# Patient Record
Sex: FEMALE | Race: WHITE | Employment: OTHER | URBAN - METROPOLITAN AREA
[De-identification: names, ages, dates, MRNs, and addresses within clinical notes are randomized per-mention and may not be internally consistent; named-entity substitution may affect disease eponyms.]

---

## 2017-10-02 ENCOUNTER — APPOINTMENT (OUTPATIENT)
Dept: MRI IMAGING | Age: 72
End: 2017-10-02
Attending: HOSPITALIST
Payer: MEDICARE

## 2017-10-02 ENCOUNTER — HOSPITAL ENCOUNTER (OUTPATIENT)
Age: 72
Setting detail: OBSERVATION
Discharge: HOME | End: 2017-10-03
Attending: EMERGENCY MEDICINE | Admitting: HOSPITALIST
Payer: MEDICARE

## 2017-10-02 ENCOUNTER — APPOINTMENT (OUTPATIENT)
Dept: CT IMAGING | Age: 72
End: 2017-10-02
Attending: EMERGENCY MEDICINE
Payer: MEDICARE

## 2017-10-02 ENCOUNTER — APPOINTMENT (OUTPATIENT)
Dept: GENERAL RADIOLOGY | Age: 72
End: 2017-10-02
Attending: EMERGENCY MEDICINE
Payer: MEDICARE

## 2017-10-02 DIAGNOSIS — G45.9 TRANSIENT CEREBRAL ISCHEMIA, UNSPECIFIED TYPE: Primary | ICD-10-CM

## 2017-10-02 PROBLEM — I63.9 CVA (CEREBRAL VASCULAR ACCIDENT) (HCC): Status: ACTIVE | Noted: 2017-10-02

## 2017-10-02 LAB
ALBUMIN SERPL-MCNC: 4.1 G/DL (ref 3.4–5)
ALBUMIN/GLOB SERPL: 1.1 {RATIO} (ref 0.8–1.7)
ALP SERPL-CCNC: 76 U/L (ref 45–117)
ALT SERPL-CCNC: 25 U/L (ref 13–56)
ANION GAP SERPL CALC-SCNC: 8 MMOL/L (ref 3–18)
AST SERPL-CCNC: 20 U/L (ref 15–37)
ATRIAL RATE: 68 BPM
BASOPHILS # BLD: 0 K/UL (ref 0–0.06)
BASOPHILS NFR BLD: 0 % (ref 0–2)
BILIRUB SERPL-MCNC: 0.4 MG/DL (ref 0.2–1)
BUN SERPL-MCNC: 15 MG/DL (ref 7–18)
BUN/CREAT SERPL: 21 (ref 12–20)
CALCIUM SERPL-MCNC: 8.8 MG/DL (ref 8.5–10.1)
CALCULATED P AXIS, ECG09: 69 DEGREES
CALCULATED R AXIS, ECG10: 37 DEGREES
CALCULATED T AXIS, ECG11: 53 DEGREES
CHLORIDE SERPL-SCNC: 108 MMOL/L (ref 100–108)
CO2 SERPL-SCNC: 25 MMOL/L (ref 21–32)
CREAT SERPL-MCNC: 0.71 MG/DL (ref 0.6–1.3)
DIAGNOSIS, 93000: NORMAL
DIFFERENTIAL METHOD BLD: ABNORMAL
EOSINOPHIL # BLD: 0.1 K/UL (ref 0–0.4)
EOSINOPHIL NFR BLD: 1 % (ref 0–5)
ERYTHROCYTE [DISTWIDTH] IN BLOOD BY AUTOMATED COUNT: 13 % (ref 11.6–14.5)
GLOBULIN SER CALC-MCNC: 3.8 G/DL (ref 2–4)
GLUCOSE BLD STRIP.AUTO-MCNC: 101 MG/DL (ref 70–110)
GLUCOSE BLD STRIP.AUTO-MCNC: 164 MG/DL (ref 70–110)
GLUCOSE SERPL-MCNC: 122 MG/DL (ref 74–99)
HCT VFR BLD AUTO: 40.9 % (ref 35–45)
HGB BLD-MCNC: 13.9 G/DL (ref 12–16)
INR PPP: 1 (ref 0.8–1.2)
LYMPHOCYTES # BLD: 2.7 K/UL (ref 0.9–3.6)
LYMPHOCYTES NFR BLD: 22 % (ref 21–52)
MCH RBC QN AUTO: 31 PG (ref 24–34)
MCHC RBC AUTO-ENTMCNC: 34 G/DL (ref 31–37)
MCV RBC AUTO: 91.3 FL (ref 74–97)
MONOCYTES # BLD: 0.7 K/UL (ref 0.05–1.2)
MONOCYTES NFR BLD: 6 % (ref 3–10)
NEUTS SEG # BLD: 8.5 K/UL (ref 1.8–8)
NEUTS SEG NFR BLD: 71 % (ref 40–73)
P-R INTERVAL, ECG05: 138 MS
PLATELET # BLD AUTO: 237 K/UL (ref 135–420)
PMV BLD AUTO: 9.8 FL (ref 9.2–11.8)
POTASSIUM SERPL-SCNC: 4 MMOL/L (ref 3.5–5.5)
PROT SERPL-MCNC: 7.9 G/DL (ref 6.4–8.2)
PROTHROMBIN TIME: 12.6 SEC (ref 11.5–15.2)
Q-T INTERVAL, ECG07: 430 MS
QRS DURATION, ECG06: 82 MS
QTC CALCULATION (BEZET), ECG08: 457 MS
RBC # BLD AUTO: 4.48 M/UL (ref 4.2–5.3)
SODIUM SERPL-SCNC: 141 MMOL/L (ref 136–145)
VENTRICULAR RATE, ECG03: 68 BPM
WBC # BLD AUTO: 12 K/UL (ref 4.6–13.2)

## 2017-10-02 PROCEDURE — 96372 THER/PROPH/DIAG INJ SC/IM: CPT

## 2017-10-02 PROCEDURE — 99285 EMERGENCY DEPT VISIT HI MDM: CPT

## 2017-10-02 PROCEDURE — 85610 PROTHROMBIN TIME: CPT | Performed by: EMERGENCY MEDICINE

## 2017-10-02 PROCEDURE — 80053 COMPREHEN METABOLIC PANEL: CPT | Performed by: EMERGENCY MEDICINE

## 2017-10-02 PROCEDURE — 96375 TX/PRO/DX INJ NEW DRUG ADDON: CPT

## 2017-10-02 PROCEDURE — 74011250637 HC RX REV CODE- 250/637: Performed by: EMERGENCY MEDICINE

## 2017-10-02 PROCEDURE — 70551 MRI BRAIN STEM W/O DYE: CPT

## 2017-10-02 PROCEDURE — 70544 MR ANGIOGRAPHY HEAD W/O DYE: CPT

## 2017-10-02 PROCEDURE — 99218 HC RM OBSERVATION: CPT

## 2017-10-02 PROCEDURE — 74011250637 HC RX REV CODE- 250/637: Performed by: HOSPITALIST

## 2017-10-02 PROCEDURE — 74011250636 HC RX REV CODE- 250/636: Performed by: HOSPITALIST

## 2017-10-02 PROCEDURE — 85025 COMPLETE CBC W/AUTO DIFF WBC: CPT | Performed by: EMERGENCY MEDICINE

## 2017-10-02 PROCEDURE — 96374 THER/PROPH/DIAG INJ IV PUSH: CPT

## 2017-10-02 PROCEDURE — 93005 ELECTROCARDIOGRAM TRACING: CPT

## 2017-10-02 PROCEDURE — 82962 GLUCOSE BLOOD TEST: CPT

## 2017-10-02 PROCEDURE — 74011250636 HC RX REV CODE- 250/636

## 2017-10-02 PROCEDURE — 65390000012 HC CONDITION CODE 44 OBSERVATION

## 2017-10-02 PROCEDURE — 70547 MR ANGIOGRAPHY NECK W/O DYE: CPT

## 2017-10-02 PROCEDURE — 70450 CT HEAD/BRAIN W/O DYE: CPT

## 2017-10-02 PROCEDURE — 71010 XR CHEST PORT: CPT

## 2017-10-02 RX ORDER — ATORVASTATIN CALCIUM 40 MG/1
40 TABLET, FILM COATED ORAL
Status: DISCONTINUED | OUTPATIENT
Start: 2017-10-02 | End: 2017-10-03 | Stop reason: HOSPADM

## 2017-10-02 RX ORDER — ONDANSETRON 2 MG/ML
INJECTION INTRAMUSCULAR; INTRAVENOUS
Status: COMPLETED
Start: 2017-10-02 | End: 2017-10-02

## 2017-10-02 RX ORDER — ONDANSETRON 2 MG/ML
4 INJECTION INTRAMUSCULAR; INTRAVENOUS
Status: COMPLETED | OUTPATIENT
Start: 2017-10-02 | End: 2017-10-02

## 2017-10-02 RX ORDER — GUAIFENESIN 100 MG/5ML
324 LIQUID (ML) ORAL
Status: COMPLETED | OUTPATIENT
Start: 2017-10-02 | End: 2017-10-02

## 2017-10-02 RX ORDER — ENOXAPARIN SODIUM 100 MG/ML
40 INJECTION SUBCUTANEOUS EVERY 24 HOURS
Status: DISCONTINUED | OUTPATIENT
Start: 2017-10-02 | End: 2017-10-03 | Stop reason: HOSPADM

## 2017-10-02 RX ORDER — LORAZEPAM 2 MG/ML
0.5 INJECTION INTRAMUSCULAR ONCE
Status: COMPLETED | OUTPATIENT
Start: 2017-10-02 | End: 2017-10-02

## 2017-10-02 RX ORDER — SODIUM CHLORIDE 0.9 % (FLUSH) 0.9 %
5-10 SYRINGE (ML) INJECTION EVERY 8 HOURS
Status: DISCONTINUED | OUTPATIENT
Start: 2017-10-02 | End: 2017-10-03 | Stop reason: HOSPADM

## 2017-10-02 RX ORDER — LABETALOL HYDROCHLORIDE 5 MG/ML
5 INJECTION, SOLUTION INTRAVENOUS
Status: DISCONTINUED | OUTPATIENT
Start: 2017-10-02 | End: 2017-10-03 | Stop reason: HOSPADM

## 2017-10-02 RX ORDER — SODIUM CHLORIDE 0.9 % (FLUSH) 0.9 %
5-10 SYRINGE (ML) INJECTION AS NEEDED
Status: DISCONTINUED | OUTPATIENT
Start: 2017-10-02 | End: 2017-10-03 | Stop reason: HOSPADM

## 2017-10-02 RX ORDER — GUAIFENESIN 100 MG/5ML
81 LIQUID (ML) ORAL DAILY
Status: DISCONTINUED | OUTPATIENT
Start: 2017-10-03 | End: 2017-10-03 | Stop reason: HOSPADM

## 2017-10-02 RX ADMIN — ASPIRIN 81 MG 324 MG: 81 TABLET ORAL at 08:58

## 2017-10-02 RX ADMIN — ENOXAPARIN SODIUM 40 MG: 40 INJECTION SUBCUTANEOUS at 14:00

## 2017-10-02 RX ADMIN — ATORVASTATIN CALCIUM 40 MG: 40 TABLET, FILM COATED ORAL at 22:36

## 2017-10-02 RX ADMIN — LORAZEPAM 0.5 MG: 2 INJECTION INTRAMUSCULAR; INTRAVENOUS at 14:59

## 2017-10-02 RX ADMIN — ONDANSETRON 4 MG: 2 INJECTION INTRAMUSCULAR; INTRAVENOUS at 09:01

## 2017-10-02 RX ADMIN — Medication 10 ML: at 22:38

## 2017-10-02 NOTE — IP AVS SNAPSHOT
Genaro Lawson 
 
 
 [address] [facility] [address] Patient: Thomas Dean MRN: [id number] :1945 Current Discharge Medication List  
  
START taking these medications Dose & Instructions Dispensing Information Comments Morning Noon Evening Bedtime  
 aspirin 81 mg chewable tablet Your last dose was: Your next dose is:    
   
   
 Dose:  81 mg Take 1 Tab by mouth daily. Quantity:  30 Tab Refills:  0  
     
   
   
   
  
 atorvastatin 20 mg tablet Commonly known as:  LIPITOR Your last dose was: Your next dose is:    
   
   
 Dose:  20 mg Take 1 Tab by mouth nightly. Quantity:  30 Tab Refills:  0 Where to Get Your Medications Information on where to get these meds will be given to you by the nurse or doctor. ! Ask your nurse or doctor about these medications  
  aspirin 81 mg chewable tablet  
 atorvastatin 20 mg tablet

## 2017-10-02 NOTE — IP AVS SNAPSHOT
Merlin Laroche 
 
 
 [address] [facility] [address] Patient: Valentin Mcgee MRN: [id number] :1945 You are allergic to the following No active allergies Recent Documentation Height Weight BMI OB Status Smoking Status 1.524 m 51.7 kg 22.26 kg/m2 Hysterectomy Never Smoker Emergency Contacts Name Discharge Info Relation Home Work Mobile Neftaly Friedman DISCHARGE CAREGIVER [3] Child [2] [phone]  [phone] About your hospitalization You were admitted on:  2017 You last received care in the:  [facility] [address] You were discharged on:  October 3, 2017 Unit phone number:  [phone] Why you were hospitalized Your primary diagnosis was:  Not on File Your diagnoses also included:  Cva (Cerebral Vascular Accident) (Hcc), Tia (Transient Ischemic Attack) Providers Seen During Your Hospitalizations Provider Role Specialty Primary office phone Angela Puri MD Attending Provider Emergency Medicine [phone] Kelly Rodriguez MD Attending Provider Internal Medicine [phone] Your Primary Care Physician (PCP) Primary Care Physician Office Phone Office Fax Umang Trevino [phone] Follow-up Information Follow up With Details Comments Contact Info PCP   pt says her daughter in law will set up appoitment in 1 week [place], NP On 11/3/2017 @ 11:15am (In for Dr. Khoi Dinh- Neurology) [address] [facility] [address] [facility] [address] 
[phone] JULIA Pittman On 10/10/2017 @ 1:30 pm (In for Dr. Geovanna Bernstein) & Please bring $12.00 co-pay w/ins. card [address] [facility] [address] [facility] [address] [phone] Your Appointments Tuesday October 10, 2017  2:00 PM EDT HOSPITAL FOLLOW-UP with JULIA Pittman 81 ([facility]) [address] [facility] [address]  
[phone] Friday November 03, 2017 11:30 AM EDT Follow Up with Julee Weinstein NP [address] ([address]) [address] [facility] [address]  
[phone] Current Discharge Medication List  
  
START taking these medications Dose & Instructions Dispensing Information Comments Morning Noon Evening Bedtime  
 aspirin 81 mg chewable tablet Your last dose was: Your next dose is:    
   
   
 Dose:  81 mg Take 1 Tab by mouth daily. Quantity:  30 Tab Refills:  0  
     
   
   
   
  
 atorvastatin 20 mg tablet Commonly known as:  LIPITOR Your last dose was: Your next dose is:    
   
   
 Dose:  20 mg Take 1 Tab by mouth nightly. Quantity:  30 Tab Refills:  0 Where to Get Your Medications Information on where to get these meds will be given to you by the nurse or doctor. ! Ask your nurse or doctor about these medications  
  aspirin 81 mg chewable tablet  
 atorvastatin 20 mg tablet Discharge Instructions Discharge Instructions Patient: Vinny Suh MRN: [id number]  CSN: [id number] YOB: 1945  Age: 81 y.o. Sex: female DOA: 10/2/2017 LOS:  LOS: 1 day   Discharge Date: DIET:  Cardiac Diet ACTIVITY: Activity as tolerated ADDITIONAL INFORMATION: If you experience any of the following symptoms but not limited to Fever, chills, nausea, vomiting, diarrhea, change in mentation, falling, bleeding, shortness of breath, chest pain, please call your primary care physician or return to the emergency room if you cannot get hold of your doctor:  
 
FOLLOW UP CARE: 
PCP, pt says her daughter in law will set up appoitment in 1 week Dr. Bobby Dobbs in 1 month. Betty Arteaga MD 
10/3/2017 11:36 AM 
 
 
 
 
 
Discharge Orders None  
  
[employer] Announcement We are excited to announce that we are making your provider's discharge notes available to you in [employer]. You will see these notes when they are completed and signed by the physician that discharged you from your recent hospital stay. If you have any questions or concerns about any information you see in [employer], please call the Health Information Department where you were seen or reach out to your Primary Care Provider for more information about your plan of care. Introducing [facility]! [employer] introduces [employer] patient portal. Now you can access parts of your medical record, email your doctor's office, and request medication refills online. 1. In your internet browser, go to [email or web address] 2. Click on the First Time User? Click Here link in the Sign In box. You will see the New Member Sign Up page. 3. Enter your [employer] Access Code exactly as it appears below. You will not need to use this code after youve completed the sign-up process. If you do not sign up before the expiration date, you must request a new code. · [employer] Access Code: [email or web address] Expires: 1/1/2018  9:40 AM 
 
4. Enter the last four digits of your Social Security Number (xxxx) and Date of Birth (mm/dd/yyyy) as indicated and click Submit. You will be taken to the next sign-up page. 5. Create a K[employer]xue ID. This will be your K[employer] login ID and cannot be changed, so think of one that is secure and easy to remember. 6. Create a [employer] password. You can change your password at any time. 7. Enter your Password Reset Question and Answer. This can be used at a later time if you forget your password. 8. Enter your e-mail address. You will receive e-mail notification when new information is available in [address] 9. Click Sign Up. You can now view and download portions of your medical record. 10. Click the Download Summary menu link to download a portable copy of your medical information. If you have questions, please visit the Frequently Asked Questions section of the [employer] website. Remember, MyChart is NOT to be used for urgent needs. For medical emergencies, dial 911. Now available from your iPhone and Android! General Information Please provide this summary of care documentation to your next provider. Patient Signature:  ____________________________________________________________ Date:  ____________________________________________________________  
  
Dustin Madiamond Provider Signature:  ____________________________________________________________ Date:  ____________________________________________________________

## 2017-10-02 NOTE — ROUTINE PROCESS
Bedside and Verbal shift change report given to ZULLY Campos RN by [place]. XANDER Rush. Report included the following information SBAR, Kardex, MAR and Recent Results.

## 2017-10-02 NOTE — ED NOTES
Patient resting on stretcher. Patient denies any needs at this time. Call bell within reach. Patient family at bedside.

## 2017-10-02 NOTE — Clinical Note
Status[de-identified] Inpatient [101] Type of Bed: Neuro/Stroke [9] Inpatient Hospitalization Certified Necessary for the Following Reasons: 3. Patient receiving treatment that can only be provided in an inpatient setting (further clarification in H&P documentation) Admitting Diagnosis: CVA (cerebral vascular accident) [facility]) [[id number]] Admitting Physician: Luis Carlos Crum [[id number]] Attending Physician: Luis Carlos Crum [[id number]] Estimated Length of Stay: 2 Midnights Discharge Plan[de-identified] Home with Office Follow-up

## 2017-10-02 NOTE — CONSULTS
[address], [address] [place] [address]    10/2/2017    Moshe Chris is a 81 y.o., right handed,   female, who presents with difficulty walking tilting to left as well as later noted left facial droop. She reports symptoms lasted for approximately 5 hours before essentially resolving. She is unaware of any previous health issues since she does not visit doctors. She denied any difficulties last evening and or previous strokelike symptoms. She denies any chest pain. She has been on Motrin 200 mg twice a day she was not on aspirin previously . Current Facility-Administered Medications   Medication Dose Route Frequency Provider Last Rate Last Dose    sodium chloride (NS) flush 5-10 mL  5-10 mL IntraVENous Q8H Demi Yeung MD        sodium chloride (NS) flush 5-10 mL  5-10 mL IntraVENous PRN Demi Yeung MD        [START ON 10/3/2017] aspirin chewable tablet 81 mg  81 mg Oral DAILY Demi Yeung MD        atorvastatin (LIPITOR) tablet 40 mg  40 mg Oral QHS Demi Yeung MD        labetalol (NORMODYNE;TRANDATE) injection 5 mg  5 mg IntraVENous Q10MIN PRN Demi Yeung MD        enoxaparin (LOVENOX) injection 40 mg  40 mg SubCUTAneous Q24H Demi Yeung MD   40 mg at 10/02/17 1400       Past Medical History:   Diagnosis Date    Abnormal Pap smear     Dr. Zapata Ice Neck pain 5/17/2010       Past Surgical History:   Procedure Laterality Date    HX GYN      Total Hyst     No family history on file.   No Known Allergies    Review of Systems:   Review of Systems - History obtained from the patient  General ROS: negative  Psychological ROS: negative  ENT ROS: negative  Hematological and Lymphatic ROS: negative  Endocrine ROS: negative  Respiratory ROS: no cough, shortness of breath, or wheezing  Cardiovascular ROS: no chest pain or dyspnea on exertion  Gastrointestinal ROS: no abdominal pain, change in bowel habits, or black or bloody stools  Genito-Urinary ROS: no dysuria, trouble voiding, or hematuria  Musculoskeletal ROS: positive for - joint pain  Neurological ROS: positive for - gait disturbance and speech problems  Dermatological ROS: negative    PHYSICAL EXAMINATION:    Visit Vitals    /65    Pulse 78    Temp 98.6 °F (37 °C)    Resp 20    SpO2 96%     General:  Well defined, nourished, and groomed individual in no acute distress. Neck: Supple, nontender, thyroid within normal limits, no JVD, no bruits, no pain with resistance to active range of motion. Heart: Regular rate and rhythm, no murmurs, rub, or gallop. Normal S1S2. Lungs:  Clear to auscultation bilaterally with equal chest expansion, no cough, no wheeze  Musculoskeletal:  Extremities revealed no edema and had full range of motion of joints. Psych:  Good mood and normal affect    NEUROLOGICAL EXAMINATION:     Mental Status:   Alert and oriented to person, place, and time with recent and remote memory intact. Attention span and concentration are normal. Speech is fluent with a full fund of knowledge. Cranial Nerves:    II, III, IV, VI:  Visual acuity grossly intact. Visual fields are normal.    Pupils are equal, round, and reactive to light and accommodation. Extra-ocular movements are full and fluid. Fundoscopic exam was not visualized, no ptosis or nystagmus. V-XII: Hearing is grossly intact. Facial features are symmetric, with normal sensation and strength. The palate rises symmetrically and the tongue protrudes midline. Sternocleidomastoids 5/5. Motor Examination: Normal tone, bulk, and strength, 5/5 muscle strength throughout. No cogwheel rigidity or clonus present. Sensory exam:  Normal throughout to pinprick, temperature, and vibration sense. Coordination:  Heel-to-shin was smooth and symmetrical bilaterally.   Finger to nose and rapid arm movement testing was normal.   No resting or intention tremor    Gait and Station:  no pronator drift. No muscle wasting or fasiculations noted. Gait testing deferred    Reflexes:  DTRs depressed throughout. Toes downgoing. Ct head imaging personally reviewed as showing minimal small vessel disease  Assessment and Plan: Thomas Dean is a 81 y.o. right handed female whose history and physical are consistent with tia versus lacunar infarct. Thomas Dean who has risk factors including ? hypertension     Recommendations :As discussed with patient and attending will need mri/mra exams with further recommendations pending . She has not taken asa in past  Chart reviewed    I spent 50 minutes with the patient in face-to-face consultation, of which greater than 50% was spent in counseling and coordination of care as described above.

## 2017-10-02 NOTE — ED TRIAGE NOTES
Patient arrived via EMS c/o possible stroke. Patient has left sided droop with slurred speech. Patient has left sided weakness with drift in left arm and left leg. Patient denies headache, blurred vision, numbness of tingling.

## 2017-10-02 NOTE — PROGRESS NOTES
conducted an initial consultation and Spiritual Assessment for Tang Smith, who is a 81 y.o.,female. Patients Primary Language is: [place]. According to the patients EMR Tenriism Affiliation is: Mormon. The reason the Patient came to the hospital is:   Patient Active Problem List    Diagnosis Date Noted    CVA (cerebral vascular accident) ([facility] [address] 10/02/2017    Neck pain 05/17/2010        The  provided the following Interventions:  Initiated a relationship of care and support. Listened empathically. Provided information about Spiritual Care Services. Offered  assurance of continued prayers on patient's behalf. Chart reviewed. The following outcomes where achieved:  Family shared limited information about both their medical narrative and spiritual journey/beliefs. Patient processed feeling about current hospitalization. Patient expressed gratitude for 's visit. Assessment:  Patient does not have any Roman Catholic/cultural needs that will affect patients preferences in health care. There are no spiritual or Roman Catholic issues which require intervention at this time. Plan:  Chaplains will continue to follow and will provide pastoral care on an as needed/requested basis.  recommends bedside caregivers page  on duty if patient shows signs of acute spiritual or emotional distress.       Rodolfo Chandra, [address] [place] [address]  Spiritual Care  [phone]

## 2017-10-03 VITALS
HEART RATE: 72 BPM | BODY MASS INDEX: 22.38 KG/M2 | WEIGHT: 114 LBS | SYSTOLIC BLOOD PRESSURE: 121 MMHG | HEIGHT: 60 IN | DIASTOLIC BLOOD PRESSURE: 68 MMHG | OXYGEN SATURATION: 98 % | TEMPERATURE: 98.2 F | RESPIRATION RATE: 20 BRPM

## 2017-10-03 LAB
BASOPHILS # BLD: 0 K/UL (ref 0–0.1)
BASOPHILS NFR BLD: 0 % (ref 0–2)
CHOLEST SERPL-MCNC: 163 MG/DL
DIFFERENTIAL METHOD BLD: NORMAL
EOSINOPHIL # BLD: 0.1 K/UL (ref 0–0.4)
EOSINOPHIL NFR BLD: 1 % (ref 0–5)
ERYTHROCYTE [DISTWIDTH] IN BLOOD BY AUTOMATED COUNT: 13.3 % (ref 11.6–14.5)
EST. AVERAGE GLUCOSE BLD GHB EST-MCNC: 111 MG/DL
GLUCOSE BLD STRIP.AUTO-MCNC: 104 MG/DL (ref 70–110)
HBA1C MFR BLD: 5.5 % (ref 4.2–5.6)
HCT VFR BLD AUTO: 39.6 % (ref 35–45)
HDLC SERPL-MCNC: 71 MG/DL (ref 40–60)
HDLC SERPL: 2.3 {RATIO} (ref 0–5)
HGB BLD-MCNC: 13.3 G/DL (ref 12–16)
LDLC SERPL CALC-MCNC: 70.2 MG/DL (ref 0–100)
LIPID PROFILE,FLP: ABNORMAL
LYMPHOCYTES # BLD: 3.2 K/UL (ref 0.9–3.6)
LYMPHOCYTES NFR BLD: 40 % (ref 21–52)
MCH RBC QN AUTO: 31.1 PG (ref 24–34)
MCHC RBC AUTO-ENTMCNC: 33.6 G/DL (ref 31–37)
MCV RBC AUTO: 92.7 FL (ref 74–97)
MONOCYTES # BLD: 0.8 K/UL (ref 0.05–1.2)
MONOCYTES NFR BLD: 10 % (ref 3–10)
NEUTS SEG # BLD: 3.8 K/UL (ref 1.8–8)
NEUTS SEG NFR BLD: 49 % (ref 40–73)
PLATELET # BLD AUTO: 249 K/UL (ref 135–420)
PMV BLD AUTO: 10 FL (ref 9.2–11.8)
RBC # BLD AUTO: 4.27 M/UL (ref 4.2–5.3)
TRIGL SERPL-MCNC: 109 MG/DL (ref ?–150)
VLDLC SERPL CALC-MCNC: 21.8 MG/DL
WBC # BLD AUTO: 7.9 K/UL (ref 4.6–13.2)

## 2017-10-03 PROCEDURE — G8987 SELF CARE CURRENT STATUS: HCPCS

## 2017-10-03 PROCEDURE — 74011250637 HC RX REV CODE- 250/637: Performed by: HOSPITALIST

## 2017-10-03 PROCEDURE — G8980 MOBILITY D/C STATUS: HCPCS

## 2017-10-03 PROCEDURE — G8978 MOBILITY CURRENT STATUS: HCPCS

## 2017-10-03 PROCEDURE — G8996 SWALLOW CURRENT STATUS: HCPCS

## 2017-10-03 PROCEDURE — 80061 LIPID PANEL: CPT | Performed by: HOSPITALIST

## 2017-10-03 PROCEDURE — G8989 SELF CARE D/C STATUS: HCPCS

## 2017-10-03 PROCEDURE — G8998 SWALLOW D/C STATUS: HCPCS

## 2017-10-03 PROCEDURE — 97165 OT EVAL LOW COMPLEX 30 MIN: CPT

## 2017-10-03 PROCEDURE — 92610 EVALUATE SWALLOWING FUNCTION: CPT

## 2017-10-03 PROCEDURE — G8979 MOBILITY GOAL STATUS: HCPCS

## 2017-10-03 PROCEDURE — 99218 HC RM OBSERVATION: CPT

## 2017-10-03 PROCEDURE — 97161 PT EVAL LOW COMPLEX 20 MIN: CPT

## 2017-10-03 PROCEDURE — G8988 SELF CARE GOAL STATUS: HCPCS

## 2017-10-03 PROCEDURE — 36415 COLL VENOUS BLD VENIPUNCTURE: CPT | Performed by: HOSPITALIST

## 2017-10-03 PROCEDURE — 83036 HEMOGLOBIN GLYCOSYLATED A1C: CPT | Performed by: HOSPITALIST

## 2017-10-03 PROCEDURE — G8997 SWALLOW GOAL STATUS: HCPCS

## 2017-10-03 PROCEDURE — 82962 GLUCOSE BLOOD TEST: CPT

## 2017-10-03 PROCEDURE — 85025 COMPLETE CBC W/AUTO DIFF WBC: CPT | Performed by: HOSPITALIST

## 2017-10-03 RX ORDER — GUAIFENESIN 100 MG/5ML
81 LIQUID (ML) ORAL DAILY
Qty: 30 TAB | Refills: 0 | Status: SHIPPED | OUTPATIENT
Start: 2017-10-03 | End: 2017-10-10 | Stop reason: SDUPTHER

## 2017-10-03 RX ORDER — ATORVASTATIN CALCIUM 20 MG/1
20 TABLET, FILM COATED ORAL
Qty: 30 TAB | Refills: 0 | Status: SHIPPED | OUTPATIENT
Start: 2017-10-03 | End: 2017-10-10 | Stop reason: SDUPTHER

## 2017-10-03 RX ADMIN — ASPIRIN 81 MG 81 MG: 81 TABLET ORAL at 08:52

## 2017-10-03 RX ADMIN — Medication 10 ML: at 06:42

## 2017-10-03 NOTE — PROGRESS NOTES
Progress Note    Patient: Estella Soulier MRN: [id number]  SSN: [id number]    YOB: 1945  Age: 81 y.o.   Sex: female      Admit Date: 10/2/2017    LOS: 1 day     Subjective:     Patient presents with transient left facial droop and gait disturbance         Current Facility-Administered Medications   Medication Dose Route Frequency    sodium chloride (NS) flush 5-10 mL  5-10 mL IntraVENous Q8H    sodium chloride (NS) flush 5-10 mL  5-10 mL IntraVENous PRN    aspirin chewable tablet 81 mg  81 mg Oral DAILY    atorvastatin (LIPITOR) tablet 40 mg  40 mg Oral QHS    labetalol (NORMODYNE;TRANDATE) injection 5 mg  5 mg IntraVENous Q10MIN PRN    enoxaparin (LOVENOX) injection 40 mg  40 mg SubCUTAneous Q24H          Objective:     Vitals:    10/03/17 0400 10/03/17 0541 10/03/17 0800 10/03/17 1200   BP: 106/67  137/82 121/68   Pulse: 77  70 72   Resp: 18  19 20   Temp: 98.7 °F (37.1 °C)  98.1 °F (36.7 °C) 98.2 °F (36.8 °C)   SpO2: 96%  97% 98%   Weight:  51.7 kg (114 lb)     Height:            Intake and Output:  Current Shift:    Last three shifts:      Physical Exam:   GENERAL: alert, cooperative, no distress, appears stated age  NEUROLOGIC: negative findings: alert, oriented x3  speech normal in context and clarity  memory intact grossly  cranial nerves II-XII intact  motor strength: full proximally and distally  no involuntary movements - tremors    Lab/Data Review:  Recent Results (from the past 24 hour(s))   GLUCOSE, POC    Collection Time: 10/02/17  6:23 PM   Result Value Ref Range    Glucose (POC) 164 (H) 70 - 110 mg/dL   GLUCOSE, POC    Collection Time: 10/02/17 10:05 PM   Result Value Ref Range    Glucose (POC) 101 70 - 110 mg/dL   LIPID PANEL    Collection Time: 10/03/17  2:21 AM   Result Value Ref Range    LIPID PROFILE          Cholesterol, total 163 <200 MG/DL    Triglyceride 109 <150 MG/DL    HDL Cholesterol 71 (H) 40 - 60 MG/DL    LDL, calculated 70.2 0 - 100 MG/DL    VLDL, calculated 21.8 MG/DL    CHOL/HDL Ratio 2.3 0 - 5.0     HEMOGLOBIN A1C WITH EAG    Collection Time: 10/03/17  2:21 AM   Result Value Ref Range    Hemoglobin A1c 5.5 4.2 - 5.6 %    Est. average glucose 111 mg/dL   CBC WITH AUTOMATED DIFF    Collection Time: 10/03/17  2:21 AM   Result Value Ref Range    WBC 7.9 4.6 - 13.2 K/uL    RBC 4.27 4.20 - 5.30 M/uL    HGB 13.3 12.0 - 16.0 g/dL    HCT 39.6 35.0 - 45.0 %    MCV 92.7 74.0 - 97.0 FL    MCH 31.1 24.0 - 34.0 PG    MCHC 33.6 31.0 - 37.0 g/dL    RDW 13.3 11.6 - 14.5 %    PLATELET 524 756 - 099 K/uL    MPV 10.0 9.2 - 11.8 FL    NEUTROPHILS 49 40 - 73 %    LYMPHOCYTES 40 21 - 52 %    MONOCYTES 10 3 - 10 %    EOSINOPHILS 1 0 - 5 %    BASOPHILS 0 0 - 2 %    ABS. NEUTROPHILS 3.8 1.8 - 8.0 K/UL    ABS. LYMPHOCYTES 3.2 0.9 - 3.6 K/UL    ABS. MONOCYTES 0.8 0.05 - 1.2 K/UL    ABS. EOSINOPHILS 0.1 0.0 - 0.4 K/UL    ABS.  BASOPHILS 0.0 0.0 - 0.1 K/UL    DF AUTOMATED     GLUCOSE, POC    Collection Time: 10/03/17  7:09 AM   Result Value Ref Range    Glucose (POC) 104 70 - 110 mg/dL       Mri brain imaging personally reviewed as no acute changes ,small vessel cerebravascular disease ?c2 abnormality    Assessment:   Small vessel cerebrovascular disease   tia  Active Problems:    CVA (cerebral vascular accident) ([facility] [address] (10/2/2017)      TIA (transient ischemic attack) (10/2/2017)        Plan:     Recommend asa therapy , statin careful blood pressure control and follow up C2 ?lesion as outpatient  Discussed with patient and attending  Signed By: Ashley Apodaca MD     October 3, 2017

## 2017-10-03 NOTE — DISCHARGE SUMMARY
Discharge Summary    Patient: Nayely Drake MRN: [id number]  CSN: [id number]    YOB: 1945  Age: 81 y.o. Sex: female    DOA: 10/2/2017 LOS:  LOS: 1 day   Discharge Date:      Admission Diagnoses: CVA (cerebral vascular accident) ([facility] [address]  TIA (transient ischemic attack)    Discharge Diagnoses:  PLEASE SEE DICTATION. Discharge Condition: Stable    PHYSICAL EXAM  Visit Vitals    /82 (BP 1 Location: Left arm, BP Patient Position: Supine)    Pulse 70    Temp 98.1 °F (36.7 °C)    Resp 19    Ht 5' (1.524 m)    Wt 51.7 kg (114 lb)    SpO2 97%    BMI 22.26 kg/m2       General: Alert, cooperative, no acute distress    Lungs:  CTA Bilaterally. Heart:  Regular rate and Rhythm. Abdomen: Soft, Non distended, Non tender. + Bowel sounds. Extremities: No edema/ cyanosis/ clubbing  Psych:   Good insight. Not anxious or agitated. Neurologic:  AA oriented X 3. Moves all extremities. Hospital Course: Please see dictation. code # [id number]. Discharge Medications:     Current Discharge Medication List      START taking these medications    Details   aspirin 81 mg chewable tablet Take 1 Tab by mouth daily. Qty: 30 Tab, Refills: 0      atorvastatin (LIPITOR) 20 mg tablet Take 1 Tab by mouth nightly. Qty: 30 Tab, Refills: 0           · It is important that you take the medication exactly as they are prescribed. · Keep your medication in the bottles provided by the pharmacist and keep a list of the medication names, dosages, and times to be taken in your wallet. · Do not take other medications without consulting your doctor.      DIET:  Cardiac Diet    ACTIVITY: Activity as tolerated    ADDITIONAL INFORMATION: If you experience any of the following symptoms but not limited to Fever, chills, nausea, vomiting, diarrhea, change in mentation, falling, bleeding, shortness of breath, chest pain, please call your primary care physician or return to the emergency room if you cannot get hold of your doctor:     FOLLOW UP CARE:  PCP, pt says her daughter in law will set up appoitment in 1 week  Dr. Nikita Jcaobs in 1 month.     Minutes spent on discharge: 40 minutes spent coordinating this discharge (review instructions/follow-up, prescriptions, preparing report for sign off)    Sanchez Walters MD  10/3/2017 11:37 AM

## 2017-10-03 NOTE — PROGRESS NOTES
Problem: Patient Education: Go to Patient Education Activity  Goal: Patient/Family Education  Outcome: Progressing Towards Goal  Pt provided 1:1 on stroke education, stroke book provided.

## 2017-10-03 NOTE — PROGRESS NOTES
Dysphagia eval completed with recs of reg diet and thin liquids, meds as tolerated. Full report to follow.      Thank you for this referral.    Leslie Hernandez M.S. CCC-SLP/L  Speech-Language Pathologist

## 2017-10-03 NOTE — DISCHARGE SUMMARY
L[address]  [address] [place] SUMMARY    Name:  Olga Hilliard  MR#:  [id number]  :  1945  Account #:  [de-identified]  Date of Adm:  10/02/2017  Date of Discharge:  10/03/2017      DISPOSITION: Discharged to home. DISCHARGE CONDITION: Stable. DISCHARGE DIAGNOSES:  1. Transient ischemic attack, cerebrovascular accident ruled out with  negative MRI. 2. 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 DISCHARGE MEDICATIONS:  1. Aspirin 81 mg p.o. daily. 2. Lipitor 20 mg at bedtime. MAJOR INVESTIGATIONS DURING THE HOSPITAL STAY: The  patient had MRI brain which was negative for stroke. The patient also  had MRA head and neck, which was patent intracranial arteries and  extracranial arteries. CONSULTATIONS IN THE HOSPITAL: Consultation with Dr. Irene Neves, Neurology. HOSPITAL COURSE: This is a 42-year-old  female who  presents to the emergency room with left-sided weakness which  resolved within 3-4 hours, the patient back to baseline. The patient had  a CT head on admission and was admitted to the hospital to rule out  stroke and MRI/MRA head and neck was ordered. The patient was  also started on aspirin and statin. The patient initially said that she was  taking aspirin, but she was actually taking Motrin at home. So this was  not an aspirin failure. The patient had MRI brain and also MRA head  and neck, which were negative. No signs of stroke. I discussed with  neurologist, Dr. Irene Neves, who recommended no further workup and  no need for any echo, okay for discharge. The patient does have a C2  fracture versus artifact, but other than some mild neck pain she did not  have any symptoms, no radiculopathies, so Neurology recommends  that an MRI can be done as an outpatient, no need for inpatient  workup. The patient is currently asymptomatic. She has been  ambulating without any problem. She desperately wants to go home.   She is stable for discharge, and the patient will be discharged home. DISCHARGE INSTRUCTIONS, FOLLOWUP APPOINTMENTS AND  PHYSICAL EXAM: Please refer to the electronic medical records. I discussed with the patient about discharge plan and followup  appointments. She completely understood and agreed with the plan. I  also answered all her questions and concerns appropriately.         Demetrius Nageotte, MD BT / RYAN  D:  10/03/2017   17:01  T:  10/03/2017   17:33  Job #:  [id number]

## 2017-10-03 NOTE — PROGRESS NOTES
Patients Name: Barry Escobedo   Account Number: [de-identified]  [address]  Date: 10/02/2017    Date: 10/3/2017    Medicare requires your physician and hospital to determine the correct billing  status for your hospital stay. This billing status is based upon your  diagnosis, the severity of your condition, the intensity of the services  required to treat your condition, the expected length of your hospital stay, and  guidelines set by Medicare. Based on the above criteria, we have determined that the correct billing status  for your current hospital stay should be Outpatient instead of Inpatient. While  your condition may not warrant an Inpatient admission, it does require  observation by health care professionals. Therefore, you are not admitted to  the hospital as an Inpatient, but are instead an Outpatient under observation. Your physician has been made aware and is in agreement. This determination will  mean: Your hospital stay has an Outpatient billing status, even though you are in a  regular hospital bed and may receive some of the same services as a patient  whose hospital stay has an Inpatient billing status. The Outpatient status of your hospital stay may affect Medicare coverage of  post-acute care, e.g., care provided at a skilled nursing facility or your home,  or other community-based care upon your discharge from the hospital.  For  instance, your Outpatient stay does not count toward the three-day Inpatient  stay requirement for admission to a skilled nursing facility. The Outpatient status of your hospital stay may affect Medicare coverage of your  current hospital services, including medications and pharmaceutical supplies. Medicare Part A does not cover Outpatient observation services. You may be  liable for some charges on your bill if they are not covered under Medicare Part  B or if you do not have Medicare Part B.     You will be responsible for your yearly Medicare Part B deductible and any  required cost-sharing. You may also be billed for self-administered  medications. If you have other or additional insurance coverage, such as Medicaid or private  health insurance, the Outpatient billing status of your hospital stay may  similarly affect such coverage. If you have questions concerning Medicare guidelines for determining whether a  hospital stay must be assigned Inpatient or Outpatient status, or about your  cost-sharing responsibilities under the Medicare program, please contact  Medicare at 1-800-MEDICARE (903-596-3268) TTY users should call 2-450.965.8192,  or Jett Matos at [phone]. If you have financial or billing questions please  contact a [place] [address] services representative at [phone] or  your insurance carrier.

## 2017-10-03 NOTE — PROGRESS NOTES
Problem: Mobility Impaired (Adult and Pediatric)  Goal: *Acute Goals and Plan of Care (Insert Text)  PHYSICAL THERAPY EVALUATION & DISCHARGE     Patient: Marco A Kong (81 y.o. female)  Date: 10/3/2017  Primary Diagnosis: CVA (cerebral vascular accident) [facility])  TIA (transient ischemic attack)        Precautions: Fall         ASSESSMENT AND RECOMMENDATIONS:  Pt is 71yo F admitted to hospital for CVA and presents today alert and agreeable to therapy. Pt reports resolution of symptoms. Pt transferred to sitting EOB for objective assessment and then stood and ambulated 200ft independently and navigated 10 steps with HR. Pt returned to supine in bed and was left resting with call bell by her side. Pt is functioning independently and skilled physical therapy is not indicated at this time. Discharge Recommendations: Outpatient  Further Equipment Recommendations for Discharge: N/A        G-:CODES      Mobility  Current  CH= 0%   Goal  CH= 0%  D/C  CH= 0%. The severity rating is based on the Level of Assistance required for Functional Mobility and ADLs. Evaluation Complexity      Eval Complexity: History: MEDIUM  Complexity : 1-2 comorbidities / personal factors will impact the outcome/ POC Exam:LOW Complexity : 1-2 Standardized tests and measures addressing body structure, function, activity limitation and / or participation in recreation  Presentation: LOW Complexity : Stable, uncomplicated  Clinical Decision Making:Low Complexity   Overall Complexity:LOW       SUBJECTIVE:   Patient stated I feel pretty good. A little tired.       OBJECTIVE DATA SUMMARY:       Past Medical History:   Diagnosis Date    Abnormal Pap smear       Dr. Leann Diaz Neck pain 5/17/2010     Past Surgical History:   Procedure Laterality Date    HX GYN         Total Hyst     Barriers to Learning/Limitations: None  Compensate with: visual, verbal, tactile, kinesthetic cues/model  Prior Level of Function/Home Situation: Pt lives in 2 story home with 0STE and was independent with mobility and I/ADL's PTA. Home Situation  Home Environment: Private residence  One/Two Story Residence: Two story  # of Interior Steps: 14  Height of Each Step (in): 6 inches  Interior Rails: Both  Lift Chair Available: No  Living Alone: No  Support Systems: Friends \ neighbors, Family member(s)  Patient Expects to be Discharged to[de-identified] Private residence  Current DME Used/Available at Home: None  Critical Behavior:  Neurologic State: Alert  Orientation Level: Oriented X4  Cognition: Follows commands   Strength:    Strength: Within functional limits (BLE)   Tone & Sensation:   Tone: Normal (BLE)   Sensation: Intact (BLE to LT)   Range Of Motion:  AROM: Within functional limits (BLE)   Functional Mobility:  Bed Mobility:  Rolling: Independent  Supine to Sit: Independent  Sit to Supine: Independent  Scooting: Independent  Transfers:  Sit to Stand: Independent  Stand to Sit: Independent      Balance:   Sitting: Intact  Standing: Intact  Ambulation/Gait Training:  Distance (ft): 200 Feet (ft)  Assistive Device:  (NONE)  Ambulation - Level of Assistance: Independent     Pain:  Pt reports 0/10 pain or discomfort prior to treatment. Pt reports 0/10 pain or discomfort post treatment. Activity Tolerance:   Pt tolerated activity well and denied dizziness, chest pain, or SOB. Please refer to the flowsheet for vital signs taken during this treatment. After treatment:   [ ]         Patient left in no apparent distress sitting up in chair  [X]         Patient left in no apparent distress in bed  [X]         Call bell left within reach  [ ]         Nursing notified  [ ]         Caregiver present  [ ]         Bed alarm activated      COMMUNICATION/EDUCATION:   [X]         Fall prevention education was provided and the patient/caregiver indicated understanding. [X]         Patient/family have participated as able in goal setting and plan of care.   [X] Patient/family agree to work toward stated goals and plan of care. [ ]         Patient understands intent and goals of therapy, but is neutral about his/her participation. [ ]         Patient is unable to participate in goal setting and plan of care.      Thank you for this referral.  Cheryle Phillips, PT   Time Calculation: 10 mins

## 2017-10-03 NOTE — PROGRESS NOTES
Problem: Dysphagia (Adult)  Goal: *Acute Goals and Plan of Care (Insert Text)  Rec:   Reg diet with thin liquids  Aspiration precautions  Oral care TID  Meds as tolerated   Outcome: Resolved/Met Date Met:  10/03/17  SPEECH LANGUAGE PATHOLOGY BEDSIDE SWALLOW EVALUATION AND DISCHARGE     Patient: Angle Borges (81 y.o. female)  Date: 10/3/2017  Primary Diagnosis: CVA (cerebral vascular accident) ([address]  TIA (transient ischemic attack)        Precautions: aspiration         ASSESSMENT :  Clinical beside swallow eval completed per MD orders. Pt A&Ox4. Speech/voice within functional limits. Oral mech examination revealed structures functional for speech and deglutition. Pt observed with thin liquids +/- straw via single sips and successive swallows with timely swallow initiation, adequate laryngeal elevation to palpation and no overt s/sx aspiration. Pt demo positive rotary chew and thorough oral clearance with regular solids with no overt s/sx aspiration. Pt safe for regular diet with thin liquids, meds as tolerated with general safe swallow precautions. Pt educated with regard to s/sx aspiration, aspiration risk, diet recs and role of SLP. Pt able to verbalize understanding. Will sign off. Please re-consult as indicated. D/w RN. PLAN :  Recommendations and Planned Interventions:  No formal ST needs ID'd for dysphagia. Eval only. Discharge Recommendations: Outpatient and To Be Determined       SUBJECTIVE:   Patient stated I feel like everything is back to normal today.       OBJECTIVE:       Past Medical History:   Diagnosis Date    Abnormal Pap smear       Dr. Magdaleno Stratton Neck pain 5/17/2010     Past Surgical History:   Procedure Laterality Date    HX GYN         Total Hyst     Prior Level of Function/Home Situation: private residence  [address]: Private residence  [address] [place] [address] St: Two story  # of Interior Steps: 14  Height of Each Step (in): 6 inches  [employer]: Both  Lift Chair Available: No  Living Alone: No  Support Systems: Friends \ neighbors, Family member(s)  Patient Expects to be Discharged to[de-identified] Private residence  Current DME Used/Available at Home: None  Diet prior to admission: reg with thin  Current Diet:  Reg with thin   Cognitive and Communication Status:  Neurologic State: Alert  Orientation Level: Oriented X4  Cognition: Follows commands   Oral Assessment:  Oral Assessment  Labial: No impairment  Dentition: Natural;Intact  Oral Hygiene: Adequate  Lingual: No impairment  Velum: No impairment  Mandible: No impairment  P.O. Trials:  Patient Position: [facility] at [facility]  Vocal quality prior to P.O.: No impairment  Consistency Presented:  All consistencies  How Presented: Self-fed/presented;Cup/sip;Spoon;Straw   Bolus Acceptance: No impairment  Bolus Formation/Control: No impairment  Propulsion: No impairment  Oral Residue: None  Initiation of Swallow: No impairment  Laryngeal Elevation: Functional  Aspiration Signs/Symptoms: None  Pharyngeal Phase Characteristics: No impairment, issues, or problems   Effective Modifications: None  Cues for Modifications: None     Oral Phase Severity: No impairment  Pharyngeal Phase Severity : No impairment     GCODESwallowing:  Swallow Current Status CH= 0%   Swallow Goal Status CH= 0%   Swallow D/C Status CH= 0%     The severity rating is based on the following outcomes:             Clinical judgment     Pain:  Pt reports 0/10 pain or discomfort prior to eval.   Pt reports 0/10 pain or discomfort post eval.      After treatment:   [ ]            Patient left in no apparent distress sitting up in chair  [X]            Patient left in no apparent distress in bed  [X]            Call bell left within reach  [X]            Nursing notified  [ ]            Caregiver present  [ ]            Bed alarm activated      COMMUNICATION/EDUCATION:   [X]            SLP educated pt with regard to compensatory swallow strategies and aspiration/reflux precautions including: small bites/sips,                  alternate liquids/solids, decrease feeding rate, HOB > 45 with all po, and                  upright in bed at 30 degrees after po for at least 45 minutes. [X]            Patient/family have participated as able in goal setting and plan of care.      Thank you for this referral.     Traci Ceron M.S. CCC-SLP/L  Speech-Language Pathologist

## 2017-10-03 NOTE — PROGRESS NOTES
Care Management Interventions  PCP Verified by CM: Yes  Mode of Transport at Discharge:  (family)  Transition of Care Consult (CM Consult): Discharge Planning  Physical Therapy Consult: Yes  Occupational Therapy Consult: Yes  Speech Therapy Consult: Yes  Current Support Network: Other (lives with friend)  Confirm Follow Up Transport: Family  Plan discussed with Pt/Family/Caregiver: Yes  Discharge Location  Discharge Placement: Home      1145 - pt is a 81year old admitted for CVA/TIA. Pt is alert and oriented and alone in room. Pt reports that she lives with her boyfriend of 9 years - Shimon Baxter. Pt reports that prior to admission she was independent in her ADLs and that she has no DME at home. Pt reports that she plans to return home on discharge and that she has transportation home with family. MOON and OBS forms given to pt. Pt requests some time to review forms prior to signing. Pt reports that she does not have a PCP right now but that her daughter works in a doctors office and that her daughter will be helping her get one at a later date. Pt agreeable to seeing Dr Pushpa Arias for now and voices that she will talk to her daughter later and may change her PCP at a later date but at least she will have one for now. 1230 - FRANZ and OBS forms signed by pt. Originals placed in chart and copies given to pt.

## 2017-10-03 NOTE — ROUTINE PROCESS
Bedside and Verbal shift change report given to [facility] RN (oncoming nurse) by Krysta Winters RN (offgoing nurse). Report given with SBAR, Kardex, Intake/Output, MAR, Accordion and Recent Results. Stroke Education provided to patient and the following topics were discussed    1. Patients personal risk factors for stroke are Other     2. Warning signs of Stroke:        * Sudden numbness or weakness of the face, arm or leg, especially on one side of          The body            * Sudden confusion, trouble speaking or understanding        * Sudden trouble seeing in one or both eyes        * Sudden trouble walking, dizziness, loss of balance or coordination        * Sudden severe headache with no known cause      3. Importance of activation Emergency Medical Services ( 9-1-1 ) immediately if experience any warning signs of stroke. 4. Be sure and schedule a follow-up appointment with your primary care doctor or any specialists as instructed. 5. You must take medicine every day to treat your risk factors for stroke. Be sure to take your medicines exactly as your doctor tells you: no more, no less. Know what your medicines are for , what they do. Anti-thrombotics /anticoagulants can help prevent strokes. You are taking the following medicine(s)  NONE     6. Smoking and second-hand smoke greatly increase your risk of stroke, cardiovascular disease and death. Smoking history never    7. Information provided was [facility] Stroke Education Binder    8. Documentation of teaching completed in Patient Education Activity and on Care Plan with teaching response noted?   yes

## 2017-10-03 NOTE — PROGRESS NOTES
Problem: Self Care Deficits Care Plan (Adult)  Goal: *Acute Goals and Plan of Care (Insert Text)  Outcome: Resolved/Met Date Met:  10/03/17  OCCUPATIONAL THERAPY EVALUATION/DISCHARGE     Patient: Levar Perales (81 y.o. female)  Date: 10/3/2017  Primary Diagnosis: CVA (cerebral vascular accident) [facility])  TIA (transient ischemic attack)        Precautions:   Fall      ASSESSMENT AND RECOMMENDATIONS:  Based on the objective data described below, the patient is independent with functional mobility and self care tasks without an AD. Patient has WFL AROM and strength of BUEs. Patient is able to vj/doff socks/shoes on EOB independently. Patient was independent with simulated toilet transfer. Skilled acute care occupational therapy is not indicated at this time. Discharge Recommendations: None  Further Equipment Recommendations for Discharge: N/A       Barriers to Learning/Limitations: None      COMPLEXITY      Eval Complexity: History: LOW Complexity : Brief history review ; Examination: LOW Complexity : 1-3 performance deficits relating to physical, cognitive , or psychosocial skils that result in activity limitations and / or participation restrictions ; Decision Making:LOW Complexity : No comorbidities that affect functional and no verbal or physical assistance needed to complete eval tasks  Assessment: Low Complexity          G-CODES:      Self Care  Current  CH= 0%   Goal  CH= 0%   D/C  CH= 0%. The severity rating is based on the Level of Assistance required for Functional Mobility and ADLs. SUBJECTIVE:   Patient stated I can do everything.       OBJECTIVE DATA SUMMARY:       Past Medical History:   Diagnosis Date    Abnormal Pap smear       Dr. Redd Heady Neck pain 5/17/2010     Past Surgical History:   Procedure Laterality Date    HX GYN         Total Hyst     Prior Level of Function/Home Situation: Patient reported she was independent in basic self care tasks and functional mobility PTA.  Home Situation  Home Environment: Private residence  # Steps to Enter: 1  One/Two Story Residence: Two story  # of Interior Steps: 14  Height of Each Step (in): 6 inches  Interior Rails: Both  Lift Chair Available: No  Living Alone: No  Support Systems: Friends \ neighbors, Family member(s)  Patient Expects to be Discharged to[de-identified] Private residence  Current DME Used/Available at Home: None  [X]     Right hand dominant       [ ]     Left hand dominant  Cognitive/Behavioral Status:  Neurologic State: Alert  Orientation Level: Oriented X4  Cognition: Follows commands     Skin: No skin changes noted     Edema: No edema noted     Vision/Perceptual:       Acuity: Within Defined Limits       Coordination:  Coordination: Within functional limits (BUEs)       Balance:  Sitting: Intact  Standing: Intact; Without support     Strength:  Strength: Within functional limits (BUEs 5/5)     Tone & Sensation:  Tone: Normal (BUEs)  Sensation: Intact (BUEs)     Range of Motion:  AROM: Within functional limits (BUEs)     Functional Mobility and Transfers for ADLs:  Bed Mobility:  Rolling: Independent  Supine to Sit: Independent  Sit to Supine: Independent  Scooting: Independent  Transfers:  Sit to Stand: Independent              Toilet Transfer : Independent (simulated with no AD)                 ADL Assessment:(clinical judgement)  Feeding: Independent     Oral Facial Hygiene/Grooming: Independent     Bathing: Independent     Upper Body Dressing: Independent     Lower Body Dressing: Independent     Toileting: Independent     Pain:  Pt reports 0/10 pain or discomfort prior to treatment. Pt reports 0/10 pain or discomfort post treatment. Activity Tolerance:   Good     Please refer to the flowsheet for vital signs taken during this treatment.   After treatment:   [ ]  Patient left in no apparent distress sitting up in chair  [X]  Patient left in no apparent distress in bed  [X]  Call bell left within reach  [ ]  Nursing notified  [ ]  Caregiver present  [ ]  Bed alarm activated      COMMUNICATION/EDUCATION:   Communication/Collaboration:  [ ]      Home safety education was provided and the patient/caregiver indicated understanding. [X]      Patient/family have participated as able and agree with findings and recommendations. [ ]      Patient is unable to participate in plan of care at this time.      Prince Joseph, OTR/L  Time Calculation: 12 mins

## 2017-10-10 ENCOUNTER — OFFICE VISIT (OUTPATIENT)
Dept: FAMILY MEDICINE CLINIC | Age: 72
End: 2017-10-10

## 2017-10-10 VITALS
RESPIRATION RATE: 16 BRPM | HEART RATE: 78 BPM | HEIGHT: 60 IN | OXYGEN SATURATION: 97 % | TEMPERATURE: 97.5 F | SYSTOLIC BLOOD PRESSURE: 121 MMHG | BODY MASS INDEX: 22.19 KG/M2 | WEIGHT: 113 LBS | DIASTOLIC BLOOD PRESSURE: 69 MMHG

## 2017-10-10 DIAGNOSIS — G45.9 TRANSIENT CEREBRAL ISCHEMIA, UNSPECIFIED TYPE: Primary | ICD-10-CM

## 2017-10-10 DIAGNOSIS — M54.2 NECK PAIN: ICD-10-CM

## 2017-10-10 DIAGNOSIS — R93.89 ABNORMAL MRI, NECK: ICD-10-CM

## 2017-10-10 RX ORDER — ATORVASTATIN CALCIUM 20 MG/1
20 TABLET, FILM COATED ORAL
Qty: 30 TAB | Refills: 3 | Status: SHIPPED | OUTPATIENT
Start: 2017-10-10 | End: 2018-02-09 | Stop reason: SDUPTHER

## 2017-10-10 RX ORDER — GUAIFENESIN 100 MG/5ML
81 LIQUID (ML) ORAL DAILY
Qty: 30 TAB | Refills: 3 | Status: SHIPPED | OUTPATIENT
Start: 2017-10-10

## 2017-10-10 NOTE — MR AVS SNAPSHOT
Visit Information Date & Time Provider Department Dept. Phone Encounter #  
 10/10/2017  2:00 PM Zahida Allison [facility] [phone] [id number] Your Appointments 11/3/2017 11:30 AM  
Follow Up with Adria Limon NP [facility] ([facility]) Appt Note: [facility] f/u; CVA; Dr. Elliot Cao consult; notes in cc; np packet mailed bd  
 [address] [place] [address] [phone]  
  
   
 [facility] [id number]  
  
    
 1/10/2018 11:00 AM  
Office Visit with JULIA Dunne [facility] ([facility]) Appt Note: [place] Barn[place] [phone]  
[facility] [id number] Upcoming Health Maintenance Date Due Hepatitis C Screening 1945 DTaP/Tdap/Td series (1 - Tdap) 6/24/1968 BREAST CANCER SCRN MAMMOGRAM 6/24/1997 FOBT Q 1 YEAR AGE 50-75 6/24/1997 ZOSTER VACCINE AGE 60> 4/24/2007 GLAUCOMA SCREENING Q2Y 6/24/2012 OSTEOPOROSIS SCREENING (DEXA) 6/24/2012 MEDICARE YEARLY EXAM 6/24/2012 Pneumococcal 65+ Low/Medium Risk (2 of 2 - PPSV23) 10/10/2018 Allergies as of 10/10/2017  Review Complete On: 10/10/2017 By: JULIA Dunne No Known Allergies Current Immunizations  Never Reviewed No immunizations on file. Not reviewed this visit You Were Diagnosed With   
  
 Codes Comments Transient cerebral ischemia, unspecified type    -  Primary ICD-10-CM: G45.9 ICD-9-CM: 435.9 Neck pain     ICD-10-CM: M54.2 ICD-9-CM: 723.1 Abnormal MRI, neck     ICD-10-CM: R93.8 ICD-9-CM: 793.99 Vitals BP Pulse Temp Resp Height(growth percentile) Weight(growth percentile) 121/69 78 97.5 °F (36.4 °C) (Oral) 16 5' (1.524 m) 113 lb (51.3 kg) SpO2 BMI OB Status Smoking Status 97% 22.07 kg/m2 Hysterectomy Never Smoker BMI and BSA Data Body Mass Index Body Surface Area 22.07 kg/m 2 1.47 m 2 Preferred Pharmacy Pharmacy Name Phone [id number]. [place]. [phone] Your Updated Medication List  
  
   
This list is accurate as of: 10/10/17  2:24 PM.  Always use your most recent med list.  
  
  
  
  
 aspirin 81 mg chewable tablet Take 1 Tab by mouth daily. atorvastatin 20 mg tablet Commonly known as:  LIPITOR Take 1 Tab by mouth nightly. Prescriptions Sent to Pharmacy Refills  
 aspirin 81 mg chewable tablet 3 Sig: Take 1 Tab by mouth daily. Class: Normal  
 Pharmacy: [id number]. [place]. Ph #: [phone] Route: Oral  
 atorvastatin (LIPITOR) 20 mg tablet 3 Sig: Take 1 Tab by mouth nightly. Class: Normal  
 Pharmacy: [facility]. [place]ra . Ph #: [phone] Route: Oral  
  
To-Do List   
 10/10/2017 Imaging:  CT SPINE CERV WO CONT Introducing [facility] [place] [facility]! [facility] introduces [employer] patient portal. Now you can access parts of your medical record, email your doctor's office, and request medication refills online. 1. In your internet browser, go to [email or web address] 2. Click on the First Time User? Click Here link in the Sign In box. You will see the New Member Sign Up page. 3. Enter your [employer] Access Code exactly as it appears below. You will not need to use this code after youve completed the sign-up process. If you do not sign up before the expiration date, you must request a new code. · [employer] Access Code: [email or web address] Expires: 1/1/2018  9:40 AM 
 
4. Enter the last four digits of your Social Security Number (xxxx) and Date of Birth (mm/dd/yyyy) as indicated and click Submit. You will be taken to the next sign-up page. 5. Create a Sample6 ID. This will be your Sample[employer] login ID and cannot be changed, so think of one that is secure and easy to remember. 6. Create a Sample6 password. You can change your password at any time. 7. Enter your Password Reset Question and Answer. This can be used at a later time if you forget your password. 8. Enter your e-mail address. You will receive e-mail notification when new information is available in [address] 9. Click Sign Up. You can now view and download portions of your medical record. 10. Click the Download Summary menu link to download a portable copy of your medical information. If you have questions, please visit the Frequently Asked Questions section of the Sample[employer] website. Remember, Sample6 is NOT to be used for urgent needs. For medical emergencies, dial 911. Now available from your iPhone and Android! Please provide this summary of care documentation to your next provider. Your primary care clinician is listed as [facility]. If you have any questions after today's visit, please call [phone].

## 2017-10-10 NOTE — PROGRESS NOTES
Patient: Marco A Kong MRN: [id number]  SSN: [id number]    YOB: 1945  Age: 81 y.o. Sex: female      Date of Service: 10/10/2017   Provider: JULIA Saeed   Office Location:   [address] [facility], [address] [place] [address], [address]  Office Phone: [phone]  Office Fax: [phone]        REASON FOR VISIT:   Chief Complaint   Patient presents with    Follow-up     pt presents for er follow up for left side weakness     Medication Refill     Asprin and Atoravastatin         VITALS:   Visit Vitals    /69    Pulse 78    Temp 97.5 °F (36.4 °C) (Oral)    Resp 16    Ht 5' (1.524 m)    Wt 113 lb (51.3 kg)    SpO2 97%    BMI 22.07 kg/m2       MEDICATIONS:   Current Outpatient Prescriptions   Medication Sig Dispense Refill    aspirin 81 mg chewable tablet Take 1 Tab by mouth daily. 30 Tab 0    atorvastatin (LIPITOR) 20 mg tablet Take 1 Tab by mouth nightly. 30 Tab 0        ALLERGIES:   No Known Allergies     ACTIVE MEDICAL PROBLEMS:  Patient Active Problem List   Diagnosis Code    Neck pain M54.2    CVA (cerebral vascular accident) ([address] I63.9    TIA (transient ischemic attack) G45.9        MEDICAL/SURGICAL HISTORY:  Past Medical History:   Diagnosis Date    Abnormal Pap smear     Dr. Leann Diaz Neck pain 5/17/2010      Past Surgical History:   Procedure Laterality Date    HX GYN      Total Hyst        FAMILY HISTORY:  History reviewed. No pertinent family history. SOCIAL HISTORY:  Social History   Substance Use Topics    Smoking status: Never Smoker    Smokeless tobacco: Never Used    Alcohol use No          HISTORY OF PRESENT ILLNESS:   Marco A Kong is a 81 y.o. female who presents to the office to establish care as a new patient, and for a hospital follow up visit. She has not had routine primary care in many years.    Patient was admitted to [facility] from 10/2/17-10/3/17 for transient left sided weakness, which resolved within 3-4 hours. MRI/MRA negative for acute process. Patient was seen by neurology and ultimately diagnosed with a TIA. She was treated with aspirin and atorvastatin. Incidentally noted on imaging was a possible C2 abnormality. Radiologist comments: \"potentially infiltrative lesion versus artifact versus chronic fracture deformity with pannus. CT or MRI of the cervical spine could be  useful for further evaluation if warranted. \"     Patient reports she has been doing well since discharge. Admits she is feeling a bit tired, but otherwise back to her baseline. She does admit to chronic neck pain x many years, and would like to proceed with follow up imaging of cervical abnormality. She has her neurolgoy follow up scheduled for 11/3. REVIEW OF SYSTEMS:  Review of Systems   Constitutional: Negative for chills and fever. Respiratory: Negative for cough, shortness of breath and wheezing. Cardiovascular: Negative for chest pain and palpitations. Gastrointestinal: Negative for abdominal pain, nausea and vomiting. Neurological: Negative for dizziness, tingling, sensory change, focal weakness and headaches. PHYSICAL EXAMINATION:  Physical Exam   Constitutional: She is oriented to person, place, and time and well-developed, well-nourished, and in no distress. Cardiovascular: Normal rate, regular rhythm and normal heart sounds. Exam reveals no gallop and no friction rub. No murmur heard. Pulmonary/Chest: Effort normal and breath sounds normal. She has no wheezes. She has no rales. Neurological: She is alert and oriented to person, place, and time. No cranial nerve deficit. Gait normal. Coordination normal.   Skin: Skin is warm and dry. No rash noted. Psychiatric: Mood, memory and affect normal.        RESULTS:  No results found for this visit on 10/10/17. ASSESSMENT/PLAN:  Diagnoses and all orders for this visit:    1.  Transient cerebral ischemia, unspecified type  - Doing well since discharge  - Continue meds as below  - Continue with healthy diet and exercise   - Follow up with neurology as scheduled   -     aspirin 81 mg chewable tablet; Take 1 Tab by mouth daily. -     atorvastatin (LIPITOR) 20 mg tablet; Take 1 Tab by mouth nightly. 2. Neck pain  3. Abnormal MRI, neck  - Will order cervical CT to further evaluate the abnormality noted on MRI   -     CT SPINE CERV WO CONT; Future      Follow up in 3 months for Medicare Wellness Visit or sooner as needed    Patient expresses understanding and is agreeable with the above plan.       PATIENT CARE TEAM:   Patient Care Team:  Christina Blake MD as PCP - General (Family Practice)       Vic Whittaker, [address]   October 10, 2017    4:03 PM

## 2017-10-17 ENCOUNTER — HOSPITAL ENCOUNTER (OUTPATIENT)
Dept: CT IMAGING | Age: 72
Discharge: HOME | End: 2017-10-17
Attending: PHYSICIAN ASSISTANT
Payer: MEDICARE

## 2017-10-17 DIAGNOSIS — R93.89 ABNORMAL CT SCAN, NECK: ICD-10-CM

## 2017-10-17 DIAGNOSIS — G89.29 CHRONIC NECK PAIN: Primary | ICD-10-CM

## 2017-10-17 DIAGNOSIS — M54.2 CHRONIC NECK PAIN: Primary | ICD-10-CM

## 2017-10-17 DIAGNOSIS — R93.89 ABNORMAL MRI, NECK: ICD-10-CM

## 2017-10-17 PROCEDURE — 72125 CT NECK SPINE W/O DYE: CPT

## 2017-10-23 ENCOUNTER — TELEPHONE (OUTPATIENT)
Dept: FAMILY MEDICINE CLINIC | Age: 72
End: 2017-10-23

## 2017-10-23 NOTE — TELEPHONE ENCOUNTER
Patient called asking if she take tyneylol will that infer with any medications she is currently taking.

## 2017-10-24 NOTE — TELEPHONE ENCOUNTER
Patient was advised that there is no drug interaction between tylenol and her current medications (Lipitor, aspirin). Patient verbalized understanding of instructions.

## 2017-11-02 ENCOUNTER — OFFICE VISIT (OUTPATIENT)
Dept: NEUROLOGY | Age: 72
End: 2017-11-02

## 2017-11-02 VITALS
WEIGHT: 112.4 LBS | OXYGEN SATURATION: 98 % | HEIGHT: 60 IN | DIASTOLIC BLOOD PRESSURE: 60 MMHG | TEMPERATURE: 97.3 F | SYSTOLIC BLOOD PRESSURE: 120 MMHG | RESPIRATION RATE: 16 BRPM | HEART RATE: 64 BPM | BODY MASS INDEX: 22.07 KG/M2

## 2017-11-02 DIAGNOSIS — Z79.899 ON STATIN THERAPY: ICD-10-CM

## 2017-11-02 DIAGNOSIS — Z79.02 ANTIPLATELET OR ANTITHROMBOTIC LONG-TERM USE: ICD-10-CM

## 2017-11-02 DIAGNOSIS — R53.1 LEFT-SIDED WEAKNESS: ICD-10-CM

## 2017-11-02 DIAGNOSIS — G45.9 TRANSIENT CEREBRAL ISCHEMIA, UNSPECIFIED TYPE: Primary | ICD-10-CM

## 2017-11-02 RX ORDER — DEXTROMETHORPHAN HYDROBROMIDE, GUAIFENESIN 5; 100 MG/5ML; MG/5ML
650 LIQUID ORAL
Status: ON HOLD | COMMUNITY
End: 2018-01-31

## 2017-11-02 NOTE — MR AVS SNAPSHOT
Visit Information Date & Time Provider Department Dept. Phone Encounter #  
 11/2/2017  9:45 AM Nakia Harley NP [facility] [phone] [id number] Follow-up Instructions Return if symptoms worsen or fail to improve. Your Appointments 11/13/2017 11:00 AM  
New Patient with Jewel Andrews MD  
[address] [facility], [address] and [facility]) Appt Note: CHRONIC NECK PAIN/ REF BY JULIA REDMAN/ CT IN CC/ *ADVISED PT TO COME EARLY W. PHOTO ID & INS. CARD, CURRENT MEDICATION LIST & DOSAGE TO THE [facility] LOCATION / CT  
 [address] [facility] [address]  
[address] [facility] [address] [place] [address],[facility] [address] [facility] [facility]  
  
    
 1/10/2018 11:00 AM  
Office Visit with JULIA [facility] ([facility]) Appt Note: [facility]on [facility] [id number]  
[facility] [address] Upcoming Health Maintenance Date Due Hepatitis C Screening 1945 DTaP/Tdap/Td series (1 - Tdap) 6/24/1968 BREAST CANCER SCRN MAMMOGRAM 6/24/1997 FOBT Q 1 YEAR AGE 50-75 6/24/1997 ZOSTER VACCINE AGE 60> 4/24/2007 GLAUCOMA SCREENING Q2Y 6/24/2012 OSTEOPOROSIS SCREENING (DEXA) 6/24/2012 MEDICARE YEARLY EXAM 6/24/2012 Pneumococcal 65+ Low/Medium Risk (2 of 2 - PPSV23) 10/10/2018 Allergies as of 11/2/2017  Review Complete On: 11/2/2017 By: Patricia Márquez LPN No Known Allergies Current Immunizations  Never Reviewed No immunizations on file. Not reviewed this visit You Were Diagnosed With   
  
 Codes Comments Transient cerebral ischemia, unspecified type    -  Primary ICD-10-CM: G45.9 ICD-9-CM: 435.9 On statin therapy     ICD-10-CM: Z79.899 ICD-9-CM: V58.69  Antiplatelet or antithrombotic long-term use     ICD-10-CM: Z79.02 
ICD-9-CM: V58.63   
 Left-sided weakness     ICD-10-CM: R53.1 ICD-9-CM: 728.87 Vitals BP Pulse Temp Resp Height(growth percentile) Weight(growth percentile) 120/60 64 97.3 °F (36.3 °C) (Temporal) 16 5' (1.524 m) 112 lb 6.4 oz (51 kg) SpO2 BMI OB Status Smoking Status 98% 21.95 kg/m2 Hysterectomy Never Smoker BMI and BSA Data Body Mass Index Body Surface Area  
 21.95 kg/m 2 1.47 m 2 Preferred Pharmacy Pharmacy Name Phone [facility] [place], [address] [phone] Your Updated Medication List  
  
   
This list is accurate as of: 11/2/17 10:15 AM.  Always use your most recent med list.  
  
  
  
  
 aspirin 81 mg chewable tablet Take 1 Tab by mouth daily. atorvastatin 20 mg tablet Commonly known as:  LIPITOR Take 1 Tab by mouth nightly. TYLENOL ARTHRITIS PAIN 650 mg CR tablet Generic drug:  acetaminophen Take 650 mg by mouth every six (6) hours as needed for Pain. One tab by mouth twice daily Follow-up Instructions Return if symptoms worsen or fail to improve. Patient Instructions We discussed modifying stroke risk factors including healthy diet and exercise. Here is a link from there American Heart Association: https://healthyforgood. heart.org/ 
Continue ASA 81 mg. Continue Lipitor. Follow up with your PCP as indicated. Maintain appointment for ortho/spine for neck pain. Please call our office with any concerns. No need for routine follow up at this point. Introducing [facility]! Romayne Duster introduces [employer] patient portal. Now you can access parts of your medical record, email your doctor's office, and request medication refills online. 1. In your internet browser, go to [email or web address] 2. Click on the First Time User? Click Here link in the Sign In box. You will see the New Member Sign Up page. 3. Enter your [employer] Access Code exactly as it appears below. You will not need to use this code after youve completed the sign-up process. If you do not sign up before the expiration date, you must request a new code. · [employer] Access Code: [email or web address] Expires: 1/1/2018  9:40 AM 
 
4. Enter the last four digits of your Social Security Number (xxxx) and Date of Birth (mm/dd/yyyy) as indicated and click Submit. You will be taken to the next sign-up page. 5. Create a Autocosta ID. This will be your [employer] login ID and cannot be changed, so think of one that is secure and easy to remember. 6. Create a [employer] password. You can change your password at any time. 7. Enter your Password Reset Question and Answer. This can be used at a later time if you forget your password. 8. Enter your e-mail address. You will receive e-mail notification when new information is available in [address] 9. Click Sign Up. You can now view and download portions of your medical record. 10. Click the Download Summary menu link to download a portable copy of your medical information. If you have questions, please visit the Frequently Asked Questions section of the [employer] website. Remember, [employer] is NOT to be used for urgent needs. For medical emergencies, dial 911. Now available from your iPhone and Android! Please provide this summary of care documentation to your next provider. Your primary care clinician is listed as Faviola Correa. If you have any questions after today's visit, please call [phone].

## 2017-11-02 NOTE — PROGRESS NOTES
[address], [address], [place], [address]  [address] Mike Simpson, [address]  Office:  [phone]  Fax: [phone]  Chief Complaint   Patient presents with   [address] [employer] f/u TIA     This is a 81year old, right handed female, who presents for hospital follow up. She woke up on October 2, and noted difficulty walking. She had left sided weakness including her left arm and left leg. Also mentions left sided facial droop. Her children called 911 and she was taken by EMS to [facility]. She said the weakness would not persist, and when she was at the hospital it seemed to get better. She then recalls laying in the hospital bed and falling asleep, and when she woke the weakness would be present again. She had an MRI of the brain negative for acute stroke. An incidental finding of the MRI she notes some abnormality to her cervical spine. Upon follow-up with primary care she had a CT scan of her cervical spine, and she will follow-up with orthopedics. He recalls a history of neck pain. This is intermittent. Said she fell up the stairs years ago and wonders if this is related. Today she is in office denying focal deficits. Denying new onset weakness. Denies numbness and tingling. She said before this incident she did receive routine health care. She was not on any medications, and now is taking aspirin and Lipitor. She endorses changing her diet, and eating healthy. Denies tobacco use. Past Medical History:   Diagnosis Date    Abnormal Pap smear     Dr. Roberth Campbell Neck pain 5/17/2010       Past Surgical History:   Procedure Laterality Date    HX GYN      Total Hyst       Current Outpatient Prescriptions   Medication Sig Dispense Refill    acetaminophen (TYLENOL ARTHRITIS PAIN) 650 mg CR tablet Take 650 mg by mouth every six (6) hours as needed for Pain.  One tab by mouth twice daily      aspirin 81 mg chewable tablet Take 1 Tab by mouth daily. 30 Tab 3    atorvastatin (LIPITOR) 20 mg tablet Take 1 Tab by mouth nightly. 30 Tab 3        No Known Allergies    Social History   Substance Use Topics    Smoking status: Never Smoker    Smokeless tobacco: Never Used    Alcohol use No       No family history on file. Review of Systems  Pertinent positives and negatives as noted otherwise comprehensive review is negative. Examination  Visit Vitals    /60    Pulse 64    Temp 97.3 °F (36.3 °C) (Temporal)    Resp 16    Ht 5' (1.524 m)    Wt 51 kg (112 lb 6.4 oz)    SpO2 98%    BMI 21.95 kg/m2     Pleasant 82-year-old female, well appearing. No icterus. Oropharynx clear. Supple neck without bruit. Heart regular. No murmur. No edema. Neurologically, she is awake, alert, and oriented with normal speech and language. Her cognition is normal.  She is able to discuss her medical history. She is able to discuss her medications. She is able to discuss current events. Intact cranial nerves 2-12. No nystagmus. Visual fields full to confrontation. Disk margins are flat bilaterally. She has normal bulk and tone. She has no abnormal movement. She has no pronation or drift. She generates full strength in the upper and lower extremities. Reflexes are symmetrical in the upper and lower extremities bilaterally. Her toes are down bilaterally. No Garza. Finger nose finger and rapid alternating movements are normal.  Steady gait. No Romberg. Impression/Plan  Moshe Chris is a 81 y.o. female whose history and physical are consistent with TIA. Patient with resolving symptoms of left sided weakness. Denies new onset symptoms. Her examination today is non focal.  I have personally reviewed imaging including MRI of the brain without evidence of acute intracranial process. Incidental finding of abnormality at cervical level.  This has been addressed by her PCP on CT and found  \"hypertrophic ossification and calcification basion/C1 with partial fusion calcified \"pseudomass\" posterior greater than anterior dens with consideration for CPPD/gout or degenerative changes. \"  MRA head and neck without significant stenosis. We discussed stroke risk factors including healthy lifestyle. Per patient's request provided her with healthy diet, and recipe information found on the American Heart Association web site. Patient on ASA 81 and Lipitor to maintain this. Goal LDL < 70 and achieved. Patient denies tobacco use. Patient has follow up with ortho spine upcoming, and she will maintain this appointment. Maintain follow up with PCP. Defer additional studies. PRN follow up. Diagnoses and all orders for this visit:    1. Transient cerebral ischemia, unspecified type    2. On statin therapy    3. Antiplatelet or antithrombotic long-term use    4. Left-sided weakness    Total time: 25 min   Counseling / coordination time: 18 min   > 50% counseling / coordination?: Yes re: symptoms, risk factors, prevention, medication, and plan of care. Signed By: Taryn Bergman NP    This note will not be viewable in [address]

## 2017-11-02 NOTE — PATIENT INSTRUCTIONS
We discussed modifying stroke risk factors including healthy diet and exercise. Here is a link from there American Heart Association: https://healthyforgood. heart.org/  Continue ASA 81 mg. Continue Lipitor. Follow up with your PCP as indicated. Maintain appointment for ortho/spine for neck pain. Please call our office with any concerns. No need for routine follow up at this point.

## 2017-11-13 ENCOUNTER — OFFICE VISIT (OUTPATIENT)
Dept: ORTHOPEDIC SURGERY | Age: 72
End: 2017-11-13

## 2017-11-13 VITALS
HEART RATE: 76 BPM | DIASTOLIC BLOOD PRESSURE: 72 MMHG | WEIGHT: 112 LBS | RESPIRATION RATE: 14 BRPM | SYSTOLIC BLOOD PRESSURE: 110 MMHG | HEIGHT: 60 IN | BODY MASS INDEX: 21.99 KG/M2

## 2017-11-13 DIAGNOSIS — M47.812 FACET ARTHROPATHY, CERVICAL: Primary | ICD-10-CM

## 2017-11-13 RX ORDER — MELOXICAM 15 MG/1
15 TABLET ORAL DAILY
Qty: 30 TAB | Refills: 2 | Status: SHIPPED | OUTPATIENT
Start: 2017-11-13 | End: 2018-01-19

## 2017-11-13 NOTE — MR AVS SNAPSHOT
Visit Information Date & Time Provider Department Dept. Phone Encounter #  
 11/13/2017 11:00 AM Sherlyn Cutler MD [address] [facility] [phone] [id number] Follow-up Instructions Return in about 2 months (around 1/13/2018), or if symptoms worsen or fail to improve. Your Appointments 1/10/2018 11:00 AM  
[facility]) Appt Note: Juan Barnard [phone]  
[facility] [id number] Upcoming Health Maintenance Date Due Hepatitis C Screening 1945 DTaP/Tdap/Td series (1 - Tdap) 6/24/1968 BREAST CANCER SCRN MAMMOGRAM 6/24/1997 FOBT Q 1 YEAR AGE 50-75 6/24/1997 ZOSTER VACCINE AGE 60> 4/24/2007 GLAUCOMA SCREENING Q2Y 6/24/2012 OSTEOPOROSIS SCREENING (DEXA) 6/24/2012 MEDICARE YEARLY EXAM 6/24/2012 Pneumococcal 65+ Low/Medium Risk (2 of 2 - PPSV23) 10/10/2018 Allergies as of 11/13/2017  Review Complete On: 11/13/2017 By: Areli Valverde LPN No Known Allergies Current Immunizations  Never Reviewed No immunizations on file. Not reviewed this visit You Were Diagnosed With   
  
 Codes Comments Facet arthropathy, cervical    -  Primary ICD-10-CM: M12.88 ICD-9-CM: 721.0 Vitals BP Pulse Resp Height(growth percentile) Weight(growth percentile) BMI  
 110/72 76 14 5' (1.524 m) 112 lb (50.8 kg) 21.87 kg/m2 OB Status Smoking Status Hysterectomy Never Smoker BMI and BSA Data Body Mass Index Body Surface Area  
 21.87 kg/m 2 1.47 m 2 Preferred Pharmacy Pharmacy Name Phone [id number] [address]. Eleazar Lopez, [address] [facility] [phone] Your Updated Medication List  
  
   
This list is accurate as of: 11/13/17 12:12 PM.  Always use your most recent med list.  
  
  
  
  
 aspirin 81 mg chewable tablet Take 1 Tab by mouth daily. atorvastatin 20 mg tablet Commonly known as:  LIPITOR Take 1 Tab by mouth nightly. meloxicam 15 mg tablet Commonly known as:  MOBIC Take 1 Tab by mouth daily. TYLENOL ARTHRITIS PAIN 650 mg Jeannadon Mitchell Generic drug:  acetaminophen Take 650 mg by mouth every six (6) hours as needed for Pain. One tab by mouth twice daily Prescriptions Sent to Pharmacy Refills  
 meloxicam (MOBIC) 15 mg tablet 2 Sig: Take 1 Tab by mouth daily. Class: Normal  
 Pharmacy: [id number] [address]. Eliceo Ibarra, [address]  #: [phone] Route: Oral  
  
We Performed the Following REFERRAL TO PAIN MANAGEMENT [TCE499 Custom] Comments:  
 Cervical medial branch block / RFA Follow-up Instructions Return in about 2 months (around 1/13/2018), or if symptoms worsen or fail to improve. Referral Information Referral ID Referred By Referred To  
  
 [id number] June Martinez MD   
   [address] [place] [address] [facility] [address] Phone: [phone] Fax: [phone] Visits Status Start Date End Date 1 New Request 11/13/17 11/13/18 If your referral has a status of pending review or denied, additional information will be sent to support the outcome of this decision. Patient Instructions Learning About a Cervical Epidural Injection What is a cervical epidural injection? A cervical epidural injection is a shot of medicine in your neck. The injection goes into the area around the spinal cord in your neck. A doctor may give it to you to help with pain, tingling, or numbness in your neck, shoulder, or arm. This injection may have both a steroid, which reduces swelling and pain, and a local anesthetic, which numbs the nerves. Or it may only have a steroid. Some people get a series of these injections over weeks or months. How is a cervical epidural done? The doctor will use a tiny needle to numb the skin where you are getting the injection. After the skin is numb, your doctor will use a larger needle for the epidural injection. He or she may use X-ray or ultrasound to help guide the needle. You may feel some pressure. But you should not feel pain. It takes about 10 to 15 minutes to get this injection. You will probably go home about 20 to 30 minutes after you get it. You will need someone to drive you home. What can you expect after a cervical epidural? 
If your injection included local anesthetic medicine, your neck, shoulder, arm, or hand may feel heavy or numb right after the shot. With a local anesthetic, your pain may be gone right away. But it may return after a few hours. This is because the steroid hasn't started working yet. Before the steroid starts to work, your neck, shoulder, or arm may be sore for a few days. These injections don't always work. When they do, it takes 1 to 5 days. The pain relief can last for several days to a few months or longer. Some people are dizzy or feel sick to their stomach after getting this shot. These symptoms usually don't last very long. You may want to do less than normal for a few days. But you may also be able to return to your daily routine. If your pain is better, you may be able to keep doing your normal activities or physical therapy. But try not to overdo it, even if your pain has improved a lot. If your pain is only a little better or if it comes back, your doctor may want you to get another injection in a few weeks. If your pain has not changed, talk to your doctor about other treatment choices. Follow-up care is a key part of your treatment and safety.  Be sure to make and go to all appointments, and call your doctor if you are having problems. It's also a good idea to know your test results and keep a list of the medicines you take. Where can you learn more? Go to http://karin-michaela.info/. Enter L710 in the search box to learn more about \"Learning About a Cervical Epidural Injection. \" Current as of: March 21, 2017 Content Version: 11.4 © 1783-8877 [employer]. Care instructions adapted under license by [employer] (which disclaims liability or warranty for this information). If you have questions about a medical condition or this instruction, always ask your healthcare professional. [place] [address] any warranty or liability for your use of this information. Neck: Exercises Your Care Instructions Here are some examples of typical rehabilitation exercises for your condition. Start each exercise slowly. Ease off the exercise if you start to have pain. Your doctor or physical therapist will tell you when you can start these exercises and which ones will work best for you. How to do the exercises Neck stretch 1. This stretch works best if you keep your shoulder down as you lean away from it. To help you remember to do this, start by relaxing your shoulders and lightly holding on to your thighs or your chair. 2. Tilt your head toward your shoulder and hold for 15 to 30 seconds. Let the weight of your head stretch your muscles. 3. If you would like a little added stretch, use your hand to gently and steadily pull your head toward your shoulder. For example, keeping your right shoulder down, lean your head to the left. 4. Repeat 2 to 4 times toward each shoulder. Diagonal neck stretch 1. Turn your head slightly toward the direction you will be stretching, and tilt your head diagonally toward your chest and hold for 15 to 30 seconds.  
2. If you would like a little added stretch, use your hand to gently and steadily pull your head forward on the diagonal. 
 3. Repeat 2 to 4 times toward each side. Dorsal glide stretch The dorsal glide stretches the back of the neck. If you feel pain, do not glide so far back. Some people find this exercise easier to do while lying on their backs with an ice pack on the neck. 1. Sit or stand tall and look straight ahead. 2. Slowly tuck your chin as you glide your head backward over your body 3. Hold for a count of 6, and then relax for up to 10 seconds. 4. Repeat 8 to 12 times. Chest and shoulder stretch 1. Sit or stand tall and glide your head backward as in the dorsal glide stretch. 2. Raise both arms so that your hands are next to your ears. 3. Take a deep breath, and as you breathe out, lower your elbows down and behind your back. You will feel your shoulder blades slide down and together, and at the same time you will feel a stretch across your chest and the front of your shoulders. 4. Hold for about 6 seconds, and then relax for up to 10 seconds. 5. Repeat 8 to 12 times. Strengthening: Hands on head 1. Move your head backward, forward, and side to side against gentle pressure from your hands, holding each position for about 6 seconds. 2. Repeat 8 to 12 times. Follow-up care is a key part of your treatment and safety. Be sure to make and go to all appointments, and call your doctor if you are having problems. It's also a good idea to know your test results and keep a list of the medicines you take. Where can you learn more? Go to http://karin-michaela.info/. Enter P975 in the search box to learn more about \"Neck: Exercises. \" Current as of: March 21, 2017 Content Version: 11.4 © 8040-1045 Healthwise, [employer]. Care instructions adapted under license by [employer] (which disclaims liability or warranty for this information).  If you have questions about a medical condition or this instruction, always ask your healthcare professional. Sisi Pichardo[employer] disclaims any warranty or liability for your use of this information. Learning About Medial Branch Block and Neurotomy What are medial branch block and neurotomy? Facet joints connect your vertebrae to each other. Problems in these joints can cause chronic (long-term) pain in the neck or back. They can sometimes affect the shoulders, arms, buttocks, or legs. Medial branch nerves are the nerves that carry many of the pain messages from your facet joints. Radiofrequency medial branch neurotomy is a type of medial branch neurotomy that is used to relieve arthritis pain. It uses radio waves to damage nerves in your neck or back so that they can no longer send pain messages to your brain. Before your doctor knows if a neurotomy will help you, he or she will do a medial branch block to find out if certain nerves are the ones that are a source of your pain. You will need two separate visits to the outpatient center or hospital to have both procedures. How is a medial branch block done? The doctor will use a tiny needle to numb the skin where you will get the block. Then he or she puts the block needle into the numbed area. You may feel some pressure, but you should not feel pain. Using fluoroscopy (live X-ray) to guide the needle, the doctor injects medicine onto one or more nerves to make them numb. If you get relief from your pain in the next 4 to 6 hours, it's a sign that those nerves may be contributing to your pain. The relief will last only a short time. You may then have a medial branch neurotomy at a later visit to try to get longer relief. It takes 20 to 30 minutes to get the block. You can go home after the doctor watches you for about an hour. You will get instructions on how to report how much pain you have when you are at home. You will need someone to drive you home. How is medial branch neurotomy done?  
The doctor will use a tiny needle to numb the skin where you will get the neurotomy. Then he or she puts the neurotomy needle into the numbed area. You may feel some pressure. Using fluoroscopy (live X-ray) to guide the needle, the doctor sends radio waves through the needle to the nerve for 60 to 90 seconds. The radio waves heat the nerve, which damages it. The doctor may do this several times. And he or she may treat more than one nerve. It takes 45 to 90 minutes to get a neurotomy, depending on how many nerves are heated. You will probably go home 30 to 60 minutes later. You will need someone to drive you home. What can you expect after a neurotomy? You may feel a little sore or tender at the injection site at first. But after a successful neurotomy, most people have pain relief right away. It often lasts for 9 to 12 months or longer. Sometimes the pain relief is permanent. If your pain does come back, it may mean that the damaged nerve has healed and can send pain messages again. Or it can mean that a different nerve is causing pain. Your doctor will discuss your options with you. Follow-up care is a key part of your treatment and safety. Be sure to make and go to all appointments, and call your doctor if you are having problems. It's also a good idea to know your test results and keep a list of the medicines you take. Where can you learn more? Go to http://karin-michaela.info/. Enter T476 in the search box to learn more about \"Learning About Medial Branch Block and Neurotomy. \" Current as of: October 14, 2016 Content Version: 11.4 © 2465-6704 Healthwise, Incorporated. Care instructions adapted under license by [employer] (which disclaims liability or warranty for this information). If you have questions about a medical condition or this instruction, always ask your healthcare professional. [place] [address] any warranty or liability for your use of this information. Introducing [facility]! Jorge Hough introduces My Best Interest patient portal. Now you can access parts of your medical record, email your doctor's office, and request medication refills online. 1. In your internet browser, go to [email or web address] [email or web address] 2. Click on the First Time User? Click Here link in the Sign In box. You will see the New Member Sign Up page. 3. Enter your My Best Interest Access Code exactly as it appears below. You will not need to use this code after youve completed the sign-up process. If you do not sign up before the expiration date, you must request a new code. · My Best Interest Access Code: [email or web address] Expires: 1/1/2018  8:40 AM 
 
4. Enter the last four digits of your Social Security Number (xxxx) and Date of Birth (mm/dd/yyyy) as indicated and click Submit. You will be taken to the next sign-up page. 5. Create a My Best Interest ID. This will be your My Best Interest login ID and cannot be changed, so think of one that is secure and easy to remember. 6. Create a My Best Interest password. You can change your password at any time. 7. Enter your Password Reset Question and Answer. This can be used at a later time if you forget your password. 8. Enter your e-mail address. You will receive e-mail notification when new information is available in [address] 9. Click Sign Up. You can now view and download portions of your medical record. 10. Click the Download Summary menu link to download a portable copy of your medical information. If you have questions, please visit the Frequently Asked Questions section of the My Best Interest website. Remember, My Best Interest is NOT to be used for urgent needs. For medical emergencies, dial 911. Now available from your iPhone and Android! Please provide this summary of care documentation to your next provider. Your primary care clinician is listed as Jennie Carlton. If you have any questions after today's visit, please call [phone].

## 2017-11-13 NOTE — PROGRESS NOTES
[address], [address]  [place], [address] [place] [address] Street  Phone: [phone]  Fax: [phone]        Samanta Bonner  : 1945  PCP: Noe Qiu MD  2017    NEW PATIENT      ASSESSMENT AND PLAN     Cora Bach comes in to the office today c/o chronic neck pain which was exacerbated by a recent mild TIA. Her symptoms are likely related to cervical facet arthropathy. On the examination, she had limited cervical ROM with pain reproduced at end ranges of rotation and extension. She did not show neurological deficits nor has radicular pain. The cervical CT scan showed multifocal degenerative disc disease most significant at C5-7 with associated canal stenosis. It also showed significant facet hypertrophy with multifocal regions of bilateral severe neural foramina stenosis. I referred her to Dr. Ally Bonilla for a cervical medial branch block / RFA with sedation. I prescribed her Mobic 15 mg daily. Pt will f/u in 2 months or sooner if needed. Diagnoses and all orders for this visit:    1. Facet arthropathy, cervical  -     REFERRAL TO PAIN MANAGEMENT  -     meloxicam (MOBIC) 15 mg tablet; Take 1 Tab by mouth daily. Follow-up Disposition:  Return in about 2 months (around 2018), or if symptoms worsen or fail to improve. Melvin Levin is seen today in consultation at the request of Noe Qiu MD for complaints of chronic neck pain. HISTORY OF PRESENT ILLNESS  Cora Bach is a 81 y.o. female c/o chronic neck pain which was exacerbated by a mild TIA on 10/02/2017. She note her pain is constant and there is not specific movement that will aggravate her symptoms. She denies a specific position that will help alleviate her pain. She denies radicular symptoms into the extremities at this time.  She present with a cervical CT scan which showed mild regions of anterior subluxation C2-C4 with associated canal stenosis C2-3 up to 7 mm. It also showed multifocal degenerative disc disease most significant at C5-7 with associated canal stenosis. Pt denies any fevers, chills, nausea, vomiting. Pt denies any chest pain and shortness of breath. Pt denies any ear, nose, and throat problems. Pt denies any fecal or urinary incontinence. PAST MEDICAL HISTORY   Past Medical History:   Diagnosis Date    Abnormal Pap smear     Dr. Royal May Neck pain 5/17/2010       Past Surgical History:   Procedure Laterality Date    HX GYN      Total Hyst       MEDICATIONS      Current Outpatient Prescriptions   Medication Sig Dispense Refill    acetaminophen (TYLENOL ARTHRITIS PAIN) 650 mg CR tablet Take 650 mg by mouth every six (6) hours as needed for Pain. One tab by mouth twice daily      aspirin 81 mg chewable tablet Take 1 Tab by mouth daily. 30 Tab 3    atorvastatin (LIPITOR) 20 mg tablet Take 1 Tab by mouth nightly. 30 Tab 3       ALLERGIES  No Known Allergies       SOCIAL HISTORY    Social History     Social History    Marital status:      Spouse name: N/A    Number of children: N/A    Years of education: N/A     Social History Main Topics    Smoking status: Never Smoker    Smokeless tobacco: Never Used    Alcohol use No    Drug use: No    Sexual activity: No     Other Topics Concern    None     Social History Narrative       FAMILY HISTORY  No family history on file. REVIEW OF SYSTEMS  Review of Systems   Constitutional: Negative for chills, diaphoresis, fever, malaise/fatigue and weight loss. Respiratory: Negative for shortness of breath. Cardiovascular: Negative for chest pain and leg swelling. Gastrointestinal: Negative for constipation, nausea and vomiting. Neurological: Negative for dizziness, tingling, seizures, loss of consciousness, weakness and headaches. Psychiatric/Behavioral: The patient does not have insomnia.         PHYSICAL EXAMINATION  Visit Vitals    /72    Pulse 76  Resp 14    Ht 5' (1.524 m)    Wt 112 lb (50.8 kg)    BMI 21.87 kg/m2       Pain Assessment  11/13/2017   Location of Pain Neck   Severity of Pain 9   Quality of Pain Throbbing   Frequency of Pain Constant       Constitutional:  Well developed, well nourished, in no acute distress. Psychiatric: Affect and mood are appropriate. HEENT: Normocephalic, atraumatic. Extraocular movements intact. Integumentary: No rashes or abrasions noted on exposed areas. Cardiovascular: Regular rate and rhythm. Pulmonary: Clear to auscultation bilaterally. SPINE/MUSCULOSKELETAL EXAM    Cervical spine:  Neck is midline. Normal muscle tone. No focal atrophy is noted. Cervical ROM limited to about 30 degrees  Shoulder ROM intact. Tenderness to palpation. Negative Spurling's sign. Negative Tinel's sign. Negative Garza's sign. Sensation in the bilateral arms grossly intact to light touch. Lumbar spine:  No rash, ecchymosis, or gross obliquity. No fasciculations. No focal atrophy is noted. No pain with hip ROM. Full range of motion. No tenderness to palpation. No tenderness to palpation at the sciatic notch. SI joints non-tender. Trochanters non tender. Sensation in the bilateral legs grossly intact to light touch. MOTOR:      Biceps  Triceps Deltoids Wrist Ext Wrist Flex Hand Intrin   Right 5/5 5/5 5/5 5/5 5/5 5/5   Left 5/5 5/5 5/5 5/5 5/5 5/5             Hip Flex  Quads Hamstrings Ankle DF EHL Ankle PF   Right 5/5 5/5 5/5 5/5 5/5 5/5   Left 5/5 5/5 5/5 5/5 5/5 5/5     DTRs are 2+ biceps, triceps, brachioradialis, patella, and Achilles. Negative Straight Leg raise. Squat not tested. No difficulty with tandem gait. Ambulation without assistive device. FWB.     RADIOGRAPHS  Cervical CT images taken on 10/17/2017 personally reviewed with patient:  FINDINGS:     There is narrowing at the atlantooccipital joints bilaterally with hypertrophic  bone formation/calcification at the joint space between the basion and C1  anterior arch. Calcific soft tissue \"mass\" posterior to the dens. There is  calcification of the transverse ligaments dorsal C2. There is significant  artifact at the foramen magnum limiting assessment for narrowing but  calcification appears to efface the thecal sac without significant mass effect  as assessed on axial view. There is sclerosis at the cranial dens with mild  hypertrophic bone anteriorly and posteriorly. Calcified soft tissue mass also  anterior to the dens. There is loss of normal cervical lordosis. There is  narrowing at the C1-C2 anterior arch. No separation at C1-2. 2 mm anterior  subluxation C2 on C3, 1 mm anterior subluxation C3 on C4 and 1 mm anterior  subluxation C4 on C5. There is stenosis at C2-3 measuring up to 7 mm secondary  to disc bulge and anterior subluxation with mass effect on the posterior cord. There may be mild loss of vertebral body height at C5 and C6 with sclerosis at  C6-7 joint space and severe disc space narrowing. Additional severe disc space  narrowing C5-6. There is mild anterior calcifications C4-C6. Disc bulge C4-5  with mild canal narrowing up to 11 mm. Disc bulge C6-7 with narrowing up to 7  mm. Significant facet hypertrophy. No acute fracture. Severe stenosis right C3-4  and C4-5 neural foramina. Severe stenosis left C3-4, C4-5 and C6-7 neural  foramina. Moderate stenosis right C2-3, C5-6, C6-7 and left to 3 C5-6. There is  soft tissue thickening with mild calcifications at the T1 supraspinous ligament.     There is prominence of the lingual tonsils. Significant biapical pleural  scarring.     IMPRESSION  IMPRESSION:       1. Hypertrophic ossification and calcification basion/C1 with partial fusion,  calcified \"pseudomass\" posterior greater than anterior dens with consideration  for CPPD/gout or degenerative changes. No significant narrowing at the foramen  magnum.   2.  Mild regions of anterior subluxation C2-C4 with associated canal stenosis  C2-3 up to 7 mm. 3.  Multifocal degenerative disc disease most significant at C5-7 with  associated canal stenosis. 4.  Significant facet hypertrophy with multifocal regions of bilateral severe  neural foramina stenosis. 5.  Prominent lingual tonsils. 6.  Significant biapical pulmonary pleural scarring.        reviewed    Ms. Gloria Mcmullen has a reminder for a \"due or due soon\" health maintenance. I have asked that she contact her primary care provider for follow-up on this health maintenance. This plan was reviewed with the patient and patient agrees. All questions were answered. More than half of this visit today was spent on counseling. Written by Sisi Martinez, as dictated by Dr. Emilia Trevino. I, Dr. Emilia Trevino, confirm that all documentation is accurate.

## 2017-11-13 NOTE — PATIENT INSTRUCTIONS
Learning About a Cervical Epidural Injection  What is a cervical epidural injection? A cervical epidural injection is a shot of medicine in your neck. The injection goes into the area around the spinal cord in your neck. A doctor may give it to you to help with pain, tingling, or numbness in your neck, shoulder, or arm. This injection may have both a steroid, which reduces swelling and pain, and a local anesthetic, which numbs the nerves. Or it may only have a steroid. Some people get a series of these injections over weeks or months. How is a cervical epidural done? The doctor will use a tiny needle to numb the skin where you are getting the injection. After the skin is numb, your doctor will use a larger needle for the epidural injection. He or she may use X-ray or ultrasound to help guide the needle. You may feel some pressure. But you should not feel pain. It takes about 10 to 15 minutes to get this injection. You will probably go home about 20 to 30 minutes after you get it. You will need someone to drive you home. What can you expect after a cervical epidural?  If your injection included local anesthetic medicine, your neck, shoulder, arm, or hand may feel heavy or numb right after the shot. With a local anesthetic, your pain may be gone right away. But it may return after a few hours. This is because the steroid hasn't started working yet. Before the steroid starts to work, your neck, shoulder, or arm may be sore for a few days. These injections don't always work. When they do, it takes 1 to 5 days. The pain relief can last for several days to a few months or longer. Some people are dizzy or feel sick to their stomach after getting this shot. These symptoms usually don't last very long. You may want to do less than normal for a few days. But you may also be able to return to your daily routine.   If your pain is better, you may be able to keep doing your normal activities or physical therapy. But try not to overdo it, even if your pain has improved a lot. If your pain is only a little better or if it comes back, your doctor may want you to get another injection in a few weeks. If your pain has not changed, talk to your doctor about other treatment choices. Follow-up care is a key part of your treatment and safety. Be sure to make and go to all appointments, and call your doctor if you are having problems. It's also a good idea to know your test results and keep a list of the medicines you take. Where can you learn more? Go to http://karin-michaela.info/. Enter L710 in the search box to learn more about \"Learning About a Cervical Epidural Injection. \"  Current as of: March 21, 2017  Content Version: 11.4  © 9359-5055 [employer]. Care instructions adapted under license by [employer] (which disclaims liability or warranty for this information). If you have questions about a medical condition or this instruction, always ask your healthcare professional. [address] any warranty or liability for your use of this information. Neck: Exercises  Your Care Instructions  Here are some examples of typical rehabilitation exercises for your condition. Start each exercise slowly. Ease off the exercise if you start to have pain. Your doctor or physical therapist will tell you when you can start these exercises and which ones will work best for you. How to do the exercises  Neck stretch    1. This stretch works best if you keep your shoulder down as you lean away from it. To help you remember to do this, start by relaxing your shoulders and lightly holding on to your thighs or your chair. 2. Tilt your head toward your shoulder and hold for 15 to 30 seconds. Let the weight of your head stretch your muscles. 3. If you would like a little added stretch, use your hand to gently and steadily pull your head toward your shoulder.  For example, keeping your right shoulder down, lean your head to the left. 4. Repeat 2 to 4 times toward each shoulder. Diagonal neck stretch    1. Turn your head slightly toward the direction you will be stretching, and tilt your head diagonally toward your chest and hold for 15 to 30 seconds. 2. If you would like a little added stretch, use your hand to gently and steadily pull your head forward on the diagonal.  3. Repeat 2 to 4 times toward each side. Dorsal glide stretch    The dorsal glide stretches the back of the neck. If you feel pain, do not glide so far back. Some people find this exercise easier to do while lying on their backs with an ice pack on the neck. 1. Sit or stand tall and look straight ahead. 2. Slowly tuck your chin as you glide your head backward over your body  3. Hold for a count of 6, and then relax for up to 10 seconds. 4. Repeat 8 to 12 times. Chest and shoulder stretch    1. Sit or stand tall and glide your head backward as in the dorsal glide stretch. 2. Raise both arms so that your hands are next to your ears. 3. Take a deep breath, and as you breathe out, lower your elbows down and behind your back. You will feel your shoulder blades slide down and together, and at the same time you will feel a stretch across your chest and the front of your shoulders. 4. Hold for about 6 seconds, and then relax for up to 10 seconds. 5. Repeat 8 to 12 times. Strengthening: Hands on head    1. Move your head backward, forward, and side to side against gentle pressure from your hands, holding each position for about 6 seconds. 2. Repeat 8 to 12 times. Follow-up care is a key part of your treatment and safety. Be sure to make and go to all appointments, and call your doctor if you are having problems. It's also a good idea to know your test results and keep a list of the medicines you take. Where can you learn more? Go to http://karin-michaela.info/.   Enter P975 in the search box to learn more about \"Neck: Exercises. \"  Current as of: March 21, 2017  Content Version: 11.4  © 2911-9121 [employer]. Care instructions adapted under license by [employer] (which disclaims liability or warranty for this information). If you have questions about a medical condition or this instruction, always ask your healthcare professional. [address] any warranty or liability for your use of this information. Learning About Medial Branch Block and Neurotomy  What are medial branch block and neurotomy? Facet joints connect your vertebrae to each other. Problems in these joints can cause chronic (long-term) pain in the neck or back. They can sometimes affect the shoulders, arms, buttocks, or legs. Medial branch nerves are the nerves that carry many of the pain messages from your facet joints. Radiofrequency medial branch neurotomy is a type of medial branch neurotomy that is used to relieve arthritis pain. It uses radio waves to damage nerves in your neck or back so that they can no longer send pain messages to your brain. Before your doctor knows if a neurotomy will help you, he or she will do a medial branch block to find out if certain nerves are the ones that are a source of your pain. You will need two separate visits to the outpatient center or hospital to have both procedures. How is a medial branch block done? The doctor will use a tiny needle to numb the skin where you will get the block. Then he or she puts the block needle into the numbed area. You may feel some pressure, but you should not feel pain. Using fluoroscopy (live X-ray) to guide the needle, the doctor injects medicine onto one or more nerves to make them numb. If you get relief from your pain in the next 4 to 6 hours, it's a sign that those nerves may be contributing to your pain. The relief will last only a short time.  You may then have a medial branch neurotomy at a later visit to try to get longer relief. It takes 20 to 30 minutes to get the block. You can go home after the doctor watches you for about an hour. You will get instructions on how to report how much pain you have when you are at home. You will need someone to drive you home. How is medial branch neurotomy done? The doctor will use a tiny needle to numb the skin where you will get the neurotomy. Then he or she puts the neurotomy needle into the numbed area. You may feel some pressure. Using fluoroscopy (live X-ray) to guide the needle, the doctor sends radio waves through the needle to the nerve for 60 to 90 seconds. The radio waves heat the nerve, which damages it. The doctor may do this several times. And he or she may treat more than one nerve. It takes 45 to 90 minutes to get a neurotomy, depending on how many nerves are heated. You will probably go home 30 to 60 minutes later. You will need someone to drive you home. What can you expect after a neurotomy? You may feel a little sore or tender at the injection site at first. But after a successful neurotomy, most people have pain relief right away. It often lasts for 9 to 12 months or longer. Sometimes the pain relief is permanent. If your pain does come back, it may mean that the damaged nerve has healed and can send pain messages again. Or it can mean that a different nerve is causing pain. Your doctor will discuss your options with you. Follow-up care is a key part of your treatment and safety. Be sure to make and go to all appointments, and call your doctor if you are having problems. It's also a good idea to know your test results and keep a list of the medicines you take. Where can you learn more? Go to http://karin-michaela.info/. Enter T836 in the search box to learn more about \"Learning About Medial Branch Block and Neurotomy. \"  Current as of: October 14, 2016  Content Version: 11.4  © 6881-5833 [employer]. Care instructions adapted under license by [employer] (which disclaims liability or warranty for this information). If you have questions about a medical condition or this instruction, always ask your healthcare professional. [address] any warranty or liability for your use of this information.

## 2017-11-28 ENCOUNTER — OFFICE VISIT (OUTPATIENT)
Dept: PAIN MANAGEMENT | Age: 72
End: 2017-11-28

## 2017-11-28 VITALS
RESPIRATION RATE: 12 BRPM | HEIGHT: 60 IN | WEIGHT: 112 LBS | TEMPERATURE: 96.4 F | DIASTOLIC BLOOD PRESSURE: 90 MMHG | BODY MASS INDEX: 21.99 KG/M2 | SYSTOLIC BLOOD PRESSURE: 144 MMHG

## 2017-11-28 DIAGNOSIS — G89.4 CHRONIC PAIN SYNDROME: ICD-10-CM

## 2017-11-28 DIAGNOSIS — M50.30 DEGENERATIVE DISC DISEASE, CERVICAL: ICD-10-CM

## 2017-11-28 DIAGNOSIS — M48.02 CERVICAL SPINAL STENOSIS: ICD-10-CM

## 2017-11-28 DIAGNOSIS — M47.812 CERVICAL FACET SYNDROME: ICD-10-CM

## 2017-11-28 DIAGNOSIS — M47.812 SPONDYLOSIS OF CERVICAL REGION WITHOUT MYELOPATHY OR RADICULOPATHY: Primary | ICD-10-CM

## 2017-11-28 NOTE — PROGRESS NOTES
[facility]  Interventional Pain Management Consultation History & Physical    PATIENT NAME:  Devika Colon DBNGUL     YOB: 1945    DATE OF SERVICE:   11/28/2017      CHIEF COMPLAINT:  Neck Pain      REASON FOR VISIT:   Giuliana Taylor presents to the pain clinic today for initial evaluation and to consider interventional pain management options as indicated for the type and location of the pain the patient is presenting with. HISTORY OF PRESENT ILLNESS:   This is an initial evaluation and consideration for interventional procedures regarding this patient. She is referred to us by Dr Salvador Diaz for consideration for cervical radiofrequency neurotomy procedures as indicated, versus other procedures also as indicated. Patient endorses chronic neck pain of years duration. Patient endorses severe neck pain which limits her ability to turn her neck in either direction. Constant aching pain. Pain radiates throughout her neck on both sides as well as up to the level of her ears. She denies upper extremity symptoms. Patient takes meloxicam for her arthritis, this seems to be helping. Is not tried physical therapy. She has not had previous other injection procedures. Is not currently take blood thinners. By review of available medical records, progress note dated November 13, 2017 by Dr Salvador Diaz is reviewed. Patient has chronic severe neck pain, diagnosed with cervical facet arthropathy. Recent mild TIA. She has been placed on Lipitor for this. Limited cervical range of motion with pain reproduced at end ranges of rotational motion as well as extension. No neurologic deficits are noted. No radicular pain is noted. Cervical CT shows multifocal degenerative disc disease most significant at C5-C7. Cervical facet hypertrophy cervical facet arthropathy is noted. Bilateral severe neuroforaminal narrowing is noted.   Anterior subluxation C2-C4 with associated canal stenosis C2-3 of 7 mm. Multilevel degenerative changes noted throughout cervical spine on cervical CT, this was taken October 17, 2017. Hypertrophic bone formation/calcification at the joint space between basion and C1 anterior arch. Calcification in the transverse ligament C2. Subluxation C2-3 C3-4 1 mm each. Canal stenosis C2-3 7 mm. This is secondary to disc bulge and anterior subluxation with mass-effect on posterior cord. Severe space narrowing C6-7. Severe disc narrowing C5-6. Disc bulges other cervical levels is noted. Severe stenosis right C3-4 and C4-5 neuroforamina foramina. Severe stenosis left C3-4, C4-5, C6-7 neuroforamina. Patient takes baby aspirin, she has been on this since her TIA. ASSESSMENT/OPTIONS: as follows. We discussed options. I am in agreement with Dr Greg Carreon and that I believe the patient has cervical facet syndrome related pain, causing a good deal of her pain symptoms. She denies radicular symptoms. She denies upper extremity symptoms. I have discussed with her risk and benefits, contraindications indications of cervical radiofrequency neurotomy procedures at bilateral C4-5, C5-6, C6-7 levels with IV conscious sedation. We will plan on this procedure. I have discussed the risks and benefits, indications, contraindications, and side effects of intended procedure with the patient. I have used skeleton spine model to describe and discuss the procedure with the patient. I have answered all questions relating to the procedure. Patient understands the nature of the procedure and wishes to proceed. Patient has no further questions. MRI Results (most recent):    Results from [place] encounter on 10/02/17   MRA BRAIN WO CONT   Narrative INTRACRANIAL MRA    CPT CODE: 49228    HISTORY: Stroke like symptoms, left-sided weakness and facial droop upon  awakening. COMPARISON: None.     FINDINGS: The distal cervical, petrous, cavernous and supraclinoid segments of the  internal carotid arteries are patent. Bilateral anterior and middle cerebral  arteries are patent. In the posterior circulation, the vertebral arteries are patent to the  vertebrobasilar junction. Basilar trunk the basilar terminus are patent. Bilateral posterior cerebral and superior cerebellar arteries are patent. No evidence of intracranial aneurysm or hemodynamically significant stenosis. Impression IMPRESSION:    Patent intracranial vasculature. PAST MEDICAL HISTORY:   The patient  has a past medical history of Abnormal Pap smear and Neck pain (5/17/2010). PAST SURGICAL HISTORY:   The patient  has a past surgical history that includes gyn. CURRENT MEDICATIONS:   The patient has a current medication list which includes the following prescription(s): meloxicam, acetaminophen, aspirin, and atorvastatin. ALLERGIES:   No Known Allergies    FAMILY HISTORY:   The patient family history is not on file. SOCIAL HISTORY:   The patient  reports that she has never smoked. She has never used smokeless tobacco. The patient  reports that she does not drink alcohol. She also  reports that she does not use illicit drugs. REVIEW OF SYSTEMS:    The patient denies fever, chills, weight loss (Constitutional), rash, itching (Skin), tinnitus, congestion (HENT), blurred vision, photophobia (Eyes), palpitations, orthopnea (Cardiovascular), hemoptysis, wheezing (Respiratory), nausea, vomiting, diarrhea (Gastrointestinal), dysuria, hematuria, urgency (Genitourinary), bowel or bladder incontinence, loss of consciousness (Neurologic), suicidal or homicidal ideation or hallucinations (Psychiatric). Denies swelling, axillary or groin masses (Lymphatic).            PHYSICAL EXAM:  VS:   Visit Vitals    /90 (BP 1 Location: Right arm, BP Patient Position: Sitting)    Temp 96.4 °F (35.8 °C)    Resp 12    Ht 5' (1.524 m)    Wt 50.8 kg (112 lb)    BMI 21.87 kg/m2     General: Elderly female , body habitus consistent with recorded height and weight and the calculated BMI. Apparent distress due to chronic neck pain. Head: Normocephalic, atraumatic. Skin: Inspection of the skin reveals no rashes, lesions or infection. CV: Regular rate. No murmurs or rubs noted. No peripheral edema noted. Pulm: Respirations are even and unlabored. Extr: No clubbing, cyanosis, or edema noted. Musculoskeletal:  1. Cervical spine -decreased range of motion all axes . Paraspinous tenderness throughout the cervical spine . There is no scoliosis, asymmetry, or musculoskeletal defect. 2. Thoracic spine -decreased ROM. No paraspinous tenderness at any level. There is no scoliosis, asymmetry, or musculoskeletal defect. 3. Lumbar spine -decreased ROM. Paraspinous tenderness. SI joints are nontender bilaterally. There is no scoliosis, asymmetry, or musculoskeletal defect. 4. Right upper extremity - Full ROM. 5/5 muscle strength in all muscle groups. No pain or tenderness in shoulder, elbow, wrist, or hand. 5. Left upper extremity - Full ROM. 5/5 muscle strength in all muscle groups. No pain or tenderness in shoulder, elbow, wrist, or hand. 6. Right lower extremity - Full ROM. 5/5 muscle strength in all muscle groups. No pain, tenderness, or swelling in the hip, knee, ankle or foot. 7. Left lower extremity - Full ROM. 5/5 muscle strength in all muscle groups. No pain, tenderness, or swelling in the hip, knee, ankle or foot. Neurological:  1. Mental Status - Alert, awake and oriented. Speech is clear and appropriate. 2. Cranial Nerves - Extraocular muscles intact bilaterally. Cranial nerves II-XII grossly intact bilaterally. 3. Gait - antalgic   4. Reflexes - 2+ and symmetric throughout. 5. Sensation - Intact to light touch and pin prick. 6. Provocative Tests - Spurlings negative bilaterally. Psychological:  1. Mood and affect - Appropriate. 2. Speech - Appropriate. 3. Though content - Appropriate. 4. Judgment - Appropriate. ASSESSMENT:      ICD-10-CM ICD-9-CM    1. Spondylosis of cervical region without myelopathy or radiculopathy M47.812 721.0    2. Cervical facet syndrome M12.88 723.8    3. Cervical spinal stenosis M48.02 723.0    4. Degenerative disc disease, cervical M50.30 722.4    5. Chronic pain syndrome G89.4 338.4            PLAN:    1.    I have thoroughly discussed the risks and benefits, indications, contraindications, and side effects of any and procedures that were mentioned at today's patient visit. I have used a skeleton model to explain all procedures, as well as to provide added emphasis regarding procedures and as well for patient education purposes. I have answered all questions in great detail, and I have obtained verbal confirmation for all procedures planned with the patient. 3.    I have reviewed in great detail today the patient's MRI and other imaging studies with the patient. I have explained to the patient their condition using both actual recent and relevant images insofar as I am able to obtain actual images. I have used a skeleton model for added emphasis as well as patient education. 4.    I have advised patient to have a primary care provider continue to care for their health maintenance and general medical conditions. 5,    I have placed appropriate referrals to specialty care providers as I have deemed necessary through today's clinical consultation with the patient. 5.    I have explained to the patient that if any significant side effects, issues, problems, concerns, or perceived complications may have arisen at around the time of the patient's procedures, they should either call the pain management clinic or go to the emergency room immediately for medical provider evaluation.    6.   I have encouraged all patients to call the pain management clinic with any questions or concerns that they may have pertaining to their procedures. DISPOSITION:   The patients condition and plan were discussed at length and all questions were answered. The patient agrees with the plan. A total of 40 minutes was spent with the patient of which over half of the time was spent counseling the patient. Keke Jessica MD 11/29/2017 2:15 PM    Note: Although these clinic notes were documented by the provider at the time of the exam, they have not been proofed and are subject to transcription variance.

## 2017-12-20 RX ORDER — SODIUM CHLORIDE 0.9 % (FLUSH) 0.9 %
5-10 SYRINGE (ML) INJECTION AS NEEDED
Status: CANCELLED | OUTPATIENT
Start: 2017-12-20

## 2017-12-20 RX ORDER — DIAZEPAM 5 MG/1
10 TABLET ORAL ONCE
Status: CANCELLED | OUTPATIENT
Start: 2018-01-03 | End: 2018-01-03

## 2018-01-03 ENCOUNTER — APPOINTMENT (OUTPATIENT)
Dept: CT IMAGING | Age: 73
End: 2018-01-03
Attending: EMERGENCY MEDICINE
Payer: MEDICARE

## 2018-01-03 ENCOUNTER — HOSPITAL ENCOUNTER (EMERGENCY)
Age: 73
Discharge: HOME | End: 2018-01-03
Attending: EMERGENCY MEDICINE | Admitting: PHYSICAL MEDICINE & REHABILITATION
Payer: MEDICARE

## 2018-01-03 ENCOUNTER — APPOINTMENT (OUTPATIENT)
Dept: GENERAL RADIOLOGY | Age: 73
End: 2018-01-03
Attending: EMERGENCY MEDICINE
Payer: MEDICARE

## 2018-01-03 VITALS
OXYGEN SATURATION: 97 % | HEIGHT: 60 IN | DIASTOLIC BLOOD PRESSURE: 60 MMHG | HEART RATE: 77 BPM | WEIGHT: 110 LBS | TEMPERATURE: 97.7 F | SYSTOLIC BLOOD PRESSURE: 102 MMHG | BODY MASS INDEX: 21.6 KG/M2 | RESPIRATION RATE: 14 BRPM

## 2018-01-03 DIAGNOSIS — R20.2 NUMBNESS AND TINGLING OF BOTH LEGS: Primary | ICD-10-CM

## 2018-01-03 DIAGNOSIS — R20.0 NUMBNESS AND TINGLING OF BOTH LEGS: Primary | ICD-10-CM

## 2018-01-03 LAB
ANION GAP SERPL CALC-SCNC: 5 MMOL/L (ref 3–18)
APTT PPP: 26.9 SEC (ref 23–36.4)
ATRIAL RATE: 76 BPM
BASOPHILS # BLD: 0 K/UL (ref 0–0.1)
BASOPHILS NFR BLD: 0 % (ref 0–2)
BUN SERPL-MCNC: 22 MG/DL (ref 7–18)
BUN/CREAT SERPL: 31 (ref 12–20)
CALCIUM SERPL-MCNC: 9.4 MG/DL (ref 8.5–10.1)
CALCULATED P AXIS, ECG09: 79 DEGREES
CALCULATED R AXIS, ECG10: 34 DEGREES
CALCULATED T AXIS, ECG11: 66 DEGREES
CHLORIDE SERPL-SCNC: 109 MMOL/L (ref 100–108)
CO2 SERPL-SCNC: 27 MMOL/L (ref 21–32)
CREAT SERPL-MCNC: 0.71 MG/DL (ref 0.6–1.3)
DIAGNOSIS, 93000: NORMAL
DIFFERENTIAL METHOD BLD: NORMAL
EOSINOPHIL # BLD: 0.1 K/UL (ref 0–0.4)
EOSINOPHIL NFR BLD: 1 % (ref 0–5)
ERYTHROCYTE [DISTWIDTH] IN BLOOD BY AUTOMATED COUNT: 13 % (ref 11.6–14.5)
GLUCOSE SERPL-MCNC: 99 MG/DL (ref 74–99)
HCT VFR BLD AUTO: 40.5 % (ref 35–45)
HGB BLD-MCNC: 13.5 G/DL (ref 12–16)
INR PPP: 1.1 (ref 0.8–1.2)
LYMPHOCYTES # BLD: 2.3 K/UL (ref 0.9–3.6)
LYMPHOCYTES NFR BLD: 23 % (ref 21–52)
MCH RBC QN AUTO: 31.3 PG (ref 24–34)
MCHC RBC AUTO-ENTMCNC: 33.3 G/DL (ref 31–37)
MCV RBC AUTO: 94 FL (ref 74–97)
MONOCYTES # BLD: 1 K/UL (ref 0.05–1.2)
MONOCYTES NFR BLD: 10 % (ref 3–10)
NEUTS SEG # BLD: 6.7 K/UL (ref 1.8–8)
NEUTS SEG NFR BLD: 66 % (ref 40–73)
P-R INTERVAL, ECG05: 148 MS
PLATELET # BLD AUTO: 231 K/UL (ref 135–420)
PMV BLD AUTO: 9.7 FL (ref 9.2–11.8)
POTASSIUM SERPL-SCNC: 3.9 MMOL/L (ref 3.5–5.5)
PROTHROMBIN TIME: 13.2 SEC (ref 11.5–15.2)
Q-T INTERVAL, ECG07: 400 MS
QRS DURATION, ECG06: 76 MS
QTC CALCULATION (BEZET), ECG08: 450 MS
RBC # BLD AUTO: 4.31 M/UL (ref 4.2–5.3)
SODIUM SERPL-SCNC: 141 MMOL/L (ref 136–145)
VENTRICULAR RATE, ECG03: 76 BPM
WBC # BLD AUTO: 10.1 K/UL (ref 4.6–13.2)

## 2018-01-03 PROCEDURE — 99284 EMERGENCY DEPT VISIT MOD MDM: CPT

## 2018-01-03 PROCEDURE — 80048 BASIC METABOLIC PNL TOTAL CA: CPT | Performed by: EMERGENCY MEDICINE

## 2018-01-03 PROCEDURE — 70450 CT HEAD/BRAIN W/O DYE: CPT

## 2018-01-03 PROCEDURE — 85730 THROMBOPLASTIN TIME PARTIAL: CPT | Performed by: EMERGENCY MEDICINE

## 2018-01-03 PROCEDURE — 71045 X-RAY EXAM CHEST 1 VIEW: CPT

## 2018-01-03 PROCEDURE — 85025 COMPLETE CBC W/AUTO DIFF WBC: CPT | Performed by: EMERGENCY MEDICINE

## 2018-01-03 PROCEDURE — 93005 ELECTROCARDIOGRAM TRACING: CPT

## 2018-01-03 PROCEDURE — 85610 PROTHROMBIN TIME: CPT | Performed by: EMERGENCY MEDICINE

## 2018-01-03 RX ORDER — LORAZEPAM 2 MG/ML
0.5 INJECTION INTRAMUSCULAR ONCE
Status: DISCONTINUED | OUTPATIENT
Start: 2018-01-03 | End: 2018-01-03 | Stop reason: HOSPADM

## 2018-01-03 NOTE — DISCHARGE INSTRUCTIONS
Numbness and Tingling: Care Instructions  Your Care Instructions    Many things can cause numbness or tingling. Swelling may put pressure on a nerve. This could cause you to lose feeling or have a pins-and-needles sensation on part of your body. Nerves may be damaged from trauma, toxins, or diseases, such as diabetes or multiple sclerosis (MS). Sometimes, though, the cause is not clear. If there is no clear reason for your symptoms, and you are not having any other symptoms, your doctor may suggest watching and waiting for a while to see if the numbness or tingling goes away on its own. Your doctor may want you to have blood or nerve tests to find the cause of your symptoms. Follow-up care is a key part of your treatment and safety. Be sure to make and go to all appointments, and call your doctor if you are having problems. It's also a good idea to know your test results and keep a list of the medicines you take. How can you care for yourself at home? · If your doctor prescribes medicine, take it exactly as directed. Call your doctor if you think you are having a problem with your medicine. · If you have any swelling, put ice or a cold pack on the area for 10 to 20 minutes at a time. Put a thin cloth between the ice and your skin. When should you call for help? Call 911 anytime you think you may need emergency care. For example, call if:  ? · You have weakness, numbness, or tingling in both legs. ? · You lose bowel or bladder control. ? · You have symptoms of a stroke. These may include:  ¨ Sudden numbness, tingling, weakness, or loss of movement in your face, arm, or leg, especially on only one side of your body. ¨ Sudden vision changes. ¨ Sudden trouble speaking. ¨ Sudden confusion or trouble understanding simple statements. ¨ Sudden problems with walking or balance. ¨ A sudden, severe headache that is different from past headaches. ? Watch closely for changes in your health, and be sure to contact your doctor if you have any problems, or if:  ? · You do not get better as expected. Where can you learn more? Go to http://karin-michaela.info/. Enter W219 in the search box to learn more about \"Numbness and Tingling: Care Instructions. \"  Current as of: October 14, 2016  Content Version: 11.4  © 0700-4234 [employer]. Care instructions adapted under license by [employer] (which disclaims liability or warranty for this information). If you have questions about a medical condition or this instruction, always ask your healthcare professional. [place] [address] any warranty or liability for your use of this information.

## 2018-01-03 NOTE — ED NOTES
Pt arrived to the ED with co bilateral leg tingling from knees down that is more prominent on the left leg that began yesterday at 1400 and it went away and it started again this morning at 0200 AM. Pt ambulatory no leg or arm drift noted. Pt states she feels as if her left arm is cramping. Hx CVA in Oct 2 nd wo deficits but the CVA initially affected the left side.

## 2018-01-03 NOTE — ED NOTES
Patient for discharge home will go directly to doctors office as previously arranged. She is able to ambulate.

## 2018-01-03 NOTE — ED PROVIDER NOTES
EMERGENCY DEPARTMENT HISTORY AND PHYSICAL EXAM    4:29 AM      Date: 1/3/2018  Patient Name: Hilario Hernandez    History of Presenting Illness     Chief Complaint   Patient presents with    Numbness         History Provided By: Patient    Chief Complaint: Numbness  Duration:  Hours  Timing:  Acute  Location: Bilateral lower extremities and left upper extremity  Quality: N/A  Severity: N/A  Modifying Factors: none reported   Associated Symptoms: SOB      Additional History (Context): Hilario Hernandez is a 81 y.o. female with neck pain and CVA who presents to the ED with c/o acute bilateral lower extremities and left upper extremity numbness which started 14 hours ago. Notes she is able to ambulate but legs are numb. Pt reports a PMHx of CVA in October 2017 which initially affected her left side. Since CVA she is easily fatigue. Notes current Sx is not similar to past CVA Sx. Pt notes SOB. Denies PMHx of asthma, COPD and DVT. Denies fever, cough, and numbness around groin area. No modifying factors reported. No further complaints or Sx. PCP: Jacoby Morales MD    Current Facility-Administered Medications   Medication Dose Route Frequency Provider Last Rate Last Dose    LORazepam (ATIVAN) injection 0.5 mg  0.5 mg IntraVENous ONCE Dinesh Zuniga MD         Current Outpatient Prescriptions   Medication Sig Dispense Refill    meloxicam (MOBIC) 15 mg tablet Take 1 Tab by mouth daily. 30 Tab 2    acetaminophen (TYLENOL ARTHRITIS PAIN) 650 mg CR tablet Take 650 mg by mouth every six (6) hours as needed for Pain. One tab by mouth twice daily      aspirin 81 mg chewable tablet Take 1 Tab by mouth daily. 30 Tab 3    atorvastatin (LIPITOR) 20 mg tablet Take 1 Tab by mouth nightly.  30 Tab 3       Past History     Past Medical History:  Past Medical History:   Diagnosis Date    Abnormal Pap smear     Dr. Alonzo Do Neck pain 5/17/2010    Stroke ([facility] [address] 10/02/2017       Past Surgical History:  Past Surgical History: Procedure Laterality Date    HX GYN      Total Hyst       Family History:  History reviewed. No pertinent family history. Social History:  Social History   Substance Use Topics    Smoking status: Never Smoker    Smokeless tobacco: Never Used    Alcohol use No       Allergies:  No Known Allergies      Review of Systems     Review of Systems   Constitutional: Negative for fever. Respiratory: Positive for shortness of breath. Negative for cough. Genitourinary:        Negative for numbness around groin area. Neurological: Positive for numbness (bilateral lower extremities and left upper extremity). All other systems reviewed and are negative. Physical Exam     Visit Vitals    /60    Pulse 77    Temp 97.7 °F (36.5 °C)    Resp 14    Ht 5' (1.524 m)    Wt 49.9 kg (110 lb)    SpO2 97%    BMI 21.48 kg/m2         Physical Exam   Constitutional:   General:  Well-developed, well-nourished, tremulous and anxious appearing. Head:  Normocephalic atraumatic. Eyes:  Pupils midrange extraocular movements intact. No pallor or conjunctival injection. Nose:  No rhinorrhea, inspection grossly normal.    Ears:  Grossly normal to inspection, no discharge. Mouth:  Mucous membranes moist, no appreciable intraoral lesion. Neck/Back:  Trachea midline, no asymmetry. Chest:  Grossly normal inspection, symmetric chest rise. Pulmonary:  Clear to auscultation bilaterally no wheezes rhonchi or rales. Cardiovascular:  S1-S2 no murmurs rubs or gallops. Abdomen: Soft, nontender, nondistended no guarding rebound or peritoneal signs.     Extremities:  Grossly normal to inspection, peripheral pulses intact    Neurologic:  Alert and oriented  Able to answer name and date correctly  Performs eye closing and hand squeeze tasks  No dysarthria or aphasia  Symmetric smile, uvula midline, no blunting of nasolabial fold  Facial sensation grossly intact to light touch  UPPER extremity:  Bilateral arm raised off bed and held against gravity for 5 seconds   strong and equal  No past pointing  Sensation grossly intact to light touch  LOWER extremity:  Bilateral leg raises off bed and held against gravity for 5 seconds  Sensation grossly intact to light touch. Skin:  Warm and dry  Psychiatric:  Grossly normal mood and affect. Nursing note reviewed, vital signs reviewed. Diagnostic Study Results     Labs -  Recent Results (from the past 12 hour(s))   CBC WITH AUTOMATED DIFF    Collection Time: 01/03/18  5:08 AM   Result Value Ref Range    WBC 10.1 4.6 - 13.2 K/uL    RBC 4.31 4.20 - 5.30 M/uL    HGB 13.5 12.0 - 16.0 g/dL    HCT 40.5 35.0 - 45.0 %    MCV 94.0 74.0 - 97.0 FL    MCH 31.3 24.0 - 34.0 PG    MCHC 33.3 31.0 - 37.0 g/dL    RDW 13.0 11.6 - 14.5 %    PLATELET 841 154 - 638 K/uL    MPV 9.7 9.2 - 11.8 FL    NEUTROPHILS 66 40 - 73 %    LYMPHOCYTES 23 21 - 52 %    MONOCYTES 10 3 - 10 %    EOSINOPHILS 1 0 - 5 %    BASOPHILS 0 0 - 2 %    ABS. NEUTROPHILS 6.7 1.8 - 8.0 K/UL    ABS. LYMPHOCYTES 2.3 0.9 - 3.6 K/UL    ABS. MONOCYTES 1.0 0.05 - 1.2 K/UL    ABS. EOSINOPHILS 0.1 0.0 - 0.4 K/UL    ABS.  BASOPHILS 0.0 0.0 - 0.1 K/UL    DF AUTOMATED     METABOLIC PANEL, BASIC    Collection Time: 01/03/18  5:08 AM   Result Value Ref Range    Sodium 141 136 - 145 mmol/L    Potassium 3.9 3.5 - 5.5 mmol/L    Chloride 109 (H) 100 - 108 mmol/L    CO2 27 21 - 32 mmol/L    Anion gap 5 3.0 - 18 mmol/L    Glucose 99 74 - 99 mg/dL    BUN 22 (H) 7.0 - 18 MG/DL    Creatinine 0.71 0.6 - 1.3 MG/DL    BUN/Creatinine ratio 31 (H) 12 - 20      GFR est AA >60 >60 ml/min/1.73m2    GFR est non-AA >60 >60 ml/min/1.73m2    Calcium 9.4 8.5 - 10.1 MG/DL   PTT    Collection Time: 01/03/18  5:08 AM   Result Value Ref Range    aPTT 26.9 23.0 - 36.4 SEC   PROTHROMBIN TIME + INR    Collection Time: 01/03/18  5:08 AM   Result Value Ref Range    Prothrombin time 13.2 11.5 - 15.2 sec    INR 1.1 0.8 - 1.2         Radiologic Studies - CT HEAD WO CONT   Final Result      XR CHEST PORT    (Results Pending)         Medical Decision Making   I am the first provider for this patient. I reviewed the vital signs, available nursing notes, past medical history, past surgical history, family history and social history. Vital Signs-Reviewed the patient's vital signs. Pulse Oximetry Analysis -  99% on room air, non-hypoxic  ED course:  Patient presents with history of stroke, bilateral leg numbness LEFT arm numbness since yesterday at 2 PM.  Not a TPA candidate due to the onset of her symptoms being more than 4.5 hours, I doubt that she has a stroke more likely a peripheral neuropathy versus anxiety. Consult:  Discussed care with Dr. Carol Moreno. Standard discussion; including history of patients chief complaint, available diagnostic results, and treatment course. Patient was evaluated by tele-neurology, no concern for stroke, check labs and likely discharge for outpatient follow-up if workup unremarkable    Labs unremarkable    Patient had slept, reevaluated at 0730 hrs.: Symptoms have completely resolved     will follow with PCP or return here with any concerns    Patient's presentation, history, physical exam and laboratory evaluations were reviewed. At this time patient was felt to be stable for outpatient management and follow with primary care/specialist.  Patient was instructed to return to the emergency department with any concerns. Disposition:    Discharged home      Portions of this chart were created with Dragon medical speech to text program.   Unrecognized errors may be present.                 Follow-up Information     Follow up With Details Comments Wayne Zhong MD Call in 2 days      [facility] EMERGENCY DEPT  As needed, If symptoms worsen [address]           Patient's Medications   Start Taking    No medications on file   Continue Taking    ACETAMINOPHEN (TYLENOL ARTHRITIS PAIN) 650 MG CR TABLET    Take 650 mg by mouth every six (6) hours as needed for Pain. One tab by mouth twice daily    ASPIRIN 81 MG CHEWABLE TABLET    Take 1 Tab by mouth daily. ATORVASTATIN (LIPITOR) 20 MG TABLET    Take 1 Tab by mouth nightly. MELOXICAM (MOBIC) 15 MG TABLET    Take 1 Tab by mouth daily. These Medications have changed    No medications on file   Stop Taking    No medications on file     _______________________________    Attestations:  [address] [place] [address] acting as a scribe for and in the presence of Lita Germain MD      January 03, 2018 at 4:29 AM       Provider Attestation:      I personally performed the services described in the documentation, reviewed the documentation, as recorded by the scribe in my presence, and it accurately and completely records my words and actions.  January 03, 2018 at 4:29 AM - Lita Germain MD    _______________________________

## 2018-01-04 ENCOUNTER — HOSPITAL ENCOUNTER (EMERGENCY)
Age: 73
Discharge: HOME | End: 2018-01-04
Attending: EMERGENCY MEDICINE
Payer: MEDICARE

## 2018-01-04 ENCOUNTER — APPOINTMENT (OUTPATIENT)
Dept: GENERAL RADIOLOGY | Age: 73
End: 2018-01-04
Attending: EMERGENCY MEDICINE
Payer: MEDICARE

## 2018-01-04 VITALS
WEIGHT: 130 LBS | TEMPERATURE: 97.6 F | HEIGHT: 60 IN | DIASTOLIC BLOOD PRESSURE: 67 MMHG | HEART RATE: 78 BPM | SYSTOLIC BLOOD PRESSURE: 127 MMHG | RESPIRATION RATE: 20 BRPM | OXYGEN SATURATION: 98 % | BODY MASS INDEX: 25.52 KG/M2

## 2018-01-04 DIAGNOSIS — R06.02 SOB (SHORTNESS OF BREATH): Primary | ICD-10-CM

## 2018-01-04 PROCEDURE — 93005 ELECTROCARDIOGRAM TRACING: CPT

## 2018-01-04 PROCEDURE — 71045 X-RAY EXAM CHEST 1 VIEW: CPT

## 2018-01-04 PROCEDURE — 99285 EMERGENCY DEPT VISIT HI MDM: CPT

## 2018-01-04 NOTE — ED PROVIDER NOTES
EMERGENCY DEPARTMENT HISTORY AND PHYSICAL EXAM    12:10 PM      Date: 1/4/2018  Patient Name: Renato Karimi    History of Presenting Illness     Chief Complaint   Patient presents with    Anxiety         History Provided By: Patient    Additional History (Context): Renato Karimi is a 81 y.o. female with stroke who presents to the ED with a chief complaint of constant lower extremity weakness for the past 2 days with associated symptoms of anxiety and SOB. Patient states \"I was here for the same thing yesterday. \" Patient also states \"I had a stroke in Oct-2017 and have SOB since then. \" Patient reports that a concern for another stroke makes her anxious and precipitates her SOB. Patient does not report any alleviating factors. Patient denies any other symptoms or complaints. PCP: Santos Velasco MD    Chief Complaint: Lower extremity weakness   Duration: 2 Days  Timing:  Constant  Location: Lower extremity   Quality: Aching  Severity: Moderate  Modifying Factors: SOB precipitated with thoughts of having another stroke. Associated Symptoms: anxiety and SOB      Current Outpatient Prescriptions   Medication Sig Dispense Refill    meloxicam (MOBIC) 15 mg tablet Take 1 Tab by mouth daily. 30 Tab 2    acetaminophen (TYLENOL ARTHRITIS PAIN) 650 mg CR tablet Take 650 mg by mouth every six (6) hours as needed for Pain. One tab by mouth twice daily      aspirin 81 mg chewable tablet Take 1 Tab by mouth daily. 30 Tab 3    atorvastatin (LIPITOR) 20 mg tablet Take 1 Tab by mouth nightly. 30 Tab 3       Past History     Past Medical History:  Past Medical History:   Diagnosis Date    Abnormal Pap smear     Dr. Tiffany Quinones Neck pain 5/17/2010    Stroke ([facility] [address] 10/02/2017       Past Surgical History:  Past Surgical History:   Procedure Laterality Date    HX GYN      Total Hyst       Family History:  History reviewed. No pertinent family history.     Social History:  Social History   Substance Use Topics    Smoking status: Never Smoker    Smokeless tobacco: Never Used    Alcohol use No       Allergies:  No Known Allergies      Review of Systems     Review of Systems   Respiratory: Positive for shortness of breath. Neurological: Positive for weakness (lower extremities). Psychiatric/Behavioral: Positive for behavioral problems (anxiety). All other systems reviewed and are negative. Physical Exam     Visit Vitals    /76 (BP 1 Location: Left arm, BP Patient Position: At rest)    Pulse 78    Temp 97.6 °F (36.4 °C)    Resp 20    Ht 5' (1.524 m)    Wt 59 kg (130 lb)    SpO2 100%    BMI 25.39 kg/m2       Physical Exam   Constitutional: She is oriented to person, place, and time. She appears well-developed. anxous and tearful   HENT:   Head: Normocephalic and atraumatic. Eyes: EOM are normal. Pupils are equal, round, and reactive to light. Neck: Normal range of motion. Neck supple. Cardiovascular: Normal rate, regular rhythm and normal heart sounds. Exam reveals no friction rub. No murmur heard. Pulmonary/Chest: Effort normal and breath sounds normal. No respiratory distress. She has no wheezes. Abdominal: Soft. She exhibits no distension. There is no tenderness. There is no rebound and no guarding. Musculoskeletal: Normal range of motion. Neurological: She is alert and oriented to person, place, and time. Skin: Skin is warm and dry. Psychiatric: She has a normal mood and affect. Her behavior is normal. Thought content normal.         Diagnostic Study Results     ekg nsr at 64. Nl axis. Nl int. No virgil/d no hypertrophy   cxr napd. Medical Decision Making     1. Sob; associated with thinking about her stroke- made worse by thinking about her cva. Pt notes similar yesterday; no cp, no n/v. No radiation. Check cxr and EKG. Labs from yesterday reviewed. Orthostatic neg; will d/c     Diagnosis     No diagnosis found.     _______________________________    Attestations:  Scribe Attestation     Richard Painter acting as a scribe for and in the presence of John Blanco MD      January 04, 2018 at 12:10 PM       Provider Attestation:      I personally performed the services described in the documentation, reviewed the documentation, as recorded by the scribe in my presence, and it accurately and completely records my words and actions.  January 04, 2018 at 12:10 PM - John Blanco MD    _______________________________

## 2018-01-04 NOTE — DISCHARGE INSTRUCTIONS
Shortness of Breath: Care Instructions  Your Care Instructions  Shortness of breath has many causes. Sometimes conditions such as anxiety can lead to shortness of breath. Some people get mild shortness of breath when they exercise. Trouble breathing also can be a symptom of a serious problem, such as asthma, lung disease, emphysema, heart problems, and pneumonia. If your shortness of breath continues, you may need tests and treatment. Watch for any changes in your breathing and other symptoms. Follow-up care is a key part of your treatment and safety. Be sure to make and go to all appointments, and call your doctor if you are having problems. It's also a good idea to know your test results and keep a list of the medicines you take. How can you care for yourself at home? · Do not smoke or allow others to smoke around you. If you need help quitting, talk to your doctor about stop-smoking programs and medicines. These can increase your chances of quitting for good. · Get plenty of rest and sleep. · Take your medicines exactly as prescribed. Call your doctor if you think you are having a problem with your medicine. · Find healthy ways to deal with stress. ¨ Exercise daily. ¨ Get plenty of sleep. ¨ Eat regularly and well. When should you call for help? Call 911 anytime you think you may need emergency care. For example, call if:  ? · You have severe shortness of breath. ? · You have symptoms of a heart attack. These may include:  ¨ Chest pain or pressure, or a strange feeling in the chest.  ¨ Sweating. ¨ Shortness of breath. ¨ Nausea or vomiting. ¨ Pain, pressure, or a strange feeling in the back, neck, jaw, or upper belly or in one or both shoulders or arms. ¨ Lightheadedness or sudden weakness. ¨ A fast or irregular heartbeat. After you call 911, the  may tell you to chew 1 adult-strength or 2 to 4 low-dose aspirin. Wait for an ambulance. Do not try to drive yourself.    ?Call your doctor now or seek immediate medical care if:  ? · Your shortness of breath gets worse or you start to wheeze. Wheezing is a high-pitched sound when you breathe. ? · You wake up at night out of breath or have to prop your head up on several pillows to breathe. ? · You are short of breath after only light activity or while at rest.   ? Watch closely for changes in your health, and be sure to contact your doctor if:  ? · You do not get better over the next 1 to 2 days. Where can you learn more? Go to http://karin-michaela.info/. Enter S780 in the search box to learn more about \"Shortness of Breath: Care Instructions. \"  Current as of: May 12, 2017  Content Version: 11.4  © 5171-5641 [employer]. Care instructions adapted under license by [employer] (which disclaims liability or warranty for this information). If you have questions about a medical condition or this instruction, always ask your healthcare professional. [address] any warranty or liability for your use of this information.

## 2018-01-04 NOTE — ED TRIAGE NOTES
Pt c/o weakness in lower extremity due to rapid breathing, pt appears anxious, pt aao*4, no s/s of acute cardiac or respiratory distress, pt ambulatory strong steady gait, pt neuro WNL

## 2018-01-05 LAB
ATRIAL RATE: 64 BPM
CALCULATED P AXIS, ECG09: 60 DEGREES
CALCULATED R AXIS, ECG10: 51 DEGREES
CALCULATED T AXIS, ECG11: 71 DEGREES
DIAGNOSIS, 93000: NORMAL
P-R INTERVAL, ECG05: 136 MS
Q-T INTERVAL, ECG07: 422 MS
QRS DURATION, ECG06: 74 MS
QTC CALCULATION (BEZET), ECG08: 435 MS
VENTRICULAR RATE, ECG03: 64 BPM

## 2018-01-09 ENCOUNTER — HOSPITAL ENCOUNTER (OUTPATIENT)
Dept: LAB | Age: 73
Discharge: HOME | End: 2018-01-09
Payer: MEDICARE

## 2018-01-09 ENCOUNTER — OFFICE VISIT (OUTPATIENT)
Dept: INTERNAL MEDICINE CLINIC | Age: 73
End: 2018-01-09

## 2018-01-09 VITALS
OXYGEN SATURATION: 99 % | TEMPERATURE: 97.9 F | BODY MASS INDEX: 20.62 KG/M2 | RESPIRATION RATE: 18 BRPM | SYSTOLIC BLOOD PRESSURE: 120 MMHG | HEIGHT: 60 IN | WEIGHT: 105 LBS | HEART RATE: 55 BPM | DIASTOLIC BLOOD PRESSURE: 69 MMHG

## 2018-01-09 DIAGNOSIS — R06.09 DYSPNEA ON EXERTION: ICD-10-CM

## 2018-01-09 DIAGNOSIS — R25.2 LEG CRAMPING: ICD-10-CM

## 2018-01-09 DIAGNOSIS — F41.9 ANXIETY: ICD-10-CM

## 2018-01-09 DIAGNOSIS — G45.9 TRANSIENT CEREBRAL ISCHEMIA, UNSPECIFIED TYPE: ICD-10-CM

## 2018-01-09 DIAGNOSIS — R06.09 DYSPNEA ON EXERTION: Primary | ICD-10-CM

## 2018-01-09 LAB
ALBUMIN SERPL-MCNC: 4.7 G/DL (ref 3.4–5)
ALBUMIN/GLOB SERPL: 1.5 {RATIO} (ref 0.8–1.7)
ALP SERPL-CCNC: 91 U/L (ref 45–117)
ALT SERPL-CCNC: 64 U/L (ref 13–56)
ANION GAP SERPL CALC-SCNC: 9 MMOL/L (ref 3–18)
AST SERPL-CCNC: 44 U/L (ref 15–37)
BASOPHILS # BLD: 0 K/UL (ref 0–0.06)
BASOPHILS NFR BLD: 0 % (ref 0–2)
BILIRUB SERPL-MCNC: 0.4 MG/DL (ref 0.2–1)
BUN SERPL-MCNC: 21 MG/DL (ref 7–18)
BUN/CREAT SERPL: 29 (ref 12–20)
CALCIUM SERPL-MCNC: 10.2 MG/DL (ref 8.5–10.1)
CHLORIDE SERPL-SCNC: 107 MMOL/L (ref 100–108)
CO2 SERPL-SCNC: 26 MMOL/L (ref 21–32)
CREAT SERPL-MCNC: 0.73 MG/DL (ref 0.6–1.3)
DIFFERENTIAL METHOD BLD: NORMAL
EOSINOPHIL # BLD: 0 K/UL (ref 0–0.4)
EOSINOPHIL NFR BLD: 0 % (ref 0–5)
ERYTHROCYTE [DISTWIDTH] IN BLOOD BY AUTOMATED COUNT: 13.2 % (ref 11.6–14.5)
GLOBULIN SER CALC-MCNC: 3.2 G/DL (ref 2–4)
GLUCOSE SERPL-MCNC: 95 MG/DL (ref 74–99)
HCT VFR BLD AUTO: 43.3 % (ref 35–45)
HGB BLD-MCNC: 14.2 G/DL (ref 12–16)
LYMPHOCYTES # BLD: 2.9 K/UL (ref 0.9–3.6)
LYMPHOCYTES NFR BLD: 28 % (ref 21–52)
MCH RBC QN AUTO: 31.8 PG (ref 24–34)
MCHC RBC AUTO-ENTMCNC: 32.8 G/DL (ref 31–37)
MCV RBC AUTO: 96.9 FL (ref 74–97)
MONOCYTES # BLD: 1 K/UL (ref 0.05–1.2)
MONOCYTES NFR BLD: 10 % (ref 3–10)
NEUTS SEG # BLD: 6.5 K/UL (ref 1.8–8)
NEUTS SEG NFR BLD: 62 % (ref 40–73)
PLATELET # BLD AUTO: 257 K/UL (ref 135–420)
PMV BLD AUTO: 10.6 FL (ref 9.2–11.8)
POTASSIUM SERPL-SCNC: 4.2 MMOL/L (ref 3.5–5.5)
PROT SERPL-MCNC: 7.9 G/DL (ref 6.4–8.2)
RBC # BLD AUTO: 4.47 M/UL (ref 4.2–5.3)
SODIUM SERPL-SCNC: 142 MMOL/L (ref 136–145)
WBC # BLD AUTO: 10.5 K/UL (ref 4.6–13.2)

## 2018-01-09 PROCEDURE — 85025 COMPLETE CBC W/AUTO DIFF WBC: CPT | Performed by: NURSE PRACTITIONER

## 2018-01-09 PROCEDURE — 80053 COMPREHEN METABOLIC PANEL: CPT | Performed by: NURSE PRACTITIONER

## 2018-01-09 RX ORDER — BUSPIRONE HYDROCHLORIDE 7.5 MG/1
7.5 TABLET ORAL 2 TIMES DAILY
Qty: 60 TAB | Refills: 1 | Status: SHIPPED | OUTPATIENT
Start: 2018-01-09 | End: 2018-02-12 | Stop reason: SDUPTHER

## 2018-01-09 NOTE — PATIENT INSTRUCTIONS
Shortness of Breath: Care Instructions  Your Care Instructions  Shortness of breath has many causes. Sometimes conditions such as anxiety can lead to shortness of breath. Some people get mild shortness of breath when they exercise. Trouble breathing also can be a symptom of a serious problem, such as asthma, lung disease, emphysema, heart problems, and pneumonia. If your shortness of breath continues, you may need tests and treatment. Watch for any changes in your breathing and other symptoms. Follow-up care is a key part of your treatment and safety. Be sure to make and go to all appointments, and call your doctor if you are having problems. It's also a good idea to know your test results and keep a list of the medicines you take. How can you care for yourself at home? · Do not smoke or allow others to smoke around you. If you need help quitting, talk to your doctor about stop-smoking programs and medicines. These can increase your chances of quitting for good. · Get plenty of rest and sleep. · Take your medicines exactly as prescribed. Call your doctor if you think you are having a problem with your medicine. · Find healthy ways to deal with stress. ¨ Exercise daily. ¨ Get plenty of sleep. ¨ Eat regularly and well. When should you call for help? Call 911 anytime you think you may need emergency care. For example, call if:  ? · You have severe shortness of breath. ? · You have symptoms of a heart attack. These may include:  ¨ Chest pain or pressure, or a strange feeling in the chest.  ¨ Sweating. ¨ Shortness of breath. ¨ Nausea or vomiting. ¨ Pain, pressure, or a strange feeling in the back, neck, jaw, or upper belly or in one or both shoulders or arms. ¨ Lightheadedness or sudden weakness. ¨ A fast or irregular heartbeat. After you call 911, the  may tell you to chew 1 adult-strength or 2 to 4 low-dose aspirin. Wait for an ambulance. Do not try to drive yourself.    ?Call your doctor now or seek immediate medical care if:  ? · Your shortness of breath gets worse or you start to wheeze. Wheezing is a high-pitched sound when you breathe. ? · You wake up at night out of breath or have to prop your head up on several pillows to breathe. ? · You are short of breath after only light activity or while at rest.   ? Watch closely for changes in your health, and be sure to contact your doctor if:  ? · You do not get better over the next 1 to 2 days. Where can you learn more? Go to http://karin-michaela.info/. Enter S780 in the search box to learn more about \"Shortness of Breath: Care Instructions. \"  Current as of: May 12, 2017  Content Version: 11.4  © 0339-0498 [employer]. Care instructions adapted under license by [employer] (which disclaims liability or warranty for this information). If you have questions about a medical condition or this instruction, always ask your healthcare professional. [address] any warranty or liability for your use of this information.

## 2018-01-09 NOTE — PROGRESS NOTES
Hilario Hernandez is a 81 y.o. female presenting today for Breathing Problem and Leg Pain (bilateral)  . HPI:  Hilario Hernandez presents to the office today for bilateral leg cramping and dyspnea. Patient notes she has difficulty ambulating due to her leg pain and cramping. She denied any edema or inflammation. She is also complaining of dyspnea. She notes she had a TIA in October and has not felt right since. Review of Systems   Respiratory: Positive for shortness of breath. Negative for cough. Cardiovascular: Negative for chest pain and palpitations. Musculoskeletal: Positive for myalgias (BLE cramping). No Known Allergies    Current Outpatient Prescriptions   Medication Sig Dispense Refill    busPIRone (BUSPAR) 7.5 mg tablet Take 1 Tab by mouth two (2) times a day. 60 Tab 1    acetaminophen (TYLENOL ARTHRITIS PAIN) 650 mg CR tablet Take 650 mg by mouth every six (6) hours as needed for Pain. One tab by mouth twice daily      aspirin 81 mg chewable tablet Take 1 Tab by mouth daily. 30 Tab 3    atorvastatin (LIPITOR) 20 mg tablet Take 1 Tab by mouth nightly. 30 Tab 3    meloxicam (MOBIC) 15 mg tablet Take 1 Tab by mouth daily. 27 Tab 2       Past Medical History:   Diagnosis Date    Abnormal Pap smear     Dr. Alonzo Do Hypercholesterolemia     Neck pain 5/17/2010    Stroke ([facility] [address] 10/02/2017       Past Surgical History:   Procedure Laterality Date    HX GYN      Total Hyst       Social History     Social History    Marital status:      Spouse name: N/A    Number of children: N/A    Years of education: N/A     Occupational History    Not on file.      Social History Main Topics    Smoking status: Never Smoker    Smokeless tobacco: Never Used    Alcohol use No    Drug use: No    Sexual activity: No     Other Topics Concern    Not on file     Social History Narrative       Patient does not have an advanced directive on file    Vitals:    01/09/18 1028   BP: 120/69   Pulse: (!) 55   Resp: 18   Temp: 97.9 °F (36.6 °C)   TempSrc: Tympanic   SpO2: 99%   Weight: 105 lb (47.6 kg)   Height: 5' (1.524 m)   PainSc:  10 - Worst pain ever   PainLoc: Neck       Physical Exam   Constitutional: She appears distressed. Cardiovascular: Normal rate, regular rhythm and normal heart sounds. Pulmonary/Chest: Effort normal and breath sounds normal.   Abdominal: Soft. Musculoskeletal: She exhibits edema (trace) and tenderness (mild). Lymphadenopathy:     She has no cervical adenopathy. Nursing note and vitals reviewed.       Admission on 01/04/2018, Discharged on 01/04/2018   Component Date Value Ref Range Status    Ventricular Rate 01/04/2018 64  BPM Final    Atrial Rate 01/04/2018 64  BPM Final    P-R Interval 01/04/2018 136  ms Final    QRS Duration 01/04/2018 74  ms Final    Q-T Interval 01/04/2018 422  ms Final    QTC Calculation (Bezet) 01/04/2018 435  ms Final    Calculated P Axis 01/04/2018 60  degrees Final    Calculated R Axis 01/04/2018 51  degrees Final    Calculated T Axis 01/04/2018 71  degrees Final    Diagnosis 01/04/2018    Final                    Value:Normal sinus rhythm  Normal ECG  When compared with ECG of 03-JAN-2018 06:51,  No significant change was found  Confirmed by Andrea Mcgraw MD, ----- ([id number]) on 1/5/2018 5:13:40 PM     Admission on 01/03/2018, Discharged on 01/03/2018   Component Date Value Ref Range Status    WBC 01/03/2018 10.1  4.6 - 13.2 K/uL Final    RBC 01/03/2018 4.31  4.20 - 5.30 M/uL Final    HGB 01/03/2018 13.5  12.0 - 16.0 g/dL Final    HCT 01/03/2018 40.5  35.0 - 45.0 % Final    MCV 01/03/2018 94.0  74.0 - 97.0 FL Final    MCH 01/03/2018 31.3  24.0 - 34.0 PG Final    MCHC 01/03/2018 33.3  31.0 - 37.0 g/dL Final    RDW 01/03/2018 13.0  11.6 - 14.5 % Final    PLATELET 33/10/2115 238  135 - 420 K/uL Final    MPV 01/03/2018 9.7  9.2 - 11.8 FL Final    NEUTROPHILS 01/03/2018 66  40 - 73 % Final    LYMPHOCYTES 01/03/2018 23  21 - 52 % Final    MONOCYTES 01/03/2018 10  3 - 10 % Final    EOSINOPHILS 01/03/2018 1  0 - 5 % Final    BASOPHILS 01/03/2018 0  0 - 2 % Final    ABS. NEUTROPHILS 01/03/2018 6.7  1.8 - 8.0 K/UL Final    ABS. LYMPHOCYTES 01/03/2018 2.3  0.9 - 3.6 K/UL Final    ABS. MONOCYTES 01/03/2018 1.0  0.05 - 1.2 K/UL Final    ABS. EOSINOPHILS 01/03/2018 0.1  0.0 - 0.4 K/UL Final    ABS. BASOPHILS 01/03/2018 0.0  0.0 - 0.1 K/UL Final    DF 01/03/2018 AUTOMATED    Final    Sodium 01/03/2018 141  136 - 145 mmol/L Final    Potassium 01/03/2018 3.9  3.5 - 5.5 mmol/L Final    Chloride 01/03/2018 109* 100 - 108 mmol/L Final    CO2 01/03/2018 27  21 - 32 mmol/L Final    Anion gap 01/03/2018 5  3.0 - 18 mmol/L Final    Glucose 01/03/2018 99  74 - 99 mg/dL Final    BUN 01/03/2018 22* 7.0 - 18 MG/DL Final    Creatinine 01/03/2018 0.71  0.6 - 1.3 MG/DL Final    BUN/Creatinine ratio 01/03/2018 31* 12 - 20   Final    GFR est AA 01/03/2018 >60  >60 ml/min/1.73m2 Final    GFR est non-AA 01/03/2018 >60  >60 ml/min/1.73m2 Final    Comment: (NOTE)  Estimated GFR is calculated using the Modification of Diet in Renal   Disease (MDRD) Study equation, reported for both  Americans   (GFRAA) and non- Americans (GFRNA), and normalized to 1.73m2   body surface area. The physician must decide which value applies to   the patient. The MDRD study equation should only be used in   individuals age 25 or older. It has not been validated for the   following: pregnant women, patients with serious comorbid conditions,   or on certain medications, or persons with extremes of body size,   muscle mass, or nutritional status.       Calcium 01/03/2018 9.4  8.5 - 10.1 MG/DL Final    aPTT 01/03/2018 26.9  23.0 - 36.4 SEC Final    Prothrombin time 01/03/2018 13.2  11.5 - 15.2 sec Final    INR 01/03/2018 1.1  0.8 - 1.2   Final    Comment:            INR Therapeutic Ranges         (on stable oral anticoagulant):     INDICATION INR  DVT/PE/Atrial Fib          2.0-3.0  MI/Mechanical Heart Valve  2.5-3.5      Ventricular Rate 01/03/2018 76  BPM Final    Atrial Rate 01/03/2018 76  BPM Final    P-R Interval 01/03/2018 148  ms Final    QRS Duration 01/03/2018 76  ms Final    Q-T Interval 01/03/2018 400  ms Final    QTC Calculation (Bezet) 01/03/2018 450  ms Final    Calculated P Axis 01/03/2018 79  degrees Final    Calculated R Axis 01/03/2018 34  degrees Final    Calculated T Axis 01/03/2018 66  degrees Final    Diagnosis 01/03/2018    Final                    Value:Normal sinus rhythm  Normal ECG  When compared with ECG of 02-OCT-2017 08:57,  No significant change was found  Confirmed by Desmond Funes MD, ----- [id number]) on 1/3/2018 5:44:57 PM         .No results found for any visits on 01/09/18. Assessment / Plan:      ICD-10-CM ICD-9-CM    1. Dyspnea on exertion J62.17 801.12 METABOLIC PANEL, COMPREHENSIVE      CBC WITH AUTOMATED DIFF      NT-PRO BNP      2D ECHO COMPLETE ADULT (TTE) W OR WO CONTR      busPIRone (BUSPAR) 7.5 mg tablet   2. Leg cramping P42.4 715.93 METABOLIC PANEL, COMPREHENSIVE   3. Anxiety F41.9 300.00 busPIRone (BUSPAR) 7.5 mg tablet   4. Transient cerebral ischemia, unspecified type G45.9 435.9      BLE cramping- CMP ordered  Echo ordered  CBC/BNP ordered  ? Anxiety related- Buspar rx given  B/p- controlled      Follow-up Disposition:  Return in about 1 week (around 1/16/2018). I asked the patient if she  had any questions and answered her  questions.   The patient stated that she understands the treatment plan and agrees with the treatment plan

## 2018-01-09 NOTE — MR AVS SNAPSHOT
Visit Information Date & Time Provider Department Dept. Phone Encounter #  
 1/9/2018 10:15 AM Neftaly Kwan NP [facility] [phone] [id number] Follow-up Instructions Return in about 1 week (around 1/16/2018). Follow-up and Disposition History Your Appointments 1/15/2018  1:30 PM  
Follow Up with Eliceo Rutherford MD  
[address] [facility] [facility]) Appt Note: 2mo fu; 2mo fu  
 [address] [facility] [address] [phone]  
  
   
 [address] [facility] [address] 1/17/2018  9:20 AM  
PROCEDURE with Antoinette Boyd MD  
[facility] (DEEJAY SCHEDULING) Appt Note: B/L Cervical MBB @ C4-6 per Sammy (2 of 2)  
 [address] [facility] [address] [place] [address]  
[phone]  
  
   
 [facility] [address]  
  
    
 1/31/2018 11:00 AM  
Office Visit with CRIS Smith ([facility]) Appt Note: ;   
 [address] [facility] [address]  
[facility] [address] Upcoming Health Maintenance Date Due Hepatitis C Screening 1945 DTaP/Tdap/Td series (1 - Tdap) 6/24/1968 BREAST CANCER SCRN MAMMOGRAM 6/24/1997 FOBT Q 1 YEAR AGE 50-75 6/24/1997 ZOSTER VACCINE AGE 60> 4/24/2007 GLAUCOMA SCREENING Q2Y 6/24/2012 OSTEOPOROSIS SCREENING (DEXA) 6/24/2012 MEDICARE YEARLY EXAM 6/24/2012 Pneumococcal 65+ Low/Medium Risk (2 of 2 - PPSV23) 10/10/2018 Allergies as of 1/9/2018  Review Complete On: 1/9/2018 By: Neftaly Kwan NP No Known Allergies Current Immunizations  Never Reviewed No immunizations on file. Not reviewed this visit You Were Diagnosed With   
  
 Codes Comments  Dyspnea on exertion    -  Primary ICD-10-CM: R06.09 
ICD-9-CM: 786.09   
 Leg cramping     ICD-10-CM: R25.2 ICD-9-CM: 729.82 Anxiety     ICD-10-CM: F41.9 ICD-9-CM: 300.00 Transient cerebral ischemia, unspecified type     ICD-10-CM: G45.9 ICD-9-CM: 435.9 Vitals BP Pulse Temp Resp Height(growth percentile) Weight(growth percentile) 120/69 (!) 55 97.9 °F (36.6 °C) (Tympanic) 18 5' (1.524 m) 105 lb (47.6 kg) SpO2 BMI OB Status Smoking Status 99% 20.51 kg/m2 Hysterectomy Never Smoker Vitals History BMI and BSA Data Body Mass Index Body Surface Area 20.51 kg/m 2 1.42 m 2 Preferred Pharmacy Pharmacy Name Phone [id number], [address] [facility] [phone] Your Updated Medication List  
  
   
This list is accurate as of: 1/9/18 11:24 AM.  Always use your most recent med list.  
  
  
  
  
 aspirin 81 mg chewable tablet Take 1 Tab by mouth daily. atorvastatin 20 mg tablet Commonly known as:  LIPITOR Take 1 Tab by mouth nightly. busPIRone 7.5 mg tablet Commonly known as:  BUSPAR Take 1 Tab by mouth two (2) times a day. meloxicam 15 mg tablet Commonly known as:  MOBIC Take 1 Tab by mouth daily. TYLENOL ARTHRITIS PAIN 650 mg Duana Repine Generic drug:  acetaminophen Take 650 mg by mouth every six (6) hours as needed for Pain. One tab by mouth twice daily Prescriptions Sent to Pharmacy Refills  
 busPIRone (BUSPAR) 7.5 mg tablet 1 Sig: Take 1 Tab by mouth two (2) times a day. Class: Normal  
 Pharmacy: [id number], [address] [facility]. Ph #: [phone] Route: Oral  
  
Follow-up Instructions Return in about 1 week (around 1/16/2018). To-Do List   
 01/09/2018 ECHO:  2D ECHO COMPLETE ADULT (TTE) W OR WO CONTR   
  
 01/09/2018 Lab:  CBC WITH AUTOMATED DIFF   
  
 01/09/2018 Lab:  METABOLIC PANEL, COMPREHENSIVE   
  
 01/09/2018   Lab:  NT-PRO BNP   
  
  
 Patient Instructions Shortness of Breath: Care Instructions Your Care Instructions Shortness of breath has many causes. Sometimes conditions such as anxiety can lead to shortness of breath. Some people get mild shortness of breath when they exercise. Trouble breathing also can be a symptom of a serious problem, such as asthma, lung disease, emphysema, heart problems, and pneumonia. If your shortness of breath continues, you may need tests and treatment. Watch for any changes in your breathing and other symptoms. Follow-up care is a key part of your treatment and safety. Be sure to make and go to all appointments, and call your doctor if you are having problems. It's also a good idea to know your test results and keep a list of the medicines you take. How can you care for yourself at home? · Do not smoke or allow others to smoke around you. If you need help quitting, talk to your doctor about stop-smoking programs and medicines. These can increase your chances of quitting for good. · Get plenty of rest and sleep. · Take your medicines exactly as prescribed. Call your doctor if you think you are having a problem with your medicine. · Find healthy ways to deal with stress. ¨ Exercise daily. ¨ Get plenty of sleep. ¨ Eat regularly and well. When should you call for help? Call 911 anytime you think you may need emergency care. For example, call if: 
? · You have severe shortness of breath. ? · You have symptoms of a heart attack. These may include: ¨ Chest pain or pressure, or a strange feeling in the chest. 
¨ Sweating. ¨ Shortness of breath. ¨ Nausea or vomiting. ¨ Pain, pressure, or a strange feeling in the back, neck, jaw, or upper belly or in one or both shoulders or arms. ¨ Lightheadedness or sudden weakness. ¨ A fast or irregular heartbeat. After you call 911, the  may tell you to chew 1 adult-strength or 2 to 4 low-dose aspirin. Wait for an ambulance.  Do not try to drive yourself. ?Call your doctor now or seek immediate medical care if: 
? · Your shortness of breath gets worse or you start to wheeze. Wheezing is a high-pitched sound when you breathe. ? · You wake up at night out of breath or have to prop your head up on several pillows to breathe. ? · You are short of breath after only light activity or while at rest. ? Watch closely for changes in your health, and be sure to contact your doctor if: 
? · You do not get better over the next 1 to 2 days. Where can you learn more? Go to http://karin-michaela.info/. Enter S780 in the search box to learn more about \"Shortness of Breath: Care Instructions. \" Current as of: May 12, 2017 Content Version: 11.4 © 8858-5046 [employer]. Care instructions adapted under license by [employer] (which disclaims liability or warranty for this information). If you have questions about a medical condition or this instruction, always ask your healthcare professional. [address] any warranty or liability for your use of this information. Introducing [facility]! Obed Dorsey introduces Heyday patient portal. Now you can access parts of your medical record, email your doctor's office, and request medication refills online. 1. In your internet browser, go to [email or web address] 2. Click on the First Time User? Click Here link in the Sign In box. You will see the New Member Sign Up page. 3. Enter your Heyday Access Code exactly as it appears below. You will not need to use this code after youve completed the sign-up process. If you do not sign up before the expiration date, you must request a new code. · Heyday Access Code: [address] Expires: 4/2/2018 11:21 AM 
 
4. Enter the last four digits of your Social Security Number (xxxx) and Date of Birth (mm/dd/yyyy) as indicated and click Submit.  You will be taken to the next sign-up page. 5. Create a Ancanco ID. This will be your Ancanco login ID and cannot be changed, so think of one that is secure and easy to remember. 6. Create a Ancanco password. You can change your password at any time. 7. Enter your Password Reset Question and Answer. This can be used at a later time if you forget your password. 8. Enter your e-mail address. You will receive e-mail notification when new information is available in [address] 9. Click Sign Up. You can now view and download portions of your medical record. 10. Click the Download Summary menu link to download a portable copy of your medical information. If you have questions, please visit the Frequently Asked Questions section of the [employer] website. Remember, [employer] is NOT to be used for urgent needs. For medical emergencies, dial 911. Now available from your iPhone and Android! Please provide this summary of care documentation to your next provider. Your primary care clinician is listed as Arelis Glass. If you have any questions after today's visit, please call [phone].

## 2018-01-10 ENCOUNTER — HOSPITAL ENCOUNTER (OUTPATIENT)
Dept: LAB | Age: 73
Discharge: HOME | End: 2018-01-10
Payer: MEDICARE

## 2018-01-10 DIAGNOSIS — R06.09 DYSPNEA ON EXERTION: ICD-10-CM

## 2018-01-10 PROCEDURE — 83880 ASSAY OF NATRIURETIC PEPTIDE: CPT

## 2018-01-15 ENCOUNTER — OFFICE VISIT (OUTPATIENT)
Dept: ORTHOPEDIC SURGERY | Age: 73
End: 2018-01-15

## 2018-01-15 ENCOUNTER — OFFICE VISIT (OUTPATIENT)
Dept: INTERNAL MEDICINE CLINIC | Age: 73
End: 2018-01-15

## 2018-01-15 VITALS
WEIGHT: 105 LBS | HEIGHT: 60 IN | OXYGEN SATURATION: 97 % | HEART RATE: 73 BPM | TEMPERATURE: 97.9 F | BODY MASS INDEX: 20.62 KG/M2 | RESPIRATION RATE: 18 BRPM

## 2018-01-15 VITALS
SYSTOLIC BLOOD PRESSURE: 122 MMHG | DIASTOLIC BLOOD PRESSURE: 66 MMHG | TEMPERATURE: 97.8 F | HEART RATE: 80 BPM | WEIGHT: 105 LBS | OXYGEN SATURATION: 97 % | HEIGHT: 60 IN | RESPIRATION RATE: 18 BRPM | BODY MASS INDEX: 20.62 KG/M2

## 2018-01-15 DIAGNOSIS — M47.812 FACET ARTHROPATHY, CERVICAL: Primary | ICD-10-CM

## 2018-01-15 DIAGNOSIS — R13.10 DYSPHAGIA, UNSPECIFIED TYPE: ICD-10-CM

## 2018-01-15 DIAGNOSIS — R06.09 DYSPNEA ON EXERTION: Primary | ICD-10-CM

## 2018-01-15 DIAGNOSIS — Z00.00 MEDICARE ANNUAL WELLNESS VISIT, INITIAL: ICD-10-CM

## 2018-01-15 DIAGNOSIS — R29.898 BILATERAL LEG WEAKNESS: ICD-10-CM

## 2018-01-15 DIAGNOSIS — F41.9 ANXIETY: ICD-10-CM

## 2018-01-15 DIAGNOSIS — Z78.0 MENOPAUSE: ICD-10-CM

## 2018-01-15 DIAGNOSIS — Z12.39 BREAST CANCER SCREENING: ICD-10-CM

## 2018-01-15 RX ORDER — LORAZEPAM 0.5 MG/1
0.5 TABLET ORAL
Qty: 30 TAB | Refills: 0 | Status: SHIPPED | OUTPATIENT
Start: 2018-01-15 | End: 2018-09-22

## 2018-01-15 RX ORDER — PREDNISONE 10 MG/1
10 TABLET ORAL SEE ADMIN INSTRUCTIONS
Qty: 21 TAB | Refills: 0 | Status: SHIPPED | OUTPATIENT
Start: 2018-01-15 | End: 2018-03-29 | Stop reason: ALTCHOICE

## 2018-01-15 NOTE — PROGRESS NOTES
Please let the patient know she needs to repeat her metabolic panel in 6 weeks- elevated liver enzymes.

## 2018-01-15 NOTE — PROGRESS NOTES
Radha Ruffin is a 81 y.o. female presenting today for Shortness of Breath and Dysphagia  . HPI:  Radha Ruffin presents to the office today for dyspnea and dysphagia. Patient was seen in the office x 1 week ago for dyspnea and echo ordered and patient given prescription for anxiety. Patient notes the anxiety has improved a little but she remains anxious. She is negative for chest, back or extremity pain or numbness. Review of Systems   Constitutional: Negative for fever. HENT: Positive for congestion (mild). Respiratory: Positive for shortness of breath. Negative for cough and wheezing. Cardiovascular: Negative for chest pain, palpitations and leg swelling. Gastrointestinal: Negative for abdominal pain. Dysphagia     Psychiatric/Behavioral: The patient is nervous/anxious. No Known Allergies    Current Outpatient Prescriptions   Medication Sig Dispense Refill    LORazepam (ATIVAN) 0.5 mg tablet Take 1 Tab by mouth every four (4) hours as needed for Anxiety. Max Daily Amount: 3 mg. 30 Tab 0    busPIRone (BUSPAR) 7.5 mg tablet Take 1 Tab by mouth two (2) times a day. 60 Tab 1    meloxicam (MOBIC) 15 mg tablet Take 1 Tab by mouth daily. 30 Tab 2    acetaminophen (TYLENOL ARTHRITIS PAIN) 650 mg CR tablet Take 650 mg by mouth every six (6) hours as needed for Pain. One tab by mouth twice daily      aspirin 81 mg chewable tablet Take 1 Tab by mouth daily. 30 Tab 3    atorvastatin (LIPITOR) 20 mg tablet Take 1 Tab by mouth nightly. 30 Tab 3    predniSONE (STERAPRED DS) 10 mg dose pack Take 1 Tab by mouth See Admin Instructions.  See administration instruction per 10mg dose pack 21 Tab 0       Past Medical History:   Diagnosis Date    Abnormal Pap smear     Dr. Marla Hernandez Hypercholesterolemia     Neck pain 5/17/2010    Stroke ([facility] [address] 10/02/2017       Past Surgical History:   Procedure Laterality Date    HX GYN      Total Hyst       Social History     Social History    Marital status:      Spouse name: N/A    Number of children: N/A    Years of education: N/A     Occupational History    Not on file. Social History Main Topics    Smoking status: Never Smoker    Smokeless tobacco: Never Used    Alcohol use No    Drug use: No    Sexual activity: No     Other Topics Concern    Not on file     Social History Narrative       Patient does not have an advanced directive on file    Vitals:    01/15/18 0857   Pulse: 73   Resp: 18   Temp: 97.9 °F (36.6 °C)   TempSrc: Tympanic   SpO2: 97%   Weight: 105 lb (47.6 kg)   Height: 5' (1.524 m)   PainSc:  10 - Worst pain ever   PainLoc: Neck       Physical Exam   Constitutional: No distress. Cardiovascular: Normal rate and regular rhythm. Pulmonary/Chest: Effort normal and breath sounds normal. No respiratory distress. She has no wheezes. She has no rales. Musculoskeletal: She exhibits no edema or tenderness. Lymphadenopathy:     She has no cervical adenopathy. Neurological: She is alert. Ambulates without assistance. Legs somewhat shaky   Skin: Skin is warm. Nursing note and vitals reviewed.       Hospital Outpatient Visit on 01/09/2018   Component Date Value Ref Range Status    Sodium 01/09/2018 142  136 - 145 mmol/L Final    Potassium 01/09/2018 4.2  3.5 - 5.5 mmol/L Final    Chloride 01/09/2018 107  100 - 108 mmol/L Final    CO2 01/09/2018 26  21 - 32 mmol/L Final    Anion gap 01/09/2018 9  3.0 - 18 mmol/L Final    Glucose 01/09/2018 95  74 - 99 mg/dL Final    BUN 01/09/2018 21* 7.0 - 18 MG/DL Final    Creatinine 01/09/2018 0.73  0.6 - 1.3 MG/DL Final    BUN/Creatinine ratio 01/09/2018 29* 12 - 20   Final    GFR est AA 01/09/2018 >60  >60 ml/min/1.73m2 Final    GFR est non-AA 01/09/2018 >60  >60 ml/min/1.73m2 Final    Comment: (NOTE)  Estimated GFR is calculated using the Modification of Diet in Renal   Disease (MDRD) Study equation, reported for both  Americans   (GFRAA) and non- Americans [facility]), and normalized to 1.73m2   body surface area. The physician must decide which value applies to   the patient. The MDRD study equation should only be used in   individuals age 25 or older. It has not been validated for the   following: pregnant women, patients with serious comorbid conditions,   or on certain medications, or persons with extremes of body size,   muscle mass, or nutritional status.  Calcium 01/09/2018 10.2* 8.5 - 10.1 MG/DL Final    Bilirubin, total 01/09/2018 0.4  0.2 - 1.0 MG/DL Final    ALT (SGPT) 01/09/2018 64* 13 - 56 U/L Final    AST (SGOT) 01/09/2018 44* 15 - 37 U/L Final    Alk. phosphatase 01/09/2018 91  45 - 117 U/L Final    Protein, total 01/09/2018 7.9  6.4 - 8.2 g/dL Final    Albumin 01/09/2018 4.7  3.4 - 5.0 g/dL Final    Globulin 01/09/2018 3.2  2.0 - 4.0 g/dL Final    A-G Ratio 01/09/2018 1.5  0.8 - 1.7   Final    WBC 01/09/2018 10.5  4.6 - 13.2 K/uL Final    RBC 01/09/2018 4.47  4.20 - 5.30 M/uL Final    HGB 01/09/2018 14.2  12.0 - 16.0 g/dL Final    HCT 01/09/2018 43.3  35.0 - 45.0 % Final    MCV 01/09/2018 96.9  74.0 - 97.0 FL Final    MCH 01/09/2018 31.8  24.0 - 34.0 PG Final    MCHC 01/09/2018 32.8  31.0 - 37.0 g/dL Final    RDW 01/09/2018 13.2  11.6 - 14.5 % Final    PLATELET 31/09/4165 246  135 - 420 K/uL Final    MPV 01/09/2018 10.6  9.2 - 11.8 FL Final    NEUTROPHILS 01/09/2018 62  40 - 73 % Final    LYMPHOCYTES 01/09/2018 28  21 - 52 % Final    MONOCYTES 01/09/2018 10  3 - 10 % Final    EOSINOPHILS 01/09/2018 0  0 - 5 % Final    BASOPHILS 01/09/2018 0  0 - 2 % Final    ABS. NEUTROPHILS 01/09/2018 6.5  1.8 - 8.0 K/UL Final    ABS. LYMPHOCYTES 01/09/2018 2.9  0.9 - 3.6 K/UL Final    ABS. MONOCYTES 01/09/2018 1.0  0.05 - 1.2 K/UL Final    ABS. EOSINOPHILS 01/09/2018 0.0  0.0 - 0.4 K/UL Final    ABS.  BASOPHILS 01/09/2018 0.0  0.0 - 0.06 K/UL Final    DF 01/09/2018 AUTOMATED    Final   Admission on 01/04/2018, Discharged on 01/04/2018 Component Date Value Ref Range Status    Ventricular Rate 01/04/2018 64  BPM Final    Atrial Rate 01/04/2018 64  BPM Final    P-R Interval 01/04/2018 136  ms Final    QRS Duration 01/04/2018 74  ms Final    Q-T Interval 01/04/2018 422  ms Final    QTC Calculation (Bezet) 01/04/2018 435  ms Final    Calculated P Axis 01/04/2018 60  degrees Final    Calculated R Axis 01/04/2018 51  degrees Final    Calculated T Axis 01/04/2018 71  degrees Final    Diagnosis 01/04/2018    Final                    Value:Normal sinus rhythm  Normal ECG  When compared with ECG of 03-JAN-2018 06:51,  No significant change was found  Confirmed by Kevin Larkin MD, ----- ([id number]) on 1/5/2018 5:13:40 PM     Admission on 01/03/2018, Discharged on 01/03/2018   Component Date Value Ref Range Status    WBC 01/03/2018 10.1  4.6 - 13.2 K/uL Final    RBC 01/03/2018 4.31  4.20 - 5.30 M/uL Final    HGB 01/03/2018 13.5  12.0 - 16.0 g/dL Final    HCT 01/03/2018 40.5  35.0 - 45.0 % Final    MCV 01/03/2018 94.0  74.0 - 97.0 FL Final    MCH 01/03/2018 31.3  24.0 - 34.0 PG Final    MCHC 01/03/2018 33.3  31.0 - 37.0 g/dL Final    RDW 01/03/2018 13.0  11.6 - 14.5 % Final    PLATELET 46/61/8216 403  135 - 420 K/uL Final    MPV 01/03/2018 9.7  9.2 - 11.8 FL Final    NEUTROPHILS 01/03/2018 66  40 - 73 % Final    LYMPHOCYTES 01/03/2018 23  21 - 52 % Final    MONOCYTES 01/03/2018 10  3 - 10 % Final    EOSINOPHILS 01/03/2018 1  0 - 5 % Final    BASOPHILS 01/03/2018 0  0 - 2 % Final    ABS. NEUTROPHILS 01/03/2018 6.7  1.8 - 8.0 K/UL Final    ABS. LYMPHOCYTES 01/03/2018 2.3  0.9 - 3.6 K/UL Final    ABS. MONOCYTES 01/03/2018 1.0  0.05 - 1.2 K/UL Final    ABS. EOSINOPHILS 01/03/2018 0.1  0.0 - 0.4 K/UL Final    ABS.  BASOPHILS 01/03/2018 0.0  0.0 - 0.1 K/UL Final    DF 01/03/2018 AUTOMATED    Final    Sodium 01/03/2018 141  136 - 145 mmol/L Final    Potassium 01/03/2018 3.9  3.5 - 5.5 mmol/L Final    Chloride 01/03/2018 109* 100 - 108 mmol/L Final    CO2 01/03/2018 27  21 - 32 mmol/L Final    Anion gap 01/03/2018 5  3.0 - 18 mmol/L Final    Glucose 01/03/2018 99  74 - 99 mg/dL Final    BUN 01/03/2018 22* 7.0 - 18 MG/DL Final    Creatinine 01/03/2018 0.71  0.6 - 1.3 MG/DL Final    BUN/Creatinine ratio 01/03/2018 31* 12 - 20   Final    GFR est AA 01/03/2018 >60  >60 ml/min/1.73m2 Final    GFR est non-AA 01/03/2018 >60  >60 ml/min/1.73m2 Final    Comment: (NOTE)  Estimated GFR is calculated using the Modification of Diet in Renal   Disease (MDRD) Study equation, reported for both  Americans   (GFRAA) and non- Americans (GFRNA), and normalized to 1.73m2   body surface area. The physician must decide which value applies to   the patient. The MDRD study equation should only be used in   individuals age 25 or older. It has not been validated for the   following: pregnant women, patients with serious comorbid conditions,   or on certain medications, or persons with extremes of body size,   muscle mass, or nutritional status.       Calcium 01/03/2018 9.4  8.5 - 10.1 MG/DL Final    aPTT 01/03/2018 26.9  23.0 - 36.4 SEC Final    Prothrombin time 01/03/2018 13.2  11.5 - 15.2 sec Final    INR 01/03/2018 1.1  0.8 - 1.2   Final    Comment:            INR Therapeutic Ranges         (on stable oral anticoagulant):     INDICATION                INR  DVT/PE/Atrial Fib          2.0-3.0  MI/Mechanical Heart Valve  2.5-3.5      Ventricular Rate 01/03/2018 76  BPM Final    Atrial Rate 01/03/2018 76  BPM Final    P-R Interval 01/03/2018 148  ms Final    QRS Duration 01/03/2018 76  ms Final    Q-T Interval 01/03/2018 400  ms Final    QTC Calculation (Bezet) 01/03/2018 450  ms Final    Calculated P Axis 01/03/2018 79  degrees Final    Calculated R Axis 01/03/2018 34  degrees Final    Calculated T Axis 01/03/2018 66  degrees Final    Diagnosis 01/03/2018    Final                    Value:Normal sinus rhythm  Normal ECG  When compared with ECG of 02-OCT-2017 08:57,  No significant change was found  Confirmed by Radha Lance MD, ----- ([id number]) on 1/3/2018 5:44:57 PM         .No results found for any visits on 01/15/18. Assessment / Plan:      ICD-10-CM ICD-9-CM    1. Dyspnea on exertion R06.09 786.09 CTA CHEST W OR W WO CONT   2. Dysphagia, unspecified type R13.10 787.20 REFERRAL TO GASTROENTEROLOGY   3. Bilateral leg weakness R29.898 729.89 CK      EMG TWO EXTREMITIES LOWER   4. Menopause Z78.0 627.2 DEXA BONE DENSITY STUDY AXIAL   5. Anxiety F41.9 300.00 LORazepam (ATIVAN) 0.5 mg tablet   6. Breast cancer screening Z12.31 V76.10 CIERRA MAMMO BI SCREENING INCL CAD     Labs- CK today  EMG's Lower extremities  Referral to GI- dysphagia  HM- Dexa scan, Mammo  Ativan until Buspar effective- remains very anxious  F/u in 2 weeks    Follow-up Disposition:  Return if symptoms worsen or fail to improve. I asked the patient if she  had any questions and answered her  questions. The patient stated that she understands the treatment plan and agrees with the treatment plan      This is an Initial Medicare Annual Wellness Exam (AWV) (Performed 12 months after IPPE or effective date of Medicare Part B enrollment, Once in a lifetime)    I have reviewed the patient's medical history in detail and updated the computerized patient record. History     Past Medical History:   Diagnosis Date    Abnormal Pap smear     Dr. Shima Hackett Hypercholesterolemia     Neck pain 5/17/2010    Stroke ([facility] [address] 10/02/2017      Past Surgical History:   Procedure Laterality Date    HX GYN      Total Hyst     Current Outpatient Prescriptions   Medication Sig Dispense Refill    LORazepam (ATIVAN) 0.5 mg tablet Take 1 Tab by mouth every four (4) hours as needed for Anxiety. Max Daily Amount: 3 mg. 30 Tab 0    busPIRone (BUSPAR) 7.5 mg tablet Take 1 Tab by mouth two (2) times a day. 60 Tab 1    meloxicam (MOBIC) 15 mg tablet Take 1 Tab by mouth daily.  30 Tab 2    acetaminophen (TYLENOL ARTHRITIS PAIN) 650 mg CR tablet Take 650 mg by mouth every six (6) hours as needed for Pain. One tab by mouth twice daily      aspirin 81 mg chewable tablet Take 1 Tab by mouth daily. 30 Tab 3    atorvastatin (LIPITOR) 20 mg tablet Take 1 Tab by mouth nightly. 30 Tab 3    predniSONE (STERAPRED DS) 10 mg dose pack Take 1 Tab by mouth See Admin Instructions. See administration instruction per 10mg dose pack 21 Tab 0     No Known Allergies  Family History   Problem Relation Age of Onset    Cancer Mother      breast    Heart Disease Father      Social History   Substance Use Topics    Smoking status: Never Smoker    Smokeless tobacco: Never Used    Alcohol use No     Patient Active Problem List   Diagnosis Code    Neck pain M54.2    CVA (cerebral vascular accident) ([facility] [address] I63.9    TIA (transient ischemic attack) G45.9    Cervical facet syndrome M12.88    Spondylosis of cervical region without myelopathy or radiculopathy M47.812    Cervical spinal stenosis M48.02    Degenerative disc disease, cervical M50.30    Chronic pain syndrome G89.4       Depression Risk Factor Screening:     PHQ over the last two weeks 11/28/2017   Little interest or pleasure in doing things Not at all   Feeling down, depressed or hopeless Not at all   Total Score PHQ 2 0     Alcohol Risk Factor Screening: You do not drink alcohol or very rarely. Functional Ability and Level of Safety:     Hearing Loss  The patient needs further evaluation. Activities of Daily Living  The home contains: no safety equipment. Patient does total self care    Fall Risk  Fall Risk Assessment, last 12 mths 11/28/2017   Able to walk? Yes   Fall in past 12 months?  No       Abuse Screen  Patient is not abused    Cognitive Screening   Evaluation of Cognitive Function:  Has your family/caregiver stated any concerns about your memory: no  Normal    Patient Care Team   Patient Care Team:  Rachel Carpio NP as PCP - General (Nurse Practitioner)    Assessment/Plan   Education and counseling provided:  Are appropriate based on today's review and evaluation    Diagnoses and all orders for this visit:    1. Dyspnea on exertion  -     CTA CHEST W OR W WO CONT; Future    2. Dysphagia, unspecified type  -     REFERRAL TO GASTROENTEROLOGY    3. Bilateral leg weakness  -     CK; Future  -     EMG TWO EXTREMITIES LOWER; Future    4. Menopause  -     DEXA BONE DENSITY STUDY AXIAL; Future    5. Anxiety  -     LORazepam (ATIVAN) 0.5 mg tablet; Take 1 Tab by mouth every four (4) hours as needed for Anxiety. Max Daily Amount: 3 mg.    6. Breast cancer screening  -     CIERRA MAMMO BI SCREENING INCL CAD;  Future         Health Maintenance Due   Topic Date Due    Hepatitis C Screening  1945    DTaP/Tdap/Td series (1 - Tdap) 06/24/1968    BREAST CANCER SCRN MAMMOGRAM  06/24/1997    FOBT Q 1 YEAR AGE 50-75  06/24/1997    ZOSTER VACCINE AGE 60>  04/24/2007    GLAUCOMA SCREENING Q2Y  06/24/2012    OSTEOPOROSIS SCREENING (DEXA)  06/24/2012    MEDICARE YEARLY EXAM  06/24/2012

## 2018-01-15 NOTE — MR AVS SNAPSHOT
Visit Information Date & Time Provider Department Dept. Phone Encounter #  
 1/15/2018  8:45 AM Abhinav Karimi NP [facility] [place] [phone] [id number] Your Appointments 1/15/2018  1:30 PM  
Follow Up with Keeley Pelayo MD  
[address] [facility], [address] and [facility]) Appt Note: 2mo fu; 2mo fu  
 [address] [facility] [address]  
[phone]  
  
   
 [address] [facility],[address]  
  
    
 1/31/2018 11:00 AM  
Office Visit with CRIS Chairez ([facility]) Appt Note: ;   
 [address] [facility] [address]  
[facility] [address] Upcoming Health Maintenance Date Due Hepatitis C Screening 1945 DTaP/Tdap/Td series (1 - Tdap) 6/24/1968 BREAST CANCER SCRN MAMMOGRAM 6/24/1997 FOBT Q 1 YEAR AGE 50-75 6/24/1997 ZOSTER VACCINE AGE 60> 4/24/2007 GLAUCOMA SCREENING Q2Y 6/24/2012 OSTEOPOROSIS SCREENING (DEXA) 6/24/2012 MEDICARE YEARLY EXAM 6/24/2012 Pneumococcal 65+ Low/Medium Risk (2 of 2 - PPSV23) 10/10/2018 Allergies as of 1/15/2018  Review Complete On: 1/15/2018 By: Marilu Mitchell LPN No Known Allergies Current Immunizations  Reviewed on 1/12/2018 No immunizations on file. Not reviewed this visit You Were Diagnosed With   
  
 Codes Comments Dyspnea on exertion    -  Primary ICD-10-CM: R06.09 
ICD-9-CM: 786.09 Dysphagia, unspecified type     ICD-10-CM: R13.10 ICD-9-CM: 787.20 Bilateral leg weakness     ICD-10-CM: R29.898 ICD-9-CM: 729.89 Menopause     ICD-10-CM: Z78.0 ICD-9-CM: 627.2 Anxiety     ICD-10-CM: F41.9 ICD-9-CM: 300.00 Breast cancer screening     ICD-10-CM: Z12.31 
ICD-9-CM: V76.10 Vitals Pulse Temp Resp Height(growth percentile) Weight(growth percentile) SpO2 73 97.9 °F (36.6 °C) (Tympanic) 18 5' (1.524 m) 105 lb (47.6 kg) 97% BMI OB Status Smoking Status 20.51 kg/m2 Hysterectomy Never Smoker BMI and BSA Data Body Mass Index Body Surface Area 20.51 kg/m 2 1.42 m 2 Preferred Pharmacy Pharmacy Name Phone [facility] [address] [address], [address] [phone] Your Updated Medication List  
  
   
This list is accurate as of: 1/15/18 10:21 AM.  Always use your most recent med list.  
  
  
  
  
 aspirin 81 mg chewable tablet Take 1 Tab by mouth daily. atorvastatin 20 mg tablet Commonly known as:  LIPITOR Take 1 Tab by mouth nightly. busPIRone 7.5 mg tablet Commonly known as:  BUSPAR Take 1 Tab by mouth two (2) times a day. LORazepam 0.5 mg tablet Commonly known as:  ATIVAN Take 1 Tab by mouth every four (4) hours as needed for Anxiety. Max Daily Amount: 3 mg.  
  
 meloxicam 15 mg tablet Commonly known as:  MOBIC Take 1 Tab by mouth daily. TYLENOL ARTHRITIS PAIN 650 mg Lilyan Senate Generic drug:  acetaminophen Take 650 mg by mouth every six (6) hours as needed for Pain. One tab by mouth twice daily Prescriptions Printed Refills LORazepam (ATIVAN) 0.5 mg tablet 0 Sig: Take 1 Tab by mouth every four (4) hours as needed for Anxiety. Max Daily Amount: 3 mg. Class: Print Route: Oral  
  
We Performed the Following REFERRAL TO GASTROENTEROLOGY [AVL88 Custom] Comments: Dysphagia To-Do List   
 01/15/2018 Lab:  CK   
  
 01/15/2018 Imaging:  CTA CHEST W OR W WO CONT   
  
 01/15/2018 Imaging:  DEXA BONE DENSITY STUDY AXIAL   
  
 01/15/2018 Neurology:  EMG TWO EXTREMITIES LOWER   
  
 01/15/2018 Imaging:  CIERRA MAMMO BI SCREENING INCL CAD   
  
 01/16/2018 11:30 AM  
  Appointment with [facility] CT RM 2 at [facility] RAD [address] ([phone]) DIET RESTRICTIONS  Nothing to eat or drink 4 hours prior to study May have water to take meds  GENERAL INSTRUCTIONS  If you were given medications to take for a contrast allergy prior to having this study, please arrange to have someone drive you to your appointment. If you have had a creatinine level drawn within the past 30 days, please bring most recent results to your appt. This study does not require you to drink contrast prior to your study. MEDICATIONS  Bring a complete list of all medications you are currently taking to include prescriptions, over-the-counter meds, herbals, vitamins & any dietary supplements. OUTSIDE FILMS  Bring outside films, CDs, and reports related to the study with you on the day of your exam.  QUESTIONS  Notify the CT Department if you have any questions concerning your study. Yanira Parada - [phone] Renat[phone]  
  
 01/17/2018 2:00 PM  
  Appointment with [facility] RM 1 at [facility] NON-INVASIVE CARD ([phone]) Age Limit for ALL Heart procedures @ [facility]: 18 yrs and older only. Under the age of 25, refer to [address] [place] [address] ([phone]). This study requires patient to bring a written physician's order or MD office may fax the order to Central Scheduling at [phone]. Patient needs to bring a current list of all medications. No preparation is required for this study. 01/24/2018 1:00 PM  
  Appointment with [facility] RM 2 at [address] [place] [address] ([phone]) OUTSIDE FILMS  - Any outside films related to the study being scheduled should be brought with you on the day of the exam.  If this cannot be done there may be a delay in the reading of the study.   MEDICATIONS  - Patient must bring a complete list of all medications currently taking to include prescriptions, over-the-counter meds, herbals, vitamins & any dietary supplements  GENERAL INSTRUCTIONS  - On the day of your exam do not use any bath powder, deodorant or lotions on the armpit area. -Tenderness of breasts may cause an increase of discomfort during procedure. If you are experiencing breast tenderness on the day of your appointment and would like to reschedule, please call [phone].  
  
 01/24/2018 2:30 PM  
  Appointment with [facility] BONE DEXA RM 1 at [facility] RAD BONE DENSITY ([phone]) OUTSIDE FILMS  - Any outside films related to the study being scheduled should be brought with you on the day of the exam.  If this cannot be done there may be a delay in the reading of the study. MEDICATIONS  - Patient must bring a complete list of all medications currently taking to include prescriptions, over-the-counter meds, herbals, vitamins & any dietary supplements - Patient must discontinue use of calcium, vitamins, or calcium supplements including antacids (calcium based) 24 hours before scan. GENERAL INSTRUCTIONS  - [facility] cannot accommodate patients on stretchers, patient must be able to walk into the room and be able to sit up for a portion of the exam.  
  
  
Referral Information Referral ID Referred By Referred To  
  
 [id number] Roberta Poole MD   
   [address] [place] [address] [address] [place], [address] Phone: [phone] Fax: [phone] Visits Status Start Date End Date 1 New Request 1/15/18 1/15/19 If your referral has a status of pending review or denied, additional information will be sent to support the outcome of this decision. Introducing \[facility] [place] [facility]\"" & HEALTH SERVICES! [employer] introduces [employer] patient portal. Now you can access parts of your medical record, email your doctor's office, and request medication refills online. 1. In your internet browser, go to [email or web address] It - Smartphone RFIDt 2. Click on the First Time User? Click Here link in the Sign In box.  You will see the New Member Sign Up page. 3. Enter your [employer] Access Code exactly as it appears below. You will not need to use this code after youve completed the sign-up process. If you do not sign up before the expiration date, you must request a new code. · [employer] Access Code: [email or web address] Expires: 4/2/2018 11:21 AM 
 
4. Enter the last four digits of your Social Security Number (xxxx) and Date of Birth (mm/dd/yyyy) as indicated and click Submit. You will be taken to the next sign-up page. 5. Create a [employer] ID. This will be your [employer] login ID and cannot be changed, so think of one that is secure and easy to remember. 6. Create a [employer] password. You can change your password at any time. 7. Enter your Password Reset Question and Answer. This can be used at a later time if you forget your password. 8. Enter your e-mail address. You will receive e-mail notification when new information is available in [address] 9. Click Sign Up. You can now view and download portions of your medical record. 10. Click the Download Summary menu link to download a portable copy of your medical information. If you have questions, please visit the Frequently Asked Questions section of the [employer] website. Remember, [employer] is NOT to be used for urgent needs. For medical emergencies, dial 911. Now available from your iPhone and Android! Please provide this summary of care documentation to your next provider. Your primary care clinician is listed as JAY JENKINS. If you have any questions after today's visit, please call [phone].

## 2018-01-15 NOTE — PROGRESS NOTES
[address]  [address], [address]  [place], [facility]  Phone: [phone]  Fax: [phone]        Armando Ring  : 1945  PCP: Bhaskar Mariee NP  1/15/2018    PROGRESS NOTE      ASSESSMENT AND PLAN    Hilario Hernandez was unable to undergo the cervical medial branch block due to a severe panic attack x2. She has started buspar which should help her complete this process eventually. I prescribed her a Prednisone 10 mg dose pack to provide relief until she is able to start the procedure. Pt will f/u after medial branch block with Dr. Pickering Done      Diagnoses and all orders for this visit:    1. Facet arthropathy, cervical  -     predniSONE (STERAPRED DS) 10 mg dose pack; Take 1 Tab by mouth See Admin Instructions. See administration instruction per 10mg dose pack       Follow-up Disposition:  Return after medial branch block with Dr. Ladan Ovalle. HISTORY OF PRESENT ILLNESS  Hilario Hernandez is a 81 y.o. female. A&P / HPI from 2017: Hilario Hernandez comes in to the office today c/o chronic neck pain which was exacerbated by a recent mild TIA. Her symptoms are likely related to cervical facet arthropathy.      On the examination, she had limited cervical ROM with pain reproduced at end ranges of rotation and extension. She did not show neurological deficits nor has radicular pain. The cervical CT scan showed multifocal degenerative disc disease most significant at C5-7 with associated canal stenosis. It also showed significant facet hypertrophy with multifocal regions of bilateral severe neural foramina stenosis.      I referred her to Dr. Ladan Ovalle for a cervical medial branch block / RFA with sedation. I prescribed her Mobic 15 mg daily. HISTORY OF PRESENT ILLNESS  Hilario Hernandez is a 81 y.o. female c/o chronic neck pain which was exacerbated by a mild TIA on 10/02/2017.  She note her pain is constant and there is not specific movement that will aggravate her symptoms. She denies a specific position that will help alleviate her pain. She denies radicular symptoms into the extremities at this time. She present with a cervical CT scan which showed mild regions of anterior subluxation C2-C4 with associated canal stenosis C2-3 up to 7 mm. It also showed multifocal degenerative disc disease most significant at C5-7 with associated canal stenosis.      Pt denies any fevers, chills, nausea, vomiting. Pt denies any chest pain and shortness of breath. Pt denies any ear, nose, and throat problems. Pt denies any fecal or urinary incontinence.      Updates from 01/15/18:  She was unable to undergo the cervical medial branch block due a severe panic attack. She has been prescribed an anti-anxiety medication to further void these symptoms. Her symptoms have not significantly changed since the last office visit. She was also unable to tolerate the Mobic 15 mg daily due to stomach issues. PAST MEDICAL HISTORY   Past Medical History:   Diagnosis Date    Abnormal Pap smear     Dr. Arya Bhakta Hypercholesterolemia     Neck pain 5/17/2010    [facility]       Past Surgical History:   Procedure Laterality Date    HX GYN      Total Hyst   .      MEDICATIONS      Current Outpatient Prescriptions   Medication Sig Dispense Refill    LORazepam (ATIVAN) 0.5 mg tablet Take 1 Tab by mouth every four (4) hours as needed for Anxiety. Max Daily Amount: 3 mg. 30 Tab 0    busPIRone (BUSPAR) 7.5 mg tablet Take 1 Tab by mouth two (2) times a day. 60 Tab 1    aspirin 81 mg chewable tablet Take 1 Tab by mouth daily. 30 Tab 3    atorvastatin (LIPITOR) 20 mg tablet Take 1 Tab by mouth nightly. 30 Tab 3    meloxicam (MOBIC) 15 mg tablet Take 1 Tab by mouth daily. 30 Tab 2    acetaminophen (TYLENOL ARTHRITIS PAIN) 650 mg CR tablet Take 650 mg by mouth every six (6) hours as needed for Pain.  One tab by mouth twice daily          ALLERGIES  No Known Allergies SOCIAL HISTORY    Social History     Social History    Marital status:      Spouse name: N/A    Number of children: N/A    Years of education: N/A     Social History Main Topics    Smoking status: Never Smoker    Smokeless tobacco: Never Used    Alcohol use No    Drug use: No    Sexual activity: No     Other Topics Concern    Not on file     Social History Narrative       FAMILY HISTORY    Family History   Problem Relation Age of Onset    Cancer Mother      breast    Heart Disease Father          REVIEW OF SYSTEMS  Review of Systems   Constitutional: Negative for chills, diaphoresis, fever, malaise/fatigue and weight loss. Respiratory: Negative for shortness of breath. Cardiovascular: Negative for chest pain and leg swelling. Gastrointestinal: Negative for constipation, nausea and vomiting. Neurological: Negative for dizziness, tingling, seizures, loss of consciousness, weakness and headaches. Psychiatric/Behavioral: The patient does not have insomnia. PHYSICAL EXAMINATION  Visit Vitals    /66    Pulse 80    Temp 97.8 °F (36.6 °C)    Resp 18    Ht 5' (1.524 m)    Wt 105 lb (47.6 kg)    SpO2 97%    BMI 20.51 kg/m2       Pain Assessment  11/13/2017   Location of Pain Neck   Severity of Pain 9   Quality of Pain Throbbing   Frequency of Pain Constant           Constitutional:  Well developed, well nourished, in no acute distress. Psychiatric: Affect and mood are appropriate. Integumentary: No rashes or abrasions noted on exposed areas. SPINE/MUSCULOSKELETAL EXAM    Cervical spine:  Neck is midline. Normal muscle tone. No focal atrophy is noted. Cervical ROM limited to about 30 degrees  Shoulder ROM intact. Tenderness to palpation. Negative Spurling's sign. Negative Tinel's sign. Negative Garza's sign.       Sensation in the bilateral arms grossly intact to light touch.      Lumbar spine:  No rash, ecchymosis, or gross obliquity.    No fasciculations. No focal atrophy is noted. No pain with hip ROM. Full range of motion. No tenderness to palpation. No tenderness to palpation at the sciatic notch. SI joints non-tender. Trochanters non tender.      Sensation in the bilateral legs grossly intact to light touch. MOTOR:      Biceps  Triceps Deltoids Wrist Ext Wrist Flex Hand Intrin   Right 5/5 5/5 5/5 5/5 5/5 5/5   Left 5/5 5/5 5/5 5/5 5/5 5/5             Hip Flex  Quads Hamstrings Ankle DF EHL Ankle PF   Right 5/5 5/5 5/5 5/5 5/5 5/5   Left 5/5 5/5 5/5 5/5 5/5 5/5     DTRs are 2+ biceps, triceps, brachioradialis, patella, and Achilles.     Negative Straight Leg raise. Squat not tested. No difficulty with tandem gait.      Ambulation without assistive device. FWB. RADIOGRAPHS  Cervical CT images taken on 10/17/2017 personally reviewed with patient:  FINDINGS:      There is narrowing at the atlantooccipital joints bilaterally with hypertrophic  bone formation/calcification at the joint space between the basion and C1  anterior arch. Calcific soft tissue \"mass\" posterior to the dens. There is  calcification of the transverse ligaments dorsal C2. There is significant  artifact at the foramen magnum limiting assessment for narrowing but  calcification appears to efface the thecal sac without significant mass effect  as assessed on axial view. There is sclerosis at the cranial dens with mild  hypertrophic bone anteriorly and posteriorly. Calcified soft tissue mass also  anterior to the dens. There is loss of normal cervical lordosis. There is  narrowing at the C1-C2 anterior arch. No separation at C1-2. 2 mm anterior  subluxation C2 on C3, 1 mm anterior subluxation C3 on C4 and 1 mm anterior  subluxation C4 on C5. There is stenosis at C2-3 measuring up to 7 mm secondary  to disc bulge and anterior subluxation with mass effect on the posterior cord.   There may be mild loss of vertebral body height at C5 and C6 with sclerosis at  C6-7 joint space and severe disc space narrowing. Additional severe disc space  narrowing C5-6. There is mild anterior calcifications C4-C6. Disc bulge C4-5  with mild canal narrowing up to 11 mm. Disc bulge C6-7 with narrowing up to 7  mm. Significant facet hypertrophy. No acute fracture. Severe stenosis right C3-4  and C4-5 neural foramina. Severe stenosis left C3-4, C4-5 and C6-7 neural  foramina. Moderate stenosis right C2-3, C5-6, C6-7 and left to 3 C5-6. There is  soft tissue thickening with mild calcifications at the T1 supraspinous ligament.      There is prominence of the lingual tonsils. Significant biapical pleural  scarring.      IMPRESSION  IMPRESSION:        1.  Hypertrophic ossification and calcification basion/C1 with partial fusion,  calcified \"pseudomass\" posterior greater than anterior dens with consideration  for CPPD/gout or degenerative changes. No significant narrowing at the foramen  magnum. 2.  Mild regions of anterior subluxation C2-C4 with associated canal stenosis  C2-3 up to 7 mm. 3.  Multifocal degenerative disc disease most significant at C5-7 with  associated canal stenosis. 4.  Significant facet hypertrophy with multifocal regions of bilateral severe  neural foramina stenosis. 5.  Prominent lingual tonsils. 6.  Significant biapical pulmonary pleural scarring.         reviewed     Ms. Amrita Matamoros has a reminder for a \"due or due soon\" health maintenance. I have asked that she contact her primary care provider for follow-up on this health maintenance.      This plan was reviewed with the patient and patient agrees. All questions were answered. More than half of this visit today was spent on counseling.     Written by Lilian Denton, as dictated by Dr. Rocío Robbins, Dr. Amanda Harry, confirm that all documentation is accurate.

## 2018-01-16 ENCOUNTER — HOSPITAL ENCOUNTER (OUTPATIENT)
Dept: CT IMAGING | Age: 73
Discharge: HOME | End: 2018-01-16
Attending: NURSE PRACTITIONER
Payer: MEDICARE

## 2018-01-16 DIAGNOSIS — R06.09 DYSPNEA ON EXERTION: ICD-10-CM

## 2018-01-16 LAB — CREAT UR-MCNC: 0.7 MG/DL (ref 0.6–1.3)

## 2018-01-16 PROCEDURE — 82565 ASSAY OF CREATININE: CPT

## 2018-01-16 PROCEDURE — 74011636320 HC RX REV CODE- 636/320: Performed by: NURSE PRACTITIONER

## 2018-01-16 PROCEDURE — 71275 CT ANGIOGRAPHY CHEST: CPT

## 2018-01-16 RX ADMIN — IOPAMIDOL 96 ML: 755 INJECTION, SOLUTION INTRAVENOUS at 11:46

## 2018-01-17 ENCOUNTER — HOSPITAL ENCOUNTER (OUTPATIENT)
Dept: NON INVASIVE DIAGNOSTICS | Age: 73
Discharge: HOME | End: 2018-01-17
Attending: NURSE PRACTITIONER
Payer: MEDICARE

## 2018-01-17 DIAGNOSIS — R06.09 DYSPNEA ON EXERTION: ICD-10-CM

## 2018-01-17 PROCEDURE — 93306 TTE W/DOPPLER COMPLETE: CPT

## 2018-01-18 LAB
Lab: NORMAL
NT-PROBNP SERPL-MCNC: 96 PG/ML (ref 0–125)
NT-PROBNP SERPL-MCNC: 96 PG/ML (ref 0–125)
REFERENCE LAB,REFLB: NORMAL
TEST DESCRIPTION:,ATST: NORMAL

## 2018-01-22 ENCOUNTER — ANESTHESIA EVENT (OUTPATIENT)
Dept: ENDOSCOPY | Age: 73
End: 2018-01-22
Payer: MEDICARE

## 2018-01-23 ENCOUNTER — ANESTHESIA (OUTPATIENT)
Dept: ENDOSCOPY | Age: 73
End: 2018-01-23
Payer: MEDICARE

## 2018-01-23 ENCOUNTER — HOSPITAL ENCOUNTER (OUTPATIENT)
Age: 73
Setting detail: OUTPATIENT SURGERY
Discharge: HOME | End: 2018-01-23
Attending: INTERNAL MEDICINE | Admitting: INTERNAL MEDICINE
Payer: MEDICARE

## 2018-01-23 VITALS
WEIGHT: 100.4 LBS | RESPIRATION RATE: 14 BRPM | BODY MASS INDEX: 19.71 KG/M2 | DIASTOLIC BLOOD PRESSURE: 69 MMHG | TEMPERATURE: 96.8 F | HEIGHT: 60 IN | SYSTOLIC BLOOD PRESSURE: 112 MMHG | HEART RATE: 75 BPM | OXYGEN SATURATION: 99 %

## 2018-01-23 PROCEDURE — 74011000250 HC RX REV CODE- 250: Performed by: NURSE ANESTHETIST, CERTIFIED REGISTERED

## 2018-01-23 PROCEDURE — 76060000031 HC ANESTHESIA FIRST 0.5 HR: Performed by: INTERNAL MEDICINE

## 2018-01-23 PROCEDURE — 74011250636 HC RX REV CODE- 250/636

## 2018-01-23 PROCEDURE — 77030008565 HC TBNG SUC IRR ERBE -B: Performed by: INTERNAL MEDICINE

## 2018-01-23 PROCEDURE — 88305 TISSUE EXAM BY PATHOLOGIST: CPT | Performed by: INTERNAL MEDICINE

## 2018-01-23 PROCEDURE — 88342 IMHCHEM/IMCYTCHM 1ST ANTB: CPT | Performed by: INTERNAL MEDICINE

## 2018-01-23 PROCEDURE — 77030018846 HC SOL IRR STRL H20 ICUM -A: Performed by: INTERNAL MEDICINE

## 2018-01-23 PROCEDURE — 77030019988 HC FCPS ENDOSC DISP BSC -B: Performed by: INTERNAL MEDICINE

## 2018-01-23 PROCEDURE — 74011250636 HC RX REV CODE- 250/636: Performed by: NURSE ANESTHETIST, CERTIFIED REGISTERED

## 2018-01-23 PROCEDURE — 76040000019: Performed by: INTERNAL MEDICINE

## 2018-01-23 RX ORDER — SODIUM CHLORIDE 0.9 % (FLUSH) 0.9 %
5-10 SYRINGE (ML) INJECTION EVERY 8 HOURS
Status: DISCONTINUED | OUTPATIENT
Start: 2018-01-23 | End: 2018-01-23 | Stop reason: HOSPADM

## 2018-01-23 RX ORDER — SODIUM CHLORIDE, SODIUM LACTATE, POTASSIUM CHLORIDE, CALCIUM CHLORIDE 600; 310; 30; 20 MG/100ML; MG/100ML; MG/100ML; MG/100ML
INJECTION, SOLUTION INTRAVENOUS
Status: DISCONTINUED | OUTPATIENT
Start: 2018-01-23 | End: 2018-01-23 | Stop reason: HOSPADM

## 2018-01-23 RX ORDER — SODIUM CHLORIDE, SODIUM LACTATE, POTASSIUM CHLORIDE, CALCIUM CHLORIDE 600; 310; 30; 20 MG/100ML; MG/100ML; MG/100ML; MG/100ML
100 INJECTION, SOLUTION INTRAVENOUS CONTINUOUS
Status: DISCONTINUED | OUTPATIENT
Start: 2018-01-23 | End: 2018-01-23 | Stop reason: HOSPADM

## 2018-01-23 RX ORDER — DEXTROSE 50 % IN WATER (D50W) INTRAVENOUS SYRINGE
25-50 AS NEEDED
Status: DISCONTINUED | OUTPATIENT
Start: 2018-01-23 | End: 2018-01-23 | Stop reason: HOSPADM

## 2018-01-23 RX ORDER — SODIUM CHLORIDE 0.9 % (FLUSH) 0.9 %
5-10 SYRINGE (ML) INJECTION AS NEEDED
Status: DISCONTINUED | OUTPATIENT
Start: 2018-01-23 | End: 2018-01-23 | Stop reason: HOSPADM

## 2018-01-23 RX ORDER — FENTANYL CITRATE 50 UG/ML
INJECTION, SOLUTION INTRAMUSCULAR; INTRAVENOUS AS NEEDED
Status: DISCONTINUED | OUTPATIENT
Start: 2018-01-23 | End: 2018-01-23 | Stop reason: HOSPADM

## 2018-01-23 RX ORDER — NALBUPHINE HYDROCHLORIDE 10 MG/ML
5 INJECTION, SOLUTION INTRAMUSCULAR; INTRAVENOUS; SUBCUTANEOUS
Status: DISCONTINUED | OUTPATIENT
Start: 2018-01-23 | End: 2018-01-23 | Stop reason: HOSPADM

## 2018-01-23 RX ORDER — PROPOFOL 10 MG/ML
INJECTION, EMULSION INTRAVENOUS AS NEEDED
Status: DISCONTINUED | OUTPATIENT
Start: 2018-01-23 | End: 2018-01-23 | Stop reason: HOSPADM

## 2018-01-23 RX ORDER — SODIUM CHLORIDE, SODIUM LACTATE, POTASSIUM CHLORIDE, CALCIUM CHLORIDE 600; 310; 30; 20 MG/100ML; MG/100ML; MG/100ML; MG/100ML
75 INJECTION, SOLUTION INTRAVENOUS CONTINUOUS
Status: DISCONTINUED | OUTPATIENT
Start: 2018-01-23 | End: 2018-01-23 | Stop reason: HOSPADM

## 2018-01-23 RX ORDER — FENTANYL CITRATE 50 UG/ML
25 INJECTION, SOLUTION INTRAMUSCULAR; INTRAVENOUS AS NEEDED
Status: DISCONTINUED | OUTPATIENT
Start: 2018-01-23 | End: 2018-01-23 | Stop reason: HOSPADM

## 2018-01-23 RX ORDER — MAGNESIUM SULFATE 100 %
4 CRYSTALS MISCELLANEOUS AS NEEDED
Status: DISCONTINUED | OUTPATIENT
Start: 2018-01-23 | End: 2018-01-23 | Stop reason: HOSPADM

## 2018-01-23 RX ADMIN — PROPOFOL 50 MG: 10 INJECTION, EMULSION INTRAVENOUS at 08:51

## 2018-01-23 RX ADMIN — FENTANYL CITRATE 50 MCG: 50 INJECTION, SOLUTION INTRAMUSCULAR; INTRAVENOUS at 08:45

## 2018-01-23 RX ADMIN — SODIUM CHLORIDE, SODIUM LACTATE, POTASSIUM CHLORIDE, CALCIUM CHLORIDE: 600; 310; 30; 20 INJECTION, SOLUTION INTRAVENOUS at 08:05

## 2018-01-23 RX ADMIN — SODIUM CHLORIDE, SODIUM LACTATE, POTASSIUM CHLORIDE, AND CALCIUM CHLORIDE 75 ML/HR: 600; 310; 30; 20 INJECTION, SOLUTION INTRAVENOUS at 08:36

## 2018-01-23 RX ADMIN — FAMOTIDINE 20 MG: 10 INJECTION, SOLUTION INTRAVENOUS at 08:35

## 2018-01-23 NOTE — H&P
History and Physical reviewed; I have examined the patient and there are no pertinent changes. Benigno Delarosa MD, MD   8:45 AM 1/23/2018  [facility], [address] [place] [address]  [email or web address]

## 2018-01-23 NOTE — ANESTHESIA PREPROCEDURE EVALUATION
Anesthetic History   No history of anesthetic complications            Review of Systems / Medical History  Patient summary reviewed and pertinent labs reviewed    Pulmonary  Within defined limits                 Neuro/Psych       CVA (3 months ago)       Cardiovascular  Within defined limits                Exercise tolerance: >4 METS     GI/Hepatic/Renal  Within defined limits              Endo/Other        Arthritis     Other Findings   Comments: Documentation of current medication  Current medications obtained, documented and obtained? YES      Risk Factors for Postoperative nausea/vomiting:       History of postoperative nausea/vomiting? NO       Female? YES       Motion sickness? NO       Intended opioid administration for postoperative analgesia? NO      Smoking Abstinence:  Current Smoker? NO  Elective Surgery? YES  Seen preoperatively by anesthesiologist or proxy prior to day of surgery? YES  Pt abstained from smoking 24 hours prior to anesthesia?  N/A    Preventive care/screening for High Blood Pressure:  Aged 18 years and older: YES  Screened for high blood pressure: YES  Patients with high blood pressure referred to primary care provider   for BP management: YES               Physical Exam    Airway  Mallampati: II  TM Distance: 4 - 6 cm  Neck ROM: normal range of motion   Mouth opening: Normal     Cardiovascular  Regular rate and rhythm,  S1 and S2 normal,  no murmur, click, rub, or gallop  Rhythm: regular  Rate: normal         Dental  No notable dental hx       Pulmonary  Breath sounds clear to auscultation               Abdominal  GI exam deferred       Other Findings            Anesthetic Plan    ASA: 4  Anesthesia type: MAC          Induction: Intravenous  Anesthetic plan and risks discussed with: Patient

## 2018-01-23 NOTE — ANESTHESIA POSTPROCEDURE EVALUATION
Post-Anesthesia Evaluation and Assessment    Patient: Radha Ruffin MRN: [id number]  SSN: [id number]    YOB: 1945  Age: 81 y.o. Sex: female      Data from PACU flowsheet    Cardiovascular Function/Vital Signs  Visit Vitals    /53    Pulse 76    Temp 36.8 °C (98.2 °F)    Resp 14    Ht 5' (1.524 m)    Wt 45.5 kg (100 lb 6.4 oz)    SpO2 99%    Breastfeeding No    BMI 19.61 kg/m2       Patient is status post MAC anesthesia for Procedure(s):  ENDOSCOPY with Bx's & Dilation 52Fr. Nausea/Vomiting: controlled    Postoperative hydration reviewed and adequate. Pain:  Pain Scale 1: Visual (01/23/18 0902)  Pain Intensity 1: 0 (01/23/18 0902)   Managed      Mental Status and Level of Consciousness: Alert and oriented     Pulmonary Status:   O2 Device: CO2 nasal cannula (01/23/18 0906)   Adequate oxygenation and airway patent    Complications related to anesthesia: None    Post-anesthesia assessment completed.  No concerns    Signed By: Ana Kemp MD     January 23, 2018

## 2018-01-23 NOTE — PROCEDURES
[facility], [address]      Brief Procedure Note    Laurey Apgar  1945  [id number]    Date of Procedure: 2018    Preoperative diagnosis: Dysphagia, unspecified type [R13.10]    Postoperative diagnosis:  Gastritis    Type of Anesthesia: MAC (monitered anesthesia care)    Description of Findings: same as post op dx    Procedure: Procedure(s):  ENDOSCOPY with Bx's & Dilation 52Fr    :  Dr. Maryann Galvan MD    Assistant(s): [unfilled]    Type of Anesthesia:MAC     EBL:None    Specimens:   ID Type Source Tests Collected by Time Destination   1 : Gastric Bx's Preservative Stomach  Maryann Galvan MD 2018 0085 Pathology       Findings: See printed and scanned procedure note    Complications: None    Dr. Maryann Galvan MD  2018  9:06 AM

## 2018-01-23 NOTE — DISCHARGE INSTRUCTIONS
Esophageal Dilation: What to Expect at Home:Your Recovery  After you have esophageal dilation, you will stay at the hospital or surgery center for 1 to 2 hours. This will allow the medicine to wear off. You will be able to go home after your doctor or nurse checks to make sure you are not having any problems. This care sheet gives you a general idea about how long it will take for you to recover. But each person recovers at a different pace. Follow the steps below to get better as quickly as possible. How can you care for yourself at home? Activity  ? · Rest as much as you need to after you go home. ? · You should be able to go back to your usual activities the day after the procedure. Diet  ? · Follow your doctor's directions for eating after the procedure. ? · Drink plenty of fluids (unless your doctor has told you not to). Medicines  ? · Your doctor will tell you if and when you can restart your medicines. He or she will also give you instructions about taking any new medicines. ? · If you take blood thinners, such as warfarin (Coumadin), clopidogrel (Plavix), or aspirin, be sure to talk to your doctor. He or she will tell you if and when to start taking those medicines again. Make sure that you understand exactly what your doctor wants you to do. ? · If you have a sore throat the day after the procedure, use an over-the-counter spray to numb your throat. Sucking on throat lozenges and gargling with warm salt water may also help relieve your symptoms. Follow-up care is a key part of your treatment and safety. Be sure to make and go to all appointments, and call your doctor if you are having problems. It's also a good idea to know your test results and keep a list of the medicines you take. When should you call for help? Call 911 anytime you think you may need emergency care. For example, call if:  ? · You passed out (lost consciousness). ? · You have trouble breathing.    ? · Your stools are maroon or very bloody   ? Call your doctor now or seek immediate medical care if:  ? · You have new or worse belly pain. ? · You have a fever. ? · You have new or more blood in your stools. ? · You are sick to your stomach and cannot drink fluids. ? · You cannot pass stools or gas. ? · You have pain that does not get better after you take pain medicine. ? Watch closely for changes in your health, and be sure to contact your doctor if:  ? · Your throat still hurts after a day or two. ? · You do not get better as expected. Where can you learn more? Go to http://karin-michaela.info/. Enter J812 in the search box to learn more about \"Esophageal Dilation: What to Expect at Home. \"  Current as of: May 12, 2017  Content Version: 11.4  © 4824-1538 [employer]. Care instructions adapted under license by [employer] (which disclaims liability or warranty for this information). If you have questions about a medical condition or this instruction, always ask your healthcare professional. [address] any warranty or liability for your use of this information. Gastritis: Care Instructions  Your Care Instructions    Gastritis is a sore and upset stomach. It happens when something irritates the stomach lining. Many things can cause it. These include an infection such as the flu or something you ate or drank. Medicines or a sore on the lining of the stomach (ulcer) also can cause it. Your belly may bloat and ache. You may belch, vomit, and feel sick to your stomach. You should be able to relieve the problem by taking medicine. And it may help to change your diet. If gastritis lasts, your doctor may prescribe medicine. Follow-up care is a key part of your treatment and safety. Be sure to make and go to all appointments, and call your doctor if you are having problems.  It's also a good idea to know your test results and keep a list of the medicines you take.  How can you care for yourself at home? · If your doctor prescribed antibiotics, take them as directed. Do not stop taking them just because you feel better. You need to take the full course of antibiotics. · Be safe with medicines. If your doctor prescribed medicine to decrease stomach acid, take it as directed. Call your doctor if you think you are having a problem with your medicine. · Do not take any other medicine, including over-the-counter pain relievers, without talking to your doctor first.  · If your doctor recommends over-the-counter medicine to reduce stomach acid, such as Pepcid AC, Prilosec, Tagamet HB, or Zantac 75, follow the directions on the label. · Drink plenty of fluids (enough so that your urine is light yellow or clear like water) to prevent dehydration. Choose water and other caffeine-free clear liquids. If you have kidney, heart, or liver disease and have to limit fluids, talk with your doctor before you increase the amount of fluids you drink. · Limit how much alcohol you drink. · Avoid coffee, tea, cola drinks, chocolate, and other foods with caffeine. They increase stomach acid. When should you call for help? Call 911 anytime you think you may need emergency care. For example, call if:  ? · You vomit blood or what looks like coffee grounds. ? · You pass maroon or very bloody stools. ?Call your doctor now or seek immediate medical care if:  ? · You start breathing fast and have not produced urine in the last 8 hours. ? · You cannot keep fluids down. ? Watch closely for changes in your health, and be sure to contact your doctor if:  ? · You do not get better as expected. Where can you learn more? Go to http://karin-michaela.info/. Enter [id number] in the search box to learn more about \"Gastritis: Care Instructions. \"  Current as of: May 12, 2017  Content Version: 11.4  © 2121-3067 [employer].  Care instructions adapted under license by [facility] (which disclaims liability or warranty for this information). If you have questions about a medical condition or this instruction, always ask your healthcare professional. [place] [address] any warranty or liability for your use of this information. Hiatal Hernia: Care Instructions  Your Care Instructions  A hiatal hernia occurs when part of the stomach bulges into the chest cavity. A hiatal hernia may allow stomach acid and juices to back up into the esophagus (acid reflux). This can cause a feeling of burning, warmth, heat, or pain behind the breastbone. This feeling may often occur after you eat, soon after you lie down, or when you bend forward, and it may come and go. You also may have a sour taste in your mouth. These symptoms are commonly known as heartburn or reflux. But not all hiatal hernias cause symptoms. Follow-up care is a key part of your treatment and safety. Be sure to make and go to all appointments, and call your doctor if you are having problems. It's also a good idea to know your test results and keep a list of the medicines you take. How can you care for yourself at home? · Take your medicines exactly as prescribed. Call your doctor if you think you are having a problem with your medicine. · Do not take aspirin or other nonsteroidal anti-inflammatory drugs (NSAIDs), such as ibuprofen (Advil, Motrin) or naproxen (Aleve), unless your doctor says it is okay. Ask your doctor what you can take for pain. · Your doctor may recommend over-the-counter medicine. For mild or occasional indigestion, antacids such as Tums, Gaviscon, Maalox, or Mylanta may help. Your doctor also may recommend over-the-counter acid reducers, such as famotidine (Pepcid AC), cimetidine (Tagamet HB), ranitidine (Zantac 75 and Zantac 150), or omeprazole (Prilosec). Read and follow all instructions on the label. If you use these medicines often, talk with your doctor.   · Change your eating habits. ¨ It's best to eat several small meals instead of two or three large meals. ¨ After you eat, wait 2 to 3 hours before you lie down. Late-night snacks aren't a good idea. ¨ Chocolate, mint, and alcohol can make heartburn worse. They relax the valve between the esophagus and the stomach. ¨ Spicy foods, foods that have a lot of acid (like tomatoes and oranges), and coffee can make heartburn symptoms worse in some people. If your symptoms are worse after you eat a certain food, you may want to stop eating that food to see if your symptoms get better. · Do not smoke or chew tobacco.  · If you get heartburn at night, raise the head of your bed 6 to 8 inches by putting the frame on blocks or placing a foam wedge under the head of your mattress. (Adding extra pillows does not work.)  · Do not wear tight clothing around your middle. · Lose weight if you need to. Losing just 5 to 10 pounds can help. When should you call for help? Call your doctor now or seek immediate medical care if:  ? · You have new or worse belly pain. ? · You are vomiting. ? Watch closely for changes in your health, and be sure to contact your doctor if:  ? · You have new or worse symptoms of indigestion. ? · You have trouble or pain swallowing. ? · You are losing weight. ? · You do not get better as expected. Where can you learn more? Go to http://karin-michaela.info/. Enter C871 in the search box to learn more about \"Hiatal Hernia: Care Instructions. \"  Current as of: May 12, 2017  Content Version: 11.4  © 2907-1729 [employer]. Care instructions adapted under license by [employer] (which disclaims liability or warranty for this information). If you have questions about a medical condition or this instruction, always ask your healthcare professional. [place] [address] any warranty or liability for your use of this information.     DISCHARGE SUMMARY from Nurse    PATIENT INSTRUCTIONS:    After general anesthesia or intravenous sedation, for 24 hours or while taking prescription Narcotics:  · Limit your activities  · Do not drive and operate hazardous machinery  · Do not make important personal or business decisions  · Do  not drink alcoholic beverages  · If you have not urinated within 8 hours after discharge, please contact your surgeon on call. Report the following to your surgeon:  · Excessive pain, swelling, redness or odor of or around the surgical area  · Temperature over 100.5  · Nausea and vomiting lasting longer than 4 hours or if unable to take medications  · Any signs of decreased circulation or nerve impairment to extremity: change in color, persistent  numbness, tingling, coldness or increase pain  · Any questions    *  Please give a list of your current medications to your Primary Care Provider. *  Please update this list whenever your medications are discontinued, doses are      changed, or new medications (including over-the-counter products) are added. *  Please carry medication information at all times in case of emergency situations. These are general instructions for a healthy lifestyle:    No smoking/ No tobacco products/ Avoid exposure to second hand smoke  Surgeon General's Warning:  Quitting smoking now greatly reduces serious risk to your health. Obesity, smoking, and sedentary lifestyle greatly increases your risk for illness    A healthy diet, regular physical exercise & weight monitoring are important for maintaining a healthy lifestyle    You may be retaining fluid if you have a history of heart failure or if you experience any of the following symptoms:  Weight gain of 3 pounds or more overnight or 5 pounds in a week, increased swelling in our hands or feet or shortness of breath while lying flat in bed.   Please call your doctor as soon as you notice any of these symptoms; do not wait until your next office visit. Recognize signs and symptoms of STROKE:    F-face looks uneven    A-arms unable to move or move unevenly    S-speech slurred or non-existent    T-time-call 911 as soon as signs and symptoms begin-DO NOT go       Back to bed or wait to see if you get better-TIME IS BRAIN. Warning Signs of HEART ATTACK     Call 911 if you have these symptoms:   Chest discomfort. Most heart attacks involve discomfort in the center of the chest that lasts more than a few minutes, or that goes away and comes back. It can feel like uncomfortable pressure, squeezing, fullness, or pain.  Discomfort in other areas of the upper body. Symptoms can include pain or discomfort in one or both arms, the back, neck, jaw, or stomach.  Shortness of breath with or without chest discomfort.  Other signs may include breaking out in a cold sweat, nausea, or lightheadedness. Don't wait more than five minutes to call 911 - MINUTES MATTER! Fast action can save your life. Calling 911 is almost always the fastest way to get lifesaving treatment. Emergency Medical Services staff can begin treatment when they arrive -- up to an hour sooner than if someone gets to the hospital by car. The discharge information has been reviewed with the patient. The patient verbalized understanding. Discharge medications reviewed with the patient and appropriate educational materials and side effects teaching were provided.   ___________________________________________________________________________________________________________________________________

## 2018-01-23 NOTE — IP AVS SNAPSHOT
[address] [id number] [facility] 61 [facility] Patient: Haley Truong MRN: [id number] :1945 About your hospitalization You were admitted on:  2018 You last received care in the:  [facility] PHASE 2 RECOVERY You were discharged on:  2018 Why you were hospitalized Your primary diagnosis was:  Not on File Follow-up Information Follow up With Details Comments Contact Info Kiley Gustafson NP   [address] [place] [address] [address] [place] [id number] 
[phone] [facility] [address] [facility] [address] 
[phone] Your Scheduled Appointments 2018 ENDOSCOPY with Jewell Ryder MD  
[facility] ENDOSCOPY [address] [place] [address]) 920 [facility] [address] 2018  1:00 PM EST  
CIERRA MAMMO SCREENING with HBV CIERRA  [address] [facility] [address] [phone] OUTSIDE FILMS  - Any outside films related to the study being scheduled should be brought with you on the day of the exam.  If this cannot be done there may be a delay in the reading of the study. MEDICATIONS  - Patient must bring a complete list of all medications currently taking to include prescriptions, over-the-counter meds, herbals, vitamins & any dietary supplements  GENERAL INSTRUCTIONS  - On the day of your exam do not use any bath powder, deodorant or lotions on the armpit area. -Tenderness of breasts may cause an increase of discomfort during procedure. If you are experiencing breast tenderness on the day of your appointment and would like to reschedule, please call [phone].   
  
    
CHECK IN INSTRUCTIONS  Check in to Registration desk 15 minutes prior to your appointment. [address] (located on the second floor) [address] Wednesday January 24, 2018  2:30 PM EST  
DEXA BONE DENSITY STDY AXIAL with HBV BONE DEXA RM 1 HBV RAD BONE DENSITY (Osito Duarte) [address] [facility] [address] [address] [address] [address] [phone] OUTSIDE FILMS  - Any outside films related to the study being scheduled should be brought with you on the day of the exam.  If this cannot be done there may be a delay in the reading of the study. MEDICATIONS  - Patient must bring a complete list of all medications currently taking to include prescriptions, over-the-counter meds, herbals, vitamins & any dietary supplements - Patient must discontinue use of calcium, vitamins, or calcium supplements including antacids (calcium based) 24 hours before scan. GENERAL INSTRUCTIONS  - [facility] cannot accommodate patients on stretchers, patient must be able to walk into the room and be able to sit up for a portion of the exam.  
  
    
CHECK IN INSTRUCTIONS  Check in to Registration desk 30 minutes prior to your appointment. [address] (located on the second floor) [address] Wednesday January 31, 2018 11:00 AM EST Office Visit with Carmenza Velasco PA-C [facility] ([address]) [facility] [address] [facility] [address]  
[phone] Monday February 12, 2018  8:45 AM EST Follow Up with Ferdie Cogan, NP  
[facility] [place] ([address]) [address] [place] [address] [address] [facility] [address] [phone] Discharge Orders None A check aleena indicates which time of day the medication should be taken. My Medications CONTINUE taking these medications  Instructions Each Dose to Equal  
 Morning Noon Evening Bedtime  
 aspirin 81 mg chewable tablet Your last dose was: Your next dose is: Take 1 Tab by mouth daily. 81 mg  
    
   
   
   
  
 atorvastatin 20 mg tablet Commonly known as:  LIPITOR Your last dose was: Your next dose is: Take 1 Tab by mouth nightly. 20 mg  
    
   
   
   
  
 busPIRone 7.5 mg tablet Commonly known as:  BUSPAR Your last dose was: Your next dose is: Take 1 Tab by mouth two (2) times a day. 7.5 mg LORazepam 0.5 mg tablet Commonly known as:  ATIVAN Your last dose was: Your next dose is: Take 1 Tab by mouth every four (4) hours as needed for Anxiety. Max Daily Amount: 3 mg. 0.5 mg  
    
   
   
   
  
 predniSONE 10 mg dose pack Commonly known as:  STERAPRED DS Your last dose was: Your next dose is: Take 1 Tab by mouth See Admin Instructions. See administration instruction per 10mg dose pack 10 mg  
    
   
   
   
  
 TYLENOL ARTHRITIS PAIN 650 mg Tber Generic drug:  acetaminophen Your last dose was: Your next dose is: Take 650 mg by mouth every six (6) hours as needed for Pain. One tab by mouth twice daily 650 mg Discharge Instructions Esophageal Dilation: What to Expect at Home:Your Recovery After you have esophageal dilation, you will stay at the hospital or surgery center for 1 to 2 hours. This will allow the medicine to wear off. You will be able to go home after your doctor or nurse checks to make sure you are not having any problems. This care sheet gives you a general idea about how long it will take for you to recover. But each person recovers at a different pace. Follow the steps below to get better as quickly as possible. How can you care for yourself at home? Activity ? · Rest as much as you need to after you go home. ? · You should be able to go back to your usual activities the day after the procedure. Diet ? · Follow your doctor's directions for eating after the procedure. ? · Drink plenty of fluids (unless your doctor has told you not to). Medicines ? · Your doctor will tell you if and when you can restart your medicines. He or she will also give you instructions about taking any new medicines. ? · If you take blood thinners, such as warfarin (Coumadin), clopidogrel (Plavix), or aspirin, be sure to talk to your doctor. He or she will tell you if and when to start taking those medicines again. Make sure that you understand exactly what your doctor wants you to do. ? · If you have a sore throat the day after the procedure, use an over-the-counter spray to numb your throat. Sucking on throat lozenges and gargling with warm salt water may also help relieve your symptoms. Follow-up care is a key part of your treatment and safety. Be sure to make and go to all appointments, and call your doctor if you are having problems. It's also a good idea to know your test results and keep a list of the medicines you take. When should you call for help? Call 911 anytime you think you may need emergency care. For example, call if: 
? · You passed out (lost consciousness). ? · You have trouble breathing. ? · Your stools are maroon or very bloody ? Call your doctor now or seek immediate medical care if: 
? · You have new or worse belly pain. ? · You have a fever. ? · You have new or more blood in your stools. ? · You are sick to your stomach and cannot drink fluids. ? · You cannot pass stools or gas. ? · You have pain that does not get better after you take pain medicine. ? Watch closely for changes in your health, and be sure to contact your doctor if: 
? · Your throat still hurts after a day or two. ? · You do not get better as expected. Where can you learn more? Go to http://karin-michaela.info/. Enter X132 in the search box to learn more about \"Esophageal Dilation: What to Expect at Home. \" Current as of: May 12, 2017 Content Version: 11.4 © 1951-3743 [employer]. Care instructions adapted under license by [employer] (which disclaims liability or warranty for this information). If you have questions about a medical condition or this instruction, always ask your healthcare professional. [address] any warranty or liability for your use of this information. Gastritis: Care Instructions Your Care Instructions Gastritis is a sore and upset stomach. It happens when something irritates the stomach lining. Many things can cause it. These include an infection such as the flu or something you ate or drank. Medicines or a sore on the lining of the stomach (ulcer) also can cause it. Your belly may bloat and ache. You may belch, vomit, and feel sick to your stomach. You should be able to relieve the problem by taking medicine. And it may help to change your diet. If gastritis lasts, your doctor may prescribe medicine. Follow-up care is a key part of your treatment and safety. Be sure to make and go to all appointments, and call your doctor if you are having problems. It's also a good idea to know your test results and keep a list of the medicines you take. How can you care for yourself at home? · If your doctor prescribed antibiotics, take them as directed. Do not stop taking them just because you feel better. You need to take the full course of antibiotics. · Be safe with medicines. If your doctor prescribed medicine to decrease stomach acid, take it as directed. Call your doctor if you think you are having a problem with your medicine.  
· Do not take any other medicine, including over-the-counter pain relievers, without talking to your doctor first. 
 · If your doctor recommends over-the-counter medicine to reduce stomach acid, such as Pepcid AC, Prilosec, Tagamet HB, or Zantac 75, follow the directions on the label. · Drink plenty of fluids (enough so that your urine is light yellow or clear like water) to prevent dehydration. Choose water and other caffeine-free clear liquids. If you have kidney, heart, or liver disease and have to limit fluids, talk with your doctor before you increase the amount of fluids you drink. · Limit how much alcohol you drink. · Avoid coffee, tea, cola drinks, chocolate, and other foods with caffeine. They increase stomach acid. When should you call for help? Call 911 anytime you think you may need emergency care. For example, call if: 
? · You vomit blood or what looks like coffee grounds. ? · You pass maroon or very bloody stools. ?Call your doctor now or seek immediate medical care if: 
? · You start breathing fast and have not produced urine in the last 8 hours. ? · You cannot keep fluids down. ? Watch closely for changes in your health, and be sure to contact your doctor if: 
? · You do not get better as expected. Where can you learn more? Go to http://karin-michaela.info/. Enter [id number] in the search box to learn more about \"Gastritis: Care Instructions. \" Current as of: May 12, 2017 Content Version: 11.4 © 4819-8197 JamOrigin. Care instructions adapted under license by [employer] (which disclaims liability or warranty for this information). If you have questions about a medical condition or this instruction, always ask your healthcare professional. [place] [address] any warranty or liability for your use of this information. Hiatal Hernia: Care Instructions Your Care Instructions A hiatal hernia occurs when part of the stomach bulges into the chest cavity.  
A hiatal hernia may allow stomach acid and juices to back up into the esophagus (acid reflux). This can cause a feeling of burning, warmth, heat, or pain behind the breastbone. This feeling may often occur after you eat, soon after you lie down, or when you bend forward, and it may come and go. You also may have a sour taste in your mouth. These symptoms are commonly known as heartburn or reflux. But not all hiatal hernias cause symptoms. Follow-up care is a key part of your treatment and safety. Be sure to make and go to all appointments, and call your doctor if you are having problems. It's also a good idea to know your test results and keep a list of the medicines you take. How can you care for yourself at home? · Take your medicines exactly as prescribed. Call your doctor if you think you are having a problem with your medicine. · Do not take aspirin or other nonsteroidal anti-inflammatory drugs (NSAIDs), such as ibuprofen (Advil, Motrin) or naproxen (Aleve), unless your doctor says it is okay. Ask your doctor what you can take for pain. · Your doctor may recommend over-the-counter medicine. For mild or occasional indigestion, antacids such as Tums, Gaviscon, Maalox, or Mylanta may help. Your doctor also may recommend over-the-counter acid reducers, such as famotidine (Pepcid AC), cimetidine (Tagamet HB), ranitidine (Zantac 75 and Zantac 150), or omeprazole (Prilosec). Read and follow all instructions on the label. If you use these medicines often, talk with your doctor. · Change your eating habits. ¨ It's best to eat several small meals instead of two or three large meals. ¨ After you eat, wait 2 to 3 hours before you lie down. Late-night snacks aren't a good idea. ¨ Chocolate, mint, and alcohol can make heartburn worse. They relax the valve between the esophagus and the stomach. ¨ Spicy foods, foods that have a lot of acid (like tomatoes and oranges), and coffee can make heartburn symptoms worse in some people.  If your symptoms are worse after you eat a certain food, you may want to stop eating that food to see if your symptoms get better. · Do not smoke or chew tobacco. 
· If you get heartburn at night, raise the head of your bed 6 to 8 inches by putting the frame on blocks or placing a foam wedge under the head of your mattress. (Adding extra pillows does not work.) · Do not wear tight clothing around your middle. · Lose weight if you need to. Losing just 5 to 10 pounds can help. When should you call for help? Call your doctor now or seek immediate medical care if: 
? · You have new or worse belly pain. ? · You are vomiting. ? Watch closely for changes in your health, and be sure to contact your doctor if: 
? · You have new or worse symptoms of indigestion. ? · You have trouble or pain swallowing. ? · You are losing weight. ? · You do not get better as expected. Where can you learn more? Go to http://karin-michaela.info/. Enter U145 in the search box to learn more about \"Hiatal Hernia: Care Instructions. \" Current as of: May 12, 2017 Content Version: 11.4 © 7845-1071 [employer]. Care instructions adapted under license by [employer] (which disclaims liability or warranty for this information). If you have questions about a medical condition or this instruction, always ask your healthcare professional. [place] [address] any warranty or liability for your use of this information. DISCHARGE SUMMARY from Nurse PATIENT INSTRUCTIONS: 
 
 
F-face looks uneven A-arms unable to move or move unevenly S-speech slurred or non-existent T-time-call 911 as soon as signs and symptoms begin-DO NOT go Back to bed or wait to see if you get better-TIME IS BRAIN. Warning Signs of HEART ATTACK Call 911 if you have these symptoms: 
? Chest discomfort.  Most heart attacks involve discomfort in the center of the chest that lasts more than a few minutes, or that goes away and comes back. It can feel like uncomfortable pressure, squeezing, fullness, or pain. ? Discomfort in other areas of the upper body. Symptoms can include pain or discomfort in one or both arms, the back, neck, jaw, or stomach. ? Shortness of breath with or without chest discomfort. ? Other signs may include breaking out in a cold sweat, nausea, or lightheadedness. Don't wait more than five minutes to call [address]! Fast action can save your life. Calling 911 is almost always the fastest way to get lifesaving treatment. Emergency Medical Services staff can begin treatment when they arrive  up to an hour sooner than if someone gets to the hospital by car. The discharge information has been reviewed with the patient. The patient verbalized understanding. Discharge medications reviewed with the patient and appropriate educational materials and side effects teaching were provided. ___________________________________________________________________________________________________________________________________ Introducing [facility]! Quinn Muse introduces [employer] patient portal. Now you can access parts of your medical record, email your doctor's office, and request medication refills online. 1. In your internet browser, go to [email or web address] [email or web address] Appt 2. Click on the First Time User? Click Here link in the Sign In box. You will see the New Member Sign Up page. 3. Enter your [employer] Access Code exactly as it appears below. You will not need to use this code after youve completed the sign-up process. If you do not sign up before the expiration date, you must request a new code. · [employer] Access Code: [address] Expires: 4/2/2018 11:21 AM 
 
4. Enter the last four digits of your Social Security Number (xxxx) and Date of Birth (mm/dd/yyyy) as indicated and click Submit.  You will be taken to the next sign-up page. 5. Create a Rormix ID. This will be your [employer] login ID and cannot be changed, so think of one that is secure and easy to remember. 6. Create a R[employer]ix password. You can change your password at any time. 7. Enter your Password Reset Question and Answer. This can be used at a later time if you forget your password. 8. Enter your e-mail address. You will receive e-mail notification when new information is available in [address]. 9. Click Sign Up. You can now view and download portions of your medical record. 10. Click the Download Summary menu link to download a portable copy of your medical information. If you have questions, please visit the Frequently Asked Questions section of the [employer] website. Remember, [employer] is NOT to be used for urgent needs. For medical emergencies, dial 911. Now available from your iPhone and Android! Providers Seen During Your Hospitalization Provider Specialty Primary office phone Kyler So MD Gastroenterology [phone] Your Primary Care Physician (PCP) Primary Care Physician Office Phone Office Fax [address] [place], [address] [facility] [address] [phone] You are allergic to the following No active allergies Recent Documentation Height Weight Breastfeeding? BMI OB Status Smoking Status 1.524 m 45.5 kg No 19.61 kg/m2 Hysterectomy Never Smoker Emergency Contacts Name Discharge Info Relation Home Work Mobile Neftaly Friedman DISCHARGE CAREGIVER [3] Child [2] [phone] [address] CAREGIVER [3] Friend [5] [phone] Patient Belongings The following personal items are in your possession at time of discharge: 
  Dental Appliances: None  Visual Aid: None Please provide this summary of care documentation to your next provider.  
  
  
 
  
Signatures-by signing, you are acknowledging that this After Visit Summary has been reviewed with you and you have received a copy. Patient Signature:  ____________________________________________________________ Date:  ____________________________________________________________  
  
Natacha Milton Provider Signature:  ____________________________________________________________ Date:  ____________________________________________________________

## 2018-01-24 ENCOUNTER — HOSPITAL ENCOUNTER (OUTPATIENT)
Dept: MAMMOGRAPHY | Age: 73
Discharge: HOME | End: 2018-01-24
Attending: NURSE PRACTITIONER
Payer: MEDICARE

## 2018-01-24 ENCOUNTER — HOSPITAL ENCOUNTER (OUTPATIENT)
Dept: BONE DENSITY | Age: 73
Discharge: HOME | End: 2018-01-24
Attending: NURSE PRACTITIONER
Payer: MEDICARE

## 2018-01-24 DIAGNOSIS — R59.0 LYMPHADENOPATHY, HILAR: Primary | ICD-10-CM

## 2018-01-24 DIAGNOSIS — Z12.39 BREAST CANCER SCREENING: ICD-10-CM

## 2018-01-24 DIAGNOSIS — Z78.0 MENOPAUSE: ICD-10-CM

## 2018-01-24 PROCEDURE — 77063 BREAST TOMOSYNTHESIS BI: CPT

## 2018-01-24 PROCEDURE — 77080 DXA BONE DENSITY AXIAL: CPT

## 2018-01-24 NOTE — PROGRESS NOTES
Please have patient follow-up with  Pulmonary physician.  She has an enlarged lymph node that should be followed by pulmonary

## 2018-01-25 ENCOUNTER — TELEPHONE (OUTPATIENT)
Dept: INTERNAL MEDICINE CLINIC | Age: 73
End: 2018-01-25

## 2018-01-25 ENCOUNTER — OFFICE VISIT (OUTPATIENT)
Dept: PAIN MANAGEMENT | Age: 73
End: 2018-01-25

## 2018-01-25 VITALS
HEIGHT: 60 IN | RESPIRATION RATE: 14 BRPM | HEART RATE: 82 BPM | BODY MASS INDEX: 19.63 KG/M2 | SYSTOLIC BLOOD PRESSURE: 117 MMHG | TEMPERATURE: 97.6 F | WEIGHT: 100 LBS | DIASTOLIC BLOOD PRESSURE: 71 MMHG

## 2018-01-25 DIAGNOSIS — G89.4 CHRONIC PAIN SYNDROME: ICD-10-CM

## 2018-01-25 DIAGNOSIS — M47.812 CERVICAL FACET SYNDROME: ICD-10-CM

## 2018-01-25 DIAGNOSIS — M47.812 SPONDYLOSIS OF CERVICAL REGION WITHOUT MYELOPATHY OR RADICULOPATHY: ICD-10-CM

## 2018-01-25 DIAGNOSIS — M48.02 CERVICAL SPINAL STENOSIS: ICD-10-CM

## 2018-01-25 DIAGNOSIS — M50.30 DEGENERATIVE DISC DISEASE, CERVICAL: ICD-10-CM

## 2018-01-25 DIAGNOSIS — M54.12 CERVICAL RADICULITIS: Primary | ICD-10-CM

## 2018-01-25 NOTE — PROGRESS NOTES
[address] for Pain Management  Interventional Pain Management Consultation History & Physical    PATIENT NAME:  Young Cash ZNTGUT     YOB: 1945    DATE OF SERVICE:   1/25/2018      CHIEF COMPLAINT:  Neck Pain      REASON FOR VISIT:   Tere Fitzgerald presents to the pain clinic today for follow on evaluation and to consider interventional pain management options as indicated for the type and location of the pain the patient is presenting with. HISTORY OF PRESENT ILLNESS:     Patient presents for follow on evaluation and to discuss interventional pain procedures as may be indicated. I had originally seen her on November 28, 2017. At that time I found her to have signs and symptoms, as well as exam and imaging evidence primarily suggestive of bilateral neck pain due to cervical facet arthropathy and cervical facet hypertrophy causing cervical facet syndrome. I discussed with her risk and benefits of cervical radiofrequency neurotomy procedure at bilateral C4-5, C5-6, C6-7 levels with IV conscious sedation. Unfortunately, before the patient's initial procedural visit, she suffered a panic attack and severe emotional distress. She feels it was actually related to her upcoming procedure, she did not feel she could mentally tolerate cervical radiofrequency neurotomy procedure. Since that time, the nature of the patient's pain has actually changed. She not only has bilateral neck pain, but she now also has bilateral upper extremity pins and needles, tingling, radiating upper extremity pain. This pain is poorly controlled with her current pain regimen. She has not had physical therapy for this pain. We again reviewed her cervical imaging. Cervical CT was again reviewed from October 17, 2017. Multifocal degenerative disc disease most significant C5-6 C6-7. Cervical facet hypertrophy, cervical facet arthropathy is noted.   Bilateral and severe neuroforaminal narrowing is noted at several levels. Anterior subluxation C2-C4 with canal stenosis C2-3 of 7 mm. Multiple areas of cervical degeneration noted on cervical spine hypertrophic bone formation/calcification of joint space C1 anterior arch. Subluxation C2-3, C3-4 1 mm each. Severe space narrowing C6-7 severe disc narrowing C5-6 severe canal stenosis on the left C3-4 C4-5 C6-7 neuroforamina. ASSESSMENT/OPTIONS: as follows. We discussed options. Given the nature of her current symptoms involving bilateral upper extremity pins and needles, shooting pain, as well as imaging evidence suggestive of nerve root compression at many cervical levels, I believe she has a strong element of cervical radiculitis secondary to cervical spinal stenosis and other degenerative changes. Furthermore, given the fact that she had a panic attack prior to cervical radiofrequency neurotomy procedures, I am at the present somewhat disinclined to continue along cervical radiofrequency route as I do not think she is psychologically prepared to undergo this. I will instead planned cervical epidural steroid injections with IV conscious sedation for her neck and bilateral upper extremity symptoms. I have discussed the risks and benefits, indications, contraindications, and side effects of intended procedure with the patient. I have used skeleton spine model to describe and discuss the procedure with the patient. I have answered all questions relating to the procedure. Patient understands the nature of the procedure and wishes to proceed. Patient has no further questions. She should tolerate cervical epidural steroid injections much better than radiofrequency procedure. I have thoroughly discussed this with the patient. She understands and agrees.                MRI Results (most recent):    Results from [place] encounter on 10/02/17   MRA BRAIN WO CONT   Narrative INTRACRANIAL MRA    CPT CODE: 08096    HISTORY: Stroke like symptoms, left-sided weakness and facial droop upon  awakening. COMPARISON: None. FINDINGS:     The distal cervical, petrous, cavernous and supraclinoid segments of the  internal carotid arteries are patent. Bilateral anterior and middle cerebral  arteries are patent. In the posterior circulation, the vertebral arteries are patent to the  vertebrobasilar junction. Basilar trunk the basilar terminus are patent. Bilateral posterior cerebral and superior cerebellar arteries are patent. No evidence of intracranial aneurysm or hemodynamically significant stenosis. Impression IMPRESSION:    Patent intracranial vasculature. PAST MEDICAL HISTORY:   The patient  has a past medical history of Abnormal Pap smear; Hypercholesterolemia; Neck pain (5/17/2010); and Stroke ([facility] [address] (10/02/2017). PAST SURGICAL HISTORY:   The patient  has a past surgical history that includes hx gyn and hx lap cholecystectomy. CURRENT MEDICATIONS:   The patient has a current medication list which includes the following prescription(s): lorazepam, prednisone, buspirone, aspirin, atorvastatin, and acetaminophen. ALLERGIES:   No Known Allergies    FAMILY HISTORY:   The patient family history includes Cancer in her mother; Heart Disease in her father. SOCIAL HISTORY:   The patient  reports that she has never smoked. She has never used smokeless tobacco. The patient  reports that she does not drink alcohol. She      REVIEW OF SYSTEMS:    The patient denies fever, chills, weight loss (Constitutional), rash, itching (Skin), tinnitus, congestion (HENT), blurred vision, photophobia (Eyes), palpitations, orthopnea (Cardiovascular), hemoptysis, wheezing (Respiratory), nausea, vomiting, diarrhea (Gastrointestinal), dysuria, hematuria, urgency (Genitourinary), bowel or bladder incontinence, loss of consciousness (Neurologic), suicidal or homicidal ideation or hallucinations (Psychiatric).  Denies swelling, axillary or groin masses (Lymphatic). PHYSICAL EXAM:  VS:   Visit Vitals    /71 (BP 1 Location: Left arm, BP Patient Position: Sitting)    Pulse 82    Temp 97.6 °F (36.4 °C) (Oral)    Resp 14    Ht 5' (1.524 m)    Wt 45.4 kg (100 lb)    BMI 19.53 kg/m2     General: Well-developed and well-nourished. Body habitus consistent with recorded height and weight and the calculated BMI. Apparent distress due to neck and upper extremity symptoms. Head: Normocephalic, atraumatic. Skin: Inspection of the skin reveals no rashes, lesions or infection. CV: Regular rate. No murmurs or rubs noted. No peripheral edema noted. Pulm: Respirations are even and unlabored. Extr: No clubbing, cyanosis, or edema noted. Musculoskeletal:  1. Cervical spine -decreased range of motion all [place] . Paraspinous tenderness throughout the cervical spine . There is no scoliosis, asymmetry, or musculoskeletal defect. 2. Thoracic spine - Full ROM. No paraspinous tenderness at any level. There is no scoliosis, asymmetry, or musculoskeletal defect. 3. Lumbar spine - Full ROM. No paraspinous tenderness at any level. SI joints are nontender bilaterally. There is no scoliosis, asymmetry, or musculoskeletal defect. 4. Right upper extremity - Full ROM. 5/5 muscle strength in all muscle groups. No pain or tenderness in shoulder, elbow, wrist, or hand. 5. Left upper extremity - Full ROM. 5/5 muscle strength in all muscle groups. No pain or tenderness in shoulder, elbow, wrist, or hand. 6. Right lower extremity - Full ROM. 5/5 muscle strength in all muscle groups. No pain, tenderness, or swelling in the hip, knee, ankle or foot. 7. Left lower extremity - Full ROM. 5/5 muscle strength in all muscle groups. No pain, tenderness, or swelling in the hip, knee, ankle or foot. Neurological:  1. Mental Status - Alert, awake and oriented. Speech is clear and appropriate.   2. Cranial Nerves - Extraocular muscles intact bilaterally. Cranial nerves II-XII grossly intact bilaterally. 3. Gait - Non-antalgic   4. Reflexes - 2+ and symmetric throughout. 5. Sensation - Intact to light touch and pin prick. 6. Provocative Tests - Spurlings negative bilaterally. Psychological:  1. Mood and affect - Appropriate. 2. Speech - Appropriate. 3. Though content - Appropriate. 4. Judgment - Appropriate. ASSESSMENT:      ICD-10-CM ICD-9-CM    1. Cervical radiculitis M54.12 723.4    2. Degenerative disc disease, cervical M50.30 722.4    3. Spondylosis of cervical region without myelopathy or radiculopathy M47.812 721.0    4. Cervical spinal stenosis M48.02 723.0    5. Cervical facet syndrome M12.88 723.8    6. Chronic pain syndrome G89.4 338.4            PLAN:    1.    I have thoroughly discussed the risks and benefits, indications, contraindications, and side effects of any and procedures that were mentioned at today's patient visit. I have used a skeleton model to explain all procedures, as well as to provide added emphasis regarding procedures and as well for patient education purposes. I have answered all questions in great detail, and I have obtained verbal confirmation for all procedures planned with the patient. 3.    I have reviewed in great detail today, when indicated, the patient's MRI and other imaging studies with the patient. I have explained to the patient, when indicated, their condition using both actual recent and relevant images insofar as I am able to obtain actual images. I have used a skeleton model for added emphasis as well as patient education. 4.    I have advised patient to have a primary care provider continue to care for their health maintenance and general medical conditions. 5,    I have placed appropriate referrals to specialty care providers as I have deemed necessary through today's clinical consultation with the patient.   5.    I have explained to the patient that if any significant side effects, issues, problems, concerns, or perceived complications may arise at around the time of the patient's procedures, they should either call the pain management clinic or go to the emergency room immediately for medical provider evaluation. 6.   I have encouraged all patients to call the pain management clinic with any questions or concerns that they may have pertaining to their procedures. DISPOSITION:   The patients condition and plan were discussed at length and all questions were answered. The patient agrees with the plan. A total of 25 minutes was spent with the patient of which over half of the time was spent counseling the patient. Diane Keene MD 1/25/2018 5:46 PM    Note: Although these clinic notes were documented by the provider at the time of the exam, they have not been proofed and are subject to transcription variance.

## 2018-01-25 NOTE — TELEPHONE ENCOUNTER
Patient wants to discuss something about her Pulmonary Test she had done She called their office and they referred her back to us.  Please call her at [phone]

## 2018-01-25 NOTE — TELEPHONE ENCOUNTER
----- Message from Kecia Hawley NP sent at 1/15/2018  2:59 PM EST -----  Please let the patient know she needs to repeat her metabolic panel in 6 weeks- elevated liver enzymes.

## 2018-01-25 NOTE — TELEPHONE ENCOUNTER
Contacted Mary Amye Kehr and informed Her of lab and 2d echo results and recomendations per Rey Mittal NP's request. Yu Desai expressed understanding.

## 2018-01-26 ENCOUNTER — TELEPHONE (OUTPATIENT)
Dept: INTERNAL MEDICINE CLINIC | Age: 73
End: 2018-01-26

## 2018-01-26 DIAGNOSIS — M81.0 OSTEOPOROSIS, UNSPECIFIED OSTEOPOROSIS TYPE, UNSPECIFIED PATHOLOGICAL FRACTURE PRESENCE: Primary | ICD-10-CM

## 2018-01-26 RX ORDER — ALENDRONATE SODIUM 10 MG/1
10 TABLET ORAL
Qty: 30 TAB | Refills: 2 | Status: SHIPPED | OUTPATIENT
Start: 2018-01-26 | End: 2018-04-16 | Stop reason: SDUPTHER

## 2018-01-26 NOTE — TELEPHONE ENCOUNTER
Ms Mireya Smoker contacted and results of CTA were reviewed and she expressed understanding and will continue with visit with pulmonary

## 2018-01-26 NOTE — TELEPHONE ENCOUNTER
Contacted Jamaica Friedman and informed Her of Mammogram and dexa results and recommendation to start fosamax per Franco Victor NP's request. Andres Lou expressed understanding.

## 2018-01-26 NOTE — PROGRESS NOTES
Please let patient know the xray results showed osteoporosis. Will need to start patient on Fosamax.  Prescription sent to pharmacy

## 2018-01-26 NOTE — TELEPHONE ENCOUNTER
----- Message from Segundo Rankin NP sent at 1/26/2018 11:59 AM EST -----  Please let patient know the xray results showed osteoporosis. Will need to start patient on Fosamax.  Prescription sent to pharmacy

## 2018-01-29 ENCOUNTER — TELEPHONE (OUTPATIENT)
Dept: PAIN MANAGEMENT | Age: 73
End: 2018-01-29

## 2018-01-29 RX ORDER — SODIUM CHLORIDE 0.9 % (FLUSH) 0.9 %
5-10 SYRINGE (ML) INJECTION AS NEEDED
Status: CANCELLED | OUTPATIENT
Start: 2018-01-31

## 2018-01-29 RX ORDER — MIDAZOLAM HYDROCHLORIDE 1 MG/ML
.5-6 INJECTION, SOLUTION INTRAMUSCULAR; INTRAVENOUS
Status: CANCELLED | OUTPATIENT
Start: 2018-01-31

## 2018-01-29 NOTE — TELEPHONE ENCOUNTER
Patient called to report she has been of ASA. Need to be off 4 days prior to procedure on 01/31/2018.

## 2018-01-31 ENCOUNTER — APPOINTMENT (OUTPATIENT)
Dept: GENERAL RADIOLOGY | Age: 73
End: 2018-01-31
Attending: PHYSICAL MEDICINE & REHABILITATION
Payer: MEDICARE

## 2018-01-31 ENCOUNTER — HOSPITAL ENCOUNTER (OUTPATIENT)
Age: 73
Setting detail: OUTPATIENT SURGERY
Discharge: HOME | End: 2018-01-31
Attending: PHYSICAL MEDICINE & REHABILITATION | Admitting: PHYSICAL MEDICINE & REHABILITATION
Payer: MEDICARE

## 2018-01-31 VITALS
BODY MASS INDEX: 19.63 KG/M2 | HEART RATE: 65 BPM | WEIGHT: 100 LBS | OXYGEN SATURATION: 98 % | RESPIRATION RATE: 18 BRPM | DIASTOLIC BLOOD PRESSURE: 67 MMHG | TEMPERATURE: 98 F | HEIGHT: 60 IN | SYSTOLIC BLOOD PRESSURE: 113 MMHG

## 2018-01-31 PROCEDURE — 74011250636 HC RX REV CODE- 250/636

## 2018-01-31 PROCEDURE — 74011000250 HC RX REV CODE- 250

## 2018-01-31 PROCEDURE — 74011636320 HC RX REV CODE- 636/320: Performed by: PHYSICAL MEDICINE & REHABILITATION

## 2018-01-31 PROCEDURE — 76010000009 HC PAIN MGT 0 TO 30 MIN PROC: Performed by: PHYSICAL MEDICINE & REHABILITATION

## 2018-01-31 PROCEDURE — 74011250636 HC RX REV CODE- 250/636: Performed by: PHYSICAL MEDICINE & REHABILITATION

## 2018-01-31 PROCEDURE — 74011636320 HC RX REV CODE- 636/320

## 2018-01-31 RX ORDER — OMEPRAZOLE 20 MG/1
20 CAPSULE, DELAYED RELEASE ORAL DAILY
COMMUNITY
End: 2020-02-19

## 2018-01-31 RX ORDER — ONDANSETRON 2 MG/ML
INJECTION INTRAMUSCULAR; INTRAVENOUS
Status: COMPLETED
Start: 2018-01-31 | End: 2018-01-31

## 2018-01-31 RX ORDER — LIDOCAINE HYDROCHLORIDE 10 MG/ML
INJECTION, SOLUTION EPIDURAL; INFILTRATION; INTRACAUDAL; PERINEURAL AS NEEDED
Status: DISCONTINUED | OUTPATIENT
Start: 2018-01-31 | End: 2018-01-31 | Stop reason: HOSPADM

## 2018-01-31 RX ORDER — DEXAMETHASONE SODIUM PHOSPHATE 100 MG/10ML
INJECTION INTRAMUSCULAR; INTRAVENOUS AS NEEDED
Status: DISCONTINUED | OUTPATIENT
Start: 2018-01-31 | End: 2018-01-31 | Stop reason: HOSPADM

## 2018-01-31 RX ORDER — MIDAZOLAM HYDROCHLORIDE 1 MG/ML
.5-6 INJECTION, SOLUTION INTRAMUSCULAR; INTRAVENOUS
Status: DISCONTINUED | OUTPATIENT
Start: 2018-01-31 | End: 2018-01-31 | Stop reason: HOSPADM

## 2018-01-31 RX ORDER — ONDANSETRON 2 MG/ML
4 INJECTION INTRAMUSCULAR; INTRAVENOUS ONCE
Status: COMPLETED | OUTPATIENT
Start: 2018-01-31 | End: 2018-01-31

## 2018-01-31 RX ORDER — FENTANYL CITRATE 50 UG/ML
INJECTION, SOLUTION INTRAMUSCULAR; INTRAVENOUS AS NEEDED
Status: DISCONTINUED | OUTPATIENT
Start: 2018-01-31 | End: 2018-01-31 | Stop reason: HOSPADM

## 2018-01-31 RX ORDER — SODIUM CHLORIDE 0.9 % (FLUSH) 0.9 %
5-10 SYRINGE (ML) INJECTION AS NEEDED
Status: DISCONTINUED | OUTPATIENT
Start: 2018-01-31 | End: 2018-01-31 | Stop reason: HOSPADM

## 2018-01-31 RX ADMIN — ONDANSETRON 4 MG: 2 SOLUTION INTRAMUSCULAR; INTRAVENOUS at 09:29

## 2018-01-31 RX ADMIN — ONDANSETRON 4 MG: 2 INJECTION INTRAMUSCULAR; INTRAVENOUS at 09:29

## 2018-01-31 NOTE — PROCEDURES
[facility] [address]    INTERLAMINAR CERVICAL EPIDURAL STEROID INJECTION  PROCEDURE REPORT      PATIENT:  Anthony Viveros OF BIRTH:  1945  DATE OF SERVICE:  1/31/2018  [facility]    PRE-PROCEDURE DIAGNOSIS:  See Above    POST-PROCEDURE DIAGNOSIS:  See Above                PROCEDURE:    1. Interlaminar cervical epidural steroid injection, C7-T1  (19406)  2. Fluoroscopic needle guidance (spinal) (95498)  3. Supervision of moderate sedation (32335)    ANESTHESIA:  Local with moderate IV sedation. See Medication Administration Record for specific medications and dosage. COMPLICATIONS: None. PHYSICIAN:  Davy Mclaughlin MD    PRE-PROCEDURE NOTE:  Pre-procedural assessment of the patient was performed including a limited history and physical examination. The details of the procedure were discussed with the patient, including the risks, benefits and alternative options and an informed consent was obtained. The patients NPO status, if necessary for the specific procedure and/or administration of moderate intravenous sedation, if utilized, and availability of a responsible adult to escort the patient following the procedure were confirmed. A peripheral intravenous cannula was placed without difficulty and lactated Ringers solution administered. See nursing notes for details. PROCEDURE NOTE:  The patient was brought to the procedure suite and positioned on the fluoroscopy table in the prone position. Physiologic monitors were applied and supplemental oxygen was administered via nasal cannula. The skin was prepped in the standard surgical fashion and sterile drapes were applied over the procedure site.  Please refer to the Flowsheet for documentation of the patients vital signs and the Medication Administration Record for any oral and/or intravenous sedation administered prior to or during the procedure. The above-listed interlaminar space was identified and the skin and subcutaneous tissues were infiltrated with 1% Lidocaine. Under anterior-posterior fluoroscopic guidance an 18g, 3.5-inch Tuohy epidural needle was advanced along the previously identified interlaminar space. The fluoroscope was then turned lateral view and a loss of resistance syringe was attached to the needle containing preservative free normal saline. Under lateral flouroscopic guidance, the needle was then advanced through the ligamentum flavum, entering the epidural space with a clear and crisp loss of resistance. The needle was not advanced beyond the interlaminar line at any time during this process. No CSF was noted. Aspiration was negative for blood or CSF. Additional confirmation was made with the injection of 0.5 mL of a nonionic water-soluble radiographic contrast medium (Isovue-M 200) demonstrating appropriate epidural spread and the absence of vascular uptake. Following this, a 3 mL solution comprised of 2 mL of dexamethasone (10mg/ml) and 1 mL of lidocaine 1% preservative free was injected slowly after negative aspiration. The needle was cleared of steroid solution and removed. The area was thoroughly cleaned and sterile bandages applied as necessary. The patient tolerated the procedure well and vital signs remained stable throughout the procedure. POST-PROCEDURE COURSE:  The patient was escorted from the procedure suite in satisfactory condition and recovered per facility protocol based on the type of procedure performed and/or the sedation utilized. The patient did not experience any adverse events and remained hemodynamically stable during the post-procedure period. DISCHARGE NOTE:  Upon discharge, the patient was able to tolerate fluids and was in no acute distress. The patient was oriented to person, place and time and vital signs were stable.  Appropriate post-procedure instructions were provided and explained to the patient in detail and all questions were answered.     Gerry Gonzalez MD 1/31/2018 10:55 AM

## 2018-01-31 NOTE — INTERVAL H&P NOTE
H&P Update: Andres Lou was seen and examined. History and physical has been reviewed. The patient has been examined.  There have been no significant clinical changes since the completion of the originally dated History and Physical.    Signed By: Mercy Ingram MD     January 31, 2018 8:15 AM

## 2018-01-31 NOTE — DISCHARGE INSTRUCTIONS
[address] [place] [address] for Pain Management      Post Procedures Instructions    *Resume Diet and Activity as tolerated. Rest for the remainder of the day. *You may fell worse before you feel better as the numbing medications wear off before the steroids take effect if used for your procedures. *Do not use affected extremity until numbness or loss of sensation has completely resolved without assistance. *DO NOT DRIVE, operate machinery/heavey equipment for 24 hours. *DO NOT DRINK ALCOHOL for 24 hours as it may interact with the sedation if you received it and also thins your blood and may cause you to bleed. *WAIT 24 hours before starting back ANY Blood thinning medications:   (Heparin, Coumadin, Warfarin, Lovenox, Plavix, Aggrenox)    *Resume Pre-Procedure Medications as prescribed except Blood Thinners unless directed by your Physician or Cardiologist.     *Avoid Hot tubs and Heating pad for 24 hours to prevent dissipation of medications, you may shower to remove bandages and remaining prep residue on the skin. * If you develop a Headache, drink plenty of fluids including beverages with caffeine (Coffee, Mt. Dew etc.) and rest.  If the headache persists longer than 24 hoursor intensifies - Please call Center for Pain Management ([facility]) [phone]    * If you are DIABETIC, check your blood sugar three times a day for the next three days, the steroids will increase your blood sugar. If your blood sugar is greater than 400 have someone drive you to the nearest [address]. * If you experience any of the following problems, call the Center for Pain Management [phone] between 8:00 am - 4:30pm or After Hours [phone].     Shortness of breath    Fever of 101 F or higher    Nausea / Vomiting (not normal to you)    Increasing stiffness in the neck    Weakness or numbness in the arms or legs that is not resolving    Prolonged and increasing pain > than 4 days    ANYTHING OUT of the ORDINARY TO YOU    If YOU are experiencing a severe reaction / complication that you have never had before post procedure, call 911 or go to the nearest emergency room! All patients must have a  for transportation [place] regardless if you do or do not receive sedation. DISCHARGE SUMMARY from Nurse      PATIENT INSTRUCTIONS:    After Oral  or intravenous sedation, for 24 hours or while taking prescription Narcotics:  · Limit your activities  · Do not drive and operate hazardous machinery  · Do not make important personal or business decisions  · Do  not drink alcoholic beverages  · If you have not urinated within 8 hours after discharge, please contact your surgeon on call. Report the following to your surgeon:  · Excessive pain, swelling, redness or odor of or around the surgical area  · Temperature over 101  · Nausea and vomiting lasting longer than 4 hours or if unable to take medications  · Any signs of decreased circulation or nerve impairment to extremity: change in color, persistent  numbness, tingling, coldness or increase pain  · Any questions        What to do at Home:  Recommended activity: Activity as tolerated, NO DRIVING FOR 24 Hours post injection          *  Please give a list of your current medications to your Primary Care Provider. *  Please update this list whenever your medications are discontinued, doses are      changed, or new medications (including over-the-counter products) are added. *  Please carry medication information at all times in case of emergency situations. These are general instructions for a healthy lifestyle:    No smoking/ No tobacco products/ Avoid exposure to second hand smoke    Surgeon General's Warning:  Quitting smoking now greatly reduces serious risk to your health.     Obesity, smoking, and sedentary lifestyle greatly increases your risk for illness    A healthy diet, regular physical exercise & weight monitoring are important for maintaining a healthy lifestyle    You may be retaining fluid if you have a history of heart failure or if you experience any of the following symptoms:  Weight gain of 3 pounds or more overnight or 5 pounds in a week, increased swelling in our hands or feet or shortness of breath while lying flat in bed. Please call your doctor as soon as you notice any of these symptoms; do not wait until your next office visit. Recognize signs and symptoms of STROKE:    F-face looks uneven    A-arms unable to move or move unevenly    S-speech slurred or non-existent    T-time-call 911 as soon as signs and symptoms begin-DO NOT go       Back to bed or wait to see if you get better-TIME IS BRAIN.

## 2018-02-07 ENCOUNTER — TELEPHONE (OUTPATIENT)
Dept: PAIN MANAGEMENT | Age: 73
End: 2018-02-07

## 2018-02-07 NOTE — TELEPHONE ENCOUNTER
Ms. Amrita Matamoros was contacted for follow-up status post Interlaminar cervical epidural steroid injection, C7-T1  on January 31, 2018.  She reports:    Pre-procedure numerical pain score: 10/10  Post-procedure numerical pain score immediately after: 3/10  Duration of relief post-procedure (if applicable): 12 hrs  Improvement in functional activities (if applicable): Yes  Percentage of overall improvement: 100%    COMMENTS:

## 2018-02-09 ENCOUNTER — OFFICE VISIT (OUTPATIENT)
Dept: FAMILY MEDICINE CLINIC | Age: 73
End: 2018-02-09

## 2018-02-09 VITALS — OXYGEN SATURATION: 98 % | RESPIRATION RATE: 18 BRPM

## 2018-02-09 DIAGNOSIS — G45.9 TRANSIENT CEREBRAL ISCHEMIA, UNSPECIFIED TYPE: ICD-10-CM

## 2018-02-09 RX ORDER — ATORVASTATIN CALCIUM 20 MG/1
20 TABLET, FILM COATED ORAL
Qty: 30 TAB | Refills: 3 | Status: SHIPPED | OUTPATIENT
Start: 2018-02-09 | End: 2018-02-09

## 2018-02-09 NOTE — PATIENT INSTRUCTIONS

## 2018-02-09 NOTE — PROGRESS NOTES
Patient came in for [address] today. This was already done by [facility] on 1/15/2018.

## 2018-02-09 NOTE — PROGRESS NOTES
A user error has taken place: encounter opened in error, closed for administrative reasons Pt already had medicare wellness visit

## 2018-02-12 ENCOUNTER — OFFICE VISIT (OUTPATIENT)
Dept: PULMONOLOGY | Age: 73
End: 2018-02-12

## 2018-02-12 ENCOUNTER — OFFICE VISIT (OUTPATIENT)
Dept: INTERNAL MEDICINE CLINIC | Age: 73
End: 2018-02-12

## 2018-02-12 VITALS
WEIGHT: 103.25 LBS | DIASTOLIC BLOOD PRESSURE: 60 MMHG | OXYGEN SATURATION: 98 % | HEART RATE: 87 BPM | RESPIRATION RATE: 18 BRPM | SYSTOLIC BLOOD PRESSURE: 110 MMHG | BODY MASS INDEX: 20.27 KG/M2 | HEIGHT: 60 IN | TEMPERATURE: 98.3 F

## 2018-02-12 VITALS
HEART RATE: 80 BPM | OXYGEN SATURATION: 98 % | RESPIRATION RATE: 16 BRPM | HEIGHT: 60 IN | WEIGHT: 105 LBS | SYSTOLIC BLOOD PRESSURE: 110 MMHG | TEMPERATURE: 98.1 F | BODY MASS INDEX: 20.62 KG/M2 | DIASTOLIC BLOOD PRESSURE: 56 MMHG

## 2018-02-12 DIAGNOSIS — R07.9 CHEST PAIN, UNSPECIFIED TYPE: ICD-10-CM

## 2018-02-12 DIAGNOSIS — R59.0 HILAR ADENOPATHY: Primary | ICD-10-CM

## 2018-02-12 DIAGNOSIS — F41.9 ANXIETY: ICD-10-CM

## 2018-02-12 DIAGNOSIS — R91.8 PULMONARY INFILTRATES: ICD-10-CM

## 2018-02-12 DIAGNOSIS — R29.898 WEAKNESS OF BOTH LOWER EXTREMITIES: Primary | ICD-10-CM

## 2018-02-12 RX ORDER — ATORVASTATIN CALCIUM 20 MG/1
20 TABLET, FILM COATED ORAL DAILY
COMMUNITY
End: 2018-06-14 | Stop reason: SDUPTHER

## 2018-02-12 RX ORDER — ATORVASTATIN CALCIUM 20 MG/1
20 TABLET, FILM COATED ORAL DAILY
Qty: 30 TAB | Refills: 1 | Status: CANCELLED | OUTPATIENT
Start: 2018-02-12

## 2018-02-12 RX ORDER — LORATADINE 10 MG/1
10 TABLET ORAL
COMMUNITY
End: 2019-02-15 | Stop reason: ALTCHOICE

## 2018-02-12 RX ORDER — BUSPIRONE HYDROCHLORIDE 7.5 MG/1
7.5 TABLET ORAL 2 TIMES DAILY
Qty: 60 TAB | Refills: 1 | Status: SHIPPED | OUTPATIENT
Start: 2018-02-12 | End: 2018-04-16 | Stop reason: SDUPTHER

## 2018-02-12 NOTE — MR AVS SNAPSHOT
[address] [facility] [address] 
 
 
 [address], [address] [place] [address] 
[phone] Patient: Andres Lou MRN: [id number] :1945 Visit Information Date & Time Provider Department Dept. Phone Encounter #  
 2018  8:45 AM Maryellen Peralta NP [facility] INTERNAL MEDICINE OF [place]  Your Appointments 2018  2:30 PM  
New Patient with Saint Peers, MD  
[address] ([facility]) Appt Note: TERESITA Friedman Ek  MV; cxr 18 MV ER  
 [address] [place] [address], [address] [address]  
[phone]  
  
   
 [address] [place] [address], Formerly [facility] [address] [address]  
  
    
 2018  8:45 AM  
Follow Up with Maryellen Peralta NP  
[facility] INTERNAL MEDICINE OF [place] ([facility]) Appt Note: 2 month [address], [address] [facility] [address]  
  
   
 [address], [address] [facility] [address] Upcoming Health Maintenance Date Due Hepatitis C Screening 1945 DTaP/Tdap/Td series (1 - Tdap) 1968 FOBT Q 1 YEAR AGE 50-75 1997 ZOSTER VACCINE AGE 60> 2007 GLAUCOMA SCREENING Q2Y 2012 Pneumococcal 65+ Low/Medium Risk (2 of 2 - PPSV23) 10/10/2018 MEDICARE YEARLY EXAM 2019 BREAST CANCER SCRN MAMMOGRAM 2020 Allergies as of 2018  Review Complete On: 2018 By: Usha Razo LPN No Known Allergies Current Immunizations  Reviewed on 2018 No immunizations on file. Not reviewed this visit You Were Diagnosed With   
  
 Codes Comments Dyspnea on exertion     ICD-10-CM: R06.09 
ICD-9-CM: 786.09 Anxiety     ICD-10-CM: F41.9 ICD-9-CM: 300.00 Vitals BP Pulse Temp Resp Height(growth percentile) 110/56 (BP 1 Location: Left arm, BP Patient Position: Sitting) 80 98.1 °F (36.7 °C) (Tympanic) 16 5' (1.524 m) Weight(growth percentile) SpO2 BMI OB Status Smoking Status 105 lb (47.6 kg) 98% 20.51 kg/m2 Hysterectomy Never Smoker BMI and BSA Data Body Mass Index Body Surface Area 20.51 kg/m 2 1.42 m 2 Preferred Pharmacy Pharmacy Name Phone [facility] [address], [address] [phone] Your Updated Medication List  
  
   
This list is accurate as of: 2/12/18  9:30 AM.  Always use your most recent med list.  
  
  
  
  
 alendronate 10 mg tablet Commonly known as:  FOSAMAX Take 1 Tab by mouth Daily (before breakfast). aspirin 81 mg chewable tablet Take 1 Tab by mouth daily. atorvastatin 20 mg tablet Commonly known as:  LIPITOR Take 20 mg by mouth daily. busPIRone 7.5 mg tablet Commonly known as:  BUSPAR Take 1 Tab by mouth two (2) times a day. LORazepam 0.5 mg tablet Commonly known as:  ATIVAN Take 1 Tab by mouth every four (4) hours as needed for Anxiety. Max Daily Amount: 3 mg.  
  
 omeprazole 20 mg capsule Commonly known as:  PRILOSEC Take 20 mg by mouth daily. predniSONE 10 mg dose pack Commonly known as:  STERAPRED DS Take 1 Tab by mouth See Admin Instructions. See administration instruction per 10mg dose pack Patient Instructions Muscle Conditioning: Exercises Your Care Instructions Here are some examples of exercises for muscle conditioning. Start each exercise slowly. Ease off the exercise if you start to have pain. Your doctor or physical therapist will tell you when you can start these exercises and which ones will work best for you. How to do the exercises Wall push-ups When you can do this exercise against a wall comfortably (without your muscles feeling tired), you can try it against a counter. Start with 5 repetitions again and work up to 8 to 12. You can then slowly progress to the end of a couch or a sturdy chair, and finally to the floor. 1. Stand facing a wall, about 12 to 18 inches away. 2. Place your hands on the wall at shoulder height. 3. Slowly bend your elbows and bring your face toward the wall, moving your hips and shoulders forward together. 4. Push slowly back to the starting position. 5. Start with 5 repetitions and work up to 8 to 12. 6. Rest for a minute, and repeat the exercise. Knee extension If this exercise becomes easy, you can add a light weight around your ankle or tie an elastic resistance band to a chair leg and one ankle. 1. While sitting in a chair, straighten one leg and hold while you slowly count to 5. Be sure you do not lock your knee. 2. Repeat 8 to 12 times. 3. Rest for a minute, and repeat the exercise. 4. Do the same exercise with the other leg. Side-lying leg lift If this exercise becomes easy, you can add a light weight around your ankle or tie an elastic resistance band to both ankles. 1. Lie on your side, with your legs extended. Keep your hips straight up and down during this exercise. Do not let your top hip rock toward the back. Support your head with your hand, and place the other hand on the floor near your waist. 
2. Slowly raise your upper leg until it is about in line with your shoulder. Keep your toes pointed forward. 3. Slowly lower your leg to the starting position. 4. Repeat 8 to 12 times. 5. Rest for a minute, and repeat the exercise. 6. Turn to your other side and do the same exercise with your other leg. Shallow standing knee bends 1. Stand with your hands lightly resting on a counter or chair in front of you with your feet shoulder-width apart. 2. Slowly bend your knees so that you squat down just like you were going to sit in a chair. Make sure your knees do not go in front of your toes. 3. Lower yourself about 6 inches. Your heels should remain on the floor at all times. 4. Rise slowly to a standing position. 5. Repeat 8 to 12 times. 6. Rest for a minute, and repeat the exercise. Follow-up care is a key part of your treatment and safety. Be sure to make and go to all appointments, and call your doctor if you are having problems. It's also a good idea to know your test results and keep a list of the medicines you take. Where can you learn more? Go to http://karin-michaela.info/. Enter R472 in the search box to learn more about \"Muscle Conditioning: Exercises. \" Current as of: March 13, 2017 Content Version: 11.4 © 9654-5066 Smart Checkout. Care instructions adapted under license by [employer] (which disclaims liability or warranty for this information). If you have questions about a medical condition or this instruction, always ask your healthcare professional. [address] any warranty or liability for your use of this information. Introducing [address]! Randy Garay introduces Availendar patient portal. Now you can access parts of your medical record, email your doctor's office, and request medication refills online. 1. In your internet browser, go to [email or web address] 2. Click on the First Time User? Click Here link in the Sign In box. You will see the New Member Sign Up page. 3. Enter your Availendar Access Code exactly as it appears below. You will not need to use this code after youve completed the sign-up process. If you do not sign up before the expiration date, you must request a new code. · Availendar Access Code: [address] Expires: 4/2/2018 11:21 AM 
 
4. Enter the last four digits of your Social Security Number (xxxx) and Date of Birth (mm/dd/yyyy) as indicated and click Submit. You will be taken to the next sign-up page. 5. Create a Availendar ID. This will be your Availendar login ID and cannot be changed, so think of one that is secure and easy to remember. 6. Create a Mobi Rider password. You can change your password at any time. 7. Enter your Password Reset Question and Answer. This can be used at a later time if you forget your password. 8. Enter your e-mail address. You will receive e-mail notification when new information is available in [address] 9. Click Sign Up. You can now view and download portions of your medical record. 10. Click the Download Summary menu link to download a portable copy of your medical information. If you have questions, please visit the Frequently Asked Questions section of the Mobi Rider website. Remember, Mobi Rider is NOT to be used for urgent needs. For medical emergencies, dial 911. Now available from your iPhone and Android! Please provide this summary of care documentation to your next provider. Your primary care clinician is listed as JAY JENKINS. If you have any questions after today's visit, please call [phone].

## 2018-02-12 NOTE — PROGRESS NOTES
Pt here for abnormal CT. She never smoked. The pt had a TIA in October 2017. She questions a disparity in her memory secondary to same.

## 2018-02-12 NOTE — PATIENT INSTRUCTIONS
Muscle Conditioning: Exercises  Your Care Instructions  Here are some examples of exercises for muscle conditioning. Start each exercise slowly. Ease off the exercise if you start to have pain. Your doctor or physical therapist will tell you when you can start these exercises and which ones will work best for you. How to do the exercises  Wall push-ups    When you can do this exercise against a wall comfortably (without your muscles feeling tired), you can try it against a counter. Start with 5 repetitions again and work up to 8 to 12. You can then slowly progress to the end of a couch or a sturdy chair, and finally to the floor. 1. Stand facing a wall, about 12 to 18 inches away. 2. Place your hands on the wall at shoulder height. 3. Slowly bend your elbows and bring your face toward the wall, moving your hips and shoulders forward together. 4. Push slowly back to the starting position. 5. Start with 5 repetitions and work up to 8 to 12. 6. Rest for a minute, and repeat the exercise. Knee extension    If this exercise becomes easy, you can add a light weight around your ankle or tie an elastic resistance band to a chair leg and one ankle. 1. While sitting in a chair, straighten one leg and hold while you slowly count to 5. Be sure you do not lock your knee. 2. Repeat 8 to 12 times. 3. Rest for a minute, and repeat the exercise. 4. Do the same exercise with the other leg. Side-lying leg lift    If this exercise becomes easy, you can add a light weight around your ankle or tie an elastic resistance band to both ankles. 1. Lie on your side, with your legs extended. Keep your hips straight up and down during this exercise. Do not let your top hip rock toward the back. Support your head with your hand, and place the other hand on the floor near your waist.  2. Slowly raise your upper leg until it is about in line with your shoulder. Keep your toes pointed forward.   3. Slowly lower your leg to the starting position. 4. Repeat 8 to 12 times. 5. Rest for a minute, and repeat the exercise. 6. Turn to your other side and do the same exercise with your other leg. Shallow standing knee bends    1. Stand with your hands lightly resting on a counter or chair in front of you with your feet shoulder-width apart. 2. Slowly bend your knees so that you squat down just like you were going to sit in a chair. Make sure your knees do not go in front of your toes. 3. Lower yourself about 6 inches. Your heels should remain on the floor at all times. 4. Rise slowly to a standing position. 5. Repeat 8 to 12 times. 6. Rest for a minute, and repeat the exercise. Follow-up care is a key part of your treatment and safety. Be sure to make and go to all appointments, and call your doctor if you are having problems. It's also a good idea to know your test results and keep a list of the medicines you take. Where can you learn more? Go to http://karin-michaela.info/. Enter R834 in the search box to learn more about \"Muscle Conditioning: Exercises. \"  Current as of: March 13, 2017  Content Version: 11.4  © 0273-8608 Healthwise, Incorporated. Care instructions adapted under license by [employer] (which disclaims liability or warranty for this information). If you have questions about a medical condition or this instruction, always ask your healthcare professional. [place] [address] any warranty or liability for your use of this information.

## 2018-02-12 NOTE — PROGRESS NOTES
Maurisio Huerta is a 81 y.o. female presenting today for Results  . HPI:  Maurisio Huerta presents to the office today for depression follow-up care and results. Patient noted she is feeling much better today. Her depression has improved and she denies any pain today. She continues to complain of lower extremity weakness. Review of Systems   Respiratory: Negative for cough. Cardiovascular: Negative for chest pain and palpitations. Musculoskeletal:        Lower extremity weakness       No Known Allergies    Current Outpatient Prescriptions   Medication Sig Dispense Refill    busPIRone (BUSPAR) 7.5 mg tablet Take 1 Tab by mouth two (2) times a day. 60 Tab 1    atorvastatin (LIPITOR) 20 mg tablet Take 20 mg by mouth daily.  omeprazole (PRILOSEC) 20 mg capsule Take 20 mg by mouth daily.  alendronate (FOSAMAX) 10 mg tablet Take 1 Tab by mouth Daily (before breakfast). 30 Tab 2    LORazepam (ATIVAN) 0.5 mg tablet Take 1 Tab by mouth every four (4) hours as needed for Anxiety. Max Daily Amount: 3 mg. 30 Tab 0    aspirin 81 mg chewable tablet Take 1 Tab by mouth daily. 30 Tab 3    predniSONE (STERAPRED DS) 10 mg dose pack Take 1 Tab by mouth See Admin Instructions. See administration instruction per 10mg dose pack 21 Tab 0       Past Medical History:   Diagnosis Date    Abnormal Pap smear     Dr. Tracy Cruz Hypercholesterolemia     Neck pain 5/17/2010    Stroke ([facility] [address] 10/02/2017    TIA-        Past Surgical History:   Procedure Laterality Date    HX GYN      Total Hyst    HX LAP CHOLECYSTECTOMY         Social History     Social History    Marital status:      Spouse name: N/A    Number of children: N/A    Years of education: N/A     Occupational History    Not on file.      Social History Main Topics    Smoking status: Never Smoker    Smokeless tobacco: Never Used    Alcohol use No    Drug use: No    Sexual activity: No     Other Topics Concern    Not on file     Social History Narrative       Patient does not have an advanced directive on file    Vitals:    02/12/18 0904   BP: 110/56   Pulse: 80   Resp: 16   Temp: 98.1 °F (36.7 °C)   TempSrc: Tympanic   SpO2: 98%   Weight: 105 lb (47.6 kg)   Height: 5' (1.524 m)   PainSc:   0 - No pain       Physical Exam   Constitutional: No distress. Cardiovascular: Normal rate, regular rhythm and normal heart sounds. Pulmonary/Chest: Effort normal and breath sounds normal.   Musculoskeletal: She exhibits no edema or tenderness. Psychiatric: Mood, affect and judgment normal.   Nursing note and vitals reviewed. Hospital Outpatient Visit on 01/16/2018   Component Date Value Ref Range Status    Creatinine, POC 01/16/2018 0.7  0.6 - 1.3 MG/DL Final    GFRAA, POC 01/16/2018 >60  >60 ml/min/1.73m2 Final    GFRNA, POC 01/16/2018 >60  >60 ml/min/1.73m2 Final    Comment: Estimated GFR is calculated using the IDMS-traceable Modification of Diet in Renal Disease (MDRD) Study equation, reported for both  Americans (GFRAA) and non- Americans (GFRNA), and normalized to 1.73m2 body surface area. The physician must decide which value applies to the patient. The MDRD study equation should only be used in individuals age 25 or older. It has not been validated for the following: pregnant women, patients with serious comorbid conditions, or on certain medications, or persons with extremes of body size, muscle mass, or nutritional status.      Hospital Outpatient Visit on 01/10/2018   Component Date Value Ref Range Status    Test Description: 01/10/2018 PBNP ON FROZEN PLASMA TEST [id number]   Final    Reference Lab: 01/10/2018 [facility]   Final    Results: 01/10/2018 SEE REPORT FROM REFERENCE LAB    Final    Comment: (NOTE)  TEST DESCRIPTION:  PRO BNP ON FROZEN PLASMA PER  TEST NUMBER [id number]    REFERENCE LAB:  [facility]    RESULTS:  96    REFERENCE RANGE: 0  pg/ml    The following cut points have been suggested for the use of proBNP for the diagnostic evaluation of heart failure (HF) in patients with  acute dyspnea:   Modality                     Age (years)       Optimal Cut Point  Diagnosis (rule in HF)          <50               450 pg/ml                               50 to 75            900 pg/ml                                 >75               1800 pg/ml  Exclusion (rule out HF)      Age independent     300 pg/ml        NT-proBNP 01/10/2018 96  pg/mL Final    NT-proBNP 01/10/2018 96  pg/mL Final   Hospital Outpatient Visit on 01/09/2018   Component Date Value Ref Range Status    Sodium 01/09/2018 142  136 - 145 mmol/L Final    Potassium 01/09/2018 4.2  3.5 - 5.5 mmol/L Final    Chloride 01/09/2018 107  100 - 108 mmol/L Final    CO2 01/09/2018 26  21 - 32 mmol/L Final    Anion gap 01/09/2018 9  3.0 - 18 mmol/L Final    Glucose 01/09/2018 95  74 - 99 mg/dL Final    BUN 01/09/2018 21* 7.0 - 18 MG/DL Final    Creatinine 01/09/2018 0.73  0.6 - 1.3 MG/DL Final    BUN/Creatinine ratio 01/09/2018 29* 12 - 20   Final    GFR est AA 01/09/2018 >60  >60 ml/min/1.73m2 Final    GFR est non-AA 01/09/2018 >60  >60 ml/min/1.73m2 Final    Comment: (NOTE)  Estimated GFR is calculated using the Modification of Diet in Renal   Disease (MDRD) Study equation, reported for both  Americans   (GFRAA) and non- Americans (GFRNA), and normalized to 1.73m2   body surface area. The physician must decide which value applies to   the patient. The MDRD study equation should only be used in   individuals age 25 or older. It has not been validated for the   following: pregnant women, patients with serious comorbid conditions,   or on certain medications, or persons with extremes of body size,   muscle mass, or nutritional status.  Calcium 01/09/2018 10.2* 8.5 - 10.1 MG/DL Final    Bilirubin, total 01/09/2018 0.4  0.2 - 1.0 MG/DL Final    ALT (SGPT) 01/09/2018 64* 13 - 56 U/L Final    AST (SGOT) 01/09/2018 44* 15 - 37 U/L Final    Alk. phosphatase 01/09/2018 91  45 - 117 U/L Final    Protein, total 01/09/2018 7.9  6.4 - 8.2 g/dL Final    Albumin 01/09/2018 4.7  3.4 - 5.0 g/dL Final    Globulin 01/09/2018 3.2  2.0 - 4.0 g/dL Final    A-G Ratio 01/09/2018 1.5  0.8 - 1.7   Final    WBC 01/09/2018 10.5  4.6 - 13.2 K/uL Final    RBC 01/09/2018 4.47  4.20 - 5.30 M/uL Final    HGB 01/09/2018 14.2  12.0 - 16.0 g/dL Final    HCT 01/09/2018 43.3  35.0 - 45.0 % Final    MCV 01/09/2018 96.9  74.0 - 97.0 FL Final    MCH 01/09/2018 31.8  24.0 - 34.0 PG Final    MCHC 01/09/2018 32.8  31.0 - 37.0 g/dL Final    RDW 01/09/2018 13.2  11.6 - 14.5 % Final    PLATELET 50/45/9922 961  135 - 420 K/uL Final    MPV 01/09/2018 10.6  9.2 - 11.8 FL Final    NEUTROPHILS 01/09/2018 62  40 - 73 % Final    LYMPHOCYTES 01/09/2018 28  21 - 52 % Final    MONOCYTES 01/09/2018 10  3 - 10 % Final    EOSINOPHILS 01/09/2018 0  0 - 5 % Final    BASOPHILS 01/09/2018 0  0 - 2 % Final    ABS. NEUTROPHILS 01/09/2018 6.5  1.8 - 8.0 K/UL Final    ABS. LYMPHOCYTES 01/09/2018 2.9  0.9 - 3.6 K/UL Final    ABS. MONOCYTES 01/09/2018 1.0  0.05 - 1.2 K/UL Final    ABS. EOSINOPHILS 01/09/2018 0.0  0.0 - 0.4 K/UL Final    ABS.  BASOPHILS 01/09/2018 0.0  0.0 - 0.06 K/UL Final    DF 01/09/2018 AUTOMATED    Final   Admission on 01/04/2018, Discharged on 01/04/2018   Component Date Value Ref Range Status    Ventricular Rate 01/04/2018 64  BPM Final    Atrial Rate 01/04/2018 64  BPM Final    P-R Interval 01/04/2018 136  ms Final    QRS Duration 01/04/2018 74  ms Final    Q-T Interval 01/04/2018 422  ms Final    QTC Calculation (Bezet) 01/04/2018 435  ms Final    Calculated P Axis 01/04/2018 60  degrees Final    Calculated R Axis 01/04/2018 51  degrees Final    Calculated T Axis 01/04/2018 71  degrees Final    Diagnosis 01/04/2018    Final                    Value:Normal sinus rhythm  Normal ECG  When compared with ECG of 03-JAN-2018 06:51,  No significant change was found  Confirmed by Jeffrey Culver MD, ----- ([phone] on 1/5/2018 5:13:40 PM     Admission on 01/03/2018, Discharged on 01/03/2018   Component Date Value Ref Range Status    WBC 01/03/2018 10.1  4.6 - 13.2 K/uL Final    RBC 01/03/2018 4.31  4.20 - 5.30 M/uL Final    HGB 01/03/2018 13.5  12.0 - 16.0 g/dL Final    HCT 01/03/2018 40.5  35.0 - 45.0 % Final    MCV 01/03/2018 94.0  74.0 - 97.0 FL Final    MCH 01/03/2018 31.3  24.0 - 34.0 PG Final    MCHC 01/03/2018 33.3  31.0 - 37.0 g/dL Final    RDW 01/03/2018 13.0  11.6 - 14.5 % Final    PLATELET 95/12/8521 269  135 - 420 K/uL Final    MPV 01/03/2018 9.7  9.2 - 11.8 FL Final    NEUTROPHILS 01/03/2018 66  40 - 73 % Final    LYMPHOCYTES 01/03/2018 23  21 - 52 % Final    MONOCYTES 01/03/2018 10  3 - 10 % Final    EOSINOPHILS 01/03/2018 1  0 - 5 % Final    BASOPHILS 01/03/2018 0  0 - 2 % Final    ABS. NEUTROPHILS 01/03/2018 6.7  1.8 - 8.0 K/UL Final    ABS. LYMPHOCYTES 01/03/2018 2.3  0.9 - 3.6 K/UL Final    ABS. MONOCYTES 01/03/2018 1.0  0.05 - 1.2 K/UL Final    ABS. EOSINOPHILS 01/03/2018 0.1  0.0 - 0.4 K/UL Final    ABS.  BASOPHILS 01/03/2018 0.0  0.0 - 0.1 K/UL Final    DF 01/03/2018 AUTOMATED    Final    Sodium 01/03/2018 141  136 - 145 mmol/L Final    Potassium 01/03/2018 3.9  3.5 - 5.5 mmol/L Final    Chloride 01/03/2018 109* 100 - 108 mmol/L Final    CO2 01/03/2018 27  21 - 32 mmol/L Final    Anion gap 01/03/2018 5  3.0 - 18 mmol/L Final    Glucose 01/03/2018 99  74 - 99 mg/dL Final    BUN 01/03/2018 22* 7.0 - 18 MG/DL Final    Creatinine 01/03/2018 0.71  0.6 - 1.3 MG/DL Final    BUN/Creatinine ratio 01/03/2018 31* 12 - 20   Final    GFR est AA 01/03/2018 >60  >60 ml/min/1.73m2 Final    GFR est non-AA 01/03/2018 >60  >60 ml/min/1.73m2 Final    Comment: (NOTE)  Estimated GFR is calculated using the Modification of Diet in Renal   Disease (MDRD) Study equation, reported for both  Americans   (GFRAA) and non- Americans [facility]), and normalized to 1.73m2   body surface area. The physician must decide which value applies to   the patient. The MDRD study equation should only be used in   individuals age 25 or older. It has not been validated for the   following: pregnant women, patients with serious comorbid conditions,   or on certain medications, or persons with extremes of body size,   muscle mass, or nutritional status.  Calcium 01/03/2018 9.4  8.5 - 10.1 MG/DL Final    aPTT 01/03/2018 26.9  23.0 - 36.4 SEC Final    Prothrombin time 01/03/2018 13.2  11.5 - 15.2 sec Final    INR 01/03/2018 1.1  0.8 - 1.2   Final    Comment:            INR Therapeutic Ranges         (on stable oral anticoagulant):     INDICATION                INR  DVT/PE/Atrial Fib          2.0-3.0  MI/Mechanical Heart Valve  2.5-3.5      Ventricular Rate 01/03/2018 76  BPM Final    Atrial Rate 01/03/2018 76  BPM Final    P-R Interval 01/03/2018 148  ms Final    QRS Duration 01/03/2018 76  ms Final    Q-T Interval 01/03/2018 400  ms Final    QTC Calculation (Bezet) 01/03/2018 450  ms Final    Calculated P Axis 01/03/2018 79  degrees Final    Calculated R Axis 01/03/2018 34  degrees Final    Calculated T Axis 01/03/2018 66  degrees Final    Diagnosis 01/03/2018    Final                    Value:Normal sinus rhythm  Normal ECG  When compared with ECG of 02-OCT-2017 08:57,  No significant change was found  Confirmed by Khang Ambriz MD, ----- ([id number]) on 1/3/2018 5:44:57 PM         .No results found for any visits on 02/12/18. Assessment / Plan:      ICD-10-CM ICD-9-CM    1. Weakness of both lower extremities R29.898 729.89    2. Anxiety F41.9 300.00 busPIRone (BUSPAR) 7.5 mg tablet     Buspar rx refill  Muscle conditioning exercising  Stopped Atorvastatin- last lipid panel good and patient have lower extremity weakness  F/u in 2 months    Follow-up Disposition:  Return if symptoms worsen or fail to improve.     I asked the patient if she  had any questions and answered her  questions.   The patient stated that she understands the treatment plan and agrees with the treatment plan

## 2018-02-12 NOTE — PROGRESS NOTES
[facility]  Pulmonary, Critical Care, and Sleep Medicine    Dear Ms. WayneTaylaBrody,    Chief complaint:  CT of the chest abnormality and chest pain    HPI:  Jeff High is 81years old and comes to the office today at your request because of abnormal CT of the chest findings including adenopathy and fibrotic infiltrates. The patient relates that in the last several months she has noted substernal nonradiating chest pain lasting up to an hour and which can occur several times a day. It is not associated with any activity such as exertion. She also denies shortness of breath or chronic cough leg pain or swelling but relates some postnasal drip. She also says she has reflux which is being treated with Prilosec even though she does not have heartburn apparently it was demonstrated on a x-ray study. No Known Allergies  Current Outpatient Prescriptions   Medication Sig    busPIRone (BUSPAR) 7.5 mg tablet Take 1 Tab by mouth two (2) times a day.  atorvastatin (LIPITOR) 20 mg tablet Take 20 mg by mouth daily.  loratadine (CLARITIN) 10 mg tablet Take 10 mg by mouth daily as needed for Allergies or Rhinitis.  omeprazole (PRILOSEC) 20 mg capsule Take 20 mg by mouth daily.  LORazepam (ATIVAN) 0.5 mg tablet Take 1 Tab by mouth every four (4) hours as needed for Anxiety. Max Daily Amount: 3 mg.  aspirin 81 mg chewable tablet Take 1 Tab by mouth daily.  alendronate (FOSAMAX) 10 mg tablet Take 1 Tab by mouth Daily (before breakfast).  predniSONE (STERAPRED DS) 10 mg dose pack Take 1 Tab by mouth See Admin Instructions. See administration instruction per 10mg dose pack     No current facility-administered medications for this visit.       Past Medical History:   Diagnosis Date    Abnormal Pap smear     Dr. Shima Hackett Allergic rhinitis     Arthritis     Chronic pain     Hypercholesterolemia     Neck pain 5/17/2010    Stroke ([facility] [address] 10/02/2017    TIA-    She denies a history of heart disease hypertension diabetes kidney disease liver disease ulcers tuberculosis cancer asthma or emphysema but does report arthritis particularly in her neck and a TIA in the past  Past Surgical History:   Procedure Laterality Date    HX GYN      Total Hyst    HX LAP CHOLECYSTECTOMY      HX OTHER SURGICAL  2017    Throat widened       Family history: Breast cancer and heart disease       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 She reports arthritis symptoms particularly in her neck and also notes difficulty with swallowing which has improved after esophageal stretching.   She also attributes recent weight loss to inability to swallow which is now improving and has a good appetite and no fever or chills    Physical Exam:  Visit Vitals    /60 (BP 1 Location: Left arm, BP Patient Position: Sitting)    Pulse 87    Temp 98.3 °F (36.8 °C)    Resp 18    Ht 5' (1.524 m)    Wt 46.8 kg (103 lb 4 oz)    SpO2 98%    BMI 20.16 kg/m2     Well-developed and thin  HEENT: pupils equal, reactive, sclera, non-icteric   Oropharynx tongue: normal   Neck: Supple   Lymph Nodes: Supra clavicular and cervical nodes, negative   Chest: Equal symmetrical expansion, no dullness, no wheezes, rales or rubs   Heart: Regular, rhythm without xiao or murmur no carotid bruits  Abdomen: soft, non-tender no masses or organomegaly   Extremities: no cyanosis, clubbing, no edema no calf tenderness  Musculoskeletal: No acute joint inflammation or muscle wasting  Skin: No rash   Neurological: alert, oriented, moves all extremities      LABS:  CT of the chest 1/16/18 personally reviewed bilateral fibrotic-like infiltrates at the apices and a small fibrotic-like infiltrate in the right upper lobe posteriorly  Also an enlarged subcarinal lymph node and hilar lymph node on the right  O2 sat 98% room air at rest  Impression:   The cause of the chest pain is unclear but because of the patient has unexplained lymphadenopathy a biopsy of the precarinal lymph node should be considered if feasible. Otherwise follow-up of the lymphadenopathy is recommended. The infiltrates appear fibrotic. The apical location suggests previous infection. The right lower lobe infiltrate is isolated and probably not related to any general fibrotic process    Plan: Will review CT for the purpose of consideration of a E-BUS    Thanks for allowing me to share in this patient's evaluation will follow her progress with you    Sincerely,    Lady Ethan MD , [facility]    CC: Melodie Harrell NP    [address] [facility]. [address]  [place], [address] [address]     P: [phone]     F: [phone]

## 2018-02-14 ENCOUNTER — TELEPHONE (OUTPATIENT)
Dept: INTERNAL MEDICINE CLINIC | Age: 73
End: 2018-02-14

## 2018-02-14 RX ORDER — MIDAZOLAM HYDROCHLORIDE 1 MG/ML
.5-6 INJECTION, SOLUTION INTRAMUSCULAR; INTRAVENOUS
Status: CANCELLED | OUTPATIENT
Start: 2018-02-19

## 2018-02-14 RX ORDER — SODIUM CHLORIDE 0.9 % (FLUSH) 0.9 %
5-10 SYRINGE (ML) INJECTION AS NEEDED
Status: CANCELLED | OUTPATIENT
Start: 2018-02-19

## 2018-02-14 NOTE — TELEPHONE ENCOUNTER
Patient called to let Bj Bandar know that she was in the ER on Monday and has an UTI. She is on an antibiotic.

## 2018-02-22 ENCOUNTER — TELEPHONE (OUTPATIENT)
Dept: PULMONOLOGY | Age: 73
End: 2018-02-22

## 2018-02-22 DIAGNOSIS — R07.89 OTHER CHEST PAIN: Primary | ICD-10-CM

## 2018-02-22 NOTE — TELEPHONE ENCOUNTER
Called and spoke with Montserrat Geiger. Explained to her that we just got the patient scheduled for her procedure yesterday afternoon and I have not had a chance to call the patient and discuss the detail or go over procedure instructions yet. Patient is first on my list to call this morning.

## 2018-02-22 NOTE — TELEPHONE ENCOUNTER
Called patient. No answer at number provided in patient's chart, patient's voice mail not set up so I was unable to leave a message for patient to return my call. Will try her again later today. Patient returned my call. Reviewed procedure reminder, including time and date of procedure. Also, reviewed procedure instructions with patient.

## 2018-02-22 NOTE — TELEPHONE ENCOUNTER
Collin Conn from [facility] called because they called the pt to go over everything for the E-Bus that she is scheduled for on 2-28-18 and the pt informed her that she was not scheduled, that Dr. Ramone York did not go over it with her and that she had no idea what was going on with the procedure.  Please call [phone]

## 2018-02-26 ENCOUNTER — HOSPITAL ENCOUNTER (OUTPATIENT)
Dept: PREADMISSION TESTING | Age: 73
Discharge: HOME | End: 2018-02-26
Payer: MEDICARE

## 2018-02-26 DIAGNOSIS — R07.89 OTHER CHEST PAIN: ICD-10-CM

## 2018-02-26 LAB
APTT PPP: 26.3 SEC (ref 23–36.4)
BASOPHILS # BLD: 0 K/UL (ref 0–0.1)
BASOPHILS NFR BLD: 0 % (ref 0–2)
DIFFERENTIAL METHOD BLD: NORMAL
EOSINOPHIL # BLD: 0.2 K/UL (ref 0–0.4)
EOSINOPHIL NFR BLD: 2 % (ref 0–5)
ERYTHROCYTE [DISTWIDTH] IN BLOOD BY AUTOMATED COUNT: 12.9 % (ref 11.6–14.5)
HCT VFR BLD AUTO: 43 % (ref 35–45)
HGB BLD-MCNC: 13.8 G/DL (ref 12–16)
INR PPP: 1 (ref 0.8–1.2)
LYMPHOCYTES # BLD: 3 K/UL (ref 0.9–3.6)
LYMPHOCYTES NFR BLD: 31 % (ref 21–52)
MCH RBC QN AUTO: 30.9 PG (ref 24–34)
MCHC RBC AUTO-ENTMCNC: 32.1 G/DL (ref 31–37)
MCV RBC AUTO: 96.2 FL (ref 74–97)
MONOCYTES # BLD: 0.9 K/UL (ref 0.05–1.2)
MONOCYTES NFR BLD: 10 % (ref 3–10)
NEUTS SEG # BLD: 5.4 K/UL (ref 1.8–8)
NEUTS SEG NFR BLD: 57 % (ref 40–73)
PLATELET # BLD AUTO: 325 K/UL (ref 135–420)
PMV BLD AUTO: 9.9 FL (ref 9.2–11.8)
PROTHROMBIN TIME: 12.8 SEC (ref 11.5–15.2)
RBC # BLD AUTO: 4.47 M/UL (ref 4.2–5.3)
WBC # BLD AUTO: 9.5 K/UL (ref 4.6–13.2)

## 2018-02-26 PROCEDURE — 85610 PROTHROMBIN TIME: CPT | Performed by: INTERNAL MEDICINE

## 2018-02-26 PROCEDURE — 85025 COMPLETE CBC W/AUTO DIFF WBC: CPT | Performed by: INTERNAL MEDICINE

## 2018-02-26 PROCEDURE — 36415 COLL VENOUS BLD VENIPUNCTURE: CPT | Performed by: INTERNAL MEDICINE

## 2018-02-26 PROCEDURE — 85730 THROMBOPLASTIN TIME PARTIAL: CPT | Performed by: INTERNAL MEDICINE

## 2018-02-26 RX ORDER — MIDAZOLAM HYDROCHLORIDE 1 MG/ML
.5-6 INJECTION, SOLUTION INTRAMUSCULAR; INTRAVENOUS
Status: CANCELLED | OUTPATIENT
Start: 2018-03-06

## 2018-02-26 RX ORDER — SODIUM CHLORIDE 0.9 % (FLUSH) 0.9 %
5-10 SYRINGE (ML) INJECTION AS NEEDED
Status: CANCELLED | OUTPATIENT
Start: 2018-03-06

## 2018-02-27 ENCOUNTER — ANESTHESIA EVENT (OUTPATIENT)
Dept: ENDOSCOPY | Age: 73
End: 2018-02-27
Payer: MEDICARE

## 2018-02-28 ENCOUNTER — APPOINTMENT (OUTPATIENT)
Dept: GENERAL RADIOLOGY | Age: 73
End: 2018-02-28
Attending: INTERNAL MEDICINE
Payer: MEDICARE

## 2018-02-28 ENCOUNTER — HOSPITAL ENCOUNTER (OUTPATIENT)
Age: 73
Setting detail: OUTPATIENT SURGERY
Discharge: HOME | End: 2018-02-28
Attending: INTERNAL MEDICINE | Admitting: INTERNAL MEDICINE
Payer: MEDICARE

## 2018-02-28 ENCOUNTER — ANESTHESIA (OUTPATIENT)
Dept: ENDOSCOPY | Age: 73
End: 2018-02-28
Payer: MEDICARE

## 2018-02-28 VITALS
WEIGHT: 101 LBS | SYSTOLIC BLOOD PRESSURE: 108 MMHG | DIASTOLIC BLOOD PRESSURE: 70 MMHG | HEIGHT: 60 IN | OXYGEN SATURATION: 99 % | BODY MASS INDEX: 19.83 KG/M2 | RESPIRATION RATE: 14 BRPM | TEMPERATURE: 97 F | HEART RATE: 69 BPM

## 2018-02-28 PROCEDURE — 77030037590 HC NDL ASPIR BIOP OCOA -D: Performed by: INTERNAL MEDICINE

## 2018-02-28 PROCEDURE — 88112 CYTOPATH CELL ENHANCE TECH: CPT | Performed by: INTERNAL MEDICINE

## 2018-02-28 PROCEDURE — 77030012699 HC VLV SUC CNTRL OCOA -A: Performed by: INTERNAL MEDICINE

## 2018-02-28 PROCEDURE — 77030009046 HC CATH BRNCH BLLN OCOA -B: Performed by: INTERNAL MEDICINE

## 2018-02-28 PROCEDURE — 74011000250 HC RX REV CODE- 250

## 2018-02-28 PROCEDURE — 71045 X-RAY EXAM CHEST 1 VIEW: CPT

## 2018-02-28 PROCEDURE — 77030008683 HC TU ET CUF COVD -A: Performed by: ANESTHESIOLOGY

## 2018-02-28 PROCEDURE — 77030003454 HC NDL BIOP BRONCH BSC -B: Performed by: INTERNAL MEDICINE

## 2018-02-28 PROCEDURE — 88172 CYTP DX EVAL FNA 1ST EA SITE: CPT | Performed by: INTERNAL MEDICINE

## 2018-02-28 PROCEDURE — 74011000258 HC RX REV CODE- 258

## 2018-02-28 PROCEDURE — 76060000034 HC ANESTHESIA 1.5 TO 2 HR: Performed by: INTERNAL MEDICINE

## 2018-02-28 PROCEDURE — 76040000009: Performed by: INTERNAL MEDICINE

## 2018-02-28 PROCEDURE — 74011250636 HC RX REV CODE- 250/636: Performed by: NURSE ANESTHETIST, CERTIFIED REGISTERED

## 2018-02-28 PROCEDURE — 74011000250 HC RX REV CODE- 250: Performed by: NURSE ANESTHETIST, CERTIFIED REGISTERED

## 2018-02-28 PROCEDURE — 74011250636 HC RX REV CODE- 250/636

## 2018-02-28 PROCEDURE — 88177 CYTP FNA EVAL EA ADDL: CPT | Performed by: INTERNAL MEDICINE

## 2018-02-28 PROCEDURE — 77030022556 HC FCPS BIOP TIS ENDOSC BSC -B: Performed by: INTERNAL MEDICINE

## 2018-02-28 PROCEDURE — 77030026438 HC STYL ET INTUB CARD -A: Performed by: ANESTHESIOLOGY

## 2018-02-28 PROCEDURE — 88305 TISSUE EXAM BY PATHOLOGIST: CPT | Performed by: INTERNAL MEDICINE

## 2018-02-28 PROCEDURE — 88173 CYTOPATH EVAL FNA REPORT: CPT | Performed by: INTERNAL MEDICINE

## 2018-02-28 RX ORDER — SODIUM CHLORIDE 9 MG/ML
INJECTION, SOLUTION INTRAVENOUS
Status: DISCONTINUED | OUTPATIENT
Start: 2018-02-28 | End: 2018-02-28 | Stop reason: HOSPADM

## 2018-02-28 RX ORDER — PROPOFOL 10 MG/ML
INJECTION, EMULSION INTRAVENOUS
Status: DISCONTINUED | OUTPATIENT
Start: 2018-02-28 | End: 2018-02-28 | Stop reason: HOSPADM

## 2018-02-28 RX ORDER — PROPOFOL 10 MG/ML
INJECTION, EMULSION INTRAVENOUS AS NEEDED
Status: DISCONTINUED | OUTPATIENT
Start: 2018-02-28 | End: 2018-02-28 | Stop reason: HOSPADM

## 2018-02-28 RX ORDER — MIDAZOLAM HYDROCHLORIDE 1 MG/ML
INJECTION, SOLUTION INTRAMUSCULAR; INTRAVENOUS AS NEEDED
Status: DISCONTINUED | OUTPATIENT
Start: 2018-02-28 | End: 2018-02-28 | Stop reason: HOSPADM

## 2018-02-28 RX ORDER — SUCCINYLCHOLINE CHLORIDE 20 MG/ML
INJECTION INTRAMUSCULAR; INTRAVENOUS AS NEEDED
Status: DISCONTINUED | OUTPATIENT
Start: 2018-02-28 | End: 2018-02-28 | Stop reason: HOSPADM

## 2018-02-28 RX ORDER — NEOSTIGMINE METHYLSULFATE 5 MG/5 ML
SYRINGE (ML) INTRAVENOUS AS NEEDED
Status: DISCONTINUED | OUTPATIENT
Start: 2018-02-28 | End: 2018-02-28 | Stop reason: HOSPADM

## 2018-02-28 RX ORDER — SODIUM CHLORIDE, SODIUM LACTATE, POTASSIUM CHLORIDE, CALCIUM CHLORIDE 600; 310; 30; 20 MG/100ML; MG/100ML; MG/100ML; MG/100ML
75 INJECTION, SOLUTION INTRAVENOUS CONTINUOUS
Status: DISCONTINUED | OUTPATIENT
Start: 2018-02-28 | End: 2018-02-28 | Stop reason: HOSPADM

## 2018-02-28 RX ORDER — ONDANSETRON 2 MG/ML
INJECTION INTRAMUSCULAR; INTRAVENOUS AS NEEDED
Status: DISCONTINUED | OUTPATIENT
Start: 2018-02-28 | End: 2018-02-28 | Stop reason: HOSPADM

## 2018-02-28 RX ORDER — SODIUM CHLORIDE 0.9 % (FLUSH) 0.9 %
5-10 SYRINGE (ML) INJECTION EVERY 8 HOURS
Status: DISCONTINUED | OUTPATIENT
Start: 2018-02-28 | End: 2018-02-28 | Stop reason: HOSPADM

## 2018-02-28 RX ORDER — ROCURONIUM BROMIDE 10 MG/ML
INJECTION, SOLUTION INTRAVENOUS AS NEEDED
Status: DISCONTINUED | OUTPATIENT
Start: 2018-02-28 | End: 2018-02-28 | Stop reason: HOSPADM

## 2018-02-28 RX ORDER — SODIUM CHLORIDE 0.9 % (FLUSH) 0.9 %
5-10 SYRINGE (ML) INJECTION AS NEEDED
Status: DISCONTINUED | OUTPATIENT
Start: 2018-02-28 | End: 2018-02-28 | Stop reason: HOSPADM

## 2018-02-28 RX ORDER — DEXAMETHASONE SODIUM PHOSPHATE 4 MG/ML
INJECTION, SOLUTION INTRA-ARTICULAR; INTRALESIONAL; INTRAMUSCULAR; INTRAVENOUS; SOFT TISSUE AS NEEDED
Status: DISCONTINUED | OUTPATIENT
Start: 2018-02-28 | End: 2018-02-28 | Stop reason: HOSPADM

## 2018-02-28 RX ORDER — FENTANYL CITRATE 50 UG/ML
INJECTION, SOLUTION INTRAMUSCULAR; INTRAVENOUS AS NEEDED
Status: DISCONTINUED | OUTPATIENT
Start: 2018-02-28 | End: 2018-02-28 | Stop reason: HOSPADM

## 2018-02-28 RX ORDER — NALOXONE HYDROCHLORIDE 0.4 MG/ML
0.1 INJECTION, SOLUTION INTRAMUSCULAR; INTRAVENOUS; SUBCUTANEOUS ONCE
Status: DISCONTINUED | OUTPATIENT
Start: 2018-02-28 | End: 2018-02-28 | Stop reason: HOSPADM

## 2018-02-28 RX ORDER — GLYCOPYRROLATE 0.2 MG/ML
INJECTION INTRAMUSCULAR; INTRAVENOUS AS NEEDED
Status: DISCONTINUED | OUTPATIENT
Start: 2018-02-28 | End: 2018-02-28 | Stop reason: HOSPADM

## 2018-02-28 RX ORDER — HYDROMORPHONE HYDROCHLORIDE 2 MG/ML
0.5 INJECTION, SOLUTION INTRAMUSCULAR; INTRAVENOUS; SUBCUTANEOUS AS NEEDED
Status: DISCONTINUED | OUTPATIENT
Start: 2018-02-28 | End: 2018-02-28

## 2018-02-28 RX ORDER — LIDOCAINE HYDROCHLORIDE 20 MG/ML
INJECTION, SOLUTION EPIDURAL; INFILTRATION; INTRACAUDAL; PERINEURAL AS NEEDED
Status: DISCONTINUED | OUTPATIENT
Start: 2018-02-28 | End: 2018-02-28 | Stop reason: HOSPADM

## 2018-02-28 RX ORDER — HYDROMORPHONE HYDROCHLORIDE 1 MG/ML
0.5 INJECTION, SOLUTION INTRAMUSCULAR; INTRAVENOUS; SUBCUTANEOUS AS NEEDED
Status: DISCONTINUED | OUTPATIENT
Start: 2018-02-28 | End: 2018-02-28 | Stop reason: HOSPADM

## 2018-02-28 RX ADMIN — ROCURONIUM BROMIDE 15 MG: 10 INJECTION, SOLUTION INTRAVENOUS at 08:27

## 2018-02-28 RX ADMIN — PROPOFOL 110 MG: 10 INJECTION, EMULSION INTRAVENOUS at 07:36

## 2018-02-28 RX ADMIN — FENTANYL CITRATE 50 MCG: 50 INJECTION, SOLUTION INTRAMUSCULAR; INTRAVENOUS at 07:36

## 2018-02-28 RX ADMIN — LIDOCAINE HYDROCHLORIDE 40 MG: 20 INJECTION, SOLUTION EPIDURAL; INFILTRATION; INTRACAUDAL; PERINEURAL at 07:36

## 2018-02-28 RX ADMIN — PROPOFOL 100 MCG/KG/MIN: 10 INJECTION, EMULSION INTRAVENOUS at 07:47

## 2018-02-28 RX ADMIN — FENTANYL CITRATE 25 MCG: 50 INJECTION, SOLUTION INTRAMUSCULAR; INTRAVENOUS at 08:24

## 2018-02-28 RX ADMIN — DEXAMETHASONE SODIUM PHOSPHATE 8 MG: 4 INJECTION, SOLUTION INTRA-ARTICULAR; INTRALESIONAL; INTRAMUSCULAR; INTRAVENOUS; SOFT TISSUE at 08:15

## 2018-02-28 RX ADMIN — MIDAZOLAM HYDROCHLORIDE 1 MG: 1 INJECTION, SOLUTION INTRAMUSCULAR; INTRAVENOUS at 07:30

## 2018-02-28 RX ADMIN — GLYCOPYRROLATE 0.3 MG: 0.2 INJECTION INTRAMUSCULAR; INTRAVENOUS at 09:04

## 2018-02-28 RX ADMIN — ONDANSETRON 4 MG: 2 INJECTION INTRAMUSCULAR; INTRAVENOUS at 09:00

## 2018-02-28 RX ADMIN — SUCCINYLCHOLINE CHLORIDE 120 MG: 20 INJECTION INTRAMUSCULAR; INTRAVENOUS at 07:36

## 2018-02-28 RX ADMIN — FAMOTIDINE 20 MG: 10 INJECTION, SOLUTION INTRAVENOUS at 07:07

## 2018-02-28 RX ADMIN — SODIUM CHLORIDE, SODIUM LACTATE, POTASSIUM CHLORIDE, AND CALCIUM CHLORIDE 75 ML/HR: 600; 310; 30; 20 INJECTION, SOLUTION INTRAVENOUS at 07:06

## 2018-02-28 RX ADMIN — Medication 1.5 MG: at 09:04

## 2018-02-28 RX ADMIN — FENTANYL CITRATE 25 MCG: 50 INJECTION, SOLUTION INTRAMUSCULAR; INTRAVENOUS at 08:43

## 2018-02-28 RX ADMIN — ROCURONIUM BROMIDE 5 MG: 10 INJECTION, SOLUTION INTRAVENOUS at 07:36

## 2018-02-28 RX ADMIN — SODIUM CHLORIDE: 9 INJECTION, SOLUTION INTRAVENOUS at 08:30

## 2018-02-28 RX ADMIN — MIDAZOLAM HYDROCHLORIDE 1 MG: 1 INJECTION, SOLUTION INTRAMUSCULAR; INTRAVENOUS at 07:35

## 2018-02-28 RX ADMIN — Medication 10 ML: at 07:07

## 2018-02-28 NOTE — PERIOP NOTES
Spoke with Dr. Miguel Serrano re: CXR and diet orders when D/C'd. Per MD CXR neg for pneumothroax and can D/C home. MD also wants pt to have water and if no aspiration then can eat/drink when she goes home.

## 2018-02-28 NOTE — PROCEDURES
[address] [place] [address] [address] [facility]  Pulmonary, Critical Care, and Sleep Medicine          Name: Dena Carney MRN: [id number]   : 1945 Hospital: [facility]   Date: 2018        Bronchoscopy with EBUS/TBNA Report    Procedure: Diagnostic bronchoscopy. Indication: Abnormal chest imaging - Mediastinal/hilar lymphadenopathy    Consent/Treatment: Informed consent was obtained from the  patient after risks, benefits and alternatives were explained. Timeout verified the correct patient and correct procedure. Anesthesia:   General anesthesia was performed by anesthesiology    Procedure Details:   -- The bronchoscope was introduced through an endotracheal tube. -- The trachea and carlos were completely inspected and were found to be bronchiectatic throughout, otherwise normal.  -- The right-sided endobronchial anatomy was completely inspected and was found to be normal.  -- The left-sided endobronchial anatomy was completely inspected and was found to be normal.   -- No endobronchial lesions were encountered  -- The flexible bronchoscope was removed and the endobronchial ultrasound scope was inserted into the endotracheal tube  -- The mediastinal and hilar lymph nodes were inspected showing lymphadenopathy at the following sites: stations 7 and 10R  -- Transbronchial needle aspiration was then performed using ultrasound guidance to the following lymph nodes/sites: station 7 (4passes), station 10R (4passes)  -- Following transbronchial needle aspiration, the endobronchial ultrasound scope was removed and the flexible bronchoscope was then reinserted. -- The airways were reinspected and showed no active bleeding. Bloody secretions were aspirated as completely as possible  -- Bronchial washings were collected for cytology  -- BAL was performed at [facility]  -- The flexible bronchoscope was then removed.   -- CXR was ordered post procedure    --Images from the procedure are below and scanned into the patient's chart    Specimens:   Bronchial washings were sent for  cytology  The bronchoscope was wedged in the RML and bronchoalveolar lavage was performed; material was sent for  cytology  Transbronchial needle aspiration from stations 7 and 10R was sent for  cytology    Rapid On-Site Evaluation: Lymphatic tissue, but no pathology    Complications: none    Estimated Blood Loss: Minimal                                  Tara Aragon MD/MPH     Pulmonary, Critical Care Medicine  [facility]

## 2018-02-28 NOTE — PROGRESS NOTES
conducted a pre-op visit with Claudette Cooley, who is a 81 y.o.,female. The  provided the following Interventions:  Initiated a relationship of care and support. Offered prayer and assurance of continued prayers on patient's behalf. Plan:  Chaplains will continue to follow and will provide pastoral care on an as needed/requested basis.       [facility] Certified [address] [place] [address]   [phone]

## 2018-02-28 NOTE — H&P
H&P Update: Elpidio Lopez was seen and examined. History and physical has been reviewed. The patient has been examined. There have been no significant clinical changes since the completion of the originally dated History and Physical.  Pt is appropriate for bronchoscopy with EBUS/TBNA.       Signed By: Rashad Barnhart MD     February 28, 2018 7:23 AM

## 2018-02-28 NOTE — IP AVS SNAPSHOT
Crystal Hurtado 
 
 
 [address] [facility] ER 61 [facility] Patient: Jorden Paredes MRN: [id number] :1945 About your hospitalization You were admitted on:  2018 You last received care in the:  [facility] PHASE 2 RECOVERY You were discharged on:  2018 Why you were hospitalized Your primary diagnosis was:  Not on File Follow-up Information Follow up With Details Comments Contact Info Devin Gaspar NP   [address] [facility] [address] [facility] [address] 
[phone] Kaia Sifuentes MD   [address] [facility] [address] [address] 
[phone] Your Scheduled Appointments 2018 CERVICAL INTERLAMINAR EPIDURAL STERIOD INJECTION with Chong Sweet MD  
[address] [place] [address] Dr) [address] [facility] ER [address] 2018 10:15 AM EDT Follow Up with Taryn Lara MD  
[address] ([address]) [address] [place] [address], [address] [address]  
[phone] 2018  8:45 AM EDT Follow Up with Devin Gaspar NP  
[facility] [place] ([address]) [address] [facility], [facility] [address]  
[phone] Discharge Orders None A check aleena indicates which time of day the medication should be taken. My Medications ASK your doctor about these medications Instructions Each Dose to Equal  
 Morning Noon Evening Bedtime  
 alendronate 10 mg tablet Commonly known as:  FOSAMAX Your last dose was: Your next dose is: Take 1 Tab by mouth Daily (before breakfast). 10 mg  
    
   
   
   
  
 aspirin 81 mg chewable tablet Your last dose was: Your next dose is: Take 1 Tab by mouth daily.   
 81 mg  
    
   
   
   
  
 atorvastatin 20 mg tablet Commonly known as:  LIPITOR Your last dose was: Your next dose is: Take 20 mg by mouth daily. 20 mg  
    
   
   
   
  
 busPIRone 7.5 mg tablet Commonly known as:  BUSPAR Your last dose was: Your next dose is: Take 1 Tab by mouth two (2) times a day. 7.5 mg  
    
   
   
   
  
 CLARITIN 10 mg tablet Generic drug:  loratadine Your last dose was: Your next dose is: Take 10 mg by mouth daily as needed for Allergies or Rhinitis. 10 mg LORazepam 0.5 mg tablet Commonly known as:  ATIVAN Your last dose was: Your next dose is: Take 1 Tab by mouth every four (4) hours as needed for Anxiety. Max Daily Amount: 3 mg. 0.5 mg  
    
   
   
   
  
 omeprazole 20 mg capsule Commonly known as:  PRILOSEC Your last dose was: Your next dose is: Take 20 mg by mouth daily. 20 mg  
    
   
   
   
  
 predniSONE 10 mg dose pack Commonly known as:  STERAPRED DS Your last dose was: Your next dose is: Take 1 Tab by mouth See Admin Instructions. See administration instruction per 10mg dose pack 10 mg Discharge Instructions DISCHARGE SUMMARY from Nurse PATIENT INSTRUCTIONS: 
 
 
F-face looks uneven A-arms unable to move or move unevenly S-speech slurred or non-existent T-time-call 911 as soon as signs and symptoms begin-DO NOT go Back to bed or wait to see if you get better-TIME IS BRAIN. Warning Signs of HEART ATTACK Call 911 if you have these symptoms: 
? Chest discomfort. Most heart attacks involve discomfort in the center of the chest that lasts more than a few minutes, or that goes away and comes back. It can feel like uncomfortable pressure, squeezing, fullness, or pain. ? Discomfort in other areas of the upper body. Symptoms can include pain or discomfort in one or both arms, the back, neck, jaw, or stomach. ? Shortness of breath with or without chest discomfort. ? Other signs may include breaking out in a cold sweat, nausea, or lightheadedness. Don't wait more than five minutes to call [address]! Fast action can save your life. Calling 911 is almost always the fastest way to get lifesaving treatment. Emergency Medical Services staff can begin treatment when they arrive  up to an hour sooner than if someone gets to the hospital by car. The discharge information has been reviewed with the patient and friend. The patient and friend verbalized understanding. Discharge medications reviewed with the patient and friend and appropriate educational materials and side effects teaching were provided. ___________________________________________________________________________________________________________________________________ Bronchoscopy: What to Expect at [facility] Your Recovery Bronchoscopy lets your doctor look at your airway through a tube called a bronchoscope. Afterward, you may feel tired for 1 or 2 days. Your mouth may feel very dry for several hours after the procedure. You may also have a sore throat and a hoarse voice for a few days. Sucking on throat lozenges or gargling with warm salt water may help soothe your sore throat. If a sample of tissue (biopsy) was taken, you may spit up a small amount of blood or have bloody saliva. This is normal. 
This care sheet gives you a general idea about how long it will take for you to recover. But each person recovers at a different pace. Follow the steps below to get better as quickly as possible. How can you care for yourself at home? Activity ? · Do not eat anything for 2 hours after the procedure. ? · Rest when you feel tired. Getting enough sleep will help you recover. ? · Avoid strenuous activities, such as bicycle riding, jogging, weight lifting, or aerobic exercise, until your doctor says it is okay. ? · Ask your doctor when you can drive again. Diet ? · You can eat your normal diet. If your stomach is upset, try bland, low-fat foods like plain rice, broiled chicken, toast, and yogurt. ? · If it is painful to swallow, start out with cold drinks, flavored ice pops, and ice cream. Next, try soft foods like pudding, yogurt, canned or cooked fruit, scrambled eggs, and mashed potatoes. Avoid eating hard or scratchy foods like chips or raw vegetables. Avoid orange or tomato juice and other acidic foods that can sting the throat. ? · Drink plenty of fluids to avoid becoming dehydrated (unless your doctor tells you not to). Medicines ? · Take pain medicines exactly as directed. ¨ If the doctor gave you a prescription medicine for pain, take it as prescribed. ¨ If you are not taking a prescription pain medicine, ask your doctor if you can take an over-the-counter medicine. ? · If you think your pain medicine is making you sick to your stomach: 
¨ Take your medicine after meals (unless your doctor has told you not to). ¨ Ask your doctor for a different pain medicine. ? · If your doctor prescribed antibiotics, take them as directed. Do not stop taking them just because you feel better. You need to take the full course of antibiotics. Follow-up care is a key part of your treatment and safety. Be sure to make and go to all appointments, and call your doctor if you are having problems. It's also a good idea to know your test results and keep a list of the medicines you take. When should you call for help? Call 911 anytime you think you may need emergency care. For example, call if: 
? · You passed out (lost consciousness). ? · You have sudden chest pain and shortness of breath. ? · You cough up large amounts of bright red blood. ? · You have severe pain in your chest.  
? · You have severe trouble breathing. ?Call your doctor now or seek immediate medical care if: ? · You cough up more than a few tablespoons of blood. ? · You have pain that does not get better after you take pain medicine. ? · You have a fever over 100°F.  
? · You still sound hoarse after a few days. ? · You have bubbles under the skin around the collarbone. These may crackle and pop when you press on them. ? Watch closely for changes in your health, and be sure to contact your doctor if you have any problems. Where can you learn more? Go to http://karin-michaela.info/. Enter S688 in the search box to learn more about \"Bronchoscopy: What to Expect at Home. \" Current as of: May 12, 2017 Content Version: 11.4 © 0450-0891 Healthwise, [employer]. Care instructions adapted under license by [employer] (which disclaims liability or warranty for this information). If you have questions about a medical condition or this instruction, always ask your healthcare professional. [address] any warranty or liability for your use of this information. Introducing [facility]! Cole Hopson introduces [employer] patient portal. Now you can access parts of your medical record, email your doctor's office, and request medication refills online. 1. In your internet browser, go to [email or web address] 2. Click on the First Time User? Click Here link in the Sign In box. You will see the New Member Sign Up page. 3. Enter your [employer] Access Code exactly as it appears below. You will not need to use this code after youve completed the sign-up process. If you do not sign up before the expiration date, you must request a new code. · [employer] Access Code: [address] Expires: 4/2/2018 11:21 AM 
 
4. Enter the last four digits of your Social Security Number (xxxx) and Date of Birth (mm/dd/yyyy) as indicated and click Submit. You will be taken to the next sign-up page. 5. Create a Dr[employer] Map ID. This will be your [employer] login ID and cannot be changed, so think of one that is secure and easy to remember. 6. Create a [employer] password. You can change your password at any time. 7. Enter your Password Reset Question and Answer. This can be used at a later time if you forget your password. 8. Enter your e-mail address. You will receive e-mail notification when new information is available in [address] 9. Click Sign Up. You can now view and download portions of your medical record. 10. Click the Download Summary menu link to download a portable copy of your medical information. If you have questions, please visit the Frequently Asked Questions section of the [employer] website. Remember, [employer] is NOT to be used for urgent needs. For medical emergencies, dial 911. Now available from your iPhone and Android! Unresulted Labs-Please follow up with your PCP about these lab tests Order Current Status XR CHEST PORT In process Providers Seen During Your Hospitalization Provider Specialty Primary office phone Domi Damon MD Pulmonary Disease [phone] Your Primary Care Physician (PCP) Primary Care Physician Office Phone Office Fax [address] [place] [address], [address] [facility] [address] [phone] You are allergic to the following No active allergies Recent Documentation Height Weight Breastfeeding? BMI OB Status Smoking Status 1.524 m 45.8 kg No 19.73 kg/m2 Hysterectomy Never Smoker Emergency Contacts Name Discharge Info Relation Home Work Mobile [address] CAREGIVER [3] Friend [5] [phone] Neftaly Friedman DISCHARGE CAREGIVER [3] Son [22] [phone] Patient Belongings The following personal items are in your possession at time of discharge: 
  Dental Appliances: None  Visual Aid: None Please provide this summary of care documentation to your next provider. Signatures-by signing, you are acknowledging that this After Visit Summary has been reviewed with you and you have received a copy. Patient Signature:  ____________________________________________________________ Date:  ____________________________________________________________  
  
[facility] Provider Signature:  ____________________________________________________________ Date:  ____________________________________________________________

## 2018-02-28 NOTE — DISCHARGE INSTRUCTIONS
DISCHARGE SUMMARY from Nurse    PATIENT INSTRUCTIONS:    After general anesthesia or intravenous sedation, for 24 hours or while taking prescription Narcotics:  · Limit your activities  · Do not drive and operate hazardous machinery  · Do not make important personal or business decisions  · Do  not drink alcoholic beverages  · If you have not urinated within 8 hours after discharge, please contact your surgeon on call. Report the following to your surgeon:  · Excessive pain, swelling, redness or odor of or around the surgical area  · Temperature over 100.5  · Nausea and vomiting lasting longer than 4 hours or if unable to take medications  · Any signs of decreased circulation or nerve impairment to extremity: change in color, persistent  numbness, tingling, coldness or increase pain  · Any questions    *  Please update this list whenever your medications are discontinued, doses are      changed, or new medications (including over-the-counter products) are added. *  Please carry medication information at all times in case of emergency situations. These are general instructions for a healthy lifestyle:    No smoking/ No tobacco products/ Avoid exposure to second hand smoke  Surgeon General's Warning:  Quitting smoking now greatly reduces serious risk to your health. Obesity, smoking, and sedentary lifestyle greatly increases your risk for illness    A healthy diet, regular physical exercise & weight monitoring are important for maintaining a healthy lifestyle    You may be retaining fluid if you have a history of heart failure or if you experience any of the following symptoms:  Weight gain of 3 pounds or more overnight or 5 pounds in a week, increased swelling in our hands or feet or shortness of breath while lying flat in bed. Please call your doctor as soon as you notice any of these symptoms; do not wait until your next office visit.     Recognize signs and symptoms of STROKE:    F-face looks uneven    A-arms unable to move or move unevenly    S-speech slurred or non-existent    T-time-call 911 as soon as signs and symptoms begin-DO NOT go       Back to bed or wait to see if you get better-TIME IS BRAIN. Warning Signs of HEART ATTACK     Call 911 if you have these symptoms:   Chest discomfort. Most heart attacks involve discomfort in the center of the chest that lasts more than a few minutes, or that goes away and comes back. It can feel like uncomfortable pressure, squeezing, fullness, or pain.  Discomfort in other areas of the upper body. Symptoms can include pain or discomfort in one or both arms, the back, neck, jaw, or stomach.  Shortness of breath with or without chest discomfort.  Other signs may include breaking out in a cold sweat, nausea, or lightheadedness. Don't wait more than five minutes to call 911 - MINUTES MATTER! Fast action can save your life. Calling 911 is almost always the fastest way to get lifesaving treatment. Emergency Medical Services staff can begin treatment when they arrive -- up to an hour sooner than if someone gets to the hospital by car. The discharge information has been reviewed with the patient and friend. The patient and friend verbalized understanding. Discharge medications reviewed with the patient and friend and appropriate educational materials and side effects teaching were provided. ___________________________________________________________________________________________________________________________________     Bronchoscopy: What to Expect at Home  Your Recovery    Bronchoscopy lets your doctor look at your airway through a tube called a bronchoscope. Afterward, you may feel tired for 1 or 2 days. Your mouth may feel very dry for several hours after the procedure. You may also have a sore throat and a hoarse voice for a few days. Sucking on throat lozenges or gargling with warm salt water may help soothe your sore throat.   If a sample of tissue (biopsy) was taken, you may spit up a small amount of blood or have bloody saliva. This is normal.  This care sheet gives you a general idea about how long it will take for you to recover. But each person recovers at a different pace. Follow the steps below to get better as quickly as possible. How can you care for yourself at home? Activity  ? · Do not eat anything for 2 hours after the procedure. ? · Rest when you feel tired. Getting enough sleep will help you recover. ? · Avoid strenuous activities, such as bicycle riding, jogging, weight lifting, or aerobic exercise, until your doctor says it is okay. ? · Ask your doctor when you can drive again. Diet  ? · You can eat your normal diet. If your stomach is upset, try bland, low-fat foods like plain rice, broiled chicken, toast, and yogurt. ? · If it is painful to swallow, start out with cold drinks, flavored ice pops, and ice cream. Next, try soft foods like pudding, yogurt, canned or cooked fruit, scrambled eggs, and mashed potatoes. Avoid eating hard or scratchy foods like chips or raw vegetables. Avoid orange or tomato juice and other acidic foods that can sting the throat. ? · Drink plenty of fluids to avoid becoming dehydrated (unless your doctor tells you not to). Medicines  ? · Take pain medicines exactly as directed. ¨ If the doctor gave you a prescription medicine for pain, take it as prescribed. ¨ If you are not taking a prescription pain medicine, ask your doctor if you can take an over-the-counter medicine. ? · If you think your pain medicine is making you sick to your stomach:  ¨ Take your medicine after meals (unless your doctor has told you not to). ¨ Ask your doctor for a different pain medicine. ? · If your doctor prescribed antibiotics, take them as directed. Do not stop taking them just because you feel better. You need to take the full course of antibiotics.    Follow-up care is a key part of your treatment and safety. Be sure to make and go to all appointments, and call your doctor if you are having problems. It's also a good idea to know your test results and keep a list of the medicines you take. When should you call for help? Call 911 anytime you think you may need emergency care. For example, call if:  ? · You passed out (lost consciousness). ? · You have sudden chest pain and shortness of breath. ? · You cough up large amounts of bright red blood. ? · You have severe pain in your chest.   ? · You have severe trouble breathing. ?Call your doctor now or seek immediate medical care if:  ? · You cough up more than a few tablespoons of blood. ? · You have pain that does not get better after you take pain medicine. ? · You have a fever over 100°F.   ? · You still sound hoarse after a few days. ? · You have bubbles under the skin around the collarbone. These may crackle and pop when you press on them. ? Watch closely for changes in your health, and be sure to contact your doctor if you have any problems. Where can you learn more? Go to http://karin-michaela.info/. Enter F268 in the search box to learn more about \"Bronchoscopy: What to Expect at Home. \"  Current as of: May 12, 2017  Content Version: 11.4  © 4996-1630 [employer]. Care instructions adapted under license by [employer] (which disclaims liability or warranty for this information). If you have questions about a medical condition or this instruction, always ask your healthcare professional. [place] [address] any warranty or liability for your use of this information.

## 2018-02-28 NOTE — ANESTHESIA PREPROCEDURE EVALUATION
Anesthetic History   No history of anesthetic complications            Review of Systems / Medical History  Patient summary reviewed and pertinent labs reviewed    Pulmonary  Within defined limits                 Neuro/Psych       CVA (3 months ago)  TIA     Cardiovascular  Within defined limits                Exercise tolerance: >4 METS     GI/Hepatic/Renal  Within defined limits              Endo/Other        Arthritis     Other Findings   Comments: Documentation of current medication  Current medications obtained, documented and obtained? YES      Risk Factors for Postoperative nausea/vomiting:       History of postoperative nausea/vomiting? NO       Female? YES       Motion sickness? NO       Intended opioid administration for postoperative analgesia? NO      Smoking Abstinence:  Current Smoker? NO  Elective Surgery? YES  Seen preoperatively by anesthesiologist or proxy prior to day of surgery? YES  Pt abstained from smoking 24 hours prior to anesthesia?  N/A    Preventive care/screening for High Blood Pressure:  Aged 18 years and older: YES  Screened for high blood pressure: YES  Patients with high blood pressure referred to primary care provider   for BP management: YES                 Physical Exam    Airway  Mallampati: II  TM Distance: 4 - 6 cm  Neck ROM: normal range of motion   Mouth opening: Normal     Cardiovascular  Regular rate and rhythm,  S1 and S2 normal,  no murmur, click, rub, or gallop  Rhythm: regular  Rate: normal         Dental  No notable dental hx       Pulmonary  Breath sounds clear to auscultation               Abdominal  GI exam deferred       Other Findings            Anesthetic Plan    ASA: 4  Anesthesia type: MAC          Induction: Intravenous  Anesthetic plan and risks discussed with: Patient

## 2018-02-28 NOTE — ANESTHESIA POSTPROCEDURE EVALUATION
Post-Anesthesia Evaluation and Assessment    Patient: Tyesha Lind MRN: [id number]  SSN: [id number]    YOB: 1945  Age: 81 y.o. Sex: female     VS from flow sheet    Cardiovascular Function/Vital Signs  Visit Vitals    /70 (BP 1 Location: Right arm, BP Patient Position: At rest)    Pulse 69    Temp 36.1 °C (97 °F)    Resp 14    Ht 5' (1.524 m)    Wt 45.8 kg (101 lb)    SpO2 99%    Breastfeeding No    BMI 19.73 kg/m2       Patient is status post MAC anesthesia for Procedure(s):  ENDOSCOPIC BRONCHOSCOPY ULTRASOUND (EBUS)/C-ARM w slides w cytolyte  BRONCHOSCOPY w washings w BAL. Nausea/Vomiting: None    Postoperative hydration reviewed and adequate. Pain:  Pain Scale 1: Numeric (0 - 10) (02/28/18 1006)  Pain Intensity 1: 0 (02/28/18 1006)   Managed    Neurological Status:   Neuro (WDL): Within Defined Limits (02/28/18 0915)   At baseline    Mental Status and Level of Consciousness: Arousable    Pulmonary Status:   O2 Device: Room air (02/28/18 1006)   Adequate oxygenation and airway patent    Complications related to anesthesia: None    Post-anesthesia assessment completed.  No concerns    Signed By: Te Matamoros MD     February 28, 2018

## 2018-02-28 NOTE — PERIOP NOTES
I have reviewed discharge instructions with the patient and friend. The patient and friend verbalized understanding.     Patient armband removed and shredded

## 2018-03-02 ENCOUNTER — TELEPHONE (OUTPATIENT)
Dept: PULMONOLOGY | Age: 73
End: 2018-03-02

## 2018-03-02 NOTE — TELEPHONE ENCOUNTER
Called patient regarding biopsy results.     Karina Conroy MD/MPH     Pulmonary, Critical Care Medicine  [facility]

## 2018-03-06 ENCOUNTER — APPOINTMENT (OUTPATIENT)
Dept: GENERAL RADIOLOGY | Age: 73
End: 2018-03-06
Attending: PHYSICAL MEDICINE & REHABILITATION
Payer: MEDICARE

## 2018-03-06 ENCOUNTER — HOSPITAL ENCOUNTER (OUTPATIENT)
Age: 73
Setting detail: OUTPATIENT SURGERY
Discharge: HOME | End: 2018-03-06
Attending: PHYSICAL MEDICINE & REHABILITATION | Admitting: PHYSICAL MEDICINE & REHABILITATION
Payer: MEDICARE

## 2018-03-06 VITALS
DIASTOLIC BLOOD PRESSURE: 62 MMHG | HEIGHT: 60 IN | BODY MASS INDEX: 19.83 KG/M2 | HEART RATE: 71 BPM | WEIGHT: 101 LBS | RESPIRATION RATE: 16 BRPM | TEMPERATURE: 98.5 F | SYSTOLIC BLOOD PRESSURE: 107 MMHG | OXYGEN SATURATION: 99 %

## 2018-03-06 PROCEDURE — 74011000250 HC RX REV CODE- 250

## 2018-03-06 PROCEDURE — 74011250636 HC RX REV CODE- 250/636: Performed by: PHYSICAL MEDICINE & REHABILITATION

## 2018-03-06 PROCEDURE — 74011636320 HC RX REV CODE- 636/320

## 2018-03-06 PROCEDURE — 74011636320 HC RX REV CODE- 636/320: Performed by: PHYSICAL MEDICINE & REHABILITATION

## 2018-03-06 PROCEDURE — 76010000009 HC PAIN MGT 0 TO 30 MIN PROC: Performed by: PHYSICAL MEDICINE & REHABILITATION

## 2018-03-06 PROCEDURE — 74011250636 HC RX REV CODE- 250/636

## 2018-03-06 RX ORDER — MIDAZOLAM HYDROCHLORIDE 1 MG/ML
INJECTION, SOLUTION INTRAMUSCULAR; INTRAVENOUS AS NEEDED
Status: DISCONTINUED | OUTPATIENT
Start: 2018-03-06 | End: 2018-03-06 | Stop reason: HOSPADM

## 2018-03-06 RX ORDER — MIDAZOLAM HYDROCHLORIDE 1 MG/ML
.5-6 INJECTION, SOLUTION INTRAMUSCULAR; INTRAVENOUS
Status: DISCONTINUED | OUTPATIENT
Start: 2018-03-06 | End: 2018-03-06 | Stop reason: HOSPADM

## 2018-03-06 RX ORDER — SODIUM CHLORIDE 0.9 % (FLUSH) 0.9 %
5-10 SYRINGE (ML) INJECTION AS NEEDED
Status: DISCONTINUED | OUTPATIENT
Start: 2018-03-06 | End: 2018-03-06 | Stop reason: HOSPADM

## 2018-03-06 RX ORDER — FENTANYL CITRATE 50 UG/ML
INJECTION, SOLUTION INTRAMUSCULAR; INTRAVENOUS AS NEEDED
Status: DISCONTINUED | OUTPATIENT
Start: 2018-03-06 | End: 2018-03-06 | Stop reason: HOSPADM

## 2018-03-06 RX ORDER — DEXAMETHASONE SODIUM PHOSPHATE 100 MG/10ML
INJECTION INTRAMUSCULAR; INTRAVENOUS AS NEEDED
Status: DISCONTINUED | OUTPATIENT
Start: 2018-03-06 | End: 2018-03-06 | Stop reason: HOSPADM

## 2018-03-06 RX ORDER — LIDOCAINE HYDROCHLORIDE 10 MG/ML
INJECTION, SOLUTION EPIDURAL; INFILTRATION; INTRACAUDAL; PERINEURAL AS NEEDED
Status: DISCONTINUED | OUTPATIENT
Start: 2018-03-06 | End: 2018-03-06 | Stop reason: HOSPADM

## 2018-03-06 RX ADMIN — SODIUM CHLORIDE 500 ML: 900 INJECTION, SOLUTION INTRAVENOUS at 09:45

## 2018-03-06 NOTE — PROCEDURES
[facility]    INTERLAMINAR CERVICAL EPIDURAL STEROID INJECTION  PROCEDURE REPORT      PATIENT:  Jagjit Quintana OF BIRTH:  1945  DATE OF SERVICE:  3/6/2018  [facility]    PRE-PROCEDURE DIAGNOSIS:  See Above    POST-PROCEDURE DIAGNOSIS:  See Above                PROCEDURE:    1. Interlaminar cervical epidural steroid injection, C7-T1  (29526)  2. Fluoroscopic needle guidance (spinal) (27406)  3. Supervision of moderate sedation (49748)    ANESTHESIA:  Local with moderate IV sedation. See Medication Administration Record for specific medications and dosage. COMPLICATIONS: None. PHYSICIAN:  Julia Kiran MD    PRE-PROCEDURE NOTE:  Pre-procedural assessment of the patient was performed including a limited history and physical examination. The details of the procedure were discussed with the patient, including the risks, benefits and alternative options and an informed consent was obtained. The patients NPO status, if necessary for the specific procedure and/or administration of moderate intravenous sedation, if utilized, and availability of a responsible adult to escort the patient following the procedure were confirmed. A peripheral intravenous cannula was placed without difficulty and lactated Ringers solution administered. See nursing notes for details. PROCEDURE NOTE:  The patient was brought to the procedure suite and positioned on the fluoroscopy table in the prone position. Physiologic monitors were applied and supplemental oxygen was administered via nasal cannula. The skin was prepped in the standard surgical fashion and sterile drapes were applied over the procedure site.  Please refer to the Flowsheet for documentation of the patients vital signs and the Medication Administration Record for any oral and/or intravenous sedation administered prior to or during the procedure. The above-listed interlaminar space was identified and the skin and subcutaneous tissues were infiltrated with 1% Lidocaine. Under anterior-posterior fluoroscopic guidance an 18g, 3.5-inch Tuohy epidural needle was advanced along the previously identified interlaminar space. The fluoroscope was then turned lateral view and a loss of resistance syringe was attached to the needle containing preservative free normal saline. Under lateral flouroscopic guidance, the needle was then advanced through the ligamentum flavum, entering the epidural space with a clear and crisp loss of resistance. The needle was not advanced beyond the interlaminar line at any time during this process. No CSF was noted. Aspiration was negative for blood or CSF. Additional confirmation was made with the injection of 0.5 mL of a nonionic water-soluble radiographic contrast medium (Isovue-M 200) demonstrating appropriate epidural spread and the absence of vascular uptake. Following this, a 3 mL solution comprised of 2 mL of dexamethasone (10mg/ml) and 1 mL of lidocaine 1% preservative free was injected slowly after negative aspiration. The needle was cleared of steroid solution and removed. The area was thoroughly cleaned and sterile bandages applied as necessary. The patient tolerated the procedure well and vital signs remained stable throughout the procedure. POST-PROCEDURE COURSE:  The patient was escorted from the procedure suite in satisfactory condition and recovered per facility protocol based on the type of procedure performed and/or the sedation utilized. The patient did not experience any adverse events and remained hemodynamically stable during the post-procedure period. DISCHARGE NOTE:  Upon discharge, the patient was able to tolerate fluids and was in no acute distress. The patient was oriented to person, place and time and vital signs were stable.  Appropriate post-procedure instructions were provided and explained to the patient in detail and all questions were answered.     Bora Martinez MD 3/6/2018 10:32 AM

## 2018-03-06 NOTE — DISCHARGE INSTRUCTIONS
[address] [place] [address] for Pain Management      Post Procedures Instructions    *Resume Diet and Activity as tolerated. Rest for the remainder of the day. *You may fell worse before you feel better as the numbing medications wear off before the steroids take effect if used for your procedures. *Do not use affected extremity until numbness or loss of sensation has completely resolved without assistance. *DO NOT DRIVE, operate machinery/heavey equipment for 24 hours. *DO NOT DRINK ALCOHOL for 24 hours as it may interact with the sedation if you received it and also thins your blood and may cause you to bleed. *WAIT 24 hours before starting back ANY Blood thinning medications:   (Heparin, Coumadin, Warfarin, Lovenox, Plavix, Aggrenox)    *Resume Pre-Procedure Medications as prescribed except Blood Thinners unless directed by your Physician or Cardiologist.     *Avoid Hot tubs and Heating pad for 24 hours to prevent dissipation of medications, you may shower to remove bandages and remaining prep residue on the skin. * If you develop a Headache, drink plenty of fluids including beverages with caffeine (Coffee, Mt. Dew etc.) and rest.  If the headache persists longer than 24 hoursor intensifies - Please call [facility] ([facility]) [phone]    * If you are DIABETIC, check your blood sugar three times a day for the next three days, the steroids will increase your blood sugar. If your blood sugar is greater than 400 have someone drive you to the nearest [address]. * If you experience any of the following problems, call the [facility] [phone] between 8:00 am - 4:30pm or After Hours [phone].     Shortness of breath    Fever of 101 F or higher    Nausea / Vomiting (not normal to you)    Increasing stiffness in the neck    Weakness or numbness in the arms or legs that is not resolving    Prolonged and increasing pain > than 4 days    ANYTHING OUT of the ORDINARY TO YOU    If YOU are experiencing a severe reaction / complication that you have never had before post procedure, call 911 or go to the nearest emergency room! All patients must have a  for transportation [place] regardless if you do or do not receive sedation. DISCHARGE SUMMARY from Nurse      PATIENT INSTRUCTIONS:    After Oral  or intravenous sedation, for 24 hours or while taking prescription Narcotics:  · Limit your activities  · Do not drive and operate hazardous machinery  · Do not make important personal or business decisions  · Do  not drink alcoholic beverages  · If you have not urinated within 8 hours after discharge, please contact your surgeon on call. Report the following to your surgeon:  · Excessive pain, swelling, redness or odor of or around the surgical area  · Temperature over 101  · Nausea and vomiting lasting longer than 4 hours or if unable to take medications  · Any signs of decreased circulation or nerve impairment to extremity: change in color, persistent  numbness, tingling, coldness or increase pain  · Any questions        What to do at Home:  Recommended activity: Activity as tolerated, NO DRIVING FOR 24 Hours post injection          *  Please give a list of your current medications to your Primary Care Provider. *  Please update this list whenever your medications are discontinued, doses are      changed, or new medications (including over-the-counter products) are added. *  Please carry medication information at all times in case of emergency situations. These are general instructions for a healthy lifestyle:    No smoking/ No tobacco products/ Avoid exposure to second hand smoke    Surgeon General's Warning:  Quitting smoking now greatly reduces serious risk to your health.     Obesity, smoking, and sedentary lifestyle greatly increases your risk for illness    A healthy diet, regular physical exercise & weight monitoring are important for maintaining a healthy lifestyle    You may be retaining fluid if you have a history of heart failure or if you experience any of the following symptoms:  Weight gain of 3 pounds or more overnight or 5 pounds in a week, increased swelling in our hands or feet or shortness of breath while lying flat in bed. Please call your doctor as soon as you notice any of these symptoms; do not wait until your next office visit. Recognize signs and symptoms of STROKE:    F-face looks uneven    A-arms unable to move or move unevenly    S-speech slurred or non-existent    T-time-call 911 as soon as signs and symptoms begin-DO NOT go       Back to bed or wait to see if you get better-TIME IS BRAIN.

## 2018-03-06 NOTE — H&P (VIEW-ONLY)
H&P Update: Dena Carney was seen and examined. History and physical has been reviewed. The patient has been examined. There have been no significant clinical changes since the completion of the originally dated History and Physical.  Pt is appropriate for bronchoscopy with EBUS/TBNA.       Signed By: Maximus Titus MD     February 28, 2018 7:23 AM

## 2018-03-06 NOTE — INTERVAL H&P NOTE
H&P Update: Darcy Simons was seen and examined. History and physical has been reviewed. The patient has been examined.  There have been no significant clinical changes since the completion of the originally dated History and Physical.    Signed By: Fide Soto MD     March 6, 2018 8:57 AM

## 2018-03-06 NOTE — PROGRESS NOTES
Pt with low BP post procedure. 500ml NS bolus given. No signs of distress. Discharged after bolus and increased BP with no distress. Left office via w/c with caregiver in stable condition.

## 2018-03-08 ENCOUNTER — TELEPHONE (OUTPATIENT)
Dept: INTERNAL MEDICINE CLINIC | Age: 73
End: 2018-03-08

## 2018-03-08 NOTE — TELEPHONE ENCOUNTER
Contacted Ms Aguilera Prashanth and reminded her to come to office for repeat Complete metabolic panel for liver enzymes per dav may np, she expressed understanding

## 2018-03-09 ENCOUNTER — HOSPITAL ENCOUNTER (OUTPATIENT)
Dept: GENERAL RADIOLOGY | Age: 73
Discharge: HOME | End: 2018-03-09
Attending: INTERNAL MEDICINE
Payer: MEDICARE

## 2018-03-09 DIAGNOSIS — K44.9 HIATAL HERNIA: ICD-10-CM

## 2018-03-09 PROCEDURE — 74011000250 HC RX REV CODE- 250: Performed by: INTERNAL MEDICINE

## 2018-03-09 PROCEDURE — 74011000255 HC RX REV CODE- 255: Performed by: INTERNAL MEDICINE

## 2018-03-09 PROCEDURE — 74220 X-RAY XM ESOPHAGUS 1CNTRST: CPT

## 2018-03-09 RX ADMIN — BARIUM SULFATE 700 MG: 700 TABLET ORAL at 09:00

## 2018-03-09 RX ADMIN — ANTACID/ANTIFLATULENT 4 G: 380; 550; 10; 10 GRANULE, EFFERVESCENT ORAL at 09:00

## 2018-03-09 RX ADMIN — BARIUM SULFATE 135 ML: 980 POWDER, FOR SUSPENSION ORAL at 09:00

## 2018-03-12 ENCOUNTER — HOSPITAL ENCOUNTER (OUTPATIENT)
Dept: LAB | Age: 73
Discharge: HOME | End: 2018-03-12
Payer: MEDICARE

## 2018-03-12 ENCOUNTER — OFFICE VISIT (OUTPATIENT)
Dept: PULMONOLOGY | Age: 73
End: 2018-03-12

## 2018-03-12 VITALS
SYSTOLIC BLOOD PRESSURE: 110 MMHG | WEIGHT: 103 LBS | HEIGHT: 60 IN | HEART RATE: 83 BPM | TEMPERATURE: 98.5 F | BODY MASS INDEX: 20.22 KG/M2 | RESPIRATION RATE: 20 BRPM | OXYGEN SATURATION: 99 % | DIASTOLIC BLOOD PRESSURE: 60 MMHG

## 2018-03-12 DIAGNOSIS — R91.8 PULMONARY INFILTRATES: ICD-10-CM

## 2018-03-12 DIAGNOSIS — R59.0 HILAR ADENOPATHY: Primary | ICD-10-CM

## 2018-03-12 DIAGNOSIS — R05.9 COUGH: ICD-10-CM

## 2018-03-12 DIAGNOSIS — R74.8 ELEVATED LIVER ENZYMES: ICD-10-CM

## 2018-03-12 LAB
ALBUMIN SERPL-MCNC: 3.9 G/DL (ref 3.4–5)
ALBUMIN/GLOB SERPL: 1.2 {RATIO} (ref 0.8–1.7)
ALP SERPL-CCNC: 99 U/L (ref 45–117)
ALT SERPL-CCNC: 25 U/L (ref 13–56)
ANION GAP SERPL CALC-SCNC: 6 MMOL/L (ref 3–18)
AST SERPL-CCNC: 19 U/L (ref 15–37)
BILIRUB SERPL-MCNC: 0.3 MG/DL (ref 0.2–1)
BUN SERPL-MCNC: 19 MG/DL (ref 7–18)
BUN/CREAT SERPL: 28 (ref 12–20)
CALCIUM SERPL-MCNC: 9.5 MG/DL (ref 8.5–10.1)
CHLORIDE SERPL-SCNC: 105 MMOL/L (ref 100–108)
CO2 SERPL-SCNC: 29 MMOL/L (ref 21–32)
CREAT SERPL-MCNC: 0.69 MG/DL (ref 0.6–1.3)
GLOBULIN SER CALC-MCNC: 3.3 G/DL (ref 2–4)
GLUCOSE SERPL-MCNC: 81 MG/DL (ref 74–99)
POTASSIUM SERPL-SCNC: 4.6 MMOL/L (ref 3.5–5.5)
PROT SERPL-MCNC: 7.2 G/DL (ref 6.4–8.2)
SODIUM SERPL-SCNC: 140 MMOL/L (ref 136–145)

## 2018-03-12 PROCEDURE — 80053 COMPREHEN METABOLIC PANEL: CPT | Performed by: NURSE PRACTITIONER

## 2018-03-12 NOTE — PROGRESS NOTES
[facility]  Pulmonary, Critical Care, and Sleep Medicine      Chief complaint:  Hilar adenopathy pulmonary infiltrate chronic cough    HPI:    Shawnee Shetty    is 81years old and returns the office today after a EBUS biopsy of hilar lymph nodes. Post biopsy the patient notes no shortness of breath no change in her mild chest discomfort and a continuing cough which is usually dry and which is associated with a sensation of postnasal drip but occurs more at night. The patient is also being treated for hiatal hernia and gastroesophageal reflux. No Known Allergies  Current Outpatient Prescriptions   Medication Sig    busPIRone (BUSPAR) 7.5 mg tablet Take 1 Tab by mouth two (2) times a day.  atorvastatin (LIPITOR) 20 mg tablet Take 20 mg by mouth daily.  loratadine (CLARITIN) 10 mg tablet Take 10 mg by mouth daily as needed for Allergies or Rhinitis.  omeprazole (PRILOSEC) 20 mg capsule Take 20 mg by mouth daily.  alendronate (FOSAMAX) 10 mg tablet Take 1 Tab by mouth Daily (before breakfast).  LORazepam (ATIVAN) 0.5 mg tablet Take 1 Tab by mouth every four (4) hours as needed for Anxiety. Max Daily Amount: 3 mg.  predniSONE (STERAPRED DS) 10 mg dose pack Take 1 Tab by mouth See Admin Instructions. See administration instruction per 10mg dose pack    aspirin 81 mg chewable tablet Take 1 Tab by mouth daily. No current facility-administered medications for this visit.       Past Medical History:   Diagnosis Date    Abnormal Pap smear     Dr. Dina Moura Allergic rhinitis     Arthritis     Chronic pain     Hypercholesterolemia     Neck pain 5/17/2010    Stroke ([facility] [address] 10/02/2017    TIA-      Past Surgical History:   Procedure Laterality Date    HX GYN      Total Hyst    HX LAP CHOLECYSTECTOMY      HX OTHER SURGICAL  2017    Throat widened     Social History     Social History    Marital status:      Spouse name: N/A    Number of children: N/A    Years of education: N/A     Occupational History    Not on file. Social History Main Topics    Smoking status: Never Smoker    Smokeless tobacco: Never Used    Alcohol use No    Drug use: No    Sexual activity: No     Other Topics Concern    Not on file     Social History Narrative     Family History   Problem Relation Age of Onset    Cancer Mother      breast    Heart Disease Father        Review of systems:  She denies fever chills poor appetite or weight loss    Physical Exam:  Visit Vitals    /60 (BP 1 Location: Left arm, BP Patient Position: Sitting)    Pulse 83    Temp 98.5 °F (36.9 °C)    Resp 20    Ht 5' (1.524 m)    Wt 46.7 kg (103 lb)    SpO2 99%    BMI 20.12 kg/m2       Well-developed and thin  HEENT: WNL  Lymph node exam: Supraclavicular cervical lymph nodes negative  Chest: Equal symmetrical expansion no dullness no wheezes rales rubs  Heart: Regular rhythm no gallop no murmur no JVD no peripheral edema  Extremities: No cyanosis clubbing or calf tenderness  Neurological: Alert and oriented    Labs:  Lymph node biopsies from 2/20 8/18- for malignancy  O2 sat 99% room air at rest    Impression:     Hilar adenopathy of unknown etiology with needle biopsies negative indicating most likely benign etiology  Cough possibly due to gastroesophageal reflux    Plan:     Empiric treatment for cough with elevation of the head of the bed and no eating for 2 hours before bedtime and daily Prilosec  Follow-up hilar adenopathy in 6 months with CT imaging  Follow-up in 3 months    Maria Teresa Wilson MD , [facility]    CC: Rachel Carpio NP     [address] [facility]  [place], [address] [facility] [address]     P: [phone]     F: [phone]

## 2018-03-12 NOTE — LETTER
4/6/2018 4:31 PM 
 
Ms. Haley Truong [address] [address] Dear Haley Truong: 
 
Please find your most recent results below. Resulted Orders METABOLIC PANEL, COMPREHENSIVE Result Value Ref Range Sodium 140 136 - 145 mmol/L Potassium 4.6 3.5 - 5.5 mmol/L Chloride 105 100 - 108 mmol/L  
 CO2 29 21 - 32 mmol/L Anion gap 6 3.0 - 18 mmol/L Glucose 81 74 - 99 mg/dL BUN 19 (H) 7.0 - 18 MG/DL Creatinine 0.69 0.6 - 1.3 MG/DL  
 BUN/Creatinine ratio 28 (H) 12 - 20 GFR est AA >60 >60 ml/min/1.73m2 GFR est non-AA >60 >60 ml/min/1.73m2 Comment:  
   (NOTE) Estimated GFR is calculated using the Modification of Diet in Renal  
Disease (MDRD) Study equation, reported for both  Americans [facility]) and non- Americans (GFRNA), and normalized to 1.73m2  
body surface area. The physician must decide which value applies to  
the patient. The MDRD study equation should only be used in  
individuals age 25 or older. It has not been validated for the  
following: pregnant women, patients with serious comorbid conditions,  
or on certain medications, or persons with extremes of body size,  
muscle mass, or nutritional status. Calcium 9.5 8.5 - 10.1 MG/DL Bilirubin, total 0.3 0.2 - 1.0 MG/DL  
 ALT (SGPT) 25 13 - 56 U/L  
 AST (SGOT) 19 15 - 37 U/L Alk. phosphatase 99 45 - 117 U/L Protein, total 7.2 6.4 - 8.2 g/dL Albumin 3.9 3.4 - 5.0 g/dL Globulin 3.3 2.0 - 4.0 g/dL A-G Ratio 1.2 0.8 - 1.7 RECOMMENDATIONS: 
None. Keep up the good work! Liver Enzymes are normal. 
 
Please call me if you have any questions: [phone] Sincerely, [facility]

## 2018-03-12 NOTE — PROGRESS NOTES
The pt. Came post Needle Biopsy. She states that \"the MD who did the procedure called her and the results were good. \"  No c/os    She hasn't been to an ED nor Urgent Care recently.

## 2018-03-12 NOTE — PATIENT INSTRUCTIONS
Always call for symptoms such as shortness of breath or worsening cough    Remember to not eat or drink for 2 hours before bedtime    Try to elevate the head of your bed by using 4 inch blocks with the top of the block carved out so that the head post fits into the block preventing the bed from slipping off the block.   There is also a commercial product called bed risers that can be placed underneath the head post    Renée Prilosec

## 2018-03-12 NOTE — MR AVS SNAPSHOT
[address] [facility], [address] [address] 
[phone] Patient: Haley Truong MRN: [id number] :1945 Visit Information Date & Time Provider Department Dept. Phone Encounter #  
 3/12/2018 10:15 AM MD [facility] Gaby Singh [id number] Follow-up Instructions Return in about 3 months (around 2018). Your Appointments 2018  8:45 AM  
Follow Up with Kiley Gustafson NP  
[facility] [place] ([facility]) Appt Note: 2 month [address] [facility], [address] [facility] [address]  
  
   
 [address] [facility], [address] [place] [address] Upcoming Health Maintenance Date Due Hepatitis C Screening 1945 DTaP/Tdap/Td series (1 - Tdap) 1968 FOBT Q 1 YEAR AGE 50-75 1997 ZOSTER VACCINE AGE 60> 2007 GLAUCOMA SCREENING Q2Y 2012 Pneumococcal 65+ Low/Medium Risk (2 of 2 - PPSV23) 10/10/2018 MEDICARE YEARLY EXAM 2019 BREAST CANCER SCRN MAMMOGRAM 2020 Allergies as of 3/12/2018  Review Complete On: 3/12/2018 By: Manjinder Lopez LPN No Known Allergies Current Immunizations  Reviewed on 2018 No immunizations on file. Not reviewed this visit You Were Diagnosed With   
  
 Codes Comments Hilar adenopathy    -  Primary ICD-10-CM: R59.0 ICD-9-CM: 785.6 Pulmonary infiltrates     ICD-10-CM: R91.8 ICD-9-CM: 793.19 Cough     ICD-10-CM: R05 ICD-9-CM: 924. 2 Vitals BP Pulse Temp Resp Height(growth percentile) Weight(growth percentile) 110/60 (BP 1 Location: Left arm, BP Patient Position: Sitting) 83 98.5 °F (36.9 °C) 20 5' (1.524 m) 103 lb (46.7 kg) SpO2 BMI OB Status Smoking Status 99% 20.12 kg/m2 Hysterectomy Never Smoker BMI and BSA Data Body Mass Index Body Surface Area 20.12 kg/m 2 1.41 m 2 Preferred Pharmacy Pharmacy Name Phone [id number] [place], [address] [phone] Your Updated Medication List  
  
   
This list is accurate as of 3/12/18 10:48 AM.  Always use your most recent med list.  
  
  
  
  
 alendronate 10 mg tablet Commonly known as:  FOSAMAX Take 1 Tab by mouth Daily (before breakfast). aspirin 81 mg chewable tablet Take 1 Tab by mouth daily. atorvastatin 20 mg tablet Commonly known as:  LIPITOR Take 20 mg by mouth daily. busPIRone 7.5 mg tablet Commonly known as:  BUSPAR Take 1 Tab by mouth two (2) times a day. CLARITIN 10 mg tablet Generic drug:  loratadine Take 10 mg by mouth daily as needed for Allergies or Rhinitis. LORazepam 0.5 mg tablet Commonly known as:  ATIVAN Take 1 Tab by mouth every four (4) hours as needed for Anxiety. Max Daily Amount: 3 mg.  
  
 omeprazole 20 mg capsule Commonly known as:  PRILOSEC Take 20 mg by mouth daily. predniSONE 10 mg dose pack Commonly known as:  STERAPRED DS Take 1 Tab by mouth See Admin Instructions. See administration instruction per 10mg dose pack Follow-up Instructions Return in about 3 months (around 6/12/2018). Patient Instructions Always call for symptoms such as shortness of breath or worsening cough Remember to not eat or drink for 2 hours before bedtime Try to elevate the head of your bed by using 4 inch blocks with the top of the block carved out so that the head post fits into the block preventing the bed from slipping off the block. There is also a commercial product called bed risers that can be placed underneath the head post 
 
Continue Prilosec Introducing [facility]! [facility] introduces [employer] patient portal. Now you can access parts of your medical record, email your doctor's office, and request medication refills online. 1. In your internet browser, go to [email or web address] 2. Click on the First Time User? Click Here link in the Sign In box. You will see the New Member Sign Up page. 3. Enter your [employer] Access Code exactly as it appears below. You will not need to use this code after youve completed the sign-up process. If you do not sign up before the expiration date, you must request a new code. · [employer] Access Code: [address] Expires: 4/2/2018 12:21 PM 
 
4. Enter the last four digits of your Social Security Number (xxxx) and Date of Birth (mm/dd/yyyy) as indicated and click Submit. You will be taken to the next sign-up page. 5. Create a Quosist ID. This will be your [employer] login ID and cannot be changed, so think of one that is secure and easy to remember. 6. Create a [employer] password. You can change your password at any time. 7. Enter your Password Reset Question and Answer. This can be used at a later time if you forget your password. 8. Enter your e-mail address. You will receive e-mail notification when new information is available in [address] 9. Click Sign Up. You can now view and download portions of your medical record. 10. Click the Download Summary menu link to download a portable copy of your medical information. If you have questions, please visit the Frequently Asked Questions section of the [employer] website. Remember, [employer] is NOT to be used for urgent needs. For medical emergencies, dial 911. Now available from your iPhone and Android! Please provide this summary of care documentation to your next provider. Your primary care clinician is listed as JAY JENKINS. If you have any questions after today's visit, please call [phone].

## 2018-03-13 ENCOUNTER — TELEPHONE (OUTPATIENT)
Dept: PAIN MANAGEMENT | Age: 73
End: 2018-03-13

## 2018-03-13 NOTE — TELEPHONE ENCOUNTER
Ms. Sherri Aviles was contacted for follow-up status post Interlaminar cervical epidural steroid injection, C7-T1  on March 6, 2018.  She reports:    Pre-procedure numerical pain score: 4/10  Post-procedure numerical pain score immediately after: 0/10  Duration of relief post-procedure (if applicable): 1 days  Improvement in functional activities (if applicable): Yes  Percentage of overall improvement: 100%    COMMENTS:

## 2018-03-15 ENCOUNTER — TELEPHONE (OUTPATIENT)
Dept: PAIN MANAGEMENT | Age: 73
End: 2018-03-15

## 2018-03-15 RX ORDER — METHYLPREDNISOLONE 4 MG/1
TABLET ORAL
Qty: 1 DOSE PACK | Refills: 0 | Status: SHIPPED | OUTPATIENT
Start: 2018-03-15 | End: 2018-03-29 | Stop reason: ALTCHOICE

## 2018-03-15 NOTE — TELEPHONE ENCOUNTER
15 Mar 2018, 16:30PM.  Patient just called in complaining of pain, she had cervical epidural steroid injection with IV conscious sedation recently. This is causing her some discomfort. I will call her in a Medrol Dosepak to her pharmacy. If she is still having significant discomfort I have offered to see her as a walk-in in the clinic in the afternoon next Tuesday, March 20, 2018.  [facility]

## 2018-03-15 NOTE — TELEPHONE ENCOUNTER
Patient called with complaint of increased pain post procedure on 3/6/18. Dr Abi Faye informed and Medrol dose pack sent to [facility]  on [address] TO follow up on 3/20/18 if no improvement.

## 2018-03-29 ENCOUNTER — OFFICE VISIT (OUTPATIENT)
Dept: PAIN MANAGEMENT | Age: 73
End: 2018-03-29

## 2018-03-29 VITALS
WEIGHT: 105 LBS | TEMPERATURE: 98.2 F | HEIGHT: 60 IN | SYSTOLIC BLOOD PRESSURE: 121 MMHG | BODY MASS INDEX: 20.62 KG/M2 | HEART RATE: 85 BPM | DIASTOLIC BLOOD PRESSURE: 67 MMHG | RESPIRATION RATE: 14 BRPM

## 2018-03-29 DIAGNOSIS — G89.4 CHRONIC PAIN SYNDROME: ICD-10-CM

## 2018-03-29 DIAGNOSIS — M47.812 CERVICAL FACET SYNDROME: ICD-10-CM

## 2018-03-29 DIAGNOSIS — M79.18 MYOFASCIAL PAIN SYNDROME: ICD-10-CM

## 2018-03-29 DIAGNOSIS — M47.812 SPONDYLOSIS OF CERVICAL REGION WITHOUT MYELOPATHY OR RADICULOPATHY: Primary | ICD-10-CM

## 2018-03-29 DIAGNOSIS — M48.02 CERVICAL SPINAL STENOSIS: ICD-10-CM

## 2018-03-29 DIAGNOSIS — M50.30 DEGENERATIVE DISC DISEASE, CERVICAL: ICD-10-CM

## 2018-03-29 RX ORDER — ACETAMINOPHEN 500 MG/1
650 TABLET, FILM COATED ORAL
COMMUNITY
End: 2019-05-15

## 2018-03-29 NOTE — PROGRESS NOTES
[address] [facility]  Interventional Pain Management Consultation History & Physical    PATIENT NAME:  Smiley WIN     YOB: 1945    DATE OF SERVICE:   3/29/2018      CHIEF COMPLAINT:  Neck Pain      REASON FOR VISIT:   Claudette Cooley presents to the pain clinic today for follow on evaluation and to consider interventional pain management options as indicated for the type and location of the pain the patient is presenting with. HISTORY OF PRESENT ILLNESS: Patient returns for clinical reevaluation and exam within 30 days of anticipated upcoming procedure. By way review I had previously seen this very pleasant lady November 18, 2017. I found her to have signs and symptoms primarily suggestive of bilateral neck pain which I believe is due to cervical facet syndrome secondary to cervical facet hypertrophy and cervical facet arthropathy causing bilateral cervical facet mediated pain. I had scheduled her for bilateral cervical radiofrequency neurotomy procedures at C4-5, C5-6, C6-7 levels with IV conscious sedation. However before the patient's initial visit for the procedure, she had a panic attack and did not complete or even begin the procedure. I therefore reevaluated her on January 25, 2018. I found her to have symptoms not only suggestive of cervical facet syndrome, but also cervical radiculitis. She underwent cervical epidural steroid injections with improvement of her radiating upper extremity symptoms. She returns with bilateral neck pain, again suggestive of cervical facet related pain. Aching throbbing pain bilateral increased with cervical facet turning to the right or left exacerbating cervical facet load and pain. At this juncture, she does not have significant upper extremity radiating or radicular pain. We reviewed her cervical imaging, cervical CT from October 17, 2017 demonstrating degenerative disc disease C5-6 and C6-7. Cervical facet hypertrophy and cervical facet arthropathy also noted. Bilateral and severe neuroforaminal narrowing at several levels. Central canal stenosis noted. Hypertrophic spinal bone formation/calcification in the joint space C1 anterior arch severe space narrowing C6-7, C5-6 severe canal stenosis. Neuroforaminal narrowing. Overall multilevel degenerative changes are noted. ASSESSMENT/OPTIONS: as follows. We discussed options. Patient is now willing to reattempt cervical radiofrequency diagnostic medial branch blocks and as indicated cervical radiofrequency neurotomy procedures. I have asked her to take her anti-anxiety medicine BuSpar, she has also 0.5 mg Ativan that she takes p.o. as needed. She can also take that. We will provide her with p.o. Valium as needed. She should be able to tolerate her procedure if she takes his medications for anxiety. I have discussed the risks and benefits, indications, contraindications, and side effects of intended procedure with the patient. I have used skeleton spine model to describe and discuss the procedure with the patient. I have answered all questions relating to the procedure. Patient understands the nature of the procedure and wishes to proceed. Patient has no further questions. From previous clinical follow-up note January 25, 2018 is followed-  HISTORY OF PRESENT ILLNESS:     Patient presents for follow on evaluation and to discuss interventional pain procedures as may be indicated. I had originally seen her on November 28, 2017. At that time I found her to have signs and symptoms, as well as exam and imaging evidence primarily suggestive of bilateral neck pain due to cervical facet arthropathy and cervical facet hypertrophy causing cervical facet syndrome. I discussed with her risk and benefits of cervical radiofrequency neurotomy procedure at bilateral C4-5, C5-6, C6-7 levels with IV conscious sedation.   Unfortunately, before the patient's initial procedural visit, she suffered a panic attack and severe emotional distress. She feels it was actually related to her upcoming procedure, she did not feel she could mentally tolerate cervical radiofrequency neurotomy procedure. Since that time, the nature of the patient's pain has actually changed. She not only has bilateral neck pain, but she now also has bilateral upper extremity pins and needles, tingling, radiating upper extremity pain. This pain is poorly controlled with her current pain regimen. She has not had physical therapy for this pain. We again reviewed her cervical imaging. Cervical CT was again reviewed from October 17, 2017. Multifocal degenerative disc disease most significant C5-6 C6-7. Cervical facet hypertrophy, cervical facet arthropathy is noted. Bilateral and severe neuroforaminal narrowing is noted at several levels. Anterior subluxation C2-C4 with canal stenosis C2-3 of 7 mm. Multiple areas of cervical degeneration noted on cervical spine hypertrophic bone formation/calcification of joint space C1 anterior arch. Subluxation C2-3, C3-4 1 mm each. Severe space narrowing C6-7 severe disc narrowing C5-6 severe canal stenosis on the left C3-4 C4-5 C6-7 neuroforamina.         ASSESSMENT/OPTIONS: as follows. We discussed options. Given the nature of her current symptoms involving bilateral upper extremity pins and needles, shooting pain, as well as imaging evidence suggestive of nerve root compression at many cervical levels, I believe she has a strong element of cervical radiculitis secondary to cervical spinal stenosis and other degenerative changes. Furthermore, given the fact that she had a panic attack prior to cervical radiofrequency neurotomy procedures, I am at the present somewhat disinclined to continue along cervical radiofrequency route as I do not think she is psychologically prepared to undergo this.   I will instead planned cervical epidural steroid injections with IV conscious sedation for her neck and bilateral upper extremity symptoms. I have discussed the risks and benefits, indications, contraindications, and side effects of intended procedure with the patient. I have used skeleton spine model to describe and discuss the procedure with the patient. I have answered all questions relating to the procedure. Patient understands the nature of the procedure and wishes to proceed. Patient has no further questions. She should tolerate cervical epidural steroid injections much better than radiofrequency procedure. I have thoroughly discussed this with the patient. She understands and agrees. MRI Results (most recent):    Results from [place] encounter on 10/02/17   MRA BRAIN WO CONT   Narrative INTRACRANIAL MRA    CPT CODE: 78473    HISTORY: Stroke like symptoms, left-sided weakness and facial droop upon  awakening. COMPARISON: None. FINDINGS:     The distal cervical, petrous, cavernous and supraclinoid segments of the  internal carotid arteries are patent. Bilateral anterior and middle cerebral  arteries are patent. In the posterior circulation, the vertebral arteries are patent to the  vertebrobasilar junction. Basilar trunk the basilar terminus are patent. Bilateral posterior cerebral and superior cerebellar arteries are patent. No evidence of intracranial aneurysm or hemodynamically significant stenosis. Impression IMPRESSION:    Patent intracranial vasculature. PAST MEDICAL HISTORY:   The patient  has a past medical history of Abnormal Pap smear; Allergic rhinitis; Arthritis; Chronic pain; Hypercholesterolemia; Neck pain (5/17/2010); and Stroke ([address] (10/02/2017). She also has no past medical history of Anemia; Asthma; Cancer ([address] Chronic bronchitis ([address] Chronic kidney disease; Chronic lung disease; Chronic sinusitis; Congestive heart failure ([address]  Coronary artery disease; Diabetes mellitus ([facility] DVT (deep venous thrombosis) ([facility] GERD (gastroesophageal reflux disease); Hypertension; Liver disease; Pneumonia; Positive PPD; Pulmonary embolism ([address] Pulmonary emphysema ([address] Thyroid disease; or Tuberculosis. PAST SURGICAL HISTORY:   The patient  has a past surgical history that includes hx gyn; hx lap cholecystectomy; and hx other surgical (2017). CURRENT MEDICATIONS:   The patient has a current medication list which includes the following prescription(s): acetaminophen, buspirone, atorvastatin, loratadine, omeprazole, alendronate, lorazepam, and aspirin. ALLERGIES:   No Known Allergies    FAMILY HISTORY:   The patient family history includes Cancer in her mother; Heart Disease in her father. SOCIAL HISTORY:   The patient  reports that she has never smoked. She has never used smokeless tobacco. The patient  reports that she does not drink alcohol. She      REVIEW OF SYSTEMS:    The patient denies fever, chills, weight loss (Constitutional), rash, itching (Skin), tinnitus, congestion (HENT), blurred vision, photophobia (Eyes), palpitations, orthopnea (Cardiovascular), hemoptysis, wheezing (Respiratory), nausea, vomiting, diarrhea (Gastrointestinal), dysuria, hematuria, urgency (Genitourinary), bowel or bladder incontinence, loss of consciousness (Neurologic), suicidal or homicidal ideation or hallucinations (Psychiatric). Denies swelling, axillary or groin masses (Lymphatic). PHYSICAL EXAM:  VS:   Visit Vitals    /67 (BP 1 Location: Right arm, BP Patient Position: Sitting)    Pulse 85    Temp 98.2 °F (36.8 °C) (Oral)    Resp 14    Ht 5' (1.524 m)    Wt 47.6 kg (105 lb)    BMI 20.51 kg/m2     General: Well-developed and well-nourished. Body habitus consistent with recorded height and weight and the calculated BMI. Apparent distress due to bilateral neck pain. Head: Normocephalic, atraumatic.   Skin: Inspection of the skin reveals no rashes, lesions or infection. CV: Regular rate. No murmurs or rubs noted. No peripheral edema noted. Pulm: Respirations are even and unlabored. Extr: No clubbing, cyanosis, or edema noted. Musculoskeletal:  1. Cervical spine -decreased range of motion bilateral .  Paraspinous tenderness throughout the posterior cervical spine area. There is no scoliosis, asymmetry, or musculoskeletal defect. 2. Thoracic spine - Full ROM. No paraspinous tenderness at any level. There is no scoliosis, asymmetry, or musculoskeletal defect. 3. Lumbar spine - Full ROM. No paraspinous tenderness at any level. SI joints are nontender bilaterally. There is no scoliosis, asymmetry, or musculoskeletal defect. 4. Right upper extremity - Full ROM. 5/5 muscle strength in all muscle groups. No pain or tenderness in shoulder, elbow, wrist, or hand. 5. Left upper extremity - Full ROM. 5/5 muscle strength in all muscle groups. No pain or tenderness in shoulder, elbow, wrist, or hand. 6. Right lower extremity - Full ROM. 5/5 muscle strength in all muscle groups. No pain, tenderness, or swelling in the hip, knee, ankle or foot. 7. Left lower extremity - Full ROM. 5/5 muscle strength in all muscle groups. No pain, tenderness, or swelling in the hip, knee, ankle or foot. Neurological:  1. Mental Status - Alert, awake and oriented. Speech is clear and appropriate. 2. Cranial Nerves - Extraocular muscles intact bilaterally. Cranial nerves II-XII grossly intact bilaterally. 3. Gait - Non-antalgic   4. Reflexes - 2+ and symmetric throughout. 5. Sensation - Intact to light touch and pin prick. 6. Provocative Tests - Spurlings negative bilaterally. Psychological:  1. Mood and affect - Appropriate. 2. Speech - Appropriate. 3. Though content - Appropriate. 4. Judgment - Appropriate. ASSESSMENT:      ICD-10-CM ICD-9-CM    1. Spondylosis of cervical region without myelopathy or radiculopathy M47.812 721.0    2.  Cervical facet syndrome M12.88 723.8    3. Cervical spinal stenosis M48.02 723.0    4. Degenerative disc disease, cervical M50.30 722.4    5. Chronic pain syndrome G89.4 338.4    6. Myofascial pain syndrome M79.1 729.1          PLAN:    1.    I have thoroughly discussed the risks and benefits, indications, contraindications, and side effects of any and procedures that were mentioned at today's patient visit. I have used a skeleton model to explain all procedures, as well as to provide added emphasis regarding procedures and as well for patient education purposes. I have answered all questions in great detail, and I have obtained verbal confirmation for all procedures planned with the patient. 3.    I have reviewed in great detail today, when indicated, the patient's MRI and other imaging studies with the patient. I have explained to the patient, when indicated, their condition using both actual recent and relevant images insofar as I am able to obtain actual images. I have used a skeleton model for added emphasis as well as patient education. 4.    I have advised patient to have a primary care provider continue to care for their health maintenance and general medical conditions. 5,    I have placed appropriate referrals to specialty care providers as I have deemed necessary through today's clinical consultation with the patient. 5.    I have explained to the patient that if any significant side effects, issues, problems, concerns, or perceived complications may arise at around the time of the patient's procedures, they should either call the pain management clinic or go to the emergency room immediately for medical provider evaluation. 6.   I have encouraged all patients to call the pain management clinic with any questions or concerns that they may have pertaining to their procedures. DISPOSITION:   The patients condition and plan were discussed at length and all questions were answered.   The patient agrees with the plan. A total of 25 minutes was spent with the patient of which over half of the time was spent counseling the patient. Mekhi Valdes MD 3/29/2018 5:11 PM    Note: Although these clinic notes were documented by the provider at the time of the exam, they have not been proofed and are subject to transcription variance.

## 2018-03-29 NOTE — PROGRESS NOTES
The pt is here today to discuss procedure options with Dr. Daja Alvarez. Patient rates pain score as 10/10.

## 2018-04-04 ENCOUNTER — HOSPITAL ENCOUNTER (OUTPATIENT)
Age: 73
Setting detail: OUTPATIENT SURGERY
Discharge: HOME | End: 2018-04-04
Attending: PHYSICAL MEDICINE & REHABILITATION | Admitting: PHYSICAL MEDICINE & REHABILITATION
Payer: MEDICARE

## 2018-04-04 ENCOUNTER — APPOINTMENT (OUTPATIENT)
Dept: GENERAL RADIOLOGY | Age: 73
End: 2018-04-04
Attending: PHYSICAL MEDICINE & REHABILITATION
Payer: MEDICARE

## 2018-04-04 VITALS
OXYGEN SATURATION: 94 % | RESPIRATION RATE: 18 BRPM | TEMPERATURE: 98.5 F | BODY MASS INDEX: 20.62 KG/M2 | HEART RATE: 88 BPM | DIASTOLIC BLOOD PRESSURE: 72 MMHG | SYSTOLIC BLOOD PRESSURE: 116 MMHG | HEIGHT: 60 IN | WEIGHT: 105 LBS

## 2018-04-04 PROCEDURE — 74011250636 HC RX REV CODE- 250/636

## 2018-04-04 PROCEDURE — 74011250636 HC RX REV CODE- 250/636: Performed by: PHYSICAL MEDICINE & REHABILITATION

## 2018-04-04 PROCEDURE — 74011000250 HC RX REV CODE- 250

## 2018-04-04 PROCEDURE — 76010000009 HC PAIN MGT 0 TO 30 MIN PROC: Performed by: PHYSICAL MEDICINE & REHABILITATION

## 2018-04-04 PROCEDURE — 74011250637 HC RX REV CODE- 250/637: Performed by: PHYSICAL MEDICINE & REHABILITATION

## 2018-04-04 PROCEDURE — 77030003672 HC NDL SPN HALY -A: Performed by: PHYSICAL MEDICINE & REHABILITATION

## 2018-04-04 RX ORDER — ROPIVACAINE HYDROCHLORIDE 2 MG/ML
INJECTION, SOLUTION EPIDURAL; INFILTRATION; PERINEURAL AS NEEDED
Status: DISCONTINUED | OUTPATIENT
Start: 2018-04-04 | End: 2018-04-04 | Stop reason: HOSPADM

## 2018-04-04 RX ORDER — LIDOCAINE HYDROCHLORIDE 10 MG/ML
INJECTION, SOLUTION EPIDURAL; INFILTRATION; INTRACAUDAL; PERINEURAL AS NEEDED
Status: DISCONTINUED | OUTPATIENT
Start: 2018-04-04 | End: 2018-04-04 | Stop reason: HOSPADM

## 2018-04-04 RX ORDER — DIAZEPAM 5 MG/1
5-20 TABLET ORAL ONCE
Status: COMPLETED | OUTPATIENT
Start: 2018-04-04 | End: 2018-04-04

## 2018-04-04 RX ADMIN — DIAZEPAM 10 MG: 5 TABLET ORAL at 08:07

## 2018-04-04 NOTE — INTERVAL H&P NOTE
H&P Update: Ary Cleveland was seen and examined. History and physical has been reviewed. The patient has been examined. There have been no significant clinical changes since the completion of the originally dated History and Physical.    [address] for Pain Management  Brief Pre-Procedure History & Physical    PATIENT NAME:  Yvrose Leal OF BIRTH:  1945  DATE OF SERVICE:  4/4/2018      CHIEF COMPLAINT:  Pain    HISTORY OF PRESENT ILLNESS:  Ary Cleveland presents today for a previously diagnosed problem contributing to some or all of this patients pain. The location and pattern of the pain has not changed substantially since the last visit in our office. No other significant medical changes have occurred in the last 30 days. PAST MEDICAL HISTORY:  The patient  has a past medical history of Abnormal Pap smear; Allergic rhinitis; Arthritis; Chronic pain; Hypercholesterolemia; Neck pain (5/17/2010); and Stroke ([address] (10/02/2017). She also has no past medical history of Anemia; Asthma; Cancer ([address] Chronic bronchitis ([address] Chronic kidney disease; Chronic lung disease; Chronic sinusitis; Congestive heart failure ([address] Coronary artery disease; Diabetes mellitus ([address] DVT (deep venous thrombosis) ([address] GERD (gastroesophageal reflux disease); Hypertension; Liver disease; Pneumonia; Positive PPD; Pulmonary embolism ([address] Pulmonary emphysema ([address] Thyroid disease; or Tuberculosis. PAST SURGICAL HISTORY:  The patient  has a past surgical history that includes hx gyn; hx lap cholecystectomy; and hx other surgical (2017). CURRENT MEDICATIONS:  See Medication Administration Record [facility]) in the patient's electronic record. ALLERGIES:  No Known Allergies    FAMILY HISTORY:  The patient family history includes Cancer in her mother; Heart Disease in her father. SOCIAL HISTORY:  The patient  reports that she has never smoked.  She has never used smokeless tobacco. The patient reports that she does not drink alcohol. She  reports that she does not use illicit drugs. REVIEW OF SYSTEMS:  Khadra Bailon denies any fever, chills, unexplained weight loss, use of antibiotics for recent infection or bleeding abnormalities. PHYSICAL EXAM:  VS:   Visit Vitals    /69 (BP 1 Location: Left arm, BP Patient Position: Sitting)    Pulse 90    Temp 98.5 °F (36.9 °C)    Resp 16    Ht 5' (1.524 m)    Wt 47.6 kg (105 lb)    SpO2 96%    BMI 20.51 kg/m2     Gen: Well-developed. Body habitus consistent with recorded height and weight and the calculated BMI. No apparent distress. Head: Normocephalic, atraumatic. Skin: No obvious rashes, lesions or infection. Pulm: Respirations are even and unlabored. Psych:    Mood, affect and speech - Appropriate. ASSESSMENT:   1. Stable for bilateral C4-6 MBB's, interventional pain procedure as discussed. PLAN:  Proceed with scheduled procedure.      Sol Heller MD 4/4/2018 8:19 AM        Signed By: Sol Heller MD     April 4, 2018 8:18 AM

## 2018-04-04 NOTE — H&P (VIEW-ONLY)
The pt is here today to discuss procedure options with Dr. Shantanu Klein. Patient rates pain score as 10/10.

## 2018-04-04 NOTE — PROCEDURES
[facility]    DIAGNOSTIC CERVICAL FACET INJECTION  PROCEDURE REPORT      PATIENT:  Zaki Willis OF BIRTH:  1945  DATE OF SERVICE:  4/4/2018  [facility]    PRE-PROCEDURE DIAGNOSIS:  See Above    POST-PROCEDURE DIAGNOSIS:  See Above                PROCEDURE:  1. Bilateral diagnostic cervical medial branch blocks at  C4/C5, C5/C6, C6/C7,  nerves  (92415, 50;  22167, 50;  46979, 50  )  2. Fluoroscopic needle guidance (27711)      LEVELS TREATED:  Bilateral  C4/C5, C5/C6, C6/C7,  nerves    ANESTHESIA:   Local with oral sedation. See Medication Administration Record for specific medications and dosage. COMPLICATIONS: None. PHYSICIAN:  Radha Mims MD    PRE-PROCEDURE NOTE:  Pre-procedural assessment of the patient was performed including a limited history and physical examination. The details of the procedure were discussed with the patient, including the risks, benefits and alternative options and an informed consent was obtained. The patients NPO status, if necessary for the specific procedure and/or administration of moderate intravenous sedation, if utilized, and availability of a responsible adult to escort the patient following the procedure were confirmed. PROCEDURE NOTE:  The patient was brought to the procedure suite and positioned on the fluoroscopy table in the prone position. Physiologic monitors were applied and supplemental oxygen was administered via nasal cannula. The skin was prepped in the standard surgical fashion and sterile drapes were applied over the procedure site. Please refer to the Flowsheet for documentation of the patients vital signs and the Medication Administration Report for any oral and/or intravenous sedation administered prior to or during the procedure. 1% Lidocaine was utilized for local anesthesia.  Under AP fluoroscopic guidance a 25-gauge, 2-1/2 inch short bevel spinal needle was advanced from the posterior approach to the apex of the waist of the articular pillar at each of the above-listed levels. Each needle tip was rotated laterally and advanced slightly anteriorly 1-2 mm to lie alongside the corresponding medial branch nerve. After all needles were placed, 0.5 mL of ropivacaine 0.20% was injected at each location after the negative aspiration of blood, air or CSF. The needles were removed and the stilets were replaced. The procedure was performed on the contralateral side in the same fashion and at the same levels using the same volume of local anesthetic following negative aspiration of blood, air or CSF. The needles were removed intact. The area was thoroughly cleaned and sterile bandages applied as necessary. The patient tolerated the procedure well and vital signs remained stable throughout the procedure. The patient was assessed immediately following the procedure and was noted to have greater than 50% reduction in pain (a reduction from 7/10 to 2/10 in severity of cervical neck pain). Based on these results, the patient was considered an appropriate candidate for radiofrequency ablation of the above-mentioned medial branch nerves and will be scheduled for that procedure. The total levels successfully blocked were 3 levels,  both sides      POST-PROCEDURE COURSE:   The patient was escorted from the procedure suite in satisfactory condition and recovered per facility protocol based on the type of procedure performed and/or the sedation utilized. The patient did not experience any adverse events and remained hemodynamically stable during the post-procedure period. DISCHARGE NOTE:  Upon discharge, the patient was able to tolerate fluids and was in no acute distress. The patient was oriented to person, place and time and vital signs were stable.  Appropriate post-procedure instructions were provided and explained to the patient in detail and all questions were answered.     Genna Harry MD 4/4/2018 9:30 AM

## 2018-04-04 NOTE — DISCHARGE INSTRUCTIONS
[facility]      Post Procedures Instructions    *Resume Diet and Activity as tolerated. Rest for the remainder of the day. *You may fell worse before you feel better as the numbing medications wear off before the steroids take effect if used for your procedures. *Do not use affected extremity until numbness or loss of sensation has completely resolved without assistance. *DO NOT DRIVE, operate machinery/heavey equipment for 24 hours. *DO NOT DRINK ALCOHOL for 24 hours as it may interact with the sedation if you received it and also thins your blood and may cause you to bleed. *WAIT 24 hours before starting back ANY Blood thinning medications:   (Heparin, Coumadin, Warfarin, Lovenox, Plavix, Aggrenox)    *Resume Pre-Procedure Medications as prescribed except Blood Thinners unless directed by your Physician or Cardiologist.     *Avoid Hot tubs and Heating pad for 24 hours to prevent dissipation of medications, you may shower to remove bandages and remaining prep residue on the skin. * If you develop a Headache, drink plenty of fluids including beverages with caffeine (Coffee, Mt. Dew etc.) and rest.  If the headache persists longer than 24 hoursor intensifies - Please call [facility] ([facility]) [phone]    * If you are DIABETIC, check your blood sugar three times a day for the next three days, the steroids will increase your blood sugar. If your blood sugar is greater than 400 have someone drive you to the nearest [address]. * If you experience any of the following problems, call the [facility] [phone] between 8:00 am - 4:30pm or After Hours [phone].     Shortness of breath    Fever of 101 F or higher    Nausea / Vomiting (not normal to you)    Increasing stiffness in the neck    Weakness or numbness in the arms or legs that is not resolving    Prolonged and increasing pain > than 4 days    ANYTHING OUT of the ORDINARY TO YOU    If YOU are experiencing a severe reaction / complication that you have never had before post procedure, call 911 or go to the nearest emergency room! All patients must have a  for transportation [place] regardless if you do or do not receive sedation. DISCHARGE SUMMARY from Nurse      PATIENT INSTRUCTIONS:    After Oral  or intravenous sedation, for 24 hours or while taking prescription Narcotics:  · Limit your activities  · Do not drive and operate hazardous machinery  · Do not make important personal or business decisions  · Do  not drink alcoholic beverages  · If you have not urinated within 8 hours after discharge, please contact your surgeon on call. Report the following to your surgeon:  · Excessive pain, swelling, redness or odor of or around the surgical area  · Temperature over 101  · Nausea and vomiting lasting longer than 4 hours or if unable to take medications  · Any signs of decreased circulation or nerve impairment to extremity: change in color, persistent  numbness, tingling, coldness or increase pain  · Any questions        What to do at Home:  Recommended activity: Activity as tolerated, NO DRIVING FOR 24 Hours post injection          *  Please give a list of your current medications to your Primary Care Provider. *  Please update this list whenever your medications are discontinued, doses are      changed, or new medications (including over-the-counter products) are added. *  Please carry medication information at all times in case of emergency situations. These are general instructions for a healthy lifestyle:    No smoking/ No tobacco products/ Avoid exposure to second hand smoke    Surgeon General's Warning:  Quitting smoking now greatly reduces serious risk to your health.     Obesity, smoking, and sedentary lifestyle greatly increases your risk for illness    A healthy diet, regular physical exercise & weight monitoring are important for maintaining a healthy lifestyle    You may be retaining fluid if you have a history of heart failure or if you experience any of the following symptoms:  Weight gain of 3 pounds or more overnight or 5 pounds in a week, increased swelling in our hands or feet or shortness of breath while lying flat in bed. Please call your doctor as soon as you notice any of these symptoms; do not wait until your next office visit. Recognize signs and symptoms of STROKE:    F-face looks uneven    A-arms unable to move or move unevenly    S-speech slurred or non-existent    T-time-call 911 as soon as signs and symptoms begin-DO NOT go       Back to bed or wait to see if you get better-TIME IS BRAIN.

## 2018-04-05 ENCOUNTER — TELEPHONE (OUTPATIENT)
Dept: PAIN MANAGEMENT | Age: 73
End: 2018-04-05

## 2018-04-05 ENCOUNTER — OFFICE VISIT (OUTPATIENT)
Dept: INTERNAL MEDICINE CLINIC | Age: 73
End: 2018-04-05

## 2018-04-05 VITALS
SYSTOLIC BLOOD PRESSURE: 120 MMHG | BODY MASS INDEX: 21.09 KG/M2 | WEIGHT: 107.4 LBS | HEART RATE: 96 BPM | DIASTOLIC BLOOD PRESSURE: 78 MMHG | RESPIRATION RATE: 16 BRPM | OXYGEN SATURATION: 97 % | HEIGHT: 60 IN | TEMPERATURE: 100.1 F

## 2018-04-05 DIAGNOSIS — R05.9 COUGH: ICD-10-CM

## 2018-04-05 DIAGNOSIS — J06.9 UPPER RESPIRATORY TRACT INFECTION, UNSPECIFIED TYPE: Primary | ICD-10-CM

## 2018-04-05 DIAGNOSIS — R68.89 FLU-LIKE SYMPTOMS: ICD-10-CM

## 2018-04-05 DIAGNOSIS — J02.9 SORE THROAT: ICD-10-CM

## 2018-04-05 LAB
QUICKVUE INFLUENZA TEST: NEGATIVE
S PYO AG THROAT QL: NEGATIVE
VALID INTERNAL CONTROL?: YES
VALID INTERNAL CONTROL?: YES

## 2018-04-05 RX ORDER — FLUTICASONE PROPIONATE 50 MCG
SPRAY, SUSPENSION (ML) NASAL
Qty: 1 BOTTLE | Refills: 1 | Status: SHIPPED | OUTPATIENT
Start: 2018-04-05 | End: 2018-07-02 | Stop reason: SDUPTHER

## 2018-04-05 RX ORDER — BENZONATATE 100 MG/1
100 CAPSULE ORAL
Qty: 21 CAP | Refills: 0 | Status: SHIPPED | OUTPATIENT
Start: 2018-04-05 | End: 2018-04-12

## 2018-04-05 RX ORDER — MONTELUKAST SODIUM 10 MG/1
10 TABLET ORAL DAILY
Qty: 30 TAB | Refills: 2 | Status: SHIPPED | OUTPATIENT
Start: 2018-04-05 | End: 2018-07-02 | Stop reason: SDUPTHER

## 2018-04-05 RX ORDER — AZITHROMYCIN 250 MG/1
TABLET, FILM COATED ORAL
Qty: 6 TAB | Refills: 0 | Status: SHIPPED | OUTPATIENT
Start: 2018-04-05 | End: 2018-04-16 | Stop reason: ALTCHOICE

## 2018-04-05 NOTE — TELEPHONE ENCOUNTER
Ms. Bora Rushing was contacted for follow-up status post Bilateral diagnostic cervical medial branch blocks at  C4/C5, C5/C6, C6/C7,  nerves  on April 4, 2018.  She reports:    Pre-procedure numerical pain score: 10/10  Post-procedure numerical pain score immediately after: 5/10  Duration of relief post-procedure (if applicable): 12 hrs  Improvement in functional activities (if applicable): Yes  Percentage of overall improvement: 50%    COMMENTS:

## 2018-04-05 NOTE — PROGRESS NOTES
Ary Cleveland is a 81 y.o. female presenting today for Cough; Nasal Congestion (reported that the symptoms been going on for 6 months,stated the Claritin is not helping); and Sore Throat  . HPI:  Ary Cleveland presents to the office today for sore throat and headache x 2 days. She is also complaining nasal congestion and productive cough    Review of Systems   Constitutional: Negative for fever. HENT: Positive for congestion and sore throat. Respiratory: Positive for cough and sputum production. Cardiovascular: Negative for chest pain and palpitations. Neurological: Negative for headaches. No Known Allergies    Current Outpatient Prescriptions   Medication Sig Dispense Refill    fluticasone (FLONASE) 50 mcg/actuation nasal spray 2 spray each nostril daily 1 Bottle 1    montelukast (SINGULAIR) 10 mg tablet Take 1 Tab by mouth daily. 30 Tab 2    azithromycin (ZITHROMAX) 250 mg tablet Take 2 tablets today, then take 1 tablet daily 6 Tab 0    benzonatate (TESSALON) 100 mg capsule Take 1 Cap by mouth three (3) times daily as needed for Cough for up to 7 days. 21 Cap 0    acetaminophen (TYLENOL EXTRA STRENGTH) 500 mg tablet Take  by mouth every six (6) hours as needed for Pain.  busPIRone (BUSPAR) 7.5 mg tablet Take 1 Tab by mouth two (2) times a day. 60 Tab 1    atorvastatin (LIPITOR) 20 mg tablet Take 20 mg by mouth daily.  loratadine (CLARITIN) 10 mg tablet Take 10 mg by mouth daily as needed for Allergies or Rhinitis.  omeprazole (PRILOSEC) 20 mg capsule Take 20 mg by mouth daily.  alendronate (FOSAMAX) 10 mg tablet Take 1 Tab by mouth Daily (before breakfast). 30 Tab 2    LORazepam (ATIVAN) 0.5 mg tablet Take 1 Tab by mouth every four (4) hours as needed for Anxiety. Max Daily Amount: 3 mg. 30 Tab 0    aspirin 81 mg chewable tablet Take 1 Tab by mouth daily.  30 Tab 3       Past Medical History:   Diagnosis Date    Abnormal Pap smear     Dr. [place] Allergic rhinitis     Arthritis     Chronic pain     Hypercholesterolemia     Neck pain 5/17/2010    Stroke ([address] 10/02/2017    TIA-        Past Surgical History:   Procedure Laterality Date    HX GYN      Total Hyst    HX LAP CHOLECYSTECTOMY      HX OTHER SURGICAL  2017    Throat widened       Social History     Social History    Marital status:      Spouse name: N/A    Number of children: N/A    Years of education: N/A     Occupational History    Not on file. Social History Main Topics    Smoking status: Never Smoker    Smokeless tobacco: Never Used    Alcohol use No    Drug use: No    Sexual activity: No     Other Topics Concern    Not on file     Social History Narrative       Patient does not have an advanced directive on file    Vitals:    04/05/18 1520   BP: 120/78   Pulse: 96   Resp: 16   Temp: 100.1 °F (37.8 °C)   TempSrc: Oral   SpO2: 97%   Weight: 107 lb 6.4 oz (48.7 kg)   Height: 5' (1.524 m)   PainSc:   5   PainLoc: Head       Physical Exam   HENT:   Mouth/Throat: No oropharyngeal exudate. Cerumen debris AU   Cardiovascular: Normal rate, regular rhythm and normal heart sounds. Pulmonary/Chest: Effort normal and breath sounds normal.   Nursing note and vitals reviewed.       Office Visit on 04/05/2018   Component Date Value Ref Range Status    VALID INTERNAL CONTROL POC 04/05/2018 Yes   Final    Group A Strep Ag 04/05/2018 Negative  Negative Final    VALID INTERNAL CONTROL POC 04/05/2018 Yes   Final    QuickVue Influenza test 04/05/2018 Negative  Negative Final   Hospital Outpatient Visit on 03/12/2018   Component Date Value Ref Range Status    Sodium 03/12/2018 140  136 - 145 mmol/L Final    Potassium 03/12/2018 4.6  3.5 - 5.5 mmol/L Final    Chloride 03/12/2018 105  100 - 108 mmol/L Final    CO2 03/12/2018 29  21 - 32 mmol/L Final    Anion gap 03/12/2018 6  3.0 - 18 mmol/L Final    Glucose 03/12/2018 81  74 - 99 mg/dL Final    BUN 03/12/2018 19* 7.0 - 18 MG/DL Final  Creatinine 03/12/2018 0.69  0.6 - 1.3 MG/DL Final    BUN/Creatinine ratio 03/12/2018 28* 12 - 20   Final    GFR est AA 03/12/2018 >60  >60 ml/min/1.73m2 Final    GFR est non-AA 03/12/2018 >60  >60 ml/min/1.73m2 Final    Comment: (NOTE)  Estimated GFR is calculated using the Modification of Diet in Renal   Disease (MDRD) Study equation, reported for both  Americans   (GFRAA) and non- Americans (GFRNA), and normalized to 1.73m2   body surface area. The physician must decide which value applies to   the patient. The MDRD study equation should only be used in   individuals age 25 or older. It has not been validated for the   following: pregnant women, patients with serious comorbid conditions,   or on certain medications, or persons with extremes of body size,   muscle mass, or nutritional status.  Calcium 03/12/2018 9.5  8.5 - 10.1 MG/DL Final    Bilirubin, total 03/12/2018 0.3  0.2 - 1.0 MG/DL Final    ALT (SGPT) 03/12/2018 25  13 - 56 U/L Final    AST (SGOT) 03/12/2018 19  15 - 37 U/L Final    Alk.  phosphatase 03/12/2018 99  45 - 117 U/L Final    Protein, total 03/12/2018 7.2  6.4 - 8.2 g/dL Final    Albumin 03/12/2018 3.9  3.4 - 5.0 g/dL Final    Globulin 03/12/2018 3.3  2.0 - 4.0 g/dL Final    A-G Ratio 03/12/2018 1.2  0.8 - 1.7   Final   Hospital Outpatient Visit on 02/26/2018   Component Date Value Ref Range Status    WBC 02/26/2018 9.5  4.6 - 13.2 K/uL Final    RBC 02/26/2018 4.47  4.20 - 5.30 M/uL Final    HGB 02/26/2018 13.8  12.0 - 16.0 g/dL Final    HCT 02/26/2018 43.0  35.0 - 45.0 % Final    MCV 02/26/2018 96.2  74.0 - 97.0 FL Final    MCH 02/26/2018 30.9  24.0 - 34.0 PG Final    MCHC 02/26/2018 32.1  31.0 - 37.0 g/dL Final    RDW 02/26/2018 12.9  11.6 - 14.5 % Final    PLATELET 03/59/1629 333  135 - 420 K/uL Final    MPV 02/26/2018 9.9  9.2 - 11.8 FL Final    NEUTROPHILS 02/26/2018 57  40 - 73 % Final    LYMPHOCYTES 02/26/2018 31  21 - 52 % Final    MONOCYTES 02/26/2018 10  3 - 10 % Final    EOSINOPHILS 02/26/2018 2  0 - 5 % Final    BASOPHILS 02/26/2018 0  0 - 2 % Final    ABS. NEUTROPHILS 02/26/2018 5.4  1.8 - 8.0 K/UL Final    ABS. LYMPHOCYTES 02/26/2018 3.0  0.9 - 3.6 K/UL Final    ABS. MONOCYTES 02/26/2018 0.9  0.05 - 1.2 K/UL Final    ABS. EOSINOPHILS 02/26/2018 0.2  0.0 - 0.4 K/UL Final    ABS. BASOPHILS 02/26/2018 0.0  0.0 - 0.1 K/UL Final    DF 02/26/2018 AUTOMATED    Final    Prothrombin time 02/26/2018 12.8  11.5 - 15.2 sec Final    INR 02/26/2018 1.0  0.8 - 1.2   Final    Comment:            INR Therapeutic Ranges         (on stable oral anticoagulant):     INDICATION                INR  DVT/PE/Atrial Fib          2.0-3.0  MI/Mechanical Heart Valve  2.5-3.5      aPTT 02/26/2018 26.3  23.0 - 36.4 [address] [place] [address] Final   Hospital Outpatient Visit on 01/16/2018   Component Date Value Ref Range Status    Creatinine, POC 01/16/2018 0.7  0.6 - 1.3 MG/DL Final    GFRAA, POC 01/16/2018 >60  >60 ml/min/1.73m2 Final    GFRNA, POC 01/16/2018 >60  >60 ml/min/1.73m2 Final    Comment: Estimated GFR is calculated using the IDMS-traceable Modification of Diet in Renal Disease (MDRD) Study equation, reported for both  Americans (GFRAA) and non- Americans (GFRNA), and normalized to 1.73m2 body surface area. The physician must decide which value applies to the patient. The MDRD study equation should only be used in individuals age 25 or older. It has not been validated for the following: pregnant women, patients with serious comorbid conditions, or on certain medications, or persons with extremes of body size, muscle mass, or nutritional status.      Hospital Outpatient Visit on 01/10/2018   Component Date Value Ref Range Status    Test Description: 01/10/2018 PBNP ON FROZEN PLASMA TEST [id number]   Final    Reference Lab: 01/10/2018 [facility]   Final    Results: 01/10/2018 SEE REPORT FROM REFERENCE LAB    Final    Comment: (NOTE)  TEST DESCRIPTION:  PRO BNP ON FROZEN PLASMA PER  TEST NUMBER [id number]    REFERENCE LAB:  [facility]    RESULTS:  96    REFERENCE RANGE: 0  pg/ml    The following cut points have been suggested for the use of proBNP   for the diagnostic evaluation of heart failure (HF) in patients with  acute dyspnea:   Modality                     Age (years)       Optimal Cut Point  Diagnosis (rule in HF)          <50               450 pg/ml                               50 to 75            900 pg/ml                                 >75               1800 pg/ml  Exclusion (rule out HF)      Age independent     300 pg/ml        NT-proBNP 01/10/2018 96  pg/mL Final    NT-proBNP 01/10/2018 96  pg/mL Final   Hospital Outpatient Visit on 01/09/2018   Component Date Value Ref Range Status    Sodium 01/09/2018 142  136 - 145 mmol/L Final    Potassium 01/09/2018 4.2  3.5 - 5.5 mmol/L Final    Chloride 01/09/2018 107  100 - 108 mmol/L Final    CO2 01/09/2018 26  21 - 32 mmol/L Final    Anion gap 01/09/2018 9  3.0 - 18 mmol/L Final    Glucose 01/09/2018 95  74 - 99 mg/dL Final    BUN 01/09/2018 21* 7.0 - 18 MG/DL Final    Creatinine 01/09/2018 0.73  0.6 - 1.3 MG/DL Final    BUN/Creatinine ratio 01/09/2018 29* 12 - 20   Final    GFR est AA 01/09/2018 >60  >60 ml/min/1.73m2 Final    GFR est non-AA 01/09/2018 >60  >60 ml/min/1.73m2 Final    Comment: (NOTE)  Estimated GFR is calculated using the Modification of Diet in Renal   Disease (MDRD) Study equation, reported for both  Americans   (GFRAA) and non- Americans (GFRNA), and normalized to 1.73m2   body surface area. The physician must decide which value applies to   the patient. The MDRD study equation should only be used in   individuals age 25 or older. It has not been validated for the   following: pregnant women, patients with serious comorbid conditions,   or on certain medications, or persons with extremes of body size,   muscle mass, or nutritional status.       Calcium 01/09/2018 10.2* 8.5 - 10.1 MG/DL Final    Bilirubin, total 01/09/2018 0.4  0.2 - 1.0 MG/DL Final    ALT (SGPT) 01/09/2018 64* 13 - 56 U/L Final    AST (SGOT) 01/09/2018 44* 15 - 37 U/L Final    Alk. phosphatase 01/09/2018 91  45 - 117 U/L Final    Protein, total 01/09/2018 7.9  6.4 - 8.2 g/dL Final    Albumin 01/09/2018 4.7  3.4 - 5.0 g/dL Final    Globulin 01/09/2018 3.2  2.0 - 4.0 g/dL Final    A-G Ratio 01/09/2018 1.5  0.8 - 1.7   Final    WBC 01/09/2018 10.5  4.6 - 13.2 K/uL Final    RBC 01/09/2018 4.47  4.20 - 5.30 M/uL Final    HGB 01/09/2018 14.2  12.0 - 16.0 g/dL Final    HCT 01/09/2018 43.3  35.0 - 45.0 % Final    MCV 01/09/2018 96.9  74.0 - 97.0 FL Final    MCH 01/09/2018 31.8  24.0 - 34.0 PG Final    MCHC 01/09/2018 32.8  31.0 - 37.0 g/dL Final    RDW 01/09/2018 13.2  11.6 - 14.5 % Final    PLATELET 99/97/7730 408  135 - 420 K/uL Final    MPV 01/09/2018 10.6  9.2 - 11.8 FL Final    NEUTROPHILS 01/09/2018 62  40 - 73 % Final    LYMPHOCYTES 01/09/2018 28  21 - 52 % Final    MONOCYTES 01/09/2018 10  3 - 10 % Final    EOSINOPHILS 01/09/2018 0  0 - 5 % Final    BASOPHILS 01/09/2018 0  0 - 2 % Final    ABS. NEUTROPHILS 01/09/2018 6.5  1.8 - 8.0 K/UL Final    ABS. LYMPHOCYTES 01/09/2018 2.9  0.9 - 3.6 K/UL Final    ABS. MONOCYTES 01/09/2018 1.0  0.05 - 1.2 K/UL Final    ABS. EOSINOPHILS 01/09/2018 0.0  0.0 - 0.4 K/UL Final    ABS. BASOPHILS 01/09/2018 0.0  0.0 - 0.06 K/UL Final    DF 01/09/2018 AUTOMATED    Final       .  Results for orders placed or performed in visit on 04/05/18   AMB POC RAPID STREP A   Result Value Ref Range    VALID INTERNAL CONTROL POC Yes     Group A Strep Ag Negative Negative   AMB POC RAPID INFLUENZA TEST   Result Value Ref Range    VALID INTERNAL CONTROL POC Yes     QuickVue Influenza test Negative Negative       Assessment / Plan:      ICD-10-CM ICD-9-CM    1. Upper respiratory tract infection, unspecified type J06.9 465.9 benzonatate (TESSALON) 100 mg capsule   2.  Cough R05 786.2 AMB POC RAPID INFLUENZA TEST      fluticasone (FLONASE) 50 mcg/actuation nasal spray      montelukast (SINGULAIR) 10 mg tablet      azithromycin (ZITHROMAX) 250 mg tablet      benzonatate (TESSALON) 100 mg capsule   3. Flu-like symptoms R68.89 780.99 AMB POC RAPID INFLUENZA TEST   4. Sore throat J02.9 462 AMB POC RAPID STREP A      montelukast (SINGULAIR) 10 mg tablet     Zpak rx  Singulair  Tessalon rx  Flonase rx  Influenza negative  Cerumen debris- ear wax removal x 3 days and return for irrigation  F/u prn    Follow-up Disposition:  Return if symptoms worsen or fail to improve. I asked the patient if she  had any questions and answered her  questions.   The patient stated that she understands the treatment plan and agrees with the treatment plan

## 2018-04-05 NOTE — PROGRESS NOTES
Identified pt with two pt identifiers(name and ). Reviewed record in preparation for visit and have obtained necessary documentation. Chief Complaint   Patient presents with    Cough    Nasal Congestion     reported that the symptoms been going on for 6 months,stated the Claritin is not helping    Sore Throat        Health Maintenance Due   Topic    Hepatitis C Screening     DTaP/Tdap/Td series (1 - Tdap)    FOBT Q 1 YEAR AGE 50-75     ZOSTER VACCINE AGE 60>     GLAUCOMA SCREENING Q2Y    HM reviewed w/patient. Depression Screening:  PHQ over the last two weeks 3/29/2018 3/12/2018 2018 2018 2018 2017   PHQ Not Done Active Diagnosis of Depression or Bipolar Disorder - - - Active Diagnosis of Depression or Bipolar Disorder -   Little interest or pleasure in doing things - Not at all Not at all Not at all - Not at all   Feeling down, depressed or hopeless - Not at all Not at all Not at all - Not at all   Total Score PHQ 2 - 0 0 0 - 0       Learning Assessment:  Learning Assessment 2017   PRIMARY LEARNER Patient Patient   HIGHEST LEVEL OF EDUCATION - PRIMARY LEARNER  GRADUATED [address] CAREGIVER No No   PRIMARY LANGUAGE ENGLISH ENGLISH   LEARNER PREFERENCE PRIMARY READING DEMONSTRATION   ANSWERED BY patient patient   RELATIONSHIP SELF SELF       Abuse Screening:  Abuse Screening Questionnaire 2017   Do you ever feel afraid of your partner? N N N   Are you in a relationship with someone who physically or mentally threatens you? N N N   Is it safe for you to go home? Colletta Springer       Fall Risk  Fall Risk Assessment, last 12 mths 3/29/2018 3/12/2018 2018 2018 2018 2017   Able to walk? Yes Yes Yes Yes Yes Yes   Fall in past 12 months?  No No No No No No       Coordination of Care Questionnaire:  :   1) Have you been to an emergency room, urgent care clinic since your last visit? no Hospitalized since your last visit? no             2. Have seen or consulted any other health care provider since your last visit? NO  If yes, where when, and reason for visit? 3) Do you have an Advanced Directive/ Living Will in place? YES  If yes, do we have a copy on file YES      Patient is accompanied by self.

## 2018-04-11 ENCOUNTER — CLINICAL SUPPORT (OUTPATIENT)
Dept: INTERNAL MEDICINE CLINIC | Age: 73
End: 2018-04-11

## 2018-04-11 DIAGNOSIS — H61.23 EXCESSIVE EAR WAX, BILATERAL: Primary | ICD-10-CM

## 2018-04-16 ENCOUNTER — OFFICE VISIT (OUTPATIENT)
Dept: INTERNAL MEDICINE CLINIC | Age: 73
End: 2018-04-16

## 2018-04-16 VITALS
OXYGEN SATURATION: 98 % | DIASTOLIC BLOOD PRESSURE: 64 MMHG | BODY MASS INDEX: 20.81 KG/M2 | HEART RATE: 92 BPM | TEMPERATURE: 97.6 F | WEIGHT: 106 LBS | SYSTOLIC BLOOD PRESSURE: 120 MMHG | RESPIRATION RATE: 16 BRPM | HEIGHT: 60 IN

## 2018-04-16 DIAGNOSIS — M47.812 CERVICAL FACET SYNDROME: ICD-10-CM

## 2018-04-16 DIAGNOSIS — M81.0 OSTEOPOROSIS, UNSPECIFIED OSTEOPOROSIS TYPE, UNSPECIFIED PATHOLOGICAL FRACTURE PRESENCE: ICD-10-CM

## 2018-04-16 DIAGNOSIS — F41.9 ANXIETY: Primary | ICD-10-CM

## 2018-04-16 RX ORDER — MIDAZOLAM HYDROCHLORIDE 1 MG/ML
.5-6 INJECTION, SOLUTION INTRAMUSCULAR; INTRAVENOUS
Status: CANCELLED | OUTPATIENT
Start: 2018-04-23

## 2018-04-16 RX ORDER — ALENDRONATE SODIUM 10 MG/1
10 TABLET ORAL
Qty: 30 TAB | Refills: 2 | Status: SHIPPED | OUTPATIENT
Start: 2018-04-16 | End: 2018-08-09 | Stop reason: SDUPTHER

## 2018-04-16 RX ORDER — SODIUM CHLORIDE 0.9 % (FLUSH) 0.9 %
5-10 SYRINGE (ML) INJECTION AS NEEDED
Status: CANCELLED | OUTPATIENT
Start: 2018-04-23

## 2018-04-16 RX ORDER — BUSPIRONE HYDROCHLORIDE 7.5 MG/1
7.5 TABLET ORAL 2 TIMES DAILY
Qty: 60 TAB | Refills: 1 | Status: SHIPPED | OUTPATIENT
Start: 2018-04-16 | End: 2018-06-08 | Stop reason: SDUPTHER

## 2018-04-16 NOTE — PROGRESS NOTES
Brandon Teresa is a 81 y.o. female presenting today for Well Woman (2 month follow up)  . HPI:  Brandon Teresa presents to the office today for hyperlipidemia and anxiety follow-up care. Patient reports she is feeling okay today. She was in the office last week for a URI and reports her symptoms have improved. She states the Buspar is controlling her anxiety symptoms. She is negative for chest pain or palpitations. Review of Systems   Constitutional: Negative for fever. HENT: Positive for congestion. Negative for sore throat. Respiratory: Negative for cough. Cardiovascular: Negative for chest pain and palpitations. Musculoskeletal: Positive for neck pain. Neurological: Negative for dizziness and headaches. No Known Allergies    Current Outpatient Prescriptions   Medication Sig Dispense Refill    alendronate (FOSAMAX) 10 mg tablet Take 1 Tab by mouth Daily (before breakfast). 30 Tab 2    busPIRone (BUSPAR) 7.5 mg tablet Take 1 Tab by mouth two (2) times a day. 60 Tab 1    fluticasone (FLONASE) 50 mcg/actuation nasal spray 2 spray each nostril daily 1 Bottle 1    montelukast (SINGULAIR) 10 mg tablet Take 1 Tab by mouth daily. 30 Tab 2    acetaminophen (TYLENOL EXTRA STRENGTH) 500 mg tablet Take  by mouth every six (6) hours as needed for Pain.  atorvastatin (LIPITOR) 20 mg tablet Take 20 mg by mouth daily.  loratadine (CLARITIN) 10 mg tablet Take 10 mg by mouth daily as needed for Allergies or Rhinitis.  omeprazole (PRILOSEC) 20 mg capsule Take 20 mg by mouth daily.  LORazepam (ATIVAN) 0.5 mg tablet Take 1 Tab by mouth every four (4) hours as needed for Anxiety. Max Daily Amount: 3 mg. 30 Tab 0    aspirin 81 mg chewable tablet Take 1 Tab by mouth daily.  30 Tab 3       Past Medical History:   Diagnosis Date    Abnormal Pap smear     Dr. Carmela Hidalgo Allergic rhinitis     Arthritis     Chronic pain     Hypercholesterolemia     Neck pain 5/17/2010    Stroke ([facility] 10/02/2017    TIA-        Past Surgical History:   Procedure Laterality Date    HX GYN      Total Hyst    HX LAP CHOLECYSTECTOMY      HX OTHER SURGICAL  2017    Throat widened       Social History     Social History    Marital status:      Spouse name: N/A    Number of children: N/A    Years of education: N/A     Occupational History    Not on file. Social History Main Topics    Smoking status: Never Smoker    Smokeless tobacco: Never Used    Alcohol use No    Drug use: No    Sexual activity: No     Other Topics Concern    Not on file     Social History Narrative       Patient does not have an advanced directive on file    Vitals:    04/16/18 0838   BP: 120/64   Pulse: 92   Resp: 16   Temp: 97.6 °F (36.4 °C)   TempSrc: Tympanic   SpO2: 98%   Weight: 106 lb (48.1 kg)   Height: 5' (1.524 m)   PainSc:   3   PainLoc: Neck       Physical Exam   Constitutional: No distress. HENT:   Mouth/Throat: No oropharyngeal exudate. Cardiovascular: Normal rate, regular rhythm and normal heart sounds. Pulmonary/Chest: Effort normal and breath sounds normal.   Abdominal: Soft. Musculoskeletal: She exhibits no edema. Neurological: She is alert. Nursing note and vitals reviewed.       Office Visit on 04/05/2018   Component Date Value Ref Range Status    VALID INTERNAL CONTROL POC 04/05/2018 Yes   Final    Group A Strep Ag 04/05/2018 Negative  Negative Final    VALID INTERNAL CONTROL POC 04/05/2018 Yes   Final    QuickVue Influenza test 04/05/2018 Negative  Negative Final   Hospital Outpatient Visit on 03/12/2018   Component Date Value Ref Range Status    Sodium 03/12/2018 140  136 - 145 mmol/L Final    Potassium 03/12/2018 4.6  3.5 - 5.5 mmol/L Final    Chloride 03/12/2018 105  100 - 108 mmol/L Final    CO2 03/12/2018 29  21 - 32 mmol/L Final    Anion gap 03/12/2018 6  3.0 - 18 mmol/L Final    Glucose 03/12/2018 81  74 - 99 mg/dL Final    BUN 03/12/2018 19* 7.0 - 18 MG/DL Final    Creatinine 03/12/2018 0.69  0.6 - 1.3 MG/DL Final    BUN/Creatinine ratio 03/12/2018 28* 12 - 20   Final    GFR est AA 03/12/2018 >60  >60 ml/min/1.73m2 Final    GFR est non-AA 03/12/2018 >60  >60 ml/min/1.73m2 Final    Comment: (NOTE)  Estimated GFR is calculated using the Modification of Diet in Renal   Disease (MDRD) Study equation, reported for both  Americans   (GFRAA) and non- Americans (GFRNA), and normalized to 1.73m2   body surface area. The physician must decide which value applies to   the patient. The MDRD study equation should only be used in   individuals age 25 or older. It has not been validated for the   following: pregnant women, patients with serious comorbid conditions,   or on certain medications, or persons with extremes of body size,   muscle mass, or nutritional status.  Calcium 03/12/2018 9.5  8.5 - 10.1 MG/DL Final    Bilirubin, total 03/12/2018 0.3  0.2 - 1.0 MG/DL Final    ALT (SGPT) 03/12/2018 25  13 - 56 U/L Final    AST (SGOT) 03/12/2018 19  15 - 37 U/L Final    Alk.  phosphatase 03/12/2018 99  45 - 117 U/L Final    Protein, total 03/12/2018 7.2  6.4 - 8.2 g/dL Final    Albumin 03/12/2018 3.9  3.4 - 5.0 g/dL Final    Globulin 03/12/2018 3.3  2.0 - 4.0 g/dL Final    A-G Ratio 03/12/2018 1.2  0.8 - 1.7   Final   Hospital Outpatient Visit on 02/26/2018   Component Date Value Ref Range Status    WBC 02/26/2018 9.5  4.6 - 13.2 K/uL Final    RBC 02/26/2018 4.47  4.20 - 5.30 M/uL Final    HGB 02/26/2018 13.8  12.0 - 16.0 g/dL Final    HCT 02/26/2018 43.0  35.0 - 45.0 % Final    MCV 02/26/2018 96.2  74.0 - 97.0 FL Final    MCH 02/26/2018 30.9  24.0 - 34.0 PG Final    MCHC 02/26/2018 32.1  31.0 - 37.0 g/dL Final    RDW 02/26/2018 12.9  11.6 - 14.5 % Final    PLATELET 58/60/4019 876  135 - 420 K/uL Final    MPV 02/26/2018 9.9  9.2 - 11.8 FL Final    NEUTROPHILS 02/26/2018 57  40 - 73 % Final    LYMPHOCYTES 02/26/2018 31  21 - 52 % Final    MONOCYTES 02/26/2018 10  3 - 10 % Final    EOSINOPHILS 02/26/2018 2  0 - 5 % Final    BASOPHILS 02/26/2018 0  0 - 2 % Final    ABS. NEUTROPHILS 02/26/2018 5.4  1.8 - 8.0 K/UL Final    ABS. LYMPHOCYTES 02/26/2018 3.0  0.9 - 3.6 K/UL Final    ABS. MONOCYTES 02/26/2018 0.9  0.05 - 1.2 K/UL Final    ABS. EOSINOPHILS 02/26/2018 0.2  0.0 - 0.4 K/UL Final    ABS. BASOPHILS 02/26/2018 0.0  0.0 - 0.1 K/UL Final    DF 02/26/2018 AUTOMATED    Final    Prothrombin time 02/26/2018 12.8  11.5 - 15.2 sec Final    INR 02/26/2018 1.0  0.8 - 1.2   Final    Comment:            INR Therapeutic Ranges         (on stable oral anticoagulant):     INDICATION                INR  DVT/PE/Atrial Fib          2.0-3.0  MI/Mechanical Heart Valve  2.5-3.5      aPTT 02/26/2018 26.3  23.0 - 36.4 SEC Final       .No results found for any visits on 04/16/18. Assessment / Plan:      ICD-10-CM ICD-9-CM    1. Anxiety F41.9 300.00 busPIRone (BUSPAR) 7.5 mg tablet   2. Osteoporosis, unspecified osteoporosis type, unspecified pathological fracture presence M81.0 733.00 alendronate (FOSAMAX) 10 mg tablet   3. Cervical facet syndrome (HCC) M46.92 723.8        Follow-up Disposition:  Return in about 4 months (around 8/16/2018). I asked the patient if she  had any questions and answered her  questions.   The patient stated that she understands the treatment plan and agrees with the treatment plan

## 2018-04-16 NOTE — PROGRESS NOTES
Chief Complaint   Patient presents with    Well Woman     2 month follow up         Is someone accompanying this pt? no    Is the patient using any DME equipment during OV? no    Depression Screening:  PHQ over the last two weeks 3/29/2018 3/12/2018 2/12/2018 2/9/2018 1/25/2018 11/28/2017   PHQ Not Done Active Diagnosis of Depression or Bipolar Disorder - - - Active Diagnosis of Depression or Bipolar Disorder -   Little interest or pleasure in doing things - Not at all Not at all Not at all - Not at all   Feeling down, depressed or hopeless - Not at all Not at all Not at all - Not at all   Total Score PHQ 2 - 0 0 0 - 0       Learning Assessment:  Learning Assessment 1/25/2018 11/28/2017   PRIMARY LEARNER Patient Patient   HIGHEST LEVEL OF EDUCATION - PRIMARY LEARNER  GRADUATED [address] CAREGIVER No No   PRIMARY LANGUAGE ENGLISH ENGLISH   LEARNER PREFERENCE PRIMARY READING DEMONSTRATION   ANSWERED BY patient patient   RELATIONSHIP SELF SELF       Abuse Screening:  Abuse Screening Questionnaire 2/9/2018 1/25/2018 11/28/2017   Do you ever feel afraid of your partner? N N N   Are you in a relationship with someone who physically or mentally threatens you? N N N   Is it safe for you to go home? Juan Bishop       Fall Risk  Fall Risk Assessment, last 12 mths 3/29/2018 3/12/2018 2/12/2018 2/9/2018 1/25/2018 11/28/2017   Able to walk? Yes Yes Yes Yes Yes Yes   Fall in past 12 months? No No No No No No         Health Maintenance reviewed and discussed per provider. Pt currently taking Antiplatelet therapy? no      Coordination of Care:  1. Have you been to the ER, urgent care clinic since your last visit? Hospitalized since your last visit? no    2. Have you seen or consulted any other health care providers outside of the [facility] since your last visit? Include any pap smears or colon screening.  no    Please see Red banners under Allergies, Med rec, Immunizations to remove outside inquires. All correct information has been verified with patient and added to chart.      Medication variance in dosage/sig per patient as follows: none

## 2018-04-19 ENCOUNTER — TELEPHONE (OUTPATIENT)
Dept: PAIN MANAGEMENT | Age: 73
End: 2018-04-19

## 2018-04-23 ENCOUNTER — APPOINTMENT (OUTPATIENT)
Dept: GENERAL RADIOLOGY | Age: 73
End: 2018-04-23
Attending: PHYSICAL MEDICINE & REHABILITATION
Payer: MEDICARE

## 2018-04-23 ENCOUNTER — HOSPITAL ENCOUNTER (OUTPATIENT)
Age: 73
Setting detail: OUTPATIENT SURGERY
Discharge: HOME | End: 2018-04-23
Attending: PHYSICAL MEDICINE & REHABILITATION | Admitting: PHYSICAL MEDICINE & REHABILITATION
Payer: MEDICARE

## 2018-04-23 VITALS
HEART RATE: 93 BPM | OXYGEN SATURATION: 100 % | RESPIRATION RATE: 18 BRPM | HEIGHT: 60 IN | WEIGHT: 106 LBS | BODY MASS INDEX: 20.81 KG/M2 | SYSTOLIC BLOOD PRESSURE: 122 MMHG | DIASTOLIC BLOOD PRESSURE: 75 MMHG | TEMPERATURE: 97.9 F

## 2018-04-23 PROCEDURE — 74011000250 HC RX REV CODE- 250

## 2018-04-23 PROCEDURE — 74011250636 HC RX REV CODE- 250/636

## 2018-04-23 PROCEDURE — 77030003672 HC NDL SPN HALY -A: Performed by: PHYSICAL MEDICINE & REHABILITATION

## 2018-04-23 PROCEDURE — 74011250636 HC RX REV CODE- 250/636: Performed by: PHYSICAL MEDICINE & REHABILITATION

## 2018-04-23 PROCEDURE — 76010000009 HC PAIN MGT 0 TO 30 MIN PROC: Performed by: PHYSICAL MEDICINE & REHABILITATION

## 2018-04-23 PROCEDURE — 74011250637 HC RX REV CODE- 250/637: Performed by: PHYSICAL MEDICINE & REHABILITATION

## 2018-04-23 RX ORDER — ROPIVACAINE HYDROCHLORIDE 2 MG/ML
INJECTION, SOLUTION EPIDURAL; INFILTRATION; PERINEURAL AS NEEDED
Status: DISCONTINUED | OUTPATIENT
Start: 2018-04-23 | End: 2018-04-23 | Stop reason: HOSPADM

## 2018-04-23 RX ORDER — DIAZEPAM 5 MG/1
5-20 TABLET ORAL ONCE
Status: COMPLETED | OUTPATIENT
Start: 2018-04-23 | End: 2018-04-23

## 2018-04-23 RX ORDER — SODIUM CHLORIDE 0.9 % (FLUSH) 0.9 %
5-10 SYRINGE (ML) INJECTION AS NEEDED
Status: DISCONTINUED | OUTPATIENT
Start: 2018-04-23 | End: 2018-04-23 | Stop reason: HOSPADM

## 2018-04-23 RX ADMIN — DIAZEPAM 15 MG: 5 TABLET ORAL at 08:08

## 2018-04-23 NOTE — DISCHARGE INSTRUCTIONS
[facility]      Post Procedures Instructions    *Resume Diet and Activity as tolerated. Rest for the remainder of the day. *You may fell worse before you feel better as the numbing medications wear off before the steroids take effect if used for your procedures. *Do not use affected extremity until numbness or loss of sensation has completely resolved without assistance. *DO NOT DRIVE, operate machinery/heavey equipment for 24 hours. *DO NOT DRINK ALCOHOL for 24 hours as it may interact with the sedation if you received it and also thins your blood and may cause you to bleed. *WAIT 24 hours before starting back ANY Blood thinning medications:   (Heparin, Coumadin, Warfarin, Lovenox, Plavix, Aggrenox)    *Resume Pre-Procedure Medications as prescribed except Blood Thinners unless directed by your Physician or Cardiologist.     *Avoid Hot tubs and Heating pad for 24 hours to prevent dissipation of medications, you may shower to remove bandages and remaining prep residue on the skin. * If you develop a Headache, drink plenty of fluids including beverages with caffeine (Coffee, Mt. Dew etc.) and rest.  If the headache persists longer than 24 hoursor intensifies - Please call [facility] ([facility]) [phone]      * If you are DIABETIC, check your blood sugar three times a day for the next three days, the steroids will increase your blood sugar. If your blood sugar is greater than 400 have someone drive you to the nearest [address]. * If you experience any of the following problems, call the [facility] [phone] between 8:00 am - 4:30pm or After Hours [phone].     Shortness of breath    Fever of 101 F or higher    Nausea / Vomiting (not normal to you)    Increasing stiffness in the neck    Weakness or numbness in the arms or legs that is not resolving    Prolonged and increasing pain > than 4 days    ANYTHING OUT of the ORDINARY TO YOU    If YOU are experiencing a severe reaction / complication that you have never had before post procedure, call 911 or go to the nearest emergency room! All patients must have a  for transportation [place] regardless if you do or do not receive sedation. DISCHARGE SUMMARY from Nurse      PATIENT INSTRUCTIONS:    After Oral  or intravenous sedation, for 24 hours or while taking prescription Narcotics:  · Limit your activities  · Do not drive and operate hazardous machinery  · Do not make important personal or business decisions  · Do  not drink alcoholic beverages  · If you have not urinated within 8 hours after discharge, please contact your surgeon on call. Report the following to your surgeon:  · Excessive pain, swelling, redness or odor of or around the surgical area  · Temperature over 101  · Nausea and vomiting lasting longer than 4 hours or if unable to take medications  · Any signs of decreased circulation or nerve impairment to extremity: change in color, persistent  numbness, tingling, coldness or increase pain  · Any questions        What to do at Home:  Recommended activity: Activity as tolerated, NO DRIVING FOR 24 Hours post injection          *  Please give a list of your current medications to your Primary Care Provider. *  Please update this list whenever your medications are discontinued, doses are      changed, or new medications (including over-the-counter products) are added. *  Please carry medication information at all times in case of emergency situations. These are general instructions for a healthy lifestyle:    No smoking/ No tobacco products/ Avoid exposure to second hand smoke    Surgeon General's Warning:  Quitting smoking now greatly reduces serious risk to your health.     Obesity, smoking, and sedentary lifestyle greatly increases your risk for illness    A healthy diet, regular physical exercise & weight monitoring are important for maintaining a healthy lifestyle    You may be retaining fluid if you have a history of heart failure or if you experience any of the following symptoms:  Weight gain of 3 pounds or more overnight or 5 pounds in a week, increased swelling in our hands or feet or shortness of breath while lying flat in bed. Please call your doctor as soon as you notice any of these symptoms; do not wait until your next office visit. Recognize signs and symptoms of STROKE:    F-face looks uneven    A-arms unable to move or move unevenly    S-speech slurred or non-existent    T-time-call 911 as soon as signs and symptoms begin-DO NOT go       Back to bed or wait to see if you get better-TIME IS BRAIN. [employer] Activation    Thank you for requesting access to [employer]. Please follow the instructions below to securely access and download your online medical record. [employer] allows you to send messages to your doctor, view your test results, renew your prescriptions, schedule appointments, and more. How Do I Sign Up? 1. In your internet browser, go to [email or web address]  2. Click on the First Time User? Click Here link in the Sign In box. You will be redirect to the New Member Sign Up page. 3. Enter your [employer] Access Code exactly as it appears below. You will not need to use this code after youve completed the sign-up process. If you do not sign up before the expiration date, you must request a new code. [employer] Access Code: [email or web address]  Expires: 2018  3:17 PM (This is the date your [employer] access code will )    4. Enter the last four digits of your Social Security Number (xxxx) and Date of Birth (mm/dd/yyyy) as indicated and click Submit. You will be taken to the next sign-up page. 5. Create a [employer] ID. This will be your [employer] login ID and cannot be changed, so think of one that is secure and easy to remember. 6. Create a [employer] password.  You can change your password at any time. 7. Enter your Password Reset Question and Answer. This can be used at a later time if you forget your password. 8. Enter your e-mail address. You will receive e-mail notification when new information is available in [address] 9. Click Sign Up. You can now view and download portions of your medical record. 10. Click the Download Summary menu link to download a portable copy of your medical information. Additional Information    If you have questions, please visit the Frequently Asked Questions section of the [employer] website at [email or web address] Remember, [employer] is NOT to be used for urgent needs. For medical emergencies, dial 911.

## 2018-04-23 NOTE — H&P (VIEW-ONLY)
Chief Complaint   Patient presents with    Well Woman     2 month follow up         Is someone accompanying this pt? no    Is the patient using any DME equipment during OV? no    Depression Screening:  PHQ over the last two weeks 3/29/2018 3/12/2018 2/12/2018 2/9/2018 1/25/2018 11/28/2017   PHQ Not Done Active Diagnosis of Depression or Bipolar Disorder - - - Active Diagnosis of Depression or Bipolar Disorder -   Little interest or pleasure in doing things - Not at all Not at all Not at all - Not at all   Feeling down, depressed or hopeless - Not at all Not at all Not at all - Not at all   Total Score PHQ 2 - 0 0 0 - 0       Learning Assessment:  Learning Assessment 1/25/2018 11/28/2017   PRIMARY LEARNER Patient Patient   HIGHEST LEVEL OF EDUCATION - PRIMARY LEARNER  GRADUATED [address] CAREGIVER No No   PRIMARY LANGUAGE ENGLISH ENGLISH   LEARNER PREFERENCE PRIMARY READING DEMONSTRATION   ANSWERED BY patient patient   RELATIONSHIP SELF SELF       Abuse Screening:  Abuse Screening Questionnaire 2/9/2018 1/25/2018 11/28/2017   Do you ever feel afraid of your partner? N N N   Are you in a relationship with someone who physically or mentally threatens you? N N N   Is it safe for you to go home? Travis Comer       Fall Risk  Fall Risk Assessment, last 12 mths 3/29/2018 3/12/2018 2/12/2018 2/9/2018 1/25/2018 11/28/2017   Able to walk? Yes Yes Yes Yes Yes Yes   Fall in past 12 months? No No No No No No         Health Maintenance reviewed and discussed per provider. Pt currently taking Antiplatelet therapy? no      Coordination of Care:  1. Have you been to the ER, urgent care clinic since your last visit? Hospitalized since your last visit? no    2. Have you seen or consulted any other health care providers outside of the [address] [place] [address] since your last visit? Include any pap smears or colon screening.  no    Please see Red banners under Allergies, Med rec, Immunizations to remove outside inquires. All correct information has been verified with patient and added to chart.      Medication variance in dosage/sig per patient as follows: none

## 2018-04-23 NOTE — PROCEDURES
[facility]    DIAGNOSTIC CERVICAL FACET INJECTION  PROCEDURE REPORT      PATIENT:  Regine Kathleen Holiday OF BIRTH:  1945  DATE OF SERVICE:  4/23/2018  [facility]    PRE-PROCEDURE DIAGNOSIS:  See Above    POST-PROCEDURE DIAGNOSIS:  See Above                PROCEDURE:  1. Bilateral diagnostic cervical medial branch blocks at  C4/C5, C5/C6, C6/C7,  nerves  (69188, 50;  02582, 50;  01833, 50  )  2. Fluoroscopic needle guidance (73175)      LEVELS TREATED:  Bilateral  C4/C5, C5/C6, C6/C7,  nerves    ANESTHESIA:   Local with oral sedation. See Medication Administration Record for specific medications and dosage. COMPLICATIONS: None. PHYSICIAN:  Farhana Welsh MD    PRE-PROCEDURE NOTE:  Pre-procedural assessment of the patient was performed including a limited history and physical examination. The details of the procedure were discussed with the patient, including the risks, benefits and alternative options and an informed consent was obtained. The patients NPO status, if necessary for the specific procedure and/or administration of moderate intravenous sedation, if utilized, and availability of a responsible adult to escort the patient following the procedure were confirmed. PROCEDURE NOTE:  The patient was brought to the procedure suite and positioned on the fluoroscopy table in the prone position. Physiologic monitors were applied and supplemental oxygen was administered via nasal cannula. The skin was prepped in the standard surgical fashion and sterile drapes were applied over the procedure site. Please refer to the Flowsheet for documentation of the patients vital signs and the Medication Administration Report for any oral and/or intravenous sedation administered prior to or during the procedure. 1% Lidocaine was utilized for local anesthesia.  Under AP fluoroscopic guidance a 25-gauge, 2-1/2 inch short bevel spinal needle was advanced from the posterior approach to the apex of the waist of the articular pillar at each of the above-listed levels. Each needle tip was rotated laterally and advanced slightly anteriorly 1-2 mm to lie alongside the corresponding medial branch nerve. After all needles were placed, 0.5 mL of ropivacaine 0.20% was injected at each location after the negative aspiration of blood, air or CSF. The needles were removed and the stilets were replaced. The procedure was performed on the contralateral side in the same fashion and at the same levels using the same volume of local anesthetic following negative aspiration of blood, air or CSF. The needles were removed intact. The area was thoroughly cleaned and sterile bandages applied as necessary. The patient tolerated the procedure well and vital signs remained stable throughout the procedure. The patient was assessed immediately following the procedure and was noted to have greater than 50% reduction in pain (a reduction from 7/10 to 2/10 in severity of cervical neck pain). Based on these results, the patient was considered an appropriate candidate for radiofrequency ablation of the above-mentioned medial branch nerves and will be scheduled for that procedure. The total levels successfully blocked were 3 levels,  both sides      POST-PROCEDURE COURSE:   The patient was escorted from the procedure suite in satisfactory condition and recovered per facility protocol based on the type of procedure performed and/or the sedation utilized. The patient did not experience any adverse events and remained hemodynamically stable during the post-procedure period. DISCHARGE NOTE:  Upon discharge, the patient was able to tolerate fluids and was in no acute distress. The patient was oriented to person, place and time and vital signs were stable.  Appropriate post-procedure instructions were provided and explained to the patient in detail and all questions were answered.     Seema Arango MD 4/23/2018 9:49 AM

## 2018-04-23 NOTE — INTERVAL H&P NOTE
H&P Update: Glory Mays was seen and examined. History and physical has been reviewed. The patient has been examined. There have been no significant clinical changes since the completion of the originally dated History and Physical.    [facility]  Brief Pre-Procedure History & Physical    PATIENT NAME:  Mahin Naqvi OF BIRTH:  1945  DATE OF SERVICE:  4/23/2018      CHIEF COMPLAINT:  Pain    HISTORY OF PRESENT ILLNESS:  Glory Mays presents today for a previously diagnosed problem contributing to some or all of this patients pain. The location and pattern of the pain has not changed substantially since the last visit in our office. No other significant medical changes have occurred in the last 30 days. PAST MEDICAL HISTORY:  The patient  has a past medical history of Abnormal Pap smear; Allergic rhinitis; Arthritis; Chronic pain; Hypercholesterolemia; Neck pain (5/17/2010); and Stroke ([address] (10/02/2017). She also has no past medical history of Anemia; Asthma; Cancer ([address] Chronic bronchitis ([address] Chronic kidney disease; Chronic lung disease; Chronic sinusitis; Congestive heart failure ([address] Coronary artery disease; Diabetes mellitus ([address] DVT (deep venous thrombosis) ([address] GERD (gastroesophageal reflux disease); Hypertension; Liver disease; Pneumonia; Positive PPD; Pulmonary embolism ([address] Pulmonary emphysema ([address] Thyroid disease; or Tuberculosis. PAST SURGICAL HISTORY:  The patient  has a past surgical history that includes hx gyn; hx lap cholecystectomy; and hx other surgical (2017). CURRENT MEDICATIONS:  See Medication Administration Record [facility]) in the patient's electronic record. ALLERGIES:  No Known Allergies    FAMILY HISTORY:  The patient family history includes Cancer in her mother; Heart Disease in her father. SOCIAL HISTORY:  The patient  reports that she has never smoked.  She has never used smokeless tobacco. The patient reports that she does not drink alcohol. She  reports that she does not use illicit drugs. REVIEW OF SYSTEMS:  Jody Taylor denies any fever, chills, unexplained weight loss, use of antibiotics for recent infection or bleeding abnormalities. PHYSICAL EXAM:  VS:   Visit Vitals    /84 (BP 1 Location: Right arm, BP Patient Position: Sitting)    Pulse 89    Temp 97.9 °F (36.6 °C)    Resp 18    Ht 5' (1.524 m)    Wt 48.1 kg (106 lb)    SpO2 99%    BMI 20.7 kg/m2     Gen: Well-developed. Body habitus consistent with recorded height and weight and the calculated BMI. No apparent distress. Head: Normocephalic, atraumatic. Skin: No obvious rashes, lesions or infection. Pulm: Respirations are even and unlabored. Psych:    Mood, affect and speech - Appropriate. ASSESSMENT:   1. Stable for bilateral cervical MBB C4-6 interventional pain procedure as discussed. PLAN:  Proceed with scheduled procedure.      Kyler Pinedo MD 4/23/2018 8:22 AM        Signed By: Kyler Pinedo MD     April 23, 2018 8:22 AM

## 2018-04-24 ENCOUNTER — TELEPHONE (OUTPATIENT)
Dept: PAIN MANAGEMENT | Age: 73
End: 2018-04-24

## 2018-04-24 NOTE — TELEPHONE ENCOUNTER
Ms. Jennifer Yang was contacted for follow-up status post Bilateral diagnostic cervical medial branch blocks at  C4/C5, C5/C6, C6/C7,  nerves  on April 23, 2018.  She reports     Pre-procedure numerical pain score: 7/10  Post-procedure numerical pain score immediately after: 2/10  Duration of relief post-procedure (if applicable): 12 hrs  Improvement in functional activities (if applicable): Yes  Percentage of overall improvement: 80%    COMMENTS:

## 2018-04-26 ENCOUNTER — TELEPHONE (OUTPATIENT)
Dept: PAIN MANAGEMENT | Age: 73
End: 2018-04-26

## 2018-04-26 RX ORDER — MIDAZOLAM HYDROCHLORIDE 1 MG/ML
.5-6 INJECTION, SOLUTION INTRAMUSCULAR; INTRAVENOUS
Status: CANCELLED | OUTPATIENT
Start: 2018-04-30

## 2018-04-26 RX ORDER — SODIUM CHLORIDE 0.9 % (FLUSH) 0.9 %
5-10 SYRINGE (ML) INJECTION AS NEEDED
Status: CANCELLED | OUTPATIENT
Start: 2018-04-30

## 2018-04-30 ENCOUNTER — HOSPITAL ENCOUNTER (OUTPATIENT)
Age: 73
Setting detail: OUTPATIENT SURGERY
Discharge: HOME | End: 2018-04-30
Attending: PHYSICAL MEDICINE & REHABILITATION | Admitting: PHYSICAL MEDICINE & REHABILITATION
Payer: MEDICARE

## 2018-04-30 ENCOUNTER — APPOINTMENT (OUTPATIENT)
Dept: GENERAL RADIOLOGY | Age: 73
End: 2018-04-30
Attending: PHYSICAL MEDICINE & REHABILITATION
Payer: MEDICARE

## 2018-04-30 VITALS
WEIGHT: 106 LBS | HEART RATE: 73 BPM | BODY MASS INDEX: 20.81 KG/M2 | OXYGEN SATURATION: 97 % | DIASTOLIC BLOOD PRESSURE: 72 MMHG | RESPIRATION RATE: 18 BRPM | HEIGHT: 60 IN | TEMPERATURE: 97.8 F | SYSTOLIC BLOOD PRESSURE: 130 MMHG

## 2018-04-30 PROCEDURE — 74011000250 HC RX REV CODE- 250: Performed by: PHYSICAL MEDICINE & REHABILITATION

## 2018-04-30 PROCEDURE — 77030020508 HC PD GRND GENRTR BAYL -A: Performed by: PHYSICAL MEDICINE & REHABILITATION

## 2018-04-30 PROCEDURE — 99152 MOD SED SAME PHYS/QHP 5/>YRS: CPT | Performed by: PHYSICAL MEDICINE & REHABILITATION

## 2018-04-30 PROCEDURE — 74011000250 HC RX REV CODE- 250

## 2018-04-30 PROCEDURE — 74011250636 HC RX REV CODE- 250/636

## 2018-04-30 PROCEDURE — 76010000009 HC PAIN MGT 0 TO 30 MIN PROC: Performed by: PHYSICAL MEDICINE & REHABILITATION

## 2018-04-30 PROCEDURE — 74011250636 HC RX REV CODE- 250/636: Performed by: PHYSICAL MEDICINE & REHABILITATION

## 2018-04-30 RX ORDER — LIDOCAINE HYDROCHLORIDE 20 MG/ML
INJECTION, SOLUTION EPIDURAL; INFILTRATION; INTRACAUDAL; PERINEURAL AS NEEDED
Status: DISCONTINUED | OUTPATIENT
Start: 2018-04-30 | End: 2018-04-30 | Stop reason: HOSPADM

## 2018-04-30 RX ORDER — MIDAZOLAM HYDROCHLORIDE 1 MG/ML
.5-6 INJECTION, SOLUTION INTRAMUSCULAR; INTRAVENOUS
Status: DISCONTINUED | OUTPATIENT
Start: 2018-04-30 | End: 2018-04-30 | Stop reason: HOSPADM

## 2018-04-30 RX ORDER — FENTANYL CITRATE 50 UG/ML
25-400 INJECTION, SOLUTION INTRAMUSCULAR; INTRAVENOUS
Status: DISCONTINUED | OUTPATIENT
Start: 2018-04-30 | End: 2018-04-30 | Stop reason: HOSPADM

## 2018-04-30 RX ORDER — LIDOCAINE HYDROCHLORIDE 10 MG/ML
INJECTION, SOLUTION EPIDURAL; INFILTRATION; INTRACAUDAL; PERINEURAL AS NEEDED
Status: DISCONTINUED | OUTPATIENT
Start: 2018-04-30 | End: 2018-04-30 | Stop reason: HOSPADM

## 2018-04-30 RX ORDER — SODIUM CHLORIDE 0.9 % (FLUSH) 0.9 %
5-10 SYRINGE (ML) INJECTION AS NEEDED
Status: DISCONTINUED | OUTPATIENT
Start: 2018-04-30 | End: 2018-04-30 | Stop reason: HOSPADM

## 2018-04-30 RX ORDER — DEXAMETHASONE SODIUM PHOSPHATE 100 MG/10ML
INJECTION INTRAMUSCULAR; INTRAVENOUS AS NEEDED
Status: DISCONTINUED | OUTPATIENT
Start: 2018-04-30 | End: 2018-04-30 | Stop reason: HOSPADM

## 2018-04-30 NOTE — PROCEDURES
[facility]    RADIOFREQUENCY THERMOCOAGULATION   PROCEDURE REPORT      PATIENT:  Shayan Cobb OF BIRTH:  1945  DATE OF SERVICE:  4/30/2018  [facility]    PRE-PROCEDURE DIAGNOSIS:  See Above    POST-PROCEDURE DIAGNOSIS:  See Above                PROCEDURE:    1. Right radiofrequency thermocoagulation of cervical medial branch nerves,  C4/C5, C5/C6, C6/C7,  (68703, 64634 x2)  2. Fluoroscopic needle guidance (spinal) (15087)  3. Supervision of moderate sedation (00943)  4. Additional 15 minutes of supervised moderate sedation (25654)    ANESTHESIA:   Local with moderate IV sedation. See Medication Administration Record for specific medications and dosage. COMPLICATIONS: None. PHYSICIAN:  Seema Arango MD    PRE-PROCEDURE NOTE:  Pre-procedural assessment of the patient was performed including a limited history and physical examination. The details of the procedure were discussed with the patient, including the risks, benefits and alternative options and an informed consent was obtained. The patients NPO status, if necessary for the specific procedure and/or administration of moderate intravenous sedation, if utilized, and availability of a responsible adult to escort the patient following the procedure were confirmed. A peripheral intravenous cannula was placed without difficulty and lactated Ringers solution administered. See nursing notes for details. PROCEDURE NOTE:  The patient was brought to the procedure suite and positioned on the fluoroscopy table in the prone position. Physiologic monitors were applied and supplemental oxygen was administered via nasal cannula. The skin was prepped in the standard surgical fashion and sterile drapes were applied over the procedure site.  Please refer to the Flowsheet for documentation of the patients vital signs and the Medication Administration Record for any oral and/or intravenous sedation administered prior to or during the procedure. 1% Lidocaine was utilized for local anesthesia. For the  C4-5, C5-6, C6-7, levels, under AP fluoroscopic guidance a 10cm 20-gauge radiofrequency needle with a 10 mm curved active tip was advanced from the posterior approach to the apex of the waist of the articular pillar at each of the above-listed levels. Each needle tip was rotated medially and advanced slightly anteriorly 1-2 mm to lie alongside the corresponding medial branch nerve. Correct needle depth was confirmed in lateral fluoroscopic view. Final correct needle placement was then confirmed by viewing each needle in AP and lateral fluoroscopic views. After each individual needle was placed and depth confirmed, sensory and motor testing were performed, at 50 Hz and 2 Hz, respectively, which elicited ipsilateral local neck discomfort without evidence of motor stimulation in the ipsilateral face or upper extremity. Following this, one half to 1 mL of lidocaine 2% was injected at each level after the negative aspiration of blood, air or CSF. Finally, medial branch nerve radiofrequency thermocoagulation was then performed at each level for 120 seconds at 70° centigrade x 1 cycle. Following this, 1/3ml  to 1/2ml of a mixture of lidocaine 1% admixed with dexamethasone 2mg [10mg/ml] was injected through each radiofrequency needle after negative aspiration and before removing each needle. Then, all needles were removed intact. The area was thoroughly cleaned and sterile bandages applied as necessary. The patient tolerated the procedure well and vital signs remained stable throughout the procedure. POST-PROCEDURE COURSE:   The patient was escorted from the procedure suite in satisfactory condition and recovered per facility protocol based on the type of procedure performed and/or the sedation utilized.  The patient did not experience any adverse events and remained hemodynamically stable during the post-procedure period. DISCHARGE NOTE:  Upon discharge, the patient was able to tolerate fluids and was in no acute distress. The patient was oriented to person, place and time and vital signs were stable. Appropriate post-procedure instructions were provided and explained to the patient in detail and all questions were answered.     Kyler Pinedo MD 4/30/2018 8:07 AM

## 2018-04-30 NOTE — INTERVAL H&P NOTE
H&P Update: Wang Ibarra was seen and examined. History and physical has been reviewed. The patient has been examined.  There have been no significant clinical changes since the completion of the originally dated History and Physical.    Signed By: Farhana Welsh MD     April 30, 2018 7:31 AM

## 2018-04-30 NOTE — H&P (VIEW-ONLY)
Leonel Braswell is a 81 y.o. female presenting today for [facility] (2 month follow up)  . HPI:  Leonel Braswell presents to the office today for hyperlipidemia and anxiety follow-up care. Patient reports she is feeling okay today. She was in the office last week for a URI and reports her symptoms have improved. She states the Buspar is controlling her anxiety symptoms. She is negative for chest pain or palpitations. Review of Systems   Constitutional: Negative for fever. HENT: Positive for congestion. Negative for sore throat. Respiratory: Negative for cough. Cardiovascular: Negative for chest pain and palpitations. Musculoskeletal: Positive for neck pain. Neurological: Negative for dizziness and headaches. No Known Allergies    Current Outpatient Prescriptions   Medication Sig Dispense Refill    alendronate (FOSAMAX) 10 mg tablet Take 1 Tab by mouth Daily (before breakfast). 30 Tab 2    busPIRone (BUSPAR) 7.5 mg tablet Take 1 Tab by mouth two (2) times a day. 60 Tab 1    fluticasone (FLONASE) 50 mcg/actuation nasal spray 2 spray each nostril daily 1 Bottle 1    montelukast (SINGULAIR) 10 mg tablet Take 1 Tab by mouth daily. 30 Tab 2    acetaminophen (TYLENOL EXTRA STRENGTH) 500 mg tablet Take  by mouth every six (6) hours as needed for Pain.  atorvastatin (LIPITOR) 20 mg tablet Take 20 mg by mouth daily.  loratadine (CLARITIN) 10 mg tablet Take 10 mg by mouth daily as needed for Allergies or Rhinitis.  omeprazole (PRILOSEC) 20 mg capsule Take 20 mg by mouth daily.  LORazepam (ATIVAN) 0.5 mg tablet Take 1 Tab by mouth every four (4) hours as needed for Anxiety. Max Daily Amount: 3 mg. 30 Tab 0    aspirin 81 mg chewable tablet Take 1 Tab by mouth daily.  30 Tab 3       Past Medical History:   Diagnosis Date    Abnormal Pap smear     Dr. Chery Scales Allergic rhinitis     Arthritis     Chronic pain     Hypercholesterolemia     Neck pain 5/17/2010    Stroke ([facility] 10/02/2017    TIA-        Past Surgical History:   Procedure Laterality Date    HX GYN      Total Hyst    HX LAP CHOLECYSTECTOMY      HX OTHER SURGICAL  2017    Throat widened       Social History     Social History    Marital status:      Spouse name: N/A    Number of children: N/A    Years of education: N/A     Occupational History    Not on file. Social History Main Topics    Smoking status: Never Smoker    Smokeless tobacco: Never Used    Alcohol use No    Drug use: No    Sexual activity: No     Other Topics Concern    Not on file     Social History Narrative       Patient does not have an advanced directive on file    Vitals:    04/16/18 0838   BP: 120/64   Pulse: 92   Resp: 16   Temp: 97.6 °F (36.4 °C)   TempSrc: Tympanic   SpO2: 98%   Weight: 106 lb (48.1 kg)   Height: 5' (1.524 m)   PainSc:   3   PainLoc: Neck       Physical Exam   Constitutional: No distress. HENT:   Mouth/Throat: No oropharyngeal exudate. Cardiovascular: Normal rate, regular rhythm and normal heart sounds. Pulmonary/Chest: Effort normal and breath sounds normal.   Abdominal: Soft. Musculoskeletal: She exhibits no edema. Neurological: She is alert. Nursing note and vitals reviewed.       Office Visit on 04/05/2018   Component Date Value Ref Range Status    VALID INTERNAL CONTROL POC 04/05/2018 Yes   Final    Group A Strep Ag 04/05/2018 Negative  Negative Final    VALID INTERNAL CONTROL POC 04/05/2018 Yes   Final    QuickVue Influenza test 04/05/2018 Negative  Negative Final   Hospital Outpatient Visit on 03/12/2018   Component Date Value Ref Range Status    Sodium 03/12/2018 140  136 - 145 mmol/L Final    Potassium 03/12/2018 4.6  3.5 - 5.5 mmol/L Final    Chloride 03/12/2018 105  100 - 108 mmol/L Final    CO2 03/12/2018 29  21 - 32 mmol/L Final    Anion gap 03/12/2018 6  3.0 - 18 mmol/L Final    Glucose 03/12/2018 81  74 - 99 mg/dL Final    BUN 03/12/2018 19* 7.0 - 18 MG/DL Final    Creatinine 03/12/2018 0.69  0.6 - 1.3 MG/DL Final    BUN/Creatinine ratio 03/12/2018 28* 12 - 20   Final    GFR est AA 03/12/2018 >60  >60 ml/min/1.73m2 Final    GFR est non-AA 03/12/2018 >60  >60 ml/min/1.73m2 Final    Comment: (NOTE)  Estimated GFR is calculated using the Modification of Diet in Renal   Disease (MDRD) Study equation, reported for both  Americans   (GFRAA) and non- Americans (GFRNA), and normalized to 1.73m2   body surface area. The physician must decide which value applies to   the patient. The MDRD study equation should only be used in   individuals age 25 or older. It has not been validated for the   following: pregnant women, patients with serious comorbid conditions,   or on certain medications, or persons with extremes of body size,   muscle mass, or nutritional status.  Calcium 03/12/2018 9.5  8.5 - 10.1 MG/DL Final    Bilirubin, total 03/12/2018 0.3  0.2 - 1.0 MG/DL Final    ALT (SGPT) 03/12/2018 25  13 - 56 U/L Final    AST (SGOT) 03/12/2018 19  15 - 37 U/L Final    Alk.  phosphatase 03/12/2018 99  45 - 117 U/L Final    Protein, total 03/12/2018 7.2  6.4 - 8.2 g/dL Final    Albumin 03/12/2018 3.9  3.4 - 5.0 g/dL Final    Globulin 03/12/2018 3.3  2.0 - 4.0 g/dL Final    A-G Ratio 03/12/2018 1.2  0.8 - 1.7   Final   Hospital Outpatient Visit on 02/26/2018   Component Date Value Ref Range Status    WBC 02/26/2018 9.5  4.6 - 13.2 K/uL Final    RBC 02/26/2018 4.47  4.20 - 5.30 M/uL Final    HGB 02/26/2018 13.8  12.0 - 16.0 g/dL Final    HCT 02/26/2018 43.0  35.0 - 45.0 % Final    MCV 02/26/2018 96.2  74.0 - 97.0 FL Final    MCH 02/26/2018 30.9  24.0 - 34.0 PG Final    MCHC 02/26/2018 32.1  31.0 - 37.0 g/dL Final    RDW 02/26/2018 12.9  11.6 - 14.5 % Final    PLATELET 79/17/7903 357  135 - 420 K/uL Final    MPV 02/26/2018 9.9  9.2 - 11.8 FL Final    NEUTROPHILS 02/26/2018 57  40 - 73 % Final    LYMPHOCYTES 02/26/2018 31  21 - 52 % Final    MONOCYTES 02/26/2018 10  3 - 10 % Final    EOSINOPHILS 02/26/2018 2  0 - 5 % Final    BASOPHILS 02/26/2018 0  0 - 2 % Final    ABS. NEUTROPHILS 02/26/2018 5.4  1.8 - 8.0 K/UL Final    ABS. LYMPHOCYTES 02/26/2018 3.0  0.9 - 3.6 K/UL Final    ABS. MONOCYTES 02/26/2018 0.9  0.05 - 1.2 K/UL Final    ABS. EOSINOPHILS 02/26/2018 0.2  0.0 - 0.4 K/UL Final    ABS. BASOPHILS 02/26/2018 0.0  0.0 - 0.1 K/UL Final    DF 02/26/2018 AUTOMATED    Final    Prothrombin time 02/26/2018 12.8  11.5 - 15.2 sec Final    INR 02/26/2018 1.0  0.8 - 1.2   Final    Comment:            INR Therapeutic Ranges         (on stable oral anticoagulant):     INDICATION                INR  DVT/PE/Atrial Fib          2.0-3.0  MI/Mechanical Heart Valve  2.5-3.5      aPTT 02/26/2018 26.3  23.0 - 36.4 SEC Final       .No results found for any visits on 04/16/18. Assessment / Plan:      ICD-10-CM ICD-9-CM    1. Anxiety F41.9 300.00 busPIRone (BUSPAR) 7.5 mg tablet   2. Osteoporosis, unspecified osteoporosis type, unspecified pathological fracture presence M81.0 733.00 alendronate (FOSAMAX) 10 mg tablet   3. Cervical facet syndrome (HCC) M46.92 723.8        Follow-up Disposition:  Return in about 4 months (around 8/16/2018). I asked the patient if she  had any questions and answered her  questions.   The patient stated that she understands the treatment plan and agrees with the treatment plan

## 2018-04-30 NOTE — INTERVAL H&P NOTE
H&P Update: Queenie Alford was seen and examined. [address] for Pain Management  Brief Pre-Procedure History & Physical    PATIENT NAME:  Phylicia Friedman   YOB: 1945  DATE OF SERVICE:  4/30/2018      CHIEF COMPLAINT:  Pain    HISTORY OF PRESENT ILLNESS:  Queenie Alford presents today for a previously diagnosed problem contributing to some or all of this patients pain. The location and pattern of the pain has not changed substantially since the last visit in our office. No other significant medical changes have occurred in the last 30 days. PAST MEDICAL HISTORY:  The patient  has a past medical history of Abnormal Pap smear; Allergic rhinitis; Arthritis; Chronic pain; Hypercholesterolemia; Neck pain (5/17/2010); and Stroke ([address] (10/02/2017). She also has no past medical history of Anemia; Asthma; Cancer ([address] Chronic bronchitis ([address] Chronic kidney disease; Chronic lung disease; Chronic sinusitis; Congestive heart failure ([address] Coronary artery disease; Diabetes mellitus ([address] DVT (deep venous thrombosis) ([address] GERD (gastroesophageal reflux disease); Hypertension; Liver disease; Pneumonia; Positive PPD; Pulmonary embolism ([address] Pulmonary emphysema ([address] Thyroid disease; or Tuberculosis. PAST SURGICAL HISTORY:  The patient  has a past surgical history that includes hx gyn; hx lap cholecystectomy; and hx other surgical (2017). CURRENT MEDICATIONS:  See Medication Administration Record [facility]) in the patient's electronic record. ALLERGIES:  No Known Allergies    FAMILY HISTORY:  The patient family history includes Cancer in her mother; Heart Disease in her father. SOCIAL HISTORY:  The patient  reports that she has never smoked. She has never used smokeless tobacco. The patient  reports that she does not drink alcohol. She  reports that she does not use illicit drugs.      REVIEW OF SYSTEMS:  Queenie Alford denies any fever, chills, unexplained weight loss, use of antibiotics for recent infection or bleeding abnormalities. PHYSICAL EXAM:  VS:   Visit Vitals    /73 (BP 1 Location: Left arm, BP Patient Position: Sitting)    Pulse 74    Temp 97.8 °F (36.6 °C)    Resp 15    Ht 5' (1.524 m)    Wt 48.1 kg (106 lb)    SpO2 99%    BMI 20.7 kg/m2     Gen: Well-developed. Body habitus consistent with recorded height and weight and the calculated BMI. No apparent distress. Head: Normocephalic, atraumatic. Skin: No obvious rashes, lesions or infection. Pulm: Respirations are even and unlabored. Psych:    Mood, affect and speech - Appropriate. ASSESSMENT:   1. Stable for right sided RFA C4-6 interventional pain procedure as discussed. PLAN:  Proceed with scheduled procedure.      Vianey Phelan MD 4/30/2018 7:31 AM        Signed By: Vianey Phelan MD     April 30, 2018 7:30 AM

## 2018-04-30 NOTE — DISCHARGE INSTRUCTIONS
[address] [place] [address] for Pain Management      Post Procedures Instructions    *Resume Diet and Activity as tolerated. Rest for the remainder of the day. *You may fell worse before you feel better as the numbing medications wear off before the steroids take effect if used for your procedures. *Do not use affected extremity until numbness or loss of sensation has completely resolved without assistance. *DO NOT DRIVE, operate machinery/heavey equipment for 24 hours. *DO NOT DRINK ALCOHOL for 24 hours as it may interact with the sedation if you received it and also thins your blood and may cause you to bleed. *WAIT 24 hours before starting back ANY Blood thinning medications:   (Heparin, Coumadin, Warfarin, Lovenox, Plavix, Aggrenox)    *Resume Pre-Procedure Medications as prescribed except Blood Thinners unless directed by your Physician or Cardiologist.     *Avoid Hot tubs and Heating pad for 24 hours to prevent dissipation of medications, you may shower to remove bandages and remaining prep residue on the skin. * If you develop a Headache, drink plenty of fluids including beverages with caffeine (Coffee, Mt. Dew etc.) and rest.  If the headache persists longer than 24 hoursor intensifies - Please call [facility] ([facility]) [phone]    * If you are DIABETIC, check your blood sugar three times a day for the next three days, the steroids will increase your blood sugar. If your blood sugar is greater than 400 have someone drive you to the nearest [address]. * If you experience any of the following problems, call the [facility] [phone] between 8:00 am - 4:30pm or After Hours [phone].     Shortness of breath    Fever of 101 F or higher    Nausea / Vomiting (not normal to you)    Increasing stiffness in the neck    Weakness or numbness in the arms or legs that is not resolving    Prolonged and increasing pain > than 4 days    ANYTHING OUT of the ORDINARY TO YOU    If YOU are experiencing a severe reaction / complication that you have never had before post procedure, call 911 or go to the nearest emergency room! All patients must have a  for transportation [place] regardless if you do or do not receive sedation. DISCHARGE SUMMARY from Nurse      PATIENT INSTRUCTIONS:    After Oral  or intravenous sedation, for 24 hours or while taking prescription Narcotics:  · Limit your activities  · Do not drive and operate hazardous machinery  · Do not make important personal or business decisions  · Do  not drink alcoholic beverages  · If you have not urinated within 8 hours after discharge, please contact your surgeon on call. Report the following to your surgeon:  · Excessive pain, swelling, redness or odor of or around the surgical area  · Temperature over 101  · Nausea and vomiting lasting longer than 4 hours or if unable to take medications  · Any signs of decreased circulation or nerve impairment to extremity: change in color, persistent  numbness, tingling, coldness or increase pain  · Any questions        What to do at Home:  Recommended activity: Activity as tolerated, NO DRIVING FOR 24 Hours post injection          *  Please give a list of your current medications to your Primary Care Provider. *  Please update this list whenever your medications are discontinued, doses are      changed, or new medications (including over-the-counter products) are added. *  Please carry medication information at all times in case of emergency situations. These are general instructions for a healthy lifestyle:    No smoking/ No tobacco products/ Avoid exposure to second hand smoke    Surgeon General's Warning:  Quitting smoking now greatly reduces serious risk to your health.     Obesity, smoking, and sedentary lifestyle greatly increases your risk for illness    A healthy diet, regular physical exercise & weight monitoring are important for maintaining a healthy lifestyle    You may be retaining fluid if you have a history of heart failure or if you experience any of the following symptoms:  Weight gain of 3 pounds or more overnight or 5 pounds in a week, increased swelling in our hands or feet or shortness of breath while lying flat in bed. Please call your doctor as soon as you notice any of these symptoms; do not wait until your next office visit. Recognize signs and symptoms of STROKE:    F-face looks uneven    A-arms unable to move or move unevenly    S-speech slurred or non-existent    T-time-call 911 as soon as signs and symptoms begin-DO NOT go       Back to bed or wait to see if you get better-TIME IS BRAIN.

## 2018-04-30 NOTE — H&P (VIEW-ONLY)
Chief Complaint   Patient presents with    Well Woman     2 month follow up         Is someone accompanying this pt? no    Is the patient using any DME equipment during OV? no    Depression Screening:  PHQ over the last two weeks 3/29/2018 3/12/2018 2/12/2018 2/9/2018 1/25/2018 11/28/2017   PHQ Not Done Active Diagnosis of Depression or Bipolar Disorder - - - Active Diagnosis of Depression or Bipolar Disorder -   Little interest or pleasure in doing things - Not at all Not at all Not at all - Not at all   Feeling down, depressed or hopeless - Not at all Not at all Not at all - Not at all   Total Score PHQ 2 - 0 0 0 - 0       Learning Assessment:  Learning Assessment 1/25/2018 11/28/2017   PRIMARY LEARNER Patient Patient   HIGHEST LEVEL OF EDUCATION - PRIMARY LEARNER  GRADUATED [address] CAREGIVER No No   PRIMARY LANGUAGE ENGLISH ENGLISH   LEARNER PREFERENCE PRIMARY READING DEMONSTRATION   ANSWERED BY patient patient   RELATIONSHIP SELF SELF       Abuse Screening:  Abuse Screening Questionnaire 2/9/2018 1/25/2018 11/28/2017   Do you ever feel afraid of your partner? N N N   Are you in a relationship with someone who physically or mentally threatens you? N N N   Is it safe for you to go home? Inderjit Joel       Fall Risk  Fall Risk Assessment, last 12 mths 3/29/2018 3/12/2018 2/12/2018 2/9/2018 1/25/2018 11/28/2017   Able to walk? Yes Yes Yes Yes Yes Yes   Fall in past 12 months? No No No No No No         Health Maintenance reviewed and discussed per provider. Pt currently taking Antiplatelet therapy? no      Coordination of Care:  1. Have you been to the ER, urgent care clinic since your last visit? Hospitalized since your last visit? no    2. Have you seen or consulted any other health care providers outside of the [facility] since your last visit? Include any pap smears or colon screening.  no    Please see Red banners under Allergies, Med rec, Immunizations to remove outside inquires. All correct information has been verified with patient and added to chart.      Medication variance in dosage/sig per patient as follows: none

## 2018-05-09 RX ORDER — SODIUM CHLORIDE 0.9 % (FLUSH) 0.9 %
5-10 SYRINGE (ML) INJECTION AS NEEDED
Status: CANCELLED | OUTPATIENT
Start: 2018-05-14

## 2018-05-09 RX ORDER — MIDAZOLAM HYDROCHLORIDE 1 MG/ML
.5-6 INJECTION, SOLUTION INTRAMUSCULAR; INTRAVENOUS
Status: CANCELLED | OUTPATIENT
Start: 2018-05-14

## 2018-05-10 ENCOUNTER — TELEPHONE (OUTPATIENT)
Dept: PAIN MANAGEMENT | Age: 73
End: 2018-05-10

## 2018-05-14 ENCOUNTER — APPOINTMENT (OUTPATIENT)
Dept: GENERAL RADIOLOGY | Age: 73
End: 2018-05-14
Attending: PHYSICAL MEDICINE & REHABILITATION
Payer: MEDICARE

## 2018-05-14 ENCOUNTER — HOSPITAL ENCOUNTER (OUTPATIENT)
Age: 73
Setting detail: OUTPATIENT SURGERY
Discharge: HOME | End: 2018-05-14
Attending: PHYSICAL MEDICINE & REHABILITATION | Admitting: PHYSICAL MEDICINE & REHABILITATION
Payer: MEDICARE

## 2018-05-14 VITALS
OXYGEN SATURATION: 96 % | RESPIRATION RATE: 16 BRPM | WEIGHT: 106 LBS | SYSTOLIC BLOOD PRESSURE: 119 MMHG | TEMPERATURE: 98 F | HEART RATE: 79 BPM | HEIGHT: 60 IN | BODY MASS INDEX: 20.81 KG/M2 | DIASTOLIC BLOOD PRESSURE: 76 MMHG

## 2018-05-14 PROCEDURE — 99152 MOD SED SAME PHYS/QHP 5/>YRS: CPT | Performed by: PHYSICAL MEDICINE & REHABILITATION

## 2018-05-14 PROCEDURE — 74011000250 HC RX REV CODE- 250

## 2018-05-14 PROCEDURE — 77030020508 HC PD GRND GENRTR BAYL -A: Performed by: PHYSICAL MEDICINE & REHABILITATION

## 2018-05-14 PROCEDURE — 74011000250 HC RX REV CODE- 250: Performed by: PHYSICAL MEDICINE & REHABILITATION

## 2018-05-14 PROCEDURE — 74011250636 HC RX REV CODE- 250/636

## 2018-05-14 PROCEDURE — 76010000009 HC PAIN MGT 0 TO 30 MIN PROC: Performed by: PHYSICAL MEDICINE & REHABILITATION

## 2018-05-14 PROCEDURE — 74011250636 HC RX REV CODE- 250/636: Performed by: PHYSICAL MEDICINE & REHABILITATION

## 2018-05-14 PROCEDURE — 77030029505: Performed by: PHYSICAL MEDICINE & REHABILITATION

## 2018-05-14 RX ORDER — LIDOCAINE HYDROCHLORIDE 10 MG/ML
INJECTION, SOLUTION EPIDURAL; INFILTRATION; INTRACAUDAL; PERINEURAL AS NEEDED
Status: DISCONTINUED | OUTPATIENT
Start: 2018-05-14 | End: 2018-05-14 | Stop reason: HOSPADM

## 2018-05-14 RX ORDER — SODIUM CHLORIDE 0.9 % (FLUSH) 0.9 %
5-10 SYRINGE (ML) INJECTION AS NEEDED
Status: DISCONTINUED | OUTPATIENT
Start: 2018-05-14 | End: 2018-05-14 | Stop reason: HOSPADM

## 2018-05-14 RX ORDER — MIDAZOLAM HYDROCHLORIDE 1 MG/ML
.5-6 INJECTION, SOLUTION INTRAMUSCULAR; INTRAVENOUS
Status: DISCONTINUED | OUTPATIENT
Start: 2018-05-14 | End: 2018-05-14 | Stop reason: HOSPADM

## 2018-05-14 RX ORDER — LIDOCAINE HYDROCHLORIDE 20 MG/ML
INJECTION, SOLUTION EPIDURAL; INFILTRATION; INTRACAUDAL; PERINEURAL AS NEEDED
Status: DISCONTINUED | OUTPATIENT
Start: 2018-05-14 | End: 2018-05-14 | Stop reason: HOSPADM

## 2018-05-14 RX ORDER — FENTANYL CITRATE 50 UG/ML
INJECTION, SOLUTION INTRAMUSCULAR; INTRAVENOUS AS NEEDED
Status: DISCONTINUED | OUTPATIENT
Start: 2018-05-14 | End: 2018-05-14 | Stop reason: HOSPADM

## 2018-05-14 RX ORDER — DEXAMETHASONE SODIUM PHOSPHATE 100 MG/10ML
INJECTION INTRAMUSCULAR; INTRAVENOUS AS NEEDED
Status: DISCONTINUED | OUTPATIENT
Start: 2018-05-14 | End: 2018-05-14 | Stop reason: HOSPADM

## 2018-05-14 NOTE — DISCHARGE INSTRUCTIONS
[address] [place] [address] for Pain Management      Post Procedures Instructions    *Resume Diet and Activity as tolerated. Rest for the remainder of the day. *You may fell worse before you feel better as the numbing medications wear off before the steroids take effect if used for your procedures. *Do not use affected extremity until numbness or loss of sensation has completely resolved without assistance. *DO NOT DRIVE, operate machinery/heavey equipment for 24 hours. *DO NOT DRINK ALCOHOL for 24 hours as it may interact with the sedation if you received it and also thins your blood and may cause you to bleed. *WAIT 24 hours before starting back ANY Blood thinning medications:   (Heparin, Coumadin, Warfarin, Lovenox, Plavix, Aggrenox)    *Resume Pre-Procedure Medications as prescribed except Blood Thinners unless directed by your Physician or Cardiologist.     *Avoid Hot tubs and Heating pad for 24 hours to prevent dissipation of medications, you may shower to remove bandages and remaining prep residue on the skin. * If you develop a Headache, drink plenty of fluids including beverages with caffeine (Coffee, Mt. Dew etc.) and rest.  If the headache persists longer than 24 hoursor intensifies - Please call [facility]) [phone]    * If you are DIABETIC, check your blood sugar three times a day for the next three days, the steroids will increase your blood sugar. If your blood sugar is greater than 400 have someone drive you to the nearest [address] * If you experience any of the following problems, call the [facility] [phone] between 8:00 am - 4:30pm or After Hours [phone].     Shortness of breath    Fever of 101 F or higher    Nausea / Vomiting (not normal to you)    Increasing stiffness in the neck    Weakness or numbness in the arms or legs that is not resolving    Prolonged and increasing pain > than 4 days    ANYTHING OUT of the ORDINARY TO YOU    If YOU are experiencing a severe reaction / complication that you have never had before post procedure, call 911 or go to the nearest emergency room! All patients must have a  for transportation [place] regardless if you do or do not receive sedation. DISCHARGE SUMMARY from Nurse      PATIENT INSTRUCTIONS:    After Oral  or intravenous sedation, for 24 hours or while taking prescription Narcotics:  · Limit your activities  · Do not drive and operate hazardous machinery  · Do not make important personal or business decisions  · Do  not drink alcoholic beverages  · If you have not urinated within 8 hours after discharge, please contact your surgeon on call. Report the following to your surgeon:  · Excessive pain, swelling, redness or odor of or around the surgical area  · Temperature over 101  · Nausea and vomiting lasting longer than 4 hours or if unable to take medications  · Any signs of decreased circulation or nerve impairment to extremity: change in color, persistent  numbness, tingling, coldness or increase pain  · Any questions        What to do at Home:  Recommended activity: Activity as tolerated, NO DRIVING FOR 24 Hours post injection          *  Please give a list of your current medications to your Primary Care Provider. *  Please update this list whenever your medications are discontinued, doses are      changed, or new medications (including over-the-counter products) are added. *  Please carry medication information at all times in case of emergency situations. These are general instructions for a healthy lifestyle:    No smoking/ No tobacco products/ Avoid exposure to second hand smoke    Surgeon General's Warning:  Quitting smoking now greatly reduces serious risk to your health.     Obesity, smoking, and sedentary lifestyle greatly increases your risk for illness    A healthy diet, regular physical exercise & weight monitoring are important for maintaining a healthy lifestyle    You may be retaining fluid if you have a history of heart failure or if you experience any of the following symptoms:  Weight gain of 3 pounds or more overnight or 5 pounds in a week, increased swelling in our hands or feet or shortness of breath while lying flat in bed. Please call your doctor as soon as you notice any of these symptoms; do not wait until your next office visit. Recognize signs and symptoms of STROKE:    F-face looks uneven    A-arms unable to move or move unevenly    S-speech slurred or non-existent    T-time-call 911 as soon as signs and symptoms begin-DO NOT go       Back to bed or wait to see if you get better-TIME IS BRAIN.

## 2018-05-14 NOTE — INTERVAL H&P NOTE
H&P Update: Brandon Teresa was seen and examined. History and physical has been reviewed. The patient has been examined. There have been no significant clinical changes since the completion of the originally dated History and Physical.    [facility]  Brief Pre-Procedure History & Physical    PATIENT NAME:  Zaira Moeller OF BIRTH:  1945  DATE OF SERVICE:  5/14/2018      CHIEF COMPLAINT:  Pain    HISTORY OF PRESENT ILLNESS:  Brandon Teresa presents today for a previously diagnosed problem contributing to some or all of this patients pain. The location and pattern of the pain has not changed substantially since the last visit in our office. No other significant medical changes have occurred in the last 30 days. PAST MEDICAL HISTORY:  The patient  has a past medical history of Abnormal Pap smear; Allergic rhinitis; Arthritis; Chronic pain; Hypercholesterolemia; Neck pain (5/17/2010); and Stroke ([address] (10/02/2017). She also has no past medical history of Anemia; Asthma; Cancer ([address] Chronic bronchitis ([address] Chronic kidney disease; Chronic lung disease; Chronic sinusitis; Congestive heart failure ([address] Coronary artery disease; Diabetes mellitus ([address] DVT (deep venous thrombosis) ([address] GERD (gastroesophageal reflux disease); Hypertension; Liver disease; Pneumonia; Positive PPD; Pulmonary embolism ([address] Pulmonary emphysema ([address] Thyroid disease; or Tuberculosis. PAST SURGICAL HISTORY:  The patient  has a past surgical history that includes hx gyn; hx lap cholecystectomy; and hx other surgical (2017). CURRENT MEDICATIONS:  See Medication Administration Record [facility]) in the patient's electronic record. ALLERGIES:  No Known Allergies    FAMILY HISTORY:  The patient family history includes Cancer in her mother; Heart Disease in her father. SOCIAL HISTORY:  The patient  reports that she has never smoked.  She has never used smokeless tobacco. The patient reports that she does not drink alcohol. She  reports that she does not use illicit drugs. REVIEW OF SYSTEMS:  Lucero Lema denies any fever, chills, unexplained weight loss, use of antibiotics for recent infection or bleeding abnormalities. PHYSICAL EXAM:  VS:   Visit Vitals    /73 (BP 1 Location: Left arm, BP Patient Position: Sitting)    Pulse 71    Temp 98 °F (36.7 °C)    Resp 15    Ht 5' (1.524 m)    Wt 48.1 kg (106 lb)    SpO2 99%    BMI 20.7 kg/m2     Gen: Well-developed. Body habitus consistent with recorded height and weight and the calculated BMI. No apparent distress. Head: Normocephalic, atraumatic. Skin: No obvious rashes, lesions or infection. Pulm: Respirations are even and unlabored. Psych:    Mood, affect and speech - Appropriate. ASSESSMENT:   1. Stable for left C4-6 RFA interventional pain procedure as discussed. PLAN:  Proceed with scheduled procedure.      Claribel Spivey MD 5/14/2018 7:32 AM        Signed By: Claribel Spivey MD     May 14, 2018 7:32 AM

## 2018-05-14 NOTE — PROCEDURES
[facility]NAG[facility]    RADIOFREQUENCY THERMOCOAGULATION   PROCEDURE REPORT      PATIENT:  Nikita Mckenna OF BIRTH:  1945  DATE OF SERVICE:  5/14/2018  [facility]    PRE-PROCEDURE DIAGNOSIS:  See Above    POST-PROCEDURE DIAGNOSIS:  See Above                PROCEDURE:    1. Left radiofrequency thermocoagulation of cervical medial branch nerves, C4/C5, C5/C6, C6/C7,  (80486, 64634 x2)  2. Fluoroscopic needle guidance (spinal) (20269)  3. Supervision of moderate sedation (56571)  4. Additional 15 minutes of supervised moderate sedation (70652)    ANESTHESIA:   Local with moderate IV sedation. See Medication Administration Record for specific medications and dosage. COMPLICATIONS: None. PHYSICIAN:  Gustavo Tavarez MD    PRE-PROCEDURE NOTE:  Pre-procedural assessment of the patient was performed including a limited history and physical examination. The details of the procedure were discussed with the patient, including the risks, benefits and alternative options and an informed consent was obtained. The patients NPO status, if necessary for the specific procedure and/or administration of moderate intravenous sedation, if utilized, and availability of a responsible adult to escort the patient following the procedure were confirmed. A peripheral intravenous cannula was placed without difficulty and lactated Ringers solution administered. See nursing notes for details. PROCEDURE NOTE:  The patient was brought to the procedure suite and positioned on the fluoroscopy table in the prone position. Physiologic monitors were applied and supplemental oxygen was administered via nasal cannula. The skin was prepped in the standard surgical fashion and sterile drapes were applied over the procedure site.  Please refer to the Flowsheet for documentation of the patients vital signs and the Medication Administration Record for any oral and/or intravenous sedation administered prior to or during the procedure. 1% Lidocaine was utilized for local anesthesia. For the C4-5, C5-6, C6-7,  levels, under AP fluoroscopic guidance a 10cm 20-gauge radiofrequency needle with a 10 mm curved active tip was advanced from the posterior approach to the apex of the waist of the articular pillar at each of the above-listed levels. Each needle tip was rotated medially and advanced slightly anteriorly 1-2 mm to lie alongside the corresponding medial branch nerve. Correct needle depth was confirmed in lateral fluoroscopic view. Final correct needle placement was then confirmed by viewing each needle in AP and lateral fluoroscopic views. After each individual needle was placed and depth confirmed, sensory and motor testing were performed, at 50 Hz and 2 Hz, respectively, which elicited ipsilateral local neck discomfort without evidence of motor stimulation in the ipsilateral face or upper extremity. Following this, one half to 1 mL of lidocaine 2% was injected at each level after the negative aspiration of blood, air or CSF. Finally, medial branch nerve radiofrequency thermocoagulation was then performed at each level for 120 seconds at 70° centigrade x 1 cycle. Following this, 1/3ml  to 1/2ml of a mixture of lidocaine 1% admixed with dexamethasone 2mg [10mg/ml] was injected through each radiofrequency needle after negative aspiration and before removing each needle. Then, all needles were removed intact. The area was thoroughly cleaned and sterile bandages applied as necessary. The patient tolerated the procedure well and vital signs remained stable throughout the procedure. POST-PROCEDURE COURSE:   The patient was escorted from the procedure suite in satisfactory condition and recovered per facility protocol based on the type of procedure performed and/or the sedation utilized.  The patient did not experience any adverse events and remained hemodynamically stable during the post-procedure period. DISCHARGE NOTE:  Upon discharge, the patient was able to tolerate fluids and was in no acute distress. The patient was oriented to person, place and time and vital signs were stable. Appropriate post-procedure instructions were provided and explained to the patient in detail and all questions were answered.     Lilly Arevalo MD 5/14/2018 9:23 AM

## 2018-05-14 NOTE — H&P (VIEW-ONLY)
Chief Complaint   Patient presents with    Well Woman     2 month follow up         Is someone accompanying this pt? no    Is the patient using any DME equipment during OV? no    Depression Screening:  PHQ over the last two weeks 3/29/2018 3/12/2018 2/12/2018 2/9/2018 1/25/2018 11/28/2017   PHQ Not Done Active Diagnosis of Depression or Bipolar Disorder - - - Active Diagnosis of Depression or Bipolar Disorder -   Little interest or pleasure in doing things - Not at all Not at all Not at all - Not at all   Feeling down, depressed or hopeless - Not at all Not at all Not at all - Not at all   Total Score PHQ 2 - 0 0 0 - 0       Learning Assessment:  Learning Assessment 1/25/2018 11/28/2017   PRIMARY LEARNER Patient Patient   HIGHEST LEVEL OF EDUCATION - PRIMARY LEARNER  GRADUATED [address] CAREGIVER No No   PRIMARY LANGUAGE ENGLISH ENGLISH   LEARNER PREFERENCE PRIMARY READING DEMONSTRATION   ANSWERED BY patient patient   RELATIONSHIP SELF SELF       Abuse Screening:  Abuse Screening Questionnaire 2/9/2018 1/25/2018 11/28/2017   Do you ever feel afraid of your partner? N N N   Are you in a relationship with someone who physically or mentally threatens you? N N N   Is it safe for you to go home? Destiny Almanza       Fall Risk  Fall Risk Assessment, last 12 mths 3/29/2018 3/12/2018 2/12/2018 2/9/2018 1/25/2018 11/28/2017   Able to walk? Yes Yes Yes Yes Yes Yes   Fall in past 12 months? No No No No No No         Health Maintenance reviewed and discussed per provider. Pt currently taking Antiplatelet therapy? no      Coordination of Care:  1. Have you been to the ER, urgent care clinic since your last visit? Hospitalized since your last visit? no    2. Have you seen or consulted any other health care providers outside of the [facility] since your last visit? Include any pap smears or colon screening.  no    Please see Red banners under Allergies, Med rec, Immunizations to remove outside inquires. All correct information has been verified with patient and added to chart.      Medication variance in dosage/sig per patient as follows: none

## 2018-05-29 ENCOUNTER — TELEPHONE (OUTPATIENT)
Dept: INTERNAL MEDICINE CLINIC | Age: 73
End: 2018-05-29

## 2018-05-29 NOTE — TELEPHONE ENCOUNTER
Patient called and stated that she is taking the Buspar twice daily and it is making her dizzy in the morning. She is wanting to know if she can take it only once a day. Please call her at [phone] to advise.

## 2018-06-02 ENCOUNTER — ANESTHESIA EVENT (OUTPATIENT)
Dept: ENDOSCOPY | Age: 73
End: 2018-06-02
Payer: MEDICARE

## 2018-06-04 ENCOUNTER — ANESTHESIA (OUTPATIENT)
Dept: ENDOSCOPY | Age: 73
End: 2018-06-04
Payer: MEDICARE

## 2018-06-04 ENCOUNTER — HOSPITAL ENCOUNTER (OUTPATIENT)
Age: 73
Setting detail: OUTPATIENT SURGERY
Discharge: HOME | End: 2018-06-04
Attending: INTERNAL MEDICINE | Admitting: INTERNAL MEDICINE
Payer: MEDICARE

## 2018-06-04 VITALS
TEMPERATURE: 97.5 F | SYSTOLIC BLOOD PRESSURE: 122 MMHG | BODY MASS INDEX: 20.81 KG/M2 | OXYGEN SATURATION: 100 % | HEIGHT: 60 IN | DIASTOLIC BLOOD PRESSURE: 65 MMHG | WEIGHT: 106 LBS | RESPIRATION RATE: 21 BRPM | HEART RATE: 70 BPM

## 2018-06-04 PROCEDURE — 74011250636 HC RX REV CODE- 250/636

## 2018-06-04 PROCEDURE — 88305 TISSUE EXAM BY PATHOLOGIST: CPT | Performed by: INTERNAL MEDICINE

## 2018-06-04 PROCEDURE — 77030013992 HC SNR POLYP ENDOSC BSC -B: Performed by: INTERNAL MEDICINE

## 2018-06-04 PROCEDURE — 76040000007: Performed by: INTERNAL MEDICINE

## 2018-06-04 PROCEDURE — 77030009426 HC FCPS BIOP ENDOSC BSC -B: Performed by: INTERNAL MEDICINE

## 2018-06-04 PROCEDURE — 76060000032 HC ANESTHESIA 0.5 TO 1 HR: Performed by: INTERNAL MEDICINE

## 2018-06-04 PROCEDURE — 74011250636 HC RX REV CODE- 250/636: Performed by: NURSE ANESTHETIST, CERTIFIED REGISTERED

## 2018-06-04 RX ORDER — SODIUM CHLORIDE, SODIUM LACTATE, POTASSIUM CHLORIDE, CALCIUM CHLORIDE 600; 310; 30; 20 MG/100ML; MG/100ML; MG/100ML; MG/100ML
75 INJECTION, SOLUTION INTRAVENOUS CONTINUOUS
Status: CANCELLED | OUTPATIENT
Start: 2018-06-04

## 2018-06-04 RX ORDER — SODIUM CHLORIDE 0.9 % (FLUSH) 0.9 %
5-10 SYRINGE (ML) INJECTION EVERY 8 HOURS
Status: CANCELLED | OUTPATIENT
Start: 2018-06-04 | End: 2018-06-04

## 2018-06-04 RX ORDER — NALOXONE HYDROCHLORIDE 0.4 MG/ML
0.4 INJECTION, SOLUTION INTRAMUSCULAR; INTRAVENOUS; SUBCUTANEOUS
Status: CANCELLED | OUTPATIENT
Start: 2018-06-04 | End: 2018-06-04

## 2018-06-04 RX ORDER — ATROPINE SULFATE 0.1 MG/ML
0.5 INJECTION INTRAVENOUS
Status: CANCELLED | OUTPATIENT
Start: 2018-06-04 | End: 2018-06-04

## 2018-06-04 RX ORDER — SODIUM CHLORIDE, SODIUM LACTATE, POTASSIUM CHLORIDE, CALCIUM CHLORIDE 600; 310; 30; 20 MG/100ML; MG/100ML; MG/100ML; MG/100ML
INJECTION, SOLUTION INTRAVENOUS
Status: DISCONTINUED | OUTPATIENT
Start: 2018-06-04 | End: 2018-06-04 | Stop reason: HOSPADM

## 2018-06-04 RX ORDER — SODIUM CHLORIDE 0.9 % (FLUSH) 0.9 %
5-10 SYRINGE (ML) INJECTION AS NEEDED
Status: CANCELLED | OUTPATIENT
Start: 2018-06-04 | End: 2018-06-04

## 2018-06-04 RX ORDER — PROPOFOL 10 MG/ML
INJECTION, EMULSION INTRAVENOUS AS NEEDED
Status: DISCONTINUED | OUTPATIENT
Start: 2018-06-04 | End: 2018-06-04 | Stop reason: HOSPADM

## 2018-06-04 RX ORDER — FLUMAZENIL 0.1 MG/ML
0.2 INJECTION INTRAVENOUS
Status: CANCELLED | OUTPATIENT
Start: 2018-06-04 | End: 2018-06-04

## 2018-06-04 RX ORDER — SODIUM CHLORIDE 0.9 % (FLUSH) 0.9 %
5-10 SYRINGE (ML) INJECTION EVERY 8 HOURS
Status: DISCONTINUED | OUTPATIENT
Start: 2018-06-04 | End: 2018-06-04 | Stop reason: HOSPADM

## 2018-06-04 RX ORDER — SODIUM CHLORIDE 0.9 % (FLUSH) 0.9 %
5-10 SYRINGE (ML) INJECTION AS NEEDED
Status: DISCONTINUED | OUTPATIENT
Start: 2018-06-04 | End: 2018-06-04 | Stop reason: HOSPADM

## 2018-06-04 RX ORDER — SODIUM CHLORIDE, SODIUM LACTATE, POTASSIUM CHLORIDE, CALCIUM CHLORIDE 600; 310; 30; 20 MG/100ML; MG/100ML; MG/100ML; MG/100ML
75 INJECTION, SOLUTION INTRAVENOUS CONTINUOUS
Status: DISCONTINUED | OUTPATIENT
Start: 2018-06-04 | End: 2018-06-04 | Stop reason: HOSPADM

## 2018-06-04 RX ORDER — DEXTROMETHORPHAN/PSEUDOEPHED 2.5-7.5/.8
1.2 DROPS ORAL
Status: CANCELLED | OUTPATIENT
Start: 2018-06-04

## 2018-06-04 RX ORDER — EPINEPHRINE 0.1 MG/ML
1 INJECTION INTRACARDIAC; INTRAVENOUS
Status: CANCELLED | OUTPATIENT
Start: 2018-06-04 | End: 2018-06-04

## 2018-06-04 RX ADMIN — PROPOFOL 20 MG: 10 INJECTION, EMULSION INTRAVENOUS at 13:12

## 2018-06-04 RX ADMIN — PROPOFOL 20 MG: 10 INJECTION, EMULSION INTRAVENOUS at 13:00

## 2018-06-04 RX ADMIN — PROPOFOL 20 MG: 10 INJECTION, EMULSION INTRAVENOUS at 13:16

## 2018-06-04 RX ADMIN — PROPOFOL 20 MG: 10 INJECTION, EMULSION INTRAVENOUS at 13:28

## 2018-06-04 RX ADMIN — SODIUM CHLORIDE, SODIUM LACTATE, POTASSIUM CHLORIDE, CALCIUM CHLORIDE: 600; 310; 30; 20 INJECTION, SOLUTION INTRAVENOUS at 12:53

## 2018-06-04 RX ADMIN — PROPOFOL 20 MG: 10 INJECTION, EMULSION INTRAVENOUS at 13:08

## 2018-06-04 RX ADMIN — PROPOFOL 60 MG: 10 INJECTION, EMULSION INTRAVENOUS at 12:56

## 2018-06-04 RX ADMIN — PROPOFOL 20 MG: 10 INJECTION, EMULSION INTRAVENOUS at 13:22

## 2018-06-04 RX ADMIN — PROPOFOL 20 MG: 10 INJECTION, EMULSION INTRAVENOUS at 12:58

## 2018-06-04 RX ADMIN — PROPOFOL 20 MG: 10 INJECTION, EMULSION INTRAVENOUS at 13:04

## 2018-06-04 RX ADMIN — SODIUM CHLORIDE, SODIUM LACTATE, POTASSIUM CHLORIDE, AND CALCIUM CHLORIDE 75 ML/HR: 600; 310; 30; 20 INJECTION, SOLUTION INTRAVENOUS at 12:26

## 2018-06-04 RX ADMIN — PROPOFOL 20 MG: 10 INJECTION, EMULSION INTRAVENOUS at 13:30

## 2018-06-04 NOTE — IP AVS SNAPSHOT
303 [facility], [place] [address] [address] [phone] Patient: Wang Ibarra MRN: [id number] :1945 About your hospitalization You were admitted on:  2018 You last received care in the:  [facility] You were discharged on:  2018 Why you were hospitalized Your primary diagnosis was:  Not on File Follow-up Information Follow up With Details Comments Contact Info [address] 200 [facility] 
[phone] Your Scheduled Appointments 2018  9:30 AM EDT Follow Up with Denisha Sanz MD  
4600  46[facility] ([address]) 235 [facility], [address] [facility] [address]  
[phone] 2018  8:30 AM EDT Follow Up with Marissa Corral NP  
[facility] [place] ([address]) 340 [facility], [address] [facility] [id number] [phone] Discharge Orders None A check aleena indicates which time of day the medication should be taken. My Medications ASK your doctor about these medications Instructions Each Dose to Equal  
 Morning Noon Evening Bedtime  
 alendronate 10 mg tablet Commonly known as:  FOSAMAX Your last dose was: Your next dose is: Take 1 Tab by mouth Daily (before breakfast). 10 mg  
    
   
   
   
  
 aspirin 81 mg chewable tablet Your last dose was: Your next dose is: Take 1 Tab by mouth daily. 81 mg  
    
   
   
   
  
 atorvastatin 20 mg tablet Commonly known as:  LIPITOR Your last dose was: Your next dose is: Take 20 mg by mouth daily. 20 mg  
    
   
   
   
  
 busPIRone 7.5 mg tablet Commonly known as:  BUSPAR Your last dose was: Your next dose is: Take 1 Tab by mouth two (2) times a day. 7.5 mg  
    
   
   
   
  
 CLARITIN 10 mg tablet Generic drug:  loratadine Your last dose was: Your next dose is: Take 10 mg by mouth daily as needed for Allergies or Rhinitis. 10 mg  
    
   
   
   
  
 fluticasone 50 mcg/actuation nasal spray Commonly known as:  Curtistine Paci Your last dose was: Your next dose is:    
   
   
 2 spray each nostril daily LORazepam 0.5 mg tablet Commonly known as:  ATIVAN Your last dose was: Your next dose is: Take 1 Tab by mouth every four (4) hours as needed for Anxiety. Max Daily Amount: 3 mg. 0.5 mg  
    
   
   
   
  
 montelukast 10 mg tablet Commonly known as:  SINGULAIR Your last dose was: Your next dose is: Take 1 Tab by mouth daily. 10 mg  
    
   
   
   
  
 omeprazole 20 mg capsule Commonly known as:  PRILOSEC Your last dose was: Your next dose is: Take 20 mg by mouth daily. 20 mg  
    
   
   
   
  
 TYLENOL EXTRA STRENGTH 500 mg tablet Generic drug:  acetaminophen Your last dose was: Your next dose is: Take  by mouth every six (6) hours as needed for Pain. Discharge Instructions Colonoscopy: What to Expect at [facility] Your Recovery After you have a colonoscopy, you will stay at the clinic for 1 to 2 hours until the medicines wear off. Then you can go home. But you will need to arrange for a ride. Your doctor will tell you when you can eat and do your other usual activities. Your doctor will talk to you about when you will need your next colonoscopy. Your doctor can help you decide how often you need to be checked. This will depend on the results of your test and your risk for colorectal cancer. After the test, you may be bloated or have gas pains.  You may need to pass gas. If a biopsy was done or a polyp was removed, you may have streaks of blood in your stool (feces) for a few days. This care sheet gives you a general idea about how long it will take for you to recover. But each person recovers at a different pace. Follow the steps below to get better as quickly as possible. How can you care for yourself at home? Activity · Rest when you feel tired. · You can do your normal activities when it feels okay to do so. Diet · Follow your doctor's directions for eating. · Unless your doctor has told you not to, drink plenty of fluids. This helps to replace the fluids that were lost during the colon prep. · Do not drink alcohol. Medicines · If polyps were removed or a biopsy was done during the test, your doctor may tell you not to take aspirin or other anti-inflammatory medicines for a few days. These include ibuprofen (Advil, Motrin) and naproxen (Aleve). Other instructions · For your safety, do not drive or operate machinery until the medicine wears off and you can think clearly. Your doctor may tell you not to drive or operate machinery until the day after your test. 
· Do not sign legal documents or make major decisions until the medicine wears off and you can think clearly. The anesthesia can make it hard for you to fully understand what you are agreeing to. Follow-up care is a key part of your treatment and safety. Be sure to make and go to all appointments, and call your doctor if you are having problems. It's also a good idea to know your test results and keep a list of the medicines you take. When should you call for help? Call 911 anytime you think you may need emergency care. For example, call if: 
· You passed out (lost consciousness). · You pass maroon or bloody stools. · You have severe belly pain. Call your doctor now or seek immediate medical care if: 
· Your stools are black and tarlike. · Your stools have streaks of blood, but you did not have a biopsy or any polyps removed. · You have belly pain, or your belly is swollen and firm. · You vomit. · You have a fever. · You are very dizzy. Watch closely for changes in your health, and be sure to contact your doctor if you have any problems. Where can you learn more? Go to Re[employer] Compass.be Enter E264 in the search box to learn more about \"Colonoscopy: What to Expect at Home. \"  
© 7438-2368 [employer]. Care instructions adapted under license by Nichelle Zhong (which disclaims liability or warranty for this information). This care instruction is for use with your licensed healthcare professional. If you have questions about a medical condition or this instruction, always ask your healthcare professional. [address] any warranty or liability for your use of this information. Content Version: 79.9.879177; Current as of: November 14, 2014 Colon Polyps: Care Instructions Your Care Instructions Colon polyps are growths in the colon or the rectum. The cause of most colon polyps is not known, and most people who get them do not have any problems. But a certain kind can turn into cancer. For this reason, regular testing for colon polyps is important for people age 48 and older and anyone who has an increased risk for colon cancer. Polyps are usually found through routine colon cancer screening tests. Although most colon polyps are not cancerous, they are usually removed and then tested for cancer. Screening for colon cancer saves lives because the cancer can usually be cured if it is caught early. If you have a polyp that is the type that can turn into cancer, you may need more tests to examine your entire colon. The doctor will remove any other polyps that he or she finds, and you will be tested more often. Follow-up care is a key part of your treatment and safety.  Be sure to make and go to all appointments, and call your doctor if you are having problems. It's also a good idea to know your test results and keep a list of the medicines you take. How can you care for yourself at home? Regular exams to look for colon polyps are the best way to prevent polyps from turning into colon cancer. These can include stool tests, sigmoidoscopy, colonoscopy, and CT colonography. Talk with your doctor about a testing schedule that is right for you. To prevent polyps There is no home treatment that can prevent colon polyps. But these steps may help lower your risk for cancer. · Stay active. Being active can help you get to and stay at a healthy weight. Try to exercise on most days of the week. Walking is a good choice. · Eat well. Choose a variety of vegetables, fruits, legumes (such as peas and beans), fish, poultry, and whole grains. · Do not smoke. If you need help quitting, talk to your doctor about stop-smoking programs and medicines. These can increase your chances of quitting for good. · If you drink alcohol, limit how much you drink. Limit alcohol to 2 drinks a day for men and 1 drink a day for women. When should you call for help? Call your doctor now or seek immediate medical care if: 
? · You have severe belly pain. ? · Your stools are maroon or very bloody. ? Watch closely for changes in your health, and be sure to contact your doctor if: 
? · You have a fever. ? · You have nausea or vomiting. ? · You have a change in bowel habits (new constipation or diarrhea). ? · Your symptoms get worse or are not improving as expected. Where can you learn more? Go to http://karin-michaela.info/. Enter 95 [id number] in the search box to learn more about \"Colon Polyps: Care Instructions. \" Current as of: May 12, 2017 Content Version: 11.4 © 5878-2783 [employer].  Care instructions adapted under license by [address] (which disclaims liability or warranty for this information). If you have questions about a medical condition or this instruction, always ask your healthcare professional. [address] any warranty or liability for your use of this information. Hemorrhoids: Care Instructions Your Care Instructions Hemorrhoids are enlarged veins that develop in the anal canal. Bleeding during bowel movements, itching, swelling, and rectal pain are the most common symptoms. They can be uncomfortable at times, but hemorrhoids rarely are a serious problem. You can treat most hemorrhoids with simple changes to your diet and bowel habits. These changes include eating more fiber and not straining to pass stools. Most hemorrhoids do not need surgery or other treatment unless they are very large and painful or bleed a lot. Follow-up care is a key part of your treatment and safety. Be sure to make and go to all appointments, and call your doctor if you are having problems. It's also a good idea to know your test results and keep a list of the medicines you take. How can you care for yourself at home? · Sit in a few inches of warm water (sitz bath) 3 times a day and after bowel movements. The warm water helps with pain and itching. · Put ice on your anal area several times a day for 10 minutes at a time. Put a thin cloth between the ice and your skin. Follow this by placing a warm, wet towel on the area for another 10 to 20 minutes. · Take pain medicines exactly as directed. ¨ If the doctor gave you a prescription medicine for pain, take it as prescribed. ¨ If you are not taking a prescription pain medicine, ask your doctor if you can take an over-the-counter medicine. · Keep the anal area clean, but be gentle. Use water and a fragrance-free soap, such as [place], or use baby wipes or medicated pads, such as Tucks. · Wear cotton underwear and loose clothing to decrease moisture in the anal area. · Eat more fiber. Include foods such as whole-grain breads and cereals, raw vegetables, raw and dried fruits, and beans. · Drink plenty of fluids, enough so that your urine is light yellow or clear like water. If you have kidney, heart, or liver disease and have to limit fluids, talk with your doctor before you increase the amount of fluids you drink. · Use a stool softener that contains bran or psyllium. You can save money by buying bran or psyllium (available in bulk at most health food stores) and sprinkling it on foods or stirring it into fruit juice. Or you can use a product such as Metamucil or Hydrocil. · Practice healthy bowel habits. ¨ Go to the bathroom as soon as you have the urge. ¨ Avoid straining to pass stools. Relax and give yourself time to let things happen naturally. ¨ Do not hold your breath while passing stools. ¨ Do not read while sitting on the toilet. Get off the toilet as soon as you have finished. · Take your medicines exactly as prescribed. Call your doctor if you think you are having a problem with your medicine. When should you call for help? Call 911 anytime you think you may need emergency care. For example, call if: 
? · You pass maroon or very bloody stools. ?Call your doctor now or seek immediate medical care if: 
? · You have increased pain. ? · You have increased bleeding. ? Watch closely for changes in your health, and be sure to contact your doctor if: 
? · Your symptoms have not improved after 3 or 4 days. Where can you learn more? Go to http://karin-michaela.info/. Enter F228 in the search box to learn more about \"Hemorrhoids: Care Instructions. \" Current as of: May 12, 2017 Content Version: 11.4 © 3420-8610 [employer].  Care instructions adapted under license by [employer] (which disclaims liability or warranty for this information). If you have questions about a medical condition or this instruction, always ask your healthcare professional. [address] any warranty or liability for your use of this information. DISCHARGE SUMMARY from Nurse POST-PROCEDURE INSTRUCTIONS: 
 
Call your Physician if you: 
? Observe any excess bleeding. ? Develop a temperature over 100.5o F. 
? Experience abdominal, shoulder or chest pain. ? Notice any signs of decreased circulation or nerve impairment to an extremity such as a change in color, persistent numbness, tingling, coldness or increase in pain. ? Vomit blood or you have nausea and vomiting lasting longer than 4 hours. ? Are unable to take medications. ? Are unable to urinate within 8 hours after discharge following general anesthesia or intravenous sedation. For the next 24 hours after receiving general anesthesia or intravenous sedation, or while taking prescription Narcotics, limit your activities: 
? Do NOT drive a motor vehicle, operate hazard machinery or power tools, or perform tasks that require coordination. The medication you received during your procedure may have some effect on your mental awareness. ? Do NOT make important personal or business decisions. The medication you received during your procedure may have some effect on your mental awareness. ? Do NOT drink alcoholic beverages. These drinks do not mix well with the medications that have been given to you. ? Upon discharge from the hospital, you must be accompanied by a responsible adult. ? Resume your diet as directed by your physician. ? Resume medications as your physician has prescribed. ? Please give a list of your current medications to your Primary Care Provider. ? Please update this list whenever your medications are discontinued, doses are changed, or new medications (including over-the-counter products) are added. ? Please carry medication information at all times in case of emergency situations. These are general instructions for a healthy lifestyle: No smoking/ No tobacco products/ Avoid exposure to second hand smoke. ? Surgeon General's Warning:  Quitting smoking now greatly reduces serious risk to your health. Obesity, smoking, and a sedentary lifestyle greatly increase your risk for illness. ? A healthy diet, regular physical exercise & weight monitoring are important for maintaining a healthy lifestyle ? You may be retaining fluid if you have a history of heart failure or if you experience any of the following symptoms:  Weight gain of 3 pounds or more overnight or 5 pounds in a week, increased swelling in our hands or feet or shortness of breath while lying flat in bed. Please call your doctor as soon as you notice any of these symptoms; do not wait until your next office visit. Recognize signs and symptoms of STROKE: 
F  -  Face looks uneven A  -  Arms unable to move or move unevenly S  -  Speech slurred or non-existent T  -  Time to call 911 - as soon as signs and symptoms begin - DO NOT go back to bed or wait to see If you get better - TIME IS BRAIN. Colorectal Screening ? Colorectal cancer almost always develops from precancerous polyps (abnormal growths) in the colon or rectum. Screening tests can find precancerous polyps, so that they can be removed before they turn into cancer. Screening tests can also find colorectal cancer early, when treatment works best. 
? Speak with your physician about when you should begin screening and how often you should be tested. Additional Information If you have questions, please call [phone]. Remember, [employer] is NOT to be used for urgent needs. For medical emergencies, dial 911. Educational references and/or instructions provided during this visit included: 
 
Colon Polyps, Hemorrhoids APPOINTMENTS: 
 
 Please make a follow-up appointment with your physician. Discharge information has been reviewed with the patient. The patient verbalized understanding. ACO Transitions of Care Introducing [facility] offers a voluntary care coordination program to provide high quality service and care to [facility] fee-for-service beneficiaries. Anya Sutton was designed to help you enhance your health and well-being through the following services: ? Transitions of Care  support for individuals who are transitioning from one care setting to another (example: Hospital to home). ? Chronic and Complex Care Coordination  support for individuals and caregivers of those with serious or chronic illnesses or with more than one chronic (ongoing) condition and those who take a number of different medications. If you meet specific medical criteria, a [address] [place] [address] Rd may call you directly to coordinate your care with your primary care physician and your other care providers. For questions about the [facility] programs, please, contact your physicians office. For general questions or additional information about Accountable Care Organizations: 
Please visit www.medicare.gov/acos. html or call 1-800-MEDICARE (3-181.487.4207) TTY users should call 4-969.609.3040. Introducing [facility]! [facility] introduces [employer] patient portal. Now you can access parts of your medical record, email your doctor's office, and request medication refills online. 1. In your internet browser, go to [email or web address] 2. Click on the First Time User? Click Here link in the Sign In box. You will see the New Member Sign Up page. 3. Enter your [employer] Access Code exactly as it appears below. You will not need to use this code after youve completed the sign-up process.  If you do not sign up before the expiration date, you must request a new code. · [employer] Access Code: [email or web address] Expires: 7/2/2018  3:17 PM 
 
4. Enter the last four digits of your Social Security Number (xxxx) and Date of Birth (mm/dd/yyyy) as indicated and click Submit. You will be taken to the next sign-up page. 5. Create a [employer] ID. This will be your [employer] login ID and cannot be changed, so think of one that is secure and easy to remember. 6. Create a [employer] password. You can change your password at any time. 7. Enter your Password Reset Question and Answer. This can be used at a later time if you forget your password. 8. Enter your e-mail address. You will receive e-mail notification when new information is available in [address] 9. Click Sign Up. You can now view and download portions of your medical record. 10. Click the Download Summary menu link to download a portable copy of your medical information. If you have questions, please visit the Frequently Asked Questions section of the [employer] website. Remember, [employer] is NOT to be used for urgent needs. For medical emergencies, dial 911. Now available from your iPhone and Android! Introducing Jagdeep Garcia As a [facility] patient, I wanted to make you aware of our electronic visit tool called Jagdeep Garcia. [facility] 24/7 allows you to connect within minutes with a medical provider 24 hours a day, seven days a week via a mobile device or tablet or logging into a secure website from your computer. You can access Jagdeep Garcia from anywhere in the [place].  
 
A virtual visit might be right for you when you have a simple condition and feel like you just dont want to get out of bed, or cant get away from work for an appointment, when your regular [facility] provider is not available (evenings, weekends or holidays), or when youre out of town and need minor care. Electronic visits cost only $49 and if the [facility] 24/7 provider determines a prescription is needed to treat your condition, one can be electronically transmitted to a nearby pharmacy*. Please take a moment to enroll today if you have not already done so. The enrollment process is free and takes just a few minutes. To enroll, please download the Rosariofritz VenturaAshleigh 24/7 inna to your tablet or phone, or visit [email or web address] to enroll on your computer. And, as an [address] [place] [address] patient with a wikifolio account, the results of your visits will be scanned into your electronic medical record and your primary care provider will be able to view the scanned results. We urge you to continue to see your regular [facility]isa Sotelo provider for your ongoing medical care. And while your primary care provider may not be the one available when you seek a Jagdeep Garcia virtual visit, the peace of mind you get from getting a real diagnosis real time can be priceless. For more information on Jagdeep Garcia, view our Frequently Asked Questions (FAQs) at [email or web address]. Sincerely, 
 
Piter Osullivan MD 
Chief Medical Officer [employer] *:  certain medications cannot be prescribed via Jagdeep [employer] Providers Seen During Your Hospitalization Provider Specialty Primary office phone Fatmata Moss MD Gastroenterology [phone] Your Primary Care Physician (PCP) Primary Care Physician Office Phone Office Fax [address] [place] [address] [address] [phone] You are allergic to the following No active allergies Recent Documentation Height Weight Breastfeeding? BMI OB Status Smoking Status 1.524 m 48.1 kg No 20.7 kg/m2 Hysterectomy Never Smoker Emergency Contacts Name Discharge Info Relation Home Work Mobile [address] CAREGIVER [3] Friend [5] [phone] Neftaly Friedman DISCHARGE CAREGIVER [3] Son [22] [phone] Patient Belongings The following personal items are in your possession at time of discharge: 
  Dental Appliances: None  Visual Aid: None Please provide this summary of care documentation to your next provider. Signatures-by signing, you are acknowledging that this After Visit Summary has been reviewed with you and you have received a copy. Patient Signature:  ____________________________________________________________ Date:  ____________________________________________________________  
  
[facility] Provider Signature:  ____________________________________________________________ Date:  ____________________________________________________________

## 2018-06-04 NOTE — DISCHARGE INSTRUCTIONS
Colonoscopy: What to Expect at [address] [place] [address]  After you have a colonoscopy, you will stay at the clinic for 1 to 2 hours until the medicines wear off. Then you can go home. But you will need to arrange for a ride. Your doctor will tell you when you can eat and do your other usual activities. Your doctor will talk to you about when you will need your next colonoscopy. Your doctor can help you decide how often you need to be checked. This will depend on the results of your test and your risk for colorectal cancer. After the test, you may be bloated or have gas pains. You may need to pass gas. If a biopsy was done or a polyp was removed, you may have streaks of blood in your stool (feces) for a few days. This care sheet gives you a general idea about how long it will take for you to recover. But each person recovers at a different pace. Follow the steps below to get better as quickly as possible. How can you care for yourself at home? Activity  · Rest when you feel tired. · You can do your normal activities when it feels okay to do so. Diet  · Follow your doctor's directions for eating. · Unless your doctor has told you not to, drink plenty of fluids. This helps to replace the fluids that were lost during the colon prep. · Do not drink alcohol. Medicines  · If polyps were removed or a biopsy was done during the test, your doctor may tell you not to take aspirin or other anti-inflammatory medicines for a few days. These include ibuprofen (Advil, Motrin) and naproxen (Aleve). Other instructions  · For your safety, do not drive or operate machinery until the medicine wears off and you can think clearly. Your doctor may tell you not to drive or operate machinery until the day after your test.  · Do not sign legal documents or make major decisions until the medicine wears off and you can think clearly. The anesthesia can make it hard for you to fully understand what you are agreeing to.   Follow-up care is a key part of your treatment and safety. Be sure to make and go to all appointments, and call your doctor if you are having problems. It's also a good idea to know your test results and keep a list of the medicines you take. When should you call for help? Call 911 anytime you think you may need emergency care. For example, call if:  · You passed out (lost consciousness). · You pass maroon or bloody stools. · You have severe belly pain. Call your doctor now or seek immediate medical care if:  · Your stools are black and tarlike. · Your stools have streaks of blood, but you did not have a biopsy or any polyps removed. · You have belly pain, or your belly is swollen and firm. · You vomit. · You have a fever. · You are very dizzy. Watch closely for changes in your health, and be sure to contact your doctor if you have any problems. Where can you learn more? Go to Oncodesign.be  Enter E264 in the search box to learn more about \"Colonoscopy: What to Expect at Home. \"   © 8222-0142 Healthwise, Incorporated. Care instructions adapted under license by [facility] (which disclaims liability or warranty for this information). This care instruction is for use with your licensed healthcare professional. If you have questions about a medical condition or this instruction, always ask your healthcare professional. [address] any warranty or liability for your use of this information. Content Version: 67.3.546016; Current as of: November 14, 2014     Colon Polyps: Care Instructions  Your Care Instructions    Colon polyps are growths in the colon or the rectum. The cause of most colon polyps is not known, and most people who get them do not have any problems. But a certain kind can turn into cancer. For this reason, regular testing for colon polyps is important for people age 48 and older and anyone who has an increased risk for colon cancer.   Polyps are usually found through routine colon cancer screening tests. Although most colon polyps are not cancerous, they are usually removed and then tested for cancer. Screening for colon cancer saves lives because the cancer can usually be cured if it is caught early. If you have a polyp that is the type that can turn into cancer, you may need more tests to examine your entire colon. The doctor will remove any other polyps that he or she finds, and you will be tested more often. Follow-up care is a key part of your treatment and safety. Be sure to make and go to all appointments, and call your doctor if you are having problems. It's also a good idea to know your test results and keep a list of the medicines you take. How can you care for yourself at home? Regular exams to look for colon polyps are the best way to prevent polyps from turning into colon cancer. These can include stool tests, sigmoidoscopy, colonoscopy, and CT colonography. Talk with your doctor about a testing schedule that is right for you. To prevent polyps  There is no home treatment that can prevent colon polyps. But these steps may help lower your risk for cancer. · Stay active. Being active can help you get to and stay at a healthy weight. Try to exercise on most days of the week. Walking is a good choice. · Eat well. Choose a variety of vegetables, fruits, legumes (such as peas and beans), fish, poultry, and whole grains. · Do not smoke. If you need help quitting, talk to your doctor about stop-smoking programs and medicines. These can increase your chances of quitting for good. · If you drink alcohol, limit how much you drink. Limit alcohol to 2 drinks a day for men and 1 drink a day for women. When should you call for help? Call your doctor now or seek immediate medical care if:  ? · You have severe belly pain. ? · Your stools are maroon or very bloody. ? Watch closely for changes in your health, and be sure to contact your doctor if:  ? · You have a fever.   ? · You have nausea or vomiting. ? · You have a change in bowel habits (new constipation or diarrhea). ? · Your symptoms get worse or are not improving as expected. Where can you learn more? Go to http://karin-michaela.info/. Enter 95 [id number] in the search box to learn more about \"Colon Polyps: Care Instructions. \"  Current as of: May 12, 2017  Content Version: 11.4  © 2006-2017 [employer]. Care instructions adapted under license by [employer] (which disclaims liability or warranty for this information). If you have questions about a medical condition or this instruction, always ask your healthcare professional. [place] [address] any warranty or liability for your use of this information. Hemorrhoids: Care Instructions  Your Care Instructions    Hemorrhoids are enlarged veins that develop in the anal canal. Bleeding during bowel movements, itching, swelling, and rectal pain are the most common symptoms. They can be uncomfortable at times, but hemorrhoids rarely are a serious problem. You can treat most hemorrhoids with simple changes to your diet and bowel habits. These changes include eating more fiber and not straining to pass stools. Most hemorrhoids do not need surgery or other treatment unless they are very large and painful or bleed a lot. Follow-up care is a key part of your treatment and safety. Be sure to make and go to all appointments, and call your doctor if you are having problems. It's also a good idea to know your test results and keep a list of the medicines you take. How can you care for yourself at home? · Sit in a few inches of warm water (sitz bath) 3 times a day and after bowel movements. The warm water helps with pain and itching. · Put ice on your anal area several times a day for 10 minutes at a time. Put a thin cloth between the ice and your skin.  Follow this by placing a warm, wet towel on the area for another 10 to 20 minutes. · Take pain medicines exactly as directed. ¨ If the doctor gave you a prescription medicine for pain, take it as prescribed. ¨ If you are not taking a prescription pain medicine, ask your doctor if you can take an over-the-counter medicine. · Keep the anal area clean, but be gentle. Use water and a fragrance-free soap, such as [place], or use baby wipes or medicated pads, such as Tucks. · Wear cotton underwear and loose clothing to decrease moisture in the anal area. · Eat more fiber. Include foods such as whole-grain breads and cereals, raw vegetables, raw and dried fruits, and beans. · Drink plenty of fluids, enough so that your urine is light yellow or clear like water. If you have kidney, heart, or liver disease and have to limit fluids, talk with your doctor before you increase the amount of fluids you drink. · Use a stool softener that contains bran or psyllium. You can save money by buying bran or psyllium (available in bulk at most health food stores) and sprinkling it on foods or stirring it into fruit juice. Or you can use a product such as Metamucil or Hydrocil. · Practice healthy bowel habits. ¨ Go to the bathroom as soon as you have the urge. ¨ Avoid straining to pass stools. Relax and give yourself time to let things happen naturally. ¨ Do not hold your breath while passing stools. ¨ Do not read while sitting on the toilet. Get off the toilet as soon as you have finished. · Take your medicines exactly as prescribed. Call your doctor if you think you are having a problem with your medicine. When should you call for help? Call 911 anytime you think you may need emergency care. For example, call if:  ? · You pass maroon or very bloody stools. ?Call your doctor now or seek immediate medical care if:  ? · You have increased pain. ? · You have increased bleeding. ? Watch closely for changes in your health, and be sure to contact your doctor if:  ? · Your symptoms have not improved after 3 or 4 days. Where can you learn more? Go to http://karin-michaela.info/. Enter F228 in the search box to learn more about \"Hemorrhoids: Care Instructions. \"  Current as of: May 12, 2017  Content Version: 11.4  © 5898-4646 [employer]. Care instructions adapted under license by [employer] (which disclaims liability or warranty for this information). If you have questions about a medical condition or this instruction, always ask your healthcare professional. [place] [address] any warranty or liability for your use of this information. DISCHARGE SUMMARY from Nurse     POST-PROCEDURE INSTRUCTIONS:    Call your Physician if you:  ? Observe any excess bleeding. ? Develop a temperature over 100.5o F.  ? Experience abdominal, shoulder or chest pain. ? Notice any signs of decreased circulation or nerve impairment to an extremity such as a change in color, persistent numbness, tingling, coldness or increase in pain. ? Vomit blood or you have nausea and vomiting lasting longer than 4 hours. ? Are unable to take medications. ? Are unable to urinate within 8 hours after discharge following general anesthesia or intravenous sedation. For the next 24 hours after receiving general anesthesia or intravenous sedation, or while taking prescription Narcotics, limit your activities:  ? Do NOT drive a motor vehicle, operate hazard machinery or power tools, or perform tasks that require coordination. The medication you received during your procedure may have some effect on your mental awareness. ? Do NOT make important personal or business decisions. The medication you received during your procedure may have some effect on your mental awareness. ? Do NOT drink alcoholic beverages. These drinks do not mix well with the medications that have been given to you. ?  Upon discharge from the hospital, you must be accompanied by a responsible adult.    ? Resume your diet as directed by your physician. ? Resume medications as your physician has prescribed. ? Please give a list of your current medications to your Primary Care Provider. ? Please update this list whenever your medications are discontinued, doses are changed, or new medications (including over-the-counter products) are added. ? Please carry medication information at all times in case of emergency situations. These are general instructions for a healthy lifestyle:    No smoking/ No tobacco products/ Avoid exposure to second hand smoke.  Surgeon General's Warning:  Quitting smoking now greatly reduces serious risk to your health. Obesity, smoking, and a sedentary lifestyle greatly increase your risk for illness.  A healthy diet, regular physical exercise & weight monitoring are important for maintaining a healthy lifestyle   You may be retaining fluid if you have a history of heart failure or if you experience any of the following symptoms:  Weight gain of 3 pounds or more overnight or 5 pounds in a week, increased swelling in our hands or feet or shortness of breath while lying flat in bed. Please call your doctor as soon as you notice any of these symptoms; do not wait until your next office visit. Recognize signs and symptoms of STROKE:  F  -  Face looks uneven  A  -  Arms unable to move or move unevenly  S  -  Speech slurred or non-existent  T  -  Time to call 911 - as soon as signs and symptoms begin - DO NOT go back to bed or wait to see If you get better - TIME IS BRAIN. Colorectal Screening   Colorectal cancer almost always develops from precancerous polyps (abnormal growths) in the colon or rectum. Screening tests can find precancerous polyps, so that they can be removed before they turn into cancer.  Screening tests can also find colorectal cancer early, when treatment works best.  [employer] Speak with your physician about when you should begin screening and how often you should be tested. Additional Information    If you have questions, please call [phone]. Remember, MyChart is NOT to be used for urgent needs. For medical emergencies, dial 911. Educational references and/or instructions provided during this visit included:    Colon Polyps, Hemorrhoids      APPOINTMENTS:    Please make a follow-up appointment with your physician. Discharge information has been reviewed with the patient. The patient verbalized understanding.

## 2018-06-04 NOTE — ANESTHESIA POSTPROCEDURE EVALUATION
Post-Anesthesia Evaluation and Assessment    Patient: Cyril Galeas MRN: [id number]  SSN: [id number]    YOB: 1945  Age: 81 y.o. Sex: female       Cardiovascular Function/Vital Signs  Visit Vitals    /48    Pulse 76    Temp 36.4 °C (97.6 °F)    Resp 21    Ht 5' (1.524 m)    Wt 48.1 kg (106 lb)    SpO2 100%    Breastfeeding No    BMI 20.7 kg/m2       Patient is status post MAC anesthesia for Procedure(s):  COLONOSCOPY / polypectomy. Nausea/Vomiting: None    Postoperative hydration reviewed and adequate. Pain:  Pain Scale 1: Numeric (0 - 10) (06/04/18 1215)  Pain Intensity 1: 0 (06/04/18 1215)   Managed    Neurological Status: At baseline    Mental Status and Level of Consciousness: Arousable    Pulmonary Status:   O2 Device: Nasal cannula (06/04/18 1340)   Adequate oxygenation and airway patent    Complications related to anesthesia: None    Post-anesthesia assessment completed.  No concerns    Signed By: Carla Horowitz MD     June 4, 2018

## 2018-06-04 NOTE — H&P
History and Physical reviewed; I have examined the patient and there are no pertinent changes. Gelacio Wall MD, MD   12:48 PM 6/4/2018  [facility], [address] [place] [address]  [email or web address]

## 2018-06-04 NOTE — ANESTHESIA PREPROCEDURE EVALUATION
Anesthetic History   No history of anesthetic complications            Review of Systems / Medical History  Patient summary reviewed and pertinent labs reviewed    Pulmonary  Within defined limits                 Neuro/Psych       CVA       Cardiovascular                  Exercise tolerance: <4 METS     GI/Hepatic/Renal  Within defined limits              Endo/Other        Arthritis     Other Findings   Comments: Documentation of current medication  Current medications obtained, documented and obtained? YES      Risk Factors for Postoperative nausea/vomiting:       History of postoperative nausea/vomiting? NO       Female? YES       Motion sickness? NO       Intended opioid administration for postoperative analgesia? NO      Smoking Abstinence:  Current Smoker? NO  Elective Surgery? YES  Seen preoperatively by anesthesiologist or proxy prior to day of surgery? YES  Pt abstained from smoking 24 hours prior to anesthesia?  N/A    Preventive care/screening for High Blood Pressure:  Aged 18 years and older: YES  Screened for high blood pressure: YES  Patients with high blood pressure referred to primary care provider   for BP management: YES                 Physical Exam    Airway  Mallampati: III  TM Distance: 4 - 6 cm  Neck ROM: decreased range of motion   Mouth opening: Normal     Cardiovascular    Rhythm: regular  Rate: normal         Dental    Dentition: Poor dentition     Pulmonary  Breath sounds clear to auscultation               Abdominal  GI exam deferred       Other Findings            Anesthetic Plan    ASA: 3  Anesthesia type: MAC            Anesthetic plan and risks discussed with: Patient

## 2018-06-04 NOTE — PROCEDURES
[email or web address]  [place], [address]      Brief Procedure Note    Bladimir Michel  [birth year]  [id number]    Date of Procedure: 6/4/2018    Preoperative diagnosis:                V76.51 - Z12.11,  Colon cancer Screening    Postoperative diagnosis: Colon Polyp.  Hemorrhoids    Type of Anesthesia: MAC (Monitored anesthesia care)    Description of findings: same as post op dx    Procedure: Procedure(s):  COLONOSCOPY / polypectomy    :  Dr. Ezequiel Malloy MD    Assistant(s): Endoscopy Technician-1: Dee Mai  Endoscopy RN-1: Radha Balbuena RN; Araceli Lynch RN    EBL:None    Specimens:   ID Type Source Tests Collected by Time Destination   1 : Polyps Ascending Preservative Colon, Ascending  Ezequiel Malloy MD 6/4/2018 1308 Pathology   2 : Polyp Cecum Preservative Cecum  Ezequiel Malloy MD 6/4/2018 1318 Pathology       Findings: See printed and scanned procedure note    Complications: None    Dr. Ezequiel Malloy MD  6/4/2018  1:48 PM

## 2018-06-06 ENCOUNTER — OFFICE VISIT (OUTPATIENT)
Dept: PULMONOLOGY | Age: 73
End: 2018-06-06

## 2018-06-06 VITALS
HEART RATE: 74 BPM | HEIGHT: 60 IN | WEIGHT: 108.75 LBS | BODY MASS INDEX: 21.35 KG/M2 | TEMPERATURE: 98.1 F | RESPIRATION RATE: 18 BRPM | SYSTOLIC BLOOD PRESSURE: 118 MMHG | DIASTOLIC BLOOD PRESSURE: 50 MMHG | OXYGEN SATURATION: 98 %

## 2018-06-06 DIAGNOSIS — R59.0 HILAR ADENOPATHY: Primary | ICD-10-CM

## 2018-06-06 NOTE — PROGRESS NOTES
The pt. C/o a bit of an increase in SOB recently. Her PCP is treating her for asthma and allergy symptoms; added Singulair and Flonase to her med regime with good effect. She c/o an achiness across the mid upper chest from time to time. It lasts a couple of minutes only not requiring medication. She denies ED nor Urgent Care visits.

## 2018-06-06 NOTE — PROGRESS NOTES
[facility]  Pulmonary, Critical Care, and Sleep Medicine      Chief complaint:  Hilar adenopathy    HPI:    Timur Montilla    is 81years old and returns the office today for follow-up concerning hilar adenopathy and relates that she does feel a slight discomfort in her substernal area which seems to come and go and is not related to activity and is nonradiating and which she describes as very mild. However the patient denies any other chest pain and says her cough is significantly improved and attributes this possibly to the ICS nasal spray   She says her rhinorrhea has stopped almost completely. She also notes no shortness of breath except with significant exertion      No Known Allergies  Current Outpatient Prescriptions   Medication Sig    alendronate (FOSAMAX) 10 mg tablet Take 1 Tab by mouth Daily (before breakfast).  busPIRone (BUSPAR) 7.5 mg tablet Take 1 Tab by mouth two (2) times a day.  fluticasone (FLONASE) 50 mcg/actuation nasal spray 2 spray each nostril daily    montelukast (SINGULAIR) 10 mg tablet Take 1 Tab by mouth daily.  acetaminophen (TYLENOL EXTRA STRENGTH) 500 mg tablet Take  by mouth every six (6) hours as needed for Pain.  atorvastatin (LIPITOR) 20 mg tablet Take 20 mg by mouth daily.  loratadine (CLARITIN) 10 mg tablet Take 10 mg by mouth daily as needed for Allergies or Rhinitis.  omeprazole (PRILOSEC) 20 mg capsule Take 20 mg by mouth daily.  LORazepam (ATIVAN) 0.5 mg tablet Take 1 Tab by mouth every four (4) hours as needed for Anxiety. Max Daily Amount: 3 mg.  aspirin 81 mg chewable tablet Take 1 Tab by mouth daily. No current facility-administered medications for this visit.       Past Medical History:   Diagnosis Date    Abnormal Pap smear     Dr. Jesse Spence Allergic rhinitis     Arthritis     Chronic pain     Hypercholesterolemia     Neck pain 5/17/2010    Stroke [facility] [address] 10/02/2017    TIA- legs weak memory issues     Past Surgical History:   Procedure Laterality Date    COLONOSCOPY N/A 6/4/2018    COLONOSCOPY / polypectomy performed by [facility]hawn Lee [facility] ENDOSCOPY    HX GYN      Total Hyst    HX LAP CHOLECYSTECTOMY      HX OTHER SURGICAL  2017    Throat widened     Social History     Social History    Marital status:      Spouse name: N/A    Number of children: N/A    Years of education: N/A     Occupational History    Not on file. Social History Main Topics    Smoking status: Never Smoker    Smokeless tobacco: Never Used    Alcohol use No    Drug use: No    Sexual activity: No     Other Topics Concern    Not on file     Social History Narrative     Family History   Problem Relation Age of Onset    Cancer Mother      breast    Heart Disease Father        Review of systems:  She denies fever chills says her appetite is about usual and that she is actually gaining some weight and that her night sweats have stopped    Physical Exam:  Visit Vitals    /50 (BP 1 Location: Left arm, BP Patient Position: Sitting)    Pulse 74    Temp 98.1 °F (36.7 °C)    Resp 18    Ht 5' (1.524 m)    Wt 49.3 kg (108 lb 12 oz)    SpO2 98%    BMI 21.24 kg/m2       Well-developed well-nourished and thin  HEENT: WNL  Lymph node exam: Supraclavicular cervical lymph nodes negative  Chest: Equal symmetrical expansion no dullness no wheezes rales rubs  Heart: Regular rhythm no gallop no murmur no JVD no peripheral edema  Extremities: No cyanosis clubbing or calf tenderness  Neurological: Alert and    Labs:    O2 sat room air at rest 98%    Impression:     By history and physical exam no suggestion of systemic disease related to hilar adenopathy    Plan:     Follow-up contrast CT in 3 months or sooner if needed    Chase Mcmahan MD , [facility]    CC: Alberta Mckeon NP     [address] [facility]  [place], [address] [address]     P: [phone]     F: [phone]

## 2018-06-08 DIAGNOSIS — F41.9 ANXIETY: ICD-10-CM

## 2018-06-08 RX ORDER — BUSPIRONE HYDROCHLORIDE 7.5 MG/1
TABLET ORAL
Qty: 60 TAB | Refills: 1 | Status: SHIPPED | OUTPATIENT
Start: 2018-06-08 | End: 2018-11-26 | Stop reason: SDUPTHER

## 2018-06-13 ENCOUNTER — PATIENT MESSAGE (OUTPATIENT)
Dept: INTERNAL MEDICINE CLINIC | Age: 73
End: 2018-06-13

## 2018-06-14 RX ORDER — ATORVASTATIN CALCIUM 20 MG/1
20 TABLET, FILM COATED ORAL DAILY
Qty: 90 TAB | Refills: 3 | Status: SHIPPED | OUTPATIENT
Start: 2018-06-14 | End: 2019-01-30 | Stop reason: SDUPTHER

## 2018-06-14 NOTE — TELEPHONE ENCOUNTER
From: Luis Holm  Sent: 6/13/2018 8:23 PM EDT  Subject: Prescription Question    Hi there. This is Stevie Zhu. Birthday [birth year]. I do not have enough Lipitor to last until my next appointment on July 2nd 2018. Can you have it refilled for me? My phone number is [phone]. Thank You.

## 2018-06-26 ENCOUNTER — OFFICE VISIT (OUTPATIENT)
Dept: PAIN MANAGEMENT | Age: 73
End: 2018-06-26

## 2018-06-26 VITALS
SYSTOLIC BLOOD PRESSURE: 138 MMHG | HEART RATE: 67 BPM | TEMPERATURE: 97.4 F | DIASTOLIC BLOOD PRESSURE: 66 MMHG | BODY MASS INDEX: 21.35 KG/M2 | HEIGHT: 60 IN | RESPIRATION RATE: 16 BRPM | WEIGHT: 108.75 LBS

## 2018-06-26 DIAGNOSIS — M79.18 MYOFASCIAL PAIN SYNDROME: ICD-10-CM

## 2018-06-26 DIAGNOSIS — M48.02 CERVICAL SPINAL STENOSIS: ICD-10-CM

## 2018-06-26 DIAGNOSIS — M47.812 SPONDYLOSIS OF CERVICAL REGION WITHOUT MYELOPATHY OR RADICULOPATHY: Primary | ICD-10-CM

## 2018-06-26 DIAGNOSIS — M47.812 CERVICAL FACET SYNDROME: ICD-10-CM

## 2018-06-26 DIAGNOSIS — M50.30 DEGENERATIVE DISC DISEASE, CERVICAL: ICD-10-CM

## 2018-06-26 DIAGNOSIS — G89.4 CHRONIC PAIN SYNDROME: ICD-10-CM

## 2018-06-26 NOTE — PROGRESS NOTES
Nursing Notes    Patient presents to the office today in follow-up. Patient rates her pain at 3/10 on the numerical pain scale. Comments:   Patient is here for a f/u appointment with Pavelrenzo Katz. POC UDS was not performed in office today    Any new labs or imaging since last appointment? NO    Have you been to an emergency room (ER) or urgent care clinic since your last visit? NO            Have you been hospitalized since your last visit? NO     If yes, where, when, and reason for visit? Have you seen or consulted any other health care providers outside of the [address] [place] [address]  since your last visit? YES, Pulmonologist.     If yes, where, when, and reason for visit? Ms. Denilson Vega has a reminder for a \"due or due soon\" health maintenance. I have asked that she contact her primary care provider for follow-up on this health maintenance.       PHQ over the last two weeks 6/26/2018   PHQ Not Done -   Little interest or pleasure in doing things Not at all   Feeling down, depressed or hopeless Not at all   Total Score PHQ 2 0

## 2018-06-28 NOTE — PROGRESS NOTES
[address] [facility]  Interventional Pain Management Consultation History & Physical    PATIENT NAME:  Itzel Arnold FGYMEG     YOB: 1945    DATE OF SERVICE:   6/26/2018      CHIEF COMPLAINT:  Neck Pain      REASON FOR VISIT:   Tyrus Lennox presents to the pain clinic today for follow on evaluation and to consider interventional pain management options as indicated for the type and location of the pain the patient is presenting with. HISTORY OF PRESENT ILLNESS:     This is a reevaluation after cervical radiofrequency neurotomy procedures at bilateral C4, C5, C6 levels which were done recently in May 2018. Patient states she has much improved neck pain, better range of motion. Better flexibility in her neck. She also physically appears to be in less pain of her neck area, and she appears to have better range of motion of her neck. She does continue to have some discomfort of her neck, although it is much improved. We again reviewed her cervical imaging, this is significant for advanced degenerative disc disease C5-6 and C6-7, cervical facet hypertrophy, cervical facet arthropathy. Bilateral severe neuroforaminal narrowing at several levels. Central canal narrowing also. This is from cervical CT October 17, 2017. Hypertrophic bone and joint space C1. Severe space narrowing C6-7 C5-6 severe canal stenosis at    ASSESSMENT/OPTIONS: as follows. We discussed options. Patient can be considered to repeat her cervical radiofrequency neurotomy procedures perhaps sometime in November 2018. In the interim she may benefit from some medication such as Celebrex 200 mg tablet daily if there are no other contraindications. We can also set her up for repeat cervical epidural steroid injection perhaps sometime in September 2018 timeframe. Patient agrees and understands, she will follow-up as needed.                MRI Results (most recent):    Results from Hospital Encounter encounter on 10/02/17   MRA BRAIN WO CONT   Narrative INTRACRANIAL MRA    CPT CODE: 94281    HISTORY: Stroke like symptoms, left-sided weakness and facial droop upon  awakening. COMPARISON: None. FINDINGS:     The distal cervical, petrous, cavernous and supraclinoid segments of the  internal carotid arteries are patent. Bilateral anterior and middle cerebral  arteries are patent. In the posterior circulation, the vertebral arteries are patent to the  vertebrobasilar junction. Basilar trunk the basilar terminus are patent. Bilateral posterior cerebral and superior cerebellar arteries are patent. No evidence of intracranial aneurysm or hemodynamically significant stenosis. Impression IMPRESSION:    Patent intracranial vasculature. PAST MEDICAL HISTORY:   The patient  has a past medical history of Abnormal Pap smear; Allergic rhinitis; Arthritis; Chronic pain; Hypercholesterolemia; Neck pain (5/17/2010); and Stroke ([address] (10/02/2017). She also has no past medical history of Anemia; Asthma; Cancer ([address] Chronic bronchitis ([address] Chronic kidney disease; Chronic lung disease; Chronic sinusitis; Congestive heart failure ([address] Coronary artery disease; Diabetes mellitus ([address] DVT (deep venous thrombosis) ([address] GERD (gastroesophageal reflux disease); Hypertension; Liver disease; Pneumonia; Positive PPD; Pulmonary embolism ([address] Pulmonary emphysema ([address] Thyroid disease; or Tuberculosis. PAST SURGICAL HISTORY:   The patient  has a past surgical history that includes hx gyn; hx lap cholecystectomy; hx other surgical (2017); and colonoscopy (N/A, 6/4/2018). CURRENT MEDICATIONS:   The patient has a current medication list which includes the following prescription(s): atorvastatin, buspirone, alendronate, fluticasone, montelukast, acetaminophen, omeprazole, lorazepam, aspirin, and loratadine.     ALLERGIES:   No Known Allergies    FAMILY HISTORY:   The patient family history includes Cancer in her mother; Heart Disease in her father. SOCIAL HISTORY:   The patient  reports that she has never smoked. She has never used smokeless tobacco. The patient  reports that she does not drink alcohol. She      REVIEW OF SYSTEMS:    The patient denies fever, chills, weight loss (Constitutional), rash, itching (Skin), tinnitus, congestion (HENT), blurred vision, photophobia (Eyes), palpitations, orthopnea (Cardiovascular), hemoptysis, wheezing (Respiratory), nausea, vomiting, diarrhea (Gastrointestinal), dysuria, hematuria, urgency (Genitourinary), bowel or bladder incontinence, loss of consciousness (Neurologic), suicidal or homicidal ideation or hallucinations (Psychiatric). Denies swelling, axillary or groin masses (Lymphatic). PHYSICAL EXAM:  VS:   Visit Vitals    /66 (BP 1 Location: Right arm, BP Patient Position: Sitting)  Comment: pt asymptomatic.  Pulse 67    Temp 97.4 °F (36.3 °C) (Oral)    Resp 16    Ht 5' (1.524 m)    Wt 49.3 kg (108 lb 12 oz)    BMI 21.24 kg/m2     General: Well-developed and well-nourished. Body habitus consistent with recorded height and weight and the calculated BMI. Apparent distress due to neck pain. Head: Normocephalic, atraumatic. Skin: Inspection of the skin reveals no rashes, lesions or infection. CV: Regular rate. No murmurs or rubs noted. No peripheral edema noted. Pulm: Respirations are even and unlabored. Extr: No clubbing, cyanosis, or edema noted. Musculoskeletal:  1. Cervical spine -Improved ROM. No paraspinous tenderness at any level. There is no scoliosis, asymmetry, or musculoskeletal defect. 2. Thoracic spine -limited ROM. No paraspinous tenderness at any level. There is no scoliosis, asymmetry, or musculoskeletal defect. 3. Lumbar spine -limited ROM. No paraspinous tenderness at any level. SI joints are nontender bilaterally. There is no scoliosis, asymmetry, or musculoskeletal defect.   4. Right upper extremity - Full ROM. 5/5 muscle strength in all muscle groups. No pain or tenderness in shoulder, elbow, wrist, or hand. 5. Left upper extremity - Full ROM. 5/5 muscle strength in all muscle groups. No pain or tenderness in shoulder, elbow, wrist, or hand. 6. Right lower extremity - Full ROM. 5/5 muscle strength in all muscle groups. No pain, tenderness, or swelling in the hip, knee, ankle or foot. 7. Left lower extremity - Full ROM. 5/5 muscle strength in all muscle groups. No pain, tenderness, or swelling in the hip, knee, ankle or foot. Neurological:  1. Mental Status - Alert, awake and oriented. Speech is clear and appropriate. 2. Cranial Nerves - Extraocular muscles intact bilaterally. Cranial nerves II-XII grossly intact bilaterally. 3. Gait - Non-antalgic   4. Reflexes - 2+ and symmetric throughout. 5. Sensation - Intact to light touch and pin prick. 6. Provocative Tests - Spurlings negative bilaterally. Straight leg raise negative bilaterally. Psychological:  1. Mood and affect - Appropriate. 2. Speech - Appropriate. 3. Though content - Appropriate. 4. Judgment - Appropriate. ASSESSMENT:      ICD-10-CM ICD-9-CM    1. Spondylosis of cervical region without myelopathy or radiculopathy M47.812 721.0    2. Cervical facet syndrome (HCC) M46.92 723.8    3. Degenerative disc disease, cervical M50.30 722.4    4. Cervical spinal stenosis M48.02 723.0    5. Chronic pain syndrome G89.4 338.4    6. Myofascial pain syndrome M79.1 729.1            PLAN:    1.    I have thoroughly discussed the risks and benefits, indications, contraindications, and side effects of any/all procedures that were mentioned at today's patient visit. I have used a skeleton spine model when indicated to explain all procedures, as well as to provide added emphasis regarding procedures and as well for patient education purposes.        I have answered all questions in great detail, and I have obtained verbal and written confirmation for all procedures planned with the patient. 3.    I have reviewed in great detail today, when indicated, the patient's MRI and other imaging studies with the patient. I have explained to the patient, when indicated, their condition using both actual recent and relevant images insofar as I am able to obtain these images. I have used a skeleton spine model for added emphasis as well as for patient education. 4.    I have advised patient to have a primary care provider continue to care for their health maintenance and general medical conditions. 5,    I have placed appropriate referrals to specialty care providers as I have deemed necessary through today's clinical consultation with the patient. 5.    I have explained to the patient that if any significant side effects, issues, problems, concerns, or perceived complications as may arise at around the time of the patient's procedures, they should either call the pain management clinic or go to the emergency room immediately for medical provider evaluation. 6.   I have encouraged all patients to call the pain management clinic with any questions or concerns that they may have pertaining to their procedures. DISPOSITION:   The patients condition and plan were discussed at length and all questions were answered. The patient agrees with the plan. A total of 15 minutes was spent with the patient of which over half of the time was spent counseling the patient. Lukas Parham MD 6/27/2018 10:02 PM    Note: Although these clinic notes were documented by the provider at the time of the exam, they have not been proofed and are subject to transcription variance.

## 2018-07-02 ENCOUNTER — HOSPITAL ENCOUNTER (OUTPATIENT)
Dept: LAB | Age: 73
Discharge: HOME | End: 2018-07-02
Payer: MEDICARE

## 2018-07-02 ENCOUNTER — TELEPHONE (OUTPATIENT)
Dept: INTERNAL MEDICINE CLINIC | Age: 73
End: 2018-07-02

## 2018-07-02 ENCOUNTER — OFFICE VISIT (OUTPATIENT)
Dept: INTERNAL MEDICINE CLINIC | Age: 73
End: 2018-07-02

## 2018-07-02 VITALS
HEART RATE: 89 BPM | OXYGEN SATURATION: 98 % | SYSTOLIC BLOOD PRESSURE: 120 MMHG | BODY MASS INDEX: 21.6 KG/M2 | WEIGHT: 110 LBS | HEIGHT: 60 IN | RESPIRATION RATE: 16 BRPM | TEMPERATURE: 98.2 F | DIASTOLIC BLOOD PRESSURE: 60 MMHG

## 2018-07-02 DIAGNOSIS — E78.5 HYPERLIPIDEMIA, UNSPECIFIED HYPERLIPIDEMIA TYPE: ICD-10-CM

## 2018-07-02 DIAGNOSIS — E78.5 HYPERLIPIDEMIA, UNSPECIFIED HYPERLIPIDEMIA TYPE: Primary | ICD-10-CM

## 2018-07-02 DIAGNOSIS — F41.9 ANXIETY: ICD-10-CM

## 2018-07-02 DIAGNOSIS — M54.2 NECK PAIN: ICD-10-CM

## 2018-07-02 DIAGNOSIS — Z13.5 GLAUCOMA SCREENING: ICD-10-CM

## 2018-07-02 DIAGNOSIS — J02.9 SORE THROAT: ICD-10-CM

## 2018-07-02 DIAGNOSIS — Z11.59 ENCOUNTER FOR HEPATITIS C SCREENING TEST FOR LOW RISK PATIENT: ICD-10-CM

## 2018-07-02 DIAGNOSIS — Z23 ENCOUNTER FOR IMMUNIZATION: ICD-10-CM

## 2018-07-02 DIAGNOSIS — R05.9 COUGH: ICD-10-CM

## 2018-07-02 LAB
CHOLEST SERPL-MCNC: 127 MG/DL
HDLC SERPL-MCNC: 57 MG/DL (ref 40–60)
HDLC SERPL: 2.2 {RATIO} (ref 0–5)
LDLC SERPL CALC-MCNC: 49 MG/DL (ref 0–100)
LIPID PROFILE,FLP: NORMAL
TRIGL SERPL-MCNC: 105 MG/DL (ref ?–150)
VLDLC SERPL CALC-MCNC: 21 MG/DL

## 2018-07-02 PROCEDURE — 36415 COLL VENOUS BLD VENIPUNCTURE: CPT | Performed by: NURSE PRACTITIONER

## 2018-07-02 PROCEDURE — 80061 LIPID PANEL: CPT | Performed by: NURSE PRACTITIONER

## 2018-07-02 PROCEDURE — 86803 HEPATITIS C AB TEST: CPT | Performed by: NURSE PRACTITIONER

## 2018-07-02 RX ORDER — MONTELUKAST SODIUM 10 MG/1
10 TABLET ORAL DAILY
Qty: 30 TAB | Refills: 2 | Status: SHIPPED | OUTPATIENT
Start: 2018-07-02 | End: 2018-08-29 | Stop reason: SDUPTHER

## 2018-07-02 RX ORDER — FLUTICASONE PROPIONATE 50 MCG
SPRAY, SUSPENSION (ML) NASAL
Qty: 1 BOTTLE | Refills: 1 | Status: SHIPPED | OUTPATIENT
Start: 2018-07-02 | End: 2018-07-31 | Stop reason: SDUPTHER

## 2018-07-02 NOTE — TELEPHONE ENCOUNTER
Patient was notified of her upcoming appointment with [facility]. During the call, she inquired about the Celebrex is is requesting since Pain Management does not prescribe. Please advise.

## 2018-07-02 NOTE — PROGRESS NOTES
Chief Complaint   Patient presents with    Well Woman     3 month follow up         Is someone accompanying this pt? no    Is the patient using any DME equipment during OV? no    Depression Screening:  PHQ over the last two weeks 6/26/2018 6/6/2018 3/29/2018 3/12/2018 2/12/2018 2/9/2018 1/25/2018   PHQ Not Done - - Active Diagnosis of Depression or Bipolar Disorder - - - Active Diagnosis of Depression or Bipolar Disorder   Little interest or pleasure in doing things Not at all Not at all - Not at all Not at all Not at all -   Feeling down, depressed or hopeless Not at all Not at all - Not at all Not at all Not at all -   Total Score PHQ 2 0 0 - 0 0 0 -       Learning Assessment:  Learning Assessment 1/25/2018 11/28/2017   PRIMARY LEARNER Patient Patient   HIGHEST LEVEL OF EDUCATION - PRIMARY LEARNER  GRADUATED [address] CAREGIVER No No   PRIMARY LANGUAGE ENGLISH ENGLISH   LEARNER PREFERENCE PRIMARY READING DEMONSTRATION   ANSWERED BY patient patient   RELATIONSHIP SELF SELF       Abuse Screening:  Abuse Screening Questionnaire 2/9/2018 1/25/2018 11/28/2017   Do you ever feel afraid of your partner? N N N   Are you in a relationship with someone who physically or mentally threatens you? N N N   Is it safe for you to go home? Matty Velasquez       Fall Risk  Fall Risk Assessment, last 12 mths 6/26/2018 6/6/2018 3/29/2018 3/12/2018 2/12/2018 2/9/2018 1/25/2018   Able to walk? Yes Yes Yes Yes Yes Yes Yes   Fall in past 12 months? No No No No No No No         Health Maintenance reviewed and discussed per provider. Pt is due for   Health Maintenance Due   Topic Date Due    Hepatitis C Screening  1945    ZOSTER VACCINE AGE 60>  04/24/2007    GLAUCOMA SCREENING Q2Y  06/24/2012      Please order/place referral if appropriate. Pt currently taking Antiplatelet therapy? Aspirin 81 mg      Coordination of Care:  1.  Have you been to the ER, urgent care clinic since your last visit? Hospitalized since your last visit? no    2. Have you seen or consulted any other health care providers outside of the [facility] since your last visit? Include any pap smears or colon screening. no    Please see Red banners under Allergies, Med rec, Immunizations to remove outside inquires. All correct information has been verified with patient and added to chart.

## 2018-07-02 NOTE — MR AVS SNAPSHOT
[address] [facility] 
 
 
 [address] [facility], [address] [facility] [address] 
[phone] Patient: Manuela Blackman MRN: [id number] :1945 Visit Information Date & Time Provider Department Dept. Phone Encounter #  
 2018  8:30 AM Pippa Mcgraw NP [facility] INTERNAL MEDICINE OF [address] [id number] Follow-up Instructions Return in about 6 months (around 2019), or if symptoms worsen or fail to improve. Follow-up and Disposition History Your Appointments 9/10/2018 10:15 AM  
Follow Up with Terrell Bocanegra MD  
[address] ([facility]) Appt Note: FROM 18  
 [address] [place] [address], [address] [place] [address]  
[phone]  
  
   
 [address] [place] [address], [address] [facility] [place] [address]  
  
    
 1/15/2019  8:30 AM  
Follow Up with Pippa Mcgraw NP  
[facility] INTERNAL MEDICINE OF [place] ([facility]) Appt Note: 6mo  
 [address] [facility], [address] [facility] [address]  
  
   
 [address] [facility], [address] [facility] [address] Upcoming Health Maintenance Date Due Hepatitis C Screening 1945 ZOSTER VACCINE AGE 60> 2007 GLAUCOMA SCREENING Q2Y 2012 DTaP/Tdap/Td series (1 - Tdap) 2018* Influenza Age 5 to Adult 2018 Pneumococcal 65+ Low/Medium Risk (2 of 2 - PPSV23) 10/10/2018 MEDICARE YEARLY EXAM 2019 BREAST CANCER SCRN MAMMOGRAM 2020 COLONOSCOPY 2021 *Topic was postponed. The date shown is not the original due date. Allergies as of 2018  Review Complete On: 2018 By: Pippa Mcgraw NP No Known Allergies Current Immunizations  Reviewed on 2018 No immunizations on file. Not reviewed this visit You Were Diagnosed With   
  
 Codes Comments Hyperlipidemia, unspecified hyperlipidemia type    -  Primary ICD-10-CM: E78.5 ICD-9-CM: 272.4 Neck pain     ICD-10-CM: M54.2 ICD-9-CM: 723.1 Anxiety     ICD-10-CM: F41.9 ICD-9-CM: 300.00 Glaucoma screening     ICD-10-CM: Z13.5 ICD-9-CM: V80.1 Encounter for immunization     ICD-10-CM: Q49 ICD-9-CM: V03.89 Encounter for hepatitis C screening test for low risk patient     ICD-10-CM: Z11.59 
ICD-9-CM: V73.89 Cough     ICD-10-CM: R05 ICD-9-CM: 786.2 Sore throat     ICD-10-CM: J02.9 ICD-9-CM: 061 Vitals BP Pulse Temp Resp Height(growth percentile) 120/60 (BP 1 Location: Left arm, BP Patient Position: Sitting) 89 98.2 °F (36.8 °C) (Tympanic) 16 5' (1.524 m) Weight(growth percentile) SpO2 BMI OB Status Smoking Status 110 lb (49.9 kg) 98% 21.48 kg/m2 Hysterectomy Never Smoker BMI and BSA Data Body Mass Index Body Surface Area  
 21.48 kg/m 2 1.45 m 2 Preferred Pharmacy Pharmacy Name Phone [id number] [address] [phone] Your Updated Medication List  
  
   
This list is accurate as of 7/2/18  9:16 AM.  Always use your most recent med list.  
  
  
  
  
 alendronate 10 mg tablet Commonly known as:  FOSAMAX Take 1 Tab by mouth Daily (before breakfast). aspirin 81 mg chewable tablet Take 1 Tab by mouth daily. atorvastatin 20 mg tablet Commonly known as:  LIPITOR Take 1 Tab by mouth daily. busPIRone 7.5 mg tablet Commonly known as:  BUSPAR  
take 1 tablet by mouth twice a day CLARITIN 10 mg tablet Generic drug:  loratadine Take 10 mg by mouth daily as needed for Allergies or Rhinitis. fluticasone 50 mcg/actuation nasal spray Commonly known as:  FLONASE  
2 spray each nostril daily LORazepam 0.5 mg tablet Commonly known as:  ATIVAN Take 1 Tab by mouth every four (4) hours as needed for Anxiety. Max Daily Amount: 3 mg.  
  
 montelukast 10 mg tablet Commonly known as:  SINGULAIR Take 1 Tab by mouth daily. omeprazole 20 mg capsule Commonly known as:  PRILOSEC Take 20 mg by mouth daily. TYLENOL EXTRA STRENGTH 500 mg tablet Generic drug:  acetaminophen Take 650 mg by mouth every six (6) hours as needed for Pain. Pt states they take medication QID. varicella-zoster recombinant (PF) 50 mcg/0.5 mL Susr injection Commonly known as:  SHINGRIX (PF)  
0.5 mL by IntraMUSCular route once for 1 dose. Please repeat dose in 2-6 Prescriptions Printed Refills  
 varicella-zoster recombinant, PF, (SHINGRIX, PF,) 50 mcg/0.5 mL susr injection 1 Si.5 mL by IntraMUSCular route once for 1 dose. Please repeat dose in 2-6 Class: Print Route: IntraMUSCular Prescriptions Sent to Pharmacy Refills  
 fluticasone (FLONASE) 50 mcg/actuation nasal spray 1 Si spray each nostril daily Class: Normal  
 Pharmacy: [address] [facility] . Ph #: [phone]  
 montelukast (SINGULAIR) 10 mg tablet 2 Sig: Take 1 Tab by mouth daily. Class: Normal  
 Pharmacy: [address]. [place] [id number]  [facility] . Ph #: [phone] Route: Oral  
  
We Performed the Following REFERRAL TO OPHTHALMOLOGY [REF57 Custom] Follow-up Instructions Return in about 6 months (around 2019), or if symptoms worsen or fail to improve. Referral Information Referral ID Referred By Referred To  
  
 [id number] Raj Adams MD   
   [address] [place] [address], [address] Phone: [phone] Fax: [phone] Visits Status Start Date End Date 1 New Request 18 If your referral has a status of pending review or denied, additional information will be sent to support the outcome of this decision. Introducing [facility]! Dear Hilary Michelle: 
Thank you for requesting a [employer] account.   Our records indicate that you already have an active [employer] account. You can access your account anytime at [email or web address] Did you know that you can access your hospital and ER discharge instructions at any time in [employer]? You can also review all of your test results from your hospital stay or ER visit. Additional Information If you have questions, please visit the Frequently Asked Questions section of the [employer] website at [email or web address] Remember, [employer] is NOT to be used for urgent needs. For medical emergencies, dial 911. Now available from your iPhone and Android! Please provide this summary of care documentation to your next provider. Your primary care clinician is listed as JAY JENKINS. If you have any questions after today's visit, please call [phone].

## 2018-07-02 NOTE — TELEPHONE ENCOUNTER
Patient was seen by Loraine Davis today and was advised to call pain management provider to see if they would prescribe medication for her neck. They do not prescribe. She would like for Loraine Davis to give her something. Please advise at [phone].

## 2018-07-02 NOTE — LETTER
7/5/2018 7:40 AM 
 
Ms. Olman Beaulieu [address] [place] [address] Dear Olman Beaulieu: 
 
Please find your most recent results below. Resulted Orders HEPATITIS C AB Result Value Ref Range Hepatitis C virus Ab 0.08 <0.80 Index Hep C  virus Ab Interp. NEGATIVE  NEG Hep C  virus Ab comment Comment:  
   Index <0.80. ......................... Chavo Curtis Negative Index > or = to 0.80 and <1.00. .... Keishae Purvis Chavo Curtis Equivocal 
Index >1.00. ......................... Chavo Curtis Positive For Equivocal or Positive results, confirmation with Hepatitis C RNA by PCR or bDNA is suggested. LIPID PANEL Result Value Ref Range LIPID PROFILE Cholesterol, total 127 <200 MG/DL Triglyceride 105 <150 MG/DL Comment:  
   The drugs N-acetylcysteine (NAC) and 
Metamiszole have been found to cause falsely 
low results in this chemical assay. Please 
be sure to submit blood samples obtained BEFORE administration of either of these 
drugs to assure correct results. HDL Cholesterol 57 40 - 60 MG/DL  
 LDL, calculated 49 0 - 100 MG/DL VLDL, calculated 21 MG/DL  
 CHOL/HDL Ratio 2.2 0 - 5.0    
 
 
RECOMMENDATIONS: 
None. Keep up the good work! Please call me if you have any questions: [phone] Sincerely, [facility] INSURANCE

## 2018-07-02 NOTE — PROGRESS NOTES
Stephanie Ernst is a 72 y.o. female presenting today for Well Woman (3 month follow up)  . Well Woman   Pertinent negatives include no chest pain and no headaches.   :  Stephanie Ernst presents to the office today for hyperlipidemia and anxiety follow-up care. Patient reports she is feeling okay today. She states the Buspar is controlling her anxiety symptoms. Patient is also complaining of post-nasal drip and continues to take Claritin, Flonase and Singulair. She is negative for chest pain or palpitations. Review of Systems   Constitutional: Negative for fever. HENT: Positive for congestion (post nasal drip). Negative for sore throat. Respiratory: Negative for cough. Cardiovascular: Negative for chest pain and palpitations. Musculoskeletal: Positive for neck pain. Neurological: Negative for dizziness and headaches. No Known Allergies    Current Outpatient Prescriptions   Medication Sig Dispense Refill    varicella-zoster recombinant, PF, (SHINGRIX, PF,) 50 mcg/0.5 mL susr injection 0.5 mL by IntraMUSCular route once for 1 dose. Please repeat dose in 2-6 0.5 mL 1    fluticasone (FLONASE) 50 mcg/actuation nasal spray 2 spray each nostril daily 1 Bottle 1    montelukast (SINGULAIR) 10 mg tablet Take 1 Tab by mouth daily. 30 Tab 2    atorvastatin (LIPITOR) 20 mg tablet Take 1 Tab by mouth daily. 90 Tab 3    busPIRone (BUSPAR) 7.5 mg tablet take 1 tablet by mouth twice a day 60 Tab 1    alendronate (FOSAMAX) 10 mg tablet Take 1 Tab by mouth Daily (before breakfast). 30 Tab 2    acetaminophen (TYLENOL EXTRA STRENGTH) 500 mg tablet Take 650 mg by mouth every six (6) hours as needed for Pain. Pt states they take medication QID.  loratadine (CLARITIN) 10 mg tablet Take 10 mg by mouth daily as needed for Allergies or Rhinitis.  omeprazole (PRILOSEC) 20 mg capsule Take 20 mg by mouth daily.       LORazepam (ATIVAN) 0.5 mg tablet Take 1 Tab by mouth every four (4) hours as needed for Anxiety. Max Daily Amount: 3 mg. 30 Tab 0    aspirin 81 mg chewable tablet Take 1 Tab by mouth daily. 30 Tab 3       Past Medical History:   Diagnosis Date    Abnormal Pap smear     Dr. Bell Carney Allergic rhinitis     Arthritis     Chronic pain     Hypercholesterolemia     Neck pain 5/17/2010    Stroke ([facility] [address] 10/02/2017    TIA- legs weak memory issues       Past Surgical History:   Procedure Laterality Date    COLONOSCOPY N/A 6/4/2018    COLONOSCOPY / polypectomy performed by Marielos Mccoy MD at [facility] ENDOSCOPY    HX GYN      Total Hyst    HX LAP CHOLECYSTECTOMY      HX OTHER SURGICAL  2017    Throat widened       Social History     Social History    Marital status:      Spouse name: N/A    Number of children: N/A    Years of education: N/A     Occupational History    Not on file. Social History Main Topics    Smoking status: Never Smoker    Smokeless tobacco: Never Used    Alcohol use No    Drug use: No    Sexual activity: No     Other Topics Concern    Not on file     Social History Narrative       Patient does not have an advanced directive on file    Vitals:    07/02/18 0834   BP: 120/60   Pulse: 89   Resp: 16   Temp: 98.2 °F (36.8 °C)   TempSrc: Tympanic   SpO2: 98%   Weight: 110 lb (49.9 kg)   Height: 5' (1.524 m)   PainSc:   4   PainLoc: Neck       Physical Exam   Constitutional: No distress. HENT:   Right Ear: External ear normal.   Left Ear: External ear normal.   Mouth/Throat: Oropharynx is clear and moist. No oropharyngeal exudate. Cardiovascular: Normal rate, regular rhythm and normal heart sounds. Pulmonary/Chest: Effort normal and breath sounds normal.   Abdominal: Soft. Musculoskeletal: She exhibits no edema. Cervical back: She exhibits pain. Lymphadenopathy:     She has no cervical adenopathy. Neurological: She is alert. Nursing note and vitals reviewed.       Office Visit on 04/05/2018   Component Date Value Ref Range Status    VALID INTERNAL CONTROL POC 04/05/2018 Yes   Final    Group A Strep Ag 04/05/2018 Negative  Negative Final    VALID INTERNAL CONTROL POC 04/05/2018 Yes   Final    QuickVue Influenza test 04/05/2018 Negative  Negative Final       .No results found for any visits on 07/02/18. Assessment / Plan:      ICD-10-CM ICD-9-CM    1. Hyperlipidemia, unspecified hyperlipidemia type E78.5 272.4 LIPID PANEL   2. Neck pain M54.2 723.1    3. Anxiety F41.9 300.00    4. Glaucoma screening Z13.5 V80.1 REFERRAL TO OPHTHALMOLOGY   5. Encounter for immunization Z23 V03.89 varicella-zoster recombinant, PF, (SHINGRIX, PF,) 50 mcg/0.5 mL susr injection   6. Encounter for hepatitis C screening test for low risk patient Z11.59 V73.89 HEPATITIS C AB   7. Cough R05 786.2 fluticasone (FLONASE) 50 mcg/actuation nasal spray      montelukast (SINGULAIR) 10 mg tablet   8. Sore throat J02.9 462 montelukast (SINGULAIR) 10 mg tablet     Lipid panel ordered  Patient scheduled for Cortisone injection in \"September, 2018\"  Anxiety- controlled. Patient reports she is taking 1 Buspar daily instead of 2  Shingle vaccine ordered  Referral to Ophthalmology for Glaucoma screening  Hep C screening  F/u in 6 months or prn    Follow-up Disposition:  Return in about 6 months (around 1/2/2019), or if symptoms worsen or fail to improve. I asked the patient if she  had any questions and answered her  questions.   The patient stated that she understands the treatment plan and agrees with the treatment plan

## 2018-07-03 DIAGNOSIS — M47.812 OSTEOARTHRITIS OF CERVICAL SPINE, UNSPECIFIED SPINAL OSTEOARTHRITIS COMPLICATION STATUS: Primary | ICD-10-CM

## 2018-07-03 LAB
HCV AB SER IA-ACNC: 0.08 INDEX
HCV AB SERPL QL IA: NEGATIVE
HCV COMMENT,HCGAC: NORMAL

## 2018-07-03 RX ORDER — CELECOXIB 200 MG/1
200 CAPSULE ORAL DAILY
Qty: 30 CAP | Refills: 1 | Status: SHIPPED | OUTPATIENT
Start: 2018-07-03 | End: 2018-09-06

## 2018-07-25 ENCOUNTER — PATIENT MESSAGE (OUTPATIENT)
Dept: INTERNAL MEDICINE CLINIC | Age: 73
End: 2018-07-25

## 2018-07-26 NOTE — TELEPHONE ENCOUNTER
Ms Yue Jeong contacted and per Cydney Pena NP she could use celexa every other day and if that was still bothering her she could stop and contact office for alternatives. Ms Yue Jeong expressed understanding.

## 2018-07-31 DIAGNOSIS — R05.9 COUGH: ICD-10-CM

## 2018-07-31 RX ORDER — FLUTICASONE PROPIONATE 50 MCG
SPRAY, SUSPENSION (ML) NASAL
Qty: 16 G | Refills: 1 | Status: SHIPPED | OUTPATIENT
Start: 2018-07-31 | End: 2018-08-22 | Stop reason: SDUPTHER

## 2018-08-09 DIAGNOSIS — M81.0 OSTEOPOROSIS, UNSPECIFIED OSTEOPOROSIS TYPE, UNSPECIFIED PATHOLOGICAL FRACTURE PRESENCE: ICD-10-CM

## 2018-08-09 NOTE — TELEPHONE ENCOUNTER
Requested Prescriptions     Pending Prescriptions Disp Refills    alendronate (FOSAMAX) 10 mg tablet 30 Tab 2     Sig: Take 1 Tab by mouth Daily (before breakfast).

## 2018-08-10 RX ORDER — ALENDRONATE SODIUM 10 MG/1
10 TABLET ORAL
Qty: 30 TAB | Refills: 2 | Status: SHIPPED | OUTPATIENT
Start: 2018-08-10 | End: 2018-11-08 | Stop reason: SDUPTHER

## 2018-08-22 ENCOUNTER — OFFICE VISIT (OUTPATIENT)
Dept: INTERNAL MEDICINE CLINIC | Age: 73
End: 2018-08-22

## 2018-08-22 VITALS
TEMPERATURE: 97.5 F | DIASTOLIC BLOOD PRESSURE: 62 MMHG | HEART RATE: 79 BPM | RESPIRATION RATE: 16 BRPM | OXYGEN SATURATION: 97 % | WEIGHT: 109 LBS | BODY MASS INDEX: 21.4 KG/M2 | HEIGHT: 60 IN | SYSTOLIC BLOOD PRESSURE: 128 MMHG

## 2018-08-22 DIAGNOSIS — R09.81 NASAL CONGESTION: ICD-10-CM

## 2018-08-22 DIAGNOSIS — R42 DIZZINESS: Primary | ICD-10-CM

## 2018-08-22 DIAGNOSIS — R51.9 NONINTRACTABLE HEADACHE, UNSPECIFIED CHRONICITY PATTERN, UNSPECIFIED HEADACHE TYPE: ICD-10-CM

## 2018-08-22 RX ORDER — FLUTICASONE PROPIONATE 50 MCG
SPRAY, SUSPENSION (ML) NASAL
Qty: 16 G | Refills: 1 | Status: SHIPPED | OUTPATIENT
Start: 2018-08-22 | End: 2019-02-13 | Stop reason: SDUPTHER

## 2018-08-22 RX ORDER — MECLIZINE HYDROCHLORIDE 25 MG/1
25 TABLET ORAL
Qty: 30 TAB | Refills: 0 | Status: SHIPPED | OUTPATIENT
Start: 2018-08-22 | End: 2018-09-01

## 2018-08-22 NOTE — MR AVS SNAPSHOT
[address] 
 
 
 [address], [address] [facility] [address] 
[phone] Patient: Tang Smith MRN: [id number] :1945 Visit Information Date & Time Provider Department Dept. Phone Encounter #  
 2018  8:45 AM Dave Paredes NP [facility] INTERNAL MEDICINE [facility] [phone] [id number] Follow-up Instructions Return in about 10 years (around 2028). Your Appointments 2018 10:45 AM  
Follow Up with Haley Aparicio MD  
[address] ([facility]) Appt Note: FROM 18; [facility] FR 9/10/18 - PT OKD  
 [address] [facility], [address] [address]  
[phone]  
  
   
 [address] [place] [address], [address] [place] [address],[address] [place] [address]  
  
    
 1/15/2019  8:30 AM  
Follow Up with Dave Paredes NP  
[facility] INTERNAL MEDICINE [facility] [place] ([facility]) Appt Note: 6mo  
 [address], [address] [facility] [address]  
  
   
 [address], [address] [facility] [address] Upcoming Health Maintenance Date Due DTaP/Tdap/Td series (1 - Tdap) 1966 ZOSTER VACCINE AGE 60> 2007 Influenza Age 5 to Adult 2018 Pneumococcal 65+ Low/Medium Risk (2 of 2 - PPSV23) 10/10/2018 MEDICARE YEARLY EXAM 2019 BREAST CANCER SCRN MAMMOGRAM 2020 GLAUCOMA SCREENING Q2Y 2020 COLONOSCOPY 2021 Allergies as of 2018  Review Complete On: 2018 By: Dave Paredes NP No Known Allergies Current Immunizations  Reviewed on 2018 Name Date Zoster Recombinant 7/3/2018 12:00 AM  
  
 Not reviewed this visit You Were Diagnosed With   
  
 Codes Comments Dizziness    -  Primary ICD-10-CM: C33 ICD-9-CM: 780.4 Nasal congestion     ICD-10-CM: R09.81 ICD-9-CM: 478.19 Vitals BP Pulse Temp Resp Height(growth percentile) 128/62 (BP 1 Location: Left arm, BP Patient Position: Sitting) 79 97.5 °F (36.4 °C) (Tympanic) 16 5' (1.524 m) Weight(growth percentile) SpO2 BMI OB Status Smoking Status 109 lb (49.4 kg) 97% 21.29 kg/m2 Hysterectomy Never Smoker BMI and BSA Data Body Mass Index Body Surface Area  
 21.29 kg/m 2 1.45 m 2 Preferred Pharmacy Pharmacy Name Phone [id number] John Schaeffer, [address] [facility]. [phone] Your Updated Medication List  
  
   
This list is accurate as of 8/22/18 10:23 AM.  Always use your most recent med list.  
  
  
  
  
 alendronate 10 mg tablet Commonly known as:  FOSAMAX Take 1 Tab by mouth Daily (before breakfast). aspirin 81 mg chewable tablet Take 1 Tab by mouth daily. atorvastatin 20 mg tablet Commonly known as:  LIPITOR Take 1 Tab by mouth daily. busPIRone 7.5 mg tablet Commonly known as:  BUSPAR  
take 1 tablet by mouth twice a day  
  
 celecoxib 200 mg capsule Commonly known as:  CELEBREX Take 1 Cap by mouth daily for 90 days. CLARITIN 10 mg tablet Generic drug:  loratadine Take 10 mg by mouth daily as needed for Allergies or Rhinitis. fluticasone 50 mcg/actuation nasal spray Commonly known as:  [place] INSTILL 2 SPRAYS IN EACH NOSTRIL DAILY LORazepam 0.5 mg tablet Commonly known as:  ATIVAN Take 1 Tab by mouth every four (4) hours as needed for Anxiety. Max Daily Amount: 3 mg.  
  
 meclizine 25 mg tablet Commonly known as:  ANTIVERT Take 1 Tab by mouth three (3) times daily as needed for up to 10 days. montelukast 10 mg tablet Commonly known as:  SINGULAIR Take 1 Tab by mouth daily. omeprazole 20 mg capsule Commonly known as:  PRILOSEC Take 20 mg by mouth daily. TYLENOL EXTRA STRENGTH 500 mg tablet Generic drug:  acetaminophen Take 650 mg by mouth every six (6) hours as needed for Pain. Pt states they take medication QID. Prescriptions Sent to Pharmacy Refills  
 fluticasone (FLONASE) 50 mcg/actuation nasal spray 1 Sig: INSTILL 2 SPRAYS IN EACH NOSTRIL DAILY Class: Normal  
 Pharmacy: [id number] [facility] [address] , [address] Ph #: [phone]  
 meclizine (ANTIVERT) 25 mg tablet 0 Sig: Take 1 Tab by mouth three (3) times daily as needed for up to 10 days. Class: Normal  
 Pharmacy: [id number] [facility] [address] , [address] [facility] Ph #: [phone] Route: Oral  
  
Follow-up Instructions Return in about 10 years (around 8/22/2028). To-Do List   
 08/22/2018 Imaging:  MRI BRAIN W WO CONT   
  
 09/04/2018 8:00 AM  
  Appointment with [facility] CT RM 2 at [facility] RAD [address] ([phone]) GENERAL INSTRUCTIONS  If you were given medications to take for a contrast allergy prior to having this study, please arrange to have someone drive you to your appointment. If you have had a creatinine level drawn within the past 30 days, please bring most recent results to your appt. This study does not require you to drink contrast prior to your study. MEDICATIONS  Bring a complete list of all medications you are currently taking to include prescriptions, over-the-counter meds, herbals, vitamins & any dietary supplements. STUDY DETAILS Patient must be NPO (nothing to eat/drink, including meds and water) for 4 hours prior to study. OUTSIDE FILMS  Bring outside films, CDs, and reports related to the study with you on the day of your exam.  QUESTIONS  Notify the CT Department if you have any questions concerning your study. Anjum Poster - [phone] Darlene Berry - [phone] Introducing [facility]! Dear Felipe Ureña: 
Thank you for requesting a [employer] account. Our records indicate that you already have an active [employer] account. You can access your account anytime at [email or web address] Did you know that you can access your hospital and ER discharge instructions at any time in [employer]? You can also review all of your test results from your hospital stay or ER visit. Additional Information If you have questions, please visit the Frequently Asked Questions section of the [employer] website at [email or web address] Remember, [employer] is NOT to be used for urgent needs. For medical emergencies, dial 911. Now available from your iPhone and Android! Please provide this summary of care documentation to your next provider. Your primary care clinician is listed as JAY JENKINS. If you have any questions after today's visit, please call [phone].

## 2018-08-22 NOTE — PROGRESS NOTES
Olman Beaulieu is a 72 y.o. female presenting today for Dizziness  . HPI:  Olman Beaulieu presents to the office today for dizziness. Patient reports she has intermittent dizzy spells while just lying in the bed at home and not the dizziness is not associated with rapid head movement or postural changes. While in the store yesterday she had a sudden onset of dizziness that last about 3 hours. Associated symptoms include a frontal headache. Review of Systems   Respiratory: Negative for cough and sputum production. Cardiovascular: Negative for chest pain and palpitations. Neurological: Positive for dizziness and headaches. No Known Allergies    Current Outpatient Prescriptions   Medication Sig Dispense Refill    fluticasone (FLONASE) 50 mcg/actuation nasal spray INSTILL 2 SPRAYS IN EACH NOSTRIL DAILY 16 g 1    meclizine (ANTIVERT) 25 mg tablet Take 1 Tab by mouth three (3) times daily as needed for up to 10 days. 30 Tab 0    alendronate (FOSAMAX) 10 mg tablet Take 1 Tab by mouth Daily (before breakfast). 30 Tab 2    montelukast (SINGULAIR) 10 mg tablet Take 1 Tab by mouth daily. 30 Tab 2    atorvastatin (LIPITOR) 20 mg tablet Take 1 Tab by mouth daily. 90 Tab 3    busPIRone (BUSPAR) 7.5 mg tablet take 1 tablet by mouth twice a day 60 Tab 1    acetaminophen (TYLENOL EXTRA STRENGTH) 500 mg tablet Take 650 mg by mouth every six (6) hours as needed for Pain. Pt states they take medication QID.  loratadine (CLARITIN) 10 mg tablet Take 10 mg by mouth daily as needed for Allergies or Rhinitis.  omeprazole (PRILOSEC) 20 mg capsule Take 20 mg by mouth daily.  LORazepam (ATIVAN) 0.5 mg tablet Take 1 Tab by mouth every four (4) hours as needed for Anxiety. Max Daily Amount: 3 mg. 30 Tab 0    aspirin 81 mg chewable tablet Take 1 Tab by mouth daily. 30 Tab 3    celecoxib (CELEBREX) 200 mg capsule Take 1 Cap by mouth daily for 90 days.  30 Cap 1       Past Medical History:   Diagnosis Date  Abnormal Pap smear     Dr. Sophie Bay Allergic rhinitis     Arthritis     Chronic pain     Hypercholesterolemia     Neck pain 5/17/2010    Stroke ([facility] [address] 10/02/2017    TIA- legs weak memory issues       Past Surgical History:   Procedure Laterality Date    COLONOSCOPY N/A 6/4/2018    COLONOSCOPY / polypectomy performed by Arnulfo Valdivia MD at [facility] ENDOSCOPY    HX GYN      Total Hyst    HX LAP CHOLECYSTECTOMY      HX OTHER SURGICAL  2017    Throat widened       Social History     Social History    Marital status:      Spouse name: N/A    Number of children: N/A    Years of education: N/A     Occupational History    Not on file. Social History Main Topics    Smoking status: Never Smoker    Smokeless tobacco: Never Used    Alcohol use No    Drug use: No    Sexual activity: No     Other Topics Concern    Not on file     Social History Narrative       Patient does have an advanced directive on file    Vitals:    08/22/18 0928   BP: 128/62   Pulse: 79   Resp: 16   Temp: 97.5 °F (36.4 °C)   TempSrc: Tympanic   SpO2: 97%   Weight: 109 lb (49.4 kg)   Height: 5' (1.524 m)   PainSc:   7   PainLoc: Head       Physical Exam   Constitutional: She is oriented to person, place, and time. HENT:   Head: Normocephalic. Right Ear: External ear normal.   Left Ear: External ear normal.   Cardiovascular: Normal rate, regular rhythm and normal heart sounds. Pulmonary/Chest: Effort normal and breath sounds normal.   Neurological: She is alert and oriented to person, place, and time. She has normal strength. A cranial nerve deficit is present. She has an abnormal Romberg Test.   Nursing note and vitals reviewed.       Hospital Outpatient Visit on 07/02/2018   Component Date Value Ref Range Status    Hepatitis C virus Ab 07/02/2018 0.08  <0.80 Index Final    Hep C  virus Ab Interp. 07/02/2018 NEGATIVE   NEG   Final    Hep C  virus Ab comment 07/02/2018        Final    Comment: Index <0. 80.......................... Yaritza Ku Negative  Index > or = to 0.80 and <1.00. .... Yaritza Holman Equivocal  Index >1.00. ......................... Yaritza Ku Positive          For Equivocal or Positive results, confirmation with Hepatitis C RNA by PCR or bDNA is suggested.  LIPID PROFILE 07/02/2018        Final    Cholesterol, total 07/02/2018 127  <200 MG/DL Final    Triglyceride 07/02/2018 105  <150 MG/DL Final    Comment: The drugs N-acetylcysteine (NAC) and  Metamiszole have been found to cause falsely  low results in this chemical assay. Please  be sure to submit blood samples obtained  BEFORE administration of either of these  drugs to assure correct results.  HDL Cholesterol 07/02/2018 57  40 - 60 MG/DL Final    LDL, calculated 07/02/2018 49  0 - 100 MG/DL Final    VLDL, calculated 07/02/2018 21  MG/DL Final    CHOL/HDL Ratio 07/02/2018 2.2  0 - 5.0   Final       .No results found for any visits on 08/22/18. Assessment / Plan:      ICD-10-CM ICD-9-CM    1. Dizziness R42 780.4 MRI BRAIN W WO CONT      meclizine (ANTIVERT) 25 mg tablet   2. Nonintractable headache, unspecified chronicity pattern, unspecified headache type R51 784.0    3. Nasal congestion R09.81 478.19 fluticasone (FLONASE) 50 mcg/actuation nasal spray     MRI Brain scheduled- ongoing dizziness, + Romberg  Refilled flonase  Take NSAID for headache  F/u in 10 days      Follow-up Disposition:  Return in about 10 years (around 8/22/2028). I asked the patient if she  had any questions and answered her  questions.   The patient stated that she understands the treatment plan and agrees with the treatment plan

## 2018-08-28 ENCOUNTER — HOSPITAL ENCOUNTER (OUTPATIENT)
Dept: MRI IMAGING | Age: 73
Discharge: HOME | End: 2018-08-28
Attending: NURSE PRACTITIONER

## 2018-08-28 DIAGNOSIS — R42 DIZZINESS: ICD-10-CM

## 2018-08-29 ENCOUNTER — OFFICE VISIT (OUTPATIENT)
Dept: INTERNAL MEDICINE CLINIC | Age: 73
End: 2018-08-29

## 2018-08-29 VITALS
TEMPERATURE: 98.2 F | OXYGEN SATURATION: 98 % | SYSTOLIC BLOOD PRESSURE: 108 MMHG | DIASTOLIC BLOOD PRESSURE: 68 MMHG | HEART RATE: 86 BPM | HEIGHT: 60 IN | RESPIRATION RATE: 16 BRPM | BODY MASS INDEX: 21.79 KG/M2 | WEIGHT: 111 LBS

## 2018-08-29 DIAGNOSIS — G43.909 MIGRAINE WITHOUT STATUS MIGRAINOSUS, NOT INTRACTABLE, UNSPECIFIED MIGRAINE TYPE: ICD-10-CM

## 2018-08-29 DIAGNOSIS — J02.9 SORE THROAT: ICD-10-CM

## 2018-08-29 DIAGNOSIS — F41.8 SITUATIONAL ANXIETY: Primary | ICD-10-CM

## 2018-08-29 DIAGNOSIS — R05.9 COUGH: ICD-10-CM

## 2018-08-29 RX ORDER — DIAZEPAM 5 MG/1
TABLET ORAL
Qty: 2 TAB | Refills: 0 | Status: SHIPPED | OUTPATIENT
Start: 2018-08-29 | End: 2018-09-22

## 2018-08-29 NOTE — PROGRESS NOTES
Chief Complaint   Patient presents with    Migraine     follow up          Is someone accompanying this pt? no    Is the patient using any DME equipment during OV? no    Depression Screening:  PHQ over the last two weeks 6/26/2018 6/6/2018 3/29/2018 3/12/2018 2/12/2018 2/9/2018 1/25/2018   PHQ Not Done - - Active Diagnosis of Depression or Bipolar Disorder - - - Active Diagnosis of Depression or Bipolar Disorder   Little interest or pleasure in doing things Not at all Not at all - Not at all Not at all Not at all -   Feeling down, depressed, irritable, or hopeless Not at all Not at all - Not at all Not at all Not at all -   Total Score PHQ 2 0 0 - 0 0 0 -       Learning Assessment:  Learning Assessment 1/25/2018 11/28/2017   PRIMARY LEARNER Patient Patient   HIGHEST LEVEL OF EDUCATION - PRIMARY LEARNER  GRADUATED HIGH SCHOOL OR GED GRADUATED [address] [facility] CAREGIVER No No   PRIMARY LANGUAGE ENGLISH ENGLISH   LEARNER PREFERENCE PRIMARY READING DEMONSTRATION   ANSWERED BY patient patient   RELATIONSHIP SELF SELF       Abuse Screening:  Abuse Screening Questionnaire 2/9/2018 1/25/2018 11/28/2017   Do you ever feel afraid of your partner? N N N   Are you in a relationship with someone who physically or mentally threatens you? N N N   Is it safe for you to go home? Brooke Diana       Fall Risk  Fall Risk Assessment, last 12 mths 6/26/2018 6/6/2018 3/29/2018 3/12/2018 2/12/2018 2/9/2018 1/25/2018   Able to walk? Yes Yes Yes Yes Yes Yes Yes   Fall in past 12 months? No No No No No No No         Health Maintenance reviewed and discussed per provider. Pt is due for   Health Maintenance Due   Topic    ZOSTER VACCINE AGE 60>     Influenza Age 5 to Adult     Please order/place referral if appropriate. Pt currently taking Antiplatelet therapy? no      Coordination of Care:  1. Have you been to the ER, urgent care clinic since your last visit? Hospitalized since your last visit? no    2.  Have you seen or consulted any other health care providers outside of the [employer] [place] [address] since your last visit? Include any pap smears or colon screening. no    Please see Red banners under Allergies, Med rec, Immunizations to remove outside inquires. All correct information has been verified with patient and added to chart.

## 2018-08-31 ENCOUNTER — HOSPITAL ENCOUNTER (OUTPATIENT)
Dept: MRI IMAGING | Age: 73
Discharge: HOME | End: 2018-08-31
Attending: NURSE PRACTITIONER
Payer: MEDICARE

## 2018-08-31 PROCEDURE — 70553 MRI BRAIN STEM W/O & W/DYE: CPT

## 2018-08-31 PROCEDURE — 74011636320 HC RX REV CODE- 636/320: Performed by: NURSE PRACTITIONER

## 2018-08-31 PROCEDURE — A9575 INJ GADOTERATE MEGLUMI 0.1ML: HCPCS | Performed by: NURSE PRACTITIONER

## 2018-08-31 PROCEDURE — 82565 ASSAY OF CREATININE: CPT

## 2018-08-31 RX ADMIN — GADOTERATE MEGLUMINE 10 ML: 376.9 INJECTION INTRAVENOUS at 11:46

## 2018-09-01 LAB — CREAT UR-MCNC: 0.7 MG/DL (ref 0.6–1.3)

## 2018-09-04 ENCOUNTER — HOSPITAL ENCOUNTER (OUTPATIENT)
Dept: CT IMAGING | Age: 73
Discharge: HOME | End: 2018-09-04
Attending: INTERNAL MEDICINE
Payer: MEDICARE

## 2018-09-04 ENCOUNTER — OFFICE VISIT (OUTPATIENT)
Dept: INTERNAL MEDICINE CLINIC | Age: 73
End: 2018-09-04

## 2018-09-04 VITALS
TEMPERATURE: 99.1 F | DIASTOLIC BLOOD PRESSURE: 64 MMHG | OXYGEN SATURATION: 97 % | WEIGHT: 112 LBS | RESPIRATION RATE: 16 BRPM | BODY MASS INDEX: 21.99 KG/M2 | HEART RATE: 90 BPM | HEIGHT: 60 IN | SYSTOLIC BLOOD PRESSURE: 112 MMHG

## 2018-09-04 DIAGNOSIS — Z23 ENCOUNTER FOR IMMUNIZATION: ICD-10-CM

## 2018-09-04 DIAGNOSIS — R59.0 HILAR ADENOPATHY: ICD-10-CM

## 2018-09-04 DIAGNOSIS — R51.9 FRONTAL HEADACHE: ICD-10-CM

## 2018-09-04 DIAGNOSIS — R42 DIZZINESS: Primary | ICD-10-CM

## 2018-09-04 PROCEDURE — 74011636320 HC RX REV CODE- 636/320: Performed by: INTERNAL MEDICINE

## 2018-09-04 PROCEDURE — 71260 CT THORAX DX C+: CPT

## 2018-09-04 RX ORDER — MONTELUKAST SODIUM 10 MG/1
TABLET ORAL
Qty: 30 TAB | Refills: 2 | Status: SHIPPED | OUTPATIENT
Start: 2018-09-04 | End: 2018-09-21 | Stop reason: SDUPTHER

## 2018-09-04 RX ADMIN — IOPAMIDOL 75 ML: 612 INJECTION, SOLUTION INTRAVENOUS at 08:00

## 2018-09-04 NOTE — PROGRESS NOTES
Moshe Chris is a 72 y.o. female presenting today for Migraine (follow up )  . HPI:  Moshe Chris presents to the office today migraine follow-up care. Patient was scheduled an MRI but became anxious and not complete the exam.  She was sent back to the office for follow-up care and for medication for situational anxiety. She continues to complain of a headache at 8/10. Review of Systems   Respiratory: Negative for cough. Cardiovascular: Negative for chest pain and palpitations. Neurological: Positive for headaches. Psychiatric/Behavioral: The patient is nervous/anxious. Allergies   Allergen Reactions    Celebrex [Celecoxib] Other (comments)     Tiredness        Current Outpatient Prescriptions   Medication Sig Dispense Refill    fluticasone (FLONASE) 50 mcg/actuation nasal spray INSTILL 2 SPRAYS IN EACH NOSTRIL DAILY 16 g 1    alendronate (FOSAMAX) 10 mg tablet Take 1 Tab by mouth Daily (before breakfast). 30 Tab 2    atorvastatin (LIPITOR) 20 mg tablet Take 1 Tab by mouth daily. 90 Tab 3    busPIRone (BUSPAR) 7.5 mg tablet take 1 tablet by mouth twice a day 60 Tab 1    acetaminophen (TYLENOL EXTRA STRENGTH) 500 mg tablet Take 650 mg by mouth every six (6) hours as needed for Pain. Pt states they take medication QID.  loratadine (CLARITIN) 10 mg tablet Take 10 mg by mouth daily as needed for Allergies or Rhinitis.  omeprazole (PRILOSEC) 20 mg capsule Take 20 mg by mouth daily.  aspirin 81 mg chewable tablet Take 1 Tab by mouth daily. 30 Tab 3    diazePAM (VALIUM) 2 mg tablet Take 1 Tab by mouth every eight (8) hours as needed for Anxiety.  Max Daily Amount: 6 mg. 10 Tab 0    montelukast (SINGULAIR) 10 mg tablet take 1 tablet by mouth once daily 90 Tab 2       Past Medical History:   Diagnosis Date    Abnormal Pap smear     Dr. Mauricio Lay    Allergic rhinitis     Arthritis     Chronic pain     Hypercholesterolemia     Neck pain 5/17/2010    Stroke ([facility] 10/02/2017 TIA- legs weak memory issues       Past Surgical History:   Procedure Laterality Date    COLONOSCOPY N/A 6/4/2018    COLONOSCOPY / polypectomy performed by Jerry Lance MD at [facility] END[facility]COPY    HX GYN      Total Hyst    HX LAP CHOLECYSTECTOMY      HX OTHER SURGICAL  2017    Throat widened       Social History     Social History    Marital status:      Spouse name: N/A    Number of children: N/A    Years of education: N/A     Occupational History    Not on file. Social History Main Topics    Smoking status: Never Smoker    Smokeless tobacco: Never Used    Alcohol use No    Drug use: No    Sexual activity: No     Other Topics Concern    Not on file     Social History Narrative       Patient does not have an advanced directive on file    Vitals:    08/29/18 0905   BP: 108/68   Pulse: 86   Resp: 16   Temp: 98.2 °F (36.8 °C)   TempSrc: Tympanic   SpO2: 98%   Weight: 111 lb (50.3 kg)   Height: 5' (1.524 m)   PainSc:   8   PainLoc: Head       Physical Exam   Cardiovascular: Normal rate and regular rhythm. Pulmonary/Chest: Effort normal and breath sounds normal.   Psychiatric: Her mood appears anxious (situational). Hospital Outpatient Visit on 07/02/2018   Component Date Value Ref Range Status    Hepatitis C virus Ab 07/02/2018 0.08  <0.80 Index Final    Hep C  virus Ab Interp. 07/02/2018 NEGATIVE   NEG   Final    Hep C  virus Ab comment 07/02/2018        Final    Comment: Index <0.80. ......................... [place] Negative  Index > or = to 0.80 and <1.00. .... [place] Equivocal  Index >1.00. ......................... [place] Positive          For Equivocal or Positive results, confirmation with Hepatitis C RNA by PCR or bDNA is suggested.       LIPID PROFILE 07/02/2018        Final    Cholesterol, total 07/02/2018 127  <200 MG/DL Final    Triglyceride 07/02/2018 105  <150 MG/DL Final    Comment: The drugs N-acetylcysteine (NAC) and  Metamiszole have been found to cause falsely  low results in this chemical assay. Please  be sure to submit blood samples obtained  BEFORE administration of either of these  drugs to assure correct results.  HDL Cholesterol 07/02/2018 57  40 - 60 MG/DL Final    LDL, calculated 07/02/2018 49  0 - 100 MG/DL Final    VLDL, calculated 07/02/2018 21  MG/DL Final    CHOL/HDL Ratio 07/02/2018 2.2  0 - 5.0   Final       .No results found for any visits on 08/29/18. Assessment / Plan:      ICD-10-CM ICD-9-CM    1. Situational anxiety F41.8 300.09 DISCONTINUED: diazePAM (VALIUM) 5 mg tablet   2. Migraine without status migrainosus, not intractable, unspecified migraine type G43.909 346.90        Follow-up Disposition:  Return if symptoms worsen or fail to improve. I asked the patient if she  had any questions and answered her  questions.   The patient stated that she understands the treatment plan and agrees with the treatment plan

## 2018-09-04 NOTE — PROGRESS NOTES
Kane Bauman is a 72 y.o. female presenting today for Results  . HPI:  Kane Bauman presents to the office today for dizziness, headache and cervical neck follow-up care. Patient was sent for a MRI of the head which was reviewed with the patient and confirmed with Dr. Mk Rudolph. Per the MRI patient has some stenosis in the cervical spine. Patient is currently followed by Ortho Spine and will schedule a follow-up appointment. She is also going to pain management and is scheduled for an injection in her neck this month. She will call for an appointment. She notes she continues to feel like she walks with a slanted gait but the dizziness has slightly improved. Review of Systems   Respiratory: Negative for cough. Cardiovascular: Negative for chest pain and palpitations. Musculoskeletal: Positive for neck pain. Neurological: Positive for dizziness and headaches. No Known Allergies    Current Outpatient Prescriptions   Medication Sig Dispense Refill    montelukast (SINGULAIR) 10 mg tablet take 1 tablet by mouth once daily 30 Tab 2    fluticasone (FLONASE) 50 mcg/actuation nasal spray INSTILL 2 SPRAYS IN EACH NOSTRIL DAILY 16 g 1    alendronate (FOSAMAX) 10 mg tablet Take 1 Tab by mouth Daily (before breakfast). 30 Tab 2    atorvastatin (LIPITOR) 20 mg tablet Take 1 Tab by mouth daily. 90 Tab 3    busPIRone (BUSPAR) 7.5 mg tablet take 1 tablet by mouth twice a day 60 Tab 1    acetaminophen (TYLENOL EXTRA STRENGTH) 500 mg tablet Take 650 mg by mouth every six (6) hours as needed for Pain. Pt states they take medication QID.  loratadine (CLARITIN) 10 mg tablet Take 10 mg by mouth daily as needed for Allergies or Rhinitis.  omeprazole (PRILOSEC) 20 mg capsule Take 20 mg by mouth daily.  LORazepam (ATIVAN) 0.5 mg tablet Take 1 Tab by mouth every four (4) hours as needed for Anxiety. Max Daily Amount: 3 mg. 30 Tab 0    aspirin 81 mg chewable tablet Take 1 Tab by mouth daily.  30 Tab 3    diazePAM (VALIUM) 5 mg tablet Take 1 tablet by mouth 60 minutes prior to the test.  If anxious 30 minutes after  taking the first tablet take an addition tablet by mouth 2 Tab 0    celecoxib (CELEBREX) 200 mg capsule Take 1 Cap by mouth daily for 90 days. 27 Cap 1       Past Medical History:   Diagnosis Date    Abnormal Pap smear     Dr. Linda Hernández Allergic rhinitis     Arthritis     Chronic pain     Hypercholesterolemia     Neck pain 5/17/2010    Stroke ([address] 10/02/2017    TIA- legs weak memory issues       Past Surgical History:   Procedure Laterality Date    COLONOSCOPY N/A 6/4/2018    COLONOSCOPY / [facility]pect[facility] Kyaw Penaloza [facility]    HX GYN      Total Hyst    HX LAP CHOLECYSTECTOMY      HX OTHER SURGICAL  2017    Throat widened       Social History     Social History    Marital status:      Spouse name: N/A    Number of children: N/A    Years of education: N/A     Occupational History    Not on file. Social History Main Topics    Smoking status: Never Smoker    Smokeless tobacco: Never Used    Alcohol use No    Drug use: No    Sexual activity: No     Other Topics Concern    Not on file     Social History Narrative       Patient does not have an advanced directive on file    Vitals:    09/04/18 1239   BP: 112/64   Pulse: 90   Resp: 16   Temp: 99.1 °F (37.3 °C)   TempSrc: Tympanic   SpO2: 97%   Weight: 112 lb (50.8 kg)   Height: 5' (1.524 m)   PainSc:   8   PainLoc: Head       Physical Exam   Constitutional: No distress. HENT:   Head: Normocephalic. Cardiovascular: Normal rate, regular rhythm and normal heart sounds. Pulmonary/Chest: Effort normal and breath sounds normal.   Musculoskeletal: She exhibits tenderness (cervical neck). Nursing note and vitals reviewed.       Hospital Outpatient Visit on 08/31/2018   Component Date Value Ref Range Status    Creatinine, POC 08/31/2018 0.7  0.6 - 1.3 MG/DL Final    GFRAA, POC 08/31/2018 >60  >60 ml/min/1.73m2 Final    GFRNA, POC 08/31/2018 >60  >60 ml/min/1.73m2 Final    Comment: Estimated GFR is calculated using the IDMS-traceable Modification of Diet in Renal Disease (MDRD) Study equation, reported for both  Americans (GFRAA) and non- Americans (GFRNA), and normalized to 1.73m2 body surface area. The physician must decide which value applies to the patient. The MDRD study equation should only be used in individuals age 25 or older. It has not been validated for the following: pregnant women, patients with serious comorbid conditions, or on certain medications, or persons with extremes of body size, muscle mass, or nutritional status. Hospital Outpatient Visit on 07/02/2018   Component Date Value Ref Range Status    Hepatitis C virus Ab 07/02/2018 0.08  <0.80 Index Final    Hep C  virus Ab Interp. 07/02/2018 NEGATIVE   NEG   Final    Hep C  virus Ab comment 07/02/2018        Final    Comment: Index <0.80. ......................... [place] Negative  Index > or = to 0.80 and <1.00. .... [place]ie Gallego [place] Galleg[place] Equivocal  Index >1.00. ......................... [place] Positive          For Equivocal or Positive results, confirmation with Hepatitis C RNA by PCR or bDNA is suggested.  LIPID PROFILE 07/02/2018        Final    Cholesterol, total 07/02/2018 127  <200 MG/DL Final    Triglyceride 07/02/2018 105  <150 MG/DL Final    Comment: The drugs N-acetylcysteine (NAC) and  Metamiszole have been found to cause falsely  low results in this chemical assay. Please  be sure to submit blood samples obtained  BEFORE administration of either of these  drugs to assure correct results.  HDL Cholesterol 07/02/2018 57  40 - 60 MG/DL Final    LDL, calculated 07/02/2018 49  0 - 100 MG/DL Final    VLDL, calculated 07/02/2018 21  MG/DL Final    CHOL/HDL Ratio 07/02/2018 2.2  0 - 5.0   Final       .No results found for any visits on 09/04/18.     Assessment / Plan:      ICD-10-CM ICD-9-CM    1. Dizziness R42 780.4 REFERRAL TO PHYSICAL THERAPY   2. Frontal headache R51 784.0    3. Encounter for immunization Z23 V03.89 INFLUENZA VACCINE INACTIVATED (IIV), SUBUNIT, ADJUVANTED, IM      ADMIN INFLUENZA VIRUS VAC     Patient with follow-up with pan management for neck pain  Patient will schedule an appointment with Spine Ortho  Tylenol or Ibuprofen for headache  Influenza vaccine today  F/u prn    Follow-up Disposition:  Return if symptoms worsen or fail to improve. I asked the patient if she  had any questions and answered her  questions.   The patient stated that she understands the treatment plan and agrees with the treatment plan

## 2018-09-04 NOTE — PATIENT INSTRUCTIONS
Vaccine Information Statement    Influenza (Flu) Vaccine (Inactivated or Recombinant): What you need to know    Many Vaccine Information Statements are available in Arabic and other languages. See www.immunize.org/vis  Hojas de Información Sobre Vacunas están disponibles en Español y en muchos otros idiomas. Visite www.immunize.org/vis    1. Why get vaccinated? Influenza (flu) is a contagious disease that spreads around the [place] every year, usually between October and May. Flu is caused by influenza viruses, and is spread mainly by coughing, sneezing, and close contact. Anyone can get flu. Flu strikes suddenly and can last several days. Symptoms vary by age, but can include:   fever/chills   sore throat   muscle aches   fatigue   cough   headache    runny or stuffy nose    Flu can also lead to pneumonia and blood infections, and cause diarrhea and seizures in children. If you have a medical condition, such as heart or lung disease, flu can make it worse. Flu is more dangerous for some people. Infants and young children, people 72years of age and older, pregnant women, and people with certain health conditions or a weakened immune system are at greatest risk. Each year thousands of people in the [facility] die from flu, and many more are hospitalized. Flu vaccine can:   keep you from getting flu,   make flu less severe if you do get it, and   keep you from spreading flu to your family and other people. 2. Inactivated and recombinant flu vaccines    A dose of flu vaccine is recommended every flu season. Children 6 months through 6years of age may need two doses during the same flu season. Everyone else needs only one dose each flu season.        Some inactivated flu vaccines contain a very small amount of a mercury-based preservative called thimerosal. Studies have not shown thimerosal in vaccines to be harmful, but flu vaccines that do not contain thimerosal are available. There is no live flu virus in flu shots. They cannot cause the flu. There are many flu viruses, and they are always changing. Each year a new flu vaccine is made to protect against three or four viruses that are likely to cause disease in the upcoming flu season. But even when the vaccine doesnt exactly match these viruses, it may still provide some protection    Flu vaccine cannot prevent:   flu that is caused by a virus not covered by the vaccine, or   illnesses that look like flu but are not. It takes about 2 weeks for protection to develop after vaccination, and protection lasts through the flu season. 3. Some people should not get this vaccine    Tell the person who is giving you the vaccine:     If you have any severe, life-threatening allergies. If you ever had a life-threatening allergic reaction after a dose of flu vaccine, or have a severe allergy to any part of this vaccine, you may be advised not to get vaccinated. Most, but not all, types of flu vaccine contain a small amount of egg protein.  If you ever had Guillain-Barré Syndrome (also called GBS). Some people with a history of GBS should not get this vaccine. This should be discussed with your doctor.  If you are not feeling well. It is usually okay to get flu vaccine when you have a mild illness, but you might be asked to come back when you feel better. 4. Risks of a vaccine reaction    With any medicine, including vaccines, there is a chance of reactions. These are usually mild and go away on their own, but serious reactions are also possible. Most people who get a flu shot do not have any problems with it.      Minor problems following a flu shot include:    soreness, redness, or swelling where the shot was given     hoarseness   sore, red or itchy eyes   cough   fever   aches   headache   itching   fatigue  If these problems occur, they usually begin soon after the shot and last 1 or 2 days. More serious problems following a flu shot can include the following:     There may be a small increased risk of Guillain-Barré Syndrome (GBS) after inactivated flu vaccine. This risk has been estimated at 1 or 2 additional cases per million people vaccinated. This is much lower than the risk of severe complications from flu, which can be prevented by flu vaccine.  Young children who get the flu shot along with pneumococcal vaccine (PCV13) and/or DTaP vaccine at the same time might be slightly more likely to have a seizure caused by fever. Ask your doctor for more information. Tell your doctor if a child who is getting flu vaccine has ever had a seizure. Problems that could happen after any injected vaccine:      People sometimes faint after a medical procedure, including vaccination. Sitting or lying down for about 15 minutes can help prevent fainting, and injuries caused by a fall. Tell your doctor if you feel dizzy, or have vision changes or ringing in the ears.  Some people get severe pain in the shoulder and have difficulty moving the arm where a shot was given. This happens very rarely.  Any medication can cause a severe allergic reaction. Such reactions from a vaccine are very rare, estimated at about 1 in a million doses, and would happen within a few minutes to a few hours after the vaccination. As with any medicine, there is a very remote chance of a vaccine causing a serious injury or death. The safety of vaccines is always being monitored. For more information, visit: www.cdc.gov/vaccinesafety/    5. What if there is a serious reaction? What should I look for?  Look for anything that concerns you, such as signs of a severe allergic reaction, very high fever, or unusual behavior.     Signs of a severe allergic reaction can include hives, swelling of the face and throat, difficulty breathing, a fast heartbeat, dizziness, and weakness - usually within a few minutes to a few hours after the vaccination. What should I do?  If you think it is a severe allergic reaction or other emergency that cant wait, call 9-1-1 and get the person to the nearest hospital. Otherwise, call your doctor.  Reactions should be reported to the Vaccine Adverse Event Reporting System (VAERS). Your doctor should file this report, or you can do it yourself through  the VAERS web site at www.vaers. [facility].gov, or by calling 4-274.442.9139. VAERS does not give medical advice. 6. The National Vaccine Injury Compensation Program    The [facility] Vaccine Injury Compensation Program (VICP) is a federal program that was created to compensate people who may have been injured by certain vaccines. Persons who believe they may have been injured by a vaccine can learn about the program and about filing a claim by calling 9-547.610.2212 or visiting the [employer] website at www.[facility].gov/vaccinecompensation. There is a time limit to file a claim for compensation. 7. How can I learn more?  Ask your healthcare provider. He or she can give you the vaccine package insert or suggest other sources of information.  Call your local or state health department.  Contact the Centers for Disease Control and Prevention (CDC):  - Call 2-129.788.4504 (1-800-CDC-INFO) or  - Visit CDCs website at www.cdc.gov/flu    Vaccine Information Statement   Inactivated Influenza Vaccine   8/7/2015  42 JULIANNA Darling [id number]    Department of Health and Human Services  Centers for Disease Control and Prevention    Office Use Only

## 2018-09-04 NOTE — PROGRESS NOTES
Vinny Suh is a 72 y.o. female who presents for routine immunizations. She denies any symptoms , reactions or allergies that would exclude them from being immunized today. Risks and adverse reactions were discussed and the VIS was given to them. All questions were addressed. She was observed for 15 min post injection. There were no reactions observed.     Estephanie Felix LPN

## 2018-09-06 ENCOUNTER — OFFICE VISIT (OUTPATIENT)
Dept: ORTHOPEDIC SURGERY | Age: 73
End: 2018-09-06

## 2018-09-06 VITALS
TEMPERATURE: 97.8 F | RESPIRATION RATE: 18 BRPM | HEART RATE: 72 BPM | DIASTOLIC BLOOD PRESSURE: 69 MMHG | BODY MASS INDEX: 22.23 KG/M2 | OXYGEN SATURATION: 96 % | SYSTOLIC BLOOD PRESSURE: 135 MMHG | HEIGHT: 60 IN | WEIGHT: 113.2 LBS

## 2018-09-06 DIAGNOSIS — M79.18 MYOFASCIAL PAIN: Primary | ICD-10-CM

## 2018-09-06 DIAGNOSIS — M54.2 NECK PAIN: ICD-10-CM

## 2018-09-06 NOTE — MR AVS SNAPSHOT
[address] [facility] [address] [facility] [address] 
[phone] Patient: Nel Navas MRN: [id number] :1945 Visit Information Date & Time Provider Department Dept. Phone Encounter #  
 2018 11:15 AM Sonali Hoffman MD [facility] [phone] [id number] Follow-up Instructions Return in about 6 weeks (around 10/18/2018). Your Appointments 2018 10:45 AM  
Follow Up with Jacoby Lopez MD  
[address] ([address]) Appt Note: FROM 18; OFFC RS FR 9/10/18 - PT OKD  
 [address] [facility], [address] [address]  
[phone]  
  
   
 [address] [place] [address], [address] [facility] [place] [address]  
  
    
 1/15/2019  8:30 AM  
[facility] [place] ([facility]) Appt Note: 6mo  
 [address], [address] [facility] [address]  
  
   
 [address], [address] [facility] [address] Upcoming Health Maintenance Date Due ZOSTER VACCINE AGE 60> 2007 DTaP/Tdap/Td series (1 - Tdap) 2023* Pneumococcal 65+ Low/Medium Risk (2 of 2 - PPSV23) 10/10/2018 MEDICARE YEARLY EXAM 2019 BREAST CANCER SCRN MAMMOGRAM 2020 GLAUCOMA SCREENING Q2Y 2020 COLONOSCOPY 2021 *Topic was postponed. The date shown is not the original due date. Allergies as of 2018  Review Complete On: 2018 By: Genevieve Patel LPN No Known Allergies Current Immunizations  Reviewed on 2018 Name Date Influenza Vaccine (Tri) Adjuvanted 2018 Zoster Recombinant 7/3/2018 12:00 AM  
  
 Not reviewed this visit You Were Diagnosed With   
  
 Codes Comments Myofascial pain    -  Primary ICD-10-CM: M79.1 ICD-9-CM: 729.1 Neck pain     ICD-10-CM: M54.2 ICD-9-CM: 723.1 Vitals BP Pulse Temp Resp Height(growth percentile) Weight(growth percentile) 135/69 72 97.8 °F (36.6 °C) (Oral) 18 5' (1.524 m) 113 lb 3.2 oz (51.3 kg) SpO2 BMI OB Status Smoking Status 96% 22.11 kg/m2 Hysterectomy Never Smoker BMI and BSA Data Body Mass Index Body Surface Area  
 22.11 kg/m 2 1.47 m 2 Preferred Pharmacy Pharmacy Name Phone [facility] [place], [address] [facility] [phone] Your Updated Medication List  
  
   
This list is accurate as of 9/6/18 11:44 AM.  Always use your most recent med list.  
  
  
  
  
 alendronate 10 mg tablet Commonly known as:  FOSAMAX Take 1 Tab by mouth Daily (before breakfast). aspirin 81 mg chewable tablet Take 1 Tab by mouth daily. atorvastatin 20 mg tablet Commonly known as:  LIPITOR Take 1 Tab by mouth daily. busPIRone 7.5 mg tablet Commonly known as:  BUSPAR  
take 1 tablet by mouth twice a day CLARITIN 10 mg tablet Generic drug:  loratadine Take 10 mg by mouth daily as needed for Allergies or Rhinitis. diazePAM 5 mg tablet Commonly known as:  VALIUM Take 1 tablet by mouth 60 minutes prior to the test.  If anxious 30 minutes after  taking the first tablet take an addition tablet by mouth  
  
 fluticasone 50 mcg/actuation nasal spray Commonly known as:  Zayda Jumper INSTILL 2 SPRAYS IN EACH NOSTRIL DAILY LORazepam 0.5 mg tablet Commonly known as:  ATIVAN Take 1 Tab by mouth every four (4) hours as needed for Anxiety. Max Daily Amount: 3 mg.  
  
 montelukast 10 mg tablet Commonly known as:  SINGULAIR  
take 1 tablet by mouth once daily  
  
 omeprazole 20 mg capsule Commonly known as:  PRILOSEC Take 20 mg by mouth daily. TYLENOL EXTRA STRENGTH 500 mg tablet Generic drug:  acetaminophen Take 650 mg by mouth every six (6) hours as needed for Pain. Pt states they take medication QID. We Performed the Following REFERRAL TO PHYSICAL THERAPY [EVQ72 Custom] Comments:  
 eval and treat Cervical myofascial pain Please include postural restoration Follow-up Instructions Return in about 6 weeks (around 10/18/2018). To-Do List   
 09/10/2018 9:00 AM  
  Appointment with Lilian Villegas PT at [facility] PT [address]  ([phone]) Referral Information Referral ID Referred By Referred To  
  
 [id number] RAISA AMBRIZ [facility] PT [address] [facility]   
   [address] [facility] [address] Phone: [phone] Fax: [phone] Visits Status Start Date End Date 1 New Request 9/6/18 9/6/19 If your referral has a status of pending review or denied, additional information will be sent to support the outcome of this decision. Introducing [facility]! Dear Mishel Jeffery: 
Thank you for requesting a [employer] account. Our records indicate that you already have an active [employer] account. You can access your account anytime at [email or web address] Did you know that you can access your hospital and ER discharge instructions at any time in [employer]? You can also review all of your test results from your hospital stay or ER visit. Additional Information If you have questions, please visit the Frequently Asked Questions section of the [employer] website at [email or web address] Remember, [employer] is NOT to be used for urgent needs. For medical emergencies, dial 911. Now available from your iPhone and Android! Please provide this summary of care documentation to your next provider. Your primary care clinician is listed as JAY JENKINS. If you have any questions after today's visit, please call [phone].

## 2018-09-06 NOTE — PROGRESS NOTES
[address], [address]  [place], [address] [place] [address] Street  Phone: [phone]  Fax: [phone]        Samanta Bonner  : 1945  PCP: Ericka Truong NP  2018    PROGRESS NOTE      ASSESSMENT AND PLAN    Cora Bach comes in to the office today for cervical RFA f/u. She has seen some improvement of her upper back pain. However, she continues to have severe upper neck pain and headaches. Pt denies any numbness, weakness, or radicular symptoms in her UE. She rates her pain as a 9/10 today. On examination, she presents with a posture with flexed forward posture. She is neurologically intact. She had pain reproduced with cervical extension. She had tenderness to palpation of her neck. Her current complaints are likely myofascial in nature. I referred to PT. Pt will f/u in 6 weeks or sooner as needed. Diagnoses and all orders for this visit:    1. Myofascial pain  -     REFERRAL TO PHYSICAL THERAPY    2. Neck pain  -     REFERRAL TO PHYSICAL THERAPY       Follow-up Disposition: Not on File      HISTORY OF PRESENT ILLNESS  Cora Bach is a 72 y.o. female. A&P / HPI from 2017: Jacy Corcoran in to the office today c/o chronic neck pain which was exacerbated by a recent mild TIA. Her symptoms are likely related to cervical facet arthropathy.       On the examination, she had limited cervical ROM with pain reproduced at end ranges of rotation and extension. She did not show neurological deficits nor has radicular pain. The cervical CT scan showed multifocal degenerative disc disease most significant at C5-7 with associated canal stenosis. It also showed significant facet hypertrophy with multifocal regions of bilateral severe neural foramina stenosis.     I referred her to Dr. Ally Bonilla for a cervical medial branch block / RFA with sedation.  I prescribed her Mobic 15 mg daily.      HISTORY OF PRESENT ILLNESS  Jamaica Friedman is a 72 y.o. female c/o chronic neck pain which was exacerbated by a mild TIA on 10/02/2017. She note her pain is constant and there is not specific movement that will aggravate her symptoms. She denies a specific position that will help alleviate her pain. She denies radicular symptoms into the extremities at this time. She present with a cervical CT scan which showed mild regions of anterior subluxation C2-C4 with associated canal stenosis C2-3 up to 7 mm. It also showed multifocal degenerative disc disease most significant at C5-7 with associated canal stenosis.     Pt denies any fevers, chills, nausea, vomiting. Pt denies any chest pain and shortness of breath. Pt denies any ear, nose, and throat problems. Pt denies any fecal or urinary incontinence.       Updates from 01/15/18:  She was unable to undergo the cervical medial branch block due a severe panic attack. She has been prescribed an anti-anxiety medication to further void these symptoms. Her symptoms have not significantly changed since the last office visit. She was also unable to tolerate the Mobic 15 mg daily due to stomach issues. Updates from 09/06/18:  Pt presents for cervical RFA f/u. She has seen some improvement of her upper back pain. However, she continues to have severe neck pain and headaches. Pt denies any numbness, weakness, or radicular symptoms in her UE. She recently had a brain MRI. She begins PT Monday because she \"is walking funny. \"      PAST MEDICAL HISTORY   Past Medical History:   Diagnosis Date    Abnormal Pap smear     Dr. Kevin Lord Allergic rhinitis     Arthritis     Chronic pain     Hypercholesterolemia     Neck pain 5/17/2010    Stroke ([facility] [address] 10/02/2017    TIA- legs weak memory issues       Past Surgical History:   Procedure Laterality Date    COLONOSCOPY N/A 6/4/2018    COLONOSCOPY / polypectomy performed by Wendy Navarro MD at [facility] ENDOSCOPY    HX GYN      Total Hyst    HX LAP CHOLECYSTECTOMY      HX OTHER SURGICAL  2017    Throat widened   . MEDICATIONS    Current Outpatient Prescriptions   Medication Sig Dispense Refill    montelukast (SINGULAIR) 10 mg tablet take 1 tablet by mouth once daily 30 Tab 2    diazePAM (VALIUM) 5 mg tablet Take 1 tablet by mouth 60 minutes prior to the test.  If anxious 30 minutes after  taking the first tablet take an addition tablet by mouth 2 Tab 0    fluticasone (FLONASE) 50 mcg/actuation nasal spray INSTILL 2 SPRAYS IN EACH NOSTRIL DAILY 16 g 1    alendronate (FOSAMAX) 10 mg tablet Take 1 Tab by mouth Daily (before breakfast). 30 Tab 2    celecoxib (CELEBREX) 200 mg capsule Take 1 Cap by mouth daily for 90 days. 30 Cap 1    atorvastatin (LIPITOR) 20 mg tablet Take 1 Tab by mouth daily. 90 Tab 3    busPIRone (BUSPAR) 7.5 mg tablet take 1 tablet by mouth twice a day 60 Tab 1    acetaminophen (TYLENOL EXTRA STRENGTH) 500 mg tablet Take 650 mg by mouth every six (6) hours as needed for Pain. Pt states they take medication QID.  loratadine (CLARITIN) 10 mg tablet Take 10 mg by mouth daily as needed for Allergies or Rhinitis.  omeprazole (PRILOSEC) 20 mg capsule Take 20 mg by mouth daily.  LORazepam (ATIVAN) 0.5 mg tablet Take 1 Tab by mouth every four (4) hours as needed for Anxiety. Max Daily Amount: 3 mg. 30 Tab 0    aspirin 81 mg chewable tablet Take 1 Tab by mouth daily.  30 Tab 3        ALLERGIES  No Known Allergies       SOCIAL HISTORY    Social History     Social History    Marital status:      Spouse name: N/A    Number of children: N/A    Years of education: N/A     Social History Main Topics    Smoking status: Never Smoker    Smokeless tobacco: Never Used    Alcohol use No    Drug use: No    Sexual activity: No     Other Topics Concern    Not on file     Social History Narrative       FAMILY HISTORY  Family History   Problem Relation Age of Onset    Cancer Mother      breast    Heart Disease Father          REVIEW OF SYSTEMS  Review of Systems   Musculoskeletal: Positive for neck pain. Neurological: Positive for headaches. PHYSICAL EXAMINATION  There were no vitals taken for this visit. Pain Assessment  11/13/2017   Location of Pain Neck   Severity of Pain 9   Quality of Pain Throbbing   Frequency of Pain Constant           Constitutional:  Well developed, well nourished, in no acute distress. Psychiatric: Affect and mood are appropriate. Integumentary: No rashes or abrasions noted on exposed areas. SPINE/MUSCULOSKELETAL EXAM    Cervical spine:  Neck is midline. Normal muscle tone. No focal atrophy is noted. Cervical ROM limited to about 30 degrees  Shoulder ROM intact. Tenderness to palpation. Negative Spurling's sign. Negative Tinel's sign. Negative Garza's sign.       Sensation in the bilateral arms grossly intact to light touch.       Lumbar spine:  No rash, ecchymosis, or gross obliquity. No fasciculations. No focal atrophy is noted. No pain with hip ROM. Full range of motion. No tenderness to palpation. No tenderness to palpation at the sciatic notch. SI joints non-tender. Trochanters non tender.      Sensation in the bilateral legs grossly intact to light touch. MOTOR:      Biceps  Triceps Deltoids Wrist Ext Wrist Flex Hand Intrin   Right 5/5 5/5 5/5 5/5 5/5 5/5   Left 5/5 5/5 5/5 5/5 5/5 5/5             Hip Flex  Quads Hamstrings Ankle DF EHL Ankle PF   Right 5/5 5/5 5/5 5/5 5/5 5/5   Left 5/5 5/5 5/5 5/5 5/5 5/5       DTRs are 2+ biceps, triceps, brachioradialis, patella, and Achilles.      Negative Straight Leg raise. Squat not tested. No difficulty with tandem gait.       Ambulation without assistive device. FWB.     RADIOGRAPHS  Cervical CT images taken on 10/17/2017 personally reviewed with patient:  FINDINGS:      There is narrowing at the atlantooccipital joints bilaterally with hypertrophic  bone formation/calcification at the joint space between the basion and C1  anterior arch. Calcific soft tissue \"mass\" posterior to the dens. There is  calcification of the transverse ligaments dorsal C2. There is significant  artifact at the foramen magnum limiting assessment for narrowing but  calcification appears to efface the thecal sac without significant mass effect  as assessed on axial view. There is sclerosis at the cranial dens with mild  hypertrophic bone anteriorly and posteriorly. Calcified soft tissue mass also  anterior to the dens. There is loss of normal cervical lordosis. There is  narrowing at the C1-C2 anterior arch. No separation at C1-2. 2 mm anterior  subluxation C2 on C3, 1 mm anterior subluxation C3 on C4 and 1 mm anterior  subluxation C4 on C5. There is stenosis at C2-3 measuring up to 7 mm secondary  to disc bulge and anterior subluxation with mass effect on the posterior cord. There may be mild loss of vertebral body height at C5 and C6 with sclerosis at  C6-7 joint space and severe disc space narrowing. Additional severe disc space  narrowing C5-6. There is mild anterior calcifications C4-C6. Disc bulge C4-5  with mild canal narrowing up to 11 mm. Disc bulge C6-7 with narrowing up to 7  mm. Significant facet hypertrophy. No acute fracture. Severe stenosis right C3-4  and C4-5 neural foramina. Severe stenosis left C3-4, C4-5 and C6-7 neural  foramina. Moderate stenosis right C2-3, C5-6, C6-7 and left to 3 C5-6. There is  soft tissue thickening with mild calcifications at the T1 supraspinous ligament.      There is prominence of the lingual tonsils. Significant biapical pleural  scarring.      IMPRESSION  IMPRESSION:        1.  Hypertrophic ossification and calcification basion/C1 with partial fusion,  calcified \"pseudomass\" posterior greater than anterior dens with consideration  for CPPD/gout or degenerative changes. No significant narrowing at the foramen  magnum.   2.  Mild regions of anterior subluxation C2-C4 with associated canal stenosis  C2-3 up to 7 mm. 3.  Multifocal degenerative disc disease most significant at C5-7 with  associated canal stenosis. 4.  Significant facet hypertrophy with multifocal regions of bilateral severe  neural foramina stenosis. 5.  Prominent lingual tonsils. 6.  Significant biapical pulmonary pleural scarring. 11 minutes of face-to-face contact were spent with the patient during today's visit extensively discussing symptoms and treatment plan. All questions were answered. More than half of this visit today was spent on counseling.      Written by Sharon Ling as dictated by Vipul Garcia MD

## 2018-09-10 ENCOUNTER — HOSPITAL ENCOUNTER (OUTPATIENT)
Dept: PHYSICAL THERAPY | Age: 73
Discharge: HOME | End: 2018-09-10
Payer: MEDICARE

## 2018-09-10 PROCEDURE — G8979 MOBILITY GOAL STATUS: HCPCS

## 2018-09-10 PROCEDURE — 97110 THERAPEUTIC EXERCISES: CPT

## 2018-09-10 PROCEDURE — G8978 MOBILITY CURRENT STATUS: HCPCS

## 2018-09-10 PROCEDURE — 97162 PT EVAL MOD COMPLEX 30 MIN: CPT

## 2018-09-10 NOTE — PROGRESS NOTES
PT DAILY TREATMENT NOTE - [facility]     Patient Name: Cristian Rodriguez  Date:9/10/2018  : 1945  [x]  Patient  Verified  Payor: Oscar Martinez / Plan: [place] MEDICARE PART A & B / Product Type: Medicare /    In time:900  Out time:1000  Total Treatment Time (min): 60  Total Timed Codes (min): 30  1:1 Treatment Time ([address] only): 60   Visit #: 1 of 12    Treatment Area: Dizziness [R42]    SUBJECTIVE  Pain Level (0-10 scale): 8/10  Any medication changes, allergies to medications, adverse drug reactions, diagnosis change, or new procedure performed?: [x] No    [] Yes (see summary sheet for update)  Subjective functional status/changes:   [] No changes reported  CVA 10/17. Was dizzy in a store 2 weeks ago that lasted hours. Spinning dizziness 3-4 times before CVA. Vision is blurry since CVA. Feel unsteady, bu no falls. Live in 2 story home with a friend. Tinnitus is constant. CS pain and stiffness with severe arthritis. Lanice Tenisha is not taken any more due to drowsiness. OBJECTIVE    Modality rationale: decrease pain to improve the patients ability to ease with ADL's.     Min Type Additional Details    - Estim:  -Unatt       -IFC  -Premod                        -Other:  -w/ice   -w/heat  Position:  Location:    - Estim: -Att    -TENS instruct  -NMES                    -Other:  -w/US   -w/ice   -w/heat  Position:  Location:    -  Traction: - Cervical       -Lumbar                       - Prone          -Supine                       -Intermittent   -Continuous Lbs:  - before manual  - after manual    -  Ultrasound: -Continuous   - Pulsed                           -1MHz   -3MHz W/cm2:  Location:    -  Iontophoresis with dexamethasone         Location: - Take home patch   - In clinic   10 -  Ice     -  heat  -  Ice massage  -  Laser   -  Anodyne Position:seated  Location:CS    []  Laser with stim  []  Other:  Position:  Location:    []  Vasopneumatic Device Pressure:       [] lo [] med [] hi   Temperature: [] lo [] med [] hi   [] Skin assessment post-treatment:  []intact []redness- no adverse reaction    []redness - adverse reaction:     25 min []Eval                  []Re-Eval       25 min Therapeutic Exercise:  [] See flow sheet :   Rationale: increase ROM, increase strength and improve coordination to improve the patients ability to ease with independence. Pt education included and 5 min gentle massage          With   [] TE   [] TA   [] neuro   [] other: Patient Education: [x] Review HEP    [] Progressed/Changed HEP based on:   [] positioning   [] body mechanics   [] transfers   [] heat/ice application    [] other:      Other Objective/Functional Measures: Positive for dizziness, neck pain and HA's. CS  Canal Stenosis per MRI. CS rotation= 50 deg right, 45 deg left. occulomotor test WNL.     HISTORY OF PRESENT ILLNESS  Devika Friedman is a 72 y.o. female c/o chronic neck pain which was exacerbated by a mild TIA on 10/02/2017. She note her pain is constant and there is not specific movement that will aggravate her symptoms. She denies a specific position that will help alleviate her pain. She denies radicular symptoms into the extremities at this time. She present with a cervical CT scan which showed mild regions of anterior subluxation C2-C4 with associated canal stenosis C2-3 up to 7 mm. It also showed multifocal degenerative disc disease most significant at C5-7 with associated canal stenosis. LE strength 4/5. Did not ck Hallpike secondary degenerative changes  transverse ligament per MRI. [place] is unstable with EC/EO with LOB and SBA. Unstable ambulation and transfers. Fatigues easily. MRI:8/31/18  Stable appearance of the craniocervical junction with poor visualization of the  odontoid process. Evaluated with cervical spine CT 10/17/2017 with intact  odontoid, presumed degenerative changes in the transverse ligament.  Associated  central spinal canal narrowing at C1-C2 level appears stable. Pain Level (0-10 scale) post treatment: 7/10    ASSESSMENT/Changes in Function: see poc    Patient will continue to benefit from skilled PT services to modify and progress therapeutic interventions, address functional mobility deficits, address ROM deficits, address strength deficits, analyze and address soft tissue restrictions, analyze and cue movement patterns, analyze and modify body mechanics/ergonomics, assess and modify postural abnormalities and address imbalance/dizziness to attain remaining goals.      [x]  See Plan of Care  []  See progress note/recertification  []  See Discharge Summary         Progress towards goals / Updated goals:  See poc    PLAN  [x]  Upgrade activities as tolerated     [x]  Continue plan of care  []  Update interventions per flow sheet       []  Discharge due to:_  []  Other:_      Ruby Ford PT 9/10/2018  9:12 AM    Future Appointments  Date Time Provider Gaby Delarosa   9/11/2018 10:45 AM Wilbert Martinez MD [address]   10/30/2018 11:30 AM Ayla Alonzo  [address]   1/15/2019 8:30 AM Kenn Love NP [facility]

## 2018-09-10 NOTE — PROGRESS NOTES
[facility] [employer] [facility]  [address] [place] [address]  [phone] fax    Plan of Care/ Statement of Necessity for Physical Therapy Services    Patient name: Manuela Blackman Start of Care: 9/10/2018   Referral source: Betzy El, * : 1945    Medical Diagnosis: Dizziness [R42]   Onset Date:2 weeks ago    Treatment Diagnosis: Dizziness/Balance   Prior Hospitalization: see medical history Provider#: [id number]   Medications: Verified on Patient summary List    Comorbidities: TIA 10/02/17. CS Stenosis, Arthritis, Panic Attacks. Prior Level of Function: Independent. Lives in a 2 story home. 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 The Plan of Care and following information is based on the information from the initial evaluation. Assessment/ key information: Pt is a 72 yr old female who reports intermittent dizziness, imbalance and HA's. She reports a sudden onset of dizziness 2 weeks ago while shopping that lasted hours. Dizziness is sometimes spinning in nature. She also reports moderate CS pain and tenderness with limitations at end ROM. She reports Tinnitus in her ears that has been ongoing since her stroke. Pt presents with decreased CS ROM, Upper shoulder and CS tenderness. Pain today 8/10. Occulomotor tests were negative, Hallpike tyler test was NOT performed due to CS instability and Transverse ligament degeneration. Pt denies Radicular sx. Pt ambulates with an unsteady gait and with complaint of ongoing mild dizziness. [place]g EO/EC is demonstrated with increased sway with LOB. SOT will be performed at follow up visit to determine Vestibular Sensory Input.     Patient will benefit from skilled PT services to modify and progress therapeutic interventions, address functional mobility deficits, address ROM deficits, address strength deficits, analyze and address soft tissue restrictions, analyze and cue movement patterns, analyze and modify body mechanics/ergonomics, assess and modify postural abnormalities and address imbalance/dizziness to attain functional goals.      Evaluation Complexity History MEDIUM  Complexity : 1-2 comorbidities / personal factors will impact the outcome/ POC ; Examination MEDIUM Complexity : 3 Standardized tests and measures addressing body structure, function, activity limitation and / or participation in recreation  ;Presentation MEDIUM Complexity : Evolving with changing characteristics  ; Clinical Decision Making MEDIUM Complexity : FOTO score of 26-74  Overall Complexity Rating: MEDIUM  Problem List: pain affecting function, decrease ROM, decrease strength, edema affecting function, impaired gait/ balance, decrease ADL/ functional abilitiies, decrease activity tolerance, decrease flexibility/ joint mobility and decrease transfer abilities   Treatment Plan may include any combination of the following: Therapeutic exercise, Therapeutic activities, Neuromuscular re-education, Physical agent/modality, Gait/balance training, Manual therapy, Patient education, Self Care training, Functional mobility training, Home safety training and Stair training  Patient / Family readiness to learn indicated by: asking questions, trying to perform skills and interest  Persons(s) to be included in education: patient (P)  Barriers to Learning/Limitations: None  Patient Goal (s): To help with balance.   Patient Self Reported Health Status: fair  Rehabilitation Potential: good    Short Term Goals: To be accomplished in 1 weeks:   1. Pt will be compliant with a HEP to improve with balance and dizziness. 2. Pt will perform SOT to determine Sensory Input to improve Vestibular function. Long Term Goals: To be accomplished in 6 weeks:   1. Pt will increase FOTO score by 3  pts to improve with balance and mobility. 2. Pt will decrease DHI by 10 pts to improve balance and ADL function.     3. Pt will report sx improvement by >70% to be able to return to PLOF and improve QOL. Frequency / Duration: Patient to be seen 2 times per week for 6 weeks. Patient/ Caregiver education and instruction: Diagnosis, prognosis, self care and exercises   [x]  Plan of care has been reviewed with PTA    G-Codes (GP)  Mobility   Current  CK= 40-59%   Goal  CK= 40-59%    The severity rating is based on clinical judgment and the FOTO score. Certification Period: 9/10/18-11/09/18  Burt Stern, PT 9/10/2018 1:55 PM    ________________________________________________________________________    I certify that the above Therapy Services are being furnished while the patient is under my care. I agree with the treatment plan and certify that this therapy is necessary.     Physician's Signature:____________Date:_________TIME:________    ** Signature, Date and Time must be completed for valid certification **    Please sign and return to [facility] - [address]  [phone] fax

## 2018-09-11 ENCOUNTER — OFFICE VISIT (OUTPATIENT)
Dept: PULMONOLOGY | Age: 73
End: 2018-09-11

## 2018-09-11 VITALS
WEIGHT: 112.5 LBS | DIASTOLIC BLOOD PRESSURE: 50 MMHG | HEART RATE: 78 BPM | OXYGEN SATURATION: 98 % | TEMPERATURE: 98.2 F | RESPIRATION RATE: 18 BRPM | HEIGHT: 60 IN | BODY MASS INDEX: 22.09 KG/M2 | SYSTOLIC BLOOD PRESSURE: 106 MMHG

## 2018-09-11 DIAGNOSIS — R91.8 PULMONARY INFILTRATES: ICD-10-CM

## 2018-09-11 DIAGNOSIS — R07.9 CHEST PAIN, UNSPECIFIED TYPE: Primary | ICD-10-CM

## 2018-09-11 NOTE — PROGRESS NOTES
[facility]  Pulmonary, Critical Care, and Sleep Medicine      Chief complaint:  Pulmonary infiltrates and chest discomfort    HPI:    Moshe Chris    is 72years old and returns the office today for follow-up concerning lymphadenopathy on CT imaging and chest discomfort. The patient relates that she has shortness of breath with exertion but it is stable but she still gets occasional substernal chest pain but it is not like an elephant on her chest anymore but it is still noticeable and present for about a half hour. She says she has no significant cough she does not cough up blood she denies leg pain or swelling      Allergies   Allergen Reactions    Celebrex [Celecoxib] Other (comments)     Tiredness      Current Outpatient Prescriptions   Medication Sig    montelukast (SINGULAIR) 10 mg tablet take 1 tablet by mouth once daily    fluticasone (FLONASE) 50 mcg/actuation nasal spray INSTILL 2 SPRAYS IN EACH NOSTRIL DAILY    alendronate (FOSAMAX) 10 mg tablet Take 1 Tab by mouth Daily (before breakfast).  atorvastatin (LIPITOR) 20 mg tablet Take 1 Tab by mouth daily.  busPIRone (BUSPAR) 7.5 mg tablet take 1 tablet by mouth twice a day    loratadine (CLARITIN) 10 mg tablet Take 10 mg by mouth daily as needed for Allergies or Rhinitis.  omeprazole (PRILOSEC) 20 mg capsule Take 20 mg by mouth daily.  aspirin 81 mg chewable tablet Take 1 Tab by mouth daily.  diazePAM (VALIUM) 5 mg tablet Take 1 tablet by mouth 60 minutes prior to the test.  If anxious 30 minutes after  taking the first tablet take an addition tablet by mouth    acetaminophen (TYLENOL EXTRA STRENGTH) 500 mg tablet Take 650 mg by mouth every six (6) hours as needed for Pain. Pt states they take medication QID.  LORazepam (ATIVAN) 0.5 mg tablet Take 1 Tab by mouth every four (4) hours as needed for Anxiety. Max Daily Amount: 3 mg. No current facility-administered medications for this visit.       Past Medical History:   Diagnosis Date    Abnormal Pap smear     Dr. Laguna Stands Allergic rhinitis     Arthritis     Chronic pain     Hypercholesterolemia     Neck pain 5/17/2010    Stroke ([facility] [address] 10/02/2017    TIA- legs weak memory issues     Past Surgical History:   Procedure Laterality Date    COLONOSCOPY N/A 6/4/2018    COLONOSCOPY / polypectomy performed by Oralia Kang [facility] at [facility]    HX GYN      Total Hyst    HX LAP CHOLECYSTECTOMY      HX OTHER SURGICAL  2017    Throat widened     Social History     Social History    Marital status:      Spouse name: N/A    Number of children: N/A    Years of education: N/A     Occupational History    Not on file.      Social History Main Topics    Smoking status: Never Smoker    Smokeless tobacco: Never Used    Alcohol use No    Drug use: No    Sexual activity: No     Other Topics Concern    Not on file     Social History Narrative     Family History   Problem Relation Age of Onset    Cancer Mother      breast    Heart Disease Father        Review of systems:  She denies fever chills poor appetite weight loss    Physical Exam:  Visit Vitals    /50 (BP 1 Location: Left arm, BP Patient Position: Sitting)    Pulse 78    Temp 98.2 °F (36.8 °C)    Resp 18    Ht 5' (1.524 m)    Wt 51 kg (112 lb 8 oz)    SpO2 98%    BMI 21.97 kg/m2       Well-developed well-nourished  HEENT: WNL  Lymph node exam: Supraclavicular and cervical lymph nodes negative  Chest: Equal symmetrical expansion no dullness and absence of wheezes rales rubs  Heart: Regular rhythm no gallop no murmur no JVD no peripheral edema  Extremities: No cyanosis clubbing or calf tenderness  Neurological: Alert and oriented    Labs:    O2 sat 98% room air at rest  CT imaging 9/4/18: Personally reviewed, no lymphadenopathy however now the patient has some mild groundglass infiltrates in the lower lung zones    Impression:     Chest pain rule out cardiac ischemia  Resolution of hilar adenopathy  Groundglass infiltrates without significant symptoms to suggest a cause    Plan:     Nuclear medicine stress test rule out cardiac ischemia  Follow-up CT prone and supine positions in 3-4 months or sooner if needed with follow-up at that time    Any Paredes MD , [facility]    CC: Chirag Hinds NP     [address] [facility] [address] [facility]  [place], [address] [address]     P: [phone]     F: [phone]

## 2018-09-11 NOTE — MR AVS SNAPSHOT
[address] [facility], [address] [place] [address] 
[phone] Patient: Moshe Chris MRN: [id number] :1945 Visit Information Date & Time Provider Department Dept. Phone Encounter #  
 2018 10:45 AM Franky Emanuel MD [facility] [id number] Follow-up Instructions Return in about 4 months (around 2019). Follow-up and Disposition History Your Appointments 10/30/2018 11:30 AM  
Follow Up with Sherlyn Cutler MD  
[facility] Appt Note: 6 WK PT F/U  
 [address] [facility] [address]  
[phone]  
  
   
 [address] [facility] [address] 1/15/2019  8:30 AM  
Follow Up with Kayla Hennessy NP  
[facility] [place] [facility] Appt Note: 6mo  
 [address] [facility] [address]  
  
   
 [address] [facility] [address] Upcoming Health Maintenance Date Due ZOSTER VACCINE AGE 60> 2007 DTaP/Tdap/Td series (1 - Tdap) 2023* Pneumococcal 65+ Low/Medium Risk (2 of 2 - PPSV23) 10/10/2018 MEDICARE YEARLY EXAM 2019 BREAST CANCER SCRN MAMMOGRAM 2020 GLAUCOMA SCREENING Q2Y 2020 COLONOSCOPY 2021 *Topic was postponed. The date shown is not the original due date. Allergies as of 2018  Review Complete On: 9/10/2018 By: Janae Valles, PT Severity Noted Reaction Type Reactions Celebrex [Celecoxib] Low 2018   Systemic Other (comments) Tiredness Current Immunizations  Reviewed on 2018 Name Date Influenza Vaccine (Tri) Adjuvanted 2018 Zoster Recombinant 7/3/2018 12:00 AM  
  
 Not reviewed this visit You Were Diagnosed With   
  
 Codes Comments Chest pain, unspecified type    -  Primary ICD-10-CM: R07.9 ICD-9-CM: 786.50 Pulmonary infiltrates     ICD-10-CM: R91.8 ICD-9-CM: 793.19 Vitals BP Pulse Temp Resp Height(growth percentile) Weight(growth percentile) 106/50 (BP 1 Location: Left arm, BP Patient Position: Sitting) 78 98.2 °F (36.8 °C) 18 5' (1.524 m) 112 lb 8 oz (51 kg) SpO2 BMI OB Status Smoking Status 98% 21.97 kg/m2 Hysterectomy Never Smoker Vitals History BMI and BSA Data Body Mass Index Body Surface Area  
 21.97 kg/m 2 1.47 m 2 Preferred Pharmacy Pharmacy Name Phone [id number] [address] [facility]. [facility] [address] [facility] [phone] Your Updated Medication List  
  
   
This list is accurate as of 9/11/18 11:54 AM.  Always use your most recent med list.  
  
  
  
  
 alendronate 10 mg tablet Commonly known as:  FOSAMAX Take 1 Tab by mouth Daily (before breakfast). aspirin 81 mg chewable tablet Take 1 Tab by mouth daily. atorvastatin 20 mg tablet Commonly known as:  LIPITOR Take 1 Tab by mouth daily. busPIRone 7.5 mg tablet Commonly known as:  BUSPAR  
take 1 tablet by mouth twice a day CLARITIN 10 mg tablet Generic drug:  loratadine Take 10 mg by mouth daily as needed for Allergies or Rhinitis. diazePAM 5 mg tablet Commonly known as:  VALIUM Take 1 tablet by mouth 60 minutes prior to the test.  If anxious 30 minutes after  taking the first tablet take an addition tablet by mouth  
  
 fluticasone 50 mcg/actuation nasal spray Commonly known as:  Kristine Serene INSTILL 2 SPRAYS IN EACH NOSTRIL DAILY LORazepam 0.5 mg tablet Commonly known as:  ATIVAN Take 1 Tab by mouth every four (4) hours as needed for Anxiety. Max Daily Amount: 3 mg.  
  
 montelukast 10 mg tablet Commonly known as:  SINGULAIR  
take 1 tablet by mouth once daily  
  
 omeprazole 20 mg capsule Commonly known as:  PRILOSEC  
 Take 20 mg by mouth daily. TYLENOL EXTRA STRENGTH 500 mg tablet Generic drug:  acetaminophen Take 650 mg by mouth every six (6) hours as needed for Pain. Pt states they take medication QID. Follow-up Instructions Return in about 4 months (around 1/11/2019). To-Do List   
 Around 09/11/2018 Imaging:  NM CARDIAC MUGA REST AND STRESS   
  
 09/17/2018 1:30 PM  
  Appointment with Sue Edward PT at [facility] PT [address] ([phone])  
  
 09/25/2018 9:00 AM  
  Appointment with Roselia Dixon PT at [facility] PT [address] ([phone])  
  
 09/27/2018 9:30 AM  
  Appointment with Agnes Neal PT at [facility] PT [address] [place] ([phone]) 10/01/2018 10:00 AM  
  Appointment with Roselia Dixon PT at [facility] PT [address] [place] ([phone]) 10/03/2018 10:00 AM  
  Appointment with Roselia Dixon PT at [facility] PT [address] [place] ([phone]) 10/08/2018 10:30 AM  
  Appointment with Roselia Dixon PT at [facility] PT [address] [place] ([phone]) 10/10/2018 10:00 AM  
  Appointment with Roselia Dixon PT at [facility] PT [address] [place] ([phone]) 10/15/2018 10:00 AM  
  Appointment with Roselia Dixon PT at [facility] PT [address] [place] ([phone]) 10/17/2018 10:30 AM  
  Appointment with Roselia Dixon PT at [facility] PT [address] [place] ([phone]) 10/22/2018 10:00 AM  
  Appointment with Roselia Dixon PT at [facility] PT [address] [facility] ([phone]) 10/24/2018 10:00 AM  
  Appointment with Roselia Dixon PT at [facility] PT [address] [facility] ([phone]) 10/29/2018 10:30 AM  
  Appointment with Roselia Dixon PT at [facility] PT [address] [facility] ([phone]) 10/31/2018 10:00 AM  
  Appointment with Roselia Dixon PT at [facility] PT [address] [facility] ([phone]) Around 12/31/2018 Imaging:  CT CHEST WO CONT Patient Instructions Call for worsening shortness of breath or worsening unexplained chest discomfort [facility]! Dear Keke Fee: Thank you for requesting a [employer] account. Our records indicate that you already have an active [employer] account. You can access your account anytime at [email or web address]. [employer] Did you know that you can access your hospital and ER discharge instructions at any time in [employer]? You can also review all of your test results from your hospital stay or ER visit. Additional Information If you have questions, please visit the Frequently Asked Questions section of the [employer] website at [email or web address] [email or web address]. [employer]/. Remember, [employer] is NOT to be used for urgent needs. For medical emergencies, dial 911. Now available from your iPhone and Android! Please provide this summary of care documentation to your next provider. Your primary care clinician is listed as JAY JENKINS. If you have any questions after today's visit, please call [phone].

## 2018-09-11 NOTE — PROGRESS NOTES
The pt. C/o head/sinus pain, stuffiness and post nasal drip constantly. She also suffers much arthritic type pain. No recent ED visits.

## 2018-09-18 ENCOUNTER — HOSPITAL ENCOUNTER (OUTPATIENT)
Dept: NON INVASIVE DIAGNOSTICS | Age: 73
Discharge: HOME | End: 2018-09-18
Attending: INTERNAL MEDICINE
Payer: MEDICARE

## 2018-09-18 ENCOUNTER — HOSPITAL ENCOUNTER (OUTPATIENT)
Dept: CT IMAGING | Age: 73
Discharge: HOME | End: 2018-09-18
Attending: INTERNAL MEDICINE
Payer: MEDICARE

## 2018-09-18 VITALS
DIASTOLIC BLOOD PRESSURE: 76 MMHG | BODY MASS INDEX: 21.14 KG/M2 | WEIGHT: 112 LBS | SYSTOLIC BLOOD PRESSURE: 142 MMHG | HEIGHT: 61 IN

## 2018-09-18 DIAGNOSIS — R91.8 PULMONARY INFILTRATES: ICD-10-CM

## 2018-09-18 DIAGNOSIS — R07.9 CHEST PAIN, UNSPECIFIED TYPE: ICD-10-CM

## 2018-09-18 LAB
NUC REST EJECTION FRACTION: 91 %
STRESS BASELINE DIAS BP: 76 MMHG
STRESS BASELINE HR: 76 BPM
STRESS BASELINE SYS BP: 142 MMHG
STRESS ESTIMATED WORKLOAD: 1 METS
STRESS EXERCISE DUR MIN: NORMAL
STRESS PEAK DIAS BP: 72 MMHG
STRESS PEAK SYS BP: 147 MMHG
STRESS PERCENT HR ACHIEVED: 88 %
STRESS POST PEAK HR: 112 BPM
STRESS RATE PRESSURE PRODUCT: NORMAL BPM*MMHG
STRESS TARGET HR: 127 BPM
TID: 0.7

## 2018-09-18 PROCEDURE — 71250 CT THORAX DX C-: CPT

## 2018-09-18 PROCEDURE — 93017 CV STRESS TEST TRACING ONLY: CPT

## 2018-09-18 PROCEDURE — 74011250636 HC RX REV CODE- 250/636: Performed by: INTERNAL MEDICINE

## 2018-09-18 RX ORDER — SODIUM CHLORIDE 9 MG/ML
250 INJECTION, SOLUTION INTRAVENOUS ONCE
Status: COMPLETED | OUTPATIENT
Start: 2018-09-18 | End: 2018-09-18

## 2018-09-18 RX ADMIN — REGADENOSON 0.4 MG: 0.08 INJECTION, SOLUTION INTRAVENOUS at 10:40

## 2018-09-18 RX ADMIN — SODIUM CHLORIDE 250 ML: 900 INJECTION, SOLUTION INTRAVENOUS at 10:40

## 2018-09-18 NOTE — PROGRESS NOTES
Patient was given 10.91 mCi of Sestamibi for the Resting pictures. Patient received 0.4 mg of Lexiscan for the exercise portion of the Stress test. Patient was then given 33 mCi of Sestamibi for the Stress pictures. Armband was removed and disposed of before the patient left.

## 2018-09-21 ENCOUNTER — PATIENT MESSAGE (OUTPATIENT)
Dept: INTERNAL MEDICINE CLINIC | Age: 73
End: 2018-09-21

## 2018-09-21 DIAGNOSIS — R05.9 COUGH: ICD-10-CM

## 2018-09-21 DIAGNOSIS — J02.9 SORE THROAT: ICD-10-CM

## 2018-09-21 RX ORDER — MONTELUKAST SODIUM 10 MG/1
TABLET ORAL
Qty: 90 TAB | Refills: 2 | Status: SHIPPED | OUTPATIENT
Start: 2018-09-21 | End: 2019-06-21 | Stop reason: SDUPTHER

## 2018-09-21 NOTE — TELEPHONE ENCOUNTER
From: Andrés Moura  Sent: 9/21/2018 9:31 AM EDT  Subject: Prescription Question    Good Morning. This is Stevie Zhu. Birthday is [birth year]. I am going to be out of Montelukast Sod 10 mg with no refills left soon. Can I have another refill? Thank You.

## 2018-09-22 ENCOUNTER — HOSPITAL ENCOUNTER (EMERGENCY)
Age: 73
Discharge: HOME | End: 2018-09-22
Attending: EMERGENCY MEDICINE
Payer: MEDICARE

## 2018-09-22 ENCOUNTER — APPOINTMENT (OUTPATIENT)
Dept: GENERAL RADIOLOGY | Age: 73
End: 2018-09-22
Attending: PHYSICIAN ASSISTANT
Payer: MEDICARE

## 2018-09-22 ENCOUNTER — APPOINTMENT (OUTPATIENT)
Dept: CT IMAGING | Age: 73
End: 2018-09-22
Attending: PHYSICIAN ASSISTANT
Payer: MEDICARE

## 2018-09-22 VITALS
HEART RATE: 67 BPM | OXYGEN SATURATION: 100 % | TEMPERATURE: 98 F | SYSTOLIC BLOOD PRESSURE: 122 MMHG | RESPIRATION RATE: 22 BRPM | DIASTOLIC BLOOD PRESSURE: 58 MMHG

## 2018-09-22 DIAGNOSIS — R42 VERTIGO: ICD-10-CM

## 2018-09-22 DIAGNOSIS — F41.1 ANXIETY STATE: ICD-10-CM

## 2018-09-22 DIAGNOSIS — F41.8 SITUATIONAL ANXIETY: ICD-10-CM

## 2018-09-22 DIAGNOSIS — R42 DIZZINESS: Primary | ICD-10-CM

## 2018-09-22 DIAGNOSIS — R51.9 ACUTE NONINTRACTABLE HEADACHE, UNSPECIFIED HEADACHE TYPE: ICD-10-CM

## 2018-09-22 LAB
ALBUMIN SERPL-MCNC: 4.4 G/DL (ref 3.4–5)
ALBUMIN/GLOB SERPL: 1.3 {RATIO} (ref 0.8–1.7)
ALP SERPL-CCNC: 66 U/L (ref 45–117)
ALT SERPL-CCNC: 23 U/L (ref 13–56)
ANION GAP SERPL CALC-SCNC: 12 MMOL/L (ref 3–18)
APPEARANCE UR: CLEAR
AST SERPL-CCNC: 20 U/L (ref 15–37)
ATRIAL RATE: 91 BPM
BASOPHILS # BLD: 0.1 K/UL (ref 0–0.1)
BASOPHILS NFR BLD: 1 % (ref 0–2)
BILIRUB DIRECT SERPL-MCNC: 0.1 MG/DL (ref 0–0.2)
BILIRUB SERPL-MCNC: 0.5 MG/DL (ref 0.2–1)
BILIRUB UR QL: NEGATIVE
BUN SERPL-MCNC: 16 MG/DL (ref 7–18)
BUN/CREAT SERPL: 21 (ref 12–20)
CALCIUM SERPL-MCNC: 9.6 MG/DL (ref 8.5–10.1)
CALCULATED P AXIS, ECG09: 71 DEGREES
CALCULATED R AXIS, ECG10: 47 DEGREES
CALCULATED T AXIS, ECG11: 66 DEGREES
CHLORIDE SERPL-SCNC: 105 MMOL/L (ref 100–108)
CK MB CFR SERPL CALC: 1.6 % (ref 0–4)
CK MB CFR SERPL CALC: NORMAL % (ref 0–4)
CK MB SERPL-MCNC: 1.3 NG/ML (ref 5–25)
CK MB SERPL-MCNC: <1 NG/ML (ref 5–25)
CK SERPL-CCNC: 80 U/L (ref 26–192)
CK SERPL-CCNC: 91 U/L (ref 26–192)
CO2 SERPL-SCNC: 24 MMOL/L (ref 21–32)
COLOR UR: YELLOW
CREAT SERPL-MCNC: 0.75 MG/DL (ref 0.6–1.3)
DIAGNOSIS, 93000: NORMAL
DIFFERENTIAL METHOD BLD: ABNORMAL
EOSINOPHIL # BLD: 0.1 K/UL (ref 0–0.4)
EOSINOPHIL NFR BLD: 1 % (ref 0–5)
ERYTHROCYTE [DISTWIDTH] IN BLOOD BY AUTOMATED COUNT: 12.3 % (ref 11.6–14.5)
GLOBULIN SER CALC-MCNC: 3.3 G/DL (ref 2–4)
GLUCOSE SERPL-MCNC: 113 MG/DL (ref 74–99)
GLUCOSE UR STRIP.AUTO-MCNC: NEGATIVE MG/DL
HCT VFR BLD AUTO: 39 % (ref 35–45)
HGB BLD-MCNC: 13.3 G/DL (ref 12–16)
HGB UR QL STRIP: NEGATIVE
KETONES UR QL STRIP.AUTO: 15 MG/DL
LEUKOCYTE ESTERASE UR QL STRIP.AUTO: NEGATIVE
LIPASE SERPL-CCNC: 246 U/L (ref 73–393)
LYMPHOCYTES # BLD: 4.8 K/UL (ref 0.9–3.6)
LYMPHOCYTES NFR BLD: 42 % (ref 21–52)
MAGNESIUM SERPL-MCNC: 1.9 MG/DL (ref 1.6–2.6)
MCH RBC QN AUTO: 31.7 PG (ref 24–34)
MCHC RBC AUTO-ENTMCNC: 34.1 G/DL (ref 31–37)
MCV RBC AUTO: 92.9 FL (ref 74–97)
MONOCYTES # BLD: 0.9 K/UL (ref 0.05–1.2)
MONOCYTES NFR BLD: 8 % (ref 3–10)
NEUTS SEG # BLD: 5.4 K/UL (ref 1.8–8)
NEUTS SEG NFR BLD: 48 % (ref 40–73)
NITRITE UR QL STRIP.AUTO: NEGATIVE
P-R INTERVAL, ECG05: 150 MS
PH UR STRIP: 8.5 [PH] (ref 5–8)
PLATELET # BLD AUTO: 270 K/UL (ref 135–420)
PMV BLD AUTO: 9.2 FL (ref 9.2–11.8)
POTASSIUM SERPL-SCNC: 3.6 MMOL/L (ref 3.5–5.5)
PROT SERPL-MCNC: 7.7 G/DL (ref 6.4–8.2)
PROT UR STRIP-MCNC: NEGATIVE MG/DL
Q-T INTERVAL, ECG07: 394 MS
QRS DURATION, ECG06: 72 MS
QTC CALCULATION (BEZET), ECG08: 484 MS
RBC # BLD AUTO: 4.2 M/UL (ref 4.2–5.3)
SODIUM SERPL-SCNC: 141 MMOL/L (ref 136–145)
SP GR UR REFRACTOMETRY: 1.01 (ref 1–1.03)
TROPONIN I SERPL-MCNC: <0.02 NG/ML (ref 0–0.04)
TROPONIN I SERPL-MCNC: <0.02 NG/ML (ref 0–0.04)
UROBILINOGEN UR QL STRIP.AUTO: 0.2 EU/DL (ref 0.2–1)
VENTRICULAR RATE, ECG03: 91 BPM
WBC # BLD AUTO: 11.2 K/UL (ref 4.6–13.2)

## 2018-09-22 PROCEDURE — 81003 URINALYSIS AUTO W/O SCOPE: CPT | Performed by: PHYSICIAN ASSISTANT

## 2018-09-22 PROCEDURE — 80048 BASIC METABOLIC PNL TOTAL CA: CPT | Performed by: EMERGENCY MEDICINE

## 2018-09-22 PROCEDURE — 83690 ASSAY OF LIPASE: CPT | Performed by: PHYSICIAN ASSISTANT

## 2018-09-22 PROCEDURE — 96374 THER/PROPH/DIAG INJ IV PUSH: CPT

## 2018-09-22 PROCEDURE — 83735 ASSAY OF MAGNESIUM: CPT | Performed by: PHYSICIAN ASSISTANT

## 2018-09-22 PROCEDURE — 93005 ELECTROCARDIOGRAM TRACING: CPT

## 2018-09-22 PROCEDURE — 96375 TX/PRO/DX INJ NEW DRUG ADDON: CPT

## 2018-09-22 PROCEDURE — 85025 COMPLETE CBC W/AUTO DIFF WBC: CPT | Performed by: EMERGENCY MEDICINE

## 2018-09-22 PROCEDURE — 96376 TX/PRO/DX INJ SAME DRUG ADON: CPT

## 2018-09-22 PROCEDURE — 74011250636 HC RX REV CODE- 250/636: Performed by: PHYSICIAN ASSISTANT

## 2018-09-22 PROCEDURE — 70450 CT HEAD/BRAIN W/O DYE: CPT

## 2018-09-22 PROCEDURE — 82550 ASSAY OF CK (CPK): CPT | Performed by: PHYSICIAN ASSISTANT

## 2018-09-22 PROCEDURE — 71045 X-RAY EXAM CHEST 1 VIEW: CPT

## 2018-09-22 PROCEDURE — 74011250636 HC RX REV CODE- 250/636

## 2018-09-22 PROCEDURE — 99285 EMERGENCY DEPT VISIT HI MDM: CPT

## 2018-09-22 PROCEDURE — 80076 HEPATIC FUNCTION PANEL: CPT | Performed by: PHYSICIAN ASSISTANT

## 2018-09-22 RX ORDER — ONDANSETRON 2 MG/ML
4 INJECTION INTRAMUSCULAR; INTRAVENOUS
Status: COMPLETED | OUTPATIENT
Start: 2018-09-22 | End: 2018-09-22

## 2018-09-22 RX ORDER — LORAZEPAM 2 MG/ML
1 INJECTION INTRAMUSCULAR
Status: DISCONTINUED | OUTPATIENT
Start: 2018-09-22 | End: 2018-09-22

## 2018-09-22 RX ORDER — DIAZEPAM 10 MG/2ML
5 INJECTION INTRAMUSCULAR
Status: COMPLETED | OUTPATIENT
Start: 2018-09-22 | End: 2018-09-22

## 2018-09-22 RX ORDER — ONDANSETRON 2 MG/ML
INJECTION INTRAMUSCULAR; INTRAVENOUS
Status: COMPLETED
Start: 2018-09-22 | End: 2018-09-22

## 2018-09-22 RX ORDER — DIAZEPAM 2 MG/1
2 TABLET ORAL
Qty: 10 TAB | Refills: 0 | Status: SHIPPED | OUTPATIENT
Start: 2018-09-22 | End: 2019-03-08 | Stop reason: ALTCHOICE

## 2018-09-22 RX ORDER — MECLIZINE HCL 12.5 MG 12.5 MG/1
25 TABLET ORAL
Status: COMPLETED | OUTPATIENT
Start: 2018-09-22 | End: 2018-09-22

## 2018-09-22 RX ADMIN — ONDANSETRON 4 MG: 2 INJECTION INTRAMUSCULAR; INTRAVENOUS at 13:11

## 2018-09-22 RX ADMIN — Medication 5 MG: at 15:42

## 2018-09-22 RX ADMIN — ONDANSETRON 4 MG: 2 INJECTION INTRAMUSCULAR; INTRAVENOUS at 15:40

## 2018-09-22 RX ADMIN — MECLIZINE 25 MG: 12.5 TABLET ORAL at 15:43

## 2018-09-22 NOTE — ED PROVIDER NOTES
EMERGENCY DEPARTMENT HISTORY AND PHYSICAL EXAM    Date: 9/22/2018  Patient Name: Valentin Mcgee    History of Presenting Illness     Chief Complaint   Patient presents with    Dizziness         History Provided By: patient     Chief Complaint: dizziness  Duration: 3-4 hrs   Timing:acute  Location: n/a  Quality:sensation of the room spinning   Severity: severe  Modifying Factors: worse with sudden movements  Associated Symptoms: N/V, anxiety, tremors, weakness, headache       Additional History (Context): Valentin Mcgee is a 72 y.o. female with PMH stroke, chronic pain, and hypercholesterolemia who presents with complaints of dizziness described as \"feeling like the room is spinning\" N/V, headache, weakness, fatigue, increased anxiety, and tremors that began 3-4 hrs PTA while out shopping. Pt states she has been told she has vertigo by her PCP, and subsequently went home to take her meclizine. She denies any relief after taking this medication. Pt has had a stroke over a yr ago with mild residual R sided weakness. She denies abd pain, urinary sx, fever, and chills. No other complaints at this time.      PCP: Cheryl Isabel NP    Current Facility-Administered Medications   Medication Dose Route Frequency Provider Last Rate Last Dose    ondansetron [facility]) injection 4 mg  4 mg IntraVENous NOW April CAROLINE Siu PA-C        LORazepam (ATIVAN) injection 1 mg  1 mg IntraVENous NOW April CAROLINE Siu PA-C         Current Outpatient Prescriptions   Medication Sig Dispense Refill    montelukast (SINGULAIR) 10 mg tablet take 1 tablet by mouth once daily 90 Tab 2    diazePAM (VALIUM) 5 mg tablet Take 1 tablet by mouth 60 minutes prior to the test.  If anxious 30 minutes after  taking the first tablet take an addition tablet by mouth 2 Tab 0    fluticasone (FLONASE) 50 mcg/actuation nasal spray INSTILL 2 SPRAYS IN EACH NOSTRIL DAILY 16 g 1    alendronate (FOSAMAX) 10 mg tablet Take 1 Tab by mouth Daily (before breakfast). 30 Tab 2    atorvastatin (LIPITOR) 20 mg tablet Take 1 Tab by mouth daily. 90 Tab 3    busPIRone (BUSPAR) 7.5 mg tablet take 1 tablet by mouth twice a day 60 Tab 1    acetaminophen (TYLENOL EXTRA STRENGTH) 500 mg tablet Take 650 mg by mouth every six (6) hours as needed for Pain. Pt states they take medication QID.  loratadine (CLARITIN) 10 mg tablet Take 10 mg by mouth daily as needed for Allergies or Rhinitis.  omeprazole (PRILOSEC) 20 mg capsule Take 20 mg by mouth daily.  LORazepam (ATIVAN) 0.5 mg tablet Take 1 Tab by mouth every four (4) hours as needed for Anxiety. Max Daily Amount: 3 mg. 30 Tab 0    aspirin 81 mg chewable tablet Take 1 Tab by mouth daily. 30 Tab 3       Past History     Past Medical History:  Past Medical History:   Diagnosis Date    Abnormal Pap smear     Dr. Tramaine Keys Allergic rhinitis     Arthritis     Chronic pain     Hypercholesterolemia     Neck pain 5/17/2010    Stroke ([address] 10/02/2017    TIA- legs weak memory issues       Past Surgical History:  Past Surgical History:   Procedure Laterality Date    COLONOSCOPY N/A 6/4/2018    COLONOSCOPY / polypectomy performed by Patience Guzman MD at [facility] ENDOSCOPY    HX GYN      Total Hyst    HX LAP CHOLECYSTECTOMY      HX OTHER SURGICAL  2017    Throat widened       Family History:  Family History   Problem Relation Age of Onset    Cancer Mother      breast    Heart Disease Father        Social History:  Social History   Substance Use Topics    Smoking status: Never Smoker    Smokeless tobacco: Never Used    Alcohol use No       Allergies: Allergies   Allergen Reactions    Celebrex [Celecoxib] Other (comments)     Tiredness          Review of Systems   Review of Systems   Constitutional: Positive for fatigue. Negative for chills and fever. HENT: Negative. Negative for congestion, ear pain and rhinorrhea. Eyes: Negative. Negative for pain and redness. Respiratory: Negative.   Negative for cough, shortness of breath, wheezing and stridor. Cardiovascular: Negative. Negative for chest pain and leg swelling. Gastrointestinal: Positive for nausea and vomiting. Negative for abdominal pain, constipation and diarrhea. Genitourinary: Negative. Negative for dysuria and frequency. Musculoskeletal: Negative. Negative for back pain and neck pain. Skin: Negative. Negative for rash and wound. Neurological: Positive for dizziness, tremors, weakness and headaches. Negative for seizures, syncope and numbness. Psychiatric/Behavioral: The patient is nervous/anxious. All other systems reviewed and are negative. All Other Systems Negative  Physical Exam     Vitals:    09/22/18 1315 09/22/18 1316 09/22/18 1330 09/22/18 1445   BP: 135/65  123/58 136/60   Pulse: 77  74 85   Resp: 17  12 (!) 41   Temp:       SpO2: 100% 100% 95% 100%     Physical Exam   Constitutional: She is oriented to person, place, and time. She appears well-developed and well-nourished. She appears distressed. HENT:   Head: Normocephalic and atraumatic. Eyes: Conjunctivae and EOM are normal. Pupils are equal, round, and reactive to light. Right eye exhibits no discharge. Left eye exhibits no discharge. No scleral icterus. Neck: Normal range of motion. Neck supple. ROM intact, no midline TTP noted along the spine, no evidence of radiculopathy    Cardiovascular: Normal rate, regular rhythm and normal heart sounds. Exam reveals no gallop and no friction rub. No murmur heard. Pulmonary/Chest: Effort normal and breath sounds normal. No stridor. No respiratory distress. She has no wheezes. She has no rales. Abdominal: Soft. Bowel sounds are normal. She exhibits no distension. There is no tenderness. There is no guarding. Musculoskeletal: Normal range of motion. She exhibits no edema, tenderness or deformity. Neurological: She is alert and oriented to person, place, and time. Coordination normal.   Gait is steady.  Able to ambulate without difficulty.  strength 4/5 bilaterally, flexor strength in the RUE 3/5; LUE 4/5. Flexor strength in the RLE 3/5, LLE 4/5. No facial droop noted. Intact and full sensation noted bilaterally. Pt exhibits a slight resting tremor. Skin: Skin is warm and dry. No rash noted. She is not diaphoretic. No erythema. Psychiatric: She has a normal mood and affect. Her behavior is normal. Thought content normal.   Nursing note and vitals reviewed. Diagnostic Study Results     Labs -     Recent Results (from the past 12 hour(s))   EKG, 12 LEAD, INITIAL    Collection Time: 09/22/18  1:04 PM   Result Value Ref Range    Ventricular Rate 91 BPM    Atrial Rate 91 BPM    P-R Interval 150 ms    QRS Duration 72 ms    Q-T Interval 394 ms    QTC Calculation (Bezet) 484 ms    Calculated P Axis 71 degrees    Calculated R Axis 47 degrees    Calculated T Axis 66 degrees    Diagnosis       Sinus rhythm  Possible Left atrial enlargement  Borderline ECG  When compared with ECG of 04-JAN-2018 12:44,  No significant change was found    Confirmed by Lazara Mcgraw ([id number]) on 9/22/2018 8:29:45 PM     METABOLIC PANEL, BASIC    Collection Time: 09/22/18  1:05 PM   Result Value Ref Range    Sodium 141 136 - 145 mmol/L    Potassium 3.6 3.5 - 5.5 mmol/L    Chloride 105 100 - 108 mmol/L    CO2 24 21 - 32 mmol/L    Anion gap 12 3.0 - 18 mmol/L    Glucose 113 (H) 74 - 99 mg/dL    BUN 16 7.0 - 18 MG/DL    Creatinine 0.75 0.6 - 1.3 MG/DL    BUN/Creatinine ratio 21 (H) 12 - 20      GFR est AA >60 >60 ml/min/1.73m2    GFR est non-AA >60 >60 ml/min/1.73m2    Calcium 9.6 8.5 - 10.1 MG/DL   CBC WITH AUTOMATED DIFF    Collection Time: 09/22/18  1:05 PM   Result Value Ref Range    WBC 11.2 4.6 - 13.2 K/uL    RBC 4.20 4. 20 - 5.30 M/uL    HGB 13.3 12.0 - 16.0 g/dL    HCT 39.0 35.0 - 45.0 %    MCV 92.9 74.0 - 97.0 FL    MCH 31.7 24.0 - 34.0 PG    MCHC 34.1 31.0 - 37.0 g/dL    RDW 12.3 11.6 - 14.5 %    PLATELET 882 701 - 295 K/uL MPV 9.2 9.2 - 11.8 FL    NEUTROPHILS 48 40 - 73 %    LYMPHOCYTES 42 21 - 52 %    MONOCYTES 8 3 - 10 %    EOSINOPHILS 1 0 - 5 %    BASOPHILS 1 0 - 2 %    ABS. NEUTROPHILS 5.4 1.8 - 8.0 K/UL    ABS. LYMPHOCYTES 4.8 (H) 0.9 - 3.6 K/UL    ABS. MONOCYTES 0.9 0.05 - 1.2 K/UL    ABS. EOSINOPHILS 0.1 0.0 - 0.4 K/UL    ABS. BASOPHILS 0.1 0.0 - 0.1 K/UL    DF AUTOMATED     HEPATIC FUNCTION PANEL    Collection Time: 09/22/18  1:05 PM   Result Value Ref Range    Protein, total 7.7 6.4 - 8.2 g/dL    Albumin 4.4 3.4 - 5.0 g/dL    Globulin 3.3 2.0 - 4.0 g/dL    A-G Ratio 1.3 0.8 - 1.7      Bilirubin, total 0.5 0.2 - 1.0 MG/DL    Bilirubin, direct 0.1 0.0 - 0.2 MG/DL    Alk. phosphatase 66 45 - 117 U/L    AST (SGOT) 20 15 - 37 U/L    ALT (SGPT) 23 13 - 56 U/L   MAGNESIUM    Collection Time: 09/22/18  1:05 PM   Result Value Ref Range    Magnesium 1.9 1.6 - 2.6 mg/dL   CARDIAC PANEL,(CK, CKMB & TROPONIN)    Collection Time: 09/22/18  1:05 PM   Result Value Ref Range    CK 80 26 - 192 U/L    CK - MB 1.3 <3.6 ng/ml    CK-MB Index 1.6 0.0 - 4.0 %    Troponin-I, Qt. <0.02 0.0 - 0.045 NG/ML   LIPASE    Collection Time: 09/22/18  1:05 PM   Result Value Ref Range    Lipase 246 73 - 393 U/L       Radiologic Studies -   CT HEAD WO CONT   Final Result      XR CHEST PORT    (Results Pending)     CT Results  (Last 48 hours)               09/22/18 1411  CT HEAD WO CONT Final result    Impression:  IMPRESSION:        No acute intracranial abnormality. No significant interval change. Narrative:  INDICATION: Dizziness       COMPARISON: CT head 1/18       TECHNIQUE: Unenhanced CT of the head was performed using 5 mm images. Brain and   bone windows were generated.  All CT scans at this facility are performed using   doze optimization technique as appropriate to a performed exam, to include   automated exposure control, adjustment of the mA and/or KV according to patient   size (including appropriate matching for site specific examinations), or use of   iterative reconstruction technique. FINDINGS:       Similar minimal global volume loss. Chronic small vessel ischemic changes. Old   lacunar infarcts in the basal ganglia. There is no intracranial hemorrhage,   extra-axial collection, mass, mass effect or midline shift. The basilar   cisterns are open. No acute infarct is identified. The bone windows demonstrate   no acute abnormalities. The visualized portions of the paranasal sinuses and   mastoid air cells are clear. Arthrosclerosis. CXR Results  (Last 48 hours)    None            Medical Decision Making   I am the first provider for this patient. I reviewed the vital signs, available nursing notes, past medical history, past surgical history, family history and social history. Vital Signs-Reviewed the patient's vital signs. Records Reviewed: Shanae Siu PA-C     Procedures:  Procedures    Provider Notes (Medical Decision Making): Impression:  Dizziness, headache, anxiety, vertigo    3:00 PM code S called as pt has developed new R sided weakness, she cannot lift her R arm. Dr. Samantha Macias made aware    3:04 PM Spoke with Dr. Francisco Harris and discussed the case, he agrees to evaluate the pt. Patronus is at bedside. Jett Mondragon PA-C     3:10 PM Dr. Dell Osborne has seen and examined the pt. He does not feel that her sx are representative of an acute stroke. CT scan is normal. Recommendation is to give the pt another dose of meclizine along with valium. This medication has been ordered and I have discussed this with the pt. Will re-evaluate the pt and also have Dr. Samantha Macias, ED attending evaluate the pt while in the ED. Shanae Siu PA-C     4:41 PM Pt re-examined. Sx significantly improved with valium and meclizine. PT denies dizziness and vertigo sx at present. Will repeat second set of cardiac enzymes at this time. Shanae Siu PA-C     5:43 PM repeat cardiac panel negative. Pt sx have greatly improved.  She has been examined by Dr. Gigi Gonzalez who agrees she is stable for d/c with ENT and neurology follow-up. Shanae Siu PA-C     MED RECONCILIATION:  Current Facility-Administered Medications   Medication Dose Route Frequency    ondansetron (ZOFRAN) injection 4 mg  4 mg IntraVENous NOW    LORazepam (ATIVAN) injection 1 mg  1 mg IntraVENous NOW     Current Outpatient Prescriptions   Medication Sig    montelukast (SINGULAIR) 10 mg tablet take 1 tablet by mouth once daily    diazePAM (VALIUM) 5 mg tablet Take 1 tablet by mouth 60 minutes prior to the test.  If anxious 30 minutes after  taking the first tablet take an addition tablet by mouth    fluticasone (FLONASE) 50 mcg/actuation nasal spray INSTILL 2 SPRAYS IN EACH NOSTRIL DAILY    alendronate (FOSAMAX) 10 mg tablet Take 1 Tab by mouth Daily (before breakfast).  atorvastatin (LIPITOR) 20 mg tablet Take 1 Tab by mouth daily.  busPIRone (BUSPAR) 7.5 mg tablet take 1 tablet by mouth twice a day    acetaminophen (TYLENOL EXTRA STRENGTH) 500 mg tablet Take 650 mg by mouth every six (6) hours as needed for Pain. Pt states they take medication QID.  loratadine (CLARITIN) 10 mg tablet Take 10 mg by mouth daily as needed for Allergies or Rhinitis.  omeprazole (PRILOSEC) 20 mg capsule Take 20 mg by mouth daily.  LORazepam (ATIVAN) 0.5 mg tablet Take 1 Tab by mouth every four (4) hours as needed for Anxiety. Max Daily Amount: 3 mg.  aspirin 81 mg chewable tablet Take 1 Tab by mouth daily. Disposition:  D/c    DISCHARGE NOTE:     Pt has been reexamined. Patient has no new complaints, changes, or physical findings. Care plan outlined and precautions discussed. Results of the labs and imaging were reviewed with the patient. All medications were reviewed with the patient; will d/c home with short course of valium. Pt has meclizine at home. All of pt's questions and concerns were addressed.  Patient was instructed and agrees to follow up with neurology and ENT, as well as to return to the ED upon further deterioration. Patient is ready to go home. Shanae Siu PA-C 5:43 PM       Diagnosis     Clinical Impression: dizziness, headache, anxiety, vertigo

## 2018-09-22 NOTE — DISCHARGE INSTRUCTIONS
Anxiety Disorder: Care Instructions  Your Care Instructions    Anxiety is a normal reaction to stress. Difficult situations can cause you to have symptoms such as sweaty palms and a nervous feeling. In an anxiety disorder, the symptoms are far more severe. Constant worry, muscle tension, trouble sleeping, nausea and diarrhea, and other symptoms can make normal daily activities difficult or impossible. These symptoms may occur for no reason, and they can affect your work, school, or social life. Medicines, counseling, and self-care can all help. Follow-up care is a key part of your treatment and safety. Be sure to make and go to all appointments, and call your doctor if you are having problems. It's also a good idea to know your test results and keep a list of the medicines you take. How can you care for yourself at home? · Take medicines exactly as directed. Call your doctor if you think you are having a problem with your medicine. · Go to your counseling sessions and follow-up appointments. · Recognize and accept your anxiety. Then, when you are in a situation that makes you anxious, say to yourself, \"This is not an emergency. I feel uncomfortable, but I am not in danger. I can keep going even if I feel anxious. \"  · Be kind to your body:  ¨ Relieve tension with exercise or a massage. ¨ Get enough rest.  ¨ Avoid alcohol, caffeine, nicotine, and illegal drugs. They can increase your anxiety level and cause sleep problems. ¨ Learn and do relaxation techniques. See below for more about these techniques. · Engage your mind. Get out and do something you enjoy. Go to a funny movie, or take a walk or hike. Plan your day. Having too much or too little to do can make you anxious. · Keep a record of your symptoms. Discuss your fears with a good friend or family member, or join a support group for people with similar problems. Talking to others sometimes relieves stress.   · Get involved in social groups, or volunteer to help others. Being alone sometimes makes things seem worse than they are. · Get at least 30 minutes of exercise on most days of the week to relieve stress. Walking is a good choice. You also may want to do other activities, such as running, swimming, cycling, or playing tennis or team sports. Relaxation techniques  Do relaxation exercises 10 to 20 minutes a day. You can play soothing, relaxing music while you do them, if you wish. · Tell others in your house that you are going to do your relaxation exercises. Ask them not to disturb you. · Find a comfortable place, away from all distractions and noise. · Lie down on your back, or sit with your back straight. · Focus on your breathing. Make it slow and steady. · Breathe in through your nose. Breathe out through either your nose or mouth. · Breathe deeply, filling up the area between your navel and your rib cage. Breathe so that your belly goes up and down. · Do not hold your breath. · Breathe like this for 5 to 10 minutes. Notice the feeling of calmness throughout your whole body. As you continue to breathe slowly and deeply, relax by doing the following for another 5 to 10 minutes:  · Tighten and relax each muscle group in your body. You can begin at your toes and work your way up to your head. · Imagine your muscle groups relaxing and becoming heavy. · Empty your mind of all thoughts. · Let yourself relax more and more deeply. · Become aware of the state of calmness that surrounds you. · When your relaxation time is over, you can bring yourself back to alertness by moving your fingers and toes and then your hands and feet and then stretching and moving your entire body. Sometimes people fall asleep during relaxation, but they usually wake up shortly afterward. · Always give yourself time to return to full alertness before you drive a car or do anything that might cause an accident if you are not fully alert.  Never play a relaxation tape while you drive a car. When should you call for help? Call 911 anytime you think you may need emergency care. For example, call if:    · You feel you cannot stop from hurting yourself or someone else.   [place] the numbers for these national suicide hotlines: 6-299-886-TALK (0-684.747.7357) and 1-699-HHIIHGC (3-277.413.4857). If you or someone you know talks about suicide or feeling hopeless, get help right away.   Watch closely for changes in your health, and be sure to contact your doctor if:    · You have anxiety or fear that affects your life.     · You have symptoms of anxiety that are new or different from those you had before. Where can you learn more? Go to http://karin-michaela.info/. Enter P754 in the search box to learn more about \"Anxiety Disorder: Care Instructions. \"  Current as of: December 7, 2017  Content Version: 11.7  © 6557-0082 [employer]. Care instructions adapted under license by [employer] (which disclaims liability or warranty for this information). If you have questions about a medical condition or this instruction, always ask your healthcare professional. [address] any warranty or liability for your use of this information. Dizziness: Care Instructions  Your Care Instructions  Dizziness is the feeling of unsteadiness or fuzziness in your head. It is different than having vertigo, which is a feeling that the room is spinning or that you are moving or falling. It is also different from lightheadedness, which is the feeling that you are about to faint. It can be hard to know what causes dizziness. Some people feel dizzy when they have migraine headaches. Sometimes bouts of flu can make you feel dizzy. Some medical conditions, such as heart problems or high blood pressure, can make you feel dizzy. Many medicines can cause dizziness, including medicines for high blood pressure, pain, or anxiety.   If a medicine causes your symptoms, your doctor may recommend that you stop or change the medicine. If it is a problem with your heart, you may need medicine to help your heart work better. If there is no clear reason for your symptoms, your doctor may suggest watching and waiting for a while to see if the dizziness goes away on its own. Follow-up care is a key part of your treatment and safety. Be sure to make and go to all appointments, and call your doctor if you are having problems. It's also a good idea to know your test results and keep a list of the medicines you take. How can you care for yourself at home? · If your doctor recommends or prescribes medicine, take it exactly as directed. Call your doctor if you think you are having a problem with your medicine. · Do not drive while you feel dizzy. · Try to prevent falls. Steps you can take include:  ¨ Using nonskid mats, adding grab bars near the tub, and using night-lights. ¨ Clearing your home so that walkways are free of anything you might trip on. ¨ Letting family and friends know that you have been feeling dizzy. This will help them know how to help you. When should you call for help? Call 911 anytime you think you may need emergency care. For example, call if:    · You passed out (lost consciousness).     · You have dizziness along with symptoms of a heart attack. These may include:  ¨ Chest pain or pressure, or a strange feeling in the chest.  ¨ Sweating. ¨ Shortness of breath. ¨ Nausea or vomiting. ¨ Pain, pressure, or a strange feeling in the back, neck, jaw, or upper belly or in one or both shoulders or arms. ¨ Lightheadedness or sudden weakness. ¨ A fast or irregular heartbeat.     · You have symptoms of a stroke. These may include:  ¨ Sudden numbness, tingling, weakness, or loss of movement in your face, arm, or leg, especially on only one side of your body. ¨ Sudden vision changes. ¨ Sudden trouble speaking.   ¨ Sudden confusion or trouble understanding simple statements. ¨ Sudden problems with walking or balance. ¨ A sudden, severe headache that is different from past headaches.    Call your doctor now or seek immediate medical care if:    · You feel dizzy and have a fever, headache, or ringing in your ears.     · You have new or increased nausea and vomiting.     · Your dizziness does not go away or comes back.    Watch closely for changes in your health, and be sure to contact your doctor if:    · You do not get better as expected. Where can you learn more? Go to http://karin-michaela.info/. Enter K171 in the search box to learn more about \"Dizziness: Care Instructions. \"  Current as of: November 20, 2017  Content Version: 11.7  © 6751-0180 [employer]. Care instructions adapted under license by [employer] (which disclaims liability or warranty for this information). If you have questions about a medical condition or this instruction, always ask your healthcare professional. [place] [address] any warranty or liability for your use of this information. Kite Activation    Thank you for requesting access to Kite. Please follow the instructions below to securely access and download your online medical record. Kite allows you to send messages to your doctor, view your test results, renew your prescriptions, schedule appointments, and more. How Do I Sign Up? 1. In your internet browser, go to [email or web address]  2. Click on the First Time User? Click Here link in the Sign In box. You will be redirect to the New Member Sign Up page. 3. Enter your Kite Access Code exactly as it appears below. You will not need to use this code after youve completed the sign-up process. If you do not sign up before the expiration date, you must request a new code. Kite Access Code:  Activation code not generated  Current Kite Status: Active (This is the date your Kite access code will )    4. Enter the last four digits of your Social Security Number (xxxx) and Date of Birth (mm/dd/yyyy) as indicated and click Submit. You will be taken to the next sign-up page. 5. Create a OpinewsTV ID. This will be your Opinews[employer] login ID and cannot be changed, so think of one that is secure and easy to remember. 6. Create a Op[employer] password. You can change your password at any time. 7. Enter your Password Reset Question and Answer. This can be used at a later time if you forget your password. 8. Enter your e-mail address. You will receive e-mail notification when new information is available in [address] 9. Click Sign Up. You can now view and download portions of your medical record. 10. Click the Download Summary menu link to download a portable copy of your medical information. Additional Information    If you have questions, please visit the Frequently Asked Questions section of the [employer]ws[employer] website at [email or web address] Remember, OpinewsTV is NOT to be used for urgent needs. For medical emergencies, dial 911.

## 2018-09-22 NOTE — Clinical Note
Complete all medications as prescribed. Follow-up with neurology and ENT in 1 week. Return to the ED immediately for any new or worsening symptoms.

## 2018-09-22 NOTE — ED TRIAGE NOTES
The patient presents for evaluation of dizziness, nausea, and vomiting that began prior to arrival.  The patient has a history of vertigo and admits to taking Meclizine one half hour prior to arrival to the ED.

## 2018-09-25 ENCOUNTER — APPOINTMENT (OUTPATIENT)
Dept: PHYSICAL THERAPY | Age: 73
End: 2018-09-25
Payer: MEDICARE

## 2018-09-27 ENCOUNTER — APPOINTMENT (OUTPATIENT)
Dept: PHYSICAL THERAPY | Age: 73
End: 2018-09-27
Payer: MEDICARE

## 2018-09-28 ENCOUNTER — HOSPITAL ENCOUNTER (OUTPATIENT)
Dept: PHYSICAL THERAPY | Age: 73
Discharge: HOME | End: 2018-09-28
Payer: MEDICARE

## 2018-09-28 PROCEDURE — 97140 MANUAL THERAPY 1/> REGIONS: CPT

## 2018-09-28 PROCEDURE — 97110 THERAPEUTIC EXERCISES: CPT

## 2018-09-28 NOTE — PROGRESS NOTES
PT DAILY TREATMENT NOTE - [facility]     Patient Name: Evan Wakefield  Date:2018  : 1945  [x]  Patient  Verified  Payor: [place] MED[place]ARE / Plan: [place] MEDICARE PART A & B / Product Type: Medicare /    In time:1024  Out time:1108  Total Treatment Time (min): 44  Total Timed Codes (min): 44  1:1 Treatment Time (MC only): 40   Visit #: 3 of 12    Treatment Area: Dizziness [R42]    SUBJECTIVE  Pain Level (0-10 scale): 2/10  Any medication changes, allergies to medications, adverse drug reactions, diagnosis change, or new procedure performed?: [x] No    [] Yes (see summary sheet for update)  Subjective functional status/changes:   [] No changes reported  Reported she had an episode of dizziness leaving [employer]. She drove home and her son called 48 and did not get an answer. He drove her to the ER and was given meclazine and threw up more after taking meclazine. She will  follow up with Neurologist and an ENT. OBJECTIVE      30 min Therapeutic Exercise:  [] See flow sheet :   Rationale: increase ROM and increase strength to improve the patients ability to ease with ADL's and educate on vestibular ex's. 14 min Manual Therapy:  []  See flow sheet : STM and TPR to multiple trigger points to UT bilaterally. Rationale: dec dizziness  to improve the patients ability to ease with ADL's           With   [] TE   [] TA   [] neuro   [] other: Patient Education: [x] Review HEP    [] Progressed/Changed HEP based on:   [] positioning   [] body mechanics   [] transfers   [] heat/ice application    [] other:      Other Objective/Functional Measures: Trial of Jaynee Piggs with demonstration. Pt reported no dizziness to the left or to the right. Reports continuous neck pain . Moderate tightness and trigger points to UT bilaterally. Pain Level (0-10 scale) post treatment: 0/10    ASSESSMENT/Changes in Function: Pt to perform Jaynee Piggs 1x day for 5 reps.  Pt reports she likes to shop and it's after shopping she gets sx most likely  Sx are consistent with Visual dependence and cervicogenic dizziness with intermittent Episodes of BPPV. Patient will continue to benefit from skilled PT services to modify and progress therapeutic interventions, address functional mobility deficits, address ROM deficits, address strength deficits, analyze and address soft tissue restrictions, analyze and cue movement patterns, analyze and modify body mechanics/ergonomics, assess and modify postural abnormalities and address imbalance/dizziness to attain remaining goals. []  See Plan of Care  [x]  See progress note/recertification  []  See Discharge Summary         Progress towards goals / Updated goals:  Short Term Goals: To be accomplished in 1 weeks:                        1. Pt will be compliant with a HEP to improve with balance and dizziness. met                        6. Pt will perform SOT to determine Sensory Input to improve Vestibular function. Met: 43 points (9/17/18)     Long Term Goals: To be accomplished in 6 weeks:                        1. Pt will increase FOTO score by 3  pts to improve with balance and mobility.                         7. Pt will decrease DHI by 10 pts to improve balance and ADL function.                         3. Pt will report sx improvement by >70% to be able to return to PLOF and improve QOL.        PLAN  []  Upgrade activities as tolerated     [x]  Continue plan of care  []  Update interventions per flow sheet       []  Discharge due to:_  []  Other:_      Rey Manning PT 9/28/2018  11:00 AM    Future Appointments  Date Time Provider Gaby Delarosa   10/1/2018 10:00 AM Rey Manning PT [id number] [facility]   10/1/2018 2:15 PM Madelaine Villarreal,  [address] [place]    10/3/2018 10:00 AM Rey Manning PT [id number] [facility]   10/8/2018 10:30 AM Rey Manning, PT [id number] [facility]   10/10/2018 10:00 AM Rey Manning, PT [id number] [facility]   10/15/2018 10:00 AM Olga Sheets, DINO [id number] [facility]   10/17/2018 10:30 AM Rey Manning, PT [id number] [facility]   10/22/2018 10:00 AM Olga Pelaez, PT [id number] [facility]   10/24/2018 10:00 AM Ratna Wiseman, PT [id number] [facility]   10/29/2018 10:30 AM Olga Pelaez, PT TABIJCT [facility]   10/30/2018 11:30 AM Amilcar Kelly  [address]   10/31/2018 10:00 AM Gurdeep Esposito, PT [id number] [facility]   1/15/2019 8:30 AM Moe Peck  [address] [place]

## 2018-10-01 ENCOUNTER — HOSPITAL ENCOUNTER (OUTPATIENT)
Dept: PHYSICAL THERAPY | Age: 73
Discharge: HOME | End: 2018-10-01
Payer: MEDICARE

## 2018-10-01 ENCOUNTER — OFFICE VISIT (OUTPATIENT)
Dept: NEUROLOGY | Age: 73
End: 2018-10-01

## 2018-10-01 VITALS
OXYGEN SATURATION: 99 % | DIASTOLIC BLOOD PRESSURE: 60 MMHG | HEIGHT: 61 IN | WEIGHT: 112 LBS | BODY MASS INDEX: 21.14 KG/M2 | SYSTOLIC BLOOD PRESSURE: 124 MMHG | HEART RATE: 75 BPM | TEMPERATURE: 98.6 F | RESPIRATION RATE: 18 BRPM

## 2018-10-01 DIAGNOSIS — R42 DIZZY: Primary | ICD-10-CM

## 2018-10-01 PROCEDURE — 97110 THERAPEUTIC EXERCISES: CPT

## 2018-10-01 RX ORDER — LORAZEPAM 0.5 MG/1
0.5 TABLET ORAL
COMMUNITY
End: 2019-03-27

## 2018-10-01 NOTE — PROGRESS NOTES
Patient is here to see MCKENNA Ramírez for dizziness.     PHQ over the last two weeks 10/1/2018   PHQ Not Done -   Little interest or pleasure in doing things Not at all   Feeling down, depressed, irritable, or hopeless Not at all   Total Score PHQ 2 0

## 2018-10-01 NOTE — PROGRESS NOTES
[facility]  [address], [address], [place], [address] [address] [facility]. Mike Simpson, [address]  Office:  [phone]  Fax: [phone]  Chief Complaint   Patient presents with    Dizziness       This is a 74-year-old female presents for new patient evaluation with chief complaint of dizziness. Patient presented to [facility] emergency room with complaints of dizziness described as \"feeling like the room is spinning. \"  This was intermittent. Associated with nausea and vomiting. She also mentions headache. She was concerned she was having a stroke. She said she drove home from shopping. Previous history of similar dizziness and was given meclizine by her primary care provider. She said she does not like to take this medication. She endorses ringing in the ears, but denies hearing loss. She endorses blurred vision. She thinks she felt her heart racing. Denies chest pain. Today denies overt dizziness or headache. In the ED she had a negative head CT. She was diagnosed with vertigo. She was discharged home with valium. She says she has not had to take this yet. She had started vestibular therapy. She has an upcoming appointment with ENT. Denies positional related dizziness. Patient was seen in office one year ago for TIA. She is maintained on Lipitor and Aspirin. She denies history of seizures. Denies waking up on the floor and sure how she got there. Denies losing her water or biting her tongue in the middle the night. She mentions history of neck pain in which she was seen by Dr. Moya Doing at pain management for cervical injections. She says this has helped her with her headaches in the past.  She wonders if this dizziness is attributed to her not having the injections. She says that she has tried to contact pain management and was unsuccessful in scheduling injections of recent.   She mentions needing to follow-up with pulmonology for some shortness of breath. She said she sees Dr. Wing Simental. No other complaints at this time. Past Medical History:   Diagnosis Date    Abnormal Pap smear     Dr. Ifeoma Mccormick Allergic rhinitis     Arthritis     Chronic pain     Hypercholesterolemia     Neck pain 5/17/2010    Stroke ([address] 10/02/2017    TIA- legs weak memory issues       Past Surgical History:   Procedure Laterality Date    COLONOSCOPY N/A 6/4/2018    COLONOSCOPY / polypectomy [facility] by Donny [facility]za [facility] ENDOSCOPY    HX GYN      Total Hyst    HX LAP CHOLECYSTECTOMY      HX OTHER SURGICAL  2017    Throat widened       Current Outpatient Prescriptions   Medication Sig Dispense Refill    LORazepam (ATIVAN) 0.5 mg tablet Take 0.5 mg by mouth as needed for Anxiety.  diazePAM (VALIUM) 2 mg tablet Take 1 Tab by mouth every eight (8) hours as needed for Anxiety. Max Daily Amount: 6 mg. 10 Tab 0    montelukast (SINGULAIR) 10 mg tablet take 1 tablet by mouth once daily 90 Tab 2    fluticasone (FLONASE) 50 mcg/actuation nasal spray INSTILL 2 SPRAYS IN EACH NOSTRIL DAILY 16 g 1    alendronate (FOSAMAX) 10 mg tablet Take 1 Tab by mouth Daily (before breakfast). 30 Tab 2    atorvastatin (LIPITOR) 20 mg tablet Take 1 Tab by mouth daily. 90 Tab 3    busPIRone (BUSPAR) 7.5 mg tablet take 1 tablet by mouth twice a day 60 Tab 1    omeprazole (PRILOSEC) 20 mg capsule Take 20 mg by mouth daily.  aspirin 81 mg chewable tablet Take 1 Tab by mouth daily. 30 Tab 3    acetaminophen (TYLENOL EXTRA STRENGTH) 500 mg tablet Take 650 mg by mouth every six (6) hours as needed for Pain. Pt states they take medication QID.  loratadine (CLARITIN) 10 mg tablet Take 10 mg by mouth daily as needed for Allergies or Rhinitis.           Allergies   Allergen Reactions    Celebrex [Celecoxib] Other (comments)     Tiredness        Social History   Substance Use Topics    Smoking status: Never Smoker    Smokeless tobacco: Never Used    Alcohol use No       Family History   Problem Relation Age of Onset    Cancer Mother      breast    Heart Disease Father        Review of Systems  GENERAL: Denies fever or fatigue  CARDIAC: No CP or SOB  PULMONARY: No cough of SOB  MUSCULOSKELETAL: No new joint pain  NEURO: SEE HPI    Examination  Visit Vitals    /60 (BP 1 Location: Right arm, BP Patient Position: Sitting)    Pulse 75    Temp 98.6 °F (37 °C) (Oral)    Resp 18    Ht 5' 1\" (1.549 m)    Wt 50.8 kg (112 lb)    SpO2 99%    BMI 21.16 kg/m2       This is a very pleasant 74-year-old female she is alert, and in NAD. Heart is regular. Oriented x3, EOM's are full, PERRL, no facial asymmetries. Resists following the upper extremities with decreased strength at bilateral psoas. DTR's symmetric, and gait symmetric with no signs of ataxia or incoordination. Impression/Plan  Elsie Benton is a 72 y.o. female whose history and physical are consistent with dizziness. Patient presents for hospital follow-up dizziness. Acute onset of dizziness not related to position. Endorses symptoms are intermittent. She presented to the emergency room and had a negative head CT. She was discharged home with a diagnosis of vertigo. Denies any subsequent dizziness. I have reviewed connect care to include head CT unremarkable for any acute intracranial processes. In the past she was seen in office for hospital follow-up TIA. At that time she had MRA brain and MRA neck patent. She recently had MRI of the brain in August with no acute intracranial abnormality. Evidence of tiny chronic lacunar infarct. I would like to move forward with EEG to rule out any underlying abnormality in brain waves  such as epileptiform. Defer additional imaging at this time. Future consideration for evaluation by cardiac electrophysiologist if she continues to have feelings of her heart racing or palpitations. No medication adjustments recommended.   We discussed safety and avoidance of falls. Patient will follow-up after testing is complete and she will have evaluation by ENT faxed to the office for review. All questions addressed and patient is agreeable with plan of care. Diagnoses and all orders for this visit:    1. Dizzy  -     EEG; Future    Total time: 30 min   Counseling / coordination time: 25 min   > 50% counseling / coordination?: Yes re: symptoms, management, chart review, recommendations, safety, answering questions, and plan of care. Signed By: Jerad Isidro NP    This note will not be viewable in [address] PLEASE NOTE:   Portions of this document may have been produced using voice recognition software. Unrecognized errors in transcription may be present.

## 2018-10-01 NOTE — MR AVS SNAPSHOT
[facility] 
 
 
 [address] [facility] 
[phone] Patient: Davy Westbrook MRN: [id number] :1945 Visit Information Date & Time Provider Department Dept. Phone Encounter #  
 10/1/2018  2:15 PM Sanna Salas NP [address] at [address] [place] [address] [id number] Follow-up Instructions Return for after test.  
  
Your Appointments 10/30/2018 11:30 AM  
Follow Up with Katie Waters MD  
[facility] [address]) Appt Note: 6 WK PT F/U  
 [address] [facility] [address]  
[phone]  
  
   
 [address] [facility] [address] 1/15/2019  8:30 AM  
Follow Up with Evan Sidhu NP  
[facility] [place] ([address]) Appt Note: 6mo  
 [address] [facility], [address] [facility] [address]  
  
   
 [address] [facility], [address] [facility] [address] Upcoming Health Maintenance Date Due Shingrix Vaccine Age 50> (2 of 2) 2018 DTaP/Tdap/Td series (1 - Tdap) 2023* Pneumococcal 65+ Low/Medium Risk (2 of 2 - PPSV23) 10/10/2018 MEDICARE YEARLY EXAM 2019 BREAST CANCER SCRN MAMMOGRAM 2020 GLAUCOMA SCREENING Q2Y 2020 COLONOSCOPY 2021 *Topic was postponed. The date shown is not the original due date. Allergies as of 10/1/2018  Review Complete On: 10/1/2018 By: Kassandra Harding RN Severity Noted Reaction Type Reactions Celebrex [Celecoxib] Low 2018   Systemic Other (comments) Tiredness Current Immunizations  Reviewed on 2018 Name Date Influenza Vaccine (Tri) Adjuvanted 2018 Zoster Recombinant 7/3/2018 12:00 AM  
  
 Not reviewed this visit You Were Diagnosed With   
  
 Codes Comments Dizzy    -  Primary ICD-10-CM: F71 ICD-9-CM: 780.4 Vitals BP Pulse Temp Resp Height(growth percentile) Weight(growth percentile) 124/60 (BP 1 Location: Right arm, BP Patient Position: Sitting) 75 98.6 °F (37 °C) (Oral) 18 5' 1\" (1.549 m) 112 lb (50.8 kg) SpO2 BMI OB Status Smoking Status 99% 21.16 kg/m2 Hysterectomy Never Smoker Vitals History BMI and BSA Data Body Mass Index Body Surface Area  
 21.16 kg/m 2 1.48 m 2 Preferred Pharmacy Pharmacy Name Phone [id number] [address], [address] [facility]. [phone] Your Updated Medication List  
  
   
This list is accurate as of 10/1/18  2:36 PM.  Always use your most recent med list.  
  
  
  
  
 alendronate 10 mg tablet Commonly known as:  FOSAMAX Take 1 Tab by mouth Daily (before breakfast). aspirin 81 mg chewable tablet Take 1 Tab by mouth daily. ATIVAN 0.5 mg tablet Generic drug:  LORazepam  
Take 0.5 mg by mouth as needed for Anxiety. atorvastatin 20 mg tablet Commonly known as:  LIPITOR Take 1 Tab by mouth daily. busPIRone 7.5 mg tablet Commonly known as:  BUSPAR  
take 1 tablet by mouth twice a day CLARITIN 10 mg tablet Generic drug:  loratadine Take 10 mg by mouth daily as needed for Allergies or Rhinitis. diazePAM 2 mg tablet Commonly known as:  VALIUM Take 1 Tab by mouth every eight (8) hours as needed for Anxiety. Max Daily Amount: 6 mg.  
  
 fluticasone 50 mcg/actuation nasal spray Commonly known as:  Salvador Kelly INSTILL 2 SPRAYS IN EACH NOSTRIL DAILY  
  
 montelukast 10 mg tablet Commonly known as:  SINGULAIR  
take 1 tablet by mouth once daily  
  
 omeprazole 20 mg capsule Commonly known as:  PRILOSEC Take 20 mg by mouth daily. TYLENOL EXTRA STRENGTH 500 mg tablet Generic drug:  acetaminophen Take 650 mg by mouth every six (6) hours as needed for Pain. Pt states they take medication QID. Follow-up Instructions  Return for after test.  
  
 To-Do List   
 10/01/2018 Neurology:  EEG   
  
 10/03/2018 10:00 AM  
  Appointment with Luis A Yang PT at [facility] PT [address] [facility] ([phone]) 10/08/2018 10:30 AM  
  Appointment with Luis A Yang PT at [facility] PT [address] [facility] ([phone]) 10/10/2018 10:00 AM  
  Appointment with Luis A Yang PT at [facility] PT [address] [facility] ([phone]) 10/15/2018 10:00 AM  
  Appointment with Luis A Yang PT at [facility] PT [address] [facility] ([phone]) 10/17/2018 10:30 AM  
  Appointment with Luis A Yang PT at [facility] PT [address] [facility] ([phone]) 10/22/2018 10:00 AM  
  Appointment with Luis A Yang PT at [facility] PT [address] [facility] ([phone]) 10/24/2018 10:00 AM  
  Appointment with Rupert Lerma PT at [facility] PT [address] [facility] ([phone]) 10/29/2018 10:30 AM  
  Appointment with Luis A Yang PT at [facility] PT [address] [facility] ([phone]) 10/31/2018 10:00 AM  
  Appointment with Luis A Yang PT at [facility] PT [address] [facility] ([phone]) Patient Instructions Please follow up after testing and after being seen by ENT. Please have ENT fax over office note to our office. Call and schedule follow up with pain management. Introducing [facility]! Dear Reford Nails: 
Thank you for requesting a [employer] account. Our records indicate that you already have an active [employer]ic[employer] account. You can access your account anytime at [email or web address] Did you know that you can access your hospital and ER discharge instructions at any time in [employer]? You can also review all of your test results from your hospital stay or ER visit. Additional Information If you have questions, please visit the Frequently Asked Questions section of the [employer]ic[employer] website at [email or web address] Remember, [employer] is NOT to be used for urgent needs. For medical emergencies, dial 911. Now available from your iPhone and Android! Please provide this summary of care documentation to your next provider. Your primary care clinician is listed as JAY JENKINS. If you have any questions after today's visit, please call [phone].

## 2018-10-01 NOTE — PROGRESS NOTES
PT DAILY TREATMENT NOTE - [facility]     Patient Name: Davy Westbrook  Date:10/1/2018  : 1945  [x]  Patient  Verified  Payor: [place] MEDICARE / Plan: VA MEDICARE PART A & B / Product Type: Medicare /    In time:1005  Out time:1032  Total Treatment Time (min): 27  Total Timed Codes (min): 27  1:1 Treatment Time ( only): 27   Visit #: 4 of 12    Treatment Area: Dizziness [R42]    SUBJECTIVE  Pain Level (0-10 scale): 4/10  Any medication changes, allergies to medications, adverse drug reactions, diagnosis change, or new procedure performed?: [x] No    [] Yes (see summary sheet for update)  Subjective functional status/changes:   [] No changes reported  No dizzy episodes reported. Reports she will see a Neurologist today at 2pm. She has not started Emily Seton. OBJECTIVE    27 min Therapeutic Exercise:  [] See flow sheet :   Rationale: increase ROM, increase strength, improve coordination and improve balance to improve the patients ability to ease with ADL's      With   [] TE   [] TA   [] neuro   [] other: Patient Education: [x] Review HEP    [] Progressed/Changed HEP based on:   [] positioning   [] body mechanics   [] transfers   [] heat/ice application    [] other:      Other Objective/Functional Measures: Pt declined VOR ex's as well as massage due to ongoing severe CS pain. Pain Level (0-10 scale) post treatment: 10    ASSESSMENT/Changes in Function: Slow progress. Pt seems unmotivated and reported she does not like to look at herself in the mirror. Patient will continue to benefit from skilled PT services to modify and progress therapeutic interventions, address functional mobility deficits, address ROM deficits, address strength deficits, analyze and address soft tissue restrictions, analyze and cue movement patterns, analyze and modify body mechanics/ergonomics, assess and modify postural abnormalities and address imbalance/dizziness to attain remaining goals.      []  See Plan of Care  [] See progress note/recertification  []  See Discharge Summary         Progress towards goals / Updated goals:  Short Term Goals: To be accomplished in 1 weeks:                        1. Pt will be compliant with a HEP to improve with balance and dizziness. met                        8. Pt will perform SOT to determine Sensory Input to improve Vestibular function. Met: 43 points (9/17/18)      Long Term Goals: To be accomplished in 6 weeks:                        4. Pt will increase FOTO score by 3  pts to improve with balance and mobility.                         4. Pt will decrease DHI by 10 pts to improve balance and ADL function.                         3. Pt will report sx improvement by >70% to be able to return to OF and improve QOL.        PLAN  []  Upgrade activities as tolerated     [x]  Continue plan of care  []  Update interventions per flow sheet       []  Discharge due to:_  []  Other:_      Jah Funk PT 10/1/2018  10:39 AM    Future Appointments  Date Time Provider Gaby Delarosa   10/1/2018 2:15 PM Yfn Trejo NP [address]   10/3/2018 10:00 AM Olga Erickson, PT [id number] [facility]   10/8/2018 10:30 AM Olga Erickson, PT [id number] [facility]   10/10/2018 10:00 AM Olga Erickson, PT [id number] [facility]   10/15/2018 10:00 AM Olga Erickson, PT REFFLAN [facility]   10/17/2018 10:30 AM Olga Erickson, PT [id number] [facility]   10/22/2018 10:00 AM Olga Erickson, PT [id number] [facility]   10/24/2018 10:00 AM Warden Lee, PT MIYSONK [facility]   10/29/2018 10:30 AM Olga Erickson, PT [id number] [facility]   10/30/2018 11:30 AM Pilo Marqeuz  [address]   10/31/2018 10:00 AM Jah Funk, PT [id number] [facility]   1/15/2019 8:30 AM Jennifer Lara  Mohinder Hernandes, [address] [place]

## 2018-10-01 NOTE — PATIENT INSTRUCTIONS
Please follow up after testing and after being seen by ENT. Please have ENT fax over office note to our office. Call and schedule follow up with pain management.

## 2018-10-03 ENCOUNTER — HOSPITAL ENCOUNTER (OUTPATIENT)
Dept: PHYSICAL THERAPY | Age: 73
Discharge: HOME | End: 2018-10-03
Payer: MEDICARE

## 2018-10-03 PROCEDURE — 97110 THERAPEUTIC EXERCISES: CPT

## 2018-10-03 PROCEDURE — 97530 THERAPEUTIC ACTIVITIES: CPT

## 2018-10-03 NOTE — PROGRESS NOTES
PT DAILY TREATMENT NOTE - [facility]     Patient Name: Elsie Benton  CVFT:[id number]  : 1945  [x]  Patient  Verified  Payor: [place] MEDICARE / Plan: VA MEDICARE PART A & B / Product Type: Medicare /    In time: 1000 Out time:1041  Total Treatment Time (min): 41  Total Timed Codes (min): 41  1:1 Treatment Time ( only): 41   Visit #: 5 of 12    Treatment Area: Dizziness [R42]    SUBJECTIVE  Pain Level (0-10 scale): 6/10  Any medication changes, allergies to medications, adverse drug reactions, diagnosis change, or new procedure performed?: [x] No    [] Yes (see summary sheet for update)  Subjective functional status/changes:   [] No changes reported  See pain mgmt tomorrow. She reported she believes that the sx are from her neck. OBJECTIVE      20 min Therapeutic Exercise:  [] See flow sheet :   Rationale: increase ROM and increase strength to improve the patients ability to ease with ADL's    21 min Therapeutic Activity:  []  See flow sheet :   R    To improve balance. With   [] TE   [] TA   [] neuro   [] other: Patient Education: [x] Review HEP    [] Progressed/Changed HEP based on:   [] positioning   [] body mechanics   [] transfers   [] heat/ice application    [] other:      Other Objective/Functional Measures: challenged with EC MSR x 30 sec  Saccades and smooth pursuit =Negative. Pt does a lot of blinking due to \"burning or dry eyes\"     Pain Level (0-10 scale) post treatment: 5/10    ASSESSMENT/Changes in Function: Slow progress. Pt educated on benefits of wearing sunglasses to deter glare and possibly to help with HA's.      Patient will continue to benefit from skilled PT services to modify and progress therapeutic interventions, address functional mobility deficits, address ROM deficits, address strength deficits, analyze and address soft tissue restrictions, analyze and cue movement patterns, analyze and modify body mechanics/ergonomics, assess and modify postural abnormalities and address imbalance/dizziness to attain remaining goals. [x]  See Plan of Care  []  See progress note/recertification  []  See Discharge Summary         Progress towards goals / Updated goals:  Short Term Goals: To be accomplished in 1 weeks:                        1. Pt will be compliant with a HEP to improve with balance and dizziness. met                        9. Pt will perform SOT to determine Sensory Input to improve Vestibular function. Met: 43 points (9/17/18)      Long Term Goals: To be accomplished in 6 weeks:                        9. Pt will increase FOTO score by 3  pts to improve with balance and mobility.                       2. Pt will decrease DHI by 10 pts to improve balance and ADL function.                         3. Pt will report sx improvement by >70% to be able to return to [facility] and improve QOL.            PLAN  [x]  Upgrade activities as tolerated     [x]  Continue plan of care  []  Update interventions per flow sheet       []  Discharge due to:_  []  Other:_      James Gordillo, PT 10/3/2018  9:25 AM    Future Appointments  Date Time Provider Gaby Delarosa   10/3/2018 10:00 AM James Gordillo, PT [id number] [facility]   10/4/2018 10:00 AM [facility]   10/8/2018 10:30 AM James Gordillo, PT [id number] [facility]   10/10/2018 10:00 AM Olga Paredes, PT [id number] [facility]   10/15/2018 10:00 AM Olga Leiva Holstein, PT [id number] [facility]   10/17/2018 10:30 AM Coreen Roma Holstein, PT [id number] [facility]   10/22/2018 10:00 AM Olga Roma Holstein, PT [id number] [facility]   10/24/2018 10:00 AM Juan Daniel Zhu, PT [id number] [facility]   10/29/2018 10:30 AM Olga Roma Holstein, PT [id number] [facility]   10/30/2018 11:30 AM Casandra Puri  [address]   10/31/2018 10:00 AM James Gordillo, PT [id number] [facility]   1/15/2019 8:30 AM Tanya Arnold  [address]

## 2018-10-04 ENCOUNTER — OFFICE VISIT (OUTPATIENT)
Dept: PAIN MANAGEMENT | Age: 73
End: 2018-10-04

## 2018-10-04 VITALS
HEART RATE: 68 BPM | HEIGHT: 61 IN | TEMPERATURE: 97 F | RESPIRATION RATE: 14 BRPM | WEIGHT: 112 LBS | SYSTOLIC BLOOD PRESSURE: 131 MMHG | BODY MASS INDEX: 21.14 KG/M2 | DIASTOLIC BLOOD PRESSURE: 76 MMHG

## 2018-10-04 DIAGNOSIS — M47.812 SPONDYLOSIS OF CERVICAL REGION WITHOUT MYELOPATHY OR RADICULOPATHY: Primary | ICD-10-CM

## 2018-10-04 DIAGNOSIS — M47.812 CERVICAL FACET SYNDROME: ICD-10-CM

## 2018-10-04 NOTE — PROGRESS NOTES
Social History     Social History    Marital status:      Spouse name: N/A    Number of children: N/A    Years of education: N/A     Occupational History    Not on file. Social History Main Topics    Smoking status: Never Smoker    Smokeless tobacco: Never Used    Alcohol use No    Drug use: No    Sexual activity: No     Other Topics Concern    Not on file     Social History Narrative     Family History   Problem Relation Age of Onset    Cancer Mother      breast    Heart Disease Father      Allergies   Allergen Reactions    Celebrex [Celecoxib] Other (comments)     Tiredness      Past Medical History:   Diagnosis Date    Abnormal Pap smear     Dr. Wallace Ely Allergic rhinitis     Arthritis     Chronic pain     Hypercholesterolemia     Neck pain 5/17/2010    Stroke ([facility] [address] 10/02/2017    TIA- legs weak memory issues     Past Surgical History:   Procedure Laterality Date    COLONOSCOPY N/A 6/4/2018    COLONOSCOPY / polypectomy performed by Nikita Crowley MD at [facility] ENDOSCOPY    HX GYN      Total Hyst    HX LAP CHOLECYSTECTOMY      HX OTHER SURGICAL  2017    Throat widened     Current Outpatient Prescriptions on File Prior to Visit   Medication Sig    LORazepam (ATIVAN) 0.5 mg tablet Take 0.5 mg by mouth daily as needed for Anxiety.  montelukast (SINGULAIR) 10 mg tablet take 1 tablet by mouth once daily    fluticasone (FLONASE) 50 mcg/actuation nasal spray INSTILL 2 SPRAYS IN EACH NOSTRIL DAILY    alendronate (FOSAMAX) 10 mg tablet Take 1 Tab by mouth Daily (before breakfast).  atorvastatin (LIPITOR) 20 mg tablet Take 1 Tab by mouth daily.  busPIRone (BUSPAR) 7.5 mg tablet take 1 tablet by mouth twice a day    acetaminophen (TYLENOL EXTRA STRENGTH) 500 mg tablet Take 650 mg by mouth every six (6) hours as needed for Pain. Pt states they take medication QID.  loratadine (CLARITIN) 10 mg tablet Take 10 mg by mouth daily as needed for Allergies or Rhinitis.     omeprazole (PRILOSEC) 20 mg capsule Take 20 mg by mouth daily.  aspirin 81 mg chewable tablet Take 1 Tab by mouth daily.  diazePAM (VALIUM) 2 mg tablet Take 1 Tab by mouth every eight (8) hours as needed for Anxiety. Max Daily Amount: 6 mg. No current facility-administered medications on file prior to visit. HPI:  Bret Boss is a 72 y.o. female here for f/u visit for ongoing evaluation of chronic cervical pain. Pt was last seen here in June 2018. Do Enrique Pt denies interval changes in the character or distribution of pain. Pain is located bilat cervical paraspinals into the bilat upper trap region. Pain is aching to stabbing in nature and ranges from 5-9/10. The left side is worse than the right. The pain is worsened with cervical rotation in either direction. Symptoms are relieved approximately 60% with cervical RFA bilateral C4 through C6 done approximately 2 weeks apart. Hx of stroke in Oct 2017 with residual L LE weakness. She had bilateral C4, C5, C6 radiofrequency ablation done in May 2018. ROS: Review of systems is negative for fever, chills, nausea, vomiting, constipation, chest pain, abdominal pain, weakness, trouble swallowing, acute changes in vision, acute changes in hearing, falls, bladder incontinence, bowel incontinence, depression, suicidal ideation, homicidal ideation, alcohol use. Review of systems positive for intermittent diarrhea, intermittent shortness of breath, dizziness, anxiety. Vitals:    10/04/18 1017   BP: 131/76   Pulse: 68   Resp: 14   Temp: 97 °F (36.1 °C)   TempSrc: Oral   Weight: 50.8 kg (112 lb)   Height: 5' 1\" (1.549 m)   PainSc:   9   PainLoc: Neck        Imaging: Report from CT of the cervical spine from October 17, 2017 showed, \"\"\"\"\"\"\"\"\"\"\"\"\"\"FINDINGS:     There is narrowing at the atlantooccipital joints bilaterally with hypertrophic  bone formation/calcification at the joint space between the basion and C1  anterior arch.  Calcific soft tissue \"mass\" posterior to the dens. There is  calcification of the transverse ligaments dorsal C2. There is significant  artifact at the foramen magnum limiting assessment for narrowing but  calcification appears to efface the thecal sac without significant mass effect  as assessed on axial view. There is sclerosis at the cranial dens with mild  hypertrophic bone anteriorly and posteriorly. Calcified soft tissue mass also  anterior to the dens. There is loss of normal cervical lordosis. There is  narrowing at the C1-C2 anterior arch. No separation at C1-2. 2 mm anterior  subluxation C2 on C3, 1 mm anterior subluxation C3 on C4 and 1 mm anterior  subluxation C4 on C5. There is stenosis at C2-3 measuring up to 7 mm secondary  to disc bulge and anterior subluxation with mass effect on the posterior cord. There may be mild loss of vertebral body height at C5 and C6 with sclerosis at  C6-7 joint space and severe disc space narrowing. Additional severe disc space  narrowing C5-6. There is mild anterior calcifications C4-C6. Disc bulge C4-5  with mild canal narrowing up to 11 mm. Disc bulge C6-7 with narrowing up to 7  mm. Significant facet hypertrophy. No acute fracture. Severe stenosis right C3-4  and C4-5 neural foramina. Severe stenosis left C3-4, C4-5 and C6-7 neural  foramina. Moderate stenosis right C2-3, C5-6, C6-7 and left to 3 C5-6. There is  soft tissue thickening with mild calcifications at the T1 supraspinous ligament.     There is prominence of the lingual tonsils. Significant biapical pleural  scarring.     IMPRESSION  IMPRESSION:       1. Hypertrophic ossification and calcification basion/C1 with partial fusion,  calcified \"pseudomass\" posterior greater than anterior dens with consideration  for CPPD/gout or degenerative changes. No significant narrowing at the foramen  magnum. 2.  Mild regions of anterior subluxation C2-C4 with associated canal stenosis  C2-3 up to 7 mm.   3.  Multifocal degenerative disc disease most significant at C5-7 with  associated canal stenosis. 4.  Significant facet hypertrophy with multifocal regions of bilateral severe  neural foramina stenosis. 5.  Prominent lingual tonsils. 6.  Significant biapical pulmonary pleural scarring.   \"\"\"\"\"\"\"\"\"\"\"\"\"\"\"\"\"\"\"         PE:  AFVSS, no acute distress, normal body habitus. A&OXs 3.  normocephalic, atraumatic. Conjugate gaze, clear sclerae. Strength for right upper extremity is 4/5 for shoulder abduction, elbow flexion, wrist extension, elbow extension, finger abduction, flexor digitorum profundus to digits 2 through 5. Strength for left upper extremity is 4/5 for shoulder abduction, elbow flexion, wrist extension, elbow extension, finger abduction, flexor digitorum profundus to digits 2 through 5. Muscle stretch reflexes for right upper extremity are 3+ for C5, C6, C7. Muscle stretch reflexes for left upper extremity are 3+ for C5, C6, C7. Muscle Stretch reflexes for right lower extremity are 2+ for L4,  S1.   Muscle Stretch reflexes for left  lower extremity are 2+ for L4, S1.      Negative Hoffmans on the right and the left. Gait is within functional limits. Balance is within functional limits. Sensation to light touch is intact for left C2-T1 and right C2-T1 and right L2-S2 and left L2-S2. Full active range of motion for cervical flexion without significant change in primary pain complaint until returning to neutral which reproduces primary pain bilaterally. Active range of motion for cervical extension is reduced approximately 25% without significant change in primary pain complaint. Active range of motion for cervical rotation to the right is reduced by 75% with reproduction of the primary right-sided cervical pain from the base of the skull to the base of the neck.   Active range of motion for cervical rotation to the left is reduced by 75% with reproduction of the primary left-sided cervical pain from the base of the skull to the base of the neck. There is mild tenderness to palpation along bilateral cervical paraspinal musculature. There is active trigger point in the right upper trapezius. Forward head posture. Primary Care Physician  [address]  [address]  [phone]      GIC-5 and 2    CHARLETTE -34%    COMM-5    PHQ -- . PHQ over the last two weeks 10/4/2018   PHQ Not Done -   Little interest or pleasure in doing things Not at all   Feeling down, depressed, irritable, or hopeless Not at all   Total Score PHQ 2 0       Assessment/Plan:     ICD-10-CM ICD-9-CM    1. Spondylosis of cervical region without myelopathy or radiculopathy M47.812 721.0    2. Cervical facet syndrome M47.812 723.8         --Patient with history and physical and imaging suggestive of ongoing cervical facet arthropathy which has responded positively in the past to bilateral C4 through C6 radiofrequency ablation. Return for left C4,C5,C6 rFA and then repeat on the right side at the same levels. --The decrease in active range of motion of bilateral cervical rotation is significant enough to suggest involvement of the atlantoaxial joint. There was certainly evidence of this mention in his CT of the cervical spine from October 2017. We will consider neurosurgical consultation regarding the upper cervical region. GOALS:  To establish complementary and integrative plan of care to address chronic pain issues while minimizing pharmaceuticals to maximize patient's function improve quality of life. F/u:. Follow-up Disposition:  Return if symptoms worsen or fail to improve.

## 2018-10-04 NOTE — PROGRESS NOTES
Nursing Notes    Patient presents to the office today in follow-up. Patient rates her pain at 9/10 on the numerical pain scale. Reviewed medications with counts as follows:    Rx Date filled Qty Dispensed Pill Count Last Dose Short   No opioids for pt at this time. Last opioid agreement N/A  Last urine drug screen N/A    Comments:     POC UDS was not performed in office today    Any new labs or imaging since last appointment? NO    Have you been to an emergency room (ER) or urgent care clinic since your last visit? YES. Pt went to the ER one time for extreme dizziness and nausea/vomiting             Have you been hospitalized since your last visit? NO     If yes, where, when, and reason for visit? Have you seen or consulted any other health care providers outside of the [address] [place] [address]  since your last visit? YES     If yes, where, when, and reason for visit? pcp  MsLenora Martinez has a reminder for a \"due or due soon\" health maintenance. I have asked that she contact her primary care provider for follow-up on this health maintenance.     PHQ over the last two weeks 10/4/2018   PHQ Not Done -   Little interest or pleasure in doing things Not at all   Feeling down, depressed, irritable, or hopeless Not at all   Total Score PHQ 2 0

## 2018-10-08 ENCOUNTER — HOSPITAL ENCOUNTER (OUTPATIENT)
Dept: PHYSICAL THERAPY | Age: 73
Discharge: HOME | End: 2018-10-08
Payer: MEDICARE

## 2018-10-08 PROCEDURE — 97110 THERAPEUTIC EXERCISES: CPT

## 2018-10-08 PROCEDURE — G8978 MOBILITY CURRENT STATUS: HCPCS

## 2018-10-08 PROCEDURE — G8979 MOBILITY GOAL STATUS: HCPCS

## 2018-10-08 NOTE — PROGRESS NOTES
PT DAILY TREATMENT NOTE - [facility]     Patient Name: Isaura Wilson  [id number]  : 1945  [x]  Patient  Verified  Payor: [place] MEDICARE / Plan: [place] MEDICARE PART A & B / Product Type: Medicare /    In time:1030  Out time:1103  Total Treatment Time (min): 33  Total Timed Codes (min): 33  1:1 Treatment Time ( only): 33   Visit #: 6 of 12    Treatment Area: Dizziness [R42]    SUBJECTIVE  Pain Level (0-10 scale): 5/10  Any medication changes, allergies to medications, adverse drug reactions, diagnosis change, or new procedure performed?: [x] No    [] Yes (see summary sheet for update)  Subjective functional status/changes:   [] No changes reported  Reports she is having an EEG tomorrow. OBJECTIVE        33 min Therapeutic Exercise:  [] See flow sheet :   Rationale: increase ROM, increase strength, improve coordination and improve balance to improve the patients ability to ease with Ambulation. With   [] TE   [] TA   [] neuro   [] other: Patient Education: [x] Review HEP    [] Progressed/Changed HEP based on:   [] positioning   [] body mechanics   [] transfers   [] heat/ice application    [] other:      Other Objective/Functional Measures: Requires correction for step up and over small hurdles to center gravity and lean forward before stepping up. No reports of dizziness. Pain Level (0-10 scale) post treatment: 4/10 dizzy    ASSESSMENT/Changes in Function: Pt reports she still think the dizziness is coming from her neck but cannot tolerate any massage or ex's due to pain. Patient will continue to benefit from skilled PT services to modify and progress therapeutic interventions, address functional mobility deficits, address ROM deficits, address strength deficits, analyze and address soft tissue restrictions, analyze and cue movement patterns, analyze and modify body mechanics/ergonomics, assess and modify postural abnormalities and address imbalance/dizziness to attain remaining goals. []  See Plan of Care  [x]  See progress note/recertification  []  See Discharge Summary         Progress towards goals / Updated goals:  Short Term Goals: To be accomplished in 1 weeks:                        1. Pt will be compliant with a HEP to improve with balance and dizziness. met                        1. Pt will perform SOT to determine Sensory Input to improve Vestibular function. Met: 43 points (9/17/18)      Long Term Goals: To be accomplished in 6 weeks:                        7. Pt will increase FOTO score by 3  pts to improve with balance and mobility.                       5. Pt will decrease DHI by 10 pts to improve balance and ADL function.                         3. Pt will report sx improvement by >70% to be able to return to [facility] [address] [facility] and improve QOL.      PLAN  [x]  Upgrade activities as tolerated     [x]  Continue plan of care  []  Update interventions per flow sheet       []  Discharge due to:_  []  Other:_      Nick Uriostegui, PT 10/8/2018  11:25 AM    Future Appointments  Date Time Provider Gaby Delarosa   10/9/2018 9:30 AM [facility] EEG RM 1 [id number] [facility]   10/10/2018 10:00 AM Olga Holliday, PT [id number] [facility]   10/15/2018 10:00 AM Olga Holliday, PT ACQIFNS [facility]   10/17/2018 10:30 AM Olga Holliday, PT [id number] [facility]   10/22/2018 10:00 AM Olga Holliday, PT [email or web address] [facility]   10/24/2018 10:00 AM Davis Gutierrez, PT [id number] [facility]   10/29/2018 10:30 AM Olga Holliday, PT [id number] [facility]   10/30/2018 11:30 AM Miranda Sutton MD [facility]   10/31/2018 10:00 AM Nick Uriostegui, PT [id number] [facility]   1/15/2019 8:30 AM Donzell Babinski,  Mohinder Hernandes, [address] [place]

## 2018-10-09 ENCOUNTER — HOSPITAL ENCOUNTER (OUTPATIENT)
Dept: NEUROLOGY | Age: 73
Discharge: HOME | End: 2018-10-09
Attending: NURSE PRACTITIONER
Payer: MEDICARE

## 2018-10-09 DIAGNOSIS — R42 DIZZY: ICD-10-CM

## 2018-10-09 PROCEDURE — 95816 EEG AWAKE AND DROWSY: CPT

## 2018-10-09 NOTE — PROCEDURES
[facility]  MR#: [id number]  : 1945  ACCOUNT #: [de-identified]   DATE OF SERVICE: 10/09/2018    EEG NUMBER . HISTORY:  A 68-year-old female with dizziness and question of alterations of consciousness. MEDICATIONS:  Include lorazepam as needed, Valium as needed, Singulair, Flonase, Fosamax, Lipitor, BuSpar, loratadine, omeprazole. EEG REPORT:  This is a 16-channel awake EEG done using international 10-20 electrode placement system. The predominant  background consists of 9 Hz posterior predominant and symmetric rhythmic activity, which attenuates with eye opening. There were no abnormal photoparoxysmal responses. Hyperventilation did not alter the background. There were no epileptiform abnormalities observed. Sleep was not observed. IMPRESSION:  This is a normal awake EEG.       MD KELLY Hannah / ANTHONY  D: 10/09/2018 15:43     T: 10/09/2018 16:13  JOB #: [id number]  CC: Gisele Guillory NP

## 2018-10-10 ENCOUNTER — HOSPITAL ENCOUNTER (OUTPATIENT)
Dept: PHYSICAL THERAPY | Age: 73
Discharge: HOME | End: 2018-10-10
Payer: MEDICARE

## 2018-10-10 PROCEDURE — 97110 THERAPEUTIC EXERCISES: CPT

## 2018-10-10 PROCEDURE — 97530 THERAPEUTIC ACTIVITIES: CPT

## 2018-10-10 NOTE — PROGRESS NOTES
[facility] [employer]  YA  [address] [facility]  [phone] fax    Continued Plan of Care/ Re-certification for Physical Therapy Services  Patient name: Elsie Benton Start of Care: 9/10/2018   Referral source: Francisco Lance, * : 1945                         Medical Diagnosis: Dizziness [R42] Onset Date:2 weeks ago                         Treatment Diagnosis: Dizziness/Balance   Prior Hospitalization: see medical history Provider#: [id number]   Medications: Verified on Patient summary List    Comorbidities: TIA 10/02/17. CS Stenosis, Arthritis, Panic Attacks. Prior Level of Function: Independent. Lives in a 2 story home. 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     Visits from Start of Care: 6    Missed Visits: 0    The Plan of Care and following information is based on the patient's current status:  Goal: 1. Pt will be compliant with a HEP to improve with balance and dizziness. Status at last note/certification:  Current Status: met    Goal:. Pt will perform SOT to determine Sensory Input to improve Vestibular function. Status at last note/certification:  Current Status: met    Goal:Pt will increase FOTO score by 3  pts to improve with balance and mobility. Status at last note/certification:  Current Status: met    Goal: Pt will decrease DHI by 10 pts to improve balance and ADL function. Status at last note/certification:  Current Status: met     Pt will report sx improvement by >70% to be able to return to PLOF and improve QOL. Not met. Key functional changes: Improving balance strategies. Dizziness and neck pain remains the same. Problems/ barriers to goal attainment: Limited tolerance to CS treatment involving cervico genic dizziness.       Problem List: pain affecting function, decrease ROM, decrease strength, impaired gait/ balance, decrease ADL/ functional abilitiies, decrease activity tolerance and decrease flexibility/ joint mobility    Treatment Plan: Therapeutic exercise, Therapeutic activities, Neuromuscular re-education, Physical agent/modality, Gait/balance training, Manual therapy, Patient education, Self Care training, Home safety training and Stair training     Patient Goal (s) has been updated and includes: \"to work on my legs\"     Goals for this certification period to be accomplished in 4 weeks:    1. Pt will report 70% improvement in balance to continue to be a safe community ambulator. 2. Pt will negotiate an obstacle course w/o LOB to ease with safe return to community ambulation. Frequency / Duration: Patient to be seen 2 times per week for 4 weeks:    Assessment / Recommendations:   Patient will continue to benefit from skilled PT services to modify and progress therapeutic interventions, address functional mobility deficits, address ROM deficits, address strength deficits, analyze and address soft tissue restrictions, analyze and cue movement patterns, analyze and modify body mechanics/ergonomics, assess and modify postural abnormalities and address imbalance/dizziness to attain remaining goals. G-Codes (GP)  Mobility  [id number] Current  CJ= 20-39%  [id number] Goal  CK= 40-59%    The severity rating is based on clinical judgment and the FOTO score. Certification Period: 10/10/18-11/9/18    Sheryl Cabral, PT 10/9/2018 11:32 PM    ________________________________________________________________________  I certify that the above Therapy Services are being furnished while the patient is under my care. I agree with the treatment plan and certify that this therapy is necessary. [] I have read the above and request that my patient continue as recommended.   [] I have read the above report and request that my patient continue therapy with the following changes/special instructions: _______________________________________  [] I have read the above report and request that my patient be discharged from therapy    Physician's Signature:____________Date:_________TIME:________    [employer], Date and Time must be completed for valid certification **    Please sign and return to [facility] [employer] [employer]  [address] [place] [address]  [phone] fax

## 2018-10-10 NOTE — PROGRESS NOTES
PT DAILY TREATMENT NOTE - [facility]     Patient Name: Elsie Benton  VHYO:  : 1945  [x]  Patient  Verified  Payor: [employer] / Plan: [place] MEDICARE PART A & B / Product Type: Medicare /    In time:1000  Out time:1032  Total Treatment Time (min): 32  Total Timed Codes (min): 32  1:1 Treatment Time ( only): 32   Visit #: 7 of 12    Treatment Area: Dizziness [R42]    SUBJECTIVE  Pain Level (0-10 scale): 4/10 dizzy  Any medication changes, allergies to medications, adverse drug reactions, diagnosis change, or new procedure performed?: [x] No    [] Yes (see summary sheet for update)  Subjective functional status/changes:   [] No changes reported  Reports she had an EEG yesterday and will get results at the end of the month/. OBJECTIVE      20 min Therapeutic Exercise:  [] See flow sheet :   Rationale: increase ROM, increase strength, improve coordination and improve balance to improve the patients ability to ease with ADL's    13 min Therapeutic Activity:  []  See flow sheet :   Rationale: increase ROM, improve coordination, improve balance and increase proprioception  to improve the patients ability to ease with ADL's          With   [] TE   [] TA   [] neuro   [] other: Patient Education: [x] Review HEP    [] Progressed/Changed HEP based on:   [] positioning   [] body mechanics   [] transfers   [] heat/ice application    [] other:      Other Objective/Functional Measures: FOTO=70  Pt was observed picking up her purse off the floor w/o dizziness. DHI=16  Pain Level (0-10 scale) post treatment: 4/10    ASSESSMENT/Changes in Function: Progressing well with improved static and dynamic balance. She can walk further distance w/o LOB.     Patient will continue to benefit from skilled PT services to modify and progress therapeutic interventions, address functional mobility deficits, address ROM deficits, address strength deficits, analyze and address soft tissue restrictions, analyze and cue movement patterns, analyze and modify body mechanics/ergonomics, assess and modify postural abnormalities and address imbalance/dizziness to attain remaining goals. []  See Plan of Care  []  See progress note/recertification  []  See Discharge Summary         Progress towards goals / Updated goals:  Short Term Goals: To be accomplished in 1 weeks:                        1. Pt will be compliant with a HEP to improve with balance and dizziness. met                        4. Pt will perform SOT to determine Sensory Input to improve Vestibular function. Met: 43 points (9/17/18)      Long Term Goals: To be accomplished in 6 weeks:                        4. Pt will increase FOTO score by 3  pts to improve with balance and mobility. MET. 101/10/18                        1. Pt will decrease DHI by 10 pts to improve balance and ADL function. Met. 10/10/18                        3. Pt will report sx improvement by >70% to be able to return to PLOF and improve QOL.      PLAN  [x]  Upgrade activities as tolerated     [x]  Continue plan of care  []  Update interventions per flow sheet       []  Discharge due to:_  []  Other:_      Ngozi Baron, PT 10/10/2018  9:40 AM    Future Appointments  Date Time Provider Gaby Delarosa   10/10/2018 10:00 AM Ngozi Baron, PT SAASKSA [facility]   10/15/2018 10:00 AM Olga Diaz, PT [id number] [facility]   10/17/2018 10:30 AM Olga Diaz, PT [id number] [facility]   10/22/2018 10:00 AM Olga Diaz, PT [id number] [facility]   10/24/2018 10:00 AM Maurizio Pineda, PT [id number] [facility]   10/29/2018 10:30 AM Olga Diaz, PT [id number] [facility]   10/30/2018 11:30 AM Dave Powell  [address]   10/31/2018 10:00 AM Ngozi Baron, PT [id number] [facility]   1/15/2019 8:30 AM Shruthi Arteaga  [facility]

## 2018-10-15 ENCOUNTER — HOSPITAL ENCOUNTER (OUTPATIENT)
Dept: PHYSICAL THERAPY | Age: 73
Discharge: HOME | End: 2018-10-15
Payer: MEDICARE

## 2018-10-15 PROCEDURE — 97112 NEUROMUSCULAR REEDUCATION: CPT

## 2018-10-15 PROCEDURE — 97110 THERAPEUTIC EXERCISES: CPT

## 2018-10-15 NOTE — PROGRESS NOTES
PT DAILY TREATMENT NOTE 10-18    Patient Name: Taylor Guzman  Date:10/15/2018  : 1945  [x]  Patient  Verified  Payor: [place] MEDICARE / Plan: [place] MEDICARE PART A & B / Product Type: Medicare /    In time:959  Out time:1028  Total Treatment Time (min): 28  Visit #: 8 of 12    Medicare/BCBS Only   Total Timed Codes (min):  28 1:1 Treatment Time:  28       Treatment Area: Dizziness [R42]    SUBJECTIVE  Pain Level (0-10 scale): 10/10  Any medication changes, allergies to medications, adverse drug reactions, diagnosis change, or new procedure performed?: [x] No    [] Yes (see summary sheet for update)  Subjective functional status/changes:   [] No changes reported  Pt stated that she always has neck pain and nothing can help it    OBJECTIVE    21 min Therapeutic Exercise:  [] See flow sheet :   Rationale: increase ROM and increase strength to improve the patients ability to increase ease with ADLs    8 min Neuromuscular Re-education:  []  See flow sheet :   Rationale: improve coordination and improve balance  to improve the patients ability to decrease fall risk    With   [x] TE   [] TA   [] neuro   [] other: Patient Education: [x] Review HEP    [] Progressed/Changed HEP based on:   [] positioning   [] body mechanics   [] transfers   [] heat/ice application    [] other:      Other Objective/Functional Measures:   Had no difficulty with static balance activities  Had no difficulty with increased weights     Pain Level (0-10 scale) post treatment: 6/10    ASSESSMENT/Changes in Function:   Pt is slowly progressing toward goals. Pt cont to have decreased activity tolerance. Needed a few standing rest breaks during standing exercises. Patient will continue to benefit from skilled PT services to modify and progress therapeutic interventions, address functional mobility deficits, address ROM deficits, address strength deficits and address imbalance/dizziness to attain remaining goals.      []  See Plan of Care  [x] See progress note/recertification  []  See Discharge Summary         Progress towards goals / Updated goals:  Short Term Goals: To be accomplished in 1 weeks:                        1. Pt will be compliant with a HEP to improve with balance and dizziness. met                        8. Pt will perform SOT to determine Sensory Input to improve Vestibular function. Met: 43 points (9/17/18)      Long Term Goals: To be accomplished in 6 weeks:                        7. Pt will increase FOTO score by 3  pts to improve with balance and mobility. MET. 101/10/18                        8. Pt will decrease DHI by 10 pts to improve balance and ADL function.    Met. 10/10/18                        3. Pt will report sx improvement by >70% to be able to return to [facility] and improve QOL.        PLAN  []  Upgrade activities as tolerated     [x]  Continue plan of care  []  Update interventions per flow sheet       []  Discharge due to:_  []  Other:_      Malia Graft, PTA 10/15/2018  10:00 AM    Future Appointments  Date Time Provider Gaby Castrejonisti   10/17/2018 10:30 AM Mike Acharya, PT [id number] [facility]   10/22/2018 10:00 AM Olga Braga, PT SMDIGWN [facility]   10/24/2018 10:00 AM Monique Alvarez, PT MMCPTPB [facility]   10/29/2018 10:30 AM Olga Braga, PT [id number] [facility]   10/30/2018 11:30 AM Taylor Garcia  [address]   10/31/2018 10:00 AM Mike Acharya, PT MMCPTPB [facility]   1/15/2019 8:30 AM Gayle Márquez NP [facility]

## 2018-10-17 ENCOUNTER — HOSPITAL ENCOUNTER (OUTPATIENT)
Dept: PHYSICAL THERAPY | Age: 73
Discharge: HOME | End: 2018-10-17
Payer: MEDICARE

## 2018-10-17 PROCEDURE — 97110 THERAPEUTIC EXERCISES: CPT

## 2018-10-17 PROCEDURE — 97530 THERAPEUTIC ACTIVITIES: CPT

## 2018-10-17 NOTE — PROGRESS NOTES
PT DAILY TREATMENT NOTE - [facility]     Patient Name: Elsie Benton  Date:10/17/2018  : 1945  [x]  Patient  Verified  Payor: [place] MEDICARE / Plan: [place] MEDICARE PART A & B / Product Type: Medicare /    In time:1030  Out time:1101  Total Treatment Time (min): 31  Total Timed Codes (min): 31  1:1 Treatment Time (MC only): 31   Visit #: 9 of 12    Treatment Area: Dizziness [R42]    SUBJECTIVE  Pain Level (0-10 scale): 9/10 Neck pain  Any medication changes, allergies to medications, adverse drug reactions, diagnosis change, or new procedure performed?: [x] No    [] Yes (see summary sheet for update)  Subjective functional status/changes:   [] No changes reported  Reports she will follow up with ENT on the 24 th OCt. And pain mgmt after for her neck. OBJECTIVE    15 min Therapeutic Exercise:  [] See flow sheet :   Rationale: increase ROM and increase strength to improve the patients ability to ease with ADL's    16 min Therapeutic Activity:  []  See flow sheet :   Rationale: increase ROM, increase strength, improve coordination and improve balance  to improve the patients ability to ease with ADL's             With   [] TE   [] TA   [] neuro   [] other: Patient Education: [x] Review HEP    [] Progressed/Changed HEP based on:   [] positioning   [] body mechanics   [] transfers   [] heat/ice application    [] other:      Other Objective/Functional Measures: Challenged with dynamic stability over cones. Challenged with maintaining COG over CONSTANTIN. Requires HHA during cone stepovers. No reports of dizziness during dynamic balance  Pain Level (0-10 scale) post treatment: 9/10 CS    ASSESSMENT/Changes in Function: Progressing WNL for dynamic balance. Pt will benefit from continuing to work on dynamic balance activities due to ongoing challenges. She declines to perform any Vestibular ex's. She prefers to work on balance.      Patient will continue to benefit from skilled PT services to modify and progress therapeutic interventions, address functional mobility deficits, address ROM deficits, address strength deficits, analyze and address soft tissue restrictions, analyze and cue movement patterns, analyze and modify body mechanics/ergonomics and instruct in home and community integration to attain remaining goals. [x]  See progress note/recertification           Progress towards goals / Updated goals:     1. Pt will report 70% improvement in balance to continue to be a safe community ambulator. 2. Pt will negotiate an obstacle course w/o LOB to ease with safe return to community ambulation. Progressing. See above.  10/17/18       PLAN  [x]  Upgrade activities as tolerated     [x]  Continue plan of care  []  Update interventions per flow sheet       []  Discharge due to:_  []  Other:_      Mike Acharya PT 10/17/2018  11:03 AM    Future Appointments   Date Time Provider Gaby Delarosa   10/22/2018 10:00 AM Otis Villafuerte PT MMCPTPB [facility]   10/24/2018 10:00 AM Otis Villafuerte PT [facility]CPTPB [facility]   10/29/2018 10:30 AM Sagrario Gan PT [facility]CPTPB [facility]   10/30/2018 11:30 AM Mike Enrique  [address]   10/31/2018 10:00 AM Sagrario Gan PT [facility]CPTPB [facility]   1/15/2019  8:30 AM Jimmie Gay  [address] [place]

## 2018-10-22 ENCOUNTER — HOSPITAL ENCOUNTER (OUTPATIENT)
Dept: PHYSICAL THERAPY | Age: 73
Discharge: HOME | End: 2018-10-22
Payer: MEDICARE

## 2018-10-22 PROCEDURE — 97112 NEUROMUSCULAR REEDUCATION: CPT

## 2018-10-22 PROCEDURE — 97110 THERAPEUTIC EXERCISES: CPT

## 2018-10-22 NOTE — PROGRESS NOTES
PT DAILY TREATMENT NOTE - [facility]     Patient Name: Vanessa Barron  Date:10/22/2018  : 1945  [x]  Patient  Verified  Payor: [place] MEDICARE / Plan: VA MEDICARE PART A & B / Product Type: Medicare /    In time: 10:00  Out time: 10:30  Total Treatment Time (min):  30  Total Timed Codes (min):  30  1:1 Treatment Time (MC only): 30   Visit #: 10 of 12    Treatment Area: Dizziness [R42]    SUBJECTIVE  Pain Level (0-10 scale): 8/10  Any medication changes, allergies to medications, adverse drug reactions, diagnosis change, or new procedure performed?: [x] No    [] Yes (see summary sheet for update)  Subjective functional status/changes:   [] No changes reported  Pt. Reports she is doing pretty good. She reports her balance is about the same since [facility]. OBJECTIVE    15 min Therapeutic Exercise:  [x] See flow sheet :   Rationale: increase ROM and increase strength to improve the patients ability to increase ease of ADLs     15 min Neuromuscular Re-education:  []  See flow sheet : standing balance activities, side stepping, backward walking   Rationale: increase strength, improve coordination and improve balance  to improve the patients ability to increase ease of ADLs          With   [x] TE   [] TA   [] neuro   [] other: Patient Education: [x] Review HEP    [] Progressed/Changed HEP based on:   [] positioning   [] body mechanics   [] transfers   [] heat/ice application    [] other:      Other Objective/Functional Measures:   Pt. Continues to have difficulty with Rhomberg eyes closed and on compliant surfaces  She had LOB with backward walking, but had good step response  She was challenged with LAQ with 5#     Pain Level (0-10 scale) post treatment: 8/10    ASSESSMENT/Changes in Function:  Pt. Is progressing slowly towards goals. She continues to have decreased balance and is limited by back and neck pain. She continues to be challenged with backward ambulation.      Patient will continue to benefit from skilled PT services to modify and progress therapeutic interventions, address functional mobility deficits, address ROM deficits, address strength deficits, analyze and address soft tissue restrictions, analyze and cue movement patterns and analyze and modify body mechanics/ergonomics to attain remaining goals. Progress towards goals / Updated goals:  1. Pt will report 70% improvement in balance to continue to be a safe community ambulator. Reports no significant change in her balance (10/22/18)  2. Pt will negotiate an obstacle course w/o LOB to ease with safe return to community ambulation.   Progressing. See above.  10/17/18           PLAN  []  Upgrade activities as tolerated     [x]  Continue plan of care  []  Update interventions per flow sheet       []  Discharge due to:_  []  Other:_      Praveena Amanda PT 10/22/2018  10:10 AM    Future Appointments   Date Time Provider Gaby Delarosa   10/24/2018 10:00 AM Lorie Mota PT [facility]CPTP[facility]   10/29/2018 10:30 AM Julia Davison PT [facility]CPTPB [facility]   10/30/2018 11:30 AM Edwar Hills  [address]   10/31/2018 10:00 AM Julia Davison PT [facility]CPTPB [facility]   1/15/2019  8:30 AM Wiley Canales  [address] [facility]

## 2018-10-24 ENCOUNTER — HOSPITAL ENCOUNTER (OUTPATIENT)
Dept: PHYSICAL THERAPY | Age: 73
Discharge: HOME | End: 2018-10-24
Payer: MEDICARE

## 2018-10-24 PROCEDURE — 97110 THERAPEUTIC EXERCISES: CPT

## 2018-10-24 PROCEDURE — 97112 NEUROMUSCULAR REEDUCATION: CPT

## 2018-10-24 NOTE — PROGRESS NOTES
PT DAILY TREATMENT NOTE - [facility]     Patient Name: Taylor Guzman  Date:10/24/2018  : 1945  [x]  Patient  Verified  Payor: [place] MEDICARE / Plan: VA MEDICARE PART A & B / Product Type: Medicare /    In time:10:00  Out time: 10:30  Total Treatment Time (min): 30  Total Timed Codes (min): 30  1:1 Treatment Time (MC only): 30   Visit #: 2 of 8    Treatment Area: Dizziness [R42]    SUBJECTIVE  Pain Level (0-10 scale): 10  Any medication changes, allergies to medications, adverse drug reactions, diagnosis change, or new procedure performed?: [x] No    [] Yes (see summary sheet for update)  Subjective functional status/changes:   [] No changes reported  Pt. Reports she is doing ok today. She reports having a lot of neck pain. OBJECTIVE    15 min Therapeutic Exercise:  [x] See flow sheet :   Rationale: increase ROM and increase strength to improve the patients ability to increase ease of ADLs     15 min Neuromuscular Re-education:  [x]  See flow sheet : standing balance activities, side stepping, backward ambulation, ambulation with eye turns, slow walking   Rationale: increase strength, improve coordination and improve balance  to improve the patients ability to increase ease of ADLs. With   [x] TE   [] TA   [] neuro   [] other: Patient Education: [x] Review HEP    [] Progressed/Changed HEP based on:   [] positioning   [] body mechanics   [] transfers   [] heat/ice application    [] other:      Other Objective/Functional Measures:   Pt. Tolerated PT well with no reports of increased pain  She had instability with backward walking, slow walking, and ambulation with eye turns  She had LOB with Rhomberg on foam with eyes closed     Pain Level (0-10 scale) post treatment:  0/10    ASSESSMENT/Changes in Function:  Pt. Is progressing slowly towards goals. She reports no significant change in her balance since [facility] but does demonstrate improving static standing balance.     Patient will continue to benefit from skilled PT services to modify and progress therapeutic interventions, address functional mobility deficits, address ROM deficits, address strength deficits, analyze and address soft tissue restrictions, analyze and cue movement patterns, analyze and modify body mechanics/ergonomics and assess and modify postural abnormalities to attain remaining goals. Progress towards goals / Updated goals:  1. Pt will report 70% improvement in balance to continue to be a safe community ambulator. Reports no significant change in her balance (10/22/18)  2. Pt will negotiate an obstacle course w/o LOB to ease with safe return to community ambulation.   Progressing. See above.  10/17/18           PLAN  []  Upgrade activities as tolerated     [x]  Continue plan of care  []  Update interventions per flow sheet       []  Discharge due to:_  []  Other:_      Solange Rankin PT 10/24/2018  9:59 AM    Future Appointments   Date Time Provider Gaby Delarosa   10/24/2018 10:00 AM Kevin Koroma PT [facility]CPTP[facility]   10/29/2018 10:30 AM Betzy Loo PT [facility]CPTP[facility]   10/30/2018 11:30 AM Mariah Parekh  [address]   10/31/2018 10:00 AM Betzy Loo PT [facility]CPTPB [facility]   1/15/2019  8:30 AM Jana Napier  [address] [place]

## 2018-10-29 ENCOUNTER — HOSPITAL ENCOUNTER (OUTPATIENT)
Dept: PHYSICAL THERAPY | Age: 73
Discharge: HOME | End: 2018-10-29
Payer: MEDICARE

## 2018-10-29 PROCEDURE — 97530 THERAPEUTIC ACTIVITIES: CPT

## 2018-10-29 PROCEDURE — 97110 THERAPEUTIC EXERCISES: CPT

## 2018-10-29 NOTE — PROGRESS NOTES
PT DAILY TREATMENT NOTE - [facility]     Patient Name: Bret Boss  Date:10/29/2018  : 1945  [x]  Patient  Verified  Payor: [place] MEDICARE / Plan: VA MEDICARE PART A & B / Product Type: Medicare /    In time:1030  Out time:1102  Total Treatment Time (min): 32  Total Timed Codes (min): 32  1:1 Treatment Time (MC only): 32   Visit #: 3 of 4    Treatment Area: Dizziness [R42]    SUBJECTIVE  Pain Level (0-10 scale): 9/10 constant C/S   Any medication changes, allergies to medications, adverse drug reactions, diagnosis change, or new procedure performed?: [x] No    [] Yes (see summary sheet for update)  Subjective functional status/changes:   [] No changes reported  Reports she is disappointed that she did not get her inner ear checked but only hearing. OBJECTIVE      20 min Therapeutic Exercise:  [] See flow sheet :   Rationale: increase ROM, increase strength, improve coordination and improve balance to improve the patients ability to ease with ADL's    12 min Therapeutic Activity:  []  See flow sheet :   Rationale: increase ROM, improve coordination, improve balance and increase proprioception  to improve the patients ability to ease with ADL's and ambulation. With   [] TE   [] TA   [] neuro   [] other: Patient Education: [x] Review HEP    [] Progressed/Changed HEP based on:   [] positioning   [] body mechanics   [] transfers   [] heat/ice application    [] other:      Other Objective/Functional Measures: Introduced FOam EC x 30 sec.  8 in step/taps with 1 finger/1 UE light touch     Pain Level (0-10 scale) post treatment: 0/10    ASSESSMENT/Changes in Function: Progressing well. Pt to DC at next visit. She has been consistent with he r HEP 1-2 times daily.      Patient will continue to benefit from skilled PT services to modify and progress therapeutic interventions, address functional mobility deficits, address ROM deficits, address strength deficits, analyze and address soft tissue restrictions, analyze and cue movement patterns, analyze and modify body mechanics/ergonomics, assess and modify postural abnormalities and address imbalance/dizziness to attain remaining goals. []  See Plan of Care  [x]  See progress note/recertification  []  See Discharge Summary         Progress towards goals / Updated goals:  1. Pt will report 70% improvement in balance to continue to be a safe community ambulator.   Reports no significant change in her balance (10/22/18)  2. Pt will negotiate an obstacle course w/o LOB to ease with safe return to community ambulation.   Met.  10/29/18        PLAN  []  Upgrade activities as tolerated     [x]  Continue plan of care  []  Update interventions per flow sheet       []  Discharge due to:_  []  Other:_      Rohini President, PT 10/29/2018  11:11 AM    Future Appointments   Date Time Provider Gaby Delarosa   10/30/2018 11:30 AM Ginette Quiroga  [address]   11/2/2018  2:00 PM Jessica Delgado PT [facility]CPTPB [facility]   11/8/2018  8:00 AM Marie Conner NP [address]   1/15/2019  8:30 AM Preethi Dooley  [address] [place]

## 2018-10-30 ENCOUNTER — OFFICE VISIT (OUTPATIENT)
Dept: ORTHOPEDIC SURGERY | Age: 73
End: 2018-10-30

## 2018-10-30 VITALS
HEIGHT: 61 IN | RESPIRATION RATE: 20 BRPM | SYSTOLIC BLOOD PRESSURE: 134 MMHG | BODY MASS INDEX: 21.16 KG/M2 | DIASTOLIC BLOOD PRESSURE: 68 MMHG | OXYGEN SATURATION: 100 % | HEART RATE: 70 BPM | TEMPERATURE: 97.6 F

## 2018-10-30 DIAGNOSIS — M25.512 ACUTE PAIN OF LEFT SHOULDER: ICD-10-CM

## 2018-10-30 DIAGNOSIS — M79.18 MYOFASCIAL PAIN: ICD-10-CM

## 2018-10-30 DIAGNOSIS — M47.812 ARTHROPATHY OF CERVICAL FACET JOINT: Primary | ICD-10-CM

## 2018-10-30 DIAGNOSIS — M54.2 NECK PAIN: ICD-10-CM

## 2018-10-30 RX ORDER — SODIUM CHLORIDE 0.9 % (FLUSH) 0.9 %
5-10 SYRINGE (ML) INJECTION AS NEEDED
Status: CANCELLED | OUTPATIENT
Start: 2018-10-30

## 2018-10-30 NOTE — PROGRESS NOTES
[address]  [address], [address]  [place], [address] [place] [address] Street  Phone: [phone]  Fax: [phone]        Katya Huntley  : 1945  PCP: Dave Loya NP  10/30/2018    PROGRESS NOTE      ASSESSMENT AND PLAN    Ulices De La Cruz comes in to the office today for 6 week f/u. She has been seeing Dr. Celina Crockett in pain management, and is scheduled to have a repeat RFA. She has been experiencing dizziness lately, and believes it can be attributed to her medications. She also c/o left deltoid and upper arm pain that she suspects may be due to recently having her flu and shingles vaccine in that arm. She has seen benefit from PT for her balance. She continues to have axial neck pain that she rates as a 7/10 today. On examination, she had a positive Resendez sign on the L. She is neurologically intact. Her pain remains due to myofascial pain and facet arthropathy. We will consider more treatment options, possibly trigger points or PT, after she has the cervical RFA. I advised she f/u with a different ENT for her vertigo and dizziness. Pt will f/u in 8 weeks or sooner as needed. Diagnoses and all orders for this visit:    1. Arthropathy of cervical facet joint    2. Myofascial pain    3. Neck pain    4. Acute pain of left shoulder         Follow-up Disposition: Not on File      HISTORY OF PRESENT ILLNESS  Ulices De La Cruz is a 72 y.o. female. A&P / HPI from 2017: Pat Meeks in to the office today c/o chronic neck pain which was exacerbated by a recent mild TIA. Her symptoms are likely related to cervical facet arthropathy.       On the examination, she had limited cervical ROM with pain reproduced at end ranges of rotation and extension. She did not show neurological deficits nor has radicular pain. The cervical CT scan showed multifocal degenerative disc disease most significant at C5-7 with associated canal stenosis.  It also showed significant facet hypertrophy with multifocal regions of bilateral severe neural foramina stenosis.     I referred her to Dr. Eliazar Yañez for a cervical medial branch block / RFA with sedation. I prescribed her Mobic 15 mg daily.      HISTORY OF PRESENT ILLNESS  Lincoln Friedman is a 72 y.o. female c/o chronic neck pain which was exacerbated by a mild TIA on 10/02/2017. She note her pain is constant and there is not specific movement that will aggravate her symptoms. She denies a specific position that will help alleviate her pain. She denies radicular symptoms into the extremities at this time. She present with a cervical CT scan which showed mild regions of anterior subluxation C2-C4 with associated canal stenosis C2-3 up to 7 mm. It also showed multifocal degenerative disc disease most significant at C5-7 with associated canal stenosis.     Pt denies any fevers, chills, nausea, vomiting. Pt denies any chest pain and shortness of breath. Pt denies any ear, nose, and throat problems. Pt denies any fecal or urinary incontinence.       Updates from 01/15/18:  She was unable to undergo the cervical medial branch block due a severe panic attack. She has been prescribed an anti-anxiety medication to further void these symptoms. Her symptoms have not significantly changed since the last office visit. She was also unable to tolerate the Mobic 15 mg daily due to stomach issues.      Updates from 09/06/18:  Pt presents for cervical RFA f/u. She has seen some improvement of her upper back pain. However, she continues to have severe neck pain and headaches. Pt denies any numbness, weakness, or radicular symptoms in her UE.     She recently had a brain MRI.     She begins PT Monday because she \"is walking funny. \"    Updates from 10/30/18:  Pt presents for 6 week f/u. She has been seeing Dr. Yahir Harry in pain management, and is scheduled to have a repeat RFA with the right side being done tomorrow.      She has been experiencing dizziness lately, and believes it can be attributed to her medications. She also c/o left deltoid and upper arm pain that she suspects may be due to recently having her flu and shingles vaccine in that arm. She has seen significant improvement of her balance with PT. She continues to have headaches and ear ringing. She states she saw an ENT, but he only advised that she needs hearing aids. She states that she experiences dizziness when she turns her neck too fast. She reports that she was given a medication for dizziness, and when she took the second pill, she was vomiting so much that she went to the ER. PAST MEDICAL HISTORY   Past Medical History:   Diagnosis Date    Abnormal Pap smear     Dr. Lizy Amor Allergic rhinitis     Arthritis     Chronic pain     Hypercholesterolemia     Neck pain 5/17/2010    Stroke ([facility] [address] 10/02/2017    TIA- legs weak memory issues       Past Surgical History:   Procedure Laterality Date    HX GYN      Total Hyst    HX LAP CHOLECYSTECTOMY      HX OTHER SURGICAL  2017    Throat widened   . MEDICATIONS    Current Outpatient Medications   Medication Sig Dispense Refill    LORazepam (ATIVAN) 0.5 mg tablet Take 0.5 mg by mouth daily as needed for Anxiety.  diazePAM (VALIUM) 2 mg tablet Take 1 Tab by mouth every eight (8) hours as needed for Anxiety. Max Daily Amount: 6 mg. 10 Tab 0    montelukast (SINGULAIR) 10 mg tablet take 1 tablet by mouth once daily 90 Tab 2    fluticasone (FLONASE) 50 mcg/actuation nasal spray INSTILL 2 SPRAYS IN EACH NOSTRIL DAILY 16 g 1    alendronate (FOSAMAX) 10 mg tablet Take 1 Tab by mouth Daily (before breakfast). 30 Tab 2    atorvastatin (LIPITOR) 20 mg tablet Take 1 Tab by mouth daily. 90 Tab 3    busPIRone (BUSPAR) 7.5 mg tablet take 1 tablet by mouth twice a day 60 Tab 1    acetaminophen (TYLENOL EXTRA STRENGTH) 500 mg tablet Take 650 mg by mouth every six (6) hours as needed for Pain.  Pt states they take medication QID.      loratadine (CLARITIN) 10 mg tablet Take 10 mg by mouth daily as needed for Allergies or Rhinitis.  omeprazole (PRILOSEC) 20 mg capsule Take 20 mg by mouth daily.  aspirin 81 mg chewable tablet Take 1 Tab by mouth daily. 30 Tab 3        ALLERGIES  Allergies   Allergen Reactions    Celebrex [Celecoxib] Other (comments)     Tiredness           SOCIAL HISTORY    Social History     Socioeconomic History    Marital status:      Spouse name: Not on file    Number of children: Not on file    Years of education: Not on file    Highest education level: Not on file   Social Needs    Financial resource strain: Not on file    Food insecurity - worry: Not on file    Food insecurity - inability: Not on file   [employer] needs - medical: Not on file   [employer] needs - non-medical: Not on file   Occupational History    Not on file   Tobacco Use    Smoking status: Never Smoker    Smokeless tobacco: Never Used   Substance and Sexual Activity    Alcohol use: No    Drug use: No    Sexual activity: No   Other Topics Concern    Not on file   Social History Narrative    Not on file       FAMILY HISTORY  Family History   Problem Relation Age of Onset    Cancer Mother         breast    Heart Disease Father          REVIEW OF SYSTEMS  Review of Systems   Musculoskeletal: Positive for neck pain. Neurological: Positive for dizziness and headaches. PHYSICAL EXAMINATION  There were no vitals taken for this visit. Pain Assessment  9/6/2018   Location of Pain Neck   Severity of Pain 9   Quality of Pain Aching   Frequency of Pain Constant           Constitutional:  Well developed, well nourished, in no acute distress. Psychiatric: Affect and mood are appropriate. Integumentary: No rashes or abrasions noted on exposed areas. SPINE/MUSCULOSKELETAL EXAM    Cervical spine:  Neck is midline. Normal muscle tone. No focal atrophy is noted.    Cervical ROM limited to about 30 degrees  Shoulder ROM intact. Tenderness to palpation. Negative Spurling's sign. Negative Tinel's sign. Negative Garza's sign.       Sensation in the bilateral arms grossly intact to light touch.       Lumbar spine:  No rash, ecchymosis, or gross obliquity. No fasciculations. No focal atrophy is noted. No pain with hip ROM. Full range of motion. No tenderness to palpation. No tenderness to palpation at the sciatic notch. SI joints non-tender. Trochanters non tender.      Sensation in the bilateral legs grossly intact to light touch. MOTOR:      Biceps  Triceps Deltoids Wrist Ext Wrist Flex Hand Intrin   Right 5/5 5/5 5/5 5/5 5/5 5/5   Left 5/5 5/5 5/5 5/5 5/5 5/5             Hip Flex  Quads Hamstrings Ankle DF EHL Ankle PF   Right 5/5 5/5 5/5 5/5 5/5 5/5   Left 5/5 5/5 5/5 5/5 5/5 5/5     DTRs are 2+ biceps, triceps, brachioradialis, patella, and Achilles.      Negative Straight Leg raise. Squat not tested. No difficulty with tandem gait.       Ambulation without assistive device. FWB. RADIOGRAPHS  Cervical CT images taken on 10/17/2017 personally reviewed with patient:  FINDINGS:      There is narrowing at the atlantooccipital joints bilaterally with hypertrophic  bone formation/calcification at the joint space between the basion and C1  anterior arch. Calcific soft tissue \"mass\" posterior to the dens. There is  calcification of the transverse ligaments dorsal C2. There is significant  artifact at the foramen magnum limiting assessment for narrowing but  calcification appears to efface the thecal sac without significant mass effect  as assessed on axial view. There is sclerosis at the cranial dens with mild  hypertrophic bone anteriorly and posteriorly. Calcified soft tissue mass also  anterior to the dens. There is loss of normal cervical lordosis. There is  narrowing at the C1-C2 anterior arch.  No separation at C1-2. 2 mm anterior  subluxation C2 on C3, 1 mm anterior subluxation C3 on C4 and 1 mm anterior  subluxation C4 on C5. There is stenosis at C2-3 measuring up to 7 mm secondary  to disc bulge and anterior subluxation with mass effect on the posterior cord. There may be mild loss of vertebral body height at C5 and C6 with sclerosis at  C6-7 joint space and severe disc space narrowing. Additional severe disc space  narrowing C5-6. There is mild anterior calcifications C4-C6. Disc bulge C4-5  with mild canal narrowing up to 11 mm. Disc bulge C6-7 with narrowing up to 7  mm. Significant facet hypertrophy. No acute fracture. Severe stenosis right C3-4  and C4-5 neural foramina. Severe stenosis left C3-4, C4-5 and C6-7 neural  foramina. Moderate stenosis right C2-3, C5-6, C6-7 and left to 3 C5-6. There is  soft tissue thickening with mild calcifications at the T1 supraspinous ligament.      There is prominence of the lingual tonsils. Significant biapical pleural  scarring.      IMPRESSION  IMPRESSION:        1.  Hypertrophic ossification and calcification basion/C1 with partial fusion,  calcified \"pseudomass\" posterior greater than anterior dens with consideration  for CPPD/gout or degenerative changes. No significant narrowing at the foramen  magnum. 2.  Mild regions of anterior subluxation C2-C4 with associated canal stenosis  C2-3 up to 7 mm. 3.  Multifocal degenerative disc disease most significant at C5-7 with  associated canal stenosis. 4.  Significant facet hypertrophy with multifocal regions of bilateral severe  neural foramina stenosis. 5.  Prominent lingual tonsils. 6.  Significant biapical pulmonary pleural scarring. 20 minutes of face-to-face contact were spent with the patient during today's visit extensively discussing symptoms and treatment plan. All questions were answered. More than half of this visit today was spent on counseling.      Written by Siomara Lara as dictated by Sylvia Rowe MD

## 2018-10-30 NOTE — PATIENT INSTRUCTIONS
Rotator Cuff: Exercises  Your Care Instructions  Here are some examples of typical rehabilitation exercises for your condition. Start each exercise slowly. Ease off the exercise if you start to have pain. Your doctor or physical therapist will tell you when you can start these exercises and which ones will work best for you. How to do the exercises  Pendulum swing    1. Hold on to a table or the back of a chair with your good arm. Then bend forward a little and let your sore arm hang straight down. This exercise does not use the arm muscles. Rather, use your legs and your hips to create movement that makes your arm swing freely. 2. Use the movement from your hips and legs to guide the slightly swinging arm back and forth like a pendulum (or elephant trunk). Then guide it in circles that start small (about the size of a dinner plate). Make the circles a bit larger each day, as your pain allows. 3. Do this exercise for 5 minutes, 5 to 7 times each day. 4. As you have less pain, try bending over a little farther to do this exercise. This will increase the amount of movement at your shoulder. Posterior stretching exercise    1. Hold the elbow of your injured arm with your other hand. 2. Use your hand to pull your injured arm gently up and across your body. You will feel a gentle stretch across the back of your injured shoulder. 3. Hold for at least 15 to 30 seconds. Then slowly lower your arm. 4. Repeat 2 to 4 times. Up-the-back stretch    1. Put your hand in your back pocket. Let it rest there to stretch your shoulder. 2. With your other hand, hold your injured arm (palm outward) behind your back by the wrist. Pull your arm up gently to stretch your shoulder. 3. Next, put a towel over your other shoulder. Put the hand of your injured arm behind your back. Now hold the back end of the towel. With the other hand, hold the front end of the towel in front of your body.  Pull gently on the front end of the towel. This will bring your hand farther up your back to stretch your shoulder. Overhead stretch    1. Standing about an arm's length away, grasp onto a solid surface. You could use a countertop, a doorknob, or the back of a sturdy chair. 2. With your knees slightly bent, bend forward with your arms straight. Lower your upper body, and let your shoulders stretch. 3. As your shoulders are able to stretch farther, you may need to take a step or two backward. 4. Hold for at least 15 to 30 seconds. Then stand up and relax. If you had stepped back during your stretch, step forward so you can keep your hands on the solid surface. 5. Repeat 2 to 4 times. Shoulder flexion (lying down)    1. Lie on your back, holding a wand with both hands. Your palms should face down as you hold the wand. 2. Keeping your elbows straight, slowly raise your arms over your head. Raise them until you feel a stretch in your shoulders, upper back, and chest.  3. Hold for 15 to 30 seconds. 4. Repeat 2 to 4 times. Shoulder rotation (lying down)    1. Lie on your back. Hold a wand with both hands with your elbows bent and palms up. 2. Keep your elbows close to your body, and move the wand across your body toward the sore arm. 3. Hold for 8 to 12 seconds. 4. Repeat 2 to 4 times. Wall climbing (to the side)    1. Stand with your side to a wall so that your fingers can just touch it at an angle about 30 degrees toward the front of your body. 2. Walk the fingers of your injured arm up the wall as high as pain permits. Try not to shrug your shoulder up toward your ear as you move your arm up. 3. Hold that position for a count of at least 15 to 20.  4. Walk your fingers back down to the starting position. 5. Repeat at least 2 to 4 times. Try to reach higher each time. Wall climbing (to the front)    1. Face a wall, and stand so your fingers can just touch it.   2. Keeping your shoulder down, walk the fingers of your injured arm up the wall as high as pain permits. (Don't shrug your shoulder up toward your ear.)  3. Hold your arm in that position for at least 15 to 30 seconds. 4. Slowly walk your fingers back down to where you started. 5. Repeat at least 2 to 4 times. Try to reach higher each time. Shoulder blade squeeze    1. Stand with your arms at your sides, and squeeze your shoulder blades together. Do not raise your shoulders up as you squeeze. 2. Hold 6 seconds. 3. Repeat 8 to 12 times. Scapular exercise: Arm reach    1. Lie flat on your back. This exercise is a very slight motion that starts with your arms raised (elbows straight, arms straight). 2. From this position, reach higher toward the panda or ceiling. Keep your elbows straight. All motion should be from your shoulder blade only. 3. Relax your arms back to where you started. 4. Repeat 8 to 12 times. Arm raise to the side    1. Slowly raise your injured arm to the side, with your thumb facing up. Raise your arm 60 degrees at the most (shoulder level is 90 degrees). 2. Hold the position for 3 to 5 seconds. Then lower your arm back to your side. If you need to, bring your \"good\" arm across your body and place it under the elbow as you lower your injured arm. Use your good arm to keep your injured arm from dropping down too fast.  3. Repeat 8 to 12 times. 4. When you first start out, don't hold any extra weight in your hand. As you get stronger, you may use a 1-pound to 2-pound dumbbell or a small can of food. Shoulder flexor and extensor exercise    1. Push forward (flex): Stand facing a wall or doorjamb, about 6 inches or less back. Hold your injured arm against your body. Make a closed fist with your thumb on top. Then gently push your hand forward into the wall with about 25% to 50% of your strength. Don't let your body move backward as you push. Hold for about 6 seconds. Relax for a few seconds. Repeat 8 to 12 times.   2. Push backward (extend): Stand with your back flat against a wall. Your upper arm should be against the wall, with your elbow bent 90 degrees (your hand straight ahead). Push your elbow gently back against the wall with about 25% to 50% of your strength. Don't let your body move forward as you push. Hold for about 6 seconds. Relax for a few seconds. Repeat 8 to 12 times. Scapular exercise: Wall push-ups    1. Stand facing a wall, about 12 inches to 18 inches away. 2. Place your hands on the wall at shoulder height. 3. Slowly bend your elbows and bring your face to the wall. Keep your back and hips straight. 4. Push back to where you started. 5. Repeat 8 to 12 times. 6. When you can do this exercise against a wall comfortably, you can try it against a counter. You can then slowly progress to the end of a couch, then to a sturdy chair, and finally to the floor. Scapular exercise: Retraction    1. Put the band around a solid object at about waist level. (A bedpost will work well.) Each hand should hold an end of the band. 2. With your elbows at your sides and bent to 90 degrees, pull the band back. Your shoulder blades should move toward each other. Then move your arms back where you started. 3. Repeat 8 to 12 times. 4. If you have good range of motion in your shoulders, try this exercise with your arms lifted out to the sides. Keep your elbows at a 90-degree angle. Raise the elastic band up to about shoulder level. Pull the band back to move your shoulder blades toward each other. Then move your arms back where you started. Internal rotator strengthening exercise    1. Start by tying a piece of elastic exercise material to a doorknob. You can use surgical tubing or Thera-Band. 2. Stand or sit with your shoulder relaxed and your elbow bent 90 degrees. Your upper arm should rest comfortably against your side. Squeeze a rolled towel between your elbow and your body for comfort. This will help keep your arm at your side.   3. Hold one end of the elastic band in the hand of the painful arm. 4. Slowly rotate your forearm toward your body until it touches your belly. Slowly move it back to where you started. 5. Keep your elbow and upper arm firmly tucked against the towel roll or at your side. 6. Repeat 8 to 12 times. External rotator strengthening exercise    1. Start by tying a piece of elastic exercise material to a doorknob. You can use surgical tubing or Thera-Band. (You may also hold one end of the band in each hand.)  2. Stand or sit with your shoulder relaxed and your elbow bent 90 degrees. Your upper arm should rest comfortably against your side. Squeeze a rolled towel between your elbow and your body for comfort. This will help keep your arm at your side. 3. Hold one end of the elastic band with the hand of the painful arm. 4. Start with your forearm across your belly. Slowly rotate the forearm out away from your body. Keep your elbow and upper arm tucked against the towel roll or the side of your body until you begin to feel tightness in your shoulder. Slowly move your arm back to where you started. 5. Repeat 8 to 12 times. Follow-up care is a key part of your treatment and safety. Be sure to make and go to all appointments, and call your doctor if you are having problems. It's also a good idea to know your test results and keep a list of the medicines you take. Where can you learn more? Go to http://karin-michaela.info/. Enter Gabby Singer in the search box to learn more about \"Rotator Cuff: Exercises. \"  Current as of: November 29, 2017  Content Version: 11.8  © 0660-0760 [employer]. Care instructions adapted under license by [employer] (which disclaims liability or warranty for this information).  If you have questions about a medical condition or this instruction, always ask your healthcare professional. [address]  any warranty or liability for your use of this information.

## 2018-10-31 ENCOUNTER — APPOINTMENT (OUTPATIENT)
Dept: GENERAL RADIOLOGY | Age: 73
End: 2018-10-31
Attending: PHYSICAL MEDICINE & REHABILITATION
Payer: MEDICARE

## 2018-10-31 ENCOUNTER — APPOINTMENT (OUTPATIENT)
Dept: PHYSICAL THERAPY | Age: 73
End: 2018-10-31
Payer: MEDICARE

## 2018-10-31 ENCOUNTER — HOSPITAL ENCOUNTER (OUTPATIENT)
Age: 73
Setting detail: OUTPATIENT SURGERY
Discharge: HOME | End: 2018-10-31
Attending: PHYSICAL MEDICINE & REHABILITATION | Admitting: PHYSICAL MEDICINE & REHABILITATION
Payer: MEDICARE

## 2018-10-31 VITALS
SYSTOLIC BLOOD PRESSURE: 124 MMHG | HEART RATE: 84 BPM | DIASTOLIC BLOOD PRESSURE: 72 MMHG | TEMPERATURE: 98.2 F | RESPIRATION RATE: 18 BRPM | OXYGEN SATURATION: 100 % | BODY MASS INDEX: 21.14 KG/M2 | WEIGHT: 112 LBS | HEIGHT: 61 IN

## 2018-10-31 PROCEDURE — 74011250636 HC RX REV CODE- 250/636

## 2018-10-31 PROCEDURE — 76010000009 HC PAIN MGT 0 TO 30 MIN PROC: Performed by: PHYSICAL MEDICINE & REHABILITATION

## 2018-10-31 PROCEDURE — 74011250637 HC RX REV CODE- 250/637: Performed by: PHYSICAL MEDICINE & REHABILITATION

## 2018-10-31 PROCEDURE — 77030029505: Performed by: PHYSICAL MEDICINE & REHABILITATION

## 2018-10-31 RX ORDER — LIDOCAINE HYDROCHLORIDE 10 MG/ML
INJECTION, SOLUTION EPIDURAL; INFILTRATION; INTRACAUDAL; PERINEURAL AS NEEDED
Status: DISCONTINUED | OUTPATIENT
Start: 2018-10-31 | End: 2018-11-08 | Stop reason: HOSPADM

## 2018-10-31 RX ORDER — DIAZEPAM 5 MG/1
5-20 TABLET ORAL ONCE
Status: COMPLETED | OUTPATIENT
Start: 2018-10-31 | End: 2018-10-31

## 2018-10-31 RX ADMIN — DIAZEPAM 10 MG: 5 TABLET ORAL at 09:22

## 2018-10-31 NOTE — DISCHARGE INSTRUCTIONS
[address] [place] [address] for Pain Management      Post Procedures Instructions    *Resume Diet and Activity as tolerated. Rest for the remainder of the day. *You may fell worse before you feel better as the numbing medications wear off before the steroids take effect if used for your procedures. *Do not use affected extremity until numbness or loss of sensation has completely resolved without assistance. *DO NOT DRIVE, operate machinery/heavey equipment for 24 hours. *DO NOT DRINK ALCOHOL for 24 hours as it may interact with the sedation if you received it and also thins your blood and may cause you to bleed. *WAIT 24 hours before starting back ANY Blood thinning medications:   (Heparin, Coumadin, Warfarin, Lovenox, Plavix, Aggrenox)    *Resume Pre-Procedure Medications as prescribed except Blood Thinners unless directed by your Physician or Cardiologist.     *Avoid Hot tubs and Heating pad for 24 hours to prevent dissipation of medications, you may shower to remove bandages and remaining prep residue on the skin. * If you develop a Headache, drink plenty of fluids including beverages with caffeine (Coffee, Mt. Dew etc.) and rest.  If the headache persists longer than 24 hoursor intensifies - Please call [facility]) [phone]    * If you are DIABETIC, check your blood sugar three times a day for the next three days, the steroids will increase your blood sugar. If your blood sugar is greater than 400 have someone drive you to the nearest [email or web address]. * If you experience any of the following problems, call the [facility] [phone] between 8:00 am - 4:30pm or After Hours [phone].     Shortness of breath    Fever of 101 F or higher    Nausea / Vomiting (not normal to you)    Increasing stiffness in the neck    Weakness or numbness in the arms or legs that is not resolving    Prolonged and increasing pain > than 4 days    ANYTHING OUT of the ORDINARY TO YOU    If YOU are experiencing a severe reaction / complication that you have never had before post procedure, call 911 or go to the nearest emergency room! All patients must have a  for transportation [place] regardless if you do or do not receive sedation. DISCHARGE SUMMARY from Nurse      PATIENT INSTRUCTIONS:    After Oral  or intravenous sedation, for 24 hours or while taking prescription Narcotics:  · Limit your activities  · Do not drive and operate hazardous machinery  · Do not make important personal or business decisions  · Do  not drink alcoholic beverages  · If you have not urinated within 8 hours after discharge, please contact your surgeon on call. Report the following to your surgeon:  · Excessive pain, swelling, redness or odor of or around the surgical area  · Temperature over 101  · Nausea and vomiting lasting longer than 4 hours or if unable to take medications  · Any signs of decreased circulation or nerve impairment to extremity: change in color, persistent  numbness, tingling, coldness or increase pain  · Any questions        What to do at Home:  Recommended activity: Activity as tolerated, NO DRIVING FOR 24 Hours post injection          *  Please give a list of your current medications to your Primary Care Provider. *  Please update this list whenever your medications are discontinued, doses are      changed, or new medications (including over-the-counter products) are added. *  Please carry medication information at all times in case of emergency situations. These are general instructions for a healthy lifestyle:    No smoking/ No tobacco products/ Avoid exposure to second hand smoke    Surgeon General's Warning:  Quitting smoking now greatly reduces serious risk to your health.     Obesity, smoking, and sedentary lifestyle greatly increases your risk for illness    A healthy diet, regular physical exercise & weight monitoring are important for maintaining a healthy lifestyle    You may be retaining fluid if you have a history of heart failure or if you experience any of the following symptoms:  Weight gain of 3 pounds or more overnight or 5 pounds in a week, increased swelling in our hands or feet or shortness of breath while lying flat in bed. Please call your doctor as soon as you notice any of these symptoms; do not wait until your next office visit. Recognize signs and symptoms of STROKE:    F-face looks uneven    A-arms unable to move or move unevenly    S-speech slurred or non-existent    T-time-call 911 as soon as signs and symptoms begin-DO NOT go       Back to bed or wait to see if you get better-TIME IS BRAIN.

## 2018-10-31 NOTE — PROGRESS NOTES
Patient unable to tolerate procedure. Staff assist off of table and to vehicle via w/c in stable condition. No signs of distress.

## 2018-10-31 NOTE — PROCEDURES
[facility]  Brief Pre-Procedure History & Physical    PATIENT NAME:  Gennaro Kehr Carney   YOB: 1945  DATE OF SERVICE:  10/31/2018      CHIEF COMPLAINT:  Pain    HISTORY OF PRESENT ILLNESS:  Patti Long presents today for a previously diagnosed problem contributing to some or all of this patients pain. The location and pattern of the pain has not changed substantially since the last visit in our office. No other significant medical changes have occurred in the last 30 days. PAST MEDICAL HISTORY:  The patient  has a past medical history of Abnormal Pap smear, Allergic rhinitis, Arthritis, Chronic pain, Hypercholesterolemia, Neck pain, and Stroke ([facility] [address] PAST SURGICAL HISTORY:  The patient  has a past surgical history that includes hx gyn; hx lap cholecystectomy; hx other surgical (2017); COLONOSCOPY / polypectomy (N/A, 6/4/2018); LEFT C4,C5,C6 RADIO FREQUENCY ABLATION/C-ARM (Left, 5/14/2018); RIGHT C4,C5,C6 RADIO FREQUENCY ABLATION/C-ARM (Right, 4/30/2018); BILATERAL CERVICAL MEDIAL BRANCH BLOCK AT C4 C5 C6/C-ARM (Bilateral, 4/23/2018); BILATERAL C4,C5,C6 MEDIAL BRANCH BLOCK/C-ARM (Bilateral, 4/4/2018); C7/T1 INTERLAMINAR EPIDURAL STEROID INJECTION/C-ARM (N/A, 3/6/2018); ENDOSCOPIC BRONCHOSCOPY ULTRASOUND (EBUS)/C-ARM w slides w cytolyte (N/A, 2/28/2018); BRONCHOSCOPY w washings w BAL (N/A, 2/28/2018); C7/T1 INTERLAMINAR EPIDURAL STEROID INJECTION/C-ARM (N/A, 1/31/2018); and ENDOSCOPY with Bx's & Dilation 52Fr (N/A, 1/23/2018). CURRENT MEDICATIONS:  See Medication Administration Record [facility]) in the patient's electronic record. ALLERGIES:    Allergies   Allergen Reactions    Celebrex [Celecoxib] Other (comments)     Tiredness        FAMILY HISTORY:  The patient family history includes Cancer in her mother; Heart Disease in her father. SOCIAL HISTORY:  The patient  reports that  has never smoked.  she has never used smokeless tobacco. The patient reports that she does not drink alcohol. She  reports that she does not use drugs. REVIEW OF SYSTEMS:  Silver Nicole denies any fever, chills, unexplained weight loss, use of antibiotics for recent infection or bleeding abnormalities. PHYSICAL EXAM:  VS:   Visit Vitals  /83 (BP 1 Location: Left arm, BP Patient Position: Sitting)   Pulse 77   Temp 98.2 °F (36.8 °C)   Resp 14   Ht 5' 1\" (1.549 m)   Wt 50.8 kg (112 lb)   SpO2 99%   BMI 21.16 kg/m²     Gen: Well-developed. Body habitus consistent with recorded height and weight and the calculated BMI. No apparent distress. Head: Normocephalic, atraumatic. Skin: No obvious rashes, lesions or infection. Pulm: Respirations are even and unlabored. Psych:    Mood, affect and speech - Appropriate. Mallampati III, decreased AROM for cervical rotation bilat,, ext, and flex. Increased cervical pain with cervical extension. Thyromental distance is 3 fingers. Mouth opening is 2.5 fingers     ASSESSMENT:   1. Stable for cervical interventional pain procedure as discussed. Risks, benefits, alternatives were discussed and all questions were answered. PLAN:  Proceed with scheduled procedure. Pastor Kel DO 10/31/2018 10:03 AM      [facility]    RADIOFREQUENCY THERMOCOAGULATION   PROCEDURE REPORT      PATIENT:  Bello Maguire OF BIRTH:  1945  DATE OF SERVICE:  10/31/2018  [facility]    PRE-PROCEDURE DIAGNOSIS: M47.182    POST-PROCEDURE DIAGNOSIS: M47.182                PROCEDURE:    1. Right radiofrequency thermocoagulation of cervical medial branch nerves,  C4/C5, C5/C6 ([id number], [id number]) the procedure was aborted  2. Fluoroscopic needle guidance (spinal) (29546)    ANESTHESIA:  Local with oral sedation/anxiolytic. See Medication Administration Record for specific medications and dosage. COMPLICATIONS: None.     PHYSICIAN:  Vincent Sheriff Julianne Kaur DO    PRE-PROCEDURE NOTE:  Pre-procedural assessment of the patient was performed including a limited history and physical examination. The details of the procedure were discussed with the patient, including the risks, benefits and alternative options and an informed consent was obtained. The patients NPO status and availability of a responsible adult to escort the patient following the procedure were confirmed. PROCEDURE NOTE:  The patient was brought to the procedure suite and positioned on the fluoroscopy table in the prone position. Physiologic monitors were applied and supplemental oxygen was administered via nasal cannula. The skin was prepped in the standard surgical fashion and sterile drapes were applied over the procedure site. Please refer to the Flowsheet for documentation of the patients vital signs and the Medication Administration Record for any oral and/or intravenous sedation administered prior to or during the procedure. 1% Lidocaine was utilized for local anesthesia. For the C4-C5-C6 levels, under AP fluoroscopic guidance a 10cm 20-gauge radiofrequency needle with a 10 mm curved active tip was advanced from the posterior approach to the apex of the waist of the articular pillar at each of the above-listed levels. The patient had difficulty remaining still during needle placement. She also had advanced osteoarthritis requiring even more precise needle placement. During needle placement during the initial AP projection as the patient continuously contracted the cervical musculature it was determined that we cannot proceed. She was displacing the previously arranged needles with every muscle contraction and there was still a lot of action remaining in the procedure. The procedure was halted at this juncture. Then, all needles were removed intact. The area was thoroughly cleaned and sterile bandages applied as necessary.  The patient remained stable throughout the procedure. POST-PROCEDURE COURSE:   The patient was escorted from the procedure suite in satisfactory condition and recovered per facility protocol based on the type of procedure performed and/or the sedation utilized. The patient did not experience any adverse events and remained hemodynamically stable during the post-procedure period. DISCHARGE NOTE:  Upon discharge, the patient was able to tolerate fluids and was in no acute distress. The patient was oriented to person, place and time and vital signs were stable. Appropriate post-procedure instructions were provided and explained to the patient in detail and all questions were answered. Patient was advised that we will need to consider performing the procedure with an anesthesiologist in the OR secondary to the need for sedation and her limited airway. Another consideration would be to refer the patient to a different interventional list who has capabilities perform the procedures with sedation.       Darius Cardona DO 10/31/2018 10:05 AM

## 2018-11-02 ENCOUNTER — HOSPITAL ENCOUNTER (OUTPATIENT)
Dept: PHYSICAL THERAPY | Age: 73
Discharge: HOME | End: 2018-11-02
Payer: MEDICARE

## 2018-11-02 ENCOUNTER — DOCUMENTATION ONLY (OUTPATIENT)
Dept: NEUROLOGY | Age: 73
End: 2018-11-02

## 2018-11-02 PROCEDURE — G8979 MOBILITY GOAL STATUS: HCPCS

## 2018-11-02 PROCEDURE — 97110 THERAPEUTIC EXERCISES: CPT

## 2018-11-02 PROCEDURE — G8978 MOBILITY CURRENT STATUS: HCPCS

## 2018-11-02 NOTE — PROGRESS NOTES
[facility] - Josie Watts  [address] [facility]  [phone] fax    Physical Therapy Discharge Summary    Patient name: Davy Westbrook Start of Care:  9/10/18   Referral source: Leeann Wan, * : 1945   Medical/Treatment Diagnosis: Dizziness and giddiness [R42]  Payor: [place] MEDICARE / Plan: [place] MEDICARE PART A & B / Product Type: Medicare /  Onset Date: 2 weeks ago     Prior Hospitalization: see medical history Provider#: [id number]   Medications: Verified on Patient Summary List    Comorbidities:  TIA 10/02/17. CS Stenosis, Arthritis, Panic Attacks. Prior Level of Function: Independent. Lives in a 2 story home. Jessica Mars a [employer] [place]. Visits from Start of Care: 13    Missed Visits: 0    Reporting Period : 10/9/18 to 18    Summary of Care:  Goal: Pt will report 70% improvement in balance to continue to be a safe community ambulator. Status at last note/certification: n/a  Status at discharge: not met    Goal: Pt will negotiate an obstacle course w/o LOB to ease with safe return to community ambulation. Status at last note/certification: unable  Status at discharge: met    Pt. Was progressing with physical therapy. She reports no significant change in symptoms since [facility], but per last progress note did have significant change in SOT, FOTO, and DHI scores. She does demonstrate improving static standing balance and has increased ease of negotiating obstacle course. She was educated on continuing with her HEP following D/C. G-Codes (GP)  Mobility   [id number] Goal  CK= 40-59%  D/C  CJ= 20-39%  The severity rating is based on clinical judgment and the FOTO score.       ASSESSMENT/RECOMMENDATIONS:  [x]Discontinue therapy: [x]Patient has reached or is progressing toward set goals      []Patient is non-compliant or has abdicated      []Due to lack of appreciable progress towards set goals    Emily Shah, PT 2018 6:06 PM

## 2018-11-02 NOTE — PROGRESS NOTES
PT DAILY TREATMENT NOTE 10-18    Patient Name: Hillard Landau  Date:2018  : 1945  [x]  Patient  Verified  Payor: VA MEDICARE / Plan: VA MEDICARE PART A & B / Product Type: Medicare /    In time: 2:00  Out time: 2:30  Total Treatment Time (min):  30  Visit #: 4 of 4    Medicare/BCBS Only   Total Timed Codes (min):  30 1:1 Treatment Time:  30       Treatment Area: Dizziness and giddiness [R42]    SUBJECTIVE  Pain Level (0-10 scale):  7/10  Any medication changes, allergies to medications, adverse drug reactions, diagnosis change, or new procedure performed?: [x] No    [] Yes (see summary sheet for update)  Subjective functional status/changes:   [] No changes reported  Pt. Reports she is still having a lot of neck pain. She is ready for D/C today. OBJECTIVE    30 min Therapeutic Exercise:  [x] See flow sheet :   Rationale: increase strength, improve coordination and improve balance to improve the patients ability to increase ease of ADLs          With   [x] TE   [] TA   [] neuro   [] other: Patient Education: [x] Review HEP    [] Progressed/Changed HEP based on:   [] positioning   [] body mechanics   [] transfers   [] heat/ice application    [] other:      Other Objective/Functional Measures:   % improvement since SOC: none  Pt.  Went around obstacle course without LOB including small hurdles and standing on compliant surfaces  She was educated on continuing with her HEP following D/C     Pain Level (0-10 scale) post treatment: 7/10    ASSESSMENT/Changes in Function:    See D/C note         Progress towards goals / Updated goals:  See d/C note    PLAN  []  Upgrade activities as tolerated     []  Continue plan of care  []  Update interventions per flow sheet       [x]  Discharge due to:_ progress towards goals  []  Other:_      Liz Ryder, PT 2018  1:57 PM    Future Appointments   Date Time Provider Gaby Delarosa   2018  2:00 PM Denia Metcalf, PT [facility]   2018 3:00 PM Mario Saravia MD [address]   12/27/2018 11:30 AM Sangeeta High  [address]   1/15/2019  8:30 AM Ryan Suárez  Mohinder Hernandes,  [address] [place]

## 2018-11-07 ENCOUNTER — DOCUMENTATION ONLY (OUTPATIENT)
Dept: INTERNAL MEDICINE CLINIC | Age: 73
End: 2018-11-07

## 2018-11-07 NOTE — PROGRESS NOTES
Pharmacy Note: Immunization Update    Patient: Naomie Aguilera (72 y.o., 1945)     Patient's immunization history was updated to reflect information contained in the [place] and/or outside immunization/pharmacy records were reconciled within [facility]. Health Maintenance schedule updated when appropriate.     Current immunizations now reflect:       Immunization History   Administered Date(s) Administered    Influenza Vaccine (Tri) Adjuvanted 09/04/2018    Zoster Recombinant 07/03/2018, 10/19/2018       Kali Stewart, ClaudiaD, BCACP

## 2018-11-08 ENCOUNTER — OFFICE VISIT (OUTPATIENT)
Dept: INTERNAL MEDICINE CLINIC | Age: 73
End: 2018-11-08

## 2018-11-08 ENCOUNTER — OFFICE VISIT (OUTPATIENT)
Dept: NEUROLOGY | Age: 73
End: 2018-11-08

## 2018-11-08 VITALS
BODY MASS INDEX: 21.67 KG/M2 | SYSTOLIC BLOOD PRESSURE: 132 MMHG | HEART RATE: 66 BPM | OXYGEN SATURATION: 98 % | HEIGHT: 61 IN | DIASTOLIC BLOOD PRESSURE: 80 MMHG | RESPIRATION RATE: 16 BRPM | TEMPERATURE: 97.7 F | WEIGHT: 114.8 LBS

## 2018-11-08 VITALS
DIASTOLIC BLOOD PRESSURE: 68 MMHG | TEMPERATURE: 97.2 F | BODY MASS INDEX: 21.67 KG/M2 | SYSTOLIC BLOOD PRESSURE: 140 MMHG | HEART RATE: 70 BPM | OXYGEN SATURATION: 99 % | HEIGHT: 61 IN | WEIGHT: 114.8 LBS | RESPIRATION RATE: 20 BRPM

## 2018-11-08 DIAGNOSIS — M81.0 OSTEOPOROSIS, UNSPECIFIED OSTEOPOROSIS TYPE, UNSPECIFIED PATHOLOGICAL FRACTURE PRESENCE: ICD-10-CM

## 2018-11-08 DIAGNOSIS — M54.2 CERVICAL PAIN (NECK): Primary | ICD-10-CM

## 2018-11-08 DIAGNOSIS — M48.02 CERVICAL STENOSIS OF SPINAL CANAL: Primary | ICD-10-CM

## 2018-11-08 DIAGNOSIS — R42 VERTIGO: ICD-10-CM

## 2018-11-08 DIAGNOSIS — R27.0 ATAXIA: ICD-10-CM

## 2018-11-08 RX ORDER — ALENDRONATE SODIUM 10 MG/1
10 TABLET ORAL
Qty: 30 TAB | Refills: 2 | Status: SHIPPED | OUTPATIENT
Start: 2018-11-08 | End: 2019-02-13 | Stop reason: SDUPTHER

## 2018-11-08 RX ORDER — TRAMADOL HYDROCHLORIDE 50 MG/1
50 TABLET ORAL
Qty: 12 TAB | Refills: 0 | Status: SHIPPED | OUTPATIENT
Start: 2018-11-08 | End: 2018-11-11

## 2018-11-08 RX ORDER — PREDNISONE 20 MG/1
TABLET ORAL
Qty: 8 TAB | Refills: 0 | Status: SHIPPED | OUTPATIENT
Start: 2018-11-08 | End: 2018-12-18 | Stop reason: ALTCHOICE

## 2018-11-08 NOTE — PROGRESS NOTES
Eve Maicas is a 72 y.o. female presenting today for Neck Pain  . HPI:  Eve Macias presents to the office today for cervical neck pain. The patient states she was scheduled for a pain injection in the cervical neck on October 31, 2018 but could not remain still due to the pain. She notes she was given 10 mg of Valium but the Valium can did not control the pain so that provider stopped the procedure. Patient reports her pain is a level 9 out of 10. She has difficulty with range of motion and is requesting something for pain. She notes she was given a referral for a appointment with Dr. Claudio Calabrese. Review of Systems   Respiratory: Negative for cough. Cardiovascular: Negative for chest pain and palpitations. Gastrointestinal: Negative for nausea and vomiting. Musculoskeletal: Positive for neck pain. Allergies   Allergen Reactions    Celebrex [Celecoxib] Other (comments)     Tiredness        Current Outpatient Medications   Medication Sig Dispense Refill    traMADol (ULTRAM) 50 mg tablet Take 1 Tab by mouth every six (6) hours as needed for Pain for up to 3 days. Max Daily Amount: 200 mg. 12 Tab 0    predniSONE (DELTASONE) 20 mg tablet 2 tabs daily x 2 days, 1 tab daily x 2 days, 1/2 tab daily x 4 days 8 Tab 0    LORazepam (ATIVAN) 0.5 mg tablet Take 0.5 mg by mouth daily as needed for Anxiety.  montelukast (SINGULAIR) 10 mg tablet take 1 tablet by mouth once daily 90 Tab 2    fluticasone (FLONASE) 50 mcg/actuation nasal spray INSTILL 2 SPRAYS IN EACH NOSTRIL DAILY 16 g 1    alendronate (FOSAMAX) 10 mg tablet Take 1 Tab by mouth Daily (before breakfast). 30 Tab 2    atorvastatin (LIPITOR) 20 mg tablet Take 1 Tab by mouth daily. 90 Tab 3    busPIRone (BUSPAR) 7.5 mg tablet take 1 tablet by mouth twice a day 60 Tab 1    acetaminophen (TYLENOL EXTRA STRENGTH) 500 mg tablet Take 650 mg by mouth every six (6) hours as needed for Pain. Pt states they take medication QID.       loratadine (CLARITIN) 10 mg tablet Take 10 mg by mouth daily as needed for Allergies or Rhinitis.  omeprazole (PRILOSEC) 20 mg capsule Take 20 mg by mouth daily.  aspirin 81 mg chewable tablet Take 1 Tab by mouth daily. 30 Tab 3    diazePAM (VALIUM) 2 mg tablet Take 1 Tab by mouth every eight (8) hours as needed for Anxiety.  Max Daily Amount: 6 mg. 10 Tab 0     Facility-Administered Medications Ordered in Other Visits   Medication Dose Route Frequency Provider Last Rate Last Dose    lidocaine (PF) (XYLOCAINE) 10 mg/mL (1 %) injection    PRN Mirza Abreu D, DO   10 mL at 10/31/18 1010       Past Medical History:   Diagnosis Date    Abnormal Pap smear     Dr. Ethan Davis Allergic rhinitis     Arthritis     Chronic pain     Hypercholesterolemia     Neck pain 5/17/2010    [facility]    TIA- legs weak memory issues       Past Surgical History:   Procedure Laterality Date    HX GYN      Total Hyst    HX LAP CHOLECYSTECTOMY      HX OTHER SURGICAL  2017    Throat widened       Social History     Socioeconomic History    Marital status:      Spouse name: Not on file    Number of children: Not on file    Years of education: Not on file    Highest education level: Not on file   Social Needs    Financial resource strain: Not on file    Food insecurity - worry: Not on file    Food insecurity - inability: Not on file   [employer] needs - medical: Not on file   [employer] needs - non-medical: Not on file   Occupational History    Not on file   Tobacco Use    Smoking status: Never Smoker    Smokeless tobacco: Never Used   Substance and Sexual Activity    Alcohol use: No    Drug use: No    Sexual activity: No   Other Topics Concern    Not on file   Social History Narrative    Not on file       Patient does not have an advanced directive on file    Vitals:    11/08/18 1108   BP: 132/80   Pulse: 66   Resp: 16   Temp: 97.7 °F (36.5 °C)   TempSrc: Tympanic   SpO2: 98% Weight: 114 lb 12.8 oz (52.1 kg)   Height: 5' 1\" (1.549 m)   PainSc:   9   PainLoc: Neck       Physical Exam   Cardiovascular: Normal rate, regular rhythm and normal heart sounds. Pulmonary/Chest: Effort normal and breath sounds normal.   Musculoskeletal: She exhibits tenderness. Cervical back: She exhibits decreased range of motion, tenderness and pain. Neurological: She is alert. Nursing note and vitals reviewed.       Admission on 09/22/2018, Discharged on 09/22/2018   Component Date Value Ref Range Status    Ventricular Rate 09/22/2018 91  BPM Final    Atrial Rate 09/22/2018 91  BPM Final    P-R Interval 09/22/2018 150  ms Final    QRS Duration 09/22/2018 72  ms Final    Q-T Interval 09/22/2018 394  ms Final    QTC Calculation (Bezet) 09/22/2018 484  ms Final    Calculated P Axis 09/22/2018 71  degrees Final    Calculated R Axis 09/22/2018 47  degrees Final    Calculated T Axis 09/22/2018 66  degrees Final    Diagnosis 09/22/2018    Final                    Value:Sinus rhythm  Possible Left atrial enlargement  Borderline ECG  When compared with ECG of 04-JAN-2018 12:44,  No significant change was found    Confirmed by Eddi Smith ([id number]) on 9/22/2018 2:51:37 PM      Sodium 09/22/2018 141  136 - 145 mmol/L Final    Potassium 09/22/2018 3.6  3.5 - 5.5 mmol/L Final    Chloride 09/22/2018 105  100 - 108 mmol/L Final    CO2 09/22/2018 24  21 - 32 mmol/L Final    Anion gap 09/22/2018 12  3.0 - 18 mmol/L Final    Glucose 09/22/2018 113* 74 - 99 mg/dL Final    BUN 09/22/2018 16  7.0 - 18 MG/DL Final    Creatinine 09/22/2018 0.75  0.6 - 1.3 MG/DL Final    BUN/Creatinine ratio 09/22/2018 21* 12 - 20   Final    GFR est AA 09/22/2018 >60  >60 ml/min/1.73m2 Final    GFR est non-AA 09/22/2018 >60  >60 ml/min/1.73m2 Final    Comment: (NOTE)  Estimated GFR is calculated using the Modification of Diet in Renal   Disease (MDRD) Study equation, reported for both  Americans   (GFRAA) and non- Americans (GFRNA), and normalized to 1.73m2   body surface area. The physician must decide which value applies to   the patient. The MDRD study equation should only be used in   individuals age 25 or older. It has not been validated for the   following: pregnant women, patients with serious comorbid conditions,   or on certain medications, or persons with extremes of body size,   muscle mass, or nutritional status.  Calcium 09/22/2018 9.6  8.5 - 10.1 MG/DL Final    WBC 09/22/2018 11.2  4.6 - 13.2 K/uL Final    RBC 09/22/2018 4.20  4. 20 - 5.30 M/uL Final    HGB 09/22/2018 13.3  12.0 - 16.0 g/dL Final    HCT 09/22/2018 39.0  35.0 - 45.0 % Final    MCV 09/22/2018 92.9  74.0 - 97.0 FL Final    MCH 09/22/2018 31.7  24.0 - 34.0 PG Final    MCHC 09/22/2018 34.1  31.0 - 37.0 g/dL Final    RDW 09/22/2018 12.3  11.6 - 14.5 % Final    PLATELET 92/04/7852 238  135 - 420 K/uL Final    MPV 09/22/2018 9.2  9.2 - 11.8 FL Final    NEUTROPHILS 09/22/2018 48  40 - 73 % Final    LYMPHOCYTES 09/22/2018 42  21 - 52 % Final    MONOCYTES 09/22/2018 8  3 - 10 % Final    EOSINOPHILS 09/22/2018 1  0 - 5 % Final    BASOPHILS 09/22/2018 1  0 - 2 % Final    ABS. NEUTROPHILS 09/22/2018 5.4  1.8 - 8.0 K/UL Final    ABS. LYMPHOCYTES 09/22/2018 4.8* 0.9 - 3.6 K/UL Final    ABS. MONOCYTES 09/22/2018 0.9  0.05 - 1.2 K/UL Final    ABS. EOSINOPHILS 09/22/2018 0.1  0.0 - 0.4 K/UL Final    ABS. BASOPHILS 09/22/2018 0.1  0.0 - 0.1 K/UL Final    DF 09/22/2018 AUTOMATED    Final    Protein, total 09/22/2018 7.7  6.4 - 8.2 g/dL Final    Albumin 09/22/2018 4.4  3.4 - 5.0 g/dL Final    Globulin 09/22/2018 3.3  2.0 - 4.0 g/dL Final    A-G Ratio 09/22/2018 1.3  0.8 - 1.7   Final    Bilirubin, total 09/22/2018 0.5  0.2 - 1.0 MG/DL Final    Bilirubin, direct 09/22/2018 0.1  0.0 - 0.2 MG/DL Final    Alk.  phosphatase 09/22/2018 66  45 - 117 U/L Final    AST (SGOT) 09/22/2018 20  15 - 37 U/L Final    ALT (SGPT) 09/22/2018 23  13 - 56 U/L Final    Magnesium 09/22/2018 1.9  1.6 - 2.6 mg/dL Final    Color 09/22/2018 YELLOW    Final    Appearance 09/22/2018 CLEAR    Final    Specific gravity 09/22/2018 1.012  1.005 - 1.030   Final    pH (UA) 09/22/2018 8.5* 5.0 - 8.0   Final    Protein 09/22/2018 NEGATIVE   NEG mg/dL Final    Glucose 09/22/2018 NEGATIVE   NEG mg/dL Final    Ketone 09/22/2018 15* NEG mg/dL Final    Bilirubin 09/22/2018 NEGATIVE   NEG   Final    Blood 09/22/2018 NEGATIVE   NEG   Final    Urobilinogen 09/22/2018 0.2  0.2 - 1.0 EU/dL Final    Nitrites 09/22/2018 NEGATIVE   NEG   Final    Leukocyte Esterase 09/22/2018 NEGATIVE   NEG   Final    CK 09/22/2018 80  26 - 192 U/L Final    CK - MB 09/22/2018 1.3  <3.6 ng/ml Final    CK-MB Index 09/22/2018 1.6  0.0 - 4.0 % Final    Troponin-I, Qt. 09/22/2018 <0.02  0.0 - 0.045 NG/ML Final    Comment: The presence of detectable troponin above the reference range indicates myocardial injury which may be due to ischemia, myocarditis, trauma, etc.  Clinical correlation is necessary to establish the significance of this finding. Sequential testing is recommended to determine if the typical rise and fall of cTnI is demonstrated. Note:  Cardiac troponin I has a relatively long half life and may be present well after the CK MB has returned to baseline. The reference range is based on the 99th percentile of the referent population.       Lipase 09/22/2018 246  73 - 393 U/L Final    CK 09/22/2018 91  26 - 192 U/L Final    SLIGHTLY HEMOLYZED SPECIMEN    CK - MB 09/22/2018 <1.0  <3.6 ng/ml Final    CK-MB Index 09/22/2018 CALCULATION NOT PERFORMED WHEN RESULT IS BELOW LINEAR LIMIT  0.0 - 4.0 % Final    Troponin-I, Qt. 09/22/2018 <0.02  0.0 - 0.045 NG/ML Final    Comment: The presence of detectable troponin above the reference range indicates myocardial injury which may be due to ischemia, myocarditis, trauma, etc.  Clinical correlation is necessary to establish the significance of this finding. Sequential testing is recommended to determine if the typical rise and fall of cTnI is demonstrated. Note:  Cardiac troponin I has a relatively long half life and may be present well after the CK MB has returned to baseline. The reference range is based on the 99th percentile of the referent population. Hospital Outpatient Visit on 09/18/2018   Component Date Value Ref Range Status    Target HR 09/18/2018 127  bpm Final    Exercise duration time 09/18/2018 00:04:00   Preliminary    Stress Base Systolic BP 72/24/2997 117  mmHg Preliminary    Stress Base Diastolic BP 00/90/9760 72  mmHg Preliminary    Post peak HR 09/18/2018 112  BPM Preliminary    Baseline HR 09/18/2018 76  BPM Preliminary    Estimated workload 09/18/2018 1.0  METS Preliminary    Percent HR 09/18/2018 88  % Final    Stress Rate Pressure Product 09/18/2018 16,464  BPM*mmHg Final    Baseline BP 09/18/2018 142  mmHg Final    Stress Base Diastolic BP 55/18/2227 76  mmHg Final    TID 09/18/2018 0.70   Final    Nuc Rest EF 09/18/2018 91  % Final   Hospital Outpatient Visit on 08/31/2018   Component Date Value Ref Range Status    Creatinine, POC 08/31/2018 0.7  0.6 - 1.3 MG/DL Final    GFRAA, POC 08/31/2018 >60  >60 ml/min/1.73m2 Final    GFRNA, POC 08/31/2018 >60  >60 ml/min/1.73m2 Final    Comment: Estimated GFR is calculated using the IDMS-traceable Modification of Diet in Renal Disease (MDRD) Study equation, reported for both  Americans (GFRAA) and non- Americans (GFRNA), and normalized to 1.73m2 body surface area. The physician must decide which value applies to the patient. The MDRD study equation should only be used in individuals age 25 or older. It has not been validated for the following: pregnant women, patients with serious comorbid conditions, or on certain medications, or persons with extremes of body size, muscle mass, or nutritional status.          .No results found for any visits on 11/08/18. Assessment / Plan:      ICD-10-CM ICD-9-CM    1. Cervical pain (neck) M54.2 723.1 traMADol (ULTRAM) 50 mg tablet      predniSONE (DELTASONE) 20 mg tablet     Cervical neck pain-prednisone taper dose given, tramadol times 3 days given  Patient has a follow-up appointment with orthopedic doctor  Follow-up PRN    Follow-up Disposition:  Return if symptoms worsen or fail to improve. I asked the patient if she  had any questions and answered her  questions.   The patient stated that she understands the treatment plan and agrees with the treatment plan

## 2018-11-08 NOTE — PROGRESS NOTES
11/8/2018 3:31 PM    SSN: [id number]    Subjective:   53-year-old  female coming for follow-up of dizziness. She had a stroke in October 2017. She woke up with left sided numbness and went to [facility], dx with a stroke at the time. She reports that a couple of times she would be at a store or in bed and she would have a room spinning sensation. She went to [facility] with 3 hrs of symptoms of numbness, she would go in and out of these symptoms, she would also have slurred speech, this went on and off for about 3 hrs. She was evaluated and MRI of the brain did not show acute ischemia. She recovered fully with no deficits. Four months ago she was in a store walking down an aisle and all of a sudden he had a room spinning sensation for 2-3 hrs, it resolved, she went home. The second one happened 1.5 months ago, she became dizzy while driving. Everything was spinning. Went home, took meclizine, started throwing up. Her partner took her to the ER, she received valium, this eventually stopped. She has seen the nail be both, NP for this. She has tried physical therapy, but she has a chronic neck pain syndrome for which she goes to a pain clinic, for which she gets injections, and it is difficult for her to do proper vestibular therapy. She had an MRI of the brain back in October 2017 which again did not show evidence of acute ischemia and a recent one in August with did not show acute ischemia, she does have a significant amount of diffuse microangiopathy  particularly in the yarely. On the sagittal views the upper part of the spine is somewhat visible, and she has a significant amount of kyphosis at the C1 level. A CT of the cervical spine that she had in October 2017 showed changes of \"Hypertrophic ossification and calcification basion/C1 with partial fusion,  calcified \"pseudomass\" posterior greater than anterior dens with consideration  for CPPD/gout or degenerative changes.  No significant narrowing at the foramen  magnum. 2.  Mild regions of anterior subluxation C2-C4 with associated canal stenosis  C2-3 up to 7 mm. 3.  Multifocal degenerative disc disease most significant at C5-7 with  associated canal stenosis. 4.  Significant facet hypertrophy with multifocal regions of bilateral severe  neural foramina stenosis. Diana Mejia An MRA of the brain done when she presented with a presumed TIA in October 2017 was degraded by motion. Social History     Socioeconomic History    Marital status:      Spouse name: Not on file    Number of children: Not on file    Years of education: Not on file    Highest education level: Not on file   Social Needs    Financial resource strain: Not on file    Food insecurity - worry: Not on file    Food insecurity - inability: Not on file    Transportation needs - medical: Not on file   [employer] needs - non-medical: Not on file   Occupational History    Not on file   Tobacco Use    Smoking status: Never Smoker    Smokeless tobacco: Never Used   Substance and Sexual Activity    Alcohol use: No    Drug use: No    Sexual activity: No   Other Topics Concern    Not on file   Social History Narrative    Not on file       Family History   Problem Relation Age of Onset    Cancer Mother         breast    Heart Disease Father        Current Outpatient Medications   Medication Sig Dispense Refill    traMADol (ULTRAM) 50 mg tablet Take 1 Tab by mouth every six (6) hours as needed for Pain for up to 3 days. Max Daily Amount: 200 mg. 12 Tab 0    predniSONE (DELTASONE) 20 mg tablet 2 tabs daily x 2 days, 1 tab daily x 2 days, 1/2 tab daily x 4 days 8 Tab 0    montelukast (SINGULAIR) 10 mg tablet take 1 tablet by mouth once daily 90 Tab 2    fluticasone (FLONASE) 50 mcg/actuation nasal spray INSTILL 2 SPRAYS IN EACH NOSTRIL DAILY 16 g 1    alendronate (FOSAMAX) 10 mg tablet Take 1 Tab by mouth Daily (before breakfast).  30 Tab 2    atorvastatin (LIPITOR) 20 mg tablet Take 1 Tab by mouth daily. 90 Tab 3    busPIRone (BUSPAR) 7.5 mg tablet take 1 tablet by mouth twice a day 60 Tab 1    loratadine (CLARITIN) 10 mg tablet Take 10 mg by mouth daily as needed for Allergies or Rhinitis.  omeprazole (PRILOSEC) 20 mg capsule Take 20 mg by mouth daily.  aspirin 81 mg chewable tablet Take 1 Tab by mouth daily. 30 Tab 3    LORazepam (ATIVAN) 0.5 mg tablet Take 0.5 mg by mouth daily as needed for Anxiety.  diazePAM (VALIUM) 2 mg tablet Take 1 Tab by mouth every eight (8) hours as needed for Anxiety. Max Daily Amount: 6 mg. 10 Tab 0    acetaminophen (TYLENOL EXTRA STRENGTH) 500 mg tablet Take 650 mg by mouth every six (6) hours as needed for Pain. Pt states they take medication QID. Past Medical History:   Diagnosis Date    Abnormal Pap smear     Dr. Severa Flint Allergic rhinitis     Arthritis     Chronic pain     Hypercholesterolemia     Neck pain 5/17/2010    Stroke ([address] 10/02/2017    TIA- legs weak memory issues       Past Surgical History:   Procedure Laterality Date    HX GYN      Total Hyst    HX LAP CHOLECYSTECTOMY      HX OTHER SURGICAL  2017    Throat widened       Allergies   Allergen Reactions    Celebrex [Celecoxib] Other (comments)     Tiredness        Vital signs:    Visit Vitals  /68 (BP 1 Location: Left arm, BP Patient Position: Sitting)   Pulse 70   Temp 97.2 °F (36.2 °C) (Oral)   Resp 20   Ht 5' 1\" (1.549 m)   Wt 52.1 kg (114 lb 12.8 oz)   SpO2 99%   BMI 21.69 kg/m²       Review of Systems:   GENERAL: Denies fever or fatigue  CARDIAC: No CP or SOB  PULMONARY: No cough of SOB  MUSCULOSKELETAL: No new joint pain  NEURO: SEE HPI      EXAM: Alert, in NAD. Heart is regular. Oriented x3, EOM's are full, PERRL, no facial asymmetries. Strength and tone are normal. DTR's +3 with spread and clonus bilat ankles, gait symmetric. Cervical range of motion is markedly diminished to no more than 15-20 degrees of rotation bilaterally. Lateral flexion is substantially diminished as well. It is difficult to do proper vestibular testing given this, but she does not elicit vertigo or nystagmus on bedside vestibular testing. Her walking is a little stiff, but reasonably stable. Assessment/Plan:  most likely her 3 episodes of clear vertigo likely from a peripheral vestibulopathy which is aggravated by significant structural cervical spine disease. She has significant abnormalities on the upper part of the cervical spine that have not been properly visualized on CT of the spine. These issues of her neck also limit her ability to do proper vestibular rehabilitation. Given that some of her vertigo spells have occurred while walking and not clearly positional, given the reports at least once of slurred speech with them, and the fact that she does have cerebrovascular disease on her imaging, it is still possible this could be vertebrobasilar insufficiency. With this in mind, I will get an MRI of the cervical spine and a CT angiogram of the head and neck. I advised her to continue trying to do cervical exercises and vestibular exercises as much as possible. I will see her in 1 month after her tests. 25 out of 40 minutes were spent counseling on vestibular and cervical exercises, on her past workup, on that the difference between peripheral and central causes of vertigo, and potential treatments that could come out of pending workup. PLEASE NOTE:   Portions of this document may have been produced using voice recognition software. Unrecognized errors in transcription may be present. This note will not be viewable in [address]

## 2018-11-08 NOTE — PROGRESS NOTES
Naomie Aguilera is a 72 y.o. female in today to discuss EEG results; Napoleon Agarwal, TERESITA patient. Learning assessment previously completed 1/25/2018; primary language is [place]. 1. Have you been to the ER, urgent care clinic since your last visit? Hospitalized since your last visit? No    2. Have you seen or consulted any other health care providers outside of the [address] [place] [address] since your last visit? Include any pap smears or colon screening.  No

## 2018-11-15 ENCOUNTER — HOSPITAL ENCOUNTER (OUTPATIENT)
Dept: MRI IMAGING | Age: 73
Discharge: HOME | End: 2018-11-15
Attending: PSYCHIATRY & NEUROLOGY
Payer: MEDICARE

## 2018-11-15 ENCOUNTER — HOSPITAL ENCOUNTER (OUTPATIENT)
Dept: CT IMAGING | Age: 73
Discharge: HOME | End: 2018-11-15
Attending: PSYCHIATRY & NEUROLOGY
Payer: MEDICARE

## 2018-11-15 DIAGNOSIS — R27.0 ATAXIA: ICD-10-CM

## 2018-11-15 DIAGNOSIS — M48.02 CERVICAL STENOSIS OF SPINAL CANAL: ICD-10-CM

## 2018-11-15 DIAGNOSIS — R42 VERTIGO: ICD-10-CM

## 2018-11-15 LAB — CREAT UR-MCNC: 0.7 MG/DL (ref 0.6–1.3)

## 2018-11-15 PROCEDURE — 72141 MRI NECK SPINE W/O DYE: CPT

## 2018-11-15 PROCEDURE — 70498 CT ANGIOGRAPHY NECK: CPT

## 2018-11-15 PROCEDURE — 82565 ASSAY OF CREATININE: CPT

## 2018-11-15 PROCEDURE — 74011636320 HC RX REV CODE- 636/320: Performed by: PSYCHIATRY & NEUROLOGY

## 2018-11-15 RX ADMIN — IOPAMIDOL 80 ML: 755 INJECTION, SOLUTION INTRAVENOUS at 08:00

## 2018-11-26 DIAGNOSIS — F41.9 ANXIETY: ICD-10-CM

## 2018-11-26 RX ORDER — BUSPIRONE HYDROCHLORIDE 7.5 MG/1
TABLET ORAL
Qty: 60 TAB | Refills: 1 | Status: SHIPPED | OUTPATIENT
Start: 2018-11-26 | End: 2019-01-16 | Stop reason: SDUPTHER

## 2018-11-26 NOTE — TELEPHONE ENCOUNTER
Requested Prescriptions     Pending Prescriptions Disp Refills    busPIRone (BUSPAR) 7.5 mg tablet 60 Tab 1     Sig: take 1 tablet by mouth twice a day

## 2018-11-27 DIAGNOSIS — R05.9 COUGH: ICD-10-CM

## 2018-11-27 RX ORDER — FLUTICASONE PROPIONATE 50 MCG
SPRAY, SUSPENSION (ML) NASAL
Qty: 16 G | Refills: 1 | Status: SHIPPED | OUTPATIENT
Start: 2018-11-27 | End: 2018-12-18 | Stop reason: SDUPTHER

## 2018-12-06 ENCOUNTER — OFFICE VISIT (OUTPATIENT)
Dept: NEUROLOGY | Age: 73
End: 2018-12-06

## 2018-12-06 VITALS
BODY MASS INDEX: 23.16 KG/M2 | HEART RATE: 70 BPM | TEMPERATURE: 98.5 F | SYSTOLIC BLOOD PRESSURE: 130 MMHG | OXYGEN SATURATION: 99 % | RESPIRATION RATE: 18 BRPM | WEIGHT: 118 LBS | DIASTOLIC BLOOD PRESSURE: 70 MMHG | HEIGHT: 60 IN

## 2018-12-06 DIAGNOSIS — M48.02 CERVICAL STENOSIS OF SPINAL CANAL: Primary | ICD-10-CM

## 2018-12-06 DIAGNOSIS — R42 VERTIGO: ICD-10-CM

## 2018-12-06 NOTE — PROGRESS NOTES
Chief Complaint   Patient presents with    Neurologic Problem     Neck and back pain follow up     PHQ over the last two weeks 12/6/2018   PHQ Not Done -   Little interest or pleasure in doing things Not at all   Feeling down, depressed, irritable, or hopeless Not at all   Total Score PHQ 2 0     Fall Risk Assessment, last 12 mths 12/6/2018   Able to walk? Yes   Fall in past 12 months?  No

## 2018-12-06 NOTE — PROGRESS NOTES
12/6/2018 3:27 PM    SSN: [id number]    Subjective: 74-year-old female coming for follow-up of dizziness. I last saw her November 8, 2018. She came with a history of 3 episodes of clear vertigo which I thought was from a peripheral vestibulopathy, but there were questions about the possibility of vertebrobasilar insufficiency. She does have severe cervical stenosis. She had a normal CT angiogram of the head and neck. She had an MRI of the cervical spine which I have personally reviewed with the patient. 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 She continues to have a lot of pain. She has a pending appointment with the pain clinic. She declines physical therapy as she has tried this before and it had not helped.      Social History     Socioeconomic History    Marital status:      Spouse name: Not on file    Number of children: Not on file    Years of education: Not on file    Highest education level: Not on file   Social Needs    Financial resource strain: Not on file    Food insecurity - worry: Not on file    Food insecurity - inability: Not on file    Transportation needs - medical: Not on file   [employer] needs - non-medical: Not on file   Occupational History    Not on file   Tobacco Use    Smoking status: Never Smoker    Smokeless tobacco: Never Used   Substance and Sexual Activity    Alcohol use: No    Drug use: No    Sexual activity: No   Other Topics Concern    Not on file   Social History Narrative    Not on file       Family History   Problem Relation Age of Onset    Cancer Mother         breast    Heart Disease Father        Current Outpatient Medications   Medication Sig Dispense Refill    fluticasone (FLONASE) 50 mcg/actuation nasal spray instill 2 sprays into each nostril daily 16 g 1    busPIRone (BUSPAR) 7.5 mg tablet take 1 tablet by mouth twice a day 60 Tab 1    alendronate (FOSAMAX) 10 mg tablet Take 1 Tab by mouth Daily (before breakfast). 30 Tab 2    LORazepam (ATIVAN) 0.5 mg tablet Take 0.5 mg by mouth daily as needed for Anxiety.  diazePAM (VALIUM) 2 mg tablet Take 1 Tab by mouth every eight (8) hours as needed for Anxiety. Max Daily Amount: 6 mg. 10 Tab 0    montelukast (SINGULAIR) 10 mg tablet take 1 tablet by mouth once daily 90 Tab 2    fluticasone (FLONASE) 50 mcg/actuation nasal spray INSTILL 2 SPRAYS IN EACH NOSTRIL DAILY 16 g 1    atorvastatin (LIPITOR) 20 mg tablet Take 1 Tab by mouth daily. 90 Tab 3    acetaminophen (TYLENOL EXTRA STRENGTH) 500 mg tablet Take 650 mg by mouth every six (6) hours as needed for Pain. Pt states they take medication QID.  loratadine (CLARITIN) 10 mg tablet Take 10 mg by mouth daily as needed for Allergies or Rhinitis.  omeprazole (PRILOSEC) 20 mg capsule Take 20 mg by mouth daily.  aspirin 81 mg chewable tablet Take 1 Tab by mouth daily.  30 Tab 3    predniSONE (DELTASONE) 20 mg tablet 2 tabs daily x 2 days, 1 tab daily x 2 days, 1/2 tab daily x 4 days 8 Tab 0       Past Medical History:   Diagnosis Date    Abnormal Pap smear     Dr. Rowena Plummer    Allergic rhinitis     Arthritis     Chronic pain     Hypercholesterolemia     Neck pain 5/17/2010    Stroke ([facility] [address] 10/02/2017    TIA- legs weak memory issues       Past Surgical History:   Procedure Laterality Date    COLONOSCOPY N/A 6/4/2018    COLONOSCOPY / polypect[facility] Lupe Kaiser [facility]    HX GYN      Total Hyst    HX LAP CHOLECYSTECTOMY      HX OTHER SURGICAL  2017    Throat widened       Allergies   Allergen Reactions    Celebrex [Celecoxib] Other (comments)     Tiredness        Vital signs:    Visit Vitals  /70 (BP 1 Location: Left arm, BP Patient Position: Sitting)   Pulse 70   Temp 98.5 °F (36.9 °C) (Oral)   Resp 18   Ht 5' (1.524 m)   Wt 53.5 kg (118 lb)   SpO2 99%   BMI 23.05 kg/m²       Review of Systems:   GENERAL: Denies fever or fatigue  CARDIAC: No CP or SOB  PULMONARY: No cough of SOB  MUSCULOSKELETAL: No new joint pain  NEURO: SEE HPI      EXAM: Alert, in NAD. Heart is regular. Cervical range of motion is markedly diminished to no more than 15-20 degrees of rotation bilaterally. Lateral flexion is substantially diminished. Oriented x3, EOM's are full, PERRL, no facial asymmetries. Strength and tone are normal. DTR's +3 with spread and clonus bilaterally, gait symmetric,  no myelopathic          Assessment/Plan:I think her vertigo is from a peripheral vestibulopathy which is secondary to the decreased cervical range of motion she has from very severe cervical spine disease for which there is no easy solution. It is not the kind of problem that is fixable with surgery, which I explained to her. She declines physical therapy. She has a pending appointment with the pain clinic. She asked me for interventions for her pain, but I explained to her that she came to see me for her vestibular symptoms which I have explained and which are currently at the and she has a pain clinic that is where she should have her pain needs meds. She will return for neurological evaluation as needed. PLEASE NOTE:   Portions of this document may have been produced using voice recognition software. Unrecognized errors in transcription may be present. This note will not be viewable in [address]

## 2018-12-18 ENCOUNTER — OFFICE VISIT (OUTPATIENT)
Dept: PULMONOLOGY | Age: 73
End: 2018-12-18

## 2018-12-18 VITALS
HEART RATE: 65 BPM | TEMPERATURE: 97.4 F | HEIGHT: 60 IN | BODY MASS INDEX: 23.56 KG/M2 | RESPIRATION RATE: 16 BRPM | OXYGEN SATURATION: 97 % | WEIGHT: 120 LBS | DIASTOLIC BLOOD PRESSURE: 74 MMHG | SYSTOLIC BLOOD PRESSURE: 128 MMHG

## 2018-12-18 DIAGNOSIS — R91.8 PULMONARY INFILTRATES: ICD-10-CM

## 2018-12-18 DIAGNOSIS — R07.9 CHEST PAIN, UNSPECIFIED TYPE: Primary | ICD-10-CM

## 2018-12-18 NOTE — PROGRESS NOTES
[facility]  Pulmonary, Critical Care, and Sleep Medicine      Chief complaint:  Pulmonary infiltrates and chest discomfort    HPI:    Clifton Rivera    is 72years old and returns the office today for follow-up and relates she is has less shortness of breath but still has dyspnea on exertion at times and still has chest discomfort at times especially with walking upstairs. The pain is substernal nonradiating and usually lasts for several minutes. She had a nuclear medicine stress test which was negative for ischemia. She denies leg pain or swelling and has only a minimal dry cough      Allergies   Allergen Reactions    Celebrex [Celecoxib] Other (comments)     Tiredness      Current Outpatient Medications   Medication Sig    busPIRone (BUSPAR) 7.5 mg tablet take 1 tablet by mouth twice a day    alendronate (FOSAMAX) 10 mg tablet Take 1 Tab by mouth Daily (before breakfast).  LORazepam (ATIVAN) 0.5 mg tablet Take 0.5 mg by mouth daily as needed for Anxiety.  diazePAM (VALIUM) 2 mg tablet Take 1 Tab by mouth every eight (8) hours as needed for Anxiety. Max Daily Amount: 6 mg.  montelukast (SINGULAIR) 10 mg tablet take 1 tablet by mouth once daily    fluticasone (FLONASE) 50 mcg/actuation nasal spray INSTILL 2 SPRAYS IN EACH NOSTRIL DAILY    atorvastatin (LIPITOR) 20 mg tablet Take 1 Tab by mouth daily.  acetaminophen (TYLENOL EXTRA STRENGTH) 500 mg tablet Take 650 mg by mouth every six (6) hours as needed for Pain. Pt states they take medication QID.  loratadine (CLARITIN) 10 mg tablet Take 10 mg by mouth daily as needed for Allergies or Rhinitis.  omeprazole (PRILOSEC) 20 mg capsule Take 20 mg by mouth daily.  aspirin 81 mg chewable tablet Take 1 Tab by mouth daily. No current facility-administered medications for this visit.       Past Medical History:   Diagnosis Date    Abnormal Pap smear     Dr. Aleta Wilburn Allergic rhinitis     Arthritis     Chronic pain  Hypercholesterolemia     Neck pain 5/17/2010    Stroke ([facility] [address] 10/02/2017    TIA- legs weak memory issues     Past Surgical History:   Procedure Laterality Date    COLONOSCOPY N/A 6/4/2018    COLONOSCOPY / polypectomy performed by Kevin Wakefield MD at [facility] ENDOSCOPY    HX GYN      Total Hyst    HX LAP CHOLECYSTECTOMY      HX OTHER SURGICAL  2017    Throat widened     Social History     Socioeconomic History    Marital status:      Spouse name: Not on file    Number of children: Not on file    Years of education: Not on file    Highest education level: Not on file   Social Needs    Financial resource strain: Not on file    Food insecurity - worry: Not on file    Food insecurity - inability: Not on file   [employer] needs - medical: Not on file   [employer] needs - non-medical: Not on file   Occupational History    Not on file   Tobacco Use    Smoking status: Never Smoker    Smokeless tobacco: Never Used   Substance and Sexual Activity    Alcohol use: No    Drug use: No    Sexual activity: No   Other Topics Concern    Not on file   Social History Narrative    Not on file     Family History   Problem Relation Age of Onset    Cancer Mother         breast    Heart Disease Father        Review of systems:  She denies fever chills poor appetite or weight loss    Physical Exam:  Visit Vitals  /74 (BP 1 Location: Left arm, BP Patient Position: Sitting)   Pulse 65   Temp 97.4 °F (36.3 °C) (Oral)   Resp 16   Ht 5' (1.524 m)   Wt 54.4 kg (120 lb)   SpO2 97%   BMI 23.44 kg/m²       Well-developed well-nourished  HEENT: WNL  Lymph node exam: Supraclavicular cervical lymph nodes negative  Chest: Equal symmetrical expansion no dullness no wheezes rales rubs  Heart: Regular rhythm no gallop no murmur no JVD no peripheral edema  Extremities: No cyanosis clubbing or calf tenderness  Neurological: Alert and oriented    Labs:  Nuclear medicine stress test 9/18/18: Negative for ischemia  O2 sat room air at rest 97    Impression:     Chest pain etiology unknown unlikely to be cardiac  Shortness of breath in a patient with pulmonary infiltrates which needs to be further evaluated    Plan:     Follow-up CT prone and supine positions 3 weeks  Follow-up office visit 4 weeks    Nathalie Snell MD , [facility]    CC: Kali Larson NP     [address]  [place], [address] [address]     P: [phone]     F: [phone]

## 2018-12-18 NOTE — PROGRESS NOTES
Chief Complaint   Patient presents with    Chest Pain     follow up from 9/11/2018; ct 9/18/2018, cardiac stress test 9/18/2018    Other     pulmonary infiltrate     1. Have you been to the ER, urgent care clinic since your last visit? Hospitalized since your last visit? Yes When: 9/22/2018 & 10/30/2018 Where: [facility] & [facility] pain mgmt Reason for visit: dizziness/vertigo/Headache & osteoarthritis of cervical spine    2. Have you seen or consulted any other health care providers outside of the [address] [place] [address] since your last visit? Include any pap smears or colon screening.  No

## 2019-01-08 ENCOUNTER — HOSPITAL ENCOUNTER (OUTPATIENT)
Dept: CT IMAGING | Age: 74
Discharge: HOME | End: 2019-01-08
Attending: INTERNAL MEDICINE
Payer: MEDICARE

## 2019-01-08 DIAGNOSIS — R91.8 PULMONARY INFILTRATES: ICD-10-CM

## 2019-01-08 PROCEDURE — 71250 CT THORAX DX C-: CPT

## 2019-01-15 ENCOUNTER — OFFICE VISIT (OUTPATIENT)
Dept: PULMONOLOGY | Age: 74
End: 2019-01-15

## 2019-01-15 VITALS
BODY MASS INDEX: 24.35 KG/M2 | RESPIRATION RATE: 22 BRPM | OXYGEN SATURATION: 96 % | SYSTOLIC BLOOD PRESSURE: 104 MMHG | TEMPERATURE: 97.5 F | HEIGHT: 60 IN | DIASTOLIC BLOOD PRESSURE: 70 MMHG | WEIGHT: 124 LBS | HEART RATE: 70 BPM

## 2019-01-15 DIAGNOSIS — R91.8 PULMONARY INFILTRATES: ICD-10-CM

## 2019-01-15 DIAGNOSIS — R06.02 SHORTNESS OF BREATH: ICD-10-CM

## 2019-01-15 DIAGNOSIS — R07.9 CHEST PAIN, UNSPECIFIED TYPE: Primary | ICD-10-CM

## 2019-01-15 NOTE — PROGRESS NOTES
[facility]  Pulmonary, Critical Care, and Sleep Medicine      Chief complaint:  Chest discomfort pulmonary infiltrates shortness of breath with exertion    HPI:    Parveen Gonzalez    is 72years old returns the office today for follow-up and relates she still has shortness of breath with exertion but it does not interfere with most of her activities of daily living. She also relates that she still gets she is vague substernal chest discomfort that are not particularly associated with activity because sometimes she gets some rest and they resolve spontaneously. She denies a chronic cough she denies leg pain or swelling fever chills. Her main complaint is postnasal drip      Allergies   Allergen Reactions    Celebrex [Celecoxib] Other (comments)     Tiredness      Current Outpatient Medications   Medication Sig    dextromethorphan-guaiFENesin (CORICIDIN HBP)  mg cap Take  by mouth as needed (sinus symptoms).  busPIRone (BUSPAR) 7.5 mg tablet take 1 tablet by mouth twice a day    alendronate (FOSAMAX) 10 mg tablet Take 1 Tab by mouth Daily (before breakfast).  diazePAM (VALIUM) 2 mg tablet Take 1 Tab by mouth every eight (8) hours as needed for Anxiety. Max Daily Amount: 6 mg.  montelukast (SINGULAIR) 10 mg tablet take 1 tablet by mouth once daily    fluticasone (FLONASE) 50 mcg/actuation nasal spray INSTILL 2 SPRAYS IN EACH NOSTRIL DAILY    atorvastatin (LIPITOR) 20 mg tablet Take 1 Tab by mouth daily.  acetaminophen (TYLENOL EXTRA STRENGTH) 500 mg tablet Take 650 mg by mouth every six (6) hours as needed for Pain. Pt states they take medication QID.  omeprazole (PRILOSEC) 20 mg capsule Take 20 mg by mouth daily.  aspirin 81 mg chewable tablet Take 1 Tab by mouth daily.  LORazepam (ATIVAN) 0.5 mg tablet Take 0.5 mg by mouth daily as needed for Anxiety.  loratadine (CLARITIN) 10 mg tablet Take 10 mg by mouth daily as needed for Allergies or Rhinitis.      No current facility-administered medications for this visit.       Past Medical History:   Diagnosis Date    Abnormal Pap smear     Dr. Azam Maxwell Allergic rhinitis     Arthritis     Chronic pain     Hypercholesterolemia     Neck pain 5/17/2010    Stroke ([facility] [address] 10/02/2017    TIA- legs weak memory issues     Past Surgical History:   Procedure Laterality Date    COLONOSCOPY N/A 6/4/2018    COLONOSCOPY / polypectomy performed by Toy Carrera MD at [facility] ENDOSCOPY    HX GYN      Total Hyst    HX LAP CHOLECYSTECTOMY      HX OTHER SURGICAL  2017    Throat widened     Social History     Socioeconomic History    Marital status:      Spouse name: Not on file    Number of children: Not on file    Years of education: Not on file    Highest education level: Not on file   Social Needs    Financial resource strain: Not on file    Food insecurity - worry: Not on file    Food insecurity - inability: Not on file   [employer] needs - medical: Not on file   [employer] needs - non-medical: Not on file   Occupational History    Not on file   Tobacco Use    Smoking status: Never Smoker    Smokeless tobacco: Never Used   Substance and Sexual Activity    Alcohol use: No    Drug use: No    Sexual activity: No   Other Topics Concern    Not on file   Social History Narrative    Not on file     Family History   Problem Relation Age of Onset    Cancer Mother         breast    Heart Disease Father        Review of systems:  She denies fever chills poor appetite or weight loss    Physical Exam:  Visit Vitals  /70 (BP 1 Location: Left arm, BP Patient Position: Sitting)   Pulse 70   Temp 97.5 °F (36.4 °C)   Resp 22   Ht 5' (1.524 m)   Wt 56.2 kg (124 lb)   SpO2 96%   BMI 24.22 kg/m²       Well-developed and elderly  HEENT: WNL  Lymph node exam: Supraclavicular cervical lymph nodes negative  Chest: Equal symmetrical expansion no dullness no wheezes rales or rubs  Heart: Regular rhythm no gallop no murmur no JVD no peripheral edema  Extremities: No cyanosis clubbing or calf tenderness  Neurological: Alert and oriented    Labs:    O2 sat room air at rest 96%  CT of the chest 1/8/19: Personally reviewed, no pulmonary infiltrates noted. There is extensive apical fibrosis which is chronic and mosaic attenuation which is prominent on expiratory views. Impression:     The patient's symptoms appear stable and her chest pain is most likely non-ischemic based on her nuclear medicine stress test.  It is not clear why she is having mosaic attenuation to this extent but she does not have appear to have active asthma or COPD but will confirm this with PFTs in the future    Plan:     Evaluate chest pain if symptoms persist or worsen with cardiology consultation  Spirometry on return  Follow-up in 6 months or sooner if needed    Mateus Lau MD , [facility]    CC: Richard Gutierrez NP     [address] [place] [address]. [address]  [place], [address]     P: [phone]     F: [phone]

## 2019-01-15 NOTE — PROGRESS NOTES
The pt.c/o wheezing, large amount of post nasal drip and occasional prod. Of clear sputum. She was using Claritin without effect therefore d/c'd it. She is current using Coricidin HBP with better effect. No recent ED nor Urgent Care visits. She is up to date with her other MD visits.

## 2019-01-16 ENCOUNTER — OFFICE VISIT (OUTPATIENT)
Dept: INTERNAL MEDICINE CLINIC | Age: 74
End: 2019-01-16

## 2019-01-16 VITALS
HEIGHT: 60 IN | DIASTOLIC BLOOD PRESSURE: 70 MMHG | WEIGHT: 121.2 LBS | OXYGEN SATURATION: 99 % | TEMPERATURE: 97.7 F | BODY MASS INDEX: 23.8 KG/M2 | HEART RATE: 78 BPM | RESPIRATION RATE: 16 BRPM | SYSTOLIC BLOOD PRESSURE: 132 MMHG

## 2019-01-16 DIAGNOSIS — M47.812 OSTEOARTHRITIS OF CERVICAL SPINE, UNSPECIFIED SPINAL OSTEOARTHRITIS COMPLICATION STATUS: ICD-10-CM

## 2019-01-16 DIAGNOSIS — E78.5 HYPERLIPIDEMIA, UNSPECIFIED HYPERLIPIDEMIA TYPE: Primary | ICD-10-CM

## 2019-01-16 DIAGNOSIS — F41.9 ANXIETY: ICD-10-CM

## 2019-01-16 DIAGNOSIS — M54.2 NECK PAIN: ICD-10-CM

## 2019-01-16 DIAGNOSIS — Z00.00 MEDICARE ANNUAL WELLNESS VISIT, SUBSEQUENT: ICD-10-CM

## 2019-01-16 RX ORDER — BUSPIRONE HYDROCHLORIDE 7.5 MG/1
TABLET ORAL
Qty: 60 TAB | Refills: 1 | Status: SHIPPED | OUTPATIENT
Start: 2019-01-16 | End: 2019-05-15 | Stop reason: SDUPTHER

## 2019-01-16 NOTE — PROGRESS NOTES
John Paul Gustafson is a 72 y.o. female presenting today for Annual Wellness Visit () and Well Woman (6 month follow up)  . HPI:  John Paul Gustafson presents to the office today for hyperlipidemia follow-up care. Patient is complaining of bilateral shoulder and cervical neck pain today. Patient is scheduled for a pain block with Dr. Ibrahima Cleveland at the [facility] on February 28th, injection with sedation  Patient reports decrease ROM in right thumb. Patient reports her thumb has been locked since her last office visit. She notes she has an upcoming appointment with Ortho and will discuss her thumb. Review of Systems   Respiratory: Negative for cough. Cardiovascular: Negative for chest pain and palpitations. Gastrointestinal: Negative for abdominal pain, diarrhea, nausea and vomiting. Musculoskeletal: Positive for joint pain, myalgias and neck pain. Psychiatric/Behavioral: The patient is nervous/anxious. Allergies   Allergen Reactions    Celebrex [Celecoxib] Other (comments)     Tiredness        Current Outpatient Medications   Medication Sig Dispense Refill    busPIRone (BUSPAR) 7.5 mg tablet take 1 tablet by mouth twice a day 60 Tab 1    dextromethorphan-guaiFENesin (CORICIDIN HBP)  mg cap Take  by mouth as needed (sinus symptoms).  alendronate (FOSAMAX) 10 mg tablet Take 1 Tab by mouth Daily (before breakfast). 30 Tab 2    montelukast (SINGULAIR) 10 mg tablet take 1 tablet by mouth once daily 90 Tab 2    fluticasone (FLONASE) 50 mcg/actuation nasal spray INSTILL 2 SPRAYS IN EACH NOSTRIL DAILY 16 g 1    atorvastatin (LIPITOR) 20 mg tablet Take 1 Tab by mouth daily. 90 Tab 3    acetaminophen (TYLENOL EXTRA STRENGTH) 500 mg tablet Take 650 mg by mouth every six (6) hours as needed for Pain. Pt states they take medication QID.  omeprazole (PRILOSEC) 20 mg capsule Take 20 mg by mouth daily.  aspirin 81 mg chewable tablet Take 1 Tab by mouth daily.  30 Tab 3    fluticasone (FLONASE) 50 mcg/actuation nasal spray instill 2 sprays into each nostril once daily 16 g 1    LORazepam (ATIVAN) 0.5 mg tablet Take 0.5 mg by mouth daily as needed for Anxiety.  diazePAM (VALIUM) 2 mg tablet Take 1 Tab by mouth every eight (8) hours as needed for Anxiety. Max Daily Amount: 6 mg. 10 Tab 0    loratadine (CLARITIN) 10 mg tablet Take 10 mg by mouth daily as needed for Allergies or Rhinitis.          Past Medical History:   Diagnosis Date    Abnormal Pap smear     Dr. Azam Maxwell Allergic rhinitis     Arthritis     Chronic pain     Hypercholesterolemia     Neck pain 5/17/2010    Stroke ([address] 10/02/2017    TIA- legs weak memory issues       Past Surgical History:   Procedure Laterality Date    COLONOSCOPY N/A 6/4/2018    COLONOSCOPY / polypectomy performed by Toy Carrera MD at [facility]    HX GYN      Total Hyst    HX LAP CHOLECYSTECTOMY      HX OTHER SURGICAL  2017    Throat widened       Social History     Socioeconomic History    Marital status:      Spouse name: Not on file    Number of children: Not on file    Years of education: Not on file    Highest education level: Not on file   Social Needs    Financial resource strain: Not on file    Food insecurity - worry: Not on file    Food insecurity - inability: Not on file   [employer] needs - medical: Not on file   [employer] needs - non-medical: Not on file   Occupational History    Not on file   Tobacco Use    Smoking status: Never Smoker    Smokeless tobacco: Never Used   Substance and Sexual Activity    Alcohol use: No    Drug use: No    Sexual activity: No   Other Topics Concern    Not on file   Social History Narrative    Not on file       Patient does have an advanced directive on file    Vitals:    01/16/19 1115   BP: 132/70   Pulse: 78   Resp: 16   Temp: 97.7 °F (36.5 °C)   TempSrc: Tympanic   SpO2: 99%   Weight: 121 lb 3.2 oz (55 kg)   Height: 5' (1.524 m)   PainSc:   8   PainLoc: Shoulder       Physical Exam   Cardiovascular: Normal rate, regular rhythm and normal heart sounds. Pulmonary/Chest: Effort normal and breath sounds normal.   Musculoskeletal:        Right shoulder: She exhibits pain. Left shoulder: She exhibits pain. Hands:      Hospital Outpatient Visit on 11/15/2018   Component Date Value Ref Range Status    Creatinine, POC 11/15/2018 0.7  0.6 - 1.3 MG/DL Final    GFRAA, POC 11/15/2018 >60  >60 ml/min/1.73m2 Final    GFRNA, POC 11/15/2018 >60  >60 ml/min/1.73m2 Final    Comment: Estimated GFR is calculated using the IDMS-traceable Modification of Diet in Renal Disease (MDRD) Study equation, reported for both  Americans (GFRAA) and non- Americans (GFRNA), and normalized to 1.73m2 body surface area. The physician must decide which value applies to the patient. The MDRD study equation should only be used in individuals age 25 or older. It has not been validated for the following: pregnant women, patients with serious comorbid conditions, or on certain medications, or persons with extremes of body size, muscle mass, or nutritional status. .No results found for any visits on 01/16/19. Assessment / Plan:      ICD-10-CM ICD-9-CM    1. Hyperlipidemia, unspecified hyperlipidemia type E78.5 272.4    2. Anxiety F41.9 300.00 busPIRone (BUSPAR) 7.5 mg tablet   3. Neck pain M54.2 723.1    4. Osteoarthritis of cervical spine, unspecified spinal osteoarthritis complication status F96.255 721.0      Anxiety refill BuSpar 7.5 mg today  Hyperlipidemia fasting labs next visit  Patient is scheduled for injection in her cervical neck in February, 2018. Patient will discuss possible right thumb trigger finger      Follow-up Disposition:  Return in about 4 months (around 5/16/2019), or if symptoms worsen or fail to improve. I asked the patient if she  had any questions and answered her  questions.   The patient stated that she understands the treatment plan and agrees with the treatment plan    This is the Subsequent Medicare Annual Wellness Exam, performed 12 months or more after the Initial AWV or the last Subsequent AWV    I have reviewed the patient's medical history in detail and updated the computerized patient record. History     Past Medical History:   Diagnosis Date    Abnormal Pap smear     Dr. Lacie Mendez Allergic rhinitis     Arthritis     Chronic pain     Hypercholesterolemia     Neck pain 5/17/2010    Stroke ([address] 10/02/2017    TIA- legs weak memory issues      Past Surgical History:   Procedure Laterality Date    COLONOSCOPY N/A 6/4/2018    COLONOSCOPY / polypectomy performed by Leighann Otto MD at [facility]    HX GYN      Total Hyst    HX LAP CHOLECYSTECTOMY      HX OTHER SURGICAL  2017    Throat widened     Current Outpatient Medications   Medication Sig Dispense Refill    busPIRone (BUSPAR) 7.5 mg tablet take 1 tablet by mouth twice a day 60 Tab 1    dextromethorphan-guaiFENesin (CORICIDIN HBP)  mg cap Take  by mouth as needed (sinus symptoms).  alendronate (FOSAMAX) 10 mg tablet Take 1 Tab by mouth Daily (before breakfast). 30 Tab 2    montelukast (SINGULAIR) 10 mg tablet take 1 tablet by mouth once daily 90 Tab 2    fluticasone (FLONASE) 50 mcg/actuation nasal spray INSTILL 2 SPRAYS IN EACH NOSTRIL DAILY 16 g 1    atorvastatin (LIPITOR) 20 mg tablet Take 1 Tab by mouth daily. 90 Tab 3    acetaminophen (TYLENOL EXTRA STRENGTH) 500 mg tablet Take 650 mg by mouth every six (6) hours as needed for Pain. Pt states they take medication QID.  omeprazole (PRILOSEC) 20 mg capsule Take 20 mg by mouth daily.  aspirin 81 mg chewable tablet Take 1 Tab by mouth daily. 30 Tab 3    fluticasone (FLONASE) 50 mcg/actuation nasal spray instill 2 sprays into each nostril once daily 16 g 1    LORazepam (ATIVAN) 0.5 mg tablet Take 0.5 mg by mouth daily as needed for Anxiety.       diazePAM (VALIUM) 2 mg tablet Take 1 Tab by mouth every eight (8) hours as needed for Anxiety. Max Daily Amount: 6 mg. 10 Tab 0    loratadine (CLARITIN) 10 mg tablet Take 10 mg by mouth daily as needed for Allergies or Rhinitis. Allergies   Allergen Reactions    Celebrex [Celecoxib] Other (comments)     Tiredness      Family History   Problem Relation Age of Onset    Cancer Mother         breast    Heart Disease Father      Social History     Tobacco Use    Smoking status: Never Smoker    Smokeless tobacco: Never Used   Substance Use Topics    Alcohol use: No     Patient Active Problem List   Diagnosis Code    Neck pain M54.2    CVA (cerebral vascular accident) ([address] I63.9    TIA (transient ischemic attack) G45.9    Cervical facet syndrome M47.812    Spondylosis of cervical region without myelopathy or radiculopathy M47.812    Cervical spinal stenosis M48.02    Degenerative disc disease, cervical M50.30    Chronic pain syndrome G89.4    Cervical radiculitis M54.12    Myofascial pain syndrome M79.18    Hyperlipidemia E78.5    Anxiety F41.9    Osteoarthritis of cervical spine M47.812       Depression Risk Factor Screening:     PHQ over the last two weeks 1/16/2019   PHQ Not Done -   Little interest or pleasure in doing things Not at all   Feeling down, depressed, irritable, or hopeless Not at all   Total Score PHQ 2 0     Alcohol Risk Factor Screening: You do not drink alcohol or very rarely. Functional Ability and Level of Safety:   Hearing Loss  Hearing is good. Activities of Daily Living  The home contains: no safety equipment. Patient does total self care    Fall Risk  Fall Risk Assessment, last 12 mths 1/16/2019   Able to walk? Yes   Fall in past 12 months?  No       Abuse Screen  Patient is not abused    Cognitive Screening   Evaluation of Cognitive Function:  Has your family/caregiver stated any concerns about your memory: no  Normal    Patient Care Team   Patient Care Team:  Cristino Dong NP as PCP - General (Nurse Practitioner)  Prachi Zhu MD (Pulmonary Disease)    Assessment/Plan   Education and counseling provided:  Are appropriate based on today's review and evaluation    Diagnoses and all orders for this visit:    1. Hyperlipidemia, unspecified hyperlipidemia type    2. Anxiety  -     busPIRone (BUSPAR) 7.5 mg tablet; take 1 tablet by mouth twice a day    3. Neck pain    4.  Osteoarthritis of cervical spine, unspecified spinal osteoarthritis complication status        Health Maintenance Due   Topic Date Due    MEDICARE YEARLY EXAM  01/16/2019

## 2019-01-23 DIAGNOSIS — R05.9 COUGH: ICD-10-CM

## 2019-01-23 RX ORDER — FLUTICASONE PROPIONATE 50 MCG
SPRAY, SUSPENSION (ML) NASAL
Qty: 16 G | Refills: 1 | Status: SHIPPED | OUTPATIENT
Start: 2019-01-23 | End: 2019-02-13 | Stop reason: SDUPTHER

## 2019-01-25 ENCOUNTER — HOSPITAL ENCOUNTER (OUTPATIENT)
Dept: MAMMOGRAPHY | Age: 74
Discharge: HOME | End: 2019-01-25
Attending: NURSE PRACTITIONER
Payer: MEDICARE

## 2019-01-25 DIAGNOSIS — Z12.31 VISIT FOR SCREENING MAMMOGRAM: ICD-10-CM

## 2019-01-25 PROCEDURE — 77063 BREAST TOMOSYNTHESIS BI: CPT

## 2019-01-30 RX ORDER — ATORVASTATIN CALCIUM 20 MG/1
TABLET, FILM COATED ORAL
Qty: 90 TAB | Refills: 3 | Status: SHIPPED | OUTPATIENT
Start: 2019-01-30 | End: 2020-02-19 | Stop reason: SDUPTHER

## 2019-02-13 DIAGNOSIS — M81.0 OSTEOPOROSIS, UNSPECIFIED OSTEOPOROSIS TYPE, UNSPECIFIED PATHOLOGICAL FRACTURE PRESENCE: ICD-10-CM

## 2019-02-13 DIAGNOSIS — R05.9 COUGH: ICD-10-CM

## 2019-02-13 RX ORDER — FLUTICASONE PROPIONATE 50 MCG
SPRAY, SUSPENSION (ML) NASAL
Qty: 3 BOTTLE | Refills: 1 | Status: SHIPPED | OUTPATIENT
Start: 2019-02-13 | End: 2019-10-01 | Stop reason: SDUPTHER

## 2019-02-13 RX ORDER — ALENDRONATE SODIUM 10 MG/1
10 TABLET ORAL
Qty: 30 TAB | Refills: 2 | Status: SHIPPED | OUTPATIENT
Start: 2019-02-13 | End: 2019-03-08 | Stop reason: CLARIF

## 2019-02-13 NOTE — TELEPHONE ENCOUNTER
Requested Prescriptions     Pending Prescriptions Disp Refills    fluticasone (FLONASE) 50 mcg/actuation nasal spray 3 Bottle 1     Sig: instill 2 sprays into each nostril once daily    alendronate (FOSAMAX) 10 mg tablet 30 Tab 2     Sig: Take 1 Tab by mouth Daily (before breakfast).

## 2019-02-15 ENCOUNTER — OFFICE VISIT (OUTPATIENT)
Dept: ORTHOPEDIC SURGERY | Age: 74
End: 2019-02-15

## 2019-02-15 VITALS
HEIGHT: 60 IN | OXYGEN SATURATION: 98 % | WEIGHT: 121.6 LBS | SYSTOLIC BLOOD PRESSURE: 150 MMHG | RESPIRATION RATE: 16 BRPM | DIASTOLIC BLOOD PRESSURE: 74 MMHG | BODY MASS INDEX: 23.87 KG/M2 | HEART RATE: 67 BPM | TEMPERATURE: 97.6 F

## 2019-02-15 DIAGNOSIS — M54.2 NECK PAIN: ICD-10-CM

## 2019-02-15 DIAGNOSIS — G89.4 CHRONIC PAIN SYNDROME: ICD-10-CM

## 2019-02-15 DIAGNOSIS — M79.641 RIGHT HAND PAIN: ICD-10-CM

## 2019-02-15 DIAGNOSIS — M47.812 ARTHROPATHY OF CERVICAL FACET JOINT: Primary | ICD-10-CM

## 2019-02-15 DIAGNOSIS — M79.18 MYOFASCIAL PAIN: ICD-10-CM

## 2019-02-15 NOTE — PATIENT INSTRUCTIONS
Neck Spasm: Exercises  Your Care Instructions  Here are some examples of typical rehabilitation exercises for your condition. Start each exercise slowly. Ease off the exercise if you start to have pain. Your doctor or physical therapist will tell you when you can start these exercises and which ones will work best for you. How to do the exercises  Levator scapula stretch    1. Sit in a firm chair, or stand up straight. 2. Gently tilt your head toward your left shoulder. 3. Turn your head to look down into your armpit, bending your head slightly forward. Let the weight of your head stretch your neck muscles. 4. Hold for 15 to 30 seconds. 5. Return to your starting position. 6. Follow the same instructions above, but tilt your head toward your right shoulder. 7. Repeat 2 to 4 times toward each shoulder. Upper trapezius stretch    1. Sit in a firm chair, or stand up straight. 2. This stretch works best if you keep your shoulder down as you lean away from it. To help you remember to do this, start by relaxing your shoulders and lightly holding on to your thighs or your chair. 3. Tilt your head toward your shoulder and hold for 15 to 30 seconds. Let the weight of your head stretch your muscles. 4. If you would like a little added stretch, place your arm behind your back. Use the arm opposite of the direction you are tilting your head. For example, if you are tilting your head to the left, place your right arm behind your back. 5. Repeat 2 to 4 times toward each shoulder. Neck rotation    1. Sit in a firm chair, or stand up straight. 2. Keeping your chin level, turn your head to the right, and hold for 15 to 30 seconds. 3. Turn your head to the left, and hold for 15 to 30 seconds. 4. Repeat 2 to 4 times to each side. Chin tuck    1. Lie on the floor with a rolled-up towel under your neck. Your head should be touching the floor. 2. Slowly bring your chin toward the front of your neck.   3. Hold for a count of 6, and then relax for up to 10 seconds. 4. Repeat 8 to 12 times. Forward neck flexion    1. Sit in a firm chair, or stand up straight. 2. Bend your head forward. 3. Hold for 15 to 30 seconds, then return to your starting position. 4. Repeat 2 to 4 times. Follow-up care is a key part of your treatment and safety. Be sure to make and go to all appointments, and call your doctor if you are having problems. It's also a good idea to know your test results and keep a list of the medicines you take. Where can you learn more? Go to http://karin-michaela.info/. Enter P962 in the search box to learn more about \"Neck Spasm: Exercises. \"  Current as of: September 20, 2018  Content Version: 11.9  © 7751-6332 [employer], [employer]. Care instructions adapted under license by [employer] (which disclaims liability or warranty for this information). If you have questions about a medical condition or this instruction, always ask your healthcare professional. [place] [address] any warranty or liability for your use of this information. Neck: Exercises  Your Care Instructions  Here are some examples of typical rehabilitation exercises for your condition. Start each exercise slowly. Ease off the exercise if you start to have pain. Your doctor or physical therapist will tell you when you can start these exercises and which ones will work best for you. How to do the exercises  Neck stretch    1. This stretch works best if you keep your shoulder down as you lean away from it. To help you remember to do this, start by relaxing your shoulders and lightly holding on to your thighs or your chair. 2. Tilt your head toward your shoulder and hold for 15 to 30 seconds. Let the weight of your head stretch your muscles. 3. If you would like a little added stretch, use your hand to gently and steadily pull your head toward your shoulder.  For example, keeping your right shoulder down, lean your head to the left. 4. Repeat 2 to 4 times toward each shoulder. Diagonal neck stretch    1. Turn your head slightly toward the direction you will be stretching, and tilt your head diagonally toward your chest and hold for 15 to 30 seconds. 2. If you would like a little added stretch, use your hand to gently and steadily pull your head forward on the diagonal.  3. Repeat 2 to 4 times toward each side. Dorsal glide stretch    1. Sit or stand tall and look straight ahead. 2. Slowly tuck your chin as you glide your head backward over your body  3. Hold for a count of 6, and then relax for up to 10 seconds. 4. Repeat 8 to 12 times. Chest and shoulder stretch    1. Sit or stand tall and glide your head backward as in the dorsal glide stretch. 2. Raise both arms so that your hands are next to your ears. 3. Take a deep breath, and as you breathe out, lower your elbows down and behind your back. You will feel your shoulder blades slide down and together, and at the same time you will feel a stretch across your chest and the front of your shoulders. 4. Hold for about 6 seconds, and then relax for up to 10 seconds. 5. Repeat 8 to 12 times. Strengthening: Hands on head    1. Move your head backward, forward, and side to side against gentle pressure from your hands, holding each position for about 6 seconds. 2. Repeat 8 to 12 times. Follow-up care is a key part of your treatment and safety. Be sure to make and go to all appointments, and call your doctor if you are having problems. It's also a good idea to know your test results and keep a list of the medicines you take. Where can you learn more? Go to http://karin-michaela.info/. Enter P975 in the search box to learn more about \"Neck: Exercises. \"  Current as of: September 20, 2018  Content Version: 11.9  © 9498-1974 [employer].  Care instructions adapted under license by [facility] (which disclaims liability or warranty for this information). If you have questions about a medical condition or this instruction, always ask your healthcare professional. [address] any warranty or liability for your use of this information. Shoulder Blade: Exercises  Your Care Instructions  Here are some examples of typical exercises for your condition. Start each exercise slowly. Ease off the exercise if you start to have pain. Your doctor or physical therapist will tell you when you can start these exercises and which ones will work best for you. How to do the exercises  Shoulder roll    1. Stand tall with your chin slightly tucked. Imagine that a string at the top of your head is pulling you straight up. 2. Keep your arms relaxed. All motion will be in your shoulders. 3. Shrug your shoulders up toward your ears, then up and back. [place] your shoulders down and back, like you're sliding your hands down into your back pants pockets. 4. Repeat the circles at least 2 to 4 times. 5. This exercise is also helpful anytime you want to relax. Lower neck and upper back stretch    1. With your arms about shoulder height, clasp your hands in front of you. 2. Drop your chin toward your chest.  3. Reach straight forward so you are rounding your upper back. Think about pulling your shoulder blades apart. Lynrichie Bolds feel a stretch across your upper back and shoulders. Hold for at least 6 seconds. 4. Repeat 2 to 4 times. Triceps stretch    1. Reach your arm straight up. 2. Keeping your elbow in place, bend your arm and reach your hand down behind your back. 3. With your other hand, apply gentle pressure to the bent elbow. Lynrichie Bolds feel a stretch at the back of your upper arm and shoulder. Hold about 6 seconds. 4. Repeat 2 to 4 times with each arm. Shoulder stretch    1. Relax your shoulders.   2. Raise one arm to shoulder height, and reach it across your chest.  3. Pull the arm slightly toward you with your other arm. This will help you get a gentle stretch. Hold for about 6 seconds. 4. Repeat 2 to 4 times. Shoulder blade squeeze    1. Sit or stand up tall with your arms at your sides. 2. Keep your shoulders relaxed and down, not shrugged. 3. Squeeze your shoulder blades together. Hold for 6 seconds, then relax. 4. Repeat 8 to 12 times. Straight-arm shoulder blade squeeze    1. Sit or stand tall. Relax your shoulders. 2. With palms down, hold your elastic tubing or band straight out in front of you. 3. Start with slight tension in the tubing or band, with your hands about shoulder-width apart. 4. Slowly pull straight out to the sides, squeezing your shoulder blades together. Keep your arms straight and at shoulder height. Slowly release. 5. Repeat 8 to 12 times. Rowing    1. [place] your elastic tubing or band at about waist height. Take one end in each hand. 2. Sit or stand with your feet hip-width apart. 3. Hold your arms straight in front of you. Adjust your distance to create slight tension in the tubing or band. 4. Slightly tuck your chin. Relax your shoulders. 5. Without shrugging your shoulders, pull straight back. Your elbows will pass alongside your waist.    Pull-downs    1. [place] your elastic tubing or band in the top of a closed door. Take one end in each hand. 2. Either sit or stand, depending on what is more comfortable. If you feel unsteady, sit on a chair. 3. Start with your arms up and comfortably apart, elbows straight. There should be a slight tension in the tubing or band. 4. Slightly tuck your chin, and look straight ahead. 5. Keeping your back straight, slowly pull down and back, bending your elbows. 6. Stop where your hands are level with your chin, in a \"goalpost\" position. 7. Repeat 8 to 12 times. Chest T stretch    1. Lie on your back. Raise your knees so they are bent. Plant your feet on the floor, hip-width apart.   2. Nikki Punch your chin, and relax your shoulders. 3. Reach your arms straight out to the sides. If you don't feel a mild stretch in your shoulders and across your chest, use a foam roll or a tightly rolled blanket under your spine, from your tailbone to your head. 4. Relax in this position for at least 15 to 30 seconds while you breathe normally. Repeat 2 to 4 times. 5. As you get used to this stretch, keep adding a little more time until you are able relax in this position for 2 or 3 minutes. When you can relax for at least 2 minutes, you only need to do the exercise 1 time per session. Chest goalpost stretch    1. Lie on your back. Raise your knees so they are bent. Plant your feet on the floor, hip-width apart. 2. Tuck your chin, and relax your shoulders. 3. Reach your arms straight out to the sides. 4. Bend your arms at the elbows, with your hands pointed toward the top of your head. Your arms should make an L on either side of your head. Your palms should be facing up. 5. If you don't feel a mild stretch in your shoulders and across your chest, use a foam roll or tightly rolled blanket under your spine, from your tailbone to your head. 6. Relax in this position for at least 15 to 30 seconds while you breathe normally. Repeat 2 to 4 times. 7. Each day you do this exercise, add a little more time until you can relax in this position for 2 or 3 minutes. When you can relax for at least 2 minutes, you only need to do the exercise 1 time per session. Follow-up care is a key part of your treatment and safety. Be sure to make and go to all appointments, and call your doctor if you are having problems. It's also a good idea to know your test results and keep a list of the medicines you take. Where can you learn more? Go to http://karin-michaela.info/. Enter [phone] in the search box to learn more about \"Shoulder Blade: Exercises. \"  Current as of: September 20, 2018  Content Version: 11.9  © 3188-9417 [employer]. Care instructions adapted under license by [employer] (which disclaims liability or warranty for this information). If you have questions about a medical condition or this instruction, always ask your healthcare professional. [place] [address] any warranty or liability for your use of this information. Healthy Upper Back: Exercises  Your Care Instructions  Here are some examples of exercises for your upper back. Start each exercise slowly. Ease off the exercise if you start to have pain. Your doctor or physical therapist will tell you when you can start these exercises and which ones will work best for you. How to do the exercises  Lower neck and upper back stretch    5. Stretch your arms out in front of your body. Clasp one hand on top of your other hand. 6. Gently reach out so that you feel your shoulder blades stretching away from each other. 7. Gently bend your head forward. 8. Hold for 15 to 30 seconds. 9. Repeat 2 to 4 times. Midback stretch    4. Kneel on the floor, and sit back on your ankles. 5. Lean forward, place your hands on the floor, and stretch your arms out in front of you. Rest your head between your arms. 6. Gently push your chest toward the floor, reaching as far in front of you as possible. 7. Hold for 15 to 30 seconds. 8. Repeat 2 to 4 times. Shoulder rolls    5. Sit comfortably with your feet shoulder-width apart. You can also do this exercise while standing. 6. Roll your shoulders up, then back, and then down in a smooth, circular motion. 7. Repeat 2 to 4 times. Wall push-up    6. Stand against a wall with your feet about 12 to 24 inches back from the wall. If you feel any pain when you do this exercise, stand closer to the wall. 7. Place your hands on the wall slightly wider apart than your shoulders, and lean forward. 8. Gently lean your body toward the wall. Then push back to your starting position.  Keep the motion smooth and controlled. 9. Repeat 8 to 12 times. Resisted shoulder blade squeeze    3. Sit or stand, holding the band in both hands in front of you. Keep your elbows close to your sides, bent at a 90-degree angle. Your palms should face up. 4. Squeeze your shoulder blades together, and move your arms to the outside, stretching the band. Be sure to keep your elbows at your sides while you do this. 5. Relax. 6. Repeat 8 to 12 times. Resisted rows    1. Put the band around a solid object, such as a bedpost, at about waist level. Hold one end of the band in each hand. 2. With your elbows at your sides and bent to 90 degrees, pull the band back to move your shoulder blades toward each other. Return to the starting position. 3. Repeat 8 to 12 times. Follow-up care is a key part of your treatment and safety. Be sure to make and go to all appointments, and call your doctor if you are having problems. It's also a good idea to know your test results and keep a list of the medicines you take. Where can you learn more? Go to http://karin-michaela.info/. Enter D756 in the search box to learn more about \"Healthy Upper Back: Exercises. \"  Current as of: September 20, 2018  Content Version: 11.9  © 4822-3736 [employer]. Care instructions adapted under license by [employer] (which disclaims liability or warranty for this information). If you have questions about a medical condition or this instruction, always ask your healthcare professional. [place] [address] any warranty or liability for your use of this information.

## 2019-02-15 NOTE — PROGRESS NOTES
[address]  [address], [address]  [place]  Phone: [phone]  Fax: [phone]        Beni Fermin  : 1945  PCP: Osmel Durant NP  2/15/2019    PROGRESS NOTE      ASSESSMENT AND PLAN    Brandon Teresa comes in to the office today for f/u. She was unable to complete the cervical RFA with Dr. Dorene Isidro because she notes that he does not do sedation, and she jumped during the procedure, even with taking valium, so they were unable to proceed. She is scheduled to have a cervical RFA with Dr. Zeus Hastings next week with sedation. She continues to have constant axial neck pain. She also c/o limited movement of her right thumb that is very painful to move, but it loosens up when she soaks her hand in hot water. She rates her pain as an 8/10 today. On examination, she had tenderness to palpation of her cervical paraspinals, trapezii, and scalenes. Her pain remains due to cervical facet arthropathy and myofascial pain. I advised on proper posture. I advised she consult her PCP in regards to potential sleep apnea. She will proceed with the cervical RFA with Dr. Zeus Hastings, and I provided exercises to add to her HEP. I referred to pain management. I also referred to Dr. Ekta Harman for evaluation of her right hand pain. Pt will f/u after cervical RFA or sooner as needed. Diagnoses and all orders for this visit:    1. Arthropathy of cervical facet joint  -     REFERRAL TO PAIN MANAGEMENT    2. Myofascial pain  -     REFERRAL TO PAIN MANAGEMENT    3. Neck pain  -     REFERRAL TO PAIN MANAGEMENT    4. Right hand pain  -     REFERRAL TO ORTHOPEDICS    5. Chronic pain syndrome  -     REFERRAL TO PAIN MANAGEMENT         Follow-up Disposition:  Return after cervical RFA. HISTORY OF PRESENT ILLNESS  Brandon Teresa is a 72 y.o. female. A&P / HPI from 2017:   Vernell Dixon in to the office today c/o chronic neck pain which was exacerbated by a recent mild TIA. Her symptoms are likely related to cervical facet arthropathy.       On the examination, she had limited cervical ROM with pain reproduced at end ranges of rotation and extension. She did not show neurological deficits nor has radicular pain. The cervical CT scan showed multifocal degenerative disc disease most significant at C5-7 with associated canal stenosis. It also showed significant facet hypertrophy with multifocal regions of bilateral severe neural foramina stenosis.     I referred her to Dr. Hany Eric for a cervical medial branch block / RFA with sedation. I prescribed her Mobic 15 mg daily.      HISTORY OF PRESENT ILLNESS  Antonieta Friedman is a 72 y.o. female c/o chronic neck pain which was exacerbated by a mild TIA on 10/02/2017. She note her pain is constant and there is not specific movement that will aggravate her symptoms. She denies a specific position that will help alleviate her pain. She denies radicular symptoms into the extremities at this time. She present with a cervical CT scan which showed mild regions of anterior subluxation C2-C4 with associated canal stenosis C2-3 up to 7 mm. It also showed multifocal degenerative disc disease most significant at C5-7 with associated canal stenosis.     Pt denies any fevers, chills, nausea, vomiting. Pt denies any chest pain and shortness of breath. Pt denies any ear, nose, and throat problems. Pt denies any fecal or urinary incontinence.       Updates from 01/15/18:  She was unable to undergo the cervical medial branch block due a severe panic attack. She has been prescribed an anti-anxiety medication to further void these symptoms. Her symptoms have not significantly changed since the last office visit. She was also unable to tolerate the Mobic 15 mg daily due to stomach issues.      Updates from 09/06/18:  Pt presents for cervical RFA f/u. She has seen some improvement of her upper back pain.  However, she continues to have severe neck pain and headaches. Pt denies any numbness, weakness, or radicular symptoms in her UE.     She recently had a brain MRI.     She begins PT Monday because she \"is walking funny. \"     Updates from 10/30/18:  Pt presents for 6 week f/u. She has been seeing Dr. Alexandre Savage in pain management, and is scheduled to have a repeat RFA with the right side being done tomorrow.      She has been experiencing dizziness lately, and believes it can be attributed to her medications.      She also c/o left deltoid and upper arm pain that she suspects may be due to recently having her flu and shingles vaccine in that arm.     She has seen significant improvement of her balance with PT.      She continues to have headaches and ear ringing. She states she saw an ENT, but he only advised that she needs hearing aids. She states that she experiences dizziness when she turns her neck too fast. She reports that she was given a medication for dizziness, and when she took the second pill, she was vomiting so much that she went to the ER. Updates from 02/15/19:  Pt presents for f/u. She was unable to complete the cervical RFA with Dr. Alexandre Savage because she notes that he does not do sedation, and she jumped during the procedure, even with taking valium, so they were unable to proceed. She is scheduled to have a cervical RFA with Dr. Genaro Rubin next week with sedation. She continues to have constant axial neck pain. She has not found relief with Salonpas, Aspercreme, or hemp oil. She notes that since her stroke, her snoring has worsened and she experiences daytime fatigue. She sometimes wakes up gasping for air, so she is curious as to whether she may have sleep apnea.        PAST MEDICAL HISTORY   Past Medical History:   Diagnosis Date    Abnormal Pap smear     Dr. Ger Clark Allergic rhinitis     Arthritis     Chronic pain     Hypercholesterolemia     Neck pain 5/17/2010    Stroke ([facility] [address] 10/02/2017    TIA- legs weak memory issues       Past Surgical History:   Procedure Laterality Date    COLONOSCOPY N/A 6/4/2018    COLONOSCOPY / polypect[facility] by Alaina Ace [facility] at [facility]OSCOPY    HX GYN      Total Hyst    HX LAP CHOLECYSTECTOMY      HX OTHER SURGICAL  2017    Throat widened   . MEDICATIONS    Current Outpatient Medications   Medication Sig Dispense Refill    fluticasone (FLONASE) 50 mcg/actuation nasal spray instill 2 sprays into each nostril once daily 3 Bottle 1    alendronate (FOSAMAX) 10 mg tablet Take 1 Tab by mouth Daily (before breakfast). 30 Tab 2    atorvastatin (LIPITOR) 20 mg tablet take 1 tablet by mouth once daily 90 Tab 3    busPIRone (BUSPAR) 7.5 mg tablet take 1 tablet by mouth twice a day 60 Tab 1    dextromethorphan-guaiFENesin (CORICIDIN HBP)  mg cap Take  by mouth as needed (sinus symptoms).  LORazepam (ATIVAN) 0.5 mg tablet Take 0.5 mg by mouth daily as needed for Anxiety.  diazePAM (VALIUM) 2 mg tablet Take 1 Tab by mouth every eight (8) hours as needed for Anxiety. Max Daily Amount: 6 mg. 10 Tab 0    montelukast (SINGULAIR) 10 mg tablet take 1 tablet by mouth once daily 90 Tab 2    acetaminophen (TYLENOL EXTRA STRENGTH) 500 mg tablet Take 650 mg by mouth every six (6) hours as needed for Pain. Pt states they take medication QID.  loratadine (CLARITIN) 10 mg tablet Take 10 mg by mouth daily as needed for Allergies or Rhinitis.  omeprazole (PRILOSEC) 20 mg capsule Take 20 mg by mouth daily.  aspirin 81 mg chewable tablet Take 1 Tab by mouth daily.  30 Tab 3        ALLERGIES  Allergies   Allergen Reactions    Celebrex [Celecoxib] Other (comments)     Tiredness           SOCIAL HISTORY    Social History     Socioeconomic History    Marital status:      Spouse name: Not on file    Number of children: Not on file    Years of education: Not on file    Highest education level: Not on file   Tobacco Use    Smoking status: Never Smoker    Smokeless tobacco: Never Used   Substance and Sexual Activity    Alcohol use: No    Drug use: No    Sexual activity: No       FAMILY HISTORY  Family History   Problem Relation Age of Onset    Cancer Mother         breast    Heart Disease Father          REVIEW OF SYSTEMS  Review of Systems   Musculoskeletal: Positive for neck pain. PHYSICAL EXAMINATION  Visit Vitals  /74   Pulse 67   Temp 97.6 °F (36.4 °C)   Resp 16   Ht 5' (1.524 m)   Wt 121 lb 9.6 oz (55.2 kg)   SpO2 98%   BMI 23.75 kg/m²       Pain Assessment  9/6/2018   Location of Pain Neck   Severity of Pain 9   Quality of Pain Aching   Frequency of Pain Constant           Constitutional:  Well developed, well nourished, in no acute distress. Psychiatric: Affect and mood are appropriate. Integumentary: No rashes or abrasions noted on exposed areas. SPINE/MUSCULOSKELETAL EXAM    Cervical spine:  Neck is midline. Normal muscle tone. No focal atrophy is noted. Cervical ROM limited to about 30 degrees  Shoulder ROM intact. Tenderness to palpation. Negative Spurling's sign. Negative Tinel's sign. Negative Garza's sign.       Sensation in the bilateral arms grossly intact to light touch.       Lumbar spine:  No rash, ecchymosis, or gross obliquity. No fasciculations. No focal atrophy is noted. No pain with hip ROM. Full range of motion. No tenderness to palpation. No tenderness to palpation at the sciatic notch. SI joints non-tender. Trochanters non tender.      Sensation in the bilateral legs grossly intact to light touch. Updates from 2/15/19:  Tenderness to palpation of cervical paraspinals, scalenes, and trapezii  Negative Garza's sign bilaterally.     MOTOR:      Biceps  Triceps Deltoids Wrist Ext Wrist Flex Hand Intrin   Right 5/5 5/5 5/5 5/5 5/5 5/5   Left 5/5 5/5 5/5 5/5 5/5 5/5             Hip Flex  Quads Hamstrings Ankle DF EHL Ankle PF   Right 5/5 5/5 5/5 5/5 5/5 5/5   Left 5/5 5/5 5/5 5/5 5/5 5/5 DTRs are 2+ biceps, triceps, brachioradialis, patella, and Achilles.      Negative Straight Leg raise. Squat not tested. No difficulty with tandem gait.       Ambulation without assistive device. FWB. RADIOGRAPHS  Cervical CT images taken on 10/17/2017 personally reviewed with patient:  FINDINGS:      There is narrowing at the atlantooccipital joints bilaterally with hypertrophic  bone formation/calcification at the joint space between the basion and C1  anterior arch. Calcific soft tissue \"mass\" posterior to the dens. There is  calcification of the transverse ligaments dorsal C2. There is significant  artifact at the foramen magnum limiting assessment for narrowing but  calcification appears to efface the thecal sac without significant mass effect  as assessed on axial view. There is sclerosis at the cranial dens with mild  hypertrophic bone anteriorly and posteriorly. Calcified soft tissue mass also  anterior to the dens. There is loss of normal cervical lordosis. There is  narrowing at the C1-C2 anterior arch. No separation at C1-2. 2 mm anterior  subluxation C2 on C3, 1 mm anterior subluxation C3 on C4 and 1 mm anterior  subluxation C4 on C5. There is stenosis at C2-3 measuring up to 7 mm secondary  to disc bulge and anterior subluxation with mass effect on the posterior cord. There may be mild loss of vertebral body height at C5 and C6 with sclerosis at  C6-7 joint space and severe disc space narrowing. Additional severe disc space  narrowing C5-6. There is mild anterior calcifications C4-C6. Disc bulge C4-5  with mild canal narrowing up to 11 mm. Disc bulge C6-7 with narrowing up to 7  mm. Significant facet hypertrophy. No acute fracture. Severe stenosis right C3-4  and C4-5 neural foramina. Severe stenosis left C3-4, C4-5 and C6-7 neural  foramina. Moderate stenosis right C2-3, C5-6, C6-7 and left to 3 C5-6.  There is  soft tissue thickening with mild calcifications at the T1 supraspinous ligament.      There is prominence of the lingual tonsils. Significant biapical pleural  scarring.      IMPRESSION  IMPRESSION:        1.  Hypertrophic ossification and calcification basion/C1 with partial fusion,  calcified \"pseudomass\" posterior greater than anterior dens with consideration  for CPPD/gout or degenerative changes. No significant narrowing at the foramen  magnum. 2.  Mild regions of anterior subluxation C2-C4 with associated canal stenosis  C2-3 up to 7 mm. 3.  Multifocal degenerative disc disease most significant at C5-7 with  associated canal stenosis. 4.  Significant facet hypertrophy with multifocal regions of bilateral severe  neural foramina stenosis. 5.  Prominent lingual tonsils. 6.  Significant biapical pulmonary pleural scarring. 30 minutes of face-to-face contact were spent with the patient during today's visit extensively discussing symptoms and treatment plan. All questions were answered. More than half of this visit today was spent on counseling.      Written by Tolu Vines as dictated by Colin Solorzano MD

## 2019-02-26 ENCOUNTER — OFFICE VISIT (OUTPATIENT)
Dept: ORTHOPEDIC SURGERY | Facility: CLINIC | Age: 74
End: 2019-02-26

## 2019-02-26 VITALS
OXYGEN SATURATION: 99 % | SYSTOLIC BLOOD PRESSURE: 129 MMHG | RESPIRATION RATE: 18 BRPM | BODY MASS INDEX: 23.13 KG/M2 | TEMPERATURE: 96.7 F | HEART RATE: 75 BPM | HEIGHT: 60 IN | WEIGHT: 117.8 LBS | DIASTOLIC BLOOD PRESSURE: 62 MMHG

## 2019-02-26 DIAGNOSIS — M65.311 TRIGGER FINGER OF RIGHT THUMB: Primary | ICD-10-CM

## 2019-02-26 RX ORDER — TRAMADOL HYDROCHLORIDE 50 MG/1
50 TABLET ORAL
COMMUNITY
End: 2019-03-14

## 2019-02-26 RX ORDER — TURMERIC 400 MG
1 CAPSULE ORAL DAILY
COMMUNITY

## 2019-02-26 NOTE — PATIENT INSTRUCTIONS
Trigger Finger: Care Instructions  Your Care Instructions  A trigger finger is a finger stuck in a bent position. The bent finger usually straightens out on its own. A trigger finger can be painful, but it normally is not a serious problem. Trigger fingers seem to occur more in some groups of people. These include people who have diabetes or arthritis or who have injured their hands in the past. This problem also occurs in musicians and people who  tools often. Rest, exercises, and other things you can do at home may help your trigger finger relax so that it can bend as it should. You may get a corticosteroid shot. This can reduce swelling and pain. Your doctor may put a splint on your finger. This will give your finger some rest and avoid irritating the joint. You may need surgery if the finger keeps locking in a bent position. Follow-up care is a key part of your treatment and safety. Be sure to make and go to all appointments, and call your doctor if you are having problems. It's also a good idea to know your test results and keep a list of the medicines you take. How can you care for yourself at home? · If your doctor put a splint on your finger, wear the splint as directed. Do not remove it until your doctor says you can. · You may need to change your activities to avoid movements that irritate the finger. · If your finger is swollen, put ice or a cold pack on your finger for 10 to 20 minutes at a time. Try to do this every 1 to 2 hours for the next 3 days (when you are awake) or until the swelling goes down. Put a thin cloth between the ice and your skin. · Prop up your hand on a pillow when you ice it or anytime you sit or lie down during the next 3 days. Try to keep it above the level of your heart. This will help reduce swelling. · Take your medicines exactly as prescribed. Call your doctor if you think you are having a problem with your medicine.   · Ask your doctor if you can take an over-the-counter pain medicine, such as acetaminophen (Tylenol), ibuprofen (Advil, Motrin), or naproxen (Aleve). Be safe with medicines. Read and follow all instructions on the label. · If your doctor recommends exercises, do them as directed. When should you call for help? Call your doctor now or seek immediate medical care if:    · Your finger locks in a bent position and will not straighten.    Watch closely for changes in your health, and be sure to contact your doctor if:    · You do not get better as expected. Where can you learn more? Go to http://karin-michaela.info/. Enter M826 in the search box to learn more about \"Trigger Finger: Care Instructions. \"  Current as of: September 20, 2018  Content Version: 11.9  © 1928-8909 [employer]. Care instructions adapted under license by [employer] (which disclaims liability or warranty for this information). If you have questions about a medical condition or this instruction, always ask your healthcare professional. [address] any warranty or liability for your use of this information.

## 2019-02-26 NOTE — PROGRESS NOTES
Cristian Pelayo is a 72 y.o. female right handed retiree. Worker's Compensation and legal considerations: not known. Vitals:    02/26/19 0819   BP: 129/62   Pulse: 75   Resp: 18   Temp: 96.7 °F (35.9 °C)   TempSrc: Oral   SpO2: 99%   Weight: 117 lb 12.8 oz (53.4 kg)   Height: 5' (1.524 m)   PainSc:  10 - Worst pain ever   PainLoc: Neck           Chief Complaint   Patient presents with    Hand Pain     Right    Neck Pain         HPI: Patient comes in today with complaints of right thumb pain. She was referred from the spine center for which she is being treated for cervical spine issues and pain issues. She reports for a month or so she has had locking and pain in her right thumb. She denies any injury to the area. She was told she has a trigger thumb. Date of onset:  1/2019    Injury: No    Prior Treatment:  No    Numbness/ Tingling: No    ROS: Review of Systems - General ROS: negative  Respiratory ROS: no cough, shortness of breath, or wheezing  Cardiovascular ROS: no chest pain or dyspnea on exertion  Musculoskeletal ROS: positive for - pain in thumb - right  Neurological ROS: negative  Dermatological ROS: negative    Past Medical History:   Diagnosis Date    Abnormal Pap smear     Dr. Kervin Patel    Allergic rhinitis     Arthritis     Chronic pain     Hypercholesterolemia     Neck pain 5/17/2010    Stroke ([facility] [address] 10/02/2017    TIA- legs weak memory issues       Past Surgical History:   Procedure Laterality Date    COLONOSCOPY N/A 6/4/2018    COLONOSCOPY / polypectomy performed by Sanchez Mcmullen MD at [facility] ENDOSCOPY    HX GYN      Total Hyst    HX LAP CHOLECYSTECTOMY      HX OTHER SURGICAL  2017    Throat widened       Current Outpatient Medications   Medication Sig Dispense Refill    turmeric 400 mg cap Take  by mouth.  traMADol (ULTRAM) 50 mg tablet Take 50 mg by mouth every six (6) hours as needed for Pain.       triamcinolone acetonide (KENALOG) 10 mg/mL injection 1 mL by IntraMUSCular route once for 1 dose. 1 Vial 0    fluticasone (FLONASE) 50 mcg/actuation nasal spray instill 2 sprays into each nostril once daily 3 Bottle 1    alendronate (FOSAMAX) 10 mg tablet Take 1 Tab by mouth Daily (before breakfast). 30 Tab 2    atorvastatin (LIPITOR) 20 mg tablet take 1 tablet by mouth once daily 90 Tab 3    busPIRone (BUSPAR) 7.5 mg tablet take 1 tablet by mouth twice a day 60 Tab 1    omeprazole (PRILOSEC) 20 mg capsule Take 20 mg by mouth daily.  aspirin 81 mg chewable tablet Take 1 Tab by mouth daily. 30 Tab 3    dextromethorphan-guaiFENesin (CORICIDIN HBP)  mg cap Take  by mouth as needed (sinus symptoms).  LORazepam (ATIVAN) 0.5 mg tablet Take 0.5 mg by mouth daily as needed for Anxiety.  diazePAM (VALIUM) 2 mg tablet Take 1 Tab by mouth every eight (8) hours as needed for Anxiety. Max Daily Amount: 6 mg. 10 Tab 0    montelukast (SINGULAIR) 10 mg tablet take 1 tablet by mouth once daily 90 Tab 2    acetaminophen (TYLENOL EXTRA STRENGTH) 500 mg tablet Take 650 mg by mouth every six (6) hours as needed for Pain. Pt states they take medication QID. Allergies   Allergen Reactions    Celebrex [Celecoxib] Other (comments)     Tiredness          PE:     Hand:    Examination L Digit(s) R Digit(s)   1st CMC Tenderness -  -    1st CMC Grind -  -    Fausto Nodes -  -    Heberden Nodes -  -    A1 Pulley Tenderness -  + Th   Triggering -  + Th   UCL Instability -  -    RCL Instability -  -    Lateral Stress Pain -  -    Palmar Cords -  -    Tabletop test -  -    Garrod's Pads -  -     Strength       Pinch Strength         ROM: Full      Imaging: None indicated today        ICD-10-CM ICD-9-CM    1. Trigger finger of right thumb M65.311 727.03 TRIAMCINOLONE ACETONIDE INJ      triamcinolone acetonide (KENALOG) 10 mg/mL injection      INJECT TENDON SHEATH/LIGAMENT       Plan:     Right thumb A1 pulley injection    Follow-up PRN.   I instructed the patient to reevaluate how she is doing at 2 months compared to now. [facility]  OFFICE PROCEDURE PROGRESS NOTE        Chart reviewed for the following:   Bc MORELOS DO, have reviewed the History, Physical and updated the Allergic reactions for Zachariah Russell performed immediately prior to start of procedure:   Bc MORELOS DO, have performed the following reviews on Luis Holm prior to the start of the procedure:            * Patient was identified by name and date of birth   * Agreement on procedure being performed was verified  * Risks and Benefits explained to the patient  * Procedure site verified and marked as necessary  * Patient was positioned for comfort  * Consent was signed and verified     Time: 08:35 AM      Date of procedure: 2/26/2019    Procedure performed by: Smiley Rodriguez DO    Provider assisted by: Dorene Ayers LPN    Patient assisted by: self    How tolerated by patient: tolerated the procedure well with no complications    Post Procedural Pain Scale: 0 - No Hurt    Comments: none    Procedure:  After consent was obtained, using sterile technique the A1 pulley was prepped. Local anesthetic used: 1% lidocaine. Kenalog 5 mg and was then injected and the needle withdrawn. The procedure was well tolerated. The patient is asked to continue to rest the area for a few more days before resuming regular activities. It may be more painful for the first 1-2 days. Watch for fever, or increased swelling or persistent pain in the joint. Call or return to clinic prn if such symptoms occur or there is failure to improve as anticipated.     Plan was reviewed with patient, who verbalized agreement and understanding of the plan

## 2019-03-08 ENCOUNTER — OFFICE VISIT (OUTPATIENT)
Dept: INTERNAL MEDICINE CLINIC | Age: 74
End: 2019-03-08

## 2019-03-08 VITALS
DIASTOLIC BLOOD PRESSURE: 78 MMHG | HEIGHT: 60 IN | BODY MASS INDEX: 23.99 KG/M2 | OXYGEN SATURATION: 98 % | WEIGHT: 122.2 LBS | SYSTOLIC BLOOD PRESSURE: 138 MMHG | HEART RATE: 66 BPM | RESPIRATION RATE: 16 BRPM | TEMPERATURE: 97.8 F

## 2019-03-08 DIAGNOSIS — M54.2 NECK PAIN: ICD-10-CM

## 2019-03-08 DIAGNOSIS — M81.0 OSTEOPOROSIS, UNSPECIFIED OSTEOPOROSIS TYPE, UNSPECIFIED PATHOLOGICAL FRACTURE PRESENCE: ICD-10-CM

## 2019-03-08 DIAGNOSIS — G47.30 SLEEP APNEA, UNSPECIFIED TYPE: Primary | ICD-10-CM

## 2019-03-08 NOTE — PROGRESS NOTES
Chief Complaint   Patient presents with    Sleep Problem         Is someone accompanying this pt? yes    Is the patient using any DME equipment during OV? no    Depression Screening:  3 most recent [facility] Screens 2/26/2019 1/16/2019 12/18/2018 12/6/2018 10/4/2018 10/1/2018 9/11/2018   PHQ Not Done Patient Decline - - - - - Medical Reason (indicate in comments)   Little interest or pleasure in doing things - Not at all Not at all Not at all Not at all Not at all -   Feeling down, depressed, irritable, or hopeless - Not at all Not at all Not at all Not at all Not at all -   Total Score PHQ 2 - 0 0 0 0 0 -       Learning Assessment:  Learning Assessment 2/26/2019 1/25/2018 11/28/2017   PRIMARY LEARNER Patient Patient Patient   HIGHEST LEVEL OF EDUCATION - PRIMARY LEARNER  - [address] [place] [address] CAREGIVER - No No   PRIMARY LANGUAGE ENGLISH ENGLISH ENGLISH   LEARNER PREFERENCE PRIMARY DEMONSTRATION READING DEMONSTRATION   ANSWERED BY Patient patient patient   RELATIONSHIP SELF SELF SELF       Abuse Screening:  Abuse Screening Questionnaire 2/9/2018 1/25/2018 11/28/2017   Do you ever feel afraid of your partner? N N N   Are you in a relationship with someone who physically or mentally threatens you? N N N   Is it safe for you to go home? Nathalie Turner       Fall Risk  Fall Risk Assessment, last 12 mths 2/26/2019 2/15/2019 1/16/2019 12/18/2018 12/6/2018 10/1/2018 9/11/2018   Able to walk? Yes Yes Yes Yes Yes Yes Yes   Fall in past 12 months? No No No No No No No         Health Maintenance reviewed and discussed per provider. Pt is due for There are no preventive care reminders to display for this patient. Please order/place referral if appropriate. Pt currently taking Antiplatelet therapy? no      Coordination of Care:  1. Have you been to the ER, urgent care clinic since your last visit? Hospitalized since your last visit? no    2.  Have you seen or consulted any other health care providers outside of the [address] [place] [address] since your last visit? Include any pap smears or colon screening. yes    Please see Red banners under Allergies, Med rec, Immunizations to remove outside inquires. All correct information has been verified with patient and added to chart.

## 2019-03-14 ENCOUNTER — HOSPITAL ENCOUNTER (EMERGENCY)
Age: 74
Discharge: HOME | DRG: 552 | End: 2019-03-14
Attending: EMERGENCY MEDICINE
Payer: MEDICARE

## 2019-03-14 ENCOUNTER — APPOINTMENT (OUTPATIENT)
Dept: GENERAL RADIOLOGY | Age: 74
DRG: 552 | End: 2019-03-14
Attending: PHYSICIAN ASSISTANT
Payer: MEDICARE

## 2019-03-14 VITALS
SYSTOLIC BLOOD PRESSURE: 144 MMHG | TEMPERATURE: 97.9 F | DIASTOLIC BLOOD PRESSURE: 83 MMHG | OXYGEN SATURATION: 99 % | RESPIRATION RATE: 16 BRPM | HEART RATE: 72 BPM

## 2019-03-14 DIAGNOSIS — R42 VERTIGO: ICD-10-CM

## 2019-03-14 DIAGNOSIS — M47.892 OTHER OSTEOARTHRITIS OF SPINE, CERVICAL REGION: Primary | ICD-10-CM

## 2019-03-14 DIAGNOSIS — R53.83 FATIGUE, UNSPECIFIED TYPE: ICD-10-CM

## 2019-03-14 LAB
ANION GAP SERPL CALC-SCNC: 6 MMOL/L (ref 3–18)
ATRIAL RATE: 62 BPM
BASOPHILS # BLD: 0 K/UL (ref 0–0.1)
BASOPHILS NFR BLD: 0 % (ref 0–2)
BUN SERPL-MCNC: 18 MG/DL (ref 7–18)
BUN/CREAT SERPL: 25 (ref 12–20)
CALCIUM SERPL-MCNC: 9.8 MG/DL (ref 8.5–10.1)
CALCULATED P AXIS, ECG09: 79 DEGREES
CALCULATED R AXIS, ECG10: 48 DEGREES
CALCULATED T AXIS, ECG11: 65 DEGREES
CHLORIDE SERPL-SCNC: 105 MMOL/L (ref 100–108)
CK SERPL-CCNC: 72 U/L (ref 26–192)
CO2 SERPL-SCNC: 28 MMOL/L (ref 21–32)
CREAT SERPL-MCNC: 0.71 MG/DL (ref 0.6–1.3)
DIAGNOSIS, 93000: NORMAL
DIFFERENTIAL METHOD BLD: ABNORMAL
EOSINOPHIL # BLD: 0.1 K/UL (ref 0–0.4)
EOSINOPHIL NFR BLD: 1 % (ref 0–5)
ERYTHROCYTE [DISTWIDTH] IN BLOOD BY AUTOMATED COUNT: 12.3 % (ref 11.6–14.5)
FLUAV AG NPH QL IA: NEGATIVE
FLUBV AG NOSE QL IA: NEGATIVE
GLUCOSE SERPL-MCNC: 112 MG/DL (ref 74–99)
HCT VFR BLD AUTO: 42.3 % (ref 35–45)
HGB BLD-MCNC: 14.5 G/DL (ref 12–16)
LYMPHOCYTES # BLD: 2.2 K/UL (ref 0.9–3.6)
LYMPHOCYTES NFR BLD: 25 % (ref 21–52)
MCH RBC QN AUTO: 32.6 PG (ref 24–34)
MCHC RBC AUTO-ENTMCNC: 34.3 G/DL (ref 31–37)
MCV RBC AUTO: 95.1 FL (ref 74–97)
MONOCYTES # BLD: 1.1 K/UL (ref 0.05–1.2)
MONOCYTES NFR BLD: 13 % (ref 3–10)
NEUTS SEG # BLD: 5.3 K/UL (ref 1.8–8)
NEUTS SEG NFR BLD: 61 % (ref 40–73)
P-R INTERVAL, ECG05: 146 MS
PLATELET # BLD AUTO: 244 K/UL (ref 135–420)
PMV BLD AUTO: 9.3 FL (ref 9.2–11.8)
POTASSIUM SERPL-SCNC: 4 MMOL/L (ref 3.5–5.5)
Q-T INTERVAL, ECG07: 406 MS
QRS DURATION, ECG06: 74 MS
QTC CALCULATION (BEZET), ECG08: 412 MS
RBC # BLD AUTO: 4.45 M/UL (ref 4.2–5.3)
SODIUM SERPL-SCNC: 139 MMOL/L (ref 136–145)
VENTRICULAR RATE, ECG03: 62 BPM
WBC # BLD AUTO: 8.8 K/UL (ref 4.6–13.2)

## 2019-03-14 PROCEDURE — 87804 INFLUENZA ASSAY W/OPTIC: CPT

## 2019-03-14 PROCEDURE — 80048 BASIC METABOLIC PNL TOTAL CA: CPT

## 2019-03-14 PROCEDURE — 71046 X-RAY EXAM CHEST 2 VIEWS: CPT

## 2019-03-14 PROCEDURE — 85025 COMPLETE CBC W/AUTO DIFF WBC: CPT

## 2019-03-14 PROCEDURE — 99283 EMERGENCY DEPT VISIT LOW MDM: CPT

## 2019-03-14 PROCEDURE — 93005 ELECTROCARDIOGRAM TRACING: CPT

## 2019-03-14 PROCEDURE — 82550 ASSAY OF CK (CPK): CPT

## 2019-03-14 RX ORDER — TRAMADOL HYDROCHLORIDE 50 MG/1
50 TABLET ORAL
Qty: 15 TAB | Refills: 0 | Status: SHIPPED | OUTPATIENT
Start: 2019-03-14 | End: 2019-03-20

## 2019-03-14 RX ORDER — MECLIZINE HYDROCHLORIDE 25 MG/1
25 TABLET ORAL
Qty: 15 TAB | Refills: 0 | Status: SHIPPED | OUTPATIENT
Start: 2019-03-14 | End: 2019-03-24

## 2019-03-14 NOTE — ED NOTES
Pt was very upset about time of wait and not seeing provider. This RN offered to get Dr. Barbara Cordero during discharge. Pt declined. I have reviewed discharge instructions and prescriptions with the patient. The patient verbalized understanding.

## 2019-03-14 NOTE — ED TRIAGE NOTES
Pt c/o fatigue and dizziness with turning neck to the left. C/o difficulty swallowing since yesterday. Hx of neck arthritis, hands and back. Saw primary care last week and was referred for sleep study.

## 2019-03-14 NOTE — ED PROVIDER NOTES
EMERGENCY DEPARTMENT HISTORY AND PHYSICAL EXAM    5:40 PM  Date: 3/14/2019  Patient Name: Leonel Braswell    History of Presenting Illness     Chief Complaint: Fatigue    History Provided By: Patient    HPI: Leonel Braswell is a 72 y.o. female with a past MHx neck pain, chronic pain, arthritis and stroke who presents to the ED c/o worsening and intermittent fatigue, dizziness, ear pain and neck pain. The pt indicated that she has ongoing trouble with her chronic neck pain, dizziness, and vertigo, however, over the past week she has been increasingly more fatigue and her other symptoms have worsened since last Friday. She indicated that her dizzy spells last 10-15 seconds and when she walks she \"stumbles\". Admitted to having neck spasms. Denies any difficulty speaking, blurred vision, syncope, fever, chills or urinary issues. PCP: Thom Gatica NP    This was created with voice recognition software and transcription errors may be present. Past History     Past Medical History:  Past Medical History:   Diagnosis Date    Abnormal Pap smear     Dr. Chery Scales Allergic rhinitis     Arthritis     Chronic pain     Hypercholesterolemia     Neck pain 5/17/2010    Stroke ([facility] [address] 10/02/2017    TIA- legs weak memory issues       Past Surgical History:  Past Surgical History:   Procedure Laterality Date    COLONOSCOPY N/A 6/4/2018    COLONOSCOPY / polypectomy performed by Gelacio Wall [facility] at [facility]COPY    HX GYN      Total Hyst    HX LAP CHOLECYSTECTOMY      HX OTHER SURGICAL  2017    Throat widened       Family History:  Family History   Problem Relation Age of Onset    Cancer Mother         breast    Heart Disease Father        Social History:  Social History     Tobacco Use    Smoking status: Never Smoker    Smokeless tobacco: Never Used   Substance Use Topics    Alcohol use: No    Drug use: No       Allergies:   Allergies   Allergen Reactions    Celebrex [Celecoxib] Other (comments) Tiredness        Review of Systems     Review of Systems   Constitutional: Positive for fatigue. Negative for chills and fever. HENT: Positive for ear pain. Eyes: Negative for visual disturbance. Genitourinary: Negative for difficulty urinating and dysuria. Musculoskeletal: Positive for gait problem (Stumbling ) and neck pain. Neurological: Positive for dizziness. Negative for syncope and speech difficulty. All other systems reviewed and are negative. Physical Exam       Physical Exam   Constitutional: She is oriented to person, place, and time. She appears well-developed. HENT:   Head: Normocephalic and atraumatic. Eyes: EOM are normal. Pupils are equal, round, and reactive to light. Neck: Normal range of motion. Neck supple. Cardiovascular: Normal rate, regular rhythm and normal heart sounds. Exam reveals no friction rub. No murmur heard. Pulmonary/Chest: Effort normal and breath sounds normal. No respiratory distress. She has no wheezes. Abdominal: Soft. She exhibits no distension. There is no tenderness. There is no rebound and no guarding. Musculoskeletal: Normal range of motion. Mild tenderness to palpation over the cervical spine C5-6   Neurological: She is alert and oriented to person, place, and time. Skin: Skin is warm and dry. Psychiatric: She has a normal mood and affect. Her behavior is normal. Thought content normal.       Diagnostic Study Results     Vital Signs   Visit Vitals  /83 (BP Patient Position: At rest)   Pulse 72   Temp 97.9 °F (36.6 °C)   Resp 16   SpO2 99%         EKG: EKG shows a rate of 62 with sinus rhythm with a normal axis and normal intervals there is no ST elevation or depression and no hypertrophy    Labs: cbc neg  Flu neg  Bmp neg      Imaging:   cxr napd  Medical Decision Making     ED Course: Progress Notes, Reevaluation, and Consults:      Provider Notes (Medical Decision Making):  This is a 62-year-old female who presents with some generalized weakness no dysuria no fevers no chills no cough some neck pain it has been going on for years associated with some dizziness. The dizziness is quite hard to figure out she does describe more of a vertigo than a lightheadedness or presyncope. There is no secondary signs of blurred vision or slurred speech. This is been a recurrent problem lasting for sometimes a few seconds and sometimes longer when this happens she does have some stumbling but she does not have any gait problems. She has been cared for by pain management as well as spine in the past.  She would just like a referral so that she knows she can improve her symptoms. I do not think this is a stroke with the vertigo. She has no secondary signs and it is extremely recurrent and often short-lived. It may be vertigo I will treated now given the duration. She has seen a neurologist in the past will refer her to neurology. Diagnosis     Clinical Impression: No diagnosis found. Disposition:       Medication List      ASK your doctor about these medications    aspirin 81 mg chewable tablet  Take 1 Tab by mouth daily.      ATIVAN 0.5 mg tablet  Generic drug:  LORazepam     atorvastatin 20 mg tablet  Commonly known as:  LIPITOR  take 1 tablet by mouth once daily     busPIRone 7.5 mg tablet  Commonly known as:  BUSPAR  take 1 tablet by mouth twice a day     CORICIDIN HBP  mg Cap  Generic drug:  dextromethorphan-guaiFENesin     fluticasone propionate 50 mcg/actuation nasal spray  Commonly known as:  FLONASE  instill 2 sprays into each nostril once daily     montelukast 10 mg tablet  Commonly known as:  SINGULAIR  take 1 tablet by mouth once daily     omeprazole 20 mg capsule  Commonly known as:  PRILOSEC     traMADol 50 mg tablet  Commonly known as:  ULTRAM     turmeric 400 mg Cap     TYLENOL EXTRA STRENGTH 500 mg tablet  Generic drug:  acetaminophen          _______________________________    Attestations:  Dena Attestation I-Madai Ernst acting as a scribe for and in the presence of Clover Ulloa MD      March 14, 2019 at 5:40 PM       Provider Attestation:      I personally performed the services described in the documentation, reviewed the documentation, as recorded by the scribe in my presence, and it accurately and completely records my words and actions.  March 14, 2019 at 5:40 PM - Clover Ulloa MD    _______________________________

## 2019-03-17 ENCOUNTER — APPOINTMENT (OUTPATIENT)
Dept: GENERAL RADIOLOGY | Age: 74
DRG: 552 | End: 2019-03-17
Attending: EMERGENCY MEDICINE
Payer: MEDICARE

## 2019-03-17 ENCOUNTER — HOSPITAL ENCOUNTER (INPATIENT)
Age: 74
LOS: 3 days | Discharge: HOME | DRG: 552 | End: 2019-03-20
Attending: EMERGENCY MEDICINE | Admitting: INTERNAL MEDICINE
Payer: MEDICARE

## 2019-03-17 ENCOUNTER — APPOINTMENT (OUTPATIENT)
Dept: CT IMAGING | Age: 74
DRG: 552 | End: 2019-03-17
Attending: EMERGENCY MEDICINE
Payer: MEDICARE

## 2019-03-17 DIAGNOSIS — R42 LIGHTHEADEDNESS: ICD-10-CM

## 2019-03-17 DIAGNOSIS — R53.1 GENERALIZED WEAKNESS: ICD-10-CM

## 2019-03-17 DIAGNOSIS — M47.892 OTHER OSTEOARTHRITIS OF SPINE, CERVICAL REGION: ICD-10-CM

## 2019-03-17 DIAGNOSIS — R42 VERTIGO: Primary | ICD-10-CM

## 2019-03-17 LAB
ALBUMIN SERPL-MCNC: 4.2 G/DL (ref 3.4–5)
ALBUMIN/GLOB SERPL: 1.4 {RATIO} (ref 0.8–1.7)
ALP SERPL-CCNC: 72 U/L (ref 45–117)
ALT SERPL-CCNC: 31 U/L (ref 13–56)
ANION GAP SERPL CALC-SCNC: 6 MMOL/L (ref 3–18)
AST SERPL-CCNC: 25 U/L (ref 15–37)
BASOPHILS # BLD: 0 K/UL (ref 0–0.1)
BASOPHILS NFR BLD: 0 % (ref 0–2)
BILIRUB SERPL-MCNC: 0.5 MG/DL (ref 0.2–1)
BNP SERPL-MCNC: 213 PG/ML (ref 0–900)
BUN SERPL-MCNC: 18 MG/DL (ref 7–18)
BUN/CREAT SERPL: 23 (ref 12–20)
CALCIUM SERPL-MCNC: 8.7 MG/DL (ref 8.5–10.1)
CHLORIDE SERPL-SCNC: 108 MMOL/L (ref 100–108)
CK MB CFR SERPL CALC: 1.3 % (ref 0–4)
CK MB SERPL-MCNC: 1.4 NG/ML (ref 5–25)
CK SERPL-CCNC: 107 U/L (ref 26–192)
CO2 SERPL-SCNC: 27 MMOL/L (ref 21–32)
CREAT SERPL-MCNC: 0.77 MG/DL (ref 0.6–1.3)
DIFFERENTIAL METHOD BLD: ABNORMAL
EOSINOPHIL # BLD: 0.1 K/UL (ref 0–0.4)
EOSINOPHIL NFR BLD: 1 % (ref 0–5)
ERYTHROCYTE [DISTWIDTH] IN BLOOD BY AUTOMATED COUNT: 12.2 % (ref 11.6–14.5)
GLOBULIN SER CALC-MCNC: 3.1 G/DL (ref 2–4)
GLUCOSE SERPL-MCNC: 87 MG/DL (ref 74–99)
HCT VFR BLD AUTO: 39.8 % (ref 35–45)
HGB BLD-MCNC: 13.1 G/DL (ref 12–16)
LYMPHOCYTES # BLD: 2.4 K/UL (ref 0.9–3.6)
LYMPHOCYTES NFR BLD: 26 % (ref 21–52)
MCH RBC QN AUTO: 31.2 PG (ref 24–34)
MCHC RBC AUTO-ENTMCNC: 32.9 G/DL (ref 31–37)
MCV RBC AUTO: 94.8 FL (ref 74–97)
MONOCYTES # BLD: 1.1 K/UL (ref 0.05–1.2)
MONOCYTES NFR BLD: 12 % (ref 3–10)
NEUTS SEG # BLD: 5.6 K/UL (ref 1.8–8)
NEUTS SEG NFR BLD: 61 % (ref 40–73)
PLATELET # BLD AUTO: 225 K/UL (ref 135–420)
PMV BLD AUTO: 9.2 FL (ref 9.2–11.8)
POTASSIUM SERPL-SCNC: 3.6 MMOL/L (ref 3.5–5.5)
PROT SERPL-MCNC: 7.3 G/DL (ref 6.4–8.2)
RBC # BLD AUTO: 4.2 M/UL (ref 4.2–5.3)
SODIUM SERPL-SCNC: 141 MMOL/L (ref 136–145)
TROPONIN I SERPL-MCNC: <0.02 NG/ML (ref 0–0.04)
WBC # BLD AUTO: 9.3 K/UL (ref 4.6–13.2)

## 2019-03-17 PROCEDURE — 93005 ELECTROCARDIOGRAM TRACING: CPT

## 2019-03-17 PROCEDURE — 85025 COMPLETE CBC W/AUTO DIFF WBC: CPT

## 2019-03-17 PROCEDURE — 80053 COMPREHEN METABOLIC PANEL: CPT

## 2019-03-17 PROCEDURE — 96360 HYDRATION IV INFUSION INIT: CPT

## 2019-03-17 PROCEDURE — 83880 ASSAY OF NATRIURETIC PEPTIDE: CPT

## 2019-03-17 PROCEDURE — 65660000000 HC RM CCU STEPDOWN

## 2019-03-17 PROCEDURE — 82550 ASSAY OF CK (CPK): CPT

## 2019-03-17 PROCEDURE — 74011250636 HC RX REV CODE- 250/636: Performed by: EMERGENCY MEDICINE

## 2019-03-17 PROCEDURE — 99284 EMERGENCY DEPT VISIT MOD MDM: CPT

## 2019-03-17 PROCEDURE — 71046 X-RAY EXAM CHEST 2 VIEWS: CPT

## 2019-03-17 PROCEDURE — 70450 CT HEAD/BRAIN W/O DYE: CPT

## 2019-03-17 RX ORDER — GUAIFENESIN 100 MG/5ML
81 LIQUID (ML) ORAL DAILY
Status: DISCONTINUED | OUTPATIENT
Start: 2019-03-18 | End: 2019-03-20 | Stop reason: HOSPADM

## 2019-03-17 RX ORDER — SODIUM CHLORIDE 0.9 % (FLUSH) 0.9 %
5-40 SYRINGE (ML) INJECTION AS NEEDED
Status: DISCONTINUED | OUTPATIENT
Start: 2019-03-17 | End: 2019-03-20 | Stop reason: HOSPADM

## 2019-03-17 RX ORDER — HEPARIN SODIUM 5000 [USP'U]/ML
5000 INJECTION, SOLUTION INTRAVENOUS; SUBCUTANEOUS EVERY 8 HOURS
Status: DISCONTINUED | OUTPATIENT
Start: 2019-03-17 | End: 2019-03-20 | Stop reason: HOSPADM

## 2019-03-17 RX ORDER — SODIUM CHLORIDE 0.9 % (FLUSH) 0.9 %
5-40 SYRINGE (ML) INJECTION EVERY 8 HOURS
Status: DISCONTINUED | OUTPATIENT
Start: 2019-03-17 | End: 2019-03-20 | Stop reason: HOSPADM

## 2019-03-17 RX ORDER — MECLIZINE HCL 12.5 MG 12.5 MG/1
25 TABLET ORAL
Status: DISCONTINUED | OUTPATIENT
Start: 2019-03-17 | End: 2019-03-20 | Stop reason: HOSPADM

## 2019-03-17 RX ORDER — SODIUM CHLORIDE 9 MG/ML
75 INJECTION, SOLUTION INTRAVENOUS CONTINUOUS
Status: DISPENSED | OUTPATIENT
Start: 2019-03-17 | End: 2019-03-18

## 2019-03-17 RX ORDER — MECLIZINE HCL 12.5 MG 12.5 MG/1
25 TABLET ORAL ONCE
Status: COMPLETED | OUTPATIENT
Start: 2019-03-17 | End: 2019-03-17

## 2019-03-17 RX ADMIN — SODIUM CHLORIDE 1000 ML: 900 INJECTION, SOLUTION INTRAVENOUS at 15:54

## 2019-03-17 RX ADMIN — MECLIZINE 25 MG: 12.5 TABLET ORAL at 17:46

## 2019-03-17 NOTE — ED NOTES
Patient laying in bed alert and oriented x3, no signs of distress noted. Patient states since she received her Meclizine she is feeling better. She was given a warm blanket.

## 2019-03-17 NOTE — H&P
History & Physical    Patient: Cristian Pelayo MRN: [id number]  CSN: [id number]    YOB: 1945  Age: 72 y.o. Sex: female      DOA: 3/17/2019    Chief Complaint:   Chief Complaint   Patient presents with    Dizziness    Fatigue    Anxiety          HPI:     Cristian Pelayo is a 72 y.o.   female who has chronic neck pain and dizziness on outpatient vestibular therapy presents to ER with worsening dizziness and weakness similar to when she had a stoke  Her partner is having to help her to ambulate and do most ADL and did not feel comfortable taking her home   She recently had Laser and DEBBIE injection by spine specialist with no relief ; few days ago was in ER given Meclizine did not fill due to side effects   Denies cough congestion or fever   CT done in hospital negative  Seen by Neuro  And ask to admit for repeat stroke work     Past Medical History:   Diagnosis Date    Abnormal Pap smear     Dr. Sean Rodas Allergic rhinitis     Arthritis     Chronic pain     Hypercholesterolemia     Neck pain 5/17/2010    Stroke ([facility] [address] 10/02/2017    TIA- legs weak memory issues       Past Surgical History:   Procedure Laterality Date    COLONOSCOPY N/A 6/4/2018    COLONOSCOPY / polypectomy performed by Sanchez Mcmullen [facility] at [facility] ENDOSCOPY    HX GYN      Total Hyst    HX LAP CHOLECYSTECTOMY      HX OTHER SURGICAL  2017    Throat widened       Family History   Problem Relation Age of Onset    Cancer Mother         breast    Heart Disease Father        Social History     Socioeconomic History    Marital status:      Spouse name: Not on file    Number of children: Not on file    Years of education: Not on file    Highest education level: Not on file   Tobacco Use    Smoking status: Never Smoker    Smokeless tobacco: Never Used   Substance and Sexual Activity    Alcohol use: No    Drug use: No    Sexual activity: No       Prior to Admission medications    Medication Sig Start Date End Date Taking? Authorizing Provider   traMADol (ULTRAM) 50 mg tablet Take 1 Tab by mouth every six (6) hours as needed for Pain for up to 3 days. Max Daily Amount: 200 mg. 3/14/19 3/17/19  Abdulaziz Wallace MD   meclizine (ANTIVERT) 25 mg tablet Take 1 Tab by mouth three (3) times daily as needed for Dizziness for up to 10 days. 3/14/19 3/24/19  Abdulaziz Wallace MD   turmeric 400 mg cap Take  by mouth. Provider, Historical   fluticasone (FLONASE) 50 mcg/actuation nasal spray instill 2 sprays into each nostril once daily 2/13/19   Lj Ferreira NP   atorvastatin (LIPITOR) 20 mg tablet take 1 tablet by mouth once daily 1/30/19   Penelope Reese MD   busPIRone (BUSPAR) 7.5 mg tablet take 1 tablet by mouth twice a day 1/16/19   jL Ferreira NP   dextromethorphan-guaiFENesin (CORICIDIN HBP)  mg cap Take  by mouth as needed (sinus symptoms). Provider, Historical   LORazepam (ATIVAN) 0.5 mg tablet Take 0.5 mg by mouth daily as needed for Anxiety. Provider, Historical   montelukast (SINGULAIR) 10 mg tablet take 1 tablet by mouth once daily 9/21/18   Lj Ferreira NP   acetaminophen (TYLENOL EXTRA STRENGTH) 500 mg tablet Take 650 mg by mouth every six (6) hours as needed for Pain. Pt states they take medication QID. Provider, Historical   omeprazole (PRILOSEC) 20 mg capsule Take 20 mg by mouth daily. Provider, Historical   aspirin 81 mg chewable tablet Take 1 Tab by mouth daily. 10/10/17   JULIA Soto       Allergies   Allergen Reactions    Celebrex [Celecoxib] Other (comments)     Tiredness          Review of Systems  GENERAL: Patient alert, awake and oriented times 3, able to communicate full sentences and not in distress. HEENT: No change in vision, no earache, tinnitus, sore throat or sinus congestion. NECK: No pain or stiffness. PULMONARY: No shortness of breath, cough or wheeze.    Cardiovascular: no pnd or orthopnea, no CP  GASTROINTESTINAL: No abdominal pain, nausea, vomiting or diarrhea, melena or bright red blood per rectum. GENITOURINARY: No urinary frequency, urgency, hesitancy or dysuria. MUSCULOSKELETAL: No joint or muscle pain, + back pain, no recent trauma. Chronic neck pain and vestibular dysfunciton   DERMATOLOGIC: No rash, no itching, no lesions. ENDOCRINE: No polyuria, polydipsia, no heat or cold intolerance. No recent change in weight. HEMATOLOGICAL: No anemia or easy bruising or bleeding. NEUROLOGIC: No headache, seizures,+ numbness, tingling+ weakness. Physical Exam:     Physical Exam:  Visit Vitals  /84 (BP 1 Location: Right arm, BP Patient Position: Standing)   Pulse 80   Temp 97.8 °F (36.6 °C)   Resp 17   SpO2 98%      O2 Device: Room air    Temp (24hrs), Av.8 °F (36.6 °C), Min:97.8 °F (36.6 °C), Max:97.8 °F (36.6 °C)    No intake/output data recorded. No intake/output data recorded. General:  Alert, cooperative, no distress, appears stated age. Head: Normocephalic, without obvious abnormality, atraumatic. Eyes:  Conjunctivae/corneas clear. PERRL, EOMs intact. Nose: Nares normal. No drainage or sinus tenderness. Neck: Supple, symmetrical, trachea midline, no adenopathy, thyroid: no enlargement, no carotid bruit and no JVD. Lungs:   Clear to auscultation bilaterally. Heart:  Regular rate and rhythm, S1, S2 normal.     Abdomen: Soft, non-tender. Bowel sounds normal.    Extremities: Extremities normal, atraumatic, no cyanosis or edema. Pulses: 2+ and symmetric all extremities. Skin:  No rashes or lesions   Neurologic: AAOx3, No focal motor or sensory deficit. Labs Reviewed: All lab results for the last 24 hours reviewed.    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     Angelina Kelly MD  3/17/2019 7:23 PM

## 2019-03-17 NOTE — ED NOTES
Patient is alert and oriented x3, no signs of distress noted at this time. Will continue to monitor. Patient states that she has been sick for a little while now since her stroke. She states recently she has felt like she has no strength and she is worried about having another stroke.

## 2019-03-17 NOTE — ED PROVIDER NOTES
[facility]  Emergency Department Treatment Report    Patient: Timur Montilla Age: 72 y.o. Sex: female    YOB: 1945 Admit Date: 3/17/2019 PCP: Tanya Moreno NP   MRN: [id number]  CSN: [id number]     Room: [facility] Time Dictated: 3:08 PM        Chief Complaint   Chief Complaint   Patient presents with    Dizziness    Fatigue    Anxiety       History of Present Illness   72 y.o. female with history including arthritis, chronic pain, stroke presents with lightheadedness, fatigue, persistent cramps. Patient states that she was seen 3 days ago for the same, her symptoms have persisted. She states that she awoke this morning left lower extremity numbness, which she states is typical for her, and that she is usually able to \"get it moving\" after several minutes. She states that it lasted a little bit longer this morning. She states that she is also felt dizzy, which she describes as a lightheaded sensation as if she is about to pass out. She denies relieving chest pain factors. She denies fever, chills, chest pain, shortness of breath, abdominal pain, nausea/vomiting/diarrhea, urinary or vaginal symptoms, or other associated complaints. Review of Systems   Constitutional:  No fever, chills  Eyes: No visual complaints.   ENT: No sore throat, runny nose  Heme/Lymph: No easy bruising or lymph node swelling  Respiratory: No cough, dyspnea  Cardiovascular: No chest pain, pressure  Gastrointestinal: No vomiting, diarrhea  Genitourinary: No dysuria, frequency  Musculoskeletal: Positive for joint pain, no swelling  Integumentary: No rashes or other skin lesions  Neurological: Positive for fatigue, positive for intermittent motor symptoms, no sensory complaints  Denies complaints in all other systems      Past Medical/Surgical History     Past Medical History:   Diagnosis Date    Abnormal Pap smear     Dr. Jesse Spence Allergic rhinitis     Arthritis     Chronic pain     Hypercholesterolemia     Neck pain 5/17/2010    Stroke ([address] 10/02/2017    TIA- legs weak memory issues     Past Surgical History:   Procedure Laterality Date    COLONOSCOPY N/A 6/4/2018    COLONOSCOPY / polypectomy performed by Davy Gant [facility] at [facility] ENDOSCOPY    HX GYN      Total Hyst    HX LAP CHOLECYSTECTOMY      HX OTHER SURGICAL  2017    Throat widened       Social History     Social History     Socioeconomic History    Marital status:      Spouse name: Not on file    Number of children: Not on file    Years of education: Not on file    Highest education level: Not on file   Tobacco Use    Smoking status: Never Smoker    Smokeless tobacco: Never Used   Substance and Sexual Activity    Alcohol use: No    Drug use: No    Sexual activity: No       Family History     Family History   Problem Relation Age of Onset    Cancer Mother         breast    Heart Disease Father        Current Medications     Prior to Admission Medications   Prescriptions Last Dose Informant Patient Reported? Taking? LORazepam (ATIVAN) 0.5 mg tablet   Yes No   Sig: Take 0.5 mg by mouth daily as needed for Anxiety. acetaminophen (TYLENOL EXTRA STRENGTH) 500 mg tablet   Yes No   Sig: Take 650 mg by mouth every six (6) hours as needed for Pain. Pt states they take medication QID. aspirin 81 mg chewable tablet   No No   Sig: Take 1 Tab by mouth daily. atorvastatin (LIPITOR) 20 mg tablet   No No   Sig: take 1 tablet by mouth once daily   busPIRone (BUSPAR) 7.5 mg tablet   No No   Sig: take 1 tablet by mouth twice a day   dextromethorphan-guaiFENesin (CORICIDIN HBP)  mg cap   Yes No   Sig: Take  by mouth as needed (sinus symptoms). fluticasone (FLONASE) 50 mcg/actuation nasal spray   No No   Sig: instill 2 sprays into each nostril once daily   meclizine (ANTIVERT) 25 mg tablet   No No   Sig: Take 1 Tab by mouth three (3) times daily as needed for Dizziness for up to 10 days.    montelukast (SINGULAIR) 10 mg tablet   No No   Sig: take 1 tablet by mouth once daily   omeprazole (PRILOSEC) 20 mg capsule   Yes No   Sig: Take 20 mg by mouth daily. traMADol (ULTRAM) 50 mg tablet   No No   Sig: Take 1 Tab by mouth every six (6) hours as needed for Pain for up to 3 days. Max Daily Amount: 200 mg.   turmeric 400 mg cap   Yes No   Sig: Take  by mouth. Facility-Administered Medications: None     Allergies     Allergies   Allergen Reactions    Celebrex [Celecoxib] Other (comments)     Tiredness      Physical Exam   TRIAGE VITALS:   ED Triage Vitals   Enc Vitals Group      BP 03/17/19 1338 136/76      Pulse (Heart Rate) 03/17/19 1332 75      Resp Rate 03/17/19 1332 17      Temp 03/17/19 1338 97.8 °F (36.6 °C)      Temp src --       O2 Sat (%) 03/17/19 1332 98 %      Weight --       Height --       Head Circumference --       Peak Flow --       Pain Score --       Pain Loc --       Pain Edu? --       Excl. in [address]? --        Constituational: Patient is afebrile, vital signs reviewed, patient is uncomfortable and anxious in mild distress  Head: Atraumatic, normocephalic  Eyes: Normal inspection. No conjunctival injection or discharge. ENT:  No facial bruises, normal external ear and nose inspection. Mucous membranes moist. Uvula midline. No injection or exudate. Neck:  Supple, non tender. normal inspection. Cardiovascular:  regular rate and rhythm, heart sounds normal without murmurs. Radial pulses 2+ and equal bilaterally  Respiratory:  No respiratory distress, breath sounds normal.  No rales or wheezing  Abdomen: Soft, nontender, nondistended, no rebound or guarding, normoactive bowel sounds  Musculoskeletal:  normal ROM, non-tender , no pedal edema. Calves soft, symmetric, and non-tender with no palpable cords. Skin: color normal, no rash, warm, dry . Neuro: awake & alert oriented ×3, no motor/sensory deficit. Upper extremity and lower extremity strength and sensation are grossly normal, intact, and symmetric. CN 3-12 normal, intact, and symmetric. Finger-to-nose and heel-to-shin normal, patient unable to stand for Romberg  Psych: Anxious, mildly agitated    Impression and Management Plan   Labs and imaging studies will be utilized to narrow the differential diagnosis and evaluate the potential causes of the patients chief complaint, and final disposition will be based on the results of their workup as well as their response to symptomatic treatment. Diagnostic Studies   Lab:   Recent Results (from the past 24 hour(s))   CBC WITH AUTOMATED DIFF    Collection Time: 03/17/19  1:40 PM   Result Value Ref Range    WBC 9.3 4.6 - 13.2 K/uL    RBC 4.20 4. 20 - 5.30 M/uL    HGB 13.1 12.0 - 16.0 g/dL    HCT 39.8 35.0 - 45.0 %    MCV 94.8 74.0 - 97.0 FL    MCH 31.2 24.0 - 34.0 PG    MCHC 32.9 31.0 - 37.0 g/dL    RDW 12.2 11.6 - 14.5 %    PLATELET 417 789 - 320 K/uL    MPV 9.2 9.2 - 11.8 FL    NEUTROPHILS 61 40 - 73 %    LYMPHOCYTES 26 21 - 52 %    MONOCYTES 12 (H) 3 - 10 %    EOSINOPHILS 1 0 - 5 %    BASOPHILS 0 0 - 2 %    ABS. NEUTROPHILS 5.6 1.8 - 8.0 K/UL    ABS. LYMPHOCYTES 2.4 0.9 - 3.6 K/UL    ABS. MONOCYTES 1.1 0.05 - 1.2 K/UL    ABS. EOSINOPHILS 0.1 0.0 - 0.4 K/UL    ABS. BASOPHILS 0.0 0.0 - 0.1 K/UL    DF AUTOMATED     METABOLIC PANEL, COMPREHENSIVE    Collection Time: 03/17/19  1:40 PM   Result Value Ref Range    Sodium 141 136 - 145 mmol/L    Potassium 3.6 3.5 - 5.5 mmol/L    Chloride 108 100 - 108 mmol/L    CO2 27 21 - 32 mmol/L    Anion gap 6 3.0 - 18 mmol/L    Glucose 87 74 - 99 mg/dL    BUN 18 7.0 - 18 MG/DL    Creatinine 0.77 0.6 - 1.3 MG/DL    BUN/Creatinine ratio 23 (H) 12 - 20      GFR est AA >60 >60 ml/min/1.73m2    GFR est non-AA >60 >60 ml/min/1.73m2    Calcium 8.7 8.5 - 10.1 MG/DL    Bilirubin, total 0.5 0.2 - 1.0 MG/DL    ALT (SGPT) 31 13 - 56 U/L    AST (SGOT) 25 15 - 37 U/L    Alk.  phosphatase 72 45 - 117 U/L    Protein, total 7.3 6.4 - 8.2 g/dL    Albumin 4.2 3.4 - 5.0 g/dL    Globulin 3.1 2.0 - 4.0 g/dL    A-G Ratio 1.4 0.8 - 1.7     NT-PRO BNP    Collection Time: 03/17/19  1:40 PM   Result Value Ref Range    NT pro- 0 - 900 PG/ML       Imaging:    CT HEAD WO CONT   Final Result   IMPRESSION:       Stable findings without acute intracranial abnormality. Please note that MR is more sensitive for an acute infarct in the first 24-48   hours. XR CHEST PA LAT    (Results Pending)     Ct Head Wo Cont    Result Date: 3/17/2019  INDICATION: Dizziness COMPARISON: 9/18 TECHNIQUE: Unenhanced CT of the head was performed using 5 mm images. Brain and bone windows were generated. All CT scans at this facility are performed using doze optimization technique as appropriate to a performed exam, to include automated exposure control, adjustment of the mA and/or KV according to patient size (including appropriate matching for site specific examinations), or use of iterative reconstruction technique. FINDINGS: Similar mild global volume loss and chronic small vessel ischemic changes. Old lacunar infarcts in the basal ganglia. No large territory acute infarct. There is no significant white matter disease. There is no intracranial hemorrhage, extra-axial collection, mass, mass effect or midline shift. The basilar cisterns are open. The bone windows demonstrate no acute abnormalities. The visualized portions of the paranasal sinuses and mastoid air cells are clear. Arthrosclerosis. IMPRESSION: Stable findings without acute intracranial abnormality. Please note that MR is more sensitive for an acute infarct in the first 24-48 hours. MOST RECENT VITALS   Visit Vitals  /76 (BP Patient Position: At rest)   Pulse 75   Temp 97.8 °F (36.6 °C)   Resp 17   SpO2 98%       Medical Decision Making   Patient seen and examined. Several days of generalized weakness and fatigue. Patient states presented with left-sided weakness. She states that she has recently been having intermittent left-sided weakness.   She is normally able to ambulate without assistance, however, recently she has required assistance from her  in order to walk around the house. No focal deficits noted on exam.  CT head negative. Do not suspect acute intracranial process, bleed. No clear distribution of patient's symptoms to suspect TIA or CVA. The intermittent history does not lend itself to a posterior stroke. Labs nonrevealing unchanged from prior. Patient initially stated that her dizziness was a lightheadedness, however, she later stated that when she went for CT she felt vertiginous. She states that she was given meclizine 2 days ago, however, she did not try this medication at home. Family not comfortable taking patient home due to her inability to ambulate herself. Discussed case with neurology who will see patient in consultation. Discussed case with internal medicine who will admit patient for further management. Plan for admission discussed with patient family understood. All questions answered. Stable for transfer to floor. Final Diagnosis       ICD-10-CM ICD-9-CM   1. Vertigo R42 780.4   2. Lightheadedness R42 780.4   3. Generalized weakness R53.1 780.79     Disposition   Admit    Sumit Bustamante MD  March 17, 2019    My signature above authenticates this document and my orders, the final    diagnosis (es), discharge prescription (s), and instructions in the Epic    record. If you have any questions please contact [phone].     Nursing notes have been reviewed by the physician/ advanced practice    Clinician. Dragon medical dictation software was used for portions of this report. Unintended voice recognition errors may occur.

## 2019-03-18 ENCOUNTER — APPOINTMENT (OUTPATIENT)
Dept: MRI IMAGING | Age: 74
DRG: 552 | End: 2019-03-18
Attending: INTERNAL MEDICINE
Payer: MEDICARE

## 2019-03-18 ENCOUNTER — APPOINTMENT (OUTPATIENT)
Dept: MRI IMAGING | Age: 74
DRG: 552 | End: 2019-03-18
Attending: PSYCHIATRY & NEUROLOGY
Payer: MEDICARE

## 2019-03-18 ENCOUNTER — APPOINTMENT (OUTPATIENT)
Dept: NON INVASIVE DIAGNOSTICS | Age: 74
DRG: 552 | End: 2019-03-18
Attending: INTERNAL MEDICINE
Payer: MEDICARE

## 2019-03-18 LAB
ATRIAL RATE: 69 BPM
CALCULATED P AXIS, ECG09: 76 DEGREES
CALCULATED R AXIS, ECG10: 55 DEGREES
CALCULATED T AXIS, ECG11: 70 DEGREES
CHOLEST SERPL-MCNC: 134 MG/DL
CK MB CFR SERPL CALC: 1.3 % (ref 0–4)
CK MB SERPL-MCNC: 1.4 NG/ML (ref 5–25)
CK SERPL-CCNC: 112 U/L (ref 26–192)
DIAGNOSIS, 93000: NORMAL
ECHO AO ROOT DIAM: 2.97 CM
ECHO IVC SNIFF: 1.5 CM
ECHO LA AREA 4C: 14 CM2
ECHO LA VOL 2C: 38.63 ML (ref 22–52)
ECHO LA VOL 4C: 33.03 ML (ref 22–52)
ECHO LA VOL BP: 41.01 ML (ref 22–52)
ECHO LA VOL/BSA BIPLANE: 27.11 ML/M2 (ref 16–28)
ECHO LA VOLUME INDEX A2C: 25.53 ML/M2 (ref 16–28)
ECHO LA VOLUME INDEX A4C: 21.83 ML/M2 (ref 16–28)
ECHO LV INTERNAL DIMENSION DIASTOLIC: 3.26 CM (ref 3.9–5.3)
ECHO LV INTERNAL DIMENSION SYSTOLIC: 2.29 CM
ECHO LV IVSD: 1.07 CM (ref 0.6–0.9)
ECHO LV MASS 2D: 116.9 G (ref 67–162)
ECHO LV MASS INDEX 2D: 77.3 G/M2 (ref 43–95)
ECHO LV POSTERIOR WALL DIASTOLIC: 1.1 CM (ref 0.6–0.9)
ECHO LVOT DIAM: 1.93 CM
ECHO LVOT PEAK GRADIENT: 3 MMHG
ECHO LVOT PEAK VELOCITY: 86.37 CM/S
ECHO LVOT VTI: 19.73 CM
ECHO MV A VELOCITY: 99.72 CM/S
ECHO MV E DECELERATION TIME (DT): 179.3 MS
ECHO MV E VELOCITY: 83.87 CM/S
ECHO MV E/A RATIO: 0.84
ECHO PULMONARY ARTERY SYSTOLIC PRESSURE (PASP): 35 MMHG
ECHO TV REGURGITANT MAX VELOCITY: 282.29 CM/S
ECHO TV REGURGITANT PEAK GRADIENT: 31.9 MMHG
EST. AVERAGE GLUCOSE BLD GHB EST-MCNC: 120 MG/DL
GLUCOSE BLD STRIP.AUTO-MCNC: 111 MG/DL (ref 70–110)
GLUCOSE BLD STRIP.AUTO-MCNC: 88 MG/DL (ref 70–110)
HBA1C MFR BLD: 5.8 % (ref 4.2–5.6)
HDLC SERPL-MCNC: 73 MG/DL (ref 40–60)
HDLC SERPL: 1.8 {RATIO} (ref 0–5)
LDLC SERPL CALC-MCNC: 48 MG/DL (ref 0–100)
LIPID PROFILE,FLP: ABNORMAL
P-R INTERVAL, ECG05: 148 MS
Q-T INTERVAL, ECG07: 434 MS
QRS DURATION, ECG06: 76 MS
QTC CALCULATION (BEZET), ECG08: 465 MS
TRIGL SERPL-MCNC: 65 MG/DL (ref ?–150)
TROPONIN I SERPL-MCNC: <0.02 NG/ML (ref 0–0.04)
VENTRICULAR RATE, ECG03: 69 BPM
VLDLC SERPL CALC-MCNC: 13 MG/DL

## 2019-03-18 PROCEDURE — 97530 THERAPEUTIC ACTIVITIES: CPT

## 2019-03-18 PROCEDURE — 80061 LIPID PANEL: CPT

## 2019-03-18 PROCEDURE — 82550 ASSAY OF CK (CPK): CPT

## 2019-03-18 PROCEDURE — 82962 GLUCOSE BLOOD TEST: CPT

## 2019-03-18 PROCEDURE — 83036 HEMOGLOBIN GLYCOSYLATED A1C: CPT

## 2019-03-18 PROCEDURE — 36415 COLL VENOUS BLD VENIPUNCTURE: CPT

## 2019-03-18 PROCEDURE — 74011000250 HC RX REV CODE- 250: Performed by: HOSPITALIST

## 2019-03-18 PROCEDURE — 74011250636 HC RX REV CODE- 250/636: Performed by: INTERNAL MEDICINE

## 2019-03-18 PROCEDURE — 70551 MRI BRAIN STEM W/O DYE: CPT

## 2019-03-18 PROCEDURE — 65660000000 HC RM CCU STEPDOWN

## 2019-03-18 PROCEDURE — 74011250637 HC RX REV CODE- 250/637: Performed by: INTERNAL MEDICINE

## 2019-03-18 PROCEDURE — 97165 OT EVAL LOW COMPLEX 30 MIN: CPT

## 2019-03-18 PROCEDURE — 93306 TTE W/DOPPLER COMPLETE: CPT

## 2019-03-18 PROCEDURE — 97161 PT EVAL LOW COMPLEX 20 MIN: CPT

## 2019-03-18 PROCEDURE — 72141 MRI NECK SPINE W/O DYE: CPT

## 2019-03-18 PROCEDURE — 74011250636 HC RX REV CODE- 250/636: Performed by: PSYCHIATRY & NEUROLOGY

## 2019-03-18 RX ORDER — ACETAMINOPHEN 325 MG/1
650 TABLET ORAL
Status: DISCONTINUED | OUTPATIENT
Start: 2019-03-18 | End: 2019-03-19

## 2019-03-18 RX ORDER — SODIUM CHLORIDE 9 MG/ML
10 INJECTION INTRAMUSCULAR; INTRAVENOUS; SUBCUTANEOUS
Status: COMPLETED | OUTPATIENT
Start: 2019-03-18 | End: 2019-03-18

## 2019-03-18 RX ORDER — ATORVASTATIN CALCIUM 20 MG/1
20 TABLET, FILM COATED ORAL DAILY
Status: DISCONTINUED | OUTPATIENT
Start: 2019-03-19 | End: 2019-03-20 | Stop reason: HOSPADM

## 2019-03-18 RX ORDER — LORAZEPAM 2 MG/ML
1 INJECTION INTRAMUSCULAR
Status: DISCONTINUED | OUTPATIENT
Start: 2019-03-18 | End: 2019-03-20 | Stop reason: HOSPADM

## 2019-03-18 RX ORDER — LORAZEPAM 2 MG/ML
2 INJECTION INTRAMUSCULAR ONCE
Status: COMPLETED | OUTPATIENT
Start: 2019-03-18 | End: 2019-03-18

## 2019-03-18 RX ORDER — GUAIFENESIN 100 MG/5ML
81 LIQUID (ML) ORAL DAILY
Status: DISCONTINUED | OUTPATIENT
Start: 2019-03-19 | End: 2019-03-20 | Stop reason: SDUPTHER

## 2019-03-18 RX ADMIN — HEPARIN SODIUM 5000 UNITS: 5000 INJECTION INTRAVENOUS; SUBCUTANEOUS at 20:26

## 2019-03-18 RX ADMIN — ASPIRIN 81 MG 81 MG: 81 TABLET ORAL at 16:56

## 2019-03-18 RX ADMIN — SODIUM CHLORIDE 10 ML: 9 INJECTION INTRAMUSCULAR; INTRAVENOUS; SUBCUTANEOUS at 11:37

## 2019-03-18 RX ADMIN — LORAZEPAM 2 MG: 2 INJECTION INTRAMUSCULAR; INTRAVENOUS at 16:53

## 2019-03-18 RX ADMIN — Medication 10 ML: at 23:29

## 2019-03-18 NOTE — ROUTINE PROCESS
TRANSFER - IN REPORT:    Verbal report received from Miranda Cobian  RN(name) on Parveen Gonzalez  being received from ED(unit) for routine progression of care      Report consisted of patients Situation, Background, Assessment and   Recommendations(SBAR). Information from the following report(s) SBAR, Kardex and Recent Results was reviewed with the receiving nurse. Opportunity for questions and clarification was provided. Assessment completed upon patients arrival to unit and care assumed.

## 2019-03-18 NOTE — PROGRESS NOTES
Problem: Mobility Impaired (Adult and Pediatric)  Goal: *Acute Goals and Plan of Care (Insert Text)  Physical Therapy Goals  Initiated 3/18/2019 and to be accomplished within 7 day(s)  1. Patient will move from supine to sit and sit to supine , scoot up and down and roll side to side in bed with modified independence. 2.  Patient will transfer from bed to chair and chair to bed with modified independence using the least restrictive device. 3.  Patient will perform sit to stand with modified independence. 4.  Patient will ambulate with modified independence for >200 feet with the least restrictive device. 5.  Patient will ascend/descend 10-12 stairs with 1 handrail(s) with supervision/set-up. physical Therapy EVALUATION    Patient: Dayana Porter (72 y.o. female)  Date: 3/18/2019  Primary Diagnosis: Vertigo [R42]  Vertigo [R42]  Dizziness [R42]  Weakness [R53.1]       Precautions: Fall        ASSESSMENT :  Patient is 79yo F admitted to hospital for dizziness and presents today alert and agreeable to therapy and was supine in bed upon arrival. Patient transferred to sitting EOB for objective assessment and denied dizziness. Patient ambulated into hallway at [facility] with antalgic gait; patient reports her baseline walking is faster and notes that [facility] doctor said one leg is shorter than the other. \" Patient transferred to sitting in locked recliner and was educated on role and goals of therapy and on keeping journal regarding factors that alleviate and worsen dizziness. Patient left resting with call bell by her side and denied further need for assist.   Patient will benefit from skilled intervention to address the above impairments.   Patients rehabilitation potential is considered to be Good  Factors which may influence rehabilitation potential include:   []         None noted  []         Mental ability/status  [x]         Medical condition  [x]         Home/family situation and support systems  [x] Safety awareness  []         Pain tolerance/management  []         Other:      PLAN :  Recommendations and Planned Interventions:  [x]           Bed Mobility Training             [x]    Neuromuscular Re-Education  [x]           Transfer Training                   []    Orthotic/Prosthetic Training  [x]           Gait Training                          []    Modalities  [x]           Therapeutic Exercises          []    Edema Management/Control  [x]           Therapeutic Activities            [x]    Patient and Family Training/Education  []           Other (comment):    Frequency/Duration: Patient will be followed by physical therapy 1-2 times per day/4-7 days per week to address goals. Discharge Recommendations: Home Health  Further Equipment Recommendations for Discharge: N/A       G-CODES     Eval Complexity: History: MEDIUM  Complexity : 1-2 comorbidities / personal factors will impact the outcome/ POC Exam:LOW Complexity : 1-2 Standardized tests and measures addressing body structure, function, activity limitation and / or participation in recreation  Presentation: LOW Complexity : Stable, uncomplicated  Clinical Decision Making:Low Complexity   Overall Complexity:LOW     SUBJECTIVE:   Patient stated I've been dealing with this for so long and I'm tired of not feeling good.     OBJECTIVE DATA SUMMARY:     Past Medical History:   Diagnosis Date    Abnormal Pap smear     Dr. Ramy Young Allergic rhinitis     Arthritis     Chronic pain     Hypercholesterolemia     Neck pain 5/17/2010    [address] [address] 10/02/2017    TIA- legs weak memory issues     Past Surgical History:   Procedure Laterality Date    COLONOSCOPY N/A 6/4/2018    COLONOSCOPY / polypectomy performed by Luc Terrell MD at [facility] ENDOSCOPY    HX GYN      Total Hyst    HX LAP CHOLECYSTECTOMY      HX OTHER SURGICAL  2017    Throat widened     Barriers to Learning/Limitations: None  Compensate with: N/A  Prior Level of Function/Home Situation: Patient lives in 2 story home with 4STE with HR and lives with her significant other. Patient reports that she was independent with mobility and I/ADL's PTA and has no AD/DME. Critical Behavior:   Orientation Level: Oriented X4  Cognition: Follows commands   Strength:    Strength: Within functional limits(BLE)   Tone & Sensation:   Tone: Normal(BLE)   Sensation: Intact(BLE)   Range Of Motion:  AROM: Within functional limits(BLE)   Functional Mobility:  Bed Mobility:  Rolling: Supervision  Supine to Sit: Supervision   Scooting: Modified independent  Transfers:  Sit to Stand: Supervision  Stand to Sit: Supervision    Balance:   Sitting: Intact  Standing: Impaired  Standing - Static: Good  Standing - Dynamic : Fair  Ambulation/Gait Training:  Distance (ft): 125 Feet (ft)   Ambulation - Level of Assistance: Supervision   Base of Support: Narrowed  Speed/Betzy: Slow  Interventions: Verbal cues; Visual/Demos              Pain:  Pt reports 0/10 pain or discomfort prior to treatment.    Pt reports 0/10 pain or discomfort post treatment. Activity Tolerance:   Patient tolerated activity well; no chest pain, SOB, and minimal dizziness that did not limit participation. Please refer to the flowsheet for vital signs taken during this treatment. After treatment:   [x]         Patient left in no apparent distress sitting up in chair  []         Patient left in no apparent distress in bed  [x]         Call bell left within reach  []         Nursing notified   []         Caregiver present  []         Bed alarm activated  []         SCDs in place to B LE     COMMUNICATION/EDUCATION:   [x]         Fall prevention education was provided and the patient/caregiver indicated understanding. [x]         Patient/family have participated as able in goal setting and plan of care. [x]         Patient/family agree to work toward stated goals and plan of care.   []         Patient understands intent and goals of therapy, but is neutral about his/her participation. []         Patient is unable to participate in goal setting and plan of care.     Thank you for this referral.  June Mejia, PT   Time Calculation: 24 mins

## 2019-03-18 NOTE — H&P
History & Physical    Patient: Do Stone MRN: [id number]  CSN: [id number]    YOB: 1945  Age: 72 y.o. Sex: female      DOA: 3/17/2019    Chief Complaint:   Chief Complaint   Patient presents with    Dizziness    Fatigue    Anxiety          HPI:     Do Stone is a 72 y.o.   female who has chronic neck pain and dizziness on outpatient vestibular therapy presents to ER with worsening dizziness and weakness similar to when she had a stoke  Her partner is having to help her to ambulate and do most ADL and did not feel comfortable taking her home   She recently had Laser and DEBBIE injection by spine specialist with no relief ; few days ago was in ER given Meclizine did not fill due to side effects   Denies cough congestion or fever   CT done in hospital negative  Seen by Neuro  And ask to admit for repeat stroke work     Past Medical History:   Diagnosis Date    Abnormal Pap smear     Dr. Mariam Fair Allergic rhinitis     Arthritis     Chronic pain     Hypercholesterolemia     Neck pain 5/17/2010    Stroke ([facility] [address] 10/02/2017    TIA- legs weak memory issues       Past Surgical History:   Procedure Laterality Date    COLONOSCOPY N/A 6/4/2018    COLONOSCOPY / polypectomy performed by [facility] Bell [facility] at [facility]OSCOPY    HX GYN      Total Hyst    HX LAP CHOLECYSTECTOMY      HX OTHER SURGICAL  2017    Throat widened       Family History   Problem Relation Age of Onset    Cancer Mother         breast    Heart Disease Father        Social History     Socioeconomic History    Marital status:      Spouse name: Not on file    Number of children: Not on file    Years of education: Not on file    Highest education level: Not on file   Tobacco Use    Smoking status: Never Smoker    Smokeless tobacco: Never Used   Substance and Sexual Activity    Alcohol use: No    Drug use: No    Sexual activity: No       Prior to Admission medications    Medication Sig Start Date End Date Taking? Authorizing Provider   traMADol (ULTRAM) 50 mg tablet Take 1 Tab by mouth every six (6) hours as needed for Pain for up to 3 days. Max Daily Amount: 200 mg. 3/14/19 3/17/19  Siena Abrams MD   meclizine (ANTIVERT) 25 mg tablet Take 1 Tab by mouth three (3) times daily as needed for Dizziness for up to 10 days. 3/14/19 3/24/19  Siena Abrams MD   turmeric 400 mg cap Take  by mouth. Provider, Historical   fluticasone (FLONASE) 50 mcg/actuation nasal spray instill 2 sprays into each nostril once daily 2/13/19   Jose Alberto Comer NP   atorvastatin (LIPITOR) 20 mg tablet take 1 tablet by mouth once daily 1/30/19   Irena Ivey MD   busPIRone (BUSPAR) 7.5 mg tablet take 1 tablet by mouth twice a day 1/16/19   Jose Alberto Comer NP   dextromethorphan-guaiFENesin (CORICIDIN HBP)  mg cap Take  by mouth as needed (sinus symptoms). Provider, Historical   LORazepam (ATIVAN) 0.5 mg tablet Take 0.5 mg by mouth daily as needed for Anxiety. Provider, Historical   montelukast (SINGULAIR) 10 mg tablet take 1 tablet by mouth once daily 9/21/18   Jose Alberto Comer NP   acetaminophen (TYLENOL EXTRA STRENGTH) 500 mg tablet Take 650 mg by mouth every six (6) hours as needed for Pain. Pt states they take medication QID. Provider, Historical   omeprazole (PRILOSEC) 20 mg capsule Take 20 mg by mouth daily. Provider, Historical   aspirin 81 mg chewable tablet Take 1 Tab by mouth daily. 10/10/17   JULIA Aguiar       Allergies   Allergen Reactions    Celebrex [Celecoxib] Other (comments)     Tiredness          Review of Systems  GENERAL: Patient alert, awake and oriented times 3, able to communicate full sentences and not in distress. HEENT: No change in vision, no earache, tinnitus, sore throat or sinus congestion. NECK: No pain or stiffness. PULMONARY: No shortness of breath, cough or wheeze.    Cardiovascular: no pnd or orthopnea, no CP  GASTROINTESTINAL: No abdominal pain, nausea, vomiting or diarrhea, melena or bright red blood per rectum. GENITOURINARY: No urinary frequency, urgency, hesitancy or dysuria. MUSCULOSKELETAL: No joint or muscle pain, + back pain, no recent trauma. Chronic neck pain and vestibular dysfunciton   DERMATOLOGIC: No rash, no itching, no lesions. ENDOCRINE: No polyuria, polydipsia, no heat or cold intolerance. No recent change in weight. HEMATOLOGICAL: No anemia or easy bruising or bleeding. NEUROLOGIC: No headache, seizures,+ numbness, tingling+ weakness. Physical Exam:     Physical Exam:  Visit Vitals  /84 (BP 1 Location: Right arm, BP Patient Position: Standing)   Pulse 80   Temp 97.8 °F (36.6 °C)   Resp 17   SpO2 98%      O2 Device: Room air    Temp (24hrs), Av.8 °F (36.6 °C), Min:97.8 °F (36.6 °C), Max:97.8 °F (36.6 °C)    No intake/output data recorded. No intake/output data recorded. General:  Alert, cooperative, no distress, appears stated age. Head: Normocephalic, without obvious abnormality, atraumatic. Eyes:  Conjunctivae/corneas clear. PERRL, EOMs intact. Nose: Nares normal. No drainage or sinus tenderness. Neck: Supple, symmetrical, trachea midline, no adenopathy, thyroid: no enlargement, no carotid bruit and no JVD. Lungs:   Clear to auscultation bilaterally. Heart:  Regular rate and rhythm, S1, S2 normal.     Abdomen: Soft, non-tender. Bowel sounds normal.    Extremities: Extremities normal, atraumatic, no cyanosis or edema. Pulses: 2+ and symmetric all extremities. Skin:  No rashes or lesions   Neurologic: AAOx3, No focal motor or sensory deficit. Labs Reviewed: All lab results for the last 24 hours reviewed.    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     Tova Schwab MD  3/17/2019 7:23 PM

## 2019-03-18 NOTE — PROGRESS NOTES
[facility] REFERRAL CONTACT NOTE  [address] [place] [address] Physical Rehabilitation    RE: Joanne Michelle    Referral received to review this patient's case for admission to [address] [place] [address] Physical Rehabilitation. Current status reviewed.  When appropriate, will need PT/OT/ST evaluation/treatment on this patient to complete the pre-admission evaluation.  Will continue to follow. Thank you for this referral.  Should you have any questions please do not hesitate to call. Sincerely,  Destiney Pratt.  [address] [place] [address] Physical Rehabilitation  [phone]

## 2019-03-18 NOTE — CONSULTS
HPI:  71 y/o female admitted on 3/17/19 with CC of ataxia. I had seen her twice in Nov and again in Dec 2018 for symptoms of vertigo which have been going on chronically for over 1 yrs. She had a stroke in October 2017. She woke up with left sided numbness and went to [facility], dx with a stroke at the time. She reports that a couple of times she would be at a store or in bed and she would have a room spinning sensation. She went to [facility] with 3 hrs of symptoms of numbness, she would go in and out of these symptoms, she would also have slurred speech, this went on and off for about 3 hrs. She was evaluated and MRI of the brain did not show acute ischemia. She recovered fully with no deficits. Around July 2018 she was in a store walking down an aisle and all of a sudden she had a room spinning sensation for 2-3 hrs, it resolved, she went home. This again happened around September, when she became dizzy while driving. Everything was spinning. Went home, took meclizine, started throwing up. Her partner took her to the ER, she received valium, this eventually stopped. She has tried physical therapy, but she has a chronic neck pain syndrome for which she goes to a pain clinic, so it has been difficult to do proper vestibular therapy. She had an MRI of the brain back in October 2017 which did not show evidence of acute ischemia and another one in August 2018 with did not show acute ischemia. She did show a significant amount of diffuse microangiopathy  particularly in the yarely. On the sagittal views the upper part of the spine is somewhat visible, and she has a significant amount of kyphosis at the C1 level. A CT of the cervical spine that she had in October 2017 showed changes of \"Hypertrophic ossification and calcification basion/C1 with partial fusion, calcified \"pseudomass\" posterior greater than anterior dens with consideration for CPPD/gout or degenerative changes.  No significant narrowing at the foramen magnum. 2.  Mild regions of anterior subluxation C2-C4 with associated canal stenosis  C2-3 up to 7 mm. 3.  Multifocal degenerative disc disease most significant at C5-7 with  associated canal stenosis. 4.  Significant facet hypertrophy with multifocal regions of bilateral severe  neural foramina stenosis. MRI Cervical spine November 2018 showed multilevel dod to advanced DD and facet joint disease, with multilevel compression of the cord without definite evidence of myelopathy within  limitation of motion degraded exam.  November CTA of the head and neck was normal.     She was admitted basically because of chronic problems with ambulation, worsening dizziness,  and the fact her partner did not feel comfortable taking her at home. She goes to a spine specialist, recently had laser and an DEBBIE injection which did not help. Cervical RFA was attempted, she could not complete this without sedation.              Social History     Socioeconomic History    Marital status:      Spouse name: Not on file    Number of children: Not on file    Years of education: Not on file    Highest education level: Not on file   Social Needs    Financial resource strain: Not on file    Food insecurity - worry: Not on file    Food insecurity - inability: Not on file    Transportation needs - medical: Not on file   [employer] needs - non-medical: Not on file   Occupational History    Not on file   Tobacco Use    Smoking status: Never Smoker    Smokeless tobacco: Never Used   Substance and Sexual Activity    Alcohol use: No    Drug use: No    Sexual activity: No   Other Topics Concern    Not on file   Social History Narrative    Not on file       Family History   Problem Relation Age of Onset    Cancer Mother         breast    Heart Disease Father        Current Facility-Administered Medications   Medication Dose Route Frequency Provider Last Rate Last Dose    [START ON 3/19/2019] atorvastatin (LIPITOR) tablet 20 mg  20 mg Oral DAILY Maikol Steiner MD        [START ON 3/19/2019] aspirin chewable tablet 81 mg  81 mg Oral DAILY Maikol Steiner MD        acetaminophen (TYLENOL) tablet 650 mg  650 mg Oral Q6H PRN Maikol Steiner MD        0.9% sodium chloride infusion  75 mL/hr IntraVENous CONTINUOUS Maikol Steiner MD        meclizine (ANTIVERT) tablet 25 mg  25 mg Oral Q6H PRN Maikol Steiner MD        sodium chloride (NS) flush 5-40 mL  5-40 mL IntraVENous Q8H Maikol Steiner MD        sodium chloride (NS) flush 5-40 mL  5-40 mL IntraVENous PRN Maikol Steiner MD        aspirin chewable tablet 81 mg  81 mg Oral DAILY Maikol Steiner MD        heparin (porcine) injection 5,000 Units  5,000 Units SubCUTAneous Q8H Maikol Steiner MD         Facility-Administered Medications Ordered in Other Encounters   Medication Dose Route Frequency Provider Last Rate Last Dose    [START ON 3/19/2019] denosumab (PROLIA) injection 60 mg  60 mg SubCUTAneous ONCE Diana Haddad NP           Past Medical History:   Diagnosis Date    Abnormal Pap smear     Dr. Gordon Rho    Allergic rhinitis     Arthritis     Chronic pain     Hypercholesterolemia     Neck pain 5/17/2010    Stroke ([facility] [address] 10/02/2017    TIA- legs weak memory issues       Past Surgical History:   Procedure Laterality Date    COLONOSCOPY N/A 6/4/2018    COLONOSCOPY / polypectomy performed by Levi Bradford MD at [facility] ENDOSCOPY    HX GYN      Total Hyst    HX LAP CHOLECYSTECTOMY      HX OTHER SURGICAL  2017    Throat widened       Allergies   Allergen Reactions    Celebrex [Celecoxib] Other (comments)     Tiredness        Patient Active Problem List   Diagnosis Code    Neck pain M54.2    CVA (cerebral vascular accident) ([facility] [address] I63.9    TIA (transient ischemic attack) G45.9    Cervical facet syndrome M47.812    Spondylosis of cervical region without myelopathy or radiculopathy M47.812    Cervical spinal stenosis M48.02    Degenerative disc disease, cervical M50.30    Chronic pain syndrome G89.4    Cervical radiculitis M54.12    Myofascial pain syndrome M79.18    Hyperlipidemia E78.5    Anxiety F41.9    Osteoarthritis of cervical spine M47.812    Osteoporosis M81.0    Vertigo R42    Dizziness R42    Weakness R53.1         Review of Systems:   Constitutional: no fever or chills  Skin denies rash or itching  HEENT:  Denies tinnitus, hearing loss, or visual changes  Respiratory: denies shortness of breath  Cardiovascular: denies chest pain, dyspnea on exertion  Gastrointestinal: does not report nausea or vomiting  Genitourinary: does not report dysuria or incontinence  Musculoskeletal: does not report joint pain or swelling  Endocrine: denies weight change  Hematology: denies easy bruising or bleeding   Neurological: as above in HPI      PHYSICAL EXAMINATION:      VITAL SIGNS:    Visit Vitals  /84   Pulse 88   Temp 98.3 °F (36.8 °C)   Resp 18   Ht 5' (1.524 m)   Wt 55.3 kg (122 lb)   SpO2 96%   BMI 23.83 kg/m²       GENERAL: Well developed, well nourished, in no apparent distress. HEART: RR, no murmurs heard, no carotid bruits  EXTREMITIES: No clubbing, cyanosis, or edema is identified. Pulses 2+    and symmetrical.  HEAD:   Normocephalic, atraumatic. Markedly reduced cervical rotation bilaterally and symmetrically, diffusely tender cervical muscles, and a     NEUROLOGIC EXAMINATION    MENTAL STATUS: Awake, alert, and oriented x 4. Attention and STM are grossly normal. There is no aphasia. Fund of knowledge is adequate. Mood and affect are appropriate  CRANIAL NERVES: Visual fields are full to confrontation. Unable to see fundi. Purdin-Halpike trigger vertigo to the right at 45 degrees, not to left, resists eye opening when dizzy but when able to open eyes I see no nystagmus. Pupils are reactive to light and accommodation.    Extraocular movements are intact and there is no nystagmus at rest or with eye movements without head movement. Facial sensation is normal  Face is symmetrical.   Hearing is present. SCM/TPZ 5/5  Palate rises symmetrically. Tongue is in the midline. MOTOR:  5/5     CEREBELLAR: No tremors     SENSORY: Normal PP, vibration, propioception. Romberg not tested    DTR's: +2 throughout, no long tract signs     GAIT:   Not tested        I have personally reviewed imaging studies. Impression: Chronic recurrent dizziness likely secondary to poor cervical mobility from advanced DJD and cord compression affecting cervical positional afferents and resulting in vertigo. The question of VBI and posterior circulation stroke has been visited and revisited on several occasions with negative w/u and her presentation is not consistent with this. Biggest obstacle has been chronic intractable neck pain precluding appropriate vestibular rehab. Plan: 1-Cancelled MRA head and neck, not needed. 2-Ordered C spine MRI with sedation, cont with MRI brain as well, to clarify the motion degraded MRI report from Nov of \"cord compression\"  3-Will follow. PLEASE NOTE:   Portions of this document may have been produced using voice recognition software. Unrecognized errors in transcription may be present. This note will not be viewable in [address]

## 2019-03-18 NOTE — ROUTINE PROCESS
Primary Nurse Lisa Robbins RN and Simeon Bhat RN performed a dual skin assessment on this patient No impairment noted  Rosalio score is 23

## 2019-03-18 NOTE — ROUTINE PROCESS
TRANSFER - OUT REPORT:    Verbal report given to [employer] (name) on Carlette Mcburney  being transferred to [address] [place] [address] (unit) for routine progression of care       Report consisted of patients Situation, Background, Assessment and   Recommendations(SBAR). Information from the following report(s) ED Summary was reviewed with the receiving nurse. Lines:   Peripheral IV 03/17/19 Right Antecubital (Active)   Site Assessment Clean, dry, & intact 3/17/2019  1:49 PM   Phlebitis Assessment 0 3/17/2019  1:49 PM   Infiltration Assessment 0 3/17/2019  1:49 PM   Dressing Status Clean, dry, & intact 3/17/2019  1:49 PM        Opportunity for questions and clarification was provided.       Patient transported with:   Registered Nurse

## 2019-03-19 ENCOUNTER — APPOINTMENT (OUTPATIENT)
Dept: GENERAL RADIOLOGY | Age: 74
DRG: 552 | End: 2019-03-19
Attending: ORTHOPAEDIC SURGERY
Payer: MEDICARE

## 2019-03-19 ENCOUNTER — HOSPITAL ENCOUNTER (OUTPATIENT)
Dept: INFUSION THERAPY | Age: 74
Discharge: HOME | End: 2019-03-19
Payer: MEDICARE

## 2019-03-19 PROBLEM — R42 DIZZINESS: Chronic | Status: ACTIVE | Noted: 2019-03-17

## 2019-03-19 PROBLEM — M48.02 CERVICAL SPINAL STENOSIS: Chronic | Status: ACTIVE | Noted: 2017-11-28

## 2019-03-19 PROBLEM — M47.812 SPONDYLOSIS OF CERVICAL REGION WITHOUT MYELOPATHY OR RADICULOPATHY: Chronic | Status: ACTIVE | Noted: 2017-11-28

## 2019-03-19 PROBLEM — G95.20 SPINAL CORD COMPRESSION (HCC): Status: ACTIVE | Noted: 2019-03-19

## 2019-03-19 PROBLEM — I67.9 CEREBROVASCULAR SMALL VESSEL DISEASE: Chronic | Status: ACTIVE | Noted: 2019-03-18

## 2019-03-19 PROBLEM — R42 VERTIGO: Chronic | Status: ACTIVE | Noted: 2019-03-17

## 2019-03-19 PROBLEM — I51.7 CONCENTRIC LEFT VENTRICULAR HYPERTROPHY: Chronic | Status: ACTIVE | Noted: 2019-03-18

## 2019-03-19 PROBLEM — I67.9 CEREBROVASCULAR SMALL VESSEL DISEASE: Status: ACTIVE | Noted: 2019-03-18

## 2019-03-19 LAB
GLUCOSE BLD STRIP.AUTO-MCNC: 106 MG/DL (ref 70–110)
GLUCOSE BLD STRIP.AUTO-MCNC: 149 MG/DL (ref 70–110)
GLUCOSE BLD STRIP.AUTO-MCNC: 95 MG/DL (ref 70–110)

## 2019-03-19 PROCEDURE — 97110 THERAPEUTIC EXERCISES: CPT

## 2019-03-19 PROCEDURE — 72052 X-RAY EXAM NECK SPINE 6/>VWS: CPT

## 2019-03-19 PROCEDURE — 74011250636 HC RX REV CODE- 250/636: Performed by: INTERNAL MEDICINE

## 2019-03-19 PROCEDURE — 82962 GLUCOSE BLOOD TEST: CPT

## 2019-03-19 PROCEDURE — 74011250637 HC RX REV CODE- 250/637: Performed by: INTERNAL MEDICINE

## 2019-03-19 PROCEDURE — 92610 EVALUATE SWALLOWING FUNCTION: CPT

## 2019-03-19 PROCEDURE — 97116 GAIT TRAINING THERAPY: CPT

## 2019-03-19 PROCEDURE — 92526 ORAL FUNCTION THERAPY: CPT

## 2019-03-19 PROCEDURE — 65660000000 HC RM CCU STEPDOWN

## 2019-03-19 RX ORDER — DEXAMETHASONE SODIUM PHOSPHATE 4 MG/ML
10 INJECTION, SOLUTION INTRA-ARTICULAR; INTRALESIONAL; INTRAMUSCULAR; INTRAVENOUS; SOFT TISSUE ONCE
Status: COMPLETED | OUTPATIENT
Start: 2019-03-19 | End: 2019-03-19

## 2019-03-19 RX ORDER — DEXAMETHASONE SODIUM PHOSPHATE 4 MG/ML
4 INJECTION, SOLUTION INTRA-ARTICULAR; INTRALESIONAL; INTRAMUSCULAR; INTRAVENOUS; SOFT TISSUE EVERY 6 HOURS
Status: DISCONTINUED | OUTPATIENT
Start: 2019-03-20 | End: 2019-03-20 | Stop reason: HOSPADM

## 2019-03-19 RX ORDER — TRAMADOL HYDROCHLORIDE 50 MG/1
50 TABLET ORAL
Status: DISCONTINUED | OUTPATIENT
Start: 2019-03-19 | End: 2019-03-20 | Stop reason: HOSPADM

## 2019-03-19 RX ORDER — ACETAMINOPHEN 325 MG/1
650 TABLET ORAL
Status: DISCONTINUED | OUTPATIENT
Start: 2019-03-19 | End: 2019-03-20 | Stop reason: HOSPADM

## 2019-03-19 RX ADMIN — HEPARIN SODIUM 5000 UNITS: 5000 INJECTION INTRAVENOUS; SUBCUTANEOUS at 13:49

## 2019-03-19 RX ADMIN — HEPARIN SODIUM 5000 UNITS: 5000 INJECTION INTRAVENOUS; SUBCUTANEOUS at 21:00

## 2019-03-19 RX ADMIN — Medication 10 ML: at 18:10

## 2019-03-19 RX ADMIN — HEPARIN SODIUM 5000 UNITS: 5000 INJECTION INTRAVENOUS; SUBCUTANEOUS at 02:48

## 2019-03-19 RX ADMIN — ASPIRIN 81 MG 81 MG: 81 TABLET ORAL at 08:57

## 2019-03-19 RX ADMIN — ATORVASTATIN CALCIUM 20 MG: 20 TABLET, FILM COATED ORAL at 08:57

## 2019-03-19 RX ADMIN — DEXAMETHASONE SODIUM PHOSPHATE 10 MG: 4 INJECTION, SOLUTION INTRAMUSCULAR; INTRAVENOUS at 13:48

## 2019-03-19 RX ADMIN — ACETAMINOPHEN 650 MG: 325 TABLET ORAL at 08:57

## 2019-03-19 RX ADMIN — Medication 10 ML: at 06:29

## 2019-03-19 RX ADMIN — Medication 10 ML: at 21:01

## 2019-03-19 RX ADMIN — TRAMADOL HYDROCHLORIDE 50 MG: 50 TABLET, COATED ORAL at 18:06

## 2019-03-19 NOTE — PROGRESS NOTES
3/19/2019 12:19 PM    SSN: [id number]    Subjective:   45-year-old female admitted on 3/17th with chief complaint of ataxia and dizziness. She has had symptomatic vertigo for over a year, which I have felt is secondary to problems with a cervical mobility, and prior attempts to do vestibular rehabilitation have been limited. I have personally reviewed the MRI of the brain she had done yesterday afternoon, which showed mild to moderate white matter anomalies likely secondary to small vessel disease and a small old left lentiform nucleus infarct. Her cervical spine MRI also was reviewed. Compared to the November 2018 1 she again has moderate to advanced degenerative disc and facet joint disease with multilevel mild to moderate degrees of central canal stenosis with a multilevel ventral compression of the cord and a moderate cord compression with subtle cord signal increase at C1-2. There are reactive bone marrow changes in the C2 lamina.           Social History     Socioeconomic History    Marital status:      Spouse name: Not on file    Number of children: Not on file    Years of education: Not on file    Highest education level: Not on file   Social Needs    Financial resource strain: Not on file    Food insecurity - worry: Not on file    Food insecurity - inability: Not on file    Transportation needs - medical: Not on file   [employer] needs - non-medical: Not on file   Occupational History    Not on file   Tobacco Use    Smoking status: Never Smoker    Smokeless tobacco: Never Used   Substance and Sexual Activity    Alcohol use: No    Drug use: No    Sexual activity: No   Other Topics Concern    Not on file   Social History Narrative    Not on file       Family History   Problem Relation Age of Onset    Cancer Mother         breast    Heart Disease Father        Current Facility-Administered Medications   Medication Dose Route Frequency Provider Last Rate Last Dose    acetaminophen (TYLENOL) tablet 650 mg  650 mg Oral Q6H PRN Nivia Sweeney MD   650 mg at 03/19/19 0857    dexamethasone (DECADRON) 4 mg/mL injection 10 mg  10 mg IntraVENous ONCE Nivia Sweeney MD        atorvastatin (LIPITOR) tablet 20 mg  20 mg Oral DAILY Vicki Steiner MD   20 mg at 03/19/19 0857    aspirin chewable tablet 81 mg  81 mg Oral DAILY Vicki Steiner MD        LORazepam (ATIVAN) injection 1 mg  1 mg IntraVENous Q4H PRN Devika Chirinos DO        meclizine (ANTIVERT) tablet 25 mg  25 mg Oral Q6H PRN Vicki Steiner MD        sodium chloride (NS) flush 5-40 mL  5-40 mL IntraVENous Q8H Vicki Steiner MD   10 mL at 03/19/19 9768    sodium chloride (NS) flush 5-40 mL  5-40 mL IntraVENous PRN Vicki Steiner MD        aspirin chewable tablet 81 mg  81 mg Oral DAILY Vicki Steiner MD   81 mg at 03/19/19 0857    heparin (porcine) injection 5,000 Units  5,000 Units SubCUTAneous Q8H Vicki Steiner MD   5,000 Units at 03/19/19 0248     Facility-Administered Medications Ordered in Other Encounters   Medication Dose Route Frequency Provider Last Rate Last Dose    denosumab (PROLIA) injection 60 mg  60 mg SubCUTAneous ONCE Kristine Garcia NP           Past Medical History:   Diagnosis Date    Abnormal Pap smear     Dr. King Prom    Allergic rhinitis     Arthritis     Cerebrovascular small vessel disease 3/18/2019    CT 3/17/2019    Chronic pain     Concentric left ventricular hypertrophy 3/18/2019    With left ventricular diastolic dysfunction by echocardiogram 3/18/2019    Hypercholesterolemia     Neck pain 5/17/2010    Stroke ([facility] [address] 10/02/2017    TIA- legs weak memory issues       Past Surgical History:   Procedure Laterality Date    COLONOSCOPY N/A 6/4/2018    COLONOSCOPY / polypectomy performed by Ezequiel Malloy MD at [facility] ENDOSCOPY    HX GYN      Total Hyst    HX LAP CHOLECYSTECTOMY      HX OTHER SURGICAL 2017    Throat widened       Allergies   Allergen Reactions    Celebrex [Celecoxib] Other (comments)     Tiredness        Vital signs:    Visit Vitals  /80 (BP 1 Location: Left arm, BP Patient Position: Supine)   Pulse 76   Temp 98 °F (36.7 °C)   Resp 17   Ht 5' (1.524 m)   Wt 54.5 kg (120 lb 3.2 oz)   SpO2 96%   Breastfeeding? Unknown   BMI 23.47 kg/m²       Review of Systems:   GENERAL: Denies fever or fatigue  CARDIAC: No CP or SOB  PULMONARY: No cough of SOB  MUSCULOSKELETAL: No new joint pain  NEURO: SEE HPI      EXAM: Alert, in NAD. Heart is regular. Oriented x3, EOM's are full, PERRL, no facial asymmetries. Strength and tone are normal. DTR's +3, gait is symmetric, and she takes a couple of very difficult steps, it does not appear to be spastic. The bilateral lower extremity strength is diminished. Assessment/Plan: Advanced cervical spondylosis with chronic neck pain and a myelopathy, with a gait impairment I suspect is secondary to cord compression. The features of her vertiginous symptoms are atypical, despite elicitation of vertigo on Rutherford-Hallpike maneuvers there is no visible nystagmus, still my best theory for her vertigo is that given her very marked decrease in cervical range of motion she is having problems with cervical afferents to the vestibular nuclei. She may benefit from a physical therapy for this, but given the cervical spine findings spine surgery needs to be consulted. I will follow patient with you. PLEASE NOTE:   Portions of this document may have been produced using voice recognition software. Unrecognized errors in transcription may be present. This note will not be viewable in [address]

## 2019-03-19 NOTE — ROUTINE PROCESS
Pt was woke up this am and C/O headache, neck ache, and dizziness. Pt helped to bathroom and back to bed. Md called for pain medication. Krissy Long

## 2019-03-19 NOTE — CONSULTS
Consult    Patient: Khadra Bailon MRN: [id number]  SSN: [id number]    YOB: 1945  Age: 72 y.o. Sex: female      Subjective: Khadra Bailon is a 72 y.o. female who is being seen for chronic cervical pain. This is a patient known to our practice. She was last referred for Cerivcal RFA. She was admitted for worsening dizziness. She stats she is having her normal, chronic neck pain. She states the pain is in her posterior neck and radiates up her head behind her ears. She does admit to intermittent headaches and dizziness.      Past Medical History:   Diagnosis Date    Abnormal Pap smear     Dr. Elizabeth Alberto Allergic rhinitis     Arthritis     Cerebrovascular small vessel disease 3/18/2019    CT 3/17/2019    Chronic pain     Concentric left ventricular hypertrophy 3/18/2019    With left ventricular diastolic dysfunction by echocardiogram 3/18/2019    Hypercholesterolemia     Neck pain 5/17/2010    Stroke ([address] 10/02/2017    TIA- legs weak memory issues     Past Surgical History:   Procedure Laterality Date    COLONOSCOPY N/A 6/4/2018    COLONOSCOPY / polypectomy performed by Ajay Lopez [facility]    HX GYN      Total Hyst    HX LAP CHOLECYSTECTOMY      HX OTHER SURGICAL  2017    Throat widened      Family History   Problem Relation Age of Onset    Cancer Mother         breast    Heart Disease Father      Social History     Tobacco Use    Smoking status: Never Smoker    Smokeless tobacco: Never Used   Substance Use Topics    Alcohol use: No      Current Facility-Administered Medications   Medication Dose Route Frequency Provider Last Rate Last Dose    acetaminophen (TYLENOL) tablet 650 mg  650 mg Oral Q6H PRN Ivan Hicks MD   650 mg at 03/19/19 0857    atorvastatin (LIPITOR) tablet 20 mg  20 mg Oral DAILY Daniel Steiner MD   20 mg at 03/19/19 0857    aspirin chewable tablet 81 mg  81 mg Oral DAILY Daniel Steiner MD        LORazepam (ATIVAN) injection 1 mg  1 mg IntraVENous Q4H PRN Nessa DO Vandana        meclizine (ANTIVERT) tablet 25 mg  25 mg Oral Q6H PRN Abhinav Steiner MD        sodium chloride (NS) flush 5-40 mL  5-40 mL IntraVENous Q8H Abhinav Steiner MD   10 mL at 03/19/19 0629    sodium chloride (NS) flush 5-40 mL  5-40 mL IntraVENous PRN Abhinav Steiner MD        aspirin chewable tablet 81 mg  81 mg Oral DAILY Abhinav Steiner MD   81 mg at 03/19/19 0857    heparin (porcine) injection 5,000 Units  5,000 Units SubCUTAneous Q8H Abhinav Steiner MD   5,000 Units at 03/19/19 1349     Facility-Administered Medications Ordered in Other Encounters   Medication Dose Route Frequency Provider Last Rate Last Dose    denosumab (PROLIA) injection 60 mg  60 mg SubCUTAneous ONCE Tone-Fernanda Brody Dose, NP            Allergies   Allergen Reactions    Celebrex [Celecoxib] Other (comments)     Tiredness        Review of Systems:  A comprehensive review of systems was negative except for that written in the History of Present Illness. Objective:     Vitals:    03/19/19 0200 03/19/19 0705 03/19/19 0821 03/19/19 1208   BP: 118/72  150/79 116/80   Pulse: 72  88 76   Resp: 17  17 17   Temp: 98.1 °F (36.7 °C)  97.6 °F (36.4 °C) 98 °F (36.7 °C)   SpO2: 100%  96% 96%   Weight:  120 lb 3.2 oz (54.5 kg)     Height:            Physical Exam:  General:  Alert, cooperative, no distress, appears stated age. Eyes:  Conjunctivae/corneas clear. PERRL, EOMs intact. Fundi benign   Ears:  Normal TMs and external ear canals both ears. Nose: Nares normal. Septum midline. Mucosa normal. No drainage or sinus tenderness. Mouth/Throat: Lips, mucosa, and tongue normal. Teeth and gums normal.   Neck: Supple, symmetrical, trachea midline, no adenopathy, thyroid: no enlargment/tenderness/nodules, no carotid bruit and no JVD. Back:   Symmetric, no curvature. ROM normal. No CVA tenderness. Lungs:   Clear to auscultation bilaterally. Heart:  Regular rate and rhythm, S1, S2 normal, no murmur, click, rub or gallop. Abdomen:   Soft, non-tender. Bowel sounds normal. No masses,  No organomegaly. Extremities: Extremities normal, atraumatic, no cyanosis or edema. Pulses: 2+ and symmetric all extremities. Skin: Skin color, texture, turgor normal. No rashes or lesions   Lymph nodes: Cervical, supraclavicular, and axillary nodes normal.   Neurologic: CNII-XII intact. Normal strength, sensation and reflexes throughout. GENERAL: alert, cooperative, no distress, appears stated age  EXTREMITIES:  extremities normal, atraumatic, no cyanosis or edema  SKIN: Normal.  NEUROLOGIC: AOx3. Gait normal. Reflexes and motor strength normal and symmetric. Cranial nerves 2-12 and sensation grossly intact. Assessment:     Hospital Problems  Date Reviewed: 3/8/2019          Codes Class Noted POA    Concentric left ventricular hypertrophy (Chronic) ICD-10-CM: I51.7  ICD-9-CM: 429.3  3/18/2019 Yes    Overview Signed 3/19/2019  2:30 AM by Madelyne Hashimoto, DO     With left ventricular diastolic dysfunction by echocardiogram 3/18/2019             Cerebrovascular small vessel disease (Chronic) ICD-10-CM: I67.9  ICD-9-CM: 437.9  3/18/2019 Yes    Overview Signed 3/19/2019  2:35 AM by Madelyne Hashimoto, DO     CT 3/17/2019             Vertigo (Chronic) ICD-10-CM: R42  ICD-9-CM: 780.4  3/17/2019 Yes        * (Principal) Dizziness ICD-10-CM: R42  ICD-9-CM: 780.4  3/17/2019 Yes        Weakness ICD-10-CM: R53.1  ICD-9-CM: 780.79  3/17/2019 Yes        Spondylosis of cervical region without myelopathy or radiculopathy (Chronic) ICD-10-CM: Z44.645  ICD-9-CM: 721.0  11/28/2017 Yes        Cervical spinal stenosis (Chronic) ICD-10-CM: M48.02  ICD-9-CM: 723.0  11/28/2017 Yes        Cervical neck pain with evidence of disc disease (Chronic) ICD-10-CM: M50.90  ICD-9-CM: 722.91  5/17/2010 Yes          C MRI   IMPRESSION:     1.  No significant interval change in multilevel moderate to advanced  degenerative disc and facet joint disease, multilevel listhesis and multilevel  mild and moderate degrees of central canal stenosis as discussed. -Multilevel ventral compression of the cord. 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 2. Multilevel high-grade neural foraminal stenosis similar to prior. 3. Mild bone marrow edema in C2 lamina, nonspecific and probably reactive  changes. No visualized fracture    Plan:     Dr. Tiffani Ortega has reviewed the CMRI. This is a patient with Cervical stenosis at C1-2 and C5-6. Surgery would be an occipital cervical fusion with a C2 laminectomy. We will obtain flexion and extension cervical x-rays to assess for instability. Will make further recommendations based on X-rays but will likely recommend she be followed on an outpatient basis. We would recommend she remain in a soft collar at this time.     Signed By: Lily Correa NP     March 19, 2019

## 2019-03-19 NOTE — PROGRESS NOTES
ARU/IPR REFERRAL CONTACT NOTE  [id number] [place] [address] for Physical Rehabilitation    RE: Lady Amaro     Thank you for the opportunity to review this patient's case for admission to [address] [place] [address] for Physical Rehabilitation. Based on our pre-admission screening:     [x ] This patient does not meet criteria for admission to [facility] for Physical        Rehabilitation due to:      [x ] Too functional, per documentation, patient does not require both Physical and Occupational Therapy Services at an acute rehabilitation level of intensity. Again, Thank you for this referral. Should you have any questions please do not hesitate to call. Sincerely,  Iveth Camara. Evette Darling, [address]   Evette Darling, RN  Admissions [facility] for Physical Rehabilitation  [phone]

## 2019-03-19 NOTE — PROGRESS NOTES
Problem: Self Care Deficits Care Plan (Adult)  Goal: *Acute Goals and Plan of Care (Insert Text)  Outcome: Resolved/Met Date Met: 03/18/19  Occupational Therapy EVALUATION/discharge    Patient: Kelsie Molina (72 y.o. female)  Date: 3/18/2019  Primary Diagnosis: Vertigo [R42]  Vertigo [R42]  Dizziness [R42]  Weakness [R53.1]       Precautions:   Fall    ASSESSMENT AND RECOMMENDATIONS:  Based on the objective data described below, the patient is able to perform basic self care tasks without assistance. Supervision given for functional standing and transfers. Will defer to PT for mobility training. Patient has a supportive significant other at home to assist her prn. Discussed use of a seat for the shower to increase safety and patient declined at this time. Skilled occupational therapy is not indicated at this time. Discharge Recommendations: None  Further Equipment Recommendations for Discharge: N/A      Barriers to Learning/Limitations: None  Compensate with: visual, verbal, tactile, kinesthetic cues/model     COMPLEXITY     Eval Complexity: History: LOW Complexity : Brief history review ; Examination: LOW Complexity : 1-3 performance deficits relating to physical, cognitive , or psychosocial skils that result in activity limitations and / or participation restrictions ; Decision Making:LOW Complexity : No comorbidities that affect functional and no verbal or physical assistance needed to complete eval tasks  Assessment: Low Complexity     SUBJECTIVE:   Patient stated My dizziness has gotten much better.     OBJECTIVE DATA SUMMARY:     Past Medical History:   Diagnosis Date    Abnormal Pap smear     Dr. Ger Clark Allergic rhinitis     Arthritis     Chronic pain     Hypercholesterolemia     Neck pain 5/17/2010    Stroke [address] [address] 10/02/2017    TIA- legs weak memory issues     Past Surgical History:   Procedure Laterality Date    COLONOSCOPY N/A 6/4/2018    COLONOSCOPY / polypectomy performed by Shakeel Nayda Barajas MD at [facility] ENDOSCOPY    HX GYN      Total Hyst    HX LAP CHOLECYSTECTOMY      HX OTHER SURGICAL  2017    Throat widened     Prior Level of Function/Home Situation: Pt was modified independent with basic self care tasks and used no AD for functional mobility PTA. Home Situation  Home Environment: Private residence  # Steps to Enter: 4  One/Two Story Residence: Two story  # of Interior Steps: 16  Height of Each Step (in): 12 inches  Interior Rails: Right  Lift Chair Available: No  Living Alone: No  Support Systems: Friends \ neighbors  Patient Expects to be Discharged to[de-identified] Private residence  Current DME Used/Available at Home: None  Tub or Shower Type: Tub/Shower combination  [x]     Right hand dominant   []     Left hand dominant  Cognitive/Behavioral Status:  Neurologic State: Alert  Orientation Level: Oriented X4  Cognition: Follows commands  Safety/Judgement: Awareness of environment; Fall prevention    Skin: Intact on UEs    Edema: None noted in UEs    Vision/Perceptual:    Acuity: Within Defined Limits      Coordination:  Coordination: Within functional limits(BLE)  Fine Motor Skills-Upper: Left Intact; Right Intact    Gross Motor Skills-Upper: Left Intact; Right Intact    Balance:  Sitting: Intact  Standing: Impaired  Standing - Static: Good  Standing - Dynamic : Fair    Strength:  Strength:  Within functional limits(UEs)    Tone & Sensation:  Tone: Normal(UEs)  Sensation: Intact(UEs)    Range of Motion:  AROM: Within functional limits(UEs)    Functional Mobility and Transfers for ADLs:  Bed Mobility:  Rolling: Supervision  Supine to Sit: Supervision  Scooting: Modified independent  Transfers:  Sit to Stand: Supervision   Toilet Transfer : Supervision     ADL Assessment:  Feeding: Independent    Oral Facial Hygiene/Grooming: Independent    Bathing: Supervision    Upper Body Dressing: Independent    Lower Body Dressing: Supervision    Toileting: Modified independent    Pain:  Pt reports 7/10 pain or discomfort prior to treatment, in neck. Pain meds given prior to session but patient reports pain is chronic and uncontrolled PTA.    Pt reports 7/10 pain or discomfort post treatment, in neck. Patient resting in chair at end of session with neck supported on pillow. Activity Tolerance:   Good    Please refer to the flowsheet for vital signs taken during this treatment. After treatment:   [x]  Patient left in no apparent distress sitting up in chair  []  Patient left in no apparent distress in bed  [x]  Call bell left within reach  [x]  Nursing notified  []  Caregiver present  []  Bed alarm activated    COMMUNICATION/EDUCATION:   Communication/Collaboration:  [x]      Home safety education was provided and the patient/caregiver indicated understanding. [x]      Patient/family have participated as able and agree with findings and recommendations. []      Patient is unable to participate in plan of care at this time.     Conchis Vicente MS OTR/L   Time Calculation: 12 mins

## 2019-03-19 NOTE — PROGRESS NOTES
Pt transported to [facility] at this time . Reason for Admission:  Vertigo [R42]  Vertigo [R42]  Dizziness [R42]  Weakness [R53.1]                 RRAT Score:    12            Plan for utilizing home health:    Ordered, nut pt prefers outpt therapy. Likelihood of Readmission:   LOW                         Transition of Care Plan:              Initial assessment completed with patient. Cognitive status of patient: oriented to time, place, person and situation. Face sheet information confirmed:  yes. The patient designates friend, Aleksandar Cohen to participate in her discharge plan and to receive any needed information. This patient lives in a single family home with patient and friend. Patient is able to navigate steps as needed. Prior to hospitalization, patient was considered to be independent with ADLs/IADLS : yes . Patient has a current ACP document on file: yes  The patient and friend will be available to transport patient home upon discharge. The patient already has none reported, and  medical equipment available in the home. Patient is not currently active with home health. Patient has not stayed in a skilled nursing facility or rehab. This patient is on dialysis :no      Freedom of choice signed: no. Currently, the discharge plan is Home (pt is only interested in outpt therapy. Refusing HH)    The patient states that she can obtain her medications from the pharmacy, and take her medications as directed. Patient's current insurance is Medicare/[employer]       Care Management Interventions  PCP Verified by CM: Yes  Mode of Transport at Discharge: Self  Transition of Care Consult (CM Consult):  Other(pt is interested in outpt therapy only)  Discharge Durable Medical Equipment: No  Physical Therapy Consult: Yes  Occupational Therapy Consult: Yes  Current Support Network: Lives with Caregiver  Confirm Follow Up Transport: Family  Plan discussed with Pt/Family/Caregiver: Yes  Discharge Location  Discharge Placement: Home        JACINDA Orozco, [address]

## 2019-03-19 NOTE — ROUTINE PROCESS
Bedside and Verbal shift change report given to XANDER Parekh (oncoming nurse) by [address], RN (offgoing nurse). Report included the following information SBAR, Kardex and Recent Results.

## 2019-03-19 NOTE — PROGRESS NOTES
Problem: Falls - Risk of  Goal: *Absence of Falls  Document Connie Fall Risk and appropriate interventions in the flowsheet.   Outcome: Progressing Towards Goal  Fall Risk Interventions:            Medication Interventions: Patient to call before getting OOB, Teach patient to arise slowly

## 2019-03-19 NOTE — ROUTINE PROCESS
Bedside shift change report given to [facility] (oncoming nurse) by Arturo Armando (offgoing nurse). Report included the following information SBAR, Intake/Output, MAR, Recent Results and Cardiac Rhythm NSR.

## 2019-03-19 NOTE — PROGRESS NOTES
Problem: Dysphagia (Adult)  Goal: *Acute Goals and Plan of Care (Insert Text)  Patient will:  1. Tolerate PO trials with 0 s/s overt distress in 4/5 trials  2. Utilize compensatory swallow strategies/maneuvers (decrease bite/sip, size/rate, alt. liq/sol) with min cues in 4/5 trials    Recommend:   Regular diet with thin liquids  Meds as tolerated   Add sauces/gravies to dry food   Aspiration precautions  HOB >45 degrees during all intake and for at least 30 min after po   Small bites/sips, Alternating bites/sips   Outpatient GI to address suspected esophageal dysphagia     Speech LAnguage Pathology bedside swallow evaluation/dysphagia treatment    Patient: Brandon Teresa (72 y.o. female)  Date: 3/19/2019  Primary Diagnosis: Vertigo [R42]  Vertigo [R42]  Dizziness [R42]  Weakness [R53.1]  Precautions: Aspiration,  Fall    ASSESSMENT :  Based on the objective data described below, the patient presents with suspected min pharyngeal and suspected esophageal dysphagia. Pt alert and oriented x4, following commands and reporting hx of dysphagia requiring dilation in the past (unsure of exact date). Pt reporting globus sensation with hard/dry solids and with pills. Oral mech exam revealed structures functional for speech/deglutition. Pt tolerating consecutive swallows of thin liquids + straw with timely swallow initiation, adequate laryngeal elevation upon observation and no overt s/sx aspiration. Pt tolerating multiple pills whole with thin liquid wash with positive oral clearance and no overt s/sx aspiration. Recommend continue current diet with use of the above mentioned compensatory strategies/aspiration precautions. TREATMENT :  Skilled therapy initiated; Educated pt on aspiration precautions and importance of compensatory swallow techniques to decrease aspiration risk (decrease rate of intake & sip/bite size, upright @HOB for all po intake and ~30 minutes after po); verbalized comprehension.  SLP to follow as indicated. Patient will benefit from skilled intervention to address the above impairments. Patients rehabilitation potential is considered to be Excellent    Factors which may influence rehabilitation potential include:   []            None noted  []            Mental ability/status  [x]            Medical condition  []            Home/family situation and support systems  []            Safety awareness  []            Pain tolerance/management  []            Other:      PLAN :  Recommendations and Planned Interventions:  As above   Frequency/Duration: Patient will be followed by speech-language pathology x1-2 visits to address goals. Discharge Recommendations: Outpatient GI      SUBJECTIVE:   Patient stated Vance Medina had my throat stretched in the past.     OBJECTIVE:     Past Medical History:   Diagnosis Date    Abnormal Pap smear     Dr. Randy Barron Allergic rhinitis     Arthritis     Cerebrovascular small vessel disease 3/18/2019    CT 3/17/2019    Chronic pain     Concentric left ventricular hypertrophy 3/18/2019    With left ventricular diastolic dysfunction by echocardiogram 3/18/2019    Hypercholesterolemia     Neck pain 5/17/2010    Stroke [address] [address] 10/02/2017    TIA- legs weak memory issues     Past Surgical History:   Procedure Laterality Date    COLONOSCOPY N/A 6/4/2018    COLONOSCOPY / polypectomy performed by Alex May MD at [facility] ENDOSCOPY    HX GYN      Total Hyst    HX LAP CHOLECYSTECTOMY      HX OTHER SURGICAL  2017    Throat widened     Prior Level of Function/Home Situation:   Home Situation  Home Environment: Private residence  # Steps to Enter: 4  One/Two Story Residence: Two story  # of Interior Steps: 16  Height of Each Step (in): 12 inches  Interior Rails: Right  Lift Chair Available: No  Living Alone: No  Support Systems: Friends \ neighbors  Patient Expects to be Discharged to[de-identified] Private residence  Current DME Used/Available at Home: None  Tub or Shower Type: Tub/Shower combination  Diet prior to admission: Regular/thin  Current Diet:  Regular/thin   Cognitive and Communication Status:  Neurologic State: Alert  Orientation Level: Oriented X4  Cognition: Follows commands  Perception: Appears intact  Perseveration: No perseveration noted  Safety/Judgement: Awareness of environment, Fall prevention  Oral Assessment:  Oral Assessment  Labial: No impairment  Dentition: Intact  Oral Hygiene: Good  Lingual: No impairment  Velum: No impairment  Mandible: No impairment  P.O. Trials:  Patient Position: 90 in chair  Vocal quality prior to P.O.: No impairment  Consistency Presented: Thin liquid; Solid;Pill/Tablet  How Presented: Self-fed/presented;Cup/sip;Spoon;Straw;Successive swallows  Bolus Acceptance: No impairment  Bolus Formation/Control: No impairment  Propulsion: No impairment  Oral Residue: None  Initiation of Swallow: No impairment  Laryngeal Elevation: Functional  Aspiration Signs/Symptoms: None  Pharyngeal Phase Characteristics: Foreign body sensation  Effective Modifications: Small sips and bites; Alternate liquids/solids  Cues for Modifications: Minimal  Oral Phase Severity: No impairment  Pharyngeal Phase Severity : Minimal    The severity rating is based on the following outcomes:    Clinical Judgment    Pain:  Pt c/o 7/10 pain prior to evaluation. Pt c/o 7/10 pain post evaluation.   RN present/aware     After treatment:   [x]            Patient left in no apparent distress sitting up in chair  []            Patient left in no apparent distress in bed  [x]            Call bell left within reach  [x]            Nursing notified  [x]            Caregiver present  []            Bed alarm activated    COMMUNICATION/EDUCATION:   [x]            SLP educated pt with regard to compensatory swallow strategies and       aspiration/reflux precautions including: small bites/sips,       alternate liquids/solids, decrease feeding rate, HOB > 45 with all po, and upright in bed at 30 degrees after po for at least 45 minutes. [x]            Patient/family have participated as able in goal setting and plan of care. []            Patient/family agree to work toward stated goals and plan of care. []            Patient understands intent and goals of therapy; neutral about participation. []            Patient is unable to participate in goal setting and plan of care.     Thank you for this referral.  Smiley Carrera M.S., [id number] [facility]  Speech-Language Pathologist

## 2019-03-19 NOTE — HOME CARE
Rounded on this ACO pt - discussed HC - left brochure with pt - [facility] will be available if needed - MICKY Khan RN

## 2019-03-19 NOTE — PROGRESS NOTES
Problem: Falls - Risk of  Goal: *Absence of Falls  Document Connie Fall Risk and appropriate interventions in the flowsheet.   Outcome: Progressing Towards Goal  Fall Risk Interventions:            Medication Interventions: Bed/chair exit alarm, Patient to call before getting OOB, Teach patient to arise slowly

## 2019-03-19 NOTE — PROGRESS NOTES
Problem: Mobility Impaired (Adult and Pediatric)  Goal: *Acute Goals and Plan of Care (Insert Text)  Physical Therapy Goals  Initiated 3/18/2019 and to be accomplished within 7 day(s)  1. Patient will move from supine to sit and sit to supine , scoot up and down and roll side to side in bed with modified independence. 2.  Patient will transfer from bed to chair and chair to bed with modified independence using the least restrictive device. 3.  Patient will perform sit to stand with modified independence. 4.  Patient will ambulate with modified independence for >200 feet with the least restrictive device. 5.  Patient will ascend/descend 10-12 stairs with 1 handrail(s) with supervision/set-up. Outcome: Progressing Towards Goal  physical Therapy TREATMENT    Patient: Matilda Johnson (72 y.o. female)  Date: 3/19/2019  Diagnosis: Vertigo [R42]  Vertigo [R42]  Dizziness [R42]  Weakness [R53.1] Dizziness      Precautions: Fall   Chart, physical therapy assessment, plan of care and goals were reviewed. OBJECTIVE/ASSESSMENT:  Pt was cleared by nursing to participate in therapy and was agreeable to PT treatment. Pt was received sitting up in recliner. She was able to stand with supervision and then ambulated x 75 feet with no AD (occasional use of railing) with SBA to [facility]. She negotiated 5 stairs with 1 railing with step-to gait pattern. Pt then ambulated x 75 feet with no AD and gait pattern was slow, narrow CONSTANTIN with occasional scissoring. Pt returned to recliner with supervision. Pt then performed therapeutic exercises in seated position with education on HEP.   Education:   [x]         Bed mobility  [x]         Transfers  [x]         Ambulation  []         Assistive device management  [x]         Stairs  []         Body mechanics  [x]         Position change  [x]         Activity pacing/energy conservation  []         Other:  Progression toward goals:  [x]      Improving appropriately and progressing toward goals  []      Improving slowly and progressing toward goals  []      Not making progress toward goals and plan of care will be adjusted     PLAN:  Patient continues to benefit from skilled intervention to address the above impairments. Continue treatment per established plan of care. Discharge Recommendations:  Outpatient  PT to address balance impairments  Further Equipment Recommendations for Discharge:  N/A     SUBJECTIVE:   Patient stated I don't want to get to the point that I need a walker.     OBJECTIVE DATA SUMMARY:   Critical Behavior:  Neurologic State: Alert  Orientation Level: Oriented X4  Cognition: Follows commands  Safety/Judgement: Awareness of environment, Fall prevention  Functional Mobility Training:  Bed Mobility:  Transfers:  Sit to Stand: Supervision  Stand to Sit: Supervision  Balance:  Sitting: Intact  Standing: Impaired  Standing - Static: Good  Standing - Dynamic : Fair  Ambulation/Gait Training:  Distance (ft): 150 Feet (ft)  Ambulation - Level of Assistance: Supervision(occasional use of furniture in room and railing in hallway)  Gait Abnormalities: Decreased step clearance;Scissoring  Base of Support: Narrowed  Speed/Betzy: Slow  Step Length: Left shortened;Right shortened    Stairs:    5 stairs, 1 railing, CGA    Therapeutic Exercises:   LAQ x 10 with 3 second hold, hip flexion  Pain:  Pre session: 0  Post session: 0  Activity Tolerance:   Good  Please refer to the flowsheet for vital signs taken during this treatment.   After treatment:   [x] Patient left in no apparent distress sitting up in chair  [] Patient left in no apparent distress in bed  [x] Call bell left within reach  [x] Nursing notified  [x] Caregiver present  [] Bed alarm activated      Uriel Reeves PT   Time Calculation: 24 mins

## 2019-03-19 NOTE — PROGRESS NOTES
Hospitalist Progress Note    Patient: Brandon Teresa Age: 72 y.o. : 1945 MR#: [id number] SSN: [id number]  Date/Time:2019  8:50 PM    Subjective:     Ms. 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 She presented to the ER at [facility] on 3/14/2019 with report of neck pain, vertigo and fatigue. She reportedly was given a RX for Meclizine and did not fill due to side effects. The patient's partner called EMS and Ms. Lizette Alexander returned to  [facility] ED on 3/17/2019. She reported that she awoke with leg cramps that she can usually work out, but they lasted longer today; additionally she felt dizzy and lightheaded. Review of Systems -   Denied fever, chills, chest pain, shortness of breath, nausea, vomiting or abdominal pain. Objective:   VS:   Visit Vitals  /72 (BP 1 Location: Left arm)   Pulse 70   Temp 98 °F (36.7 °C)   Resp 17   Ht 5' (1.524 m)   Wt 55.3 kg (122 lb)   SpO2 98%   Breastfeeding? Unknown   BMI 23.83 kg/m²      Tmax/24hrs: Temp (24hrs), Av.3 °F (36.8 °C), Min:98 °F (36.7 °C), Max:98.4 °F (36.9 °C)       Intake/Output Summary (Last 24 hours) at 3/19/2019 0151  Last data filed at 3/18/2019 1821  Gross per 24 hour   Intake 280 ml   Output --   Net 280 ml       General:afebrile, sleepy, NAD   HEENT:atruamatic, normal external ears, facial symmetry, neck veins flat  Cardiovascular:  HR 70 reg  Pulmonary: normal respiratory effort, clear   GI: BS+, non distended, no guarding   Extremities:  Warm and dry, no edema, no rashes    Labs:    ECHOCARDIOGRAM 3/18/2019\"  Summary     · Saline contrast was given to evaluate for intracardiac shunt. No shunt seen. · Estimated left ventricular ejection fraction is 56 - 60%. Visually measured ejection fraction. Left ventricular cavity size is decreased. Left ventricular mild concentric hypertrophy.  No regional wall motion abnormality noted. Age-appropriate left ventricular diastolic function.   · There is no evidence of pulmonary hypertension       Recent Results (from the past 24 hour(s))   ECHO ADULT COMPLETE    Collection Time: 03/18/19 11:37 AM   Result Value Ref Range    LVIDd 3.26 (A) 3.9 - 5.3 cm    LVPWd 1.10 (A) 0.6 - 0.9 cm    LVIDs 2.29 cm    IVSd 1.07 (A) 0.6 - 0.9 cm    LVOT d 1.93 cm    LVOT Peak Velocity 86.37 cm/s    LVOT Peak Gradient 3.0 mmHg    LVOT VTI 19.73 cm    MV A Zi 99.72 cm/s    MV E Zi 83.87 cm/s    MV E/A 0.84     Inferior Vena Cava Diameter Sniffing 1.50 cm    LA Vol 4C 33.03 22 - 52 mL    LA Vol 2C 38.63 22 - 52 mL    LA Area 4C 14.0 cm2    LV Mass .9 67 - 162 g    LV Mass AL Index 77.3 43 - 95 g/m2    Mitral Valve E Wave Deceleration Time 179.3 ms    Triscuspid Valve Regurgitation Peak Gradient 31.9 mmHg    TR Max Velocity 282.29 cm/s    PASP 35.0 mmHg    LA Vol Index 25.53 16 - 28 ml/m2    LA Vol Index 21.83 16 - 28 ml/m2    LA Volume 41.01 22 - 52 mL    Ao Root D 2.97 cm    LA Vol Index 27.11 16 - 28 ml/m2   GLUCOSE, POC    Collection Time: 03/18/19  3:12 PM   Result Value Ref Range    Glucose (POC) 88 70 - 110 mg/dL   HEMOGLOBIN A1C WITH EAG    Collection Time: 03/18/19  4:07 PM   Result Value Ref Range    Hemoglobin A1c 5.8 (H) 4.2 - 5.6 %    Est. average glucose 120 mg/dL   CARDIAC PANEL,(CK, CKMB & TROPONIN)    Collection Time: 03/18/19  4:07 PM   Result Value Ref Range     26 - 192 U/L    CK - MB 1.4 <3.6 ng/ml    CK-MB Index 1.3 0.0 - 4.0 %    Troponin-I, QT <0.02 0.0 - 0.045 NG/ML   LIPID PANEL    Collection Time: 03/18/19  4:07 PM   Result Value Ref Range    LIPID PROFILE          Cholesterol, total 134 <200 MG/DL    Triglyceride 65 <150 MG/DL    HDL Cholesterol 73 (H) 40 - 60 MG/DL    LDL, calculated 48 0 - 100 MG/DL    VLDL, calculated 13 MG/DL    CHOL/HDL Ratio 1.8 0 - 5.0     GLUCOSE, POC    Collection Time: 03/18/19  9:12 PM   Result Value Ref Range Glucose (POC) 111 (H) 70 - 110 mg/dL       Imaging:  CT Results  (Last 48 hours)               03/17/19 1500  CT HEAD WO CONT Final result    Impression:  IMPRESSION:        Stable findings without acute intracranial abnormality. Please note that MR is more sensitive for an acute infarct in the first 24-48   hours. Narrative:  INDICATION: Dizziness       COMPARISON: 9/18       TECHNIQUE: Unenhanced CT of the head was performed using 5 mm images. Brain and   bone windows were generated. All CT scans at this facility are performed using   doze optimization technique as appropriate to a performed exam, to include   automated exposure control, adjustment of the mA and/or KV according to patient   size (including appropriate matching for site specific examinations), or use of   iterative reconstruction technique. FINDINGS:       Similar mild global volume loss and chronic small vessel ischemic changes. Old   lacunar infarcts in the basal ganglia. No large territory acute infarct. There   is no significant white matter disease. There is no intracranial hemorrhage,   extra-axial collection, mass, mass effect or midline shift. The basilar   cisterns are open. The bone windows demonstrate no acute abnormalities. The   visualized portions of the paranasal sinuses and mastoid air cells are clear. Arthrosclerosis. Assessment/Plan:     Principal Problem:    Dizziness (3/17/2019)    Active Problems:    Cervical neck pain with evidence of disc disease (5/17/2010)      Spondylosis of cervical region without myelopathy or radiculopathy (11/28/2017)      Cervical spinal stenosis (11/28/2017)      Vertigo (3/17/2019)      Weakness (3/17/2019)      Concentric left ventricular hypertrophy (3/18/2019)      Overview:  With left ventricular diastolic dysfunction by echocardiogram 3/18/2019      Cerebrovascular small vessel disease (3/18/2019)      Overview: CT 3/17/2019    neurology has consulted and will follow  MRI C spine with ativan 2 mg IV on call   MRI brain  Patient has been intolerant of vestibular rehab secondary to chronic intractable neck pain, per her neurologist  PT/OT  Additional Notes:      Full Code    Disposition:   Home    Case discussed with:  []Patient  []Family  [x]Nursing  []Case Management  DVT Prophylaxis:  []Lovenox  []Hep SQ  []SCDs  []Coumadin   []On Heparin gtt    Signed By: Gerson Hickey DO    March 18, 2019  8:50 PM

## 2019-03-20 VITALS
TEMPERATURE: 98 F | DIASTOLIC BLOOD PRESSURE: 76 MMHG | OXYGEN SATURATION: 98 % | HEART RATE: 82 BPM | BODY MASS INDEX: 22.97 KG/M2 | HEIGHT: 60 IN | WEIGHT: 117 LBS | RESPIRATION RATE: 19 BRPM | SYSTOLIC BLOOD PRESSURE: 143 MMHG

## 2019-03-20 LAB
GLUCOSE BLD STRIP.AUTO-MCNC: 132 MG/DL (ref 70–110)
GLUCOSE BLD STRIP.AUTO-MCNC: 152 MG/DL (ref 70–110)
GLUCOSE BLD STRIP.AUTO-MCNC: 201 MG/DL (ref 70–110)

## 2019-03-20 PROCEDURE — 82962 GLUCOSE BLOOD TEST: CPT

## 2019-03-20 PROCEDURE — 74011250636 HC RX REV CODE- 250/636: Performed by: INTERNAL MEDICINE

## 2019-03-20 PROCEDURE — 74011250637 HC RX REV CODE- 250/637: Performed by: INTERNAL MEDICINE

## 2019-03-20 RX ORDER — DEXAMETHASONE 4 MG/1
TABLET ORAL
Qty: 10 TAB | Refills: 0 | Status: SHIPPED | OUTPATIENT
Start: 2019-03-20 | End: 2019-05-15

## 2019-03-20 RX ADMIN — DEXAMETHASONE SODIUM PHOSPHATE 4 MG: 4 INJECTION, SOLUTION INTRA-ARTICULAR; INTRALESIONAL; INTRAMUSCULAR; INTRAVENOUS; SOFT TISSUE at 06:30

## 2019-03-20 RX ADMIN — DEXAMETHASONE SODIUM PHOSPHATE 4 MG: 4 INJECTION, SOLUTION INTRA-ARTICULAR; INTRALESIONAL; INTRAMUSCULAR; INTRAVENOUS; SOFT TISSUE at 00:45

## 2019-03-20 RX ADMIN — HEPARIN SODIUM 5000 UNITS: 5000 INJECTION INTRAVENOUS; SUBCUTANEOUS at 04:15

## 2019-03-20 RX ADMIN — TRAMADOL HYDROCHLORIDE 50 MG: 50 TABLET, COATED ORAL at 00:49

## 2019-03-20 RX ADMIN — ATORVASTATIN CALCIUM 20 MG: 20 TABLET, FILM COATED ORAL at 09:17

## 2019-03-20 RX ADMIN — ASPIRIN 81 MG 81 MG: 81 TABLET ORAL at 09:17

## 2019-03-20 RX ADMIN — HEPARIN SODIUM 5000 UNITS: 5000 INJECTION INTRAVENOUS; SUBCUTANEOUS at 11:59

## 2019-03-20 RX ADMIN — DEXAMETHASONE SODIUM PHOSPHATE 4 MG: 4 INJECTION, SOLUTION INTRA-ARTICULAR; INTRALESIONAL; INTRAMUSCULAR; INTRAVENOUS; SOFT TISSUE at 11:57

## 2019-03-20 RX ADMIN — Medication 10 ML: at 06:30

## 2019-03-20 NOTE — PROGRESS NOTES
vss afeb  Neuro intact  Patient with decreased neck pain- no pain since soft cervical collar last night. Has chronic cervical arthropathy with suboccipital stenosis c1/2 without instability. This is a probable cause for neck pain and and radiating posterior occipital headache. Less likely cause for vertigo, or changes in visual acuity. Patient without classic myelopathic symptoms, dexterity issues, or balance issues outside of vertigo. Intervention could be temporized with occipital nerve blocks, but true treatment would require a cervical decompression of c1/2/3 and occipital cervical fusion-  a large surgery. Patient has significant osteoporosis- about to be treated with prolia. Recommendation:  Patient in hard collar  Will see in office as outpatient  Have discussed options and will review again. Cervical spondylosis and stenosis likely part of picture but does not explain the whole of presentation'  Patient states that the neck pain is dramatically affected her quality of life. She may very well be a candidate for surgical intervention.

## 2019-03-20 NOTE — PROGRESS NOTES
Important Message from Medicare reviewed and explained with the patient and/or representative at bedside and signature obtained. A copy provided to patient/ representative. Original signed document placed on patients chart.  \

## 2019-03-20 NOTE — ROUTINE PROCESS
Bedside and Verbal shift change report given to [facility] (oncoming nurse) by ZULLY Riddle RN (offgoing nurse). Report given with SBAR, Jerry, MAR and Recent Results.

## 2019-03-20 NOTE — ROUTINE PROCESS
0800 assumed care of pt after bedside verbal report was given by off going nurse, pt resting in bed awake, no acute distress noted, will monitor

## 2019-03-20 NOTE — PROGRESS NOTES
3/20/2019 8:46 AM    SSN: [id number]    Subjective:   82-year-old female admitted on 3/17 with worsening of a chronic ataxia. She has also had chronic vertigo for a year on and off. A cervical spine MRI done yesterday showed multilevel degenerative joint disease with multilevel compression and cord signal anomalies at C1-2. Dr. Scott Castro input is greatly appreciated. She is in a hard collar, surgery is being contemplated (cervical decompression at C1, 2, and 3 with occipital cervical fusion) given the pain she has. She feels her dizziness today is much better.     Social History     Socioeconomic History    Marital status:      Spouse name: Not on file    Number of children: Not on file    Years of education: Not on file    Highest education level: Not on file   Occupational History    Not on file   Social Needs    Financial resource strain: Not on file    Food insecurity:     Worry: Not on file     Inability: Not on file    Transportation needs:     Medical: Not on file     Non-medical: Not on file   Tobacco Use    Smoking status: Never Smoker    Smokeless tobacco: Never Used   Substance and Sexual Activity    Alcohol use: No    Drug use: No    Sexual activity: Never   Lifestyle    Physical activity:     Days per week: Not on file     Minutes per session: Not on file    Stress: Not on file   Relationships    Social connections:     Talks on phone: Not on file     Gets together: Not on file     Attends Taoist service: Not on file     Active member of club or organization: Not on file     Attends meetings of clubs or organizations: Not on file     Relationship status: Not on file    Intimate partner violence:     Fear of current or ex partner: Not on file     Emotionally abused: Not on file     Physically abused: Not on file     Forced sexual activity: Not on file   Other Topics Concern    Not on file   Social History Narrative    Not on file       Family History Problem Relation Age of Onset    Cancer Mother         breast    Heart Disease Father        Current Facility-Administered Medications   Medication Dose Route Frequency Provider Last Rate Last Dose    acetaminophen (TYLENOL) tablet 650 mg  650 mg Oral Q6H PRN Ruthie Negrete MD   650 mg at 03/19/19 0857    traMADol (ULTRAM) tablet 50 mg  50 mg Oral Q6H PRN Ruthie Negrete MD   50 mg at 03/20/19 0049    dexamethasone (DECADRON) 4 mg/mL injection 4 mg  4 mg IntraVENous Q6H Ruthie Negrete MD   4 mg at 03/20/19 0630    atorvastatin (LIPITOR) tablet 20 mg  20 mg Oral DAILY Gene Steiner MD   20 mg at 03/19/19 0857    aspirin chewable tablet 81 mg  81 mg Oral DAILY Gene Steiner MD        LORazepam (ATIVAN) injection 1 mg  1 mg IntraVENous Q4H PRN Kim Peterson DO        meclizine (ANTIVERT) tablet 25 mg  25 mg Oral Q6H PRN Gene Steiner MD        sodium chloride (NS) flush 5-40 mL  5-40 mL IntraVENous Q8H Gene Steiner MD   10 mL at 03/20/19 0630    sodium chloride (NS) flush 5-40 mL  5-40 mL IntraVENous PRN Gene Steiner MD        aspirin chewable tablet 81 mg  81 mg Oral DAILY Gene Steiner MD   81 mg at 03/19/19 0857    heparin (porcine) injection 5,000 Units  5,000 Units SubCUTAneous Q8H Gene Steiner MD   5,000 Units at 03/20/19 0415       Past Medical History:   Diagnosis Date    Abnormal Pap smear     Dr. Angelique Frausto Allergic rhinitis     Arthritis     Cerebrovascular small vessel disease 3/18/2019    CT 3/17/2019    Chronic pain     Concentric left ventricular hypertrophy 3/18/2019    With left ventricular diastolic dysfunction by echocardiogram 3/18/2019    Hypercholesterolemia     Neck pain 5/17/2010    Stroke ([facility] [address] 10/02/2017    TIA- legs weak memory issues       Past Surgical History:   Procedure Laterality Date    COLONOSCOPY N/A 6/4/2018    COLONOSCOPY / polypectomy performed by Keyshawn Lee MD at [facility] ENDOSCOPY    HX GYN      Total Hyst    HX LAP CHOLECYSTECTOMY      HX OTHER SURGICAL  2017    Throat widened       Allergies   Allergen Reactions    Celebrex [Celecoxib] Other (comments)     Tiredness        Vital signs:    Visit Vitals  /80 (BP 1 Location: Left arm, BP Patient Position: Supine)   Pulse 76   Temp 97.9 °F (36.6 °C)   Resp 18   Ht 5' (1.524 m)   Wt 53.1 kg (117 lb)   SpO2 93%   Breastfeeding? Unknown   BMI 22.85 kg/m²       Review of Systems:   GENERAL: Denies fever or fatigue  CARDIAC: No CP or SOB  PULMONARY: No cough of SOB  MUSCULOSKELETAL: No new joint pain  NEURO: SEE HPI      EXAM: Alert, in NAD. Heart is regular. Oriented x3, EOM's are full, PERRL, no facial asymmetries. Strength and tone are normal. DTR's +3, gait  symmetric,slightly more steady today, not spastic, bilateral lower extremity power testing is variable. Assessment/Plan: Advanced cervical spondylosis with chronic neck pain and a myelopathy, with a gait impairment I suspect is secondary to cord compression. She does have hyperreflexia, although I agree does not have an overtly myelopathic picture. I suspect her chronic vertigo is part of the reason she is having problems with walking. Her exam is inconsistent, her workup has not yielded any other cerebral or peripheral nervous system explanation for her weakness (does have an abundance of cerebrovascular disease on imaging which may in part contributed to her ataxia), as she is not intact and she has bilateral lower extremity weakness and ataxia. I also think that in directly as mentioned before her vertigo is likely secondary to her marked decrease in cervical range of motion causing problems with cervical afferents to the vestibular nuclei, and a well known phenomenon. This has made it very difficult to do proper vestibular rehabilitation which requires good cervical mobility.   At this time she is feeling good and her issues can be addressed as an outpatient, so I recommend that she is discharged home today with follow-up with Dr Akshat Valles as well as with me for additional evaluation and interventions. PLEASE NOTE:   Portions of this document may have been produced using voice recognition software. Unrecognized errors in transcription may be present. This note will not be viewable in [address]

## 2019-03-20 NOTE — PROGRESS NOTES
Hospitalist Progress Note    Patient: Ary Cleveland Age: 72 y.o. : 1945 MR#: [id number] SSN: [id number]  Date/Time:2019  8:50 PM    Subjective:     Ms. 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 She presented to the ER at [facility] on 3/14/2019 with report of neck pain, vertigo and fatigue. She reportedly was given a RX for Meclizine and did not fill due to side effects. The patient's partner called EMS and Ms. Matheus Moore returned to  [facility] ED on 3/17/2019. She reported that she awoke with leg cramps that she can usually work out, but they lasted longer today; additionally she felt dizzy and lightheaded. Pt is seen and examined, continues to c/o worsening Neck pain. MRI results reviewed and d/w neurology and Dr Giana Covarrubias   Cervical soft collar placed   MRI Impression  -Multilevel ventral compression of the cord. 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 Objective:   VS:   Visit Vitals  /74 (BP 1 Location: Left arm, BP Patient Position: At rest)   Pulse 86   Temp 98.1 °F (36.7 °C)   Resp 18   Ht 5' (1.524 m)   Wt 54.5 kg (120 lb 3.2 oz)   SpO2 96%   Breastfeeding? Unknown   BMI 23.47 kg/m²      Tmax/24hrs: Temp (24hrs), Av °F (36.7 °C), Min:97.6 °F (36.4 °C), Max:98.3 °F (36.8 °C)     No intake or output data in the 24 hours ending 19    General:afebrile, sleepy, NAD   HEENT:atruamatic, normal external ears, facial symmetry, has neck pain with Restricted ROM   Cardiovascular:  HR 70 reg  Pulmonary: normal respiratory effort, clear   GI: BS+, non distended, no guarding   Extremities:  Warm and dry, no edema, no rashes    Labs:    ECHOCARDIOGRAM 3/18/2019\"  Summary     · Saline contrast was given to evaluate for intracardiac shunt. No shunt seen.   · Estimated left ventricular ejection fraction is 56 - 60%. Visually measured ejection fraction. Left ventricular cavity size is decreased. Left ventricular mild concentric hypertrophy. No regional wall motion abnormality noted. Age-appropriate left ventricular diastolic function. · There is no evidence of pulmonary hypertension       Recent Results (from the past 24 hour(s))   GLUCOSE, POC    Collection Time: 03/18/19  9:12 PM   Result Value Ref Range    Glucose (POC) 111 (H) 70 - 110 mg/dL   GLUCOSE, POC    Collection Time: 03/19/19  6:33 AM   Result Value Ref Range    Glucose (POC) 95 70 - 110 mg/dL   GLUCOSE, POC    Collection Time: 03/19/19 11:36 AM   Result Value Ref Range    Glucose (POC) 106 70 - 110 mg/dL       Imaging:  CT Results  (Last 48 hours)    None          Assessment/Plan:     Principal Problem:    Spinal cord compression (HCC) (3/19/2019)    Active Problems:    Cervical neck pain with evidence of disc disease (5/17/2010)      Spondylosis of cervical region without myelopathy or radiculopathy (11/28/2017)      Cervical spinal stenosis (11/28/2017)      Vertigo (3/17/2019)      Dizziness (3/17/2019)      Weakness (3/17/2019)      Concentric left ventricular hypertrophy (3/18/2019)      Overview:  With left ventricular diastolic dysfunction by echocardiogram 3/18/2019      Cerebrovascular small vessel disease (3/18/2019)      Overview: CT 3/17/2019    Neurology consult is appreciated   Dr Tiffani Ortega is called >> possible intervention   C-collar placed  One dose of Decadron is given >> will continue 4 mg q 6 for now   Awaiting official read on XR SPINE CERV W OBL/FLEX/EXT MIN 6 V COMP     Patient has been intolerant of vestibular rehab secondary to chronic intractable neck pain, per her neurologist  PT/OT  Additional Notes:  Pain control with Tramadol     Full Code    Disposition:   TBD    Case discussed with:  [x]Patient  []Family  [x]Nursing  []Case Management  DVT Prophylaxis:  []Lovenox  [x]Hep SQ  []SCDs  []Coumadin   []On Heparin gtt    Signed By: Felicitas Frankel, DO    March 18, 2019  8:50 PM

## 2019-03-20 NOTE — INTERDISCIPLINARY ROUNDS
Per Dr. Carmelo Calle, the NIHSS q4 hours is discontinued. However, with any acute neurological changes, the nurse should do an NIHSS, contact the doctor, and resume floor protocol for CVA/TIA patients.

## 2019-03-20 NOTE — PROGRESS NOTES
conducted an initial consultation and Spiritual Assessment for Luis Holm, who is a 72 y.o.,female. Patients Primary Language is: [place]. According to the patients EMR Tenriism Affiliation is: Presybeterian. The reason the Patient came to the hospital is:   Patient Active Problem List    Diagnosis Date Noted    Spinal cord compression ([facility] [address] 03/19/2019    Concentric left ventricular hypertrophy 03/18/2019    Cerebrovascular small vessel disease 03/18/2019    Vertigo 03/17/2019    Dizziness 03/17/2019    Weakness 03/17/2019    Osteoporosis 03/08/2019    Osteoarthritis of cervical spine 07/03/2018    Hyperlipidemia 07/02/2018    Anxiety 07/02/2018    Myofascial pain syndrome 03/29/2018    Cervical radiculitis 01/25/2018    Cervical facet syndrome 11/28/2017    Spondylosis of cervical region without myelopathy or radiculopathy 11/28/2017    Cervical spinal stenosis 11/28/2017    Degenerative disc disease, cervical 11/28/2017    Chronic pain syndrome 11/28/2017    CVA (cerebral vascular accident) ([facility] [address] 10/02/2017    TIA (transient ischemic attack) 10/02/2017    Cervical neck pain with evidence of disc disease 05/17/2010        The  provided the following Interventions:  Initiated a relationship of care and support. Explored issues of wolfgang, belief, spirituality and Confucianism/ritual needs while hospitalized. Listened empathically. Provided chaplaincy education. Provided information about Spiritual Care Services. Offered prayer and assurance of continued prayers on patient's behalf. Chart reviewed. The following outcomes where achieved:  Patient shared limited information about both their medical narrative and spiritual journey/beliefs. Patient processed feeling about current hospitalization. Patient expressed gratitude for 's visit.     Assessment:  Patient does not have any Confucianism/cultural needs that will affect patients preferences in health care.  There are no spiritual or Druze issues which require intervention at this time. Plan:  Chaplains will continue to follow and will provide pastoral care on an as needed/requested basis.  recommends bedside caregivers page  on duty if patient shows signs of acute spiritual or emotional distress.     [address] [facility]   [phone]

## 2019-03-20 NOTE — ROUTINE PROCESS
The documentation for this period is being entered following the guidelines as defined in the [facility] policy by Barb Collier RN.

## 2019-03-20 NOTE — DISCHARGE SUMMARY
Discharge Summary    Patient: Tyrus Lennox MRN: [id number]  CSN: [id number]    YOB: 1945  Age: 72 y.o.   Sex: female    DOA: 3/17/2019 LOS:  LOS: 3 days   Discharge Date:      Admission Diagnoses: Vertigo [R42]  Vertigo [R42]  Dizziness [R42]  Weakness [R53.1]    Discharge Diagnoses:    Problem List as of 3/20/2019 Date Reviewed: 3/8/2019          Codes Class Noted - Resolved    * (Principal) Spinal cord compression ([facility] [address] ICD-10-CM: G95.20  ICD-9-CM: 336.9  3/19/2019 - Present        Concentric left ventricular hypertrophy (Chronic) ICD-10-CM: I51.7  ICD-9-CM: 429.3  3/18/2019 - Present    Overview Signed 3/19/2019  2:30 AM by Rama Necessary, DO     With left ventricular diastolic dysfunction by echocardiogram 3/18/2019             Cerebrovascular small vessel disease (Chronic) ICD-10-CM: I67.9  ICD-9-CM: 437.9  3/18/2019 - Present    Overview Signed 3/19/2019  2:35 AM by Rama Necessary, DO     CT 3/17/2019             Vertigo (Chronic) ICD-10-CM: R42  ICD-9-CM: 780.4  3/17/2019 - Present        Dizziness ICD-10-CM: R42  ICD-9-CM: 780.4  3/17/2019 - Present        Weakness ICD-10-CM: R53.1  ICD-9-CM: 780.79  3/17/2019 - Present        Osteoporosis ICD-10-CM: M81.0  ICD-9-CM: 733.00  3/8/2019 - Present        Osteoarthritis of cervical spine ICD-10-CM: Q96.419  ICD-9-CM: 721.0  7/3/2018 - Present        Hyperlipidemia ICD-10-CM: E78.5  ICD-9-CM: 272.4  7/2/2018 - Present        Anxiety ICD-10-CM: F41.9  ICD-9-CM: 300.00  7/2/2018 - Present        Myofascial pain syndrome ICD-10-CM: M79.18  ICD-9-CM: 729.1  3/29/2018 - Present        Cervical radiculitis ICD-10-CM: M54.12  ICD-9-CM: 723.4  1/25/2018 - Present        Cervical facet syndrome ICD-10-CM: O73.406  ICD-9-CM: 723.8  11/28/2017 - Present        Spondylosis of cervical region without myelopathy or radiculopathy (Chronic) ICD-10-CM: M73.715  ICD-9-CM: 721.0  11/28/2017 - Present        Cervical spinal stenosis (Chronic) ICD-10-CM: M48.02  ICD-9-CM: 723.0  11/28/2017 - Present        Degenerative disc disease, cervical ICD-10-CM: M50.30  ICD-9-CM: 722.4  11/28/2017 - Present        Chronic pain syndrome ICD-10-CM: G89.4  ICD-9-CM: 338.4  11/28/2017 - Present        CVA (cerebral vascular accident) [facility]) ICD-10-CM: I63.9  ICD-9-CM: 434.91  10/2/2017 - Present        TIA (transient ischemic attack) ICD-10-CM: G45.9  ICD-9-CM: 435.9  10/2/2017 - Present        Cervical neck pain with evidence of disc disease (Chronic) ICD-10-CM: M50.90  ICD-9-CM: 722.91  5/17/2010 - Present              Discharge Condition: Stable    PHYSICAL EXAM  Visit Vitals  /70 (BP 1 Location: Left arm, BP Patient Position: Sitting)   Pulse 79   Temp 97.9 °F (36.6 °C)   Resp 18   Ht 5' (1.524 m)   Wt 53.1 kg (117 lb)   SpO2 93%   Breastfeeding? Unknown   BMI 22.85 kg/m²       General: Alert, cooperative, no acute distress    HEENT: NC, Atraumatic. PERRLA, EOMI. Anicteric sclerae. Lungs:  CTA Bilaterally. No Wheezing/Rhonchi/Rales. Heart:  Regular  rhythm,  No murmur, No Rubs, No Gallops  Abdomen: Soft, Non distended, Non tender.  +Bowel sounds, no HSM  Extremities: No c/c/e  Psych:   Good insight. Not anxious or agitated. Neurologic:  CN 2-12 grossly intact, oriented X 3. No acute neurological                                 Deficits,     Hospital Course: Ms. Bibiana Hennessy is a 72year old female with a past medical history of CVA 2017, diffuse cerebral microangiopathy and advanced degenerative disc and facet joint disease of the cervical spine. She presented to the ER at [facility] on 3/14/2019 with report of neck pain, vertigo and fatigue. She reportedly was given a RX for Meclizine and did not fill due to side effects    Pt continued to c/o worsening Neck pain. MRI results reviewed and d/w neurology and Dr Talita Butterfield   Cervical soft collar placed   MRI Impression  -Multilevel ventral compression of the cord.  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 Dr Rao Trevino reviewed the case and Pt is currently stable for discharge  Received Decadron 10 mg IV and felt better and will be discharged on Decadron PO to f/u with Dr Naun Palafox for possible surgical intervention as an OP       Consults: Neurology and ortho    Significant Diagnostic Studies: AVS    Discharge Medications:     Current Discharge Medication List      START taking these medications    Details   dexamethasone (DECADRON) 4 mg tablet Take one table BID x 5 days  Indications: cervical cord compression  Qty: 10 Tab, Refills: 0         CONTINUE these medications which have NOT CHANGED    Details   meclizine (ANTIVERT) 25 mg tablet Take 1 Tab by mouth three (3) times daily as needed for Dizziness for up to 10 days. Qty: 15 Tab, Refills: 0      atorvastatin (LIPITOR) 20 mg tablet take 1 tablet by mouth once daily  Qty: 90 Tab, Refills: 3      aspirin 81 mg chewable tablet Take 1 Tab by mouth daily. Qty: 30 Tab, Refills: 3    Associated Diagnoses: Transient cerebral ischemia, unspecified type      turmeric 400 mg cap Take  by mouth. fluticasone (FLONASE) 50 mcg/actuation nasal spray instill 2 sprays into each nostril once daily  Qty: 3 Bottle, Refills: 1    Associated Diagnoses: Cough      busPIRone (BUSPAR) 7.5 mg tablet take 1 tablet by mouth twice a day  Qty: 60 Tab, Refills: 1    Associated Diagnoses: Anxiety      LORazepam (ATIVAN) 0.5 mg tablet Take 0.5 mg by mouth daily as needed for Anxiety. montelukast (SINGULAIR) 10 mg tablet take 1 tablet by mouth once daily  Qty: 90 Tab, Refills: 2    Associated Diagnoses: Cough; Sore throat      acetaminophen (TYLENOL EXTRA STRENGTH) 500 mg tablet Take 650 mg by mouth every six (6) hours as needed for Pain. Pt states they take medication QID. omeprazole (PRILOSEC) 20 mg capsule Take 20 mg by mouth daily.          STOP taking these medications       traMADol (ULTRAM) 50 mg tablet Comments:   Reason for Stopping: Activity: activity as tolerated    Diet: Cardiac Diet    Wound Care: None needed and C-collar     Follow-up: with PCP, Osmel Durant, NP in 7-10days    Minutes spent on discharge: >30 minutes spent coordinating this discharge (review instructions/follow-up, prescriptions, preparing report for sign off)

## 2019-03-21 ENCOUNTER — PATIENT OUTREACH (OUTPATIENT)
Dept: FAMILY MEDICINE CLINIC | Facility: CLINIC | Age: 74
End: 2019-03-21

## 2019-03-21 NOTE — PROGRESS NOTES
Hospital Discharge Follow-Up      Date/Time:  3/21/2019 10:13 AM    Patient was admitted to [facility] on 3/17/19 and discharged on 3/20/19 for cervical spondylosis, vertigo. The physician discharge summary was not available at the time of outreach. Patient was contacted within one business days of discharge. Top Challenges reviewed with the provider   Apt with PCP 19         Method of communication with provider :staff message    Inpatient RRAT score: 15  Was this a readmission? no   Patient stated reason for the readmission: NA    Nurse Navigator (NN) contacted the patient by telephone to perform post hospital discharge assessment. Verified name and  with patient as identifiers. Provided introduction to self, and explanation of the Nurse Navigator role. Reviewed discharge instructions and red flags with patient who verbalized understanding. Patient given an opportunity to ask questions and does not have any further questions or concerns at this time. The patient agrees to contact the PCP office for questions related to their healthcare. NN provided contact information for future reference. Disease Specific:   N/A    Summary of patient's top problems:  1. vertigo  2. Neck pain  3. Back pain    Home Health orders at discharge: [address]: NA  Date of initial visit: NA    Durable Medical Equipment ordered/company: none  Durable Medical Equipment received: NA    Barriers to care? none identified at this time    Advance Care Planning:   Does patient have an Advance Directive:  reviewed and needs to be updated. The documents on file are not signed. Discussed with patient who agrees to provide a signed copy at next [address] [place] [address] Rd.     Medication(s):   New Medications at Discharge: Decadron  Changed Medications at Discharge: none  Discontinued Medications at Discharge: Tramadol    Medication reconciliation was performed with patient, who verbalizes understanding of administration of home medications. There were no barriers to obtaining medications identified at this time. Referral to Pharm D needed: no     Current Outpatient Medications   Medication Sig    dexamethasone (DECADRON) 4 mg tablet Take one table BID x 5 days  Indications: cervical cord compression    turmeric 400 mg cap Take  by mouth.  fluticasone (FLONASE) 50 mcg/actuation nasal spray instill 2 sprays into each nostril once daily    atorvastatin (LIPITOR) 20 mg tablet take 1 tablet by mouth once daily    busPIRone (BUSPAR) 7.5 mg tablet take 1 tablet by mouth twice a day    montelukast (SINGULAIR) 10 mg tablet take 1 tablet by mouth once daily    acetaminophen (TYLENOL EXTRA STRENGTH) 500 mg tablet Take 650 mg by mouth every six (6) hours as needed for Pain. Pt states they take medication QID.  omeprazole (PRILOSEC) 20 mg capsule Take 20 mg by mouth daily.  aspirin 81 mg chewable tablet Take 1 Tab by mouth daily.  meclizine (ANTIVERT) 25 mg tablet Take 1 Tab by mouth three (3) times daily as needed for Dizziness for up to 10 days.  LORazepam (ATIVAN) 0.5 mg tablet Take 0.5 mg by mouth daily as needed for Anxiety. No current facility-administered medications for this visit. There are no discontinued medications. [facility] follow up appointment(s):   Future Appointments   Date Time Provider Gaby Delarosa   3/25/2019  3:00 PM [facility] INFUSION NURSE 2 [facility]   4/3/2019 10:00 AM TERESITA SilvaMO DEEJAY SCHED   4/4/2019 12:30 PM TERESITA Moon SCHED   4/10/2019  3:30 PM Luis Carlos Zendejas MD [address]   5/15/2019 11:00 AM TERESITA Moon SCHED      Non-[facility] follow up appointment(s): none  [facility]:  n/a       Goals      Attends follow-up appointments as directed.       Plan:  Monitor for attendance at appointments and assist as needed to schedule         Knowledge and adherence of prescribed medication (ie. action, side effects, missed dose, etc.).       Plan: Reconcile medications and assess patient understanding of purpose, how/when to take using the teach back technique      3/21/19-done with patient who can name purpose, when to take

## 2019-03-21 NOTE — ROUTINE PROCESS
1558 discharged pt to home, instructions given to pt on follow up appt, medications, opportunity given for questions

## 2019-03-25 ENCOUNTER — HOSPITAL ENCOUNTER (OUTPATIENT)
Dept: INFUSION THERAPY | Age: 74
Discharge: HOME | End: 2019-03-25
Payer: MEDICARE

## 2019-03-27 ENCOUNTER — HOSPITAL ENCOUNTER (OUTPATIENT)
Dept: INFUSION THERAPY | Age: 74
Discharge: HOME | End: 2019-03-27
Payer: MEDICARE

## 2019-03-27 VITALS
SYSTOLIC BLOOD PRESSURE: 125 MMHG | DIASTOLIC BLOOD PRESSURE: 69 MMHG | RESPIRATION RATE: 18 BRPM | OXYGEN SATURATION: 97 % | HEART RATE: 72 BPM | TEMPERATURE: 98.1 F

## 2019-03-27 LAB
MAGNESIUM SERPL-MCNC: 2.3 MG/DL (ref 1.6–2.6)
PHOSPHATE SERPL-MCNC: 3.9 MG/DL (ref 2.5–4.9)

## 2019-03-27 PROCEDURE — 74011250636 HC RX REV CODE- 250/636: Performed by: NURSE PRACTITIONER

## 2019-03-27 PROCEDURE — 36415 COLL VENOUS BLD VENIPUNCTURE: CPT

## 2019-03-27 PROCEDURE — 84100 ASSAY OF PHOSPHORUS: CPT

## 2019-03-27 PROCEDURE — 83735 ASSAY OF MAGNESIUM: CPT

## 2019-03-27 PROCEDURE — 96372 THER/PROPH/DIAG INJ SC/IM: CPT

## 2019-03-27 RX ADMIN — DENOSUMAB 60 MG: 60 INJECTION SUBCUTANEOUS at 15:05

## 2019-03-27 NOTE — PROGRESS NOTES
[facility] Progress Note    Date: 2019    Name: Teresa Price    MRN: [id number]         : 1945     Prolia Injection. Patient arrived to [facility] at 1500 anxious and fearful. She states she has a fear of needles and is not looking forward to receiving her prolia. Patient offered encouragement. Ms. Johnny Lagos was assessed and education was provided. Ms. Haley Thomas vitals were reviewed and patient was observed for 5 minutes prior to treatment. Visit Vitals  /69 (BP 1 Location: Left arm, BP Patient Position: Sitting)   Pulse 72   Temp 98.1 °F (36.7 °C)   Resp 18   SpO2 97%   Breastfeeding? No       Lab results( CMP) were obtained on 3/17/19 and reviewed. Calcuim= 8.7    Prolia 60 mg was administered subcutaneously to the back of patient's left arm. Band aid applied to site. Care notes offered on prolia. Full understanding noted by patient. Patient politely declined observation period and was informed to go to the ER in the event of a reaction. She was noted with full understanding. Ms. Johnny Lagos tolerated well, and had no complaints. Patient armband removed and shredded. Ms. Johnny Lagos was discharged from [place] [address] in stable condition at 1520. She is to return on 19 at 1500 for her next appointment.     Marysol Mccauley  2019  3:19 PM

## 2019-04-03 ENCOUNTER — OFFICE VISIT (OUTPATIENT)
Dept: ORTHOPEDIC SURGERY | Age: 74
End: 2019-04-03

## 2019-04-03 VITALS
DIASTOLIC BLOOD PRESSURE: 77 MMHG | HEART RATE: 88 BPM | HEIGHT: 60 IN | OXYGEN SATURATION: 98 % | WEIGHT: 120.9 LBS | SYSTOLIC BLOOD PRESSURE: 151 MMHG | BODY MASS INDEX: 23.74 KG/M2 | TEMPERATURE: 97.8 F | RESPIRATION RATE: 18 BRPM

## 2019-04-03 DIAGNOSIS — M54.2 CERVICAL PAIN: Primary | ICD-10-CM

## 2019-04-03 NOTE — PATIENT INSTRUCTIONS
Neck Pain: Care Instructions  Your Care Instructions    You can have neck pain anywhere from the bottom of your head to the top of your shoulders. It can spread to the upper back or arms. Injuries, painting a ceiling, sleeping with your neck twisted, staying in one position for too long, and many other activities can cause neck pain. Most neck pain gets better with home care. Your doctor may recommend medicine to relieve pain or relax your muscles. He or she may suggest exercise and physical therapy to increase flexibility and relieve stress. You may need to wear a special (cervical) collar to support your neck for a day or two. Follow-up care is a key part of your treatment and safety. Be sure to make and go to all appointments, and call your doctor if you are having problems. It's also a good idea to know your test results and keep a list of the medicines you take. How can you care for yourself at home? · Try using a heating pad on a low or medium setting for 15 to 20 minutes every 2 or 3 hours. Try a warm shower in place of one session with the heating pad. · You can also try an ice pack for 10 to 15 minutes every 2 to 3 hours. Put a thin cloth between the ice and your skin. · Take pain medicines exactly as directed. ? If the doctor gave you a prescription medicine for pain, take it as prescribed. ? If you are not taking a prescription pain medicine, ask your doctor if you can take an over-the-counter medicine. · If your doctor recommends a cervical collar, wear it exactly as directed. When should you call for help? Call your doctor now or seek immediate medical care if:    · You have new or worsening numbness in your arms, buttocks or legs.     · You have new or worsening weakness in your arms or legs.  (This could make it hard to stand up.)     · You lose control of your bladder or bowels.    Watch closely for changes in your health, and be sure to contact your doctor if:    · Your neck pain is getting worse.     · You are not getting better after 1 week.     · You do not get better as expected. Where can you learn more? Go to http://karin-michaela.info/. Enter [id number] in the search box to learn more about \"Neck Pain: Care Instructions. \"  Current as of: September 20, 2018  Content Version: 11.9  © 3955-0837 [employer]. Care instructions adapted under license by [employer] (which disclaims liability or warranty for this information). If you have questions about a medical condition or this instruction, always ask your healthcare professional. [address] any warranty or liability for your use of this information. Cervical Spinal Fusion: Before Your Surgery  What is cervical spinal fusion? Cervical spinal fusion is surgery that joins two or more of the vertebrae in your neck. When these bones are joined together, it's called fusion. After the joints are fused, they can no longer move. During the surgery, the doctor uses bone to make a \"bridge\" between your vertebrae. This bridge is strengthened with metal plates and screws. In most cases, the doctor uses bone from another part of your body or bone that has been donated to a bone bank. But sometimes artificial bone is used. To do the surgery, the doctor makes a cut in either the front or the back of your neck. The cut is called an incision. It leaves a scar that fades with time. After surgery, you will stay in the hospital for a few days. Your neck will feel stiff or sore. You will get medicine to help with pain. Most people can go back to work after 4 to 6 weeks. But it may take a few months to get back to all of your usual activities. Follow-up care is a key part of your treatment and safety. Be sure to make and go to all appointments, and call your doctor if you are having problems.  It's also a good idea to know your test results and keep a list of the medicines you take.  What happens before surgery?   Surgery can be stressful. This information will help you understand what you can expect. And it will help you safely prepare for surgery.   Preparing for surgery    · Understand exactly what surgery is planned, along with the risks, benefits, and other options. · Tell your doctors ALL the medicines, vitamins, supplements, and herbal remedies you take. Some of these can increase the risk of bleeding or interact with anesthesia.     · If you take blood thinners, such as warfarin (Coumadin), clopidogrel (Plavix), or aspirin, be sure to talk to your doctor. He or she will tell you if you should stop taking these medicines before your surgery. Make sure that you understand exactly what your doctor wants you to do.     · Your doctor will tell you which medicines to take or stop before your surgery. You may need to stop taking certain medicines a week or more before surgery. So talk to your doctor as soon as you can.     · If you have an advance directive, let your doctor know. It may include a living will and a durable power of  for health care. Bring a copy to the hospital. If you don't have one, you may want to prepare one. It lets your doctor and loved ones know your health care wishes. Doctors advise that everyone prepare these papers before any type of surgery or procedure. What happens on the day of surgery? · Follow the instructions exactly about when to stop eating and drinking. If you don't, your surgery may be canceled. If your doctor told you to take your medicines on the day of surgery, take them with only a sip of water.     · Take a bath or shower before you come in for your surgery. Do not apply lotions, perfumes, deodorants, or nail polish.     · Do not shave the surgical site yourself.     · Take off all jewelry and piercings.  And take out contact lenses, if you wear them.    At the hospital or surgery center   · Bring a picture ID.     · The area for surgery is often marked to make sure there are no errors.     · You will be kept comfortable and safe by your anesthesia provider. You will be asleep during the surgery.     · The surgery usually takes about 1 to 1½ hours.     · When you wake up, you will be lying on your back. You will have a soft or hard collar around your neck. This will protect and support your neck. It will also keep you from turning your head.     · You may have a small plastic tube coming out of your incision. This is to drain fluids. It's usually taken out in 1 or 2 days. Going home   · Be sure you have someone to drive you home. Anesthesia and pain medicine make it unsafe for you to drive.     · You will be given more specific instructions about recovering from your surgery. They will cover things like diet, wound care, follow-up care, driving, and getting back to your normal routine. When should you call your doctor? · You have questions or concerns.     · You do not understand how to prepare for your surgery.     · You become ill before surgery (such as fever, flu, or a cold).     · You need to reschedule or have changed your mind about having the surgery. Where can you learn more? Go to http://karin-michaela.info/. Enter N691 in the search box to learn more about \"Cervical Spinal Fusion: Before Your Surgery. \"  Current as of: September 20, 2018  Content Version: 11.9  © 9519-0686 [employer]. Care instructions adapted under license by [employer] (which disclaims liability or warranty for this information). If you have questions about a medical condition or this instruction, always ask your healthcare professional. [place] [address] any warranty or liability for your use of this information.

## 2019-04-03 NOTE — PROGRESS NOTES
[address]  [address], [address]  [place], [address]  Phone: [phone]  Fax: [phone]    Palak Frank  : 1945  PCP: Cyrus Bumpers, NP    PROGRESS NOTE    HISTORY OF PRESENT ILLNESS:  Chief Complaint   Patient presents with   Martaamber Robbins is a 72 y.o.  female with history of cerivcal pain. She was last seen in the office with Dr. Abdelrahman Miranda. She was to undergo RFA with Dr. Blu Deras. She was seen at [facility] by Dr. Brad Whitney. Per this note, Has chronic cervical arthropathy with suboccipital stenosis c1/2 without instability. This is a probable cause for neck pain and and radiating posterior occipital headache. Less likely cause for vertigo, or changes in visual acuity. Patient without classic myelopathic symptoms, dexterity issues, or balance issues outside of vertigo. Intervention could be temporized with occipital nerve blocks, but true treatment would require a cervical decompression of c1/2/3 and occipital cervical fusion-  a large surgery. Patient has significant osteoporosis- about to be treated with prolia. He Recommend she remain in a hard collar. Cervical spondylosis and stenosis likely part of picture but does not explain the whole of presentation. Patient stated that the neck pain is dramatically affected her quality of life. She may very well be a candidate for surgical intervention. Today, she states her pain is about a 1/10. Its a posterior neck pain. She is here today for a hospital follow up. She is stating she would like this issue fixed. She states her pain was a 8/10 before the steroids consistently. She finished the MDP about 1 week ago. She has been wearing the foam hard collar. She is taking Aleve as needed. She is adamant she wants to speak with Dr. Brad Whitney about a surgical solution. In her mind, she is fearful for when the steroids stop working and she wants this issue surgically resolved.  She is a relatively healty woman. She had a stroke about 1.5 weeks ago. She is not really interested in the occipital blocks. Denies bladder/bowel dysfunction, saddle paresthesia, weakness, gait disturbance, or other neurological deficit. Pt at this time desires to  continue with current care/proceed with surgical consult. CMRI 3/2018  IMPRESSION:     1. No significant interval change in multilevel moderate to advanced  degenerative disc and facet joint disease, multilevel listhesis and multilevel  mild and moderate degrees of central canal stenosis as discussed. -Multilevel ventral compression of the cord. 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 2. Multilevel high-grade neural foraminal stenosis similar to prior. 3. Mild bone marrow edema in C2 lamina, nonspecific and probably reactive  changes. No visualized fracture. ASSESSMENT  72 y.o. female with cervical pain. Diagnoses and all orders for this visit:    1. Cervical pain         IMPRESSION/PLAN    1) Pt was given information on cervical care. 2) she would like to to talk with Dr. Brad Whitney about her surgical options. 3) she will continue to wear her collar. Cont Aleve as needed. 4) Ms. Ashleigh Elizondo has a reminder for a \"due or due soon\" health maintenance. I have asked that she contact her primary care provider, Cyrus Bumpers, NP, for follow-up on this health maintenance. 5) We have informed patient to notify us for immediate appointment if he has any worsening neurogical symptoms or if an emergency situation presents, then call 911  6) Pt will follow-up in next available with Dr. Brad Whitney. Risks and benefits of ongoing therapy have been reviewed with the patient.  is appropriate.        PAST MEDICAL HISTORY  Past Medical History:   Diagnosis Date    Abnormal Pap smear     Dr. Mark Rodriguez Allergic rhinitis     Arthritis     Cerebrovascular small vessel disease 3/18/2019    CT 3/17/2019    Chronic pain     Concentric left ventricular hypertrophy 3/18/2019    With left ventricular diastolic dysfunction by echocardiogram 3/18/2019    Hypercholesterolemia     Neck pain 5/17/2010    Stroke ([facility] [address] 10/02/2017    TIA- legs weak memory issues        MEDICATIONS  Current Outpatient Medications   Medication Sig Dispense Refill    dextromethorphan-guaiFENesin (CORICIDIN HBP)  mg cap Take  mg by mouth.  denosumab (PROLIA) 60 mg/mL injection 60 mg by SubCUTAneous route once.  dexamethasone (DECADRON) 4 mg tablet Take one table BID x 5 days  Indications: cervical cord compression 10 Tab 0    turmeric 400 mg cap Take  by mouth.  fluticasone (FLONASE) 50 mcg/actuation nasal spray instill 2 sprays into each nostril once daily 3 Bottle 1    atorvastatin (LIPITOR) 20 mg tablet take 1 tablet by mouth once daily 90 Tab 3    busPIRone (BUSPAR) 7.5 mg tablet take 1 tablet by mouth twice a day 60 Tab 1    montelukast (SINGULAIR) 10 mg tablet take 1 tablet by mouth once daily 90 Tab 2    omeprazole (PRILOSEC) 20 mg capsule Take 20 mg by mouth daily.  aspirin 81 mg chewable tablet Take 1 Tab by mouth daily. 30 Tab 3    acetaminophen (TYLENOL EXTRA STRENGTH) 500 mg tablet Take 650 mg by mouth every six (6) hours as needed for Pain. Pt states they take medication QID.          ALLERGIES  Allergies   Allergen Reactions    Celebrex [Celecoxib] Other (comments)     Tiredness        SOCIAL HISTORY    Social History     Socioeconomic History    Marital status:      Spouse name: Not on file    Number of children: Not on file    Years of education: Not on file    Highest education level: Not on file   Occupational History    Not on file   Social Needs    Financial resource strain: Not on file    Food insecurity:     Worry: Not on file     Inability: Not on file    Transportation needs:     Medical: Not on file     Non-medical: Not on file   Tobacco Use    Smoking status: Never Smoker  Smokeless tobacco: Never Used   Substance and Sexual Activity    Alcohol use: No    Drug use: No    Sexual activity: Never   Lifestyle    Physical activity:     Days per week: Not on file     Minutes per session: Not on file    Stress: Not on file   Relationships    Social connections:     Talks on phone: Not on file     Gets together: Not on file     Attends Scientologist service: Not on file     Active member of club or organization: Not on file     Attends meetings of clubs or organizations: Not on file     Relationship status: Not on file    Intimate partner violence:     Fear of current or ex partner: Not on file     Emotionally abused: Not on file     Physically abused: Not on file     Forced sexual activity: Not on file   Other Topics Concern    Not on file   Social History Narrative    Not on file       SUBJECTIVE      Pain Scale: 1/10    Pain Assessment  4/3/2019   Location of Pain Neck   Location Modifiers -   Severity of Pain 1   Quality of Pain Aching   Duration of Pain -   Frequency of Pain Intermittent   Aggravating Factors -   Limiting Behavior -   Relieving Factors (No Data)   Relieving Factors Comment steroids   Result of Injury -       Accompanied by spouse. REVIEW OF SYSTEMS  ROS    Constitutional: Negative for fever, chills, or weight change. Respiratory: Negative for cough or shortness of breath. Cardiovascular: Negative for chest pain or palpitations. Gastrointestinal: Negative for acid reflux, change in bowel habits, or constipation. Genitourinary: Negative for incontinence, dysuria and flank pain. Musculoskeletal: Positive for cervical pain. Skin: Negative for rash. Neurological: Negative for headaches, dizziness, or numbness. Endo/Heme/Allergies: Negative . Psychiatric/Behavioral: Negative.        PHYSICAL EXAMINATION  Visit Vitals  /77 (BP 1 Location: Left arm, BP Patient Position: Sitting)   Pulse 88   Temp 97.8 °F (36.6 °C) (Oral)   Resp 18   Ht 5' (1.524 m) Wt 120 lb 14.4 oz (54.8 kg)   SpO2 98%   BMI 23.61 kg/m²       Constitutional: Well developed,  well nourished,  awake, alert, and in no acute distress. Neurological:  Sensation to light touch is intact. Psychiatric: Affect and mood are appropriate. Integumentary: No rashes or abrasions noted on exposed areas,  warm, dry and intact. Cardiovascular/Peripheral Vascular:  No peripheral edema is noted. Lymphatic:  No evidence of lymphedema. No cervical lymphadenopathy. SPINE/MUSCULOSKELETAL EXAM    Cervical spine:  Neck is midline. Normal muscle tone. No focal atrophy is noted. Shoulder ROM intact. Tenderness to palpation to posterior cervical spine. Negative Spurling's sign. Negative Tinel's sign. Negative Garza's sign. MOTOR    Biceps  Triceps Deltoids Wrist Ext Wrist Flex Hand Intrin   Right +4/5 +4/5 +4/5 +4/5 +4/5 +4/5   Left +4/5 +4/5 +4/5 +4/5 +4/5 +4/5       normal gait and station    Ambulation without assistive device. full weight bearing, non-antalgic gait.     Adonis Molina NP

## 2019-04-04 ENCOUNTER — OFFICE VISIT (OUTPATIENT)
Dept: FAMILY MEDICINE CLINIC | Facility: CLINIC | Age: 74
End: 2019-04-04

## 2019-04-04 VITALS
HEART RATE: 78 BPM | BODY MASS INDEX: 21.99 KG/M2 | SYSTOLIC BLOOD PRESSURE: 118 MMHG | RESPIRATION RATE: 16 BRPM | OXYGEN SATURATION: 98 % | HEIGHT: 60 IN | WEIGHT: 112 LBS | DIASTOLIC BLOOD PRESSURE: 64 MMHG | TEMPERATURE: 97.6 F

## 2019-04-04 DIAGNOSIS — M54.2 CERVICAL PAIN (NECK): Primary | ICD-10-CM

## 2019-04-04 DIAGNOSIS — G95.20 SPINAL CORD COMPRESSION (HCC): ICD-10-CM

## 2019-04-04 NOTE — PROGRESS NOTES
Chief Complaint   Patient presents with    Transitions Of Care     cervical neck compression         Is someone accompanying this pt? no    Is the patient using any DME equipment during OV? no    Depression Screening:  3 most recent [facility] Screens 2/26/2019 1/16/2019 12/18/2018 12/6/2018 10/4/2018 10/1/2018 9/11/2018   PHQ Not Done Patient Decline - - - - - Medical Reason (indicate in comments)   Little interest or pleasure in doing things - Not at all Not at all Not at all Not at all Not at all -   Feeling down, depressed, irritable, or hopeless - Not at all Not at all Not at all Not at all Not at all -   Total Score PHQ 2 - 0 0 0 0 0 -       Learning Assessment:  Learning Assessment 2/26/2019 1/25/2018 11/28/2017   PRIMARY LEARNER Patient Patient Patient   HIGHEST LEVEL OF EDUCATION - PRIMARY LEARNER  - [address] [place] [address] CAREGIVER - No No   PRIMARY LANGUAGE ENGLISH ENGLISH ENGLISH   LEARNER PREFERENCE PRIMARY DEMONSTRATION READING DEMONSTRATION   ANSWERED BY Patient patient patient   RELATIONSHIP SELF SELF SELF       Abuse Screening:  Abuse Screening Questionnaire 2/9/2018 1/25/2018 11/28/2017   Do you ever feel afraid of your partner? N N N   Are you in a relationship with someone who physically or mentally threatens you? N N N   Is it safe for you to go home? Marylen Bath       Fall Risk  Fall Risk Assessment, last 12 mths 3/21/2019 2/26/2019 2/15/2019 1/16/2019 12/18/2018 12/6/2018 10/1/2018   Able to walk? Yes Yes Yes Yes Yes Yes Yes   Fall in past 12 months? No No No No No No No         Health Maintenance reviewed and discussed per provider. Pt is due for   Health Maintenance Due   Topic    Pneumococcal 65+ years (1 of 2 - PCV13)    Please order/place referral if appropriate. Pt currently taking Antiplatelet therapy? no      Coordination of Care:  1. Have you been to the ER, urgent care clinic since your last visit? Hospitalized since your last visit? yes    2. Have you seen or consulted any other health care providers outside of the [address] [place] [address] since your last visit? Include any pap smears or colon screening. yes    Please see Red banners under Allergies, Med rec, Immunizations to remove outside inquires. All correct information has been verified with patient and added to chart.

## 2019-04-04 NOTE — PROGRESS NOTES
Andrés Moura is a 72 y.o. female presenting today for Transitions Of Care (cervical neck compression)  . HPI:  Andrés Moura presents to the office today for cervical spondylosis and vertigo follow-up care. Patient was admitted to [facility] on 3/17/2019 and discharged on 3/20/2019 and contacted by Ernie Sharp on 3/21/2019. Patient was admitted to [facility] for reported cervical neck pain, vertigo and fatigue. MRI was done in noted to have multilevel ventral compression of the cord. 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 Patient was given pain steroids and was discharged to follow-up with the surgeon as well as neurologist.  Patient presents today with a cervical collar. She denies any pain. She notes that the Decadron has really helped with the cervical neck pain. She notes that she has upcoming appointments next week with both the surgeon and the neurologist.  She is negative for any chest pain, palpitation or dyspnea. Review of Systems   Respiratory: Negative for cough. Cardiovascular: Negative for chest pain and palpitations. Neurological: Negative for dizziness, speech change, weakness and headaches. Allergies   Allergen Reactions    Celebrex [Celecoxib] Other (comments)     Tiredness        Current Outpatient Medications   Medication Sig Dispense Refill    denosumab (PROLIA) 60 mg/mL injection 60 mg by SubCUTAneous route once.  dexamethasone (DECADRON) 4 mg tablet Take one table BID x 5 days  Indications: cervical cord compression 10 Tab 0    turmeric 400 mg cap Take  by mouth.       fluticasone (FLONASE) 50 mcg/actuation nasal spray instill 2 sprays into each nostril once daily 3 Bottle 1    atorvastatin (LIPITOR) 20 mg tablet take 1 tablet by mouth once daily 90 Tab 3    busPIRone (BUSPAR) 7.5 mg tablet take 1 tablet by mouth twice a day 60 Tab 1    montelukast (SINGULAIR) 10 mg tablet take 1 tablet by mouth once daily 90 Tab 2    acetaminophen (TYLENOL EXTRA STRENGTH) 500 mg tablet Take 650 mg by mouth every six (6) hours as needed for Pain. Pt states they take medication QID.  omeprazole (PRILOSEC) 20 mg capsule Take 20 mg by mouth daily.  aspirin 81 mg chewable tablet Take 1 Tab by mouth daily. 30 Tab 3    dextromethorphan-guaiFENesin (CORICIDIN HBP)  mg cap Take  mg by mouth.          Past Medical History:   Diagnosis Date    Abnormal Pap smear     Dr. Rao Lassiter Allergic rhinitis     Arthritis     Cerebrovascular small vessel disease 3/18/2019    CT 3/17/2019    Chronic pain     Concentric left ventricular hypertrophy 3/18/2019    With left ventricular diastolic dysfunction by echocardiogram 3/18/2019    Hypercholesterolemia     Neck pain 5/17/2010    Stroke ([facility] [address] 10/02/2017    TIA- legs weak memory issues       Past Surgical History:   Procedure Laterality Date    COLONOSCOPY N/A 6/4/2018    COLONOSCOPY / polypectomy performed by Liliana Hester MD at [facility] ENDOSCOPY    HX GYN      Total Hyst    HX LAP CHOLECYSTECTOMY      HX OTHER SURGICAL  2017    Throat widened       Social History     Socioeconomic History    Marital status:      Spouse name: Not on file    Number of children: Not on file    Years of education: Not on file    Highest education level: Not on file   Occupational History    Not on file   Social Needs    Financial resource strain: Not on file    Food insecurity:     Worry: Not on file     Inability: Not on file    Transportation needs:     Medical: Not on file     Non-medical: Not on file   Tobacco Use    Smoking status: Never Smoker    Smokeless tobacco: Never Used   Substance and Sexual Activity    Alcohol use: No    Drug use: No    Sexual activity: Never   Lifestyle    Physical activity:     Days per week: Not on file     Minutes per session: Not on file    Stress: Not on file   Relationships    Social connections: Talks on phone: Not on file     Gets together: Not on file     Attends Yarsanism service: Not on file     Active member of club or organization: Not on file     Attends meetings of clubs or organizations: Not on file     Relationship status: Not on file    Intimate partner violence:     Fear of current or ex partner: Not on file     Emotionally abused: Not on file     Physically abused: Not on file     Forced sexual activity: Not on file   Other Topics Concern    Not on file   Social History Narrative    Not on file       Patient does not have an advanced directive on file    Vitals:    04/04/19 1301   BP: 118/64   Pulse: 78   Resp: 16   Temp: 97.6 °F (36.4 °C)   TempSrc: Tympanic   SpO2: 98%   Weight: 112 lb (50.8 kg)   Height: 5' (1.524 m)   PainSc:   0 - No pain       Physical Exam   Constitutional: She is oriented to person, place, and time. Cardiovascular: Normal rate, regular rhythm and normal heart sounds. Pulmonary/Chest: Effort normal and breath sounds normal.   Musculoskeletal: Normal range of motion. Neurological: She is alert and oriented to person, place, and time. No cranial nerve deficit. Gait normal. Coordination normal.   Nursing note and vitals reviewed. Hospital Outpatient Visit on 03/27/2019   Component Date Value Ref Range Status    Magnesium 03/27/2019 2.3  1.6 - 2.6 mg/dL Final    Phosphorus 03/27/2019 3.9  2.5 - 4.9 MG/DL Final   Admission on 03/17/2019, Discharged on 03/20/2019   Component Date Value Ref Range Status    WBC 03/17/2019 9.3  4.6 - 13.2 K/uL Final    RBC 03/17/2019 4.20  4. 20 - 5.30 M/uL Final    HGB 03/17/2019 13.1  12.0 - 16.0 g/dL Final    HCT 03/17/2019 39.8  35.0 - 45.0 % Final    MCV 03/17/2019 94.8  74.0 - 97.0 FL Final    MCH 03/17/2019 31.2  24.0 - 34.0 PG Final    MCHC 03/17/2019 32.9  31.0 - 37.0 g/dL Final    RDW 03/17/2019 12.2  11.6 - 14.5 % Final    PLATELET 34/61/5095 220  135 - 420 K/uL Final    MPV 03/17/2019 9.2  9.2 - 11.8 FL Final  NEUTROPHILS 03/17/2019 61  40 - 73 % Final    LYMPHOCYTES 03/17/2019 26  21 - 52 % Final    MONOCYTES 03/17/2019 12* 3 - 10 % Final    EOSINOPHILS 03/17/2019 1  0 - 5 % Final    BASOPHILS 03/17/2019 0  0 - 2 % Final    ABS. NEUTROPHILS 03/17/2019 5.6  1.8 - 8.0 K/UL Final    ABS. LYMPHOCYTES 03/17/2019 2.4  0.9 - 3.6 K/UL Final    ABS. MONOCYTES 03/17/2019 1.1  0.05 - 1.2 K/UL Final    ABS. EOSINOPHILS 03/17/2019 0.1  0.0 - 0.4 K/UL Final    ABS. BASOPHILS 03/17/2019 0.0  0.0 - 0.1 K/UL Final    DF 03/17/2019 AUTOMATED    Final    Sodium 03/17/2019 141  136 - 145 mmol/L Final    Potassium 03/17/2019 3.6  3.5 - 5.5 mmol/L Final    Chloride 03/17/2019 108  100 - 108 mmol/L Final    CO2 03/17/2019 27  21 - 32 mmol/L Final    Anion gap 03/17/2019 6  3.0 - 18 mmol/L Final    Glucose 03/17/2019 87  74 - 99 mg/dL Final    BUN 03/17/2019 18  7.0 - 18 MG/DL Final    Creatinine 03/17/2019 0.77  0.6 - 1.3 MG/DL Final    BUN/Creatinine ratio 03/17/2019 23* 12 - 20   Final    GFR est AA 03/17/2019 >60  >60 ml/min/1.73m2 Final    GFR est non-AA 03/17/2019 >60  >60 ml/min/1.73m2 Final    Comment: (NOTE)  Estimated GFR is calculated using the Modification of Diet in Renal   Disease (MDRD) Study equation, reported for both  Americans   (GFRAA) and non- Americans (GFRNA), and normalized to 1.73m2   body surface area. The physician must decide which value applies to   the patient. The MDRD study equation should only be used in   individuals age 25 or older. It has not been validated for the   following: pregnant women, patients with serious comorbid conditions,   or on certain medications, or persons with extremes of body size,   muscle mass, or nutritional status.  Calcium 03/17/2019 8.7  8.5 - 10.1 MG/DL Final    Bilirubin, total 03/17/2019 0.5  0.2 - 1.0 MG/DL Final    ALT (SGPT) 03/17/2019 31  13 - 56 U/L Final    AST (SGOT) 03/17/2019 25  15 - 37 U/L Final    Alk.  phosphatase 03/17/2019 72  45 - 117 U/L Final    Protein, total 03/17/2019 7.3  6.4 - 8.2 g/dL Final    Albumin 03/17/2019 4.2  3.4 - 5.0 g/dL Final    Globulin 03/17/2019 3.1  2.0 - 4.0 g/dL Final    A-G Ratio 03/17/2019 1.4  0.8 - 1.7   Final    Ventricular Rate 03/17/2019 69  BPM Final    Atrial Rate 03/17/2019 69  BPM Final    P-R Interval 03/17/2019 148  ms Final    QRS Duration 03/17/2019 76  ms Final    Q-T Interval 03/17/2019 434  ms Final    QTC Calculation (Bezet) 03/17/2019 465  ms Final    Calculated P Axis 03/17/2019 76  degrees Final    Calculated R Axis 03/17/2019 55  degrees Final    Calculated T Axis 03/17/2019 70  degrees Final    Diagnosis 03/17/2019    Final                    Value:Normal sinus rhythm  Possible Left atrial enlargement  Borderline ECG  When compared with ECG of 14-MAR-2019 12:49,  No significant change was found  Confirmed by Gary Sutton MD, Андрей Knowles ([id number]) on 3/18/2019 8:32:37 AM      NT pro-BNP 03/17/2019 213  0 - 900 PG/ML Final    Comment:           For patients with dyspnea, NT proBNP is highly sensitive for detecting acute congestive heart failure. Also, an NT proBNP <300 pg/mL effectively rules out acute congestive heart failure, with 99% negative predictive value. Our reference ranges are for acute dyspnea. Age              Range (pg/ml)                          0-49                0-450        50-75               0-900        >75                 0-1800       For ambulatory office patients, the ranges below apply: For patients with dyspnea, NT proBNP is highly sensitive for detecting acute congestive heart failure. Also, an NT proBNP <300 pg/mL effectively rules out acute congestive heart failure, with 99% negative predictive value. Our reference ranges are for acute dyspnea.       CK 03/17/2019 107  26 - 192 U/L Final    CK - MB 03/17/2019 1.4  <3.6 ng/ml Final    CK-MB Index 03/17/2019 1.3  0.0 - 4.0 % Final    Troponin-I, QT 03/17/2019 <0.02  0.0 - 0.045 NG/ML Final    Comment: The presence of detectable troponin above the reference range indicates myocardial injury which may be due to ischemia, myocarditis, trauma, etc.  Clinical correlation is necessary to establish the significance of this finding. Sequential testing is recommended to determine if the typical rise and fall of cTnI is demonstrated. Note:  Cardiac troponin I has a relatively long half life and may be present well after the CK MB has returned to baseline. The reference range is based on the 99th percentile of the referent population.       LVIDd 03/18/2019 3.26* 3.9 - 5.3 cm Final    LVPWd 03/18/2019 1.10* 0.6 - 0.9 cm Final    LVIDs 03/18/2019 2.29  cm Final    IVSd 03/18/2019 1.07* 0.6 - 0.9 cm Final    LVOT d 03/18/2019 1.93  cm Final    LVOT Peak Velocity 03/18/2019 86.37  cm/s Final    LVOT Peak Gradient 03/18/2019 3.0  mmHg Final    LVOT VTI 03/18/2019 19.73  cm Final    MV A Zi 03/18/2019 99.72  cm/s Final    MV E Zi 03/18/2019 83.87  cm/s Final    MV E/A 03/18/2019 0.84   Final    Inferior Vena Cava Diameter Sniffi* 03/18/2019 1.50  cm Final    LA Vol 4C 03/18/2019 33.03  22 - 52 mL Final    LA Vol 2C 03/18/2019 38.63  22 - 52 mL Final    LA Area 4C 03/18/2019 14.0  cm2 Final    LV Mass AL 03/18/2019 116.9  67 - 162 g Final    LV Mass AL Index 03/18/2019 77.3  43 - 95 g/m2 Final    Mitral Valve E Wave Deceleration T* 03/18/2019 179.3  ms Final    Triscuspid Valve Regurgitation Pea* 03/18/2019 31.9  mmHg Final    TR Max Velocity 03/18/2019 282.29  cm/s Final    PASP 03/18/2019 35.0  mmHg Final    LA Vol Index 03/18/2019 25.53  16 - 28 ml/m2 Final    LA Vol Index 03/18/2019 21.83  16 - 28 ml/m2 Final    LA Volume 03/18/2019 41.01  22 - 52 mL Final    Ao Root D 03/18/2019 2.97  cm Final    LA Vol Index 03/18/2019 27.11  16 - 28 ml/m2 Final    Hemoglobin A1c 03/18/2019 5.8* 4.2 - 5.6 % Final    Comment: (NOTE)  HbA1C Interpretive Ranges  <5.7 Normal  5.7 - 6.4         Consider Prediabetes  >6.5              Consider Diabetes      Est. average glucose 03/18/2019 120  mg/dL Final    Comment: (NOTE)  The eAG should be interpreted with patient characteristics in mind   since ethnicity, interindividual differences, red cell lifespan,   variation in rates of glycation, etc. may affect the validity of the   calculation.  CK 03/18/2019 112  26 - 192 U/L Final    CK - MB 03/18/2019 1.4  <3.6 ng/ml Final    CK-MB Index 03/18/2019 1.3  0.0 - 4.0 % Final    Troponin-I, QT 03/18/2019 <0.02  0.0 - 0.045 NG/ML Final    Comment: The presence of detectable troponin above the reference range indicates myocardial injury which may be due to ischemia, myocarditis, trauma, etc.  Clinical correlation is necessary to establish the significance of this finding. Sequential testing is recommended to determine if the typical rise and fall of cTnI is demonstrated. Note:  Cardiac troponin I has a relatively long half life and may be present well after the CK MB has returned to baseline. The reference range is based on the 99th percentile of the referent population.  LIPID PROFILE 03/18/2019        Final    Cholesterol, total 03/18/2019 134  <200 MG/DL Final    Triglyceride 03/18/2019 65  <150 MG/DL Final    Comment: The drugs N-acetylcysteine (NAC) and  Metamiszole have been found to cause falsely  low results in this chemical assay. Please  be sure to submit blood samples obtained  BEFORE administration of either of these  drugs to assure correct results.       HDL Cholesterol 03/18/2019 73* 40 - 60 MG/DL Final    LDL, calculated 03/18/2019 48  0 - 100 MG/DL Final    VLDL, calculated 03/18/2019 13  MG/DL Final    CHOL/HDL Ratio 03/18/2019 1.8  0 - 5.0   Final    Glucose (POC) 03/18/2019 88  70 - 110 mg/dL Final    Comment: (NOTE)  The FDA has indicated that no capillary point of care blood glucose   monitoring systems are approved for use in \"critically ill\" patients,   however they have not defined this population. The College of   American Pathologists has recommended that these devices should not   be used in cases such as severe hypotension, dehydration, shock, and   hyperglycemic-hyperosmolar state, amongst others. Venous or arterial   collection is the recommended specimen for testing these patients.  Glucose (POC) 03/18/2019 111* 70 - 110 mg/dL Final    Comment: (NOTE)  The FDA has indicated that no capillary point of care blood glucose   monitoring systems are approved for use in \"critically ill\" patients,   however they have not defined this population. The College of   American Pathologists has recommended that these devices should not   be used in cases such as severe hypotension, dehydration, shock, and   hyperglycemic-hyperosmolar state, amongst others. Venous or arterial   collection is the recommended specimen for testing these patients.  Glucose (POC) 03/19/2019 95  70 - 110 mg/dL Final    Comment: (NOTE)  The FDA has indicated that no capillary point of care blood glucose   monitoring systems are approved for use in \"critically ill\" patients,   however they have not defined this population. The College of   American Pathologists has recommended that these devices should not   be used in cases such as severe hypotension, dehydration, shock, and   hyperglycemic-hyperosmolar state, amongst others. Venous or arterial   collection is the recommended specimen for testing these patients.  Glucose (POC) 03/19/2019 106  70 - 110 mg/dL Final    Comment: (NOTE)  The FDA has indicated that no capillary point of care blood glucose   monitoring systems are approved for use in \"critically ill\" patients,   however they have not defined this population.  The College of   American Pathologists has recommended that these devices should not   be used in cases such as severe hypotension, dehydration, shock, and   hyperglycemic-hyperosmolar state, amongst others. Venous or arterial   collection is the recommended specimen for testing these patients.  Glucose (POC) 03/19/2019 149* 70 - 110 mg/dL Final    Comment: (NOTE)  The FDA has indicated that no capillary point of care blood glucose   monitoring systems are approved for use in \"critically ill\" patients,   however they have not defined this population. The College of   American Pathologists has recommended that these devices should not   be used in cases such as severe hypotension, dehydration, shock, and   hyperglycemic-hyperosmolar state, amongst others. Venous or arterial   collection is the recommended specimen for testing these patients.  Glucose (POC) 03/20/2019 132* 70 - 110 mg/dL Final    Comment: (NOTE)  The FDA has indicated that no capillary point of care blood glucose   monitoring systems are approved for use in \"critically ill\" patients,   however they have not defined this population. The College of   American Pathologists has recommended that these devices should not   be used in cases such as severe hypotension, dehydration, shock, and   hyperglycemic-hyperosmolar state, amongst others. Venous or arterial   collection is the recommended specimen for testing these patients.  Glucose (POC) 03/20/2019 201* 70 - 110 mg/dL Final    Comment: (NOTE)  The FDA has indicated that no capillary point of care blood glucose   monitoring systems are approved for use in \"critically ill\" patients,   however they have not defined this population. The College of   American Pathologists has recommended that these devices should not   be used in cases such as severe hypotension, dehydration, shock, and   hyperglycemic-hyperosmolar state, amongst others. Venous or arterial   collection is the recommended specimen for testing these patients.       Glucose (POC) 03/20/2019 152* 70 - 110 mg/dL Final    Comment: (NOTE)  The FDA has indicated that no capillary point of care blood glucose   monitoring systems are approved for use in \"critically ill\" patients,   however they have not defined this population. The College of   American Pathologists has recommended that these devices should not   be used in cases such as severe hypotension, dehydration, shock, and   hyperglycemic-hyperosmolar state, amongst others. Venous or arterial   collection is the recommended specimen for testing these patients. Admission on 03/14/2019, Discharged on 03/14/2019   Component Date Value Ref Range Status    CK 03/14/2019 72  26 - 192 U/L Final    WBC 03/14/2019 8.8  4.6 - 13.2 K/uL Final    RBC 03/14/2019 4.45  4.20 - 5.30 M/uL Final    HGB 03/14/2019 14.5  12.0 - 16.0 g/dL Final    HCT 03/14/2019 42.3  35.0 - 45.0 % Final    MCV 03/14/2019 95.1  74.0 - 97.0 FL Final    MCH 03/14/2019 32.6  24.0 - 34.0 PG Final    MCHC 03/14/2019 34.3  31.0 - 37.0 g/dL Final    RDW 03/14/2019 12.3  11.6 - 14.5 % Final    PLATELET 14/65/5802 463  135 - 420 K/uL Final    MPV 03/14/2019 9.3  9.2 - 11.8 FL Final    NEUTROPHILS 03/14/2019 61  40 - 73 % Final    LYMPHOCYTES 03/14/2019 25  21 - 52 % Final    MONOCYTES 03/14/2019 13* 3 - 10 % Final    EOSINOPHILS 03/14/2019 1  0 - 5 % Final    BASOPHILS 03/14/2019 0  0 - 2 % Final    ABS. NEUTROPHILS 03/14/2019 5.3  1.8 - 8.0 K/UL Final    ABS. LYMPHOCYTES 03/14/2019 2.2  0.9 - 3.6 K/UL Final    ABS. MONOCYTES 03/14/2019 1.1  0.05 - 1.2 K/UL Final    ABS. EOSINOPHILS 03/14/2019 0.1  0.0 - 0.4 K/UL Final    ABS.  BASOPHILS 03/14/2019 0.0  0.0 - 0.1 K/UL Final    DF 03/14/2019 AUTOMATED    Final    Sodium 03/14/2019 139  136 - 145 mmol/L Final    Potassium 03/14/2019 4.0  3.5 - 5.5 mmol/L Final    Chloride 03/14/2019 105  100 - 108 mmol/L Final    CO2 03/14/2019 28  21 - 32 mmol/L Final    Anion gap 03/14/2019 6  3.0 - 18 mmol/L Final    Glucose 03/14/2019 112* 74 - 99 mg/dL Final    BUN 03/14/2019 18  7.0 - 18 MG/DL Final    Creatinine 03/14/2019 0.71  0.6 - 1.3 MG/DL Final  BUN/Creatinine ratio 03/14/2019 25* 12 - 20   Final    GFR est AA 03/14/2019 >60  >60 ml/min/1.73m2 Final    GFR est non-AA 03/14/2019 >60  >60 ml/min/1.73m2 Final    Comment: (NOTE)  Estimated GFR is calculated using the Modification of Diet in Renal   Disease (MDRD) Study equation, reported for both  Americans   (GFRAA) and non- Americans (GFRNA), and normalized to 1.73m2   body surface area. The physician must decide which value applies to   the patient. The MDRD study equation should only be used in   individuals age 25 or older. It has not been validated for the   following: pregnant women, patients with serious comorbid conditions,   or on certain medications, or persons with extremes of body size,   muscle mass, or nutritional status.  Calcium 03/14/2019 9.8  8.5 - 10.1 MG/DL Final    Ventricular Rate 03/14/2019 62  BPM Final    Atrial Rate 03/14/2019 62  BPM Final    P-R Interval 03/14/2019 146  ms Final    QRS Duration 03/14/2019 74  ms Final    Q-T Interval 03/14/2019 406  ms Final    QTC Calculation (Bezet) 03/14/2019 412  ms Final    Calculated P Axis 03/14/2019 79  degrees Final    Calculated R Axis 03/14/2019 48  degrees Final    Calculated T Axis 03/14/2019 65  degrees Final    Diagnosis 03/14/2019    Final                    Value:Normal sinus rhythm  Normal ECG  When compared with ECG of 22-SEP-2018 13:04,  QT has shortened  Confirmed by Sharifa Howell MD, ----- ([id number]) on 3/14/2019 2:04:55 PM      Influenza A Antigen 03/14/2019 NEGATIVE   NEG   Final    A negative result does not exclude influenza virus infection, seasonal or H1N1 due to suboptimal sensitivity. If influenza is circulating in your community, a diagnosis of influenza should be considered based on a patients clinical presentation and empiric antiviral treatment should be considered, if indicated.     Influenza B Antigen 03/14/2019 NEGATIVE   NEG   Final       .No results found for any visits on 04/04/19. Assessment / Plan:      ICD-10-CM ICD-9-CM    1. Cervical pain (neck) M54.2 723.1    2. Spinal cord compression (HCC) G95.20 336.9      Keep cervical collar on until seen by orthopedic specialist  Pain controlled status post Decadron injection  Blood pressure controlled at 118/84  Follow-up in 1 month    Follow-up and Dispositions    · Return in about 1 month (around 5/4/2019), or if symptoms worsen or fail to improve. I asked the patient if she  had any questions and answered her  questions.   The patient stated that she understands the treatment plan and agrees with the treatment plan

## 2019-04-08 ENCOUNTER — PATIENT OUTREACH (OUTPATIENT)
Dept: FAMILY MEDICINE CLINIC | Facility: CLINIC | Age: 74
End: 2019-04-08

## 2019-04-08 PROBLEM — G95.20 SPINAL CORD COMPRESSION (HCC): Status: RESOLVED | Noted: 2019-03-19 | Resolved: 2019-04-08

## 2019-04-08 NOTE — PROGRESS NOTES
Hospital Discharge Follow-Up        Patient was [facility] and discharged on 3/20/19 for cervical spondylosis, vertigo. The patient attended an appointment with TERESITA Howell on 4/3/19. No medication changes noted. The patient attended an appointment with TERESITA Land on 4/4/19. Per visit note patient reported relief from cervical pain with Decadron. No new medications noted. Spoke with patient who stated \"I'm getting along fine. The pain is much better. I'm not taking any prescription pain medications. \"  Patient stated she is looking forward to upcoming appointments this week with Dr. Paola Bragg, neurology, and Dr. Miguel Vaca, ortho because she would like to find a permanent solution to the neck pain. Endorsed taking medications as directed and using Aleve once daily for pain. Goals Addressed                 This Visit's Progress     Attends follow-up appointments as directed. On track     Plan:  Monitor for attendance at appointments and assist as needed to schedule      4/3/19-attended apt with peter Perez  4/4/19-attended apt with PCP         Knowledge and adherence of prescribed medication (ie. action, side effects, missed dose, etc.). On track     Plan: Reconcile medications and assess patient understanding of purpose, how/when to take using the teach back technique      3/21/19-done with patient who can name purpose, when to take    4/8/19-taking medications as directed              .

## 2019-04-10 ENCOUNTER — OFFICE VISIT (OUTPATIENT)
Dept: NEUROLOGY | Age: 74
End: 2019-04-10

## 2019-04-10 VITALS
HEIGHT: 60 IN | WEIGHT: 118 LBS | TEMPERATURE: 98.7 F | SYSTOLIC BLOOD PRESSURE: 110 MMHG | RESPIRATION RATE: 16 BRPM | OXYGEN SATURATION: 97 % | DIASTOLIC BLOOD PRESSURE: 60 MMHG | BODY MASS INDEX: 23.16 KG/M2 | HEART RATE: 87 BPM

## 2019-04-10 DIAGNOSIS — G47.10 HYPERSOMNIA: ICD-10-CM

## 2019-04-10 DIAGNOSIS — G44.89 OTHER HEADACHE SYNDROME: ICD-10-CM

## 2019-04-10 DIAGNOSIS — M48.02 CERVICAL STENOSIS OF SPINAL CANAL: Primary | ICD-10-CM

## 2019-04-10 DIAGNOSIS — G47.8 UPPER AIRWAY RESISTANCE SYNDROME: ICD-10-CM

## 2019-04-10 NOTE — PROGRESS NOTES
4/10/2019 3:04 PM    SSN: [id number]    Subjective:   82-year-old female who I recently saw in the hospital for evaluation of vertigo related to chronic advanced structural spine disease. My thoughts were that the decrease ability to move her neck secondary to her cervical stenosis was an indirect cause of vertigo by reducing feedback from cervico-vestibular afferents and was creating an obstacle for treatment. She saw Dr. Duarte Marquis in the hospital as well. There is ongoing discussion regarding the possibility of a large cervical decompression with an occipital cervical fusion, which is a very intensive surgery, being contemplated because of the severe problems with quality of life she has had as a result of her cervical stenosis. She comes with her significant other. She reports she has felt a lot better since I saw her in the hospital.  She does get a little bit dizzy when she gets up in the morning or when she bends down. Her neck pain is also significantly improved by using a soft cervical collar. She also brings up the fact that she snores very heavily. Her significant other reports that he had to move to different room because of the heavy snoring. He also reports that she does stop breathing in her sleep. She falls asleep anywhere, anytime. She does have sleep fragmentation. She has never had a sleep study done. She does have sinusitis and she takes fluticasone for it with relief. She has history of asthma.     Social History     Socioeconomic History    Marital status:      Spouse name: Not on file    Number of children: Not on file    Years of education: Not on file    Highest education level: Not on file   Occupational History    Not on file   Social Needs    Financial resource strain: Not on file    Food insecurity:     Worry: Not on file     Inability: Not on file    Transportation needs:     Medical: Not on file     Non-medical: Not on file   Tobacco Use    Smoking status: Never Smoker    Smokeless tobacco: Never Used   Substance and Sexual Activity    Alcohol use: No    Drug use: No    Sexual activity: Never   Lifestyle    Physical activity:     Days per week: Not on file     Minutes per session: Not on file    Stress: Not on file   Relationships    Social connections:     Talks on phone: Not on file     Gets together: Not on file     Attends Druze service: Not on file     Active member of club or organization: Not on file     Attends meetings of clubs or organizations: Not on file     Relationship status: Not on file    Intimate partner violence:     Fear of current or ex partner: Not on file     Emotionally abused: Not on file     Physically abused: Not on file     Forced sexual activity: Not on file   Other Topics Concern    Not on file   Social History Narrative    Not on file       Family History   Problem Relation Age of Onset    Cancer Mother         breast    Heart Disease Father        Current Outpatient Medications   Medication Sig Dispense Refill    turmeric 400 mg cap Take  by mouth.  fluticasone (FLONASE) 50 mcg/actuation nasal spray instill 2 sprays into each nostril once daily 3 Bottle 1    atorvastatin (LIPITOR) 20 mg tablet take 1 tablet by mouth once daily 90 Tab 3    busPIRone (BUSPAR) 7.5 mg tablet take 1 tablet by mouth twice a day 60 Tab 1    montelukast (SINGULAIR) 10 mg tablet take 1 tablet by mouth once daily 90 Tab 2    acetaminophen (TYLENOL EXTRA STRENGTH) 500 mg tablet Take 650 mg by mouth every six (6) hours as needed for Pain. Pt states they take medication QID.  omeprazole (PRILOSEC) 20 mg capsule Take 20 mg by mouth daily.  aspirin 81 mg chewable tablet Take 1 Tab by mouth daily. 30 Tab 3    dextromethorphan-guaiFENesin (CORICIDIN HBP)  mg cap Take  mg by mouth.  denosumab (PROLIA) 60 mg/mL injection 60 mg by SubCUTAneous route once.       dexamethasone (DECADRON) 4 mg tablet Take one table BID x 5 days  Indications: cervical cord compression 10 Tab 0       Past Medical History:   Diagnosis Date    Abnormal Pap smear     Dr. Loretta Mark Allergic rhinitis     Arthritis     Cerebrovascular small vessel disease 3/18/2019    CT 3/17/2019    Chronic pain     Concentric left ventricular hypertrophy 3/18/2019    With left ventricular diastolic dysfunction by echocardiogram 3/18/2019    Hypercholesterolemia     Neck pain 5/17/2010    Stroke ([address] 10/02/2017    TIA- legs weak memory issues       Past Surgical History:   Procedure Laterality Date    COLONOSCOPY N/A 6/4/2018    COLONOSCOPY / polypectomy performed by Wandy Qureshi MD at [facility] ENDOSCOPY    HX GYN      Total Hyst    HX LAP CHOLECYSTECTOMY      HX OTHER SURGICAL  2017    Throat widened       Allergies   Allergen Reactions    Celebrex [Celecoxib] Other (comments)     Tiredness        Vital signs:    Visit Vitals  /60 (BP 1 Location: Left arm, BP Patient Position: Sitting)   Pulse 87   Temp 98.7 °F (37.1 °C) (Oral)   Resp 16   Ht 5' (1.524 m)   Wt 53.5 kg (118 lb)   LMP  (LMP Unknown)   SpO2 97%   BMI 23.05 kg/m²       Review of Systems:   GENERAL: Denies fever or fatigue  CARDIAC: No CP or SOB  PULMONARY: No cough of SOB  MUSCULOSKELETAL: No new joint pain  NEURO: SEE HPI      EXAM: Alert, in NAD. Heart is regular. Reduced cervical range of motion with a soft collar. Oriented x3, EOM's are full, PERRL, no facial asymmetries. Strength and tone are normal. DTR's +2, gait slow but symmetric       Assessment/Plan: Chronic vertigo, appears better, related to severe structural spine disease, as mentioned I think there is a problem with cervical vestibular afferents and obstacles for physical therapy because of her structural spine disease. At this point she is doing quite well. She does not want to take medications such as meclizine.   I advised her and demonstrated some range of motion exercises that may help her situation so she can do them judiciously at times when she is not wearing her soft cervical collar. She will follow-up with Dr. Radha Dsouza also to discuss whether to proceed with major neck surgery that includes a cervical occipital fusion. Regarding her excessive daytime sleepiness, witnessed apneas, and heavy disruptive snoring, she has a high likelihood of having obstructive sleep apnea. Counseled pt at length about obstructive sleep apnea evaluation, treatment options, weight loss as appropriate, and consequences of untreated LORNA. I will get a sleep study and see her afterwards. 25 out of 40 minutes spent counseling of the above issues. PLEASE NOTE:   Portions of this document may have been produced using voice recognition software. Unrecognized errors in transcription may be present. This note will not be viewable in [address]

## 2019-04-10 NOTE — PROGRESS NOTES
ROOM # 3    Estella Soulier presents today for   Chief Complaint   Patient presents with    Follow-up    Neck Pain       [facility], [place], [facility] preferred language for health care discussion is english/other. Depression Screening:  3 most recent [facility] [address] Screens 4/10/2019 2/26/2019 1/16/2019 12/18/2018 12/6/2018 10/4/2018 10/1/2018   PHQ Not Done - Patient Decline - - - - -   Little interest or pleasure in doing things Not at all - Not at all Not at all Not at all Not at all Not at all   Feeling down, depressed, irritable, or hopeless Not at all - Not at all Not at all Not at all Not at all Not at all   Total Score PHQ 2 0 - 0 0 0 0 0       Learning Assessment:  Learning Assessment 2/26/2019 1/25/2018 11/28/2017   PRIMARY LEARNER Patient Patient Patient   HIGHEST LEVEL OF EDUCATION - PRIMARY LEARNER  - [address] [place] [address] CAREGIVER - No No   PRIMARY LANGUAGE ENGLISH ENGLISH ENGLISH   LEARNER PREFERENCE PRIMARY DEMONSTRATION READING DEMONSTRATION   ANSWERED BY Patient patient patient   RELATIONSHIP SELF SELF SELF       Abuse Screening:  Abuse Screening Questionnaire 2/9/2018 1/25/2018 11/28/2017   Do you ever feel afraid of your partner? N N N   Are you in a relationship with someone who physically or mentally threatens you? N N N   Is it safe for you to go home? Jarret Ngo       Fall Risk  Fall Risk Assessment, last 12 mths 4/10/2019 3/21/2019 2/26/2019 2/15/2019 1/16/2019 12/18/2018 12/6/2018   Able to walk? Yes Yes Yes Yes Yes Yes Yes   Fall in past 12 months? No No No No No No No       Visit Vitals  /60 (BP 1 Location: Left arm, BP Patient Position: Sitting)   Pulse 87   Temp 98.7 °F (37.1 °C) (Oral)   Resp 16   Ht 5' (1.524 m)   Wt 118 lb (53.5 kg)   SpO2 97%   BMI 23.05 kg/m²       Health Maintenance reviewed and discussed per provider.  Yes    Estella Soulier is due for   Health Maintenance Due   Topic Date Due    Pneumococcal 65+ years (1 of 2 - PCV13) 06/24/2012 Please order/place referral if appropriate. Advance Directive:  1. Do you have an advance directive in place? Patient Reply: No    2. If not, would you like material regarding how to put one in place? Patient Reply: No    Coordination of Care:  1. Have you been to the ER, urgent care clinic since your last visit? Hospitalized since your last visit?  No

## 2019-04-12 ENCOUNTER — OFFICE VISIT (OUTPATIENT)
Dept: ORTHOPEDIC SURGERY | Age: 74
End: 2019-04-12

## 2019-04-12 VITALS
HEIGHT: 60 IN | SYSTOLIC BLOOD PRESSURE: 136 MMHG | TEMPERATURE: 98 F | HEART RATE: 64 BPM | WEIGHT: 118.6 LBS | DIASTOLIC BLOOD PRESSURE: 81 MMHG | RESPIRATION RATE: 16 BRPM | BODY MASS INDEX: 23.29 KG/M2

## 2019-04-12 DIAGNOSIS — G95.20 SPINAL CORD COMPRESSION (HCC): ICD-10-CM

## 2019-04-12 DIAGNOSIS — M48.02 CERVICAL SPINAL STENOSIS: Primary | ICD-10-CM

## 2019-04-12 NOTE — PROGRESS NOTES
[address]  [address], [address]  [place], [address] [place] [address] Street  Phone: [phone]  Fax: [phone]  INITIAL CONSULTATION  Patient: Kelsie Hernandez                MRN: [id number]       SSN: [id number]  YOB: 1945        AGE: 72 y.o. SEX: female  Body mass index is 23.16 kg/m². PCP: Juarez Hidalgo NP  04/12/19    Chief Complaint   Patient presents with    Neck Pain     SC         HISTORY OF PRESENT ILLNESS, RADIOGRAPHS, and PLAN:         HISTORY OF PRESENT ILLNESS:  Ms. Alaina Acevedo returns today. She is doing quite well. She has known diffuse cervical arthritis with cervical stenosis at C1 and C2 that would require occipital cervical fusion and suboccipital arthritis that if we ended up operating on her, she probably would end up requiring an occiput to C6 fusion. However, utilizing a soft, cervical collar, her pain is a 2/10, and she has no objective neurology. She has also noticed improvement in her vertiginousness. She has seen Dr. Nayeli Spencer. He has recommended sleep studies, which are being obtained. ASSESSMENT/PLAN: I discussed the nature of her pathology. Her MRI demonstrates stenosis. There is a hint of signal change to the cord but subtle at worst, and she has no objective neurology. At this point, she does not wish to consider surgery and I think that is reasonable. I think we can observe her. Surgery would be extensive, and I cannot be confident that she would actually have less pain after surgery than she has now given the extensive nature of the surgery she would require. I think sleep studies are a great idea. She may have obstructive sleep apnea. We will see her back in a few months and just monitor her clinically. If she does develop a more clear-cut myelopathic picture, an occipital cervical decompression and fusion, I believe, would be indicated. Cc:Meri Land N.P.         Past Medical History: Diagnosis Date    Abnormal Pap smear     Dr. Lynn Mclaughlin    Allergic rhinitis     Arthritis     Cerebrovascular small vessel disease 3/18/2019    CT 3/17/2019    Chronic pain     Concentric left ventricular hypertrophy 3/18/2019    With left ventricular diastolic dysfunction by echocardiogram 3/18/2019    Hypercholesterolemia     Neck pain 5/17/2010    Stroke ([address] 10/02/2017    TIA- legs weak memory issues       Family History   Problem Relation Age of Onset    Cancer Mother         breast    Heart Disease Father        Current Outpatient Medications   Medication Sig Dispense Refill    dextromethorphan-guaiFENesin (CORICIDIN HBP)  mg cap Take  mg by mouth.  denosumab (PROLIA) 60 mg/mL injection 60 mg by SubCUTAneous route once.  dexamethasone (DECADRON) 4 mg tablet Take one table BID x 5 days  Indications: cervical cord compression 10 Tab 0    turmeric 400 mg cap Take  by mouth.  fluticasone (FLONASE) 50 mcg/actuation nasal spray instill 2 sprays into each nostril once daily 3 Bottle 1    atorvastatin (LIPITOR) 20 mg tablet take 1 tablet by mouth once daily 90 Tab 3    busPIRone (BUSPAR) 7.5 mg tablet take 1 tablet by mouth twice a day 60 Tab 1    montelukast (SINGULAIR) 10 mg tablet take 1 tablet by mouth once daily 90 Tab 2    acetaminophen (TYLENOL EXTRA STRENGTH) 500 mg tablet Take 650 mg by mouth every six (6) hours as needed for Pain. Pt states they take medication QID.  omeprazole (PRILOSEC) 20 mg capsule Take 20 mg by mouth daily.  aspirin 81 mg chewable tablet Take 1 Tab by mouth daily.  30 Tab 3       Allergies   Allergen Reactions    Celebrex [Celecoxib] Other (comments)     Tiredness        Past Surgical History:   Procedure Laterality Date    COLONOSCOPY N/A 6/4/2018    COLONOSCOPY / polypectomy performed by Kai Franco [facility]OSCOPY    HX GYN      Total Hyst    HX LAP CHOLECYSTECTOMY      HX OTHER SURGICAL  2017    Throat widened Past Medical History:   Diagnosis Date    Abnormal Pap smear     Dr. Destiney Monreal    Allergic rhinitis     Arthritis     Cerebrovascular small vessel disease 3/18/2019    CT 3/17/2019    Chronic pain     Concentric left ventricular hypertrophy 3/18/2019    With left ventricular diastolic dysfunction by echocardiogram 3/18/2019    Hypercholesterolemia     Neck pain 5/17/2010    Stroke ([facility] 10/02/2017    TIA- legs weak memory issues       Social History     Socioeconomic History    Marital status:      Spouse name: Not on file    Number of children: Not on file    Years of education: Not on file    Highest education level: Not on file   Occupational History    Not on file   Social Needs    Financial resource strain: Not on file    Food insecurity:     Worry: Not on file     Inability: Not on file    Transportation needs:     Medical: Not on file     Non-medical: Not on file   Tobacco Use    Smoking status: Never Smoker    Smokeless tobacco: Never Used   Substance and Sexual Activity    Alcohol use: No    Drug use: No    Sexual activity: Never   Lifestyle    Physical activity:     Days per week: Not on file     Minutes per session: Not on file    Stress: Not on file   Relationships    Social connections:     Talks on phone: Not on file     Gets together: Not on file     Attends Ep[employer]op[employer] service: Not on file     Active member of club or organization: Not on file     Attends meetings of clubs or organizations: Not on file     Relationship status: Not on file    Intimate partner violence:     Fear of current or ex partner: Not on file     Emotionally abused: Not on file     Physically abused: Not on file     Forced sexual activity: Not on file   Other Topics Concern    Not on file   Social History Narrative    Not on file           REVIEW OF SYSTEMS:   CONSTITUTIONAL SYMPTOMS:  Negative. EYES:  Negative. EARS, NOSE, THROAT AND MOUTH:  Negative.    CARDIOVASCULAR: Negative. RESPIRATORY:  Negative. GENITOURINARY: Per HPI. GASTROINTESTINAL:  Per HPI. INTEGUMENTARY (SKIN AND/OR BREAST):  Negative. MUSCULOSKELETAL: Per HPI.   ENDOCRINE/RHEUMATOLOGIC:  Negative. NEUROLOGICAL:  Per HPI. HEMATOLOGIC/LYMPHATIC:  Negative. ALLERGIC/IMMUNOLOGIC:  Negative. PSYCHIATRIC:  Negative. PHYSICAL EXAMINATION:   Visit Vitals  /81 (BP 1 Location: Left arm, BP Patient Position: Sitting)   Pulse 64   Temp 98 °F (36.7 °C) (Oral)   Resp 16   Ht 5' (1.524 m)   Wt 118 lb 9.6 oz (53.8 kg)   LMP  (LMP Unknown)   BMI 23.16 kg/m²    PAIN SCALE: 2/10    CONSTITUTIONAL: The patient is in no apparent distress and is alert and oriented x 3. HEENT: Normocephalic. Hearing grossly intact. NECK: Supple and symmetric. no tenderness, or masses were felt. RESPIRATORY: No labored breathing. CARDIOVASCULAR: The carotid pulses were normal. Peripheral pulses were 2+. CHEST: Normal AP diameter and normal contour without any kyphoscoliosis. LYMPHATIC: No lymphadenopathy was appreciated in the neck, axillae or groin. SKIN:  Negative for scars, rashes, lesions, or ulcers on the right upper, right lower, left upper, left lower and trunk. NEUROLOGICAL: Alert and oriented x 3. Ambulation without assistive device. FWB. EXTREMITIES:  See musculoskeletal.  MUSCULOSKELETAL:   Head and Neck:  Negative for misalignment, asymmetry, crepitation, defects, tenderness masses or effusions.  Left Upper Extremity: Inspection, percussion and palpation performed. Garzas sign is negative.  Right Upper Extremity: Inspection, percussion and palpation performed. Garzas sign is negative.  Spine, Ribs and Pelvis: Inspection, percussion and palpation performed. Negative for misalignment, asymmetry, crepitation, defects, tenderness masses or effusions.  Left Lower Extremity: Inspection, percussion and palpation performed. Negative straight leg raise.    Right Lower Extremity: Inspection, percussion and palpation performed. Negative straight leg raise. SPINE EXAM:     Cervical spine: Neck is midline. Normal muscle tone. No focal atrophy is noted. Lumbar spine: No rash, ecchymosis, or gross obliquity. No focal atrophy is noted. ASSESSMENT    ICD-10-CM ICD-9-CM    1. Cervical spinal stenosis M48.02 723.0    2. Spinal cord compression ([facility] G95.20 336.9        Written by Phylicia Garrett, as dictated by Maryam García MD.    I, Dr. Maryam García MD, confirm that all documentation is accurate.

## 2019-04-14 ENCOUNTER — HOSPITAL ENCOUNTER (OUTPATIENT)
Dept: SLEEP MEDICINE | Age: 74
Discharge: HOME | End: 2019-04-14
Payer: MEDICARE

## 2019-04-14 DIAGNOSIS — G47.8 UPPER AIRWAY RESISTANCE SYNDROME: ICD-10-CM

## 2019-04-14 DIAGNOSIS — G47.10 HYPERSOMNIA: ICD-10-CM

## 2019-04-14 PROCEDURE — 95810 POLYSOM 6/> YRS 4/> PARAM: CPT

## 2019-04-15 VITALS
HEIGHT: 60 IN | DIASTOLIC BLOOD PRESSURE: 75 MMHG | SYSTOLIC BLOOD PRESSURE: 143 MMHG | BODY MASS INDEX: 23.16 KG/M2 | HEART RATE: 79 BPM | WEIGHT: 118 LBS

## 2019-04-17 ENCOUNTER — PATIENT OUTREACH (OUTPATIENT)
Dept: FAMILY MEDICINE CLINIC | Facility: CLINIC | Age: 74
End: 2019-04-17

## 2019-04-17 NOTE — PROGRESS NOTES
Hospital Discharge Follow-Up        Patient was admitted [facility] on 3/17/19 and discharged on 3/20/19 for cervical spondylosis, vertigo. The patient attended an appointment with Dr. Jose Son, neurology, on 4/10/19. Patient was counseled about obstructive sleep apnea and a sleep study was ordered. The patient attended an appointment with peter Kimball, on 4/12/19. Patient is wearing in a soft cervical collar and reported decreased neck pain and vertigo. Per visit note the plan is to monitor with no surgery planned at this time. No medication changes noted. Goals Addressed                 This Visit's Progress     Attends follow-up appointments as directed.         Plan:  Monitor for attendance at appointments and assist as needed to schedule      4/3/19-attended apt with peter Perez  4/4/19-attended apt with PCP  4/10/19-attended apt with Dr. Jose Son, neurology  4/12/19-attended apt with peter Kimball  4/14/19- attended apt with sleep center

## 2019-04-26 ENCOUNTER — OFFICE VISIT (OUTPATIENT)
Dept: NEUROLOGY | Age: 74
End: 2019-04-26

## 2019-04-26 VITALS
SYSTOLIC BLOOD PRESSURE: 130 MMHG | BODY MASS INDEX: 23.16 KG/M2 | HEIGHT: 60 IN | DIASTOLIC BLOOD PRESSURE: 80 MMHG | RESPIRATION RATE: 16 BRPM | HEART RATE: 66 BPM | TEMPERATURE: 97.2 F | OXYGEN SATURATION: 93 % | WEIGHT: 118 LBS

## 2019-04-26 DIAGNOSIS — M48.02 CERVICAL STENOSIS OF SPINAL CANAL: ICD-10-CM

## 2019-04-26 DIAGNOSIS — R42 VERTIGO: ICD-10-CM

## 2019-04-26 DIAGNOSIS — G47.33 OSA (OBSTRUCTIVE SLEEP APNEA): Primary | ICD-10-CM

## 2019-04-26 NOTE — PROGRESS NOTES
ROOM # 2    Manus Poser presents today for   Chief Complaint   Patient presents with    Follow-up    Sleep Study       Manus Poser preferred language for health care discussion is english/other. Depression Screening:  3 most recent [facility] Screens 4/10/2019 2/26/2019 1/16/2019 12/18/2018 12/6/2018 10/4/2018 10/1/2018   PHQ Not Done - Patient Decline - - - - -   Little interest or pleasure in doing things Not at all - Not at all Not at all Not at all Not at all Not at all   Feeling down, depressed, irritable, or hopeless Not at all - Not at all Not at all Not at all Not at all Not at all   Total Score PHQ 2 0 - 0 0 0 0 0       Learning Assessment:  Learning Assessment 2/26/2019 1/25/2018 11/28/2017   PRIMARY LEARNER Patient Patient Patient   HIGHEST LEVEL OF EDUCATION - PRIMARY LEARNER  - [address] [place] [address] CAREGIVER - No No   PRIMARY LANGUAGE ENGLISH ENGLISH ENGLISH   LEARNER PREFERENCE PRIMARY DEMONSTRATION READING DEMONSTRATION   ANSWERED BY Patient patient patient   RELATIONSHIP SELF SELF SELF       Abuse Screening:  Abuse Screening Questionnaire 2/9/2018 1/25/2018 11/28/2017   Do you ever feel afraid of your partner? N N N   Are you in a relationship with someone who physically or mentally threatens you? N N N   Is it safe for you to go home? [place]       Fall Risk  Fall Risk Assessment, last 12 mths 4/10/2019 3/21/2019 2/26/2019 2/15/2019 1/16/2019 12/18/2018 12/6/2018   Able to walk? Yes Yes Yes Yes Yes Yes Yes   Fall in past 12 months? No No No No No No No       Visit Vitals  /80 (BP 1 Location: Left arm, BP Patient Position: Sitting)   Pulse 66   Temp 97.2 °F (36.2 °C) (Oral)   Resp 16   Ht 5' (1.524 m)   Wt 118 lb (53.5 kg)   SpO2 93%   BMI 23.05 kg/m²       Health Maintenance reviewed and discussed per provider.  Yes    Manus Poser is due for   Health Maintenance Due   Topic Date Due    Pneumococcal 65+ years (1 of 2 - PCV13) 06/24/2012 Please order/place referral if appropriate. Advance Directive:  1. Do you have an advance directive in place? Patient Reply: No    2. If not, would you like material regarding how to put one in place? Patient Reply: No    Coordination of Care:  1. Have you been to the ER, urgent care clinic since your last visit? Hospitalized since your last visit?  No

## 2019-04-26 NOTE — PROGRESS NOTES
4/26/2019 10:07 AM    SSN: [id number]    Subjective: 29-year-old female comes in for follow-up of chronic vertigo, structural cervical spine disease, and symptoms of obstructive sleep apnea. Since last year she recently saw Dr. Dustin Chang, she had reported improvement of her symptoms of vertigo and neck pain with use of a soft cervical collar and given that the nature of her structural spine disease and the very complex surgery which would be required to address this, the conclusion was that this was going to be observed. She has continued to do pretty well. An overnight sleep study on April 14 showed overall AHI of 9.5 which increased during REM to 13 with desaturations down to a minimum of 84%.     Social History     Socioeconomic History    Marital status:      Spouse name: Not on file    Number of children: Not on file    Years of education: Not on file    Highest education level: Not on file   Occupational History    Not on file   Social Needs    Financial resource strain: Not on file    Food insecurity:     Worry: Not on file     Inability: Not on file    Transportation needs:     Medical: Not on file     Non-medical: Not on file   Tobacco Use    Smoking status: Never Smoker    Smokeless tobacco: Never Used   Substance and Sexual Activity    Alcohol use: No    Drug use: No    Sexual activity: Never   Lifestyle    Physical activity:     Days per week: Not on file     Minutes per session: Not on file    Stress: Not on file   Relationships    Social connections:     Talks on phone: Not on file     Gets together: Not on file     Attends Yazidi service: Not on file     Active member of club or organization: Not on file     Attends meetings of clubs or organizations: Not on file     Relationship status: Not on file    Intimate partner violence:     Fear of current or ex partner: Not on file     Emotionally abused: Not on file     Physically abused: Not on file Forced sexual activity: Not on file   Other Topics Concern    Not on file   Social History Narrative    Not on file       Family History   Problem Relation Age of Onset    Cancer Mother         breast    Heart Disease Father        Current Outpatient Medications   Medication Sig Dispense Refill    denosumab (PROLIA) 60 mg/mL injection 60 mg by SubCUTAneous route once.  turmeric 400 mg cap Take  by mouth.  fluticasone (FLONASE) 50 mcg/actuation nasal spray instill 2 sprays into each nostril once daily 3 Bottle 1    atorvastatin (LIPITOR) 20 mg tablet take 1 tablet by mouth once daily 90 Tab 3    busPIRone (BUSPAR) 7.5 mg tablet take 1 tablet by mouth twice a day 60 Tab 1    montelukast (SINGULAIR) 10 mg tablet take 1 tablet by mouth once daily 90 Tab 2    omeprazole (PRILOSEC) 20 mg capsule Take 20 mg by mouth daily.  aspirin 81 mg chewable tablet Take 1 Tab by mouth daily. 30 Tab 3    dextromethorphan-guaiFENesin (CORICIDIN HBP)  mg cap Take  mg by mouth.  dexamethasone (DECADRON) 4 mg tablet Take one table BID x 5 days  Indications: cervical cord compression 10 Tab 0    acetaminophen (TYLENOL EXTRA STRENGTH) 500 mg tablet Take 650 mg by mouth every six (6) hours as needed for Pain. Pt states they take medication QID.          Past Medical History:   Diagnosis Date    Abnormal Pap smear     Dr. Patrick Mckay Allergic rhinitis     Arthritis     Cerebrovascular small vessel disease 3/18/2019    CT 3/17/2019    Chronic pain     Concentric left ventricular hypertrophy 3/18/2019    With left ventricular diastolic dysfunction by echocardiogram 3/18/2019    Hypercholesterolemia     Neck pain 5/17/2010    Stroke ([facility] [address] 10/02/2017    TIA- legs weak memory issues       Past Surgical History:   Procedure Laterality Date    COLONOSCOPY N/A 6/4/2018    COLONOSCOPY / polypectomy performed by Montse Stauffer MD at [facility] ENDOSCOPY    HX GYN      Total Hyst    HX LAP CHOLECYSTECTOMY  HX OTHER SURGICAL  2017    Throat widened       Allergies   Allergen Reactions    Celebrex [Celecoxib] Other (comments)     Tiredness        Vital signs:    Visit Vitals  /80 (BP 1 Location: Left arm, BP Patient Position: Sitting)   Pulse 66   Temp 97.2 °F (36.2 °C) (Oral)   Resp 16   Ht 5' (1.524 m)   Wt 53.5 kg (118 lb)   LMP  (LMP Unknown)   SpO2 93%   BMI 23.05 kg/m²       Review of Systems:   GENERAL: Denies fever, reports fatigue  CARDIAC: No CP or SOB  PULMONARY: No cough of SOB  MUSCULOSKELETAL: No new joint pain  NEURO: SEE HPI      EXAM: Alert, in NAD. Heart is regular. Oriented x3, EOM's are full, PERRL, no facial asymmetries. Strength and tone are normal. DTR's +2, gait symmetric           Assessment/Plan: She has mild obstructive sleep apnea with snoring, excessive daytime sleepiness. Counseled pt at length about obstructive sleep apnea evaluation, treatment options, weight loss as appropriate, and consequences of untreated LORNA. Discussed study results, CPAP pitfalls, LORNA treatment options, weight management. She is motivated to have this treated, so I will bring her back for an attended CPAP trial.  She will return to see me after she has this done. Her vertigo lately has been at [facility] and her neck pain improved. She is following up with Dr. Anna Wheeler for this. PLEASE NOTE:   Portions of this document may have been produced using voice recognition software. Unrecognized errors in transcription may be present. This note will not be viewable in [address]

## 2019-04-30 ENCOUNTER — PATIENT OUTREACH (OUTPATIENT)
Dept: FAMILY MEDICINE CLINIC | Facility: CLINIC | Age: 74
End: 2019-04-30

## 2019-04-30 NOTE — PROGRESS NOTES
Hospital Discharge Follow-Up        Patient was admitted [facility] on 3/17/19 and discharged on 3/20/19 for cervical spondylosis, vertigo.     The patient attended an appointment with Dr. Lenora Brooks, neurology, on 4/26/19 to discuss results of sleep study. Patient will try a CPAP. Spoke with patient who stated \"I'm doing well. \"  Endorsed taking medications as directed and voiced no questions or concerns. Patient has graduated from the Transitions of Care Coordination  program on 4/30/19. Patient's symptoms are stable at this time. Patient/family has the ability to self-manage. Care management goals have been completed at this time. No further nurse navigator follow up scheduled. Pt has nurse navigator's contact information for any further questions, concerns, or needs. Patients upcoming visits:    Future Appointments   Date Time Provider Gaby Delarosa   5/15/2019 11:00 AM Dorene Babinski, NP [facility] SCHED   6/18/2019  8:30 PM [facility] SLEEP RM 4 [facility]   8/13/2019  8:15 AM Darlene Katz  [address]   9/30/2019  3:00 PM [facility] FAST TRACK NURSE [facility]      Goals Addressed                 This Visit's Progress     Attends follow-up appointments as directed. On track     Plan:  Monitor for attendance at appointments and assist as needed to schedule      4/3/19-attended apt with peter Perez  4/4/19-attended apt with PCP  4/10/19-attended apt with Dr. Lenora Brooks, neurology  4/12/19, 4/26/19-attended apt with peter Giles  4/14/19- attended apt with sleep center       Knowledge and adherence of prescribed medication (ie. action, side effects, missed dose, etc.).    On track     Plan: Reconcile medications and assess patient understanding of purpose, how/when to take using the teach back technique      3/21/19-done with patient who can name purpose, when to take    4/8/19, 4/30/19-taking medications as directed

## 2019-05-15 ENCOUNTER — OFFICE VISIT (OUTPATIENT)
Dept: FAMILY MEDICINE CLINIC | Facility: CLINIC | Age: 74
End: 2019-05-15

## 2019-05-15 VITALS
BODY MASS INDEX: 23.4 KG/M2 | HEIGHT: 60 IN | HEART RATE: 62 BPM | TEMPERATURE: 97.5 F | OXYGEN SATURATION: 99 % | WEIGHT: 119.2 LBS | RESPIRATION RATE: 16 BRPM | SYSTOLIC BLOOD PRESSURE: 110 MMHG | DIASTOLIC BLOOD PRESSURE: 64 MMHG

## 2019-05-15 DIAGNOSIS — F41.9 ANXIETY: Primary | ICD-10-CM

## 2019-05-15 DIAGNOSIS — M50.90 CERVICAL NECK PAIN WITH EVIDENCE OF DISC DISEASE: Chronic | ICD-10-CM

## 2019-05-15 DIAGNOSIS — E78.5 HYPERLIPIDEMIA, UNSPECIFIED HYPERLIPIDEMIA TYPE: ICD-10-CM

## 2019-05-15 RX ORDER — BUSPIRONE HYDROCHLORIDE 7.5 MG/1
TABLET ORAL
Qty: 60 TAB | Refills: 1 | Status: SHIPPED | OUTPATIENT
Start: 2019-05-15 | End: 2019-09-16 | Stop reason: SDUPTHER

## 2019-05-15 RX ORDER — CHOLECALCIFEROL (VITAMIN D3) 125 MCG
2 CAPSULE ORAL 2 TIMES DAILY
COMMUNITY
End: 2020-02-19

## 2019-05-15 NOTE — PROGRESS NOTES
Boy Gillespie is a 72 y.o. female presenting today for Anxiety  . HPI:  Boy Gillespie presents to the office today for anxiety, hyperlipidemia and GERD follow-up care. Patient presents today stating she feels well. She does present today wearing her cervical collar. Patient is being seen for cervical spinal stenosis by Dr. Starla Berg. Per Dr. Fausto Marshall note she has known diffuse cervical arthritis with cervical stenosis at C1-C2 that require a cervical fusion. Patient notes she is wearing the cervical collar for support and notes she will continue wear to to prolonged surgery. Per Dr. Celeste [address] office note patient will require occipital to C6 fusion. She presents today without any complaints of pain. She denied any upper extremity numbness or tingling. Patient presents today requesting refill of her postpartum. Patient notes that she is currently taking BuSpar 1 tablet daily. She reports she is expecting a very stressful situation this summer. She notes that she was contacted by son that she gave up for adoption and anticipates meeting him this summer. Patient notes that she has 2 other sons who have no ideas she had a child in her abhi year in high school. Patient also another stressful situation is that she will be going to [place] to visit a sister and notes those situations are often stressful  Patient was evaluated by sleep medicine. She reports that she has mild obstructive sleep apnea scheduled for sleep apnea 39 June 18, 2019. Patient presents today without any chest pain, palpitation or lower extremity edema. She denied any cough or wheezing. She is negative for abdominal pain, nausea, vomiting, diarrhea constipation. She denies any headache. Review of Systems   Constitutional: Positive for malaise/fatigue ( Secondary to LORNA). Respiratory: Negative for cough, sputum production and shortness of breath.     Cardiovascular: Negative for chest pain, palpitations and leg swelling. Musculoskeletal: Positive for neck pain. Neurological: Negative for dizziness and headaches. Allergies   Allergen Reactions    Celebrex [Celecoxib] Other (comments)     Tiredness        Current Outpatient Medications   Medication Sig Dispense Refill    naproxen sodium (ALEVE) 220 mg cap Take 2 Caps by mouth two (2) times a day.  doxylamine succinate (UNISOM) 25 mg tablet Take 25 mg by mouth nightly as needed for Sleep.  busPIRone (BUSPAR) 7.5 mg tablet take 1 tablet by mouth twice a day 60 Tab 1    denosumab (PROLIA) 60 mg/mL injection 60 mg by SubCUTAneous route once.  turmeric 400 mg cap Take  by mouth.  fluticasone (FLONASE) 50 mcg/actuation nasal spray instill 2 sprays into each nostril once daily 3 Bottle 1    atorvastatin (LIPITOR) 20 mg tablet take 1 tablet by mouth once daily 90 Tab 3    montelukast (SINGULAIR) 10 mg tablet take 1 tablet by mouth once daily 90 Tab 2    omeprazole (PRILOSEC) 20 mg capsule Take 20 mg by mouth daily.  aspirin 81 mg chewable tablet Take 1 Tab by mouth daily.  30 Tab 3       Past Medical History:   Diagnosis Date    Abnormal Pap smear     Dr. Gabriel Vega Allergic rhinitis     Arthritis     Cerebrovascular small vessel disease 3/18/2019    CT 3/17/2019    Chronic pain     Concentric left ventricular hypertrophy 3/18/2019    With left ventricular diastolic dysfunction by echocardiogram 3/18/2019    Hypercholesterolemia     Neck pain 5/17/2010    [facility]    TIA- legs weak memory issues       Past Surgical History:   Procedure Laterality Date    COLONOSCOPY N/A 6/4/2018    COLONOSCOPY / polypectomy performed by [facility]legas [facility] at [facility] ENDOSCOPY    HX GYN      Total Hyst    HX LAP CHOLECYSTECTOMY      HX OTHER SURGICAL  2017    Throat widened       Social History     Socioeconomic History    Marital status:      Spouse name: Not on file    Number of children: Not on file    Years of education: Not on file    Highest education level: Not on file   Occupational History    Not on file   Social Needs    Financial resource strain: Not on file    Food insecurity:     Worry: Not on file     Inability: Not on file    Transportation needs:     Medical: Not on file     Non-medical: Not on file   Tobacco Use    Smoking status: Never Smoker    Smokeless tobacco: Never Used   Substance and Sexual Activity    Alcohol use: No    Drug use: No    Sexual activity: Never   Lifestyle    Physical activity:     Days per week: Not on file     Minutes per session: Not on file    Stress: Not on file   Relationships    Social connections:     Talks on phone: Not on file     Gets together: Not on file     Attends Quaker service: Not on file     Active member of club or organization: Not on file     Attends meetings of clubs or organizations: Not on file     Relationship status: Not on file    Intimate partner violence:     Fear of current or ex partner: Not on file     Emotionally abused: Not on file     Physically abused: Not on file     Forced sexual activity: Not on file   Other Topics Concern    Not on file   Social History Narrative    Not on file       Patient does not have an advanced directive on file    Vitals:    05/15/19 1055   BP: 110/64   Pulse: 62   Resp: 16   Temp: 97.5 °F (36.4 °C)   TempSrc: Tympanic   SpO2: 99%   Weight: 119 lb 3.2 oz (54.1 kg)   Height: 5' (1.524 m)   PainSc:   0 - No pain       Physical Exam   Cardiovascular: Normal rate, regular rhythm, normal heart sounds and intact distal pulses. Pulmonary/Chest: Effort normal and breath sounds normal.   Abdominal: Soft. Bowel sounds are normal.   Musculoskeletal: She exhibits no edema or tenderness. Cervical back: She exhibits decreased range of motion and pain. She exhibits no tenderness and no swelling. Cervical collar intact. Negative for pain   Neurological: She is alert. Nursing note and vitals reviewed.       [facility] Outpatient Visit on 03/27/2019   Component Date Value Ref Range Status    Magnesium 03/27/2019 2.3  1.6 - 2.6 mg/dL Final    Phosphorus 03/27/2019 3.9  2.5 - 4.9 MG/DL Final   Admission on 03/17/2019, Discharged on 03/20/2019   Component Date Value Ref Range Status    WBC 03/17/2019 9.3  4.6 - 13.2 K/uL Final    RBC 03/17/2019 4.20  4. 20 - 5.30 M/uL Final    HGB 03/17/2019 13.1  12.0 - 16.0 g/dL Final    HCT 03/17/2019 39.8  35.0 - 45.0 % Final    MCV 03/17/2019 94.8  74.0 - 97.0 FL Final    MCH 03/17/2019 31.2  24.0 - 34.0 PG Final    MCHC 03/17/2019 32.9  31.0 - 37.0 g/dL Final    RDW 03/17/2019 12.2  11.6 - 14.5 % Final    PLATELET 19/75/5618 993  135 - 420 K/uL Final    MPV 03/17/2019 9.2  9.2 - 11.8 FL Final    NEUTROPHILS 03/17/2019 61  40 - 73 % Final    LYMPHOCYTES 03/17/2019 26  21 - 52 % Final    MONOCYTES 03/17/2019 12* 3 - 10 % Final    EOSINOPHILS 03/17/2019 1  0 - 5 % Final    BASOPHILS 03/17/2019 0  0 - 2 % Final    ABS. NEUTROPHILS 03/17/2019 5.6  1.8 - 8.0 K/UL Final    ABS. LYMPHOCYTES 03/17/2019 2.4  0.9 - 3.6 K/UL Final    ABS. MONOCYTES 03/17/2019 1.1  0.05 - 1.2 K/UL Final    ABS. EOSINOPHILS 03/17/2019 0.1  0.0 - 0.4 K/UL Final    ABS.  BASOPHILS 03/17/2019 0.0  0.0 - 0.1 K/UL Final    DF 03/17/2019 AUTOMATED    Final    Sodium 03/17/2019 141  136 - 145 mmol/L Final    Potassium 03/17/2019 3.6  3.5 - 5.5 mmol/L Final    Chloride 03/17/2019 108  100 - 108 mmol/L Final    CO2 03/17/2019 27  21 - 32 mmol/L Final    Anion gap 03/17/2019 6  3.0 - 18 mmol/L Final    Glucose 03/17/2019 87  74 - 99 mg/dL Final    BUN 03/17/2019 18  7.0 - 18 MG/DL Final    Creatinine 03/17/2019 0.77  0.6 - 1.3 MG/DL Final    BUN/Creatinine ratio 03/17/2019 23* 12 - 20   Final    GFR est AA 03/17/2019 >60  >60 ml/min/1.73m2 Final    GFR est non-AA 03/17/2019 >60  >60 ml/min/1.73m2 Final    Comment: (NOTE)  Estimated GFR is calculated using the Modification of Diet in Renal   Disease (MDRD) Study equation, reported for both  Americans   (GFRAA) and non- Americans (GFRNA), and normalized to 1.73m2   body surface area. The physician must decide which value applies to   the patient. The MDRD study equation should only be used in   individuals age 25 or older. It has not been validated for the   following: pregnant women, patients with serious comorbid conditions,   or on certain medications, or persons with extremes of body size,   muscle mass, or nutritional status.  Calcium 03/17/2019 8.7  8.5 - 10.1 MG/DL Final    Bilirubin, total 03/17/2019 0.5  0.2 - 1.0 MG/DL Final    ALT (SGPT) 03/17/2019 31  13 - 56 U/L Final    AST (SGOT) 03/17/2019 25  15 - 37 U/L Final    Alk. phosphatase 03/17/2019 72  45 - 117 U/L Final    Protein, total 03/17/2019 7.3  6.4 - 8.2 g/dL Final    Albumin 03/17/2019 4.2  3.4 - 5.0 g/dL Final    Globulin 03/17/2019 3.1  2.0 - 4.0 g/dL Final    A-G Ratio 03/17/2019 1.4  0.8 - 1.7   Final    Ventricular Rate 03/17/2019 69  BPM Final    Atrial Rate 03/17/2019 69  BPM Final    P-R Interval 03/17/2019 148  ms Final    QRS Duration 03/17/2019 76  ms Final    Q-T Interval 03/17/2019 434  ms Final    QTC Calculation (Bezet) 03/17/2019 465  ms Final    Calculated P Axis 03/17/2019 76  degrees Final    Calculated R Axis 03/17/2019 55  degrees Final    Calculated T Axis 03/17/2019 70  degrees Final    Diagnosis 03/17/2019    Final                    Value:Normal sinus rhythm  Possible Left atrial enlargement  Borderline ECG  When compared with ECG of 14-MAR-2019 12:49,  No significant change was found  Confirmed by Kevin Nolasco MD, Jennifer Villalpando ([id number]) on 3/18/2019 8:32:37 AM      NT pro-BNP 03/17/2019 213  0 - 900 PG/ML Final    Comment:           For patients with dyspnea, NT proBNP is highly sensitive for detecting acute congestive heart failure.  Also, an NT proBNP <300 pg/mL effectively rules out acute congestive heart failure, with 99% negative predictive value. Our reference ranges are for acute dyspnea. Age              Range (pg/ml)                            0-49                0-450        50-75               0-900        >75                 0-1800       For ambulatory office patients, the ranges below apply: For patients with dyspnea, NT proBNP is highly sensitive for detecting acute congestive heart failure. Also, an NT proBNP <300 pg/mL effectively rules out acute congestive heart failure, with 99% negative predictive value. Our reference ranges are for acute dyspnea.  CK 03/17/2019 107  26 - 192 U/L Final    CK - MB 03/17/2019 1.4  <3.6 ng/ml Final    CK-MB Index 03/17/2019 1.3  0.0 - 4.0 % Final    Troponin-I, QT 03/17/2019 <0.02  0.0 - 0.045 NG/ML Final    Comment: The presence of detectable troponin above the reference range indicates myocardial injury which may be due to ischemia, myocarditis, trauma, etc.  Clinical correlation is necessary to establish the significance of this finding. Sequential testing is recommended to determine if the typical rise and fall of cTnI is demonstrated. Note:  Cardiac troponin I has a relatively long half life and may be present well after the CK MB has returned to baseline. The reference range is based on the 99th percentile of the referent population.       LVIDd 03/18/2019 3.26* 3.9 - 5.3 cm Final    LVPWd 03/18/2019 1.10* 0.6 - 0.9 cm Final    LVIDs 03/18/2019 2.29  cm Final    IVSd 03/18/2019 1.07* 0.6 - 0.9 cm Final    LVOT d 03/18/2019 1.93  cm Final    LVOT Peak Velocity 03/18/2019 86.37  cm/s Final    LVOT Peak Gradient 03/18/2019 3.0  mmHg Final    LVOT VTI 03/18/2019 19.73  cm Final    MV A Zi 03/18/2019 99.72  cm/s Final    MV E Zi 03/18/2019 83.87  cm/s Final    MV E/A 03/18/2019 0.84   Final    Inferior Vena Cava Diameter Sniffi* 03/18/2019 1.50  cm Final    LA Vol 4C 03/18/2019 33.03  22 - 52 mL Final    LA Vol 2C 03/18/2019 38.63  22 - 52 mL Final    LA Area 4C 03/18/2019 14.0  cm2 Final    LV Mass AL 03/18/2019 116.9  67 - 162 g Final    LV Mass AL Index 03/18/2019 77.3  43 - 95 g/m2 Final    Mitral Valve E Wave Deceleration T* 03/18/2019 179.3  ms Final    Triscuspid Valve Regurgitation Pea* 03/18/2019 31.9  mmHg Final    TR Max Velocity 03/18/2019 282.29  cm/s Final    PASP 03/18/2019 35.0  mmHg Final    LA Vol Index 03/18/2019 25.53  16 - 28 ml/m2 Final    LA Vol Index 03/18/2019 21.83  16 - 28 ml/m2 Final    LA Volume 03/18/2019 41.01  22 - 52 mL Final    Ao Root D 03/18/2019 2.97  cm Final    LA Vol Index 03/18/2019 27.11  16 - 28 ml/m2 Final    Hemoglobin A1c 03/18/2019 5.8* 4.2 - 5.6 % Final    Comment: (NOTE)  HbA1C Interpretive Ranges  <5.7              Normal  5.7 - 6.4         Consider Prediabetes  >6.5              Consider Diabetes      Est. average glucose 03/18/2019 120  mg/dL Final    Comment: (NOTE)  The eAG should be interpreted with patient characteristics in mind   since ethnicity, interindividual differences, red cell lifespan,   variation in rates of glycation, etc. may affect the validity of the   calculation.  CK 03/18/2019 112  26 - 192 U/L Final    CK - MB 03/18/2019 1.4  <3.6 ng/ml Final    CK-MB Index 03/18/2019 1.3  0.0 - 4.0 % Final    Troponin-I, QT 03/18/2019 <0.02  0.0 - 0.045 NG/ML Final    Comment: The presence of detectable troponin above the reference range indicates myocardial injury which may be due to ischemia, myocarditis, trauma, etc.  Clinical correlation is necessary to establish the significance of this finding. Sequential testing is recommended to determine if the typical rise and fall of cTnI is demonstrated. Note:  Cardiac troponin I has a relatively long half life and may be present well after the CK MB has returned to baseline. The reference range is based on the 99th percentile of the referent population.       LIPID PROFILE 03/18/2019        Final    Cholesterol, total 03/18/2019 134  <200 MG/DL Final    Triglyceride 03/18/2019 65  <150 MG/DL Final    Comment: The drugs N-acetylcysteine (NAC) and  Metamiszole have been found to cause falsely  low results in this chemical assay. Please  be sure to submit blood samples obtained  BEFORE administration of either of these  drugs to assure correct results.  HDL Cholesterol 03/18/2019 73* 40 - 60 MG/DL Final    LDL, calculated 03/18/2019 48  0 - 100 MG/DL Final    VLDL, calculated 03/18/2019 13  MG/DL Final    CHOL/HDL Ratio 03/18/2019 1.8  0 - 5.0   Final    Glucose (POC) 03/18/2019 88  70 - 110 mg/dL Final    Comment: (NOTE)  The FDA has indicated that no capillary point of care blood glucose   monitoring systems are approved for use in \"critically ill\" patients,   however they have not defined this population. The College of   American Pathologists has recommended that these devices should not   be used in cases such as severe hypotension, dehydration, shock, and   hyperglycemic-hyperosmolar state, amongst others. Venous or arterial   collection is the recommended specimen for testing these patients.  Glucose (POC) 03/18/2019 111* 70 - 110 mg/dL Final    Comment: (NOTE)  The FDA has indicated that no capillary point of care blood glucose   monitoring systems are approved for use in \"critically ill\" patients,   however they have not defined this population. The College of   American Pathologists has recommended that these devices should not   be used in cases such as severe hypotension, dehydration, shock, and   hyperglycemic-hyperosmolar state, amongst others. Venous or arterial   collection is the recommended specimen for testing these patients.  Glucose (POC) 03/19/2019 95  70 - 110 mg/dL Final    Comment: (NOTE)  The FDA has indicated that no capillary point of care blood glucose   monitoring systems are approved for use in \"critically ill\" patients,   however they have not defined this population.  The [employer] of   American Pathologists has recommended that these devices should not   be used in cases such as severe hypotension, dehydration, shock, and   hyperglycemic-hyperosmolar state, amongst others. Venous or arterial   collection is the recommended specimen for testing these patients.  Glucose (POC) 03/19/2019 106  70 - 110 mg/dL Final    Comment: (NOTE)  The FDA has indicated that no capillary point of care blood glucose   monitoring systems are approved for use in \"critically ill\" patients,   however they have not defined this population. The College of   American Pathologists has recommended that these devices should not   be used in cases such as severe hypotension, dehydration, shock, and   hyperglycemic-hyperosmolar state, amongst others. Venous or arterial   collection is the recommended specimen for testing these patients.  Glucose (POC) 03/19/2019 149* 70 - 110 mg/dL Final    Comment: (NOTE)  The FDA has indicated that no capillary point of care blood glucose   monitoring systems are approved for use in \"critically ill\" patients,   however they have not defined this population. The College of   American Pathologists has recommended that these devices should not   be used in cases such as severe hypotension, dehydration, shock, and   hyperglycemic-hyperosmolar state, amongst others. Venous or arterial   collection is the recommended specimen for testing these patients.  Glucose (POC) 03/20/2019 132* 70 - 110 mg/dL Final    Comment: (NOTE)  The FDA has indicated that no capillary point of care blood glucose   monitoring systems are approved for use in \"critically ill\" patients,   however they have not defined this population. The College of   American Pathologists has recommended that these devices should not   be used in cases such as severe hypotension, dehydration, shock, and   hyperglycemic-hyperosmolar state, amongst others. Venous or arterial   collection is the recommended specimen for testing these patients.  Glucose (POC) 03/20/2019 201* 70 - 110 mg/dL Final    Comment: (NOTE)  The FDA has indicated that no capillary point of care blood glucose   monitoring systems are approved for use in \"critically ill\" patients,   however they have not defined this population. The College of   American Pathologists has recommended that these devices should not   be used in cases such as severe hypotension, dehydration, shock, and   hyperglycemic-hyperosmolar state, amongst others. Venous or arterial   collection is the recommended specimen for testing these patients.  Glucose (POC) 03/20/2019 152* 70 - 110 mg/dL Final    Comment: (NOTE)  The FDA has indicated that no capillary point of care blood glucose   monitoring systems are approved for use in \"critically ill\" patients,   however they have not defined this population. The College of   American Pathologists has recommended that these devices should not   be used in cases such as severe hypotension, dehydration, shock, and   hyperglycemic-hyperosmolar state, amongst others. Venous or arterial   collection is the recommended specimen for testing these patients.      Admission on 03/14/2019, Discharged on 03/14/2019   Component Date Value Ref Range Status    CK 03/14/2019 72  26 - 192 U/L Final    WBC 03/14/2019 8.8  4.6 - 13.2 K/uL Final    RBC 03/14/2019 4.45  4.20 - 5.30 M/uL Final    HGB 03/14/2019 14.5  12.0 - 16.0 g/dL Final    HCT 03/14/2019 42.3  35.0 - 45.0 % Final    MCV 03/14/2019 95.1  74.0 - 97.0 FL Final    MCH 03/14/2019 32.6  24.0 - 34.0 PG Final    MCHC 03/14/2019 34.3  31.0 - 37.0 g/dL Final    RDW 03/14/2019 12.3  11.6 - 14.5 % Final    PLATELET 81/61/5040 323  135 - 420 K/uL Final    MPV 03/14/2019 9.3  9.2 - 11.8 FL Final    NEUTROPHILS 03/14/2019 61  40 - 73 % Final    LYMPHOCYTES 03/14/2019 25  21 - 52 % Final    MONOCYTES 03/14/2019 13* 3 - 10 % Final    EOSINOPHILS 03/14/2019 1  0 - 5 % Final    BASOPHILS 03/14/2019 0  0 - 2 % Final  ABS. NEUTROPHILS 03/14/2019 5.3  1.8 - 8.0 K/UL Final    ABS. LYMPHOCYTES 03/14/2019 2.2  0.9 - 3.6 K/UL Final    ABS. MONOCYTES 03/14/2019 1.1  0.05 - 1.2 K/UL Final    ABS. EOSINOPHILS 03/14/2019 0.1  0.0 - 0.4 K/UL Final    ABS. BASOPHILS 03/14/2019 0.0  0.0 - 0.1 K/UL Final    DF 03/14/2019 AUTOMATED    Final    Sodium 03/14/2019 139  136 - 145 mmol/L Final    Potassium 03/14/2019 4.0  3.5 - 5.5 mmol/L Final    Chloride 03/14/2019 105  100 - 108 mmol/L Final    CO2 03/14/2019 28  21 - 32 mmol/L Final    Anion gap 03/14/2019 6  3.0 - 18 mmol/L Final    Glucose 03/14/2019 112* 74 - 99 mg/dL Final    BUN 03/14/2019 18  7.0 - 18 MG/DL Final    Creatinine 03/14/2019 0.71  0.6 - 1.3 MG/DL Final    BUN/Creatinine ratio 03/14/2019 25* 12 - 20   Final    GFR est AA 03/14/2019 >60  >60 ml/min/1.73m2 Final    GFR est non-AA 03/14/2019 >60  >60 ml/min/1.73m2 Final    Comment: (NOTE)  Estimated GFR is calculated using the Modification of Diet in Renal   Disease (MDRD) Study equation, reported for both  Americans   (GFRAA) and non- Americans (GFRNA), and normalized to 1.73m2   body surface area. The physician must decide which value applies to   the patient. The MDRD study equation should only be used in   individuals age 25 or older. It has not been validated for the   following: pregnant women, patients with serious comorbid conditions,   or on certain medications, or persons with extremes of body size,   muscle mass, or nutritional status.       Calcium 03/14/2019 9.8  8.5 - 10.1 MG/DL Final    Ventricular Rate 03/14/2019 62  BPM Final    Atrial Rate 03/14/2019 62  BPM Final    P-R Interval 03/14/2019 146  ms Final    QRS Duration 03/14/2019 74  ms Final    Q-T Interval 03/14/2019 406  ms Final    QTC Calculation (Bezet) 03/14/2019 412  ms Final    Calculated P Axis 03/14/2019 79  degrees Final    Calculated R Axis 03/14/2019 48  degrees Final    Calculated T Axis 03/14/2019 65 degrees Final    Diagnosis 03/14/2019    Final                    Value:Normal sinus rhythm  Normal ECG  When compared with ECG of 22-SEP-2018 13:04,  QT has shortened  Confirmed by Aime Malagon MD, ----- ([id number]) on 3/14/2019 2:04:55 PM      Influenza A Antigen 03/14/2019 NEGATIVE   NEG   Final    A negative result does not exclude influenza virus infection, seasonal or H1N1 due to suboptimal sensitivity. If influenza is circulating in your community, a diagnosis of influenza should be considered based on a patients clinical presentation and empiric antiviral treatment should be considered, if indicated.  Influenza B Antigen 03/14/2019 NEGATIVE   NEG   Final       .No results found for any visits on 05/15/19. Assessment / Plan:      ICD-10-CM ICD-9-CM    1. Anxiety F41.9 300.00 busPIRone (BUSPAR) 7.5 mg tablet   2. Cervical neck pain with evidence of disc disease M50.90 722.91    3. Hyperlipidemia, unspecified hyperlipidemia type E78.5 272.4      Anxiety-refill buspirone prescription  Cervical neck pain-managed with Dr. Teresita Ramos. Patient notes she wears a cervical neck collar about half of the day  Hyperlipidemia-last labs okay. Patient needs lipid panel next visit  Follow-up in 4 months    Follow-up and Dispositions    · Return in about 4 months (around 9/15/2019). I asked the patient if she  had any questions and answered her  questions. The patient stated that she understands the treatment plan and agrees with the treatment plan    This document was created with a voice activated dictation system and may contain transcription errors.

## 2019-06-04 ENCOUNTER — TELEPHONE (OUTPATIENT)
Dept: PULMONOLOGY | Age: 74
End: 2019-06-04

## 2019-06-04 NOTE — TELEPHONE ENCOUNTER
Per pt, yesterday pt states she felt sick to stomach and vomited x 1. She states she saw spots of blood in vomit. She states she then laid down. She woke up and blew nose that was clot of blood and also clot from mouth. No cough or congestion. All came from throwing up. Pt has not seen any more signs of bleeding throughout the rest of yesterday or this am.   Advised pt she needs to let her PCP know to see if they want to evaluate.  Pt verbalizes understanding and agrees with plan

## 2019-06-04 NOTE — TELEPHONE ENCOUNTER
Pt stated yesterday she threw up spots of blood. After she tried to lay down and blood + clots came out of her mouth + nose. Please advise [phone].

## 2019-06-05 ENCOUNTER — OFFICE VISIT (OUTPATIENT)
Dept: FAMILY MEDICINE CLINIC | Facility: CLINIC | Age: 74
End: 2019-06-05

## 2019-06-05 VITALS
RESPIRATION RATE: 16 BRPM | BODY MASS INDEX: 22.78 KG/M2 | TEMPERATURE: 97.7 F | WEIGHT: 116 LBS | SYSTOLIC BLOOD PRESSURE: 128 MMHG | DIASTOLIC BLOOD PRESSURE: 62 MMHG | OXYGEN SATURATION: 98 % | HEIGHT: 60 IN | HEART RATE: 75 BPM

## 2019-06-05 DIAGNOSIS — R04.0 NOSEBLEED, SYMPTOM: ICD-10-CM

## 2019-06-05 DIAGNOSIS — K92.0 HEMATEMESIS WITHOUT NAUSEA: Primary | ICD-10-CM

## 2019-06-05 DIAGNOSIS — R09.81 NASAL CONGESTION: ICD-10-CM

## 2019-06-05 RX ORDER — LORATADINE 10 MG/1
10 TABLET ORAL DAILY
Qty: 90 TAB | Refills: 1 | Status: SHIPPED | OUTPATIENT
Start: 2019-06-05 | End: 2021-02-04

## 2019-06-05 NOTE — PROGRESS NOTES
Sheyla Yeager is a 72 y.o. female presenting today for Vomiting Blood  . HPI:  Sheyla Yeager presents to the office today for hematemesis and blood when blowing her nose. Patient awoke yesterday morning and felt a lump in her throat. She notes that she went to the bathroom and vomited and noticed some yellow, mucousy secretions with dark red blood. She notes that later that same morning she felt like she needed to blow her nose in which she saw blood again and notice a blood clot. Patient notes she does have some congestion and rhinorrhea. She currently takes Flonase and Singulair differently. She denies taking any antihistamines. She is negative for abdominal pain or abdominal cramping. She denies any cough, wheezing, hematochezia or diarrhea. He notes the episode was isolated episode and she has not had any further problems. She is negative for chest pain, palpitation or dyspnea. Review of Systems   HENT: Positive for congestion and nosebleeds. Respiratory: Negative for cough and sputum production. Cardiovascular: Negative for chest pain and palpitations. Gastrointestinal: Positive for vomiting (with dark red blood). Negative for constipation and diarrhea. Allergies   Allergen Reactions    Celebrex [Celecoxib] Other (comments)     Tiredness        Current Outpatient Medications   Medication Sig Dispense Refill    loratadine (CLARITIN) 10 mg tablet Take 1 Tab by mouth daily. 90 Tab 1    naproxen sodium (ALEVE) 220 mg cap Take 2 Caps by mouth two (2) times a day.  doxylamine succinate (UNISOM) 25 mg tablet Take 25 mg by mouth nightly as needed for Sleep.  busPIRone (BUSPAR) 7.5 mg tablet take 1 tablet by mouth twice a day (Patient taking differently: take 1 tablet by mouth twice a day  Patient states she take 1 a day) 60 Tab 1    denosumab (PROLIA) 60 mg/mL injection 60 mg by SubCUTAneous route once.  turmeric 400 mg cap Take  by mouth.       fluticasone (FLONASE) 50 mcg/actuation nasal spray instill 2 sprays into each nostril once daily 3 Bottle 1    atorvastatin (LIPITOR) 20 mg tablet take 1 tablet by mouth once daily 90 Tab 3    montelukast (SINGULAIR) 10 mg tablet take 1 tablet by mouth once daily 90 Tab 2    omeprazole (PRILOSEC) 20 mg capsule Take 20 mg by mouth daily.  aspirin 81 mg chewable tablet Take 1 Tab by mouth daily.  27 Tab 3       Past Medical History:   Diagnosis Date    Abnormal Pap smear     Dr. Mauricio Lay    Allergic rhinitis     Arthritis     Cerebrovascular small vessel disease 3/18/2019    CT 3/17/2019    Chronic pain     Concentric left ventricular hypertrophy 3/18/2019    With left ventricular diastolic dysfunction by echocardiogram 3/18/2019    Hypercholesterolemia     Neck pain 5/17/2010    Stroke ([facility] [address] 10/02/2017    TIA- legs weak memory issues       Past Surgical History:   Procedure Laterality Date    COLONOSCOPY N/A 6/4/2018    COLONOSCOPY / polypectomy performed by Harshal Castaneda MD at [facility] ENDOSCOPY    HX GYN      Total Hyst    HX LAP CHOLECYSTECTOMY      HX OTHER SURGICAL  2017    Throat widened       Social History     Socioeconomic History    Marital status:      Spouse name: Not on file    Number of children: Not on file    Years of education: Not on file    Highest education level: Not on file   Occupational History    Not on file   Social Needs    Financial resource strain: Not on file    Food insecurity:     Worry: Not on file     Inability: Not on file    Transportation needs:     Medical: Not on file     Non-medical: Not on file   Tobacco Use    Smoking status: Never Smoker    Smokeless tobacco: Never Used   Substance and Sexual Activity    Alcohol use: No    Drug use: No    Sexual activity: Never   Lifestyle    Physical activity:     Days per week: Not on file     Minutes per session: Not on file    Stress: Not on file   Relationships    Social connections:     Talks on phone: Not on file Gets together: Not on file     Attends [employer]ism service: Not on file     Active member of club or organization: Not on file     Attends meetings of clubs or organizations: Not on file     Relationship status: Not on file    Intimate partner violence:     Fear of current or ex partner: Not on file     Emotionally abused: Not on file     Physically abused: Not on file     Forced sexual activity: Not on file   Other Topics Concern    Not on file   Social History Narrative    Not on file       Patient does not have an advanced directive on file    Vitals:    06/05/19 0712   BP: 128/62   Pulse: 75   Resp: 16   Temp: 97.7 °F (36.5 °C)   TempSrc: Oral   SpO2: 98%   Weight: 116 lb (52.6 kg)   Height: 5' (1.524 m)   PainSc:   2   PainLoc: Neck       Physical Exam   Constitutional: No distress. HENT:   Right Ear: External ear normal.   Left Ear: External ear normal.   Mouth/Throat: Oropharynx is clear and moist. No oropharyngeal exudate. Left nare mild more erythematous   Cardiovascular: Normal rate, regular rhythm and normal heart sounds. Pulmonary/Chest: Effort normal and breath sounds normal.   Abdominal: Soft. Bowel sounds are normal.   Neurological: She is alert. Nursing note and vitals reviewed. Hospital Outpatient Visit on 03/27/2019   Component Date Value Ref Range Status    Magnesium 03/27/2019 2.3  1.6 - 2.6 mg/dL Final    Phosphorus 03/27/2019 3.9  2.5 - 4.9 MG/DL Final   Admission on 03/17/2019, Discharged on 03/20/2019   Component Date Value Ref Range Status    WBC 03/17/2019 9.3  4.6 - 13.2 K/uL Final    RBC 03/17/2019 4.20  4. 20 - 5.30 M/uL Final    HGB 03/17/2019 13.1  12.0 - 16.0 g/dL Final    HCT 03/17/2019 39.8  35.0 - 45.0 % Final    MCV 03/17/2019 94.8  74.0 - 97.0 FL Final    MCH 03/17/2019 31.2  24.0 - 34.0 PG Final    MCHC 03/17/2019 32.9  31.0 - 37.0 g/dL Final    RDW 03/17/2019 12.2  11.6 - 14.5 % Final    PLATELET 88/14/7971 499  135 - 420 K/uL Final    MPV 03/17/2019 9.2  9.2 - 11.8 FL Final    NEUTROPHILS 03/17/2019 61  40 - 73 % Final    LYMPHOCYTES 03/17/2019 26  21 - 52 % Final    MONOCYTES 03/17/2019 12* 3 - 10 % Final    EOSINOPHILS 03/17/2019 1  0 - 5 % Final    BASOPHILS 03/17/2019 0  0 - 2 % Final    ABS. NEUTROPHILS 03/17/2019 5.6  1.8 - 8.0 K/UL Final    ABS. LYMPHOCYTES 03/17/2019 2.4  0.9 - 3.6 K/UL Final    ABS. MONOCYTES 03/17/2019 1.1  0.05 - 1.2 K/UL Final    ABS. EOSINOPHILS 03/17/2019 0.1  0.0 - 0.4 K/UL Final    ABS. BASOPHILS 03/17/2019 0.0  0.0 - 0.1 K/UL Final    DF 03/17/2019 AUTOMATED    Final    Sodium 03/17/2019 141  136 - 145 mmol/L Final    Potassium 03/17/2019 3.6  3.5 - 5.5 mmol/L Final    Chloride 03/17/2019 108  100 - 108 mmol/L Final    CO2 03/17/2019 27  21 - 32 mmol/L Final    Anion gap 03/17/2019 6  3.0 - 18 mmol/L Final    Glucose 03/17/2019 87  74 - 99 mg/dL Final    BUN 03/17/2019 18  7.0 - 18 MG/DL Final    Creatinine 03/17/2019 0.77  0.6 - 1.3 MG/DL Final    BUN/Creatinine ratio 03/17/2019 23* 12 - 20   Final    GFR est AA 03/17/2019 >60  >60 ml/min/1.73m2 Final    GFR est non-AA 03/17/2019 >60  >60 ml/min/1.73m2 Final    Comment: (NOTE)  Estimated GFR is calculated using the Modification of Diet in Renal   Disease (MDRD) Study equation, reported for both  Americans   (GFRAA) and non- Americans (GFRNA), and normalized to 1.73m2   body surface area. The physician must decide which value applies to   the patient. The MDRD study equation should only be used in   individuals age 25 or older. It has not been validated for the   following: pregnant women, patients with serious comorbid conditions,   or on certain medications, or persons with extremes of body size,   muscle mass, or nutritional status.       Calcium 03/17/2019 8.7  8.5 - 10.1 MG/DL Final    Bilirubin, total 03/17/2019 0.5  0.2 - 1.0 MG/DL Final    ALT (SGPT) 03/17/2019 31  13 - 56 U/L Final    AST (SGOT) 03/17/2019 25  15 - 37 U/L Final  Alk. phosphatase 03/17/2019 72  45 - 117 U/L Final    Protein, total 03/17/2019 7.3  6.4 - 8.2 g/dL Final    Albumin 03/17/2019 4.2  3.4 - 5.0 g/dL Final    Globulin 03/17/2019 3.1  2.0 - 4.0 g/dL Final    A-G Ratio 03/17/2019 1.4  0.8 - 1.7   Final    Ventricular Rate 03/17/2019 69  BPM Final    Atrial Rate 03/17/2019 69  BPM Final    P-R Interval 03/17/2019 148  ms Final    QRS Duration 03/17/2019 76  ms Final    Q-T Interval 03/17/2019 434  ms Final    QTC Calculation (Bezet) 03/17/2019 465  ms Final    Calculated P Axis 03/17/2019 76  degrees Final    Calculated R Axis 03/17/2019 55  degrees Final    Calculated T Axis 03/17/2019 70  degrees Final    Diagnosis 03/17/2019    Final                    Value:Normal sinus rhythm  Possible Left atrial enlargement  Borderline ECG  When compared with ECG of 14-MAR-2019 12:49,  No significant change was found  Confirmed by Malvin Bernard MD, Melissa Bundy ([id number]) on 3/18/2019 8:32:37 AM      NT pro-BNP 03/17/2019 213  0 - 900 PG/ML Final    Comment:           For patients with dyspnea, NT proBNP is highly sensitive for detecting acute congestive heart failure. Also, an NT proBNP <300 pg/mL effectively rules out acute congestive heart failure, with 99% negative predictive value. Our reference ranges are for acute dyspnea. Age              Range (pg/ml)                            0-49                0-450        50-75               0-900        >75                 0-1800       For ambulatory office patients, the ranges below apply: For patients with dyspnea, NT proBNP is highly sensitive for detecting acute congestive heart failure. Also, an NT proBNP <300 pg/mL effectively rules out acute congestive heart failure, with 99% negative predictive value. Our reference ranges are for acute dyspnea.       CK 03/17/2019 107  26 - 192 U/L Final    CK - MB 03/17/2019 1.4  <3.6 ng/ml Final    CK-MB Index 03/17/2019 1.3  0.0 - 4.0 % Final    Troponin-I, QT 03/17/2019 <0.02  0.0 - 0.045 NG/ML Final    Comment: The presence of detectable troponin above the reference range indicates myocardial injury which may be due to ischemia, myocarditis, trauma, etc.  Clinical correlation is necessary to establish the significance of this finding. Sequential testing is recommended to determine if the typical rise and fall of cTnI is demonstrated. Note:  Cardiac troponin I has a relatively long half life and may be present well after the CK MB has returned to baseline. The reference range is based on the 99th percentile of the referent population.       LVIDd 03/18/2019 3.26* 3.9 - 5.3 cm Final    LVPWd 03/18/2019 1.10* 0.6 - 0.9 cm Final    LVIDs 03/18/2019 2.29  cm Final    IVSd 03/18/2019 1.07* 0.6 - 0.9 cm Final    LVOT d 03/18/2019 1.93  cm Final    LVOT Peak Velocity 03/18/2019 86.37  cm/s Final    LVOT Peak Gradient 03/18/2019 3.0  mmHg Final    LVOT VTI 03/18/2019 19.73  cm Final    MV A Zi 03/18/2019 99.72  cm/s Final    MV E Zi 03/18/2019 83.87  cm/s Final    MV E/A 03/18/2019 0.84   Final    Inferior Vena Cava Diameter Sniffi* 03/18/2019 1.50  cm Final    LA Vol 4C 03/18/2019 33.03  22 - 52 mL Final    LA Vol 2C 03/18/2019 38.63  22 - 52 mL Final    LA Area 4C 03/18/2019 14.0  cm2 Final    LV Mass AL 03/18/2019 116.9  67 - 162 g Final    LV Mass AL Index 03/18/2019 77.3  43 - 95 g/m2 Final    Mitral Valve E Wave Deceleration T* 03/18/2019 179.3  ms Final    Triscuspid Valve Regurgitation Pea* 03/18/2019 31.9  mmHg Final    TR Max Velocity 03/18/2019 282.29  cm/s Final    PASP 03/18/2019 35.0  mmHg Final    LA Vol Index 03/18/2019 25.53  16 - 28 ml/m2 Final    LA Vol Index 03/18/2019 21.83  16 - 28 ml/m2 Final    LA Volume 03/18/2019 41.01  22 - 52 mL Final    Ao Root D 03/18/2019 2.97  cm Final    LA Vol Index 03/18/2019 27.11  16 - 28 ml/m2 Final    Hemoglobin A1c 03/18/2019 5.8* 4.2 - 5.6 % Final    Comment: (NOTE)  HbA1C Interpretive Ranges  <5.7              Normal  5.7 - 6.4         Consider Prediabetes  >6.5              Consider Diabetes      Est. average glucose 03/18/2019 120  mg/dL Final    Comment: (NOTE)  The eAG should be interpreted with patient characteristics in mind   since ethnicity, interindividual differences, red cell lifespan,   variation in rates of glycation, etc. may affect the validity of the   calculation.  CK 03/18/2019 112  26 - 192 U/L Final    CK - MB 03/18/2019 1.4  <3.6 ng/ml Final    CK-MB Index 03/18/2019 1.3  0.0 - 4.0 % Final    Troponin-I, QT 03/18/2019 <0.02  0.0 - 0.045 NG/ML Final    Comment: The presence of detectable troponin above the reference range indicates myocardial injury which may be due to ischemia, myocarditis, trauma, etc.  Clinical correlation is necessary to establish the significance of this finding. Sequential testing is recommended to determine if the typical rise and fall of cTnI is demonstrated. Note:  Cardiac troponin I has a relatively long half life and may be present well after the CK MB has returned to baseline. The reference range is based on the 99th percentile of the referent population.  LIPID PROFILE 03/18/2019        Final    Cholesterol, total 03/18/2019 134  <200 MG/DL Final    Triglyceride 03/18/2019 65  <150 MG/DL Final    Comment: The drugs N-acetylcysteine (NAC) and  Metamiszole have been found to cause falsely  low results in this chemical assay. Please  be sure to submit blood samples obtained  BEFORE administration of either of these  drugs to assure correct results.       HDL Cholesterol 03/18/2019 73* 40 - 60 MG/DL Final    LDL, calculated 03/18/2019 48  0 - 100 MG/DL Final    VLDL, calculated 03/18/2019 13  MG/DL Final    CHOL/HDL Ratio 03/18/2019 1.8  0 - 5.0   Final    Glucose (POC) 03/18/2019 88  70 - 110 mg/dL Final    Comment: (NOTE)  The FDA has indicated that no capillary point of care blood glucose   monitoring systems are approved for use in \"critically ill\" patients,   however they have not defined this population. The College of   American Pathologists has recommended that these devices should not   be used in cases such as severe hypotension, dehydration, shock, and   hyperglycemic-hyperosmolar state, amongst others. Venous or arterial   collection is the recommended specimen for testing these patients.  Glucose (POC) 03/18/2019 111* 70 - 110 mg/dL Final    Comment: (NOTE)  The FDA has indicated that no capillary point of care blood glucose   monitoring systems are approved for use in \"critically ill\" patients,   however they have not defined this population. The College of   American Pathologists has recommended that these devices should not   be used in cases such as severe hypotension, dehydration, shock, and   hyperglycemic-hyperosmolar state, amongst others. Venous or arterial   collection is the recommended specimen for testing these patients.  Glucose (POC) 03/19/2019 95  70 - 110 mg/dL Final    Comment: (NOTE)  The FDA has indicated that no capillary point of care blood glucose   monitoring systems are approved for use in \"critically ill\" patients,   however they have not defined this population. The College of   American Pathologists has recommended that these devices should not   be used in cases such as severe hypotension, dehydration, shock, and   hyperglycemic-hyperosmolar state, amongst others. Venous or arterial   collection is the recommended specimen for testing these patients.  Glucose (POC) 03/19/2019 106  70 - 110 mg/dL Final    Comment: (NOTE)  The FDA has indicated that no capillary point of care blood glucose   monitoring systems are approved for use in \"critically ill\" patients,   however they have not defined this population.  The College of   American Pathologists has recommended that these devices should not   be used in cases such as severe hypotension, dehydration, shock, and hyperglycemic-hyperosmolar state, amongst others. Venous or arterial   collection is the recommended specimen for testing these patients.  Glucose (POC) 03/19/2019 149* 70 - 110 mg/dL Final    Comment: (NOTE)  The FDA has indicated that no capillary point of care blood glucose   monitoring systems are approved for use in \"critically ill\" patients,   however they have not defined this population. The College of   American Pathologists has recommended that these devices should not   be used in cases such as severe hypotension, dehydration, shock, and   hyperglycemic-hyperosmolar state, amongst others. Venous or arterial   collection is the recommended specimen for testing these patients.  Glucose (POC) 03/20/2019 132* 70 - 110 mg/dL Final    Comment: (NOTE)  The FDA has indicated that no capillary point of care blood glucose   monitoring systems are approved for use in \"critically ill\" patients,   however they have not defined this population. The College of   American Pathologists has recommended that these devices should not   be used in cases such as severe hypotension, dehydration, shock, and   hyperglycemic-hyperosmolar state, amongst others. Venous or arterial   collection is the recommended specimen for testing these patients.  Glucose (POC) 03/20/2019 201* 70 - 110 mg/dL Final    Comment: (NOTE)  The FDA has indicated that no capillary point of care blood glucose   monitoring systems are approved for use in \"critically ill\" patients,   however they have not defined this population. The College of   American Pathologists has recommended that these devices should not   be used in cases such as severe hypotension, dehydration, shock, and   hyperglycemic-hyperosmolar state, amongst others. Venous or arterial   collection is the recommended specimen for testing these patients.       Glucose (POC) 03/20/2019 152* 70 - 110 mg/dL Final    Comment: (NOTE)  The FDA has indicated that no capillary point of care blood glucose   monitoring systems are approved for use in \"critically ill\" patients,   however they have not defined this population. The College of   American Pathologists has recommended that these devices should not   be used in cases such as severe hypotension, dehydration, shock, and   hyperglycemic-hyperosmolar state, amongst others. Venous or arterial   collection is the recommended specimen for testing these patients. Admission on 03/14/2019, Discharged on 03/14/2019   Component Date Value Ref Range Status    CK 03/14/2019 72  26 - 192 U/L Final    WBC 03/14/2019 8.8  4.6 - 13.2 K/uL Final    RBC 03/14/2019 4.45  4.20 - 5.30 M/uL Final    HGB 03/14/2019 14.5  12.0 - 16.0 g/dL Final    HCT 03/14/2019 42.3  35.0 - 45.0 % Final    MCV 03/14/2019 95.1  74.0 - 97.0 FL Final    MCH 03/14/2019 32.6  24.0 - 34.0 PG Final    MCHC 03/14/2019 34.3  31.0 - 37.0 g/dL Final    RDW 03/14/2019 12.3  11.6 - 14.5 % Final    PLATELET 89/28/2852 026  135 - 420 K/uL Final    MPV 03/14/2019 9.3  9.2 - 11.8 FL Final    NEUTROPHILS 03/14/2019 61  40 - 73 % Final    LYMPHOCYTES 03/14/2019 25  21 - 52 % Final    MONOCYTES 03/14/2019 13* 3 - 10 % Final    EOSINOPHILS 03/14/2019 1  0 - 5 % Final    BASOPHILS 03/14/2019 0  0 - 2 % Final    ABS. NEUTROPHILS 03/14/2019 5.3  1.8 - 8.0 K/UL Final    ABS. LYMPHOCYTES 03/14/2019 2.2  0.9 - 3.6 K/UL Final    ABS. MONOCYTES 03/14/2019 1.1  0.05 - 1.2 K/UL Final    ABS. EOSINOPHILS 03/14/2019 0.1  0.0 - 0.4 K/UL Final    ABS.  BASOPHILS 03/14/2019 0.0  0.0 - 0.1 K/UL Final    DF 03/14/2019 AUTOMATED    Final    Sodium 03/14/2019 139  136 - 145 mmol/L Final    Potassium 03/14/2019 4.0  3.5 - 5.5 mmol/L Final    Chloride 03/14/2019 105  100 - 108 mmol/L Final    CO2 03/14/2019 28  21 - 32 mmol/L Final    Anion gap 03/14/2019 6  3.0 - 18 mmol/L Final    Glucose 03/14/2019 112* 74 - 99 mg/dL Final    BUN 03/14/2019 18  7.0 - 18 MG/DL Final    Creatinine 03/14/2019 0.71  0.6 - 1.3 MG/DL Final    BUN/Creatinine ratio 03/14/2019 25* 12 - 20   Final    GFR est AA 03/14/2019 >60  >60 ml/min/1.73m2 Final    GFR est non-AA 03/14/2019 >60  >60 ml/min/1.73m2 Final    Comment: (NOTE)  Estimated GFR is calculated using the Modification of Diet in Renal   Disease (MDRD) Study equation, reported for both  Americans   (GFRAA) and non- Americans (GFRNA), and normalized to 1.73m2   body surface area. The physician must decide which value applies to   the patient. The MDRD study equation should only be used in   individuals age 25 or older. It has not been validated for the   following: pregnant women, patients with serious comorbid conditions,   or on certain medications, or persons with extremes of body size,   muscle mass, or nutritional status.  Calcium 03/14/2019 9.8  8.5 - 10.1 MG/DL Final    Ventricular Rate 03/14/2019 62  BPM Final    Atrial Rate 03/14/2019 62  BPM Final    P-R Interval 03/14/2019 146  ms Final    QRS Duration 03/14/2019 74  ms Final    Q-T Interval 03/14/2019 406  ms Final    QTC Calculation (Bezet) 03/14/2019 412  ms Final    Calculated P Axis 03/14/2019 79  degrees Final    Calculated R Axis 03/14/2019 48  degrees Final    Calculated T Axis 03/14/2019 65  degrees Final    Diagnosis 03/14/2019    Final                    Value:Normal sinus rhythm  Normal ECG  When compared with ECG of 22-SEP-2018 13:04,  QT has shortened  Confirmed by Ana Maria Rowley MD, ----- ([id number]) on 3/14/2019 2:04:55 PM      Influenza A Antigen 03/14/2019 NEGATIVE   NEG   Final    A negative result does not exclude influenza virus infection, seasonal or H1N1 due to suboptimal sensitivity. If influenza is circulating in your community, a diagnosis of influenza should be considered based on a patients clinical presentation and empiric antiviral treatment should be considered, if indicated.     Influenza B Antigen 03/14/2019 NEGATIVE   NEG Final       .No results found for any visits on 06/05/19. Assessment / Plan:      ICD-10-CM ICD-9-CM    1. Hematemesis without nausea K92.0 578.0 loratadine (CLARITIN) 10 mg tablet   2. Nasal congestion R09.81 478.19    3. Nosebleed, symptom R04.0 784.7      Humidifying to bed at room  Loratadine 10 mg tablet  We will continue to monitor for additional bleeding  Last platelet count was 100 k/uL        Follow-up and Dispositions    · Return if symptoms worsen or fail to improve. I asked the patient if she  had any questions and answered her  questions. The patient stated that she understands the treatment plan and agrees with the treatment plan    This document was created with a voice activated dictation system and may contain transcription errors.

## 2019-06-05 NOTE — PROGRESS NOTES
Chino Zhang presents today for   Chief Complaint   Patient presents with    Vomiting Blood       Starla Friedman preferred language for health care discussion is english. Is someone accompanying this pt? no    Is the patient using any DME equipment during [address]? no    Depression Screening:  3 most recent PHQ Screens 6/5/2019   PHQ Not Done -   Little interest or pleasure in doing things Not at all   Feeling down, depressed, irritable, or hopeless Not at all   Total Score PHQ 2 0       Learning Assessment:  Learning Assessment 2/26/2019   PRIMARY LEARNER Patient   HIGHEST LEVEL OF EDUCATION - PRIMARY LEARNER  -   CO-LEARNER CAREGIVER -   PRIMARY LANGUAGE ENGLISH   LEARNER PREFERENCE PRIMARY DEMONSTRATION   ANSWERED BY Patient   RELATIONSHIP SELF       Abuse Screening:  Abuse Screening Questionnaire 2/9/2018   Do you ever feel afraid of your partner? N   Are you in a relationship with someone who physically or mentally threatens you? N   Is it safe for you to go home? Y       Fall Risk  Fall Risk Assessment, last 12 mths 6/5/2019   Able to walk? Yes   Fall in past 12 months? No       Health Maintenance reviewed and discussed and ordered per Provider. Health Maintenance Due   Topic Date Due    Pneumococcal 65+ years (1 of 2 - PCV13) 06/24/2012   . Chino Zhang is updated on all     Pt currently taking Antiplatelet therapy? no    Coordination of Care:  1. Have you been to the ER, urgent care clinic since your last visit? no Hospitalized since your last visit? no    2. Have you seen or consulted any other health care providers outside of the [address] [place] [address] since your last visit? no Include any pap smears or colon screening.  no

## 2019-06-18 ENCOUNTER — HOSPITAL ENCOUNTER (OUTPATIENT)
Dept: SLEEP MEDICINE | Age: 74
Discharge: HOME | End: 2019-06-18
Payer: MEDICARE

## 2019-06-18 DIAGNOSIS — G47.33 OSA (OBSTRUCTIVE SLEEP APNEA): ICD-10-CM

## 2019-06-18 PROCEDURE — 95811 POLYSOM 6/>YRS CPAP 4/> PARM: CPT

## 2019-06-19 ENCOUNTER — PATIENT MESSAGE (OUTPATIENT)
Dept: FAMILY MEDICINE CLINIC | Facility: CLINIC | Age: 74
End: 2019-06-19

## 2019-06-19 VITALS
SYSTOLIC BLOOD PRESSURE: 135 MMHG | WEIGHT: 119.2 LBS | HEART RATE: 72 BPM | HEIGHT: 61 IN | DIASTOLIC BLOOD PRESSURE: 73 MMHG | BODY MASS INDEX: 22.51 KG/M2

## 2019-06-19 DIAGNOSIS — J02.9 SORE THROAT: ICD-10-CM

## 2019-06-19 DIAGNOSIS — R05.9 COUGH: ICD-10-CM

## 2019-06-23 RX ORDER — MONTELUKAST SODIUM 10 MG/1
TABLET ORAL
Qty: 90 TAB | Refills: 2 | Status: SHIPPED | OUTPATIENT
Start: 2019-06-23 | End: 2020-02-19

## 2019-06-28 ENCOUNTER — OFFICE VISIT (OUTPATIENT)
Dept: NEUROLOGY | Age: 74
End: 2019-06-28

## 2019-06-28 VITALS
TEMPERATURE: 97.1 F | HEIGHT: 60 IN | OXYGEN SATURATION: 97 % | DIASTOLIC BLOOD PRESSURE: 74 MMHG | WEIGHT: 119 LBS | BODY MASS INDEX: 23.36 KG/M2 | SYSTOLIC BLOOD PRESSURE: 114 MMHG | RESPIRATION RATE: 16 BRPM | HEART RATE: 71 BPM

## 2019-06-28 DIAGNOSIS — G47.33 OSA (OBSTRUCTIVE SLEEP APNEA): Primary | ICD-10-CM

## 2019-06-28 DIAGNOSIS — M48.02 CERVICAL STENOSIS OF SPINAL CANAL: ICD-10-CM

## 2019-06-28 NOTE — PROGRESS NOTES
ROOM # 2    Marcia Hightower presents today for   Chief Complaint   Patient presents with    Follow-up    Sleep Study         Visit Vitals  /74 (BP 1 Location: Left arm, BP Patient Position: Sitting)   Pulse 71   Temp 97.1 °F (36.2 °C) (Oral)   Resp 16   Ht 5' (1.524 m)   Wt 119 lb (54 kg)   SpO2 97%   BMI 23.24 kg/m²         Advance Directive:  1. Do you have an advance directive in place? Patient Reply: No    2. If not, would you like material regarding how to put one in place? Patient Reply: No    Coordination of Care:  1. Have you been to the ER, urgent care clinic since your last visit? Hospitalized since your last visit?  No

## 2019-06-28 NOTE — PROGRESS NOTES
6/28/2019 10:47 AM    SSN: [id number]    Subjective:   68-year-old female who I last saw in April for history of chronic vertigo, structural cervical spine disease, and symptoms of obstructive sleep apnea. Last time here we reviewed an overnight sleep study on April 14 showed overall AHI of 9.5 which increased during REM to 13 with desaturations down to a minimum of 84%. A CPAP trial in June 18 showed she responded well to CPAP at 6 cm of H2O with a Leona nasal S/M mask. She is stable from her cervical spine disease.         Social History     Socioeconomic History    Marital status:      Spouse name: Not on file    Number of children: Not on file    Years of education: Not on file    Highest education level: Not on file   Occupational History    Not on file   Social Needs    Financial resource strain: Not on file    Food insecurity:     Worry: Not on file     Inability: Not on file    Transportation needs:     Medical: Not on file     Non-medical: Not on file   Tobacco Use    Smoking status: Never Smoker    Smokeless tobacco: Never Used   Substance and Sexual Activity    Alcohol use: No    Drug use: No    Sexual activity: Never   Lifestyle    Physical activity:     Days per week: Not on file     Minutes per session: Not on file    Stress: Not on file   Relationships    Social connections:     Talks on phone: Not on file     Gets together: Not on file     Attends Yarsani service: Not on file     Active member of club or organization: Not on file     Attends meetings of clubs or organizations: Not on file     Relationship status: Not on file    Intimate partner violence:     Fear of current or ex partner: Not on file     Emotionally abused: Not on file     Physically abused: Not on file     Forced sexual activity: Not on file   Other Topics Concern    Not on file   Social History Narrative    Not on file       Family History   Problem Relation Age of Onset    Cancer Mother         breast    Heart Disease Father        Current Outpatient Medications   Medication Sig Dispense Refill    montelukast (SINGULAIR) 10 mg tablet take 1 tablet by mouth once daily 90 Tab 2    loratadine (CLARITIN) 10 mg tablet Take 1 Tab by mouth daily. 90 Tab 1    naproxen sodium (ALEVE) 220 mg cap Take 2 Caps by mouth two (2) times a day.  doxylamine succinate (UNISOM) 25 mg tablet Take 25 mg by mouth nightly as needed for Sleep.  busPIRone (BUSPAR) 7.5 mg tablet take 1 tablet by mouth twice a day (Patient taking differently: take 1 tablet by mouth twice a day  Patient states she take 1 a day) 60 Tab 1    denosumab (PROLIA) 60 mg/mL injection 60 mg by SubCUTAneous route once.  turmeric 400 mg cap Take  by mouth.  fluticasone (FLONASE) 50 mcg/actuation nasal spray instill 2 sprays into each nostril once daily 3 Bottle 1    atorvastatin (LIPITOR) 20 mg tablet take 1 tablet by mouth once daily 90 Tab 3    omeprazole (PRILOSEC) 20 mg capsule Take 20 mg by mouth daily.  aspirin 81 mg chewable tablet Take 1 Tab by mouth daily.  30 Tab 3       Past Medical History:   Diagnosis Date    Abnormal Pap smear     Dr. Colleen Huggins Allergic rhinitis     Arthritis     Cerebrovascular small vessel disease 3/18/2019    CT 3/17/2019    Chronic pain     Concentric left ventricular hypertrophy 3/18/2019    With left ventricular diastolic dysfunction by echocardiogram 3/18/2019    Hypercholesterolemia     Neck pain 5/17/2010    Stroke ([address] 10/02/2017    TIA- legs weak memory issues       Past Surgical History:   Procedure Laterality Date    COLONOSCOPY N/A 6/4/2018    COLONOSCOPY / [facility]pect[facility] ENDOSCOPY    HX GYN      Total Hyst    HX LAP CHOLECYSTECTOMY      HX OTHER SURGICAL  2017    Throat widened       Allergies   Allergen Reactions    Celebrex [Celecoxib] Other (comments)     Tiredness        Vital signs:    Visit Vitals  /74 (BP 1 Location: Left arm, BP Patient Position: Sitting)   Pulse 71   Temp 97.1 °F (36.2 °C) (Oral)   Resp 16   Ht 5' (1.524 m)   Wt 54 kg (119 lb)   LMP  (LMP Unknown)   SpO2 97%   BMI 23.24 kg/m²       Review of Systems:   GENERAL: Denies fever or fatigue  CARDIAC: No CP or SOB  PULMONARY: No cough of SOB  MUSCULOSKELETAL: No new joint pain  NEURO: SEE HPI      EXAM: Alert, in NAD. Heart is regular. Oriented x3, EOM's are full, PERRL, no facial asymmetries. Strength and tone are normal. DTR's +2, gait symmetric           Assessment/Plan: Mild obstructive sleep apnea, responding well to CPAP at 6 cm of H2O using a Leona nasal S/M mask. Discussed study results, CPAP pitfalls, LORNA treatment options, weight management. She is not too motivated to try CPAP and would like options. She will try a tennis ball tshirt to avoid supine sleep. PLEASE NOTE:   Portions of this document may have been produced using voice recognition software. Unrecognized errors in transcription may be present. This note will not be viewable in [address]

## 2019-07-08 ENCOUNTER — OFFICE VISIT (OUTPATIENT)
Dept: PULMONOLOGY | Age: 74
End: 2019-07-08

## 2019-07-08 VITALS
OXYGEN SATURATION: 99 % | HEIGHT: 60 IN | BODY MASS INDEX: 22.97 KG/M2 | HEART RATE: 69 BPM | SYSTOLIC BLOOD PRESSURE: 130 MMHG | RESPIRATION RATE: 20 BRPM | DIASTOLIC BLOOD PRESSURE: 60 MMHG | TEMPERATURE: 97.5 F | WEIGHT: 117 LBS

## 2019-07-08 DIAGNOSIS — J45.909 UNCOMPLICATED ASTHMA, UNSPECIFIED ASTHMA SEVERITY, UNSPECIFIED WHETHER PERSISTENT: Primary | ICD-10-CM

## 2019-07-08 DIAGNOSIS — R06.02 SHORTNESS OF BREATH: ICD-10-CM

## 2019-07-08 NOTE — PROGRESS NOTES
Tara Arguelles presents today for   Chief Complaint   Patient presents with    Asthma    Shortness of Breath       Is someone accompanying this pt? No    Is the patient using any DME equipment during OV? No    -DME Company None    Depression Screening:  3 most recent PHQ Screens 6/5/2019   PHQ Not Done -   Little interest or pleasure in doing things Not at all   Feeling down, depressed, irritable, or hopeless Not at all   Total Score PHQ 2 0       Learning Assessment:  Learning Assessment 2/26/2019   PRIMARY LEARNER Patient   HIGHEST LEVEL OF EDUCATION - PRIMARY LEARNER  -   CO-LEARNER CAREGIVER -   PRIMARY LANGUAGE ENGLISH   LEARNER PREFERENCE PRIMARY DEMONSTRATION   ANSWERED BY Patient   RELATIONSHIP SELF       Abuse Screening:  Abuse Screening Questionnaire 2/9/2018   Do you ever feel afraid of your partner? N   Are you in a relationship with someone who physically or mentally threatens you? N   Is it safe for you to go home? Y       Fall Risk  Fall Risk Assessment, last 12 mths 6/5/2019   Able to walk? Yes   Fall in past 12 months? No         Coordination of Care:  1. Have you been to the ER, urgent care clinic since your last visit? Hospitalized since your last visit? Yes; Name: [facility]  5/2019  Weakness     2. Have you seen or consulted any other health care providers outside of the [address] [place] [address] since your last visit? Include any pap smears or colon screening.  No

## 2019-07-08 NOTE — PROGRESS NOTES
[facility]  Pulmonary, Critical Care, and Sleep Medicine      Chief complaint:  Shortness of breath    HPI:    Alejandro Schwartz    is 69years old and comes to the office today for follow-up concerning shortness of breath which includes difficulty walking upstairs which she does several times a day and sometimes activity like carrying in groceries. She denies any associated chronic cough chest pain leg swelling. She also denies PND      Allergies   Allergen Reactions    Celebrex [Celecoxib] Other (comments)     Tiredness      Current Outpatient Medications   Medication Sig    montelukast (SINGULAIR) 10 mg tablet take 1 tablet by mouth once daily    loratadine (CLARITIN) 10 mg tablet Take 1 Tab by mouth daily.  naproxen sodium (ALEVE) 220 mg cap Take 2 Caps by mouth two (2) times a day.  doxylamine succinate (UNISOM) 25 mg tablet Take 25 mg by mouth nightly as needed for Sleep.  busPIRone (BUSPAR) 7.5 mg tablet take 1 tablet by mouth twice a day (Patient taking differently: take 1 tablet by mouth twice a day  Patient states she take 1 a day)    denosumab (PROLIA) 60 mg/mL injection 60 mg by SubCUTAneous route once.  turmeric 400 mg cap Take  by mouth.  fluticasone (FLONASE) 50 mcg/actuation nasal spray instill 2 sprays into each nostril once daily    atorvastatin (LIPITOR) 20 mg tablet take 1 tablet by mouth once daily    omeprazole (PRILOSEC) 20 mg capsule Take 20 mg by mouth daily.  aspirin 81 mg chewable tablet Take 1 Tab by mouth daily. No current facility-administered medications for this visit.       Past Medical History:   Diagnosis Date    Abnormal Pap smear     Dr. Venkatesh Ray Allergic rhinitis     Arthritis     Cerebrovascular small vessel disease 3/18/2019    CT 3/17/2019    Chronic pain     Concentric left ventricular hypertrophy 3/18/2019    With left ventricular diastolic dysfunction by echocardiogram 3/18/2019    Hypercholesterolemia     Neck pain 5/17/2010    St[address] [address] 10/02/2017    TIA- legs weak memory issues     Past Surgical History:   Procedure Laterality Date    COLONOSCOPY N/A 6/4/2018    COLONOSCOPY / polypectomy performed by Mery Jonse MD at [facility] ENDOSCOPY    HX GYN      Total Hyst    HX LAP CHOLECYSTECTOMY      HX OTHER SURGICAL  2017    Throat widened     Social History     Socioeconomic History    Marital status:      Spouse name: Not on file    Number of children: Not on file    Years of education: Not on file    Highest education level: Not on file   Occupational History    Not on file   Social Needs    Financial resource strain: Not on file    Food insecurity:     Worry: Not on file     Inability: Not on file    Transportation needs:     Medical: Not on file     Non-medical: Not on file   Tobacco Use    Smoking status: Never Smoker    Smokeless tobacco: Never Used   Substance and Sexual Activity    Alcohol use: No    Drug use: No    Sexual activity: Never   Lifestyle    Physical activity:     Days per week: Not on file     Minutes per session: Not on file    Stress: Not on file   Relationships    Social connections:     Talks on phone: Not on file     Gets together: Not on file     Attends Mandaen service: Not on file     Active member of club or organization: Not on file     Attends meetings of clubs or organizations: Not on file     Relationship status: Not on file    Intimate partner violence:     Fear of current or ex partner: Not on file     Emotionally abused: Not on file     Physically abused: Not on file     Forced sexual activity: Not on file   Other Topics Concern    Not on file   Social History Narrative    Not on file     Family History   Problem Relation Age of Onset    Cancer Mother         breast    Heart Disease Father        Review of systems:  She denies fever chills poor appetite or weight loss    Physical Exam:  Visit Vitals  /60 (BP 1 Location: Left arm, BP Patient Position: At rest)   Pulse 69   Temp 97.5 °F (36.4 °C) (Oral)   Resp 20   Ht 5' (1.524 m)   Wt 53.1 kg (117 lb)   LMP  (LMP Unknown)   SpO2 99%   BMI 22.85 kg/m²       Well-developed well-nourished  HEENT: WNL  Lymph node exam: Supraclavicular cervical lymph nodes negative  Chest: Equal symmetrical expansion no dullness no wheezes rales rubs  Heart: Regular rhythm no gallop no murmur no JVD no peripheral edema  Extremities: No cyanosis clubbing or calf tenderness  Neurological: Alert and oriented    Labs:    O2 sat room air at rest 99%  Spirometry 7/8/2019: Slightly reduced FVC otherwise completely normal spirometry      Impression:     Because of the patient's shortness of breath is not completely clear but with no chest pain and a negative stress test in the past cardiac ischemia is unlikely also with a normal spirometry except for slight reduced FVC and no findings on CT imaging for restrictive lung defect COPD or ILD again is not a consideration. Most likely consideration would be significant deconditioning    Plan:     Trial of modest reconditioning with follow-up in 8 to 10 weeks    Aleta Pritchard MD , [facility]    CC: Shelia Yu NP     [address] [facility]  [place], [address] [address]     P: [phone]     F: [phone]

## 2019-07-08 NOTE — PATIENT INSTRUCTIONS
Begin walking 2-1/2 minutes up and 2-1/2 minutes back without stopping  Walking must be done without stopping 5 times a week  After 1 to 2 weeks gradually increase the time you walk until you are walking 20 to 30 minutes without stopping.

## 2019-07-08 NOTE — PROGRESS NOTES
Verbal Order with read back per Isha Silverman MD  For PFT smart panel. AMB POC PFT complete w/ bronchodilator  AMB POC PFT complete w/o bronchodilator    Dr. Mickey Conn MD will co-sign the orders.

## 2019-08-13 ENCOUNTER — OFFICE VISIT (OUTPATIENT)
Dept: ORTHOPEDIC SURGERY | Age: 74
End: 2019-08-13

## 2019-08-13 VITALS
TEMPERATURE: 97.8 F | DIASTOLIC BLOOD PRESSURE: 78 MMHG | OXYGEN SATURATION: 97 % | SYSTOLIC BLOOD PRESSURE: 150 MMHG | BODY MASS INDEX: 22.81 KG/M2 | WEIGHT: 116.2 LBS | HEART RATE: 74 BPM | RESPIRATION RATE: 16 BRPM | HEIGHT: 60 IN

## 2019-08-13 DIAGNOSIS — G95.20 SPINAL CORD COMPRESSION (HCC): ICD-10-CM

## 2019-08-13 DIAGNOSIS — M48.02 CERVICAL SPINAL STENOSIS: Primary | ICD-10-CM

## 2019-08-13 NOTE — PROGRESS NOTES
[facility]  [address]  [place] [address]  Phone: [phone]  Fax: [phone]  PROGRESS NOTE  Patient: Lavern Price                MRN: [id number]       SSN: [id number]  YOB: 1945        AGE: 69 y.o. SEX: female  Body mass index is 22.69 kg/m². PCP: Shaun Camilo NP  08/13/19    Chief Complaint   Patient presents with    Neck Pain     4 month f/u       HISTORY OF PRESENT ILLNESS, RADIOGRAPHS, and PLAN:     HISTORY OF PRESENT ILLNESS:  Ms. Debbie Ojeda returns today. The soft collar we provided her provides her excellent control of her neck pain. She is functional and is doing well. She has no objective neurology. Dr. Kayley Fallon has evaluated her through sleep studies, and she does not require a C-PAP. ASSESSMENT/PLAN: She would like to see Dr. Stacy Shetty for some more facet blocks, and that is certainly something that is reasonable for her. We have discussed previously the extensive nature of cervical surgery for her, but she is content enough with the soft collar at this point, that surgery is nothing that she wishes to entertain. I will see her back again in four months time for routine followup. I, again, offered her opinions from tertiary centers, but she declines that. cc: LITTLE Epstein.RAMONE.          Past Medical History:   Diagnosis Date    Abnormal Pap smear     Dr. Bre Canales Allergic rhinitis     Arthritis     Cerebrovascular small vessel disease 3/18/2019    CT 3/17/2019    Chronic pain     Concentric left ventricular hypertrophy 3/18/2019    With left ventricular diastolic dysfunction by echocardiogram 3/18/2019    Hypercholesterolemia     Neck pain 5/17/2010    Stroke ([address] 10/02/2017    TIA- legs weak memory issues       Family History   Problem Relation Age of Onset    Cancer Mother         breast    Heart Disease Father        Current Outpatient Medications   Medication Sig Dispense Refill    montelukast (SINGULAIR) 10 mg tablet take 1 tablet by mouth once daily 90 Tab 2    naproxen sodium (ALEVE) 220 mg cap Take 2 Caps by mouth two (2) times a day.  doxylamine succinate (UNISOM) 25 mg tablet Take 25 mg by mouth nightly as needed for Sleep.  busPIRone (BUSPAR) 7.5 mg tablet take 1 tablet by mouth twice a day (Patient taking differently: take 1 tablet by mouth twice a day  Patient states she take 1 a day) 60 Tab 1    denosumab (PROLIA) 60 mg/mL injection 60 mg by SubCUTAneous route once.  turmeric 400 mg cap Take  by mouth.  fluticasone (FLONASE) 50 mcg/actuation nasal spray instill 2 sprays into each nostril once daily 3 Bottle 1    atorvastatin (LIPITOR) 20 mg tablet take 1 tablet by mouth once daily 90 Tab 3    omeprazole (PRILOSEC) 20 mg capsule Take 20 mg by mouth daily.  aspirin 81 mg chewable tablet Take 1 Tab by mouth daily. 30 Tab 3    loratadine (CLARITIN) 10 mg tablet Take 1 Tab by mouth daily.  90 Tab 1       Allergies   Allergen Reactions    Celebrex [Celecoxib] Other (comments)     Tiredness        Past Surgical History:   Procedure Laterality Date    COLONOSCOPY N/A 6/4/2018    COLONOSCOPY / polypectomy performed by Mariam Belcher MD at [facility] ENDOSCOPY    HX GYN      Total Hyst    HX LAP CHOLECYSTECTOMY      HX OTHER SURGICAL  2017    Throat widened       Past Medical History:   Diagnosis Date    Abnormal Pap smear     Dr. Ramy Waterman    Allergic rhinitis     Arthritis     Cerebrovascular small vessel disease 3/18/2019    CT 3/17/2019    Chronic pain     Concentric left ventricular hypertrophy 3/18/2019    With left ventricular diastolic dysfunction by echocardiogram 3/18/2019    Hypercholesterolemia     Neck pain 5/17/2010    St[address] [address] 10/02/2017    TIA- legs weak memory issues       Social History     Socioeconomic History    Marital status:      Spouse name: Not on file    Number of children: Not on file    Years of education: Not on file    Highest education level: Not on file   Occupational History    Not on file   Social Needs    Financial resource strain: Not on file    Food insecurity:     Worry: Not on file     Inability: Not on file    Transportation needs:     Medical: Not on file     Non-medical: Not on file   Tobacco Use    Smoking status: Never Smoker    Smokeless tobacco: Never Used   Substance and Sexual Activity    Alcohol use: No    Drug use: No    Sexual activity: Never   Lifestyle    Physical activity:     Days per week: Not on file     Minutes per session: Not on file    Stress: Not on file   Relationships    Social connections:     Talks on phone: Not on file     Gets together: Not on file     Attends Pentecostalism service: Not on file     Active member of club or organization: Not on file     Attends meetings of clubs or organizations: Not on file     Relationship status: Not on file    Intimate partner violence:     Fear of current or ex partner: Not on file     Emotionally abused: Not on file     Physically abused: Not on file     Forced sexual activity: Not on file   Other Topics Concern    Not on file   Social History Narrative    Not on file         REVIEW OF SYSTEMS:   CONSTITUTIONAL SYMPTOMS:  Negative. EYES:  Negative. EARS, NOSE, THROAT AND MOUTH:  Negative. CARDIOVASCULAR:  Negative. RESPIRATORY:  Negative. GENITOURINARY: Per HPI. GASTROINTESTINAL:  Per HPI. INTEGUMENTARY (SKIN AND/OR BREAST):  Negative. MUSCULOSKELETAL: Per HPI.   ENDOCRINE/RHEUMATOLOGIC:  Negative. NEUROLOGICAL:  Per HPI. HEMATOLOGIC/LYMPHATIC:  Negative. ALLERGIC/IMMUNOLOGIC:  Negative. PSYCHIATRIC:  Negative.     PHYSICAL EXAMINATION:   Visit Vitals  /78 (BP 1 Location: Left arm, BP Patient Position: Sitting)   Pulse 74   Temp 97.8 °F (36.6 °C) (Oral)   Resp 16   Ht 5' (1.524 m)   Wt 116 lb 3.2 oz (52.7 kg)   LMP  (LMP Unknown)   SpO2 97%   BMI 22.69 kg/m²    PAIN SCALE: 4/10    CONSTITUTIONAL: The patient is in no apparent distress and is alert and oriented x 3. HEENT: Normocephalic. Hearing grossly intact. NECK: Supple and symmetric. no tenderness, or masses were felt. RESPIRATORY: No labored breathing. CARDIOVASCULAR: The carotid pulses were normal. Peripheral pulses were 2+. CHEST: Normal AP diameter and normal contour without any kyphoscoliosis. LYMPHATIC: No lymphadenopathy was appreciated in the neck, axillae or groin. SKIN:   Negative for scars, rashes, lesions, or ulcers on the right upper, right lower, left upper, left lower and trunk. NEUROLOGICAL: Alert and oriented x 3. Ambulation without assistive device. FWB. EXTREMITIES: See musculoskeletal.  MUSCULOSKELETAL:   Head and Neck: Neck pain. Negative for misalignment, asymmetry, crepitation, defects, tenderness masses or effusions.  Left Upper Extremity: Inspection, percussion and palpation performed. Garzas sign is negative.  Right Upper Extremity: Inspection, percussion and palpation performed. Garzas sign is negative.  Spine, Ribs and Pelvis: Inspection, percussion and palpation performed. Negative for misalignment, asymmetry, crepitation, defects, tenderness masses or effusions.  Left Lower Extremity: Inspection, percussion and palpation performed. Negative straight leg raise.  Right Lower Extremity: Inspection, percussion and palpation performed. Negative straight leg raise. SPINE EXAM:     Cervical spine: Neck is midline. Normal muscle tone. No focal atrophy is noted. ASSESSMENT    ICD-10-CM ICD-9-CM    1. Cervical spinal stenosis M48.02 723.0    2. Spinal cord compression ([facility] G95.20 336.9        Written by Cordelia Mixon, as dictated by Amanda Klein MD.    I, Dr. Amanda Klein MD, confirm that all documentation is accurate.

## 2019-08-20 ENCOUNTER — OFFICE VISIT (OUTPATIENT)
Dept: ORTHOPEDIC SURGERY | Age: 74
End: 2019-08-20

## 2019-08-20 VITALS
BODY MASS INDEX: 22.69 KG/M2 | DIASTOLIC BLOOD PRESSURE: 66 MMHG | RESPIRATION RATE: 18 BRPM | TEMPERATURE: 97.4 F | HEART RATE: 68 BPM | HEIGHT: 60 IN | SYSTOLIC BLOOD PRESSURE: 128 MMHG

## 2019-08-20 DIAGNOSIS — M48.02 CERVICAL SPINAL STENOSIS: Primary | ICD-10-CM

## 2019-08-20 RX ORDER — BACLOFEN 10 MG/1
10 TABLET ORAL
Qty: 45 TAB | Refills: 1 | Status: SHIPPED | OUTPATIENT
Start: 2019-08-20 | End: 2019-09-16 | Stop reason: ALTCHOICE

## 2019-08-20 RX ORDER — GABAPENTIN 100 MG/1
CAPSULE ORAL
Qty: 90 CAP | Refills: 1 | Status: SHIPPED | OUTPATIENT
Start: 2019-08-20 | End: 2020-03-09 | Stop reason: SDUPTHER

## 2019-08-20 RX ORDER — METHYLPREDNISOLONE 4 MG/1
TABLET ORAL
Qty: 1 DOSE PACK | Refills: 0 | Status: SHIPPED | OUTPATIENT
Start: 2019-08-20 | End: 2019-10-01 | Stop reason: ALTCHOICE

## 2019-08-20 NOTE — PATIENT INSTRUCTIONS
Gabapentin (By mouth)   Gabapentin (michael-a-PEN-tin)  Treats seizures and pain caused by shingles. Brand Name(s): ACTIVE-PAC with Gabapentin, Convenience Sameer, Cyclo/Nicholas 10/300 Pack, FusePaq Fanatrex, Nicholas-V, Gralise, [address] Pack, Neurontin, SmartRx Nicholas Kit   There may be other brand names for this medicine. When This Medicine Should Not Be Used: This medicine is not right for everyone. Do not use it if you had an allergic reaction to gabapentin. How to Use This Medicine:   Capsule, Liquid, Tablet  · Take your medicine as directed. Your dose may need to be changed several times to find what works best for you. If you have epilepsy, do not allow more than 12 hours to pass between doses. · Capsule: Swallow the capsule whole with plenty of water. Do not open, crush, or chew it. · Gralise® tablet: Swallow the tablet whole . Do not crush, break, or chew it. · Neurontin® tablet: If you break a tablet into 2 pieces, use the second half as your next dose. If you don't use it within 28 days, throw it away. · Measure the oral liquid medicine with a marked measuring spoon, oral syringe, or medicine cup. · This medicine should come with a Medication Guide. Ask your pharmacist for a copy if you do not have one. · Missed dose: Take a dose as soon as you remember. If it is almost time for your next dose, wait until then and take a regular dose. Do not take extra medicine to make up for a missed dose. · Store the medicine in a closed container at room temperature, away from heat, moisture, and direct light. Store the Neurontin® oral liquid in the refrigerator. Do not freeze. Drugs and Foods to Avoid:   Ask your doctor or pharmacist before using any other medicine, including over-the-counter medicines, vitamins, and herbal products. · Some medicines can affect how gabapentin works.  Tell your doctor if you also use any of the following:   ¨ Hydrocodone  ¨ Morphine  · If you take an antacid, wait at least 2 hours before you take gabapentin. · Tell your doctor if you use anything else that makes you sleepy. Some examples are allergy medicine, narcotic pain medicine, and alcohol. Warnings While Using This Medicine:   · Tell your doctor if you are pregnant or breastfeeding, or if you have kidney problems or are receiving dialysis. Tell your doctor if you have a history of depression or mental health problems. · This medicine may increase depression or thoughts of suicide. Tell your doctor right away if you start to feel more depressed or think about hurting yourself. · This medicine may cause a serious allergic reaction called multiorgan hypersensitivity, which can damage organs and be life-threatening. · Do not stop using this medicine suddenly. Your doctor will need to slowly decrease your dose before you stop it completely. If you take this medicine to prevent seizures, your seizures may return or occur more often if you stop this medicine suddenly. · This medicine may make you dizzy or drowsy. Do not drive or do anything else that could be dangerous until you know how this medicine affects you. · Tell any doctor or dentist who treats you that you are using this medicine. This medicine may affect certain medical test results. · Your doctor will check your progress and the effects of this medicine at regular visits. Keep all appointments. · Keep all medicine out of the reach of children. Never share your medicine with anyone.   Possible Side Effects While Using This Medicine:   Call your doctor right away if you notice any of these side effects:  · Allergic reaction: Itching or hives, swelling in your face or hands, swelling or tingling in your mouth or throat, chest tightness, trouble breathing  · Behavior problems, aggression, restlessness, trouble concentrating, moodiness (especially in children)  · Blistering, peeling, red skin rash  · Change in how much or how often you urinate, bloody or cloudy urine,  · Chest pain, fast heartbeat, trouble breathing  · Dark urine or pale stools, nausea, vomiting, loss of appetite, stomach pain, yellow skin or eyes  · Fever, rash, swollen or tender glands in the neck, armpit, or groin  · Problems with coordination, shakiness, unsteadiness  · Rapid weight gain, swelling in your hands, ankles, or feet  · Unusual moods or behaviors, thoughts of hurting yourself, feeling depressed  If you notice these less serious side effects, talk with your doctor:   · Dizziness, drowsiness, sleepiness, tiredness  If you notice other side effects that you think are caused by this medicine, tell your doctor. Call your doctor for medical advice about side effects. You may report side effects to FDA at 0-024-FDA-8977  © 2017 [facility] Information is for End User's use only and may not be sold, redistributed or otherwise used for commercial purposes. The above information is an  only. It is not intended as medical advice for individual conditions or treatments. Talk to your doctor, nurse or pharmacist before following any medical regimen to see if it is safe and effective for you.

## 2019-08-20 NOTE — PROGRESS NOTES
[facility]  [address]  [place], [facility]  Phone: [phone]  Fax: [phone]  PROGRESS NOTE  Patient: Lamar Villatoro                MRN: [id number]       SSN: [id number]  YOB: 1945        AGE: 69 y.o. SEX: female  Body mass index is 22.69 kg/m². PCP: Mirza Landrum NP  08/21/19    Chief Complaint   Patient presents with    Neck Pain    Follow-up       HISTORY OF PRESENT ILLNESS:  Lamar Villatoro is a 69 y.o.  female with history of neck pain, arm pain, shooting pains in the arm(s) and muscle spasms. She has cervical stenosis at C1 and C2 that would require occipital cervical fusion and suboccipital arthritis that if we ended up operating on her, she probably would end up requiring an occiput to C6 fusion. At last OV 8/13/19 Dr Lana Robertson advised against surgery and offered referral to a tertiary care center. She is scheduled for CS RFA w Dr. Bj Garcia next week. She has had RFA before w/ benefit. Today, she wishes to avoid surgery and referral. She comes in w/ severe spasms and BUE pain, neuropathic. Pain is aching, burning and stabbing and radiation of pain and BUE arm. Symptoms are worst: afternoon. Pain is worse with looking up, looking down, manual/sedentary work and affects sleep and recreational activities. Pain is better with relaxation. Denies bladder/bowel dysfunction, saddle paresthesia, weakness, gait disturbance, or other neurological deficits. Current Medications: has Tramadol at home and takes Aleve  with no, BENEFIT. She had a MDP from the ER some time ago w/ benefit    PMHx: CVA w/ memory issues    ASSESSMENT   Diagnoses and all orders for this visit:    1. Cervical spinal stenosis  -     gabapentin (NEURONTIN) 100 mg capsule; Take 1 Tab QHS x 1 wk, then 1 Tab BID x1 wk, then 1 tab TID x1 wk    Other orders  -     baclofen (LIORESAL) 10 mg tablet;  Take 1 Tab by mouth three (3) times daily as needed for Pain.  -     methylPREDNISolone (MEDROL, KAROL,) 4 mg tablet; Per dose pack instructions         IMPRESSION AND PLAN:  This is a pt with cervical stenosis and previous CVA, which would require extensive surgery that she wishes to avoid at this time. We discussed medications and their SEs.    > Pt was given information on Gabapentin   > MDP  > Trial of low dose Gabapentin  > Trial of PRN Baclofen  > Ms. Patience Delvalle has a reminder for a \"due or due soon\" health maintenance. I have asked that she contact her primary care provider, Mirza Landrum NP, for follow-up on this health maintenance. >\" We have informed patient to notify us for immediate appointment if he has any worsening neurogical symptoms or if an emergency situation presents, then call 911  >  has been reviewed and is appropriate  > Pt will follow-up in 4 wks w/ me. Subjective    Pain Scale: 7/10    Pain Assessment  8/20/2019   Location of Pain Neck   Location Modifiers -   Severity of Pain 7   Quality of Pain Throbbing   Quality of Pain Comment -   Duration of Pain Persistent   Frequency of Pain Constant   Aggravating Factors -   Aggravating Factors Comment -   Limiting Behavior -   Relieving Factors -   Relieving Factors Comment -   Result of Injury -         REVIEW OF SYSTEMS  Constitutional: Negative for fever, chills, or weight change. Respiratory: Negative for cough or shortness of breath. Cardiovascular: Negative for chest pain or palpitations. Gastrointestinal: Negative for incontinence, acid reflux, change in bowel habits, or constipation. Genitourinary: Negative for incontinence, dysuria and flank pain. Musculoskeletal: Positive for neck and BUE pain. See HPI. Skin: Negative for rash. Neurological:BUE paresthesias and muscle spasms, tics present See HPI. Endo/Heme/Allergies: Negative. Psychiatric/Behavioral: Negative.       PHYSICAL EXAMINATION  Visit Vitals  /66 (BP 1 Location: Left arm, BP Patient Position: Sitting)   Pulse 68   Temp 97.4 °F (36.3 °C) (Oral)   Resp 18   Ht 5' (1.524 m)   LMP  (LMP Unknown)   BMI 22.69 kg/m²         Accompanied by significant other. Constitutional:  Well developed, well nourished, in no acute distress. Psychiatric: Affect and mood are appropriate. Integumentary: No rashes or abrasions noted on exposed areas. Cardiovascular/Peripheral Vascular: +2 radial & pedal pulses. No peripheral edema is noted. Lymphatic:  No evidence of lymphedema. No cervical lymphadenopathy. SPINE/MUSCULOSKELETAL EXAM    Cervical spine:  Neck is midline. Normal muscle tone. No focal atrophy is noted. Neck ROM decreased and pain with flexion, extension, turning right, turning left. Shoulder ROM intact. Tenderness to palpation posterior neck and bilateral trap. Negative Spurling's sign. Negative Tinel's sign. Negative Garza's sign. Sensation grossly intact to light touch. MOTOR:        Biceps  Triceps Deltoids Wrist Ext Wrist Flex Hand Intrin   Right +4/5 +4/5 +4/5 +4/5 +4/5 +4/5   Left +4/5 +4/5 +4/5 +4/5 +4/5 +4/5       Ambulation without assistive device. FWB. Balance issues        PAST MEDICAL HISTORY   Past Medical History:   Diagnosis Date    Abnormal Pap smear     Dr. Grimes Channel Allergic rhinitis     Arthritis     Cerebrovascular small vessel disease 3/18/2019    CT 3/17/2019    Chronic pain     Concentric left ventricular hypertrophy 3/18/2019    With left ventricular diastolic dysfunction by echocardiogram 3/18/2019    Hypercholesterolemia     Neck pain 5/17/2010    Stroke ([facility] [address] 10/02/2017    TIA- legs weak memory issues       Past Surgical History:   Procedure Laterality Date    COLONOSCOPY N/A 6/4/2018    COLONOSCOPY / polypectomy performed [facility] Sw[facility]asmine [facility] END[facility]COPY    HX GYN      Total Hyst    HX LAP CHOLECYSTECTOMY      HX OTHER SURGICAL  2017    Throat widened   .       MEDICATIONS      Current Outpatient Medications   Medication Sig Dispense Refill    gabapentin (NEURONTIN) 100 mg capsule Take 1 Tab QHS x 1 wk, then 1 Tab BID x1 wk, then 1 tab TID x1 wk 90 Cap 1    baclofen (LIORESAL) 10 mg tablet Take 1 Tab by mouth three (3) times daily as needed for Pain. 45 Tab 1    montelukast (SINGULAIR) 10 mg tablet take 1 tablet by mouth once daily 90 Tab 2    busPIRone (BUSPAR) 7.5 mg tablet take 1 tablet by mouth twice a day (Patient taking differently: take 1 tablet by mouth twice a day  Patient states she take 1 a day) 60 Tab 1    denosumab (PROLIA) 60 mg/mL injection 60 mg by SubCUTAneous route once.  turmeric 400 mg cap Take  by mouth.  fluticasone (FLONASE) 50 mcg/actuation nasal spray instill 2 sprays into each nostril once daily 3 Bottle 1    atorvastatin (LIPITOR) 20 mg tablet take 1 tablet by mouth once daily 90 Tab 3    omeprazole (PRILOSEC) 20 mg capsule Take 20 mg by mouth daily.  aspirin 81 mg chewable tablet Take 1 Tab by mouth daily. 30 Tab 3    methylPREDNISolone (MEDROL, KAROL,) 4 mg tablet Per dose pack instructions 1 Dose Pack 0    loratadine (CLARITIN) 10 mg tablet Take 1 Tab by mouth daily. 90 Tab 1    naproxen sodium (ALEVE) 220 mg cap Take 2 Caps by mouth two (2) times a day.  doxylamine succinate (UNISOM) 25 mg tablet Take 25 mg by mouth nightly as needed for Sleep.           ALLERGIES    Allergies   Allergen Reactions    Celebrex [Celecoxib] Other (comments)     Tiredness           SOCIAL HISTORY    Social History     Socioeconomic History    Marital status:      Spouse name: Not on file    Number of children: Not on file    Years of education: Not on file    Highest education level: Not on file   Occupational History    Not on file   Social Needs    Financial resource strain: Not on file    Food insecurity:     Worry: Not on file     Inability: Not on file    Transportation needs:     Medical: Not on file     Non-medical: Not on file   Tobacco Use    Smoking status: Never Smoker    Smokeless tobacco: Never Used   Substance and Sexual Activity    Alcohol use: No    Drug use: No    Sexual activity: Never   Lifestyle    Physical activity:     Days per week: Not on file     Minutes per session: Not on file    Stress: Not on file   Relationships    Social connections:     Talks on phone: Not on file     Gets together: Not on file     Attends Alevism service: Not on file     Active member of club or organization: Not on file     Attends meetings of clubs or organizations: Not on file     Relationship status: Not on file    Intimate partner violence:     Fear of current or ex partner: Not on file     Emotionally abused: Not on file     Physically abused: Not on file     Forced sexual activity: Not on file   Other Topics Concern    Not on file   Social History Narrative    Not on file       FAMILY HISTORY    Family History   Problem Relation Age of Onset    Cancer Mother         breast    Heart Disease Father          Jc Saenz NP

## 2019-08-28 DIAGNOSIS — R05.9 COUGH: ICD-10-CM

## 2019-08-29 RX ORDER — FLUTICASONE PROPIONATE 50 MCG
SPRAY, SUSPENSION (ML) NASAL
Qty: 16 G | Refills: 1 | Status: SHIPPED | OUTPATIENT
Start: 2019-08-29 | End: 2020-02-19

## 2019-09-11 ENCOUNTER — LAB ONLY (OUTPATIENT)
Dept: FAMILY MEDICINE CLINIC | Facility: CLINIC | Age: 74
End: 2019-09-11

## 2019-09-11 DIAGNOSIS — E78.5 HYPERLIPIDEMIA, UNSPECIFIED HYPERLIPIDEMIA TYPE: Primary | ICD-10-CM

## 2019-09-13 ENCOUNTER — HOSPITAL ENCOUNTER (EMERGENCY)
Age: 74
Discharge: LEFT BEFORE DISCHARGE | End: 2019-09-13
Attending: EMERGENCY MEDICINE
Payer: MEDICARE

## 2019-09-13 VITALS
TEMPERATURE: 97.6 F | WEIGHT: 116 LBS | SYSTOLIC BLOOD PRESSURE: 124 MMHG | RESPIRATION RATE: 16 BRPM | OXYGEN SATURATION: 96 % | DIASTOLIC BLOOD PRESSURE: 60 MMHG | HEART RATE: 73 BPM | HEIGHT: 60 IN | BODY MASS INDEX: 22.78 KG/M2

## 2019-09-13 PROCEDURE — 75810000275 HC EMERGENCY DEPT VISIT NO LEVEL OF CARE

## 2019-09-13 PROCEDURE — 93005 ELECTROCARDIOGRAM TRACING: CPT

## 2019-09-14 LAB
ATRIAL RATE: 68 BPM
CALCULATED P AXIS, ECG09: 83 DEGREES
CALCULATED R AXIS, ECG10: 56 DEGREES
CALCULATED T AXIS, ECG11: 74 DEGREES
DIAGNOSIS, 93000: NORMAL
P-R INTERVAL, ECG05: 134 MS
Q-T INTERVAL, ECG07: 418 MS
QRS DURATION, ECG06: 80 MS
QTC CALCULATION (BEZET), ECG08: 444 MS
VENTRICULAR RATE, ECG03: 68 BPM

## 2019-09-16 ENCOUNTER — OFFICE VISIT (OUTPATIENT)
Dept: ORTHOPEDIC SURGERY | Age: 74
End: 2019-09-16

## 2019-09-16 VITALS
OXYGEN SATURATION: 98 % | WEIGHT: 112.2 LBS | HEIGHT: 60 IN | HEART RATE: 86 BPM | TEMPERATURE: 97.3 F | DIASTOLIC BLOOD PRESSURE: 59 MMHG | BODY MASS INDEX: 22.03 KG/M2 | SYSTOLIC BLOOD PRESSURE: 101 MMHG

## 2019-09-16 DIAGNOSIS — F41.9 ANXIETY: ICD-10-CM

## 2019-09-16 DIAGNOSIS — G95.20 SPINAL CORD COMPRESSION (HCC): ICD-10-CM

## 2019-09-16 DIAGNOSIS — M48.02 CERVICAL SPINAL STENOSIS: Primary | ICD-10-CM

## 2019-09-16 RX ORDER — HYDROCODONE BITARTRATE AND ACETAMINOPHEN 5; 325 MG/1; MG/1
1 TABLET ORAL
Qty: 20 TAB | Refills: 0 | Status: SHIPPED | OUTPATIENT
Start: 2019-09-16 | End: 2019-09-23

## 2019-09-16 RX ORDER — METHOCARBAMOL 500 MG/1
500 TABLET, FILM COATED ORAL
Qty: 45 TAB | Refills: 1 | Status: SHIPPED | OUTPATIENT
Start: 2019-09-16 | End: 2020-06-11

## 2019-09-16 NOTE — PATIENT INSTRUCTIONS
Hydrocodone/Acetaminophen (By mouth)   Acetaminophen (m-hxwk-i-MIN-oh-fen), Hydrocodone Bitartrate (xad-koon-VLC-done bye-TAR-trate)  Treats pain. This medicine contains a narcotic pain reliever. Brand Name(s): Hycet, Lorcet, Lorcet HD, Lorcet Plus, Lortab 10/325, Lortab 5/325, Lortab 7.5/325, Lortab Elixir, Norco, Verdrocet, Vicodin, Vicodin ES, Vicodin HP, Xodol, Xodol 5/300   There may be other brand names for this medicine. When This Medicine Should Not Be Used: This medicine is not right for everyone. Do not use it if you had an allergic reaction to acetaminophen, hydrocodone, or other narcotic medicines, or stomach or bowel blockage (including paralytic ileus). How to Use This Medicine:   Capsule, Liquid, Tablet  · Your doctor will tell you how much medicine to use. Do not use more than directed. · An overdose can be dangerous. Follow directions carefully so you do not get too much medicine at one time. · Oral liquid: Measure the oral liquid medicine with a marked measuring spoon, oral syringe, or medicine cup. · Drink plenty of liquids to help avoid constipation. · This medicine should come with a Medication Guide. Ask your pharmacist for a copy if you do not have one. · Missed dose: Take a dose as soon as you remember. If it is almost time for your next dose, wait until then and take a regular dose. Do not take extra medicine to make up for a missed dose. · Store the medicine in a closed container at room temperature, away from heat, moisture, and direct light. Flush any unused Norco® tablets down the toilet. Drugs and Foods to Avoid:   Ask your doctor or pharmacist before using any other medicine, including over-the-counter medicines, vitamins, and herbal products. · Do not use this medicine if you are using or have used an MAO inhibitor within the past 14 days. · Some medicines can affect how hydrocodone/acetaminophen works.  Tell your doctor if you are using any of the following: ¨ Carbamazepine, erythromycin, ketoconazole, mirtazapine, phenytoin, rifampin, ritonavir, tramadol, trazodone  ¨ Diuretic (water pill)  ¨ Medicine to treat depression or mental health problems  ¨ Medicine to treat migraine headaches  ¨ Phenothiazine medicine  · Tell your doctor if you use anything else that makes you sleepy. Some examples are allergy medicine, narcotic pain medicine, and alcohol. Tell your doctor if you are using buprenorphine, butorphanol, nalbuphine, pentazocine, or a muscle relaxer. · Do not drink alcohol while you are using this medicine. Acetaminophen can damage your liver, and your risk is higher if you also drink alcohol. Warnings While Using This Medicine:   · Tell your doctor if you are pregnant or breastfeeding, or if you have kidney disease, liver disease, lung or breathing problems, gallbladder or pancreas problems, an underactive thyroid, Trigg disease, prostate problems, trouble urinating, stomach problems, or a history of head injury or brain tumor, seizures, alcohol or drug addiction. · This medicine may cause the following problems:   ¨ High risk of overdose, which can lead to death  ¨ Respiratory depression (serious breathing problem that can be life-threatening)  ¨ Liver problems  ¨ Serious skin reactions  ¨ Serotonin syndrome (when used with certain medicines)  · This medicine can be habit-forming. Do not use more than your prescribed dose. Call your doctor if you think your medicine is not working. · This medicine may make you dizzy or drowsy. Do not drive or doing anything else that could be dangerous until you know how this medicine affects you. · This medicine contains acetaminophen. Read the labels of all other medicines you are using to see if they also contain acetaminophen, or ask your doctor or pharmacist. Angela Arambula not use more than 4 grams (4,000 milligrams) total of acetaminophen in one day.   · Tell any doctor or dentist who treats you that you are using this medicine. This medicine may affect certain medical test results. · This medicine may cause constipation, especially with long-term use. Ask your doctor if you should use a laxative to prevent and treat constipation. · This medicine could cause infertility. Talk with your doctor before using this medicine if you plan to have children. · Keep all medicine out of the reach of children. Never share your medicine with anyone. Possible Side Effects While Using This Medicine:   Call your doctor right away if you notice any of these side effects:  · Allergic reaction: Itching or hives, swelling in your face or hands, swelling or tingling in your mouth or throat, chest tightness, trouble breathing  · Anxiety, restlessness, fast heartbeat, fever, sweating, muscle spasms, twitching, diarrhea, seeing or hearing things that are not there  · Blistering, peeling, red skin rash  · Blue lips, fingernails, or skin  · Dark urine or pale stools, loss of appetite, nausea or vomiting, stomach pain, yellow skin or eyes  · Extreme weakness, shallow breathing, slow heartbeat, sweating, seizures, cold or clammy skin  · Lightheadedness, dizziness, fainting  If you notice these less serious side effects, talk with your doctor:   · Constipation, nausea, vomiting  · Tiredness or sleepiness  If you notice other side effects that you think are caused by this medicine, tell your doctor. Call your doctor for medical advice about side effects. You may report side effects to FDA at 9-133-FDA-2208  © 2017 [facility] Information is for End User's use only and may not be sold, redistributed or otherwise used for commercial purposes. The above information is an  only. It is not intended as medical advice for individual conditions or treatments. Talk to your doctor, nurse or pharmacist before following any medical regimen to see if it is safe and effective for you.

## 2019-09-16 NOTE — PROGRESS NOTES
[facility]  [address]  [place] [address]  Phone: [phone]  Fax: [phone]  PROGRESS NOTE  Patient: Uriel Huerta                MRN: [id number]       SSN: [id number]  YOB: 1945        AGE: 69 y.o. SEX: female  Body mass index is 21.91 kg/m². PCP: Eneida Kim NP  09/16/19    No chief complaint on file. HISTORY OF PRESENT ILLNESS:  Uriel Huerta is a 69 y.o.  female with history of neck pain, arm pain, shooting pains in the arm(s) and muscle spasms. She has cervical stenosis at C1 and C2 that would require occipital cervical fusion and suboccipital arthritis that if we ended up operating on her, she probably would end up requiring an occiput to C6 fusion. She was last seen by Dr. Evelyn Hennessy on 8/13/19, he advised against surgery and offered referral to a tertiary care center. I saw her a month ago and gave her a MDP, some Baclofen and Gabapentin. She saw Dr. Lauren Barrientos a few weeks ago and he recommended surgical intervention.     Today, she now wants surgical intervention. She reports bad SEs from the medications I gave her and no benedit from the MDP. She comes in w/ severe spasms and BUE pain, neuropathic and myelopathic complaints. Pain is aching, burning and stabbing and radiation of pain and BUE arm. Symptoms are worst: afternoon. Pain is worse with looking up, looking down, manual/sedentary work and affects sleep and recreational activities. Pain is better with relaxation.      Denies bladder/bowel dysfunction, saddle paresthesia, weakness, gait disturbance, or other neurological deficits.      Current Medications: has Tramadol at home and takes Aleve  with no, benefit. Failed Gabapentin and Baclofen due to SEs. Last MDP w/no benefit.      PMHx: CVA w/ memory issues    ASSESSMENT   Diagnoses and all orders for this visit:    1.  Cervical spinal stenosis  -     HYDROcodone-acetaminophen (NORCO) 5-325 mg per tablet; Take 1 Tab by mouth every eight (8) hours as needed for Pain for up to 7 days. Max Daily Amount: 3 Tabs. -     REFERRAL TO NEUROSURGERY    2. Spinal cord compression (HCC)  -     REFERRAL TO NEUROSURGERY    Other orders  -     methocarbamol (ROBAXIN) 500 mg tablet; Take 1 Tab by mouth three (3) times daily as needed for Pain. IMPRESSION AND PLAN:  This is a pt with cervical stenosis, high grade and upper cervical distribution that would require extensive neck surgery that Dr. Daquan Foster recommended referral to [facility], she reports seeing Dr. Sabiha Michelle a few weeks ago and was referred to neuro-surgery Dr. Arlene Romero in [place] and has an apt next week with him. She would still like referral to [facility] if he will not operate. I have explained that we are not a PM practice and after this Rx we will no longer be able to give her any opioids. > Pt was given information on [facility]   > Refer to neuro-surgery  > 1 time [facility], pt has PM list  > Ms. Kalpana Aquino has a reminder for a \"due or due soon\" health maintenance. I have asked that she contact her primary care provider, Anahi Beebe NP, for follow-up on this health maintenance. >\" We have informed patient to notify us for immediate appointment if he has any worsening neurogical symptoms or if an emergency situation presents, then call 911  >  has been reviewed and is appropriate  > Pt will follow-up in PRN. 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 Respiratory: Negative for cough or shortness of breath. Cardiovascular: Negative for chest pain or palpitations. Gastrointestinal: Negative for incontinence, acid reflux, change in bowel habits, or constipation. Genitourinary: Negative for incontinence, dysuria and flank pain. Musculoskeletal: Positive for neck and BUE pain. See HPI. Skin: Negative for rash. Neurological:BUE diffuse radicular pain  See HPI. Endo/Heme/Allergies: Negative. Psychiatric/Behavioral: Negative. PHYSICAL EXAMINATION  Visit Vitals  /59 (BP 1 Location: Left arm, BP Patient Position: Sitting)   Pulse 86   Temp 97.3 °F (36.3 °C) ([place])   Ht 5' (1.524 m)   Wt 112 lb 3.2 oz (50.9 kg)   LMP  (LMP Unknown)   SpO2 98%   BMI 21.91 kg/m²         Accompanied by spouse. Constitutional:  Well developed, well nourished, in no acute distress. Psychiatric: Affect and mood are appropriate. Integumentary: No rashes or abrasions noted on exposed areas. Cardiovascular/Peripheral Vascular: +2 radial & pedal pulses. No peripheral edema is noted. Lymphatic:  No evidence of lymphedema. No cervical lymphadenopathy. SPINE/MUSCULOSKELETAL EXAM    Cervical spine:  Neck is midline. Normal muscle tone. No focal atrophy is noted. Neck ROM decreased, pain, spaticity with flexion, extension, turning right, turning left. Shoulder ROM intact. Tenderness to palpation post neck. Negative Spurling's sign. Negative Tinel's sign. + Garza's sign. Sensation grossly intact to light touch. MOTOR:        Biceps  Triceps Deltoids Wrist Ext Wrist Flex Hand Intrin   Right +4/5 +4/5 +4/5 +4/5 +4/5 +4/5   Left +4/5 +4/5 +4/5 +4/5 +4/5 +4/5   . Ambulation without assistive device.  FWB.    abnormal tandem walk: loss of balance        PAST MEDICAL HISTORY   Past Medical History:   Diagnosis Date    Abnormal Pap smear     Dr. Shant Hawk rhinitis     Arthritis     Cerebrovascular small vessel disease 3/18/2019    CT 3/17/2019    Chronic pain     Concentric left ventricular hypertrophy 3/18/2019    With left ventricular diastolic dysfunction by echocardiogram 3/18/2019    Hypercholesterolemia     Neck pain 5/17/2010    Stroke ([facility] [address] 10/02/2017    TIA- legs weak memory issues       Past Surgical History:   Procedure Laterality Date    COLONOSCOPY N/A 6/4/2018    COLONOSCOPY / polypectomy performed by Burt Solorzano MD at [facility] ENDOSCOPY    HX GYN      Total Hyst    HX LAP CHOLECYSTECTOMY      HX OTHER SURGICAL  2017    Throat widened   . MEDICATIONS      Current Outpatient Medications   Medication Sig Dispense Refill    HYDROcodone-acetaminophen (NORCO) 5-325 mg per tablet Take 1 Tab by mouth every eight (8) hours as needed for Pain for up to 7 days. Max Daily Amount: 3 Tabs. 20 Tab 0    methocarbamol (ROBAXIN) 500 mg tablet Take 1 Tab by mouth three (3) times daily as needed for Pain. 45 Tab 1    fluticasone propionate (FLONASE) 50 mcg/actuation nasal spray instill 2 sprays into each nostril once daily 16 g 1    montelukast (SINGULAIR) 10 mg tablet take 1 tablet by mouth once daily 90 Tab 2    loratadine (CLARITIN) 10 mg tablet Take 1 Tab by mouth daily. 90 Tab 1    naproxen sodium (ALEVE) 220 mg cap Take 2 Caps by mouth two (2) times a day.  doxylamine succinate (UNISOM) 25 mg tablet Take 25 mg by mouth nightly as needed for Sleep.  busPIRone (BUSPAR) 7.5 mg tablet take 1 tablet by mouth twice a day (Patient taking differently: take 1 tablet by mouth twice a day  Patient states she take 1 a day) 60 Tab 1    denosumab (PROLIA) 60 mg/mL injection 60 mg by SubCUTAneous route once.  turmeric 400 mg cap Take  by mouth.       fluticasone (FLONASE) 50 mcg/actuation nasal spray instill 2 sprays into each nostril once daily 3 Bottle 1    atorvastatin (LIPITOR) 20 mg tablet take 1 tablet by mouth once daily 90 Tab 3    omeprazole (PRILOSEC) 20 mg capsule Take 20 mg by mouth daily.  aspirin 81 mg chewable tablet Take 1 Tab by mouth daily.  30 Tab 3    gabapentin (NEURONTIN) 100 mg capsule Take 1 Tab QHS x 1 wk, then 1 Tab BID x1 wk, then 1 tab TID x1 wk (Patient not taking: Reported on 9/16/2019) 90 Cap 1    methylPREDNISolone (MEDROL, KAROL,) 4 mg tablet Per dose pack instructions 1 Dose Pack 0        ALLERGIES    Allergies   Allergen Reactions    Baclofen Shortness of Breath    Celebrex [Celecoxib] Other (comments)     Tiredness           SOCIAL HISTORY    Social History     Socioeconomic History    Marital status:      Spouse name: Not on file    Number of children: Not on file    Years of education: Not on file    Highest education level: Not on file   Occupational History    Not on file   Social Needs    Financial resource strain: Not on file    Food insecurity:     Worry: Not on file     Inability: Not on file    Transportation needs:     Medical: Not on file     Non-medical: Not on file   Tobacco Use    Smoking status: Never Smoker    Smokeless tobacco: Never Used   Substance and Sexual Activity    Alcohol use: No    Drug use: No    Sexual activity: Never   Lifestyle    Physical activity:     Days per week: Not on file     Minutes per session: Not on file    Stress: Not on file   Relationships    Social connections:     Talks on phone: Not on file     Gets together: Not on file     Attends Spiritism service: Not on file     Active member of club or organization: Not on file     Attends meetings of clubs or organizations: Not on file     Relationship status: Not on file    Intimate partner violence:     Fear of current or ex partner: Not on file     Emotionally abused: Not on file     Physically abused: Not on file     Forced sexual activity: Not on file   Other Topics Concern    Not on file   Social History Narrative    Not on file       FAMILY HISTORY    Family History   Problem Relation Age of Onset    Cancer Mother         breast    Heart Disease Father          Glenda Jennifer, NP

## 2019-09-17 ENCOUNTER — APPOINTMENT (OUTPATIENT)
Dept: INFUSION THERAPY | Age: 74
End: 2019-09-17
Payer: MEDICARE

## 2019-09-17 RX ORDER — BUSPIRONE HYDROCHLORIDE 7.5 MG/1
TABLET ORAL
Qty: 60 TAB | Refills: 1 | Status: SHIPPED | OUTPATIENT
Start: 2019-09-17 | End: 2020-03-03

## 2019-09-30 ENCOUNTER — HOSPITAL ENCOUNTER (OUTPATIENT)
Dept: INFUSION THERAPY | Age: 74
Discharge: HOME | End: 2019-09-30
Payer: MEDICARE

## 2019-09-30 VITALS
HEART RATE: 71 BPM | SYSTOLIC BLOOD PRESSURE: 130 MMHG | RESPIRATION RATE: 18 BRPM | DIASTOLIC BLOOD PRESSURE: 72 MMHG | OXYGEN SATURATION: 98 % | TEMPERATURE: 98 F

## 2019-09-30 LAB
ANION GAP BLD CALC-SCNC: 15 MMOL/L (ref 10–20)
BUN BLD-MCNC: 12 MG/DL (ref 7–18)
CA-I BLD-MCNC: 1.2 MMOL/L (ref 1.12–1.32)
CHLORIDE BLD-SCNC: 105 MMOL/L (ref 100–108)
CO2 BLD-SCNC: 26 MMOL/L (ref 19–24)
CREAT UR-MCNC: 0.7 MG/DL (ref 0.6–1.3)
GLUCOSE BLD STRIP.AUTO-MCNC: 118 MG/DL (ref 74–106)
HCT VFR BLD CALC: 42 % (ref 36–49)
HGB BLD-MCNC: 14.3 G/DL (ref 12–16)
MAGNESIUM SERPL-MCNC: 2.1 MG/DL (ref 1.6–2.6)
PHOSPHATE SERPL-MCNC: 2.6 MG/DL (ref 2.5–4.9)
POTASSIUM BLD-SCNC: 3.6 MMOL/L (ref 3.5–5.5)
SODIUM BLD-SCNC: 142 MMOL/L (ref 136–145)

## 2019-09-30 PROCEDURE — 36415 COLL VENOUS BLD VENIPUNCTURE: CPT

## 2019-09-30 PROCEDURE — 74011250636 HC RX REV CODE- 250/636: Performed by: NURSE PRACTITIONER

## 2019-09-30 PROCEDURE — 84100 ASSAY OF PHOSPHORUS: CPT

## 2019-09-30 PROCEDURE — 83735 ASSAY OF MAGNESIUM: CPT

## 2019-09-30 PROCEDURE — 96372 THER/PROPH/DIAG INJ SC/IM: CPT

## 2019-09-30 PROCEDURE — 80047 BASIC METABLC PNL IONIZED CA: CPT

## 2019-09-30 RX ADMIN — DENOSUMAB 60 MG: 60 INJECTION SUBCUTANEOUS at 15:46

## 2019-09-30 NOTE — PROGRESS NOTES
[facility] Progress Note    Date: 2019    Name: Adebayo Pierce    MRN: [id number]         : 1945     Prolia Injection. Patient arrived to [facility] at 1510 ambulatory. No concerns regarding injection today. States she tolerated previous prolia well. Pain to neck 6/10. States she took pain medication as per home schedule. She has a neck brace on. Ms. Asif Banerjee was assessed and education was provided. Ms. Aliyah Miller vitals were reviewed and patient was observed for 5 minutes prior to treatment. Visit Vitals  /72 (BP 1 Location: Left arm, BP Patient Position: Sitting)   Pulse 71   Temp 98 °F (36.7 °C)   Resp 18   SpO2 98%   Breastfeeding? No       Blood sample obtained by mukesh-puncture x 1 stick from left AC for istat bmp, magnesium and phosphorus. Mag and Phos sent out    Recent Results (from the past 12 hour(s))   POC CHEM8    Collection Time: 19  3:39 PM   Result Value Ref Range    CO2, POC 26 (H) 19 - 24 MMOL/L    Glucose,  (H) 74 - 106 MG/DL    BUN, POC 12 7 - 18 MG/DL    Creatinine, POC 0.7 0.6 - 1.3 MG/DL    GFRAA, POC >60 >60 ml/min/1.73m2    GFRNA, POC >60 >60 ml/min/1.73m2    Sodium,  136 - 145 MMOL/L    Potassium, POC 3.6 3.5 - 5.5 MMOL/L    Calcium, ionized (POC) 1.20 1. 12 - 1.32 mmol/L    Chloride,  100 - 108 MMOL/L    Anion gap, POC 15 10 - 20      Hematocrit, POC 42 36 - 49 %    Hemoglobin, POC 14.3 12 - 16 G/DL           Prolia 60 mg was administered subcutaneously to the back of patient's left arm. Band aid applied to site. Tolerated well. Reviewed discharge instructions with patient. She verbalized understanding     Neck pain prior discharge 5/10. Will resume pain medication on return home as per home schedule      Patient armband removed and shredded. Ms. Asif Banerjee was discharged from [place] [id number] in stable condition at 1540. She is to return on 3/30/20 at 1500 for her next appointment.     Lam Butler RN  , 2019  3:19 PM

## 2019-10-01 ENCOUNTER — HOSPITAL ENCOUNTER (OUTPATIENT)
Dept: LAB | Age: 74
Discharge: HOME | End: 2019-10-01
Payer: MEDICARE

## 2019-10-01 ENCOUNTER — OFFICE VISIT (OUTPATIENT)
Dept: PULMONOLOGY | Age: 74
End: 2019-10-01

## 2019-10-01 VITALS
HEART RATE: 66 BPM | SYSTOLIC BLOOD PRESSURE: 137 MMHG | TEMPERATURE: 97.6 F | OXYGEN SATURATION: 97 % | DIASTOLIC BLOOD PRESSURE: 84 MMHG | HEIGHT: 60 IN | RESPIRATION RATE: 18 BRPM | BODY MASS INDEX: 22.1 KG/M2 | WEIGHT: 112.6 LBS

## 2019-10-01 DIAGNOSIS — R06.02 SHORTNESS OF BREATH: Primary | ICD-10-CM

## 2019-10-01 DIAGNOSIS — E78.5 HYPERLIPIDEMIA, UNSPECIFIED HYPERLIPIDEMIA TYPE: ICD-10-CM

## 2019-10-01 LAB
ALBUMIN SERPL-MCNC: 4.7 G/DL (ref 3.4–5)
ALBUMIN/GLOB SERPL: 1.6 {RATIO} (ref 0.8–1.7)
ALP SERPL-CCNC: 65 U/L (ref 45–117)
ALT SERPL-CCNC: 25 U/L (ref 13–56)
ANION GAP SERPL CALC-SCNC: 4 MMOL/L (ref 3–18)
AST SERPL-CCNC: 22 U/L (ref 10–38)
BASOPHILS # BLD: 0 K/UL (ref 0–0.1)
BASOPHILS NFR BLD: 0 % (ref 0–2)
BILIRUB SERPL-MCNC: 0.6 MG/DL (ref 0.2–1)
BUN SERPL-MCNC: 10 MG/DL (ref 7–18)
BUN/CREAT SERPL: 14 (ref 12–20)
CALCIUM SERPL-MCNC: 9 MG/DL (ref 8.5–10.1)
CHLORIDE SERPL-SCNC: 106 MMOL/L (ref 100–111)
CO2 SERPL-SCNC: 29 MMOL/L (ref 21–32)
CREAT SERPL-MCNC: 0.72 MG/DL (ref 0.6–1.3)
DIFFERENTIAL METHOD BLD: NORMAL
EOSINOPHIL # BLD: 0.1 K/UL (ref 0–0.4)
EOSINOPHIL NFR BLD: 1 % (ref 0–5)
ERYTHROCYTE [DISTWIDTH] IN BLOOD BY AUTOMATED COUNT: 13.3 % (ref 11.6–14.5)
GLOBULIN SER CALC-MCNC: 2.9 G/DL (ref 2–4)
GLUCOSE SERPL-MCNC: 92 MG/DL (ref 74–99)
HCT VFR BLD AUTO: 43.6 % (ref 35–45)
HGB BLD-MCNC: 14.2 G/DL (ref 12–16)
LYMPHOCYTES # BLD: 2.2 K/UL (ref 0.9–3.6)
LYMPHOCYTES NFR BLD: 27 % (ref 21–52)
MCH RBC QN AUTO: 31.2 PG (ref 24–34)
MCHC RBC AUTO-ENTMCNC: 32.6 G/DL (ref 31–37)
MCV RBC AUTO: 95.8 FL (ref 74–97)
MONOCYTES # BLD: 0.8 K/UL (ref 0.05–1.2)
MONOCYTES NFR BLD: 10 % (ref 3–10)
NEUTS SEG # BLD: 5 K/UL (ref 1.8–8)
NEUTS SEG NFR BLD: 62 % (ref 40–73)
PLATELET # BLD AUTO: 248 K/UL (ref 135–420)
PMV BLD AUTO: 10.5 FL (ref 9.2–11.8)
POTASSIUM SERPL-SCNC: 3.9 MMOL/L (ref 3.5–5.5)
PROT SERPL-MCNC: 7.6 G/DL (ref 6.4–8.2)
RBC # BLD AUTO: 4.55 M/UL (ref 4.2–5.3)
SODIUM SERPL-SCNC: 139 MMOL/L (ref 136–145)
WBC # BLD AUTO: 8.1 K/UL (ref 4.6–13.2)

## 2019-10-01 PROCEDURE — 80053 COMPREHEN METABOLIC PANEL: CPT

## 2019-10-01 PROCEDURE — 36415 COLL VENOUS BLD VENIPUNCTURE: CPT

## 2019-10-01 PROCEDURE — 85025 COMPLETE CBC W/AUTO DIFF WBC: CPT

## 2019-10-01 PROCEDURE — 80061 LIPID PANEL: CPT

## 2019-10-01 RX ORDER — METHYLPREDNISOLONE 4 MG/1
TABLET ORAL
Refills: 0 | COMMUNITY
Start: 2019-08-20 | End: 2019-10-01

## 2019-10-01 RX ORDER — HYDROCODONE BITARTRATE AND ACETAMINOPHEN 5; 325 MG/1; MG/1
1 TABLET ORAL
COMMUNITY
End: 2020-02-19

## 2019-10-01 RX ORDER — BACLOFEN 10 MG/1
TABLET ORAL
Refills: 0 | COMMUNITY
Start: 2019-08-20 | End: 2019-10-01

## 2019-10-01 NOTE — PATIENT INSTRUCTIONS
Continue your efforts to walk as a way of maintaining conditioning at least 4 times a week if possible    Always call for symptoms such as worsening shortness of breath

## 2019-10-01 NOTE — PROGRESS NOTES
Jaylene Rendon presents today for   Chief Complaint   Patient presents with    Asthma     follow up from 7/8/2019    Shortness of Breath       Is someone accompanying this pt? No    Is the patient using any DME equipment during OV? Yes. Neck brace d/t to cervical compression   -DME Company N/A    Depression Screening:  3 most recent PHQ Screens 10/1/2019   PHQ Not Done -   Little interest or pleasure in doing things Not at all   Feeling down, depressed, irritable, or hopeless Not at all   Total Score PHQ 2 0       Learning Assessment:  Learning Assessment 2/26/2019   PRIMARY LEARNER Patient   HIGHEST LEVEL OF EDUCATION - PRIMARY LEARNER  -   CO-LEARNER CAREGIVER -   PRIMARY LANGUAGE ENGLISH   LEARNER PREFERENCE PRIMARY DEMONSTRATION   ANSWERED BY Patient   RELATIONSHIP SELF       Abuse Screening:  Abuse Screening Questionnaire 10/1/2019   Do you ever feel afraid of your partner? N   Are you in a relationship with someone who physically or mentally threatens you? N   Is it safe for you to go home? Y       Fall Risk  Fall Risk Assessment, last 12 mths 6/5/2019   Able to walk? Yes   Fall in past 12 months? No         Coordination of Care:  1. Have you been to the ER, urgent care clinic since your last visit? Hospitalized since your last visit? Yes; Where: [facility], When: 9/13/2019-SO[facility]    2. Have you seen or consulted any other health care providers outside of the [address] [place] [address] since your last visit? Include any pap smears or colon screening. Yes.  Dr. Charlotte Rhodes, neurosurgeon

## 2019-10-02 LAB
CHOLEST SERPL-MCNC: 143 MG/DL
HDLC SERPL-MCNC: 80 MG/DL (ref 40–60)
HDLC SERPL: 1.8 {RATIO} (ref 0–5)
LDLC SERPL CALC-MCNC: 47 MG/DL (ref 0–100)
LIPID PROFILE,FLP: ABNORMAL
TRIGL SERPL-MCNC: 80 MG/DL (ref ?–150)
VLDLC SERPL CALC-MCNC: 16 MG/DL

## 2019-10-08 ENCOUNTER — OFFICE VISIT (OUTPATIENT)
Dept: FAMILY MEDICINE CLINIC | Facility: CLINIC | Age: 74
End: 2019-10-08

## 2019-10-08 DIAGNOSIS — F41.9 ANXIETY: ICD-10-CM

## 2019-10-08 DIAGNOSIS — M54.2 CERVICAL PAIN (NECK): ICD-10-CM

## 2019-10-08 DIAGNOSIS — E78.5 HYPERLIPIDEMIA, UNSPECIFIED HYPERLIPIDEMIA TYPE: Primary | ICD-10-CM

## 2019-10-08 DIAGNOSIS — R03.0 ELEVATED BLOOD PRESSURE READING WITHOUT DIAGNOSIS OF HYPERTENSION: ICD-10-CM

## 2019-10-08 NOTE — PROGRESS NOTES
Tayo Prieto is a 69 y.o. female presenting today for Labs (results)    HPI:  Tayo Prieto presents to the office today for hyperlipidemia follow-up care. She presents today complaining of cervical neck pain. She notes she was also seen at [facility] and opted to have surgery done at [facility] with Dr. Chanelle Goldman. She notes she had CT and xrays done and is waiting  a surgical date. She notes she was seen in the Orthopedic practice 2 weeks ago. She presents to the practice today complaining of  intermittent radiculopathy to bilateral shoulders with ttingling sensation to the occipital region of the her head and intermittent radiculopathy to the RUE. She reports she takes 2 Aleve in the a.m, 1 Aleve in the evening and unisom to assist with sleep. She notes she is not taking any narcotics and reports the pain level is an 8 on a scale of 0-10. Review of Systems   Respiratory: Negative for cough and sputum production. Cardiovascular: Negative for chest pain, palpitations and leg swelling. Musculoskeletal: Positive for joint pain (bilateral shoulders), myalgias and neck pain. Neurological: Positive for headaches (chronic headache). Negative for dizziness. Allergies   Allergen Reactions    Baclofen Shortness of Breath    Celebrex [Celecoxib] Other (comments)     Tiredness        Current Outpatient Medications   Medication Sig Dispense Refill    busPIRone (BUSPAR) 7.5 mg tablet take 1 tablet by mouth twice a day 60 Tab 1    fluticasone propionate (FLONASE) 50 mcg/actuation nasal spray instill 2 sprays into each nostril once daily 16 g 1    montelukast (SINGULAIR) 10 mg tablet take 1 tablet by mouth once daily 90 Tab 2    naproxen sodium (ALEVE) 220 mg cap Take 2 Caps by mouth two (2) times a day.  doxylamine succinate (UNISOM) 25 mg tablet Take 25 mg by mouth nightly as needed for Sleep.  denosumab (PROLIA) 60 mg/mL injection 60 mg by SubCUTAneous route once.       turmeric 400 mg cap Take 1 Cap by mouth daily.  atorvastatin (LIPITOR) 20 mg tablet take 1 tablet by mouth once daily 90 Tab 3    omeprazole (PRILOSEC) 20 mg capsule Take 20 mg by mouth daily.  aspirin 81 mg chewable tablet Take 1 Tab by mouth daily. 30 Tab 3    HYDROcodone-acetaminophen (NORCO) 5-325 mg per tablet Take 1 Tab by mouth.  methocarbamol (ROBAXIN) 500 mg tablet Take 1 Tab by mouth three (3) times daily as needed for Pain. 45 Tab 1    gabapentin (NEURONTIN) 100 mg capsule Take 1 Tab QHS x 1 wk, then 1 Tab BID x1 wk, then 1 tab TID x1 wk 90 Cap 1    loratadine (CLARITIN) 10 mg tablet Take 1 Tab by mouth daily.  80 Tab 1       Past Medical History:   Diagnosis Date    Abnormal Pap smear     Dr. Brooke Shook Allergic rhinitis     Arthritis     Cerebrovascular small vessel disease 3/18/2019    CT 3/17/2019    Cervical spinal cord compression (HCC)     Chronic pain     Concentric left ventricular hypertrophy 3/18/2019    With left ventricular diastolic dysfunction by echocardiogram 3/18/2019    Hypercholesterolemia     Neck pain 5/17/2010    Stroke ([facility] [address] 10/02/2017    TIA- legs weak memory issues       Past Surgical History:   Procedure Laterality Date    COLONOSCOPY N/A 6/4/2018    COLONOSCOPY / polypectomy performed by Blanca Collazo MD at [facility] ENDOSCOPY    HX GYN      Total Hyst    HX LAP CHOLECYSTECTOMY      HX OTHER SURGICAL  2017    Throat widened       Social History     Socioeconomic History    Marital status:      Spouse name: Not on file    Number of children: Not on file    Years of education: Not on file    Highest education level: Not on file   Occupational History    Not on file   Social Needs    Financial resource strain: Not on file    Food insecurity:     Worry: Not on file     Inability: Not on file    Transportation needs:     Medical: Not on file     Non-medical: Not on file   Tobacco Use    Smoking status: Never Smoker    Smokeless tobacco: Never Used   Substance and Sexual Activity    Alcohol use: No    Drug use: No    Sexual activity: Never   Lifestyle    Physical activity:     Days per week: Not on file     Minutes per session: Not on file    Stress: Not on file   Relationships    Social connections:     Talks on phone: Not on file     Gets together: Not on file     Attends [employer] service: Not on file     Active member of club or organization: Not on file     Attends meetings of clubs or organizations: Not on file     Relationship status: Not on file    Intimate partner violence:     Fear of current or ex partner: Not on file     Emotionally abused: Not on file     Physically abused: Not on file     Forced sexual activity: Not on file   Other Topics Concern    Not on file   Social History Narrative    Not on file       Patient does not have an advanced directive on file    Vitals:    10/08/19 1024 10/12/19 1331   BP: 161/72 150/70   Pulse: 73    Resp: 12    Temp: 97 °F (36.1 °C)    TempSrc: Oral    SpO2: 97%    Weight: 112 lb (50.8 kg)    Height: 5' (1.524 m)    PainSc:   8        Physical Exam   Constitutional: She is well-developed, well-nourished, and in no distress. Cardiovascular: Normal rate, regular rhythm and normal heart sounds. Pulmonary/Chest: Effort normal and breath sounds normal. No respiratory distress. Abdominal: Soft. Musculoskeletal: She exhibits tenderness. Cervical back: She exhibits decreased range of motion, tenderness and pain. Lymphadenopathy:     She has no cervical adenopathy. Neurological: She is alert. Skin: Skin is warm and dry. Nursing note and vitals reviewed.       Hospital Outpatient Visit on 10/01/2019   Component Date Value Ref Range Status    Sodium 10/01/2019 139  136 - 145 mmol/L Final    Potassium 10/01/2019 3.9  3.5 - 5.5 mmol/L Final    Chloride 10/01/2019 106  100 - 111 mmol/L Final    CO2 10/01/2019 29  21 - 32 mmol/L Final    Anion gap 10/01/2019 4  3.0 - 18 mmol/L Final    Glucose 10/01/2019 92  74 - 99 mg/dL Final    BUN 10/01/2019 10  7.0 - 18 MG/DL Final    Creatinine 10/01/2019 0.72  0.6 - 1.3 MG/DL Final    BUN/Creatinine ratio 10/01/2019 14  12 - 20   Final    GFR est AA 10/01/2019 >60  >60 ml/min/1.73m2 Final    GFR est non-AA 10/01/2019 >60  >60 ml/min/1.73m2 Final    Comment: (NOTE)  Estimated GFR is calculated using the Modification of Diet in Renal   Disease (MDRD) Study equation, reported for both  Americans   (GFRAA) and non- Americans (GFRNA), and normalized to 1.73m2   body surface area. The physician must decide which value applies to   the patient. The MDRD study equation should only be used in   individuals age 25 or older. It has not been validated for the   following: pregnant women, patients with serious comorbid conditions,   or on certain medications, or persons with extremes of body size,   muscle mass, or nutritional status.  Calcium 10/01/2019 9.0  8.5 - 10.1 MG/DL Final    Bilirubin, total 10/01/2019 0.6  0.2 - 1.0 MG/DL Final    ALT (SGPT) 10/01/2019 25  13 - 56 U/L Final    AST (SGOT) 10/01/2019 22  10 - 38 U/L Final    Alk. phosphatase 10/01/2019 65  45 - 117 U/L Final    Protein, total 10/01/2019 7.6  6.4 - 8.2 g/dL Final    Albumin 10/01/2019 4.7  3.4 - 5.0 g/dL Final    Globulin 10/01/2019 2.9  2.0 - 4.0 g/dL Final    A-G Ratio 10/01/2019 1.6  0.8 - 1.7   Final    LIPID PROFILE 10/01/2019        Final    Cholesterol, total 10/01/2019 143  <200 MG/DL Final    Triglyceride 10/01/2019 80  <150 MG/DL Final    Comment: The drugs N-acetylcysteine (NAC) and  Metamiszole have been found to cause falsely  low results in this chemical assay. Please  be sure to submit blood samples obtained  BEFORE administration of either of these  drugs to assure correct results.       HDL Cholesterol 10/01/2019 80* 40 - 60 MG/DL Final    LDL, calculated 10/01/2019 47  0 - 100 MG/DL Final    VLDL, calculated 10/01/2019 16  MG/DL Final    CHOL/HDL Ratio 10/01/2019 1.8  0 - 5.0   Final    WBC 10/01/2019 8.1  4.6 - 13.2 K/uL Final    RBC 10/01/2019 4.55  4.20 - 5.30 M/uL Final    HGB 10/01/2019 14.2  12.0 - 16.0 g/dL Final    HCT 10/01/2019 43.6  35.0 - 45.0 % Final    MCV 10/01/2019 95.8  74.0 - 97.0 FL Final    MCH 10/01/2019 31.2  24.0 - 34.0 PG Final    MCHC 10/01/2019 32.6  31.0 - 37.0 g/dL Final    RDW 10/01/2019 13.3  11.6 - 14.5 % Final    PLATELET 44/65/4356 077  135 - 420 K/uL Final    MPV 10/01/2019 10.5  9.2 - 11.8 FL Final    NEUTROPHILS 10/01/2019 62  40 - 73 % Final    LYMPHOCYTES 10/01/2019 27  21 - 52 % Final    MONOCYTES 10/01/2019 10  3 - 10 % Final    EOSINOPHILS 10/01/2019 1  0 - 5 % Final    BASOPHILS 10/01/2019 0  0 - 2 % Final    ABS. NEUTROPHILS 10/01/2019 5.0  1.8 - 8.0 K/UL Final    ABS. LYMPHOCYTES 10/01/2019 2.2  0.9 - 3.6 K/UL Final    ABS. MONOCYTES 10/01/2019 0.8  0.05 - 1.2 K/UL Final    ABS. EOSINOPHILS 10/01/2019 0.1  0.0 - 0.4 K/UL Final    ABS. BASOPHILS 10/01/2019 0.0  0.0 - 0.1 K/UL Final    DF 10/01/2019 AUTOMATED    Final   Hospital Outpatient Visit on 09/30/2019   Component Date Value Ref Range Status    Magnesium 09/30/2019 2.1  1.6 - 2.6 mg/dL Final    Phosphorus 09/30/2019 2.6  2.5 - 4.9 MG/DL Final    CO2, POC 09/30/2019 26* 19 - 24 MMOL/L Final    Glucose, POC 09/30/2019 118* 74 - 106 MG/DL Final    BUN, POC 09/30/2019 12  7 - 18 MG/DL Final    Creatinine, POC 09/30/2019 0.7  0.6 - 1.3 MG/DL Final    GFRAA, POC 09/30/2019 >60  >60 ml/min/1.73m2 Final    GFRNA, POC 09/30/2019 >60  >60 ml/min/1.73m2 Final    Comment: Estimated GFR is calculated using the IDMS-traceable Modification of Diet in Renal Disease (MDRD) Study equation, reported for both  Americans (GFRAA) and non- Americans (GFRNA), and normalized to 1.73m2 body surface area. The physician must decide which value applies to the patient. The MDRD study equation should only be used in individuals age 25 or older.  It has not been validated for the following: pregnant women, patients with serious comorbid conditions, or on certain medications, or persons with extremes of body size, muscle mass, or nutritional status.  Sodium, POC 09/30/2019 142  136 - 145 MMOL/L Final    Potassium, POC 09/30/2019 3.6  3.5 - 5.5 MMOL/L Final    Calcium, ionized (POC) 09/30/2019 1.20  1. 12 - 1.32 mmol/L Final    Chloride, POC 09/30/2019 105  100 - 108 MMOL/L Final    Anion gap, POC 09/30/2019 15  10 - 20   Final    Hematocrit, POC 09/30/2019 42  36 - 49 % Final    Hemoglobin, POC 09/30/2019 14.3  12 - 16 G/DL Final   Admission on 09/13/2019, Discharged on 09/13/2019   Component Date Value Ref Range Status    Ventricular Rate 09/13/2019 68  BPM Final    Atrial Rate 09/13/2019 68  BPM Final    P-R Interval 09/13/2019 134  ms Final    QRS Duration 09/13/2019 80  ms Final    Q-T Interval 09/13/2019 418  ms Final    QTC Calculation (Bezet) 09/13/2019 444  ms Final    Calculated P Axis 09/13/2019 83  degrees Final    Calculated R Axis 09/13/2019 56  degrees Final    Calculated T Axis 09/13/2019 74  degrees Final    Diagnosis 09/13/2019    Final                    Value:Normal sinus rhythm  Right atrial enlargement  Borderline ECG  When compared with ECG of 17-MAR-2019 18:01,  No significant change was found  Confirmed by Miriam Jones MD, Maricel Bolton [id number]) on 9/14/2019 2:10:36 PM         .No results found for any visits on 10/08/19. Assessment / Plan:      ICD-10-CM ICD-9-CM    1. Hyperlipidemia, unspecified hyperlipidemia type E78.5 272.4    2. Anxiety F41.9 300.00    3. Cervical pain (neck) M54.2 723.1    4. Elevated blood pressure reading without diagnosis of hypertension R03.0 796.2      Hyperlipidemia-no change to current treatment plan  Anxiety-patient notes she feels anxious but is not willing to start any medication at this time  Elevated blood pressure-pain level is a 8. We will continue to monitor.   Follow-up and Dispositions · Return in about 3 months (around 1/8/2020), or if symptoms worsen or fail to improve. I asked the patient if she  had any questions and answered her  questions. The patient stated that she understands the treatment plan and agrees with the treatment plan    This document was created with a voice activated dictation system and may contain transcription errors.

## 2019-10-08 NOTE — PROGRESS NOTES
Visit Vitals  /72   Pulse 73   Temp 97 °F (36.1 °C) (Oral)   Resp 12   Ht 5' (1.524 m)   Wt 112 lb (50.8 kg)   LMP  (LMP Unknown)   SpO2 97%   BMI 21.87 kg/m²     Dilcia Gonzalez presents today for   Chief Complaint   Patient presents with    Labs     results       Is someone accompanying this pt? no    Is the patient using any DME equipment during OV? no    Depression Screening:  3 most recent PHQ Screens 10/8/2019   PHQ Not Done -   Little interest or pleasure in doing things Not at all   Feeling down, depressed, irritable, or hopeless Not at all   Total Score PHQ 2 0       Learning Assessment:  Learning Assessment 2/26/2019   PRIMARY LEARNER Patient   HIGHEST LEVEL OF EDUCATION - PRIMARY LEARNER  -   CO-LEARNER CAREGIVER -   PRIMARY LANGUAGE ENGLISH   LEARNER PREFERENCE PRIMARY DEMONSTRATION   ANSWERED BY Patient   RELATIONSHIP SELF       Abuse Screening:  Abuse Screening Questionnaire 10/1/2019   Do you ever feel afraid of your partner? N   Are you in a relationship with someone who physically or mentally threatens you? N   Is it safe for you to go home? Y       Fall Risk  Fall Risk Assessment, last 12 mths 10/8/2019   Able to walk? Yes   Fall in past 12 months? No       Health Maintenance reviewed and discussed and ordered per Provider. Health Maintenance Due   Topic Date Due    Pneumococcal 65+ years (1 of 2 - PCV13) 06/24/2012    Influenza Age 5 to Adult  08/01/2019           Coordination of Care:  1. Have you been to the ER, urgent care clinic since your last visit? Hospitalized since your last visit? no    2. Have you seen or consulted any other health care providers outside of the [address] [place] [address] since your last visit? Include any pap smears or colon screening.  no

## 2019-10-12 VITALS
SYSTOLIC BLOOD PRESSURE: 150 MMHG | TEMPERATURE: 97 F | HEIGHT: 60 IN | RESPIRATION RATE: 12 BRPM | OXYGEN SATURATION: 97 % | WEIGHT: 112 LBS | HEART RATE: 73 BPM | BODY MASS INDEX: 21.99 KG/M2 | DIASTOLIC BLOOD PRESSURE: 70 MMHG

## 2019-10-15 ENCOUNTER — TELEPHONE (OUTPATIENT)
Dept: PULMONOLOGY | Age: 74
End: 2019-10-15

## 2019-10-15 NOTE — TELEPHONE ENCOUNTER
Viviane Heimlich from Dr. Radha Tovar [address] office states she would like to know if pt can be cleared from 10/1 appt w/ Dr. Eric Powell. If he would addend his office note. Pt have an infusion w/ general anesthesia per Dr. Oneida Lange. Please advise [phone].

## 2019-11-13 ENCOUNTER — OFFICE VISIT (OUTPATIENT)
Dept: FAMILY MEDICINE CLINIC | Facility: CLINIC | Age: 74
End: 2019-11-13

## 2019-11-13 VITALS
OXYGEN SATURATION: 96 % | HEIGHT: 60 IN | HEART RATE: 80 BPM | WEIGHT: 117 LBS | DIASTOLIC BLOOD PRESSURE: 70 MMHG | TEMPERATURE: 97 F | BODY MASS INDEX: 22.97 KG/M2 | RESPIRATION RATE: 12 BRPM | SYSTOLIC BLOOD PRESSURE: 120 MMHG

## 2019-11-13 DIAGNOSIS — E78.5 HYPERLIPIDEMIA, UNSPECIFIED HYPERLIPIDEMIA TYPE: ICD-10-CM

## 2019-11-13 DIAGNOSIS — Z01.818 PREOPERATIVE EXAMINATION: Primary | ICD-10-CM

## 2019-11-13 DIAGNOSIS — M54.2 CERVICAL PAIN (NECK): ICD-10-CM

## 2019-11-13 DIAGNOSIS — F41.9 ANXIETY: ICD-10-CM

## 2019-11-13 RX ORDER — LORAZEPAM 0.5 MG/1
0.5 TABLET ORAL
Qty: 10 TAB | Refills: 0 | OUTPATIENT
Start: 2019-11-13 | End: 2020-07-26

## 2019-11-13 NOTE — PROGRESS NOTES
Viviana Elmore is a 69 y.o. female presenting today for Pre-op Exam (Neck Infusion Surgery)    HPI:  Viviana Elmore presents to the office today for preoperative examination. She is scheduled for Cervical 1 to Decompression with Occiput to Cervical 4 Fusion on 11/20/2019 with Dr. Nita Virgen at [facility]. She has a PMH for hyperlipidemia, anxiety, osteoporosis and GERD. She presents today with a mildly elevated blood pressure. She is very anxious regarding her upcoming surgery which is probable related to her elevated blood pressure reading. She is negative for chest pain, palpation or lower extremity. Review of Systems   Constitutional: Negative for malaise/fatigue. Respiratory: Negative for cough, sputum production and shortness of breath. Cardiovascular: Negative for chest pain, palpitations and leg swelling. Gastrointestinal: Negative for abdominal pain, nausea and vomiting. Musculoskeletal: Positive for neck pain. Neurological: Negative for dizziness and headaches. Allergies   Allergen Reactions    Baclofen Shortness of Breath    Celebrex [Celecoxib] Other (comments)     Tiredness        Current Outpatient Medications   Medication Sig Dispense Refill    LORazepam (ATIVAN) 0.5 mg tablet Take 1 Tab by mouth every eight (8) hours as needed for Anxiety. Max Daily Amount: 1.5 mg. 10 Tab 0    busPIRone (BUSPAR) 7.5 mg tablet take 1 tablet by mouth twice a day 60 Tab 1    fluticasone propionate (FLONASE) 50 mcg/actuation nasal spray instill 2 sprays into each nostril once daily 16 g 1    montelukast (SINGULAIR) 10 mg tablet take 1 tablet by mouth once daily 90 Tab 2    doxylamine succinate (UNISOM) 25 mg tablet Take 25 mg by mouth nightly as needed for Sleep.  denosumab (PROLIA) 60 mg/mL injection 60 mg by SubCUTAneous route once.  turmeric 400 mg cap Take 1 Cap by mouth daily.       atorvastatin (LIPITOR) 20 mg tablet take 1 tablet by mouth once daily 90 Tab 3    omeprazole (PRILOSEC) 20 mg capsule Take 20 mg by mouth daily.  aspirin 81 mg chewable tablet Take 1 Tab by mouth daily. 30 Tab 3    HYDROcodone-acetaminophen (NORCO) 5-325 mg per tablet Take 1 Tab by mouth.  methocarbamol (ROBAXIN) 500 mg tablet Take 1 Tab by mouth three (3) times daily as needed for Pain. 45 Tab 1    gabapentin (NEURONTIN) 100 mg capsule Take 1 Tab QHS x 1 wk, then 1 Tab BID x1 wk, then 1 tab TID x1 wk 90 Cap 1    loratadine (CLARITIN) 10 mg tablet Take 1 Tab by mouth daily. 90 Tab 1    naproxen sodium (ALEVE) 220 mg cap Take 2 Caps by mouth two (2) times a day.          Past Medical History:   Diagnosis Date    Abnormal Pap smear     Dr. Shelby Boggs Allergic rhinitis     Arthritis     Cerebrovascular small vessel disease 3/18/2019    CT 3/17/2019    Cervical spinal cord compression (HCC)     Chronic pain     Concentric left ventricular hypertrophy 3/18/2019    With left ventricular diastolic dysfunction by echocardiogram 3/18/2019    Hypercholesterolemia     Neck pain 5/17/2010    Stroke ([facility] [address] 10/02/2017    TIA- legs weak memory issues       Past Surgical History:   Procedure Laterality Date    COLONOSCOPY N/A 6/4/2018    COLONOSCOPY / polypectomy performed by Meliza Valdes MD at [facility] ENDOSCOPY    HX GYN      Total Hyst    HX LAP CHOLECYSTECTOMY      HX OTHER SURGICAL  2017    Throat widened       Social History     Socioeconomic History    Marital status:      Spouse name: Not on file    Number of children: Not on file    Years of education: Not on file    Highest education level: Not on file   Occupational History    Not on file   Social Needs    Financial resource strain: Not on file    Food insecurity:     Worry: Not on file     Inability: Not on file    Transportation needs:     Medical: Not on file     Non-medical: Not on file   Tobacco Use    Smoking status: Never Smoker    Smokeless tobacco: Never Used   Substance and Sexual Activity    Alcohol use: No    Drug use: No    Sexual activity: Never   Lifestyle    Physical activity:     Days per week: Not on file     Minutes per session: Not on file    Stress: Not on file   Relationships    Social connections:     Talks on phone: Not on file     Gets together: Not on file     Attends [employer] service: Not on file     Active member of club or organization: Not on file     Attends meetings of clubs or organizations: Not on file     Relationship status: Not on file    Intimate partner violence:     Fear of current or ex partner: Not on file     Emotionally abused: Not on file     Physically abused: Not on file     Forced sexual activity: Not on file   Other Topics Concern    Not on file   Social History Narrative    Not on file       Patient does not have an advanced directive on file    Vitals:    11/13/19 0953 11/13/19 1055   BP: 150/78 120/70   Pulse: 80    Resp: 12    Temp: 97 °F (36.1 °C)    TempSrc: Oral    SpO2: 96%    Weight: 117 lb (53.1 kg)    Height: 5' (1.524 m)    PainSc:   0 - No pain        Physical Exam   Constitutional: She is oriented to person, place, and time. No distress. Cardiovascular: Normal rate, regular rhythm, normal heart sounds and intact distal pulses. Pulmonary/Chest: Effort normal and breath sounds normal.   Musculoskeletal: She exhibits no edema or tenderness. Cervical collar intact   Lymphadenopathy:     She has no cervical adenopathy. Neurological: She is alert and oriented to person, place, and time. Skin: Skin is warm. Nursing note and vitals reviewed.       Hospital Outpatient Visit on 10/01/2019   Component Date Value Ref Range Status    Sodium 10/01/2019 139  136 - 145 mmol/L Final    Potassium 10/01/2019 3.9  3.5 - 5.5 mmol/L Final    Chloride 10/01/2019 106  100 - 111 mmol/L Final    CO2 10/01/2019 29  21 - 32 mmol/L Final    Anion gap 10/01/2019 4  3.0 - 18 mmol/L Final    Glucose 10/01/2019 92  74 - 99 mg/dL Final    BUN 10/01/2019 10  7.0 - 18 MG/DL Final    Creatinine 10/01/2019 0.72  0.6 - 1.3 MG/DL Final    BUN/Creatinine ratio 10/01/2019 14  12 - 20   Final    GFR est AA 10/01/2019 >60  >60 ml/min/1.73m2 Final    GFR est non-AA 10/01/2019 >60  >60 ml/min/1.73m2 Final    Comment: (NOTE)  Estimated GFR is calculated using the Modification of Diet in Renal   Disease (MDRD) Study equation, reported for both  Americans   (GFRAA) and non- Americans (GFRNA), and normalized to 1.73m2   body surface area. The physician must decide which value applies to   the patient. The MDRD study equation should only be used in   individuals age 25 or older. It has not been validated for the   following: pregnant women, patients with serious comorbid conditions,   or on certain medications, or persons with extremes of body size,   muscle mass, or nutritional status.  Calcium 10/01/2019 9.0  8.5 - 10.1 MG/DL Final    Bilirubin, total 10/01/2019 0.6  0.2 - 1.0 MG/DL Final    ALT (SGPT) 10/01/2019 25  13 - 56 U/L Final    AST (SGOT) 10/01/2019 22  10 - 38 U/L Final    Alk. phosphatase 10/01/2019 65  45 - 117 U/L Final    Protein, total 10/01/2019 7.6  6.4 - 8.2 g/dL Final    Albumin 10/01/2019 4.7  3.4 - 5.0 g/dL Final    Globulin 10/01/2019 2.9  2.0 - 4.0 g/dL Final    A-G Ratio 10/01/2019 1.6  0.8 - 1.7   Final    LIPID PROFILE 10/01/2019        Final    Cholesterol, total 10/01/2019 143  <200 MG/DL Final    Triglyceride 10/01/2019 80  <150 MG/DL Final    Comment: The drugs N-acetylcysteine (NAC) and  Metamiszole have been found to cause falsely  low results in this chemical assay. Please  be sure to submit blood samples obtained  BEFORE administration of either of these  drugs to assure correct results.       HDL Cholesterol 10/01/2019 80* 40 - 60 MG/DL Final    LDL, calculated 10/01/2019 47  0 - 100 MG/DL Final    VLDL, calculated 10/01/2019 16  MG/DL Final    CHOL/HDL Ratio 10/01/2019 1.8  0 - 5.0   Final    WBC 10/01/2019 8.1  4.6 - 13.2 K/uL Final    RBC 10/01/2019 4.55  4.20 - 5.30 M/uL Final    HGB 10/01/2019 14.2  12.0 - 16.0 g/dL Final    HCT 10/01/2019 43.6  35.0 - 45.0 % Final    MCV 10/01/2019 95.8  74.0 - 97.0 FL Final    MCH 10/01/2019 31.2  24.0 - 34.0 PG Final    MCHC 10/01/2019 32.6  31.0 - 37.0 g/dL Final    RDW 10/01/2019 13.3  11.6 - 14.5 % Final    PLATELET 37/27/4096 403  135 - 420 K/uL Final    MPV 10/01/2019 10.5  9.2 - 11.8 FL Final    NEUTROPHILS 10/01/2019 62  40 - 73 % Final    LYMPHOCYTES 10/01/2019 27  21 - 52 % Final    MONOCYTES 10/01/2019 10  3 - 10 % Final    EOSINOPHILS 10/01/2019 1  0 - 5 % Final    BASOPHILS 10/01/2019 0  0 - 2 % Final    ABS. NEUTROPHILS 10/01/2019 5.0  1.8 - 8.0 K/UL Final    ABS. LYMPHOCYTES 10/01/2019 2.2  0.9 - 3.6 K/UL Final    ABS. MONOCYTES 10/01/2019 0.8  0.05 - 1.2 K/UL Final    ABS. EOSINOPHILS 10/01/2019 0.1  0.0 - 0.4 K/UL Final    ABS. BASOPHILS 10/01/2019 0.0  0.0 - 0.1 K/UL Final    DF 10/01/2019 AUTOMATED    Final   Hospital Outpatient Visit on 09/30/2019   Component Date Value Ref Range Status    Magnesium 09/30/2019 2.1  1.6 - 2.6 mg/dL Final    Phosphorus 09/30/2019 2.6  2.5 - 4.9 MG/DL Final    CO2, POC 09/30/2019 26* 19 - 24 MMOL/L Final    Glucose, POC 09/30/2019 118* 74 - 106 MG/DL Final    BUN, POC 09/30/2019 12  7 - 18 MG/DL Final    Creatinine, POC 09/30/2019 0.7  0.6 - 1.3 MG/DL Final    GFRAA, POC 09/30/2019 >60  >60 ml/min/1.73m2 Final    GFRNA, POC 09/30/2019 >60  >60 ml/min/1.73m2 Final    Comment: Estimated GFR is calculated using the IDMS-traceable Modification of Diet in Renal Disease (MDRD) Study equation, reported for both  Americans (GFRAA) and non- Americans (GFRNA), and normalized to 1.73m2 body surface area. The physician must decide which value applies to the patient. The MDRD study equation should only be used in individuals age 25 or older.  It has not been validated for the following: pregnant women, patients with serious comorbid conditions, or on certain medications, or persons with extremes of body size, muscle mass, or nutritional status.  Sodium, POC 09/30/2019 142  136 - 145 MMOL/L Final    Potassium, POC 09/30/2019 3.6  3.5 - 5.5 MMOL/L Final    Calcium, ionized (POC) 09/30/2019 1.20  1. 12 - 1.32 mmol/L Final    Chloride, POC 09/30/2019 105  100 - 108 MMOL/L Final    Anion gap, POC 09/30/2019 15  10 - 20   Final    Hematocrit, POC 09/30/2019 42  36 - 49 % Final    Hemoglobin, POC 09/30/2019 14.3  12 - 16 G/DL Final   Admission on 09/13/2019, Discharged on 09/13/2019   Component Date Value Ref Range Status    Ventricular Rate 09/13/2019 68  BPM Final    Atrial Rate 09/13/2019 68  BPM Final    P-R Interval 09/13/2019 134  ms Final    QRS Duration 09/13/2019 80  ms Final    Q-T Interval 09/13/2019 418  ms Final    QTC Calculation (Bezet) 09/13/2019 444  ms Final    Calculated P Axis 09/13/2019 83  degrees Final    Calculated R Axis 09/13/2019 56  degrees Final    Calculated T Axis 09/13/2019 74  degrees Final    Diagnosis 09/13/2019    Final                    Value:Normal sinus rhythm  Right atrial enlargement  Borderline ECG  When compared with ECG of 17-MAR-2019 18:01,  No significant change was found  Confirmed by Cintia Esparza MD, Robin Roberts ([id number]) on 9/14/2019 2:10:36 PM         .No results found for any visits on 11/13/19. Assessment / Plan:      ICD-10-CM ICD-9-CM    1. Preoperative examination Z01.818 V72.84    2. Anxiety F41.9 300.00 LORazepam (ATIVAN) 0.5 mg tablet   3. Hyperlipidemia, unspecified hyperlipidemia type E78.5 272.4    4. Cervical pain (neck) M54.2 723.1      Patient feels very anxious prior to surgery. She is requesting something a little bit stronger than BuSpar.   Patient given Ativan x10 pills  Labs reviewed and results discussed with patient  Patient is optimized for cervical neck surgery  Follow-up and Dispositions    · Return in about 6 months (around 5/13/2020), or if symptoms worsen or fail to improve. I asked the patient if she  had any questions and answered her  questions. The patient stated that she understands the treatment plan and agrees with the treatment plan    This document was created with a voice activated dictation system and may contain transcription errors.

## 2019-11-13 NOTE — PROGRESS NOTES
Visit Vitals  /78   Pulse 80   Temp 97 °F (36.1 °C) (Oral)   Resp 12   Ht 5' (1.524 m)   Wt 117 lb (53.1 kg)   LMP  (LMP Unknown)   SpO2 96%   BMI 22.85 kg/m²     Kelsey Hernandez presents today for   Chief Complaint   Patient presents with    Pre-op Exam     Neck Infusion Surgery       Is someone accompanying this pt? no    Is the patient using any DME equipment during OV? no    Depression Screening:  3 most recent PHQ Screens 11/13/2019   PHQ Not Done -   Little interest or pleasure in doing things Not at all   Feeling down, depressed, irritable, or hopeless Not at all   Total Score PHQ 2 0       Learning Assessment:  Learning Assessment 2/26/2019   PRIMARY LEARNER Patient   HIGHEST LEVEL OF EDUCATION - PRIMARY LEARNER  -   CO-LEARNER CAREGIVER -   PRIMARY LANGUAGE ENGLISH   LEARNER PREFERENCE PRIMARY DEMONSTRATION   ANSWERED BY Patient   RELATIONSHIP SELF       Abuse Screening:  Abuse Screening Questionnaire 10/1/2019   Do you ever feel afraid of your partner? N   Are you in a relationship with someone who physically or mentally threatens you? N   Is it safe for you to go home? Y       Fall Risk  Fall Risk Assessment, last 12 mths 11/13/2019   Able to walk? Yes   Fall in past 12 months? No       Health Maintenance reviewed and discussed and ordered per Provider. Health Maintenance Due   Topic Date Due    Pneumococcal 65+ years (1 of 1 - PPSV23) 06/24/2012    Influenza Age 5 to Adult  08/01/2019           Coordination of Care:  1. Have you been to the ER, urgent care clinic since your last visit? Hospitalized since your last visit? no    2. Have you seen or consulted any other health care providers outside of the [address] [place] [address] since your last visit? Include any pap smears or colon screening.  no

## 2019-12-13 ENCOUNTER — HOSPITAL ENCOUNTER (OUTPATIENT)
Dept: REHABILITATION | Age: 74
End: 2020-02-06
Attending: PHYSICAL MEDICINE & REHABILITATION | Admitting: PHYSICAL MEDICINE & REHABILITATION

## 2019-12-13 DIAGNOSIS — M47.12 OTHER SPONDYLOSIS WITH MYELOPATHY, CERVICAL REGION: ICD-10-CM

## 2019-12-13 LAB
APPEARANCE UR: ABNORMAL
BACTERIA URNS QL MICRO: ABNORMAL /HPF
BILIRUB UR QL: NEGATIVE
COLOR UR: ABNORMAL
EPITH CASTS URNS QL MICRO: ABNORMAL /LPF
GLUCOSE UR STRIP.AUTO-MCNC: NEGATIVE MG/DL
HGB UR QL STRIP: NEGATIVE
HYALINE CASTS URNS QL MICRO: ABNORMAL /LPF (ref 0–5)
KETONES UR QL STRIP.AUTO: NEGATIVE MG/DL
LEUKOCYTE ESTERASE UR QL STRIP.AUTO: NEGATIVE
NITRITE UR QL STRIP.AUTO: NEGATIVE
PH UR STRIP: 7.5 [PH] (ref 5–8)
PROT UR STRIP-MCNC: NEGATIVE MG/DL
RBC #/AREA URNS HPF: ABNORMAL /HPF (ref 0–5)
SP GR UR REFRACTOMETRY: 1.01 (ref 1–1.03)
UROBILINOGEN UR QL STRIP.AUTO: 1 EU/DL (ref 0.2–1)
WBC URNS QL MICRO: ABNORMAL /HPF (ref 0–4)

## 2019-12-13 PROCEDURE — 87186 SC STD MICRODIL/AGAR DIL: CPT

## 2019-12-13 PROCEDURE — 81001 URINALYSIS AUTO W/SCOPE: CPT

## 2019-12-13 PROCEDURE — 87077 CULTURE AEROBIC IDENTIFY: CPT

## 2019-12-13 PROCEDURE — 87086 URINE CULTURE/COLONY COUNT: CPT

## 2019-12-14 LAB
ALBUMIN SERPL-MCNC: 2.9 G/DL (ref 3.5–5)
ALBUMIN/GLOB SERPL: 0.9 {RATIO} (ref 1.1–2.2)
ALP SERPL-CCNC: 72 U/L (ref 45–117)
ALT SERPL-CCNC: 51 U/L (ref 12–78)
ANION GAP SERPL CALC-SCNC: 3 MMOL/L (ref 5–15)
AST SERPL-CCNC: 23 U/L (ref 15–37)
BILIRUB SERPL-MCNC: 0.9 MG/DL (ref 0.2–1)
BUN SERPL-MCNC: 20 MG/DL (ref 6–20)
BUN/CREAT SERPL: 59 (ref 12–20)
CALCIUM SERPL-MCNC: 8.6 MG/DL (ref 8.5–10.1)
CHLORIDE SERPL-SCNC: 104 MMOL/L (ref 97–108)
CO2 SERPL-SCNC: 31 MMOL/L (ref 21–32)
CREAT SERPL-MCNC: 0.34 MG/DL (ref 0.55–1.02)
ERYTHROCYTE [DISTWIDTH] IN BLOOD BY AUTOMATED COUNT: 18.1 % (ref 11.5–14.5)
GLOBULIN SER CALC-MCNC: 3.2 G/DL (ref 2–4)
GLUCOSE SERPL-MCNC: 126 MG/DL (ref 65–100)
HCT VFR BLD AUTO: 35.9 % (ref 35–47)
HGB BLD-MCNC: 11.2 G/DL (ref 11.5–16)
MCH RBC QN AUTO: 32.6 PG (ref 26–34)
MCHC RBC AUTO-ENTMCNC: 31.2 G/DL (ref 30–36.5)
MCV RBC AUTO: 104.4 FL (ref 80–99)
NRBC # BLD: 0 K/UL (ref 0–0.01)
NRBC BLD-RTO: 0 PER 100 WBC
PLATELET # BLD AUTO: 237 K/UL (ref 150–400)
PMV BLD AUTO: 9.7 FL (ref 8.9–12.9)
POTASSIUM SERPL-SCNC: 4.5 MMOL/L (ref 3.5–5.1)
PROT SERPL-MCNC: 6.1 G/DL (ref 6.4–8.2)
RBC # BLD AUTO: 3.44 M/UL (ref 3.8–5.2)
SODIUM SERPL-SCNC: 138 MMOL/L (ref 136–145)
WBC # BLD AUTO: 9 K/UL (ref 3.6–11)

## 2019-12-14 PROCEDURE — 36415 COLL VENOUS BLD VENIPUNCTURE: CPT

## 2019-12-14 PROCEDURE — 85027 COMPLETE CBC AUTOMATED: CPT

## 2019-12-14 PROCEDURE — 80053 COMPREHEN METABOLIC PANEL: CPT

## 2019-12-15 LAB
BACTERIA SPEC CULT: ABNORMAL
CC UR VC: ABNORMAL
SERVICE CMNT-IMP: ABNORMAL

## 2019-12-18 LAB
ANION GAP SERPL CALC-SCNC: 5 MMOL/L (ref 5–15)
BUN SERPL-MCNC: 25 MG/DL (ref 6–20)
BUN/CREAT SERPL: 58 (ref 12–20)
CALCIUM SERPL-MCNC: 7.9 MG/DL (ref 8.5–10.1)
CHLORIDE SERPL-SCNC: 104 MMOL/L (ref 97–108)
CO2 SERPL-SCNC: 29 MMOL/L (ref 21–32)
CREAT SERPL-MCNC: 0.43 MG/DL (ref 0.55–1.02)
ERYTHROCYTE [DISTWIDTH] IN BLOOD BY AUTOMATED COUNT: 17 % (ref 11.5–14.5)
GLUCOSE SERPL-MCNC: 149 MG/DL (ref 65–100)
HCT VFR BLD AUTO: 35.8 % (ref 35–47)
HGB BLD-MCNC: 11.3 G/DL (ref 11.5–16)
MCH RBC QN AUTO: 33 PG (ref 26–34)
MCHC RBC AUTO-ENTMCNC: 31.6 G/DL (ref 30–36.5)
MCV RBC AUTO: 104.7 FL (ref 80–99)
NRBC # BLD: 0 K/UL (ref 0–0.01)
NRBC BLD-RTO: 0 PER 100 WBC
PLATELET # BLD AUTO: 333 K/UL (ref 150–400)
PMV BLD AUTO: 9.4 FL (ref 8.9–12.9)
POTASSIUM SERPL-SCNC: 4.1 MMOL/L (ref 3.5–5.1)
RBC # BLD AUTO: 3.42 M/UL (ref 3.8–5.2)
SODIUM SERPL-SCNC: 138 MMOL/L (ref 136–145)
WBC # BLD AUTO: 5.4 K/UL (ref 3.6–11)

## 2019-12-18 PROCEDURE — 80048 BASIC METABOLIC PNL TOTAL CA: CPT

## 2019-12-18 PROCEDURE — 36415 COLL VENOUS BLD VENIPUNCTURE: CPT

## 2019-12-18 PROCEDURE — 85027 COMPLETE CBC AUTOMATED: CPT

## 2019-12-20 LAB
ANION GAP SERPL CALC-SCNC: 2 MMOL/L (ref 5–15)
BUN SERPL-MCNC: 13 MG/DL (ref 6–20)
BUN/CREAT SERPL: 37 (ref 12–20)
CALCIUM SERPL-MCNC: 8.1 MG/DL (ref 8.5–10.1)
CHLORIDE SERPL-SCNC: 105 MMOL/L (ref 97–108)
CO2 SERPL-SCNC: 30 MMOL/L (ref 21–32)
CREAT SERPL-MCNC: 0.35 MG/DL (ref 0.55–1.02)
GLUCOSE SERPL-MCNC: 100 MG/DL (ref 65–100)
POTASSIUM SERPL-SCNC: 4.2 MMOL/L (ref 3.5–5.1)
SODIUM SERPL-SCNC: 137 MMOL/L (ref 136–145)

## 2019-12-20 PROCEDURE — 36415 COLL VENOUS BLD VENIPUNCTURE: CPT

## 2019-12-20 PROCEDURE — 80048 BASIC METABOLIC PNL TOTAL CA: CPT

## 2019-12-23 LAB
ANION GAP SERPL CALC-SCNC: 5 MMOL/L (ref 5–15)
BUN SERPL-MCNC: 15 MG/DL (ref 6–20)
BUN/CREAT SERPL: 65 (ref 12–20)
CALCIUM SERPL-MCNC: 8.5 MG/DL (ref 8.5–10.1)
CHLORIDE SERPL-SCNC: 108 MMOL/L (ref 97–108)
CO2 SERPL-SCNC: 30 MMOL/L (ref 21–32)
CREAT SERPL-MCNC: 0.23 MG/DL (ref 0.55–1.02)
ERYTHROCYTE [DISTWIDTH] IN BLOOD BY AUTOMATED COUNT: 16.6 % (ref 11.5–14.5)
GLUCOSE SERPL-MCNC: 94 MG/DL (ref 65–100)
HCT VFR BLD AUTO: 38.6 % (ref 35–47)
HGB BLD-MCNC: 11.9 G/DL (ref 11.5–16)
MCH RBC QN AUTO: 32.4 PG (ref 26–34)
MCHC RBC AUTO-ENTMCNC: 30.8 G/DL (ref 30–36.5)
MCV RBC AUTO: 105.2 FL (ref 80–99)
NRBC # BLD: 0 K/UL (ref 0–0.01)
NRBC BLD-RTO: 0 PER 100 WBC
PLATELET # BLD AUTO: 354 K/UL (ref 150–400)
PMV BLD AUTO: 8.6 FL (ref 8.9–12.9)
POTASSIUM SERPL-SCNC: 4.3 MMOL/L (ref 3.5–5.1)
RBC # BLD AUTO: 3.67 M/UL (ref 3.8–5.2)
SODIUM SERPL-SCNC: 143 MMOL/L (ref 136–145)
WBC # BLD AUTO: 5.6 K/UL (ref 3.6–11)

## 2019-12-23 PROCEDURE — 85027 COMPLETE CBC AUTOMATED: CPT

## 2019-12-23 PROCEDURE — 80048 BASIC METABOLIC PNL TOTAL CA: CPT

## 2019-12-23 PROCEDURE — 36415 COLL VENOUS BLD VENIPUNCTURE: CPT

## 2019-12-24 LAB
ANION GAP SERPL CALC-SCNC: 5 MMOL/L (ref 5–15)
APPEARANCE UR: ABNORMAL
BACTERIA URNS QL MICRO: ABNORMAL /HPF
BASOPHILS # BLD: 0 K/UL (ref 0–0.1)
BASOPHILS NFR BLD: 0 % (ref 0–1)
BILIRUB UR QL: NEGATIVE
BUN SERPL-MCNC: 16 MG/DL (ref 6–20)
BUN/CREAT SERPL: 47 (ref 12–20)
CALCIUM SERPL-MCNC: 8 MG/DL (ref 8.5–10.1)
CHLORIDE SERPL-SCNC: 108 MMOL/L (ref 97–108)
CO2 SERPL-SCNC: 29 MMOL/L (ref 21–32)
COLOR UR: ABNORMAL
CREAT SERPL-MCNC: 0.34 MG/DL (ref 0.55–1.02)
DIFFERENTIAL METHOD BLD: ABNORMAL
EOSINOPHIL # BLD: 0.2 K/UL (ref 0–0.4)
EOSINOPHIL NFR BLD: 3 % (ref 0–7)
EPITH CASTS URNS QL MICRO: ABNORMAL /LPF
ERYTHROCYTE [DISTWIDTH] IN BLOOD BY AUTOMATED COUNT: 16.5 % (ref 11.5–14.5)
GLUCOSE SERPL-MCNC: 97 MG/DL (ref 65–100)
GLUCOSE UR STRIP.AUTO-MCNC: NEGATIVE MG/DL
HCT VFR BLD AUTO: 35.9 % (ref 35–47)
HGB BLD-MCNC: 11.2 G/DL (ref 11.5–16)
HGB UR QL STRIP: ABNORMAL
HYALINE CASTS URNS QL MICRO: ABNORMAL /LPF (ref 0–5)
IMM GRANULOCYTES # BLD AUTO: 0.1 K/UL (ref 0–0.04)
IMM GRANULOCYTES NFR BLD AUTO: 1 % (ref 0–0.5)
KETONES UR QL STRIP.AUTO: NEGATIVE MG/DL
LEUKOCYTE ESTERASE UR QL STRIP.AUTO: NEGATIVE
LYMPHOCYTES # BLD: 1.9 K/UL (ref 0.8–3.5)
LYMPHOCYTES NFR BLD: 28 % (ref 12–49)
MCH RBC QN AUTO: 32.7 PG (ref 26–34)
MCHC RBC AUTO-ENTMCNC: 31.2 G/DL (ref 30–36.5)
MCV RBC AUTO: 105 FL (ref 80–99)
MONOCYTES # BLD: 1 K/UL (ref 0–1)
MONOCYTES NFR BLD: 15 % (ref 5–13)
MUCOUS THREADS URNS QL MICRO: ABNORMAL /LPF
NEUTS SEG # BLD: 3.5 K/UL (ref 1.8–8)
NEUTS SEG NFR BLD: 53 % (ref 32–75)
NITRITE UR QL STRIP.AUTO: NEGATIVE
NRBC # BLD: 0 K/UL (ref 0–0.01)
NRBC BLD-RTO: 0 PER 100 WBC
PH UR STRIP: 7 [PH] (ref 5–8)
PLATELET # BLD AUTO: 371 K/UL (ref 150–400)
PMV BLD AUTO: 9.1 FL (ref 8.9–12.9)
POTASSIUM SERPL-SCNC: 4.3 MMOL/L (ref 3.5–5.1)
PROT UR STRIP-MCNC: NEGATIVE MG/DL
RBC # BLD AUTO: 3.42 M/UL (ref 3.8–5.2)
RBC #/AREA URNS HPF: ABNORMAL /HPF (ref 0–5)
SODIUM SERPL-SCNC: 142 MMOL/L (ref 136–145)
SP GR UR REFRACTOMETRY: 1.01 (ref 1–1.03)
UA: UC IF INDICATED,UAUC: ABNORMAL
UROBILINOGEN UR QL STRIP.AUTO: 0.2 EU/DL (ref 0.2–1)
WBC # BLD AUTO: 6.7 K/UL (ref 3.6–11)
WBC URNS QL MICRO: ABNORMAL /HPF (ref 0–4)

## 2019-12-24 PROCEDURE — 81001 URINALYSIS AUTO W/SCOPE: CPT

## 2019-12-24 PROCEDURE — 36415 COLL VENOUS BLD VENIPUNCTURE: CPT

## 2019-12-24 PROCEDURE — 80048 BASIC METABOLIC PNL TOTAL CA: CPT

## 2019-12-24 PROCEDURE — 87086 URINE CULTURE/COLONY COUNT: CPT

## 2019-12-24 PROCEDURE — 85025 COMPLETE CBC W/AUTO DIFF WBC: CPT

## 2019-12-25 LAB
ANION GAP SERPL CALC-SCNC: 3 MMOL/L (ref 5–15)
BASOPHILS # BLD: 0 K/UL (ref 0–0.1)
BASOPHILS NFR BLD: 1 % (ref 0–1)
BUN SERPL-MCNC: 8 MG/DL (ref 6–20)
BUN/CREAT SERPL: 29 (ref 12–20)
CALCIUM SERPL-MCNC: 8.6 MG/DL (ref 8.5–10.1)
CHLORIDE SERPL-SCNC: 112 MMOL/L (ref 97–108)
CO2 SERPL-SCNC: 29 MMOL/L (ref 21–32)
CREAT SERPL-MCNC: 0.28 MG/DL (ref 0.55–1.02)
DIFFERENTIAL METHOD BLD: ABNORMAL
EOSINOPHIL # BLD: 0.1 K/UL (ref 0–0.4)
EOSINOPHIL NFR BLD: 3 % (ref 0–7)
ERYTHROCYTE [DISTWIDTH] IN BLOOD BY AUTOMATED COUNT: 16.3 % (ref 11.5–14.5)
GLUCOSE SERPL-MCNC: 94 MG/DL (ref 65–100)
HCT VFR BLD AUTO: 38 % (ref 35–47)
HGB BLD-MCNC: 11.9 G/DL (ref 11.5–16)
IMM GRANULOCYTES # BLD AUTO: 0.1 K/UL (ref 0–0.04)
IMM GRANULOCYTES NFR BLD AUTO: 1 % (ref 0–0.5)
LYMPHOCYTES # BLD: 1.6 K/UL (ref 0.8–3.5)
LYMPHOCYTES NFR BLD: 31 % (ref 12–49)
MCH RBC QN AUTO: 32.6 PG (ref 26–34)
MCHC RBC AUTO-ENTMCNC: 31.3 G/DL (ref 30–36.5)
MCV RBC AUTO: 104.1 FL (ref 80–99)
MONOCYTES # BLD: 0.8 K/UL (ref 0–1)
MONOCYTES NFR BLD: 16 % (ref 5–13)
NEUTS SEG # BLD: 2.5 K/UL (ref 1.8–8)
NEUTS SEG NFR BLD: 48 % (ref 32–75)
NRBC # BLD: 0 K/UL (ref 0–0.01)
NRBC BLD-RTO: 0 PER 100 WBC
PLATELET # BLD AUTO: 349 K/UL (ref 150–400)
PMV BLD AUTO: 8.6 FL (ref 8.9–12.9)
POTASSIUM SERPL-SCNC: 3.9 MMOL/L (ref 3.5–5.1)
RBC # BLD AUTO: 3.65 M/UL (ref 3.8–5.2)
SODIUM SERPL-SCNC: 144 MMOL/L (ref 136–145)
WBC # BLD AUTO: 5.1 K/UL (ref 3.6–11)

## 2019-12-25 PROCEDURE — 80048 BASIC METABOLIC PNL TOTAL CA: CPT

## 2019-12-25 PROCEDURE — 85025 COMPLETE CBC W/AUTO DIFF WBC: CPT

## 2019-12-25 PROCEDURE — 36415 COLL VENOUS BLD VENIPUNCTURE: CPT

## 2019-12-26 LAB
BACTERIA SPEC CULT: NORMAL
SERVICE CMNT-IMP: NORMAL

## 2019-12-30 ENCOUNTER — PATIENT OUTREACH (OUTPATIENT)
Dept: FAMILY MEDICINE CLINIC | Facility: CLINIC | Age: 74
End: 2019-12-30

## 2019-12-30 LAB
ANION GAP SERPL CALC-SCNC: 5 MMOL/L (ref 5–15)
BUN SERPL-MCNC: 15 MG/DL (ref 6–20)
BUN/CREAT SERPL: 71 (ref 12–20)
CALCIUM SERPL-MCNC: 8.8 MG/DL (ref 8.5–10.1)
CHLORIDE SERPL-SCNC: 109 MMOL/L (ref 97–108)
CO2 SERPL-SCNC: 28 MMOL/L (ref 21–32)
CREAT SERPL-MCNC: 0.21 MG/DL (ref 0.55–1.02)
ERYTHROCYTE [DISTWIDTH] IN BLOOD BY AUTOMATED COUNT: 16.2 % (ref 11.5–14.5)
GLUCOSE SERPL-MCNC: 108 MG/DL (ref 65–100)
HCT VFR BLD AUTO: 36.4 % (ref 35–47)
HGB BLD-MCNC: 11.4 G/DL (ref 11.5–16)
MCH RBC QN AUTO: 31.9 PG (ref 26–34)
MCHC RBC AUTO-ENTMCNC: 31.3 G/DL (ref 30–36.5)
MCV RBC AUTO: 102 FL (ref 80–99)
NRBC # BLD: 0 K/UL (ref 0–0.01)
NRBC BLD-RTO: 0 PER 100 WBC
PLATELET # BLD AUTO: 351 K/UL (ref 150–400)
PMV BLD AUTO: 9.3 FL (ref 8.9–12.9)
POTASSIUM SERPL-SCNC: 3.8 MMOL/L (ref 3.5–5.1)
RBC # BLD AUTO: 3.57 M/UL (ref 3.8–5.2)
SODIUM SERPL-SCNC: 142 MMOL/L (ref 136–145)
WBC # BLD AUTO: 8.4 K/UL (ref 3.6–11)

## 2019-12-30 PROCEDURE — 80048 BASIC METABOLIC PNL TOTAL CA: CPT

## 2019-12-30 PROCEDURE — 85027 COMPLETE CBC AUTOMATED: CPT

## 2019-12-30 PROCEDURE — 36415 COLL VENOUS BLD VENIPUNCTURE: CPT

## 2020-01-02 ENCOUNTER — APPOINTMENT (OUTPATIENT)
Dept: GENERAL RADIOLOGY | Age: 75
End: 2020-01-02
Attending: PHYSICAL MEDICINE & REHABILITATION

## 2020-01-02 PROCEDURE — 72040 X-RAY EXAM NECK SPINE 2-3 VW: CPT

## 2020-01-06 ENCOUNTER — PATIENT OUTREACH (OUTPATIENT)
Dept: FAMILY MEDICINE CLINIC | Facility: CLINIC | Age: 75
End: 2020-01-06

## 2020-01-06 LAB
APPEARANCE UR: ABNORMAL
BACTERIA URNS QL MICRO: ABNORMAL /HPF
BILIRUB UR QL: NEGATIVE
COLOR UR: ABNORMAL
EPITH CASTS URNS QL MICRO: ABNORMAL /LPF
GLUCOSE UR STRIP.AUTO-MCNC: NEGATIVE MG/DL
HGB UR QL STRIP: ABNORMAL
KETONES UR QL STRIP.AUTO: NEGATIVE MG/DL
LEUKOCYTE ESTERASE UR QL STRIP.AUTO: ABNORMAL
NITRITE UR QL STRIP.AUTO: NEGATIVE
PH UR STRIP: 8 [PH] (ref 5–8)
PROT UR STRIP-MCNC: 100 MG/DL
RBC #/AREA URNS HPF: ABNORMAL /HPF (ref 0–5)
SP GR UR REFRACTOMETRY: 1.01 (ref 1–1.03)
UROBILINOGEN UR QL STRIP.AUTO: 0.2 EU/DL (ref 0.2–1)
WBC URNS QL MICRO: >100 /HPF (ref 0–4)

## 2020-01-06 PROCEDURE — 87077 CULTURE AEROBIC IDENTIFY: CPT

## 2020-01-06 PROCEDURE — 87186 SC STD MICRODIL/AGAR DIL: CPT

## 2020-01-06 PROCEDURE — 87086 URINE CULTURE/COLONY COUNT: CPT

## 2020-01-06 PROCEDURE — 81001 URINALYSIS AUTO W/SCOPE: CPT

## 2020-01-08 LAB
BACTERIA SPEC CULT: ABNORMAL
CC UR VC: ABNORMAL
SERVICE CMNT-IMP: ABNORMAL

## 2020-01-28 ENCOUNTER — APPOINTMENT (OUTPATIENT)
Dept: GENERAL RADIOLOGY | Age: 75
End: 2020-01-28
Attending: PHYSICAL MEDICINE & REHABILITATION

## 2020-01-28 PROCEDURE — 71046 X-RAY EXAM CHEST 2 VIEWS: CPT

## 2020-01-29 LAB
ANION GAP SERPL CALC-SCNC: 6 MMOL/L (ref 5–15)
BUN SERPL-MCNC: 12 MG/DL (ref 6–20)
BUN/CREAT SERPL: 26 (ref 12–20)
CALCIUM SERPL-MCNC: 8.4 MG/DL (ref 8.5–10.1)
CHLORIDE SERPL-SCNC: 109 MMOL/L (ref 97–108)
CO2 SERPL-SCNC: 27 MMOL/L (ref 21–32)
CREAT SERPL-MCNC: 0.47 MG/DL (ref 0.55–1.02)
ERYTHROCYTE [DISTWIDTH] IN BLOOD BY AUTOMATED COUNT: 15.7 % (ref 11.5–14.5)
GLUCOSE SERPL-MCNC: 93 MG/DL (ref 65–100)
HCT VFR BLD AUTO: 36.5 % (ref 35–47)
HGB BLD-MCNC: 11.5 G/DL (ref 11.5–16)
MCH RBC QN AUTO: 31.3 PG (ref 26–34)
MCHC RBC AUTO-ENTMCNC: 31.5 G/DL (ref 30–36.5)
MCV RBC AUTO: 99.5 FL (ref 80–99)
NRBC # BLD: 0 K/UL (ref 0–0.01)
NRBC BLD-RTO: 0 PER 100 WBC
PLATELET # BLD AUTO: 290 K/UL (ref 150–400)
PMV BLD AUTO: 9.4 FL (ref 8.9–12.9)
POTASSIUM SERPL-SCNC: 4 MMOL/L (ref 3.5–5.1)
RBC # BLD AUTO: 3.67 M/UL (ref 3.8–5.2)
SODIUM SERPL-SCNC: 142 MMOL/L (ref 136–145)
WBC # BLD AUTO: 7.2 K/UL (ref 3.6–11)

## 2020-01-29 PROCEDURE — 85027 COMPLETE CBC AUTOMATED: CPT

## 2020-01-29 PROCEDURE — 80048 BASIC METABOLIC PNL TOTAL CA: CPT

## 2020-01-29 PROCEDURE — 36415 COLL VENOUS BLD VENIPUNCTURE: CPT

## 2020-02-02 LAB
APPEARANCE UR: ABNORMAL
BACTERIA URNS QL MICRO: ABNORMAL /HPF
BILIRUB UR QL: ABNORMAL
COLOR UR: ABNORMAL
EPITH CASTS URNS QL MICRO: ABNORMAL /LPF
GLUCOSE UR STRIP.AUTO-MCNC: ABNORMAL MG/DL
HGB UR QL STRIP: ABNORMAL
KETONES UR QL STRIP.AUTO: ABNORMAL MG/DL
LEUKOCYTE ESTERASE UR QL STRIP.AUTO: ABNORMAL
NITRITE UR QL STRIP.AUTO: ABNORMAL
PH UR STRIP: ABNORMAL [PH] (ref 5–8)
PROT UR STRIP-MCNC: ABNORMAL MG/DL
RBC #/AREA URNS HPF: ABNORMAL /HPF (ref 0–5)
SP GR UR REFRACTOMETRY: ABNORMAL (ref 1–1.03)
SP GR UR REFRACTOMETRY: ABNORMAL (ref 1–1.03)
UROBILINOGEN UR QL STRIP.AUTO: ABNORMAL EU/DL (ref 0.2–1)
WBC URNS QL MICRO: ABNORMAL /HPF (ref 0–4)

## 2020-02-02 PROCEDURE — 81001 URINALYSIS AUTO W/SCOPE: CPT

## 2020-02-02 PROCEDURE — 87086 URINE CULTURE/COLONY COUNT: CPT

## 2020-02-03 LAB
APPEARANCE UR: ABNORMAL
BACTERIA URNS QL MICRO: ABNORMAL /HPF
BILIRUB UR QL: NEGATIVE
COLOR UR: ABNORMAL
COMMENT, HOLDF: NORMAL
EPITH CASTS URNS QL MICRO: ABNORMAL /LPF
GLUCOSE UR STRIP.AUTO-MCNC: NEGATIVE MG/DL
HGB UR QL STRIP: ABNORMAL
KETONES UR QL STRIP.AUTO: 15 MG/DL
LEUKOCYTE ESTERASE UR QL STRIP.AUTO: ABNORMAL
NITRITE UR QL STRIP.AUTO: POSITIVE
PH UR STRIP: 7 [PH] (ref 5–8)
PROT UR STRIP-MCNC: 30 MG/DL
RBC #/AREA URNS HPF: ABNORMAL /HPF (ref 0–5)
SAMPLES BEING HELD,HOLD: NORMAL
SP GR UR REFRACTOMETRY: 1.01 (ref 1–1.03)
UROBILINOGEN UR QL STRIP.AUTO: 0.2 EU/DL (ref 0.2–1)
WBC URNS QL MICRO: >100 /HPF (ref 0–4)

## 2020-02-03 PROCEDURE — 81001 URINALYSIS AUTO W/SCOPE: CPT

## 2020-02-03 PROCEDURE — 36416 COLLJ CAPILLARY BLOOD SPEC: CPT

## 2020-02-03 PROCEDURE — 87077 CULTURE AEROBIC IDENTIFY: CPT

## 2020-02-03 PROCEDURE — 87186 SC STD MICRODIL/AGAR DIL: CPT

## 2020-02-03 PROCEDURE — 87086 URINE CULTURE/COLONY COUNT: CPT

## 2020-02-04 LAB
BACTERIA SPEC CULT: ABNORMAL
CC UR VC: ABNORMAL
SERVICE CMNT-IMP: ABNORMAL

## 2020-02-05 LAB
ANION GAP SERPL CALC-SCNC: 3 MMOL/L (ref 5–15)
BACTERIA SPEC CULT: ABNORMAL
BUN SERPL-MCNC: 12 MG/DL (ref 6–20)
BUN/CREAT SERPL: 21 (ref 12–20)
CALCIUM SERPL-MCNC: 8.7 MG/DL (ref 8.5–10.1)
CC UR VC: ABNORMAL
CHLORIDE SERPL-SCNC: 106 MMOL/L (ref 97–108)
CO2 SERPL-SCNC: 27 MMOL/L (ref 21–32)
CREAT SERPL-MCNC: 0.56 MG/DL (ref 0.55–1.02)
ERYTHROCYTE [DISTWIDTH] IN BLOOD BY AUTOMATED COUNT: 14.9 % (ref 11.5–14.5)
GLUCOSE SERPL-MCNC: 100 MG/DL (ref 65–100)
HCT VFR BLD AUTO: 44.3 % (ref 35–47)
HGB BLD-MCNC: 14.1 G/DL (ref 11.5–16)
MCH RBC QN AUTO: 31.4 PG (ref 26–34)
MCHC RBC AUTO-ENTMCNC: 31.8 G/DL (ref 30–36.5)
MCV RBC AUTO: 98.7 FL (ref 80–99)
NRBC # BLD: 0 K/UL (ref 0–0.01)
NRBC BLD-RTO: 0 PER 100 WBC
PLATELET # BLD AUTO: 346 K/UL (ref 150–400)
PMV BLD AUTO: 9.3 FL (ref 8.9–12.9)
POTASSIUM SERPL-SCNC: 4.3 MMOL/L (ref 3.5–5.1)
RBC # BLD AUTO: 4.49 M/UL (ref 3.8–5.2)
SERVICE CMNT-IMP: ABNORMAL
SODIUM SERPL-SCNC: 136 MMOL/L (ref 136–145)
WBC # BLD AUTO: 9.7 K/UL (ref 3.6–11)

## 2020-02-05 PROCEDURE — 80048 BASIC METABOLIC PNL TOTAL CA: CPT

## 2020-02-05 PROCEDURE — 36415 COLL VENOUS BLD VENIPUNCTURE: CPT

## 2020-02-05 PROCEDURE — 85027 COMPLETE CBC AUTOMATED: CPT

## 2020-02-06 ENCOUNTER — HOME HEALTH ADMISSION (OUTPATIENT)
Dept: HOME HEALTH SERVICES | Facility: HOME HEALTH | Age: 75
End: 2020-02-06
Payer: MEDICARE

## 2020-02-09 ENCOUNTER — HOME CARE VISIT (OUTPATIENT)
Dept: SCHEDULING | Facility: HOME HEALTH | Age: 75
End: 2020-02-09
Payer: MEDICARE

## 2020-02-09 PROCEDURE — G0299 HHS/HOSPICE OF RN EA 15 MIN: HCPCS

## 2020-02-09 PROCEDURE — 400013 HH SOC

## 2020-02-09 PROCEDURE — 3331090002 HH PPS REVENUE DEBIT

## 2020-02-09 PROCEDURE — 3331090001 HH PPS REVENUE CREDIT

## 2020-02-10 ENCOUNTER — HOME CARE VISIT (OUTPATIENT)
Dept: SCHEDULING | Facility: HOME HEALTH | Age: 75
End: 2020-02-10
Payer: MEDICARE

## 2020-02-10 VITALS
SYSTOLIC BLOOD PRESSURE: 96 MMHG | DIASTOLIC BLOOD PRESSURE: 48 MMHG | TEMPERATURE: 98 F | OXYGEN SATURATION: 99 % | RESPIRATION RATE: 16 BRPM | HEART RATE: 82 BPM

## 2020-02-10 PROCEDURE — G0299 HHS/HOSPICE OF RN EA 15 MIN: HCPCS

## 2020-02-10 PROCEDURE — 3331090001 HH PPS REVENUE CREDIT

## 2020-02-10 PROCEDURE — 3331090002 HH PPS REVENUE DEBIT

## 2020-02-11 ENCOUNTER — HOME CARE VISIT (OUTPATIENT)
Dept: HOME HEALTH SERVICES | Facility: HOME HEALTH | Age: 75
End: 2020-02-11
Payer: MEDICARE

## 2020-02-11 ENCOUNTER — HOME CARE VISIT (OUTPATIENT)
Dept: SCHEDULING | Facility: HOME HEALTH | Age: 75
End: 2020-02-11
Payer: MEDICARE

## 2020-02-11 PROCEDURE — G0151 HHCP-SERV OF PT,EA 15 MIN: HCPCS

## 2020-02-11 PROCEDURE — 3331090001 HH PPS REVENUE CREDIT

## 2020-02-11 PROCEDURE — 3331090002 HH PPS REVENUE DEBIT

## 2020-02-12 VITALS
HEART RATE: 100 BPM | SYSTOLIC BLOOD PRESSURE: 102 MMHG | DIASTOLIC BLOOD PRESSURE: 61 MMHG | TEMPERATURE: 97.9 F | OXYGEN SATURATION: 97 %

## 2020-02-12 PROCEDURE — 3331090001 HH PPS REVENUE CREDIT

## 2020-02-12 PROCEDURE — 3331090002 HH PPS REVENUE DEBIT

## 2020-02-13 PROCEDURE — 3331090002 HH PPS REVENUE DEBIT

## 2020-02-13 PROCEDURE — 3331090001 HH PPS REVENUE CREDIT

## 2020-02-14 ENCOUNTER — HOME CARE VISIT (OUTPATIENT)
Dept: SCHEDULING | Facility: HOME HEALTH | Age: 75
End: 2020-02-14
Payer: MEDICARE

## 2020-02-14 PROCEDURE — G0157 HHC PT ASSISTANT EA 15: HCPCS

## 2020-02-14 PROCEDURE — 3331090002 HH PPS REVENUE DEBIT

## 2020-02-14 PROCEDURE — 3331090001 HH PPS REVENUE CREDIT

## 2020-02-15 ENCOUNTER — HOME CARE VISIT (OUTPATIENT)
Dept: SCHEDULING | Facility: HOME HEALTH | Age: 75
End: 2020-02-15
Payer: MEDICARE

## 2020-02-15 VITALS
RESPIRATION RATE: 17 BRPM | TEMPERATURE: 98.4 F | OXYGEN SATURATION: 95 % | SYSTOLIC BLOOD PRESSURE: 117 MMHG | DIASTOLIC BLOOD PRESSURE: 67 MMHG | HEART RATE: 87 BPM

## 2020-02-15 PROCEDURE — 3331090001 HH PPS REVENUE CREDIT

## 2020-02-15 PROCEDURE — 3331090002 HH PPS REVENUE DEBIT

## 2020-02-15 PROCEDURE — G0299 HHS/HOSPICE OF RN EA 15 MIN: HCPCS

## 2020-02-16 VITALS
RESPIRATION RATE: 16 BRPM | SYSTOLIC BLOOD PRESSURE: 130 MMHG | HEART RATE: 82 BPM | TEMPERATURE: 97.5 F | DIASTOLIC BLOOD PRESSURE: 82 MMHG | OXYGEN SATURATION: 97 %

## 2020-02-16 PROCEDURE — 3331090002 HH PPS REVENUE DEBIT

## 2020-02-16 PROCEDURE — 3331090001 HH PPS REVENUE CREDIT

## 2020-02-17 ENCOUNTER — HOME CARE VISIT (OUTPATIENT)
Dept: SCHEDULING | Facility: HOME HEALTH | Age: 75
End: 2020-02-17
Payer: MEDICARE

## 2020-02-17 VITALS
HEART RATE: 90 BPM | TEMPERATURE: 98 F | DIASTOLIC BLOOD PRESSURE: 60 MMHG | RESPIRATION RATE: 17 BRPM | SYSTOLIC BLOOD PRESSURE: 110 MMHG | OXYGEN SATURATION: 96 %

## 2020-02-17 PROCEDURE — G0300 HHS/HOSPICE OF LPN EA 15 MIN: HCPCS

## 2020-02-17 PROCEDURE — 3331090001 HH PPS REVENUE CREDIT

## 2020-02-17 PROCEDURE — G0152 HHCP-SERV OF OT,EA 15 MIN: HCPCS

## 2020-02-17 PROCEDURE — G0157 HHC PT ASSISTANT EA 15: HCPCS

## 2020-02-17 PROCEDURE — 3331090002 HH PPS REVENUE DEBIT

## 2020-02-18 VITALS
HEART RATE: 93 BPM | OXYGEN SATURATION: 97 % | DIASTOLIC BLOOD PRESSURE: 62 MMHG | TEMPERATURE: 98.5 F | SYSTOLIC BLOOD PRESSURE: 118 MMHG | RESPIRATION RATE: 20 BRPM

## 2020-02-18 VITALS
TEMPERATURE: 98.8 F | SYSTOLIC BLOOD PRESSURE: 94 MMHG | HEART RATE: 95 BPM | DIASTOLIC BLOOD PRESSURE: 56 MMHG | OXYGEN SATURATION: 97 %

## 2020-02-18 PROCEDURE — 3331090001 HH PPS REVENUE CREDIT

## 2020-02-18 PROCEDURE — 3331090002 HH PPS REVENUE DEBIT

## 2020-02-19 ENCOUNTER — OFFICE VISIT (OUTPATIENT)
Dept: FAMILY MEDICINE CLINIC | Facility: CLINIC | Age: 75
End: 2020-02-19

## 2020-02-19 ENCOUNTER — HOME CARE VISIT (OUTPATIENT)
Dept: SCHEDULING | Facility: HOME HEALTH | Age: 75
End: 2020-02-19
Payer: MEDICARE

## 2020-02-19 VITALS
HEART RATE: 93 BPM | SYSTOLIC BLOOD PRESSURE: 112 MMHG | DIASTOLIC BLOOD PRESSURE: 72 MMHG | WEIGHT: 117 LBS | HEIGHT: 60 IN | RESPIRATION RATE: 12 BRPM | OXYGEN SATURATION: 98 % | TEMPERATURE: 96.8 F | BODY MASS INDEX: 22.97 KG/M2

## 2020-02-19 DIAGNOSIS — E78.5 HYPERLIPIDEMIA, UNSPECIFIED HYPERLIPIDEMIA TYPE: ICD-10-CM

## 2020-02-19 DIAGNOSIS — Z00.00 MEDICARE ANNUAL WELLNESS VISIT, SUBSEQUENT: Primary | ICD-10-CM

## 2020-02-19 DIAGNOSIS — Z98.1 STATUS POST CERVICAL SPINAL FUSION: ICD-10-CM

## 2020-02-19 DIAGNOSIS — R53.1 GENERAL WEAKNESS: ICD-10-CM

## 2020-02-19 DIAGNOSIS — R11.0 NAUSEA: ICD-10-CM

## 2020-02-19 PROCEDURE — 3331090002 HH PPS REVENUE DEBIT

## 2020-02-19 PROCEDURE — G0157 HHC PT ASSISTANT EA 15: HCPCS

## 2020-02-19 PROCEDURE — 3331090001 HH PPS REVENUE CREDIT

## 2020-02-19 RX ORDER — MIDODRINE HYDROCHLORIDE 5 MG/1
5 TABLET ORAL AS NEEDED
COMMUNITY
Start: 2020-02-06

## 2020-02-19 RX ORDER — ATORVASTATIN CALCIUM 20 MG/1
TABLET, FILM COATED ORAL
Qty: 90 TAB | Refills: 3 | Status: SHIPPED | OUTPATIENT
Start: 2020-02-19 | End: 2020-09-23

## 2020-02-19 RX ORDER — ONDANSETRON 4 MG/1
4 TABLET, ORALLY DISINTEGRATING ORAL
COMMUNITY
End: 2020-02-19 | Stop reason: SDUPTHER

## 2020-02-19 RX ORDER — ONDANSETRON 4 MG/1
4 TABLET, ORALLY DISINTEGRATING ORAL EVERY 6 HOURS
Qty: 30 TAB | Refills: 1 | Status: SHIPPED | OUTPATIENT
Start: 2020-02-19 | End: 2020-05-07

## 2020-02-19 RX ORDER — ACETAMINOPHEN 160 MG/5ML
1000 ELIXIR ORAL DAILY
COMMUNITY
Start: 2019-12-12

## 2020-02-19 RX ORDER — MIRTAZAPINE 15 MG/1
15 TABLET, FILM COATED ORAL
COMMUNITY
Start: 2020-02-06 | End: 2020-06-11

## 2020-02-19 NOTE — PROGRESS NOTES
Visit Vitals  /72   Pulse 93   Temp 96.8 °F (36 °C) (Oral)   Resp 12   Ht 5' (1.524 m)   Wt 117 lb (53.1 kg)   LMP  (LMP Unknown)   SpO2 98%   BMI 22.85 kg/m²     Stephanie Ernst presents today for   Chief Complaint   Patient presents with   [facility] Follow Up    Annual Wellness Visit       Is someone accompanying this pt? no    Is the patient using any DME equipment during OV? no    Depression Screening:  3 most recent PHQ Screens 2/19/2020   PHQ Not Done -   Little interest or pleasure in doing things Not at all   Feeling down, depressed, irritable, or hopeless Not at all   Total Score PHQ 2 0       Learning Assessment:  Learning Assessment 2/26/2019   PRIMARY LEARNER Patient   HIGHEST LEVEL OF EDUCATION - PRIMARY LEARNER  -   CO-LEARNER CAREGIVER -   PRIMARY LANGUAGE ENGLISH   LEARNER PREFERENCE PRIMARY DEMONSTRATION   ANSWERED BY Patient   RELATIONSHIP SELF       Abuse Screening:  Abuse Screening Questionnaire 10/1/2019   Do you ever feel afraid of your partner? N   Are you in a relationship with someone who physically or mentally threatens you? N   Is it safe for you to go home? Y       Fall Risk  Fall Risk Assessment, last 12 mths 2/19/2020   Able to walk? Yes   Fall in past 12 months? No       Health Maintenance reviewed and discussed and ordered per Provider. Health Maintenance Due   Topic Date Due    Pneumococcal 65+ years (1 of 1 - PPSV23) 06/24/2012    Influenza Age 5 to Adult  08/01/2019    Medicare Yearly Exam  01/17/2020           Coordination of Care:  1. Have you been to the ER, urgent care clinic since your last visit? Hospitalized since your last visit? no    2. Have you seen or consulted any other health care providers outside of the [address] [place] [address] since your last visit? Include any pap smears or colon screening.  no

## 2020-02-19 NOTE — PATIENT INSTRUCTIONS
Medicare Wellness Visit, Female     The best way to live healthy is to have a lifestyle where you eat a well-balanced diet, exercise regularly, limit alcohol use, and quit all forms of tobacco/nicotine, if applicable. Regular preventive services are another way to keep healthy. Preventive services (vaccines, screening tests, monitoring & exams) can help personalize your care plan, which helps you manage your own care. Screening tests can find health problems at the earliest stages, when they are easiest to treat. [facility] follows the current, evidence-based guidelines published by the [facility] ([facility]) when recommending preventive services for our patients. Because we follow these guidelines, sometimes recommendations change over time as research supports it. (For example, mammograms used to be recommended annually. Even though Medicare will still pay for an annual mammogram, the newer guidelines recommend a mammogram every two years for women of average risk). Of course, you and your doctor may decide to screen more often for some diseases, based on your risk and your co-morbidities (chronic disease you are already diagnosed with). Preventive services for you include:  - Medicare offers their members a free annual wellness visit, which is time for you and your primary care provider to discuss and plan for your preventive service needs. Take advantage of this benefit every year!  -All adults over the age of 72 should receive the recommended pneumonia vaccines. Current USPSTF guidelines recommend a series of two vaccines for the best pneumonia protection.   -All adults should have a flu vaccine yearly and a tetanus vaccine every 10 years.   -All adults age 48 and older should receive the shingles vaccines (series of two vaccines).       -All adults age 38-68 who are overweight should have a diabetes screening test once every three years.   -All adults born between [birth year] and 1965 should be screened once for Hepatitis C.  -Other screening tests and preventive services for persons with diabetes include: an eye exam to screen for diabetic retinopathy, a kidney function test, a foot exam, and stricter control over your cholesterol.   -Cardiovascular screening for adults with routine risk involves an electrocardiogram (ECG) at intervals determined by your doctor.   -Colorectal cancer screenings should be done for adults age 54-65 with no increased risk factors for colorectal cancer. There are a number of acceptable methods of screening for this type of cancer. Each test has its own benefits and drawbacks. Discuss with your doctor what is most appropriate for you during your annual wellness visit. The different tests include: colonoscopy (considered the best screening method), a fecal occult blood test, a fecal DNA test, and sigmoidoscopy.    -A bone mass density test is recommended when a woman turns 65 to screen for osteoporosis. This test is only recommended one time, as a screening. Some providers will use this same test as a disease monitoring tool if you already have osteoporosis. -Breast cancer screenings are recommended every other year for women of normal risk, age 54-69.  -Cervical cancer screenings for women over age 72 are only recommended with certain risk factors.      Here is a list of your current Health Maintenance items (your personalized list of preventive services) with a due date:  Health Maintenance Due   Topic Date Due    Pneumococcal Vaccine (1 of 1 - PPSV23) 06/24/2012    Flu Vaccine  08/01/2019    Annual Well Visit  01/17/2020

## 2020-02-19 NOTE — PROGRESS NOTES
This is the Subsequent Medicare Annual Wellness Exam, performed 12 months or more after the Initial AWV or the last Subsequent AWV    I have reviewed the patient's medical history in detail and updated the computerized patient record. History     Patient Active Problem List   Diagnosis Code    Cervical neck pain with evidence of disc disease M50.90    CVA (cerebral vascular accident) ([facility] [address] I63.9    TIA (transient ischemic attack) G45.9    Cervical facet syndrome M47.812    Spondylosis of cervical region without myelopathy or radiculopathy M47.812    Cervical spinal stenosis M48.02    Degenerative disc disease, cervical M50.30    Chronic pain syndrome G89.4    Cervical radiculitis M54.12    Myofascial pain syndrome M79.18    Hyperlipidemia E78.5    Anxiety F41.9    Osteoarthritis of cervical spine M47.812    Osteoporosis M81.0    Vertigo R42    Dizziness R42    Weakness R53.1    Concentric left ventricular hypertrophy I51.7    Cerebrovascular small vessel disease I67.9     Past Medical History:   Diagnosis Date    Abnormal Pap smear     Dr. Brianna Velasco    Allergic rhinitis     Arthritis     Cerebrovascular small vessel disease 3/18/2019    CT 3/17/2019    Cervical spinal cord compression (HCC)     Chronic pain     Concentric left ventricular hypertrophy 3/18/2019    With left ventricular diastolic dysfunction by echocardiogram 3/18/2019    Hypercholesterolemia     Neck pain 5/17/2010    Stroke ([facility] [address] 10/02/2017    TIA- legs weak memory issues      Past Surgical History:   Procedure Laterality Date    COLONOSCOPY N/A 6/4/2018    COLONOSCOPY / polypectomy performed by Jerry Peter MD at [facility] ENDOSCOPY    HX GYN      Total Hyst    HX LAP CHOLECYSTECTOMY      HX OTHER SURGICAL  2017    Throat widened     Current Outpatient Medications   Medication Sig Dispense Refill    acetaminophen (TYLENOL) 160 mg/5 mL elixir Take 1,000 mg by mouth daily.       midodrine (PROAMITINE) 5 mg tablet Take 5 mg by mouth two (2) times a day. 1 tab by mouth with breakfast and lunch      mirtazapine (REMERON) 15 mg tablet Take 15 mg by mouth nightly. 1 tab by mouth at bedtime      atorvastatin (LIPITOR) 20 mg tablet take 1 tablet by mouth once daily 90 Tab 3    ondansetron (ZOFRAN ODT) 4 mg disintegrating tablet Take 1 Tab by mouth every six (6) hours. Dissolve 1 tab on tongue every 6 hour if needed for nausea or vomiting 30 Tab 1    ALPRAZolam (XANAX) 0.25 mg tablet Take 0.25 mg by mouth two (2) times daily as needed for Anxiety.  LORazepam (ATIVAN) 0.5 mg tablet Take 1 Tab by mouth every eight (8) hours as needed for Anxiety. Max Daily Amount: 1.5 mg. 10 Tab 0    busPIRone (BUSPAR) 7.5 mg tablet take 1 tablet by mouth twice a day 60 Tab 1    methocarbamol (ROBAXIN) 500 mg tablet Take 1 Tab by mouth three (3) times daily as needed for Pain. 45 Tab 1    gabapentin (NEURONTIN) 100 mg capsule Take 1 Tab QHS x 1 wk, then 1 Tab BID x1 wk, then 1 tab TID x1 wk 90 Cap 1    turmeric 400 mg cap Take 1 Cap by mouth daily.  aspirin 81 mg chewable tablet Take 1 Tab by mouth daily. 30 Tab 3    denosumab (PROLIA) 60 mg/mL injection 60 mg by SubCUTAneous route every 6 months.  loratadine (CLARITIN) 10 mg tablet Take 1 Tab by mouth daily.  90 Tab 1     Allergies   Allergen Reactions    Baclofen Shortness of Breath    Celebrex [Celecoxib] Other (comments)     Tiredness        Family History   Problem Relation Age of Onset    Cancer Mother         breast    Heart Disease Father      Social History     Tobacco Use    Smoking status: Never Smoker    Smokeless tobacco: Never Used   Substance Use Topics    Alcohol use: No       Depression Risk Factor Screening:     3 most recent PHQ Screens 2/19/2020   PHQ Not Done -   Little interest or pleasure in doing things Not at all   Feeling down, depressed, irritable, or hopeless Not at all   Total Score PHQ 2 0       Alcohol Risk Factor Screening:   Do you average 1 drink per night or more than 7 drinks a week:  No    On any one occasion in the past three months have you have had more than 3 drinks containing alcohol:  No      Functional Ability and Level of Safety:   Hearing: The patient needs further evaluation. Activities of Daily Living: The home contains: no safety equipment. Patient does total self care    Ambulation: with no difficulty    Fall Risk:  Fall Risk Assessment, last 12 mths 2/19/2020   Able to walk? Yes   Fall in past 12 months? No       Abuse Screen:  Patient is not abused    Cognitive Screening   Has your family/caregiver stated any concerns about your memory: no  Cognitive Screening: Normal - Clock Drawing Test    Patient Care Team   Patient Care Team:  Cyrus Bumpers, NP as PCP - General (Nurse Practitioner)  Cyrus Bumpers, NP as PCP - [facility]  Katarzyna Daniel MD (Pulmonary Disease)    Assessment/Plan   Education and counseling provided:  Are appropriate based on today's review and evaluation    Diagnoses and all orders for this visit:    1. Medicare annual wellness visit, subsequent    2. Hyperlipidemia, unspecified hyperlipidemia type  -     atorvastatin (LIPITOR) 20 mg tablet; take 1 tablet by mouth once daily    3. Nausea  -     ondansetron (ZOFRAN ODT) 4 mg disintegrating tablet; Take 1 Tab by mouth every six (6) hours. Dissolve 1 tab on tongue every 6 hour if needed for nausea or vomiting    4. Status post cervical spinal fusion    5. General weakness        Health Maintenance Due   Topic Date Due    Pneumococcal 65+ years (1 of 1 - PPSV23) 06/24/2012    Influenza Age 5 to Adult  08/01/2019     Valentin Mcgee is a 69 y.o. female presenting today for Hospital Follow Up and Annual Wellness Visit    HPI:  Valentin Mcgee presents to the office today for hospital follow-up. She also presents today for her annual wellness exam.  She was admitted to [facility] on November 28, 2019 for cervical neck surgery. Per ED EMR from [address] was admitted via the ambulatory care unit and taken to the operating room where she underwent the above named procedure. She Tolerated the first operative procedure well and was taken to PACU and then to the floor. She was slow to recover from anesthesia but she ultimately woke up and would follow commands with all 4 ext with good motor strength. ON POD #2 she had some complaints of pain, numbness and weakness which were similar to her pre-op symptoms and I elected to monitor her. Later that night she became unresponsive and STAT head CT was done which was normal. She later awakened and could still move all ext. The following morning she was noted to have severe weakness/quadaresis. A STAT neck CT showed a hematoma but no cord compression. Due to her exam however she was taken to the OR for re-exploration and found to have a large post op hematoma with cord compression. Post op from the second surgery she has made a slow but steady improvement in her neurologic condition. She is now ready for transfer to in pt rehab. Post op MRI from the second surgery showed no cord compression but some abnormal signal in the cord. As of d/c the patient is on tube feeding to supplement her PO intake   Patient notes that her tube feeding was discharged shortly after arriving to skilled nursing facility. She notes she is tolerating p.o. well. She continues to complain of lower extremity weakness but able to follow commands with all 4 extremities. Per the patient she continues to have inpatient physical therapy. Review of Systems   Respiratory: Negative for cough and sputum production. Cardiovascular: Negative for chest pain and palpitations. Gastrointestinal: Negative for abdominal pain, nausea and vomiting. Musculoskeletal: Negative for neck pain. Neurological: Positive for weakness. Negative for dizziness, speech change and headaches.         Patient is wheelchair-bound status post cervical neck surgery       Allergies   Allergen Reactions    Baclofen Shortness of Breath    Celebrex [Celecoxib] Other (comments)     Tiredness        Current Outpatient Medications   Medication Sig Dispense Refill    acetaminophen (TYLENOL) 160 mg/5 mL elixir Take 1,000 mg by mouth daily.  midodrine (PROAMITINE) 5 mg tablet Take 5 mg by mouth two (2) times a day. 1 tab by mouth with breakfast and lunch      mirtazapine (REMERON) 15 mg tablet Take 15 mg by mouth nightly. 1 tab by mouth at bedtime      atorvastatin (LIPITOR) 20 mg tablet take 1 tablet by mouth once daily 90 Tab 3    ondansetron (ZOFRAN ODT) 4 mg disintegrating tablet Take 1 Tab by mouth every six (6) hours. Dissolve 1 tab on tongue every 6 hour if needed for nausea or vomiting 30 Tab 1    ALPRAZolam (XANAX) 0.25 mg tablet Take 0.25 mg by mouth two (2) times daily as needed for Anxiety.  LORazepam (ATIVAN) 0.5 mg tablet Take 1 Tab by mouth every eight (8) hours as needed for Anxiety. Max Daily Amount: 1.5 mg. 10 Tab 0    busPIRone (BUSPAR) 7.5 mg tablet take 1 tablet by mouth twice a day 60 Tab 1    methocarbamol (ROBAXIN) 500 mg tablet Take 1 Tab by mouth three (3) times daily as needed for Pain. 45 Tab 1    gabapentin (NEURONTIN) 100 mg capsule Take 1 Tab QHS x 1 wk, then 1 Tab BID x1 wk, then 1 tab TID x1 wk 90 Cap 1    turmeric 400 mg cap Take 1 Cap by mouth daily.  aspirin 81 mg chewable tablet Take 1 Tab by mouth daily. 30 Tab 3    denosumab (PROLIA) 60 mg/mL injection 60 mg by SubCUTAneous route every 6 months.  loratadine (CLARITIN) 10 mg tablet Take 1 Tab by mouth daily.  80 Tab 1       Past Medical History:   Diagnosis Date    Abnormal Pap smear     Dr. Shala Pereira Allergic rhinitis     Arthritis     Cerebrovascular small vessel disease 3/18/2019    CT 3/17/2019    Cervical spinal cord compression (HCC)     Chronic pain     Concentric left ventricular hypertrophy 3/18/2019 With left ventricular diastolic dysfunction by echocardiogram 3/18/2019    Hypercholesterolemia     Neck pain 5/17/2010    Stroke ([facility] [address] 10/02/2017    TIA- legs weak memory issues       Past Surgical History:   Procedure Laterality Date    COLONOSCOPY N/A 6/4/2018    COLONOSCOPY / polypectomy performed by Rebeka Sawyer MD at [facility]COPY    HX GYN      Total Hyst    HX LAP CHOLECYSTECTOMY      HX OTHER SURGICAL  2017    Throat widened       Social History     Socioeconomic History    Marital status:      Spouse name: Not on file    Number of children: Not on file    Years of education: Not on file    Highest education level: Not on file   Occupational History    Not on file   Social Needs    Financial resource strain: Not on file    Food insecurity:     Worry: Not on file     Inability: Not on file    Transportation needs:     Medical: Not on file     Non-medical: Not on file   Tobacco Use    Smoking status: Never Smoker    Smokeless tobacco: Never Used   Substance and Sexual Activity    Alcohol use: No    Drug use: No    Sexual activity: Never   Lifestyle    Physical activity:     Days per week: Not on file     Minutes per session: Not on file    Stress: Not on file   Relationships    Social connections:     Talks on phone: Not on file     Gets together: Not on file     Attends Restorationism service: Not on file     Active member of club or organization: Not on file     Attends meetings of clubs or organizations: Not on file     Relationship status: Not on file    Intimate partner violence:     Fear of current or ex partner: Not on file     Emotionally abused: Not on file     Physically abused: Not on file     Forced sexual activity: Not on file   Other Topics Concern    Not on file   Social History Narrative    Not on file       Patient does not have an advanced directive on file    Vitals:    02/19/20 1422   BP: 112/72   Pulse: 93   Resp: 12   Temp: 96.8 °F (36 °C)   TempSrc: Oral SpO2: 98%   Weight: 117 lb (53.1 kg)   Height: 5' (1.524 m)   PainSc:   0 - No pain       Physical Exam  Vitals signs and nursing note reviewed. Constitutional:       Appearance: Normal appearance. Cardiovascular:      Rate and Rhythm: Normal rate and regular rhythm. Pulses: Normal pulses. Heart sounds: Normal heart sounds. Pulmonary:      Effort: Pulmonary effort is normal.      Breath sounds: Normal breath sounds. Skin:     General: Skin is warm and dry. Neurological:      Mental Status: She is alert. No results displayed because visit has over 200 results. .No results found for any visits on 02/19/20. Assessment / Plan:      ICD-10-CM ICD-9-CM    1. Medicare annual wellness visit, subsequent Z00.00 V70.0    2. Hyperlipidemia, unspecified hyperlipidemia type E78.5 272.4 atorvastatin (LIPITOR) 20 mg tablet   3. Nausea R11.0 787.02 ondansetron (ZOFRAN ODT) 4 mg disintegrating tablet   4. Status post cervical spinal fusion Z98.1 V45.4    5. General weakness R53.1 780.79          Follow-up and Dispositions    · Return in about 3 months (around 5/19/2020). I asked the patient if she  had any questions and answered her  questions. The patient stated that she understands the treatment plan and agrees with the treatment plan    This document was created with a voice activated dictation system and may contain transcription errors.

## 2020-02-20 ENCOUNTER — HOME CARE VISIT (OUTPATIENT)
Dept: SCHEDULING | Facility: HOME HEALTH | Age: 75
End: 2020-02-20
Payer: MEDICARE

## 2020-02-20 VITALS
HEART RATE: 88 BPM | RESPIRATION RATE: 20 BRPM | SYSTOLIC BLOOD PRESSURE: 100 MMHG | TEMPERATURE: 98.7 F | DIASTOLIC BLOOD PRESSURE: 70 MMHG | OXYGEN SATURATION: 98 %

## 2020-02-20 VITALS
RESPIRATION RATE: 19 BRPM | TEMPERATURE: 98.2 F | OXYGEN SATURATION: 98 % | SYSTOLIC BLOOD PRESSURE: 106 MMHG | DIASTOLIC BLOOD PRESSURE: 60 MMHG | HEART RATE: 88 BPM

## 2020-02-20 PROCEDURE — G0158 HHC OT ASSISTANT EA 15: HCPCS

## 2020-02-20 PROCEDURE — 3331090002 HH PPS REVENUE DEBIT

## 2020-02-20 PROCEDURE — 3331090001 HH PPS REVENUE CREDIT

## 2020-02-21 ENCOUNTER — HOME CARE VISIT (OUTPATIENT)
Dept: SCHEDULING | Facility: HOME HEALTH | Age: 75
End: 2020-02-21
Payer: MEDICARE

## 2020-02-21 VITALS
DIASTOLIC BLOOD PRESSURE: 64 MMHG | HEART RATE: 92 BPM | TEMPERATURE: 98.2 F | OXYGEN SATURATION: 98 % | RESPIRATION RATE: 18 BRPM | SYSTOLIC BLOOD PRESSURE: 102 MMHG

## 2020-02-21 PROCEDURE — G0157 HHC PT ASSISTANT EA 15: HCPCS

## 2020-02-21 PROCEDURE — 3331090002 HH PPS REVENUE DEBIT

## 2020-02-21 PROCEDURE — 3331090001 HH PPS REVENUE CREDIT

## 2020-02-22 ENCOUNTER — HOME CARE VISIT (OUTPATIENT)
Dept: SCHEDULING | Facility: HOME HEALTH | Age: 75
End: 2020-02-22
Payer: MEDICARE

## 2020-02-22 PROCEDURE — G0299 HHS/HOSPICE OF RN EA 15 MIN: HCPCS

## 2020-02-22 PROCEDURE — 3331090002 HH PPS REVENUE DEBIT

## 2020-02-22 PROCEDURE — 3331090001 HH PPS REVENUE CREDIT

## 2020-02-23 PROCEDURE — 3331090002 HH PPS REVENUE DEBIT

## 2020-02-23 PROCEDURE — 3331090001 HH PPS REVENUE CREDIT

## 2020-02-24 ENCOUNTER — HOME CARE VISIT (OUTPATIENT)
Dept: SCHEDULING | Facility: HOME HEALTH | Age: 75
End: 2020-02-24
Payer: MEDICARE

## 2020-02-24 VITALS
HEART RATE: 112 BPM | TEMPERATURE: 98.9 F | RESPIRATION RATE: 16 BRPM | DIASTOLIC BLOOD PRESSURE: 60 MMHG | OXYGEN SATURATION: 97 % | SYSTOLIC BLOOD PRESSURE: 108 MMHG

## 2020-02-24 VITALS
DIASTOLIC BLOOD PRESSURE: 62 MMHG | TEMPERATURE: 98.2 F | HEART RATE: 82 BPM | SYSTOLIC BLOOD PRESSURE: 106 MMHG | OXYGEN SATURATION: 98 % | RESPIRATION RATE: 18 BRPM

## 2020-02-24 PROCEDURE — 3331090001 HH PPS REVENUE CREDIT

## 2020-02-24 PROCEDURE — 3331090002 HH PPS REVENUE DEBIT

## 2020-02-24 PROCEDURE — G0157 HHC PT ASSISTANT EA 15: HCPCS

## 2020-02-25 PROCEDURE — 3331090001 HH PPS REVENUE CREDIT

## 2020-02-25 PROCEDURE — 3331090002 HH PPS REVENUE DEBIT

## 2020-02-26 ENCOUNTER — HOME CARE VISIT (OUTPATIENT)
Dept: SCHEDULING | Facility: HOME HEALTH | Age: 75
End: 2020-02-26
Payer: MEDICARE

## 2020-02-26 VITALS
OXYGEN SATURATION: 98 % | RESPIRATION RATE: 19 BRPM | TEMPERATURE: 98.2 F | DIASTOLIC BLOOD PRESSURE: 58 MMHG | HEART RATE: 68 BPM | SYSTOLIC BLOOD PRESSURE: 104 MMHG

## 2020-02-26 PROCEDURE — G0157 HHC PT ASSISTANT EA 15: HCPCS

## 2020-02-26 PROCEDURE — 3331090001 HH PPS REVENUE CREDIT

## 2020-02-26 PROCEDURE — 3331090002 HH PPS REVENUE DEBIT

## 2020-02-27 ENCOUNTER — HOME CARE VISIT (OUTPATIENT)
Dept: SCHEDULING | Facility: HOME HEALTH | Age: 75
End: 2020-02-27
Payer: MEDICARE

## 2020-02-27 VITALS
TEMPERATURE: 98.2 F | RESPIRATION RATE: 18 BRPM | HEART RATE: 96 BPM | SYSTOLIC BLOOD PRESSURE: 110 MMHG | DIASTOLIC BLOOD PRESSURE: 72 MMHG | OXYGEN SATURATION: 98 %

## 2020-02-27 PROCEDURE — 3331090002 HH PPS REVENUE DEBIT

## 2020-02-27 PROCEDURE — 3331090001 HH PPS REVENUE CREDIT

## 2020-02-27 PROCEDURE — G0158 HHC OT ASSISTANT EA 15: HCPCS

## 2020-02-27 RX ORDER — OMEPRAZOLE 20 MG/1
CAPSULE, DELAYED RELEASE ORAL
Qty: 90 CAP | Refills: 1 | Status: SHIPPED | OUTPATIENT
Start: 2020-02-27 | End: 2020-05-07

## 2020-02-28 ENCOUNTER — TELEPHONE (OUTPATIENT)
Dept: FAMILY MEDICINE CLINIC | Facility: CLINIC | Age: 75
End: 2020-02-28

## 2020-02-28 ENCOUNTER — HOME CARE VISIT (OUTPATIENT)
Dept: SCHEDULING | Facility: HOME HEALTH | Age: 75
End: 2020-02-28
Payer: MEDICARE

## 2020-02-28 ENCOUNTER — APPOINTMENT (OUTPATIENT)
Dept: GENERAL RADIOLOGY | Age: 75
End: 2020-02-28
Attending: EMERGENCY MEDICINE
Payer: MEDICARE

## 2020-02-28 ENCOUNTER — APPOINTMENT (OUTPATIENT)
Dept: CT IMAGING | Age: 75
End: 2020-02-28
Attending: EMERGENCY MEDICINE
Payer: MEDICARE

## 2020-02-28 ENCOUNTER — HOSPITAL ENCOUNTER (EMERGENCY)
Age: 75
Discharge: HOME | End: 2020-02-28
Attending: EMERGENCY MEDICINE
Payer: MEDICARE

## 2020-02-28 VITALS
OXYGEN SATURATION: 97 % | HEART RATE: 97 BPM | HEIGHT: 60 IN | TEMPERATURE: 99.7 F | RESPIRATION RATE: 18 BRPM | SYSTOLIC BLOOD PRESSURE: 127 MMHG | WEIGHT: 107 LBS | BODY MASS INDEX: 21.01 KG/M2 | DIASTOLIC BLOOD PRESSURE: 91 MMHG

## 2020-02-28 VITALS
DIASTOLIC BLOOD PRESSURE: 78 MMHG | HEART RATE: 90 BPM | HEART RATE: 88 BPM | TEMPERATURE: 98.7 F | SYSTOLIC BLOOD PRESSURE: 82 MMHG | TEMPERATURE: 98.7 F | DIASTOLIC BLOOD PRESSURE: 74 MMHG | OXYGEN SATURATION: 99 % | OXYGEN SATURATION: 98 % | SYSTOLIC BLOOD PRESSURE: 118 MMHG

## 2020-02-28 DIAGNOSIS — K59.00 CONSTIPATION, UNSPECIFIED CONSTIPATION TYPE: Primary | ICD-10-CM

## 2020-02-28 DIAGNOSIS — N39.0 URINARY TRACT INFECTION WITHOUT HEMATURIA, SITE UNSPECIFIED: ICD-10-CM

## 2020-02-28 LAB
ALBUMIN SERPL-MCNC: 3.9 G/DL (ref 3.4–5)
ALBUMIN/GLOB SERPL: 1 {RATIO} (ref 0.8–1.7)
ALP SERPL-CCNC: 63 U/L (ref 45–117)
ALT SERPL-CCNC: 23 U/L (ref 13–56)
ANION GAP SERPL CALC-SCNC: 8 MMOL/L (ref 3–18)
APPEARANCE UR: ABNORMAL
AST SERPL-CCNC: 31 U/L (ref 10–38)
ATRIAL RATE: 99 BPM
BACTERIA URNS QL MICRO: ABNORMAL /HPF
BASOPHILS # BLD: 0 K/UL (ref 0–0.1)
BASOPHILS NFR BLD: 0 % (ref 0–2)
BILIRUB SERPL-MCNC: 0.6 MG/DL (ref 0.2–1)
BILIRUB UR QL: NEGATIVE
BUN SERPL-MCNC: 17 MG/DL (ref 7–18)
BUN/CREAT SERPL: 27 (ref 12–20)
CALCIUM SERPL-MCNC: 9.2 MG/DL (ref 8.5–10.1)
CALCULATED P AXIS, ECG09: 82 DEGREES
CALCULATED R AXIS, ECG10: 50 DEGREES
CALCULATED T AXIS, ECG11: 77 DEGREES
CHLORIDE SERPL-SCNC: 108 MMOL/L (ref 100–111)
CO2 SERPL-SCNC: 24 MMOL/L (ref 21–32)
COLOR UR: YELLOW
CREAT SERPL-MCNC: 0.64 MG/DL (ref 0.6–1.3)
DIAGNOSIS, 93000: NORMAL
DIFFERENTIAL METHOD BLD: ABNORMAL
EOSINOPHIL # BLD: 0.1 K/UL (ref 0–0.4)
EOSINOPHIL NFR BLD: 1 % (ref 0–5)
EPITH CASTS URNS QL MICRO: NEGATIVE /LPF (ref 0–5)
ERYTHROCYTE [DISTWIDTH] IN BLOOD BY AUTOMATED COUNT: 15.8 % (ref 11.6–14.5)
GLOBULIN SER CALC-MCNC: 4.1 G/DL (ref 2–4)
GLUCOSE SERPL-MCNC: 85 MG/DL (ref 74–99)
GLUCOSE UR STRIP.AUTO-MCNC: NEGATIVE MG/DL
HCT VFR BLD AUTO: 41 % (ref 35–45)
HGB BLD-MCNC: 13.2 G/DL (ref 12–16)
HGB UR QL STRIP: ABNORMAL
KETONES UR QL STRIP.AUTO: 40 MG/DL
LACTATE SERPL-SCNC: 1.8 MMOL/L (ref 0.4–2)
LEUKOCYTE ESTERASE UR QL STRIP.AUTO: ABNORMAL
LYMPHOCYTES # BLD: 2.8 K/UL (ref 0.9–3.6)
LYMPHOCYTES NFR BLD: 23 % (ref 21–52)
MCH RBC QN AUTO: 30.7 PG (ref 24–34)
MCHC RBC AUTO-ENTMCNC: 32.2 G/DL (ref 31–37)
MCV RBC AUTO: 95.3 FL (ref 74–97)
MONOCYTES # BLD: 1.1 K/UL (ref 0.05–1.2)
MONOCYTES NFR BLD: 9 % (ref 3–10)
NEUTS SEG # BLD: 8.5 K/UL (ref 1.8–8)
NEUTS SEG NFR BLD: 67 % (ref 40–73)
NITRITE UR QL STRIP.AUTO: POSITIVE
P-R INTERVAL, ECG05: 126 MS
PH UR STRIP: >8.5 [PH] (ref 5–8)
PLATELET # BLD AUTO: 366 K/UL (ref 135–420)
PMV BLD AUTO: 10 FL (ref 9.2–11.8)
POTASSIUM SERPL-SCNC: 4.2 MMOL/L (ref 3.5–5.5)
PROT SERPL-MCNC: 8 G/DL (ref 6.4–8.2)
PROT UR STRIP-MCNC: 100 MG/DL
Q-T INTERVAL, ECG07: 358 MS
QRS DURATION, ECG06: 66 MS
QTC CALCULATION (BEZET), ECG08: 459 MS
RBC # BLD AUTO: 4.3 M/UL (ref 4.2–5.3)
RBC #/AREA URNS HPF: NEGATIVE /HPF (ref 0–5)
SODIUM SERPL-SCNC: 140 MMOL/L (ref 136–145)
SP GR UR REFRACTOMETRY: 1.02 (ref 1–1.03)
TRI-PHOS CRY URNS QL MICRO: ABNORMAL
UROBILINOGEN UR QL STRIP.AUTO: 0.2 EU/DL (ref 0.2–1)
VENTRICULAR RATE, ECG03: 99 BPM
WBC # BLD AUTO: 12.6 K/UL (ref 4.6–13.2)
WBC URNS QL MICRO: ABNORMAL /HPF (ref 0–4)

## 2020-02-28 PROCEDURE — 3331090001 HH PPS REVENUE CREDIT

## 2020-02-28 PROCEDURE — 3331090002 HH PPS REVENUE DEBIT

## 2020-02-28 PROCEDURE — 71045 X-RAY EXAM CHEST 1 VIEW: CPT

## 2020-02-28 PROCEDURE — 74011636320 HC RX REV CODE- 636/320: Performed by: EMERGENCY MEDICINE

## 2020-02-28 PROCEDURE — 74011000258 HC RX REV CODE- 258: Performed by: EMERGENCY MEDICINE

## 2020-02-28 PROCEDURE — G0158 HHC OT ASSISTANT EA 15: HCPCS

## 2020-02-28 PROCEDURE — 87040 BLOOD CULTURE FOR BACTERIA: CPT

## 2020-02-28 PROCEDURE — 74011250637 HC RX REV CODE- 250/637: Performed by: EMERGENCY MEDICINE

## 2020-02-28 PROCEDURE — 81001 URINALYSIS AUTO W/SCOPE: CPT

## 2020-02-28 PROCEDURE — G0157 HHC PT ASSISTANT EA 15: HCPCS

## 2020-02-28 PROCEDURE — 74011250636 HC RX REV CODE- 250/636: Performed by: EMERGENCY MEDICINE

## 2020-02-28 PROCEDURE — 80053 COMPREHEN METABOLIC PANEL: CPT

## 2020-02-28 PROCEDURE — 93005 ELECTROCARDIOGRAM TRACING: CPT

## 2020-02-28 PROCEDURE — 83605 ASSAY OF LACTIC ACID: CPT

## 2020-02-28 PROCEDURE — 85025 COMPLETE CBC W/AUTO DIFF WBC: CPT

## 2020-02-28 PROCEDURE — 99285 EMERGENCY DEPT VISIT HI MDM: CPT

## 2020-02-28 PROCEDURE — 74177 CT ABD & PELVIS W/CONTRAST: CPT

## 2020-02-28 PROCEDURE — 96365 THER/PROPH/DIAG IV INF INIT: CPT

## 2020-02-28 RX ORDER — SODIUM CHLORIDE 0.9 % (FLUSH) 0.9 %
5-10 SYRINGE (ML) INJECTION AS NEEDED
Status: DISCONTINUED | OUTPATIENT
Start: 2020-02-28 | End: 2020-02-28 | Stop reason: HOSPADM

## 2020-02-28 RX ORDER — NITROFURANTOIN 25; 75 MG/1; MG/1
100 CAPSULE ORAL 2 TIMES DAILY
Qty: 6 CAP | Refills: 0 | Status: SHIPPED | OUTPATIENT
Start: 2020-02-28 | End: 2020-03-02

## 2020-02-28 RX ORDER — POLYETHYLENE GLYCOL 3350 17 G/17G
17 POWDER, FOR SOLUTION ORAL DAILY
Qty: 238 G | Refills: 0 | Status: SHIPPED | OUTPATIENT
Start: 2020-02-28 | End: 2020-03-13

## 2020-02-28 RX ORDER — MORPHINE SULFATE 2 MG/ML
INJECTION, SOLUTION INTRAMUSCULAR; INTRAVENOUS
Status: DISCONTINUED
Start: 2020-02-28 | End: 2020-02-28 | Stop reason: WASHOUT

## 2020-02-28 RX ORDER — NITROFURANTOIN MACROCRYSTALS 50 MG/1
100 CAPSULE ORAL ONCE
Status: COMPLETED | OUTPATIENT
Start: 2020-02-28 | End: 2020-02-28

## 2020-02-28 RX ADMIN — NITROFURANTOIN MACROCRYSTALS 100 MG: 50 CAPSULE ORAL at 18:24

## 2020-02-28 RX ADMIN — PIPERACILLIN AND TAZOBACTAM 3.38 G: 3; .375 INJECTION, POWDER, LYOPHILIZED, FOR SOLUTION INTRAVENOUS at 13:43

## 2020-02-28 RX ADMIN — IOPAMIDOL 100 ML: 612 INJECTION, SOLUTION INTRAVENOUS at 16:55

## 2020-02-28 NOTE — TELEPHONE ENCOUNTER
Home health called to inform [facility] that the patient was unable to stand ,she is having a hard time in her bed .  Her tempt was 99.5 and BP was 140/100 please advise [phone] Ari Trevizo)

## 2020-02-28 NOTE — ED PROVIDER NOTES
EMERGENCY DEPARTMENT HISTORY AND PHYSICAL EXAM    1:01 PM      Date: 2/28/2020  Patient Name: Kelsie Hernandez    History of Presenting Illness     Chief Complaint   Patient presents with    Abdominal Pain     History Provided By: patient    Additional History (Context): Kelsie Hernandez is a 69 y.o. female presents with abdominal pain and no bowel movement for 5 days. No vomiting. She does have objectively measured fever of 101.4. Abdomen is distended. 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 Pain is severe. She is urinating without difficulty. .  Status post hysterectomy and cholecystectomy. PCP: Juarez Hidalgo NP    Chief Complaint:   Duration:    Timing:    Location:   Quality:   Severity:   Modifying Factors:   Associated Symptoms:       Current Facility-Administered Medications   Medication Dose Route Frequency Provider Last Rate Last Dose    sodium chloride (NS) flush 5-10 mL  5-10 mL IntraVENous PRN Frankey Kinds, MD        nitrofurantoin (MACRODANTIN) capsule 100 mg  100 mg Oral ONCE Jhonathan Crowder MD         Current Outpatient Medications   Medication Sig Dispense Refill    polyethylene glycol (MIRALAX) 17 gram/dose powder Take 17 g by mouth daily for 14 days. 1 tablespoon with 8 oz of water daily 238 g 0    nitrofurantoin, macrocrystal-monohydrate, (MACROBID) 100 mg capsule Take 1 Cap by mouth two (2) times a day for 3 days. 6 Cap 0    omeprazole (PRILOSEC) 20 mg capsule take 1 capsule by mouth once daily before breakfast 90 Cap 1    denosumab (PROLIA) 60 mg/mL injection 60 mg by SubCUTAneous route every 6 months.  acetaminophen (TYLENOL) 160 mg/5 mL elixir Take 1,000 mg by mouth daily.  midodrine (PROAMITINE) 5 mg tablet Take 5 mg by mouth two (2) times a day.  1 tab by mouth with breakfast and lunch      mirtazapine (REMERON) 15 mg tablet Take 15 mg by mouth nightly. 1 tab by mouth at bedtime      atorvastatin (LIPITOR) 20 mg tablet take 1 tablet by mouth once daily 90 Tab 3    ondansetron (ZOFRAN ODT) 4 mg disintegrating tablet Take 1 Tab by mouth every six (6) hours. Dissolve 1 tab on tongue every 6 hour if needed for nausea or vomiting 30 Tab 1    ALPRAZolam (XANAX) 0.25 mg tablet Take 0.25 mg by mouth two (2) times daily as needed for Anxiety.  LORazepam (ATIVAN) 0.5 mg tablet Take 1 Tab by mouth every eight (8) hours as needed for Anxiety. Max Daily Amount: 1.5 mg. 10 Tab 0    busPIRone (BUSPAR) 7.5 mg tablet take 1 tablet by mouth twice a day 60 Tab 1    methocarbamol (ROBAXIN) 500 mg tablet Take 1 Tab by mouth three (3) times daily as needed for Pain. 45 Tab 1    gabapentin (NEURONTIN) 100 mg capsule Take 1 Tab QHS x 1 wk, then 1 Tab BID x1 wk, then 1 tab TID x1 wk 90 Cap 1    loratadine (CLARITIN) 10 mg tablet Take 1 Tab by mouth daily. 90 Tab 1    turmeric 400 mg cap Take 1 Cap by mouth daily.  aspirin 81 mg chewable tablet Take 1 Tab by mouth daily.  30 Tab 3       Past History     Past Medical History:  Past Medical History:   Diagnosis Date    Abnormal Pap smear     Dr. Block Range Allergic rhinitis     Arthritis     Cerebrovascular small vessel disease 3/18/2019    CT 3/17/2019    Cervical spinal cord compression (HCC)     Chronic pain     Concentric left ventricular hypertrophy 3/18/2019    With left ventricular diastolic dysfunction by echocardiogram 3/18/2019    Hypercholesterolemia     Neck pain 5/17/2010    Stroke ([address] 10/02/2017    TIA- legs weak memory issues       Past Surgical History:  Past Surgical History:   Procedure Laterality Date    COLONOSCOPY N/A 6/4/2018    COLONOSCOPY / polypectomy performed by Jerry Lance MD at [facility]    HX GYN      Total Hyst    HX LAP CHOLECYSTECTOMY      HX OTHER SURGICAL  2017    Throat widened       Family History:  Family History   Problem Relation Age of Onset    Cancer Mother         breast    Heart Disease Father        Social History:  Social History     Tobacco Use    Smoking status: Never Smoker    Smokeless tobacco: Never Used   Substance Use Topics    Alcohol use: No    Drug use: No       Allergies: Allergies   Allergen Reactions    Baclofen Shortness of Breath    Celebrex [Celecoxib] Other (comments)     Tiredness          Review of Systems     Review of Systems   Constitutional: Positive for fever. Negative for diaphoresis. HENT: Negative for congestion and sore throat. Eyes: Negative for pain and itching. Respiratory: Negative for cough and shortness of breath. Cardiovascular: Negative for chest pain and palpitations. Gastrointestinal: Positive for abdominal pain. Negative for diarrhea. Endocrine: Negative for polydipsia and polyuria. Genitourinary: Negative for dysuria and hematuria. Musculoskeletal: Negative for arthralgias and myalgias. Skin: Negative for rash and wound. Neurological: Negative for seizures and syncope. Hematological: Does not bruise/bleed easily. Psychiatric/Behavioral: Negative for agitation and hallucinations. Physical Exam       Patient Vitals for the past 12 hrs:   Temp Pulse Resp BP SpO2   02/28/20 1732 -- -- -- -- 96 %   02/28/20 1318 99.7 °F (37.6 °C) -- -- -- --   02/28/20 1304 98.9 °F (37.2 °C) (!) 105 17 127/51 99 %       Physical Exam  Vitals signs and nursing note reviewed. Constitutional:       General: She is in acute distress (Mild). Appearance: She is well-developed. She is not toxic-appearing. Comments: Thin   HENT:      Head: Normocephalic and atraumatic. Eyes:      General: No scleral icterus. Conjunctiva/sclera: Conjunctivae normal.   Neck:      Musculoskeletal: Normal range of motion and neck supple. Vascular: No JVD. Cardiovascular:      Rate and Rhythm: Normal rate and regular rhythm. Heart sounds: Normal heart sounds.       Comments: 4 intact extremity pulses  Pulmonary:      Effort: Pulmonary effort is normal.      Breath sounds: Normal breath sounds. Abdominal:      Palpations: Abdomen is soft. There is no mass. Tenderness: There is no abdominal tenderness. Comments: Bit of distention, really no specific tenderness. Well-healed infraumbilical midline vertical incision. Musculoskeletal: Normal range of motion. Lymphadenopathy:      Cervical: No cervical adenopathy. Skin:     General: Skin is warm and dry. Neurological:      Mental Status: She is alert. Diagnostic Study Results   Labs -  Recent Results (from the past 12 hour(s))   EKG, 12 LEAD, INITIAL    Collection Time: 02/28/20  1:07 PM   Result Value Ref Range    Ventricular Rate 99 BPM    Atrial Rate 99 BPM    P-R Interval 126 ms    QRS Duration 66 ms    Q-T Interval 358 ms    QTC Calculation (Bezet) 459 ms    Calculated P Axis 82 degrees    Calculated R Axis 50 degrees    Calculated T Axis 77 degrees    Diagnosis       Normal sinus rhythm  Possible Left atrial enlargement  Borderline ECG  When compared with ECG of 13-SEP-2019 16:04,  No significant change was found  Confirmed by Vivek Stafford MD, Madi ([id number]) on 2/28/2020 4:23:13 PM     LACTIC ACID    Collection Time: 02/28/20  1:30 PM   Result Value Ref Range    Lactic acid 1.8 0.4 - 2.0 MMOL/L   METABOLIC PANEL, COMPREHENSIVE    Collection Time: 02/28/20  1:30 PM   Result Value Ref Range    Sodium 140 136 - 145 mmol/L    Potassium 4.2 3.5 - 5.5 mmol/L    Chloride 108 100 - 111 mmol/L    CO2 24 21 - 32 mmol/L    Anion gap 8 3.0 - 18 mmol/L    Glucose 85 74 - 99 mg/dL    BUN 17 7.0 - 18 MG/DL    Creatinine 0.64 0.6 - 1.3 MG/DL    BUN/Creatinine ratio 27 (H) 12 - 20      GFR est AA >60 >60 ml/min/1.73m2    GFR est non-AA >60 >60 ml/min/1.73m2    Calcium 9.2 8.5 - 10.1 MG/DL    Bilirubin, total 0.6 0.2 - 1.0 MG/DL    ALT (SGPT) 23 13 - 56 U/L    AST (SGOT) 31 10 - 38 U/L    Alk.  phosphatase 63 45 - 117 U/L    Protein, total 8.0 6.4 - 8.2 g/dL    Albumin 3.9 3.4 - 5.0 g/dL    Globulin 4.1 (H) 2.0 - 4.0 g/dL    A-G Ratio 1.0 0.8 - 1.7     CBC WITH AUTOMATED DIFF    Collection Time: 02/28/20  1:30 PM   Result Value Ref Range    WBC 12.6 4.6 - 13.2 K/uL    RBC 4.30 4.20 - 5.30 M/uL    HGB 13.2 12.0 - 16.0 g/dL    HCT 41.0 35.0 - 45.0 %    MCV 95.3 74.0 - 97.0 FL    MCH 30.7 24.0 - 34.0 PG    MCHC 32.2 31.0 - 37.0 g/dL    RDW 15.8 (H) 11.6 - 14.5 %    PLATELET 036 393 - 270 K/uL    MPV 10.0 9.2 - 11.8 FL    NEUTROPHILS 67 40 - 73 %    LYMPHOCYTES 23 21 - 52 %    MONOCYTES 9 3 - 10 %    EOSINOPHILS 1 0 - 5 %    BASOPHILS 0 0 - 2 %    ABS. NEUTROPHILS 8.5 (H) 1.8 - 8.0 K/UL    ABS. LYMPHOCYTES 2.8 0.9 - 3.6 K/UL    ABS. MONOCYTES 1.1 0.05 - 1.2 K/UL    ABS. EOSINOPHILS 0.1 0.0 - 0.4 K/UL    ABS. BASOPHILS 0.0 0.0 - 0.1 K/UL    DF AUTOMATED     URINALYSIS W/ RFLX MICROSCOPIC    Collection Time: 02/28/20  4:00 PM   Result Value Ref Range    Color YELLOW      Appearance TURBID      Specific gravity 1.021 1.005 - 1.030      pH (UA) >8.5 (H) 5.0 - 8.0    Protein 100 (A) NEG mg/dL    Glucose NEGATIVE  NEG mg/dL    Ketone 40 (A) NEG mg/dL    Bilirubin NEGATIVE  NEG      Blood SMALL (A) NEG      Urobilinogen 0.2 0.2 - 1.0 EU/dL    Nitrites POSITIVE (A) NEG      Leukocyte Esterase LARGE (A) NEG     URINE MICROSCOPIC ONLY    Collection Time: 02/28/20  4:00 PM   Result Value Ref Range    WBC TOO NUMEROUS TO COUNT 0 - 4 /hpf    RBC NEGATIVE  0 - 5 /hpf    Epithelial cells NEGATIVE  0 - 5 /lpf    Bacteria 4+ (A) NEG /hpf    Triple Phosphate crystals 2+ (A) NEG       Radiologic Studies -   CT ABD PELV W CONT   Final Result   IMPRESSION[de-identified]      1.  Distended rectum with stool content. This could be creating a   postobstructive diarrheal state given fluid-filled more proximal large   intestine. There is no bowel wall thickening to imply colitis otherwise.       Thank you for this referral.      XR CHEST PORT   Final Result   IMPRESSION:      No radiographic finding for an acute cardiopulmonary process        Ct Abd Pelv W Cont    Result Date: 2/28/2020  CT SCAN ABDOMEN AND PELVIS WITH IV CONTRAST HISTORY: Abdominal distention pain fever , onset this morning; recently with constipation; also with tachycardia COMPARISON: Partial comparison chest CT January 2019 TECHNIQUE: After the intravenous administration of iodinated IV contrast, without oral contrast, contiguous 5 mm axial scans were obtained through the abdomen and pelvis. Contrast used: 100cc Isovue-300 CT scans at this facility are performed using dose optimization technique as appropriate with performed exam, to include automated exposure control, adjustment of mA and/or kV according to patient's size (including appropriate matching for site-specific examinations), or use of iterative reconstruction technique. FINDINGS: Lower chest: No acute findings. Small hiatal hernia noted. No intraperitoneal free air. No free fluid. No fluid collection. Liver: Unremarkable Gallbladder/biliary: Unremarkable post cholecystectomy Spleen: Unremarkable Pancreas: Unremarkable Left Kidney: Unremarkable Right kidney: Unremarkable Adrenals: Unremarkable Bowels/mesentery: Distention of the rectal vault with stool content. There is otherwise a paucity of formed stool. A moderate amount of fluid throughout the large intestine. No bowel wall thickening. Normal caliber of the small bowel. Appendix: Unremarkable Urinary bladder: Unremarkable  Pelvic organs: Prior hysterectomy Vascular: Aorta unremarkable for age. IVC unremarkable. Lymph Nodes: No adenopathy. Bones: No acute findings. Unremarkable for age. IMPRESSION[de-identified] 1.  Distended rectum with stool content. This could be creating a postobstructive diarrheal state given fluid-filled more proximal large intestine. There is no bowel wall thickening to imply colitis otherwise.  Thank you for this referral.    Xr Chest Port    Result Date: 2/28/2020  Portable Chest CPT CODE: 25624 HISTORY: Abdominal pain. FINDINGS: Comparison 3/17/2019 Multiple wires overlie the patient. Heart size and mediastinal contours within normal limits. No pulmonary edema. No effusion. No pneumothorax. No acute consolidation. IMPRESSION: No radiographic finding for an acute cardiopulmonary process      Medications ordered:   Medications   sodium chloride (NS) flush 5-10 mL (has no administration in time range)   nitrofurantoin (MACRODANTIN) capsule 100 mg (has no administration in time range)   piperacillin-tazobactam (ZOSYN) 3.375 g in 0.9% sodium chloride (MBP/ADV) 100 mL MBP (3.375 g IntraVENous New Bag 2/28/20 1343)   iopamidoL (ISOVUE 300) 61 % contrast injection 100 mL (100 mL IntraVENous Given 2/28/20 1655)         Medical Decision Making   Initial Medical Decision Making and DDx:      Possible bowel obstruction or fecal impaction. Meets sepsis criteria so we will start protocol for intra-abdominal infection. ED Course: Progress Notes, Reevaluation, and Consults:  ED Course as of Feb 28 1801   Fri Feb 28, 2020   1310 Twelve-lead EKG at 1307, sinus rhythm at 99 no acute process    [CB]      ED Course User Index  [CB] Romeo Zuleta MD     6:00 PM Pt reevaluated at this time. Discussed results and findings, as well as, diagnosis and plan for discharge. Follow up with doctors/services listed. Return to the emergency department for alarming symptoms. Pt verbalizes understanding and agreement with plan. All questions addressed. No evidence of sepsis pyelonephritis bowel obstruction perforation appendicitis diverticulitis pancreatitis acute cholecystitis pneumonia. We will treat for constipation with MiraLAX daily follow-up with primary care  We will treat for UTI with Macrobid. I am the first provider for this patient. I reviewed the vital signs, available nursing notes, past medical history, past surgical history, family history and social history.     Patient Vitals for the past 12 hrs:   Temp Pulse Resp BP SpO2   02/28/20 1732 -- -- -- -- 96 %   02/28/20 1318 99.7 °F (37.6 °C) -- -- -- --   02/28/20 1304 98.9 °F (37.2 °C) (!) 105 17 127/51 99 %       Vital Signs-Reviewed the patient's vital signs. Pulse Oximetry Analysis, Cardiac Monitor, 12 lead ekg:  Twelve-lead EKG at 1:07 PM, sinus rhythm and process. Interpreted by the EP. Records Reviewed: Nursing notes reviewed (Time of Review: 1:01 PM)    Procedures:   Critical Care Time:   Aspirin: (was aspirin given for stroke?)    Diagnosis     Clinical Impression:   1. Constipation, unspecified constipation type    2. Urinary tract infection without hematuria, site unspecified        Disposition:       Follow-up Information     Follow up With Specialties Details Why Contact Info    Silva Abraham NP Nurse Practitioner In 2 days  [address] [place] [address] [phone]             Patient's Medications   Start Taking    NITROFURANTOIN, MACROCRYSTAL-MONOHYDRATE, (MACROBID) 100 MG CAPSULE    Take 1 Cap by mouth two (2) times a day for 3 days. POLYETHYLENE GLYCOL (MIRALAX) 17 GRAM/DOSE POWDER    Take 17 g by mouth daily for 14 days. 1 tablespoon with 8 oz of water daily   Continue Taking    ACETAMINOPHEN (TYLENOL) 160 MG/5 ML ELIXIR    Take 1,000 mg by mouth daily. ALPRAZOLAM (XANAX) 0.25 MG TABLET    Take 0.25 mg by mouth two (2) times daily as needed for Anxiety. ASPIRIN 81 MG CHEWABLE TABLET    Take 1 Tab by mouth daily. ATORVASTATIN (LIPITOR) 20 MG TABLET    take 1 tablet by mouth once daily    BUSPIRONE (BUSPAR) 7.5 MG TABLET    take 1 tablet by mouth twice a day    DENOSUMAB (PROLIA) 60 MG/ML INJECTION    60 mg by SubCUTAneous route every 6 months. GABAPENTIN (NEURONTIN) 100 MG CAPSULE    Take 1 Tab QHS x 1 wk, then 1 Tab BID x1 wk, then 1 tab TID x1 wk    LORATADINE (CLARITIN) 10 MG TABLET    Take 1 Tab by mouth daily.     LORAZEPAM (ATIVAN) 0.5 MG TABLET    Take 1 Tab by mouth every eight (8) hours as needed for Anxiety. Max Daily Amount: 1.5 mg. METHOCARBAMOL (ROBAXIN) 500 MG TABLET    Take 1 Tab by mouth three (3) times daily as needed for Pain. MIDODRINE (PROAMITINE) 5 MG TABLET    Take 5 mg by mouth two (2) times a day. 1 tab by mouth with breakfast and lunch    MIRTAZAPINE (REMERON) 15 MG TABLET    Take 15 mg by mouth nightly. 1 tab by mouth at bedtime    OMEPRAZOLE (PRILOSEC) 20 MG CAPSULE    take 1 capsule by mouth once daily before breakfast    ONDANSETRON (ZOFRAN ODT) 4 MG DISINTEGRATING TABLET    Take 1 Tab by mouth every six (6) hours. Dissolve 1 tab on tongue every 6 hour if needed for nausea or vomiting    TURMERIC 400 MG CAP    Take 1 Cap by mouth daily.    These Medications have changed    No medications on file   Stop Taking    No medications on file     _______________________________    Notes:    Alveta Blizzard, MD using Dragon dictation      _______________________________

## 2020-02-28 NOTE — ED TRIAGE NOTES
Pt presents via EMS with c/o RLQ abd pain that began this AM. Pt stated no BM for 5 days. Pt tachycardic otherwise VSS at this time.

## 2020-02-29 ENCOUNTER — HOME CARE VISIT (OUTPATIENT)
Dept: SCHEDULING | Facility: HOME HEALTH | Age: 75
End: 2020-02-29
Payer: MEDICARE

## 2020-02-29 PROCEDURE — G0300 HHS/HOSPICE OF LPN EA 15 MIN: HCPCS

## 2020-02-29 PROCEDURE — 3331090002 HH PPS REVENUE DEBIT

## 2020-02-29 PROCEDURE — 3331090001 HH PPS REVENUE CREDIT

## 2020-03-01 PROCEDURE — 3331090002 HH PPS REVENUE DEBIT

## 2020-03-01 PROCEDURE — 3331090001 HH PPS REVENUE CREDIT

## 2020-03-02 ENCOUNTER — HOME CARE VISIT (OUTPATIENT)
Dept: SCHEDULING | Facility: HOME HEALTH | Age: 75
End: 2020-03-02
Payer: MEDICARE

## 2020-03-02 VITALS
HEART RATE: 83 BPM | SYSTOLIC BLOOD PRESSURE: 102 MMHG | TEMPERATURE: 98.1 F | DIASTOLIC BLOOD PRESSURE: 62 MMHG | OXYGEN SATURATION: 97 %

## 2020-03-02 VITALS
TEMPERATURE: 98.2 F | DIASTOLIC BLOOD PRESSURE: 100 MMHG | HEART RATE: 102 BPM | RESPIRATION RATE: 20 BRPM | SYSTOLIC BLOOD PRESSURE: 140 MMHG | OXYGEN SATURATION: 98 %

## 2020-03-02 DIAGNOSIS — F41.9 ANXIETY: ICD-10-CM

## 2020-03-02 PROCEDURE — G0157 HHC PT ASSISTANT EA 15: HCPCS

## 2020-03-02 PROCEDURE — 3331090001 HH PPS REVENUE CREDIT

## 2020-03-02 PROCEDURE — 3331090002 HH PPS REVENUE DEBIT

## 2020-03-03 ENCOUNTER — HOME CARE VISIT (OUTPATIENT)
Dept: SCHEDULING | Facility: HOME HEALTH | Age: 75
End: 2020-03-03
Payer: MEDICARE

## 2020-03-03 VITALS
HEART RATE: 88 BPM | SYSTOLIC BLOOD PRESSURE: 100 MMHG | DIASTOLIC BLOOD PRESSURE: 84 MMHG | RESPIRATION RATE: 20 BRPM | TEMPERATURE: 97.4 F | OXYGEN SATURATION: 98 %

## 2020-03-03 PROCEDURE — G0158 HHC OT ASSISTANT EA 15: HCPCS

## 2020-03-03 PROCEDURE — 3331090001 HH PPS REVENUE CREDIT

## 2020-03-03 PROCEDURE — 3331090002 HH PPS REVENUE DEBIT

## 2020-03-03 RX ORDER — BUSPIRONE HYDROCHLORIDE 7.5 MG/1
TABLET ORAL
Qty: 60 TAB | Refills: 1 | Status: SHIPPED | OUTPATIENT
Start: 2020-03-03 | End: 2020-05-07

## 2020-03-04 ENCOUNTER — HOME CARE VISIT (OUTPATIENT)
Dept: SCHEDULING | Facility: HOME HEALTH | Age: 75
End: 2020-03-04
Payer: MEDICARE

## 2020-03-04 VITALS
HEART RATE: 75 BPM | TEMPERATURE: 98.9 F | SYSTOLIC BLOOD PRESSURE: 105 MMHG | DIASTOLIC BLOOD PRESSURE: 66 MMHG | OXYGEN SATURATION: 99 %

## 2020-03-04 DIAGNOSIS — M48.02 CERVICAL SPINAL STENOSIS: ICD-10-CM

## 2020-03-04 PROCEDURE — 3331090002 HH PPS REVENUE DEBIT

## 2020-03-04 PROCEDURE — G0151 HHCP-SERV OF PT,EA 15 MIN: HCPCS

## 2020-03-04 PROCEDURE — 3331090001 HH PPS REVENUE CREDIT

## 2020-03-04 PROCEDURE — G0299 HHS/HOSPICE OF RN EA 15 MIN: HCPCS

## 2020-03-04 RX ORDER — GABAPENTIN 100 MG/1
CAPSULE ORAL
Qty: 90 CAP | OUTPATIENT
Start: 2020-03-04

## 2020-03-05 ENCOUNTER — HOME CARE VISIT (OUTPATIENT)
Dept: SCHEDULING | Facility: HOME HEALTH | Age: 75
End: 2020-03-05
Payer: MEDICARE

## 2020-03-05 VITALS
SYSTOLIC BLOOD PRESSURE: 100 MMHG | HEART RATE: 89 BPM | TEMPERATURE: 98.7 F | OXYGEN SATURATION: 98 % | RESPIRATION RATE: 21 BRPM | DIASTOLIC BLOOD PRESSURE: 78 MMHG

## 2020-03-05 VITALS
DIASTOLIC BLOOD PRESSURE: 53 MMHG | SYSTOLIC BLOOD PRESSURE: 104 MMHG | OXYGEN SATURATION: 98 % | TEMPERATURE: 98.7 F | HEART RATE: 93 BPM

## 2020-03-05 LAB
BACTERIA SPEC CULT: NORMAL
BACTERIA SPEC CULT: NORMAL
SERVICE CMNT-IMP: NORMAL
SERVICE CMNT-IMP: NORMAL

## 2020-03-05 PROCEDURE — 3331090002 HH PPS REVENUE DEBIT

## 2020-03-05 PROCEDURE — 3331090001 HH PPS REVENUE CREDIT

## 2020-03-05 PROCEDURE — G0158 HHC OT ASSISTANT EA 15: HCPCS

## 2020-03-06 ENCOUNTER — TELEPHONE (OUTPATIENT)
Dept: FAMILY MEDICINE CLINIC | Facility: CLINIC | Age: 75
End: 2020-03-06

## 2020-03-06 VITALS
RESPIRATION RATE: 18 BRPM | DIASTOLIC BLOOD PRESSURE: 60 MMHG | SYSTOLIC BLOOD PRESSURE: 100 MMHG | TEMPERATURE: 96.6 F | OXYGEN SATURATION: 98 % | HEART RATE: 89 BPM

## 2020-03-06 PROCEDURE — 3331090001 HH PPS REVENUE CREDIT

## 2020-03-06 PROCEDURE — 3331090002 HH PPS REVENUE DEBIT

## 2020-03-06 NOTE — TELEPHONE ENCOUNTER
2 pt identifiers confirmed. Called patient to do status check on her temp and BP that Ericka Frederick reported. Patient stated that she had UTI and was treated and she is doing well.

## 2020-03-07 PROCEDURE — 3331090001 HH PPS REVENUE CREDIT

## 2020-03-07 PROCEDURE — 3331090002 HH PPS REVENUE DEBIT

## 2020-03-08 PROCEDURE — 3331090001 HH PPS REVENUE CREDIT

## 2020-03-08 PROCEDURE — 3331090002 HH PPS REVENUE DEBIT

## 2020-03-09 ENCOUNTER — OFFICE VISIT (OUTPATIENT)
Dept: ORTHOPEDIC SURGERY | Age: 75
End: 2020-03-09

## 2020-03-09 VITALS
RESPIRATION RATE: 16 BRPM | TEMPERATURE: 97.7 F | SYSTOLIC BLOOD PRESSURE: 91 MMHG | BODY MASS INDEX: 20.9 KG/M2 | DIASTOLIC BLOOD PRESSURE: 55 MMHG | OXYGEN SATURATION: 98 % | HEIGHT: 60 IN | HEART RATE: 86 BPM

## 2020-03-09 DIAGNOSIS — R20.2 BILATERAL LEG PARESTHESIA: ICD-10-CM

## 2020-03-09 DIAGNOSIS — M54.50 LOW BACK PAIN AT MULTIPLE SITES: ICD-10-CM

## 2020-03-09 DIAGNOSIS — M54.9 UPPER BACK PAIN: Primary | ICD-10-CM

## 2020-03-09 DIAGNOSIS — R29.898 BILATERAL LEG WEAKNESS: ICD-10-CM

## 2020-03-09 DIAGNOSIS — M48.02 CERVICAL SPINAL STENOSIS: ICD-10-CM

## 2020-03-09 PROCEDURE — 3331090001 HH PPS REVENUE CREDIT

## 2020-03-09 PROCEDURE — 3331090002 HH PPS REVENUE DEBIT

## 2020-03-09 RX ORDER — GABAPENTIN 100 MG/1
100 CAPSULE ORAL 3 TIMES DAILY
Qty: 270 CAP | Refills: 0 | Status: SHIPPED | OUTPATIENT
Start: 2020-03-09 | End: 2020-04-16 | Stop reason: SDUPTHER

## 2020-03-09 NOTE — PATIENT INSTRUCTIONS
Magnetic Resonance Imaging (MRI): About This Test  What is it? Magnetic resonance imaging (MRI) is a test that uses a magnetic field and pulses of radio wave energy to make pictures of organs and structures inside the body. When you have an MRI, you lie on a table and your body is moved into the MRI machine, where an image is taken of the area of the body being studied. Why is this test done? There are many reasons to have an MRI test. This test can find problems such as tumors, bleeding, injury, conditions that some children are born with, and infection. An MRI also may provide more information about a problem seen on an X-ray, ultrasound scan, CT scan, or nuclear medicine exam.  How can you prepare for the test?  Talk to your doctor about all your health conditions before the test. For example, tell your doctor if:  · You are allergic to any medicines. · You are or might be pregnant. · You have a pacemaker, an artificial limb, any metal pins or metal parts in your body, metal heart valves, metal clips in your brain, metal implants in your ears, or any other implanted or prosthetic medical device. · You have an intrauterine device (IUD) in place. · You get nervous in confined spaces. You may need medicine to help you relax. · You wear any patches that contain medicine. · You have kidney disease. What happens before the test?  · You will remove all metal objects from your body. These include hearing aids, dentures, jewelry, watches, and hairpins. · You will take off all or most of your clothes and then change into a gown. · If you do leave some clothes on, make sure you take everything out of your pockets. · You may have contrast materials (dye) put into your arm through a tube called an IV. Contrast material helps doctors see specific organs, blood vessels, and most tumors. What happens during the test?  · You will lie on your back on a table that is part of the MRI scanner.   · The table will slide into the space that contains the magnet. · Inside the scanner you will hear a fan and feel air moving. You may hear tapping, thumping, or snapping noises. You may be given earplugs or headphones to reduce the noise. · You will be asked to hold still during the scan. You may be asked to hold your breath for short periods. · You may be alone in the scanning room, but a technologist will be watching you through a window and talking with you during the test.  What else should you know about the test?  · An MRI does not hurt. · You may feel warmth in the area being examined. This is normal.  · A dye (contrast material) that contains gadolinium may be used in this test. Be sure to tell your doctor if:  ? You are pregnant or think you may be pregnant. ? You have kidney problems. ? You've had more than one test that used gadolinium. · The U.S. Food and Drug Administration (FDA) has safety warnings about gadolinium. But for most people, the benefit of its use in this test outweighs the risk. · If a dye is used, you may feel a quick sting or pinch and some coolness when the IV is started. The dye may give you a metallic taste in your mouth. Some people feel sick to their stomach or get a headache. · If you breastfeed and are concerned about whether the dye used in this test is safe, talk to your doctor. Most experts believe that very little dye passes into breast milk and even less is passed on to the baby. But if you prefer, you can store some of your breast milk ahead of time and use it for a day or two after the test.  How long does the test take? The test usually takes 30 to 60 minutes. But it can take as long as 2 hours. What happens after the test?  · You will probably be able to go home right away, depending on the reason for the test.  Follow-up care is a key part of your treatment and safety. Be sure to make and go to all appointments, and call your doctor if you are having problems.  It's also a good idea to keep a list of the medicines you take. Ask your doctor when you can expect to have your test results. Where can you learn more? Go to http://karin-michaela.info/. Enter V016 in the search box to learn more about \"Magnetic Resonance Imaging (MRI): About This Test.\"  Current as of: March 28, 2019  Content Version: 12.2  © 9050-1910 [employer]. Care instructions adapted under license by [employer] (which disclaims liability or warranty for this information). If you have questions about a medical condition or this instruction, always ask your healthcare professional. [address] any warranty or liability for your use of this information.

## 2020-03-09 NOTE — PROGRESS NOTES
[facility]  [address]  [place], [facility]  Phone: [phone]  Fax: [phone]  PROGRESS NOTE  Patient: Valentin Mcgee                MRN: [id number]       SSN: [id number]  YOB: 1945        AGE: 69 y.o. SEX: female  Body mass index is 20.9 kg/m². PCP: Cyrus Bumpers, NP  03/09/20    Chief Complaint   Patient presents with    Neck Pain     cervical pain, f/u appt. HISTORY OF PRESENT ILLNESS:  Greg Epley a 69 y. o.  female with history of neck pain, arm pain, shooting pains in the arm(s) and muscle spasms. She has cervical stenosis at C1 and C2 that would require occipital cervical fusion and suboccipital arthritis that if we ended up operating on her, she probably would end up requiring an occiput to C6 fusion. She was last seen by Dr. Brad Whitney on 8/13/19, he advised against surgery and offered referral to a tertiary care center. I saw her back in 9/2019, she wanted to seek surgical intervention thru a neuro-surgeon and had a posterior cervical fusion back on 11/20/19, she had developed a hematoma and had an evacuation of that a few days after her surgery. She was DC to rehab center in [address] and was sent home last month. When I saw her last she was ambulating w/out an assistive device.     Today, she comes in a wheelchair due to a heavy feeling from the waste down and a squeezing sensation around the chest region and going on down that started a couple of months ago without injury. She has diffuse weakness in her BLE and a sensation of heaviness in her legs. As far as her neck is, she is improved, she continues to have neck and BUE pain, but it is better. She comes into our office and reports that she wishes to get all of her future care w/us and Dr. Bard Whitney. She is also here for a refill of her Gabapentin. She can ambulate short distances with a walker, since I saw her last she has deteriorated.  On exam she has diffuse weakness in her legs, she has back pain from her thoracolumbar junction on down.     Denies bladder/bowel dysfunction, saddle paresthesia     Current Medications: Gabapentin 100mg TID, has Tramadol at home and takes Aleve  with no, benefit. Failed Baclofen due to SEs. Last MDP w/no benefit.      PMHx: CVA w/ memory issues    ASSESSMENT   Diagnoses and all orders for this visit:    1. Upper back pain  -     AMB POC XRAY, SPINE; THORACIC, 2 VIEW  -     MRI [facility] SPINE WO CONT; Future    2. Low back pain at multiple sites  -     AMB POC XRAY, SPINE, LUMBOSACRAL; 2 O  -     MRI LUMB SPINE WO CONT; Future    3. Cervical spinal stenosis  -     gabapentin (NEURONTIN) 100 mg capsule; Take 1 Cap by mouth three (3) times daily. Max Daily Amount: 300 mg.    4. Bilateral leg weakness  -     MRI [facility] SPINE WO CONT; Future  -     MRI LUMB SPINE WO CONT; Future    5. Bilateral leg paresthesia  -     MRI [facility] SPINE WO CONT; Future  -     MRI LUMB SPINE WO CONT; Future         IMPRESSION AND PLAN:  This is a pt with history of cervical stenosis and now presenting with upper and lower back pain w/ diffuse weakness from the waste down.    > Pt was given information on MRI   > TS and LS xrays  > TS and LS MRIs due to BLE weakness, presents in wheelchair was ambulating w/out assist device  > Discussed f/o w/ Dr Terrell Trejo, pt would like to be evaluated by Dr. Delicia High and wishes to continue care w/us. > Continue Gabapentin  > Ms. Tristin Conde has a reminder for a \"due or due soon\" health maintenance. I have asked that she contact her primary care provider, Bere Wallis NP, for follow-up on this health maintenance. >\" We have informed patient to notify us for immediate appointment if he has any worsening neurogical symptoms or if an emergency situation presents, then call 911  >  has been reviewed and is appropriate  > Pt will follow-up in 3-4 wks w/ Dr Delicia High, get C X-rays.     Subjective    Work not working, disabled    Smoking Status non smoker    Pain Scale: 1/10    Pain Assessment  3/9/2020   Location of Pain Neck   Pain Location Comment -   Location Modifiers (No Data)   Severity of Pain 1   Quality of Pain Aching; Other (Comment)   Quality of Pain Comment numbness, tingling, & weakness   Duration of Pain Persistent   Frequency of Pain Constant   Aggravating Factors (No Data)   Aggravating Factors Comment nothing aggravates   Limiting Behavior Yes   Relieving Factors Rest   Relieving Factors Comment -   Result of Injury No         REVIEW OF SYSTEMS  Constitutional: Negative for fever, chills, or weight change. Respiratory: Negative for cough or shortness of breath. Cardiovascular: Negative for chest pain or palpitations. Gastrointestinal: Negative for incontinence, acid reflux, change in bowel habits, or constipation. Genitourinary: Negative for incontinence, dysuria and flank pain. Musculoskeletal: Positive for neck, back and leg pain. See HPI. Skin: Negative for rash. Neurological:BUE and BLE  radiculopathy. See HPI. Endo/Heme/Allergies: Negative. Psychiatric/Behavioral: Negative. PHYSICAL EXAMINATION  Visit Vitals  BP 91/55 (BP 1 Location: Right arm, BP Patient Position: Sitting)   Pulse 86   Temp 97.7 °F (36.5 °C) (Oral)   Resp 16   Ht 5' (1.524 m)   LMP  (LMP Unknown)   SpO2 98%   BMI 20.90 kg/m²         Accompanied by Spouse. Constitutional:  Well developed, well nourished, in no acute distress. Psychiatric: Affect and mood are appropriate. Integumentary: No rashes or abrasions noted on exposed areas. Cardiovascular/Peripheral Vascular: +2 radial & pedal pulses. No peripheral edema is noted. Lymphatic:  No evidence of lymphedema. No cervical lymphadenopathy. SPINE/MUSCULOSKELETAL EXAM    Cervical spine:  Neck is midline. Normal muscle tone. No focal atrophy is noted. Neck ROM decreased with flexion, extension, turning right, turning left. Shoulder ROM intact.  Tenderness to palpation post neck. Negative Spurling's sign. Negative Tinel's sign. Negative Garza's sign. Sensation grossly intact to light touch. Thoracic Spine  Thoracolumbar junction pain bilaterally    Lumbar spine:  No rash, ecchymosis, or gross obliquity. No fasciculations. No focal atrophy is noted. Range of motion is not tested. Tenderness to palpation bilateral low back pain. No tenderness to palpation at the sciatic notch. SI joints non-tender. Trochanters non tender. Straight leg raise neg  Hip Impingement neg    Sensation grossly intact to light touch. MOTOR:        Biceps  Triceps Deltoids Wrist Ext Wrist Flex Hand Intrin   Right +4/5 +4/5 +4/5 +4/5 +4/5 +4/5   Left +4/5 +4/5 +4/5 +4/5 +4/5 +4/5     3/5 BLE diffuse weakness     Ambulation presents in wheelchair. Uses walker at home for short distances        PAST MEDICAL HISTORY   Past Medical History:   Diagnosis Date    Abnormal Pap smear     Dr. Magdaleno Stratton Allergic rhinitis     Arthritis     Cerebrovascular small vessel disease 3/18/2019    CT 3/17/2019    Cervical spinal cord compression (HCC)     Chronic pain     Concentric left ventricular hypertrophy 3/18/2019    With left ventricular diastolic dysfunction by echocardiogram 3/18/2019    Hypercholesterolemia     Neck pain 5/17/2010    Stroke ([facility] [address] 10/02/2017    TIA- legs weak memory issues       Past Surgical History:   Procedure Laterality Date    COLONOSCOPY N/A 6/4/2018    COLONOSCOPY / polypectomy performed by Tiffani Lara MD at [facility] ENDOSCOPY    HX GYN      Total Hyst    HX LAP CHOLECYSTECTOMY      HX OTHER SURGICAL  2017    Throat widened   . MEDICATIONS      Current Outpatient Medications   Medication Sig Dispense Refill    gabapentin (NEURONTIN) 100 mg capsule Take 1 Cap by mouth three (3) times daily.  Max Daily Amount: 300 mg. 270 Cap 0    busPIRone (BUSPAR) 7.5 mg tablet take 1 tablet by mouth twice a day 60 Tab 1    polyethylene glycol (MIRALAX) 17 gram/dose powder Take 17 g by mouth daily for 14 days. 1 tablespoon with 8 oz of water daily 238 g 0    denosumab (PROLIA) 60 mg/mL injection 60 mg by SubCUTAneous route every 6 months.  acetaminophen (TYLENOL) 160 mg/5 mL elixir Take 1,000 mg by mouth daily.  midodrine (PROAMITINE) 5 mg tablet Take 5 mg by mouth two (2) times a day. 1 tab by mouth with breakfast and lunch      mirtazapine (REMERON) 15 mg tablet Take 15 mg by mouth nightly. 1 tab by mouth at bedtime      atorvastatin (LIPITOR) 20 mg tablet take 1 tablet by mouth once daily 90 Tab 3    ondansetron (ZOFRAN ODT) 4 mg disintegrating tablet Take 1 Tab by mouth every six (6) hours. Dissolve 1 tab on tongue every 6 hour if needed for nausea or vomiting 30 Tab 1    ALPRAZolam (XANAX) 0.25 mg tablet Take 0.25 mg by mouth two (2) times daily as needed for Anxiety.  LORazepam (ATIVAN) 0.5 mg tablet Take 1 Tab by mouth every eight (8) hours as needed for Anxiety. Max Daily Amount: 1.5 mg. 10 Tab 0    methocarbamol (ROBAXIN) 500 mg tablet Take 1 Tab by mouth three (3) times daily as needed for Pain. 45 Tab 1    aspirin 81 mg chewable tablet Take 1 Tab by mouth daily. 30 Tab 3    omeprazole (PRILOSEC) 20 mg capsule take 1 capsule by mouth once daily before breakfast 90 Cap 1    loratadine (CLARITIN) 10 mg tablet Take 1 Tab by mouth daily. 90 Tab 1    turmeric 400 mg cap Take 1 Cap by mouth daily.           ALLERGIES    Allergies   Allergen Reactions    Baclofen Shortness of Breath    Morphine Other (comments)     hallucinations    Celebrex [Celecoxib] Other (comments)     Tiredness           SOCIAL HISTORY    Social History     Socioeconomic History    Marital status:      Spouse name: Not on file    Number of children: Not on file    Years of education: Not on file    Highest education level: Not on file   Occupational History    Not on file   Social Needs    Financial resource strain: Not on file    Food insecurity: Worry: Not on file     Inability: Not on file    Transportation needs:     Medical: Not on file     Non-medical: Not on file   Tobacco Use    Smoking status: Never Smoker    Smokeless tobacco: Never Used   Substance and Sexual Activity    Alcohol use: No    Drug use: No    Sexual activity: Never   Lifestyle    Physical activity:     Days per week: Not on file     Minutes per session: Not on file    Stress: Not on file   Relationships    Social connections:     Talks on phone: Not on file     Gets together: Not on file     Attends Roman [employer] service: Not on file     Active member of club or organization: Not on file     Attends meetings of clubs or organizations: Not on file     Relationship status: Not on file    Intimate partner violence:     Fear of current or ex partner: Not on file     Emotionally abused: Not on file     Physically abused: Not on file     Forced sexual activity: Not on file   Other Topics Concern    Not on file   Social History Narrative    Not on file       FAMILY HISTORY    Family History   Problem Relation Age of Onset    Cancer Mother         breast    Heart Disease Father          Queta Mason NP

## 2020-03-10 PROCEDURE — 3331090001 HH PPS REVENUE CREDIT

## 2020-03-10 PROCEDURE — 3331090002 HH PPS REVENUE DEBIT

## 2020-03-11 ENCOUNTER — HOME CARE VISIT (OUTPATIENT)
Dept: SCHEDULING | Facility: HOME HEALTH | Age: 75
End: 2020-03-11
Payer: MEDICARE

## 2020-03-11 VITALS
HEART RATE: 89 BPM | RESPIRATION RATE: 17 BRPM | OXYGEN SATURATION: 98 % | SYSTOLIC BLOOD PRESSURE: 111 MMHG | TEMPERATURE: 98.1 F | DIASTOLIC BLOOD PRESSURE: 86 MMHG

## 2020-03-11 PROCEDURE — G0299 HHS/HOSPICE OF RN EA 15 MIN: HCPCS

## 2020-03-11 PROCEDURE — 3331090001 HH PPS REVENUE CREDIT

## 2020-03-11 PROCEDURE — 3331090002 HH PPS REVENUE DEBIT

## 2020-03-11 PROCEDURE — 400013 HH SOC

## 2020-03-12 ENCOUNTER — HOME CARE VISIT (OUTPATIENT)
Dept: SCHEDULING | Facility: HOME HEALTH | Age: 75
End: 2020-03-12
Payer: MEDICARE

## 2020-03-12 ENCOUNTER — HOSPITAL ENCOUNTER (OUTPATIENT)
Dept: MRI IMAGING | Age: 75
Discharge: HOME | End: 2020-03-12
Attending: NURSE PRACTITIONER
Payer: MEDICARE

## 2020-03-12 DIAGNOSIS — R20.2 BILATERAL LEG PARESTHESIA: ICD-10-CM

## 2020-03-12 DIAGNOSIS — M54.50 LOW BACK PAIN AT MULTIPLE SITES: ICD-10-CM

## 2020-03-12 DIAGNOSIS — M54.9 UPPER BACK PAIN: ICD-10-CM

## 2020-03-12 DIAGNOSIS — R29.898 BILATERAL LEG WEAKNESS: ICD-10-CM

## 2020-03-12 PROCEDURE — 72146 MRI CHEST SPINE W/O DYE: CPT

## 2020-03-12 PROCEDURE — 72148 MRI LUMBAR SPINE W/O DYE: CPT

## 2020-03-12 PROCEDURE — 3331090002 HH PPS REVENUE DEBIT

## 2020-03-12 PROCEDURE — 3331090001 HH PPS REVENUE CREDIT

## 2020-03-13 ENCOUNTER — HOME CARE VISIT (OUTPATIENT)
Dept: SCHEDULING | Facility: HOME HEALTH | Age: 75
End: 2020-03-13
Payer: MEDICARE

## 2020-03-13 ENCOUNTER — TELEPHONE (OUTPATIENT)
Dept: FAMILY MEDICINE CLINIC | Facility: CLINIC | Age: 75
End: 2020-03-13

## 2020-03-13 DIAGNOSIS — M50.90 CERVICAL NECK PAIN WITH EVIDENCE OF DISC DISEASE: Primary | Chronic | ICD-10-CM

## 2020-03-13 DIAGNOSIS — M48.02 CERVICAL SPINAL STENOSIS: Chronic | ICD-10-CM

## 2020-03-13 PROCEDURE — G0152 HHCP-SERV OF OT,EA 15 MIN: HCPCS

## 2020-03-13 PROCEDURE — 3331090002 HH PPS REVENUE DEBIT

## 2020-03-13 PROCEDURE — 3331090001 HH PPS REVENUE CREDIT

## 2020-03-13 NOTE — TELEPHONE ENCOUNTER
Per TYE Land call the PT Nurse Gaby Tapia and find out what they are needing for the patient. I called Jhonathan Tavarez and ask her what she was needing for the patient and she stated. The patient will need 2 referral for out patient for eval and treat for OT and PT. Patient had 1 mo of OT and PT after her surgery on her neck. Per TYE Wayne verbal order place these referral. Orders placed and in the system. For In [address]

## 2020-03-14 PROCEDURE — 3331090002 HH PPS REVENUE DEBIT

## 2020-03-14 PROCEDURE — 3331090001 HH PPS REVENUE CREDIT

## 2020-03-15 PROCEDURE — 3331090001 HH PPS REVENUE CREDIT

## 2020-03-15 PROCEDURE — 3331090002 HH PPS REVENUE DEBIT

## 2020-03-16 VITALS
SYSTOLIC BLOOD PRESSURE: 76 MMHG | DIASTOLIC BLOOD PRESSURE: 53 MMHG | HEART RATE: 95 BPM | RESPIRATION RATE: 16 BRPM | OXYGEN SATURATION: 96 % | TEMPERATURE: 98.2 F

## 2020-03-16 PROCEDURE — 3331090001 HH PPS REVENUE CREDIT

## 2020-03-16 PROCEDURE — 3331090002 HH PPS REVENUE DEBIT

## 2020-03-17 ENCOUNTER — TELEPHONE (OUTPATIENT)
Dept: ORTHOPEDIC SURGERY | Age: 75
End: 2020-03-17

## 2020-03-17 ENCOUNTER — HOME CARE VISIT (OUTPATIENT)
Dept: SCHEDULING | Facility: HOME HEALTH | Age: 75
End: 2020-03-17
Payer: MEDICARE

## 2020-03-17 VITALS
TEMPERATURE: 97.8 F | DIASTOLIC BLOOD PRESSURE: 78 MMHG | SYSTOLIC BLOOD PRESSURE: 124 MMHG | HEART RATE: 80 BPM | RESPIRATION RATE: 18 BRPM | OXYGEN SATURATION: 98 %

## 2020-03-17 PROCEDURE — 3331090002 HH PPS REVENUE DEBIT

## 2020-03-17 PROCEDURE — 3331090001 HH PPS REVENUE CREDIT

## 2020-03-17 PROCEDURE — G0299 HHS/HOSPICE OF RN EA 15 MIN: HCPCS

## 2020-03-17 NOTE — TELEPHONE ENCOUNTER
Patient is taking Gabapentin 300mg a day but it's not helping and wants to know if it can be increased to help pain/pressure in the chest area.  She states this was discussed at her last visit    [facility] [phone]    MRI review on 3/19/2020

## 2020-03-18 PROCEDURE — 3331090001 HH PPS REVENUE CREDIT

## 2020-03-18 PROCEDURE — 3331090002 HH PPS REVENUE DEBIT

## 2020-03-19 ENCOUNTER — OFFICE VISIT (OUTPATIENT)
Dept: ORTHOPEDIC SURGERY | Age: 75
End: 2020-03-19

## 2020-03-19 VITALS
SYSTOLIC BLOOD PRESSURE: 81 MMHG | HEART RATE: 104 BPM | OXYGEN SATURATION: 95 % | WEIGHT: 100 LBS | TEMPERATURE: 98 F | HEIGHT: 60 IN | BODY MASS INDEX: 19.63 KG/M2 | RESPIRATION RATE: 24 BRPM | DIASTOLIC BLOOD PRESSURE: 49 MMHG

## 2020-03-19 DIAGNOSIS — M48.02 CERVICAL SPINAL STENOSIS: Primary | ICD-10-CM

## 2020-03-19 PROCEDURE — 3331090002 HH PPS REVENUE DEBIT

## 2020-03-19 PROCEDURE — 3331090001 HH PPS REVENUE CREDIT

## 2020-03-19 NOTE — PROGRESS NOTES
Estella Soulier presents today for   Chief Complaint   Patient presents with    Neck Pain       Is someone accompanying this pt? no    Is the patient using any DME equipment during OV? no    Depression Screening:  3 most recent PHQ Screens 2/19/2020   PHQ Not Done -   Little interest or pleasure in doing things Not at all   Feeling down, depressed, irritable, or hopeless Not at all   Total Score PHQ 2 0       Learning Assessment:  Learning Assessment 2/26/2019   PRIMARY LEARNER Patient   HIGHEST LEVEL OF EDUCATION - PRIMARY LEARNER  -   CO-LEARNER CAREGIVER -   PRIMARY LANGUAGE ENGLISH   LEARNER PREFERENCE PRIMARY DEMONSTRATION   ANSWERED BY Patient   RELATIONSHIP SELF       Abuse Screening:  Abuse Screening Questionnaire 10/1/2019   Do you ever feel afraid of your partner? N   Are you in a relationship with someone who physically or mentally threatens you? N   Is it safe for you to go home? Y       Fall Risk  Fall Risk Assessment, last 12 mths 2/19/2020   Able to walk? Yes   Fall in past 12 months? No       OPIOID RISK TOOL  No flowsheet data found. Coordination of Care:  1. Have you been to the ER, urgent care clinic since your last visit? no  Hospitalized since your last visit? no    2. Have you seen or consulted any other health care providers outside of the [address] [place] [address] since your last visit? no Include any pap smears or colon screening.  none

## 2020-03-19 NOTE — LETTER
3/19/20 Patient: Tang Smith YOB: 1945 Date of Visit: 3/19/2020 Dave Paredes NP 
[address] [place] [address] VIA In Basket Dear Dave Paredes NP, Thank you for referring Ms. Miranda Brittle to [facility] for evaluation. My notes for this consultation are attached. If you have questions, please do not hesitate to call me. I look forward to following your patient along with you.  
 
 
Sincerely, 
 
Shelton Mary MD

## 2020-03-19 NOTE — PROGRESS NOTES
[facility]  [address]  [address]  Phone: [phone]  Fax: [phone]  PROGRESS NOTE  Patient: Ralph Walsh                MRN: [id number]       SSN: [id number]  YOB: 1945        AGE: 69 y.o. SEX: female  Body mass index is 19.53 kg/m². PCP: Eugenio Lemos NP  03/19/20    Chief Complaint   Patient presents with    Neck Pain       HISTORY OF PRESENT ILLNESS, RADIOGRAPHS, and PLAN:     HISTORY OF PRESENT ILLNESS:  Ms. Su Watters is seen today, March 19, 2020. Since I had seen her for occipital cervical arthritis, she went for another opinion and ended up having an occipital cervical decompression and fusion that was complicated by a hematoma that left her paraparetic. She underwent extensive rehab and regained the ability to walk. She comes in today wishing me to continue her care. She has diffuse weakness in her lower extremities with hyperreflexia and a positive Garzas bilaterally. She continues to have neck pain, but a different type of neck pain most consistent with an occipital cervical fusion. Her wound is healed and dry. ASSESSMENT/PLAN: I discussed the matter at length with her and her continuing chronic pain and weakness now with neuralgia in her upper extremities and pain in her abdomen consistent with a spinal cord injury syndrome. She has had no postoperative studies. We will obtain some plain x-rays of her cervical spine, as well as an MRI of her cervical spine to determine the location of instrumentation and any cord injury evidenced. I will see her back for followup.          Past Medical History:   Diagnosis Date    Abnormal Pap smear     Dr. Dyana Steele Allergic rhinitis     Arthritis     Cerebrovascular small vessel disease 3/18/2019    CT 3/17/2019    Cervical spinal cord compression (HCC)     Chronic pain     Concentric left ventricular hypertrophy 3/18/2019    With left ventricular diastolic dysfunction by echocardiogram 3/18/2019    Hypercholesterolemia     Neck pain 5/17/2010    Stroke ([facility] [address] 10/02/2017    TIA- legs weak memory issues       Family History   Problem Relation Age of Onset    Cancer Mother         breast    Heart Disease Father        Current Outpatient Medications   Medication Sig Dispense Refill    gabapentin (NEURONTIN) 100 mg capsule Take 1 Cap by mouth three (3) times daily. Max Daily Amount: 300 mg. 270 Cap 0    busPIRone (BUSPAR) 7.5 mg tablet take 1 tablet by mouth twice a day 60 Tab 1    omeprazole (PRILOSEC) 20 mg capsule take 1 capsule by mouth once daily before breakfast 90 Cap 1    denosumab (PROLIA) 60 mg/mL injection 60 mg by SubCUTAneous route every 6 months.  acetaminophen (TYLENOL) 160 mg/5 mL elixir Take 1,000 mg by mouth daily.  mirtazapine (REMERON) 15 mg tablet Take 15 mg by mouth nightly. 1 tab by mouth at bedtime      atorvastatin (LIPITOR) 20 mg tablet take 1 tablet by mouth once daily 90 Tab 3    ondansetron (ZOFRAN ODT) 4 mg disintegrating tablet Take 1 Tab by mouth every six (6) hours. Dissolve 1 tab on tongue every 6 hour if needed for nausea or vomiting 30 Tab 1    ALPRAZolam (XANAX) 0.25 mg tablet Take 0.25 mg by mouth two (2) times daily as needed for Anxiety.  methocarbamol (ROBAXIN) 500 mg tablet Take 1 Tab by mouth three (3) times daily as needed for Pain. 45 Tab 1    loratadine (CLARITIN) 10 mg tablet Take 1 Tab by mouth daily. 90 Tab 1    turmeric 400 mg cap Take 1 Cap by mouth daily.  aspirin 81 mg chewable tablet Take 1 Tab by mouth daily. 30 Tab 3    midodrine (PROAMITINE) 5 mg tablet Take 5 mg by mouth two (2) times a day. 1 tab by mouth with breakfast and lunch      LORazepam (ATIVAN) 0.5 mg tablet Take 1 Tab by mouth every eight (8) hours as needed for Anxiety.  Max Daily Amount: 1.5 mg. 10 Tab 0       Allergies   Allergen Reactions    Baclofen Shortness of Breath    Morphine Other (comments)     hallucinations    Celebrex [Celecoxib] Other (comments)     Tiredness        Past Surgical History:   Procedure Laterality Date    COLONOSCOPY N/A 6/4/2018    COLONOSCOPY / polypectomy [facility] by Asif Morales [facility] ENDOSCOPY    HX GYN      Total Hyst    HX LAP CHOLECYSTECTOMY      HX OTHER SURGICAL  2017    Throat widened       Past Medical History:   Diagnosis Date    Abnormal Pap smear     Dr. Sergey Wright    Allergic rhinitis     Arthritis     Cerebrovascular small vessel disease 3/18/2019    CT 3/17/2019    Cervical spinal cord compression (HCC)     Chronic pain     Concentric left ventricular hypertrophy 3/18/2019    With left ventricular diastolic dysfunction by echocardiogram 3/18/2019    Hypercholesterolemia     Neck pain 5/17/2010    Stroke ([facility] [address] 10/02/2017    TIA- legs weak memory issues       Social History     Socioeconomic History    Marital status:      Spouse name: Not on file    Number of children: Not on file    Years of education: Not on file    Highest education level: Not on file   Occupational History    Not on file   Social Needs    Financial resource strain: Not on file    Food insecurity     Worry: Not on file     Inability: Not on file    Transportation needs     Medical: Not on file     Non-medical: Not on file   Tobacco Use    Smoking status: Never Smoker    Smokeless tobacco: Never Used   Substance and Sexual Activity    Alcohol use: No    Drug use: No    Sexual activity: Never   Lifestyle    Physical activity     Days per week: Not on file     Minutes per session: Not on file    Stress: Not on file   Relationships    Social connections     Talks on phone: Not on file     Gets together: Not on file     Attends Yazidi service: Not on file     Active member of club or organization: Not on file     Attends meetings of clubs or organizations: Not on file     Relationship status: Not on file    Intimate partner violence     Fear of current or ex partner: Not on file     Emotionally abused: Not on file     Physically abused: Not on file     Forced sexual activity: Not on file   Other Topics Concern    Not on file   Social History Narrative    Not on file         REVIEW OF SYSTEMS:   CONSTITUTIONAL SYMPTOMS:  Negative. EYES:  Negative. EARS, NOSE, THROAT AND MOUTH:  Negative. CARDIOVASCULAR:  Negative. RESPIRATORY:  Negative. GENITOURINARY: Per HPI. GASTROINTESTINAL:  Per HPI. INTEGUMENTARY (SKIN AND/OR BREAST):  Negative. MUSCULOSKELETAL: Per HPI.   ENDOCRINE/RHEUMATOLOGIC:  Negative. NEUROLOGICAL:  Per HPI. HEMATOLOGIC/LYMPHATIC:  Negative. ALLERGIC/IMMUNOLOGIC:  Negative. PSYCHIATRIC:  Negative. PHYSICAL EXAMINATION:   Visit Vitals  BP (!) 81/49 (BP 1 Location: Right arm, BP Patient Position: Sitting) Comment: pt asymptomatic, MD aware   Pulse (!) 104 Comment: pt asymptomatic   Temp 98 °F (36.7 °C) (Oral)   Resp 24   Ht 5' (1.524 m)   Wt 100 lb (45.4 kg)   LMP  (LMP Unknown)   SpO2 95% Comment: RA   BMI 19.53 kg/m²    PAIN SCALE: 3/10    CONSTITUTIONAL: The patient is in no apparent distress and is alert and oriented x 3. HEENT: Normocephalic. Hearing grossly intact. NECK: Supple and symmetric. no tenderness, or masses were felt. RESPIRATORY: No labored breathing. CARDIOVASCULAR: The carotid pulses were normal. Peripheral pulses were 2+. CHEST: Normal AP diameter and normal contour without any kyphoscoliosis. LYMPHATIC: No lymphadenopathy was appreciated in the neck, axillae or groin. SKIN: Negative for scars, rashes, lesions, or ulcers on the right upper, right lower, left upper, left lower and trunk. NEUROLOGICAL: Alert and oriented x 3. Presents in wheelchair. EXTREMITIES: See musculoskeletal.  MUSCULOSKELETAL:   Head and Neck:  Negative for misalignment, asymmetry, crepitation, defects, tenderness masses or effusions.  Left Upper Extremity: Pain.  Inspection, percussion and palpation performed. Gazras sign is positive.  Right Upper Extremity: Pain. Inspection, percussion and palpation performed. Garzas sign is positive.  Spine, Ribs and Pelvis: Tightness around ribcage. Inspection, percussion and palpation performed. Negative for misalignment, asymmetry, crepitation, defects, tenderness masses or effusions.  Left Lower Extremity: Weakness. Inspection, percussion and palpation performed. Negative straight leg raise.  Right Lower Extremity: Weakness. Inspection, percussion and palpation performed. Negative straight leg raise. SPINE EXAM:     Cervical spine: Neck is midline. Normal muscle tone. No focal atrophy is noted. Lumbar spine: No rash, ecchymosis, or gross obliquity. No focal atrophy is noted. ASSESSMENT    ICD-10-CM ICD-9-CM    1. Cervical spinal stenosis M48.02 723.0 MRI CERV SPINE WO CONT      CT SPINE CERV WO CONT       Written by Yuniel Burns, as dictated by Shagufta Calabrese MD.    I, Dr. Shagufta Calabrese MD, confirm that all documentation is accurate.

## 2020-03-20 PROCEDURE — 3331090002 HH PPS REVENUE DEBIT

## 2020-03-20 PROCEDURE — 3331090001 HH PPS REVENUE CREDIT

## 2020-03-21 PROCEDURE — 3331090001 HH PPS REVENUE CREDIT

## 2020-03-21 PROCEDURE — 3331090002 HH PPS REVENUE DEBIT

## 2020-03-22 PROCEDURE — 3331090001 HH PPS REVENUE CREDIT

## 2020-03-22 PROCEDURE — 3331090002 HH PPS REVENUE DEBIT

## 2020-03-23 ENCOUNTER — APPOINTMENT (OUTPATIENT)
Dept: PHYSICAL THERAPY | Age: 75
End: 2020-03-23

## 2020-03-23 ENCOUNTER — TELEPHONE (OUTPATIENT)
Dept: ORTHOPEDIC SURGERY | Age: 75
End: 2020-03-23

## 2020-03-23 PROCEDURE — 3331090002 HH PPS REVENUE DEBIT

## 2020-03-23 PROCEDURE — 3331090001 HH PPS REVENUE CREDIT

## 2020-03-23 RX ORDER — DIPHENHYDRAMINE HYDROCHLORIDE 50 MG/ML
25 INJECTION, SOLUTION INTRAMUSCULAR; INTRAVENOUS AS NEEDED
Status: CANCELLED
Start: 2020-03-30

## 2020-03-23 RX ORDER — ACETAMINOPHEN 325 MG/1
650 TABLET ORAL AS NEEDED
Status: CANCELLED
Start: 2020-03-30

## 2020-03-23 RX ORDER — EPINEPHRINE 1 MG/ML
0.3 INJECTION, SOLUTION, CONCENTRATE INTRAVENOUS AS NEEDED
Status: CANCELLED | OUTPATIENT
Start: 2020-03-30

## 2020-03-23 RX ORDER — HYDROCORTISONE SODIUM SUCCINATE 100 MG/2ML
100 INJECTION, POWDER, FOR SOLUTION INTRAMUSCULAR; INTRAVENOUS AS NEEDED
Status: CANCELLED | OUTPATIENT
Start: 2020-03-30

## 2020-03-23 RX ORDER — ONDANSETRON 2 MG/ML
8 INJECTION INTRAMUSCULAR; INTRAVENOUS AS NEEDED
Status: CANCELLED | OUTPATIENT
Start: 2020-03-30

## 2020-03-23 RX ORDER — ALBUTEROL SULFATE 0.83 MG/ML
2.5 SOLUTION RESPIRATORY (INHALATION) AS NEEDED
Status: CANCELLED
Start: 2020-03-30

## 2020-03-23 RX ORDER — DIPHENHYDRAMINE HYDROCHLORIDE 50 MG/ML
50 INJECTION, SOLUTION INTRAMUSCULAR; INTRAVENOUS AS NEEDED
Status: CANCELLED
Start: 2020-03-30

## 2020-03-23 NOTE — TELEPHONE ENCOUNTER
Matt from [facility] scheduling needs to confirm patients MRI  And CT scan are urgent and cannot wait until the end of May. Please advise Matt at [phone].

## 2020-03-24 ENCOUNTER — HOME CARE VISIT (OUTPATIENT)
Dept: SCHEDULING | Facility: HOME HEALTH | Age: 75
End: 2020-03-24
Payer: MEDICARE

## 2020-03-24 PROCEDURE — G0299 HHS/HOSPICE OF RN EA 15 MIN: HCPCS

## 2020-03-24 PROCEDURE — 3331090002 HH PPS REVENUE DEBIT

## 2020-03-24 PROCEDURE — 3331090001 HH PPS REVENUE CREDIT

## 2020-03-24 NOTE — TELEPHONE ENCOUNTER
Spoke with Esperanza Hansen, informed per NP Dante MRI was urgent and patient should continue as scheduled. Esperanza Hansen stated understanding, no further actions needed at this time.

## 2020-03-25 PROCEDURE — 3331090001 HH PPS REVENUE CREDIT

## 2020-03-25 PROCEDURE — 3331090002 HH PPS REVENUE DEBIT

## 2020-03-26 ENCOUNTER — HOSPITAL ENCOUNTER (OUTPATIENT)
Dept: CT IMAGING | Age: 75
Discharge: HOME | End: 2020-03-26
Attending: ORTHOPAEDIC SURGERY
Payer: MEDICARE

## 2020-03-26 ENCOUNTER — HOSPITAL ENCOUNTER (OUTPATIENT)
Dept: MRI IMAGING | Age: 75
Discharge: HOME | End: 2020-03-26
Attending: ORTHOPAEDIC SURGERY
Payer: MEDICARE

## 2020-03-26 DIAGNOSIS — M48.02 CERVICAL SPINAL STENOSIS: ICD-10-CM

## 2020-03-26 PROCEDURE — 3331090001 HH PPS REVENUE CREDIT

## 2020-03-26 PROCEDURE — 72125 CT NECK SPINE W/O DYE: CPT

## 2020-03-26 PROCEDURE — 72141 MRI NECK SPINE W/O DYE: CPT

## 2020-03-26 PROCEDURE — 3331090002 HH PPS REVENUE DEBIT

## 2020-03-27 ENCOUNTER — TELEPHONE (OUTPATIENT)
Dept: FAMILY MEDICINE CLINIC | Facility: CLINIC | Age: 75
End: 2020-03-27

## 2020-03-27 VITALS
OXYGEN SATURATION: 96 % | SYSTOLIC BLOOD PRESSURE: 100 MMHG | TEMPERATURE: 97.4 F | HEART RATE: 78 BPM | RESPIRATION RATE: 16 BRPM | DIASTOLIC BLOOD PRESSURE: 60 MMHG

## 2020-03-27 NOTE — TELEPHONE ENCOUNTER
Amanda with the infusion center needs a verberal ok to use the labs from 02/28/20 for her prolia injections.   Please call her back at [phone]

## 2020-03-27 NOTE — TELEPHONE ENCOUNTER
Spoke with Sharmila at the infusion center. Dr. Melvin Repress NP stated she could use the labs from 02/28/2020 for Ms. Elder Pfeifferia injection.

## 2020-03-30 ENCOUNTER — HOSPITAL ENCOUNTER (OUTPATIENT)
Dept: INFUSION THERAPY | Age: 75
Discharge: HOME | End: 2020-03-30
Payer: MEDICARE

## 2020-03-30 VITALS
SYSTOLIC BLOOD PRESSURE: 134 MMHG | TEMPERATURE: 98.1 F | DIASTOLIC BLOOD PRESSURE: 77 MMHG | HEART RATE: 84 BPM | OXYGEN SATURATION: 97 % | RESPIRATION RATE: 18 BRPM

## 2020-03-30 DIAGNOSIS — M81.0 OSTEOPOROSIS, UNSPECIFIED OSTEOPOROSIS TYPE, UNSPECIFIED PATHOLOGICAL FRACTURE PRESENCE: Primary | ICD-10-CM

## 2020-03-30 DIAGNOSIS — M81.0 OSTEOPOROSIS, UNSPECIFIED OSTEOPOROSIS TYPE, UNSPECIFIED PATHOLOGICAL FRACTURE PRESENCE: ICD-10-CM

## 2020-03-30 LAB
MAGNESIUM SERPL-MCNC: 2 MG/DL (ref 1.6–2.6)
PHOSPHATE SERPL-MCNC: 3.9 MG/DL (ref 2.5–4.9)

## 2020-03-30 PROCEDURE — 83735 ASSAY OF MAGNESIUM: CPT

## 2020-03-30 PROCEDURE — 74011250636 HC RX REV CODE- 250/636: Performed by: NURSE PRACTITIONER

## 2020-03-30 PROCEDURE — 96372 THER/PROPH/DIAG INJ SC/IM: CPT

## 2020-03-30 PROCEDURE — 36415 COLL VENOUS BLD VENIPUNCTURE: CPT

## 2020-03-30 PROCEDURE — 84100 ASSAY OF PHOSPHORUS: CPT

## 2020-03-30 RX ORDER — EPINEPHRINE 1 MG/ML
0.3 INJECTION, SOLUTION, CONCENTRATE INTRAVENOUS AS NEEDED
Status: CANCELLED | OUTPATIENT
Start: 2020-09-28

## 2020-03-30 RX ORDER — DIPHENHYDRAMINE HYDROCHLORIDE 50 MG/ML
25 INJECTION, SOLUTION INTRAMUSCULAR; INTRAVENOUS AS NEEDED
Status: CANCELLED
Start: 2020-09-28

## 2020-03-30 RX ORDER — DIPHENHYDRAMINE HYDROCHLORIDE 50 MG/ML
50 INJECTION, SOLUTION INTRAMUSCULAR; INTRAVENOUS AS NEEDED
Status: CANCELLED
Start: 2020-09-28

## 2020-03-30 RX ORDER — ALBUTEROL SULFATE 0.83 MG/ML
2.5 SOLUTION RESPIRATORY (INHALATION) AS NEEDED
Status: CANCELLED
Start: 2020-09-28

## 2020-03-30 RX ORDER — HYDROCORTISONE SODIUM SUCCINATE 100 MG/2ML
100 INJECTION, POWDER, FOR SOLUTION INTRAMUSCULAR; INTRAVENOUS AS NEEDED
Status: CANCELLED | OUTPATIENT
Start: 2020-09-28

## 2020-03-30 RX ORDER — ACETAMINOPHEN 325 MG/1
650 TABLET ORAL AS NEEDED
Status: CANCELLED
Start: 2020-09-28

## 2020-03-30 RX ORDER — ONDANSETRON 2 MG/ML
8 INJECTION INTRAMUSCULAR; INTRAVENOUS AS NEEDED
Status: CANCELLED | OUTPATIENT
Start: 2020-09-28

## 2020-03-30 RX ADMIN — DENOSUMAB 60 MG: 60 INJECTION SUBCUTANEOUS at 15:27

## 2020-03-30 NOTE — PROGRESS NOTES
[facility] Progress Note    Date: 2020    Name: Ina Steven    MRN: [id number]         : 1945     Prolia Injection. Patient arrived to [facility] at 1510 ambulatory. No concerns regarding injection today. States she tolerated previous prolia well. Pain to neck 3/10. States she took pain medication as per home schedule. Ms. Danette Villegas was assessed and education was provided. Ms. Kellie Turcios vitals were reviewed and patient was observed for 5 minutes prior to treatment. Visit Vitals  /77 (BP 1 Location: Left arm, BP Patient Position: Post activity)   Pulse 84   Temp 98.1 °F (36.7 °C)   Resp 18   SpO2 97%   Breastfeeding No       Blood sample obtained by mukesh-puncture x 1 stick from right AC for  magnesium and phosphorus. Mag and Phos sent out    Calcium 9.2--done on  2020--okay per pharmacy to give prolia. Prolia 60 mg was administered subcutaneously to the back of patient's right arm. Band aid applied to site. Tolerated well. Reviewed discharge instructions with patient. She verbalized understanding       Patient armband removed and shredded. Ms. Danette Villegas was discharged from [place] [address] in stable condition at 1530 She is to return on 20 at 1500 for her next appointment.     Phan Patiño RN  2020  3:19 PM

## 2020-03-31 ENCOUNTER — TELEPHONE (OUTPATIENT)
Dept: ORTHOPEDIC SURGERY | Age: 75
End: 2020-03-31

## 2020-03-31 NOTE — TELEPHONE ENCOUNTER
I spoke with this pt yesterday, she is not doing well neurologically and I advised her to come in at [address] [place] [address] on Friday to see Dr. Brad Whitney, if she would like to come in tomorrow morning instead that should be fine.

## 2020-03-31 NOTE — TELEPHONE ENCOUNTER
Patient called stating she would like a return call to review what was discussed yesterday. She was driving and could not remember most of the conversation. It was in regards to her neck, not particularly good news is all she can remember. Please return her call.      Patient [phone]

## 2020-03-31 NOTE — TELEPHONE ENCOUNTER
Please call patient and let her know that we do not believe this conversation was with our practice.

## 2020-03-31 NOTE — TELEPHONE ENCOUNTER
Spoke with patient and her caregiver, confirmed they wanted to move the appointment to tomorrow per TERESITA Barbosa. No further actions needed at this time.

## 2020-03-31 NOTE — TELEPHONE ENCOUNTER
Spoke with patient to inform she was not seen in our office on yesterday. Patient stated it was not a visit, but she believes TERESITA Barbosa called her yesterday and spoke with her. There is no documentation of any telephone call. Please advise TERESITA Barbosa.

## 2020-04-01 ENCOUNTER — OFFICE VISIT (OUTPATIENT)
Dept: ORTHOPEDIC SURGERY | Age: 75
End: 2020-04-01

## 2020-04-01 VITALS
DIASTOLIC BLOOD PRESSURE: 65 MMHG | SYSTOLIC BLOOD PRESSURE: 112 MMHG | HEART RATE: 88 BPM | BODY MASS INDEX: 19.53 KG/M2 | RESPIRATION RATE: 15 BRPM | TEMPERATURE: 97.7 F | HEIGHT: 60 IN

## 2020-04-01 DIAGNOSIS — M48.02 CERVICAL SPINAL STENOSIS: Primary | ICD-10-CM

## 2020-04-01 DIAGNOSIS — F41.9 ANXIETY: ICD-10-CM

## 2020-04-01 DIAGNOSIS — M62.838 MUSCLE SPASM: ICD-10-CM

## 2020-04-01 RX ORDER — BACLOFEN 10 MG/1
10 TABLET ORAL 2 TIMES DAILY
Qty: 60 TAB | Refills: 2 | Status: SHIPPED | OUTPATIENT
Start: 2020-04-01 | End: 2020-06-11 | Stop reason: SDUPTHER

## 2020-04-01 RX ORDER — ALPRAZOLAM 0.25 MG/1
0.25 TABLET ORAL
Qty: 60 TAB | Refills: 0 | Status: SHIPPED | OUTPATIENT
Start: 2020-04-01 | End: 2020-05-26 | Stop reason: SDUPTHER

## 2020-04-01 NOTE — PROGRESS NOTES
[facility]  [address]  [place]  Phone: [phone]  Fax: [phone]  PROGRESS NOTE  Patient: Nixon Bone                MRN: [id number]       SSN: [id number]  YOB: 1945        AGE: 69 y.o. SEX: female  Body mass index is 19.53 kg/m². PCP: Jeannine Roth NP  04/01/20    Chief Complaint   Patient presents with    Back Pain     SC    Neck Pain       HISTORY OF PRESENT ILLNESS, RADIOGRAPHS, and PLAN:     HISTORY OF PRESENT ILLNESS:  Ms. Addis Carias returns today. I reviewed her CT scan, as well as her MRI. RADIOGRAPHS:  Her CT scan demonstrates appropriate placement of instrumentation but no consolidation of her occipital cervical fusion. MRI demonstrates a large area of cord edema or gliosis around C4-5, likely the spot of the cord compression from her postoperative hematoma. Of concern is that at C6-7, the patient has relative residual cervical stenosis junctional to her now fusion with the canal measuring about 7 millimeters. It is quite stiff at that segment. The stenosis is due to both anterior and posterior compression forces. ASSESSMENT/PLAN: I discussed the matter at length with her. Her big concerns are this abdominal and chest heaviness and tightness that she gets following the spinal cord injury, as well as the dysesthetic sensations in her arms. She is already on Neurontin at 600 mg three times a day. We are going to restart her on some Baclofen. She had it listed as causing shortness of breath. She does not recall that and would like to start it as she is getting spasticity in her lower extremities. So, we will start her on Baclofen and see how she tolerates it now. She is going to continue a home exercise program in as much as with the health crisis going on, we cannot get her to an outpatient therapy unit.   For followup, I would like her to be seen by Physiatry, and she can see the Physical Medicine and Rehabilitation doctors in my practice to go over a rehabilitative program at home following her spinal cord injury. We will get additional x-rays in the months to come to make sure she is fusing without issue. I would avoid considerations of junctional decompression and fusion at C6-7 until, or unless, she plateaus at an unacceptable level following the recovery from her spinal cord injury or has clear progressive symptoms that would be associated with that. We will provide her some Xanax. She is angry and anxiety-ridden from the events that have unfolded. It helps her quite a bit. We are trying to avoid use of narcotic pain medications if possible. Past Medical History:   Diagnosis Date    Abnormal Pap smear     Dr. Obdulio Lopez Allergic rhinitis     Arthritis     Cerebrovascular small vessel disease 3/18/2019    CT 3/17/2019    Cervical spinal cord compression (HCC)     Chronic pain     Concentric left ventricular hypertrophy 3/18/2019    With left ventricular diastolic dysfunction by echocardiogram 3/18/2019    Hypercholesterolemia     Neck pain 5/17/2010    Stroke ([facility] [address] 10/02/2017    TIA- legs weak memory issues       Family History   Problem Relation Age of Onset    Cancer Mother         breast    Heart Disease Father        Current Outpatient Medications   Medication Sig Dispense Refill    gabapentin (NEURONTIN) 100 mg capsule Take 1 Cap by mouth three (3) times daily. Max Daily Amount: 300 mg. 270 Cap 0    busPIRone (BUSPAR) 7.5 mg tablet take 1 tablet by mouth twice a day 60 Tab 1    omeprazole (PRILOSEC) 20 mg capsule take 1 capsule by mouth once daily before breakfast 90 Cap 1    denosumab (PROLIA) 60 mg/mL injection 60 mg by SubCUTAneous route every 6 months.  acetaminophen (TYLENOL) 160 mg/5 mL elixir Take 1,000 mg by mouth daily.  midodrine (PROAMITINE) 5 mg tablet Take 5 mg by mouth two (2) times a day.  1 tab by mouth with breakfast and lunch      mirtazapine (REMERON) 15 mg tablet Take 15 mg by mouth nightly. 1 tab by mouth at bedtime      atorvastatin (LIPITOR) 20 mg tablet take 1 tablet by mouth once daily 90 Tab 3    ondansetron (ZOFRAN ODT) 4 mg disintegrating tablet Take 1 Tab by mouth every six (6) hours. Dissolve 1 tab on tongue every 6 hour if needed for nausea or vomiting 30 Tab 1    ALPRAZolam (XANAX) 0.25 mg tablet Take 0.25 mg by mouth two (2) times daily as needed for Anxiety.  LORazepam (ATIVAN) 0.5 mg tablet Take 1 Tab by mouth every eight (8) hours as needed for Anxiety. Max Daily Amount: 1.5 mg. 10 Tab 0    loratadine (CLARITIN) 10 mg tablet Take 1 Tab by mouth daily. 90 Tab 1    turmeric 400 mg cap Take 1 Cap by mouth daily.  aspirin 81 mg chewable tablet Take 1 Tab by mouth daily. 30 Tab 3    methocarbamol (ROBAXIN) 500 mg tablet Take 1 Tab by mouth three (3) times daily as needed for Pain.  45 Tab 1       Allergies   Allergen Reactions    Baclofen Shortness of Breath    Morphine Other (comments)     hallucinations    Celebrex [Celecoxib] Other (comments)     Tiredness        Past Surgical History:   Procedure Laterality Date    COLONOSCOPY N/A 6/4/2018    COLONOSCOPY / polypectomy [facility] Ese Tong [facility] at [facility] ENDOSCOPY    HX GYN      Total Hyst    HX LAP CHOLECYSTECTOMY      HX OTHER SURGICAL  2017    Throat widened       Past Medical History:   Diagnosis Date    Abnormal Pap smear     Dr. Angel Crisostomo    Allergic rhinitis     Arthritis     Cerebrovascular small vessel disease 3/18/2019    CT 3/17/2019    Cervical spinal cord compression (HCC)     Chronic pain     Concentric left ventricular hypertrophy 3/18/2019    With left ventricular diastolic dysfunction by echocardiogram 3/18/2019    Hypercholesterolemia     Neck pain 5/17/2010    Stroke ([facility] [address] 10/02/2017    TIA- legs weak memory issues       Social History     Socioeconomic History    Marital status:      Spouse name: Not on file    Number of children: Not on file    Years of education: Not on file    Highest education level: Not on file   Occupational History    Not on file   Social Needs    Financial resource strain: Not on file    Food insecurity     Worry: Not on file     Inability: Not on file    Transportation needs     Medical: Not on file     Non-medical: Not on file   Tobacco Use    Smoking status: Never Smoker    Smokeless tobacco: Never Used   Substance and Sexual Activity    Alcohol use: No    Drug use: No    Sexual activity: Never   Lifestyle    Physical activity     Days per week: Not on file     Minutes per session: Not on file    Stress: Not on file   Relationships    Social connections     Talks on phone: Not on file     Gets together: Not on file     Attends Bahai service: Not on file     Active member of club or organization: Not on file     Attends meetings of clubs or organizations: Not on file     Relationship status: Not on file    Intimate partner violence     Fear of current or ex partner: Not on file     Emotionally abused: Not on file     Physically abused: Not on file     Forced sexual activity: Not on file   Other Topics Concern    Not on file   Social History Narrative    Not on file         REVIEW OF SYSTEMS:   CONSTITUTIONAL SYMPTOMS:  Negative. EYES:  Negative. EARS, NOSE, THROAT AND MOUTH:  Negative. CARDIOVASCULAR:  Negative. RESPIRATORY:  Negative. GENITOURINARY: Per HPI. GASTROINTESTINAL:  Per HPI. INTEGUMENTARY (SKIN AND/OR BREAST):  Negative. MUSCULOSKELETAL: Per HPI.   ENDOCRINE/RHEUMATOLOGIC:  Negative. NEUROLOGICAL:  Per HPI. HEMATOLOGIC/LYMPHATIC:  Negative. ALLERGIC/IMMUNOLOGIC:  Negative. PSYCHIATRIC:  Negative.     PHYSICAL EXAMINATION:   Visit Vitals  /65 (BP 1 Location: Right arm, BP Patient Position: Sitting)   Pulse 88   Temp 97.7 °F (36.5 °C) (Oral)   Resp 15   Ht 5' (1.524 m)   LMP  (LMP Unknown)   BMI 19.53 kg/m²    PAIN SCALE: 3/10    CONSTITUTIONAL: The patient is in no apparent distress and is alert and oriented x 3. HEENT: Normocephalic. Hearing grossly intact. NECK: Supple and symmetric. no tenderness, or masses were felt. RESPIRATORY: No labored breathing. CARDIOVASCULAR: The carotid pulses were normal. Peripheral pulses were 2+. CHEST: Normal AP diameter and normal contour without any kyphoscoliosis. LYMPHATIC: No lymphadenopathy was appreciated in the neck, axillae or groin. SKIN:  Incision healing well, no drainage, no erythema, no hernia, no seroma, no swelling, no dehiscence, incision well approximated. Negative for scars, rashes, lesions, or ulcers on the right upper, right lower, left upper, left lower and trunk. NEUROLOGICAL: Alert and oriented x 3. Presents in wheelchair. EXTREMITIES: See musculoskeletal.  MUSCULOSKELETAL:   Head and Neck: Neck pain. Negative for misalignment, asymmetry, crepitation, defects, tenderness masses or effusions.  Left Upper Extremity: Inspection, percussion and palpation performed. Garzas sign is negative.  Right Upper Extremity: Inspection, percussion and palpation performed. Garzas sign is negative.  Spine, Ribs and Pelvis: Inspection, percussion and palpation performed. Negative for misalignment, asymmetry, crepitation, defects, tenderness masses or effusions.  Left Lower Extremity: Inspection, percussion and palpation performed. Negative straight leg raise.  Right Lower Extremity: Inspection, percussion and palpation performed. Negative straight leg raise. SPINE EXAM:     Cervical spine: Neck is midline. Normal muscle tone. No focal atrophy is noted. ASSESSMENT    ICD-10-CM ICD-9-CM    1. Cervical spinal stenosis M48.02 723.0    2. Muscle spasm M62.838 728.85 baclofen (LIORESAL) 10 mg tablet       Written by Kimmy Hernández, as dictated by Chan Neal MD.    I, Dr. Chan Neal MD, confirm that all documentation is accurate.

## 2020-04-02 ENCOUNTER — TELEPHONE (OUTPATIENT)
Dept: FAMILY MEDICINE CLINIC | Facility: CLINIC | Age: 75
End: 2020-04-02

## 2020-04-02 NOTE — TELEPHONE ENCOUNTER
TC made to pt and pt verified name and  pt states \" that she did see Dr. Heidy Hratley and was given Baclofen and she is taking Gabapentin which is not helping pt states \" that he had a MRI done pt states \" that she saw DR. Heidy Hartley cause she felt like her Chest and Back was compressed together and pt states \" that Dr. Heidy Hartley told her that she was feeling thi way due to pt is healing. Pt states \" she is having pain and want to know if you can send in something for her pain. Please Advise.

## 2020-04-02 NOTE — TELEPHONE ENCOUNTER
----- Message from [address] sent at 3/29/2020  7:54 PM EDT -----  Regarding: Non-Urgent Medical Question  Contact: [phone]  This is from [address]  BDay 1-67-19. This is for My NP Romy Austin. I need some help with medicine. Can you or someone call me please? I really feel bad. Thank You.

## 2020-04-05 NOTE — TELEPHONE ENCOUNTER
Please let the patient know that I am sorry she is in pain but the pain medication will have to prescribed but the surgeon as the pain is secondary to a surgical procedure.

## 2020-04-07 NOTE — TELEPHONE ENCOUNTER
TC made to pt and per NP Stu Watson will not be able to give pt any pain medication pt will have to contact pt surgeon since the pain is secondary to her surgical site. I was not able to leave pt a VM cause VM has not been set up.

## 2020-04-16 DIAGNOSIS — M48.02 CERVICAL SPINAL STENOSIS: ICD-10-CM

## 2020-04-16 RX ORDER — GABAPENTIN 100 MG/1
200 CAPSULE ORAL 3 TIMES DAILY
Qty: 180 CAP | Refills: 1 | Status: SHIPPED | OUTPATIENT
Start: 2020-04-16

## 2020-04-16 NOTE — TELEPHONE ENCOUNTER
Patient is requesting for refill of gabapentin (NEURONTIN) 100 mg capsule, please advise patient once done, [phone].

## 2020-04-16 NOTE — TELEPHONE ENCOUNTER
Per patient dose was increased to 2 caps TID (See note from 3/17/20 telephone encounter). New Rx pended with dose increase. Please sign if appropriate. Last Visit: 4/1/20 with MD López Brito  Next Appointment: 6/11/20 with MD Edgar Olea  Previous Refill Encounter(s): 3/9/20 #270    Requested Prescriptions     Pending Prescriptions Disp Refills    gabapentin (NEURONTIN) 100 mg capsule 180 Cap 1     Sig: Take 2 Caps by mouth three (3) times daily. Max Daily Amount: 600 mg.      Notes from 3/17/20 encounter:

## 2020-04-22 ENCOUNTER — PATIENT MESSAGE (OUTPATIENT)
Dept: ORTHOPEDIC SURGERY | Age: 75
End: 2020-04-22

## 2020-04-22 NOTE — LETTER
NOTIFICATION  
 
5/11/2020 1:48 PM 
 
Ms. Madhuri Ely [address] [facility] [address] To Whom It May Concern: Madhuri Ely is currently under the care of [facility]. She currently has the caregiver Eder Vogel, who renders assistance with her activities. If there are questions or concerns please have the patient contact our office. Sincerely, Compa Recinos NP

## 2020-04-30 NOTE — TELEPHONE ENCOUNTER
Mrs. Kermit Dubin called in regards to the message from Anna Holley :     Is the Agent letter a speical form that we fill out? Or, do you just need a note on our letterhead stating Danica Waters is your caregiver and renders you assistance due to your spinal condition?      Mrs. Kermit Dubin said she only needs the note and she will  from the [facility] location. Patient tel. [phone].

## 2020-05-01 ENCOUNTER — TELEPHONE (OUTPATIENT)
Dept: ORTHOPEDIC SURGERY | Age: 75
End: 2020-05-01

## 2020-05-01 NOTE — TELEPHONE ENCOUNTER
I would increase the gabapentin to 300 mg TID. She is currently taking 200 mg TID per the last rx. If she develops weakness, go to ER. The increased gabapentin dose may take time to work.

## 2020-05-01 NOTE — TELEPHONE ENCOUNTER
Patient is reporting neck pain and tingling going up her head, her fingers and feet are getting numb - constant numbness. She is asking if she should go to ED, she sates it started worsening just now. Patient is requesting a call back at [phone].

## 2020-05-01 NOTE — TELEPHONE ENCOUNTER
Spoke with pt informed of NP Buttery message below. Pt stated understanding, no further actions needed at this time.

## 2020-05-04 DIAGNOSIS — F41.9 ANXIETY: ICD-10-CM

## 2020-05-04 DIAGNOSIS — R11.0 NAUSEA: ICD-10-CM

## 2020-05-07 RX ORDER — BUSPIRONE HYDROCHLORIDE 7.5 MG/1
TABLET ORAL
Qty: 180 TAB | Refills: 0 | Status: SHIPPED | OUTPATIENT
Start: 2020-05-07 | End: 2020-09-23

## 2020-05-07 RX ORDER — OMEPRAZOLE 20 MG/1
CAPSULE, DELAYED RELEASE ORAL
Qty: 90 CAP | Refills: 1 | Status: SHIPPED | OUTPATIENT
Start: 2020-05-07

## 2020-05-07 RX ORDER — ONDANSETRON 4 MG/1
TABLET, ORALLY DISINTEGRATING ORAL
Qty: 90 TAB | Refills: 0 | Status: SHIPPED | OUTPATIENT
Start: 2020-05-07 | End: 2021-11-16

## 2020-05-11 NOTE — TELEPHONE ENCOUNTER
Letter in myChart, pt has been informed and stated she would access it there. No further actions needed at this time.

## 2020-05-11 NOTE — TELEPHONE ENCOUNTER
Regarding: FW: Non-Urgent Medical Question  Give pt a note stating Natalie Valenzuela is her caregiver and renders assistance with her activities. ----- Message -----  From: Cassie Avilez NP  Sent: 2020  10:05 AM EDT  To: Johanna Corea NP  Subject: FW: Non-Urgent Medical Question                    ----- Message -----  From: Anya Harevy  Sent: 2020   9:48 AM EDT  To: [facility]  Subject: RE: Non-Urgent Medical Question                  ----- Message from Rj Milton sent at 2020  9:48 AM EDT -----       ----- Message sent from Chaya Guerra NP to Alex Bullard at 2020 11:04 AM -----   Ms. Kermit Dubin,     Is the Agent letter a speical form that we fill out? Or, do you just need a note on our letterhead stating Danica Waters is your caregiver and renders you assistance due to your spinal condition? Johanna Corea NP      ----- Message -----       From: Jamaica Friedman       Sent:2020 10:54 AM EDT         To:[facility]    Subject:Non-Urgent Medical Question    This is Jamaica Friedman.  7-. I would like to know if you can help me obtain an Agent Letter stating Danica Waters is my caregiver. [place] are requiring non  to have one in order for him to go with me. Thank You. Flower Fxo. [phone].

## 2020-05-13 ENCOUNTER — TELEPHONE (OUTPATIENT)
Dept: ORTHOPEDIC SURGERY | Age: 75
End: 2020-05-13

## 2020-05-13 NOTE — LETTER
NOTIFICATION RETURN TO WORK / SCHOOL 
 
5/13/2020 1:58 PM 
 
Ms. Nereida Aguila [address] [facility] [address] To Whom It May Concern: Nereida Aguila is currently under the care of [facility]. She currently has the caregiver Casey Murphy, who renders assistance with her activities. If there are questions or concerns please have the patient contact our office.  
 
 
 
Sincerely, 
 
 
Wellington Mcburney, MD

## 2020-05-13 NOTE — LETTER
NOTIFICATION  
 
5/13/2020 1:56 PM 
 
Ms. Herrera Hodgson [address] [place] [id number] To Whom It May Concern: Herrera Hodgson is currently under the care of [address] [place] [address]. She currently has the caregiver Reuben Abler, who renders assistance with her activities. If there are questions or concerns please have the patient contact our office.  
 
 
 
Sincerely, 
 
 
Rainelle Kanner, MD

## 2020-05-13 NOTE — TELEPHONE ENCOUNTER
Spoke with pt informed that letter had been updated. She is aware the letter is in myChart. No further actions needed at this time.

## 2020-05-13 NOTE — TELEPHONE ENCOUNTER
Patient called stating that Mr. Sierra More last name is spelt wrong on her note, she stated it is supposed to Reinaldo Turner not Miko Conte. She asked for that to be changed because it is needed for the  base and she can get it off My Chart when completed. She also asked if Dr. Christi Cordova is able to refill her Xanax prescription. She does have 20 tablets left due to only taking them once a day.  Please advise patient at [phone]

## 2020-05-26 ENCOUNTER — DOCUMENTATION ONLY (OUTPATIENT)
Dept: ORTHOPEDIC SURGERY | Age: 75
End: 2020-05-26

## 2020-05-26 DIAGNOSIS — F41.9 ANXIETY: ICD-10-CM

## 2020-05-26 RX ORDER — ALPRAZOLAM 0.25 MG/1
0.25 TABLET ORAL
Qty: 60 TAB | Refills: 0 | Status: SHIPPED | OUTPATIENT
Start: 2020-05-26 | End: 2021-11-16

## 2020-05-26 NOTE — TELEPHONE ENCOUNTER
Patient is requesting refill of ALPRAZolam (XANAX) 0.25 mg tablet called in to [facility] on file.

## 2020-05-26 NOTE — TELEPHONE ENCOUNTER
Last Visit: 4/1/20 with MD Jaimee Crump  Next Appointment: 6/11/20 with MD Anna Wong  Previous Refill Encounter(s): 4/1/20 #60    Requested Prescriptions     Pending Prescriptions Disp Refills    ALPRAZolam (XANAX) 0.25 mg tablet 60 Tab 0     Sig: Take 1 Tab by mouth two (2) times daily as needed for Anxiety.  Max Daily Amount: 0.5 mg.

## 2020-06-11 ENCOUNTER — OFFICE VISIT (OUTPATIENT)
Dept: ORTHOPEDIC SURGERY | Age: 75
End: 2020-06-11

## 2020-06-11 VITALS
RESPIRATION RATE: 16 BRPM | SYSTOLIC BLOOD PRESSURE: 116 MMHG | HEIGHT: 60 IN | TEMPERATURE: 97.9 F | HEART RATE: 80 BPM | BODY MASS INDEX: 19.53 KG/M2 | DIASTOLIC BLOOD PRESSURE: 76 MMHG

## 2020-06-11 DIAGNOSIS — R25.2 SPASTICITY: ICD-10-CM

## 2020-06-11 DIAGNOSIS — G95.89 CERVICAL CORD MYELOMALACIA (HCC): Primary | ICD-10-CM

## 2020-06-11 DIAGNOSIS — R20.2 BILATERAL LEG PARESTHESIA: ICD-10-CM

## 2020-06-11 DIAGNOSIS — G90.9 AUTONOMIC DYSFUNCTION: ICD-10-CM

## 2020-06-11 DIAGNOSIS — R26.2 AMBULATORY DYSFUNCTION: ICD-10-CM

## 2020-06-11 RX ORDER — BACLOFEN 10 MG/1
10 TABLET ORAL 2 TIMES DAILY
Qty: 60 TAB | Refills: 2 | Status: SHIPPED | OUTPATIENT
Start: 2020-06-11 | End: 2021-02-04

## 2020-06-11 RX ORDER — DULOXETIN HYDROCHLORIDE 20 MG/1
20 CAPSULE, DELAYED RELEASE ORAL
Qty: 30 CAP | Refills: 0 | Status: SHIPPED | OUTPATIENT
Start: 2020-06-11 | End: 2020-07-02 | Stop reason: SINTOL

## 2020-06-11 NOTE — PATIENT INSTRUCTIONS
Recommended over the counter B12 and Folate supplements. Bladder Training: Care Instructions  Your Care Instructions     Bladder training is used to treat urge incontinence and stress incontinence. Urge incontinence means that the need to urinate comes on so fast that you can't get to a toilet in time. Stress incontinence means that you leak urine because of pressure on your bladder. For example, it may happen when you laugh, cough, or lift something heavy. Bladder training can increase how long you can wait before you have to urinate. It can also help your bladder hold more urine. And it can give you better control over the urge to urinate. It is important to remember that bladder training takes a few weeks to a few months to make a difference. You may not see results right away, but don't give up. Follow-up care is a key part of your treatment and safety. Be sure to make and go to all appointments, and call your doctor if you are having problems. It's also a good idea to know your test results and keep a list of the medicines you take. How can you care for yourself at home? Work with your doctor to come up with a bladder training program that is right for you. You may use one or more of the following methods. Delayed urination  · In the beginning, try to keep from urinating for 5 minutes after you first feel the need to go. · While you wait, take deep, slow breaths to relax. Kegel exercises can also help you delay the need to go to the bathroom. · After some practice, when you can easily wait 5 minutes to urinate, try to wait 10 minutes before you urinate. · Slowly increase the waiting period until you are able to control when you have to urinate. Scheduled urination  · Empty your bladder when you first wake up in the morning. · Schedule times throughout the day when you will urinate. · Start by going to the bathroom every hour, even if you don't need to go.   · Slowly increase the time between trips to the bathroom. · When you have found a schedule that works well for you, keep doing it. · If you wake up during the night and have to urinate, do it. Apply your schedule to waking hours only. Kegel exercises  These tighten and strengthen pelvic muscles, which can help you control the flow of urine. To do Kegel exercises:  · Squeeze the same muscles you would use to stop your urine. Your belly and thighs should not move. · Hold the squeeze for 3 seconds, and then relax for 3 seconds. · Start with 3 seconds. Then add 1 second each week until you are able to squeeze for 10 seconds. · Repeat the exercise 10 to 15 times a session. Do three or more sessions a day. When should you call for help? Watch closely for changes in your health, and be sure to contact your doctor if:  · Your incontinence is getting worse. · You do not get better as expected. Where can you learn more? Go to http://karin-michaela.info/  Enter Y217 in the search box to learn more about \"Bladder Training: Care Instructions. \"  Current as of: August 22, 2019               Content Version: 12.5  © 3577-7663 [employer]. Care instructions adapted under license by [employer] (which disclaims liability or warranty for this information). If you have questions about a medical condition or this instruction, always ask your healthcare professional. [address] any warranty or liability for your use of this information.

## 2020-06-11 NOTE — PROGRESS NOTES
Verbal order entered per Dr. Alison More as documented on blue sheet:MRI C-spine due to cord injury re-eval. MRI T-spine due to worsening gait paresthesias. Hyper reflexia. Cymbalta 20mg take 1 tab po with dinner. Wilber Cao presents today for   Chief Complaint   Patient presents with    Back Pain     FU    Neck Pain       Is someone accompanying this pt? YES    Is the patient using any DME equipment during OV? YES, wheelchair    Depression Screening:  3 most recent PHQ Screens 6/11/2020   PHQ Not Done -   Little interest or pleasure in doing things Not at all   Feeling down, depressed, irritable, or hopeless Not at all   Total Score PHQ 2 0       Learning Assessment:  Learning Assessment 2/26/2019   PRIMARY LEARNER Patient   HIGHEST LEVEL OF EDUCATION - PRIMARY LEARNER  -   CO-LEARNER CAREGIVER -   PRIMARY LANGUAGE ENGLISH   LEARNER PREFERENCE PRIMARY DEMONSTRATION   ANSWERED BY Patient   RELATIONSHIP SELF       Abuse Screening:  Abuse Screening Questionnaire 10/1/2019   Do you ever feel afraid of your partner? N   Are you in a relationship with someone who physically or mentally threatens you? N   Is it safe for you to go home? Y       Fall Risk  Fall Risk Assessment, last 12 mths 6/11/2020   Able to walk? Yes   Fall in past 12 months? No         Coordination of Care:  1. Have you been to the ER, urgent care clinic since your last visit? NO  Hospitalized since your last visit? NO    2. Have you seen or consulted any other health care providers outside of the [address] [place] [address] since your last visit? NO Include any pap smears or colon screening.  NO    Last  Checked 6/11/2020

## 2020-06-11 NOTE — PROGRESS NOTES
[address]  [address], [address]  [place], [address] [place] [address] Street  Phone: [phone]  Fax: [phone]        Kareen Pichardo  : 1945  PCP: Reed Carlos NP    PROGRESS NOTE      ASSESSMENT AND PLAN    Diagnoses and all orders for this visit:    1. Cervical cord myelomalacia ([address] status post decompression and hematoma evacuation   Comments:  Dr. Madelyn Downey  Orders:  -     MRI CERV SPINE WO CONT; Future    2. Ambulatory dysfunction    3. Bilateral leg paresthesia  -     MRI [facility] SPINE WO CONT; Future    4. Spasticity  -     baclofen (LIORESAL) 10 mg tablet; Take 1 Tab by mouth two (2) times a day. -     MRI [facility] SPINE WO CONT; Future    5. Autonomic dysfunction    Other orders  -     DULoxetine (CYMBALTA) 20 mg capsule; Take 1 Cap by mouth daily (with dinner). 3. 69 y.o. female, articulate, active but unfortunately with an NATE D incomplete cervical spinal cord injury. She is 7 months post decompression and hematoma evacuation. We have discussed the natural history of spinal cord injuries and neuronal healing. Discussed bowel, bladder, skin, DVT, range of motion and safety precautions. I am concerned that about 2 months ago she was able to ambulate up stairs, and now cannot. Her deficits are sensory, autonomic much greater than motor. She needs a new set of MRIs to see why she has had progressive ambulatory dysfunction. 2. Advised to continue HEP  3. Wait to start PT until after MRI  4. Wean off Gabapentin  5. Trial of Cymbalta  6. C MRI: reevaluate cord injury  7. T MRI: worsening gait paresthesias and hyperreflexia  8. Instructed timed void schedule, bowel regimen  9. Advised Miralax qD  10. Refill Baclofen. Advised baclofen at night  11. Instructed to be on B12 and Folate supplement  12. Given information on bladder training,     Follow-up and Dispositions    · Return for MRI/CT f/u.             HISTORY OF PRESENT ILLNESS  Shell Quinonez Alla Geronimo is a 69 y.o. female. Pt was last evaluated 4/2020 by Dr. Chelsie Fitch for cervical stenosis. Continue Gabapentin. Trial of Baclofen. PT referral.  He did not recommend for further surgery for her junctional stenosis. This is my first eval of the patient. Has a history of a CVA and diffuse ischemic changes. Admitted to hospital 3/2019 for spinal cord compression. In 11/2019 she had C1-C5 decompression by Dr. Ehsan Mcdaniel. Post op was course complicated by hematoma (decompressed) and an episode of unresponsiveness. Had tetraparesis, she was in a nursing facility until February 2020. Pt presents in a wheel chair, and she has a bone growth stimulator. Pt notes that she was supposed to go to PT, but she cancelled it due to [place]. Pt notes that she finally got home from the rehab center in 2/2020. Notes that after leaving the rehab center, she was doing great around March or April, noting she was able to walk up stairs on her own. Notes getting worse, which coincides with gabapentin increase. Pt notes that she was on 1800 mg of Gabapentin a day by Dr. Maggi Garzon, and she felt completely out of it, noting weakness. Pt notes burning/tingling in hands, feet, arms. Pt feels that her legs are swollen, but don't appear swollen. Notes crushing in back and stomach, which mutes her appetite. Pt notes weight loss due to not having an appetite. She describes  constipation new since her surgery, denies trying miralax. Pt denies seeing a GI doctor. Pt notes that she fell out of the bed last night. Pt denies having any pressure sores. Pt denies palpitations. Denies hx of SI, HI. Location of pain: neck (chronic) , back of head tingling  Does pain radiate into extremities: tingling hands, feet, arms; tightness/burning/pressure in back and stomach  Does patient have weakness: BLE; entire leg; BUE  Pt denies saddle paresthesias. Medications pt is on: Gabapentin 300 mg TID. Baclofen 10 mg qD-BID. BUSPAR. Prolia.  Denies persistent fevers, chills, neurogenic bowel or bladder symptoms. Pt denies recent ED visits or hospitalizations. Treatments patient has tried:  Physical therapy:Yes; with benefit but cancelled due to COVID   Doing HEP: Unknown  Non-opioid medications: Yes; baclofen-patient tolerating  Spinal injections: Yes; 1 & 3/2018 C7-T1 DEBBIE by Dr. Azam Mott. Arpita العراقي Purple pain. Spinal surgery- Yes. 11/2019 by Dr. Surjit Wallace C1-C5 decompression. Post op was complicated by hematoma (decompressed); sustained permanent cord injury. Episode of unresponsiveness. Last C MRI 3/2020: myelomalacia C4-C5  Last T MRI 3/2020: mild flattening C7-T1-T2. Normal cord signal  Last L MRI 3/2020: deg changes and no stenosis      reviewed. Phx of CVA and diffuse ischemic changes, stroke, UTIs, LV diastolic dysfunction. Pt uses bone growth stimulator. Previously in [facility]. Sister is currently undergoing breast cancer treatment. 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 Pain Assessment  6/11/2020   Location of Pain Neck;Back;Leg   Pain Location Comment -   Location Modifiers Left;Right   Severity of Pain 6   Quality of Pain Dull;Aching   Quality of Pain Comment tingling, weakness   Duration of Pain -   Frequency of Pain Constant   Aggravating Factors (No Data)   Aggravating Factors Comment pain is just there   Limiting Behavior Some   Relieving Factors (No Data)   Relieving Factors Comment gabapentin helps, thinks she is taking too much   Result of Injury -       MRI Results (maximum last 3): Results from [place] encounter on 03/26/20   MRI CERV SPINE WO CONT    Narrative EXAM: MRI CERVICAL SPINE  CPT code: 30481    CLINICAL INDICATION/HISTORY: Cervical spinal stenosis. Status post  decompression and posterior fixation/fusion on 20 November 2019.     COMPARISON: CT cervical spine of earlier this same date and MRIs cervical spine  of 24 November 2019 ([address]) and 18 March 2019.    TECHNIQUE: MRI of the cervical spine is performed by using standard protocol  pulse sequences with axial images from C2 through T1.    FINDINGS: There has been interval occipital cervical fusion with bilateral  longitudinal rods and bilateral lateral mass screws C3-C5 and occipital  fixation. There is a transverse stabilizer at C3. There have been  decompressive laminectomies C1-C5. There is subtle anterior subluxation of  C1/dense with reference to the basion. Further, the basion-dental ligament  space has been effaced and there is some scalloping of the inferior surface of  the basion, possibly with ankylosis. There are persistent mild anterolistheses at C2-3, C3-4 and C4-5. Other  alignment is normal.  The bone marrow signal intensity is normal.  There is  marked narrowing the intervertebral disc spaces at C5-6 and C6-7 with  mild-to-moderate narrowing at C4-5. Other disc spaces are relatively preserved. There is interval appearance of a small region of abnormal increased signal  intensity on long-TR images involving a somewhat wedge-shaped lesion in the  central dorsal half of the cord at C4-5 level extending about 10 mm with a  cross-section of about 4 mm AP and 5 mm transverse. In addition, there is  chronic subtle swelling of the cord posteriorly from about mid C3 to C5-6. No  other specific cord lesion is seen. Flow voids present both vertebral arteries. The paraspinal soft tissues are unremarkable    C2-3: There is a subtle broad-based disc bulge but there is no canal stenosis. Facet hypertrophy results in mild right and mild-to-moderate left foraminal  stenosis. C3-4: There is a subtle retrolisthesis and mild broad-based disc bulge. The  canal is otherwise capacious. Facet hypertrophy results in mild right and  severe left foraminal stenosis. C4-5: There is a subtle anterolisthesis and broad-based disc bulge but the canal  is capacious.   Uncinate and facet arthropathy results in severe right and  moderately severe left foraminal stenosis. C5-6: There is a mild broad-based disc protrusion with mild canal encroachment. Uncinate and facet hypertrophy results in moderate right and moderately severe  left foraminal stenosis. C6-7: There is a broad-based disc protrusion and discal spurring with impression  on the ventral thecal sac with mild-to-moderate canal stenosis. Uncinate and  facet hypertrophy results in moderate right and moderately severe left foraminal  stenosis. C7-T1: There is a left paracentral disc protrusion in contact of the ventral  cord with mild-to-moderate overall canal stenosis. Right foramen is patent;  there is mild degenerative left foraminal stenosis. T1-2: Sagittal images only. There is a broad-based disc protrusion with mild  canal encroachment. There may be mild right foraminal stenosis; the left  foramen is patent      Impression IMPRESSION:     Status post cervical decompression with laminectomies C1 through C5 with  occipitocervical posterior fixation and fusion from the occiput through C5, as  described. There is a region of probable myelomalacia in the central dorsal cord at C4-5  similar to, but perhaps slightly more well-defined than, that seen on the most  recent previous MRI (postoperative). There is also persistent mild swelling of  the cord C3 to C5/6 present since the earliest study. There is no significant canal stenosis; mild-to-moderate narrowing at C6-7 and  C7-T1 but a cerebrospinal fluid mantle is preserved around the cord. Multilevel small foraminal stenoses from uncinate and facet hypertrophy,  bilaterally as described. There is relatively little change from the prior  studies.        Results from [place] encounter on 03/12/20   MRI LUMB SPINE WO CONT    Narrative MR Lumbar spine without contrast    HISTORY: Low back pain and bilateral leg weakness    COMPARISON: None    Technique: Multi-sequence multiplanar T1, T2, STIR imaging with and without fat  saturation obtained centered on the lumbar spine. FINDINGS:     Alignment: Intact lordosis  Vertebral body height: Normal  Marrow signal: Chronic discogenic reactive bone marrow changes L5-S1. No  suspicious bone marrow lesion or infiltrative bone marrow process. Disc spaces: Multilevel disc space narrowing  Conus: Terminates at upper L2 level with normal signal    Axial imaging correlation:    Lower thoracic levels T10-L1, sagittal images only, demonstrate unremarkable  discs with patent canal and foramina    L1-2: No significant disc pathology. Patent canal and foramina. L2-3: Mild disc bulge. Mild facet hypertrophy ligamentum flavum thickening. Patent canal and foramina. L3-4: Mild disc bulge. Mild facet hypertrophy and ligamentum flavum thickening. Patent canal and foramina. L4-5: Moderate disc bulge. Moderate facet hypertrophy with ligamentum flavum  thickening. Mild narrowing of the lateral recess and mild mass effect on the  dorsal thecal sac but otherwise patent central canal. Abutment of the crossing  at L5 nerve roots in the lateral recesses without neural compression. Moderate  left and mild right foraminal stenosis with abutment of the left foraminal L4  nerve root. L5-S1: Moderate disc bulge. Mild facet hypertrophy. Patent central canal and  right foramen. Moderate left foraminal stenosis with abutment of the left  foraminal L5 nerve root. Other structures: Unremarkable. Impression IMPRESSION:    1. Multilevel degenerative disc and facet joint disease most pronounced at L4-5  and L5-S1  -Mild narrowing of the L4-5 lateral recesses with abutment of the crossing L5  nerve roots without neural compression. No high-grade central canal stenosis. -Moderate left foraminal stenosis L4-5 and L5-S1 with abutment of the  corresponding left-sided foraminal nerve roots without gross impingement.        MRI [facility] SPINE WO CONT Narrative Procedure: MRI of the thoracic spine without contrast.    Indications: Upper back pain, bilateral leg weakness and bilateral paresthesia    Comparisons: Correlation with C-spine MRI 3/18/2019, CT thoracic spine 1/8/2019      Technique: T1 weighted, T2 weighted FSE, and FSE inversion recovery sagittal  images of the thoracic spine are supplemented by T2 weighted FSE axial images. Findings:     Partially imaged postsurgical changes in the cervical spine was posterior  fusion. Multilevel degenerative disc disease. At least mild central canal  stenosis C6-7. Normal alignment of the thoracic spine. Mild superior endplate concavity and  compression deformity at T3 vertebral body without associated bone marrow edema. Otherwise normal vertebral body heights. No suspicious bone marrow lesion or  infiltrative bone marrow process. Disc space heights are maintained. C7-T1 posterior disc herniation extending 5  mm behind C7 with grade 1 anterolisthesis effacing ventral CSF space an  flattening ventral cord, AP canal diameter 9 mm. There is also mild posterior  disc herniation at T1-T2 effacing ventral CSF space with AP canal diameter 9 mm. Otherwise no significant disc pathology. The central canal is patent throughout. Multilevel bilateral foraminal left fluid signal lesions, most likely perineural  cysts. Otherwise patent neural foramina. The thoracic cord is normal in signal and morphology otherwise. No incidental abnormality in the partially imaged intrathoracic structures. Impression IMPRESSION:    1. Disc herniations at C7-T1 and T1-T2 with mild flattening of the ventral cord  at C7-T1 and mild compromise of the central canal but no high-grade central  canal stenosis. These are similar to comparison cervical MRI 3/18/2019.  -No abnormal cord signal.  -Otherwise patent central canal throughout. 2. Chronic mild superior endplate compression deformity at T3. No acute  fractures.   3. Multiple small foraminal perineural cysts but otherwise patent neural  foramina. 4. Postsurgical and degenerative changes in the cervical spine, incompletely  evaluated. PAST MEDICAL HISTORY   Past Medical History:   Diagnosis Date    Abnormal Pap smear     Dr. Jp Mccray Allergic rhinitis     Arthritis     Cerebrovascular small vessel disease 3/18/2019    CT 3/17/2019    Cervical spinal cord compression (HCC)     Chronic pain     Concentric left ventricular hypertrophy 3/18/2019    With left ventricular diastolic dysfunction by echocardiogram 3/18/2019    Hypercholesterolemia     Neck pain 5/17/2010    Stroke ([facility] [address] 10/02/2017    TIA- legs weak memory issues       Past Surgical History:   Procedure Laterality Date    COLONOSCOPY N/A 6/4/2018    COLONOSCOPY / polypectomy performed by Charis Jauregui MD [facility] ENDOSCOPY    HX GYN      Total Hyst    HX LAP CHOLECYSTECTOMY      HX OTHER SURGICAL  2017    Throat widened   . MEDICATIONS      Current Outpatient Medications   Medication Sig Dispense Refill    baclofen (LIORESAL) 10 mg tablet Take 1 Tab by mouth two (2) times a day. 60 Tab 2    DULoxetine (CYMBALTA) 20 mg capsule Take 1 Cap by mouth daily (with dinner). 30 Cap 0    ALPRAZolam (XANAX) 0.25 mg tablet Take 1 Tab by mouth two (2) times daily as needed for Anxiety. Max Daily Amount: 0.5 mg. 60 Tab 0    busPIRone (BUSPAR) 7.5 mg tablet take 1 tablet by mouth twice a day 180 Tab 0    ondansetron (ZOFRAN ODT) 4 mg disintegrating tablet dissolve 1 tablet ON TONGUE every 6 hours if needed for nausea OR vomiting 90 Tab 0    omeprazole (PRILOSEC) 20 mg capsule take 1 capsule by mouth once daily before breakfast 90 Cap 1    gabapentin (NEURONTIN) 100 mg capsule Take 2 Caps by mouth three (3) times daily. Max Daily Amount: 600 mg. (Patient taking differently: Take 300 mg by mouth three (3) times daily.  Takes 300mg TID) 180 Cap 1    acetaminophen (TYLENOL) 160 mg/5 mL elixir Take 1,000 mg by mouth daily.      atorvastatin (LIPITOR) 20 mg tablet take 1 tablet by mouth once daily 90 Tab 3    loratadine (CLARITIN) 10 mg tablet Take 1 Tab by mouth daily. 90 Tab 1    turmeric 400 mg cap Take 1 Cap by mouth daily.  aspirin 81 mg chewable tablet Take 1 Tab by mouth daily. 30 Tab 3    denosumab (PROLIA) 60 mg/mL injection 60 mg by SubCUTAneous route every 6 months.  midodrine (PROAMITINE) 5 mg tablet Take 5 mg by mouth two (2) times a day. 1 tab by mouth with breakfast and lunch      LORazepam (ATIVAN) 0.5 mg tablet Take 1 Tab by mouth every eight (8) hours as needed for Anxiety. Max Daily Amount: 1.5 mg. 10 Tab 0        Controlled Substance Monitoring:    No flowsheet data found. ALLERGIES    Allergies   Allergen Reactions    Baclofen Shortness of Breath     Denies.  Pt tolerates    Morphine Other (comments)     hallucinations    Celebrex [Celecoxib] Other (comments)     Tiredness           SOCIAL HISTORY    Social History     Socioeconomic History    Marital status:      Spouse name: Not on file    Number of children: Not on file    Years of education: Not on file    Highest education level: Not on file   Occupational History    Not on file   Social Needs    Financial resource strain: Not on file    Food insecurity     Worry: Not on file     Inability: Not on file    Transportation needs     Medical: Not on file     Non-medical: Not on file   Tobacco Use    Smoking status: Never Smoker    Smokeless tobacco: Never Used   Substance and Sexual Activity    Alcohol use: No    Drug use: No    Sexual activity: Never   Lifestyle    Physical activity     Days per week: Not on file     Minutes per session: Not on file    Stress: Not on file   Relationships    Social connections     Talks on phone: Not on file     Gets together: Not on file     Attends [employer]al service: Not on file     Active member of club or organization: Not on file     Attends meetings of clubs or organizations: Not on file     Relationship status: Not on file    Intimate partner violence     Fear of current or ex partner: Not on file     Emotionally abused: Not on file     Physically abused: Not on file     Forced sexual activity: Not on file   Other Topics Concern    Not on file   Social History Narrative    Not on file     Socioeconomic History    Marital status:      Spouse name: Not on file    Number of children: Not on file    Years of education: Not on file    Highest education level: Not on file   Occupational History    Not on file   Social Needs    Financial resource strain: Not on file    Food insecurity     Worry: Not on file     Inability: Not on file    Transportation needs     Medical: Not on file     Non-medical: Not on file   Tobacco Use    Smoking status: Never Smoker    Smokeless tobacco: Never Used   Substance and Sexual Activity    Alcohol use: No    Drug use: No    Sexual activity: Never   Lifestyle    Physical activity     Days per week: Not on file     Minutes per session: Not on file    Stress: Not on file   Relationships    Social connections     Talks on phone: Not on file     Gets together: Not on file     Attends Jew service: Not on file     Active member of club or organization: Not on file     Attends meetings of clubs or organizations: Not on file     Relationship status: Not on file    Intimate partner violence     Fear of current or ex partner: Not on file     Emotionally abused: Not on file     Physically abused: Not on file     Forced sexual activity: Not on file   Other Topics Concern    Not on file   Social History Narrative    Not on file      Problem Relation Age of Onset    Cancer Mother         breast    Heart Disease Father        REVIEW OF SYSTEMS  Review of Systems   Constitutional: Positive for weight loss. Negative for chills and fever. Respiratory: Negative for shortness of breath.     Cardiovascular: Negative for chest pain.   Gastrointestinal: Positive for abdominal pain. Negative for constipation. Negative for fecal incontinence   Genitourinary: Positive for frequency. Negative for dysuria. Negative for urinary incontinence   Musculoskeletal:        Per HPI   Skin: Negative for rash. Neurological: Positive for tingling, sensory change, focal weakness and weakness. Negative for dizziness, tremors, speech change and headaches. Endo/Heme/Allergies: Does not bruise/bleed easily. Psychiatric/Behavioral: The patient does not have insomnia. PHYSICAL EXAMINATION  Visit Vitals  /76 (BP 1 Location: Left arm, BP Patient Position: Sitting)   Pulse 80   Temp 97.9 °F (36.6 °C) (Oral)   Resp 16   Ht 5' (1.524 m)   LMP  (LMP Unknown)   BMI 19.53 kg/m²         Accompanied by spouse. Constitutional: Thin, articulate in a wheelchair  Psychiatric: Affect and mood are appropriate. Integumentary: No rashes or abrasions noted on exposed areas. Cardiovascular/Peripheral Vascular: No peripheral edema is noted BLE. SPINE/MUSCULOSKELETAL EXAM    Cervical spine:    Intact sensation to C4. Zone of partial preservation from C4-T2. T2 down she has increased sensitivity to light tough. Right arm abduction limited to 90 degrees. Left arm abduction limited to 120 degrees. Lumbar spine:     Non-tender to palpation in abdomen and pelvis. Loss of sensation to light touch in T spine and distally preserved sensation to deep pressure. MOTOR:      Biceps  Triceps Deltoids Wrist Ext Wrist Flex Hand Intrin   Right 4/5 4/5 4/5 4/5 4/5 3/5   Left 4/5 4/5 4/5 4/5 4/5 3/5        Hip Flex Quads Hamstrings Ankle DF EHL Ankle PF   Right 4/5 4/5 4/5 Not tested, recent injury     Left 4/5 4/5 4/5 4/5 4/5 4/5       DTRs are 2+ biceps, triceps, brachioradialis,   DTRs are 3+ patella  1-2 beats of clonus    Straight Leg raise negative. Presents in wheelchair  .            Written by Meenakshi Sargent, as dictated by Essence Carpio MD.    I, Dr. Essence Carpio MD, confirm that all documentation is accurate. Ms. Addis Carias may have a reminder for a \"due or due soon\" health maintenance. I have asked that she contact her primary care provider for follow-up on this health maintenance.

## 2020-06-23 ENCOUNTER — TELEPHONE (OUTPATIENT)
Dept: ORTHOPEDIC SURGERY | Age: 75
End: 2020-06-23

## 2020-06-23 NOTE — TELEPHONE ENCOUNTER
Patient [phone]  MRIs are going to be done 06/26/20. First available isn't until 07/23. She is asking if there is somewhere she can be fit in. Please advise.

## 2020-06-26 ENCOUNTER — HOSPITAL ENCOUNTER (OUTPATIENT)
Dept: MRI IMAGING | Age: 75
Discharge: HOME | End: 2020-06-26
Attending: PHYSICAL MEDICINE & REHABILITATION
Payer: MEDICARE

## 2020-06-26 DIAGNOSIS — R25.2 SPASTICITY: ICD-10-CM

## 2020-06-26 DIAGNOSIS — R20.2 BILATERAL LEG PARESTHESIA: ICD-10-CM

## 2020-06-26 DIAGNOSIS — G95.89 CERVICAL CORD MYELOMALACIA (HCC): ICD-10-CM

## 2020-06-26 PROCEDURE — 72146 MRI CHEST SPINE W/O DYE: CPT

## 2020-06-26 PROCEDURE — 72141 MRI NECK SPINE W/O DYE: CPT

## 2020-06-29 ENCOUNTER — TELEPHONE (OUTPATIENT)
Dept: ORTHOPEDIC SURGERY | Age: 75
End: 2020-06-29

## 2020-06-29 NOTE — TELEPHONE ENCOUNTER
Spoke with patient and she had her MRI done @ [facility] on Friday, report PENDING, was given an appointment for 07- She will bring in the CD of MRI, I have called for the report to be read.

## 2020-06-29 NOTE — TELEPHONE ENCOUNTER
Dr Renee Stuart, this pt had a spinal cord injury and you were concern about decreasing function. Do you want to add her on to your schedule?

## 2020-06-29 NOTE — TELEPHONE ENCOUNTER
[phone] Patient    Patient has had her MRI and needs a follow appt. There is nothing until the end of July. Can she be fit in sooner? Please advise.

## 2020-07-01 NOTE — TELEPHONE ENCOUNTER
Patient has an appointment with Dr. Sanjuana Doyle on 7/2/2020. No further action needed at this time.

## 2020-07-02 ENCOUNTER — OFFICE VISIT (OUTPATIENT)
Dept: ORTHOPEDIC SURGERY | Age: 75
End: 2020-07-02

## 2020-07-02 VITALS
RESPIRATION RATE: 17 BRPM | SYSTOLIC BLOOD PRESSURE: 87 MMHG | DIASTOLIC BLOOD PRESSURE: 54 MMHG | HEART RATE: 98 BPM | TEMPERATURE: 97.8 F

## 2020-07-02 DIAGNOSIS — M79.2 NEUROPATHIC PAIN: ICD-10-CM

## 2020-07-02 DIAGNOSIS — G95.89 CERVICAL CORD MYELOMALACIA (HCC): Primary | ICD-10-CM

## 2020-07-02 NOTE — PROGRESS NOTES
[facility] [address]  [address], [address]  [place], [facility] [address]  Phone: [phone]  Fax: [phone]        Elsa Guerin  : 1945  PCP: Adilson Montes NP    PROGRESS NOTE      ASSESSMENT AND PLAN    Diagnoses and all orders for this visit:    1. Cervical cord myelomalacia ([facility] [address] status post decompression and hematoma evacuation     2. Neuropathic pain      1. 70 y.o. female with cervical cord injury 8months ago due to hematoma following cervical decompression. Slight area of junctional narrowing C6/7, would observe, significant area of myelomalacia. No role for spine injections  2. Advised to continue HEP  3. DC Cymbalta   4. Continue Baclofen  5. Increase Gabapentin to 300mg QID  6. Advised to follow-up with PM  7. Given information on fall preventions    Follow-up and Dispositions    · Return if symptoms worsen or fail to improve. HISTORY OF PRESENT ILLNESS  Anna Carrera is a 70 y.o. female. Pt was last evaluated 2020 for [address] Trial of Cymbalta. Wean off Gabapentin. Last visit pt was sent to have a C & T spine MRI. Images reviewed with the pt. Presents with her  who helps provide the hx. She notes that the Cymbalta did not have benefit, noting that it also gave her terrible thoughts. She states that the Gabapentin has more benefit. Pt reports that everything has gotten worse since her last visit regarding her pain. She describes constant pain, and she also notes that her hands have begun to lock up. She describes that she has been waking up at night, and the pain then returns. Pt recently fell out of bed and broke her foot, and she reports that her legs feel tired now. She reports that she will see PM at [facility].     Pain Assessment  2020   Location of Pain Back;Neck   Pain Location Comment -   Location Modifiers -   Severity of Pain 7   Quality of Pain Aching   Quality of Pain Comment tingling   Duration of Pain - Frequency of Pain Constant   Aggravating Factors Standing;Straightening   Aggravating Factors Comment laying   Limiting Behavior Some   Relieving Factors Rest   Relieving Factors Comment meds   Result of Injury -     Location of pain: neck (chronic) , back of head tingling  Does pain radiate into extremities: tingling hands, feet, arms; tightness/burning/pressure in back and stomach  Does patient have weakness: BLE; entire leg; BUE  Pt denies saddle paresthesias. Medications pt is on: Gabapentin 300 mg TID with benefit and wooziness. Baclofen 10 mg qD-BID. BUSPAR. Prolia. Denies persistent fevers, chills, neurogenic bowel or bladder symptoms. Pt denies recent ED visits or hospitalizations.      Treatments patient has tried:  Physical therapy:Yes; with benefit but cancelled due to COVID   Doing HEP: Unknown  Non-opioid medications: Yes; baclofen-patient tolerating, Cymbalta-bad thoughts  Spinal injections: Yes; 1 & 3/2018 C7-T1 DEBBIE by Dr. Annamarie Gonzalez. Shelle Blonder Dr. Braden Cabot pain. Spinal surgery- Yes. 11/2019 by Dr. Devante Anderson C1-C5 decompression. Post op was complicated by hematoma (decompressed); sustained permanent cord injury. Episode of unresponsiveness. Last C MRI 3/2020: myelomalacia C4-C5  Last T MRI 3/2020: mild flattening C7-T1-T2. Normal cord signal  Last L MRI 3/2020: deg changes and no stenosis   Last C MRI 6/2020: myelomalacia C4-5, mild stenosis at C6-7,   Last T MRI 6/2020: no stenosis      reviewed. Phx of CVA and diffuse ischemic changes, stroke, UTIs, LV diastolic dysfunction. Pt uses bone growth stimulator. Previously in [facility]. Sister is currently undergoing breast cancer treatment. 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 ED 6/2020 foot injury. MRI Results (maximum last 3):   Results from [place] encounter on 06/26/20   MRI [facility] SPINE WO CONT    Narrative EXAM: MRI CERV SPINE WO CONT, MRI [facility] SPINE WO CONT    CLINICAL INDICATION/HISTORY: Cervical cord myelomalacia (HCC)  - cord injury re-eval  - status post decompression and hematoma evacuation 2019    COMPARISON: MRI cervical spine 3/26/2020. CT C-Spine 3/26/2020. C1-C5. TECHNIQUE:   MR imaging was performed through the cervical and thoracic spine without  contrast.    FINDINGS:       MRI cervical spine:    Susceptibility artifact arises from posterior surgical fusion hardware on the  occiput through C5. Laminectomy has been performed at C1-C5. There is stable,  minimal anterolisthesis of C2 on C3, mild anterolisthesis at C3-C4, C4-C5., C7  and T1 There is a stable focus of dorsal cord myelomalacia at the level of  C4-C5. Previously described cord swelling is less conspicuous at the level of  C3-C5. There are scattered degenerative endplate changes, most pronounced at C6-C7. The  prevertebral and paravertebral soft tissues are within normal limits. Degenerative changes by level:    C2-C3: Disc osteophyte complex and facet arthropathy. Posterior decompression. Moderate right and mild-to-moderate left foraminal stenosis. C3-C4: Disc osteophyte complex and facet arthropathy. Posterior decompression. Mild right and severe left foraminal stenosis. C4-C5: Disc osteophyte complex and facet arthropathy. Posterior decompression. Moderate to severe bilateral foraminal stenosis. C5-C6: Disc osteophyte complex and facet arthropathy. Posterior decompression. Moderate right and moderate to severe left foraminal stenosis. C6-C7: Disc osteophyte complex and facet arthropathy. Moderate spinal canal  stenosis. Severe left and moderate to severe right foraminal stenosis. C7-T1: Broad central and paracentral disc protrusion contacts the left  ventrolateral cord. Mild to moderate overall spinal canal stenosis. Mild left  foraminal stenosis. No significant right foraminal narrowing. MRI thoracic spine:     Upper thoracic of focal stenosis is mildly exaggerated.  There is stable, chronic  mild compression deformity at T3. Bone marrow signal is unremarkable. The  thoracic cord is normal in size and signal characteristics. There are  redemonstrated mild degenerative changes, including small disc protrusions at  T1-T2 and T2-T3, with mild spinal canal and bilateral foraminal stenosis at  T1-T2. No high-grade spinal canal or foraminal stenosis. Scattered perineural  root sleeve cysts are again noted in the midthoracic region. Incidental note is  made of aberrant right subclavian artery. Fluid layers in the upper esophagus,  which may reflect reflux disease. Impression IMPRESSION:    MRI C-spine:  1. Stable postoperative and degenerative changes of the cervical spine,  including moderate spinal canal stenosis at C6-C7; moderate to severe foraminal  stenosis bilaterally at C4-C5, on the left at C5-C6, and on the right at C6-C7;  severe left foraminal stenosis at C6-C7.  2. Stable focus of dorsal cord myelomalacia at the level of C4-C5. Previously  described cord swelling is less conspicuous at the level of C3-C5. 3. Stable alignment of cervical spine status post occiput-C5 posterior surgical  fixation and C1-C5 laminectomy, including, minimal anterolisthesis of C2 on C3,  mild anterolisthesis at C3-C4 and C4-C5. MRI T-Spine:  1. Redemonstrated mild degenerative changes with mild spinal canal stenosis and  bilateral foraminal stenosis T1-T2. No high-grade spinal canal foraminal  narrowing. 2. Chronic mild compression fracture at T3 vertebral body. 3. No thoracic cord signal abnormality. 4. Incidental note is made of aberrant right subclavian artery. MRI CERV SPINE WO CONT    Narrative EXAM: MRI CERV SPINE WO CONT, MRI [facility] SPINE WO CONT    CLINICAL INDICATION/HISTORY: Cervical cord myelomalacia (HCC)  - cord injury re-eval  - status post decompression and hematoma evacuation 2019    COMPARISON: MRI cervical spine 3/26/2020. CT C-Spine 3/26/2020. C1-C5.     TECHNIQUE:   MR imaging was performed through the cervical and thoracic spine without  contrast.    FINDINGS:       MRI cervical spine:    Susceptibility artifact arises from posterior surgical fusion hardware on the  occiput through C5. Laminectomy has been performed at C1-C5. There is stable,  minimal anterolisthesis of C2 on C3, mild anterolisthesis at C3-C4, C4-C5., C7  and T1 There is a stable focus of dorsal cord myelomalacia at the level of  C4-C5. Previously described cord swelling is less conspicuous at the level of  C3-C5. There are scattered degenerative endplate changes, most pronounced at C6-C7. The  prevertebral and paravertebral soft tissues are within normal limits. Degenerative changes by level:    C2-C3: Disc osteophyte complex and facet arthropathy. Posterior decompression. Moderate right and mild-to-moderate left foraminal stenosis. C3-C4: Disc osteophyte complex and facet arthropathy. Posterior decompression. Mild right and severe left foraminal stenosis. C4-C5: Disc osteophyte complex and facet arthropathy. Posterior decompression. Moderate to severe bilateral foraminal stenosis. C5-C6: Disc osteophyte complex and facet arthropathy. Posterior decompression. Moderate right and moderate to severe left foraminal stenosis. C6-C7: Disc osteophyte complex and facet arthropathy. Moderate spinal canal  stenosis. Severe left and moderate to severe right foraminal stenosis. C7-T1: Broad central and paracentral disc protrusion contacts the left  ventrolateral cord. Mild to moderate overall spinal canal stenosis. Mild left  foraminal stenosis. No significant right foraminal narrowing. MRI thoracic spine:     Upper thoracic of focal stenosis is mildly exaggerated. There is stable, chronic  mild compression deformity at T3. Bone marrow signal is unremarkable. The  thoracic cord is normal in size and signal characteristics.  There are  redemonstrated mild degenerative changes, including small disc protrusions at  T1-T2 and T2-T3, with mild spinal canal and bilateral foraminal stenosis at  T1-T2. No high-grade spinal canal or foraminal stenosis. Scattered perineural  root sleeve cysts are again noted in the midthoracic region. Incidental note is  made of aberrant right subclavian artery. Fluid layers in the upper esophagus,  which may reflect reflux disease. Impression IMPRESSION:    MRI C-spine:  1. Stable postoperative and degenerative changes of the cervical spine,  including moderate spinal canal stenosis at C6-C7; moderate to severe foraminal  stenosis bilaterally at C4-C5, on the left at C5-C6, and on the right at C6-C7;  severe left foraminal stenosis at C6-C7.  2. Stable focus of dorsal cord myelomalacia at the level of C4-C5. Previously  described cord swelling is less conspicuous at the level of C3-C5. 3. Stable alignment of cervical spine status post occiput-C5 posterior surgical  fixation and C1-C5 laminectomy, including, minimal anterolisthesis of C2 on C3,  mild anterolisthesis at C3-C4 and C4-C5. MRI T-Spine:  1. Redemonstrated mild degenerative changes with mild spinal canal stenosis and  bilateral foraminal stenosis T1-T2. No high-grade spinal canal foraminal  narrowing. 2. Chronic mild compression fracture at T3 vertebral body. 3. No thoracic cord signal abnormality. 4. Incidental note is made of aberrant right subclavian artery.           PAST MEDICAL HISTORY   Past Medical History:   Diagnosis Date    Abnormal Pap smear     Dr. Wily Villasenor Allergic rhinitis     Arthritis     Cerebrovascular small vessel disease 3/18/2019    CT 3/17/2019    Cervical spinal cord compression (HCC)     Chronic pain     Concentric left ventricular hypertrophy 3/18/2019    With left ventricular diastolic dysfunction by echocardiogram 3/18/2019    Hypercholesterolemia     Neck pain 5/17/2010    Stroke ([facility] [address] 10/02/2017    TIA- legs weak memory issues       Past Surgical History: Procedure Laterality Date    COLONOSCOPY N/A 6/4/2018    COLONOSCOPY / polypectomy performed by Deejay Hernandez MD at [facility] ENDOSCOPY    HX GYN      Total Hyst    HX LAP CHOLECYSTECTOMY      HX OTHER SURGICAL  2017    Throat widened   . MEDICATIONS      Current Outpatient Medications   Medication Sig Dispense Refill    baclofen (LIORESAL) 10 mg tablet Take 1 Tab by mouth two (2) times a day. 60 Tab 2    ALPRAZolam (XANAX) 0.25 mg tablet Take 1 Tab by mouth two (2) times daily as needed for Anxiety. Max Daily Amount: 0.5 mg. 60 Tab 0    busPIRone (BUSPAR) 7.5 mg tablet take 1 tablet by mouth twice a day 180 Tab 0    ondansetron (ZOFRAN ODT) 4 mg disintegrating tablet dissolve 1 tablet ON TONGUE every 6 hours if needed for nausea OR vomiting 90 Tab 0    omeprazole (PRILOSEC) 20 mg capsule take 1 capsule by mouth once daily before breakfast 90 Cap 1    gabapentin (NEURONTIN) 100 mg capsule Take 2 Caps by mouth three (3) times daily. Max Daily Amount: 600 mg. (Patient taking differently: Take 300 mg by mouth three (3) times daily. Takes 300mg TID) 180 Cap 1    denosumab (PROLIA) 60 mg/mL injection 60 mg by SubCUTAneous route every 6 months.  acetaminophen (TYLENOL) 160 mg/5 mL elixir Take 1,000 mg by mouth daily.  midodrine (PROAMITINE) 5 mg tablet Take 5 mg by mouth two (2) times a day. 1 tab by mouth with breakfast and lunch      atorvastatin (LIPITOR) 20 mg tablet take 1 tablet by mouth once daily 90 Tab 3    loratadine (CLARITIN) 10 mg tablet Take 1 Tab by mouth daily. 90 Tab 1    turmeric 400 mg cap Take 1 Cap by mouth daily.  aspirin 81 mg chewable tablet Take 1 Tab by mouth daily. 30 Tab 3    LORazepam (ATIVAN) 0.5 mg tablet Take 1 Tab by mouth every eight (8) hours as needed for Anxiety. Max Daily Amount: 1.5 mg. 10 Tab 0        Controlled Substance Monitoring:    No flowsheet data found.      ALLERGIES    Allergies   Allergen Reactions    Baclofen Shortness of Breath Denies. Pt tolerates    Morphine Other (comments)     hallucinations    Celebrex [Celecoxib] Other (comments)     Tiredness           SOCIAL HISTORY    Social History     Socioeconomic History    Marital status:      Spouse name: Not on file    Number of children: Not on file    Years of education: Not on file    Highest education level: Not on file   Occupational History    Not on file   Social Needs    Financial resource strain: Not on file    Food insecurity     Worry: Not on file     Inability: Not on file    Transportation needs     Medical: Not on file     Non-medical: Not on file   Tobacco Use    Smoking status: Never Smoker    Smokeless tobacco: Never Used   Substance and Sexual Activity    Alcohol use: No    Drug use: No    Sexual activity: Never   Lifestyle    Physical activity     Days per week: Not on file     Minutes per session: Not on file    Stress: Not on file   Relationships    Social connections     Talks on phone: Not on file     Gets together: Not on file     Attends Nondenominational service: Not on file     Active member of club or organization: Not on file     Attends meetings of clubs or organizations: Not on file     Relationship status: Not on file    Intimate partner violence     Fear of current or ex partner: Not on file     Emotionally abused: Not on file     Physically abused: Not on file     Forced sexual activity: Not on file   Other Topics Concern    Not on file   Social History Narrative    Not on file       FAMILY HISTORY    Family History   Problem Relation Age of Onset    Cancer Mother         breast    Heart Disease Father        REVIEW OF SYSTEMS  Review of Systems   Constitutional: Negative for chills, fever and weight loss. Respiratory: Negative for shortness of breath. Cardiovascular: Negative for chest pain. Gastrointestinal: Negative for constipation. Negative for fecal incontinence   Genitourinary: Negative for dysuria.         Negative for urinary incontinence   Musculoskeletal: Positive for back pain and neck pain. Per HPI   Skin: Negative for rash. Neurological: Positive for tingling and focal weakness. Negative for dizziness, tremors and headaches. Endo/Heme/Allergies: Does not bruise/bleed easily. Psychiatric/Behavioral: The patient does not have insomnia. PHYSICAL EXAMINATION  Visit Vitals  BP (!) 87/54 (BP 1 Location: Right arm, BP Patient Position: Sitting)   Pulse 98   Temp 97.8 °F (36.6 °C) (Oral)   Resp 17   LMP  (LMP Unknown)         Accompanied by spouse. Constitutional:  Well developed, well nourished, in no acute distress. Psychiatric: Affect and mood are appropriate. Integumentary: No rashes or abrasions noted on exposed areas. Cardiovascular/Peripheral Vascular: No peripheral edema is noted BLE. SPINE/MUSCULOSKELETAL EXAM    Cervical spine:  Neck is midline. Normal muscle tone. No focal atrophy is noted. Limited ROM in C spine. Tenderness to palpation diffusely cervical paraspinals, upper traps. Negative Spurling's sign. Negative Tinel's sign. Negative Garza's sign. MOTOR:      Biceps  Triceps Deltoids Wrist Ext Wrist Flex Hand Intrin   Right 4/5 4/5 4/5 4/5 4/5 3/5   Left 4/5 4/5 4/5 4/5 4/5 3/5   Right leg in splint    DTRs are 3+ biceps, triceps, brachioradialis,   2+ L patella     Presents in wheelchair. Written by Cassia Joshi, as dictated by Jonny Hand MD.    I, Dr. Jonny aHnd MD, confirm that all documentation is accurate. Ms. Justus Gutierrez may have a reminder for a \"due or due soon\" health maintenance. I have asked that she contact her primary care provider for follow-up on this health maintenance.

## 2020-07-02 NOTE — PATIENT INSTRUCTIONS

## 2020-07-02 NOTE — PROGRESS NOTES
Stacy Truong presents today for   Chief Complaint   Patient presents with    Neck Pain     MRI fu    Back Pain       Is someone accompanying this pt? NO    Is the patient using any DME equipment during OV? NO    Depression Screening:  3 most recent PHQ Screens 7/2/2020   PHQ Not Done -   Little interest or pleasure in doing things Not at all   Feeling down, depressed, irritable, or hopeless Not at all   Total Score PHQ 2 0       Learning Assessment:  Learning Assessment 2/26/2019   PRIMARY LEARNER Patient   HIGHEST LEVEL OF EDUCATION - PRIMARY LEARNER  -   CO-LEARNER CAREGIVER -   PRIMARY LANGUAGE ENGLISH   LEARNER PREFERENCE PRIMARY DEMONSTRATION   ANSWERED BY Patient   RELATIONSHIP SELF       Abuse Screening:  Abuse Screening Questionnaire 10/1/2019   Do you ever feel afraid of your partner? N   Are you in a relationship with someone who physically or mentally threatens you? N   Is it safe for you to go home? Y       Fall Risk  Fall Risk Assessment, last 12 mths 7/2/2020   Able to walk? Yes   Fall in past 12 months? Yes   Fall with injury? No   Number of falls in past 12 months 1   Fall Risk Score 1         Coordination of Care:  1. Have you been to the ER, urgent care clinic since your last visit? NO  Hospitalized since your last visit? NO    2. Have you seen or consulted any other health care providers outside of the [address] [place] [address] [facility] since your last visit? NO Include any pap smears or colon screening.  NO    Last  Checked 7/2/2020

## 2020-07-15 ENCOUNTER — OFFICE VISIT (OUTPATIENT)
Dept: ORTHOPEDIC SURGERY | Age: 75
End: 2020-07-15

## 2020-07-15 VITALS
HEIGHT: 60 IN | WEIGHT: 91 LBS | OXYGEN SATURATION: 96 % | SYSTOLIC BLOOD PRESSURE: 121 MMHG | BODY MASS INDEX: 17.87 KG/M2 | TEMPERATURE: 98.2 F | DIASTOLIC BLOOD PRESSURE: 76 MMHG | HEART RATE: 74 BPM

## 2020-07-15 DIAGNOSIS — S93.324A LISFRANC'S DISLOCATION, RIGHT, INITIAL ENCOUNTER: Primary | ICD-10-CM

## 2020-07-15 DIAGNOSIS — S92.301A MULTIPLE CLOSED FRACTURES OF METATARSAL BONE OF RIGHT FOOT, INITIAL ENCOUNTER: ICD-10-CM

## 2020-07-15 RX ORDER — UREA 10 %
100 LOTION (ML) TOPICAL DAILY
COMMUNITY
Start: 2021-10-21 | End: 2021-10-21

## 2020-07-15 NOTE — PROGRESS NOTES
AMBULATORY PROGRESS NOTE      Patient: Demetra Spear             MRN: [id number]     SSN: [id number] Body mass index is 17.77 kg/m². YOB: 1945     AGE: 70 y.o. EX: female    PCP: No primary care provider on file. IMPRESSION //  DIAGNOSIS AND TREATMENT PLAN      DIAGNOSES  1. Lisfranc's dislocation, right, initial encounter    2. Multiple closed fractures of metatarsal bone of right foot, initial encounter        Orders Placed This Encounter    AMB POC XRAY, FOOT; COMPLETE, 3+ VIEW     ASK ALL FEMALE PATIENTS IF PREGNANT     Order Specific Question:   Reason for Exam     Answer:   PAIN    AMB POC XRAY, ANKLE; COMPLETE, 3+ VIE     Order Specific Question:   Reason for Exam     Answer:   FX    FL CLOSED TX METATARSAL FX    FL CLOSED TX METATARSAL FX    FL CLOSED TX METATARSAL FX    cyanocobalamin (Vitamin B-12) 100 mcg tablet     Sig: Take 100 mcg by mouth daily. This patient has multiple metatarsal fractures of the basilar regions of her right second third and fourth metatarsal regions nondisplaced non angulated. On her images today, there is about 2 mm of space, between the medial cuneiform altering between the medial most cuneiforms and second metatarsal base, in the region of the Lisfranc region. She is been weightbearing, admits to having no major pain, in her right midfoot. I cautioned her that we need to follow her closely, that because of this injury, she may have further splaying her foot or may have further changing changes in this region. If this continues to separate, she may require surgical invention. The displacement comes about 1 to 2 minutes at most.  She is had multiple medical problems she is just recovering from major complication from a spine surgery: Paralysis: And after lengthy discussion, she is not keen on any type of surgery on her foot at this current time.   She is having minimal to no discomfort, essentially, and the plan I think would be to follow her closely let her weight-bear as tolerated as she has been although she weight-bear as tolerated in a limited fashion with her cam walker boot. She understands it should she visit should she develop any further deformity which she does not have, then she may require midfoot stabilization. But she is clearly stating she does not want any surgery on her foot at this time. PLAN:    1.  3-week 4-week follow-up, weightbearing x-rays please of her right foot if she can tolerate weightbearing if she cannot get it nonweightbearing. RTO-3 to 4 weeks      [facility] //  [facility] Asuncion Porras IS A 70 y.o. female who presents to my outpatient office for evaluation of:    37-year-old female, who had fell out of bed, on June 11, 2020. She had surgery on her neck, by Dr. Kadie Whipple, November 2019. She unfortunately, developed what sounds like a clot or hematoma at her surgical site, and had to be taken back to surgery states. As a consequence from her blood clot, the neck, she describes having sustained a paralysis condition. She remains to the care of Dr. Lucrecia Owusu in our spine team.  She takes folic acid and vitamin B12 for her neuropathy. She uses a wheelchair primarily, but her strength has returned enough that she is been able to walk. In any event, she had fallen from a wheelchair, on June 11, 2020. She gone to [facility] [place], [place] by [facility] facility, where she had x-rays anterior [facility] where she had x-rays of her right tib-fib which were unremarkable for fracture other than show some degenerative changes about her right knee I did review this image due to the x-rays. She also underwent x-rays of her right ankle and right foot, same date, June 11, 2020, and it showed a nondisplaced fracture of the base of her second third metatarsals as well as a possible fracture of the middle cuneiform seen in the oblique film. I did review the x-rays of her ankle and foot at that from the images from 6/11/2020. Did not see any significant widening, these non stressed films, at her Lisfranc complex. She had x-rays, of her foot, today, 3 views, of her right foot, today, nonweightbearing: These films reveal a healing fracture to the right second, third and fourth metatarsal basilar regions. There is a slight widening, at the middle cuneiform second metatarsal base region, by about 2 mm. No dislocation. The generalized osteopenia, presumed osteoporosis. Ankle films 3 views, AP lateral oblique, shows no fracture no supple is no dislocation, to the right ankle. There is a degenerative spur seen to the tip of the medial malleolus, of this right ankle. Visit Vitals  /76   Pulse 74   Temp 98.2 °F (36.8 °C)   Ht 5' (1.524 m)   Wt 91 lb (41.3 kg)   SpO2 96%   BMI 17.77 kg/m²       ANKLE/FOOT right    Psychiatry: Alert, oriented x 3 (name,place,time of day); speech normal in context and clarity, memory intact grossly, no involuntary movements - tremors, no dementia  Gait: slow   // currently in a wheelchair, but also has a cam walker boot t  Tenderness: mild to very firm palpation to the second third and fourth metatarsal basilar regions. Cutaneous: WNL  //slight swelling to the dorsal midfoot   joint Motion: WNL to the ankle joint and hindfoot and subtalar region  Joint / Tendon Stability: No Ankle or Subtalar instability or joint laxity. No peroneal sublux ability or dislocation  Alignment: neutral Hindfoot, none Metatarsus Adductus Metatarsus. Neuro Motor/Sensory: NL/NL,  Vascular: NL foot/ankle pulses,   Lymphatics: No extremity lymphedema, No calf swelling, no tenderness to calf muscles.           CHART REVIEW     Patient Active Problem List   Diagnosis Code    Cervical neck pain with evidence of disc disease M50.90    CVA (cerebral vascular accident) ([facility] [address] I63.9    TIA (transient ischemic attack) G45.9    Cervical facet syndrome M47.812    Spondylosis of cervical region without myelopathy or radiculopathy M47.812    Cervical spinal stenosis M48.02    Degenerative disc disease, cervical M50.30    Chronic pain syndrome G89.4    Cervical radiculitis M54.12    Myofascial pain syndrome M79.18    Hyperlipidemia E78.5    Anxiety F41.9    Osteoarthritis of cervical spine M47.812    Osteoporosis M81.0    Vertigo R42    Dizziness R42    Weakness R53.1    Concentric left ventricular hypertrophy I51.7    Cerebrovascular small vessel disease I67.9    Cervical cord myelomalacia (HCC) G95.89        Ulysses Mcqueen has been experiencing pain and discomfort confirmed as outlined in the pain assessment outlined below. Pain Assessment  7/15/2020   Location of Pain Foot   Pain Location Comment -   Location Modifiers Right   Severity of Pain 1   Quality of Pain Dull   Quality of Pain Comment -   Duration of Pain Persistent   Frequency of Pain Intermittent   Aggravating Factors Standing;Walking   Aggravating Factors Comment -   Limiting Behavior Yes   Relieving Factors Rest   Relieving Factors Comment -   Result of Injury Yes   Work-Related Injury No   Type of Injury Fall        Ulysses Mcqueen  has a past medical history of Abnormal Pap smear, Allergic rhinitis, Arthritis, Cerebrovascular small vessel disease (3/18/2019), Cervical spinal cord compression ([address] Chronic pain, Concentric left ventricular hypertrophy (3/18/2019), Hypercholesterolemia, Neck pain (5/17/2010), and Stroke ([address] (10/02/2017). She also has no past medical history of Anemia, Asthma, Cancer ([address] Chronic bronchitis ([address] Chronic kidney disease, Chronic lung disease, Chronic sinusitis, Congestive heart failure ([address] Coronary artery disease, Diabetes mellitus ([address] DVT (deep venous thrombosis) ([address] GERD (gastroesophageal reflux disease), Hypertension, Liver disease, Pneumonia, Positive PPD, Pulmonary embolism ([address] Pulmonary emphysema ([address] Thyroid disease, or Tuberculosis.      Patients is employed at:         Past Medical History:   Diagnosis Date    Abnormal Pap smear     Dr. Leyla Gould Allergic rhinitis     Arthritis     Cerebrovascular small vessel disease 3/18/2019    CT 3/17/2019    Cervical spinal cord compression (HCC)     Chronic pain     Concentric left ventricular hypertrophy 3/18/2019    With left ventricular diastolic dysfunction by echocardiogram 3/18/2019    Hypercholesterolemia     Neck pain 5/17/2010    Stroke ([facility] [address] 10/02/2017    TIA- legs weak memory issues     Past Surgical History:   Procedure Laterality Date    COLONOSCOPY N/A 6/4/2018    COLONOSCOPY / polypectomy performed by Gardenia Tang MD at [facility] ENDOSCOPY    HX GYN      Total Hyst    HX LAP CHOLECYSTECTOMY      HX OTHER SURGICAL  2017    Throat widened     Current Outpatient Medications   Medication Sig    cyanocobalamin (Vitamin B-12) 100 mcg tablet Take 100 mcg by mouth daily.  baclofen (LIORESAL) 10 mg tablet Take 1 Tab by mouth two (2) times a day.  ALPRAZolam (XANAX) 0.25 mg tablet Take 1 Tab by mouth two (2) times daily as needed for Anxiety. Max Daily Amount: 0.5 mg.    busPIRone (BUSPAR) 7.5 mg tablet take 1 tablet by mouth twice a day    ondansetron (ZOFRAN ODT) 4 mg disintegrating tablet dissolve 1 tablet ON TONGUE every 6 hours if needed for nausea OR vomiting    gabapentin (NEURONTIN) 100 mg capsule Take 2 Caps by mouth three (3) times daily. Max Daily Amount: 600 mg. (Patient taking differently: Take 300 mg by mouth three (3) times daily. Takes 300mg TID)    denosumab (PROLIA) 60 mg/mL injection 60 mg by SubCUTAneous route every 6 months.  acetaminophen (TYLENOL) 160 mg/5 mL elixir Take 1,000 mg by mouth daily.  midodrine (PROAMITINE) 5 mg tablet Take 5 mg by mouth two (2) times a day. 1 tab by mouth with breakfast and lunch    atorvastatin (LIPITOR) 20 mg tablet take 1 tablet by mouth once daily    turmeric 400 mg cap Take 1 Cap by mouth daily.  aspirin 81 mg chewable tablet Take 1 Tab by mouth daily.  omeprazole (PRILOSEC) 20 mg capsule take 1 capsule by mouth once daily before breakfast    LORazepam (ATIVAN) 0.5 mg tablet Take 1 Tab by mouth every eight (8) hours as needed for Anxiety. Max Daily Amount: 1.5 mg.    loratadine (CLARITIN) 10 mg tablet Take 1 Tab by mouth daily. No current facility-administered medications for this visit. Allergies   Allergen Reactions    Baclofen Shortness of Breath     Denies. Pt tolerates    Morphine Other (comments)     hallucinations    Celebrex [Celecoxib] Other (comments)     Tiredness      Social History     Occupational History    Not on file   Tobacco Use    Smoking status: Never Smoker    Smokeless tobacco: Never Used   Substance and Sexual Activity    Alcohol use: No    Drug use: No    Sexual activity: Never     Family History   Problem Relation Age of Onset    Cancer Mother         breast    Heart Disease Father        THE  FOR Edmond Christina MD 7/15/2020 . [facility] IMAGING : INTERPRETATION BY RADIOLOGIST     2020 June  CT ABD/PELVIS-IV ONLY6/14/2020  [address]  Result Impression     Fluid throughout the colon suggesting diarrheal illness. Signed By: Joe Snell MD on 6/14/2020 7:28 PM   Result Narrative   EXAM: CT of the abdomen and pelvis    INDICATION: Abdominal distention, constipation    COMPARISON: December 12, 2018    TECHNIQUE: Axial CT imaging of the abdomen and pelvis was performed with intravenous contrast. Multiplanar reformats were generated. One or more dose reduction techniques were used on this CT: automated exposure control, adjustment of the mAs and/or kVp according to patient size, and iterative reconstruction techniques.  The specific techniques used on this CT exam have been documented in the patient's electronic medical record.  Digital Imaging and Communications in Medicine (DICOM) format image data are available to nonaffiliated external healthcare facilities or entities on a secure, media free, reciprocally searchable basis with patient authorization for at least a 12-month period after this study. _______________    FINDINGS:    LOWER CHEST: Unremarkable. LIVER, BILIARY: 6 mm low-attenuation seen in the posterior segment the right hepatic lobe suggesting cyst or hemangioma. 2 other small cysts or hemangiomas seen in the tip of the liver. These are all stable. No biliary dilation. Cholecystectomy    PANCREAS: Normal.    SPLEEN: Normal.    ADRENALS: Normal.    KIDNEYS: Normal.    VASCULATURE: There is mild calcific atherosclerosis. LYMPH NODES: No enlarged lymph nodes. GASTROINTESTINAL TRACT: No bowel dilation or wall thickening. Moderate amount of stool present in the rectum and sigmoid colon. There is fluid-filled: More proximally the colon measuring up to 4.5 cm in diameter. Findings are suggestive diarrheal illness. There is no bowel obstruction. The appendix is normal. Small hiatal hernia present. PELVIC ORGANS: Hysterectomy. Urinary bladder is unremarkable. No free fluid seen. BONES: No acute or aggressive osseous abnormalities identified. There is osteopenia. Degenerative disc disease present at L5-S1.    OTHER: None.    _______________   Other Result Information   Interface, [employer] - 06/14/2020  7:30 PM EDT  EXAM: CT of the abdomen and pelvis    INDICATION: Abdominal distention, constipation    COMPARISON: December 12, 2018    TECHNIQUE: Axial CT imaging of the abdomen and pelvis was performed with intravenous contrast. Multiplanar reformats were generated. One or more dose reduction techniques were used on this CT: automated exposure control, adjustment of the mAs and/or kVp according to patient size, and iterative reconstruction techniques. The specific techniques used on this CT exam have been documented in the patient's electronic medical record.  Digital Imaging and Communications in Medicine (DICOM) format image data are available to nonaffiliated external healthcare facilities or entities on a secure, media free, reciprocally searchable basis with patient authorization for at least a 12-month period after this study. _______________    FINDINGS:    LOWER CHEST: Unremarkable. LIVER, BILIARY: 6 mm low-attenuation seen in the posterior segment the right hepatic lobe suggesting cyst or hemangioma. 2 other small cysts or hemangiomas seen in the tip of the liver. These are all stable. No biliary dilation. Cholecystectomy    PANCREAS: Normal.    SPLEEN: Normal.    ADRENALS: Normal.    KIDNEYS: Normal.    VASCULATURE: There is mild calcific atherosclerosis. LYMPH NODES: No enlarged lymph nodes. GASTROINTESTINAL TRACT: No bowel dilation or wall thickening. Moderate amount of stool present in the rectum and sigmoid colon. There is fluid-filled: More proximally the colon measuring up to 4.5 cm in diameter. Findings are suggestive diarrheal illness. There is no bowel obstruction. The appendix is normal. Small hiatal hernia present. PELVIC ORGANS: Hysterectomy. Urinary bladder is unremarkable. No free fluid seen. BONES: No acute or aggressive osseous abnormalities identified. There is osteopenia. Degenerative disc disease present at L5-S1.    OTHER: None.    _______________    IMPRESSION    Fluid throughout the colon suggesting diarrheal illness. Signed By: Margarito Soto MD on 6/14/2020 7:28 PM   Status Results Details     Encounter Summary   CT CERVICAL SPINE W/O CONTRAST6/11/2020  [facility]  Result Impression     1. No evidence of acute bony cervical spine fracture identified by CT. 2. Stable appearing cervical fusion and posterior decompression as described above. Multilevel spondylosis and foraminal narrowing. No new or progressive alignment abnormalities identified.     Note that this cervical spine CT was performed supine.  Although it is highly sensitive for fractures, it does not evaluate for or preclude significant ligamentous injury or instability.  If the patient has persistent symptoms or if otherwise clinically indicated, then erect plain film, or MRI should be performed. Signed By: Elidia Bone MD on 6/11/2020 12:09 PM   Result Narrative   EXAM: CT cervical spine    HISTORY:   -From Provider: C-spine trauma, NEXUS/CCR positive, low risk  -Additional: None    COMPARISON: 11/23/2019    TECHNIQUE: Axial CT imaging of the cervical spine was performed from the skull base to the thoracic inlet without intravenous contrast. Multiplanar reformats were generated. Dose reduction techniques used: automated exposure control, adjustment of the mAs and/or kVp according to patient size, and iterative reconstruction techniques. Digital Imaging and Communications in Medicine (DICOM) format image data are available to nonaffiliated external healthcare facilities or entities on a secure, media free, reciprocally searchable basis with patient authorization for at least a 12-month period after this study. _______________    FINDINGS:    VERTEBRAE AND DISCS:     Alignment: Straightening of lordosis with posterior decompression/cervical fusion hardware from skull base to C5. Stable 0.2 cm anterolisthesis of C2 on C3 and C3 on C4. Osseous: No CT evident acute fracture or dislocation is seen.  Multilevel disc space narrowing and spondylosis, most evident from C5-C7. Altered level foraminal narrowing. Posterior decompression C1-C5.   CT is limited in assessment of disc disease. PREVERTEBRAL SOFT TISSUES: Unremarkable. VISIBLE PORTIONS OF POSTERIOR FOSSA/BRAIN: Please see accompanying CT brain for detailed findings. LUNG APICES: Patchy biapical pleural densities similar to prior.  No evident pneumothorax.     OTHER: None.    _______________   Other Result Information   Interface, [employer] Rad Res - 06/11/2020 12:11 PM EDT  EXAM: CT cervical spine    HISTORY:   -From Provider: C-spine trauma, NEXUS/CCR positive, low risk  -Additional: None    COMPARISON: 11/23/2019    TECHNIQUE: Axial CT imaging of the cervical spine was performed from the skull base to the thoracic inlet without intravenous contrast. Multiplanar reformats were generated. Dose reduction techniques used: automated exposure control, adjustment of the mAs and/or kVp according to patient size, and iterative reconstruction techniques. Digital Imaging and Communications in Medicine (DICOM) format image data are available to nonaffiliated external healthcare facilities or entities on a secure, media free, reciprocally searchable basis with patient authorization for at least a 12-month period after this study. _______________    FINDINGS:    VERTEBRAE AND DISCS:     Alignment: Straightening of lordosis with posterior decompression/cervical fusion hardware from skull base to C5. Stable 0.2 cm anterolisthesis of C2 on C3 and C3 on C4. Osseous: No CT evident acute fracture or dislocation is seen. Multilevel disc space narrowing and spondylosis, most evident from C5-C7. Altered level foraminal narrowing. Posterior decompression C1-C5. CT is limited in assessment of disc disease. PREVERTEBRAL SOFT TISSUES: Unremarkable. VISIBLE PORTIONS OF POSTERIOR FOSSA/BRAIN: Please see accompanying CT brain for detailed findings. LUNG APICES: Patchy biapical pleural densities similar to prior. No evident pneumothorax. OTHER: None.    _______________    IMPRESSION    1. No evidence of acute bony cervical spine fracture identified by CT. 2. Stable appearing cervical fusion and posterior decompression as described above. Multilevel spondylosis and foraminal narrowing. No new or progressive alignment abnormalities identified. Note that this cervical spine CT was performed supine. Although it is highly sensitive for fractures, it does not evaluate for or preclude significant ligamentous injury or instability.   If the patient has persistent symptoms or if otherwise clinically indicated, then erect plain film, or MRI should be performed. Signed By: Maryana Shahid MD on 6/11/2020 12:09 PM   Status Results Details     Encounter Summary   KNEE COMPLETE RT 4 VIEWS6/11/2020  [address]  Result Impression     No acute osseous finding. Signed By: Nikita Luke MD on 6/11/2020 12:05 PM   Result Narrative   EXAM: RIGHT KNEE RADIOGRAPHS    CLINICAL INDICATION/HISTORY: TRAUMA    > Additional:    COMPARISON: None    TECHNIQUE: 4 views of the right knee    _______________    FINDINGS:    BONES: Moderate tricompartmental degenerative changes are present. Mild chondrocalcinosis is present. SOFT TISSUES: Unremarkable.    _______________   Other Result Information   Interface, [employer] Rad Res - 06/11/2020 12:07 PM EDT  EXAM: RIGHT KNEE RADIOGRAPHS    CLINICAL INDICATION/HISTORY: TRAUMA    > Additional:    COMPARISON: None    TECHNIQUE: 4 views of the right knee    _______________    FINDINGS:    BONES: Moderate tricompartmental degenerative changes are present. Mild chondrocalcinosis is present. SOFT TISSUES: Unremarkable.    _______________    IMPRESSION    No acute osseous finding. Signed By: Nikita Luke MD on 6/11/2020 12:05 PM   Status Results Details     Encounter Summary   TIBIA FIBULA RT6/11/2020  [facility]  Result Impression     No acute osseous findings. Signed By: Nikita Luke MD on 6/11/2020 12:03 PM   Result Narrative   EXAM: RIGHT TIBIA/FIBULA RADIOGRAPHS    CLINICAL INDICATION/HISTORY: TRAUMA    > Additional: Status post fall with swelling in the right lower extremity. COMPARISON: None    TECHNIQUE: 2 views of the right tibia and fibula    _______________    FINDINGS:    BONES: Moderate degenerative changes are present within the knee joint. Questionable mild chondrocalcinosis is present. Osteopenia is present.     SOFT TISSUES: Unremarkable.    _______________   Other Result Information   Interface, [employer] Rad Res - 06/11/2020 12:05 PM EDT  EXAM: RIGHT TIBIA/FIBULA RADIOGRAPHS    CLINICAL INDICATION/HISTORY: TRAUMA    > Additional: Status post fall with swelling in the right lower extremity. COMPARISON: None    TECHNIQUE: 2 views of the right tibia and fibula    _______________    FINDINGS:    BONES: Moderate degenerative changes are present within the knee joint. Questionable mild chondrocalcinosis is present. Osteopenia is present. SOFT TISSUES: Unremarkable.    _______________    IMPRESSION    No acute osseous findings. Signed By: Tanya Ash MD on 6/11/2020 12:03 PM   Status Results Details     Encounter Summary   ANKLE COMPLETE RT6/11/2020  [facility]  Result Impression     Nondisplaced fractures are present at the base of the second and third metatarsals. Avulsion fragment is also noted at the tarsal metatarsal joint, seen on the lateral view, which could be related to an middle cuneiform fracture. CT of the foot can be performed for further evaluation if clinically indicated. Associated soft tissue swelling is present. Signed By: Tanya Ash MD on 6/11/2020 12:02 PM   Result Narrative   EXAM:   Right foot x-ray  Right ankle x-ray    INDICATION: Right foot and ankle pain following trauma. COMPARISON: No prior relevant study available for comparison. TECHNIQUE: 3 views of the foot and 3 views of the ankle were obtained.    _______________    FINDINGS:    Nondisplaced fractures are present at the base of the second and third metatarsals. Possible fracture of the middle cuneiform is present, seen on the oblique view. Prominent adjacent soft tissue swelling is present. There is no significant tibiotalar joint space narrowing.  Ankle mortise is intact.  _______________   Other Result Information   Interface, [employer] Rad Res - 06/11/2020 12:05 PM EDT  EXAM:   Right foot x-ray  Right ankle x-ray    INDICATION: Right foot and ankle pain following trauma. COMPARISON: No prior relevant study available for comparison. TECHNIQUE: 3 views of the foot and 3 views of the ankle were obtained.    _______________    FINDINGS:    Nondisplaced fractures are present at the base of the second and third metatarsals. Possible fracture of the middle cuneiform is present, seen on the oblique view. Prominent adjacent soft tissue swelling is present. There is no significant tibiotalar joint space narrowing. Ankle mortise is intact.  _______________    IMPRESSION    Nondisplaced fractures are present at the base of the second and third metatarsals. Avulsion fragment is also noted at the tarsal metatarsal joint, seen on the lateral view, which could be related to an middle cuneiform fracture. CT of the foot can be performed for further evaluation if clinically indicated. Associated soft tissue swelling is present. Signed By: Melody Stern MD on 6/11/2020 12:02 PM   Status Results Details     Encounter Summary   FOOT COMPLETE RT6/11/2020  [facility]  Result Impression     Nondisplaced fractures are present at the base of the second and third metatarsals. Avulsion fragment is also noted at the tarsal metatarsal joint, seen on the lateral view, which could be related to an middle cuneiform fracture. CT of the foot can be performed for further evaluation if clinically indicated. Associated soft tissue swelling is present. Signed By: Melody Stern MD on 6/11/2020 12:02 PM   Result Narrative   EXAM:   Right foot x-ray  Right ankle x-ray    INDICATION: Right foot and ankle pain following trauma. COMPARISON: No prior relevant study available for comparison. TECHNIQUE: 3 views of the foot and 3 views of the ankle were obtained.    _______________    FINDINGS:    Nondisplaced fractures are present at the base of the second and third metatarsals. Possible fracture of the middle cuneiform is present, seen on the oblique view.  Prominent adjacent soft tissue swelling is present. There is no significant tibiotalar joint space narrowing. Ankle mortise is intact.  _______________   Other Result Information   Interface, [facility] - 06/11/2020 12:05 PM EDT  EXAM:   Right foot x-ray  Right ankle x-ray    INDICATION: Right foot and ankle pain following trauma. COMPARISON: No prior relevant study available for comparison. TECHNIQUE: 3 views of the foot and 3 views of the ankle were obtained.    _______________    FINDINGS:    Nondisplaced fractures are present at the base of the second and third metatarsals. Possible fracture of the middle cuneiform is present, seen on the oblique view. Prominent adjacent soft tissue swelling is present. There is no significant tibiotalar joint space narrowing. Ankle mortise is intact.  _______________    IMPRESSION    Nondisplaced fractures are present at the base of the second and third metatarsals. Avulsion fragment is also noted at the tarsal metatarsal joint, seen on the lateral view, which could be related to an middle cuneiform fracture. CT of the foot can be performed for further evaluation if clinically indicated. Associated soft tissue swelling is present. Signed By: Lisset Gustafson MD on 6/11/2020 12:02 PM   Status Results Details     Encounter Summary   ED CT HEAD NO CONTRAST6/11/2020  [facility]  Result Impression     1.  No acute intracranial abnormalities are identified. 2. White matter findings, non-specific, but typically reflecting chronic ischemic change in a patient of this age. Stable bilateral basal ganglia lacunar infarcts. Please note that head CT may be negative in the acute setting and MRI could best further evaluate for acute/subacute ischemia/infarct in the high/persistent index of clinical suspicion.     Signed By: Kamar Landon MD on 6/11/2020 11:44 AM   Result Narrative   EXAM: CT head    HISTORY:   -From Provider: Head trauma, minor, GCS>=13, NOC/NEXUS/CCR postive, first study  -Additional: None    COMPARISON: 11/22/2019    TECHNIQUE: Axial CT imaging of the head was performed without intravenous contrast. Sagittal and coronal reconstructions were created from the axial data. One or more dose reduction techniques were used on this CT: automated exposure control, adjustment of the mAs and/or kVp according to patient size, and iterative reconstruction techniques.  The specific techniques used on this CT exam have been documented in the patient's electronic medical record. Digital Imaging and Communications in Medicine (DICOM) format image data are available to nonaffiliated external healthcare facilities or entities on a secure, media free, reciprocally searchable basis with patient authorization for at least a 12-month period after this study. _______________    FINDINGS:      BRAIN, POSTERIOR FOSSA, EXTRA-AXIAL SPACES AND MENINGES:   The sulci, folia, ventricles and basal cisterns are   within normal limits for the patient's age. Mild periventricular hypodensity. Patchy hypodensity in the basal ganglia.     There is no intracranial hemorrhage, mass effect, or midline shift. There are no abnormal extra-axial fluid collections. CALVARIUM: Intact. SINUSES/MASTOIDS: Clear in visualized extent. OTHER: Streak artifact from cervical fusion hardware partially obscures the skull base/foramen magnum. .     _______________   Other Result Information   Interface, [facility] - 06/11/2020 11:46 AM EDT  EXAM: CT head    HISTORY:   -From Provider: Head trauma, minor, GCS>=13, NOC/NEXUS/CCR postive, first study  -Additional: None    COMPARISON: 11/22/2019    TECHNIQUE: Axial CT imaging of the head was performed without intravenous contrast. Sagittal and coronal reconstructions were created from the axial data.       One or more dose reduction techniques were used on this CT: automated exposure control, adjustment of the mAs and/or kVp according to patient size, and iterative reconstruction techniques. The specific techniques used on this CT exam have been documented in the patient's electronic medical record. Digital Imaging and Communications in Medicine (DICOM) format image data are available to nonaffiliated external healthcare facilities or entities on a secure, media free, reciprocally searchable basis with patient authorization for at least a 12-month period after this study. _______________    FINDINGS:      BRAIN, POSTERIOR FOSSA, EXTRA-AXIAL SPACES AND MENINGES:   The sulci, folia, ventricles and basal cisterns are   within normal limits for the patient's age. Mild periventricular hypodensity. Patchy hypodensity in the basal ganglia. There is no intracranial hemorrhage, mass effect, or midline shift. There are no abnormal extra-axial fluid collections. CALVARIUM: Intact. SINUSES/MASTOIDS: Clear in visualized extent. OTHER: Streak artifact from cervical fusion hardware partially obscures the skull base/foramen magnum. .     _______________    IMPRESSION    1. No acute intracranial abnormalities are identified. 2. White matter findings, non-specific, but typically reflecting chronic ischemic change in a patient of this age. Stable bilateral basal ganglia lacunar infarcts. Please note that head CT may be negative in the acute setting and MRI could best further evaluate for acute/subacute ischemia/infarct in the high/persistent index of clinical suspicion.     Signed By: Jody Simmons MD on 6/11/2020 11:44 AM   Status Results Details     Encounter Summary        DIAGNOSTIC IMAGING  LAB DATA      Lab Results   Component Value Date/Time    Hemoglobin A1c 5.8 (H) 03/18/2019 04:07 PM    Hemoglobin A1c 5.5 10/03/2017 02:21 AM    //   Lab Results   Component Value Date/Time    Glucose 85 02/28/2020 01:30 PM    Glucose,  (H) 09/30/2019 03:39 PM        No results found for: QTQ9ZBLO, UVO8MMKZ      No results found for: Santo Weller, [address], Shun Langley, XQVID, VD3RIA, [id number]      REVIEW OF SYSTEMS : 7/15/2020  ALL BELOW ARE Negative except : SEE HPI     Constitutional: Negative for fever, chills and weight loss. Neg Weight Loss  Cardiovascular: Negative for chest pain, claudication and leg swelling. SOB, WEEKS   Gastrointestinal/Urological: Negative for pain, N/V/D/C, Blood in stool or urine,dysuria, Hematuria, Incontinence  Musculoskeletal: see HPI. Neurological: Negative for dizziness and weakness, headaches,Visual Changes or             Confusion, or Seizures,   Psychiatric/Behavioral: Negative for depression, memory loss and substance abuse. Extremities:  Negative for hair changes, rash or skin lesion changes. Hematologic: Negative for Bleeding problems, bruising, pallor or swollen lymph nodes. Peripheral Vascular: No calf pain, vascular vein tenderness to calf pain// No calf throbbing, posterior knee throbbing pain     DIAGNOSTIC IMAGING      Please see above section of this report. I have personally reviewed the results of the above study. The interpretation of this study is my professional opinion.       Alexis Gaspar MD  7/15/2020  12:24 PM

## 2020-07-26 ENCOUNTER — APPOINTMENT (OUTPATIENT)
Dept: GENERAL RADIOLOGY | Age: 75
End: 2020-07-26
Attending: EMERGENCY MEDICINE
Payer: MEDICARE

## 2020-07-26 ENCOUNTER — HOSPITAL ENCOUNTER (EMERGENCY)
Age: 75
Discharge: HOME | End: 2020-07-26
Attending: EMERGENCY MEDICINE
Payer: MEDICARE

## 2020-07-26 ENCOUNTER — APPOINTMENT (OUTPATIENT)
Dept: CT IMAGING | Age: 75
End: 2020-07-26
Attending: EMERGENCY MEDICINE
Payer: MEDICARE

## 2020-07-26 VITALS
SYSTOLIC BLOOD PRESSURE: 104 MMHG | TEMPERATURE: 98.3 F | HEART RATE: 66 BPM | OXYGEN SATURATION: 100 % | RESPIRATION RATE: 16 BRPM | DIASTOLIC BLOOD PRESSURE: 68 MMHG
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
## 2020-07-26 DIAGNOSIS — S42.211A CLOSED DISPLACED FRACTURE OF SURGICAL NECK OF RIGHT HUMERUS, UNSPECIFIED FRACTURE MORPHOLOGY, INITIAL ENCOUNTER: Primary | ICD-10-CM

## 2020-07-26 PROCEDURE — 96374 THER/PROPH/DIAG INJ IV PUSH: CPT

## 2020-07-26 PROCEDURE — 73030 X-RAY EXAM OF SHOULDER: CPT

## 2020-07-26 PROCEDURE — 74011250636 HC RX REV CODE- 250/636: Performed by: EMERGENCY MEDICINE

## 2020-07-26 PROCEDURE — 99283 EMERGENCY DEPT VISIT LOW MDM: CPT

## 2020-07-26 PROCEDURE — 72125 CT NECK SPINE W/O DYE: CPT

## 2020-07-26 PROCEDURE — 96375 TX/PRO/DX INJ NEW DRUG ADDON: CPT

## 2020-07-26 RX ORDER — NALOXONE HYDROCHLORIDE 4 MG/.1ML
SPRAY NASAL
Qty: 1 EACH | Refills: 0 | Status: SHIPPED | OUTPATIENT
Start: 2020-07-26 | End: 2021-02-04

## 2020-07-26 RX ORDER — IBUPROFEN 400 MG/1
400 TABLET ORAL
Qty: 8 TAB | Refills: 0 | Status: SHIPPED | OUTPATIENT
Start: 2020-07-26

## 2020-07-26 RX ORDER — ACETAMINOPHEN AND CODEINE PHOSPHATE 300; 30 MG/1; MG/1
1 TABLET ORAL
Qty: 6 TAB | Refills: 0 | Status: SHIPPED | OUTPATIENT
Start: 2020-07-26 | End: 2020-07-29

## 2020-07-26 RX ORDER — KETOROLAC TROMETHAMINE 15 MG/ML
15 INJECTION, SOLUTION INTRAMUSCULAR; INTRAVENOUS
Status: COMPLETED | OUTPATIENT
Start: 2020-07-26 | End: 2020-07-26

## 2020-07-26 RX ORDER — FENTANYL CITRATE 50 UG/ML
25 INJECTION, SOLUTION INTRAMUSCULAR; INTRAVENOUS
Status: COMPLETED | OUTPATIENT
Start: 2020-07-26 | End: 2020-07-26

## 2020-07-26 RX ADMIN — FENTANYL CITRATE 25 MCG: 50 INJECTION, SOLUTION INTRAMUSCULAR; INTRAVENOUS at 10:51

## 2020-07-26 RX ADMIN — KETOROLAC TROMETHAMINE 15 MG: 15 INJECTION, SOLUTION INTRAMUSCULAR; INTRAVENOUS at 14:30

## 2020-07-26 NOTE — ED PROVIDER NOTES
EMERGENCY DEPARTMENT HISTORY AND PHYSICAL EXAM    10:44 AM      Date: 7/26/2020  Patient Name: Tayo Prieto    History of Presenting Illness     Chief Complaint   Patient presents with    Shoulder Injury         History Provided By: Patient  Location/Duration/Severity/Modifying factors   Patient is a 68-year-old female with a history of spinal cord injury after a fall, chronic weakness of her lower extremities but is able to walk with a walker, chronic pain, and dyslipidemia the presents emergency department with complaint of right shoulder pain after a fall. Patient was getting up with a walker yesterday a hardwood floor and then she had her legs weekend she fell to the ground landing on her right shoulder. Patient had pain at that time however she was go to be okay and then this morning have increasing pain EMS was called evaluate the patient. Patient notes that she cannot move her right shoulder due to the pain. Patient denies new neck pain however has chronic pain so is hard to say if anything is changed based on history. Patient denies any nausea, vomiting, chest pain, shortness of breath, or lightheadedness. Patient has not a smoker, drinker, or drug user. Patient was given 50 mcg of fentanyl by EMS. I did assist with the pain she is still complains of severe right shoulder pain with any movement. PCP: None    Current Outpatient Medications   Medication Sig Dispense Refill    ibuprofen (MOTRIN) 400 mg tablet Take 1 Tab by mouth every six (6) hours as needed for Pain. 8 Tab 0    cyanocobalamin (Vitamin B-12) 100 mcg tablet Take 100 mcg by mouth daily.  baclofen (LIORESAL) 10 mg tablet Take 1 Tab by mouth two (2) times a day. 60 Tab 2    ALPRAZolam (XANAX) 0.25 mg tablet Take 1 Tab by mouth two (2) times daily as needed for Anxiety.  Max Daily Amount: 0.5 mg. 60 Tab 0    busPIRone (BUSPAR) 7.5 mg tablet take 1 tablet by mouth twice a day 180 Tab 0    ondansetron (ZOFRAN ODT) 4 mg disintegrating tablet dissolve 1 tablet ON TONGUE every 6 hours if needed for nausea OR vomiting 90 Tab 0    omeprazole (PRILOSEC) 20 mg capsule take 1 capsule by mouth once daily before breakfast 90 Cap 1    gabapentin (NEURONTIN) 100 mg capsule Take 2 Caps by mouth three (3) times daily. Max Daily Amount: 600 mg. (Patient taking differently: Take 300 mg by mouth three (3) times daily. Takes 300mg TID) 180 Cap 1    denosumab (PROLIA) 60 mg/mL injection 60 mg by SubCUTAneous route every 6 months.  acetaminophen (TYLENOL) 160 mg/5 mL elixir Take 1,000 mg by mouth daily.  midodrine (PROAMITINE) 5 mg tablet Take 5 mg by mouth two (2) times a day. 1 tab by mouth with breakfast and lunch      atorvastatin (LIPITOR) 20 mg tablet take 1 tablet by mouth once daily 90 Tab 3    loratadine (CLARITIN) 10 mg tablet Take 1 Tab by mouth daily. 90 Tab 1    turmeric 400 mg cap Take 1 Cap by mouth daily.  aspirin 81 mg chewable tablet Take 1 Tab by mouth daily.  30 Tab 3       Past History     Past Medical History:  Past Medical History:   Diagnosis Date    Abnormal Pap smear     Dr. Brooke Shook Allergic rhinitis     Arthritis     Cerebrovascular small vessel disease 3/18/2019    CT 3/17/2019    Cervical spinal cord compression (HCC)     Chronic pain     Concentric left ventricular hypertrophy 3/18/2019    With left ventricular diastolic dysfunction by echocardiogram 3/18/2019    Hypercholesterolemia     Neck pain 5/17/2010    Stroke [facility] [address] 10/02/2017    TIA- legs weak memory issues       Past Surgical History:  Past Surgical History:   Procedure Laterality Date    COLONOSCOPY N/A 6/4/2018    COLONOSCOPY / polypectomy [facility] Bl[facility] ENDOSCOPY    HX GYN      Total Hyst    HX LAP CHOLECYSTECTOMY      HX OTHER SURGICAL  2017    Throat widened       Family History:  Family History   Problem Relation Age of Onset    Cancer Mother         breast    Heart Disease Father        Social History:  Social History     Tobacco Use    Smoking status: Never Smoker    Smokeless tobacco: Never Used   Substance Use Topics    Alcohol use: No    Drug use: No       Allergies: Allergies   Allergen Reactions    Baclofen Shortness of Breath     Denies. Pt tolerates    Morphine Other (comments)     hallucinations    Celebrex [Celecoxib] Other (comments)     Tiredness          Review of Systems       Review of Systems   Constitutional: Negative for activity change, fatigue and fever. HENT: Negative for congestion and rhinorrhea. Eyes: Negative for visual disturbance. Respiratory: Negative for shortness of breath. Cardiovascular: Negative for chest pain and palpitations. Gastrointestinal: Negative for abdominal pain, diarrhea, nausea and vomiting. Genitourinary: Negative for dysuria and hematuria. Musculoskeletal: Positive for arthralgias and myalgias. Negative for back pain. Skin: Negative for rash. Neurological: Negative for dizziness, weakness and light-headedness. All other systems reviewed and are negative. Physical Exam     Visit Vitals  /68   Pulse 66   Temp 98.3 °F (36.8 °C)   Resp 16   LMP  (LMP Unknown)   SpO2 100%         Physical Exam  Vitals signs and nursing note reviewed. Constitutional:       General: She is not in acute distress. Appearance: She is well-developed. Comments: Thin, pain with minimal movement of the right shoulder   HENT:      Head: Normocephalic and atraumatic. Right Ear: External ear normal.      Left Ear: External ear normal.      Nose: Nose normal.   Eyes:      General: No scleral icterus. Conjunctiva/sclera: Conjunctivae normal.      Pupils: Pupils are equal, round, and reactive to light. Neck:      Musculoskeletal: Neck supple. Thyroid: No thyromegaly. Vascular: No JVD. Trachea: No tracheal deviation.       Comments: Poorly localized neck pain without step-off  Cardiovascular:      Rate and Rhythm: Normal rate and regular rhythm. Heart sounds: Normal heart sounds. No murmur. No friction rub. No gallop. Pulmonary:      Effort: Pulmonary effort is normal.      Breath sounds: Normal breath sounds. Chest:      Chest wall: No tenderness. Abdominal:      General: Bowel sounds are normal. There is no distension. Palpations: Abdomen is soft. Tenderness: There is no abdominal tenderness. There is no guarding or rebound. Musculoskeletal: Normal range of motion. General: Tenderness present. Comments: Right shoulder with tenderness and ecchymosis, deformity noted, distal pulses intact, sensory difficult to determine due to pain   Lymphadenopathy:      Cervical: No cervical adenopathy. Skin:     General: Skin is warm and dry. Capillary Refill: Capillary refill takes less than 2 seconds. Neurological:      Mental Status: She is alert and oriented to person, place, and time. Cranial Nerves: No cranial nerve deficit. Coordination: Coordination normal.      Comments: Globally weak, paresthesias lower extremities that are chronic, gait not observed, full strength in left upper extremity, right upper extremity difficult to evaluate due to pain   Psychiatric:         Behavior: Behavior normal.         Thought Content: Thought content normal.         Judgment: Judgment normal.      Comments: No family currently at the bedside           Diagnostic Study Results     Labs -  No results found for this or any previous visit (from the past 12 hour(s)). Radiologic Studies -   CT SPINE CERV WO CONT   Final Result   IMPRESSION:      1. No convincing evidence for acute fracture or acute malalignment. 2. Chronic multilevel listhesis, degenerative change, and postsurgical changes   as described. 3. Proximal right humeral fracture better seen on same day dedicated right   shoulder radiograph      XR SHOULDER RT AP/LAT MIN 2 V   Final Result   IMPRESSION:   1.  Comminuted fracture of the proximal right humerus as described. Right shoulder x-ray: Minimally displaced humeral neck fracture without dislocation interpreted by me    Medical Decision Making   I am the first provider for this patient. I reviewed the vital signs, available nursing notes, past medical history, past surgical history, family history and social history. Vital Signs-Reviewed the patient's vital signs. Records Reviewed: Nursing Notes and Old Medical Records (Time of Review: 10:44 AM)    ED Course: Progress Notes, Reevaluation, and Consults: The patient appears to have a minimally displaced humeral neck fracture and no dislocation. Will place a sling and awaiting CT results of the neck. Zenaida Miller,  12:36 PM    Patient's imaging is reassuring of the C-spine for anything acute however does have the comminuted minimally displaced fracture of the humeral neck. Patient is now in a sling and is good position and she is comfortable will discuss care with her partner Malvin Goff regarding outpatient care plan. The patient does have a wheelchair at home and uses it and will need to use until her arm heals. Zenaida Miller,  1:29 PM    Extended Emergency Contact Information  Primary Emergency Contact: Fabian Valdovinos, [address]  Phone: [phone]  Mobile Phone: [phone]  Relation: Boyfriend  Secondary Emergency Contact: [address] Phone: [phone]  Relation: Son    Patient's partner was on the waiting room and could not be contacted by the phone number in the chart. We will proceed with discharge. Patient is moving her hips well and has no headache or signs of head injury. She is on multiple sedating medications we will give her an NSAID for pain instead of adding an opiate to her pain control. I discussed the case with Dr. Felicia Burt and will see the patient in the office tomorrow at [address] [place] [address]. Jett Lam Altaf, DO 1:40 PM    Patient is having continued pain and will add Tylenol 3 to her pain control medications at home with Narcan as she is on benzos as well. Patient understands that the Tylenol 3 is for breakthrough pain. Greg Travis, DO 2:44 PM        Provider Notes (Medical Decision Making):   MDM  Number of Diagnoses or Management Options  Diagnosis management comments: Patient is a 72-year-old female with a history of chronic neck pain, weakness from a neck injury, debility, the presents emergency department after a fall onto her right side with right shoulder pain and concern for dislocation. Patient does not appear to have a deformity of her joint however does have some proximal humerus deformity and suspect fracture over dislocation however dislocation still possible. CT cervical spine because she has chronic pain is difficult evaluation if anything change. Patient denies hitting her head denies any loss of consciousness. We will CT her cervical spine, control her pain, right shoulder x-ray, and then reevaluate. Procedures          Diagnosis     Clinical Impression:   1. Closed displaced fracture of surgical neck of right humerus, unspecified fracture morphology, initial encounter    2. Fall, initial encounter        Disposition: [place]    Follow-up Information     Follow up With Specialties Details Why [address] [place] [address]  In 1 day  [address] [place] [address] Merissa Montelongo Ashu Ordaz [address]  [phone]    [facility] EMERGENCY DEPT Emergency Medicine  As needed, If symptoms worsen [address] [place] [address]  [phone]           Patient's Medications   Start Taking    IBUPROFEN (MOTRIN) 400 MG TABLET    Take 1 Tab by mouth every six (6) hours as needed for Pain. Continue Taking    ACETAMINOPHEN (TYLENOL) 160 MG/5 ML ELIXIR    Take 1,000 mg by mouth daily. ALPRAZOLAM (XANAX) 0.25 MG TABLET    Take 1 Tab by mouth two (2) times daily as needed for Anxiety.  Max Daily Amount: 0.5 mg.    ASPIRIN 81 MG CHEWABLE TABLET    Take 1 Tab by mouth daily. ATORVASTATIN (LIPITOR) 20 MG TABLET    take 1 tablet by mouth once daily    BACLOFEN (LIORESAL) 10 MG TABLET    Take 1 Tab by mouth two (2) times a day. BUSPIRONE (BUSPAR) 7.5 MG TABLET    take 1 tablet by mouth twice a day    CYANOCOBALAMIN (VITAMIN B-12) 100 MCG TABLET    Take 100 mcg by mouth daily. DENOSUMAB (PROLIA) 60 MG/ML INJECTION    60 mg by SubCUTAneous route every 6 months. GABAPENTIN (NEURONTIN) 100 MG CAPSULE    Take 2 Caps by mouth three (3) times daily. Max Daily Amount: 600 mg. LORATADINE (CLARITIN) 10 MG TABLET    Take 1 Tab by mouth daily. MIDODRINE (PROAMITINE) 5 MG TABLET    Take 5 mg by mouth two (2) times a day. 1 tab by mouth with breakfast and lunch    OMEPRAZOLE (PRILOSEC) 20 MG CAPSULE    take 1 capsule by mouth once daily before breakfast    ONDANSETRON (ZOFRAN ODT) 4 MG DISINTEGRATING TABLET    dissolve 1 tablet ON TONGUE every 6 hours if needed for nausea OR vomiting    TURMERIC 400 MG CAP    Take 1 Cap by mouth daily. These Medications have changed    No medications on file   Stop Taking    LORAZEPAM (ATIVAN) 0.5 MG TABLET    Take 1 Tab by mouth every eight (8) hours as needed for Anxiety. Max Daily Amount: 1.5 mg.     Disclaimer: Sections of this note are dictated using utilizing voice recognition software. Minor typographical errors may be present. If questions arise, please do not hesitate to contact me or call our department.

## 2020-07-26 NOTE — DISCHARGE INSTRUCTIONS
Patient Education        Broken Arm: Care Instructions  Your Care Instructions  Fractures can range from a small, hairline crack, to a bone or bones broken into two or more pieces. Your treatment depends on how bad the break is. Your doctor may have put your arm in a splint or cast to allow it to heal or to keep it stable until you see another doctor. It may take weeks or months for your arm to heal. You can help your arm heal with some care at home. You heal best when you take good care of yourself. Eat a variety of healthy foods, and don't smoke. You may have had a sedative to help you relax. You may be unsteady after having sedation. It can take a few hours for the medicine's effects to wear off. Common side effects of sedation include nausea, vomiting, and feeling sleepy or tired. The doctor has checked you carefully, but problems can develop later. If you notice any problems or new symptoms, get medical treatment right away. Follow-up care is a key part of your treatment and safety. Be sure to make and go to all appointments, and call your doctor if you are having problems. It's also a good idea to know your test results and keep a list of the medicines you take. How can you care for yourself at home? · If the doctor gave you a sedative:  ? For 24 hours, don't do anything that requires attention to detail, such as going to work, making important decisions, or signing any legal documents. It takes time for the medicine's effects to completely wear off.  ? For your safety, do not drive or operate any machinery that could be dangerous. Wait until the medicine wears off and you can think clearly and react easily. · Put ice or a cold pack on your arm for 10 to 20 minutes at a time. Try to do this every 1 to 2 hours for the next 3 days (when you are awake). Put a thin cloth between the ice and your cast or splint. Keep the cast or splint dry. · Follow the cast care instructions your doctor gives you.  If you have a splint, do not take it off unless your doctor tells you to. · Be safe with medicines. Take pain medicines exactly as directed. ? If the doctor gave you a prescription medicine for pain, take it as prescribed. ? If you are not taking a prescription pain medicine, ask your doctor if you can take an over-the-counter medicine. · Prop up your arm on pillows when you sit or lie down in the first few days after the injury. Keep the arm higher than the level of your heart. This will help reduce swelling. · Follow instructions for exercises to keep your arm strong. · Wiggle your fingers and wrist often to reduce swelling and stiffness. When should you call for help? [id number] anytime you think you may need emergency care. For example, call if:  · You are very sleepy and you have trouble waking up. Call your doctor now or seek immediate medical care if:  · You have new or worse nausea or vomiting. · You have new or worse pain. · Your hand or fingers are cool or pale or change color. · Your cast or splint feels too tight. · You have tingling, weakness, or numbness in your hand or fingers. Watch closely for changes in your health, and be sure to contact your doctor if:  · You do not get better as expected. · You have problems with your cast or splint. Where can you learn more? Go to http://karin-michaela.info/  Enter J298 in the search box to learn more about \"Broken Arm: Care Instructions. \"  Current as of: March 2, 2020               Content Version: 12.5  © 2006-2020 Healthwise, Incorporated. Care instructions adapted under license by [employer] (which disclaims liability or warranty for this information). If you have questions about a medical condition or this instruction, always ask your healthcare professional. [place] [address] any warranty or liability for your use of this information.          Patient Education        Preventing Falls: Care Instructions  Your Care Instructions     Getting around your home safely can be a challenge if you have injuries or health problems that make it easy for you to fall. Loose rugs and furniture in walkways are among the dangers for many older people who have problems walking or who have poor eyesight. People who have conditions such as arthritis, osteoporosis, or dementia also have to be careful not to fall. You can make your home safer with a few simple measures. Follow-up care is a key part of your treatment and safety. Be sure to make and go to all appointments, and call your doctor if you are having problems. It's also a good idea to know your test results and keep a list of the medicines you take. How can you care for yourself at home? Taking care of yourself  · You may get dizzy if you do not drink enough water. To prevent dehydration, drink plenty of fluids, enough so that your urine is light yellow or clear like water. Choose water and other caffeine-free clear liquids. If you have kidney, heart, or liver disease and have to limit fluids, talk with your doctor before you increase the amount of fluids you drink. · Exercise regularly to improve your strength, muscle tone, and balance. Walk if you can. Swimming may be a good choice if you cannot walk easily. · Have your vision and hearing checked each year or any time you notice a change. If you have trouble seeing and hearing, you might not be able to avoid objects and could lose your balance. · Know the side effects of the medicines you take. Ask your doctor or pharmacist whether the medicines you take can affect your balance. Sleeping pills or sedatives can affect your balance. · Limit the amount of alcohol you drink. Alcohol can impair your balance and other senses. · Ask your doctor whether calluses or corns on your feet need to be removed. If you wear loose-fitting shoes because of calluses or corns, you can lose your balance and fall.   · Talk to your doctor if you have numbness in your feet. Preventing falls at home  · Remove raised doorway thresholds, throw rugs, and clutter. Repair loose carpet or raised areas in the floor. · Move furniture and electrical cords to keep them out of walking paths. · Use nonskid floor wax, and wipe up spills right away, especially on ceramic tile floors. · If you use a walker or cane, put rubber tips on it. If you use crutches, clean the bottoms of them regularly with an abrasive pad, such as steel wool. · Keep your house well lit, especially Corlis Storm, and outside walkways. Use night-lights in areas such as hallways and bathrooms. Add extra light switches or use remote switches (such as switches that go on or off when you clap your hands) to make it easier to turn lights on if you have to get up during the night. · Install sturdy handrails on stairways. · Move items in your cabinets so that the things you use a lot are on the lower shelves (about waist level). · Keep a cordless phone and a flashlight with new batteries by your bed. If possible, put a phone in each of the main rooms of your house, or carry a cell phone in case you fall and cannot reach a phone. Or, you can wear a device around your neck or wrist. You push a button that sends a signal for help. · Wear low-heeled shoes that fit well and give your feet good support. Use footwear with nonskid soles. Check the heels and soles of your shoes for wear. Repair or replace worn heels or soles. · Do not wear socks without shoes on wood floors. · Walk on the grass when the sidewalks are slippery. If you live in an area that gets snow and ice in the winter, sprinkle salt on slippery steps and sidewalks. Preventing falls in the bath  · Install grab bars and nonskid mats inside and outside your shower or tub and near the toilet and sinks. · Use shower chairs and bath benches.   · Use a hand-held shower head that will allow you to sit while showering. · Get into a tub or shower by putting the weaker leg in first. Get out of a tub or shower with your strong side first.  · Repair loose toilet seats and consider installing a raised toilet seat to make getting on and off the toilet easier. · Keep your bathroom door unlocked while you are in the shower. Where can you learn more? Go to http://karin-michaela.info/  Enter G117 in the search box to learn more about \"Preventing Falls: Care Instructions. \"  Current as of: August 7, 2019               Content Version: 12.5  © 1722-3219 Healthwise, Incorporated. Care instructions adapted under license by [employer] (which disclaims liability or warranty for this information). If you have questions about a medical condition or this instruction, always ask your healthcare professional. [address] any warranty or liability for your use of this information.

## 2020-07-26 NOTE — ED TRIAGE NOTES
Pt arrived via ems from home with complains of right shoulder injury. Pt fell last night and now cannot move right arm.

## 2020-07-27 ENCOUNTER — OFFICE VISIT (OUTPATIENT)
Dept: ORTHOPEDIC SURGERY | Facility: CLINIC | Age: 75
End: 2020-07-27

## 2020-07-27 VITALS
DIASTOLIC BLOOD PRESSURE: 52 MMHG | TEMPERATURE: 97.5 F | SYSTOLIC BLOOD PRESSURE: 81 MMHG | OXYGEN SATURATION: 93 % | HEART RATE: 93 BPM | RESPIRATION RATE: 18 BRPM

## 2020-07-27 DIAGNOSIS — M75.41 IMPINGEMENT SYNDROME OF RIGHT SHOULDER: ICD-10-CM

## 2020-07-27 DIAGNOSIS — M25.511 ACUTE PAIN OF RIGHT SHOULDER: ICD-10-CM

## 2020-07-27 DIAGNOSIS — M81.0 OSTEOPOROSIS, UNSPECIFIED OSTEOPOROSIS TYPE, UNSPECIFIED PATHOLOGICAL FRACTURE PRESENCE: ICD-10-CM

## 2020-07-27 DIAGNOSIS — M19.011 PRIMARY OSTEOARTHRITIS, RIGHT SHOULDER: ICD-10-CM

## 2020-07-27 DIAGNOSIS — Z91.81 RISK FOR FALLS: ICD-10-CM

## 2020-07-27 DIAGNOSIS — S42.201A CLOSED FRACTURE OF PROXIMAL END OF RIGHT HUMERUS, UNSPECIFIED FRACTURE MORPHOLOGY, INITIAL ENCOUNTER: Primary | ICD-10-CM

## 2020-07-27 NOTE — PROGRESS NOTES
Patient: Tayo Prieto                MRN: [id number]       SSN: [id number]  YOB: 1945        AGE: 70 y.o. SEX: female    PCP: None  07/27/20    Chief Complaint   Patient presents with    Shoulder Injury     right     HISTORY:  Tayo Prieto is a 70 y.o. female who sustained a right shoulder injury on 7/26/20. She landed on her right side when her legs gave out as she was getting up from a recliner. She was seen at [facility] on the same day where right shoulder x rays revealed a comminuted fracture of the proximal right humerus. She was provided an arm sling and referred for orthopedic consultation. She has been experiencing pain and swelling since the injury. She states she has leg weakness and dizziness because of her gabapentin medication. She reports she was able to walk, stay home alone, and even do dishes before starting gabapentin. She is s/p multi level posterior cervical fusion 11/20/19 by Dr. Chanelle Goldman. She is currently being treated for foot pain by Dr. Clint Jasmine. Pain Assessment  7/27/2020   Location of Pain Shoulder   Pain Location Comment -   Location Modifiers Right   Severity of Pain 8   Quality of Pain Throbbing   Quality of Pain Comment -   Duration of Pain Persistent   Frequency of Pain Constant   Aggravating Factors -   Aggravating Factors Comment -   Limiting Behavior Yes   Relieving Factors -   Relieving Factors Comment -   Result of Injury Yes   Work-Related Injury No   Type of Injury Fall     Occupation, etc:  Ms. 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 She lives in [place] with her partner, Pattie Mims. She has 3 sons-- one in the area. She has 5 grandchildren. She has 16 steps to get to her home. She does not exercise. She recently lost 9 pounds due to lack of appetite. Ms. Lolita Owen weighs 91 lbs and is 5'0\" tall.        Lab Results   Component Value Date/Time    Hemoglobin A1c 5.8 (H) 03/18/2019 04:07 PM     Weight Metrics 7/27/2020 7/15/2020 6/11/2020 4/1/2020 3/19/2020 3/9/2020 2/28/2020   Weight - 91 lb - - 100 lb - 107 lb   BMI - 17.77 kg/m2 19.53 kg/m2 19.53 kg/m2 19.53 kg/m2 20.9 kg/m2 20.9 kg/m2       Patient Active Problem List   Diagnosis Code    Cervical neck pain with evidence of disc disease M50.90    CVA (cerebral vascular accident) ([facility] [address] I63.9    TIA (transient ischemic attack) G45.9    Cervical facet syndrome M47.812    Spondylosis of cervical region without myelopathy or radiculopathy M47.812    Cervical spinal stenosis M48.02    Degenerative disc disease, cervical M50.30    Chronic pain syndrome G89.4    Cervical radiculitis M54.12    Myofascial pain syndrome M79.18    Hyperlipidemia E78.5    Anxiety F41.9    Osteoarthritis of cervical spine M47.812    Osteoporosis M81.0    Vertigo R42    Dizziness R42    Weakness R53.1    Concentric left ventricular hypertrophy I51.7    Cerebrovascular small vessel disease I67.9    Cervical cord myelomalacia (HCC) G95.89     REVIEW OF SYSTEMS:    Constitutional Symptoms: Negative   Eyes: Negative   Ears, Nose, Throat and Mouth: Negative   Cardiovascular: Negative   Respiratory: Negative   Genitourinary: Per HPI   Gastrointestinal: Per HPI   Integumentary (Skin and/or Breast): Negative   Musculoskeletal: Per HPI   Endocrine/Rheumatologic: Negative   Neurological: Per HPI   Hematology/Lymphatic: Negative    Allergic/Immunologic: Negative   Phychiatric: Negative    Social History     Socioeconomic History    Marital status:      Spouse name: Not on file    Number of children: Not on file    Years of education: Not on file    Highest education level: Not on file   Occupational History    Not on file   Social Needs    Financial resource strain: Not on file    Food insecurity     Worry: Not on file     Inability: Not on file    Transportation needs     Medical: Not on file     Non-medical: Not on file   Tobacco Use    Smoking status: Never Smoker  Smokeless tobacco: Never Used   Substance and Sexual Activity    Alcohol use: No    Drug use: No    Sexual activity: Never   Lifestyle    Physical activity     Days per week: Not on file     Minutes per session: Not on file    Stress: Not on file   Relationships    Social connections     Talks on phone: Not on file     Gets together: Not on file     Attends Islam service: Not on file     Active member of club or organization: Not on file     Attends meetings of clubs or organizations: Not on file     Relationship status: Not on file    Intimate partner violence     Fear of current or ex partner: Not on file     Emotionally abused: Not on file     Physically abused: Not on file     Forced sexual activity: Not on file   Other Topics Concern    Not on file   Social History Narrative    Not on file      Allergies   Allergen Reactions    Baclofen Shortness of Breath     Denies. Pt tolerates    Morphine Other (comments)     hallucinations    Celebrex [Celecoxib] Other (comments)     Tiredness       Current Outpatient Medications   Medication Sig    ibuprofen (MOTRIN) 400 mg tablet Take 1 Tab by mouth every six (6) hours as needed for Pain.  acetaminophen-codeine (Tylenol-Codeine #3) 300-30 mg per tablet Take 1 Tab by mouth every four (4) hours as needed for Pain for up to 3 days. Max Daily Amount: 6 Tabs.  naloxone (Narcan) 4 mg/actuation nasal spray Use 1 spray intranasally, then discard. Repeat with new spray every 2 min as needed for opioid overdose symptoms, alternating nostrils.  cyanocobalamin (Vitamin B-12) 100 mcg tablet Take 100 mcg by mouth daily.  baclofen (LIORESAL) 10 mg tablet Take 1 Tab by mouth two (2) times a day.  ALPRAZolam (XANAX) 0.25 mg tablet Take 1 Tab by mouth two (2) times daily as needed for Anxiety.  Max Daily Amount: 0.5 mg.    busPIRone (BUSPAR) 7.5 mg tablet take 1 tablet by mouth twice a day    ondansetron (ZOFRAN ODT) 4 mg disintegrating tablet dissolve 1 tablet ON TONGUE every 6 hours if needed for nausea OR vomiting    omeprazole (PRILOSEC) 20 mg capsule take 1 capsule by mouth once daily before breakfast    gabapentin (NEURONTIN) 100 mg capsule Take 2 Caps by mouth three (3) times daily. Max Daily Amount: 600 mg. (Patient taking differently: Take 300 mg by mouth three (3) times daily. Takes 300mg TID)    denosumab (PROLIA) 60 mg/mL injection 60 mg by SubCUTAneous route every 6 months.  acetaminophen (TYLENOL) 160 mg/5 mL elixir Take 1,000 mg by mouth daily.  midodrine (PROAMITINE) 5 mg tablet Take 5 mg by mouth two (2) times a day. 1 tab by mouth with breakfast and lunch    atorvastatin (LIPITOR) 20 mg tablet take 1 tablet by mouth once daily    loratadine (CLARITIN) 10 mg tablet Take 1 Tab by mouth daily.  turmeric 400 mg cap Take 1 Cap by mouth daily.  aspirin 81 mg chewable tablet Take 1 Tab by mouth daily. No current facility-administered medications for this visit. PHYSICAL EXAMINATION:  Visit Vitals  BP (!) 81/52 (BP 1 Location: Left arm, BP Patient Position: Sitting)   Pulse 93   Temp 97.5 °F (36.4 °C) (Temporal)   Resp 18   LMP  (LMP Unknown)   SpO2 93%    Appearance: Alert, well appearing and pleasant patient who is in no distress, oriented to person, place/time, and who follows commands. Wearing a sling R UE. HEENT: Anna Carrera hears well, does not require hearing aids. Her sclera of the eyes are non-icteric. She is breathing normally and no respiratory accessory muscle use is noted. No JVD present and Neck ROM within normal limits. Psychiatric: Affect and mood are appropriate. Oriented x3  Cardiovascular/Peripheral Vascular: Normal pulses to each foot. Integumentary: No rashes. Warm and normal color. No drainage.    Gait: nonambulatory in WC  Sensory Exam: Intact/Normal Sensation    Lymphatic: No evidence of Lymphedema  Vascular:       Pulses: palpable  Varicosities none  Wounds/Abrasion: None Present  Neuro: Negative, no tremors  ORTHO EXAMINATION:  Examination Right shoulder Left shoulder   Skin Intact Intact   Effusion - -   Biceps deformity - -   Atrophy - -   AC joint tenderness + -   Acromial tenderness +, proximal humeral tenderness +   Biceps tenderness - -   Forward flexion/Elevation  170   Active abduction  160   External rotation ROM 30 30   Internal rotation ROM 90 90   Apprehension - -   Impingement - -   Drop Arm Test - -   Neurovascular Intact Intact   Unable to exam range of motion due to pain    Examination Neck   Skin Intact, 5 in posterior cervical incision site, tens unit placed   Tenderness +   Tightness +   Flexion Decreased 25%   Extension Decreased 25%   Lateral bend left Normal   Lateral bend right Normal   Masses -   Biceps reflex Normal   Triceps reflex Normal   Brachioradialis reflex Normal   Very limited neck rotation  Absent neck extension   Ambulates in wheelchair    RADIOGRAPHS:  XR RIGHT SHOULDER 7/26/20 [facility]  IMPRESSION:  1. Comminuted fracture of the proximal right humerus as described.  -I have independently reviewed these images during this office visit. -Dr. Kasia Gonzalez:  Three views - fracture of proximal right humerus fractures, + acromioclavicular narrowing, + glenohumeral narrowing, + calcific densities, shoulder is well located, type 3 impingement. IMPRESSION:      ICD-10-CM ICD-9-CM    1. Closed fracture of proximal end of right humerus, unspecified fracture morphology, initial encounter  S42.201A 812.00 REFERRAL TO PHYSICAL THERAPY      CLOSED TX PROX HUMERUS FRACTURE      AMB SUPPLY ORDER   2. Risk for falls  Z91.81 V15.88    3. Acute pain of right shoulder  M25.511 719.41 REFERRAL TO PHYSICAL THERAPY      AMB SUPPLY ORDER   4. Primary osteoarthritis, right shoulder  M19.011 715.11 REFERRAL TO PHYSICAL THERAPY      AMB SUPPLY ORDER   5. Impingement syndrome of right shoulder  M75.41 726.2 REFERRAL TO PHYSICAL THERAPY      AMB SUPPLY ORDER   6.  Osteoporosis, unspecified osteoporosis type, unspecified pathological fracture presence  M81.0 733.00      PLAN: Right shoulder sling provided. She will start a brief course of outpatient physical therapy. We discussed the need to discontinue or diminish the dose of her gabapentin medication. There is no need for surgery at this time. She will follow up in 2 weeks with JULIA Valdes.       Scribed by Briana Ray ([address]) as dictated by Jeanna Weinstein MD

## 2020-07-28 ENCOUNTER — TELEPHONE (OUTPATIENT)
Dept: ORTHOPEDIC SURGERY | Age: 75
End: 2020-07-28

## 2020-07-28 RX ORDER — TOPIRAMATE 25 MG/1
25 TABLET ORAL 2 TIMES DAILY WITH MEALS
Qty: 60 TAB | Refills: 1 | Status: SHIPPED | OUTPATIENT
Start: 2020-07-28 | End: 2021-02-04

## 2020-07-28 NOTE — TELEPHONE ENCOUNTER
When I spoke to the pt earlier she was saying that she was taking 300 mg a day of the neurontin. Upon further discussion with her, she reports that she has been taking 300 mg three times a day. She was been using 600 mg tabs that she has been breaking in half. I spoke verbally to the provider about this dose and the wean. She states that the pt can still only take 1 tab twice a day for 5 days and then decrease down to 1 tab a day for 5 days. This being 300 mg. I called and spoke to the pt. She was notified of these instructions. She also verbally gave these instructions to  Shade Aron, who was in the background. She will contact the office if there are any issues or concerns.

## 2020-07-28 NOTE — TELEPHONE ENCOUNTER
Patient called again and is requesting a call back in regards to the medication. Patient said she has another medication that can possibly help her get off the Gabapentin. Patient said she does not know the name of the medication, and she is unable to get out of bed. Patient tel. [phone].

## 2020-07-28 NOTE — TELEPHONE ENCOUNTER
She should go down to 1 tab BID for 5 days then 1 tab once a day then stop it.  Suddenly stopping it can make the withdrawal worse

## 2020-07-28 NOTE — TELEPHONE ENCOUNTER
Patient called and stated that she can not continue to take the gabapentin. Patient states that she would like to know what to do. Patient states medication is causing weakness, dizziness, and nausea.      Patient is requesting that we speak with her caregiver for further details Richard Player [phone]    Best contact number for patient [phone]

## 2020-07-28 NOTE — TELEPHONE ENCOUNTER
I called and spoke to the pt about the new medication. She was advised that she will need to slowly wean off of the gabapentin over the next couple of weeks. She states that she will need more direction with this. She has been taking 100 mg three times a day and did yesterday. She has not taken any today because she has been having dry heaves. Does she need to take any today? Influenza Vaccination Influenza Vaccination/Stroke

## 2020-07-28 NOTE — TELEPHONE ENCOUNTER
Rx sent, may take 2-3 months to get dosing correct.  She should wean off Gabapentin slowly over next 2-3 wks

## 2020-07-28 NOTE — TELEPHONE ENCOUNTER
I called and spoke to Ms Abbe Dias. Mr. Teague Pawnee is not listed on her HIPAA form to discuss her health care. Pt was made aware of this. The pt was identified using 2 pt identifiers. She was asked if she had any history of glaucoma or kidney stones and she denies both. The pt was informed of the new medication that the provider would like to try. She is willing to try the medication. She wants to know how long it will take to work. The pt is aware that the message will be routed back to the provider for review. She will be contacted once the provider responds. The pt verbalized understanding and has no other questions or concerns at this time.

## 2020-07-31 ENCOUNTER — TELEPHONE (OUTPATIENT)
Dept: ORTHOPEDIC SURGERY | Age: 75
End: 2020-07-31

## 2020-07-31 NOTE — TELEPHONE ENCOUNTER
Patient is still awaiting a return phone call regarding the medication. Please return call.    [phone]

## 2020-07-31 NOTE — TELEPHONE ENCOUNTER
Patient called stating that the Topamax is making her really sick and was wondering if she can go back on the gabapentin.  Please advise patient at [phone]

## 2020-07-31 NOTE — TELEPHONE ENCOUNTER
I called and spoke to MsLenora Elder. The pt was identified using 2 pt identifiers. She was informed that she can go back to taking the gabapentin. The pt is not sure what dose to go back to. Prior to weaning herself down on the gabapentin she was taking 600 mg tabs and breaking them in half to take 300 mg TID. She states that she has a new bottle upstairs in her house. Does pt go back to her 300 mg TID dose or do a new titration?

## 2020-07-31 NOTE — TELEPHONE ENCOUNTER
I called Ms. Friedman back and gave her the provider's titration instructions. She verbalized understanding and has no questions at this time. She had her male friend also listen to the instructions in the back ground. She will call the office if she has any other issues.

## 2020-08-12 ENCOUNTER — OFFICE VISIT (OUTPATIENT)
Dept: ORTHOPEDIC SURGERY | Facility: CLINIC | Age: 75
End: 2020-08-12

## 2020-08-12 VITALS
HEART RATE: 112 BPM | OXYGEN SATURATION: 93 % | RESPIRATION RATE: 15 BRPM | TEMPERATURE: 97.4 F | DIASTOLIC BLOOD PRESSURE: 57 MMHG | WEIGHT: 90.4 LBS | HEIGHT: 60 IN | SYSTOLIC BLOOD PRESSURE: 98 MMHG | BODY MASS INDEX: 17.75 KG/M2

## 2020-08-12 DIAGNOSIS — S42.201D CLOSED FRACTURE OF PROXIMAL END OF RIGHT HUMERUS WITH ROUTINE HEALING, UNSPECIFIED FRACTURE MORPHOLOGY, SUBSEQUENT ENCOUNTER: ICD-10-CM

## 2020-08-12 DIAGNOSIS — G89.29 CHRONIC RIGHT SHOULDER PAIN: Primary | ICD-10-CM

## 2020-08-12 DIAGNOSIS — M25.511 CHRONIC RIGHT SHOULDER PAIN: Primary | ICD-10-CM

## 2020-08-12 NOTE — PROGRESS NOTES
Patient: Tayo Prieto                MRN: [id number]       SSN: [id number]  YOB: 1945        AGE: 70 y.o. SEX: female    PCP: None  08/12/20 8/12/2020: Patient follows up status post fall with a right proximal humeral fracture. She has been somewhat reluctant to start her own self-guided Codman exercises which were demonstrated at last OV. Her pain has improved generally but does recur it with particular movements of the right shoulder and elbow. She is alternating Tylenol with Motrin for symptom management. Chief Complaint   Patient presents with    Shoulder Pain     right shoulder pain     HISTORY:  Tayo Prieto is a 70 y.o. female who sustained a right shoulder injury on 7/26/20. She landed on her right side when her legs gave out as she was getting up from a recliner. She was seen at [facility] on the same day where right shoulder x rays revealed a comminuted fracture of the proximal right humerus. She was provided an arm sling and referred for orthopedic consultation. She has been experiencing pain and swelling since the injury. She states she has leg weakness and dizziness because of her gabapentin medication. She reports she was able to walk, stay home alone, and even do dishes before starting gabapentin. She is s/p multi level posterior cervical fusion 11/20/19 by Dr. Chanelle Goldman. She is currently being treated for foot pain by Dr. Clint Jasmine.     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 She lives in [place] with her partner, Pattie Mims. She has 3 sons-- one in the area. She has 5 grandchildren. She has 16 steps to get to her home. She does not exercise. She recently lost 9 pounds due to lack of appetite. Ms. Lolita Owen weighs 91 lbs and is 5'0\" tall.        Lab Results   Component Value Date/Time    Hemoglobin A1c 5.8 (H) 03/18/2019 04:07 PM     Weight Metrics 8/12/2020 7/27/2020 7/15/2020 6/11/2020 4/1/2020 3/19/2020 3/9/2020   Weight 90 lb 6.4 oz - 91 lb - - 100 lb -   BMI 17.66 kg/m2 - 17.77 kg/m2 19.53 kg/m2 19.53 kg/m2 19.53 kg/m2 20.9 kg/m2       Patient Active Problem List   Diagnosis Code    Cervical neck pain with evidence of disc disease M50.90    CVA (cerebral vascular accident) ([facility] [address] I63.9    TIA (transient ischemic attack) G45.9    Cervical facet syndrome M47.812    Spondylosis of cervical region without myelopathy or radiculopathy M47.812    Cervical spinal stenosis M48.02    Degenerative disc disease, cervical M50.30    Chronic pain syndrome G89.4    Cervical radiculitis M54.12    Myofascial pain syndrome M79.18    Hyperlipidemia E78.5    Anxiety F41.9    Osteoarthritis of cervical spine M47.812    Osteoporosis M81.0    Vertigo R42    Dizziness R42    Weakness R53.1    Concentric left ventricular hypertrophy I51.7    Cerebrovascular small vessel disease I67.9    Cervical cord myelomalacia (HCC) G95.89     REVIEW OF SYSTEMS:    Constitutional Symptoms: Negative   Eyes: Negative   Ears, Nose, Throat and Mouth: Negative   Cardiovascular: Negative   Respiratory: Negative   Genitourinary: Per HPI   Gastrointestinal: Per HPI   Integumentary (Skin and/or Breast): Negative   Musculoskeletal: Per HPI   Endocrine/Rheumatologic: Negative   Neurological: Per HPI   Hematology/Lymphatic: Negative    Allergic/Immunologic: Negative   Phychiatric: Negative    Social History     Socioeconomic History    Marital status:  Spouse name: Not on file    Number of children: Not on file    Years of education: Not on file    Highest education level: Not on file   Occupational History    Not on file   Social Needs    Financial resource strain: Not on file    Food insecurity     Worry: Not on file     Inability: Not on file    Transportation needs     Medical: Not on file     Non-medical: Not on file   Tobacco Use    Smoking status: Never Smoker    Smokeless tobacco: Never Used   Substance and Sexual Activity    Alcohol use: No    Drug use: No    Sexual activity: Never   Lifestyle    Physical activity     Days per week: Not on file     Minutes per session: Not on file    Stress: Not on file   Relationships    Social connections     Talks on phone: Not on file     Gets together: Not on file     Attends Orthodoxy service: Not on file     Active member of club or organization: Not on file     Attends meetings of clubs or organizations: Not on file     Relationship status: Not on file    Intimate partner violence     Fear of current or ex partner: Not on file     Emotionally abused: Not on file     Physically abused: Not on file     Forced sexual activity: Not on file   Other Topics Concern    Not on file   Social History Narrative    Not on file      Allergies   Allergen Reactions    Baclofen Shortness of Breath     Denies. Pt tolerates    Morphine Other (comments)     hallucinations    Celebrex [Celecoxib] Other (comments)     Tiredness       Current Outpatient Medications   Medication Sig    topiramate (Topamax) 25 mg tablet Take 1 Tab by mouth two (2) times daily (with meals).  cyanocobalamin (Vitamin B-12) 100 mcg tablet Take 100 mcg by mouth daily.  ALPRAZolam (XANAX) 0.25 mg tablet Take 1 Tab by mouth two (2) times daily as needed for Anxiety.  Max Daily Amount: 0.5 mg.    busPIRone (BUSPAR) 7.5 mg tablet take 1 tablet by mouth twice a day    ondansetron (ZOFRAN ODT) 4 mg disintegrating tablet dissolve 1 tablet ON TONGUE every 6 hours if needed for nausea OR vomiting    gabapentin (NEURONTIN) 100 mg capsule Take 2 Caps by mouth three (3) times daily. Max Daily Amount: 600 mg. (Patient taking differently: Take 300 mg by mouth three (3) times daily. Takes 300mg TID)    denosumab (PROLIA) 60 mg/mL injection 60 mg by SubCUTAneous route every 6 months.  atorvastatin (LIPITOR) 20 mg tablet take 1 tablet by mouth once daily    turmeric 400 mg cap Take 1 Cap by mouth daily.  aspirin 81 mg chewable tablet Take 1 Tab by mouth daily.  ibuprofen (MOTRIN) 400 mg tablet Take 1 Tab by mouth every six (6) hours as needed for Pain.  naloxone (Narcan) 4 mg/actuation nasal spray Use 1 spray intranasally, then discard. Repeat with new spray every 2 min as needed for opioid overdose symptoms, alternating nostrils.  baclofen (LIORESAL) 10 mg tablet Take 1 Tab by mouth two (2) times a day.  omeprazole (PRILOSEC) 20 mg capsule take 1 capsule by mouth once daily before breakfast    acetaminophen (TYLENOL) 160 mg/5 mL elixir Take 1,000 mg by mouth daily.  midodrine (PROAMITINE) 5 mg tablet Take 5 mg by mouth two (2) times a day. 1 tab by mouth with breakfast and lunch    loratadine (CLARITIN) 10 mg tablet Take 1 Tab by mouth daily. No current facility-administered medications for this visit.        PHYSICAL EXAMINATION:  Wounds/Abrasion: None Present  Neuro: Negative, no tremors  ORTHO EXAMINATION:  Examination     Skin     Effusion     Biceps deformity     Atrophy     AC joint tenderness     Acromial tenderness     Biceps tenderness     Forward flexion/Elevation ROM     Active abduction ROM     External rotation ROM     Internal rotation ROM     Apprehension     Impingement  -   Drop Arm Test  -   Neurovascular  Intact   Unable to exam range of motion due to pain    Examination    Skin    Tenderness    Tightness    Flexion    Extension    Lateral bend left    Lateral bend right    Masses    Biceps reflex Triceps reflex    Brachioradialis reflex        On exam today patient is found sitting comfortably in a wheelchair. She is joined by her male friend Coretta Castelan. The patient's right upper extremity examined thoroughly to reveal skin intact. No warmth or erythema. There is circumferential bruising/ecchymosis of the right humerus with no evidence of skin breakdown blistering or infection. Patient does have tenderness to palpation over the anterior portion of the right shoulder. Her range of motion tested passively in the forward flexion plane today at barely 5 degrees. Her passive external rotation is untested secondary to pain with patient guarding. Glenohumeral abduction also on tested. Internal rotation untested. Comparisons of today's exam from initial onset reflect possible incorrect documentation of patient's ranges. She describes that those ranges were not any better than today's. RADIOGRAPHS:  XR RIGHT SHOULDER 7/26/20 [facility]  IMPRESSION:  1. Comminuted fracture of the proximal right humerus as described with repeated x-rays on 8/12/2020 reviewed by Petra Nunes PA-C revealing no interval change in the previously identified x-rays from below. .  -I have independently reviewed these images during this office visit. -Dr. Stewart Petties  Three views - fracture of proximal right humerus fractures, + acromioclavicular narrowing, + glenohumeral narrowing, + calcific densities, shoulder is well located, type 3 impingement. IMPRESSION:      ICD-10-CM ICD-9-CM    1. Chronic right shoulder pain  M25.511 719.41 AMB POC XRAY, SHOULDER; COMPLETE, 2+    G89.29 338.29    2. Closed fracture of proximal end of right humerus with routine healing, unspecified fracture morphology, subsequent encounter  S42.201D V54.11      PLAN: Sling to wear when moving point-to-point. .. She will start a brief course of outpatient physical therapy after seen in 2 weeks. .  There is no need for surgery at this time.   She will follow up in 2 weeks with JULIA Valdes. She was again demonstrated Codman exercises and will continue those each hour while awake 10-15 circles to the right and 10-15 to the left.

## 2020-08-26 ENCOUNTER — OFFICE VISIT (OUTPATIENT)
Dept: ORTHOPEDIC SURGERY | Facility: CLINIC | Age: 75
End: 2020-08-26

## 2020-08-26 VITALS
HEIGHT: 60 IN | BODY MASS INDEX: 17.79 KG/M2 | DIASTOLIC BLOOD PRESSURE: 65 MMHG | WEIGHT: 90.6 LBS | TEMPERATURE: 98.2 F | HEART RATE: 89 BPM | SYSTOLIC BLOOD PRESSURE: 107 MMHG

## 2020-08-26 DIAGNOSIS — S42.201D CLOSED FRACTURE OF PROXIMAL END OF RIGHT HUMERUS WITH ROUTINE HEALING, UNSPECIFIED FRACTURE MORPHOLOGY, SUBSEQUENT ENCOUNTER: Primary | ICD-10-CM

## 2020-08-26 RX ORDER — UREA 10 %
800 LOTION (ML) TOPICAL DAILY
COMMUNITY
End: 2021-02-04

## 2020-08-26 NOTE — PROGRESS NOTES
Patient: Juliane Hamm                MRN: [id number]       N: [id number]  YOB: 1945        AGE: 70 y.o. SEX: female    PCP: None  08/26/20 8/26/2020: Patient follows up status post right proximal humeral comminuted fracture. She is essentially trying to use her right upper extremity normally despite her injury. She has participated in some self-guided Codman exercises but overall felt like that was too little to help her regain her motion and strength. She has discontinued use of her sling. 8/12/2020: Patient follows up status post fall with a right proximal humeral fracture. She has been somewhat reluctant to start her own self-guided Codman exercises which were demonstrated at last OV. Her pain has improved generally but does recur it with particular movements of the right shoulder and elbow. She is alternating Tylenol with Motrin for symptom management. Chief Complaint   Patient presents with    Shoulder Pain     Rt     HISTORY:  Juliane Hamm is a 70 y.o. female who sustained a right shoulder injury on 7/26/20. She landed on her right side when her legs gave out as she was getting up from a recliner. She was seen at [facility] on the same day where right shoulder x rays revealed a comminuted fracture of the proximal right humerus. She was provided an arm sling and referred for orthopedic consultation. She has been experiencing pain and swelling since the injury. She states she has leg weakness and dizziness because of her gabapentin medication. She reports she was able to walk, stay home alone, and even do dishes before starting gabapentin. She is s/p multi level posterior cervical fusion 11/20/19 by Dr. Bereket Miller. She is currently being treated for foot pain by Dr. Eliazar Davidson.     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 She lives in [place] with her partner, Pattie Mims. She has 3 sons-- one in the area. She has 5 grandchildren. She has 16 steps to get to her home. She does not exercise. She recently lost 9 pounds due to lack of appetite. Ms. Lolita Owen weighs 91 lbs and is 5'0\" tall.        Lab Results   Component Value Date/Time    Hemoglobin A1c 5.8 (H) 03/18/2019 04:07 PM     Weight Metrics 8/26/2020 8/12/2020 7/27/2020 7/15/2020 6/11/2020 4/1/2020 3/19/2020   Weight 90 lb 9.6 oz 90 lb 6.4 oz - 91 lb - - 100 lb   BMI 17.69 kg/m2 17.66 kg/m2 - 17.77 kg/m2 19.53 kg/m2 19.53 kg/m2 19.53 kg/m2       Patient Active Problem List   Diagnosis Code    Cervical neck pain with evidence of disc disease M50.90    CVA (cerebral vascular accident) ([facility] [address] I63.9    TIA (transient ischemic attack) G45.9    Cervical facet syndrome M47.812    Spondylosis of cervical region without myelopathy or radiculopathy M47.812    Cervical spinal stenosis M48.02    Degenerative disc disease, cervical M50.30    Chronic pain syndrome G89.4    Cervical radiculitis M54.12    Myofascial pain syndrome M79.18    Hyperlipidemia E78.5    Anxiety F41.9    Osteoarthritis of cervical spine M47.812    Osteoporosis M81.0    Vertigo R42    Dizziness R42    Weakness R53.1    Concentric left ventricular hypertrophy I51.7    Cerebrovascular small vessel disease I67.9    Cervical cord myelomalacia (HCC) G95.89     REVIEW OF SYSTEMS:    Constitutional Symptoms: Negative   Eyes: Negative   Ears, Nose, Throat and Mouth: Negative   Cardiovascular: Negative   Respiratory: Negative   Genitourinary: Per HPI   Gastrointestinal: Per HPI   Integumentary (Skin and/or Breast): Negative   Musculoskeletal: Per HPI   Endocrine/Rheumatologic: Negative   Neurological: Per HPI   Hematology/Lymphatic: Negative    Allergic/Immunologic: Negative   Phychiatric: Negative    Social History     Socioeconomic History    Marital status:      Spouse name: Not on file    Number of children: Not on file    Years of education: Not on file    Highest education level: Not on file   Occupational History    Not on file   Social Needs    Financial resource strain: Not on file    Food insecurity     Worry: Not on file     Inability: Not on file    Transportation needs     Medical: Not on file     Non-medical: Not on file   Tobacco Use    Smoking status: Never Smoker    Smokeless tobacco: Never Used   Substance and Sexual Activity    Alcohol use: No    Drug use: No    Sexual activity: Never   Lifestyle    Physical activity     Days per week: Not on file     Minutes per session: Not on file    Stress: Not on file   Relationships    Social connections     Talks on phone: Not on file     Gets together: Not on file     Attends Mandaen service: Not on file     Active member of club or organization: Not on file     Attends meetings of clubs or organizations: Not on file     Relationship status: Not on file    Intimate partner violence     Fear of current or ex partner: Not on file     Emotionally abused: Not on file     Physically abused: Not on file     Forced sexual activity: Not on file   Other Topics Concern    Not on file   Social History Narrative    Not on file      Allergies   Allergen Reactions    Baclofen Shortness of Breath     Denies.  Pt tolerates    Morphine Other (comments)     hallucinations    Celebrex [Celecoxib] Other (comments)     Tiredness       Current Outpatient Medications   Medication Sig    folic acid 191 mcg tablet Take 800 mcg by mouth daily.  ibuprofen (MOTRIN) 400 mg tablet Take 1 Tab by mouth every six (6) hours as needed for Pain.  cyanocobalamin (Vitamin B-12) 100 mcg tablet Take 100 mcg by mouth daily.  ALPRAZolam (XANAX) 0.25 mg tablet Take 1 Tab by mouth two (2) times daily as needed for Anxiety. Max Daily Amount: 0.5 mg.    busPIRone (BUSPAR) 7.5 mg tablet take 1 tablet by mouth twice a day    ondansetron (ZOFRAN ODT) 4 mg disintegrating tablet dissolve 1 tablet ON TONGUE every 6 hours if needed for nausea OR vomiting    gabapentin (NEURONTIN) 100 mg capsule Take 2 Caps by mouth three (3) times daily. Max Daily Amount: 600 mg. (Patient taking differently: Take 300 mg by mouth three (3) times daily. Takes 300mg TID)    acetaminophen (TYLENOL) 160 mg/5 mL elixir Take 1,000 mg by mouth daily.  midodrine (PROAMITINE) 5 mg tablet Take 5 mg by mouth two (2) times a day. 1 tab by mouth with breakfast and lunch    turmeric 400 mg cap Take 1 Cap by mouth daily.  aspirin 81 mg chewable tablet Take 1 Tab by mouth daily.  topiramate (Topamax) 25 mg tablet Take 1 Tab by mouth two (2) times daily (with meals).  naloxone (Narcan) 4 mg/actuation nasal spray Use 1 spray intranasally, then discard. Repeat with new spray every 2 min as needed for opioid overdose symptoms, alternating nostrils.  baclofen (LIORESAL) 10 mg tablet Take 1 Tab by mouth two (2) times a day.  omeprazole (PRILOSEC) 20 mg capsule take 1 capsule by mouth once daily before breakfast    denosumab (PROLIA) 60 mg/mL injection 60 mg by SubCUTAneous route every 6 months.  atorvastatin (LIPITOR) 20 mg tablet take 1 tablet by mouth once daily    loratadine (CLARITIN) 10 mg tablet Take 1 Tab by mouth daily. No current facility-administered medications for this visit.        PHYSICAL EXAMINATION:  Wounds/Abrasion: None Present  Neuro: Negative, no tremors  ORTHO EXAMINATION:  Examination     Skin     Effusion     Biceps deformity     Atrophy     AC joint tenderness     Acromial tenderness     Biceps tenderness     Forward flexion/Elevation ROM     Active abduction ROM     External rotation ROM     Internal rotation ROM     Apprehension     Impingement  -   Drop Arm Test  -   Neurovascular  Intact   Unable to exam range of motion due to pain    Examination    Skin    Tenderness    Tightness    Flexion    Extension    Lateral bend left    Lateral bend right    Masses    Biceps reflex    Triceps reflex    Brachioradialis reflex        On exam today patient is found sitting comfortably in a wheelchair. She is joined by her male friend Coretta Castelan. The patient's right upper extremity examined thoroughly to reveal skin intact. No warmth or erythema. There is circumferential bruising/ecchymosis of the right humerus with no evidence of skin breakdown blistering or infection which is migrating distal from its initial finding associated with the proximal shoulder. .  Patient does have tenderness to palpation over the anterior portion of the right shoulder. Her active range of motion tested today in the forward flexion plane at 40 degrees she can glenohumeral abduct to 50 degrees. Internal rotation actively 10 degrees and external rotation passively 20 degrees slight pain in all planes of motion but patient comfortable overall. Comparisons of today's exam from initial onset reflect possible incorrect documentation of patient's ranges. She describes that those ranges were not any better than today's. RADIOGRAPHS:  XR RIGHT SHOULDER 7/26/20 [facility]  IMPRESSION:  1.  Comminuted fracture of the proximal right humerus as described with repeated x-rays on 8/12/2020 reviewed by Petra Nunes PA-C revealing no interval change in the previously identified x-rays from below and further x-rays completed on 8/26/2020 reflecting positive interval change associated with the medial portion of the comminuted humeral head with a fragment now distracted from the surgical neck. No other identified callus inlay or abnormalities identified. ..  -I have independently reviewed these images during this office visit. -Dr. Jocelin Gotti  Three views - fracture of proximal right humerus fractures, + acromioclavicular narrowing, + glenohumeral narrowing, + calcific densities, shoulder is well located, type 3 impingement. IMPRESSION:      ICD-10-CM ICD-9-CM    1. Closed fracture of proximal end of right humerus with routine healing, unspecified fracture morphology, subsequent encounter  S42.201D V54.11 AMB POC XRAY, SHOULDER; COMPLETE, 2+      REFERRAL TO PHYSICAL THERAPY     PLAN: Sling discontinued. She will start a brief course of outpatient physical therapy after seen in 2 weeks. .  There is no need for surgery at this time. She will follow up in 2 weeks with JULIA Valdes. She is cautioned to avoid any overuse of her right shoulder.

## 2020-08-27 ENCOUNTER — HOSPITAL ENCOUNTER (OUTPATIENT)
Dept: PHYSICAL THERAPY | Age: 75
Discharge: HOME | End: 2020-08-27
Payer: MEDICARE

## 2020-08-27 PROCEDURE — 97110 THERAPEUTIC EXERCISES: CPT

## 2020-08-27 PROCEDURE — 97162 PT EVAL MOD COMPLEX 30 MIN: CPT

## 2020-08-27 NOTE — PROGRESS NOTES
PT DAILY TREATMENT NOTE - [facility]     Patient Name: Demetra Spear  Date:2020  : 1945  [x]  Patient  Verified  Payor: [place] MEDICARE / Plan: [place] MEDICARE PART A & B / Product Type: Medicare /    In time 1100  Out time:1145  Total Treatment Time (min): 45  Total Timed Codes (min): 25  1:1 Treatment Time ( only): 39   Visit #: 1 of 4    Treatment Area: Pain in right arm [M79.601]    SUBJECTIVE  Pain Level (0-10 scale): 4  Any medication changes, allergies to medications, adverse drug reactions, diagnosis change, or new procedure performed?: [x] No    [] Yes (see summary sheet for update)  Subjective functional status:   [x] See Eval form in paper chart      OBJECTIVE    20 min [x]Eval                  []Re-Eval       25 min Therapeutic Exercise:  [x] See flow sheet :HEP/ PROM right shoulder    Rationale: increase ROM, increase strength, improve coordination, improve balance and increase proprioception to improve the patients ability to perform ADLs. With   [] TE   [] TA   [] neuro   [] other: Patient Education: [x] Review HEP    [] Progressed/Changed HEP based on:   [] positioning   [] body mechanics   [] transfers   [] heat/ice application    [] other:                  Pain Level (0-10 scale) post treatment: 4    ASSESSMENT:   [x]  See Evaluation         Goals:  Short Term Goals: To be accomplished in 1 weeks:  1. Therapist to establish HEP for strength and ROM to improve ease with ADLs. Long Term Goals: To be accomplished in 4 treatments:  1. Patient will be independent with HEP to improve carryover of functional gains with ADLs between visits. Eval Status:n/a  2. Pt will increase right passive shoulder flexion/ER to 130/ 50 degrees to increase ease with ADLs. Eval Status:    Shoulder flexion PROM supine: 80 deg    Shoulder PROM ER in scaption: 20 deg  3. Pt will increase right active shoulder flexion to 90 degrees to increase ease with ADLs.    Eval Status:will address as cleared by MD    PLAN      [x]  Continue plan of care    []  Other:_      Kevon Montoya, PT 8/27/2020  10:55 AM

## 2020-09-02 ENCOUNTER — HOSPITAL ENCOUNTER (OUTPATIENT)
Dept: PHYSICAL THERAPY | Age: 75
Discharge: HOME | End: 2020-09-02
Payer: MEDICARE

## 2020-09-02 PROCEDURE — 97110 THERAPEUTIC EXERCISES: CPT

## 2020-09-02 NOTE — PROGRESS NOTES
PT DAILY TREATMENT NOTE 10-18    Patient Name: Teresa Price  Date:2020  : 1945  [x]  Patient  Verified  Payor: Radha Cones / Plan: [place] MED[place]ARE PART A & B / Product Type: Medicare /    In time:115  Out time:200  Total Treatment Time (min): 45  Visit #: 2 of 4    Medicare/BCBS Only   Total Timed Codes (min):  45 1:1 Treatment Time:  45       Treatment Area: Pain in right arm [M79.601]    SUBJECTIVE  Pain Level (0-10 scale): 2  Any medication changes, allergies to medications, adverse drug reactions, diagnosis change, or new procedure performed?: [x] No    [] Yes (see summary sheet for update)  Subjective functional status/changes:   [] No changes reported  \"its stif but not hurting as much since I've been resting it. \"    OBJECTIVE      45 min Therapeutic Exercise:  [x] See flow sheet :   Rationale: increase ROM to improve the patients ability to improve ROM for ADLs. With   [] TE   [] TA   [] neuro   [] other: Patient Education: [x] Review HEP    [] Progressed/Changed HEP based on:   [] positioning   [] body mechanics   [] transfers   [] heat/ice application    [] other:      Other Objective/Functional Measures: 95 deg PROM right shoulder flexion     Pain Level (0-10 scale) post treatment: 0    ASSESSMENT/Changes in Function: patient arrives using rollator and actively using the right UE. Explained precautions again and that she may use wheelchair for safe transport now, as she is not safe for ambulation with SPC and is not able to actively use or weight bear through the right UE just yet. Patient and her partner  voiced understanding.      Patient will continue to benefit from skilled PT services to modify and progress therapeutic interventions, address functional mobility deficits, address ROM deficits, address strength deficits, analyze and address soft tissue restrictions, analyze and cue movement patterns, analyze and modify body mechanics/ergonomics, assess and modify postural abnormalities, address imbalance/dizziness and instruct in home and community integration to attain remaining goals. []  See Plan of Care  []  See progress note/recertification  []  See Discharge Summary         Progress towards goals / Updated goals:  Short Term Goals: To be accomplished in 1 weeks:  1. Therapist to establish HEP for strength and ROM to improve ease with ADLs.  MET  Long Term Goals: To be accomplished in 4 treatments:  1. Patient will be independent with HEP to improve carryover of functional gains with ADLs between visits. Eval Status:n/a  2. Pt will increase right passive shoulder flexion/ER to 130/ 50 degrees to increase ease with ADLs. Eval Status:              Shoulder flexion PROM supine: 80 deg              Shoulder PROM ER in scaption: 20 deg  3. Pt will increase right active shoulder flexion to 90 degrees to increase ease with ADLs.               Eval Status:will address as cleared by MD       PLAN  []  Upgrade activities as tolerated     [x]  Continue plan of care  []  Update interventions per flow sheet       []  Discharge due to:_  []  Other:_      Ana Fernandes PT 9/2/2020  2:01 PM    Future Appointments   Date Time Provider Gaby Delarosa   9/9/2020  1:15 PM Manulea Alford PTA [facility]   9/16/2020  1:45 PM Tuan Valdes PA-C [address]   9/16/2020  2:45 PM Alec Ureña PT [facility]   9/28/2020  3:00 PM [facility] FAST TRACK NURSE [facility]   11/24/2020  1:15 PM Drea Salinas MD [facility]

## 2020-09-09 ENCOUNTER — HOSPITAL ENCOUNTER (OUTPATIENT)
Dept: PHYSICAL THERAPY | Age: 75
Discharge: HOME | End: 2020-09-09
Payer: MEDICARE

## 2020-09-09 PROCEDURE — 97110 THERAPEUTIC EXERCISES: CPT

## 2020-09-09 NOTE — PROGRESS NOTES
PT DAILY TREATMENT NOTE 10-18    Patient Name: Miracle Honeycutt  Date:2020  : 1945  [x]  Patient  Verified  Payor: VA MEDICARE / Plan: [place] MEDICARE PART A & B / Product Type: Medicare /    In time:115  Out time:155  Total Treatment Time (min): 40  Visit #: 3 of 4    Medicare/BCBS Only   Total Timed Codes (min):  40 1:1 Treatment Time:  40       Treatment Area: Pain in right arm [M79.601]    SUBJECTIVE  Pain Level (0-10 scale): 0  Any medication changes, allergies to medications, adverse drug reactions, diagnosis change, or new procedure performed?: [x] No    [] Yes (see summary sheet for update)  Subjective functional status/changes:   [] No changes reported  \"I don't really have any pain. I don't wear my sling all the time. \"    OBJECTIVE    Modality rationale: patient declined   Min Type Additional Details    [] Estim:  []Unatt       []IFC  []Premod                        []Other:  []w/ice   []w/heat  Position:  Location:    [] Estim: []Att    []TENS instruct  []NMES                    []Other:  []w/US   []w/ice   []w/heat  Position:  Location:    []  Traction: [] Cervical       []Lumbar                       [] Prone          []Supine                       []Intermittent   []Continuous Lbs:  [] before manual  [] after manual    []  Ultrasound: []Continuous   [] Pulsed                           []1MHz   []3MHz W/cm2:  Location:    []  Iontophoresis with dexamethasone         Location: [] Take home patch   [] In clinic    []  Ice     []  heat  []  Ice massage  []  Laser   []  Anodyne Position:  Location:    []  Laser with stim  []  Other:  Position:  Location:    []  Vasopneumatic Device Pressure:       [] lo [] med [] hi   Temperature: [] lo [] med [] hi   [] Skin assessment post-treatment:  []intact []redness- no adverse reaction    []redness - adverse reaction:     40 min Therapeutic Exercise:  [x] See flow sheet :   Rationale: increase ROM and increase strength to improve the patients ability to perform ADLs        With   [x] TE   [] TA   [] neuro   [] other: Patient Education: [x] Review HEP    [] Progressed/Changed HEP based on:   [x] positioning   [x] body mechanics   [] transfers   [] heat/ice application    [] other:      Other Objective/Functional Measures:      Pain Level (0-10 scale) post treatment: 0    ASSESSMENT/Changes in Function: Pt is making progress with her PROM. She states that she has been inconsistent with wearing her sling. Educated pt again about importance of wearing her sling. She reports that her pain is improving. Patient will continue to benefit from skilled PT services to modify and progress therapeutic interventions, address functional mobility deficits, address ROM deficits, address strength deficits, analyze and address soft tissue restrictions, analyze and cue movement patterns, analyze and modify body mechanics/ergonomics, assess and modify postural abnormalities, address imbalance/dizziness and instruct in home and community integration to attain remaining goals. [x]  See Plan of Care  []  See progress note/recertification  []  See Discharge Summary         Progress towards goals / Updated goals:  Short Term Goals: To be accomplished in 1 weeks:  1. Therapist to establish HEP for strength and ROM to improve ease with ADLs.   MET  Long Term Goals: To be accomplished in 4 treatments:  1. Patient will be independent with HEP to improve carryover of functional gains with ADLs between visits.             Eval Status:n/a   Reports daily compliance  2. Pt will increase right passive shoulder flexion/ER to 130/ 50 degrees to increase ease with ADLs.             Eval Status:              Shoulder flexion PROM supine: 80 deg              Shoulder PROM ER in scaption: 20 deg   PROGRESSING; 116 deg flexion PROM supine  3.  Pt will increase right active shoulder flexion to 90 degrees to increase ease with ADLs.               Eval Status:will address as cleared by MD KIRBY    PLAN  []  Upgrade activities as tolerated     [x]  Continue plan of care  []  Update interventions per flow sheet       []  Discharge due to:_  []  Other:_      Rita Grace, PTA, CSCS 9/9/2020  1:56 PM    Future Appointments   Date Time Provider Gaby Delarosa   9/16/2020  1:45 PM [facility] [address]   9/16/2020  2:45 PM [facility]   9/28/2020  3:00 PM [facility] FAST TRACK NURSE [facility]   11/2/2020  2:40 PM Rosalinda Joseph, TERESITA Sauer

## 2020-09-16 ENCOUNTER — HOSPITAL ENCOUNTER (OUTPATIENT)
Dept: PHYSICAL THERAPY | Age: 75
Discharge: HOME | End: 2020-09-16
Payer: MEDICARE

## 2020-09-16 ENCOUNTER — OFFICE VISIT (OUTPATIENT)
Dept: ORTHOPEDIC SURGERY | Facility: CLINIC | Age: 75
End: 2020-09-16

## 2020-09-16 VITALS
HEART RATE: 94 BPM | WEIGHT: 89 LBS | RESPIRATION RATE: 16 BRPM | HEIGHT: 60 IN | SYSTOLIC BLOOD PRESSURE: 81 MMHG | TEMPERATURE: 97.4 F | BODY MASS INDEX: 17.47 KG/M2 | OXYGEN SATURATION: 95 % | DIASTOLIC BLOOD PRESSURE: 55 MMHG

## 2020-09-16 DIAGNOSIS — G89.29 CHRONIC RIGHT SHOULDER PAIN: ICD-10-CM

## 2020-09-16 DIAGNOSIS — S42.201D CLOSED FRACTURE OF PROXIMAL END OF RIGHT HUMERUS WITH ROUTINE HEALING, UNSPECIFIED FRACTURE MORPHOLOGY, SUBSEQUENT ENCOUNTER: Primary | ICD-10-CM

## 2020-09-16 DIAGNOSIS — M25.511 CHRONIC RIGHT SHOULDER PAIN: ICD-10-CM

## 2020-09-16 PROCEDURE — 97110 THERAPEUTIC EXERCISES: CPT

## 2020-09-16 NOTE — PROGRESS NOTES
Patient: Corie Stone                MRN: [id number]       SSN: [id number]  YOB: 1945        AGE: 70 y.o. SEX: female    PCP: None  09/16/20 9/16/2020. Patient follows up for reimaging of the right proximal humeral fracture. Generally she is doing well. She is continue her exercises. She has been doubling her exercises daily. She is encouraged not to overdo her recoveries noting that she may plateau and have worsening motion of the right shoulder occur. 8/26/2020: Patient follows up status post right proximal humeral comminuted fracture. She is essentially trying to use her right upper extremity normally despite her injury. She has participated in some self-guided Codman exercises but overall felt like that was too little to help her regain her motion and strength. She has discontinued use of her sling. 8/12/2020: Patient follows up status post fall with a right proximal humeral fracture. She has been somewhat reluctant to start her own self-guided Codman exercises which were demonstrated at last OV. Her pain has improved generally but does recur it with particular movements of the right shoulder and elbow. She is alternating Tylenol with Motrin for symptom management. Chief Complaint   Patient presents with    Shoulder Pain     right shoulder pain     HISTORY:  Corie Stone is a 70 y.o. female who sustained a right shoulder injury on 7/26/20. She landed on her right side when her legs gave out as she was getting up from a recliner. She was seen at [facility] on the same day where right shoulder x rays revealed a comminuted fracture of the proximal right humerus. She was provided an arm sling and referred for orthopedic consultation. She has been experiencing pain and swelling since the injury. She states she has leg weakness and dizziness because of her gabapentin medication.  She reports she was able to walk, stay home alone, and even do dishes before starting gabapentin. She is s/p multi level posterior cervical fusion 11/20/19 by Dr. Rogers Hernandez. She is currently being treated for foot pain by Dr. Natalio Martin. Pain Assessment  9/16/2020   Location of Pain Shoulder   Pain Location Comment -   Location Modifiers Right   Severity of Pain 0   Quality of Pain -   Quality of Pain Comment -   Duration of Pain -   Frequency of Pain -   Aggravating Factors -   Aggravating Factors Comment -   Limiting Behavior -   Relieving Factors -   Relieving Factors Comment -   Result of Injury -   Work-Related Injury -   Type of Injury -     Occupation, etc:  Ms. 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 She lives in [place] with her partner, Gildardo Draper. She has 3 sons-- one in the area. She has 5 grandchildren. She has 16 steps to get to her home. She does not exercise. She recently lost 9 pounds due to lack of appetite. Ms. Fausto Lacey weighs 91 lbs and is 5'0\" tall.        Lab Results   Component Value Date/Time    Hemoglobin A1c 5.8 (H) 03/18/2019 04:07 PM     Weight Metrics 9/16/2020 8/26/2020 8/12/2020 7/27/2020 7/15/2020 6/11/2020 4/1/2020   Weight 89 lb 90 lb 9.6 oz 90 lb 6.4 oz - 91 lb - -   BMI 17.38 kg/m2 17.69 kg/m2 17.66 kg/m2 - 17.77 kg/m2 19.53 kg/m2 19.53 kg/m2       Patient Active Problem List   Diagnosis Code    Cervical neck pain with evidence of disc disease M50.90    CVA (cerebral vascular accident) ([facility] [address] I63.9    TIA (transient ischemic attack) G45.9    Cervical facet syndrome M47.812    Spondylosis of cervical region without myelopathy or radiculopathy M47.812    Cervical spinal stenosis M48.02    Degenerative disc disease, cervical M50.30    Chronic pain syndrome G89.4    Cervical radiculitis M54.12    Myofascial pain syndrome M79.18    Hyperlipidemia E78.5    Anxiety F41.9    Osteoarthritis of cervical spine M47.812    Osteoporosis M81.0    Vertigo R42    Dizziness R42    Weakness R53.1    Concentric left ventricular hypertrophy I51.7    Cerebrovascular small vessel disease I67.9    Cervical cord myelomalacia (HCC) G95.89     REVIEW OF SYSTEMS:    Constitutional Symptoms: Negative   Eyes: Negative   Ears, Nose, Throat and Mouth: Negative   Cardiovascular: Negative   Respiratory: Negative   Genitourinary: Per HPI   Gastrointestinal: Per HPI   Integumentary (Skin and/or Breast): Negative   Musculoskeletal: Per HPI   Endocrine/Rheumatologic: Negative   Neurological: Per HPI   Hematology/Lymphatic: Negative    Allergic/Immunologic: Negative   Phychiatric: Negative    Social History     Socioeconomic History    Marital status:      Spouse name: Not on file    Number of children: Not on file    Years of education: Not on file    Highest education level: Not on file   Occupational History    Not on file   Social Needs    Financial resource strain: Not on file    Food insecurity     Worry: Not on file     Inability: Not on file    Transportation needs     Medical: Not on file     Non-medical: Not on file   Tobacco Use    Smoking status: Never Smoker    Smokeless tobacco: Never Used   Substance and Sexual Activity    Alcohol use: No    Drug use: No    Sexual activity: Never   Lifestyle    Physical activity     Days per week: Not on file     Minutes per session: Not on file    Stress: Not on file   Relationships    Social connections     Talks on phone: Not on file     Gets together: Not on file     Attends Worship service: Not on file     Active member of club or organization: Not on file     Attends meetings of clubs or organizations: Not on file     Relationship status: Not on file    Intimate partner violence     Fear of current or ex partner: Not on file     Emotionally abused: Not on file     Physically abused: Not on file     Forced sexual activity: Not on file   Other Topics Concern    Not on file   Social History Narrative    Not on file      Allergies   Allergen Reactions    Baclofen Shortness of Breath     Denies. Pt tolerates    Morphine Other (comments)     hallucinations    Celebrex [Celecoxib] Other (comments)     Tiredness       Current Outpatient Medications   Medication Sig    folic acid 570 mcg tablet Take 800 mcg by mouth daily.  ibuprofen (MOTRIN) 400 mg tablet Take 1 Tab by mouth every six (6) hours as needed for Pain.  cyanocobalamin (Vitamin B-12) 100 mcg tablet Take 100 mcg by mouth daily.  ALPRAZolam (XANAX) 0.25 mg tablet Take 1 Tab by mouth two (2) times daily as needed for Anxiety. Max Daily Amount: 0.5 mg.    busPIRone (BUSPAR) 7.5 mg tablet take 1 tablet by mouth twice a day    ondansetron (ZOFRAN ODT) 4 mg disintegrating tablet dissolve 1 tablet ON TONGUE every 6 hours if needed for nausea OR vomiting    gabapentin (NEURONTIN) 100 mg capsule Take 2 Caps by mouth three (3) times daily. Max Daily Amount: 600 mg. (Patient taking differently: Take 300 mg by mouth three (3) times daily. Takes 300mg TID)    acetaminophen (TYLENOL) 160 mg/5 mL elixir Take 1,000 mg by mouth daily.  midodrine (PROAMITINE) 5 mg tablet Take 5 mg by mouth two (2) times a day. 1 tab by mouth with breakfast and lunch    turmeric 400 mg cap Take 1 Cap by mouth daily.  aspirin 81 mg chewable tablet Take 1 Tab by mouth daily.  topiramate (Topamax) 25 mg tablet Take 1 Tab by mouth two (2) times daily (with meals).  naloxone (Narcan) 4 mg/actuation nasal spray Use 1 spray intranasally, then discard. Repeat with new spray every 2 min as needed for opioid overdose symptoms, alternating nostrils.  baclofen (LIORESAL) 10 mg tablet Take 1 Tab by mouth two (2) times a day.  omeprazole (PRILOSEC) 20 mg capsule take 1 capsule by mouth once daily before breakfast    denosumab (PROLIA) 60 mg/mL injection 60 mg by SubCUTAneous route every 6 months.     atorvastatin (LIPITOR) 20 mg tablet take 1 tablet by mouth once daily    loratadine (CLARITIN) 10 mg tablet Take 1 Tab by mouth daily. No current facility-administered medications for this visit. PHYSICAL EXAMINATION:  Wounds/Abrasion: None Present  Neuro: Negative, no tremors  ORTHO EXAMINATION:  Examination     Skin     Effusion     Biceps deformity     Atrophy     AC joint tenderness     Acromial tenderness     Biceps tenderness     Forward flexion/Elevation ROM     Active abduction ROM     External rotation ROM     Internal rotation ROM     Apprehension     Impingement  -   Drop Arm Test  -   Neurovascular  Intact   Unable to exam range of motion due to pain    Examination    Skin    Tenderness    Tightness    Flexion    Extension    Lateral bend left    Lateral bend right    Masses    Biceps reflex    Triceps reflex    Brachioradialis reflex        On exam today patient is found sitting comfortably in a wheelchair. She is joined by her male friend Coretta Castelan. The patient's right upper extremity examined thoroughly to reveal skin intact. No warmth or erythema. There is circumferential bruising/ecchymosis of the right humerus has all been resolved. Patient does have tenderness to palpation over the anterior portion of the right shoulder. Her active range of motion tested today in the forward flexion plane at 50 degrees she can glenohumeral abduct to 50 degrees. Internal rotation actively 10 degrees and external rotation passively 30 degrees slight pain in all planes of motion but patient comfortable overall. C    RADIOGRAPHS:  XR RIGHT SHOULDER 7/26/20 [facility]  IMPRESSION:  1. Comminuted fracture of the proximal right humerus as described with repeated x-rays on 8/12/2020 reviewed by Petra Nunes PA-C revealing no interval change in the previously identified x-rays from below and further x-rays completed on 8/26/2020 reflecting positive interval change associated with the medial portion of the comminuted humeral head with a fragment now distracted from the surgical neck.   No other identified callus inlay or abnormalities identified. ..  -I have independently reviewed these images during this office visit. -Dr. Marge Moreno  Three views - fracture of proximal right humerus fractures, + acromioclavicular narrowing, + glenohumeral narrowing, + calcific densities, shoulder is well located, type 3 impingement. IMPRESSION:      ICD-10-CM ICD-9-CM    1. Closed fracture of proximal end of right humerus with routine healing, unspecified fracture morphology, subsequent encounter  S42.201D V54.11    2. Chronic right shoulder pain  M25.511 719.41 AMB POC XRAY, SHOULDER; COMPLETE, 2+    G89.29 338.29      PLAN: Sling discontinued. She will start a brief course of outpatient physical therapy after seen in 2 weeks. .  There is no need for surgery at this time. She will follow up in 3 weeks with JULIA Valdes. She is cautioned to avoid any overuse of her right shoulder.

## 2020-09-16 NOTE — PROGRESS NOTES
PT DAILY TREATMENT NOTE 10-18    Patient Name: Regine Antoine  Date:2020  : 1945  [x]  Patient  Verified  Payor: [place] MEDICARE / Plan: [place] MEDICARE PART A & B / Product Type: Medicare /    In time: 2:50  Out time: 3:31  Total Treatment Time (min): 41  Visit #: 4 of 4    Medicare/BCBS Only   Total Timed Codes (min): 41 1:1 Treatment Time:  41       Treatment Area: Pain in right arm [M79.601]    SUBJECTIVE  Pain Level (0-10 scale): 0  Any medication changes, allergies to medications, adverse drug reactions, diagnosis change, or new procedure performed?: [x] No    [] Yes (see summary sheet for update)  Subjective functional status/changes:   [] No changes reported  Pt states she saw the PA today and he states she can progress to AAROM. OBJECTIVE    41 min Therapeutic Exercise:  [x] See flow sheet : exercises, PROM right shoulder flex to ~90 degs, right elbow flex/EXT PROM; both within pain free range   Rationale: increase ROM and increase strength to improve the patients ability to perform ADLs        With   [x] TE   [] TA   [] neuro   [] other: Patient Education: [x] Review HEP    [] Progressed/Changed HEP based on:   [x] positioning   [x] body mechanics   [] transfers   [] heat/ice application    [] other:      Other Objective/Functional Measures: See goals below. Progressed exercises per flow sheet secondary to progressing to [facility] today per pt's PA. Functional Gains: everything, less pain at night  Functional Deficits: not AROM at this time  Pain        Best: 0/10     Worst: 2/10    Pain Level (0-10 scale) post treatment: 0    ASSESSMENT/Changes in Function: See re-certification. Reported no pain post session today and educated pt to not push through pain with exercises. Pt reports/demonstrates improvements in pain, PROM and tightness in the right shoulder since starting therapy. Pt reports having less pain at night.  The pt's PA states that the pt is ready for AAROM of the right shoulder at this time. We will plan on continuing therapy to improve AAROM and flexibility per protocol. Patient will continue to benefit from skilled PT services to modify and progress therapeutic interventions, address functional mobility deficits, address ROM deficits, address strength deficits, analyze and address soft tissue restrictions, analyze and cue movement patterns, analyze and modify body mechanics/ergonomics, assess and modify postural abnormalities, address imbalance/dizziness and instruct in home and community integration to attain remaining goals. []  See Plan of Care  [x]  See progress note/recertification  []  See Discharge Summary         Progress towards goals / Updated goals:  Short Term Goals: To be accomplished in 1 weeks:  1. Therapist to establish HEP for strength and ROM to improve ease with ADLs.   MET  Long Term Goals: To be accomplished in 4 treatments:  1. Patient will be independent with HEP to improve carryover of functional gains with ADLs between visits.             Eval Status:n/a   MET, Reports daily compliance  2. Pt will increase right passive shoulder flexion/ER to 130/ 50 degrees to increase ease with ADLs.             Eval Status:              Shoulder flexion PROM supine: 80 deg              Shoulder PROM ER in scaption: 20 deg   Not met, 90 deg flexion PROM supine with increased guarding today  3.  Pt will increase right active shoulder flexion to 90 degrees to increase ease with ADLs.               Eval Status:will address as cleared by MD   Progressing, progressed to [address] today     PLAN  [x]  Upgrade activities as tolerated     [x]  Continue plan of care  [x]  Update interventions per flow sheet       []  Discharge due to:_  []  Other:_      Marcus Phoenix, PT 9/16/2020  2:53 PM    Future Appointments   Date Time Provider Gaby Delarosa   9/28/2020  3:00 PM [facility] FAST TRACK NURSE [facility]   10/7/2020  1:45 PM Pasquale Valdes PA-C [facility] SCHED   11/2/2020  2:40 PM Ilan Doe, NP [facility]

## 2020-09-16 NOTE — PROGRESS NOTES
[facility] - [address]  [address] [place], [address] [phone] fax    Continued Plan of Care/ Re-certification for Physical Therapy Services    Patient name: Ziggy Mitchell Start of Care: 2020   Referral source: Clover Schaumann : 1945               Medical Diagnosis: Pain in right arm [M79.601]  Payor: [place] MEDICARE / Plan: [place] MEDICARE PART A & B / Product Type: Medicare /     Onset Date:20               Treatment Diagnosis: right arm pain   Prior Hospitalization: see medical history Provider#: [id number]   Medications: Verified on Patient summary List    Comorbidities: cervical fusion 2019 due to myelopathy, OA, CVA, HTN   Prior Level of Function: retired. Lives with partner in 2 story home. Uses w/c for most mobility due to residual leg weakness. Able to independently transfer  Visits from Start of Care: 4    Missed Visits: 0    The Plan of Care and following information is based on the patient's current status:  Goal: Therapist to establish HEP for strength and ROM to improve ease with ADLs. Status at last note/certification: MET  Current Status: met    Goal: Patient will be independent with HEP to improve carryover of functional gains with ADLs between visits. Status at last note/certification: MET, Reports daily compliance  Current Status: met    Goal: Pt will increase right passive shoulder flexion/ER to 130/ 50 degrees to increase ease with ADLs. Status at last note/certification: Not met, 90 deg flexion PROM supine with increased guarding today  Current Status: not met    Goal: Pt will increase right active shoulder flexion to 90 degrees to increase ease with ADLs.   Status at last note/certification: Progressing, progressed to [facility] today   Current Status: not met    Key functional changes:   Functional Gains: everything, less pain at night  Functional Deficits: not AROM at this time  Pain         Best: 0/10 Worst: 2/10      Problems/ barriers to goal attainment: none    Problem List: pain affecting function, decrease ROM, decrease strength, edema affecting function, decrease ADL/ functional abilitiies, decrease activity tolerance, decrease flexibility/ joint mobility and decrease transfer abilities    Treatment Plan: Therapeutic exercise, Therapeutic activities, Neuromuscular re-education, Physical agent/modality, Manual therapy, Patient education, Self Care training, Functional mobility training and Home safety training     Patient Goal (s) has been updated and includes: \"be able to move arm\"     Goals for this certification period to be accomplished in 4 weeks:  1. Pt will be cleared to perform AROM by the MD to improve independence. Re-certification: AAROM  2. Pt will increase right passive shoulder flexion/ER to 130/ 50 degrees to increase ease with ADLs. Re-certification: 90 deg flexion PROM supine with increased guarding today  3.  Pt will increase right AAROM shoulder flexion to 110 degrees to increase ease with ADLs. Re-certification: Progressing, progressed to [facility] today     Frequency / Duration: Patient to be seen 2 times per week for 4 weeks:    Assessment / Recommendations:  Pt reports/demonstrates improvements in pain, PROM and tightness in the right shoulder since starting therapy. Pt reports having less pain at night. The pt's PA states that the pt is ready for AAROM of the right shoulder at this time. We will plan on continuing therapy to improve AAROM and flexibility per protocol. Certification Period: 9/17/2020 - 10/16/2020    James Cuellar, PT 9/16/2020 3:12 PM    ________________________________________________________________________  I certify that the above Therapy Services are being furnished while the patient is under my care. I agree with the treatment plan and certify that this therapy is necessary. [] I have read the above and request that my patient continue as recommended.   [] I have read the above report and request that my patient continue therapy with the following changes/special instructions: _____________________________________________  [] I have read the above report and request that my patient be discharged from therapy    Physician's Signature:____________Date:_________TIME:________    ** Signature, Date and Time must be completed for valid certification **    Please sign and return to [facility] - [address], [address] [phone] fax

## 2020-09-21 DIAGNOSIS — F41.9 ANXIETY: ICD-10-CM

## 2020-09-22 ENCOUNTER — HOSPITAL ENCOUNTER (OUTPATIENT)
Dept: PHYSICAL THERAPY | Age: 75
Discharge: HOME | End: 2020-09-22
Payer: MEDICARE

## 2020-09-22 PROCEDURE — 97110 THERAPEUTIC EXERCISES: CPT

## 2020-09-22 NOTE — PROGRESS NOTES
PT DAILY TREATMENT NOTE 10-18    Patient Name: Corie Stone  Date:2020  : 1945  [x]  Patient  Verified  Payor: VA MEDICARE / Plan: VA MEDICARE PART A & B / Product Type: Medicare /    In time:200  Out time:240  Total Treatment Time (min): 40  Visit #: 1 of 8    Medicare/BCBS Only   Total Timed Codes (min):  40 1:1 Treatment Time:  40       Treatment Area: Pain in right arm [M79.601]    SUBJECTIVE  Pain Level (0-10 scale): 0  Any medication changes, allergies to medications, adverse drug reactions, diagnosis change, or new procedure performed?: [x] No    [] Yes (see summary sheet for update)  Subjective functional status/changes:   [] No changes reported  \"No pain. \"    OBJECTIVE    Modality rationale: patient declined   Min Type Additional Details    [] Estim:  []Unatt       []IFC  []Premod                        []Other:  []w/ice   []w/heat  Position:  Location:    [] Estim: []Att    []TENS instruct  []NMES                    []Other:  []w/US   []w/ice   []w/heat  Position:  Location:    []  Traction: [] Cervical       []Lumbar                       [] Prone          []Supine                       []Intermittent   []Continuous Lbs:  [] before manual  [] after manual    []  Ultrasound: []Continuous   [] Pulsed                           []1MHz   []3MHz W/cm2:  Location:    []  Iontophoresis with dexamethasone         Location: [] Take home patch   [] In clinic    []  Ice     []  heat  []  Ice massage  []  Laser   []  Anodyne Position:  Location:    []  Laser with stim  []  Other:  Position:  Location:    []  Vasopneumatic Device Pressure:       [] lo [] med [] hi   Temperature: [] lo [] med [] hi   [] Skin assessment post-treatment:  []intact []redness- no adverse reaction    []redness - adverse reaction:     40 min Therapeutic Exercise:  [x] See flow sheet : including PROM and AAROM    Rationale: increase ROM and increase strength to improve the patients ability to improve mobility and reaching With   [x] TE   [] TA   [] neuro   [] other: Patient Education: [x] Review HEP    [] Progressed/Changed HEP based on:   [x] positioning   [x] body mechanics   [] transfers   [] heat/ice application    [] other:      Other Objective/Functional Measures:      Pain Level (0-10 scale) post treatment: 0    ASSESSMENT/Changes in Function: Pt doing well with AAROM and PROM. Bruising appeared better today over her right shoulder and bicep. Remains very weak in right shoulder and elbow. Progress is slow and we will progress per protocol. Patient will continue to benefit from skilled PT services to modify and progress therapeutic interventions, address functional mobility deficits, address ROM deficits, address strength deficits, analyze and address soft tissue restrictions, analyze and cue movement patterns, analyze and modify body mechanics/ergonomics, assess and modify postural abnormalities, address imbalance/dizziness and instruct in home and community integration to attain remaining goals. [x]  See Plan of Care  []  See progress note/recertification  []  See Discharge Summary         Progress towards goals / Updated goals:  Short Term Goals: To be accomplished in 1 weeks:  1. Therapist to establish HEP for strength and ROM to improve ease with ADLs.             MET  Long Term Goals: To be accomplished in 4 treatments:  1. Patient will be independent with HEP to improve carryover of functional gains with ADLs between visits.             Eval Status:n/a              MET, Reports daily compliance  2. Pt will increase right passive shoulder flexion/ER to 130/ 50 degrees to increase ease with ADLs.               Eval Status:              Shoulder flexion PROM supine: 80 deg              Shoulder PROM ER in scaption: 20 deg              Not met, 90 deg flexion PROM supine with increased guarding today  3.  Pt will increase right active shoulder flexion to 90 degrees to increase ease with ADLs.              Eval Status:will address as cleared by MD Israel, progressed to [facility] today    PLAN  []  Upgrade activities as tolerated     [x]  Continue plan of care  []  Update interventions per flow sheet       []  Discharge due to:_  []  Other:_      Rita Grace PTA, CSCS 9/22/2020  2:46 PM    Future Appointments   Date Time Provider Gaby Delarosa   9/22/2020  2:00 PM [facility]   9/24/2020  2:00 PM [facility]   9/28/2020  3:00 PM [facility] FAST TRACK NURSE [facility]   9/29/2020  2:00 PM [facility]   10/1/2020  2:00 PM Bard January, PT [facility]   10/6/2020  2:15 PM Leonarda Lopez, [facility]   10/7/2020  1:45 PM Carrie Valdes PA-C [facility]   10/8/2020  2:15 PM [facility]   10/13/2020  2:15 PM Leonarda Lopez, PT [facility]   10/15/2020  2:15 PM [facility]   11/2/2020  2:40 PM Rosalinda Joseph, [facility]

## 2020-09-23 RX ORDER — BUSPIRONE HYDROCHLORIDE 7.5 MG/1
TABLET ORAL
Qty: 180 TAB | Refills: 0 | Status: SHIPPED | OUTPATIENT
Start: 2020-09-23

## 2020-09-24 ENCOUNTER — HOSPITAL ENCOUNTER (OUTPATIENT)
Dept: PHYSICAL THERAPY | Age: 75
Discharge: HOME | End: 2020-09-24
Payer: MEDICARE

## 2020-09-24 PROCEDURE — 97110 THERAPEUTIC EXERCISES: CPT

## 2020-09-24 NOTE — PROGRESS NOTES
PT DAILY TREATMENT NOTE 10-18    Patient Name: Jaylene Rendon  Date:2020  : 1945  [x]  Patient  Verified  Payor: VA MEDICARE / Plan: VA MEDICARE PART A & B / Product Type: Medicare /    In time:200  Out time:240  Total Treatment Time (min): 40  Visit #: 2 of 8    Medicare/BCBS Only   Total Timed Codes (min):  40 1:1 Treatment Time:  40       Treatment Area: Pain in right arm [M79.601]    SUBJECTIVE  Pain Level (0-10 scale): 0  Any medication changes, allergies to medications, adverse drug reactions, diagnosis change, or new procedure performed?: [x] No    [] Yes (see summary sheet for update)  Subjective functional status/changes:   [] No changes reported  \"No pain. \"    OBJECTIVE    Modality rationale: patient declined   Min Type Additional Details    [] Estim:  []Unatt       []IFC  []Premod                        []Other:  []w/ice   []w/heat  Position:  Location:    [] Estim: []Att    []TENS instruct  []NMES                    []Other:  []w/US   []w/ice   []w/heat  Position:  Location:    []  Traction: [] Cervical       []Lumbar                       [] Prone          []Supine                       []Intermittent   []Continuous Lbs:  [] before manual  [] after manual    []  Ultrasound: []Continuous   [] Pulsed                           []1MHz   []3MHz W/cm2:  Location:    []  Iontophoresis with dexamethasone         Location: [] Take home patch   [] In clinic    []  Ice     []  heat  []  Ice massage  []  Laser   []  Anodyne Position:  Location:    []  Laser with stim  []  Other:  Position:  Location:    []  Vasopneumatic Device Pressure:       [] lo [] med [] hi   Temperature: [] lo [] med [] hi   [] Skin assessment post-treatment:  []intact []redness- no adverse reaction    []redness - adverse reaction:     40 min Therapeutic Exercise:  [x] See flow sheet :   Rationale: increase ROM and increase strength to improve the patients ability to perform ADLs          With   [x] TE   [] TA   [] neuro   [] other: Patient Education: [x] Review HEP    [] Progressed/Changed HEP based on:   [x] positioning   [x] body mechanics   [] transfers   [] heat/ice application    [] other:      Other Objective/Functional Measures:      Pain Level (0-10 scale) post treatment: 0    ASSESSMENT/Changes in Function: Pt is making progress with her AAROM and PROM. She ambulated into the clinic today using a rollator walker instead of her power chair. Educated pt on safety and hand placement with transfers. Patient will continue to benefit from skilled PT services to modify and progress therapeutic interventions, address functional mobility deficits, address ROM deficits, address strength deficits, analyze and address soft tissue restrictions, analyze and cue movement patterns, analyze and modify body mechanics/ergonomics, assess and modify postural abnormalities, address imbalance/dizziness and instruct in home and community integration to attain remaining goals. [x]  See Plan of Care  []  See progress note/recertification  []  See Discharge Summary         Progress towards goals / Updated goals:  Short Term Goals: To be accomplished in 1 weeks:  1. Therapist to establish HEP for strength and ROM to improve ease with ADLs.             MET  Long Term Goals: To be accomplished in 4 treatments:  1. Patient will be independent with HEP to improve carryover of functional gains with ADLs between visits.             Eval Status:n/a              MET, Reports daily compliance  2. Pt will increase right passive shoulder flexion/ER to 130/ 50 degrees to increase ease with ADLs.             Eval Status:              Shoulder flexion PROM supine: 80 deg              Shoulder PROM ER in scaption: 20 deg              Not met, 90 deg flexion PROM supine with increased guarding today  3.  Pt will increase right active shoulder flexion to 90 degrees to increase ease with ADLs.               Eval Status:will address as cleared by MD [email or web address], progressed to [facility] today    PLAN  []  Upgrade activities as tolerated     [x]  Continue plan of care  []  Update interventions per flow sheet       []  Discharge due to:_  []  Other:_      Sandhya Arora PTA, CSCS 9/24/2020  2:41 PM    Future Appointments   Date Time Provider Gaby Delarosa   9/24/2020  2:00 PM [facility]   9/28/2020  3:00 PM [facility] FAST TRACK NURSE [facility]   9/29/2020  2:00 PM [facility]ford[facility]   10/1/2020  2:00 PM Kailyn Alberto, PT [facility]   10/6/2020  2:15 PM Alec [facility]reña[facility]   10/7/2020  1:45 PM Tuan Valdes PA-C [facility] BS AMB   10/8/2020  2:15 PM [facility]ford[facility]   10/13/2020  2:15 PM Alec Ureña, [facility]   10/15/2020  2:15 PM Manuela Alford[facility]   11/2/2020  2:40 PM Toya Thibodeaux NP [facility]

## 2020-09-27 DIAGNOSIS — M81.0 OSTEOPOROSIS, UNSPECIFIED OSTEOPOROSIS TYPE, UNSPECIFIED PATHOLOGICAL FRACTURE PRESENCE: ICD-10-CM

## 2020-09-28 ENCOUNTER — HOSPITAL ENCOUNTER (OUTPATIENT)
Dept: INFUSION THERAPY | Age: 75
End: 2020-09-28

## 2020-09-29 ENCOUNTER — HOSPITAL ENCOUNTER (OUTPATIENT)
Dept: PHYSICAL THERAPY | Age: 75
Discharge: HOME | End: 2020-09-29
Payer: MEDICARE

## 2020-09-29 PROCEDURE — 97110 THERAPEUTIC EXERCISES: CPT

## 2020-09-29 NOTE — PROGRESS NOTES
PT DAILY TREATMENT NOTE 10-18    Patient Name: Bulmaro Sessions  Date:2020  : 1945  [x]  Patient  Verified  Payor: VA MEDICARE / Plan: VA MEDICARE PART A & B / Product Type: Medicare /    In time:200  Out time:230  Total Treatment Time (min): 30  Visit #: 3 of 8    Medicare/BCBS Only   Total Timed Codes (min):  30 1:1 Treatment Time:  30       Treatment Area: Pain in right arm [M79.601]    SUBJECTIVE  Pain Level (0-10 scale): 0  Any medication changes, allergies to medications, adverse drug reactions, diagnosis change, or new procedure performed?: [x] No    [] Yes (see summary sheet for update)  Subjective functional status/changes:   [] No changes reported  \"No pain. \"    OBJECTIVE    Modality rationale: patient declined   Min Type Additional Details    [] Estim:  []Unatt       []IFC  []Premod                        []Other:  []w/ice   []w/heat  Position:  Location:    [] Estim: []Att    []TENS instruct  []NMES                    []Other:  []w/US   []w/ice   []w/heat  Position:  Location:    []  Traction: [] Cervical       []Lumbar                       [] Prone          []Supine                       []Intermittent   []Continuous Lbs:  [] before manual  [] after manual    []  Ultrasound: []Continuous   [] Pulsed                           []1MHz   []3MHz W/cm2:  Location:    []  Iontophoresis with dexamethasone         Location: [] Take home patch   [] In clinic    []  Ice     []  heat  []  Ice massage  []  Laser   []  Anodyne Position:  Location:    []  Laser with stim  []  Other:  Position:  Location:    []  Vasopneumatic Device Pressure:       [] lo [] med [] hi   Temperature: [] lo [] med [] hi   [] Skin assessment post-treatment:  []intact []redness- no adverse reaction    []redness - adverse reaction:     30 min Therapeutic Exercise:  [x] See flow sheet :   Rationale: increase ROM and increase strength to improve the patients ability to improve mobility and ADL performance         With   [x] TE [] TA   [] neuro   [] other: Patient Education: [x] Review HEP    [] Progressed/Changed HEP based on:   [x] positioning   [x] body mechanics   [] transfers   [] heat/ice application    [] other:      Other Objective/Functional Measures:   PROM right shoulder flexion 120 deg     Pain Level (0-10 scale) post treatment: 0    ASSESSMENT/Changes in Function: Pt is making progress with her passive motion. Still needs some verbal cuing for safety with her hand placement during sit to stand transfers. She reports having an improvement with ADLs since being more adherent to her HEP. Patient will continue to benefit from skilled PT services to modify and progress therapeutic interventions, address functional mobility deficits, address ROM deficits, address strength deficits, analyze and address soft tissue restrictions, analyze and cue movement patterns, analyze and modify body mechanics/ergonomics, assess and modify postural abnormalities, address imbalance/dizziness and instruct in home and community integration to attain remaining goals. [x]  See Plan of Care  []  See progress note/recertification  []  See Discharge Summary         Progress towards goals / Updated goals:  Short Term Goals: To be accomplished in 1 weeks:  1. Therapist to establish HEP for strength and ROM to improve ease with ADLs.             MET  Long Term Goals: To be accomplished in 4 treatments:  1. Patient will be independent with HEP to improve carryover of functional gains with ADLs between visits.             Eval Status:n/a              MET, Reports daily compliance  2. Pt will increase right passive shoulder flexion/ER to 130/ 50 degrees to increase ease with ADLs.               Eval Status:              Shoulder flexion PROM supine: 80 deg              Shoulder PROM ER in scaption: 20 deg              Not met, 90 deg flexion PROM supine with increased guarding today  3.  Pt will increase right active shoulder flexion to 90 degrees to increase ease with ADLs.               Eval Status:will address as cleared by MD              [id number], progressed to [facility] today    PLAN  []  Upgrade activities as tolerated     [x]  Continue plan of care  []  Update interventions per flow sheet       []  Discharge due to:_  []  Other:_      Nereida Butler PTA, CSCS 9/29/2020  2:36 PM    Future Appointments   Date Time Provider Gaby Arcei   10/1/2020  2:00 PM Jimmie Schuster [facility]   10/6/2020  2:15 PM Danitza Villalobos PT [facility]   10/7/2020  1:45 PM Gaye Valdes PA-C [facility] BS AMB   10/8/2020  2:15 PM Abhishek Watts PTA [facility]   10/13/2020  2:15 PM Danitza Villalobos PT [facility]   10/15/2020  2:15 PM Abhishek Watts PTA [facility]   11/2/2020  2:40 PM Fabián Naik NP [facility]

## 2020-10-01 ENCOUNTER — HOSPITAL ENCOUNTER (OUTPATIENT)
Dept: PHYSICAL THERAPY | Age: 75
Discharge: HOME | End: 2020-10-01
Payer: MEDICARE

## 2020-10-01 PROCEDURE — 97110 THERAPEUTIC EXERCISES: CPT

## 2020-10-01 NOTE — PROGRESS NOTES
PT DAILY TREATMENT NOTE 10-18    Patient Name: Jennifer Lyn  ZRQA:  : 1945  [x]  Patient  Verified  Payor: VA MEDICARE / Plan: VA MEDICARE PART A & B / Product Type: Medicare /    In time: 2:05  Out time: 2:42  Total Treatment Time (min): 37  Visit #: 4 of 8    Medicare/BCBS Only   Total Timed Codes (min):  37 1:1 Treatment Time:  37       Treatment Area: Pain in right arm [M79.601]    SUBJECTIVE  Pain Level (0-10 scale): 0/10  Any medication changes, allergies to medications, adverse drug reactions, diagnosis change, or new procedure performed?: [x] No    [] Yes (see summary sheet for update)  Subjective functional status/changes:   [] No changes reported  Patient stated that her shoulder has been feeling good. OBJECTIVE    37 min Therapeutic Exercise:  [x] See flow sheet : PROM into flexion within patient's tolerance    Rationale: increase ROM and increase strength to improve the patients ability to perform ADLs safely and efficiently         With   [] TE   [] TA   [] neuro   [] other: Patient Education: [x] Review HEP    [] Progressed/Changed HEP based on:   [] positioning   [] body mechanics   [] transfers   [] heat/ice application    [] other:      Other Objective/Functional Measures: right shoulder AAROM (with pulleys) flex: 100deg      Pain Level (0-10 scale) post treatment: 0/10    ASSESSMENT/Changes in Function: Therapist provided multiple cues for correct technique with exercises. Good return demonstration by patient. Patient is progressing toward LTG #3. Patient will continue to benefit from skilled PT services to modify and progress therapeutic interventions, address functional mobility deficits, address ROM deficits, address strength deficits, analyze and address soft tissue restrictions, analyze and cue movement patterns, analyze and modify body mechanics/ergonomics and assess and modify postural abnormalities to attain remaining goals.      []  See Plan of Care  []  See progress note/recertification  []  See Discharge Summary         Progress towards goals / Updated goals:  Short Term Goals: To be accomplished in 1 weeks:  1. Therapist to establish HEP for strength and ROM to improve ease with ADLs.             MET  Long Term Goals: To be accomplished in 4 treatments:  1. Patient will be independent with HEP to improve carryover of functional gains with ADLs between visits.             Eval Status:n/a              MET, Reports daily compliance  2. Pt will increase right passive shoulder flexion/ER to 130/ 50 degrees to increase ease with ADLs.             Eval Status:              Shoulder flexion PROM supine: 80 deg              Shoulder PROM ER in scaption: 20 deg              Not met, 90 deg flexion PROM supine with increased guarding today  3.  Pt will increase right active shoulder flexion to 90 degrees to increase ease with ADLs.               Eval Status:will address as cleared by MD              [id number],   right shoulder AAROM (with pulleys) flex: 100deg         PLAN  []  Upgrade activities as tolerated     [x]  Continue plan of care  []  Update interventions per flow sheet       []  Discharge due to:_  []  Other:_      Shell Barnhart, PT 10/1/2020  2:11 PM    Future Appointments   Date Time Provider Gaby Delarosa   10/6/2020  2:15 PM Meseret Ayers PT [facility]   10/7/2020  1:45 PM Meir Valdes PA-C [facility]   10/8/2020  2:15 PM Guevara Peoples PTA [facility]   10/13/2020  2:15 PM Meseret Ayers PT [facility]   10/15/2020  2:15 PM Guevara Peoples PTA [facility]   10/16/2020  1:00 PM [facility] INFUSION PHLEBOTOMIST [facility]   10/19/2020  1:00 PM [facility] FAST TRACK NURSE [facility]   11/2/2020  2:40 PM Eben Toussaint NP [facility]

## 2020-10-06 ENCOUNTER — HOSPITAL ENCOUNTER (OUTPATIENT)
Dept: PHYSICAL THERAPY | Age: 75
Discharge: HOME | End: 2020-10-06
Payer: MEDICARE

## 2020-10-06 PROCEDURE — 97110 THERAPEUTIC EXERCISES: CPT

## 2020-10-06 NOTE — PROGRESS NOTES
PT DAILY TREATMENT NOTE 10-18    Patient Name: Symone Gilbert  VYLB:  : 1945  [x]  Patient  Verified  Payor: [place] MEDICARE / Plan: [place] MEDICARE PART A & B / Product Type: Medicare /    In time:215  Out time:258  Total Treatment Time (min): 43  Visit #: 5 of 8    Medicare/BCBS Only   Total Timed Codes (min):  43 1:1 Treatment Time:  43       Treatment Area: Pain in right arm [M79.601]    SUBJECTIVE  Pain Level (0-10 scale): 0  Any medication changes, allergies to medications, adverse drug reactions, diagnosis change, or new procedure performed?: [x] No    [] Yes (see summary sheet for update)  Subjective functional status/changes:   [] No changes reported  \"I'm doing good. \"    OBJECTIVE        43 min Therapeutic Exercise:  [x] See flow sheet :   Rationale: increase ROM, increase strength, improve coordination, improve balance and increase proprioception to improve the patients ability to perform ADls. With   [] TE   [] TA   [] neuro   [] other: Patient Education: [x] Review HEP    [] Progressed/Changed HEP based on:   [] positioning   [] body mechanics   [] transfers   [] heat/ice application    [] other:      Other Objective/Functional Measures:      Pain Level (0-10 scale) post treatment: 0    ASSESSMENT/Changes in Function: Progressed AAROM per flow sheet with good tolerance. Still very stiff into supine flexion > 100 deg. Added supine  AAROM flexion and AAROM ER behind head to improve carryover between session. Patient will continue to benefit from skilled PT services to modify and progress therapeutic interventions, address functional mobility deficits, address ROM deficits, address strength deficits, analyze and address soft tissue restrictions, analyze and cue movement patterns, analyze and modify body mechanics/ergonomics, assess and modify postural abnormalities, address imbalance/dizziness and instruct in home and community integration to attain remaining goals.      []  See Plan of Care  []  See progress note/recertification  []  See Discharge Summary         Progress towards goals / Updated goals:  Short Term Goals: To be accomplished in 1 weeks:  1. Therapist to establish HEP for strength and ROM to improve ease with ADLs.             MET  Long Term Goals: To be accomplished in 4 treatments:  1. Patient will be independent with HEP to improve carryover of functional gains with ADLs between visits.             Eval Status:n/a              MET, Reports daily compliance  2. Pt will increase right passive shoulder flexion/ER to 130/ 50 degrees to increase ease with ADLs.             Eval Status:              Shoulder flexion PROM supine: 80 deg              Shoulder PROM ER in scaption: 20 deg              Not met, 90 deg flexion PROM supine with increased guarding today  3.  Pt will increase right active shoulder flexion to 90 degrees to increase ease with ADLs.               Eval Status:will address as cleared by MD              [id number],   right shoulder AAROM (with pulleys) flex: 100deg               PLAN  []  Upgrade activities as tolerated     [x]  Continue plan of care  []  Update interventions per flow sheet       []  Discharge due to:_  []  Other:_      Fouzia Johnson, PT 10/6/2020  2:57 PM    Future Appointments   Date Time Provider Gaby Delarosa   10/8/2020  2:15 PM Viki Asif PTA [facility]   10/8/2020  3:30 PM Braydon Abreu PA-C [facility]   10/13/2020  2:15 PM Slick Steen PT [facility]   10/15/2020  2:15 PM Viki Asif PTA [facility]   10/16/2020  1:00 PM [facility] INFUSION PHLEBOTOMIST [facility]   10/19/2020  1:00 PM [facility] FAST TRACK NURSE [facility]   11/2/2020  2:40 PM Charleen Benavides NP [facility]

## 2020-10-08 ENCOUNTER — HOSPITAL ENCOUNTER (OUTPATIENT)
Dept: PHYSICAL THERAPY | Age: 75
Discharge: HOME | End: 2020-10-08
Payer: MEDICARE

## 2020-10-08 ENCOUNTER — OFFICE VISIT (OUTPATIENT)
Dept: ORTHOPEDIC SURGERY | Age: 75
End: 2020-10-08
Payer: MEDICARE

## 2020-10-08 VITALS
SYSTOLIC BLOOD PRESSURE: 110 MMHG | HEART RATE: 89 BPM | HEIGHT: 60 IN | OXYGEN SATURATION: 96 % | TEMPERATURE: 97.2 F | DIASTOLIC BLOOD PRESSURE: 78 MMHG | RESPIRATION RATE: 16 BRPM | BODY MASS INDEX: 17.28 KG/M2 | WEIGHT: 88 LBS

## 2020-10-08 DIAGNOSIS — S42.201D CLOSED FRACTURE OF PROXIMAL END OF RIGHT HUMERUS WITH ROUTINE HEALING, UNSPECIFIED FRACTURE MORPHOLOGY, SUBSEQUENT ENCOUNTER: Primary | ICD-10-CM

## 2020-10-08 PROCEDURE — 97110 THERAPEUTIC EXERCISES: CPT

## 2020-10-08 PROCEDURE — 99024 POSTOP FOLLOW-UP VISIT: CPT | Performed by: PHYSICIAN ASSISTANT

## 2020-10-08 NOTE — PROGRESS NOTES
PT DAILY TREATMENT NOTE 10-18    Patient Name: Melita Contreras  SIJV:  : 1945  [x]  Patient  Verified  Payor: [place] MEDICARE / Plan: VA MEDICARE PART A & B / Product Type: Medicare /    In time:212  Out time:250  Total Treatment Time (min): 38  Visit #: 6 of 8    Medicare/BCBS Only   Total Timed Codes (min):  38 1:1 Treatment Time:  38       Treatment Area: Pain in right arm [M79.601]    SUBJECTIVE  Pain Level (0-10 scale): 0  Any medication changes, allergies to medications, adverse drug reactions, diagnosis change, or new procedure performed?: [x] No    [] Yes (see summary sheet for update)  Subjective functional status/changes:   [] No changes reported  \"No pain. I was a little sore from last time. \"    OBJECTIVE    Modality rationale: patient declined   Min Type Additional Details    [] Estim:  []Unatt       []IFC  []Premod                        []Other:  []w/ice   []w/heat  Position:  Location:    [] Estim: []Att    []TENS instruct  []NMES                    []Other:  []w/US   []w/ice   []w/heat  Position:  Location:    []  Traction: [] Cervical       []Lumbar                       [] Prone          []Supine                       []Intermittent   []Continuous Lbs:  [] before manual  [] after manual    []  Ultrasound: []Continuous   [] Pulsed                           []1MHz   []3MHz W/cm2:  Location:    []  Iontophoresis with dexamethasone         Location: [] Take home patch   [] In clinic    []  Ice     []  heat  []  Ice massage  []  Laser   []  Anodyne Position:  Location:    []  Laser with stim  []  Other:  Position:  Location:    []  Vasopneumatic Device Pressure:       [] lo [] med [] hi   Temperature: [] lo [] med [] hi   [] Skin assessment post-treatment:  []intact []redness- no adverse reaction    []redness - adverse reaction:     38 min Therapeutic Exercise:  [x] See flow sheet :   Rationale: increase ROM and increase strength to improve the patients ability to perform ADLs With   [x] TE   [] TA   [] neuro   [] other: Patient Education: [x] Review HEP    [] Progressed/Changed HEP based on:   [x] positioning   [x] body mechanics   [] transfers   [] heat/ice application    [] other:      Other Objective/Functional Measures:      Pain Level (0-10 scale) post treatment: 0    ASSESSMENT/Changes in Function: Pt is making good progress with her active reaching and overall functional mobility. She needs less cuing for safety with her rollator, but did need cuing for safety with transfer to [facility] 115. Patient will continue to benefit from skilled PT services to modify and progress therapeutic interventions, address functional mobility deficits, address ROM deficits, address strength deficits, analyze and address soft tissue restrictions, analyze and cue movement patterns, analyze and modify body mechanics/ergonomics, assess and modify postural abnormalities, address imbalance/dizziness and instruct in home and community integration to attain remaining goals. [x]  See Plan of Care  []  See progress note/recertification  []  See Discharge Summary         Progress towards goals / Updated goals:  Short Term Goals: To be accomplished in 1 weeks:  1. Therapist to establish HEP for strength and ROM to improve ease with ADLs.             MET  Long Term Goals: To be accomplished in 4 treatments:  1. Patient will be independent with HEP to improve carryover of functional gains with ADLs between visits.             Eval Status:n/a              MET, Reports daily compliance  2. Pt will increase right passive shoulder flexion/ER to 130/ 50 degrees to increase ease with ADLs.             Eval Status:              Shoulder flexion PROM supine: 80 deg              Shoulder PROM ER in scaption: 20 deg              Not met, 90 deg flexion PROM supine with increased guarding today  3.  Pt will increase right active shoulder flexion to 90 degrees to increase ease with ADLs.               Eval Status:will address as cleared by MD BACA,   AKXSV shoulder AAROM (with pulleys) flex: 100deg            PLAN  []  Upgrade activities as tolerated     [x]  Continue plan of care  []  Update interventions per flow sheet       []  Discharge due to:_  []  Other:_      Paradise Dunbar PTA, CSCS 10/8/2020  2:57 PM    Future Appointments   Date Time Provider Gaby Delarosa   10/8/2020  2:15 PM [facility]   10/8/2020  3:30 PM Vita Mercado PA-C [facility]   10/13/2020  2:15 PM [facility]ni[facility]   10/15/2020  2:15 PM [facility]   10/16/2020  1:00 PM [facility] INFUSION PHLEBOTOMIST [facility]   10/19/2020  1:00 PM [facility] FAST TRACK NURSE [facility]   11/2/2020  2:40 PM Chelsey Howell, NP [facility]

## 2020-10-08 NOTE — PROGRESS NOTES
Patient: Johan Jacobs                MRN: [id number]       SSN: [id number]  YOB: 1945        AGE: 70 y.o. SEX: female    PCP: None  10/08/20     10/8/2020: Patient follows up for right proximal humeral fracture. She is continuing outpatient physical therapy and made extremely positive gains with both out patient therapy and in-home self-guided exercises. She is using a Rollator walker with brakes and seat today. 9/16/2020. Patient follows up for reimaging of the right proximal humeral fracture. Generally she is doing well. She is continue her exercises. She has been doubling her exercises daily. She is encouraged not to overdo her recoveries noting that she may plateau and have worsening motion of the right shoulder occur. 8/26/2020: Patient follows up status post right proximal humeral comminuted fracture. She is essentially trying to use her right upper extremity normally despite her injury. She has participated in some self-guided Codman exercises but overall felt like that was too little to help her regain her motion and strength. She has discontinued use of her sling. 8/12/2020: Patient follows up status post fall with a right proximal humeral fracture. She has been somewhat reluctant to start her own self-guided Codman exercises which were demonstrated at last OV. Her pain has improved generally but does recur it with particular movements of the right shoulder and elbow. She is alternating Tylenol with Motrin for symptom management. Chief Complaint   Patient presents with    Shoulder Pain     right shoulder pain     HISTORY:  Johan Jacobs is a 70 y.o. female who sustained a right shoulder injury on 7/26/20. She landed on her right side when her legs gave out as she was getting up from a recliner. She was seen at [facility] on the same day where right shoulder x rays revealed a comminuted fracture of the proximal right humerus.  She was provided an arm sling and referred for orthopedic consultation. She has been experiencing pain and swelling since the injury. She states she has leg weakness and dizziness because of her gabapentin medication. She reports she was able to walk, stay home alone, and even do dishes before starting gabapentin. She is s/p multi level posterior cervical fusion 11/20/19 by Dr. Jackie Reardon. She is currently being treated for foot pain by Dr. Tal Graham. Pain Assessment  10/8/2020   Location of Pain Shoulder   Pain Location Comment -   Location Modifiers Right   Severity of Pain 0   Quality of Pain -   Quality of Pain Comment -   Duration of Pain -   Frequency of Pain -   Aggravating Factors -   Aggravating Factors Comment -   Limiting Behavior -   Relieving Factors -   Relieving Factors Comment -   Result of Injury No   Work-Related Injury -   Type of Injury -     Occupation, etc:  Ms. 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 She lives in [place] with her partner, Matheus Pérez. She has 3 sons-- one in the area. She has 5 grandchildren. She has 16 steps to get to her home. She does not exercise. She recently lost 9 pounds due to lack of appetite. Ms. Tiffany Curtis weighs 91 lbs and is 5'0\" tall.        Lab Results   Component Value Date/Time    Hemoglobin A1c 5.8 (H) 03/18/2019 04:07 PM     Weight Metrics 10/8/2020 9/16/2020 8/26/2020 8/12/2020 7/27/2020 7/15/2020 6/11/2020   Weight 88 lb 89 lb 90 lb 9.6 oz 90 lb 6.4 oz - 91 lb -   BMI 17.19 kg/m2 17.38 kg/m2 17.69 kg/m2 17.66 kg/m2 - 17.77 kg/m2 19.53 kg/m2       Patient Active Problem List   Diagnosis Code    Cervical neck pain with evidence of disc disease M50.90    CVA (cerebral vascular accident) ([facility] [address] I63.9    TIA (transient ischemic attack) G45.9    Cervical facet syndrome M47.812    Spondylosis of cervical region without myelopathy or radiculopathy M47.812    Cervical spinal stenosis M48.02    Degenerative disc disease, cervical M50.30    Chronic pain syndrome G89.4    Cervical radiculitis M54.12    Myofascial pain syndrome M79.18    Hyperlipidemia E78.5    Anxiety F41.9    Osteoarthritis of cervical spine M47.812    Osteoporosis M81.0    Vertigo R42    Dizziness R42    Weakness R53.1    Concentric left ventricular hypertrophy I51.7    Cerebrovascular small vessel disease I67.9    Cervical cord myelomalacia (HCC) G95.89     REVIEW OF SYSTEMS:    Constitutional Symptoms: Negative   Eyes: Negative   Ears, Nose, Throat and Mouth: Negative   Cardiovascular: Negative   Respiratory: Negative   Genitourinary: Per HPI   Gastrointestinal: Per HPI   Integumentary (Skin and/or Breast): Negative   Musculoskeletal: Per HPI   Endocrine/Rheumatologic: Negative   Neurological: Per HPI   Hematology/Lymphatic: Negative    Allergic/Immunologic: Negative   Phychiatric: Negative    Social History     Socioeconomic History    Marital status:      Spouse name: Not on file    Number of children: Not on file    Years of education: Not on file    Highest education level: Not on file   Occupational History    Not on file   Social Needs    Financial resource strain: Not on file    Food insecurity     Worry: Not on file     Inability: Not on file    Transportation needs     Medical: Not on file     Non-medical: Not on file   Tobacco Use    Smoking status: Never Smoker    Smokeless tobacco: Never Used   Substance and Sexual Activity    Alcohol use: No    Drug use: No    Sexual activity: Never   Lifestyle    Physical activity     Days per week: Not on file     Minutes per session: Not on file    Stress: Not on file   Relationships    Social connections     Talks on phone: Not on file     Gets together: Not on file     Attends Shinto service: Not on file     Active member of club or organization: Not on file     Attends meetings of clubs or organizations: Not on file     Relationship status: Not on file    Intimate partner violence     Fear of current or ex partner: Not on file     Emotionally abused: Not on file     Physically abused: Not on file     Forced sexual activity: Not on file   Other Topics Concern    Not on file   Social History Narrative    Not on file      Allergies   Allergen Reactions    Baclofen Shortness of Breath     Denies. Pt tolerates    Morphine Other (comments)     hallucinations    Celebrex [Celecoxib] Other (comments)     Tiredness       Current Outpatient Medications   Medication Sig    busPIRone (BUSPAR) 7.5 mg tablet take 1 tablet by mouth twice a day    ibuprofen (MOTRIN) 400 mg tablet Take 1 Tab by mouth every six (6) hours as needed for Pain.  cyanocobalamin (Vitamin B-12) 100 mcg tablet Take 100 mcg by mouth daily.  baclofen (LIORESAL) 10 mg tablet Take 1 Tab by mouth two (2) times a day.  ALPRAZolam (XANAX) 0.25 mg tablet Take 1 Tab by mouth two (2) times daily as needed for Anxiety. Max Daily Amount: 0.5 mg.    gabapentin (NEURONTIN) 100 mg capsule Take 2 Caps by mouth three (3) times daily. Max Daily Amount: 600 mg. (Patient taking differently: Take 300 mg by mouth three (3) times daily. Takes 300mg TID)    acetaminophen (TYLENOL) 160 mg/5 mL elixir Take 1,000 mg by mouth daily.  turmeric 400 mg cap Take 1 Cap by mouth daily.  aspirin 81 mg chewable tablet Take 1 Tab by mouth daily.  atorvastatin (LIPITOR) 20 mg tablet take 1 tablet by mouth once daily    folic acid 349 mcg tablet Take 800 mcg by mouth daily.  topiramate (Topamax) 25 mg tablet Take 1 Tab by mouth two (2) times daily (with meals).  naloxone (Narcan) 4 mg/actuation nasal spray Use 1 spray intranasally, then discard. Repeat with new spray every 2 min as needed for opioid overdose symptoms, alternating nostrils.     ondansetron (ZOFRAN ODT) 4 mg disintegrating tablet dissolve 1 tablet ON TONGUE every 6 hours if needed for nausea OR vomiting    omeprazole (PRILOSEC) 20 mg capsule take 1 capsule by mouth once daily before breakfast    denosumab (PROLIA) 60 mg/mL injection 60 mg by SubCUTAneous route every 6 months.  midodrine (PROAMITINE) 5 mg tablet Take 5 mg by mouth two (2) times a day. 1 tab by mouth with breakfast and lunch    loratadine (CLARITIN) 10 mg tablet Take 1 Tab by mouth daily. No current facility-administered medications for this visit. PHYSICAL EXAMINATION:  Wounds/Abrasion: None Present  Neuro: Negative, no tremors  ORTHO EXAMINATION:  Examination     Skin   intact   Effusion   none   Biceps deformity   none   Atrophy     AC joint tenderness   none   Acromial tenderness   none   Biceps tenderness   none   Forward flexion/Elevation ROM   140 degrees actively without pain   Active abduction ROM     External rotation ROM   active external rotation 30 degrees without pain. Internal rotation ROM   L3 actively without pain   Apprehension   none   Impingement   none-   Drop Arm Test  -   Neurovascular  Intact   Unable to exam range of motion due to pain    Examination    Skin    Tenderness    Tightness    Flexion    Extension    Lateral bend left    Lateral bend right    Masses    Biceps reflex    Triceps reflex    Brachioradialis reflex        On exam today patient is found sitting comfortably in a wheelchair. She is joined by her male friend Gloria Black. The patient's right upper extremity examined thoroughly to reveal skin intact. No warmth or erythema. There is circumferential bruising/ecchymosis of the right humerus has all been resolved. Patient does have tenderness to palpation over the anterior portion of the right shoulder. Her active range of motion tested today in the forward flexion plane at 50 degrees she can glenohumeral abduct to 50 degrees. Internal rotation actively 10 degrees and external rotation passively 30 degrees slight pain in all planes of motion but patient comfortable overall. C    RADIOGRAPHS:  XR RIGHT SHOULDER 7/26/20 [facility]  IMPRESSION:  1.  Comminuted fracture of the proximal right humerus as described with repeated x-rays on 8/12/2020 reviewed by Carlos Davison PA-C revealing no interval change in the previously identified x-rays from below and further x-rays completed on 8/26/2020 reflecting positive interval change associated with the medial portion of the comminuted humeral head with a fragment now distracted from the surgical neck. No other identified callus inlay or abnormalities identified, imaging repeated on 10/8/2020 reflects healing associated with the above fracture. This represents interval improvement. ...  -I have independently reviewed these images during this office visit. -Dr. Shelia Landrum  Three views - fracture of proximal right humerus fractures, + acromioclavicular narrowing, + glenohumeral narrowing, + calcific densities, shoulder is well located, type 3 impingement. IMPRESSION:      ICD-10-CM ICD-9-CM    1. Closed fracture of proximal end of right humerus with routine healing, unspecified fracture morphology, subsequent encounter  S42.201D V54.11 AMB POC XRAY, SHOULDER; COMPLETE, 2+     PLAN: Patient is done well through her recoveries. Her motion is excellent today and therefore she will likely not need any invasive procedures such as a shoulder replacement. She is not wishing any surgery. She will continue to complete outpatient physical therapy and then follow with home exercises self-guided on a daily basis. She will follow with our office on a as needed basis. Today all of her questions answered to her satisfaction a copy of her x-rays reviewed and provided.

## 2020-10-12 RX ORDER — HYDROCORTISONE SODIUM SUCCINATE 100 MG/2ML
100 INJECTION, POWDER, FOR SOLUTION INTRAMUSCULAR; INTRAVENOUS AS NEEDED
Status: CANCELLED | OUTPATIENT
Start: 2020-10-19

## 2020-10-12 RX ORDER — ONDANSETRON 2 MG/ML
8 INJECTION INTRAMUSCULAR; INTRAVENOUS AS NEEDED
Status: CANCELLED | OUTPATIENT
Start: 2020-10-19

## 2020-10-12 RX ORDER — DIPHENHYDRAMINE HYDROCHLORIDE 50 MG/ML
50 INJECTION, SOLUTION INTRAMUSCULAR; INTRAVENOUS AS NEEDED
Status: CANCELLED
Start: 2020-10-19

## 2020-10-12 RX ORDER — ALBUTEROL SULFATE 0.83 MG/ML
2.5 SOLUTION RESPIRATORY (INHALATION) AS NEEDED
Status: CANCELLED
Start: 2020-10-19

## 2020-10-12 RX ORDER — ACETAMINOPHEN 325 MG/1
650 TABLET ORAL AS NEEDED
Status: CANCELLED
Start: 2020-10-19

## 2020-10-12 RX ORDER — DIPHENHYDRAMINE HYDROCHLORIDE 50 MG/ML
25 INJECTION, SOLUTION INTRAMUSCULAR; INTRAVENOUS AS NEEDED
Status: CANCELLED
Start: 2020-10-19

## 2020-10-12 RX ORDER — EPINEPHRINE 1 MG/ML
0.3 INJECTION, SOLUTION, CONCENTRATE INTRAVENOUS AS NEEDED
Status: CANCELLED | OUTPATIENT
Start: 2020-10-19

## 2020-10-13 ENCOUNTER — HOSPITAL ENCOUNTER (OUTPATIENT)
Dept: PHYSICAL THERAPY | Age: 75
Discharge: HOME | End: 2020-10-13
Payer: MEDICARE

## 2020-10-13 PROCEDURE — 97110 THERAPEUTIC EXERCISES: CPT

## 2020-10-13 NOTE — PROGRESS NOTES
PT DAILY TREATMENT NOTE 10-18    Patient Name: Vinny Suh  KIOM:  : 1945  [x]  Patient  Verified  Payor: [place] MEDICARE / Plan: VA MEDICARE PART A & B / Product Type: Medicare /    In time:219  Out time:300  Total Treatment Time (min): 41  Visit #: 5 of 8    Treatment Area: Pain in right arm [M60.031]    SUBJECTIVE  Pain Level (0-10 scale):0  Any medication changes, allergies to medications, adverse drug reactions, diagnosis change, or new procedure performed?: [x] No    [] Yes (see summary sheet for update)  Subjective functional status/changes:   [] No changes reported  \"I'm doing great with the arm. \"    OBJECTIVE  Modality rationale: patient declined   Min Type Additional Details      []? Estim:  []? Unatt       []? IFC  []? Premod                        []?Other:  []?w/ice   []?w/heat  Position:  Location:      []? Estim: []? Att    []? TENS instruct  []? NMES                    []?Other:  []?w/US   []?w/ice   []?w/heat  Position:  Location:      []? Traction: []? Cervical       []? Lumbar                       []? Prone          []? Supine                       []?Intermittent   []? Continuous Lbs:  []? before manual  []? after manual      []? Ultrasound: []? Continuous   []? Pulsed                           []? 1MHz   []? 3MHz W/cm2:  Location:      []? Iontophoresis with dexamethasone         Location: []? Take home patch   []? In clinic      []? Ice     []?  heat  []? Ice massage  []? Laser   []? Anodyne Position:  Location:      []? Laser with stim  []? Other:  Position:  Location:      []? Vasopneumatic Device Pressure:       []? lo []? med []? hi   Temperature: []? lo []? med []? hi    []? Skin assessment post-treatment:  []?intact []? redness- no adverse reaction    []? redness - adverse reaction:      41 min Therapeutic Exercise:  [x]?  See flow sheet :   Rationale: increase ROM and increase strength to improve the patients ability to perform ADLs With   [] TE   [] TA   [] neuro   [] other: Patient Education: [x] Review HEP    [] Progressed/Changed HEP based on:   [] positioning   [] body mechanics   [] transfers   [] heat/ice application    [] other:      Other Objective/Functional Measures:      Pain Level (0-10 scale) post treatment: 0    ASSESSMENT/Changes in Function: Mr. Nikita Mccurdy continues to improve with shoulder ROM and pain remains ewll controlled. We have discussed transitioning to HEP if she feels able at next session as she would like to focus on LE strengthening and balance training rehab from another MD order. Patient will continue to benefit from skilled PT services to modify and progress therapeutic interventions, address functional mobility deficits, address ROM deficits, address strength deficits, analyze and address soft tissue restrictions, analyze and cue movement patterns, analyze and modify body mechanics/ergonomics, assess and modify postural abnormalities, address imbalance/dizziness and instruct in home and community integration to attain remaining goals. []  See Plan of Care  []  See progress note/recertification  []  See Discharge Summary         Progress towards goals / Updated goals:  Short Term Goals: To be accomplished in 1 weeks:  1. Therapist to establish HEP for strength and ROM to improve ease with ADLs.             MET  Long Term Goals: To be accomplished in 4 treatments:  1. Patient will be independent with HEP to improve carryover of functional gains with ADLs between visits.             Eval Status:n/a              MET, Reports daily compliance  2. Pt will increase right passive shoulder flexion/ER to 130/ 50 degrees to increase ease with ADLs.               Eval Status:              Shoulder flexion PROM supine: 80 deg              Shoulder PROM ER in scaption: 20 deg              Not met, 90 deg flexion PROM supine with increased guarding today  3.  Pt will increase right active shoulder flexion to 90 degrees to increase ease with ADLs.               Eval Status:will address as cleared by MD MONTESINOS,   JNJTF shoulder AAROM (with pulleys) flex: 100deg               PLAN  []  Upgrade activities as tolerated     [x]  Continue plan of care  []  Update interventions per flow sheet       []  Discharge due to:_  []  Other:_      Krystle Santacruz, PT 10/13/2020  3:21 PM    Future Appointments   Date Time Provider Gaby Arcei   10/15/2020  2:15 [facility]   10/16/2020  1:00 PM [facility] INFUSION PHLEBOTOMIST [facility]   10/19/2020  1:00 PM [facility] FAST TRACK NURSE [facility]   10/28/2020  1:00 PM Ana Paula MORRISON DO [facility]   11/2/2020  2:40 PM Mickie Cao NP [facility]

## 2020-10-15 ENCOUNTER — HOSPITAL ENCOUNTER (OUTPATIENT)
Dept: PHYSICAL THERAPY | Age: 75
Discharge: HOME | End: 2020-10-15
Payer: MEDICARE

## 2020-10-15 PROCEDURE — 97110 THERAPEUTIC EXERCISES: CPT

## 2020-10-15 NOTE — PROGRESS NOTES
PT DAILY TREATMENT NOTE 10-18    Patient Name: Mary Ann Fair  EOPE:  : 1945  [x]  Patient  Verified  Payor: VA MEDICARE / Plan: VA MEDICARE PART A & B / Product Type: Medicare /    In time:200  Out time:239  Total Treatment Time (min): 39  Visit #: 8 of 8    Medicare/BCBS Only   Total Timed Codes (min):  39 1:1 Treatment Time:  39       Treatment Area: Pain in right arm [M59.921]    SUBJECTIVE  Pain Level (0-10 scale): 0  Any medication changes, allergies to medications, adverse drug reactions, diagnosis change, or new procedure performed?: [x] No    [] Yes (see summary sheet for update)  Subjective functional status/changes:   [] No changes reported  \"No pain. \"    OBJECTIVE    Modality rationale: patient declined   Min Type Additional Details    [] Estim:  []Unatt       []IFC  []Premod                        []Other:  []w/ice   []w/heat  Position:  Location:    [] Estim: []Att    []TENS instruct  []NMES                    []Other:  []w/US   []w/ice   []w/heat  Position:  Location:    []  Traction: [] Cervical       []Lumbar                       [] Prone          []Supine                       []Intermittent   []Continuous Lbs:  [] before manual  [] after manual    []  Ultrasound: []Continuous   [] Pulsed                           []1MHz   []3MHz W/cm2:  Location:    []  Iontophoresis with dexamethasone         Location: [] Take home patch   [] In clinic    []  Ice     []  heat  []  Ice massage  []  Laser   []  Anodyne Position:  Location:    []  Laser with stim  []  Other:  Position:  Location:    []  Vasopneumatic Device Pressure:       [] lo [] med [] hi   Temperature: [] lo [] med [] hi   [] Skin assessment post-treatment:  []intact []redness- no adverse reaction    []redness - adverse reaction:     39 min Therapeutic Exercise:  [x] See flow sheet :   Rationale: increase ROM and increase strength to improve the patients ability to perform ADLs        With   [x] TE   [] TA   [] neuro   [] other: Patient Education: [x] Review HEP    [] Progressed/Changed HEP based on:   [x] positioning   [x] body mechanics   [] transfers   [] heat/ice application    [] other:      Other Objective/Functional Measures:   PROM right shoulder flexion 110 deg  PROM right shoulder ER 47 deg    AROM right shoulder flexion 100 deg     Pain Level (0-10 scale) post treatment: 0    ASSESSMENT/Changes in Function: Ms. Lyric Montoya has been a pleasure to treat and reports 100% improvement since beginning therapy. Pt has shown improvements with her passive and active motion of the right shoulder. She reports ease with most basic ADLs, but still has some difficulty with washing her hair. We are discharging to an updated HEP at this time. []  See Plan of Care  []  See progress note/recertification  [x]  See Discharge Summary         Progress towards goals / Updated goals:  Short Term Goals: To be accomplished in 1 weeks:  1. Therapist to establish HEP for strength and ROM to improve ease with ADLs.             MET  Long Term Goals: To be accomplished in 4 treatments:  1. Patient will be independent with HEP to improve carryover of functional gains with ADLs between visits.             Eval Status:n/a              MET, Reports daily compliance  2. Pt will increase right passive shoulder flexion/ER to 130/ 50 degrees to increase ease with ADLs.             Eval Status:              Shoulder flexion PROM supine: 80 deg              Shoulder PROM ER in scaption: 20 deg              PROGRESSED WELL; 110 deg flexion, 47 deg ER  3.  Pt will increase right active shoulder flexion to 90 degrees to increase ease with ADLs.               Eval Status:will address as cleared by MD              MET; 100 deg            Functional Gains: dressing, pain, low-level reaching, basic ADLs    Functional Deficits: washing hair  % improvement: 100%  Pain   Average: 0/10       Best: 0/10     Worst: 0/10  Patient Goal: \"to be able to use it again and wash my hair.\"    PLAN  []  Upgrade activities as tolerated     []  Continue plan of care  []  Update interventions per flow sheet       [x]  Discharge due to: PROGRAM COMPLETE  []  Other:_      Sugar Durant PTA, CSCS 10/15/2020  2:42 PM    Future Appointments   Date Time Provider Gaby Delarosa   10/15/2020  2:15 PM Elizabeth Rosales PTA [facility]   10/16/2020  1:00 PM [facility] INFUSION PHLEBOTOMIST [facility]   10/19/2020  1:00 PM HBV FAST TRACK NURSE [facility]   10/28/2020  1:00 PM Bc Ernst DO [facility]   11/2/2020  2:40 PM Maria Guadalupe García NP [facility]   4/16/2021  1:00 PM [facility] INFUSION PHLEBOTOMIST [facility]   4/19/2021  1:00 PM [facility] FAST TRACK NURSE [facility]

## 2020-10-15 NOTE — PROGRESS NOTES
Physical Therapy Discharge Instructions      [facility] [address]  [address] [facility], [address] [phone] fax      Patient:  Gloria Bowman  : 1945      Continue Home Exercise Program 1-2 times per day       Continue with    [x] Ice  as needed      [x] Heat           Follow up with MD:     [] Upon completion of therapy     [x] As needed      Recommendations:     [x]   Return to activity with home program    []   Return to activity with the following modifications:       []Post Rehab Program    []Join Independent aquatic program     []Return to/join local gym      Julee Mcfarlane PTA, CSCS   10/15/2020 2:36 PM

## 2020-10-16 ENCOUNTER — HOSPITAL ENCOUNTER (OUTPATIENT)
Dept: INFUSION THERAPY | Age: 75
Discharge: HOME | End: 2020-10-16
Payer: MEDICARE

## 2020-10-16 VITALS
TEMPERATURE: 97.8 F | DIASTOLIC BLOOD PRESSURE: 61 MMHG | OXYGEN SATURATION: 97 % | SYSTOLIC BLOOD PRESSURE: 122 MMHG | HEART RATE: 78 BPM

## 2020-10-16 LAB
ALBUMIN SERPL-MCNC: 4 G/DL (ref 3.4–5)
ALBUMIN/GLOB SERPL: 1.1 {RATIO} (ref 0.8–1.7)
ALP SERPL-CCNC: 84 U/L (ref 45–117)
ALT SERPL-CCNC: 22 U/L (ref 13–56)
ANION GAP SERPL CALC-SCNC: 3 MMOL/L (ref 3–18)
AST SERPL-CCNC: 20 U/L (ref 10–38)
BILIRUB SERPL-MCNC: 0.3 MG/DL (ref 0.2–1)
BUN SERPL-MCNC: 21 MG/DL (ref 7–18)
BUN/CREAT SERPL: 26 (ref 12–20)
CALCIUM SERPL-MCNC: 9.7 MG/DL (ref 8.5–10.1)
CHLORIDE SERPL-SCNC: 105 MMOL/L (ref 100–111)
CO2 SERPL-SCNC: 31 MMOL/L (ref 21–32)
CREAT SERPL-MCNC: 0.8 MG/DL (ref 0.6–1.3)
GLOBULIN SER CALC-MCNC: 3.7 G/DL (ref 2–4)
GLUCOSE SERPL-MCNC: 83 MG/DL (ref 74–99)
MAGNESIUM SERPL-MCNC: 2.1 MG/DL (ref 1.6–2.6)
PHOSPHATE SERPL-MCNC: 4.5 MG/DL (ref 2.5–4.9)
POTASSIUM SERPL-SCNC: 4.1 MMOL/L (ref 3.5–5.5)
PROT SERPL-MCNC: 7.7 G/DL (ref 6.4–8.2)
SODIUM SERPL-SCNC: 139 MMOL/L (ref 136–145)

## 2020-10-16 PROCEDURE — 83735 ASSAY OF MAGNESIUM: CPT

## 2020-10-16 PROCEDURE — 80053 COMPREHEN METABOLIC PANEL: CPT

## 2020-10-16 PROCEDURE — 84100 ASSAY OF PHOSPHORUS: CPT

## 2020-10-16 PROCEDURE — 36415 COLL VENOUS BLD VENIPUNCTURE: CPT

## 2020-10-16 NOTE — PROGRESS NOTES
[facility] - [facility]  [address] [place], [address] [phone] fax    Discharge Summary  Patient name: Deejay Townsend Start of Care: 2020   Referral source: Chuck Cantor : 1945               Medical Diagnosis: Pain in right arm [M79.601]  Payor: VA MEDICARE / Plan: VA MEDICARE PART A & B / Product Type: Medicare /     Onset Date:20               Treatment Diagnosis: right arm pain   Prior Hospitalization: see medical history Provider#: [id number]   Medications: Verified on Patient summary List    Comorbidities: cervical fusion 2019 due to myelopathy, OA, CVA, HTN   Prior Level of Function: retired. Lives with partner in 2 story home. Uses w/c for most mobility due to residual leg weakness. Able to independently transfer      Visits from Start of Care: 12    Missed Visits: 0    Reporting Period : 20 to 10/15/20    Short Term Goals: To be accomplished in 1 weeks:  1. Therapist to establish HEP for strength and ROM to improve ease with ADLs.             MET  Long Term Goals: To be accomplished in 4 treatments:  1. Patient will be independent with HEP to improve carryover of functional gains with ADLs between visits.             Eval Status:n/a              MET, Reports daily compliance  2. Pt will increase right passive shoulder flexion/ER to 130/ 50 degrees to increase ease with ADLs.             Eval Status:              Shoulder flexion PROM supine: 80 deg              Shoulder PROM ER in scaption: 20 deg              PROGRESSED WELL; 110 deg flexion, 47 deg ER  3.  Pt will increase right active shoulder flexion to 90 degrees to increase ease with ADLs.               Eval Status:will address as cleared by MD              MET; 100 deg            Functional Gains: dressing, pain, low-level reaching, basic ADLs    Functional Deficits: washing hair  % improvement: 100%  Pain   Average: 0/10                  Best: 0/10 Worst: 0/10  Patient Goal: \"to be able to use it again and wash my hair. \"89}    Assessment/Summary of care: Ms. Manohar Buchanan has been a pleasure to treat and reports 100% improvement since beginning therapy. Pt has shown improvements with her passive and active motion of the right shoulder. She reports ease with most basic ADLs, but still has some difficulty with washing her hair. We are discharging to an updated HEP at this time.  She plans to begin therapy for leg strengthening and balance as cleared by MD.     RECOMMENDATIONS:  [x]Discontinue therapy: [x]Patient has reached or is progressing toward set goals      []Patient is non-compliant or has abdicated      []Due to lack of appreciable progress towards set [address], PT 10/16/2020 3:16 PM

## 2020-10-16 NOTE — PROGRESS NOTES
[facility] Progress Note    Date: 2020    Name: Patsy Butler    MRN: [id number]         : 1945    Peripheral Lab Draw      Ms. Friedman to [facility], ambulatory at [phone] accompanied by self. Pt was assessed and education was provided. Ms. Susana Velazquez vitals were reviewed and patient was observed for 5 minutes prior to treatment. Visit Vitals  /61 (BP 1 Location: Right arm, BP Patient Position: Sitting)   Pulse 78   Temp 97.8 °F (36.6 °C)   SpO2 97%   No results found for this or any previous visit (from the past 12 hour(s)). Blood obtained peripherally from right arm  first attempt with butterfly needle and sent to lab for Magnesium, CMP, and Phosphorus per written orders. No bleeding or hematoma noted at site. Gauze and coban applied. Ms. Paty Mcintyre tolerated the venipuncture, and had no complaints. Pateint's results were reviewed by the RN and phlebotomy was not needed today. Patient armband removed and shredded. Ms. Paty Mcintyre was discharged from [place] [address] in stable condition at [phone].      Zac Burns Phlebotomist PCT  2020  1:36 PM

## 2020-10-19 ENCOUNTER — HOSPITAL ENCOUNTER (OUTPATIENT)
Dept: INFUSION THERAPY | Age: 75
Discharge: HOME | End: 2020-10-19
Payer: MEDICARE

## 2020-10-19 VITALS
HEART RATE: 87 BPM | OXYGEN SATURATION: 94 % | RESPIRATION RATE: 16 BRPM | TEMPERATURE: 97.7 F | SYSTOLIC BLOOD PRESSURE: 136 MMHG | DIASTOLIC BLOOD PRESSURE: 85 MMHG

## 2020-10-19 DIAGNOSIS — M81.0 OSTEOPOROSIS, UNSPECIFIED OSTEOPOROSIS TYPE, UNSPECIFIED PATHOLOGICAL FRACTURE PRESENCE: ICD-10-CM

## 2020-10-19 DIAGNOSIS — M81.0 OSTEOPOROSIS, UNSPECIFIED OSTEOPOROSIS TYPE, UNSPECIFIED PATHOLOGICAL FRACTURE PRESENCE: Primary | ICD-10-CM

## 2020-10-19 PROCEDURE — 96372 THER/PROPH/DIAG INJ SC/IM: CPT

## 2020-10-19 PROCEDURE — 74011250636 HC RX REV CODE- 250/636: Performed by: NURSE PRACTITIONER

## 2020-10-19 RX ORDER — HYDROCORTISONE SODIUM SUCCINATE 100 MG/2ML
100 INJECTION, POWDER, FOR SOLUTION INTRAMUSCULAR; INTRAVENOUS AS NEEDED
Status: CANCELLED | OUTPATIENT
Start: 2021-04-19

## 2020-10-19 RX ORDER — ALBUTEROL SULFATE 0.83 MG/ML
2.5 SOLUTION RESPIRATORY (INHALATION) AS NEEDED
Status: CANCELLED
Start: 2021-04-19

## 2020-10-19 RX ORDER — EPINEPHRINE 1 MG/ML
0.3 INJECTION, SOLUTION, CONCENTRATE INTRAVENOUS AS NEEDED
Status: CANCELLED | OUTPATIENT
Start: 2021-04-19

## 2020-10-19 RX ORDER — ONDANSETRON 2 MG/ML
8 INJECTION INTRAMUSCULAR; INTRAVENOUS AS NEEDED
Status: CANCELLED | OUTPATIENT
Start: 2021-04-19

## 2020-10-19 RX ORDER — DIPHENHYDRAMINE HYDROCHLORIDE 50 MG/ML
25 INJECTION, SOLUTION INTRAMUSCULAR; INTRAVENOUS AS NEEDED
Status: CANCELLED
Start: 2021-04-19

## 2020-10-19 RX ORDER — DIPHENHYDRAMINE HYDROCHLORIDE 50 MG/ML
50 INJECTION, SOLUTION INTRAMUSCULAR; INTRAVENOUS AS NEEDED
Status: CANCELLED
Start: 2021-04-19

## 2020-10-19 RX ORDER — ACETAMINOPHEN 325 MG/1
650 TABLET ORAL AS NEEDED
Status: CANCELLED
Start: 2021-04-19

## 2020-10-19 RX ADMIN — DENOSUMAB 60 MG: 60 INJECTION SUBCUTANEOUS at 13:22

## 2020-10-19 NOTE — PROGRESS NOTES
[facility] Progress Note    Date: 2020    Name: Jeremiah Christianson    MRN: [id number]         : 1945      Ms. Joaquina Granger arrived in the [facility] today at 1300, in stable condition, here for her Prolia Injection (Every 6 Months). She was assessed and education was provided. Ms. Amirah Mason vitals were reviewed. Visit Vitals  /85 (BP 1 Location: Right arm, BP Patient Position: Sitting)   Pulse 87   Temp 97.7 °F (36.5 °C)   Resp 16   SpO2 94%   Breastfeeding No       Lab results were reviewed. (Her most recent results from 10-16-20 were reviewed, and all results listed below were noted to be satisfactory for treatment today.)      Results for Zita Melton (MRN [id number])    Ref. Range 10/16/2020 13:12   Sodium Latest Ref Range: 136 - 145 mmol/L 139   Potassium Latest Ref Range: 3.5 - 5.5 mmol/L 4.1   Chloride Latest Ref Range: 100 - 111 mmol/L 105   CO2 Latest Ref Range: 21 - 32 mmol/L 31   Anion gap Latest Ref Range: 3.0 - 18 mmol/L 3   Glucose Latest Ref Range: 74 - 99 mg/dL 83   BUN Latest Ref Range: 7.0 - 18 MG/DL 21 (H)   Creatinine Latest Ref Range: 0.6 - 1.3 MG/DL 0.80   BUN/Creatinine ratio Latest Ref Range: 12 - 20   26 (H)   Calcium Latest Ref Range: 8.5 - 10.1 MG/DL 9.7   Phosphorus Latest Ref Range: 2.5 - 4.9 MG/DL 4.5   Magnesium Latest Ref Range: 1.6 - 2.6 mg/dL 2.1   GFR est non-AA Latest Ref Range: >60 ml/min/1.73m2 >60   GFR est AA Latest Ref Range: >60 ml/min/1.73m2 >60   Bilirubin, total Latest Ref Range: 0.2 - 1.0 MG/DL 0.3   Protein, total Latest Ref Range: 6.4 - 8.2 g/dL 7.7   Albumin Latest Ref Range: 3.4 - 5.0 g/dL 4.0   Globulin Latest Ref Range: 2.0 - 4.0 g/dL 3.7   A-G Ratio Latest Ref Range: 0.8 - 1.7   1.1   ALT Latest Ref Range: 13 - 56 U/L 22   AST Latest Ref Range: 10 - 38 U/L 20   Alk. phosphatase Latest Ref Range: 45 - 117 U/L 84           Prolia (Denosumab) 60 mg, was administered SQ in the back of her left arm at 1322, per order, and without incident.           Ms. Elder tolerated well, and had no complaints. Ms. Julianna Lehman was discharged from [place] [address] in stable condition at 1325. Idalmis Tyler She is to return in 6 months, on Friday, 4-16-21 at 1300, for her next appointment, for pre-Prolia Labs. And then, she is scheduled to return on Monday, 4-19-21 at 1300, for her next Prolia injection.      Monica Cook RN  October 19, 2020  1:19 PM

## 2020-10-22 ENCOUNTER — OFFICE VISIT (OUTPATIENT)
Dept: ORTHOPEDIC SURGERY | Age: 75
End: 2020-10-22
Payer: MEDICARE

## 2020-10-22 VITALS
HEART RATE: 89 BPM | SYSTOLIC BLOOD PRESSURE: 110 MMHG | OXYGEN SATURATION: 95 % | RESPIRATION RATE: 16 BRPM | HEIGHT: 60 IN | WEIGHT: 88 LBS | DIASTOLIC BLOOD PRESSURE: 69 MMHG | BODY MASS INDEX: 17.28 KG/M2 | TEMPERATURE: 97.5 F

## 2020-10-22 DIAGNOSIS — M25.561 PAIN IN BOTH KNEES, UNSPECIFIED CHRONICITY: Primary | ICD-10-CM

## 2020-10-22 DIAGNOSIS — G62.9 NEUROPATHY: ICD-10-CM

## 2020-10-22 DIAGNOSIS — M54.50 LOW BACK PAIN AT MULTIPLE SITES: ICD-10-CM

## 2020-10-22 DIAGNOSIS — M25.562 PAIN IN BOTH KNEES, UNSPECIFIED CHRONICITY: Primary | ICD-10-CM

## 2020-10-22 DIAGNOSIS — R29.898 BILATERAL LEG WEAKNESS: ICD-10-CM

## 2020-10-22 DIAGNOSIS — M47.16 LUMBAR SPONDYLOSIS WITH MYELOPATHY: ICD-10-CM

## 2020-10-22 PROCEDURE — 1100F PTFALLS ASSESS-DOCD GE2>/YR: CPT | Performed by: PHYSICIAN ASSISTANT

## 2020-10-22 PROCEDURE — 3017F COLORECTAL CA SCREEN DOC REV: CPT | Performed by: PHYSICIAN ASSISTANT

## 2020-10-22 PROCEDURE — 73562 X-RAY EXAM OF KNEE 3: CPT | Performed by: PHYSICIAN ASSISTANT

## 2020-10-22 PROCEDURE — 99213 OFFICE O/P EST LOW 20 MIN: CPT | Performed by: PHYSICIAN ASSISTANT

## 2020-10-22 PROCEDURE — 3288F FALL RISK ASSESSMENT DOCD: CPT | Performed by: PHYSICIAN ASSISTANT

## 2020-10-22 PROCEDURE — 72100 X-RAY EXAM L-S SPINE 2/3 VWS: CPT | Performed by: PHYSICIAN ASSISTANT

## 2020-10-22 PROCEDURE — G8432 DEP SCR NOT DOC, RNG: HCPCS | Performed by: PHYSICIAN ASSISTANT

## 2020-10-22 PROCEDURE — G8428 CUR MEDS NOT DOCUMENT: HCPCS | Performed by: PHYSICIAN ASSISTANT

## 2020-10-22 PROCEDURE — G8536 NO DOC ELDER MAL SCRN: HCPCS | Performed by: PHYSICIAN ASSISTANT

## 2020-10-22 PROCEDURE — G8419 CALC BMI OUT NRM PARAM NOF/U: HCPCS | Performed by: PHYSICIAN ASSISTANT

## 2020-10-22 PROCEDURE — 1090F PRES/ABSN URINE INCON ASSESS: CPT | Performed by: PHYSICIAN ASSISTANT

## 2020-10-22 PROCEDURE — G9899 SCRN MAM PERF RSLTS DOC: HCPCS | Performed by: PHYSICIAN ASSISTANT

## 2020-10-22 NOTE — PROGRESS NOTES
Patient: Celia Hassan                MRN: [id number]       SSN: [id number]  YOB: 1945        AGE: 70 y.o. SEX: female          PCP: None  10/22/20    Chief Complaint   Patient presents with    Leg Pain     bilateral       HISTORY:  Celia Hassan is a 70 y.o. female who presents complaining of bilateral leg weakness. She has a history of neuropathy by admission. She does have cervical spondylosis and radiculopathy in the upper extremities. She is uncertain if she has been x-rayed of her low back. She is cautious when she is walking from point-to-point both in her home and around in the communities noting her neuropathy history. The patient denies any bowel or bladder incontinence. No overt radicular pain reported in the lower extremities and patient reports that if there were she is uncertain that she would experience a feeling secondary to her lower extremity neuropathy. No history of trauma to the lumbar spine. She originally thought her knees were causing the numbness tingling but after some discussion today the knees were to be included in her complaint of weakness with numbness tingling however not likely the true cause. She does use Neurontin for neuropathy. Pain Assessment  10/22/2020   Location of Pain Leg   Pain Location Comment -   Location Modifiers Left;Right   Severity of Pain 4   Quality of Pain Aching; Other (Comment)   Quality of Pain Comment tingling   Duration of Pain Persistent   Frequency of Pain Constant   Date Pain First Started 12/2/2019   Aggravating Factors -   Aggravating Factors Comment -   Limiting Behavior Yes   Relieving Factors Nothing   Relieving Factors Comment -   Result of Injury -   Work-Related Injury -   Type of Injury -           Lab Results   Component Value Date/Time    Hemoglobin A1c 5.8 (H) 03/18/2019 04:07 PM     Weight Metrics 10/22/2020 10/8/2020 9/16/2020 8/26/2020 8/12/2020 7/27/2020 7/15/2020   Weight 88 lb 88 lb 89 lb 90 lb 9.6 oz 90 lb 6.4 oz - 91 lb   BMI 17.19 kg/m2 17.19 kg/m2 17.38 kg/m2 17.69 kg/m2 17.66 kg/m2 - 17.77 kg/m2            Problem List Items Addressed This Visit     None      Visit Diagnoses     Pain in both knees, unspecified chronicity    -  Primary    Relevant Orders    AMB POC X-RAY KNEE 3 VIEW (Completed)    AMB POC X-RAY KNEE 3 VIEW (Completed)    AMB POC XRAY, SPINE, LUMBOSACRAL; 2 O (Completed)    REFERRAL TO PHYSICAL THERAPY    Lumbar spondylosis with myelopathy        Relevant Orders    REFERRAL TO PHYSICAL THERAPY    Low back pain at multiple sites        Relevant Orders    REFERRAL TO PHYSICAL THERAPY    Bilateral leg weakness        Relevant Orders    REFERRAL TO PHYSICAL THERAPY    Neuropathy        Relevant Orders    REFERRAL TO PHYSICAL THERAPY          PAST MEDICAL HISTORY:   Past Medical History:   Diagnosis Date    Abnormal Pap smear     Dr. Yulia Steven    Allergic rhinitis     Arthritis     Cerebrovascular small vessel disease 3/18/2019    CT 3/17/2019    Cervical spinal cord compression (HCC)     Chronic pain     Concentric left ventricular hypertrophy 3/18/2019    With left ventricular diastolic dysfunction by echocardiogram 3/18/2019    Hypercholesterolemia     Neck pain 5/17/2010    Stroke ([facility] [address] 10/02/2017    TIA- legs weak memory issues       PAST SURGICAL HISTORY:   Past Surgical History:   Procedure Laterality Date    COLONOSCOPY N/A 6/4/2018    COLONOSCOPY / [facility]pect[facility]ahnaz [facility]el [facility]    HX GYN      Total Hyst    HX LAP CHOLECYSTECTOMY      HX OTHER SURGICAL  2017    Throat widened       ALLERGIES:   Allergies   Allergen Reactions    Baclofen Shortness of Breath     Denies.  Pt tolerates    Morphine Other (comments)     hallucinations    Celebrex [Celecoxib] Other (comments)     Tiredness         CURRENT MEDICATIONS:  A list of medications prior to the time of admission include:  Prior to Admission medications    Medication Sig Start Date End Date Taking? Authorizing Provider   busPIRone (BUSPAR) 7.5 mg tablet take 1 tablet by mouth twice a day 9/23/20  Yes Leslie Lopez NP   folic acid 218 mcg tablet Take 800 mcg by mouth daily. Yes Provider, Historical   ibuprofen (MOTRIN) 400 mg tablet Take 1 Tab by mouth every six (6) hours as needed for Pain. 7/26/20  Yes Claudia Do MD   cyanocobalamin (Vitamin B-12) 100 mcg tablet Take 100 mcg by mouth daily. Yes Provider, Historical   ondansetron (ZOFRAN ODT) 4 mg disintegrating tablet dissolve 1 tablet ON TONGUE every 6 hours if needed for nausea OR vomiting 5/7/20  Yes Leslie Lopez NP   gabapentin (NEURONTIN) 100 mg capsule Take 2 Caps by mouth three (3) times daily. Max Daily Amount: 600 mg. Patient taking differently: Take 300 mg by mouth three (3) times daily. Takes 300mg TID 4/16/20  Yes Cierra Howell NP   denosumab (PROLIA) 60 mg/mL injection 60 mg by SubCUTAneous route every 6 months. 2/21/20  Yes Provider, Historical   acetaminophen (TYLENOL) 160 mg/5 mL elixir Take 1,000 mg by mouth daily. 12/12/19  Yes Provider, Historical   turmeric 400 mg cap Take 1 Cap by mouth daily. Yes Provider, Historical   aspirin 81 mg chewable tablet Take 1 Tab by mouth daily. 10/10/17  Yes Inessa Sanchez PA   atorvastatin (LIPITOR) 20 mg tablet take 1 tablet by mouth once daily 9/23/20   Leslie Lopez NP   topiramate (Topamax) 25 mg tablet Take 1 Tab by mouth two (2) times daily (with meals). 7/28/20   Savannah Colbert NP   naloxone (Narcan) 4 mg/actuation nasal spray Use 1 spray intranasally, then discard. Repeat with new spray every 2 min as needed for opioid overdose symptoms, alternating nostrils. 7/26/20   Sherleen Mortimer, MD   baclofen (LIORESAL) 10 mg tablet Take 1 Tab by mouth two (2) times a day. 6/11/20   Ashley Levine MD   ALPRAZolam Casandra Frost) 0.25 mg tablet Take 1 Tab by mouth two (2) times daily as needed for Anxiety.  Max Daily Amount: 0.5 mg. 5/26/20   Anirudh Oscar NP   omeprazole (PRILOSEC) 20 mg capsule take 1 capsule by mouth once daily before breakfast 5/7/20   Phill Veras NP   midodrine (PROAMITINE) 5 mg tablet Take 5 mg by mouth two (2) times a day. 1 tab by mouth with breakfast and lunch 2/6/20   Provider, Jonna   loratadine (CLARITIN) 10 mg tablet Take 1 Tab by mouth daily. 6/5/19   Phill Veras NP       FAMILY HISTORY:   Family History   Problem Relation Age of Onset    Cancer Mother         breast    Heart Disease Father        SOCIAL HISTORY:   Social History     Socioeconomic History    Marital status: UNKNOWN     Spouse name: Not on file    Number of children: Not on file    Years of education: Not on file    Highest education level: Not on file   Tobacco Use    Smoking status: Never Smoker    Smokeless tobacco: Never Used   Substance and Sexual Activity    Alcohol use: No    Drug use: No    Sexual activity: Never       ROS:No CP, No SOB, No fever/chills nor night sweats. No headaches, vision abnormalities to include double and oral loss of vision. No hearing abnormalities. Musculoskeletal pain per HPI. Pain is exacerbated positionally. Pt denies h/o spinal surgery, injections, or PT/chiropractor. Self treated with less than adequate relief on oral antiinflammatories. . Pt denies change in bowel or bladder habits. Pt denies fever, weight loss, or skin changes. EXAM:  Patient alert and oriented x 3,   CN II-XII grossly intact  Sitting comfortably in the exam room, interacting with conversation with pleasant affect. Breathing appears regular effortless with no visible usage of accessory muscles  Distal cap refill intact at 2/2 Reed UE / LE. Neuro intact Reed UE/LE to noxious stimuli    Ortho Specific exam:    Bilateral knees reveal no warmth erythema edema ecchymosis or effusion.   She has active range of motion of both knees without pain but modest crepitation through ranging noted today to 105 degrees -5 degrees. She has slight varus deformities of both knees. Patella tracks midline bilaterally. No instability on medial or lateral stressing. Medial stressing does increase medial joint line discomfort however. This is noted bilaterally. ACL and PCL are both intact with no laxity. Negative Jeffrey sign. DTRs for patella symmetrically 1+/2    DTRs for Achilles 1+/2 symmetrically. Negative Babinski bilateral lower extremities. Quad and hamstring musculature tested against resistance at 3+/5 today. Plantar flexion dorsiflexion intact at 10 degrees and 5 degrees respectfully against resistance. X-rays: [facility] 10/22/2020    Bilateral knees 3 view AP tunnel and lateral moderate medial joint space narrowing with early narrowing of the lateral joint space moderate narrowing of the patellofemoral spaces. No soft tissue calcifications noted. 2 view of the lumbar spine AP and lateral reveal moderate spondylosis throughout the entire lumbar spine with DJD evident at L4-L5 and L5-S1. There is also facet arthropathy noted through the entire lumbar spine. There is a slight spondylolisthesis of L4 on L5 grade 1. IMPRESSION:      ICD-10-CM ICD-9-CM    1. Pain in both knees, unspecified chronicity  M25.561 719.46 AMB POC X-RAY KNEE 3 VIEW    M25.562  AMB POC X-RAY KNEE 3 VIEW      AMB POC XRAY, SPINE, LUMBOSACRAL; 2 O      REFERRAL TO PHYSICAL THERAPY   2. Lumbar spondylosis with myelopathy  M47.16 721.42 REFERRAL TO PHYSICAL THERAPY   3. Low back pain at multiple sites  M54.5 724.2 REFERRAL TO PHYSICAL THERAPY   4. Bilateral leg weakness  R29.898 729.89 REFERRAL TO PHYSICAL THERAPY   5. Neuropathy  G62.9 355.9 REFERRAL TO PHYSICAL THERAPY        PLAN: Today we discussed her diagnosis of pre-existing neuropathy. She is getting continue her Neurontin.   Regarding the lumbar spine certainly there is likely a component noting the spondylosis contributing to her leg discomfort and or weakness. Fortunately she has no bowel or bladder incontinence. There is no definitive radicular pattern which can be identified that provides an explanation for her weakness with the underlying diagnosis of neuropathy. Medical had to start her in some physical therapy with attention to gait training range of motion all modalities some strength training to see how she does a MRI may still be necessary to further assess for any spinal stenosis and/or foraminal stenosis. Today x-rays reviewed all of her questions answered to her satisfaction she is got a follow-up in about a month after physical therapy. Should she develop any bowel or bladder incontinence she is to report immediately to the emergency room. Patient provided a reminder for a \"due or due soon\" health maintenance. I have asked the patient to schedule an appointment with their primary care provider for follow-up on general health maintenance concerns. Today all the patient's questions were answered to their satisfaction. Copies of x-rays reviewed if obtained this visit, and provided to patient. Dictation disclaimer:  Please note that this dictation was completed with [email or web address], the computer voice recognition software. Quite often unanticipated grammatical, syntax, homophones, and other interpretive errors are inadvertently transcribed by the computer software. Please disregard these errors. Please excuse any errors that have escaped final proofreading. Regine DUFFY, APC, MPAS, [facility]

## 2020-10-28 ENCOUNTER — OFFICE VISIT (OUTPATIENT)
Dept: ORTHOPEDIC SURGERY | Age: 75
End: 2020-10-28
Payer: MEDICARE

## 2020-10-28 VITALS
DIASTOLIC BLOOD PRESSURE: 63 MMHG | TEMPERATURE: 98.4 F | HEIGHT: 60 IN | WEIGHT: 87.6 LBS | HEART RATE: 86 BPM | SYSTOLIC BLOOD PRESSURE: 98 MMHG | BODY MASS INDEX: 17.2 KG/M2

## 2020-10-28 DIAGNOSIS — G56.03 BILATERAL CARPAL TUNNEL SYNDROME: Primary | ICD-10-CM

## 2020-10-28 PROCEDURE — 3288F FALL RISK ASSESSMENT DOCD: CPT | Performed by: ORTHOPAEDIC SURGERY

## 2020-10-28 PROCEDURE — G8419 CALC BMI OUT NRM PARAM NOF/U: HCPCS | Performed by: ORTHOPAEDIC SURGERY

## 2020-10-28 PROCEDURE — 99214 OFFICE O/P EST MOD 30 MIN: CPT | Performed by: ORTHOPAEDIC SURGERY

## 2020-10-28 PROCEDURE — G8432 DEP SCR NOT DOC, RNG: HCPCS | Performed by: ORTHOPAEDIC SURGERY

## 2020-10-28 PROCEDURE — 1100F PTFALLS ASSESS-DOCD GE2>/YR: CPT | Performed by: ORTHOPAEDIC SURGERY

## 2020-10-28 PROCEDURE — G9899 SCRN MAM PERF RSLTS DOC: HCPCS | Performed by: ORTHOPAEDIC SURGERY

## 2020-10-28 PROCEDURE — 3017F COLORECTAL CA SCREEN DOC REV: CPT | Performed by: ORTHOPAEDIC SURGERY

## 2020-10-28 PROCEDURE — G8536 NO DOC ELDER MAL SCRN: HCPCS | Performed by: ORTHOPAEDIC SURGERY

## 2020-10-28 PROCEDURE — G8427 DOCREV CUR MEDS BY ELIG CLIN: HCPCS | Performed by: ORTHOPAEDIC SURGERY

## 2020-10-28 PROCEDURE — 1090F PRES/ABSN URINE INCON ASSESS: CPT | Performed by: ORTHOPAEDIC SURGERY

## 2020-10-28 NOTE — PROGRESS NOTES
Pt states that her whole body feels like her hands - has tingling & burning everywhere. Pt voices frustration with her overall care.

## 2020-10-28 NOTE — PROGRESS NOTES
Meaghan Bajwa is a 70 y.o. female right handed retiree. Worker's Compensation and legal considerations: not known. Vitals:    10/28/20 1256   BP: 98/63   Pulse: 86   Temp: 98.4 °F (36.9 °C)   Weight: 87 lb 9.6 oz (39.7 kg)   Height: 5' (1.524 m)   PainSc:   5   PainLoc: Hand           Chief Complaint   Patient presents with    Hand Pain     B/L       HPI: Patient comes in today with complaints of bilateral hand pain and numbness. She reports a history of neuropathy    Initial HPI: Patient comes in today with complaints of right thumb pain. She was referred from the spine center for which she is being treated for cervical spine issues and pain issues. She reports for a month or so she has had locking and pain in her right thumb. She denies any injury to the area. She was told she has a trigger thumb.     Date of onset:  1/2019    Injury: No    Prior Treatment:  No    Numbness/ Tingling: No    ROS: Review of Systems - General ROS: negative  Respiratory ROS: no cough, shortness of breath, or wheezing  Cardiovascular ROS: no chest pain or dyspnea on exertion  Musculoskeletal ROS: positive for - pain in thumb - right  Neurological ROS: negative  Dermatological ROS: negative    Past Medical History:   Diagnosis Date    Abnormal Pap smear     Dr. Taiwo Bellamy    Allergic rhinitis     Arthritis     Cerebrovascular small vessel disease 3/18/2019    CT 3/17/2019    Cervical spinal cord compression (HCC)     Chronic pain     Concentric left ventricular hypertrophy 3/18/2019    With left ventricular diastolic dysfunction by echocardiogram 3/18/2019    Hypercholesterolemia     Neck pain 5/17/2010    Stroke ([facility] [address] 10/02/2017    TIA- legs weak memory issues       Past Surgical History:   Procedure Laterality Date    COLONOSCOPY N/A 6/4/2018    COLONOSCOPY / polypectomy performed by Ladon Hodgkins, MD at [facility] ENDOSCOPY    HX GYN      Total Hyst    HX LAP CHOLECYSTECTOMY      HX OTHER SURGICAL  2017    Throat widened Current Outpatient Medications   Medication Sig Dispense Refill    busPIRone (BUSPAR) 7.5 mg tablet take 1 tablet by mouth twice a day 293 Tab 0    folic acid 504 mcg tablet Take 800 mcg by mouth daily.  ibuprofen (MOTRIN) 400 mg tablet Take 1 Tab by mouth every six (6) hours as needed for Pain. 8 Tab 0    cyanocobalamin (Vitamin B-12) 100 mcg tablet Take 100 mcg by mouth daily.  ondansetron (ZOFRAN ODT) 4 mg disintegrating tablet dissolve 1 tablet ON TONGUE every 6 hours if needed for nausea OR vomiting 90 Tab 0    gabapentin (NEURONTIN) 100 mg capsule Take 2 Caps by mouth three (3) times daily. Max Daily Amount: 600 mg. (Patient taking differently: Take 300 mg by mouth three (3) times daily. Takes 300mg TID) 180 Cap 1    denosumab (PROLIA) 60 mg/mL injection 60 mg by SubCUTAneous route every 6 months.  acetaminophen (TYLENOL) 160 mg/5 mL elixir Take 1,000 mg by mouth daily.  turmeric 400 mg cap Take 1 Cap by mouth daily.  aspirin 81 mg chewable tablet Take 1 Tab by mouth daily. 30 Tab 3    atorvastatin (LIPITOR) 20 mg tablet take 1 tablet by mouth once daily 90 Tab 0    topiramate (Topamax) 25 mg tablet Take 1 Tab by mouth two (2) times daily (with meals). 60 Tab 1    naloxone (Narcan) 4 mg/actuation nasal spray Use 1 spray intranasally, then discard. Repeat with new spray every 2 min as needed for opioid overdose symptoms, alternating nostrils. 1 Each 0    baclofen (LIORESAL) 10 mg tablet Take 1 Tab by mouth two (2) times a day. 60 Tab 2    ALPRAZolam (XANAX) 0.25 mg tablet Take 1 Tab by mouth two (2) times daily as needed for Anxiety. Max Daily Amount: 0.5 mg. 60 Tab 0    omeprazole (PRILOSEC) 20 mg capsule take 1 capsule by mouth once daily before breakfast 90 Cap 1    midodrine (PROAMITINE) 5 mg tablet Take 5 mg by mouth two (2) times a day. 1 tab by mouth with breakfast and lunch      loratadine (CLARITIN) 10 mg tablet Take 1 Tab by mouth daily.  90 Tab 1 Allergies   Allergen Reactions    Baclofen Shortness of Breath     Denies. Pt tolerates    Morphine Other (comments)     hallucinations    Celebrex [Celecoxib] Other (comments)     Tiredness          PE:     Physical Exam  Nursing note reviewed. Exam conducted with a chaperone present. Constitutional:       General: She is not in acute distress. Appearance: Normal appearance. She is not ill-appearing. Eyes:      Extraocular Movements: Extraocular movements intact. Pupils: Pupils are equal, round, and reactive to light. Neck:      Musculoskeletal: Normal range of motion and neck supple. Cardiovascular:      Pulses: Normal pulses. Pulmonary:      Effort: Pulmonary effort is normal. No respiratory distress. Abdominal:      General: Abdomen is flat. There is no distension. Musculoskeletal: Normal range of motion. General: Tenderness present. No swelling, deformity or signs of injury. Right lower leg: No edema. Left lower leg: No edema. Skin:     General: Skin is warm and dry. Capillary Refill: Capillary refill takes less than 2 seconds. Findings: No bruising or erythema. Neurological:      General: No focal deficit present. Mental Status: She is alert and oriented to person, place, and time. Cranial Nerves: No cranial nerve deficit. Sensory: No sensory deficit. Psychiatric:         Mood and Affect: Mood normal.         Behavior: Behavior normal.           NEUROVASCULAR    Examination L R Examination L R   Carpal Comp. ? ? Pronator Comp. - -   Carpal Tinel + + Pronator Tinel - -   Phalen's ? ? Pronator Stress - -   Cubital Comp. - - Guyon Comp. - -   Cubital Tinel - - Guyon Tinel - -   Elbow Hyperflexion - - Adson's - -   Spurling's - - SC Comp. - -   PCB Median abn - - SC Tinel - -   Radial Tinel - - IC Comp.  - -   Digital Tinel - - IC Tinel - -   Radial 2-Pt WNL WNL Ulnar 2-Pt WNL WNL     Radial Pulse: 2+  Capillary Refill: < 2 sec  Lj: Not Performed  Digital Lj: Not Performed      Imaging: None indicated today        ICD-10-CM ICD-9-CM    1. Bilateral carpal tunnel syndrome  G56.03 354.0 AMB SUPPLY ORDER      EMG TWO EXTREMITIES UPPER      NCV/LAT MOTOR PER NERVE UP/RT      NCV/LAT MOTOR PER NERVE UP/LT       Plan:     Bilateral upper extremity EMG and nerve conduction studies and bilateral carpal tunnel braces. Follow-up and Dispositions    · Return if symptoms worsen or fail to improve.        Plan was reviewed with patient, who verbalized agreement and understanding of the plan

## 2020-11-02 ENCOUNTER — HOSPITAL ENCOUNTER (OUTPATIENT)
Dept: PHYSICAL THERAPY | Age: 75
Discharge: HOME | End: 2020-11-02
Payer: MEDICARE

## 2020-11-02 PROCEDURE — 97162 PT EVAL MOD COMPLEX 30 MIN: CPT

## 2020-11-02 PROCEDURE — 97110 THERAPEUTIC EXERCISES: CPT

## 2020-11-02 PROCEDURE — 97112 NEUROMUSCULAR REEDUCATION: CPT

## 2020-11-02 NOTE — PROGRESS NOTES
PT DAILY TREATMENT NOTE - [facility]     Patient Name: Hilda Rivera  Date:2020  : 1945  [x]  Patient  Verified  [place]or[place] MED[place] / Plan: [place] MEDICARE PART A & B / Product Type: Medicare /    In time:1200  Out time:1250  Total Treatment Time (min): 50  Total Timed Codes (min): 25  1:1 Treatment Time ( [address] only): 50   Visit #: 1 of 8    Treatment Area: Bilateral leg pain [M79.604, M79.605]  Back pain [M54.9]  Bilateral knee pain [M25.561, M25.562]    SUBJECTIVE  Pain Level (0-10 scale): 6  Any medication changes, allergies to medications, adverse drug reactions, diagnosis change, or new procedure performed?: [x] No    [] Yes (see summary sheet for update)  Subjective functional status:   [x] See Eval form in paper chart      OBJECTIVE    25 min [x]Eval                  []Re-Eval       15 min Therapeutic Exercise:  [x] See flow sheet :HEP review   Rationale: increase ROM, increase strength, improve coordination, improve balance and increase proprioception to improve the patients ability to perform ADls. 10 min Neuromuscular Re-education:  [x]  See flow sheet :balance training with min A for safety   Rationale: increase strength, improve coordination, improve balance and increase proprioception  to improve the patients ability to decrease fall risk. With   [] TE   [] TA   [] neuro   [] other: Patient Education: [x] Review HEP    [] Progressed/Changed HEP based on:   [] positioning   [] body mechanics   [] transfers   [] heat/ice application    [] other:                  Pain Level (0-10 scale) post treatment: 6    ASSESSMENT:   [x]  See Evaluation         Goals:  Short Term Goals: To be accomplished in 1 weeks:  1. Therapist to establish HEP for strength/gait & balance training to decrease fall risk. Long Term Goals: To be accomplished in 4 weeks:  1. Patient will be independent with HEP to improve carryover of functional gains with ADLs between visits. Eval Status:n/a  2.  Pt will increase score on Tinetti Balance & Gait Assessment to > 19/28 to demonstrate decreased fall risk. Eval Status:11/28 - high fall risk  3. Pt will increase FOTO score to 46 points to demonstrate increased ease with ADLs. Eval Status: FOTO: 37  4. Pt will increase B hip flexion/abduction/knee extension to 5/5 with MMT to improve ease with gait & safety with ambulation. Eval Status:   right hip flexion: 3/5  right hip abduction: 3/5  Right knee extension: 3/5  left hip flexion: 3/5   left hip abduction: 3/5   Left knee extension: 4/5  5. Patient will reduce time on TUG test to < 15\" using AD to demonstrate decreased fall risk.     Eval status: 24\" with rollator    PLAN      [x]  Continue plan of care    []  Other:_      Sherri Lane, PT 11/2/2020  11:57 AM

## 2020-11-02 NOTE — PROGRESS NOTES
[facility] 85  [address] [place], [address] [phone] fax    Plan of Care/ Statement of Necessity for Physical Therapy Services    Patient name: Neptali Dodd Start of Care: 2020   Referral source: April Camarena : 1945    Medical Diagnosis: Bilateral leg pain [M79.604, M79.605]  Back pain [M54.9]  Bilateral knee pain [M25.561, M25.562]  Payor: [place] MEDICARE / Plan: VA MEDICARE PART A & B / Product Type: Medicare /    Onset Date:10/22/20    Treatment Diagnosis: B leg, back and B knee pain   Prior Hospitalization: see medical history Provider#: [id number]   Medications: Verified on Patient summary List    Comorbidities: OA, CVA, cervical myelopathy with hx of fusion   Prior Level of Function: retired. Recovering from cervical myelopathy/fusion earlier this year. Lives with friend who A with all care in 1 story home. Frequent falls and general deconditioning present since surgery         The Plan of Care and following information is based on the information from the initial evaluation. Assessment/ key information: Patient is a 70 y. o.female presenting with Bilateral leg pain [M79.604, M79.605]  Back pain [M54.9]  Bilateral knee pain [M25.561, M25.562]. Ms. Jayy Chang presents to initial PT evaluation with c/o progressive B LE weakness, falls, and back/knee pain s/p cervical fusion earlier this year. She has been throughout extensive rehab to regain her ability to walk, but unfortunately broke her shoulder which slowed her return of function. She returns with the shoulder healed and full able to work on LE strengthening, balance, training, and improved functional independence. Patient is currently at high risk for falls based on balance assessment tools. . Patient will benefit from skilled PT services to address deficits and facilitate return to premorbid activity level and promote improved quality of life.        Evaluation Complexity History HIGH Complexity :3+ comorbidities / personal factors will impact the outcome/ POC ; Examination MEDIUM Complexity : 3 Standardized tests and measures addressing body structure, function, activity limitation and / or participation in recreation  ;Presentation HIGH Complexity : Unstable and unpredictable characteristics  ; Clinical Decision Making MEDIUM Complexity : FOTO score of 26-74  Overall Complexity Rating: MEDIUM  Problem List: pain affecting function, decrease ROM, decrease strength, edema affecting function, impaired gait/ balance, decrease ADL/ functional abilitiies, decrease activity tolerance, decrease flexibility/ joint mobility and decrease transfer abilities   Treatment Plan may include any combination of the following: Therapeutic exercise, Therapeutic activities, Neuromuscular re-education, Physical agent/modality, Gait/balance training, Manual therapy, Aquatic therapy, Patient education, Self Care training, Functional mobility training, Home safety training and Stair training  Patient / Family readiness to learn indicated by: asking questions, trying to perform skills and interest  Persons(s) to be included in education: patient (P)  Barriers to Learning/Limitations: None  Patient Goal (s): to regain movement in my legs and feeling.   Patient Self Reported Health Status: good  Rehabilitation Potential: good  Short Term Goals: To be accomplished in 1 weeks:  1. Therapist to establish HEP for strength/gait & balance training to decrease fall risk. Long Term Goals: To be accomplished in 4 weeks:  1. Patient will be independent with HEP to improve carryover of functional gains with ADLs between visits. Eval Status:n/a  2. Pt will increase score on Tinetti Balance & Gait Assessment to > 19/28 to demonstrate decreased fall risk. Eval Status:11/28 - high fall risk  3. Pt will increase FOTO score to 46 points to demonstrate increased ease with ADLs. Eval Status: FOTO: 37  4.  Pt will increase B hip flexion/abduction/knee extension to 5/5 with MMT to improve ease with gait & safety with ambulation. Eval Status:   right hip flexion: 3/5  right hip abduction: 3/5  Right knee extension: 3/5  left hip flexion: 3/5   left hip abduction: 3/5   Left knee extension: 4/5  5. Patient will reduce time on TUG test to < 15\" using AD to demonstrate decreased fall risk. Eval status: 24\" with rollator    Frequency / Duration: Patient to be seen 2 times per week for 4 weeks. Patient/ Caregiver education and instruction: Diagnosis, prognosis, self care, activity modification and exercises   [x]  Plan of care has been reviewed with PTA      Certification Period: 11/2/20 - 12/1/20    Antonella Lacey, PT 11/2/2020 11:55 AM    ________________________________________________________________________    I certify that the above Therapy Services are being furnished while the patient is under my care. I agree with the treatment plan and certify that this therapy is necessary.     Physician's Signature:____________Date:_________TIME:________    ** Signature, Date and Time must be completed for valid certification **    Please sign and return to [facility] - [place] [facility]  [address] [place], [address]  [phone] fax

## 2020-11-04 DIAGNOSIS — G56.03 BILATERAL CARPAL TUNNEL SYNDROME: ICD-10-CM

## 2020-11-09 ENCOUNTER — HOSPITAL ENCOUNTER (OUTPATIENT)
Dept: PHYSICAL THERAPY | Age: 75
Discharge: HOME | End: 2020-11-09
Payer: MEDICARE

## 2020-11-09 PROCEDURE — 97112 NEUROMUSCULAR REEDUCATION: CPT

## 2020-11-09 PROCEDURE — 97530 THERAPEUTIC ACTIVITIES: CPT

## 2020-11-09 PROCEDURE — 97110 THERAPEUTIC EXERCISES: CPT

## 2020-11-09 NOTE — PROGRESS NOTES
PT DAILY TREATMENT NOTE     Patient Name: Joaquina Turner  Date:2020  : 1945  [x]  Patient  Verified  Payor: VA MEDICARE / Plan: VA MEDICARE PART A & B / Product Type: Medicare /    In time:130  Out time:210  Total Treatment Time (min): 40  Visit #: 2 of 8    Medicare/BCBS Only   Total Timed Codes (min):  40 1:1 Treatment Time:  40       Treatment Area: Bilateral leg pain [M79.604, M79.605]  Back pain [M54.9]  Bilateral knee pain [M25.561, M25.562]    SUBJECTIVE  Pain Level (0-10 scale): 0  Any medication changes, allergies to medications, adverse drug reactions, diagnosis change, or new procedure performed?: [x] No    [] Yes (see summary sheet for update)  Subjective functional status/changes:   [] No changes reported  \"No pain. \"    OBJECTIVE    Modality rationale: patient declined   Min Type Additional Details    [] Estim:  []Unatt       []IFC  []Premod                        []Other:  []w/ice   []w/heat  Position:  Location:    [] Estim: []Att    []TENS instruct  []NMES                    []Other:  []w/US   []w/ice   []w/heat  Position:  Location:    []  Traction: [] Cervical       []Lumbar                       [] Prone          []Supine                       []Intermittent   []Continuous Lbs:  [] before manual  [] after manual    []  Ultrasound: []Continuous   [] Pulsed                           []1MHz   []3MHz W/cm2:  Location:    []  Iontophoresis with dexamethasone         Location: [] Take home patch   [] In clinic    []  Ice     []  heat  []  Ice massage  []  Laser   []  Anodyne Position:  Location:    []  Laser with stim  []  Other:  Position:  Location:    []  Vasopneumatic Device Pressure:       [] lo [] med [] hi   Temperature: [] lo [] med [] hi   [] Skin assessment post-treatment:  []intact []redness- no adverse reaction    []redness - adverse reaction:     10 min Therapeutic Exercise:  [x] See flow sheet :   Rationale: increase ROM and increase strength to improve the patients ability to perform ADLs    10 min Therapeutic Activity:  [x]  See flow sheet : functional standing exercises   Rationale: increase ROM, increase strength, improve coordination, improve balance and increase proprioception  to improve the patients ability to improve mobility and ADL performance     20 min Neuromuscular Re-education:  [x]  See flow sheet : balance training   Rationale: increase ROM, increase strength, improve coordination, improve balance and increase proprioception  to improve the patients ability to improve mobility and decrease fall risk         With   [x] TE   [x] TA   [x] neuro   [] other: Patient Education: [x] Review HEP    [] Progressed/Changed HEP based on:   [x] positioning   [x] body mechanics   [] transfers   [] heat/ice application    [] other:      Other Objective/Functional Measures:      Pain Level (0-10 scale) post treatment: 0    ASSESSMENT/Changes in Function: Initiated POC to which pt responded well. Pt is very motivated and did well with static balance exercises. Needs some cuing to avoid compensations with hip 4 way. Pt is apprehensive with exercises without UE support. Pt was fearful with sidelying activities due to her fall a few months ago. Patient will continue to benefit from skilled PT services to modify and progress therapeutic interventions, address functional mobility deficits, address ROM deficits, address strength deficits, analyze and address soft tissue restrictions, analyze and cue movement patterns, analyze and modify body mechanics/ergonomics, assess and modify postural abnormalities, address imbalance/dizziness and instruct in home and community integration to attain remaining goals. [x]  See Plan of Care  []  See progress note/recertification  []  See Discharge Summary         Progress towards goals / Updated goals:  Short Term Goals: To be accomplished in 1 weeks:  1. Therapist to establish HEP for strength/gait & balance training to decrease fall risk. MET   Long Term Goals: To be accomplished in 4 weeks:  1. Patient will be independent with HEP to improve carryover of functional gains with ADLs between visits. Eval Status:n/a  2. Pt will increase score on Tinetti Balance & Gait Assessment to > 19/28 to demonstrate decreased fall risk. Eval Status:11/28 - high fall risk  3. Pt will increase FOTO score to 46 points to demonstrate increased ease with ADLs. Eval Status: FOTO: 37  4. Pt will increase B hip flexion/abduction/knee extension to 5/5 with MMT to improve ease with gait & safety with ambulation. Eval Status:   right hip flexion: 3/5  right hip abduction: 3/5  Right knee extension: 3/5  left hip flexion: 3/5              left hip abduction: 3/5              Left knee extension: 4/5  5. Patient will reduce time on TUG test to < 15\" using AD to demonstrate decreased fall risk.                Eval status: 24\" with rollator    PLAN  []  Upgrade activities as tolerated     [x]  Continue plan of care  []  Update interventions per flow sheet       []  Discharge due to:_  []  Other:_      Kevin Adams PTA, CSCS 11/9/2020  2:14 PM    Future Appointments   Date Time Provider Gaby Delarosa   11/11/2020  1:30 PM Luis Clifford PTA [facility]   11/16/2020  1:30 PM Sis Campbell, PT [facility]   11/18/2020  1:30 PM Luis Clifford PTA [facility]   11/23/2020  1:30 PM Sis Campbell, PT [facility]   11/25/2020  1:30 PM [facility] Clifford [facility]   11/30/2020  1:30 PM Sis Campbell, PT [facility]   4/16/2021  1:00 PM [facility] PHLEBOTOMIST [facility]   4/19/2021  1:00 PM [facility] FAST TRACK NURSE [facility]

## 2020-11-11 ENCOUNTER — HOSPITAL ENCOUNTER (OUTPATIENT)
Dept: PHYSICAL THERAPY | Age: 75
Discharge: HOME | End: 2020-11-11
Payer: MEDICARE

## 2020-11-11 PROCEDURE — 97112 NEUROMUSCULAR REEDUCATION: CPT

## 2020-11-11 PROCEDURE — 97110 THERAPEUTIC EXERCISES: CPT

## 2020-11-11 PROCEDURE — 97530 THERAPEUTIC ACTIVITIES: CPT

## 2020-11-11 NOTE — PROGRESS NOTES
PT DAILY TREATMENT NOTE     Patient Name: Geoffrey Beltran  Date:2020  : 1945  [x]  Patient  Verified  Payor: VA MEDICARE / Plan: VA MEDICARE PART A & B / Product Type: Medicare /    In time:130  Out time:220  Total Treatment Time (min): 50  Visit #: 3 of 8    Medicare/BCBS Only   Total Timed Codes (min):  50 1:1 Treatment Time:  45       Treatment Area: Bilateral leg pain [M79.604, M79.605]  Back pain [M54.9]  Bilateral knee pain [M25.561, M25.562]    SUBJECTIVE  Pain Level (0-10 scale): 3  Any medication changes, allergies to medications, adverse drug reactions, diagnosis change, or new procedure performed?: [x] No    [] Yes (see summary sheet for update)  Subjective functional status/changes:   [] No changes reported  \"I'm having a rough day today. \"    OBJECTIVE    Modality rationale: patient declined   Min Type Additional Details    [] Estim:  []Unatt       []IFC  []Premod                        []Other:  []w/ice   []w/heat  Position:  Location:    [] Estim: []Att    []TENS instruct  []NMES                    []Other:  []w/US   []w/ice   []w/heat  Position:  Location:    []  Traction: [] Cervical       []Lumbar                       [] Prone          []Supine                       []Intermittent   []Continuous Lbs:  [] before manual  [] after manual    []  Ultrasound: []Continuous   [] Pulsed                           []1MHz   []3MHz W/cm2:  Location:    []  Iontophoresis with dexamethasone         Location: [] Take home patch   [] In clinic    []  Ice     []  heat  []  Ice massage  []  Laser   []  Anodyne Position:  Location:    []  Laser with stim  []  Other:  Position:  Location:    []  Vasopneumatic Device Pressure:       [] lo [] med [] hi   Temperature: [] lo [] med [] hi   [] Skin assessment post-treatment:  []intact []redness- no adverse reaction    []redness - adverse reaction:     10 min Therapeutic Exercise:  [x] See flow sheet :   Rationale: increase ROM and increase strength to improve the patients ability to perform ADLs    15 min Therapeutic Activity:  [x]  See flow sheet : functional standing exercises   Rationale: increase ROM, increase strength, improve coordination, improve balance and increase proprioception  to improve the patients ability to improve mobility and ADL performance     25 min Neuromuscular Re-education:  [x]  See flow sheet : balance training   Rationale: increase ROM, increase strength, improve coordination, improve balance and increase proprioception  to improve the patients ability to improve mobility and decrease fall risk        With   [x] TE   [x] TA   [x] neuro   [] other: Patient Education: [x] Review HEP    [] Progressed/Changed HEP based on:   [x] positioning   [x] body mechanics   [] transfers   [] heat/ice application    [] other:      Other Objective/Functional Measures:      Pain Level (0-10 scale) post treatment: 1    ASSESSMENT/Changes in Function: Pt is making progress with her balance, but was a little more winded today. She was able to perform sidelying exercises on the mat table without fear of falling off. Patient will continue to benefit from skilled PT services to modify and progress therapeutic interventions, address functional mobility deficits, address ROM deficits, address strength deficits, analyze and address soft tissue restrictions, analyze and cue movement patterns, analyze and modify body mechanics/ergonomics, assess and modify postural abnormalities, address imbalance/dizziness and instruct in home and community integration to attain remaining goals. [x]  See Plan of Care  []  See progress note/recertification  []  See Discharge Summary         Progress towards goals / Updated goals:  Short Term Goals: To be accomplished in 1 weeks:  1. Therapist to establish HEP for strength/gait & balance training to decrease fall risk. [place] be accomplished in 4 weeks:  1.  Patient will be independent with HEP to improve carryover of functional gains with ADLs between visits.             Eval Status:n/a   Reports compliance  2. Pt will increase score on Tinetti Balance & Gait Assessment to > 19/28 to demonstrate decreased fall risk.              Eval Status:11/28 - high fall risk   Assess at 30 day aleena  3. Pt will increase FOTO score to 46 points to demonstrate increased ease with ADLs.                Eval Status: FOTO: 37   Assess at 30 day aleena  4. Pt will increase B hip flexion/abduction/knee extension to 5/5 with MMT to improve ease with gait & safety with ambulation. Eval Status:   right hip flexion: 3/5  right hip abduction: 3/5  Right knee extension: 3/5  left hip flexion: 3/5              left hip abduction: 3/5              Left knee extension: 4/5   Making progress  5.  Patient will reduce time on TUG test to < 15\" using AD to demonstrate decreased fall risk.               Eval status: 24\" with rollator   Assess at later visit    PLAN  []  Upgrade activities as tolerated     [x]  Continue plan of care  []  Update interventions per flow sheet       []  Discharge due to:_  []  Other:_      Arnaud Campbell PTA, CSCS 11/11/2020  2:32 PM    Future Appointments   Date Time Provider Gaby Delarosa   11/11/2020  1:30 PM Mame Saab PTA [facility]   11/16/2020  1:30 PM Prince Pierre, PT [facility]   11/18/2020  1:30 PM Mame Saab PTA [facility]   11/23/2020  1:30 PM David Haswell, PT [facility]   11/25/2020 10:00 AM Daljit Crook MD [facility]   11/25/2020  1:30 PM Mame Saab PTA [facility]   11/30/2020  1:30 PM Prince Pierre, PT [facility]   12/2/2020  2:30 PM Bc Horowitz DO [facility]   4/16/2021  1:00 PM [facility]BOTOM[facility]   4/19/2021  1:00 PM [facility] FAST TRACK NURSE [facility]

## 2020-11-16 ENCOUNTER — HOSPITAL ENCOUNTER (OUTPATIENT)
Dept: PHYSICAL THERAPY | Age: 75
Discharge: HOME | End: 2020-11-16
Payer: MEDICARE

## 2020-11-16 PROCEDURE — 97530 THERAPEUTIC ACTIVITIES: CPT

## 2020-11-16 PROCEDURE — 97110 THERAPEUTIC EXERCISES: CPT

## 2020-11-16 NOTE — PROGRESS NOTES
PT DAILY TREATMENT NOTE     Patient Name: Giovanna Crawford  Date:2020  : 1945  [x]  Patient  Verified  Payor: Rubi Michelle / Plan: VA MEDICARE PART A & B / Product Type: Medicare /    In time:132  Out time:220  Total Treatment Time (min): 52  Visit #: 4 of 8    Medicare/BCBS Only   Total Timed Codes (min):  52 1:1 Treatment Time:  52       Treatment Area: Bilateral leg pain [M79.604, M79.605]  Back pain [M54.9]  Bilateral knee pain [M25.561, M25.562]    SUBJECTIVE  Pain Level (0-10 scale): 2  Any medication changes, allergies to medications, adverse drug reactions, diagnosis change, or new procedure performed?: [x] No    [] Yes (see summary sheet for update)  Subjective functional status/changes:   [] No changes reported  \"I'm having some shortness of breath that has gotten worse over the past couple days. It comes and goes. \"    OBJECTIVE        10 min Therapeutic Exercise:  [x] See flow sheet :   Rationale: increase ROM, increase strength, improve coordination, improve balance and increase proprioception to improve the patients ability to perform ADLs. 42 min Therapeutic Activity:  [x]  See flow sheet :vital assessment, patient education   Rationale: increase ROM, increase strength, improve coordination, improve balance and increase proprioception  to improve the patients ability to perform ADLs. With   [] TE   [] TA   [] neuro   [] other: Patient Education: [x] Review HEP    [] Progressed/Changed HEP based on:   [] positioning   [] body mechanics   [] transfers   [] heat/ice application    [] other:      Other Objective/Functional Measures: initial BP : 150/70 mmHg, SPO2: 95%, HR: 89 bpm    After 5 in rest: 141/65mmHg, SPO2: 95 %, HP: 86 bpm    Pain Level (0-10 scale) post treatment: 0    ASSESSMENT/Changes in Function: ms. Lyric Montoya arrives to PT today with c/o shortness of breath and sensation of something in her throat.  She reports symptoms have been getting progressively worse over the past few days. Noted use of accessory muscle for breathing. Shortness of breath improved with reclined positioning on table. Discussed with referring PA patient's current symptoms and he advised she been seen at ED for immediate observation and treatment. Held on majority of exercises and most session spent on vital assessment, patient education, and reclined activities. Patient to f/u with our office later today. Patient will continue to benefit from skilled PT services to modify and progress therapeutic interventions, address functional mobility deficits, address ROM deficits, address strength deficits, analyze and address soft tissue restrictions, analyze and cue movement patterns, analyze and modify body mechanics/ergonomics, assess and modify postural abnormalities, address imbalance/dizziness and instruct in home and community integration to attain remaining goals. []  See Plan of Care  []  See progress note/recertification  []  See Discharge Summary         Progress towards goals / Updated goals:   Short Term Goals: To be accomplished in 1 weeks:  1. Therapist to establish HEP for strength/gait & balance training to decrease fall risk.               MET   Long Term Goals: To be accomplished in 4 weeks:  1. Patient will be independent with HEP to improve carryover of functional gains with ADLs between visits.             Eval Status:n/a              Reports compliance  2. Pt will increase score on Tinetti Balance & Gait Assessment to > 19/28 to demonstrate decreased fall risk.              Eval Status:11/28 - high fall risk              Assess at 30 day aleena  3. Pt will increase FOTO score to 46 points to demonstrate increased ease with ADLs.                Eval Status: FOTO: 37              Assess at 30 day aleena  4. Pt will increase B hip flexion/abduction/knee extension to 5/5 with MMT to improve ease with gait & safety with ambulation.    Eval Status:   right hip flexion: 3/5  right hip abduction: 3/5  Right knee extension: 3/5  left hip flexion: 3/5              left hip abduction: 3/5              Left knee extension: 4/5              Making progress  5.  Patient will reduce time on TUG test to < 15\" using AD to demonstrate decreased fall risk.               Eval status: 24\" with rollator              Assess at later visit    PLAN  []  Upgrade activities as tolerated     [x]  Continue plan of care  []  Update interventions per flow sheet       []  Discharge due to:_  []  Other:_      Xena Araujo, PT 11/16/2020  2:27 PM    Future Appointments   Date Time Provider Gaby Delarosa   11/16/2020  4:00 PM Collins Burns [facility] BS AMB   11/18/2020  1:30 PM Mame Saab PTA [facility]   11/23/2020  1:30 PM Prince Pierre, PT [facility]   11/25/2020 10:00 AM Daljit Crook MD [facility] BS AMB   11/25/2020  1:30 PM Mame Saab PTA [facility]   11/30/2020  1:30 PM Prince Pierre, PT [facility]   12/2/2020  2:30 PM Bc Horowitz DO [facility] BS AMB   4/16/2021  1:00 PM [facility] PHLEBOTOMIST [facility]   4/19/2021  1:00 PM [facility] FAST TRACK NURSE [facility]

## 2020-11-18 ENCOUNTER — HOSPITAL ENCOUNTER (OUTPATIENT)
Dept: PHYSICAL THERAPY | Age: 75
Discharge: HOME | End: 2020-11-18
Payer: MEDICARE

## 2020-11-18 PROCEDURE — 97112 NEUROMUSCULAR REEDUCATION: CPT

## 2020-11-18 PROCEDURE — 97110 THERAPEUTIC EXERCISES: CPT

## 2020-11-18 NOTE — PROGRESS NOTES
PT DAILY TREATMENT NOTE     Patient Name: Adilia Aviles  Date:2020  : 1945  [x]  Patient  Verified  Payor: [place] MEDICARE / Plan: [place] MEDICARE PART A & B / Product Type: Medicare /    In time:130  Out time:212  Total Treatment Time (min): 42  Visit #: 5 of 8    Medicare/BCBS Only   Total Timed Codes (min):  42 1:1 Treatment Time:  42       Treatment Area: Bilateral leg pain [M79.604, M79.605]  Back pain [M54.9]  Bilateral knee pain [M25.561, M25.562]    SUBJECTIVE  Pain Level (0-10 scale): 0  Any medication changes, allergies to medications, adverse drug reactions, diagnosis change, or new procedure performed?: [x] No    [x] Yes (see summary sheet for update)  Subjective functional status/changes:   [] No changes reported  \"I don't have any pain today. They put me on a new antibiotic for my UTI. \"    OBJECTIVE    Modality rationale: patient declined   Min Type Additional Details    [] Estim:  []Unatt       []IFC  []Premod                        []Other:  []w/ice   []w/heat  Position:  Location:    [] Estim: []Att    []TENS instruct  []NMES                    []Other:  []w/US   []w/ice   []w/heat  Position:  Location:    []  Traction: [] Cervical       []Lumbar                       [] Prone          []Supine                       []Intermittent   []Continuous Lbs:  [] before manual  [] after manual    []  Ultrasound: []Continuous   [] Pulsed                           []1MHz   []3MHz W/cm2:  Location:    []  Iontophoresis with dexamethasone         Location: [] Take home patch   [] In clinic    []  Ice     []  heat  []  Ice massage  []  Laser   []  Anodyne Position:  Location:    []  Laser with stim  []  Other:  Position:  Location:    []  Vasopneumatic Device Pressure:       [] lo [] med [] hi   Temperature: [] lo [] med [] hi   [] Skin assessment post-treatment:  []intact []redness- no adverse reaction    []redness - adverse reaction:     12 min Therapeutic Exercise:  [x] See flow sheet : Rationale: increase ROM and increase strength to improve the patients ability to perform ADLs    30 min Neuromuscular Re-education:  [x]  See flow sheet : balance training   Rationale: increase ROM, increase strength, improve coordination, improve balance and increase proprioception  to improve the patients ability to improve mobility and decrease fall risk        With   [x] TE   [] TA   [x] neuro   [] other: Patient Education: [x] Review HEP    [] Progressed/Changed HEP based on:   [x] positioning   [x] body mechanics   [] transfers   [] heat/ice application    [] other:      Other Objective/Functional Measures:   BP: 114/68 mmHg  HR: 96 bpm  SpO2: 97%     Pain Level (0-10 scale) post treatment: 0    ASSESSMENT/Changes in Function: Pt reports an improvement with her SOB after going to ED after her last visit this past Monday. Her oxygen saturation remained at 97% throughout treatment today. Balance was challenged with forward and lateral stepping without UE support. All exercises in standing performed with gait belt and SBA. Patient will continue to benefit from skilled PT services to modify and progress therapeutic interventions, address functional mobility deficits, address ROM deficits, address strength deficits, analyze and address soft tissue restrictions, analyze and cue movement patterns, analyze and modify body mechanics/ergonomics, assess and modify postural abnormalities, address imbalance/dizziness and instruct in home and community integration to attain remaining goals. [x]  See Plan of Care  []  See progress note/recertification  []  See Discharge Summary         Progress towards goals / Updated goals:  Short Term Goals: To be accomplished in 1 weeks:  1. Therapist to establish HEP for strength/gait & balance training to decrease fall risk.               MET   Long Term Goals: To be accomplished in 4 weeks:  1.  Patient will be independent with HEP to improve carryover of functional gains with ADLs between visits.             Eval Status:n/a              Reports compliance  2. Pt will increase score on Tinetti Balance & Gait Assessment to > 19/28 to demonstrate decreased fall risk.              Eval Status:11/28 - high fall risk              Assess at 30 day aleena  3. Pt will increase FOTO score to 46 points to demonstrate increased ease with ADLs.                Eval Status: FOTO: 37              Assess at 30 day aleena  4. Pt will increase B hip flexion/abduction/knee extension to 5/5 with MMT to improve ease with gait & safety with ambulation. Eval Status:   right hip flexion: 3/5  right hip abduction: 3/5  Right knee extension: 3/5  left hip flexion: 3/5              left hip abduction: 3/5              Left knee extension: 4/5              Making progress  5.  Patient will reduce time on TUG test to < 15\" using AD to demonstrate decreased fall risk.               Eval status: 24\" with rollator              Assess at later visit    PLAN  []  Upgrade activities as tolerated     [x]  Continue plan of care  []  Update interventions per flow sheet       []  Discharge due to:_  []  Other:_      Kevin Adams PTA, CSCS 11/18/2020  2:22 PM    Future Appointments   Date Time Provider Gaby Delarosa   11/23/2020  1:30 PM [facility]   11/25/2020 10:00 AM Veronika Vidales MD [facility] BS AMB   11/25/2020  1:30 PM [facility]   11/30/2020  1:30 PM [facility]   12/2/2020  2:30 PM Bc Fish DO [facility] BS AMB   4/16/2021  1:00 PM [facility] INFUSION PHLEBOTOMIST [facility]   4/19/2021  1:00 PM [facility] FAST TRACK NURSE [facility]

## 2020-11-23 ENCOUNTER — HOSPITAL ENCOUNTER (OUTPATIENT)
Dept: PHYSICAL THERAPY | Age: 75
Discharge: HOME | End: 2020-11-23
Payer: MEDICARE

## 2020-11-23 PROCEDURE — 97110 THERAPEUTIC EXERCISES: CPT

## 2020-11-23 PROCEDURE — 97112 NEUROMUSCULAR REEDUCATION: CPT

## 2020-11-23 NOTE — PROGRESS NOTES
PT DAILY TREATMENT NOTE     Patient Name: Celia Hassan  Date:2020  : 1945  [x]  Patient  Verified  Payor: [place] MEDICARE / Plan: [place] MEDICARE PART A & B / Product Type: Medicare /    In time:130  Out time:320  Total Treatment Time (min): 50  Visit #: 6 of 8    Medicare/BCBS Only   Total Timed Codes (min):  40 1:1 Treatment Time:  40       Treatment Area: Bilateral leg pain [M79.604, M79.605]  Back pain [M54.9]  Bilateral knee pain [M25.561, M25.562]    SUBJECTIVE  Pain Level (0-10 scale): 0  Any medication changes, allergies to medications, adverse drug reactions, diagnosis change, or new procedure performed?: [x] No    [] Yes (see summary sheet for update)  Subjective functional status/changes:   [] No changes reported  \"I'm sore in my neck today but no pain in my back or legs. \"    OBJECTIVE         25 min Therapeutic Exercise:  [x]? See flow sheet :   Rationale: increase ROM and increase strength to improve the patients ability to perform ADLs     25 min Neuromuscular Re-education:  [x]? See flow sheet : balance training   Rationale: increase ROM, increase strength, improve coordination, improve balance and increase proprioception  to improve the patients ability to improve mobility and decrease fall risk            With   [] TE   [] TA   [] neuro   [] other: Patient Education: [x] Review HEP    [] Progressed/Changed HEP based on:   [] positioning   [] body mechanics   [] transfers   [] heat/ice application    [] other:      Other Objective/Functional Measures: progressed standing hip 4 way to 1# resistance. o     Pain Level (0-10 scale) post treatment: 0    ASSESSMENT/Changes in Function: Ms. Ling Barr saw improved tolerance to exercises and felt more comfortable with sidelying activities on wide mat table. Proprioception remains very limited.      Patient will continue to benefit from skilled PT services to modify and progress therapeutic interventions, address functional mobility deficits, address ROM deficits, address strength deficits, analyze and address soft tissue restrictions, analyze and cue movement patterns, analyze and modify body mechanics/ergonomics, assess and modify postural abnormalities, address imbalance/dizziness and instruct in home and community integration to attain remaining goals. []  See Plan of Care  []  See progress note/recertification  []  See Discharge Summary         Progress towards goals / Updated goals:  Short Term Goals: To be accomplished in 1 weeks:  1. Therapist to establish HEP for strength/gait & balance training to decrease fall risk.               MET   Long Term Goals: To be accomplished in 4 weeks:  1. Patient will be independent with HEP to improve carryover of functional gains with ADLs between visits.             Eval Status:n/a              Reports compliance  2. Pt will increase score on Tinetti Balance & Gait Assessment to > 19/28 to demonstrate decreased fall risk.              Eval Status:11/28 - high fall risk              Assess at 30 day aleena  3. Pt will increase FOTO score to 46 points to demonstrate increased ease with ADLs.                Eval Status: FOTO: 37              Assess at 30 day aleena  4. Pt will increase B hip flexion/abduction/knee extension to 5/5 with MMT to improve ease with gait & safety with ambulation. Eval Status:   right hip flexion: 3/5  right hip abduction: 3/5  Right knee extension: 3/5  left hip flexion: 3/5              left hip abduction: 3/5              Left knee extension: 4/5              Making progress  5.  Patient will reduce time on TUG test to < 15\" using AD to demonstrate decreased fall risk.               Eval status: 24\" with rollator              Assess at later visit    PLAN  []  Upgrade activities as tolerated     [x]  Continue plan of care  []  Update interventions per flow sheet       []  Discharge due to:_  []  Other:_      Janina Bird, PT 11/23/2020  1:59 PM    Future Appointments   Date Time Provider [place]   11/25/2020 10:00 AM Ney Amaya MD [facility] BS AMB   11/25/2020  1:30 PM [facility]   11/30/2020  1:30 PM Shane Collado, PT [facility]   12/2/2020  2:30 PM Bc Ledbetter DO [facility] BS AMB   4/16/2021  1:00 PM [facility] INFUSION PHLEBOTOMIST [facility]   4/19/2021  1:00 PM [facility] FAST TRACK NURSE [facility]

## 2020-11-25 ENCOUNTER — HOSPITAL ENCOUNTER (OUTPATIENT)
Dept: PHYSICAL THERAPY | Age: 75
Discharge: HOME | End: 2020-11-25
Payer: MEDICARE

## 2020-11-25 ENCOUNTER — OFFICE VISIT (OUTPATIENT)
Dept: ORTHOPEDIC SURGERY | Age: 75
End: 2020-11-25
Payer: MEDICARE

## 2020-11-25 VITALS
TEMPERATURE: 97.5 F | SYSTOLIC BLOOD PRESSURE: 115 MMHG | HEART RATE: 87 BPM | BODY MASS INDEX: 17.87 KG/M2 | WEIGHT: 91 LBS | DIASTOLIC BLOOD PRESSURE: 76 MMHG | HEIGHT: 60 IN | RESPIRATION RATE: 18 BRPM

## 2020-11-25 DIAGNOSIS — R20.2 NUMBNESS AND TINGLING IN BOTH HANDS: Primary | ICD-10-CM

## 2020-11-25 DIAGNOSIS — R20.0 NUMBNESS AND TINGLING IN BOTH HANDS: ICD-10-CM

## 2020-11-25 DIAGNOSIS — R20.2 NUMBNESS AND TINGLING IN BOTH HANDS: ICD-10-CM

## 2020-11-25 DIAGNOSIS — R20.0 NUMBNESS AND TINGLING IN BOTH HANDS: Primary | ICD-10-CM

## 2020-11-25 PROCEDURE — 97110 THERAPEUTIC EXERCISES: CPT

## 2020-11-25 PROCEDURE — 97112 NEUROMUSCULAR REEDUCATION: CPT

## 2020-11-25 PROCEDURE — 95911 NRV CNDJ TEST 9-10 STUDIES: CPT | Performed by: PHYSICAL MEDICINE & REHABILITATION

## 2020-11-25 PROCEDURE — 95886 MUSC TEST DONE W/N TEST COMP: CPT | Performed by: PHYSICAL MEDICINE & REHABILITATION

## 2020-11-25 NOTE — PROGRESS NOTES
[facility] [address]  [address], [address]  [address]  Phone: [phone]  Fax: [phone]        Venancio Carroll  : 1945  PCP: None  2020    ELECTROMYOGRAPHY AND NERVE CONDUCTION STUDIES    Manohar Sorenson was referred by Dr. Cornelious Lesches for electrodiagnostic evaluation of bilateral hand numbness and tingling. NCV & EMG Findings:  All nerve conduction studies (as indicated in the following tables) were within normal limits. All left vs. right side differences were within normal limits. All examined muscles (as indicated in the following table) showed no evidence of electrical instability. INTERPRETATION  This was a normal nerve conduction and EMG study showing there to be no signs of neuropathy, myopathy, or radiculopathy in the nerves and muscles tested. CLINICAL INTERPRETATION  Her electrodiagnostic findings do not appear to explain her hand symptoms. HISTORY OF PRESENT ILLNESS  Manohar Sorenson is a 70 y.o. female. Pt presents today for BUE EMG evaluation of bilateral hand numbness and tingling. She is s/p cervical fusion C3-5 in 2019 by Dr. 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 She was able to regain the ability to walk. She has had difficulty gaining weight.     PAST MEDICAL HISTORY   Past Medical History:   Diagnosis Date    Abnormal Pap smear     Dr. Fredi Lindquist Allergic rhinitis     Arthritis     Cerebrovascular small vessel disease 3/18/2019    CT 3/17/2019    Cervical spinal cord compression (HCC)     Chronic pain     Concentric left ventricular hypertrophy 3/18/2019    With left ventricular diastolic dysfunction by echocardiogram 3/18/2019    Hypercholesterolemia     Neck pain 2010    Stroke ([facility] [address] 10/02/2017    TIA- legs weak memory issues       Past Surgical History:   Procedure Laterality Date    COLONOSCOPY N/A 2018    COLONOSCOPY / polypectomy performed by Amanda Galvan, [facility] at [facility] ENDOSCOPY    HX GYN      Total Hyst    HX LAP CHOLECYSTECTOMY      HX OTHER SURGICAL  2017    Throat widened   . MEDICATIONS    Current Outpatient Medications   Medication Sig Dispense Refill    atorvastatin (LIPITOR) 20 mg tablet take 1 tablet by mouth once daily 90 Tab 0    busPIRone (BUSPAR) 7.5 mg tablet take 1 tablet by mouth twice a day 365 Tab 0    folic acid 692 mcg tablet Take 800 mcg by mouth daily.  topiramate (Topamax) 25 mg tablet Take 1 Tab by mouth two (2) times daily (with meals). 60 Tab 1    ibuprofen (MOTRIN) 400 mg tablet Take 1 Tab by mouth every six (6) hours as needed for Pain. 8 Tab 0    naloxone (Narcan) 4 mg/actuation nasal spray Use 1 spray intranasally, then discard. Repeat with new spray every 2 min as needed for opioid overdose symptoms, alternating nostrils. 1 Each 0    cyanocobalamin (Vitamin B-12) 100 mcg tablet Take 100 mcg by mouth daily.  baclofen (LIORESAL) 10 mg tablet Take 1 Tab by mouth two (2) times a day. 60 Tab 2    ALPRAZolam (XANAX) 0.25 mg tablet Take 1 Tab by mouth two (2) times daily as needed for Anxiety. Max Daily Amount: 0.5 mg. 60 Tab 0    ondansetron (ZOFRAN ODT) 4 mg disintegrating tablet dissolve 1 tablet ON TONGUE every 6 hours if needed for nausea OR vomiting 90 Tab 0    omeprazole (PRILOSEC) 20 mg capsule take 1 capsule by mouth once daily before breakfast 90 Cap 1    gabapentin (NEURONTIN) 100 mg capsule Take 2 Caps by mouth three (3) times daily. Max Daily Amount: 600 mg. (Patient taking differently: Take 300 mg by mouth three (3) times daily. Takes 300mg TID) 180 Cap 1    denosumab (PROLIA) 60 mg/mL injection 60 mg by SubCUTAneous route every 6 months.  acetaminophen (TYLENOL) 160 mg/5 mL elixir Take 1,000 mg by mouth daily.  midodrine (PROAMITINE) 5 mg tablet Take 5 mg by mouth two (2) times a day. 1 tab by mouth with breakfast and lunch      loratadine (CLARITIN) 10 mg tablet Take 1 Tab by mouth daily.  80 Tab 1    turmeric 400 mg cap Take 1 Cap by mouth daily.  aspirin 81 mg chewable tablet Take 1 Tab by mouth daily. 30 Tab 3        ALLERGIES  Allergies   Allergen Reactions    Baclofen Shortness of Breath     Denies. Pt tolerates    Morphine Other (comments)     hallucinations    Celebrex [Celecoxib] Other (comments)     Tiredness           SOCIAL HISTORY    Social History     Socioeconomic History    Marital status: UNKNOWN     Spouse name: Not on file    Number of children: Not on file    Years of education: Not on file    Highest education level: Not on file   Tobacco Use    Smoking status: Never Smoker    Smokeless tobacco: Never Used   Substance and Sexual Activity    Alcohol use: No    Drug use: No    Sexual activity: Never       FAMILY HISTORY  Family History   Problem Relation Age of Onset    Cancer Mother         breast    Heart Disease Father          PHYSICAL EXAMINATION  Visit Vitals  LMP  (LMP Unknown)       Pain Assessment  10/28/2020   Location of Pain Hand   Pain Location Comment -   Location Modifiers Right;Left   Severity of Pain 5   Quality of Pain Throbbing; Other (Comment)   Quality of Pain Comment numb & tingle   Duration of Pain Persistent   Frequency of Pain Constant   Date Pain First Started (No Data)   Aggravating Factors Other (Comment); Straightening;Bending   Aggravating Factors Comment general use of hands   Limiting Behavior Yes   Relieving Factors Nothing   Relieving Factors Comment states gabapentin not helpful   Result of Injury No   Work-Related Injury -   Type of Injury -           Constitutional:  Well developed, well nourished, in no acute distress. Psychiatric: Affect and mood are appropriate. Integumentary: No rashes or abrasions noted on exposed areas. SPINE/MUSCULOSKELETAL EXAM    On brief examination: None.       NCV & EMG Findings:  Nerve Conduction Studies  Anti Sensory Summary Table     Stim Site NR Peak (ms) Norm Peak (ms) O-P Amp (µV) Norm O-P Amp Site1 Site2 Delta-P (ms) Dist (cm) Zi (m/s) Norm Zi (m/s)   Left Median Anti Sensory (2nd Digit)   Wrist    3.4 <3.6 37.3 >10 Wrist 2nd Digit 3.4 14.0 41 >39   Right Median Anti Sensory (2nd Digit)   Wrist    3.0 <3.6 42.6 >10 Wrist 2nd Digit 3.0 14.0 47 >39   Left Radial Anti Sensory (Base 1st Digit)   Wrist    2.5 <3.1 28.0  Wrist Base 1st Digit 2.5 0.0     Right Radial Anti Sensory (Base 1st Digit)   Wrist    2.3 <3.1 52.1  Wrist Base 1st Digit 2.3 0.0     Left Ulnar Anti Sensory (5th Digit)   Wrist    3.1 <3.7 35.6 >15.0 Wrist 5th Digit 3.1 14.0 45 >38   Right Ulnar Anti Sensory (5th Digit)   Wrist    3.3 <3.7 27.4 >15.0 Wrist 5th Digit 3.3 14.0 42 >38     Motor Summary Table     Stim Site NR Onset (ms) Norm Onset (ms) O-P Amp (mV) Norm O-P Amp Site1 Site2 Delta-0 (ms) Dist (cm) Zi (m/s) Norm Zi (m/s)   Left Median Motor (Abd Poll Brev)   Wrist    3.4 <4.2 6.8 >5 Elbow Wrist 3.4 18.0 53 >50   Elbow    6.8  6.1          Right Median Motor (Abd Poll Brev)   Wrist    3.2 <4.2 6.9 >5 Elbow Wrist 3.5 18.5 53 >50   Elbow    6.7  6.2          Left Ulnar Motor (Abd Dig Min)   Wrist    2.9 <4.2 7.9 >3 B Elbow Wrist 2.8 15.0 54 >53   B Elbow    5.7  6.6  A Elbow B Elbow 1.8 10.0 56 >53   A Elbow    7.5  6.2          Right Ulnar Motor (Abd Dig Min)   Wrist    2.7 <4.2 7.7 >3 B Elbow Wrist 2.8 16.0 57 >53   B Elbow    5.5  6.9  A Elbow B Elbow 1.8 10.0 56 >53   A Elbow    7.3  6.3            EMG     Side Muscle Nerve Root Ins Act Fibs Psw Amp Dur Poly Recrt Int Charlee Salen Comment   Left Biceps Musculocut C5-6 Nml Nml Nml Nml Nml 0 Nml Nml    Left Triceps Radial C6-7-8 Nml Nml Nml Nml Nml 0 Nml Nml    Left PronatorTeres Median C6-7 Nml Nml Nml Nml Nml 0 Nml Nml    Left Abd Poll Brev Median C8-T1 Nml Nml Nml Nml Nml 0 Nml Nml    Left 1stDorInt Ulnar C8-T1 Nml Nml Nml Nml Nml 0 Nml Nml    Right Biceps Musculocut C5-6 Nml Nml Nml Nml Nml 0 Nml Nml    Right Triceps Radial C6-7-8 Nml Nml Nml Nml Nml 0 Nml Nml    Right PronatorTeres Median C6-7 Nml Nml Nml Nml Nml 0 Nml Nml    Right Abd Poll Brev Median C8-T1 Nml Nml Nml Nml Nml 0 Nml Nml    Right 1stDorInt Ulnar C8-T1 Nml Nml Nml Nml Nml 0 Nml Nml        Nerve Conduction Studies  Anti Sensory Left/Right Comparison     Stim Site L Lat (ms) R Lat (ms) L-R Lat (ms) L Amp (µV) R Amp (µV) L-R Amp (%) Site1 Site2 L Zi (m/s) R Zi (m/s) L-R Zi (m/s)   Median Anti Sensory (2nd Digit)   Wrist 3.4 3.0 0.4 37.3 42.6 12.4 Wrist 2nd Digit 41 47 6   Radial Anti Sensory (Base 1st Digit)   Wrist 2.5 2.3 0.2 28.0 52.1 46.3 Wrist Base 1st Digit      Ulnar Anti Sensory (5th Digit)   Wrist 3.1 3.3 0.2 35.6 27.4 23.0 Wrist 5th Digit 45 42 3     Motor Left/Right Comparison     Stim Site L Lat (ms) R Lat (ms) L-R Lat (ms) L Amp (mV) R Amp (mV) L-R Amp (%) Site1 Site2 L Zi (m/s) R Zi (m/s) L-R Zi (m/s)   Median Motor (Abd Poll Brev)   Wrist 3.4 3.2 0.2 6.8 6.9 1.4 Elbow Wrist 53 53 0   Elbow 6.8 6.7 0.1 6.1 6.2 1.6        Ulnar Motor (Abd Dig Min)   Wrist 2.9 2.7 0.2 7.9 7.7 2.5 B Elbow Wrist 54 57 3   B Elbow 5.7 5.5 0.2 6.6 6.9 4.3 A Elbow B Elbow 56 56 0   A Elbow 7.5 7.3 0.2 6.2 6.3 1.6              Waveforms:                                  [facility]  OFFICE PROCEDURE PROGRESS NOTE        Chart reviewed for the following:   Miracle MORELOS, have reviewed the History, Physical and updated the Allergic reactions for [address] performed immediately prior to start of procedure:   Miracle MORELOS, have performed the following reviews on Hemant Farooq prior to the start of the procedure:            * Patient was identified by name and date of birth   * Agreement on procedure being performed was verified  * Risks and Benefits explained to the patient  * Procedure site verified and marked as necessary  * Patient was positioned for comfort  * Consent was signed and verified     Time: 10:58 AM    Date of procedure: 11/25/2020    Procedure performed by:  Reyna Lara MD    Provider accompanied by: Dena. Patient accompanied by: Self.     How tolerated by patient: tolerated the procedure well with no complications    Post Procedural Pain Scale: 0 - No Hurt    Comments: none    Written by Robert Ragsdale, [address] as dictated by Alan Burciaga MD

## 2020-11-25 NOTE — PROGRESS NOTES
PT DAILY TREATMENT NOTE     Patient Name: Sorin Galeana  Date:2020  : 1945  [x]  Patient  Verified  Payor: [place] MEDICARE / Plan: VA MEDICARE PART A & B / Product Type: Medicare /    In time:130  Out time:220  Total Treatment Time (min): 50  Visit #: 7 of 8    Medicare/BCBS Only   Total Timed Codes (min):  40 1:1 Treatment Time:  40       Treatment Area: Bilateral leg pain [M79.604, M79.605]  Back pain [M54.9]  Bilateral knee pain [M25.561, M25.562]    SUBJECTIVE  Pain Level (0-10 scale): 0  Any medication changes, allergies to medications, adverse drug reactions, diagnosis change, or new procedure performed?: [x] No    [] Yes (see summary sheet for update)  Subjective functional status/changes:   [] No changes reported  \"No pain. \"    OBJECTIVE    Modality rationale: patient declined   Min Type Additional Details    [] Estim:  []Unatt       []IFC  []Premod                        []Other:  []w/ice   []w/heat  Position:  Location:    [] Estim: []Att    []TENS instruct  []NMES                    []Other:  []w/US   []w/ice   []w/heat  Position:  Location:    []  Traction: [] Cervical       []Lumbar                       [] Prone          []Supine                       []Intermittent   []Continuous Lbs:  [] before manual  [] after manual    []  Ultrasound: []Continuous   [] Pulsed                           []1MHz   []3MHz W/cm2:  Location:    []  Iontophoresis with dexamethasone         Location: [] Take home patch   [] In clinic    []  Ice     []  heat  []  Ice massage  []  Laser   []  Anodyne Position:  Location:    []  Laser with stim  []  Other:  Position:  Location:    []  Vasopneumatic Device Pressure:       [] lo [] med [] hi   Temperature: [] lo [] med [] hi   [] Skin assessment post-treatment:  []intact []redness- no adverse reaction    []redness - adverse reaction:     25 min Therapeutic Exercise:  [x] See flow sheet :   Rationale: increase ROM and increase strength to improve the patients ability to perform ADLs    25 min Neuromuscular Re-education:  [x]  See flow sheet : balance training   Rationale: increase ROM, increase strength, improve coordination, improve balance and increase proprioception  to improve the patients ability to improve mobility and decrease fall risk         With   [x] TE   [] TA   [x] neuro   [] other: Patient Education: [x] Review HEP    [] Progressed/Changed HEP based on:   [x] positioning   [x] body mechanics   [] transfers   [] heat/ice application    [] other:      Other Objective/Functional Measures:      Pain Level (0-10 scale) post treatment: 0    ASSESSMENT/Changes in Function: Pt is making progress with her balance and functional mobility. Her bed mobility and confidence with bed mobility have improved recently with a decrease in apprehension with rolling to sidelying. Improved fluidity with sit to stands. Patient will continue to benefit from skilled PT services to modify and progress therapeutic interventions, address functional mobility deficits, address ROM deficits, address strength deficits, analyze and address soft tissue restrictions, analyze and cue movement patterns, analyze and modify body mechanics/ergonomics, assess and modify postural abnormalities, address imbalance/dizziness and instruct in home and community integration to attain remaining goals. [x]  See Plan of Care  []  See progress note/recertification  []  See Discharge Summary         Progress towards goals / Updated goals:  Short Term Goals: To be accomplished in 1 weeks:  1. Therapist to establish HEP for strength/gait & balance training to decrease fall risk.               MET   Long Term Goals: To be accomplished in 4 weeks:  1. Patient will be independent with HEP to improve carryover of functional gains with ADLs between visits.             Eval Status:n/a              Reports compliance  2.  Pt will increase score on Tinetti Balance & Gait Assessment to > 19/28 to demonstrate decreased fall risk.              Eval Status:11/28 - high fall risk              Assess at 30 day aleena  3. Pt will increase FOTO score to 46 points to demonstrate increased ease with ADLs.                Eval Status: FOTO: 37              Assess at 30 day aleena  4. Pt will increase B hip flexion/abduction/knee extension to 5/5 with MMT to improve ease with gait & safety with ambulation. Eval Status:   right hip flexion: 3/5  right hip abduction: 3/5  Right knee extension: 3/5  left hip flexion: 3/5              left hip abduction: 3/5              Left knee extension: 4/5              Making progress  5.  Patient will reduce time on TUG test to < 15\" using AD to demonstrate decreased fall risk.               Eval status: 24\" with rollator              Assess at later visit    PLAN  []  Upgrade activities as tolerated     [x]  Continue plan of care  []  Update interventions per flow sheet       []  Discharge due to:_  []  Other:_      Rene Lyle PTA, CSCS 11/25/2020  2:29 PM    Future Appointments   Date Time Provider Gaby Delarosa   11/25/2020  1:30 PM [facility]   11/30/2020  1:30 PM [facility]   12/2/2020  2:30 PM Bc Benitez DO [facility]   4/16/2021  1:00 PM [facility] INFUSION PHLEBOTOMIST [facility]   4/19/2021  1:00 PM [facility] FAST TRACK NURSE [facility]

## 2020-11-27 ENCOUNTER — TRANSCRIBE ORDER (OUTPATIENT)
Dept: SCHEDULING | Age: 75
End: 2020-11-27

## 2020-11-27 DIAGNOSIS — M47.12 CERVICAL SPONDYLOSIS WITH MYELOPATHY: Primary | ICD-10-CM

## 2020-11-30 ENCOUNTER — HOSPITAL ENCOUNTER (OUTPATIENT)
Dept: PHYSICAL THERAPY | Age: 75
Discharge: HOME | End: 2020-11-30
Payer: MEDICARE

## 2020-11-30 PROCEDURE — 97530 THERAPEUTIC ACTIVITIES: CPT

## 2020-11-30 PROCEDURE — 97110 THERAPEUTIC EXERCISES: CPT

## 2020-11-30 PROCEDURE — 97112 NEUROMUSCULAR REEDUCATION: CPT

## 2020-11-30 NOTE — PROGRESS NOTES
PT DAILY TREATMENT NOTE - [facility]     Patient Name: Ehsan Samuel  Date:2020  : 1945  [x]  Patient  Verified  Payor: VA MEDICARE / Plan: VA MEDICARE PART A & B / Product Type: Medicare /    In time:130  Out time:223  Total Treatment Time (min): 53  Total Timed Codes (min): 53  1:1 Treatment Time ( [address] only): 48   Visit #: 8 of 8    Treatment Area: Bilateral leg pain [M79.604, M79.605]  Back pain [M54.9]  Bilateral knee pain [M25.561, M25.562]    SUBJECTIVE  Pain Level (0-10 scale): 6  Any medication changes, allergies to medications, adverse drug reactions, diagnosis change, or new procedure performed?: [x] No    [] Yes (see summary sheet for update)  Subjective functional status:   [x] See Eval form in paper chart      OBJECTIVE    Modality rationale: patient declined   Min Type Additional Details      []? Estim:  []? Unatt       []? IFC  []? Premod                        []?Other:  []?w/ice   []?w/heat  Position:  Location:      []? Estim: []? Att    []? TENS instruct  []? NMES                    []?Other:  []?w/US   []?w/ice   []?w/heat  Position:  Location:      []? Traction: []? Cervical       []? Lumbar                       []? Prone          []? Supine                       []?Intermittent   []? Continuous Lbs:  []? before manual  []? after manual      []? Ultrasound: []? Continuous   []? Pulsed                           []? 1MHz   []? 3MHz W/cm2:  Location:      []? Iontophoresis with dexamethasone         Location: []? Take home patch   []? In clinic      []? Ice     []?  heat  []? Ice massage  []? Laser   []? Anodyne Position:  Location:      []? Laser with stim  []? Other:  Position:  Location:      []? Vasopneumatic Device Pressure:       []? lo []? med []? hi   Temperature: []? lo []? med []? hi    []? Skin assessment post-treatment:  []?intact []? redness- no adverse reaction    []? redness - adverse reaction:      10 min Therapeutic Exercise:  [x]?  See flow sheet :   Rationale: increase ROM and increase strength to improve the patients ability to perform ADLs     23 min Neuromuscular Re-education:  [x]? See flow sheet : balance training   Rationale: increase ROM, increase strength, improve coordination, improve balance and increase proprioception  to improve the patients ability to improve mobility and decrease fall risk     20 min Therapeutic Activity:  [x]  See flow sheet :sit to stand, reassessment of goals, review of squatting and safe functional activities to work on at home. Rationale: increase ROM, increase strength, improve coordination, improve balance and increase proprioception  to improve the patients ability to perform ADLs. With   [] TE   [] TA   [] neuro   [] other: Patient Education: [x] Review HEP    [] Progressed/Changed HEP based on:   [] positioning   [] body mechanics   [] transfers   [] heat/ice application    [] other:                  Pain Level (0-10 scale) post treatment: 6    ASSESSMENT:   [x]  See Evaluation         Goals:    Goals for this certification period to be accomplished in 4 weeks:  1. Pt will increase score on Tinetti Balance & Gait Assessment to > 19/28 to demonstrate decreased fall risk.              recert status: 04/60  2. Pt will increase FOTO score to 46 points to demonstrate increased ease with ADLs.                recert [id number]:64  3. Pt will increase B hip flexion/abduction/knee extension to 5/5 with MMT to improve ease with gait & safety with ambulation.               recert status[de-identified]   right hip flexion: 3+/5  right hip abduction: 3+5  Right knee extension: 5/5  left hip flexion: 4+/5              left hip abduction: 3+/5              Left knee extension: 4/5  4.  Patient will reduce time on TUG test to < 15\" using AD to demonstrate decreased fall risk.               recert OEBVVT:05\" with rollator    PLAN      [x]  Continue plan of care    []  Other:_      Bassam [place], PT 11/30/2020  2:56 PM

## 2020-11-30 NOTE — PROGRESS NOTES
[facility] [address]  [address] [place], [address] [phone] fax    Continued Plan of Care/ Re-certification for Physical Therapy Services    Patient name: Sorin Galeana Start of Care: 2020   Referral source: Ashleigh Sampson : 1945               Medical Diagnosis: Bilateral leg pain [M79.604, M79.605]  Back pain [M54.9]  Bilateral knee pain [M25.561, M25.562]  Payor: VA MEDICARE / Plan: VA MEDICARE PART A & B / Product Type: Medicare /     Onset Date:10/22/20               Treatment Diagnosis: B leg, back and B knee pain   Prior Hospitalization: see medical history Provider#: [id number]   Medications: Verified on Patient summary List    Comorbidities: OA, CVA, cervical myelopathy with hx of fusion   Prior Level of Function: retired. Recovering from cervical myelopathy/fusion earlier this year. Lives with friend who A with all care in 1 story home. Frequent falls and general deconditioning present since surgery    Visits from Start of Care: 8    Missed Visits: 0    The Plan of Care and following information is based on the patient's current status:  Short Term Goals: To be accomplished in 1 weeks:  1. Therapist to establish HEP for strength/gait & balance training to decrease fall risk.               MET   Long Term Goals: To be accomplished in 4 weeks:  1. Patient will be independent with HEP to improve carryover of functional gains with ADLs between visits.             Eval Status:n/a              MET: Reports compliance  2. Pt will increase score on Tinetti Balance & Gait Assessment to > / to demonstrate decreased fall risk.              Eval Status: - high fall risk             MET:   3. Pt will increase FOTO score to 46 points to demonstrate increased ease with ADLs.                Eval Status: FOTO: 37              NOT MET:  36  4.  Pt will increase B hip flexion/abduction/knee extension to 5/5 with MMT to improve ease with gait & safety with ambulation. Eval Status:   right hip flexion: 3/5  right hip abduction: 3/5  Right knee extension: 3/5  left hip flexion: 3/5              left hip abduction: 3/5              Left knee extension: 4/5              PROGRESSING:   right hip flexion: 3+/5  right hip abduction: 3+5  Right knee extension: 5/5  left hip flexion: 4+/5              left hip abduction: 3+/5              Left knee extension: 4/5  5. Patient will reduce time on TUG test to < 15\" using AD to demonstrate decreased fall risk.               Eval status: 24\" with rollator              PROGRESSIN\" with rollator    Key functional changes:   Functional Gains: general strength, ADLs, balance with rollator  Functional Deficits: stairs, walking without AD, curb, prolonged walking  % improvement: 30%  Pain   Average: 0/10       Best: 0/10     Worst: 6/10  Patient Goal: \"to keep getting better and working on stairs. \"        Problems/ barriers to goal attainment: pain, weakness, limited ROM and strength. Problem List: pain affecting function, decrease ROM, decrease strength, edema affecting function, impaired gait/ balance, decrease ADL/ functional abilitiies, decrease activity tolerance, decrease flexibility/ joint mobility and decrease transfer abilities    Treatment Plan: Therapeutic exercise, Therapeutic activities, Neuromuscular re-education, Physical agent/modality, Gait/balance training, Manual therapy, Aquatic therapy, Patient education, Self Care training, Functional mobility training, Home safety training and Stair training         Goals for this certification period to be accomplished in 4 weeks:  1. Pt will increase score on Tinetti Balance & Gait Assessment to > 19/28 to demonstrate decreased fall risk.              recert status: 97/61  2. Pt will increase FOTO score to 46 points to demonstrate increased ease with ADLs.                recert JJTKTT:99  3.  Pt will increase B hip flexion/abduction/knee extension to 5/5 with MMT to improve ease with gait & safety with ambulation.               recert status[de-identified]   right hip flexion: 3+/5  right hip abduction: 3+5  Right knee extension: 5/5  left hip flexion: 4+/5              left hip abduction: 3+/5              Left knee extension: 4/5  4. Patient will reduce time on TUG test to < 15\" using AD to demonstrate decreased fall risk.               recert VKLZNA:91\" with rollator      Frequency / Duration: Patient to be seen 2 times per week for 4 weeks:    Assessment / Recommendations:Ms. Lea Gallo has seen good response to PT fo rimproved LE strength and progression of balance deficits. She still is at risk for fall per assessment and recommend she continue with PT to address remaining deficits. Certification Period: 11/30/20 - 12/29/20    Wayne Oscar, PT 11/30/2020 1:39 PM    ________________________________________________________________________  I certify that the above Therapy Services are being furnished while the patient is under my care. I agree with the treatment plan and certify that this therapy is necessary. [] I have read the above and request that my patient continue as recommended.   [] I have read the above report and request that my patient continue therapy with the following changes/special instructions: _____________________________________________  [] I have read the above report and request that my patient be discharged from therapy    Physician's Signature:____________Date:_________TIME:________    ** Signature, Date and Time must be completed for valid certification **    Please sign and return to [facility] [place] [facility]  [address] [facility] [address], [address] [phone] fax

## 2020-12-02 ENCOUNTER — OFFICE VISIT (OUTPATIENT)
Dept: ORTHOPEDIC SURGERY | Age: 75
End: 2020-12-02
Payer: MEDICARE

## 2020-12-02 VITALS
DIASTOLIC BLOOD PRESSURE: 69 MMHG | TEMPERATURE: 97.8 F | OXYGEN SATURATION: 95 % | HEART RATE: 82 BPM | BODY MASS INDEX: 17.87 KG/M2 | SYSTOLIC BLOOD PRESSURE: 128 MMHG | RESPIRATION RATE: 16 BRPM | WEIGHT: 91 LBS | HEIGHT: 60 IN

## 2020-12-02 DIAGNOSIS — M79.642 BILATERAL HAND PAIN: ICD-10-CM

## 2020-12-02 DIAGNOSIS — M19.042 PRIMARY OSTEOARTHRITIS OF LEFT HAND: ICD-10-CM

## 2020-12-02 DIAGNOSIS — M79.642 LEFT HAND PAIN: ICD-10-CM

## 2020-12-02 DIAGNOSIS — M19.041 LOCALIZED OSTEOARTHRITIS OF RIGHT HAND: Primary | ICD-10-CM

## 2020-12-02 DIAGNOSIS — M79.641 BILATERAL HAND PAIN: ICD-10-CM

## 2020-12-02 PROCEDURE — 73130 X-RAY EXAM OF HAND: CPT | Performed by: ORTHOPAEDIC SURGERY

## 2020-12-02 PROCEDURE — 1090F PRES/ABSN URINE INCON ASSESS: CPT | Performed by: ORTHOPAEDIC SURGERY

## 2020-12-02 PROCEDURE — G8419 CALC BMI OUT NRM PARAM NOF/U: HCPCS | Performed by: ORTHOPAEDIC SURGERY

## 2020-12-02 PROCEDURE — G8510 SCR DEP NEG, NO PLAN REQD: HCPCS | Performed by: ORTHOPAEDIC SURGERY

## 2020-12-02 PROCEDURE — G8427 DOCREV CUR MEDS BY ELIG CLIN: HCPCS | Performed by: ORTHOPAEDIC SURGERY

## 2020-12-02 PROCEDURE — G8536 NO DOC ELDER MAL SCRN: HCPCS | Performed by: ORTHOPAEDIC SURGERY

## 2020-12-02 PROCEDURE — 3017F COLORECTAL CA SCREEN DOC REV: CPT | Performed by: ORTHOPAEDIC SURGERY

## 2020-12-02 PROCEDURE — 99214 OFFICE O/P EST MOD 30 MIN: CPT | Performed by: ORTHOPAEDIC SURGERY

## 2020-12-02 PROCEDURE — G9899 SCRN MAM PERF RSLTS DOC: HCPCS | Performed by: ORTHOPAEDIC SURGERY

## 2020-12-02 PROCEDURE — 1101F PT FALLS ASSESS-DOCD LE1/YR: CPT | Performed by: ORTHOPAEDIC SURGERY

## 2020-12-02 NOTE — PROGRESS NOTES
Hemant Farooq is a 70 y.o. female right handed retiree. Worker's Compensation and legal considerations: not known. Vitals:    12/02/20 1424   BP: 128/69   Pulse: 82   Resp: 16   Temp: 97.8 °F (36.6 °C)   TempSrc: Temporal   SpO2: 95%   Weight: 91 lb (41.3 kg)   Height: 5' (1.524 m)   PainSc:   0 - No pain   PainLoc: Hand           Chief Complaint   Patient presents with    Hand Pain     radha hand pain       HPI: Patient comes in today for follow-up of bilateral upper extremity EMGs. She reports no numbness or tingling but difficulty moving her fingers especially at the PIP joints in all digits. Previous HPI: Patient comes in today with complaints of bilateral hand pain and numbness. She reports a history of neuropathy    Initial HPI: Patient comes in today with complaints of right thumb pain. She was referred from the spine center for which she is being treated for cervical spine issues and pain issues. She reports for a month or so she has had locking and pain in her right thumb. She denies any injury to the area. She was told she has a trigger thumb.     Date of onset:  1/2019    Injury: No    Prior Treatment:  No    Numbness/ Tingling: No    ROS: Review of Systems - General ROS: negative  Respiratory ROS: no cough, shortness of breath, or wheezing  Cardiovascular ROS: no chest pain or dyspnea on exertion  Musculoskeletal ROS: positive for - pain in thumb - right  Neurological ROS: negative  Dermatological ROS: negative    Past Medical History:   Diagnosis Date    Abnormal Pap smear     Dr. Bennett Courser    Allergic rhinitis     Arthritis     Cerebrovascular small vessel disease 3/18/2019    CT 3/17/2019    Cervical spinal cord compression (HCC)     Chronic pain     Concentric left ventricular hypertrophy 3/18/2019    With left ventricular diastolic dysfunction by echocardiogram 3/18/2019    Hypercholesterolemia     Neck pain 5/17/2010    Stroke ([facility] [address] 10/02/2017    TIA- legs weak memory issues Past Surgical History:   Procedure Laterality Date    COLONOSCOPY N/A 6/4/2018    COLONOSCOPY / polypectomy performed by Andrew [facility] at [facility] ENDOSCOPY    HX GYN      Total Hyst    HX LAP CHOLECYSTECTOMY      HX OTHER SURGICAL  2017    Throat widened       Current Outpatient Medications   Medication Sig Dispense Refill    busPIRone (BUSPAR) 7.5 mg tablet take 1 tablet by mouth twice a day 782 Tab 0    folic acid 000 mcg tablet Take 800 mcg by mouth daily.  ibuprofen (MOTRIN) 400 mg tablet Take 1 Tab by mouth every six (6) hours as needed for Pain. 8 Tab 0    cyanocobalamin (Vitamin B-12) 100 mcg tablet Take 100 mcg by mouth daily.  ondansetron (ZOFRAN ODT) 4 mg disintegrating tablet dissolve 1 tablet ON TONGUE every 6 hours if needed for nausea OR vomiting 90 Tab 0    gabapentin (NEURONTIN) 100 mg capsule Take 2 Caps by mouth three (3) times daily. Max Daily Amount: 600 mg. (Patient taking differently: Take 300 mg by mouth three (3) times daily. Takes 300mg TID) 180 Cap 1    denosumab (PROLIA) 60 mg/mL injection 60 mg by SubCUTAneous route every 6 months.  acetaminophen (TYLENOL) 160 mg/5 mL elixir Take 1,000 mg by mouth daily.  turmeric 400 mg cap Take 1 Cap by mouth daily.  aspirin 81 mg chewable tablet Take 1 Tab by mouth daily. 30 Tab 3    atorvastatin (LIPITOR) 20 mg tablet take 1 tablet by mouth once daily 90 Tab 0    topiramate (Topamax) 25 mg tablet Take 1 Tab by mouth two (2) times daily (with meals). 60 Tab 1    naloxone (Narcan) 4 mg/actuation nasal spray Use 1 spray intranasally, then discard. Repeat with new spray every 2 min as needed for opioid overdose symptoms, alternating nostrils. 1 Each 0    baclofen (LIORESAL) 10 mg tablet Take 1 Tab by mouth two (2) times a day. 60 Tab 2    ALPRAZolam (XANAX) 0.25 mg tablet Take 1 Tab by mouth two (2) times daily as needed for Anxiety.  Max Daily Amount: 0.5 mg. 60 Tab 0    omeprazole (PRILOSEC) 20 mg capsule take 1 capsule by mouth once daily before breakfast 90 Cap 1    midodrine (PROAMITINE) 5 mg tablet Take 5 mg by mouth two (2) times a day. 1 tab by mouth with breakfast and lunch      loratadine (CLARITIN) 10 mg tablet Take 1 Tab by mouth daily. 90 Tab 1       Allergies   Allergen Reactions    Baclofen Shortness of Breath     Denies. Pt tolerates    Morphine Other (comments)     hallucinations    Celebrex [Celecoxib] Other (comments)     Tiredness          PE:     Physical Exam  Nursing note reviewed. Exam conducted with a chaperone present. Constitutional:       General: She is not in acute distress. Appearance: Normal appearance. She is not ill-appearing. Eyes:      Extraocular Movements: Extraocular movements intact. Pupils: Pupils are equal, round, and reactive to light. Neck:      Musculoskeletal: Normal range of motion and neck supple. Cardiovascular:      Pulses: Normal pulses. Pulmonary:      Effort: Pulmonary effort is normal. No respiratory distress. Abdominal:      General: Abdomen is flat. There is no distension. Musculoskeletal: Normal range of motion. General: No swelling, tenderness, deformity or signs of injury. Right lower leg: No edema. Left lower leg: No edema. Skin:     General: Skin is warm and dry. Capillary Refill: Capillary refill takes less than 2 seconds. Findings: No bruising or erythema. Neurological:      General: No focal deficit present. Mental Status: She is alert and oriented to person, place, and time. Cranial Nerves: No cranial nerve deficit. Sensory: No sensory deficit. Psychiatric:         Mood and Affect: Mood normal.         Behavior: Behavior normal.       Bilateral hands: There is difficulty with range of motion of the PIP joints bilaterally. There is no locking or clicking in any of the joints. Range of motion is full but difficulty in the middle of motion.   Sensation is intact distally and cap refill is brisk. NEUROVASCULAR    Examination L R Examination L R   Carpal Comp. - - Pronator Comp. - -   Carpal Tinel - - Pronator Tinel - -   Phalen's - - Pronator Stress - -   Cubital Comp. - - Guyon Comp. - -   Cubital Tinel - - Guyon Tinel - -   Elbow Hyperflexion - - Adson's - -   Spurling's - - SC Comp. - -   PCB Median abn - - SC Tinel - -   Radial Tinel - - IC Comp. - -   Digital Tinel - - IC Tinel - -   Radial 2-Pt WNL WNL Ulnar 2-Pt WNL WNL     Radial Pulse: 2+  Capillary Refill: < 2 sec  Lj: Not Performed  [employer]: Not Performed    NCV & EMG Findings:  All nerve conduction studies (as indicated in the following tables) were within normal limits. All left vs. right side differences were within normal limits.       All examined muscles (as indicated in the following table) showed no evidence of electrical instability.       INTERPRETATION  This was a normal nerve conduction and EMG study showing there to be no signs of neuropathy, myopathy, or radiculopathy in the nerves and muscles tested.      CLINICAL INTERPRETATION  Her electrodiagnostic findings do not appear to explain her hand symptoms. Imagin/3/2020 plain films of bilateral hands are positive for diffuse degenerative changes in most of the small joints specifically the PIP and DIP joints of the hands. ICD-10-CM ICD-9-CM    1. Localized osteoarthritis of right hand  M19.041 715.34 REFERRAL TO OCCUPATIONAL THERAPY   2. Primary osteoarthritis of left hand  M19.042 715.14 REFERRAL TO OCCUPATIONAL THERAPY   3. Left hand pain  M79.642 729.5 AMB POC XRAY, HAND; 3+ VIEWS   4. Bilateral hand pain  M79.641 729.5 AMB POC XRAY, HAND; 3+ VIEWS    M79.642         Plan:     OT for range of motion exercises and other modalities. Follow-up and Dispositions    · Return if symptoms worsen or fail to improve.        Plan was reviewed with patient, who verbalized agreement and understanding of the plan

## 2020-12-04 ENCOUNTER — HOSPITAL ENCOUNTER (OUTPATIENT)
Dept: PHYSICAL THERAPY | Age: 75
Discharge: HOME | End: 2020-12-04
Payer: MEDICARE

## 2020-12-04 PROCEDURE — 97112 NEUROMUSCULAR REEDUCATION: CPT

## 2020-12-04 PROCEDURE — 97110 THERAPEUTIC EXERCISES: CPT

## 2020-12-04 PROCEDURE — 97530 THERAPEUTIC ACTIVITIES: CPT

## 2020-12-04 NOTE — PROGRESS NOTES
PT DAILY TREATMENT NOTE     Patient Name: Mirna Vick  Date:2020  : 1945  [x]  Patient  Verified  Payor: [place] MEDICARE / Plan: VA MEDICARE PART A & B / Product Type: Medicare /    In time:130  Out time:212  Total Treatment Time (min): 42  Visit #: 1 of 8    Medicare/BCBS Only   Total Timed Codes (min):  42 1:1 Treatment Time:  42       Treatment Area: Bilateral leg pain [M79.604, M79.605]  Back pain [M54.9]  Bilateral knee pain [M25.561, M25.562]    SUBJECTIVE  Pain Level (0-10 scale): 0  Any medication changes, allergies to medications, adverse drug reactions, diagnosis change, or new procedure performed?: [x] No    [] Yes (see summary sheet for update)  Subjective functional status/changes:   [] No changes reported  \"No pain. \"    OBJECTIVE    Modality rationale: patient declined   Min Type Additional Details    [] Estim:  []Unatt       []IFC  []Premod                        []Other:  []w/ice   []w/heat  Position:  Location:    [] Estim: []Att    []TENS instruct  []NMES                    []Other:  []w/US   []w/ice   []w/heat  Position:  Location:    []  Traction: [] Cervical       []Lumbar                       [] Prone          []Supine                       []Intermittent   []Continuous Lbs:  [] before manual  [] after manual    []  Ultrasound: []Continuous   [] Pulsed                           []1MHz   []3MHz W/cm2:  Location:    []  Iontophoresis with dexamethasone         Location: [] Take home patch   [] In clinic    []  Ice     []  heat  []  Ice massage  []  Laser   []  Anodyne Position:  Location:    []  Laser with stim  []  Other:  Position:  Location:    []  Vasopneumatic Device Pressure:       [] lo [] med [] hi   Temperature: [] lo [] med [] hi   [] Skin assessment post-treatment:  []intact []redness- no adverse reaction    []redness - adverse reaction:     10 min Therapeutic Exercise:  [x] See flow sheet :   Rationale: increase ROM and increase strength to improve the patients ability to perform ADLs    10 min Therapeutic Activity:  [x]  See flow sheet : stair training, sit to stands   Rationale: increase ROM, increase strength, improve coordination, improve balance and increase proprioception  to improve the patients ability to improve mobility and transfers     22 min Neuromuscular Re-education:  [x]  See flow sheet  : balance training   Rationale: increase ROM, increase strength, improve coordination, improve balance and increase proprioception  to improve the patients ability to improve mobility and decrease fall risk        With   [x] TE   [x] TA   [x] neuro   [] other: Patient Education: [x] Review HEP    [] Progressed/Changed HEP based on:   [x] positioning   [x] body mechanics   [] transfers   [] heat/ice application    [] other:      Other Objective/Functional Measures:      Pain Level (0-10 scale) post treatment: 0    ASSESSMENT/Changes in Function: Attempted stair negotiation in the clinic today which pt did well. She does better with ascension vs descending stairs due to fear of falling. She was able to complete with reciprocal pattern with B UE support. Her static balance continues to improve, but was fatigued with ankle weights during strengthening exercises in the parallel bars. Patient will continue to benefit from skilled PT services to modify and progress therapeutic interventions, address functional mobility deficits, address ROM deficits, address strength deficits, analyze and address soft tissue restrictions, analyze and cue movement patterns, analyze and modify body mechanics/ergonomics, assess and modify postural abnormalities, address imbalance/dizziness and instruct in home and community integration to attain remaining goals. [x]  See Plan of Care  []  See progress note/recertification  []  See Discharge Summary         Progress towards goals / Updated goals:  1.  Pt will increase score on Tinetti Balance & Gait Assessment to > 19/28 to demonstrate decreased fall risk.              recert status: 63/29  2. Pt will increase FOTO score to 46 points to demonstrate increased ease with ADLs.                recert SJNVVZ:39  3. Pt will increase B hip flexion/abduction/knee extension to 5/5 with MMT to improve ease with gait & safety with ambulation.               recert status[de-identified]   right hip flexion: 3+/5  right hip abduction: 3+5  Right knee extension: 5/5  left hip flexion: 4+/5              left hip abduction: 3+/5              Left knee extension: 4/5  4.  Patient will reduce time on TUG test to < 15\" using AD to demonstrate decreased fall risk.               recert FVBQPL:82\" with rollator    PLAN  []  Upgrade activities as tolerated     [x]  Continue plan of care  []  Update interventions per flow sheet       []  Discharge due to:_  []  Other:_      Taye Weir, PTA, CSCS 12/4/2020  2:31 PM    Future Appointments   Date Time Provider Gaby Delarosa   12/4/2020  1:30 PM Cecy Becerra PTA [facility]   12/7/2020  1:30 PM Jos Ball, PT [facility]   12/9/2020  1:30 PM Cecy Becerra, PTA [facility]   12/14/2020  1:30 PM Cecy Becerra, PTA [facility]   12/17/2020  1:30 PM Cecy Becerra, PTA [facility]   12/21/2020  1:30 PM Jos Ball, PT [facility]   12/23/2020  1:30 PM Jos Ball, PT [facility]   12/28/2020  1:30 PM Jos Ball, PT [facility]   12/30/2020  1:30 PM Jos Ball, PT [facility]   4/16/2021  1:00 PM [facility]OTOM[facility]   4/19/2021  1:00 PM [facility] FAST TRACK NURSE [facility]

## 2020-12-07 ENCOUNTER — HOSPITAL ENCOUNTER (OUTPATIENT)
Dept: PHYSICAL THERAPY | Age: 75
Discharge: HOME | End: 2020-12-07
Payer: MEDICARE

## 2020-12-07 PROCEDURE — 97112 NEUROMUSCULAR REEDUCATION: CPT

## 2020-12-07 PROCEDURE — 97110 THERAPEUTIC EXERCISES: CPT

## 2020-12-07 PROCEDURE — 97530 THERAPEUTIC ACTIVITIES: CPT

## 2020-12-07 NOTE — PROGRESS NOTES
PT DAILY TREATMENT NOTE     Patient Name: Celia Hassan  Date:2020  : 1945  [x]  Patient  Verified  Payor: VA MEDICARE / Plan: VA MEDICARE PART A & B / Product Type: Medicare /    In time:130  Out time:215  Total Treatment Time (min): 45  Visit #: 2 of 8    Medicare/BCBS Only   Total Timed Codes (min):  45 1:1 Treatment Time:  45       Treatment Area: Bilateral leg pain [M79.604, M79.605]  Back pain [M54.9]  Bilateral knee pain [M25.561, M25.562]    SUBJECTIVE  Pain Level (0-10 scale): 0  Any medication changes, allergies to medications, adverse drug reactions, diagnosis change, or new procedure performed?: [x] No    [] Yes (see summary sheet for update)  Subjective functional status/changes:   [] No changes reported  \"my back and legs dont hurt, but my neck hurts. \"    OBJECTIVE    Modality rationale: patient declined   Min Type Additional Details      []? Estim:  []? Unatt       []? IFC  []? Premod                        []?Other:  []?w/ice   []?w/heat  Position:  Location:      []? Estim: []? Att    []? TENS instruct  []? NMES                    []?Other:  []?w/US   []?w/ice   []?w/heat  Position:  Location:      []? Traction: []? Cervical       []? Lumbar                       []? Prone          []? Supine                       []?Intermittent   []? Continuous Lbs:  []? before manual  []? after manual      []? Ultrasound: []? Continuous   []? Pulsed                           []? 1MHz   []? 3MHz W/cm2:  Location:      []? Iontophoresis with dexamethasone         Location: []? Take home patch   []? In clinic      []? Ice     []?  heat  []? Ice massage  []? Laser   []? Anodyne Position:  Location:      []? Laser with stim  []? Other:  Position:  Location:      []? Vasopneumatic Device Pressure:       []? lo []? med []? hi   Temperature: []? lo []? med []? hi    []? Skin assessment post-treatment:  []?intact []? redness- no adverse reaction    []? redness - adverse reaction:      10 min Therapeutic Exercise:  [x]? See flow sheet :   Rationale: increase ROM and increase strength to improve the patients ability to perform ADLs     10 min Therapeutic Activity:  [x]? See flow sheet : stair training, sit to stands   Rationale: increase ROM, increase strength, improve coordination, improve balance and increase proprioception  to improve the patients ability to improve mobility and transfers     25 min Neuromuscular Re-education:  [x]? See flow sheet  : balance training   Rationale: increase ROM, increase strength, improve coordination, improve balance and increase proprioception  to improve the patients ability to improve mobility and decrease fall risk            With   [] TE   [] TA   [] neuro   [] other: Patient Education: [x] Review HEP    [] Progressed/Changed HEP based on:   [] positioning   [] body mechanics   [] transfers   [] heat/ice application    [] other:      Other Objective/Functional Measures:      Pain Level (0-10 scale) post treatment: 0    ASSESSMENT/Changes in Function: did well with progression of stairs, reaching, and dynamic gait activities in parallel bars. Advised she is ok to try stairs at home with her partner behind her and with gait belt. Plan to progress more standing/walking activities and have her continue with bed exercises at home to work on strength. Patient will continue to benefit from skilled PT services to modify and progress therapeutic interventions, address functional mobility deficits, address ROM deficits, address strength deficits, analyze and address soft tissue restrictions, analyze and cue movement patterns, analyze and modify body mechanics/ergonomics, assess and modify postural abnormalities, address imbalance/dizziness and instruct in home and community integration to attain remaining goals. []  See Plan of Care  []  See progress note/recertification  []  See Discharge Summary         Progress towards goals / Updated goals:  1.  Pt will increase score on Tinetti Balance & Gait Assessment to > 19/28 to demonstrate decreased fall risk.              recert status: 81/31  2. Pt will increase FOTO score to 46 points to demonstrate increased ease with ADLs.                recert UQDUGF:86  3. Pt will increase B hip flexion/abduction/knee extension to 5/5 with MMT to improve ease with gait & safety with ambulation.               recert status[de-identified]   right hip flexion: 3+/5  right hip abduction: 3+5  Right knee extension: 5/5  left hip flexion: 4+/5              left hip abduction: 3+/5              Left knee extension: 4/5  4.  Patient will reduce time on TUG test to < 15\" using AD to demonstrate decreased fall risk.               recert YJOEJO:03\" with rollator       PLAN  []  Upgrade activities as tolerated     [x]  Continue plan of care  []  Update interventions per flow sheet       []  Discharge due to:_  []  Other:_      Bassam Isatu, PT 12/7/2020  1:40 PM    Future Appointments   Date Time Provider Gaby Delarosa   12/9/2020  1:30 PM Janett Recinos PTA [facility]   12/14/2020  1:30 PM Janett Recinos P[facility]   12/17/2020  1:30 PM Janett Recinos PTA [facility]   12/21/2020  1:30 PM Marlo Singh, PT [facility]   12/23/2020  1:30 PM Marlogracie Singh, PT [facility]   12/28/2020  1:30 PM Washingtongracie Singh, PT [facility]   12/30/2020  1:30 PM [facility] Singh, [facility]   4/16/2021  1:00 PM [facility]   4/19/2021  1:00 PM [facility] FA[facility] TRACK NUR[facility]

## 2020-12-09 ENCOUNTER — HOSPITAL ENCOUNTER (OUTPATIENT)
Dept: PHYSICAL THERAPY | Age: 75
Discharge: HOME | End: 2020-12-09
Payer: MEDICARE

## 2020-12-09 PROCEDURE — 97110 THERAPEUTIC EXERCISES: CPT

## 2020-12-09 PROCEDURE — 97530 THERAPEUTIC ACTIVITIES: CPT

## 2020-12-09 PROCEDURE — 97112 NEUROMUSCULAR REEDUCATION: CPT

## 2020-12-09 NOTE — PROGRESS NOTES
PT DAILY TREATMENT NOTE     Patient Name: Ehsan Samuel  Date:2020  : 1945  [x]  Patient  Verified  Payor: VA MEDICARE / Plan: VA MEDICARE PART A & B / Product Type: Medicare /    In time:130  Out time:213  Total Treatment Time (min): 43  Visit #: 3 of 8    Medicare/BCBS Only   Total Timed Codes (min):  43 1:1 Treatment Time:  43       Treatment Area: Bilateral leg pain [M79.604, M79.605]  Back pain [M54.9]  Bilateral knee pain [M25.561, M25.562]    SUBJECTIVE  Pain Level (0-10 scale): 0  Any medication changes, allergies to medications, adverse drug reactions, diagnosis change, or new procedure performed?: [x] No    [] Yes (see summary sheet for update)  Subjective functional status/changes:   [] No changes reported  \"No pain. \"    OBJECTIVE    Modality rationale: patient declined   Min Type Additional Details    [] Estim:  []Unatt       []IFC  []Premod                        []Other:  []w/ice   []w/heat  Position:  Location:    [] Estim: []Att    []TENS instruct  []NMES                    []Other:  []w/US   []w/ice   []w/heat  Position:  Location:    []  Traction: [] Cervical       []Lumbar                       [] Prone          []Supine                       []Intermittent   []Continuous Lbs:  [] before manual  [] after manual    []  Ultrasound: []Continuous   [] Pulsed                           []1MHz   []3MHz W/cm2:  Location:    []  Iontophoresis with dexamethasone         Location: [] Take home patch   [] In clinic    []  Ice     []  heat  []  Ice massage  []  Laser   []  Anodyne Position:  Location:    []  Laser with stim  []  Other:  Position:  Location:    []  Vasopneumatic Device Pressure:       [] lo [] med [] hi   Temperature: [] lo [] med [] hi   [] Skin assessment post-treatment:  []intact []redness- no adverse reaction    []redness - adverse reaction:     10 min Therapeutic Exercise:  [x] See flow sheet :   Rationale: increase ROM and increase strength to improve the patients ability to perform ADLs    13 min Therapeutic Activity:  [x]  See flow sheet : functional reaching activities, sit to stands, squatting mechanics   Rationale: increase ROM, increase strength, improve coordination, improve balance and increase proprioception  to improve the patients ability to improve mobility and ADL performance      20 min Neuromuscular Re-education:  [x]  See flow sheet : balance activities   Rationale: increase ROM, increase strength, improve coordination, improve balance and increase proprioception  to improve the patients ability to improve mobility and decrease fall risk        With   [x] TE   [x] TA   [x] neuro   [] other: Patient Education: [x] Review HEP    [] Progressed/Changed HEP based on:   [x] positioning   [x] body mechanics   [] transfers   [] heat/ice application    [] other:      Other Objective/Functional Measures:      Pain Level (0-10 scale) post treatment: 0    ASSESSMENT/Changes in Function: Pt was challenged with progressing static and dynamic balance activities at the counter using stable and unstable surfaces. She got winded while reaching outside of [employer]. Improved sit to stand fluidity. Patient will continue to benefit from skilled PT services to modify and progress therapeutic interventions, address functional mobility deficits, address ROM deficits, address strength deficits, analyze and address soft tissue restrictions, analyze and cue movement patterns, analyze and modify body mechanics/ergonomics, assess and modify postural abnormalities, address imbalance/dizziness and instruct in home and community integration to attain remaining goals. [x]  See Plan of Care  []  See progress note/recertification  []  See Discharge Summary         Progress towards goals / Updated goals:  1. Pt will increase score on Tinetti Balance & Gait Assessment to > 19/28 to demonstrate decreased fall risk.              recert status: 81/64  2.  Pt will increase FOTO score to 46 points to demonstrate increased ease with ADLs.                recert ZLBWWU:59  3. Pt will increase B hip flexion/abduction/knee extension to 5/5 with MMT to improve ease with gait & safety with ambulation.               recert status[de-identified]   right hip flexion: 3+/5  right hip abduction: 3+5  Right knee extension: 5/5  left hip flexion: 4+/5              left hip abduction: 3+/5              Left knee extension: 4/5  4.  Patient will reduce time on TUG test to < 15\" using AD to demonstrate decreased fall risk.               recert XJPBOV:36\" with rollator     PLAN  []  Upgrade activities as tolerated     [x]  Continue plan of care  []  Update interventions per flow sheet       []  Discharge due to:_  []  Other:_      Shelton Navas PTA, CSCS 12/9/2020  2:17 PM    Future Appointments   Date Time Provider Gaby Delarosa   12/14/2020  1:30 PM Reesa Credit, PTA [facility]   12/15/2020  1:45 PM David Arias OT [facility]   12/17/2020  1:30 PM Reesa Credit, PTA [facility]   12/21/2020  1:30 PM Elwyn Eglin, PT [facility]   12/23/2020  1:30 PM [facility]glin, PT [facility]   12/28/2020  1:30 PM Elwyn Eglin, PT [facility]   12/30/2020  1:30 PM Elwyn Eglin, PT [facility]   4/16/2021  1:00 PM [facility]   4/19/2021  1:00 PM [facility] FA[facility] TRACK NURSE [facility]

## 2020-12-14 ENCOUNTER — APPOINTMENT (OUTPATIENT)
Dept: PHYSICAL THERAPY | Age: 75
End: 2020-12-14
Payer: MEDICARE

## 2020-12-15 ENCOUNTER — HOSPITAL ENCOUNTER (OUTPATIENT)
Dept: PHYSICAL THERAPY | Age: 75
Discharge: HOME | End: 2020-12-15
Payer: MEDICARE

## 2020-12-15 PROCEDURE — 97166 OT EVAL MOD COMPLEX 45 MIN: CPT

## 2020-12-15 PROCEDURE — 97535 SELF CARE MNGMENT TRAINING: CPT

## 2020-12-15 PROCEDURE — 97110 THERAPEUTIC EXERCISES: CPT

## 2020-12-15 NOTE — PROGRESS NOTES
[facility]  [address]  [place], [address]  [phone] fax    Plan of Care/Statement of Necessity for Occupational Therapy Services    Patient name: Jeannine Keys Start of Care: 12/15/2020   Referral source: Andrés Merritt DO : 1945    Medical Diagnosis: Right hand pain [M79.641]  Left hand pain [M79.642]  Payor: VA MEDICARE / Plan: VA MEDICARE PART A & B / Product Type: Medicare /  Onset Date:2019    Treatment Diagnosis: bilateral hand pain   Prior Hospitalization: see medical history Provider#: [id number]   Medications: Verified on Patient summary List    Comorbidities: H/o falls, h/o stroke, anxiety, arthritis, incontinence,osteoporosis, DDD    Prior Level of Function: Min A with ADL/IADL tasks without pain limiting function          The Plan of Care and following information is based on the information from the initial evaluation. Assessment/ key information: Patient is a right hand dominant 70 y.o. female with a chief complaint  of bilateral hand pain and paresthesias beginning 2019 after undergoing cervical fusion which caused complication with recovery. 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 Pt reports receiving skilled OT services immediately after surgery which helped. Pt reports constant paresthesias throughout body. Pt presented to skilled OT describing pain level as 4/10 constant cramping. She frequently is bending and extending the fingers stating she is trying to get relief from pain. She reports constant numbness and tingling of the hands and frequently losing . EMG studies were normal showing no signs of neuropathy, myopathy, or radiculopathy of the right hand left UEs. Imaging revealed degenerative changes in the PIP and DIP joints of bilateral hands. Her hand and wrist ROM are WFL.  There is significant weakness in bilateral hand  strength, and FM deficiency noted with 9 Hole Peg Test.  She was able to appropriately identify 5/5 objects during stereognosis testing bilaterally. Patient received an initial evaluation today followed by education as to diagnosis, precautions and treatment plan. Patient was provided with a basic home exercise program including hand coordination activities and intrinsic stretches. She will benefit from a course of skilled OT to educate on joint protection strategies and address deficits to improve quality of life. Evaluation Complexity: History LOW Complexity : Brief history review  Examination MEDIUM Complexity : 3-5 performance deficits relating to physical, cognitive , or psychosocial skils that result in activity limitations and / or participation restrictions Clinical Decision Making MEDIUM Complexity : Patient may present with comorbidities that affect occupational performnce. Miniml to moderate modification of tasks or assistance (eg, physical or verbal ) with assesment(s) is necessary to enable patient to complete evaluation   Overall Complexity Rating: MEDIUM    Problem List: Pain effecting function, Decreased range of motion, Decreased strength, Decreased coordination/prehension, Decreased ADL/functional abilities , Decreased activity tolerance, Decreased flexibility/joint mobility and Sensability     Treatment Plan may include any combination of the following: Therapeutic exercise, Therapeutic activities, Neuromuscular re-education, Physical agent/modality, Manual therapy, Splinting/orthoses, Patient education and ADLs/IADLs  Patient / Family readiness to learn indicated by: asking questions, trying to perform skills and interest  Persons(s) to be included in education:   patient (P)  Barriers to Learning/Limitations: None  Patient Goal (s): relief  Patient Self Reported Health Status: fair  Rehabilitation Potential: good  Short Term Goals: To be accomplished in 2  weeks:  1.  Patient will be compliant with initial home exercise program to take an active role in their rehabilitation process. Status at Eval:  Patient was provided with a basic home exercise program including intrinsic stretches and hand coordination activities     2. Patient will demonstrate a good understanding of their condition and strategies for self-management. Status at Eval: Patient received an initial evaluation today followed by education as to diagnosis, precautions and treatment plan.       Long Term Goals: To be accomplished in 6 weeks:              1. Patient will be able to state 4 joint protection techniques to reduce pain in bilateral hands. Status at Eval: not yet discussed     2. Patient will report a decrease in pain in bilateral hands after 30 minutes of constant activities. Status at Eval: NT     3. Patient will improve functional  strength in bilateral hands to 15# to increase ability to open containers, drinks and sealed bags. Status at Eval: right hand 8#, left hand 5#     4. Patient will improve 9 Hole Peg Test speed by 10% in the right hand in order to demonstrate improved dexterity. Status at eval: right hand 30 seconds     Frequency / Duration: Patient to be seen 1-2 times per week for 4 weeks:     Patient/ Caregiver education and instruction: Diagnosis, prognosis, self care, activity modification and exercises  [x]  Plan of care has been reviewed with BURK[facility]    Certification Period: 12/15/2020 - 1/13/2021    Kae Chen OT 12/15/2020 1:25 PM    ________________________________________________________________________    I certify that the above Therapy Services are being furnished while the patient is under my care. I agree with the treatment plan and certify that this therapy is necessary.     [de-identified] Signature:____________Date:_________TIME:________    [employer], Date and Time must be completed for valid certification **    Please sign and return to [facility] [address] [place] [address] [address]  [phone] fax

## 2020-12-15 NOTE — PROGRESS NOTES
Hand Therapy Evaluation and Daily Note    Patient Name: Silvano Castelan  Date:12/15/2020  : 1945  Age: 70 y.o.y/o  [x]  Patient  Verified  Payor: [place] MEDICARE / Plan: [place] MEDICARE PART A & B / Product Type: Medicare /    Referring Provider: Bohdan Meckel, DO Next MD Visit:  None scheduled  Onset Date:  2019  Surgical Date: nA  Surgical Procedure: NA    In time:1:40 PM  Out time:2:35 PM  Total Treatment Time (min): 55  Total Timed Codes (min): 30  1:1 Treatment Time ([facility] only): 55   Visit #: 1 of 8    Treatment Area: Right hand pain [M79.641]  Left hand pain [M79.642]    Precautions:    Hand Dominance: right handed   Hand Involved: bilateral hands    Total Evaluation Time:  25    History of Present Condition:  Patient is a right hand dominant 70 y.o. female with a chief complaint  of bilateral hand pain and paresthesias beginning 2019 after undergoing cervical fusion which caused complication with recovery. 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 Pt reports receiving skilled OT services immediately after surgery which helped. Pt reports constant paresthesias throughout body. Pain Rating:   Current: (0-no pain 10-debilitating pain) moderate 4/10   At best: (0-no pain 10-debilitating pain) moderate 4/10  At worst: (0-no pain 10-debilitating pain) moderate 4/10  Location: bilateral hand  Type:  moderate , constant  Better with: sleep, gabapentin, tylenol   Worse with: cold    Medications/Allergies/Past Medical History:  See chart; reviewed with patient.  H/o falls, h/o stroke, anxiety, arthritis, incontinence,osteoporosis, DDD      Diagnostic Tests: NCV & EMG Findings:  All nerve conduction studies (as indicated in the following tables) were within normal limits.  All left vs. right side differences were within normal limits.       All examined muscles (as indicated in the following table) showed no evidence of electrical instability.       INTERPRETATION  This was a normal nerve conduction and EMG study showing there to be no signs of neuropathy, myopathy, or radiculopathy in the nerves and muscles tested.      CLINICAL INTERPRETATION  Her electrodiagnostic findings do not appear to explain her hand symptoms.      Imagin/3/2020 plain films of bilateral hands are positive for diffuse degenerative changes in most of the small joints specifically the PIP and DIP joints of the hands. Prior Level of Function: Min A with ADL/IADL tasks without pain limiting function    Current Level of Function:  Limitations in function due to constant pain in hands.      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, listing    Observation: hand ROM WFL    Palpation:  Diffuse pain throughout hand reported    Range of Motion:   Hands WFL    Strength:   Measurements: Taken with Rob Dynamometer, in Lbs   Level 2 12/15/2020    Date Date Date Date Date Date   Right 8         Left 5         Deficit          Change                Pinch Measurements: Taken with Pinch Gauge, in Lbs   (hand) 12/15/2020    Date Date Date Date Date   Lateral          Right 7        Left  9        Deficit         Change         Pad         Right 5        Left 3        Deficit         Change         Jared         Right 5        Left 3        Deficit         Change             Sensation:    Paresthesias of UE, especially in small fingers bilaterally    Edema: none    Special Tests:   Nine-Hole Peg Test:  Left= __35___seconds  Right=___30__seconds  Stereognosis: Right 5/5 objects, Left 5/5 objects identified    ADLs  Feeding:        []MaxA   []ModA   []Omari   [] CGA   []SBA   [x]Juan Daniel   []Independent  UE Dressing:       []MaxA   []ModA   []Omari   [] CGA   []SBA   [x]Juan Daniel   []Independent  LE Dressing:       []MaxA   []ModA   []Omari   [] CGA   []SBA   [x]Juan Daniel   []Independent  Grooming: []MaxA   []ModA   []Omari   [] CGA   []SBA   [x]Juan Daniel   []Independent  Toileting:       []MaxA   []ModA   []Omari   [] CGA   []SBA   [x]Juan Daniel   []Independent  Bathing:       []MaxA   []ModA   []Omari   [] CGA   []SBA   [x]Juan Daniel   []Independent  Light Meal Prep:    []MaxA   [x]ModA   []Omari   [] CGA   []SBA   []Juan Daniel   []Independent  Household/Other: []MaxA   [x]ModA   []Omari   [] CGA   []SBA   []Juan Daniel   []Independent  Adaptive Equip:     []MaxA   []ModA   []Omari   [] CGA   []SBA   []Juan Daniel   []Independent  Driving:       [x]MaxA   []ModA   []Omari   [] CGA   []SBA   []Juan Daniel   []Independent      Todays Treatment:  Patient received an initial evaluation today followed by education as to diagnosis, precautions and treatment plan. Patient was provided with a basic home exercise program including hand coordination activities and intrinsic stretches. OBJECTIVE  15 min Therapeutic Exercise:  [x] See flow sheet :   Rationale: increase ROM to improve the patients ability to grasp    15 min Self Care/Home Management: activity modifications, treatment plan   Rationale: education  to improve the patients ability to improve functional use of Reed UEs. With   [] TE   [] TA   [] neuro   [] other: Patient Education: [x] Review HEP    [] Progressed/Changed HEP based on:   [] positioning   [] body mechanics   [] transfers   [] heat/ice application   [] Splint wear/care   [] Sensory re-education   [] scar management      [] other:      Pain Level (0-10 scale) post treatment: 4/10    Patient will continue to benefit from skilled OT services to modify and progress therapeutic interventions, address strength deficits, analyze and address soft tissue restrictions and instruct in home and community integration to attain goals. Assessment: Pt presented to skilled OT describing pain level as 4/10 constant cramping. She frequently is bending and extending the fingers stating she is trying to get relief from pain.   She reports constant numbness and tingling of the hands and frequently losing . EMG studies were normal showing no signs of neuropathy, myopathy, or radiculopathy of the right hand left UEs. Imaging revealed degenerative changes in the PIP and DIP joints of bilateral hands. Her hand and wrist ROM are WFL. There is significant weakness in bilateral hand  strength, and FM deficiency noted with 9 Hole Peg Test.  She was able to appropriately identify 5/5 objects during stereognosis testing bilaterally. Short Term Goals: To be accomplished in 2  weeks:  1. Patient will be compliant with initial home exercise program to take an active role in their rehabilitation process. Status at Eval:  Patient was provided with a basic home exercise program including intrinsic stretches and hand coordination activities    2. Patient will demonstrate a good understanding of their condition and strategies for self-management. Status at Eval: Patient received an initial evaluation today followed by education as to diagnosis, precautions and treatment plan. Long Term Goals: To be accomplished in 6 weeks:   1. Patient will be able to state 4 joint protection techniques to reduce pain in bilateral hands. Status at Eval: not yet discussed    2. Patient will report a decrease in pain in bilateral hands after 30 minutes of constant activities. Status at Eval: NT    3. Patient will improve functional  strength in bilateral hands to 15# to increase ability to open containers, drinks and sealed bags. Status at Eval: right hand 8#, left hand 5#    4. Patient will improve 9 Hole Peg Test speed by 10% in the right hand in order to demonstrate improved dexterity.   Status at eval: right hand 30 seconds    Frequency / Duration: Patient to be seen 1-2 times per week for 4 weeks:    Patient/ Caregiver education and instruction: Diagnosis, prognosis, self care, activity modification and exercises      Certification Period: 12/15/2020 - 1/13/2021    Kim Bonilla, OT, 12/15/2020 1:22 PM

## 2020-12-17 ENCOUNTER — HOSPITAL ENCOUNTER (OUTPATIENT)
Dept: PHYSICAL THERAPY | Age: 75
Discharge: HOME | End: 2020-12-17
Payer: MEDICARE

## 2020-12-17 PROCEDURE — 97530 THERAPEUTIC ACTIVITIES: CPT

## 2020-12-17 PROCEDURE — 97112 NEUROMUSCULAR REEDUCATION: CPT

## 2020-12-17 NOTE — PROGRESS NOTES
PT DAILY TREATMENT NOTE     Patient Name: Neptali Dodd  Date:2020  : 1945  [x]  Patient  Verified  Payor: [place] MEDICARE / Plan: [place] MEDICARE PART A & B / Product Type: Medicare /    In time:130  Out time:215  Total Treatment Time (min): 45  Visit #: 4 of 8    Medicare/BCBS Only   Total Timed Codes (min):  45 1:1 Treatment Time:  45       Treatment Area: Bilateral leg pain [M79.604, M79.605]  Back pain [M54.9]  Bilateral knee pain [M25.561, M25.562]    SUBJECTIVE  Pain Level (0-10 scale): 0  Any medication changes, allergies to medications, adverse drug reactions, diagnosis change, or new procedure performed?: [x] No    [] Yes (see summary sheet for update)  Subjective functional status/changes:   [] No changes reported  \"No pain. I have another UTI. My blood pressure this morning was 70/64 mmHg. \"    OBJECTIVE    Modality rationale: patient declined   Min Type Additional Details    [] Estim:  []Unatt       []IFC  []Premod                        []Other:  []w/ice   []w/heat  Position:  Location:    [] Estim: []Att    []TENS instruct  []NMES                    []Other:  []w/US   []w/ice   []w/heat  Position:  Location:    []  Traction: [] Cervical       []Lumbar                       [] Prone          []Supine                       []Intermittent   []Continuous Lbs:  [] before manual  [] after manual    []  Ultrasound: []Continuous   [] Pulsed                           []1MHz   []3MHz W/cm2:  Location:    []  Iontophoresis with dexamethasone         Location: [] Take home patch   [] In clinic    []  Ice     []  heat  []  Ice massage  []  Laser   []  Anodyne Position:  Location:    []  Laser with stim  []  Other:  Position:  Location:    []  Vasopneumatic Device Pressure:       [] lo [] med [] hi   Temperature: [] lo [] med [] hi   [] Skin assessment post-treatment:  []intact []redness- no adverse reaction    []redness - adverse reaction:     30 min Therapeutic Activity:  [x]  See flow sheet : sit to stands, functional standing exercises   Rationale: increase ROM, increase strength, improve coordination, improve balance and increase proprioception  to improve the patients ability to improve mobility and ADL performance     15 min Neuromuscular Re-education:  [x]  See flow sheet : balance training   Rationale: increase ROM, increase strength, improve coordination, improve balance and increase proprioception  to improve the patients ability to improve mobility and decrease fall risk         With   [x] TE   [x] TA   [x] neuro   [] other: Patient Education: [x] Review HEP    [] Progressed/Changed HEP based on:   [x] positioning   [x] body mechanics   [] transfers   [] heat/ice application    [] other:      Other Objective/Functional Measures:   BP: 90/60 mmHg  HR: 91 bpm  SpO2: 98%     BP during exercise: 88/56 mmHg (First reading), 76/50 mmHg (Second reading)    Pain Level (0-10 scale) post treatment: 0    ASSESSMENT/Changes in Function: Pt arrived stating that her BP was lower than normal this morning, but came back up after taking her medication. She also states that she was recently diagnosed with another UTI. Therapist discussed at length with pt about calling PCP to check on recent BP readings at home and in the clinic. BP after coming into the clinic was 90/60 mmHg, but after doing a few standing exercises BP dropped to 76/50 mmHg so stopped remaining treatment. Pt was more short of breath and quicker to fatigue today than normal. Pt insisted on continuing therapy today despite vital readings. Therapist thought it was best to hold on remainder of treatment for today. Therapist offered to call for emergency services for patient which she declined. Patient's significant other was informed of treatment today and verbalized with pt permission to call PCP when she got home. Will follow up with pt later today to check status.     Patient will continue to benefit from skilled PT services to modify and progress therapeutic interventions, address functional mobility deficits, address ROM deficits, address strength deficits, analyze and address soft tissue restrictions, analyze and cue movement patterns, analyze and modify body mechanics/ergonomics, assess and modify postural abnormalities, address imbalance/dizziness and instruct in home and community integration to attain remaining goals. [x]  See Plan of Care  []  See progress note/recertification  []  See Discharge Summary         Progress towards goals / Updated goals:  1. Pt will increase score on Tinetti Balance & Gait Assessment to > 19/28 to demonstrate decreased fall risk.              recert status: 68/73  2. Pt will increase FOTO score to 46 points to demonstrate increased ease with ADLs.                recert JRCERL:29  3. Pt will increase B hip flexion/abduction/knee extension to 5/5 with MMT to improve ease with gait & safety with ambulation.               recert status[de-identified]   right hip flexion: 3+/5  right hip abduction: 3+5  Right knee extension: 5/5  left hip flexion: 4+/5              left hip abduction: 3+/5              Left knee extension: 4/5  4.  Patient will reduce time on TUG test to < 15\" using AD to demonstrate decreased fall risk.               recert OPWKVB:32\" with rollator    PLAN  []  Upgrade activities as tolerated     [x]  Continue plan of care  []  Update interventions per flow sheet       []  Discharge due to:_  []  Other:_      Filemon Kaplan PTA, CSCS 12/17/2020  2:31 PM    Future Appointments   Date Time Provider Gaby Arcei   12/18/2020  2:00 PM Donaldo Hinson, OTR/L [facility]   12/21/2020  1:30 PM Gisela Cam, PT [facility]   12/22/2020  1:45 PM Jeanie Haynes, OT [facility]   12/23/2020  1:30 PM Gisela Cam, PT [facility]   12/28/2020  1:30 PM Gisela Cam, PT [facility]   12/28/2020  3:00 PM Jeanie Haynes OT [facility]   12/30/2020 10:15 AM Donaldo Hinson, OTR/L [facility] 12/30/2020  1:30 PM Oneida Ya, PT [facility]   1/4/2021  1:00 PM Merilee Kitten, OT [facility]   1/6/2021 10:45 AM Merilee Kitten, OT [facility]   1/11/2021 10:45 AM Merilee Kitten, OT [facility]   1/13/2021 10:45 AM Merilee Kitten, OT [facility]   4/16/2021  1:00 PM [facility] INFUSION PHLEBOTOMIST [facility]   4/19/2021  1:00 PM [facility] FAST TRACK NURSE [facility]

## 2020-12-18 ENCOUNTER — HOSPITAL ENCOUNTER (OUTPATIENT)
Dept: PHYSICAL THERAPY | Age: 75
Discharge: HOME | End: 2020-12-18
Payer: MEDICARE

## 2020-12-18 PROCEDURE — 97110 THERAPEUTIC EXERCISES: CPT

## 2020-12-18 PROCEDURE — 97018 PARAFFIN BATH THERAPY: CPT

## 2020-12-18 NOTE — PROGRESS NOTES
OT DAILY TREATMENT NOTE     Patient Name: Inga Champagne  Date:2020  : 1945  [x]  Patient  Verified  Payor: [place] MEDICARE / Plan: [place] MEDICARE PART A & B / Product Type: Medicare /    In time:14:00  Out time:14:42  Total Treatment Time (min): 42  Visit #: 2 of 8    Medicare/BCBS Only   Total Timed Codes (min):  32 1:1 Treatment Time:  42     Treatment Area: Right hand pain [M79.641]  Left hand pain [M79.642]    SUBJECTIVE  Pain Level (0-10 scale):3/10  Any medication changes, allergies to medications, adverse drug reactions, diagnosis change, or new procedure performed?: [x] No    [] Yes (see summary sheet for update)  Subjective functional status/changes:   [] No changes reported  \"My hands feel stiff today and very cold\"    OBJECTIVE    Modality rationale: decrease inflammation and decrease pain to improve the patients ability to use both hands for functional tasks   Min Type Additional Details    [] Estim:  []Unatt       []IFC  []Premod                        []Other:  []w/ice   []w/heat  Position:  Location:    [] Estim: []Att    []TENS instruct  []NMES                    []Other:  []w/US   []w/ice   []w/heat  Position:  Location:    []  Traction: [] Cervical       []Lumbar                       [] Prone          []Supine                       []Intermittent   []Continuous Lbs:  [] before manual  [] after manual    []  Ultrasound: []Continuous   [] Pulsed                           []1MHz   []3MHz W/cm2:  Location:    []  Iontophoresis with dexamethasone         Location: [] Take home patch   [] In clinic   10 []  Ice     []  heat  []  Ice massage  []  Laser   [x]  Paraffin Position: at rest  Location: both hands    []  Laser with stim  []  Other:  Position:  Location:    []  Vasopneumatic Device Pressure:       [] lo [] med [] hi   Temperature: [] lo [] med [] hi       [x] Skin assessment post-treatment:  []intact [x]redness- no adverse reaction    []redness - adverse reaction:    32 min Therapeutic Exercise:  [x] See flow sheet :   Rationale: increase ROM, increase strength and improve coordination to improve the patients ability to use hands to open containers and packages    With   [] TE   [] TA   [] neuro   [] other: Patient Education: [x] Review HEP  [] Progressed/Changed HEP based on:   [] positioning   [] body mechanics   [] transfers   [] heat/ice application   [] Splint wear/care   [] Sensory re-education   [] scar management      [] other:            Other Objective/Functional Measures: Decreased tingling after Paraffin. Pain Level (0-10 scale) post treatment: 3/10    ASSESSMENT/Changes in Function: Pt benefited from review of the intrinsic stretches for HEP in order to ensure proper form. Introduced mult in-hand manipulation and coordination tasks with good effort from the pt. Pt requires brief rest breaks in-between tasks to prevent cramping in hands. Patient will continue to benefit from skilled OT services to modify and progress therapeutic interventions, address ROM deficits, address strength deficits and improve coordination to attain remaining goals. [x]  See Plan of Care  []  See progress note/recertification  []  See Discharge Summary         Progress towards goals / Updated goals:  Short Term Goals: To be accomplished in 2  weeks:  1. Patient will be compliant with initial home exercise program to take an active role in their rehabilitation process. Status at [facility]:  Patient was provided with a basic home exercise program including intrinsic stretches and hand coordination activities  12/18/20: Reviewed HEP with pt. Pt required verbal cues for proper technique. 2. Patient will demonstrate a good understanding of their condition and strategies for self-management. Status at [facility]: Patient received an initial evaluation today followed by education as to diagnosis, precautions and treatment plan.     12/18/20: pt reports she's been trying to perform coordination tasks at home.     Long Term Goals: To be accomplished in 6 weeks:              1. Patient will be able to state 4 joint protection techniques to reduce pain in bilateral hands. Status at Eval: not yet discussed     2. Patient will report a decrease in pain in bilateral hands after 30 minutes of constant activities. Status at Eval: NT  12/18/20: Pt reports decrease in pain from 3/10 to 2/10 at the end of the session. 3. Patient will improve functional  strength in bilateral hands to 15# to increase ability to open containers, drinks and sealed bags. Status at Eval: right hand 8#, left hand 5#     4. Patient will improve 9 Hole Peg Test speed by 10% in the right hand in order to demonstrate improved dexterity.   Status at eval: right hand 30 seconds    PLAN  [x]  Upgrade activities as tolerated     [x]  Continue plan of care  []  Update interventions per flow sheet       []  Discharge due to:_  []  Other:_      Edis Gonzalez OTR/L 12/18/2020  1:52 PM    Future Appointments   Date Time Provider Gaby Delarosa   12/18/2020  2:00 PM Umu Sepulveda, OTR/L [facility]   12/21/2020  1:30 PM Go Moore, PT [facility]   12/22/2020  1:45 PM Louie Villegas, OT [facility]   12/23/2020  1:30 PM Go Moore, PT [facility]   12/28/2020  1:30 PM Go Moore, PT [facility]   12/28/2020  3:00 PM Louie Villegas, OT [facility]   12/30/2020 10:15 AM Umu Sepulveda OTR/L [facility]   12/30/2020  1:30 PM Go Moore, PT [facility]   1/4/2021  1:00 PM Louie Villegas, OT [facility]   1/6/2021 10:45 AM Louie Villegas, OT [facility]   1/11/2021 10:45 AM Louie Villegas, OT [facility]   1/13/2021 10:45 AM Bevelynir Villegas, OT [facility]   4/16/2021  1:00 PM [facility] INF[facility]ION PHLEBOTOMIST [facility]   4/19/2021  1:00 PM [facility] FAST TRACK NURSE [facility]

## 2020-12-21 ENCOUNTER — HOSPITAL ENCOUNTER (OUTPATIENT)
Dept: PHYSICAL THERAPY | Age: 75
Discharge: HOME | End: 2020-12-21
Payer: MEDICARE

## 2020-12-21 PROCEDURE — 97112 NEUROMUSCULAR REEDUCATION: CPT

## 2020-12-21 PROCEDURE — 97110 THERAPEUTIC EXERCISES: CPT

## 2020-12-21 NOTE — PROGRESS NOTES
PT DAILY TREATMENT NOTE     Patient Name: Eliazar Do  Date:2020  : 1945  [x]  Patient  Verified  Payor: [place] MEDICARE / Plan: [place] MEDICARE PART A & B / Product Type: Medicare /    In time:130  Out time:225  Total Treatment Time (min): 55  Visit #: 5 of 8    Treatment Area: Bilateral leg pain [M79.604, M79.605]  Back pain [M54.9]  Bilateral knee pain [M25.561, M25.562]    SUBJECTIVE  Pain Level (0-10 scale): 0  Any medication changes, allergies to medications, adverse drug reactions, diagnosis change, or new procedure performed?: [x] No    [] Yes (see summary sheet for update)  Subjective functional status/changes:   [] No changes reported  \"I'm doing fine today. \"    OBJECTIVE         Modality rationale: patient declined   Min Type Additional Details      []? Estim:  []? Unatt       []? IFC  []? Premod                        []?Other:  []?w/ice   []?w/heat  Position:  Location:      []? Estim: []? Att    []? TENS instruct  []? NMES                    []?Other:  []?w/US   []?w/ice   []?w/heat  Position:  Location:      []? Traction: []? Cervical       []? Lumbar                       []? Prone          []? Supine                       []?Intermittent   []? Continuous Lbs:  []? before manual  []? after manual      []? Ultrasound: []? Continuous   []? Pulsed                           []? 1MHz   []? 3MHz W/cm2:  Location:      []? Iontophoresis with dexamethasone         Location: []? Take home patch   []? In clinic      []? Ice     []?  heat  []? Ice massage  []? Laser   []? Anodyne Position:  Location:      []? Laser with stim  []? Other:  Position:  Location:      []? Vasopneumatic Device Pressure:       []? lo []? med []? hi   Temperature: []? lo []? med []? hi    []? Skin assessment post-treatment:  []?intact []? redness- no adverse reaction    []? redness - adverse reaction:      30 min Therapeutic Activity:  [x]?   See flow sheet : sit to stands, functional standing exercises   Rationale: increase ROM, increase strength, improve coordination, improve balance and increase proprioception  to improve the patients ability to improve mobility and ADL performance     25 min Neuromuscular Re-education:  [x]? See flow sheet :dynamic gait & balance training   Rationale: increase ROM, increase strength, improve coordination, improve balance and increase proprioception  to improve the patients ability to improve mobility and decrease fall risk                                                             With   [] TE   [] TA   [] neuro   [] other: Patient Education: [x] Review HEP    [] Progressed/Changed HEP based on:   [] positioning   [] body mechanics   [] transfers   [] heat/ice application    [] other:      Other Objective/Functional Measures: 90/50 mmHg at beginning of session, 98/70 mmHg with exercise     260' with CGA without AD    Pain Level (0-10 scale) post treatment: 0    ASSESSMENT/Changes in Function: Ms. Bandar Hernandez did well with return to exercise following blood pressure issues last session. No abnormal symptoms seen or reported and she was able to progress well with walking tolerance and dynamic gait. Patient will continue to benefit from skilled PT services to modify and progress therapeutic interventions, address functional mobility deficits, address ROM deficits, address strength deficits, analyze and address soft tissue restrictions, analyze and cue movement patterns, analyze and modify body mechanics/ergonomics, assess and modify postural abnormalities, address imbalance/dizziness and instruct in home and community integration to attain remaining goals. []  See Plan of Care  []  See progress note/recertification  []  See Discharge Summary         Progress towards goals / Updated goals:  1. Pt will increase score on Tinetti Balance & Gait Assessment to > 19/28 to demonstrate decreased fall risk.              recert status: 86/03  2.  Pt will increase FOTO score to 46 points to demonstrate increased ease with ADLs.                recert [id number]  3. Pt will increase B hip flexion/abduction/knee extension to 5/5 with MMT to improve ease with gait & safety with ambulation.               recert status[de-identified]   right hip flexion: 3+/5  right hip abduction: 3+5  Right knee extension: 5/5  left hip flexion: 4+/5              left hip abduction: 3+/5              Left knee extension: 4/5  4.  Patient will reduce time on TUG test to < 15\" using AD to demonstrate decreased fall risk.               recert [id number]:53\" with rollator       PLAN  []  Upgrade activities as tolerated     [x]  Continue plan of care  []  Update interventions per flow sheet       []  Discharge due to:_  []  Other:_      Raj Hanna, PT 12/21/2020  1:34 PM    Future Appointments   Date Time Provider Gaby Delarosa   12/22/2020  1:45 PM Connye Noy, OT [facility]   12/23/2020  1:30 PM Mona Sahni, PT [facility]   12/28/2020  1:30 PM [facility]avel Sahni, [facility]   12/28/2020  3:00 PM Connye Noy, OT [facility]   12/30/2020 10:15 AM Veryl Squibb, OTR/L [facility]   12/30/2020  1:30 PM Mona Sahni, PT [facility]   1/4/2021  1:00 PM Connye Noy, OT [facility]   1/6/2021 10:45 AM Connye Noy, OT [facility]   1/11/2021 10:45 AM Connye Noy, OT [facility]   1/13/2021 10:45 AM Connye Noy, OT [facility]   4/16/2021  1:00 PM [facility] INFUSION PHLEBOTOMIST [facility]   4/19/2021  1:00 PM [facility] FAST TRACK NURSE [facility]

## 2020-12-22 ENCOUNTER — HOSPITAL ENCOUNTER (OUTPATIENT)
Dept: PHYSICAL THERAPY | Age: 75
Discharge: HOME | End: 2020-12-22
Payer: MEDICARE

## 2020-12-22 PROCEDURE — 97110 THERAPEUTIC EXERCISES: CPT

## 2020-12-22 PROCEDURE — 97530 THERAPEUTIC ACTIVITIES: CPT

## 2020-12-22 PROCEDURE — 97018 PARAFFIN BATH THERAPY: CPT

## 2020-12-23 ENCOUNTER — HOSPITAL ENCOUNTER (OUTPATIENT)
Dept: PHYSICAL THERAPY | Age: 75
Discharge: HOME | End: 2020-12-23
Payer: MEDICARE

## 2020-12-23 PROCEDURE — 97530 THERAPEUTIC ACTIVITIES: CPT

## 2020-12-23 PROCEDURE — 97112 NEUROMUSCULAR REEDUCATION: CPT

## 2020-12-28 ENCOUNTER — HOSPITAL ENCOUNTER (OUTPATIENT)
Dept: PHYSICAL THERAPY | Age: 75
Discharge: HOME | End: 2020-12-28
Payer: MEDICARE

## 2020-12-28 PROCEDURE — 97018 PARAFFIN BATH THERAPY: CPT

## 2020-12-28 PROCEDURE — 97530 THERAPEUTIC ACTIVITIES: CPT

## 2020-12-28 PROCEDURE — 97112 NEUROMUSCULAR REEDUCATION: CPT

## 2020-12-28 PROCEDURE — 97110 THERAPEUTIC EXERCISES: CPT

## 2020-12-28 NOTE — PROGRESS NOTES
PT DAILY TREATMENT NOTE     Patient Name: Linn Naranjo  Date:2020  : 1945  [x]  Patient  Verified  Payor: [place] MEDICARE / Plan: [place] MEDICARE PART A & B / Product Type: Medicare /    In time:130  Out time:217  Total Treatment Time (min): 47  Visit #: 7 of 8    Medicare/BCBS Only   Total Timed Codes (min):  47 1:1 Treatment Time:  47       Treatment Area: Bilateral leg pain [M79.604, M79.605]  Back pain [M54.9]  Bilateral knee pain [M25.561, M25.562]    SUBJECTIVE  Pain Level (0-10 scale): 0  Any medication changes, allergies to medications, adverse drug reactions, diagnosis change, or new procedure performed?: [x] No    [] Yes (see summary sheet for update)  Subjective functional status/changes:   [] No changes reported  \"No pain. \"    OBJECTIVE    Modality rationale: patient declined   Min Type Additional Details    [] Estim:  []Unatt       []IFC  []Premod                        []Other:  []w/ice   []w/heat  Position:  Location:    [] Estim: []Att    []TENS instruct  []NMES                    []Other:  []w/US   []w/ice   []w/heat  Position:  Location:    []  Traction: [] Cervical       []Lumbar                       [] Prone          []Supine                       []Intermittent   []Continuous Lbs:  [] before manual  [] after manual    []  Ultrasound: []Continuous   [] Pulsed                           []1MHz   []3MHz W/cm2:  Location:    []  Iontophoresis with dexamethasone         Location: [] Take home patch   [] In clinic    []  Ice     []  heat  []  Ice massage  []  Laser   []  Anodyne Position:  Location:    []  Laser with stim  []  Other:  Position:  Location:    []  Vasopneumatic Device Pressure:       [] lo [] med [] hi   Temperature: [] lo [] med [] hi   [] Skin assessment post-treatment:  []intact []redness- no adverse reaction    []redness - adverse reaction:     23 min Therapeutic Activity:  [x]  See flow sheet : FOTO, reassessment, Tinetti, TUG   Rationale: increase ROM and increase strength  to improve the patients ability to perform ADLs     24 min Neuromuscular Re-education:  [x]  See flow sheet : balance training   Rationale: increase ROM, increase strength, improve coordination, improve balance and increase proprioception  to improve the patients ability to improve mobility and decrease fall risk        With   [] TE   [x] TA   [x] neuro   [] other: Patient Education: [x] Review HEP    [] Progressed/Changed HEP based on:   [x] positioning   [x] body mechanics   [] transfers   [] heat/ice application    [] other:      Other Objective/Functional Measures:   BP pre-exercise: 120/68 mmHg  HR: 78 bpm  SpO2: 98%    Walking Tolerance: 480 ft without AD in 4:26    FOTO 34    Tinetti:     TU seconds with rollator  TU seconds without AD    MMT right hip flexion 4/5  MMT right hip abduction 4/5  MMT right knee extension 4/5    MMT left hip flexion 4+/5  MMT left hip abduction 4/5  MMT left knee extension 5/5     Pain Level (0-10 scale) post treatment: 0    ASSESSMENT/Changes in Function: Ms. Augusto Pereira has been a pleasure to treat and reports 60% improvement since beginning therapy. Pt is progressing with her walking/standing tolerance, but remains SOB and fatigued after short bursts of activity. B LE strength is progressing nicely. Her TUG time improved as well as the safety of the movement, but her Tinetti score continues to put her at a high risk for falls. She was able to complete session today without her BP dropping. We will continue with therapy to address her remaining functional deficits.     Patient will continue to benefit from skilled PT services to modify and progress therapeutic interventions, address functional mobility deficits, address ROM deficits, address strength deficits, analyze and address soft tissue restrictions, analyze and cue movement patterns, analyze and modify body mechanics/ergonomics, assess and modify postural abnormalities, address imbalance/dizziness and instruct in home and community integration to attain remaining goals. [x]  See Plan of Care  [x]  See progress note/recertification  []  See Discharge Summary         Progress towards goals / Updated goals:  1. Pt will increase score on Tinetti Balance & Gait Assessment to > 19/28 to demonstrate decreased fall risk.              recert status: 06/76   NOT MET; 18/28  2. Pt will increase FOTO score to 46 points to demonstrate increased ease with ADLs.                recert [id number]:08    NOT MET; 34  3. Pt will increase B hip flexion/abduction/knee extension to 5/5 with MMT to improve ease with gait & safety with ambulation.               recert status[de-identified]   right hip flexion: 3+/5  right hip abduction: 3+5  Right knee extension: 5/5  left hip flexion: 4+/5              left hip abduction: 3+/5              Left knee extension: 4/5   PROGRESSING; right hip flexion 4/5, right hip abduction 4/5, right knee extension 4/5, left hip flexion 4+/5, left hip abduction 4/5, left knee extension 5/5  4. Patient will reduce time on TUG test to < 15\" using AD to demonstrate decreased fall risk.               recert QMFREI:72\" with rollator   MET; 14 seconds with rollator; 18 seconds without AD    Functional Gains: walking/standing tolerance with rollator, ease with walking down the martin without AD, sit to stands, stair negotiation  Functional Deficits: balance, walking without an AD or with a cane, activity tolerance, stiffness  % improvement: 60%  Pain   Average: 3/10       Best: 0/10     Worst: 3/10  Patient Goal: \"be able to walk without a walker and not have to have any more surgery. \"    PLAN  []  Upgrade activities as tolerated     [x]  Continue plan of care  []  Update interventions per flow sheet       []  Discharge due to:_  []  Other:_      Jennifer Ac PTA, FLAVIO 12/28/2020  2:25 PM    Future Appointments   Date Time Provider Gaby Delarosa   12/28/2020  1:30 PM Carlos Vega PTA [facility] 12/28/2020  3:00 PM Raquel Byrne, OT [facility]   12/30/2020 10:15 AM Nichelle Mcgee, OTR/L [facility]   12/30/2020  1:30 PM Blanca Rhodes, PT [facility]   1/4/2021  1:00 PM aRquel Byrne, OT [facility]   1/6/2021 10:45 AM Raquel Byrne, OT [facility]   1/11/2021 10:45 AM Raquel Byrne, OT [facility]   1/13/2021 10:45 AM Raquel Byrne, OT [facility]   4/16/2021  1:00 PM [facility]BOTOM[facility]   4/19/2021  1:00 PM [facility] FAST TRACK NURSE [facility]

## 2020-12-28 NOTE — PROGRESS NOTES
OT DAILY TREATMENT NOTE     Patient Name: Giacomo Bell  Date:2020  : 1945  [x]  Patient  Verified  Payor: [place] MEDICARE / Plan: [place] MEDICARE PART A & B / Product Type: Medicare /    In time:3:00 PM  Out time:3:44 PM  Total Treatment Time (min): 44  Visit #: 4 of 8    Medicare/BCBS Only   Total Timed Codes (min):  29 1:1 Treatment Time:  44     Treatment Area: Right hand pain [M79.641]  Left hand pain [M79.642]    SUBJECTIVE  Pain Level (0-10 scale): 0/10  Any medication changes, allergies to medications, adverse drug reactions, diagnosis change, or new procedure performed?: [x] No    [] Yes (see summary sheet for update)  Subjective functional status/changes:   [] No changes reported  \"I looked over that sheet you gave me and I do a lot of that already. \"  Pt reports no pain upon arrival to treatment sessions however she reports no change in severity of constant stiffness and tingling since beginning skilled OT. OBJECTIVE    Modality rationale: decrease pain and increase tissue extensibility to improve the patients ability to functionally use bilateral hands.     Min Type Additional Details    [] Estim:  []Unatt       []IFC  []Premod                        []Other:  []w/ice   []w/heat  Position:  Location:    [] Estim: []Att    []TENS instruct  []NMES                    []Other:  []w/US   []w/ice   []w/heat  Position:  Location:    []  Traction: [] Cervical       []Lumbar                       [] Prone          []Supine                       []Intermittent   []Continuous Lbs:  [] before manual  [] after manual    []  Ultrasound: []Continuous   [] Pulsed                           []1MHz   []3MHz W/cm2:  Location:    []  Iontophoresis with dexamethasone         Location: [] Take home patch   [] In clinic   15 []  Ice     []  heat  []  Ice massage  []  Laser   [x]  Paraffin Position: resting  Location: bilateral hands.     []  Laser with stim  []  Other:  Position:  Location:    []  Vasopneumatic Device Pressure:       [] lo [] med [] hi   Temperature: [] lo [] med [] hi       [x] Skin assessment post-treatment:  [x]intact []redness- no adverse reaction    []redness - adverse reaction:     14 min Therapeutic Exercise:  [x] See flow sheet :   Rationale: increase ROM, increase strength and improve coordination to improve the patients ability to grasp, . 15 min Therapeutic Activity:  [x]  See flow sheet :   Rationale: increase ROM and improve coordination  to improve the patients ability to manipulate small items. With   [] TE   [] TA   [] neuro   [] other: Patient Education: [x] Review HEP    [] Progressed/Changed HEP based on:   [] positioning   [] body mechanics   [] transfers   [] heat/ice application   [] Splint wear/care   [] Sensory re-education   [] scar management      [] other:            Other Objective/Functional Measures: mild difficulty manipulating dexterity balls with right hand. Improved from last treatment session. Pain Level (0-10 scale) post treatment: 0/10    ASSESSMENT/Changes in Function: FM skills improving. Patient will continue to benefit from skilled OT services to modify and progress therapeutic interventions, address ROM deficits, address strength deficits, analyze and address soft tissue restrictions and instruct in home and community integration to attain remaining goals. []  See Plan of Care  []  See progress note/recertification  []  See Discharge Summary         Progress towards goals / Updated goals:  Short Term Goals: To be accomplished in 2  weeks:  1. Patient will be compliant with initial home exercise program to take an active role in their rehabilitation process. Status at [facility]:  Patient was provided with a basic home exercise program including intrinsic stretches and hand coordination activities  12/18/20: Reviewed HEP with pt. Pt required verbal cues for proper technique.   12/22/2020 - pt reports compliance with intrinsic stretches HEP  12/28/2020 - pt reports compliance with HEP 2x per day, goal met     2. Patient will demonstrate a good understanding of their condition and strategies for self-management. Status at [facility]: Patient received an initial evaluation today followed by education as to diagnosis, precautions and treatment plan.    12/18/20: pt reports she's been trying to perform coordination tasks at home. 12/22/2020 - pt continues to report coordination activities at home. 12/28/2020 - pt reports using games and blocks for coordination activities, and implementing joint protection strategies, goal met     Long Term Goals: To be accomplished in 6 weeks:              1. Patient will be able to state 4 joint protection techniques to reduce pain in bilateral hands. Status at [facility]: not yet discussed  12/22/2020 - pt provided with joint protection strategies. 12/28/2020 - pt reports reviewing joint protection sheet and implementing strategies including built up toothbrush, increased independence with dressing including zippers and buttons     2. Patient will report a decrease in pain in bilateral hands after 30 minutes of constant activities. Status at [facility]: NT  12/18/20: Pt reports decrease in pain from 3/10 to 2/10 at the end of the session. 12/28/2020 - 0/10 pain level at end of treatment session.      3. Patient will improve functional  strength in bilateral hands to 15# to increase ability to open containers, drinks and sealed bags. [facility]     4. Patient will improve 9 Hole Peg Test speed by 10% in the right hand in order to demonstrate improved dexterity.   Status at [facility]: right hand 30 seconds    PLAN  []  Upgrade activities as tolerated     [x]  Continue plan of care  []  Update interventions per flow sheet       []  Discharge due to:_  []  Other:_      Arsalan Wiggins OT 12/28/2020  2:57 PM    Future Appointments   Date Time Provider Gaby Delarosa   12/28/2020  3:00 PM Rolan Nicky, OT [facility]   12/30/2020 10:15 AM Ignacia Lefty, OTR/L [facility]   12/30/2020  1:30 PM Latia Riddle, PT [facility]   1/4/2021  9:45 AM Melody Breeding, PTA [facility]   1/4/2021  1:00 PM Marley Petite, OT [facility]   1/6/2021 10:45 AM Marley Petite, OT [facility]   1/6/2021  3:00 PM Melody Breeding, PTA [facility]   1/11/2021 10:45 AM Marley Petite, OT [facility]   1/11/2021  1:30 PM Melody Breeding, PTA [facility]   1/13/2021 10:45 AM Marley Petite, OT [facility]   1/13/2021  2:15 PM Melody Breeding, PTA [facility]   1/18/2021  1:30 PM Melody Breeding, PTA [facility]   1/20/2021  1:30 PM Latia Riddle, PT [facility]   4/16/2021  1:00 PM [facility] INFUSION PHLEBOTOMIST [facility]   4/19/2021  1:00 PM [facility] FAST TRACK NURSE [facility]

## 2020-12-30 ENCOUNTER — HOSPITAL ENCOUNTER (OUTPATIENT)
Dept: PHYSICAL THERAPY | Age: 75
Discharge: HOME | End: 2020-12-30
Payer: MEDICARE

## 2020-12-30 PROCEDURE — 97530 THERAPEUTIC ACTIVITIES: CPT

## 2020-12-30 PROCEDURE — 97112 NEUROMUSCULAR REEDUCATION: CPT

## 2020-12-30 PROCEDURE — 97018 PARAFFIN BATH THERAPY: CPT

## 2020-12-30 PROCEDURE — 97110 THERAPEUTIC EXERCISES: CPT

## 2020-12-30 NOTE — PROGRESS NOTES
OT DAILY TREATMENT NOTE     Patient Name: Jesse Amor  Date:2020  : 1945  [x]  Patient  Verified  Payor: [place] MEDICARE / Plan: [place] MEDICARE PART A & B / Product Type: Medicare /    In time:10:11  Out time:10:51  Total Treatment Time (min): 40  Visit #: 5 of 8    Medicare/BCBS Only   Total Timed Codes (min):  30 1:1 Treatment Time:  40     Treatment Area: Right hand pain [M79.641]  Left hand pain [M79.642]    SUBJECTIVE  Pain Level (0-10 scale): 0/10  Any medication changes, allergies to medications, adverse drug reactions, diagnosis change, or new procedure performed?: [x] No    [] Yes (see summary sheet for update)  Subjective functional status/changes:   [] No changes reported  I did some stretches this morning and it felt good\"    OBJECTIVE    Modality rationale: decrease pain and increase tissue extensibility to improve the patients ability to use her hands for functional tasks   Min Type Additional Details    [] Estim:  []Unatt       []IFC  []Premod                        []Other:  []w/ice   []w/heat  Position:  Location:    [] Estim: []Att    []TENS instruct  []NMES                    []Other:  []w/US   []w/ice   []w/heat  Position:  Location:    []  Traction: [] Cervical       []Lumbar                       [] Prone          []Supine                       []Intermittent   []Continuous Lbs:  [] before manual  [] after manual    []  Ultrasound: []Continuous   [] Pulsed                           []1MHz   []3MHz W/cm2:  Location:    []  Iontophoresis with dexamethasone         Location: [] Take home patch   [] In clinic   10 []  Ice     []  heat  []  Ice massage  []  Laser   [x]  Paraffin Position: at rest  Location: both hands    []  Laser with stim  []  Other:  Position:  Location:    []  Vasopneumatic Device Pressure:       [] lo [] med [] hi   Temperature: [] lo [] med [] hi     [x] Skin assessment post-treatment:  []intact [x]redness- no adverse reaction    []redness - adverse reaction: 13 min Therapeutic Exercise:  [x] See flow sheet :   Rationale: increase ROM and increase strength to improve the patients ability to use both hands for ADLs. 17 min Therapeutic Activity:  [x]  See flow sheet :   Rationale: improve coordination  to improve the patients ability to use hands to manipulate small fasteners. With   [x] TE   [] TA   [] neuro   [] other: Patient Education: [x] Review HEP    [] Progressed/Changed HEP based on:   [] positioning   [] body mechanics   [] transfers   [] heat/ice application   [] Splint wear/care   [] Sensory re-education   [] scar management      [] other:            Other Objective/Functional Measures:    Strength:   Measurements: Taken with Rob Dynamometer, in Lbs   Level 2 12/15/2020    12/30/2020   Date Date Date Date Date   Right 8  19             Left 5  16             Deficit                 Change                        Pain Level (0-10 scale) post treatment: 0/10    ASSESSMENT/Changes in Function: Pt continues to demonstrate progress towards her goals. Improvement noted in  strength based on objective measurements with pt meeting her  strength goal. Pt is compliant with her HEP and reports noticing improvements in functional use of her hands at home. Patient will continue to benefit from skilled OT services to modify and progress therapeutic interventions, address strength deficits and decrease pain with functional use to attain remaining goals. []  See Plan of Care  []  See progress note/recertification  []  See Discharge Summary         Progress towards goals / Updated goals:  Short Term Goals: To be accomplished in 2  weeks:  1. Patient will be compliant with initial home exercise program to take an active role in their rehabilitation process. Status at [facility]:  Patient was provided with a basic home exercise program including intrinsic stretches and hand coordination activities  12/18/20: Reviewed HEP with pt.  Pt required verbal cues for proper technique. 12/22/2020 - pt reports compliance with intrinsic stretches HEP  12/28/2020 - pt reports compliance with HEP 2x per day, goal met     2. Patient will demonstrate a good understanding of their condition and strategies for self-management. Status at [facility]: Patient received an initial evaluation today followed by education as to diagnosis, precautions and treatment plan.    12/18/20: pt reports she's been trying to perform coordination tasks at home. 12/22/2020 - pt continues to report coordination activities at home.   12/28/2020 - pt reports using games and blocks for coordination activities, and implementing joint protection strategies, goal met     Long Term Goals: To be accomplished in 6 weeks:              1. Patient will be able to state 4 joint protection techniques to reduce pain in bilateral hands. Status at [facility]: not yet discussed  12/22/2020 - pt provided with joint protection strategies.    12/28/2020 - pt reports reviewing joint protection sheet and implementing strategies including built up toothbrush, increased independence with dressing including zippers and buttons     2. Patient will report a decrease in pain in bilateral hands after 30 minutes of constant activities. Status at [facility]: NT  12/18/20: Pt reports decrease in pain from 3/10 to 2/10 at the end of the session. 12/28/2020 - 0/10 pain level at end of treatment session.      3. Patient will improve functional  strength in bilateral hands to 15# to increase ability to open containers, drinks and sealed bags. Status at [facility] hand 8#, left hand 5#  12/30/20: R 19#, L 16#. Goal Met     4. Patient will improve 9 Hole Peg Test speed by 10% in the right hand in order to demonstrate improved dexterity.   Status at [facility]: right hand 30 seconds       PLAN  [x]  Upgrade activities as tolerated     [x]  Continue plan of care  []  Update interventions per flow sheet       []  Discharge due to:_  []  Other:_ Prince Nolan, OTR/L 12/30/2020  10:16 AM    Future Appointments   Date Time Provider Gaby Delarosa   12/30/2020  1:30 PM David Das, PT [facility]   1/4/2021  9:45 AM [place], PTA [facility]   1/4/2021  1:00 PM Mireya Hsu, OT [facility]   1/6/2021 10:45 AM Mireya Tobint, OT [facility]   1/6/2021  3:00 PM Marcela Mountain, PTA [facility]   1/11/2021 10:45 AM Mireya Hsu, OT [facility]   1/11/2021  1:30 PM [place], PTA [facility]   1/13/2021 10:45 AM Mireya Hsu, OT [facility]   1/13/2021  2:15 PM [place], PTA [facility]   1/18/2021  1:30 PM Marcela Mountain, PTA [facility]   1/20/2021  1:30 PM David Das, PT [facility]   4/16/2021  1:00 PM [facility] INFUS[facility] PHLEBOTOMIST [facility]   4/19/2021  1:00 PM [facility] FAST TRACK NURSE [facility]

## 2020-12-30 NOTE — PROGRESS NOTES
PT DAILY TREATMENT NOTE     Patient Name: Jerad Townsend  Date:2020  : 1945  [x]  Patient  Verified  Payor: VA MEDICARE / Plan: VA MEDICARE PART A & B / Product Type: Medicare /    In time:130  Out time:215  Total Treatment Time (min): 45  Visit #: 1 of 8    Medicare/BCBS Only   Total Timed Codes (min):  45 1:1 Treatment Time:  45       Treatment Area: Bilateral leg pain [M79.604, M79.605]  Back pain [M54.9]  Bilateral knee pain [M25.561, M25.562]    SUBJECTIVE  Pain Level (0-10 scale): 0  Any medication changes, allergies to medications, adverse drug reactions, diagnosis change, or new procedure performed?: [x] No    [] Yes (see summary sheet for update)  Subjective functional status/changes:   [] No changes reported  \"I'm doing fine today. \"    OBJECTIVE    OBJECTIVE            Modality rationale: patient declined   Min Type Additional Details      []? Estim:  []? Unatt       []? IFC  []? Premod                        []?Other:  []?w/ice   []?w/heat  Position:  Location:      []? Estim: []? Att    []? TENS instruct  []? NMES                    []?Other:  []?w/US   []?w/ice   []?w/heat  Position:  Location:      []? Traction: []? Cervical       []? Lumbar                       []? Prone          []? Supine                       []?Intermittent   []? Continuous Lbs:  []? before manual  []? after manual      []? Ultrasound: []? Continuous   []? Pulsed                           []? 1MHz   []? 3MHz W/cm2:  Location:      []? Iontophoresis with dexamethasone         Location: []? Take home patch   []? In clinic      []? Ice     []?  heat  []? Ice massage  []? Laser   []? Anodyne Position:  Location:      []? Laser with stim  []? Other:  Position:  Location:      []? Vasopneumatic Device Pressure:       []? lo []? med []? hi   Temperature: []? lo []? med []? hi    []? Skin assessment post-treatment:  []?intact []? redness- no adverse reaction    []? redness - adverse reaction:      20 min Therapeutic Activity:  [x]? See flow sheet :    Rationale: increase ROM and increase strength  to improve the patients ability to perform ADLs     25 min Neuromuscular Re-education:  [x]? See flow sheet : balance training   Rationale: increase ROM, increase strength, improve coordination, improve balance and increase proprioception  to improve the patients ability to improve mobility and decrease fall risk                                                   With   [] TE   [] TA   [] neuro   [] other: Patient Education: [x] Review HEP    [] Progressed/Changed HEP based on:   [] positioning   [] body mechanics   [] transfers   [] heat/ice application    [] other:      Other Objective/Functional Measures:      Pain Level (0-10 scale) post treatment: 0    ASSESSMENT/Changes in Function: Ms. Norma Coello continues to push herself and did well with progression of walking tolerance, balance training, and reaching outside [employer]. Needs 2-3 seated rest breaks to accommodate all activities. Remains  Very unsafe without AD for functional independent gait,  but does well with CGA and gait belt for short distances. Patient will continue to benefit from skilled PT services to modify and progress therapeutic interventions, address functional mobility deficits, address ROM deficits, address strength deficits, analyze and address soft tissue restrictions, analyze and cue movement patterns, analyze and modify body mechanics/ergonomics, assess and modify postural abnormalities, address imbalance/dizziness and instruct in home and community integration to attain remaining goals. []  See Plan of Care  []  See progress note/recertification  []  See Discharge Summary           Goals for this certification period to be accomplished in 4 weeks:  1. Pt will increase score on Tinetti Balance & Gait Assessment to > 19/28 to demonstrate decreased fall risk. recert [id number]; 80/06  2.  Pt will increase FOTO score to 46 points to demonstrate increased ease with ADLs.                 recert [id number]  3. Pt will increase B hip flexion/abduction/knee extension to 5/5 with MMT to improve ease with gait & safety with ambulation. recert status; right hip flexion 4/5, right hip abduction 4/5, right knee  4. Patient will increase walking tolerance with AD to < 6 min to improve functional independence and household mobility.                 recert status: 4: 26 min    PLAN  []  Upgrade activities as tolerated     [x]  Continue plan of care  []  Update interventions per flow sheet       []  Discharge due to:_  []  Other:_      Alfie Kumar, PT 12/30/2020  1:27 PM    Future Appointments   Date Time Provider Gaby Delarosa   12/30/2020  1:30 PM Jael German, PT [facility]   1/4/2021  9:45 AM Ely Nelson, CHRISTIAN [facility]   1/4/2021  1:00 PM Gene Dean, OT [facility]   1/6/2021 10:45 AM Gene Dean, OT [facility]   1/6/2021  3:00 PM Ely Nelson, PTA [facility]   1/11/2021 10:45 AM Gene Dean, OT [facility]   1/11/2021  1:30 PM Ely Nelson, PTA [facility]   1/13/2021 10:45 AM Gene Dean, OT [facility]   1/13/2021  2:15 PM Ely Nelson, PTA [facility]   1/18/2021  1:30 PM Ely Nelson, PTA [facility]   1/20/2021  1:30 PM Jael German, PT [facility]   4/16/2021  1:00 PM [facility] PHLEBOTOMIST [facility]   4/19/2021  1:00 PM [facility] FAST TRACK NURSE [facility]

## 2020-12-30 NOTE — PROGRESS NOTES
[facility]   [address]  [place], [address]  [phone] fax    Continued Plan of Care/ Re-certification for Physical Therapy Services    Patient name: Jamaica Friedman Start of Care: 2020   Referral source: Keisha Jess Kayser : 1945               Medical Diagnosis: Bilateral leg pain [M79.604, M79.605]  Back pain [M54.9]  Bilateral knee pain [M25.561, M25.562]  Payor: VA MEDICARE / Plan: VA MEDICARE PART A & B / Product Type: Medicare /     Onset Date:10/22/20               Treatment Diagnosis: B leg, back and B knee pain   Prior Hospitalization: see medical history Provider#: [id number]   Medications: Verified on Patient summary List    Comorbidities: OA, CVA, cervical myelopathy with hx of fusion   Prior Level of Function: retired. Recovering from cervical myelopathy/fusion earlier this year. Lives with friend who A with all care in 1 story home. Frequent falls and general deconditioning present since surgery       Visits from Start of Care: 15    Missed Visits: 1    The Plan of Care and following information is based on the patient's current status:  1. Pt will increase score on Tinetti Balance & Gait Assessment to > 19/28 to demonstrate decreased fall risk.              recert status:               NOT MET;   2. Pt will increase FOTO score to 46 points to demonstrate increased ease with ADLs.                recert [id number]               NOT MET; 34  3.  Pt will increase B hip flexion/abduction/knee extension to 5/5 with MMT to improve ease with gait & safety with ambulation.               recert status[de-identified]   right hip flexion: 3+/5  right hip abduction: 3+5  Right knee extension: 5/5  left hip flexion: 4+/5              left hip abduction: 3+/5              Left knee extension: 4/5              PROGRESSING; right hip flexion 4/5, right hip abduction 4/5, right knee extension 4/5, left hip flexion 4+/5, left hip abduction 4/5, left knee extension 5/5  4. Patient will reduce time on TUG test to < 15\" using AD to demonstrate decreased fall risk.               recert [id number]:09\" with rollator              MET; 14 seconds with rollator; 18 seconds without AD     Functional Gains: walking/standing tolerance with rollator, ease with walking down the martin without AD, sit to stands, stair negotiation  Functional Deficits: balance, walking without an AD or with a cane, activity tolerance, stiffness  % improvement: 60%  Pain   Average: 3/10                  Best: 0/10                Worst: 3/10  Patient Goal: \"be able to walk without a walker and not have to have any more surgery. \"    Other Objective/Functional Measures:   BP pre-exercise: 120/68 mmHg  HR: 78 bpm  SpO2: 98%     Walking Tolerance: 480 ft without AD in 4:26     FOTO 34     Tinetti:      TU seconds with rollator  TU seconds without AD     MMT right hip flexion 4/5  MMT right hip abduction 4/5  MMT right knee extension 4/5     MMT left hip flexion 4+/5  MMT left hip abduction 4/5  MMT left knee extension 5/5      Problems/ barriers to goal attainment: weakness, impaired activity tolerance, pain    Problem List: pain affecting function, decrease ROM, decrease strength, edema affecting function, impaired gait/ balance, decrease ADL/ functional abilitiies, decrease activity tolerance, decrease flexibility/ joint mobility and decrease transfer abilities    Treatment Plan: Therapeutic exercise, Therapeutic activities, Neuromuscular re-education, Physical agent/modality, Gait/balance training, Manual therapy, Patient education, Self Care training, Functional mobility training, Home safety training and Stair training        Goals for this certification period to be accomplished in 4 weeks:  1. Pt will increase score on Tinetti Balance & Gait Assessment to > 19/28 to demonstrate decreased fall risk. recert status; 64/03  2.  Pt will increase FOTO score to 46 points to demonstrate increased ease with ADLs.     recert [id number]  3. Pt will increase B hip flexion/abduction/knee extension to 5/5 with MMT to improve ease with gait & safety with ambulation. recert status; right hip flexion 4/5, right hip abduction 4/5, right knee  4. Patient will increase walking tolerance with AD to < 6 min to improve functional independence and household mobility. recert status: 4: 26 min    Frequency / Duration: Patient to be seen 2 times per week for 4 weeks:    Assessment / Recommendations:Ms. Yefri Escobar has been a pleasure to treat and reports 60% improvement since beginning therapy. Pt is progressing with her walking/standing tolerance, but remains SOB and fatigued after short bursts of activity. B LE strength is progressing nicely. Her TUG time improved as well as the safety of the movement, but her Tinetti score continues to put her at a high risk for falls. She was able to complete session today without her BP dropping. We will continue with therapy to address her remaining functional deficits. Certification Period: 12/30/20 - 1/ 28/21    Beverlee Gowers, PT 12/30/2020 9:41 AM    ________________________________________________________________________  I certify that the above Therapy Services are being furnished while the patient is under my care. I agree with the treatment plan and certify that this therapy is necessary. [] I have read the above and request that my patient continue as recommended.   [] I have read the above report and request that my patient continue therapy with the following changes/special instructions: _____________________________________________  [] I have read the above report and request that my patient be discharged from therapy    Physician's Signature:____________Date:_________TIME:________    ** Signature, Date and Time must be completed for valid certification **    Please sign and return to [facility]  [address] [facility] [address], [address]  [phone] fax

## 2021-01-04 ENCOUNTER — APPOINTMENT (OUTPATIENT)
Dept: PHYSICAL THERAPY | Age: 76
End: 2021-01-04
Payer: MEDICARE

## 2021-01-06 ENCOUNTER — HOSPITAL ENCOUNTER (OUTPATIENT)
Dept: PHYSICAL THERAPY | Age: 76
Discharge: HOME | End: 2021-01-06
Payer: MEDICARE

## 2021-01-06 PROCEDURE — 97530 THERAPEUTIC ACTIVITIES: CPT

## 2021-01-06 PROCEDURE — 97112 NEUROMUSCULAR REEDUCATION: CPT

## 2021-01-06 PROCEDURE — 97110 THERAPEUTIC EXERCISES: CPT

## 2021-01-06 PROCEDURE — 97018 PARAFFIN BATH THERAPY: CPT

## 2021-01-06 NOTE — PROGRESS NOTES
OT DAILY TREATMENT NOTE     Patient Name: Jett Davison  Date:2021  : 1945  [x]  Patient  Verified  Payor: [place] MEDICARE / Plan: [place] MEDICARE PART A & B / Product Type: Medicare /    In time:10:45 AM  Out time:11:25 AM  Total Treatment Time (min): 40  Visit #: 6 of 8    Medicare/BCBS Only   Total Timed Codes (min):  25 1:1 Treatment Time:  40     Treatment Area: Right hand pain [M79.641]  Left hand pain [M79.642]    SUBJECTIVE  Pain Level (0-10 scale): 0/10  Any medication changes, allergies to medications, adverse drug reactions, diagnosis change, or new procedure performed?: [x] No    [] Yes (see summary sheet for update)  Subjective functional status/changes:   [] No changes reported  \"My poor hands. I soaked them in hot water this morning. \"    OBJECTIVE    Modality rationale: decrease pain and increase tissue extensibility to improve the patients ability to functionally use bilateral hands   Min Type Additional Details    [] Estim:  []Unatt       []IFC  []Premod                        []Other:  []w/ice   []w/heat  Position:  Location:    [] Estim: []Att    []TENS instruct  []NMES                    []Other:  []w/US   []w/ice   []w/heat  Position:  Location:    []  Traction: [] Cervical       []Lumbar                       [] Prone          []Supine                       []Intermittent   []Continuous Lbs:  [] before manual  [] after manual    []  Ultrasound: []Continuous   [] Pulsed                           []1MHz   []3MHz W/cm2:  Location:    []  Iontophoresis with dexamethasone         Location: [] Take home patch   [] In clinic   10 []  Ice     []  heat  []  Ice massage  []  Laser   [x]  Paraffin Position: resting  Location: bilateral hands.     []  Laser with stim  []  Other:  Position:  Location:    []  Vasopneumatic Device Pressure:       [] lo [] med [] hi   Temperature: [] lo [] med [] hi       [x] Skin assessment post-treatment:  [x]intact []redness- no adverse reaction    []redness - adverse reaction:     15 min Therapeutic Exercise:  [x] See flow sheet :   Rationale: increase ROM and increase strength to improve the patients ability to grasp,  and pinch    15 min Therapeutic Activity:  [x]  See flow sheet :   Rationale: increase ROM and improve coordination  to improve the patients ability to manipulate small items. With   [] TE   [] TA   [] neuro   [] other: Patient Education: [x] Review HEP    [] Progressed/Changed HEP based on:   [] positioning   [] body mechanics   [] transfers   [] heat/ice application   [] Splint wear/care   [] Sensory re-education   [] scar management      [] other:            Other Objective/Functional Measures: grooved pegs right: 3m 22s, Left: 4m 29 s     Pain Level (0-10 scale) post treatment: 0/10    ASSESSMENT/Changes in Function: Minimal changes in symptoms per patient reports. Fatigued with activity. Will continue to upgrade interventions as tolerated. Patient will continue to benefit from skilled OT services to modify and progress therapeutic interventions, address ROM deficits, address strength deficits, analyze and address soft tissue restrictions and instruct in home and community integration to attain remaining goals. []  See Plan of Care  []  See progress note/recertification  []  See Discharge Summary         Progress towards goals / Updated goals:  Short Term Goals: To be accomplished in 2  weeks:  1. Patient will be compliant with initial home exercise program to take an active role in their rehabilitation process. Status at [facility]:  Patient was provided with a basic home exercise program including intrinsic stretches and hand coordination activities  12/18/20: Reviewed HEP with pt. Pt required verbal cues for proper technique.   12/22/2020 - pt reports compliance with intrinsic stretches HEP  12/28/2020 - pt reports compliance with HEP 2x per day, goal met     2. Patient will demonstrate a good understanding of their condition and strategies for self-management. Status at [facility]: Patient received an initial evaluation today followed by education as to diagnosis, precautions and treatment plan.    12/18/20: pt reports she's been trying to perform coordination tasks at home. 12/22/2020 - pt continues to report coordination activities at home.   12/28/2020 - pt reports using games and blocks for coordination activities, and implementing joint protection strategies, goal met     Long Term Goals: To be accomplished in 6 weeks:              1. Patient will be able to state 4 joint protection techniques to reduce pain in bilateral hands. Status at [facility]: not yet discussed  12/22/2020 - pt provided with joint protection strategies.    12/28/2020 - pt reports reviewing joint protection sheet and implementing strategies including built up toothbrush, increased independence with dressing including zippers and buttons  1/6/2021 - pt reports daily implementation of joint protection strategies, goal met     2. Patient will report a decrease in pain in bilateral hands after 30 minutes of constant activities. Status at [facility]: NT  12/18/20: Pt reports decrease in pain from 3/10 to 2/10 at the end of the session. 12/28/2020 - 0/10 pain level at end of treatment session.      3. Patient will improve functional  strength in bilateral hands to 15# to increase ability to open containers, drinks and sealed bags. Status at [facility] hand 8#, left hand 5#  12/30/20: R 19#, L 16#. Goal Met     4. Patient will improve 9 Hole Peg Test speed by 10% in the right hand in order to demonstrate improved dexterity.   Status at [facility]: right hand 30 seconds    PLAN  []  Upgrade activities as tolerated     [x]  Continue plan of care  []  Update interventions per flow sheet       []  Discharge due to:_  []  Other:_      Lit Crowley OT 1/6/2021  10:50 AM    Future Appointments   Date Time Provider Gaby Delarosa   1/6/2021  3:00 PM Marcus Sahni PTA [facility] 1/11/2021 10:45 AM Donne Grief, OT [facility]   1/11/2021  1:30 PM Jeffery Vasquez, PTA [facility]   1/13/2021 10:45 AM Donne Grief, OT [facility]   1/13/2021  2:15 PM Jeffery Vasquez, PTA [facility]   1/18/2021 11:30 AM Donne Grief, OT [facility]   1/18/2021  1:30 PM Jeffery Vasquez, CHRISTIAN [facility]   1/20/2021  1:30 PM Renard Gonzalez, PT [facility]   4/16/2021  1:00 PM [facility] INF[facility]ION PHLEBOTOMIST [facility]   4/19/2021  1:00 PM [facility] FAST TRACK NURSE [facility]

## 2021-01-06 NOTE — PROGRESS NOTES
PT DAILY TREATMENT NOTE     Patient Name: Andreina Stephens  Date:2021  : 1945  [x]  Patient  Verified  Payor: Rhonda Jacobson / Plan: [place] MEDICARE PART A & B / Product Type: Medicare /    In time:252  Out time:340  Total Treatment Time (min): 48  Visit #: 2 of 8    Medicare/BCBS Only   Total Timed Codes (min):  48 1:1 Treatment Time:  48       Treatment Area: Bilateral leg pain [M79.604, M79.605]  Back pain [M54.9]  Bilateral knee pain [M25.561, M25.562]    SUBJECTIVE  Pain Level (0-10 scale): 2  Any medication changes, allergies to medications, adverse drug reactions, diagnosis change, or new procedure performed?: [x] No    [] Yes (see summary sheet for update)  Subjective functional status/changes:   [] No changes reported  \"I'm a little sore. \"    OBJECTIVE    Modality rationale: patient declined   Min Type Additional Details    [] Estim:  []Unatt       []IFC  []Premod                        []Other:  []w/ice   []w/heat  Position:  Location:    [] Estim: []Att    []TENS instruct  []NMES                    []Other:  []w/US   []w/ice   []w/heat  Position:  Location:    []  Traction: [] Cervical       []Lumbar                       [] Prone          []Supine                       []Intermittent   []Continuous Lbs:  [] before manual  [] after manual    []  Ultrasound: []Continuous   [] Pulsed                           []1MHz   []3MHz W/cm2:  Location:    []  Iontophoresis with dexamethasone         Location: [] Take home patch   [] In clinic    []  Ice     []  heat  []  Ice massage  []  Laser   []  Anodyne Position:  Location:    []  Laser with stim  []  Other:  Position:  Location:    []  Vasopneumatic Device Pressure:       [] lo [] med [] hi   Temperature: [] lo [] med [] hi   [] Skin assessment post-treatment:  []intact []redness- no adverse reaction    []redness - adverse reaction:     20 min Therapeutic Activity:  [x]  See flow sheet :   Rationale: increase ROM, increase strength, improve coordination, improve balance and increase proprioception  to improve the patients ability to improve mobility and ADL performance     28 min Neuromuscular Re-education:  [x]  See flow sheet : balance training   Rationale: increase ROM, increase strength, improve coordination, improve balance and increase proprioception  to improve the patients ability to improve mobility and decrease fall risk         With   [] TE   [x] TA   [x] neuro   [] other: Patient Education: [x] Review HEP    [] Progressed/Changed HEP based on:   [x] positioning   [x] body mechanics   [] transfers   [] heat/ice application    [] other:      Other Objective/Functional Measures:    BP: 110/62 mmHg  HR: 85 bpm  SpO2: 98%    Pain Level (0-10 scale) post treatment: 0    ASSESSMENT/Changes in Function: Pt was more fatigued with ambulation today and was challenged with dynamic balance activities in the parallel bars. Her O2 levels dropped to 89% following ambulation, but quickly recovered after a short seated rest break. Patient will continue to benefit from skilled PT services to modify and progress therapeutic interventions, address functional mobility deficits, address ROM deficits, address strength deficits, analyze and address soft tissue restrictions, analyze and cue movement patterns, analyze and modify body mechanics/ergonomics, assess and modify postural abnormalities, address imbalance/dizziness and instruct in home and community integration to attain remaining goals. [x]  See Plan of Care  []  See progress note/recertification  []  See Discharge Summary         Progress towards goals / Updated goals:  1. Pt will increase score on Tinetti Balance & Gait Assessment to > 19/28 to demonstrate decreased fall risk.               recert [id number]; 45/94  2. Pt will increase FOTO score to 46 points to demonstrate increased ease with ADLs.                 recert [id number]  3.  Pt will increase B hip flexion/abduction/knee extension to 5/5 with MMT to improve ease with gait & safety with ambulation.                recert status; right hip flexion 4/5, right hip abduction 4/5, right knee  4. Patient will increase walking tolerance with AD to < 6 min to improve functional independence and household mobility.                 recert status: 4: 26 min    PLAN  []  Upgrade activities as tolerated     [x]  Continue plan of care  []  Update interventions per flow sheet       []  Discharge due to:_  []  Other:_      Su Arriola, PTA, CSCS 1/6/2021  3:46 PM    Future Appointments   Date Time Provider Gaby Delarosa   1/6/2021  3:00 PM Viralrobin Pereza, PTA [facility]   1/11/2021 10:45 AM Ryan Crystale, OT [facility]   1/11/2021  1:30 PM Viral Cera, PTA [facility]   1/13/2021 10:45 AM Ryan Amceliae, OT [facility]   1/13/2021  2:15 PM Viral Cera, PTA [facility]   1/18/2021 11:30 AM Ryan Crystale, OT [facility]   1/18/2021  1:30 PM Viral Cera, PTA [facility]   1/20/2021  1:30 PM Nora Gallegos, PT [facility]   4/16/2021  1:00 PM [facility] INF[facility]ION PHLEBOTOMIST [facility]   4/19/2021  1:00 PM [facility] FAST TRACK NURSE [facility]

## 2021-01-11 ENCOUNTER — HOSPITAL ENCOUNTER (OUTPATIENT)
Dept: PHYSICAL THERAPY | Age: 76
Discharge: HOME | End: 2021-01-11
Payer: MEDICARE

## 2021-01-11 PROCEDURE — 97530 THERAPEUTIC ACTIVITIES: CPT

## 2021-01-11 PROCEDURE — 97110 THERAPEUTIC EXERCISES: CPT

## 2021-01-11 PROCEDURE — 97018 PARAFFIN BATH THERAPY: CPT

## 2021-01-11 NOTE — PROGRESS NOTES
OT DAILY TREATMENT NOTE     Patient Name: Giacomo Bell  Date:2021  : 1945  [x]  Patient  Verified  Payor: Agapito Medici / Plan: [place] MEDICARE PART A & B / Product Type: Medicare /    In time:10:45 AM  Out time:11:40 AM  Total Treatment Time (min): 55  Visit #: 7 of 8    Medicare/BCBS Only   Total Timed Codes (min):  45 1:1 Treatment Time:  55     Treatment Area: Right hand pain [M79.641]  Left hand pain [M79.642]    SUBJECTIVE  Pain Level (0-10 scale): 0/10  Any medication changes, allergies to medications, adverse drug reactions, diagnosis change, or new procedure performed?: [x] No    [] Yes (see summary sheet for update)  Subjective functional status/changes:   [] No changes reported  \"I will make an appointment to follow up with the hand surgeon. I haven't noticed much of a difference but I will definitely keep doing the exercises. \"    OBJECTIVE    Modality rationale: decrease pain and increase tissue extensibility to improve the patients ability to functionally use bilateral hands.     Min Type Additional Details    [] Estim:  []Unatt       []IFC  []Premod                        []Other:  []w/ice   []w/heat  Position:  Location:    [] Estim: []Att    []TENS instruct  []NMES                    []Other:  []w/US   []w/ice   []w/heat  Position:  Location:    []  Traction: [] Cervical       []Lumbar                       [] Prone          []Supine                       []Intermittent   []Continuous Lbs:  [] before manual  [] after manual    []  Ultrasound: []Continuous   [] Pulsed                           []1MHz   []3MHz W/cm2:  Location:    []  Iontophoresis with dexamethasone         Location: [] Take home patch   [] In clinic   10 []  Ice     []  heat  []  Ice massage  []  Laser   [x]  Paraffin Position: resting  Location: bilateral hands.     []  Laser with stim  []  Other:  Position:  Location:    []  Vasopneumatic Device Pressure:       [] lo [] med [] hi   Temperature: [] lo [] med [] hi [x] Skin assessment post-treatment:  [x]intact [x]redness- no adverse reaction    []redness - adverse reaction:     10 min Therapeutic Exercise:  [x] See flow sheet :   Rationale: increase ROM and increase strength to improve the patients ability to grasp,  and pinch. 35 min Therapeutic Activity:  [x]  See flow sheet :   Rationale: increase ROM and improve coordination  to improve the patients ability to manipulate small items. With   [] TE   [] TA   [] neuro   [] other: Patient Education: [x] Review HEP    [] Progressed/Changed HEP based on:   [] positioning   [] body mechanics   [] transfers   [] heat/ice application   [] Splint wear/care   [] Sensory re-education   [] scar management      [] other:          Other Objective/Functional Measures: reduced stiffness with activity reported     Pain Level (0-10 scale) post treatment: 0/10    ASSESSMENT/Changes in Function: Discussed practicing buttoning shirts to increase independence with task. Fatigued with activity however no complaints with skilled interventions. Able to differentiate objects in rice bin. Discussed d/c at next treatment session. Will plan for her to continue with HEP. Patient will continue to benefit from skilled OT services to modify and progress therapeutic interventions, address ROM deficits, address strength deficits, analyze and address soft tissue restrictions and instruct in home and community integration to attain remaining goals. []  See Plan of Care  []  See progress note/recertification  []  See Discharge Summary         Progress towards goals / Updated goals:  Short Term Goals: To be accomplished in 2  weeks:  1. Patient will be compliant with initial home exercise program to take an active role in their rehabilitation process. Status at [facility]:  Patient was provided with a basic home exercise program including intrinsic stretches and hand coordination activities  12/18/20: Reviewed HEP with pt.  Pt required verbal cues for proper technique. 12/22/2020 - pt reports compliance with intrinsic stretches HEP  12/28/2020 - pt reports compliance with HEP 2x per day, goal met     2. Patient will demonstrate a good understanding of their condition and strategies for self-management. Status at [facility]: Patient received an initial evaluation today followed by education as to diagnosis, precautions and treatment plan.    12/18/20: pt reports she's been trying to perform coordination tasks at home. 12/22/2020 - pt continues to report coordination activities at home.   12/28/2020 - pt reports using games and blocks for coordination activities, and implementing joint protection strategies, goal met     Long Term Goals: To be accomplished in 6 weeks:              1. Patient will be able to state 4 joint protection techniques to reduce pain in bilateral hands. Status at [facility]: not yet discussed  12/22/2020 - pt provided with joint protection strategies.    12/28/2020 - pt reports reviewing joint protection sheet and implementing strategies including built up toothbrush, increased independence with dressing including zippers and buttons  1/6/2021 - pt reports daily implementation of joint protection strategies, goal met     2. Patient will report a decrease in pain in bilateral hands after 30 minutes of constant activities. Status at [facility]: NT  12/18/20: Pt reports decrease in pain from 3/10 to 2/10 at the end of the session. 12/28/2020 - 0/10 pain level at end of treatment session.      3. Patient will improve functional  strength in bilateral hands to 15# to increase ability to open containers, drinks and sealed bags. Status at [facility] hand 8#, left hand 5#  12/30/20: R 19#, L 16#. Goal Met     4. Patient will improve 9 Hole Peg Test speed by 10% in the right hand in order to demonstrate improved dexterity.   Status at [facility]: right hand 30 seconds    PLAN  []  Upgrade activities as tolerated     [x]  Continue plan of care  [] Update interventions per flow sheet       []  Discharge due to:_  []  Other:_      Salome Gama, OT 1/11/2021  10:43 AM    Future Appointments   Date Time Provider Gaby Delarosa   1/11/2021 10:45 AM Teresa Nakul, OT [facility]   1/11/2021  1:30 PM Kristy Forth, PTA [facility]   1/13/2021 10:45 AM Teresa Nakul, OT [facility]   1/13/2021  2:15 PM Kristy Forth, PTA [facility]   1/18/2021 11:30 AM Teresa Nakul, OT [facility]   1/18/2021  1:30 PM Kristy Swapna, PTA [facility]   1/20/2021  1:30 PM CHAI Downing [facility]   4/16/2021  1:00 PM [facility] INFUSION PHLEBOTOMIST [facility]   4/19/2021  1:00 PM [facility] FAST TRACK NURSE [facility]

## 2021-01-11 NOTE — PROGRESS NOTES
PT DAILY TREATMENT NOTE     Patient Name: Andreina Stephens  Date:2021  : 1945  [x]  Patient  Verified  Payor: [place] MEDICARE / Plan: [place] MEDICARE PART A & B / Product Type: Medicare /    In time:130  Out time:153  Total Treatment Time (min): 23  Visit #: 3 of 8    Medicare/BCBS Only   Total Timed Codes (min):  23 1:1 Treatment Time:  23       Treatment Area: Bilateral leg pain [M79.604, M79.605]  Back pain [M54.9]  Bilateral knee pain [M25.561, M25.562]    SUBJECTIVE  Pain Level (0-10 scale): 0  Any medication changes, allergies to medications, adverse drug reactions, diagnosis change, or new procedure performed?: [x] No    [] Yes (see summary sheet for update)  Subjective functional status/changes:   [] No changes reported  \"No pain. \"    OBJECTIVE    Modality rationale: patient declined   Min Type Additional Details    [] Estim:  []Unatt       []IFC  []Premod                        []Other:  []w/ice   []w/heat  Position:  Location:    [] Estim: []Att    []TENS instruct  []NMES                    []Other:  []w/US   []w/ice   []w/heat  Position:  Location:    []  Traction: [] Cervical       []Lumbar                       [] Prone          []Supine                       []Intermittent   []Continuous Lbs:  [] before manual  [] after manual    []  Ultrasound: []Continuous   [] Pulsed                           []1MHz   []3MHz W/cm2:  Location:    []  Iontophoresis with dexamethasone         Location: [] Take home patch   [] In clinic    []  Ice     []  heat  []  Ice massage  []  Laser   []  Anodyne Position:  Location:    []  Laser with stim  []  Other:  Position:  Location:    []  Vasopneumatic Device Pressure:       [] lo [] med [] hi   Temperature: [] lo [] med [] hi   [] Skin assessment post-treatment:  []intact []redness- no adverse reaction    []redness - adverse reaction:     23 min Therapeutic Activity:  [x]  See flow sheet :   Rationale: increase ROM and increase strength  to improve the patients ability to perform ADLs         With   [] TE   [x] TA   [x] neuro   [] other: Patient Education: [x] Review HEP    [] Progressed/Changed HEP based on:   [x] positioning   [x] body mechanics   [] transfers   [] heat/ice application    [] other:      Other Objective/Functional Measures:   SpO2: 99%    HR (1st reading): 101 bpm  HR (2nd reading): 91 bpm    BP (1st reading): 90/50 mmHg  BP (2nd reading): 80/50 mmHg  BP (3rd reading): 90/50 mmHg  BP (4th reading): 88/50 mmHg     Pain Level (0-10 scale) post treatment: 0    ASSESSMENT/Changes in Function: Pt stated that her legs felt weaker than usual today. Pt states that she stopped taking her medication due to fear of side of effects. Upon checking BP it was found to be low after multiple attempts. Told pt we were holding on PT today secondary to BP readings. Pt states that she was not having any other symptoms and declined having therapist called medical services. Advised pt to call PCP about recent BP issues and discuss with MD about her recently self-stopping her medication. Will follow up later today with pt to check in. Patient will continue to benefit from skilled PT services to modify and progress therapeutic interventions, address functional mobility deficits, address ROM deficits, address strength deficits, analyze and address soft tissue restrictions, analyze and cue movement patterns, analyze and modify body mechanics/ergonomics, assess and modify postural abnormalities, address imbalance/dizziness and instruct in home and community integration to attain remaining goals. [x]  See Plan of Care  []  See progress note/recertification  []  See Discharge Summary         Progress towards goals / Updated goals:  1. Pt will increase score on Tinetti Balance & Gait Assessment to > 19/28 to demonstrate decreased fall risk.               recert [id number]; 45/23  2. Pt will increase FOTO score to 46 points to demonstrate increased ease with ADLs.                 recert [id number]  3. Pt will increase B hip flexion/abduction/knee extension to 5/5 with MMT to improve ease with gait & safety with ambulation.                recert status; right hip flexion 4/5, right hip abduction 4/5, right knee  4. Patient will increase walking tolerance with AD to < 6 min to improve functional independence and household mobility.                 recert status: 4: 26 min    PLAN  []  Upgrade activities as tolerated     [x]  Continue plan of care  []  Update interventions per flow sheet       []  Discharge due to:_  []  Other:_      Amirah Colbert, PTA, CSCS 1/11/2021  2:01 PM    Future Appointments   Date Time Provider Gaby Delarosa   1/11/2021  1:30 PM Zhane Canchola PTA [facility]   1/13/2021 10:45 AM Kenji Oconnor OT [facility]   1/13/2021  2:15 PM [facility]   1/18/2021 11:30 AM Kenji Oconnor OT [facility]   1/18/2021  1:30 PM Zhane Canchola PTA [facility]   1/20/2021  1:30 PM CHAI Rowland [facility]   4/16/2021  1:00 PM [facility] INFUSION PHLEBOTOMIST [facility]   4/19/2021  1:00 PM [facility] FAST TRACK NURSE [facility]

## 2021-01-13 ENCOUNTER — HOSPITAL ENCOUNTER (OUTPATIENT)
Dept: PHYSICAL THERAPY | Age: 76
Discharge: HOME | End: 2021-01-13
Payer: MEDICARE

## 2021-01-13 PROCEDURE — 97112 NEUROMUSCULAR REEDUCATION: CPT

## 2021-01-13 PROCEDURE — 97018 PARAFFIN BATH THERAPY: CPT

## 2021-01-13 PROCEDURE — 97110 THERAPEUTIC EXERCISES: CPT

## 2021-01-13 PROCEDURE — 97530 THERAPEUTIC ACTIVITIES: CPT

## 2021-01-13 NOTE — PROGRESS NOTES
[facility]  [address] [place] [address], [facility] [address]  [phone] fax  Patient Name: Jesse Amor  Date:2021  : 1945  [x]  Patient  Verified      Hand Therapy/ Occupational Therapy Discharge Instructions        Continue with home exercise program for 2-3 times a day for 4 weeks then decrease to 1 times a day as needed/patient discretion. Continue to apply heat/paraffin as needed per day. Follow up with MD: schedule follow up with hand specialist      Recommendations: __x__ Return to activity with home program            ____ Return to activity with the following modifcations                       ____ [address]                                  ____Return to/Join local gym    Additional comments:    Continue to implement joint protection strategies with your everyday activities       Please call with any questions or concerns. Thank you for your participation.          Brent Trevizo OT 2021  11:19 AM

## 2021-01-13 NOTE — PROGRESS NOTES
PT DAILY TREATMENT NOTE     Patient Name: Kennedi Blackwell  Date:2021  : 1945  [x]  Patient  Verified  Payor: [place] MEDICARE / Plan: [place] MEDICARE PART A & B / Product Type: Medicare /    In time:213  Out time:256  Total Treatment Time (min): 43  Visit #: 4 of 8    Medicare/BCBS Only   Total Timed Codes (min):  43 1:1 Treatment Time:  43       Treatment Area: Bilateral leg pain [M79.604, M79.605]  Back pain [M54.9]  Bilateral knee pain [M25.561, M25.562]    SUBJECTIVE  Pain Level (0-10 scale): 0  Any medication changes, allergies to medications, adverse drug reactions, diagnosis change, or new procedure performed?: [x] No    [] Yes (see summary sheet for update)  Subjective functional status/changes:   [] No changes reported  \"No pain. \"    OBJECTIVE    Modality rationale: patient declined   Min Type Additional Details    [] Estim:  []Unatt       []IFC  []Premod                        []Other:  []w/ice   []w/heat  Position:  Location:    [] Estim: []Att    []TENS instruct  []NMES                    []Other:  []w/US   []w/ice   []w/heat  Position:  Location:    []  Traction: [] Cervical       []Lumbar                       [] Prone          []Supine                       []Intermittent   []Continuous Lbs:  [] before manual  [] after manual    []  Ultrasound: []Continuous   [] Pulsed                           []1MHz   []3MHz W/cm2:  Location:    []  Iontophoresis with dexamethasone         Location: [] Take home patch   [] In clinic    []  Ice     []  heat  []  Ice massage  []  Laser   []  Anodyne Position:  Location:    []  Laser with stim  []  Other:  Position:  Location:    []  Vasopneumatic Device Pressure:       [] lo [] med [] hi   Temperature: [] lo [] med [] hi   [] Skin assessment post-treatment:  []intact []redness- no adverse reaction    []redness - adverse reaction:     18 min Therapeutic Activity:  [x]  See flow sheet :   Rationale: increase ROM, increase strength, improve coordination, improve balance and increase proprioception  to improve the patients ability to improve mobility and ADL performance     25 min Neuromuscular Re-education:  [x]  See flow sheet : balance training   Rationale: increase ROM, increase strength, improve coordination, improve balance and increase proprioception  to improve the patients ability to improve mobility and decrease fall risk         With   [] TE   [x] TA   [x] neuro   [] other: Patient Education: [x] Review HEP    [] Progressed/Changed HEP based on:   [x] positioning   [x] body mechanics   [] transfers   [] heat/ice application    [] other:      Other Objective/Functional Measures:   BP: 110/60 mmHg     Pain Level (0-10 scale) post treatment: 0    ASSESSMENT/Changes in Function: Pt was able to increase her activity tolerance and walking distance in the clinic today. Still has a shuffled wide gait without AD. Balance was challenged with more dynamic activities, but was able to correct weight shifting and balance. Patient will continue to benefit from skilled PT services to modify and progress therapeutic interventions, address functional mobility deficits, address ROM deficits, address strength deficits, analyze and address soft tissue restrictions, analyze and cue movement patterns, analyze and modify body mechanics/ergonomics, assess and modify postural abnormalities, address imbalance/dizziness and instruct in home and community integration to attain remaining goals. [x]  See Plan of Care  []  See progress note/recertification  []  See Discharge Summary         Progress towards goals / Updated goals:  1. Pt will increase score on Tinetti Balance & Gait Assessment to > 19/28 to demonstrate decreased fall risk.               recert [id number]; 99/33  2. Pt will increase FOTO score to 46 points to demonstrate increased ease with ADLs.                 recert [id number]  3.  Pt will increase B hip flexion/abduction/knee extension to 5/5 with MMT to improve ease with gait & safety with ambulation.                recert status; right hip flexion 4/5, right hip abduction 4/5, right knee  4. Patient will increase walking tolerance with AD to < 6 min to improve functional independence and household mobility.                 recert status: 4: 26 min    PLAN  []  Upgrade activities as tolerated     [x]  Continue plan of care  []  Update interventions per flow sheet       []  Discharge due to:_  []  Other:_      Suleiman Hernandez PTA, [facility] 1/13/2021  1:55 PM    Future Appointments   Date Time Provider Gaby Delarosa   1/13/2021  2:15 PM Clayton Cummings PTA [facility]   1/18/2021  1:30 PM Clayton Cummings PTA [facility]   1/20/2021  1:30 PM Gail Gaucher [address] [facility]   2/4/2021  1:30 PM Sherryle Shope, PA [facility] DEEJAY SCHED   4/16/2021  1:00 PM [facility] INFUSION PHLEBOTOMIST [facility]   4/19/2021  1:00 PM [facility] FAST TRACK NURSE [facility]

## 2021-01-13 NOTE — PROGRESS NOTES
OT DISCHARGE DAILY NOTE AND SUMMARY- [facility]     Date:2021  Patient name: Mackenzie Brown Start of Care: 12/15/2020   Referral source: Higinio Holley DO : 1945   Medical/Treatment Diagnosis: Right hand pain [M79.641]  Left hand pain [M79.642] Onset Date:2019     Prior Hospitalization: see medical history Provider#: [id number]   Medications: Verified on Patient Summary List    Comorbidities: H/o falls, h/o stroke, anxiety, arthritis, incontinence,osteoporosis, DDD    Prior Level of Function: Min A with ADL/IADL tasks without pain limiting function                 Visits from Start of Care: 8    Missed Visits: 0    Reporting Period: 12/15/2020 to 2021    [x]  Patient  Verified  Payor: VA MEDICARE / Plan: VA MEDICARE PART A & B / Product Type: Medicare /    In time:10:45 AM  Out time:11:28 AM  Total Treatment Time (min): 43  Visit #: 8 of 8    Medicare/BCBS Only   Total Timed Codes (min):  28 1:1 Treatment Time:  43       Treatment Area: Right hand pain [M79.641]  Left hand pain [M79.642]    SUBJECTIVE  Pain Level (0-10 scale): 0/10  Any medication changes, allergies to medications, adverse drug reactions, diagnosis change, or new procedure performed?: [x] No    [] Yes (see summary sheet for update)  Subjective functional status/changes:   [] No changes reported  \"I will keep up with the exercises. \"    OBJECTIVE    Modality rationale: decrease pain and increase tissue extensibility to improve the patients ability to functionally use bilateral hands.    Min Type Additional Details    [] Estim:  []Unatt       []IFC  []Premod                        []Other:  []w/ice   []w/heat  Position:  Location:    [] Estim: []Att    []TENS instruct  []NMES                    []Other:  []w/US   []w/ice   []w/heat  Position:  Location:    []  Traction: [] Cervical       []Lumbar                       [] Prone          []Supine                       []Intermittent   []Continuous Lbs:  [] before manual  [] after manual    []  Ultrasound: []Continuous   [] Pulsed                           []1MHz   []3MHz W/cm2:  Location:    []  Iontophoresis with dexamethasone         Location: [] Take home patch   [] In clinic   15 []  Ice     [x]  Heat - paraffin  []  Ice massage  []  Laser   []  Anodyne Position: resting  Location: bilateral hands    []  Laser with stim  []  Other:  Position:  Location:    []  Vasopneumatic Device Pressure:       [] lo [] med [] hi   Temperature: [] lo [] med [] hi   [x] Skin assessment post-treatment:  [x]intact []redness- no adverse reaction    []redness - adverse reaction:     28 min Therapeutic Exercise:  [x] See flow sheet :   Rationale: increase ROM and increase strength to improve the patients ability to grasp,  and pinch. With   [] TE   [] TA   [] neuro   [] other: Patient Education: [x] Review HEP    [] Progressed/Changed HEP based on:   [] positioning   [] body mechanics   [] transfers   [] heat/ice application   [] Splint wear/care   [] Sensory re-education   [] scar management      [] other:            Other Objective/Functional Measures: Strength:   Measurements: Taken with Rob Dynamometer, in Lbs   Level 2 12/15/2020    12/30/2020    1/13/2021    Date Date Date Date   Right 8  19  21           Left 5  16 14            Deficit                Change                      12/15/2020: Nine-Hole Peg Test:  Left= __35___seconds                        Right=___30__seconds   1/13/2021 - Nine-Hole Peg Test:  Left= __33___seconds                        Right=___28__seconds    Pain Level (0-10 scale) post treatment: 0/10    Summary of Care:  Short Term Goals: To be accomplished in 2  weeks:  1. Patient will be compliant with initial home exercise program to take an active role in their rehabilitation process. Status at Eval:  Patient was provided with a basic home exercise program including intrinsic stretches and hand coordination activities  12/18/20: Reviewed HEP with pt.  Pt required verbal cues for proper technique. 12/22/2020 - pt reports compliance with intrinsic stretches HEP  12/28/2020 - pt reports compliance with HEP 2x per day, goal met  Status at discharge: goal met     2. Patient will demonstrate a good understanding of their condition and strategies for self-management. Status at [facility]: Patient received an initial evaluation today followed by education as to diagnosis, precautions and treatment plan.    12/18/20: pt reports she's been trying to perform coordination tasks at home. 12/22/2020 - pt continues to report coordination activities at home.   12/28/2020 - pt reports using games and blocks for coordination activities, and implementing joint protection strategies, goal met   Status at discharge: goal met    [address] [facility] be accomplished in 6 weeks:              1. Patient will be able to state 4 joint protection techniques to reduce pain in bilateral hands. Status at [facility]: not yet discussed  12/22/2020 - pt provided with joint protection strategies.    12/28/2020 - pt reports reviewing joint protection sheet and implementing strategies including built up toothbrush, increased independence with dressing including zippers and buttons  1/6/2021 - pt reports daily implementation of joint protection strategies, goal met   Status at discharge: goal met    2. Patient will report a decrease in pain in bilateral hands after 30 minutes of constant activities. Status at [facility]: NT  12/18/20: Pt reports decrease in pain from 3/10 to 2/10 at the end of the session. 12/28/2020 - 0/10 pain level at end of treatment session. Status at discharge: 0/10 pain level after therex, goal met     3. Patient will improve functional  strength in bilateral hands to 15# to increase ability to open containers, drinks and sealed bags. Status at [facility] hand 8#, left hand 5#  12/30/20: R 19#, L 16#. Goal Met   Status at discharge: goal met    4.  Patient will improve 9 Hole Peg Test speed by 10% in the right hand in order to demonstrate improved dexterity. Status at eval: right hand 30 seconds  Status at discharge: 6% improvement, progressing, goal not met    ASSESSMENT/Changes in Function: Pt has met or is progressing towards goals set for this reporting period. Pt was provided and instructed in light  and pinch strengthening HEP and provided with graded therapy putty. Pt was provided with dc instructions to continue with coordination activities, joint protection strategies and  and pinch strengthening as part of updated HEP. Pt verbalized understanding and is in agreement to d/c from skilled OT. Will d/c at this time and reassess in the future if medically necessary.  Thank you for this referral.       PLAN:  [x]Discontinue therapy: [x]Patient has reached or is progressing toward set goals      []Patient is non-compliant or has abdicated      []Due to lack of appreciable progress towards set goals    Thank you for this referral!    Purnima Julian OT 1/13/2021 10:42 AM

## 2021-01-18 ENCOUNTER — APPOINTMENT (OUTPATIENT)
Dept: PHYSICAL THERAPY | Age: 76
End: 2021-01-18
Payer: MEDICARE

## 2021-01-18 ENCOUNTER — HOSPITAL ENCOUNTER (OUTPATIENT)
Dept: PHYSICAL THERAPY | Age: 76
Discharge: HOME | End: 2021-01-18
Payer: MEDICARE

## 2021-01-18 PROCEDURE — 97530 THERAPEUTIC ACTIVITIES: CPT

## 2021-01-18 PROCEDURE — 97112 NEUROMUSCULAR REEDUCATION: CPT

## 2021-01-18 NOTE — PROGRESS NOTES
PT DAILY TREATMENT NOTE     Patient Name: Loyd Waters  Date:2021  : 1945  [x]  Patient  Verified  Payor: VA MEDICARE / Plan: VA MEDICARE PART A & B / Product Type: Medicare /    In time:130  Out time:211  Total Treatment Time (min): 41  Visit #: 5 of 8    Medicare/BCBS Only   Total Timed Codes (min):  41 1:1 Treatment Time:  41       Treatment Area: Bilateral leg pain [M79.604, M79.605]  Back pain [M54.9]  Bilateral knee pain [M25.561, M25.562]    SUBJECTIVE  Pain Level (0-10 scale): 0  Any medication changes, allergies to medications, adverse drug reactions, diagnosis change, or new procedure performed?: [x] No    [] Yes (see summary sheet for update)  Subjective functional status/changes:   [] No changes reported  \"No pain. \"    OBJECTIVE    Modality rationale: patient declined   Min Type Additional Details    [] Estim:  []Unatt       []IFC  []Premod                        []Other:  []w/ice   []w/heat  Position:  Location:    [] Estim: []Att    []TENS instruct  []NMES                    []Other:  []w/US   []w/ice   []w/heat  Position:  Location:    []  Traction: [] Cervical       []Lumbar                       [] Prone          []Supine                       []Intermittent   []Continuous Lbs:  [] before manual  [] after manual    []  Ultrasound: []Continuous   [] Pulsed                           []1MHz   []3MHz W/cm2:  Location:    []  Iontophoresis with dexamethasone         Location: [] Take home patch   [] In clinic    []  Ice     []  heat  []  Ice massage  []  Laser   []  Anodyne Position:  Location:    []  Laser with stim  []  Other:  Position:  Location:    []  Vasopneumatic Device Pressure:       [] lo [] med [] hi   Temperature: [] lo [] med [] hi   [] Skin assessment post-treatment:  []intact []redness- no adverse reaction    []redness - adverse reaction:     16 min Therapeutic Activity:  [x]  See flow sheet : sit to stands, functional standing exercises   Rationale: increase ROM and increase strength  to improve the patients ability to perform ADLs    25 min Neuromuscular Re-education:  [x]  See flow sheet : balance training   Rationale: increase ROM, increase strength, improve coordination, improve balance and increase proprioception  to improve the patients ability to improve mobility and decrease fall risk         With   [] TE   [x] TA   [x] neuro   [] other: Patient Education: [x] Review HEP    [] Progressed/Changed HEP based on:   [x] positioning   [x] body mechanics   [] transfers   [] heat/ice application    [] other:      Other Objective/Functional Measures:      Pain Level (0-10 scale) post treatment: 0    ASSESSMENT/Changes in Function: Pt is making steady progress with her activity tolerance and recovery time. Able to increase walking duration over a shorter time period. Still needs cuing for safety with ambulation for foot clearance and shuffled gait. Patient will continue to benefit from skilled PT services to modify and progress therapeutic interventions, address functional mobility deficits, address ROM deficits, address strength deficits, analyze and address soft tissue restrictions, analyze and cue movement patterns, analyze and modify body mechanics/ergonomics, assess and modify postural abnormalities, address imbalance/dizziness and instruct in home and community integration to attain remaining goals. [x]  See Plan of Care  []  See progress note/recertification  []  See Discharge Summary         Progress towards goals / Updated goals:  1. Pt will increase score on Tinetti Balance & Gait Assessment to > 19/28 to demonstrate decreased fall risk.               recert [id number]; 82/52   Assess at 30 day aleena  2. Pt will increase FOTO score to 46 points to demonstrate increased ease with ADLs.                 recert [id number]   Assess at 30 day aleena  3.  Pt will increase B hip flexion/abduction/knee extension to 5/5 with MMT to improve ease with gait & safety with ambulation.                recert status; right hip flexion 4/5, right hip abduction 4/5, right knee   Making steady progress  4. Patient will increase walking tolerance with AD to < 6 min to improve functional independence and household mobility.                 recert status: 4: 26 min   MET; 7:23 min    PLAN  []  Upgrade activities as tolerated     [x]  Continue plan of care  []  Update interventions per flow sheet       []  Discharge due to:_  []  Other:_      Heidy Cobos PTA, CSCS 1/18/2021  2:31 PM    Future Appointments   Date Time Provider Gaby Delarosa   1/18/2021  1:30 PM [facility]   1/20/2021  1:30 PM [facility]   1/25/2021  2:15 PM [facility]   1/27/2021  1:30 PM [facility]   2/4/2021  1:30 PM JULIA Oliveira [facility] DEEJAY SCHED   4/16/2021  1:00 PM [facility] INFUSION PHLEBOTOMIST [facility]   4/19/2021  1:00 PM [facility] FAST TRACK NURSE [facility]

## 2021-01-20 ENCOUNTER — HOSPITAL ENCOUNTER (OUTPATIENT)
Dept: PHYSICAL THERAPY | Age: 76
Discharge: HOME | End: 2021-01-20
Payer: MEDICARE

## 2021-01-20 PROCEDURE — 97112 NEUROMUSCULAR REEDUCATION: CPT

## 2021-01-20 PROCEDURE — 97530 THERAPEUTIC ACTIVITIES: CPT

## 2021-01-20 NOTE — PROGRESS NOTES
PT DAILY TREATMENT NOTE     Patient Name: Aureliano Singh  Date:2021  : 1945  [x]  Patient  Verified  Payor: VA MEDICARE / Plan: VA MEDICARE PART A & B / Product Type: Medicare /    In time:130  Out time:215  Total Treatment Time (min): 45  Visit #: 6 of 8    Medicare/BCBS Only   Total Timed Codes (min):  45 1:1 Treatment Time:  45       Treatment Area: Bilateral leg pain [M79.604, M79.605]  Back pain [M54.9]  Bilateral knee pain [M25.561, M25.562]    SUBJECTIVE  Pain Level (0-10 scale): 0  Any medication changes, allergies to medications, adverse drug reactions, diagnosis change, or new procedure performed?: [x] No    [] Yes (see summary sheet for update)  Subjective functional status/changes:   [] No changes reported  \"No pain. \"    OBJECTIVE    Modality rationale: patient declined   Min Type Additional Details    [] Estim:  []Unatt       []IFC  []Premod                        []Other:  []w/ice   []w/heat  Position:  Location:    [] Estim: []Att    []TENS instruct  []NMES                    []Other:  []w/US   []w/ice   []w/heat  Position:  Location:    []  Traction: [] Cervical       []Lumbar                       [] Prone          []Supine                       []Intermittent   []Continuous Lbs:  [] before manual  [] after manual    []  Ultrasound: []Continuous   [] Pulsed                           []1MHz   []3MHz W/cm2:  Location:    []  Iontophoresis with dexamethasone         Location: [] Take home patch   [] In clinic    []  Ice     []  heat  []  Ice massage  []  Laser   []  Anodyne Position:  Location:    []  Laser with stim  []  Other:  Position:  Location:    []  Vasopneumatic Device Pressure:       [] lo [] med [] hi   Temperature: [] lo [] med [] hi   [] Skin assessment post-treatment:  []intact []redness- no adverse reaction    []redness - adverse reaction:     20 min Therapeutic Activity:  [x]  See flow sheet : standing functional activities, sit to stands   Rationale: increase ROM, increase strength, improve coordination, improve balance and increase proprioception  to improve the patients ability to improve mobility and decrease fall risk     25 min Neuromuscular Re-education:  [x]  See flow sheet : balance training   Rationale: increase ROM, increase strength, improve coordination, improve balance and increase proprioception  to improve the patients ability to improve mobility and decrease fall risk        With   [] TE   [x] TA   [x] neuro   [] other: Patient Education: [x] Review HEP    [] Progressed/Changed HEP based on:   [x] positioning   [x] body mechanics   [] transfers   [] heat/ice application    [] other:      Other Objective/Functional Measures:      Pain Level (0-10 scale) post treatment: 0    ASSESSMENT/Changes in Function: Pt is making great progress with her activity tolerance and balance. She was challenged with increased activity on an unstable surface. Able to successfully use stepping strategies to correct LOB. Patient will continue to benefit from skilled PT services to modify and progress therapeutic interventions, address functional mobility deficits, address ROM deficits, address strength deficits, analyze and address soft tissue restrictions, analyze and cue movement patterns, analyze and modify body mechanics/ergonomics, assess and modify postural abnormalities, address imbalance/dizziness and instruct in home and community integration to attain remaining goals. [x]  See Plan of Care  []  See progress note/recertification  []  See Discharge Summary         Progress towards goals / Updated goals:  1. Pt will increase score on Tinetti Balance & Gait Assessment to > 19/28 to demonstrate decreased fall risk.               recert LAFEEF; 99/52              Assess at 30 day aleena  2. Pt will increase FOTO score to 46 points to demonstrate increased ease with ADLs.                 recert [id number]              Assess at 30 day aleena  3.  Pt will increase B hip flexion/abduction/knee extension to 5/5 with MMT to improve ease with gait & safety with ambulation.                recert status; right hip flexion 4/5, right hip abduction 4/5, right knee              Making steady progress  4. Patient will increase walking tolerance with AD to < 6 min to improve functional independence and household mobility.                 recert status: 4: 26 min              MET; 7:23 min       PLAN  []  Upgrade activities as tolerated     [x]  Continue plan of care  []  Update interventions per flow sheet       []  Discharge due to:_  []  Other:_      Kvngll Current, PTA, CSCS 1/20/2021  2:24 PM    Future Appointments   Date Time Provider Gaby Delarosa   1/20/2021  1:30 PM Dominguez Forrest PTA [facility]   1/25/2021  2:15 PM Dominguez Forrest PTA [facility]   1/27/2021  1:30 PM Dominguez Forrest PTA [facility]   2/4/2021  1:30 PM JULIA Montanez [facility] DEEJAY SCHED   4/16/2021  1:00 PM [facility] INFUSION PHLEBOTOMIST [facility]   4/19/2021  1:00 PM [facility] FAST TRACK NURSE [facility]

## 2021-01-25 ENCOUNTER — HOSPITAL ENCOUNTER (OUTPATIENT)
Dept: PHYSICAL THERAPY | Age: 76
Discharge: HOME | End: 2021-01-25
Payer: MEDICARE

## 2021-01-25 PROCEDURE — 97530 THERAPEUTIC ACTIVITIES: CPT

## 2021-01-25 PROCEDURE — 97112 NEUROMUSCULAR REEDUCATION: CPT

## 2021-01-25 NOTE — PROGRESS NOTES
PT DAILY TREATMENT NOTE     Patient Name: Flower Fox  Date:2021  : 1945  [x]  Patient  Verified  Payor: Cris Farris / Plan: [place] MED[place] & B / Product Type: Medicare /    In time:215  Out time:256  Total Treatment Time (min): 41  Visit #: 7 of 8    Medicare/BCBS Only   Total Timed Codes (min):  41 1:1 Treatment Time:  41       Treatment Area: Bilateral leg pain [M79.604, M79.605]  Back pain [M54.9]  Bilateral knee pain [M25.561, M25.562]    SUBJECTIVE  Pain Level (0-10 scale): 2  Any medication changes, allergies to medications, adverse drug reactions, diagnosis change, or new procedure performed?: [x] No    [] Yes (see summary sheet for update)  Subjective functional status/changes:   [] No changes reported  \"My knees are hurting today. \"    OBJECTIVE    Modality rationale: patient declined   Min Type Additional Details    [] Estim:  []Unatt       []IFC  []Premod                        []Other:  []w/ice   []w/heat  Position:  Location:    [] Estim: []Att    []TENS instruct  []NMES                    []Other:  []w/US   []w/ice   []w/heat  Position:  Location:    []  Traction: [] Cervical       []Lumbar                       [] Prone          []Supine                       []Intermittent   []Continuous Lbs:  [] before manual  [] after manual    []  Ultrasound: []Continuous   [] Pulsed                           []1MHz   []3MHz W/cm2:  Location:    []  Iontophoresis with dexamethasone         Location: [] Take home patch   [] In clinic    []  Ice     []  heat  []  Ice massage  []  Laser   []  Anodyne Position:  Location:    []  Laser with stim  []  Other:  Position:  Location:    []  Vasopneumatic Device Pressure:       [] lo [] med [] hi   Temperature: [] lo [] med [] hi   [] Skin assessment post-treatment:  []intact []redness- no adverse reaction    []redness - adverse reaction:     16 min Therapeutic Activity:  [x]  See flow sheet : sit to stands on foam, TRX squats   Rationale: increase ROM and increase strength  to improve the patients ability to perform ADLs     25 min Neuromuscular Re-education:  [x]  See flow sheet : balance training   Rationale: increase ROM, increase strength, improve coordination, improve balance and increase proprioception  to improve the patients ability to improve mobility and decrease fall risk        With   [] TE   [x] TA   [x] neuro   [] other: Patient Education: [x] Review HEP    [] Progressed/Changed HEP based on:   [x] positioning   [x] body mechanics   [] transfers   [] heat/ice application    [] other:      Other Objective/Functional Measures:      Pain Level (0-10 scale) post treatment: 2    ASSESSMENT/Changes in Function: Pt is making progress with her balance, gait and B LE strength. Discussed potential D/C to an updated HEP at her [place] to which pt agreed. Patient will continue to benefit from skilled PT services to modify and progress therapeutic interventions, address functional mobility deficits, address ROM deficits, address strength deficits, analyze and address soft tissue restrictions, analyze and cue movement patterns, analyze and modify body mechanics/ergonomics, assess and modify postural abnormalities, address imbalance/dizziness and instruct in home and community integration to attain remaining goals. [x]  See Plan of Care  []  See progress note/recertification  []  See Discharge Summary         Progress towards goals / Updated goals:  1. Pt will increase score on Tinetti Balance & Gait Assessment to > 19/28 to demonstrate decreased fall risk.               recert [id number]; 96/31              Assess at 30 day aleena  2. Pt will increase FOTO score to 46 points to demonstrate increased ease with ADLs.                 recert [id number]              Assess at 30 day aleena  3.  Pt will increase B hip flexion/abduction/knee extension to 5/5 with MMT to improve ease with gait & safety with ambulation.                recert status; right hip flexion 4/5, right hip abduction 4/5, right knee              Making steady progress  4. Patient will increase walking tolerance with AD to < 6 min to improve functional independence and household mobility.                 recert status: 4: 26 min              MET; 7:23 min    PLAN  []  Upgrade activities as tolerated     [x]  Continue plan of care  []  Update interventions per flow sheet       []  Discharge due to:_  []  Other:_      Luigi Garcia PTA, [facility] 1/25/2021  3:03 PM    Future Appointments   Date Time Provider Gayb Delarosa   1/25/2021  2:15 PM Alejandro Tovar PTA [facility]   1/27/2021  1:30 PM Alejandro Tovar PTA [facility]   2/4/2021  1:30 PM JULIA Lezama [facility] DEEJAY SCHED   4/16/2021  1:00 PM [facility] INF[facility] PHLEBOTOMIST [facility]   4/19/2021  1:00 PM [facility] FAST TRACK NURSE [facility]

## 2021-01-27 ENCOUNTER — OFFICE VISIT (OUTPATIENT)
Dept: ORTHOPEDIC SURGERY | Age: 76
End: 2021-01-27
Payer: MEDICARE

## 2021-01-27 ENCOUNTER — HOSPITAL ENCOUNTER (OUTPATIENT)
Dept: PHYSICAL THERAPY | Age: 76
Discharge: HOME | End: 2021-01-27
Payer: MEDICARE

## 2021-01-27 VITALS
TEMPERATURE: 97.2 F | BODY MASS INDEX: 17.32 KG/M2 | WEIGHT: 88.2 LBS | RESPIRATION RATE: 16 BRPM | DIASTOLIC BLOOD PRESSURE: 69 MMHG | SYSTOLIC BLOOD PRESSURE: 102 MMHG | HEIGHT: 60 IN | OXYGEN SATURATION: 95 % | HEART RATE: 83 BPM

## 2021-01-27 DIAGNOSIS — Z98.1 S/P CERVICAL SPINAL FUSION: ICD-10-CM

## 2021-01-27 DIAGNOSIS — M47.16 LUMBAR SPONDYLOSIS WITH MYELOPATHY: ICD-10-CM

## 2021-01-27 DIAGNOSIS — R29.898 BILATERAL LEG WEAKNESS: ICD-10-CM

## 2021-01-27 DIAGNOSIS — M54.50 LOW BACK PAIN AT MULTIPLE SITES: ICD-10-CM

## 2021-01-27 DIAGNOSIS — M54.2 NECK PAIN: Primary | ICD-10-CM

## 2021-01-27 DIAGNOSIS — G62.9 NEUROPATHY: ICD-10-CM

## 2021-01-27 DIAGNOSIS — R29.898 DECREASED RANGE OF MOTION OF NECK: ICD-10-CM

## 2021-01-27 PROCEDURE — G8536 NO DOC ELDER MAL SCRN: HCPCS | Performed by: PHYSICIAN ASSISTANT

## 2021-01-27 PROCEDURE — G9899 SCRN MAM PERF RSLTS DOC: HCPCS | Performed by: PHYSICIAN ASSISTANT

## 2021-01-27 PROCEDURE — 72040 X-RAY EXAM NECK SPINE 2-3 VW: CPT | Performed by: PHYSICIAN ASSISTANT

## 2021-01-27 PROCEDURE — 3017F COLORECTAL CA SCREEN DOC REV: CPT | Performed by: PHYSICIAN ASSISTANT

## 2021-01-27 PROCEDURE — G8432 DEP SCR NOT DOC, RNG: HCPCS | Performed by: PHYSICIAN ASSISTANT

## 2021-01-27 PROCEDURE — 99213 OFFICE O/P EST LOW 20 MIN: CPT | Performed by: PHYSICIAN ASSISTANT

## 2021-01-27 PROCEDURE — 1090F PRES/ABSN URINE INCON ASSESS: CPT | Performed by: PHYSICIAN ASSISTANT

## 2021-01-27 PROCEDURE — G8419 CALC BMI OUT NRM PARAM NOF/U: HCPCS | Performed by: PHYSICIAN ASSISTANT

## 2021-01-27 PROCEDURE — G8427 DOCREV CUR MEDS BY ELIG CLIN: HCPCS | Performed by: PHYSICIAN ASSISTANT

## 2021-01-27 PROCEDURE — 1101F PT FALLS ASSESS-DOCD LE1/YR: CPT | Performed by: PHYSICIAN ASSISTANT

## 2021-01-27 PROCEDURE — 97530 THERAPEUTIC ACTIVITIES: CPT

## 2021-01-27 NOTE — PROGRESS NOTES
PT DAILY TREATMENT NOTE     Patient Name: Zack Colon  Date:2021  : 1945  [x]  Patient  Verified  Payor: VA MEDICARE / Plan: [place] MEDICARE PART A & B / Product Type: Medicare /    In time:130  Out time:213  Total Treatment Time (min): 43  Visit #: 8 of 8    Medicare/BCBS Only   Total Timed Codes (min):  43 1:1 Treatment Time:  43       Treatment Area: Bilateral leg pain [M79.604, M79.605]  Back pain [M54.9]  Bilateral knee pain [M25.561, M25.562]    SUBJECTIVE  Pain Level (0-10 scale): 3  Any medication changes, allergies to medications, adverse drug reactions, diagnosis change, or new procedure performed?: [x] No    [] Yes (see summary sheet for update)  Subjective functional status/changes:   [] No changes reported  \"My legs hurt a little. \"    OBJECTIVE    Modality rationale: patient declined   Min Type Additional Details    [] Estim:  []Unatt       []IFC  []Premod                        []Other:  []w/ice   []w/heat  Position:  Location:    [] Estim: []Att    []TENS instruct  []NMES                    []Other:  []w/US   []w/ice   []w/heat  Position:  Location:    []  Traction: [] Cervical       []Lumbar                       [] Prone          []Supine                       []Intermittent   []Continuous Lbs:  [] before manual  [] after manual    []  Ultrasound: []Continuous   [] Pulsed                           []1MHz   []3MHz W/cm2:  Location:    []  Iontophoresis with dexamethasone         Location: [] Take home patch   [] In clinic    []  Ice     []  heat  []  Ice massage  []  Laser   []  Anodyne Position:  Location:    []  Laser with stim  []  Other:  Position:  Location:    []  Vasopneumatic Device Pressure:       [] lo [] med [] hi   Temperature: [] lo [] med [] hi   [] Skin assessment post-treatment:  []intact []redness- no adverse reaction    []redness - adverse reaction:     43 min Therapeutic Activity:  [x]  See flow sheet : Tinetti, FOTO, reassessment   Rationale: increase ROM, increase strength, improve coordination, improve balance and increase proprioception  to improve the patients ability to improve mobility and ADL performance          With   [] TE   [x] TA   [] neuro   [] other: Patient Education: [x] Review HEP    [] Progressed/Changed HEP based on:   [x] positioning   [x] body mechanics   [] transfers   [] heat/ice application    [] other:      Other Objective/Functional Measures:   FOTO 36    MMT right hip flexion 4/5  MMT left hip flexion 4+/5    MMT right hip abduction 3/5  MMT left hip abduction 4/5    MMT right knee extension 4+/5  MMT left knee extension 5/5    Tinetti Balance: 21/28     Pain Level (0-10 scale) post treatment: 3    ASSESSMENT/Changes in Function: Ms. Kai Dee has been a pleasure to treat and reports 40% improvement since beginning therapy. Pt has progressed wonderfully with her static balance, ADL performance, and BLE strength. She still has difficulty with dynamic balance and remains a moderate fall risk according to her most recent Tinetti Balance score. We are discharging to an updated HEP at this time. []  See Plan of Care  []  See progress note/recertification  [x]  See Discharge Summary         Progress towards goals / Updated goals:  1. Pt will increase score on Tinetti Balance & Gait Assessment to > 19/28 to demonstrate decreased fall risk.               recert [id number]; 21/40              MET; 21/28  2. Pt will increase FOTO score to 46 points to demonstrate increased ease with ADLs.                 recert [id number]              NOT MET; 36  3. Pt will increase B hip flexion/abduction/knee extension to 5/5 with MMT to improve ease with gait & safety with ambulation.                recert status; right hip flexion 4/5, right hip abduction 4/5, right knee              PROGRESSED WELL; right hip flexion 4/5, left hip flexion 4+/5, right hip abduction 3/5, left hip abduction 4/5, right knee extension 4+/5, left knee extension 5/5  4.  Patient will increase walking tolerance with AD to < 6 min to improve functional independence and household mobility.                 recert status: 4: 26 min              MET; 7:23 min     Functional Gains: walking/standing tolerance with rollator, activity tolerance, static balance, dishes, preparing meals, more independence   Functional Deficits: dynamic balance, walking without rollator for > 5-10 minutes  % improvement: 40%  Pain   Average: 3/10       Best: 0/10     Worst: 5/10  Patient Goal: \"walk without that walker\"    PLAN  []  Upgrade activities as tolerated     []  Continue plan of care  []  Update interventions per flow sheet       [x]  Discharge due to: PROGRAM COMPLETE  []  Other:_      Loy Barkley PTA, CSCS 1/27/2021  2:23 PM    Future Appointments   Date Time Provider Gaby Delarosa   2/4/2021  1:30 PM [facility], [address] [place] [address]   4/16/2021  1:00 PM [facility] INF[facility] PHLEBOTOMIST [facility]   4/19/2021  1:00 PM [facility] FAST TRACK NURSE [facility]

## 2021-01-27 NOTE — PROGRESS NOTES
Patient: Jesse Amor                MRN: [id number]       SSN: [id number]  YOB: 1945        AGE: 70 y.o. SEX: female          PCP: None  01/27/21    Chief Complaint   Patient presents with    Knee Pain     radha knee pain       HISTORY:  Jesse Amor is a 70 y.o. female well-known to our practice presents today complaining of neck and low back pain. She is previously seen Dr. Damir Mondragon for her low back and no surgical intervention was recommended. She has completed multiple visits of outpatient physical therapy with attention to her back. In addition she has recently undergone a occipital cervical fusion under the care of Dr. Randy Guzman in [facility]. The fusion was done in 2019. Since that time she has had progressive pain with numbness tingling in the hands and arms develop. She has not gone back to Dr. Randy Guzman noting that she was not happy with the way his office staff treated her as a patient. Regarding her low back she denies any bowel or bladder incontinence no saddle paresthesia or anesthesia reported. She is in therapy currently for attention to bilateral lower extremity strengthening gait training and general postural concerns. She does ambulate with a Rollator type walker with brakes and seat. She denies any trauma to the neck or low back. She further notes from her spinal neck surgery she recovered very slowly and is not excited about considering a additional surgery but the symptoms of weakness and numbness in the hands and arms may in her words predicate that.           Pain Assessment  1/27/2021   Location of Pain Leg;Knee   Pain Location Comment -   Location Modifiers Left;Right   Severity of Pain 5   Quality of Pain Aching   Quality of Pain Comment -   Duration of Pain Persistent   Frequency of Pain Constant   Date Pain First Started -   Aggravating Factors Walking;Standing   Aggravating Factors Comment -   Limiting Behavior -   Relieving Factors Nothing   Relieving Factors Comment -   Result of Injury No   Work-Related Injury -   Type of Injury -           Lab Results   Component Value Date/Time    Hemoglobin A1c 5.8 (H) 03/18/2019 04:07 PM     Weight Metrics 1/27/2021 12/2/2020 11/25/2020 10/28/2020 10/22/2020 10/8/2020 9/16/2020   Weight 88 lb 3.2 oz 91 lb 91 lb 87 lb 9.6 oz 88 lb 88 lb 89 lb   BMI 17.23 kg/m2 17.77 kg/m2 17.77 kg/m2 17.11 kg/m2 17.19 kg/m2 17.19 kg/m2 17.38 kg/m2            Problem List Items Addressed This Visit     None      Visit Diagnoses     Neck pain    -  Primary    Relevant Orders    AMB POC XRAY, SPINE, CERVICAL; 2 OR 3 (Completed)    S/P cervical spinal fusion        Decreased range of motion of neck        Bilateral leg weakness        Neuropathy        Low back pain at multiple sites        Lumbar spondylosis with myelopathy              PAST MEDICAL HISTORY:   Past Medical History:   Diagnosis Date    Abnormal Pap smear     Dr. Gemini Wilson    Allergic rhinitis     Arthritis     Cerebrovascular small vessel disease 3/18/2019    CT 3/17/2019    Cervical spinal cord compression ([address]     Chronic pain     Concentric left ventricular hypertrophy 3/18/2019    With left ventricular diastolic dysfunction by echocardiogram 3/18/2019    Hypercholesterolemia     Neck pain 5/17/2010    Stroke ([address] 10/02/2017    TIA- legs weak memory issues       PAST SURGICAL HISTORY:   Past Surgical History:   Procedure Laterality Date    COLONOSCOPY N/A 6/4/2018    COLONOSCOPY / polypectomy performed by Vasiliy Real MD at [facility] ENDOSCOPY    HX GYN      Total Hyst    HX LAP CHOLECYSTECTOMY      HX OTHER SURGICAL  2017    Throat widened       ALLERGIES:   Allergies   Allergen Reactions    Baclofen Shortness of Breath     Denies.  Pt tolerates    Morphine Other (comments)     hallucinations    Celebrex [Celecoxib] Other (comments)     Tiredness         CURRENT MEDICATIONS:  A list of medications prior to the time of admission include:  Prior to Admission medications    Medication Sig Start Date End Date Taking? Authorizing Provider   busPIRone (BUSPAR) 7.5 mg tablet take 1 tablet by mouth twice a day 9/23/20  Yes Meri Land NP   folic acid 800 mcg tablet Take 800 mcg by mouth daily.   Yes Provider, Historical   ibuprofen (MOTRIN) 400 mg tablet Take 1 Tab by mouth every six (6) hours as needed for Pain. 7/26/20  Yes Rishabh Solitario MD   cyanocobalamin (Vitamin B-12) 100 mcg tablet Take 100 mcg by mouth daily.   Yes Provider, Historical   ondansetron (ZOFRAN ODT) 4 mg disintegrating tablet dissolve 1 tablet ON TONGUE every 6 hours if needed for nausea OR vomiting 5/7/20  Yes Meri Land NP   gabapentin (NEURONTIN) 100 mg capsule Take 2 Caps by mouth three (3) times daily. Max Daily Amount: 600 mg.  Patient taking differently: Take 300 mg by mouth three (3) times daily. Takes 300mg TID 4/16/20  Yes Cierra Howell NP   denosumab (PROLIA) 60 mg/mL injection 60 mg by SubCUTAneous route every 6 months. 2/21/20  Yes Provider, Historical   acetaminophen (TYLENOL) 160 mg/5 mL elixir Take 1,000 mg by mouth daily. 12/12/19  Yes Provider, Historical   turmeric 400 mg cap Take 1 Cap by mouth daily.   Yes Provider, Historical   aspirin 81 mg chewable tablet Take 1 Tab by mouth daily. 10/10/17  Yes Inessa Sanchez PA   atorvastatin (LIPITOR) 20 mg tablet take 1 tablet by mouth once daily 9/23/20   Meri Land NP   topiramate (Topamax) 25 mg tablet Take 1 Tab by mouth two (2) times daily (with meals). 7/28/20   Anabell Barbosa NP   naloxone (Narcan) 4 mg/actuation nasal spray Use 1 spray intranasally, then discard. Repeat with new spray every 2 min as needed for opioid overdose symptoms, alternating nostrils. 7/26/20   Rishabh Solitario MD   baclofen (LIORESAL) 10 mg tablet Take 1 Tab by mouth two (2) times a day. 6/11/20   Varsha Flores MD   ALPRAZolam (XANAX) 0.25 mg tablet Take 1 Tab by mouth two  (2) times daily as needed for Anxiety. Max Daily Amount: 0.5 mg. 5/26/20   Edi GALICIA NP   omeprazole (PRILOSEC) 20 mg capsule take 1 capsule by mouth once daily before breakfast 5/7/20   Robe Chin NP   midodrine (PROAMITINE) 5 mg tablet Take 5 mg by mouth two (2) times a day. 1 tab by mouth with breakfast and lunch 2/6/20   Provider, Historical   loratadine (CLARITIN) 10 mg tablet Take 1 Tab by mouth daily. 6/5/19   Robe Chin NP       FAMILY HISTORY:   Family History   Problem Relation Age of Onset    Cancer Mother         breast    Heart Disease Father        SOCIAL HISTORY:   Social History     Socioeconomic History    Marital status: UNKNOWN     Spouse name: Not on file    Number of children: Not on file    Years of education: Not on file    Highest education level: Not on file   Tobacco Use    Smoking status: Never Smoker    Smokeless tobacco: Never Used   Substance and Sexual Activity    Alcohol use: No    Drug use: No    Sexual activity: Never       ROS:No CP, No SOB, No fever/chills nor night sweats. No headaches, vision abnormalities to include double and oral loss of vision. No hearing abnormalities. Musculoskeletal pain per HPI. Pain is exacerbated positionally. Pt denies h/o spinal surgery, injections, or PT/chiropractor. Self treated with less than adequate relief on oral antiinflammatories. . Pt denies change in bowel or bladder habits. Pt denies fever, weight loss, or skin changes. EXAM:  Patient alert and oriented x 3,   CN II-XII grossly intact  Sitting comfortably in the exam room, interacting with conversation with pleasant affect. Breathing appears regular effortless with no visible usage of accessory muscles  Distal cap refill intact at 2/2 Reed UE / LE. Neuro intact Reed LE to noxious stimuli    Ortho Specific exam:    Cervical spine reveals midline post incision well-healed cranial/caudal oriented extending 16 cm.     Skin is thin overlying the retained orthopedic hardware and the screw/plate/rosemary fixation hardware is palpable and there is discomfort reproduced in the paracervical musculature. Patient has very limited range of motion of her cervical spine noting no chin to chest flexion from neutral and barely 5 degrees of extension from neutral.    Sensation decreased in the C4 C5-C6-C7 and C8 dermatomes symmetrically.  strength moderately weaker in the right hand compared to the left. She is right-hand dominant. X-ray: [facility] 1/27/2021 space 2 view of the cervical spine reveals extensive cervical spondylosis with a posterior occipitocervical fusion involving C2-C4. Rods and pedicle screws intact with no hardware fracturing or failure. There is severe spondylotic changes with DJD seen at C4-C5-C6 and C7. No evidence of spondylolisthesis. Degenerative anterior osteophytes noted throughout the entire lower cervical complex from C4-C7. IMPRESSION:      ICD-10-CM ICD-9-CM    1. Neck pain  M54.2 723.1 AMB POC XRAY, SPINE, CERVICAL; 2 OR 3   2. S/P cervical spinal fusion  Z98.1 V45.4    3. Decreased range of motion of neck  R29.898 723.8    4. Bilateral leg weakness  R29.898 729.89    5. Neuropathy  G62.9 355.9    6. Low back pain at multiple sites  M54.5 724.2    7. Lumbar spondylosis with myelopathy  M47.16 721.42         PLAN: With patient's history of spondylosis of the lumbar spine and as she has been struggling with weakness in the legs as well as postural instability ongoing order an MRI to fully assess for nerve root concerns associated. Regarding her cervical spine go to reach back out to Dr. Vaibhav Rincon office for prior x-rays and also to Dr. Miri Latif office in order to assess whether her C5-C7 cervical changes are new from her fusion or pre-existing. Today her x-rays were reviewed copies provided all of her questions answered to her satisfaction.                          Patient provided a reminder for a \"due or due soon\" health maintenance. I have asked the patient to schedule an appointment with their primary care provider for follow-up on general health maintenance concerns. Today all the patient's questions were answered to their satisfaction. Copies of x-rays reviewed if obtained this visit, and provided to patient. Dictation disclaimer:  Please note that this dictation was completed with [employer], the computer voice recognition software. Quite often unanticipated grammatical, syntax, homophones, and other interpretive errors are inadvertently transcribed by the computer software. Please disregard these errors. Please excuse any errors that have escaped final proofreading. Elsa DUFFY, APC, MPAS, PA-C  [facility]

## 2021-01-27 NOTE — PROGRESS NOTES
[facility]  [address]  [place], [phone] fax    Discharge Summary  Patient name: Jamaica Friedman Start of Care: 11/2/2020   Referral source: Pamela Valdes Floyd EMERITA: [id number]               Medical Diagnosis: Bilateral leg pain [M79.604, M79.605]  Back pain [M54.9]  Bilateral knee pain [M25.561, M25.562]  Payor: VA MEDICARE / Plan: VA MEDICARE PART A & B / Product Type: Medicare /     Onset Date:10/22/20               Treatment Diagnosis: B leg, back and B knee pain   Prior Hospitalization: see medical history Provider#: [id number]   Medications: Verified on Patient summary List    Comorbidities: OA, CVA, cervical myelopathy with hx of fusion   Prior Level of Function: retired. Recovering from cervical myelopathy/fusion earlier this year. Lives with friend who A with all care in 1 story home. Frequent falls and general deconditioning present since surgery  Visits from Start of Care: 23    Missed Visits: 2  Reporting Period : 12/28/2020 to 1/27/2021    Goal: Pt will increase score on Tinetti Balance & Gait Assessment to > 19/28 to demonstrate decreased fall risk. Status at last note/certification: MET; 35/93  Status at discharge: met    Goal: Pt will increase FOTO score to 46 points to demonstrate increased ease with ADLs.    Status at last note/certification: NOT MET; 36  Status at discharge: not met    Goal: Pt will increase B hip flexion/abduction/knee extension to 5/5 with MMT to improve ease with gait & safety with ambulation.   Status at last note/certification: PROGRESSED WELL; right hip flexion 4/5, left hip flexion 4+/5, right hip abduction 3/5, left hip abduction 4/5, right knee extension 4+/5, left knee extension 5/5  Status at discharge: not met    Goal: Patient will increase walking tolerance with AD to < 6 min to improve functional independence and household mobility.    Status at last note/certification: MET; 9:55 min  Status at discharge: met    Functional Gains: walking/standing tolerance with rollator, activity tolerance, static balance, dishes, preparing meals, more independence   Functional Deficits: dynamic balance, walking without rollator for > 5-10 minutes  % improvement: 40%  Pain   Average: 3/10                  Best: 0/10                Worst: 5/10  Patient Goal: \"walk without that walker\"    Assessment/Summary of care:   Pt was seen for 23 therapy sessions. Pt demonstrated/reported improvements in balance, ADL performance, and B LE strength since starting therapy. Pt reports 40% improvement in symptoms since start of care. She is still challenged with dynamic stability per Tinetti Balance score. Pt given updated HEP to perform. Pt is d/c'ed from therapy at this time to HEP secondary to improvement in symptoms and ability to independently perform HEP to manage current deficits.      RECOMMENDATIONS:  [x]Discontinue therapy: [x]Patient has reached or is progressing toward set goals      []Patient is non-compliant or has abdicated      []Due to lack of appreciable progress towards set goals    Jeol Rodney, PT 1/27/2021 3:30 PM

## 2021-01-27 NOTE — PROGRESS NOTES
Physical Therapy Discharge Instructions      [facility]  [address] [facility], [address] [phone] fax      Patient:  Jett Davison  : 1945      Continue Home Exercise Program 1-2 times per day       Continue with    [] Ice  as needed      [x] Heat           Follow up with MD:     [x] Upon completion of therapy     [] As needed      Recommendations:     [x]   Return to activity with home program    []   Return to activity with the following modifications:       []Post Rehab Program    []Join Independent aquatic program     []Return to/join local gym      Amirah Colbert PTA, CSCS   2021 2:06 PM

## 2021-02-01 DIAGNOSIS — R29.898 BILATERAL LEG WEAKNESS: ICD-10-CM

## 2021-02-03 ENCOUNTER — HOSPITAL ENCOUNTER (OUTPATIENT)
Dept: MRI IMAGING | Age: 76
Discharge: HOME | End: 2021-02-03
Attending: PHYSICIAN ASSISTANT
Payer: MEDICARE

## 2021-02-03 PROCEDURE — 72148 MRI LUMBAR SPINE W/O DYE: CPT

## 2021-02-09 ENCOUNTER — OFFICE VISIT (OUTPATIENT)
Dept: ORTHOPEDIC SURGERY | Age: 76
End: 2021-02-09
Payer: MEDICARE

## 2021-02-09 VITALS
HEIGHT: 60 IN | SYSTOLIC BLOOD PRESSURE: 130 MMHG | HEART RATE: 90 BPM | OXYGEN SATURATION: 96 % | DIASTOLIC BLOOD PRESSURE: 68 MMHG | WEIGHT: 89 LBS | BODY MASS INDEX: 17.47 KG/M2 | TEMPERATURE: 98.2 F

## 2021-02-09 DIAGNOSIS — M48.02 CERVICAL SPINAL STENOSIS: ICD-10-CM

## 2021-02-09 DIAGNOSIS — E44.0 MODERATE PROTEIN-CALORIE MALNUTRITION (HCC): ICD-10-CM

## 2021-02-09 DIAGNOSIS — M47.812 SPONDYLOSIS OF CERVICAL REGION WITHOUT MYELOPATHY OR RADICULOPATHY: Primary | ICD-10-CM

## 2021-02-09 DIAGNOSIS — M47.812 SPONDYLOSIS OF CERVICAL REGION WITHOUT MYELOPATHY OR RADICULOPATHY: ICD-10-CM

## 2021-02-09 PROCEDURE — G8536 NO DOC ELDER MAL SCRN: HCPCS | Performed by: PHYSICIAN ASSISTANT

## 2021-02-09 PROCEDURE — 99212 OFFICE O/P EST SF 10 MIN: CPT | Performed by: PHYSICIAN ASSISTANT

## 2021-02-09 PROCEDURE — G8418 CALC BMI BLW LOW PARAM F/U: HCPCS | Performed by: PHYSICIAN ASSISTANT

## 2021-02-09 PROCEDURE — 1090F PRES/ABSN URINE INCON ASSESS: CPT | Performed by: PHYSICIAN ASSISTANT

## 2021-02-09 PROCEDURE — 1101F PT FALLS ASSESS-DOCD LE1/YR: CPT | Performed by: PHYSICIAN ASSISTANT

## 2021-02-09 PROCEDURE — G8427 DOCREV CUR MEDS BY ELIG CLIN: HCPCS | Performed by: PHYSICIAN ASSISTANT

## 2021-02-09 PROCEDURE — 3017F COLORECTAL CA SCREEN DOC REV: CPT | Performed by: PHYSICIAN ASSISTANT

## 2021-02-09 PROCEDURE — G8432 DEP SCR NOT DOC, RNG: HCPCS | Performed by: PHYSICIAN ASSISTANT

## 2021-02-09 PROCEDURE — G9899 SCRN MAM PERF RSLTS DOC: HCPCS | Performed by: PHYSICIAN ASSISTANT

## 2021-02-09 NOTE — PROGRESS NOTES
Matilde Bower returns the office for follow-up and review of her MRI of the lumbar spine. She was found to have moderate facet arthropathy at L4-L5 L5-S1. She still experiencing pain with weakness in her legs. She has seen Dr. Elba Regalado in the past for prior lumbar treatments. I would like to refer her back to his office for further evaluation to include but not limited to possible epidural injections associated with the areas of concern identified on the MRI. Additionally she still having pain of the cervical spine and has previous radiographic findings of a cervico-occipital fusion with extension of instability just distal to the lowest point of the existing posterior plate screw fixation. Therefore an MRI with [place] sequential assay is being ordered. Patient will follow back once the cervical MRI is available for review and comment. She is walking with her male friend but not using a walker. In light of the findings from previous radiographs associated with the cervical spine I recommend that she return to using a walker and have her friend present when she does ambulate. She does not ambulate alone or without a minimum of the 4 post rolling walker. Today a copy of the MRI was provided questions answered supplements model utilized for identifying the areas of concern as they correlate with the MRI findings. All of her questions answered to her satisfaction. MRI as below. EXAM: MRI LUMB SPINE WO CONT     CLINICAL INDICATIONS/HISTORY: Bilateral leg weakness     COMPARISON: March 2020     Technique: Multi-sequence multiplanar MR imaging obtained centered on the lumbar  spine.      FINDINGS:      Alignment: Intact lordosis  Vertebral body height: Normal  Marrow signal: Unremarkable  Conus: At L1-2, normal morphology     Axial imaging correlation:     T12-L1: Patent canal and foramina.      L1-2: Patent canal and foramina.     L2-3: Bilobed disc bulge. Bilateral facet arthropathy.  Patent canal and  foramina.     L3-4: Minor disc bulge and facet arthropathy. Patent canal and foramina.     L4-5: Mild to moderate disc space narrowing and disc desiccation. Broad-based  disc bulge/disc osteophyte complex. Bilateral facet arthropathy. Thickening and  buckling of ligamentum flavum. Mild trefoil deformity of thecal sac. Moderate  left and mild right foraminal stenosis.     L5-S1: Moderate disc space narrowing and desiccation. Broad-based disc  osteophyte complex. Bilateral facet arthropathy. The canal is patent. Moderate  left foraminal stenosis.     Other structures: Unremarkable.        IMPRESSION     1. Same distribution of multilevel degenerative findings along the lumbar spine  -Overall mild to moderate  -No high-grade spinal stenosis  -Moderate foraminal stenoses at L4-5 and L5-S1 without overt nerve impingement          Additionally patient does carry a BMI of 17.38 which is consistent with protein malnutrition. Patient therefore was provided a referral for a nutritionist.  She is in a continue at least 2 balanced meals a day with 2 nutritional supplements in the form of boost or Ensure.

## 2021-02-12 ENCOUNTER — HOSPITAL ENCOUNTER (OUTPATIENT)
Dept: MRI IMAGING | Age: 76
Discharge: HOME | End: 2021-02-12
Attending: PHYSICIAN ASSISTANT
Payer: MEDICARE

## 2021-02-12 PROCEDURE — 72141 MRI NECK SPINE W/O DYE: CPT

## 2021-02-17 ENCOUNTER — TELEPHONE (OUTPATIENT)
Dept: ORTHOPEDIC SURGERY | Age: 76
End: 2021-02-17

## 2021-02-17 ENCOUNTER — OFFICE VISIT (OUTPATIENT)
Dept: ORTHOPEDIC SURGERY | Age: 76
End: 2021-02-17
Payer: MEDICARE

## 2021-02-17 VITALS
HEART RATE: 87 BPM | SYSTOLIC BLOOD PRESSURE: 110 MMHG | DIASTOLIC BLOOD PRESSURE: 71 MMHG | OXYGEN SATURATION: 95 % | HEIGHT: 60 IN | BODY MASS INDEX: 17.28 KG/M2 | WEIGHT: 88 LBS | RESPIRATION RATE: 16 BRPM | TEMPERATURE: 97.1 F

## 2021-02-17 DIAGNOSIS — R29.898 BILATERAL LEG WEAKNESS: Primary | ICD-10-CM

## 2021-02-17 DIAGNOSIS — R29.898 WEAKNESS OF BOTH UPPER EXTREMITIES: ICD-10-CM

## 2021-02-17 DIAGNOSIS — R62.7 FAILURE TO THRIVE IN ADULT: Primary | ICD-10-CM

## 2021-02-17 PROCEDURE — G8536 NO DOC ELDER MAL SCRN: HCPCS | Performed by: PHYSICIAN ASSISTANT

## 2021-02-17 PROCEDURE — G8432 DEP SCR NOT DOC, RNG: HCPCS | Performed by: PHYSICIAN ASSISTANT

## 2021-02-17 PROCEDURE — 1090F PRES/ABSN URINE INCON ASSESS: CPT | Performed by: PHYSICIAN ASSISTANT

## 2021-02-17 PROCEDURE — 1101F PT FALLS ASSESS-DOCD LE1/YR: CPT | Performed by: PHYSICIAN ASSISTANT

## 2021-02-17 PROCEDURE — G8427 DOCREV CUR MEDS BY ELIG CLIN: HCPCS | Performed by: PHYSICIAN ASSISTANT

## 2021-02-17 PROCEDURE — G8418 CALC BMI BLW LOW PARAM F/U: HCPCS | Performed by: PHYSICIAN ASSISTANT

## 2021-02-17 PROCEDURE — 3017F COLORECTAL CA SCREEN DOC REV: CPT | Performed by: PHYSICIAN ASSISTANT

## 2021-02-17 PROCEDURE — 99212 OFFICE O/P EST SF 10 MIN: CPT | Performed by: PHYSICIAN ASSISTANT

## 2021-02-17 PROCEDURE — G9899 SCRN MAM PERF RSLTS DOC: HCPCS | Performed by: PHYSICIAN ASSISTANT

## 2021-02-17 NOTE — PROGRESS NOTES
Mariam Herndon returns the office for MRI follow-up of her cervical spine. She still echoing frustration in her recoveries from her cervical occipital fusion. Weakness is her primary concern of both the upper and lower extremities. Her friend who is with her today echoed again that the surgeon Dr. Bharati Herndon noted it may take up to 18 months to fully recover from her extended cervical surgical intervention. The MRI was reviewed thoroughly and report follows below:    Status: Final result (Exam End: 2/12/2021 12:55) Provider Status: Reviewed   Study Result    MR CERVICAL SPINE WITHOUT CONTRAST     HISTORY: painfull orthopaedic hardware     COMPARISON: June 2020 cervical MRI 8 months prior     TECHNIQUE: Multisequence multiplanar imaging through the cervical spine.     FINDINGS:     Alignment: Abnormal with straightening of the normal cervical lordosis see below  Vertebral body height: Normal  Marrow signal: Unremarkable  Disc spaces: Preserved height and signal intensity     Cervicomedullary Junction: Patent  Cervical cord: No cord expansion or atrophy.      On axial imaging, findings at each level are as follows:     C2/C3: Patient has had takedown posterior bony elements with occipital fixation  devices in place these are similar to the June study no evidence of any  complication seen here. Patent canal and foramina.     C3/C4: Posterior elements have been taken down there has been prior fixation at  the C3, C4, C5 levels the form of posterior pedicle screws these are in good  position unchanged from prior exam Patent canal and foramina.     C4/C5: Posterior elements have been removed Central canal neuroforamina show no  stenosis. The cord demonstrates in the posterior column an abnormal defect  consistent with cervical myelomalacia. Previously measured 0.47 x 0.31 cm now  0.34 x 0.27 cm suggesting stabilization. A portion of this defect is adjacent to  the medial spinothalamic tract bilaterally.  However this is decreasing in size  not increasing this extends from about 3 4 disc space to the C4-5 disc space. No  additional areas of cord injury are seen. C5/C6: Posterior elements remain in place posterior fixation apparatus in good  position cord is not compressed central canal neuroforamina normal Patent canal  and foramina.     C6/C7: Central canal 0.73 versus 8.81 cm measurement on the prior exam overall  probably no significant interval change this is secondary to a diffuse annular  bulge and osteophytic projection. Cord is mildly flattened along its ventral  surface. Overall appearance stable cord shows no signal abnormality  intrinsically.     Encroachment into the left neuroforamina probable irritation to the exiting left  C7 nerve root.     Similar moderate sized osteophytic formation present into the right  neuroforamina probable irritation to the right C7 nerve root also suspected.     C7/T1: Persistent focal left central herniation osteophyte projects into the  central canal and mildly flattens the left ventral aspect of the cord similar  and unchanged from prior study. Left right neuroforamina demonstrate no  significant stenosis.     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   Fortunately the MRI above indicates that there is no change in the central canal stenosis noted noted at C6-7 as well as the left central herniation with cord contact and mild compression at C7-T1. Based on the data above patient did feel more confident and would like to return to physical therapy. I indicated that a conditioning program would be essential for recovering strength and her endurance. A recommendation was made for a local gym membership however patient declined secondary to the current Covid crisis. A new order was placed for physical therapy to include weightbearing as tolerated bilateral upper and lower extremities with an attention to conditioning and light strengthening. Patient does have a nutritionist consult which is pending. She is grossly underweight carrying a BMI of 17.19 with a weight of 88 pounds. Certainly modifying her diet may assist in developing muscle mass with the idea that the conditioning will also help in the long-term. Today all of her and her friend's questions were present today were answered to her satisfaction copy of the MRI reviewed and provided. Follow-up as needed.

## 2021-02-17 NOTE — TELEPHONE ENCOUNTER
There is a referral for a nutritionist. However, in the body of the order it states \"physical therapy\" not nutrition and/or the reason for needing this provider's consultation.   Can we please change the order to read for nutrition and notify patient at [phone]

## 2021-02-17 NOTE — TELEPHONE ENCOUNTER
Please specifically write an order for a nutritionist counseling secondary to patient's anorexia with a BMI of 17 and a weight of 88 pounds. Diagnosis can be also failure to thrive.

## 2021-02-23 ENCOUNTER — HOSPITAL ENCOUNTER (OUTPATIENT)
Dept: NUTRITION | Age: 76
Discharge: HOME | End: 2021-02-23

## 2021-02-25 NOTE — PROGRESS NOTES
[address] [place] [address]     Nutrition Assessment - Medical Nutrition Therapy   Outpatient Initial Evaluation         Patient Name: Melissa Babcock : 1945   Treatment Diagnosis: Failure to thrive in adult; BMI 17     Referral Source: Alaina Ardon [facility]): 2021     Gender: female Age: 70 y.o. Ht: 5' in Wt: 88.6  lb  kg   BMI: 16 BMR   Male  BMR Female 201     Past Medical History:  Cervical Spine stenosis. Pertinent Medications:   Zofran  Buspar     Biochemical Data:   Lab Results   Component Value Date/Time    Hemoglobin A1c 5.8 (H) 2019 04:07 PM     Lab Results   Component Value Date/Time    Sodium 139 10/16/2020 01:12 PM    Potassium 4.1 10/16/2020 01:12 PM    Chloride 105 10/16/2020 01:12 PM    CO2 31 10/16/2020 01:12 PM    Anion gap 3 10/16/2020 01:12 PM    Glucose 83 10/16/2020 01:12 PM    BUN 21 (H) 10/16/2020 01:12 PM    Creatinine 0.80 10/16/2020 01:12 PM    BUN/Creatinine ratio 26 (H) 10/16/2020 01:12 PM    GFR est AA >60 10/16/2020 01:12 PM    GFR est non-AA >60 10/16/2020 01:12 PM    Calcium 9.7 10/16/2020 01:12 PM    Bilirubin, total 0.3 10/16/2020 01:12 PM    Alk. phosphatase 84 10/16/2020 01:12 PM    Protein, total 7.7 10/16/2020 01:12 PM    Albumin 4.0 10/16/2020 01:12 PM    Globulin 3.7 10/16/2020 01:12 PM    A-G Ratio 1.1 10/16/2020 01:12 PM    ALT (SGPT) 22 10/16/2020 01:12 PM    AST (SGOT) 20 10/16/2020 01:12 PM     Lab Results   Component Value Date/Time    Cholesterol, total 143 10/01/2019 10:50 AM    HDL Cholesterol 80 (H) 10/01/2019 10:50 AM    LDL, calculated 47 10/01/2019 10:50 AM    VLDL, calculated 16 10/01/2019 10:50 AM    Triglyceride 80 10/01/2019 10:50 AM    CHOL/HDL Ratio 1.8 10/01/2019 10:50 AM     Lab Results   Component Value Date/Time    ALT (SGPT) 22 10/16/2020 01:12 PM    AST (SGOT) 20 10/16/2020 01:12 PM    Alk.  phosphatase 84 10/16/2020 01:12 PM    Bilirubin, direct 0.1 2018 01:05 PM    Bilirubin, total 0.3 10/16/2020 01:12 PM     Lab Results   Component Value Date/Time    Creatinine, POC 0.7 09/30/2019 03:39 PM    Creatinine 0.80 10/16/2020 01:12 PM     Lab Results   Component Value Date/Time    BUN 21 (H) 10/16/2020 01:12 PM    BUN, POC 12 09/30/2019 03:39 PM     No results found for: MCACR, MCA1, MCA2, MCA3, MCAU, MCAU2, MCALPOCT     Assessment:   Patient is a 70year old female who visits RD with her partner for insight on how to gain weight. Patient would like to weigh 100#. She comments that her appetite seems to have improved lately. Patient's activity consists of a sedentary lifestyle. She needs assistance to walk to avoid falls. Patient's sleeping habits consists of fractured sleep due to patient needing to use the bathroom much. Food & Nutrition: Patient eats 3 meals a day consisting of starch, protein and sometimes vegetables. Breakfast may consist of sausage, hash browns, cereal, bananas. A lunch choice may be pizza, hotdogs, donuts, ravioli. Dinner choices consist of beans and nixon, spaghetti, beef stew and vegetables. Late night snacking may consist of  [place] ice cream, bread and butter. Estimate Needs   Calories: >1200 Protein: >60 Carbs: >100 Fat: >50   Kcal/day  g/day  g/day  g/day                            Nutrition Diagnosis Inadequate oral intake related to health issues as evidenced by a BMI of 17     Nutrition Intervention &  Education: Encouraged pt to drink >48 ounces of fluid which can include juices, Boost and other high calorie fluids. Patient was educated on the importance of making lifestyle changes that would assist in weight gain and appetite increase  such as:  1. Adding high calories and adequate protein to each meal.  2. Increasing activity and movement to protect muscle mass. 3. Menu planning and tracking. Recommend My Fitness Pal for tracking to make sure she is eating adequate calories and protein.   4. The importance of eating breakfast.  5. Appropriate snacks for weight gain. 6. Sufficient portions. 7. The importance of good sleeping habits. 8.  Consider a Boost or Ensure Plus at 3:00 pm as a between meal snack. Handouts Provided: [x]  Carbohydrates  [x]  Protein  []  Non-starchy Vegatbles  []  Food Label  [x]  Meal and Snack Ideas  []  Food Journals []  Diabetes  []  Cholesterol  []  Sodium  [x]  Gen Nutr Guidelines  []  SBGM Guidelines  [x]  Others:   Information Reviewed with: Patient and her partner. Readiness to Change Stage:   []  Pre-contemplative    []  Contemplative  []  Preparation               [x]  Action                  []  Maintenance   Potential Barriers to Learning: []  Decline in memory    []  Language barrier   []  Other:  []  Emotional                  []  Limited mobility  []  Lack of motivation     [] Vision, hearing or cognitive impairment   Expected Compliance: Good. Nutritional Goal - To promote lifestyle changes to result in:    []  Weight loss  []  Improved diabetic control  []  Decreased cholesterol levels  []  Decreased blood pressure  []  Weight maintenance []  Preventing any interactions associated with food allergies  [x]  Adequate weight gain toward goal weight  []  Other:        Patient Goals:   Patient desires to gain weight. Dietitian Signature:  Glenn Blood RD Date: 02/23/2021   Follow-up: Scheduled: March 30th at 4:30 pm

## 2021-03-03 ENCOUNTER — APPOINTMENT (OUTPATIENT)
Dept: PHYSICAL THERAPY | Age: 76
End: 2021-03-03
Payer: MEDICARE

## 2021-03-11 ENCOUNTER — HOSPITAL ENCOUNTER (OUTPATIENT)
Dept: PHYSICAL THERAPY | Age: 76
Discharge: HOME | End: 2021-03-11
Payer: MEDICARE

## 2021-03-11 PROCEDURE — 97535 SELF CARE MNGMENT TRAINING: CPT

## 2021-03-11 PROCEDURE — 97162 PT EVAL MOD COMPLEX 30 MIN: CPT

## 2021-03-11 PROCEDURE — 97110 THERAPEUTIC EXERCISES: CPT

## 2021-03-11 NOTE — PROGRESS NOTES
[facility] [place] [address]  [address] [place], [address]  [phone] fax    Plan of Care/ Statement of Necessity for Physical Therapy Services     Patient name: Loyd Waters Start of Care: 3/11/2021   Referral source: Eliberto Schilder : 1945    Medical Diagnosis: Weakness of both upper extremities [R29.898]  Bilateral leg weakness [R29.898]  Payor: [place] MED[place]ARE / Plan: [place] MEDICARE PART A & B / Product Type: Medicare /  Onset Date: 2019    Treatment Diagnosis: B LE/UE weakness/pain, impaired gait and balance   Prior Hospitalization: see medical history Provider#: [id number]   Medications: Verified on Patient summary List    Comorbidities: hx CVA, fractured right humerus 2020, c/s fusion 2019, arthritis, recent weight change. Prior Level of Function: retired. Recovering from cervical myelopathy/fusion last year. Lives with friend who helps assist with all care in an 1 story home. Frequent falls and general deconditioning was present since surgery. Hx fractured right humerus/shoulder in 2020. The Plan of Care and following information is based on the information from the initial evaluation. Assessment/ key information:   Pt is a 70year old female who presents to therapy today with B LE/UE weakness/pain and impaired gait/balance. Pt reports having a c/s fusion on 2019 and reports having weakness/limited mobility before and after the surgery. Pt was seen for therapy late last year to 2021 to address her balance/gait and weakness and demonstrated improvement with static balancing but had difficulty with dynamic balancing. She denies any recent falls/trauma/mechanisms of injury but uses a rollator for mobility. She reports \"whole body\" numbness with increased right>left sided weakness. Pt demonstrated decreased strength, decreased right shoulder AROM, and impaired gait/balance. TUG 18 seconds with rollator.  Pt would benefit from physical therapy to improve the above impairments to help the pt return to performing ADLs and functional activities. Evaluation Complexity History HIGH Complexity :3+ comorbidities / personal factors will impact the outcome/ POC ; Examination MEDIUM Complexity : 3 Standardized tests and measures addressing body structure, function, activity limitation and / or participation in recreation  ;Presentation MEDIUM Complexity : Evolving with changing characteristics  ; Clinical Decision Making MEDIUM Complexity : FOTO score of 26-74  Overall Complexity Rating: MEDIUM  Problem List: pain affecting function, decrease ROM, decrease strength, impaired gait/ balance, decrease ADL/ functional abilitiies, decrease activity tolerance, decrease flexibility/ joint mobility and decrease transfer abilities   Treatment Plan may include any combination of the following: Therapeutic exercise, Therapeutic activities, Neuromuscular re-education, Physical agent/modality, Gait/balance training, Manual therapy, Patient education, Self Care training, Functional mobility training, Home safety training and Stair training  Patient / Family readiness to learn indicated by: asking questions, trying to perform skills and interest  Persons(s) to be included in education: patient (P); pt's partner/friend. Barriers to Learning/Limitations: None  Patient Goal (s): less pain  Patient Self Reported Health Status: fair  Rehabilitation Potential: fair    Short Term Goals: To be accomplished in 2 treatments:  1. Pt will report compliance and independence to [facility] to help the pt manage their pain and symptoms. Eval: established  Long Term Goals: To be accomplished in 10 treatments:  1. Pt will report at least 50% improvement in pain/symptoms to improve dynamic balance and gait. Eval: 0%  2. Pt will increase MMT right hip flex to 4-/5, ER to 4-/5, IR to 3/5 to improve household mobility. Eval: right hip flex 3+/5, ER 3+/5, IR 3/5  3. Pt will improve TUG time to 15 seconds or less with rollator to improve the pt's dynamic stability and fall risk. Eval: 18 seconds with rollator. 4. Pt will be able to descend 4 stairs with B handrails with alternating step pattern to improve safety and efficiency with mobility. Eval: reports using 2 handrails and takes 1 step at a time with descending stairs at home, report she can do alternating step pattern with ascending stairs sometimes. Frequency / Duration: Patient to be seen 2-3 times per week for 10 treatments. Patient/ Caregiver education and instruction: Diagnosis, prognosis, self care, activity modification and exercises   [x]  Plan of care has been reviewed with PTA    Certification Period: 3/11/2021-4/9/2021  Pam Mack, PT 3/11/2021 9:45 AM  _____________________________________________________________________  I certify that the above Therapy Services are being furnished while the patient is under my care. I agree with the treatment plan and certify that this therapy is necessary.     Physician's Signature:____________Date:_________TIME:________    ** Signature, Date and Time must be completed for valid certification **    Please sign and return to [facility] [place] [facility]  [address] [place], [address]  [phone] fax

## 2021-03-11 NOTE — PROGRESS NOTES
PT DAILY TREATMENT NOTE  10-18    Patient Name: Linn Naranjo  Date:3/11/2021  : 1945  [x]  Patient  Verified  Payor: [place] MEDICARE / Plan: [place] MEDICARE PART A & B / Product Type: Medicare /    In time: 8:20  Out time:9:02  Total Treatment Time (min): 42  Visit #: 1 of 10    Medicare/BCBS Only   Total Timed Codes (min):  24 1:1 Treatment Time:  42     Treatment Area: Bilateral leg weakness [R29.898]    SUBJECTIVE  Pain Level (0-10 scale): 4  Any medication changes, allergies to medications, adverse drug reactions, diagnosis change, or new procedure performed?: [x] No    [] Yes (see summary sheet for update)  Subjective functional status/changes:   [] No changes reported  See POC    OBJECTIVE    22 min [x]Eval                  []Re-Eval     12 min Therapeutic Exercise:  [] See flow sheet : HEP instruction and demonstration   Rationale: increase ROM and increase strength to improve the patients ability to tolerate ADLs    12 min Self Care/Home Management: []  See flow sheet : pt education regarding anatomy and physiology of the UEs/LEs and how it relates to the pt's condition, pt education on continuing to use rollator for gait for safety. Rationale: increase ROM, increase strength and decrease pain/symptoms  to improve the patients ability to tolerate functional tasks. With   [x] TE   [] TA   [] neuro   [x] Other: Self Care/Home management Patient Education: [x] Review HEP    [] Progressed/Changed HEP based on:   [] positioning   [] body mechanics   [] transfers   [] heat/ice application    [] other:      Other Objective/Functional Measures: See evaluation. Pain Level (0-10 scale) post treatment: 4    ASSESSMENT/Changes in Function: Pt given HEP handout to perform. Pt understood exercises in HEP handout. Pt demonstrated decreased strength, decreased right shoulder AROM, and impaired gait/balance. TUG 18 seconds with rollator.  Pt would benefit from physical therapy to improve the above impairments to help the pt return to performing ADLs and functional activities. Patient will continue to benefit from skilled PT services to modify and progress therapeutic interventions, address functional mobility deficits, address ROM deficits, address strength deficits, analyze and address soft tissue restrictions, analyze and cue movement patterns, analyze and modify body mechanics/ergonomics, assess and modify postural abnormalities, address imbalance/dizziness and instruct in home and community integration to attain remaining goals. [x]  See Plan of Care  []  See progress note/recertification  []  See Discharge Summary         Progress towards goals / Updated goals:  Short Term Goals: To be accomplished in 2 treatments:  1. Pt will report compliance and independence to HEP to help the pt manage their pain and symptoms. Eval: established  Long Term Goals: To be accomplished in 10 treatments:  1. Pt will report at least 50% improvement in pain/symptoms to improve dynamic balance and gait. Eval: 0%  2. Pt will increase MMT right hip flex to 4-/5, ER to 4-/5, IR to 3/5 to improve household mobility. Eval: right hip flex 3+/5, ER 3+/5, IR 3/5  3. Pt will improve TUG time to 15 seconds or less with rollator to improve the pt's dynamic stability and fall risk. Eval: 18 seconds with rollator. 4. Pt will be able to descend 4 stairs with B handrails with alternating step pattern to improve safety and efficiency with mobility. Eval: reports using 2 handrails and takes 1 step at a time with descending stairs at home, report she can do alternating step pattern with ascending stairs sometimes.      PLAN  [x]  Upgrade activities as tolerated     [x]  Continue plan of care  [x]  Update interventions per flow sheet       []  Discharge due to:_  []  Other:_      Quinten Robertson, PT 3/11/2021  9:45 AM    Future Appointments   Date Time Provider Gaby Delarosa   3/15/2021  1:30 PM Ewa Moreno, PT [facility]   3/18/2021  1:30 PM Charlean Patella, PTA [facility]   3/22/2021  2:15 PM Charlean Patella, PTA [facility]   3/25/2021  1:30 PM Charlean Patella, PTA [facility]   3/29/2021  1:30 PM Sary Burciaga, PT [facility]   3/30/2021  4:30 PM Netta Forrest RD [facility]   4/1/2021  1:30 PM Charlean Patella, PTA [facility]   4/5/2021  1:30 PM Reagan Castelan, PT [facility]   4/8/2021  1:30 PM Charlean Patella, PTA [facility]   4/12/2021  1:30 PM Charlean Patella, PTA [facility]   4/16/2021  1:00 PM [facility]   4/19/2021  1:00 PM [facility] FAST TRACK NURSE [facility]

## 2021-03-15 ENCOUNTER — HOSPITAL ENCOUNTER (OUTPATIENT)
Dept: PHYSICAL THERAPY | Age: 76
Discharge: HOME | End: 2021-03-15
Payer: MEDICARE

## 2021-03-15 PROCEDURE — 97112 NEUROMUSCULAR REEDUCATION: CPT

## 2021-03-15 PROCEDURE — 97530 THERAPEUTIC ACTIVITIES: CPT

## 2021-03-15 NOTE — PROGRESS NOTES
PT DAILY TREATMENT NOTE     Patient Name: Andreina Stephens  Date:3/15/2021  : 1945  [x]  Patient  Verified  Payor: [place] MEDICARE / Plan: VA MEDICARE PART A & B / Product Type: Medicare /    In time:130  Out time:209  Total Treatment Time (min): 39  Visit #: 1 of 10    Medicare/BCBS Only   Total Timed Codes (min):  39 1:1 Treatment Time:  39       Treatment Area: Bilateral leg weakness [R29.898]  Weakness of both upper extremities [R29.898]    SUBJECTIVE  Pain Level (0-10 scale): 4  Any medication changes, allergies to medications, adverse drug reactions, diagnosis change, or new procedure performed?: [x] No    [] Yes (see summary sheet for update)  Subjective functional status/changes:   [] No changes reported  Pt reports her pain never really goes     OBJECTIVE    23 min Therapeutic Activity:  [x]  See flow sheet : standing functional strength; step ups   Rationale: increase strength and improve coordination  to improve the patients ability to negotiate home and community      16 min Neuromuscular Re-education:  [x]  See flow sheet : balance   Rationale: increase strength, improve coordination, improve balance and increase proprioception  to improve the patients ability to recover LOB          With   [] TE   [] TA   [] neuro   [] other: Patient Education: [x] Review HEP    [] Progressed/Changed HEP based on:   [] positioning   [] body mechanics   [] transfers   [] heat/ice application    [] other:      Other Objective/Functional Measures: initiated exercises per flow sheet     Pain Level (0-10 scale) post treatment: 4    ASSESSMENT/Changes in Function: Pt was able to initiate exercises per flow sheet without increase in symptoms. Pt requiring cues for visual fixation anteriorly vs at the floor to reduce forward LOB on step ups.  CGA for all step ups and taps without UE    Patient will continue to benefit from skilled PT services to modify and progress therapeutic interventions, address functional mobility deficits, address ROM deficits, address strength deficits, analyze and address soft tissue restrictions, analyze and cue movement patterns, analyze and modify body mechanics/ergonomics, assess and modify postural abnormalities, address imbalance/dizziness and instruct in home and community integration to attain remaining goals. []  See Plan of Care  []  See progress note/recertification  []  See Discharge Summary         Progress towards goals / Updated goals:  1. Pt will report compliance and independence to [facility] to help the pt manage their pain and symptoms. Eval: established   Reports compliance    Long Term Goals: To be accomplished in 10 treatments:  1. Pt will report at least 50% improvement in pain/symptoms to improve dynamic balance and gait. Eval: 0%  2. Pt will increase MMT right hip flex to 4-/5, ER to 4-/5, IR to 3/5 to improve household mobility. Eval: right hip flex 3+/5, ER 3+/5, IR 3/5  3. Pt will improve TUG time to 15 seconds or less with rollator to improve the pt's dynamic stability and fall risk. Eval: 18 seconds with rollator. 4. Pt will be able to descend 4 stairs with B handrails with alternating step pattern to improve safety and efficiency with mobility.     Eval: reports using 2 handrails and takes 1 step at a time with descending stairs at home, report she can do alternating step pattern with ascending stairs sometimes.        PLAN  []  Upgrade activities as tolerated     []  Continue plan of care  []  Update interventions per flow sheet       []  Discharge due to:_  []  Other:_      Ya Garcia, PT 3/15/2021  2:13 PM    Future Appointments   Date Time Provider Gaby Delarosa   3/18/2021  1:30 [facility]   3/22/2021  2:15 PM Faye Camara PTA [facility]   3/25/2021  1:30 PM Faye Camara PTA [facility]   3/29/2021  1:30 PM Beverly Gonzalez, PT [facility]   3/30/2021  4:30 PM Juliane Mortensen, RD [facility] 4/1/2021  1:30 PM Eula Mcgowan, PTA [facility]   4/5/2021  1:30 PM [facility]   4/8/2021  1:30 PM [facility]gowan, [facility]   4/12/2021  1:30 PM Eula Mcgowan, [facility]   4/16/2021  1:00 PM [facility]LEBOTOM[facility]   4/19/2021  1:00 PM [facility] FAST TRACK NURSE [facility]   6/3/2021 10:00 AM Tamir Acosta MD [address]

## 2021-03-18 ENCOUNTER — HOSPITAL ENCOUNTER (OUTPATIENT)
Dept: PHYSICAL THERAPY | Age: 76
Discharge: HOME | End: 2021-03-18
Payer: MEDICARE

## 2021-03-18 PROCEDURE — 97110 THERAPEUTIC EXERCISES: CPT

## 2021-03-18 PROCEDURE — 97530 THERAPEUTIC ACTIVITIES: CPT

## 2021-03-18 PROCEDURE — 97112 NEUROMUSCULAR REEDUCATION: CPT

## 2021-03-22 ENCOUNTER — HOSPITAL ENCOUNTER (OUTPATIENT)
Dept: PHYSICAL THERAPY | Age: 76
Discharge: HOME | End: 2021-03-22
Payer: MEDICARE

## 2021-03-22 PROCEDURE — 97112 NEUROMUSCULAR REEDUCATION: CPT

## 2021-03-22 PROCEDURE — 97110 THERAPEUTIC EXERCISES: CPT

## 2021-03-22 PROCEDURE — 97530 THERAPEUTIC ACTIVITIES: CPT

## 2021-03-22 NOTE — PROGRESS NOTES
PT DAILY TREATMENT NOTE     Patient Name: Jett Davison  Date:3/22/2021  : 1945  [x]  Patient  Verified  Payor: Noe Alt / Plan: [place] MEDICARE PART A & B / Product Type: Medicare /    In time:215  Out time:300  Total Treatment Time (min): 45  Visit #: 4 of 10    Medicare/BCBS Only   Total Timed Codes (min):  45 1:1 Treatment Time:  45       Treatment Area: Bilateral leg weakness [R29.898]  Weakness of both upper extremities [R29.898]    SUBJECTIVE  Pain Level (0-10 scale): 4  Any medication changes, allergies to medications, adverse drug reactions, diagnosis change, or new procedure performed?: [x] No    [] Yes (see summary sheet for update)  Subjective functional status/changes:   [] No changes reported  \"The right leg still hurts. \"    OBJECTIVE    Modality rationale: patient declined   Min Type Additional Details    [] Estim:  []Unatt       []IFC  []Premod                        []Other:  []w/ice   []w/heat  Position:  Location:    [] Estim: []Att    []TENS instruct  []NMES                    []Other:  []w/US   []w/ice   []w/heat  Position:  Location:    []  Traction: [] Cervical       []Lumbar                       [] Prone          []Supine                       []Intermittent   []Continuous Lbs:  [] before manual  [] after manual    []  Ultrasound: []Continuous   [] Pulsed                           []1MHz   []3MHz W/cm2:  Location:    []  Iontophoresis with dexamethasone         Location: [] Take home patch   [] In clinic    []  Ice     []  heat  []  Ice massage  []  Laser   []  Anodyne Position:  Location:    []  Laser with stim  []  Other:  Position:  Location:    []  Vasopneumatic Device Pressure:       [] lo [] med [] hi   Temperature: [] lo [] med [] hi   [] Skin assessment post-treatment:  []intact []redness- no adverse reaction    []redness - adverse reaction:     10 min Therapeutic Exercise:  [x] See flow sheet :   Rationale: increase ROM and increase strength to improve the patients ability to perform ADLs    20 min Therapeutic Activity:  [x]  See flow sheet :step ups, sit to stands    Rationale: increase ROM, increase strength, improve coordination, improve balance and increase proprioception  to improve the patient’s ability to improve mobility and ADL performance     15 min Neuromuscular Re-education:  [x]  See flow sheet : balance training   Rationale: increase ROM, increase strength, improve coordination, improve balance and increase proprioception  to improve the patient’s ability to improve mobility and decrease fall risk        With   [x] TE   [x] TA   [x] neuro   [] other: Patient Education: [x] Review HEP    [] Progressed/Changed HEP based on:   [x] positioning   [x] body mechanics   [] transfers   [] heat/ice application    [] other:      Other Objective/Functional Measures:      Pain Level (0-10 scale) post treatment: 4    ASSESSMENT/Changes in Function: Pt was able to stand for the majority of the treatment today with only two short seated rest breaks. Remains limited with right hip flexion. Balance continues to improve with static activities, but challenged with more dynamic exercises. Improved safety awareness with rollator.    Patient will continue to benefit from skilled PT services to modify and progress therapeutic interventions, address functional mobility deficits, address ROM deficits, address strength deficits, analyze and address soft tissue restrictions, analyze and cue movement patterns, analyze and modify body mechanics/ergonomics, assess and modify postural abnormalities, address imbalance/dizziness and instruct in home and community integration to attain remaining goals.     [x]  See Plan of Care  []  See progress note/recertification  []  See Discharge Summary         Progress towards goals / Updated goals:  1. Pt will report compliance and independence to HEP to help the pt manage their pain and symptoms.                      Eval: established              Reports  compliance     Long Term Goals: To be accomplished in 10 treatments:  1. Pt will report at least 50% improvement in pain/symptoms to improve dynamic balance and gait.                Eval: 0%              Too soon to see progress  2. Pt will increase MMT right hip flex to 4-/5, ER to 4-/5, IR to 3/5 to improve household mobility.             Eval: right hip flex 3+/5, ER 3+/5, IR 3/5              Too soon to see progress  3. Pt will improve TUG time to 15 seconds or less with rollator to improve the pt's dynamic stability and fall risk.              Eval: 18 seconds with rollator.               Assess at later visit  4.  Pt will be able to descend 4 stairs with B handrails with alternating step pattern to improve safety and efficiency with mobility.               Eval: reports using 2 handrails and takes 1 step at a time with descending stairs at home, report she can do alternating step pattern with ascending stairs sometimes.               Challenged with stairs    PLAN  []  Upgrade activities as tolerated     [x]  Continue plan of care  []  Update interventions per flow sheet       []  Discharge due to:_  []  Other:_      Bogdan Herndon, CHRISTIAN, CSCS 3/22/2021  3:06 PM    Future Appointments   Date Time Provider Gaby Delarosa   3/25/2021  1:30 PM Alcus Deisy, PTA [facility]   3/29/2021  1:30 PM Shilo Velasco, PT [facility]   3/30/2021  4:30 PM Suleiman Rooney RD [facility]   4/1/2021  1:30 PM Alcus Deisy, PTA [facility]   4/5/2021  1:30 PM Leann Wilkerson, PT [facility]   4/8/2021  1:30 PM [facility]cus Deisy, [facility]   4/12/2021  1:30 PM Alcus Deisy, PTA [facility]   4/16/2021  1:00 PM [facility] INFUSION PHLEBOTOMIST [facility]   4/19/2021  1:00 PM [facility] FAST TRACK NURSE [facility]   6/3/2021 10:00 AM Jada Hood MD [address] [facility]

## 2021-03-25 ENCOUNTER — HOSPITAL ENCOUNTER (OUTPATIENT)
Dept: PHYSICAL THERAPY | Age: 76
Discharge: HOME | End: 2021-03-25
Payer: MEDICARE

## 2021-03-25 PROCEDURE — 97530 THERAPEUTIC ACTIVITIES: CPT

## 2021-03-25 PROCEDURE — 97112 NEUROMUSCULAR REEDUCATION: CPT

## 2021-03-25 PROCEDURE — 97110 THERAPEUTIC EXERCISES: CPT

## 2021-03-25 NOTE — PROGRESS NOTES
PT DAILY TREATMENT NOTE     Patient Name: Emilee Aguilar  Date:3/25/2021  : 1945  [x]  Patient  Verified  Payor: Pauline Villasenor / Plan: [place] MEDICARE PART A & B / Product Type: Medicare /    In time: 1:31   Out time: 2:14  Total Treatment Time (min): 43  Visit #: 5 of 10    Medicare/BCBS Only   Total Timed Codes (min): 43 1:1 Treatment Time: 38       Treatment Area: Bilateral leg weakness [R29.898]  Weakness of both upper extremities [R29.898]    SUBJECTIVE  Pain Level (0-10 scale): 4  Any medication changes, allergies to medications, adverse drug reactions, diagnosis change, or new procedure performed?: [x] No    [] Yes (see summary sheet for update)  Subjective functional status/changes:   [] No changes reported  Pt reports no changes since the last session. OBJECTIVE    10 min Therapeutic Exercise:  [x] See flow sheet :   Rationale: increase ROM and increase strength to improve the patients ability to perform ADLs    10 min Therapeutic Activity:  [x]  See flow sheet :step ups/taps, squats   Rationale: increase ROM, increase strength, improve coordination, improve balance and increase proprioception  to improve the patients ability to improve ease of mobility. 23 min Neuromuscular Re-education:  [x]  See flow sheet : dynamic gait activities, balance training   Rationale: increase ROM, increase strength, improve coordination, improve balance and increase proprioception  to improve the patients ability to decrease fall risk        With   [x] TE   [x] TA   [x] neuro   [] other: Patient Education: [x] Review HEP    [] Progressed/Changed HEP based on:   [x] positioning   [x] body mechanics   [] transfers   [] heat/ice application    [] other:      Other Objective/Functional Measures: CGA used for balancing exercises today. Needed 2 sitting rest breaks today secondary to fatigue.      Pain Level (0-10 scale) post treatment: 4    ASSESSMENT/Changes in Function: Reported no change in pain post session today. Pt is challenged with stability with EC balancing today. Increased forward/backward sway is noted at times with step ups onto 4 inch step. She continues to be challenged with dynamic stability with standing exercises. Continue POC as tolerated. Patient will continue to benefit from skilled PT services to modify and progress therapeutic interventions, address functional mobility deficits, address ROM deficits, address strength deficits, analyze and address soft tissue restrictions, analyze and cue movement patterns, analyze and modify body mechanics/ergonomics, assess and modify postural abnormalities, address imbalance/dizziness and instruct in home and community integration to attain remaining goals. [x]  See Plan of Care  []  See progress note/recertification  []  See Discharge Summary         Progress towards goals / Updated goals:  1. Pt will report compliance and independence to [facility] to help the pt manage their pain and symptoms.                       Eval: established              Reports compliance     Long Term Goals: To be accomplished in 10 treatments:  1. Pt will report at least 50% improvement in pain/symptoms to improve dynamic balance and gait.                Eval: 0%              Too soon to see progress; will plan to assess at PN. 2. Pt will increase MMT right hip flex to 4-/5, ER to 4-/5, IR to 3/5 to improve household mobility.             Eval: right hip flex 3+/5, ER 3+/5, IR 3/5              continued weakness noted on the right LE. 3. Pt will improve TUG time to 15 seconds or less with rollator to improve the pt's dynamic stability and fall risk.              Eval: 18 seconds with rollator.               Continues to use rollator  4.  Pt will be able to descend 4 stairs with B handrails with alternating step pattern to improve safety and efficiency with mobility.               Eval: reports using 2 handrails and takes 1 step at a time with descending stairs at home, report she can do alternating step pattern with ascending stairs sometimes.               Challenged with stairs    PLAN  [x]  Upgrade activities as tolerated     [x]  Continue plan of care  [x]  Update interventions per flow sheet       []  Discharge due to:_  []  Other:_      Jameel Sanchez, PT 3/25/2021  1:37 PM    Future Appointments   Date Time Provider Gaby Isaura   3/29/2021  1:30 PM Flordia Music [address]   3/30/2021  4:30 PM Abdulaziz Jackson RD [facility] [address]   3/31/2021  8:30 AM [address] RM 2 [facility] [address]   4/1/2021  1:30 PM Loyce Grate, PTA [facility] [address]   4/5/2021  1:30 PM Lico Baer, PT [facility] [address]   4/8/2021  1:30 PM Loyce Grate, PTA [facility] [address]   4/12/2021  1:30 PM Loyce Grate, PTA [facility] [address]   4/16/2021  1:00 PM [facility] INFUSION PHLEBOTOMIST [facility]   4/19/2021  1:00 PM [facility] FAST TRACK NURSE [facility]   6/3/2021 10:00 AM MD Tonya Linda

## 2021-03-29 ENCOUNTER — HOSPITAL ENCOUNTER (OUTPATIENT)
Dept: PHYSICAL THERAPY | Age: 76
Discharge: HOME | End: 2021-03-29
Payer: MEDICARE

## 2021-03-29 PROCEDURE — 97112 NEUROMUSCULAR REEDUCATION: CPT

## 2021-03-29 PROCEDURE — 97116 GAIT TRAINING THERAPY: CPT

## 2021-03-29 PROCEDURE — 97530 THERAPEUTIC ACTIVITIES: CPT

## 2021-03-29 NOTE — PROGRESS NOTES
PT DAILY TREATMENT NOTE     Patient Name: Rashawn Rivera  Date:3/29/2021  : 1945  [x]  Patient  Verified  Payor: [place] MEDICARE / Plan: [place] MEDICARE PART A & B / Product Type: Medicare /    In time:130  Out time:218  Total Treatment Time (min): 48  Visit #: 6 of 10    Medicare/BCBS Only   Total Timed Codes (min):  48 1:1 Treatment Time:  48       Treatment Area: Bilateral leg weakness [R29.898]  Weakness of both upper extremities [R29.898]    SUBJECTIVE  Pain Level (0-10 scale): 3  Any medication changes, allergies to medications, adverse drug reactions, diagnosis change, or new procedure performed?: [x] No    [] Yes (see summary sheet for update)  Subjective functional status/changes:   [] No changes reported  Pt reports she wants to keep focusing on her legs, she feels weak and is nervous about falling.     OBJECTIVE    10 min Therapeutic Activity:  [x]  See flow sheet : sit to stands   Rationale: increase strength and improve coordination  to improve the patients ability to negotiate home and community      28 min Neuromuscular Re-education:  [x]  See flow sheet : balance   Rationale: increase strength, improve coordination, improve balance and increase proprioception  to improve the patients ability to recover LOB    10 min Gait Training:  x3laps in parallel bars each lateral and retro; cuing for weight shifting; 50ft CGA no AD, 10ft each grapevine: 2x50ft with RW and SBA cuing for increased heel strike   Rationale: normalize gait to reduce risk of falls          With   [] TE   [] TA   [] neuro   [] other: Patient Education: [x] Review HEP    [] Progressed/Changed HEP based on:   [] positioning   [] body mechanics   [] transfers   [] heat/ice application    [] other:      Other Objective/Functional Measures: progressed exercises per flow sheet      Pain Level (0-10 scale) post treatment: 3    ASSESSMENT/Changes in Function: focused on improving weight shifting and weight acceptance in single leg stance today. Pt apprehensive without UE, but able to self correct all LOB with stepping and UE reaching strategy. Pt scared with grapevine, was able to complete after seated rest break and encouragement. Upon exit pt improving heel strike with verbal cues, increasing step length and single leg vs DLS time. Patient will continue to benefit from skilled PT services to modify and progress therapeutic interventions, address functional mobility deficits, address ROM deficits, address strength deficits, analyze and address soft tissue restrictions, analyze and cue movement patterns, analyze and modify body mechanics/ergonomics, assess and modify postural abnormalities, address imbalance/dizziness and instruct in home and community integration to attain remaining goals. []  See Plan of Care  []  See progress note/recertification  []  See Discharge Summary         Progress towards goals / Updated goals:  1. Pt will report compliance and independence to HEP to help the pt manage their pain and symptoms.                       Eval: established              Reports compliance     Long Term Goals: To be accomplished in 10 treatments:  1. Pt will report at least 50% improvement in pain/symptoms to improve dynamic balance and gait.                Eval: 0%              Too soon to see progress; will plan to assess at PN. 2. Pt will increase MMT right hip flex to 4-/5, ER to 4-/5, IR to 3/5 to improve household mobility.             Eval: right hip flex 3+/5, ER 3+/5, IR 3/5              continued weakness noted on the right LE. 3. Pt will improve TUG time to 15 seconds or less with rollator to improve the pt's dynamic stability and fall risk.              Eval: 18 seconds with rollator.               Continues to use rollator  4.  Pt will be able to descend 4 stairs with B handrails with alternating step pattern to improve safety and efficiency with mobility.               Eval: reports using 2 handrails and takes 1 step at a time with descending stairs at home, report she can do alternating step pattern with ascending stairs sometimes.               Challenged with stairs       PLAN  []  Upgrade activities as tolerated     []  Continue plan of care  []  Update interventions per flow sheet       []  Discharge due to:_  []  Other:_      Eliazar Goldstein, PT 3/29/2021  2:20 PM    Future Appointments   Date Time Provider Gaby Delarosa   3/30/2021  4:30 PM Jaswinder Walters RD [facility]   3/31/2021  8:30 AM [facility] CT RM 2 [facility]   4/1/2021  1:30 PM Ma Peer, PTA [facility]   4/5/2021  1:30 PM Alan Kirkpatrick, PT [facility]   4/8/2021  1:30 PM Ma Peer, PTA [facility]   4/12/2021  1:30 PM Ma Peer, PTA [facility]   4/16/2021  1:00 PM [facility]   4/19/2021  1:00 PM [facility] FAST TRACK NURSE [facility]   6/3/2021 10:00 AM Janey Bearden MD [address] [facility]

## 2021-03-30 ENCOUNTER — HOSPITAL ENCOUNTER (OUTPATIENT)
Dept: NUTRITION | Age: 76
Discharge: HOME | End: 2021-03-30
Payer: MEDICARE

## 2021-03-31 ENCOUNTER — HOSPITAL ENCOUNTER (OUTPATIENT)
Dept: CT IMAGING | Age: 76
Discharge: HOME | End: 2021-03-31
Attending: PHYSICIAN ASSISTANT
Payer: MEDICARE

## 2021-03-31 DIAGNOSIS — R31.29 MICROHEMATURIA: ICD-10-CM

## 2021-03-31 LAB — CREAT UR-MCNC: 0.6 MG/DL (ref 0.6–1.3)

## 2021-03-31 PROCEDURE — 74011000636 HC RX REV CODE- 636: Performed by: PHYSICIAN ASSISTANT

## 2021-03-31 PROCEDURE — 74178 CT ABD&PLV WO CNTR FLWD CNTR: CPT

## 2021-03-31 PROCEDURE — 82565 ASSAY OF CREATININE: CPT

## 2021-03-31 RX ADMIN — IOPAMIDOL 80 ML: 755 INJECTION, SOLUTION INTRAVENOUS at 09:15

## 2021-04-01 ENCOUNTER — HOSPITAL ENCOUNTER (OUTPATIENT)
Dept: PHYSICAL THERAPY | Age: 76
Discharge: HOME | End: 2021-04-01
Payer: MEDICARE

## 2021-04-01 PROCEDURE — 97112 NEUROMUSCULAR REEDUCATION: CPT

## 2021-04-01 PROCEDURE — 97116 GAIT TRAINING THERAPY: CPT

## 2021-04-01 PROCEDURE — 97110 THERAPEUTIC EXERCISES: CPT

## 2021-04-01 NOTE — PROGRESS NOTES
PT DAILY TREATMENT NOTE     Patient Name: Abel Farr  Date:2021  : 1945  [x]  Patient  Verified  Payor: VA MEDICARE / Plan: VA MEDICARE PART A & B / Product Type: Medicare /    In time:132  Out time:215  Total Treatment Time (min): 43  Visit #: 7 of 10    Medicare/BCBS Only   Total Timed Codes (min):  43 1:1 Treatment Time:  43       Treatment Area: Bilateral leg weakness [R29.898]  Weakness of both upper extremities [R29.898]    SUBJECTIVE  Pain Level (0-10 scale): 3  Any medication changes, allergies to medications, adverse drug reactions, diagnosis change, or new procedure performed?: [x] No    [] Yes (see summary sheet for update)  Subjective functional status/changes:   [] No changes reported  \"I'm a little sore. \"    OBJECTIVE    Modality rationale: patient declined   Min Type Additional Details    [] Estim:  []Unatt       []IFC  []Premod                        []Other:  []w/ice   []w/heat  Position:  Location:    [] Estim: []Att    []TENS instruct  []NMES                    []Other:  []w/US   []w/ice   []w/heat  Position:  Location:    []  Traction: [] Cervical       []Lumbar                       [] Prone          []Supine                       []Intermittent   []Continuous Lbs:  [] before manual  [] after manual    []  Ultrasound: []Continuous   [] Pulsed                           []1MHz   []3MHz W/cm2:  Location:    []  Iontophoresis with dexamethasone         Location: [] Take home patch   [] In clinic    []  Ice     []  heat  []  Ice massage  []  Laser   []  Anodyne Position:  Location:    []  Laser with stim  []  Other:  Position:  Location:    []  Vasopneumatic Device Pressure:       [] lo [] med [] hi   Temperature: [] lo [] med [] hi   [] Skin assessment post-treatment:  []intact []redness- no adverse reaction    []redness - adverse reaction:     8 min Therapeutic Exercise:  [x] See flow sheet :   Rationale: increase ROM and increase strength to improve the patients ability to perform ADLs    15 min Neuromuscular Re-education:  [x]  See flow sheet :balance training   Rationale: increase ROM, increase strength, improve coordination, improve balance and increase proprioception  to improve the patients ability to improve mobility and decrease fall risk    20 min Gait Training:  Obstacle course, 50ft CGA no AD, 10ft each grapevine: 2x50ft with RW and SBA cuing for increased heel strike la   Rationale: to promote functional independence and gait          With   [x] TE   [] TA   [x] neuro   [] other: Patient Education: [x] Review HEP    [] Progressed/Changed HEP based on:   [] positioning   [] body mechanics   [] transfers   [] heat/ice application    [] other:      Other Objective/Functional Measures:      Pain Level (0-10 scale) post treatment: 3    ASSESSMENT/Changes in Function: Pt is making tremendous functional progress with her activity tolerance. Balance was challenged with more dynamic movements in the martin including lateral ambulation without UE support and during grapevine. Requires CGA with obstacle course without AD. Patient will continue to benefit from skilled PT services to modify and progress therapeutic interventions, address functional mobility deficits, address ROM deficits, address strength deficits, analyze and address soft tissue restrictions, analyze and cue movement patterns, analyze and modify body mechanics/ergonomics, assess and modify postural abnormalities, address imbalance/dizziness and instruct in home and community integration to attain remaining goals. [x]  See Plan of Care  []  See progress note/recertification  []  See Discharge Summary         Progress towards goals / Updated goals:  1. Pt will report compliance and independence to HEP to help the pt manage their pain and symptoms.                       Eval: established              Reports compliance     Long Term Goals: To be accomplished in 10 treatments:  1.  Pt will report at least 50% improvement in pain/symptoms to improve dynamic balance and gait.                Eval: 0%              Too soon to see progress; will plan to assess at PN.   2. Pt will increase MMT right hip flex to 4-/5, ER to 4-/5, IR to 3/5 to improve household mobility.             Eval: right hip flex 3+/5, ER 3+/5, IR 3/5              continued weakness noted on the right LE.   3. Pt will improve TUG time to 15 seconds or less with rollator to improve the pt's dynamic stability and fall risk.              Eval: 18 seconds with rollator.               Continues to use rollator  4.  Pt will be able to descend 4 stairs with B handrails with alternating step pattern to improve safety and efficiency with mobility.               Eval: reports using 2 handrails and takes 1 step at a time with descending stairs at home, report she can do alternating step pattern with ascending stairs sometimes.               Challenged with stairs    PLAN  []  Upgrade activities as tolerated     [x]  Continue plan of care  []  Update interventions per flow sheet       []  Discharge due to:_  []  Other:_      Jaylon Tapia, CHRISTIAN, CSCS 4/1/2021  2:44 PM    Future Appointments   Date Time Provider Gaby Delarosa   4/5/2021  1:30 PM Eugenie Boone, PT [facility]   4/8/2021  1:30 PM Jessica Soliz PTA [facility]   4/12/2021  1:30 PM Jessica Soliz PTA [facility]   4/16/2021  1:00 PM [facility]OTOM[facility]   4/19/2021  1:00 PM [facility] FAST TRACK NURSE [facility]   5/25/2021  4:30 PM DARIO Gunter [facility]   6/3/2021 10:00 AM MD Tonya De Anda

## 2021-04-01 NOTE — PROGRESS NOTES
If she empties well then unlikely.       Would do TIMED voiding q2 hours to try to keep more empty more often and then standard prevention protocol     Also PFPT

## 2021-04-02 NOTE — PROGRESS NOTES
NUTRITION - FOLLOW-UP TREATMENT NOTE  Patient Name: Elvin Jewell         JGHF:  : 1945    YES/NO Patient  Verified  Diagnosis: underweight. In time:  4:30             Out time:   5:00   Total Treatment Time (min):   30     SUBJECTIVE/ASSESSMENT:  Patient is eating better. Patient's partner is constantly encouraging patient to eat. Changes in medication or medical history? Any new allergies, surgeries or procedures? YES/NO    If yes, update Summary List   None mentioned. Current Wt: 95# Previous Wt: 88.4# Wt Change: 6#     Achievement of Goals: 1. Patient has gained  6#.  2.  Patient is eating better. Patient Education:  [x]  Review current plan with patient   [x]  Other:    Handouts/  Information Provided: []  Carbohydrates  []  Protein  []  Fiber  []  Serving Sizes  []  Fluids  []  General guidelines []  Diabetes  []  Cholesterol  []  Sodium  []  SBGM  []  Food Journals  []  Others:      New Patient Goals: 1. >fluids to 48 oz per day. 2.  Continue to add healthy fats. PLAN    [x]  Continue on current plan []  Follow-up PRN   []  Discharge due to :    [x]  Next appt:  May 25th, 2021  4:30 pm     Dietitian: Osei Qiu RD    Date: 2021

## 2021-04-05 ENCOUNTER — HOSPITAL ENCOUNTER (OUTPATIENT)
Dept: PHYSICAL THERAPY | Age: 76
Discharge: HOME | End: 2021-04-05
Payer: MEDICARE

## 2021-04-05 PROCEDURE — 97112 NEUROMUSCULAR REEDUCATION: CPT

## 2021-04-05 NOTE — PROGRESS NOTES
PT DAILY TREATMENT NOTE     Patient Name: Elvin Jewell  Date:2021  : 1945  [x]  Patient  Verified  Payor: [employer] / Plan: [place] MEDICARE PART A & B / Product Type: Medicare /    In time: 1:30   Out time: 2:15  Total Treatment Time (min): 45  Visit #: 8 of 10    Medicare/BCBS Only   Total Timed Codes (min):  45 1:1 Treatment Time:  45       Treatment Area: Bilateral leg weakness [R29.898]  Weakness of both upper extremities [R29.898]    SUBJECTIVE  Pain Level (0-10 scale): 3  Any medication changes, allergies to medications, adverse drug reactions, diagnosis change, or new procedure performed?: [x] No    [] Yes (see summary sheet for update)  Subjective functional status/changes:   [] No changes reported  Reports having right knee and neck pain today. OBJECTIVE    5 min Therapeutic Exercise:  [x] See flow sheet :   Rationale: increase ROM and increase strength to improve the patients ability to perform ADLs    40 min Gait Training: obstacle course (lateral ambulation on foam balance beam, step over 2x4, weave around 2 kettlebells; 4 laps), 100ft CGA forward/lateral/retro ambulation no AD, 10ft grapevine (2 laps). Rationale: to promote functional independence and gait          With   [x] TE   [] TA   [x] neuro   [] other: Patient Education: [x] Review HEP    [] Progressed/Changed HEP based on:   [] positioning   [] body mechanics   [] transfers   [] heat/ice application    [] other:      Other Objective/Functional Measures: CGA with gait belt and pt holding onto the pt's thumb during dynamic gait exercises. CGA with gait belt with other exercises/activities today. Pain Level (0-10 scale) post treatment: 3    ASSESSMENT/Changes in Function: Reported no change in pain post session today. Needed several sitting rest breaks secondary to fatigue with exercises/activities. Cues given to take bigger steps with retro gait and used the wall at times for balance and stability.  No LOB noted with exercises/activities today but is challenged with dynamic stability with no AD, especially lateral and retro movements. Continue POC as tolerated to improve balance and fall risk. Patient will continue to benefit from skilled PT services to modify and progress therapeutic interventions, address functional mobility deficits, address ROM deficits, address strength deficits, analyze and address soft tissue restrictions, analyze and cue movement patterns, analyze and modify body mechanics/ergonomics, assess and modify postural abnormalities, address imbalance/dizziness and instruct in home and community integration to attain remaining goals. [x]  See Plan of Care  []  See progress note/recertification  []  See Discharge Summary         Progress towards goals / Updated goals:  1. Pt will report compliance and independence to [facility] to help the pt manage their pain and symptoms.                       Eval: established              Reports compliance     Long Term Goals: To be accomplished in 10 treatments:  1. Pt will report at least 50% improvement in pain/symptoms to improve dynamic balance and gait.                Eval: 0%              Too soon to see progress; will plan to assess at PN.   2. Pt will increase MMT right hip flex to 4-/5, ER to 4-/5, IR to 3/5 to improve household mobility.             Eval: right hip flex 3+/5, ER 3+/5, IR 3/5              continued weakness noted on the right LE.   3. Pt will improve TUG time to 15 seconds or less with rollator to improve the pt's dynamic stability and fall risk.              Eval: 18 seconds with rollator.               Continues to use rollator  4.  Pt will be able to descend 4 stairs with B handrails with alternating step pattern to improve safety and efficiency with mobility.               Eval: reports using 2 handrails and takes 1 step at a time with descending stairs at home, report she can do alternating step pattern with ascending stairs sometimes.               [id number] with stairs    PLAN  [x]  Upgrade activities as tolerated     [x]  Continue plan of care  [x]  Update interventions per flow sheet       []  Discharge due to:_  []  Other:_      Erik Garcia, PT 4/5/2021  1:47 PM    Future Appointments   Date Time Provider Gaby Delarosa   4/8/2021  1:30 PM [facility]   4/12/2021  1:30 PM [facility]   4/16/2021  1:00 PM [facility]LEBOTOMIST [facility]   4/19/2021  1:00 PM [facility] FAST TRACK NURSE [facility]   5/25/2021  4:30 PM DARIO Mckinney [facility]   6/3/2021 10:00 AM MD Yu PatrickWildwood

## 2021-04-08 ENCOUNTER — HOSPITAL ENCOUNTER (OUTPATIENT)
Dept: PHYSICAL THERAPY | Age: 76
Discharge: HOME | End: 2021-04-08
Payer: MEDICARE

## 2021-04-08 PROCEDURE — 97530 THERAPEUTIC ACTIVITIES: CPT

## 2021-04-08 PROCEDURE — 97112 NEUROMUSCULAR REEDUCATION: CPT

## 2021-04-08 PROCEDURE — 97110 THERAPEUTIC EXERCISES: CPT

## 2021-04-08 NOTE — PROGRESS NOTES
PT DAILY TREATMENT NOTE     Patient Name: Tasha Headings  Date:2021  : 1945  [x]  Patient  Verified  Payor: [place] MEDICARE / Plan: VA MEDICARE PART A & B / Product Type: Medicare /    In time:130  Out time:219  Total Treatment Time (min): 49  Visit #: 9 of 10    Medicare/BCBS Only   Total Timed Codes (min):  49 1:1 Treatment Time:  49       Treatment Area: Bilateral leg weakness [R29.898]  Weakness of both upper extremities [R29.898]    SUBJECTIVE  Pain Level (0-10 scale): 6  Any medication changes, allergies to medications, adverse drug reactions, diagnosis change, or new procedure performed?: [x] No    [] Yes (see summary sheet for update)  Subjective functional status/changes:   [] No changes reported  \"I'm having a rough day. I did a lot of exercises yesterday and my leg started having spasms. \"    OBJECTIVE    Modality rationale: patient declined   Min Type Additional Details    [] Estim:  []Unatt       []IFC  []Premod                        []Other:  []w/ice   []w/heat  Position:  Location:    [] Estim: []Att    []TENS instruct  []NMES                    []Other:  []w/US   []w/ice   []w/heat  Position:  Location:    []  Traction: [] Cervical       []Lumbar                       [] Prone          []Supine                       []Intermittent   []Continuous Lbs:  [] before manual  [] after manual    []  Ultrasound: []Continuous   [] Pulsed                           []1MHz   []3MHz W/cm2:  Location:    []  Iontophoresis with dexamethasone         Location: [] Take home patch   [] In clinic    []  Ice     []  heat  []  Ice massage  []  Laser   []  Anodyne Position:  Location:    []  Laser with stim  []  Other:  Position:  Location:    []  Vasopneumatic Device Pressure:       [] lo [] med [] hi   Temperature: [] lo [] med [] hi   [] Skin assessment post-treatment:  []intact []redness- no adverse reaction    []redness - adverse reaction:     19 min Therapeutic Activity:  [x]  See flow sheet : FOTO, reassessment   Rationale: increase ROM, increase strength, improve coordination, improve balance and increase proprioception  to improve the patients ability to improve mobility and ADL performance     10 min Therapeutic Exercise:  [x] See flow sheet :    Rationale: increase ROM, increase strength, improve coordination, improve balance and increase proprioception to improve the patients ability to perform ADLs    20 min Neuromuscular Re-education:  [x]  See flow sheet : balance training   Rationale: increase ROM, increase strength, improve coordination, improve balance and increase proprioception  to improve the patients ability to improve mobility and decrease fall risk         With   [x] TE   [x] TA   [x] neuro   [] other: Patient Education: [x] Review HEP    [] Progressed/Changed HEP based on:   [x] positioning   [x] body mechanics   [] transfers   [] heat/ice application    [] other:      Other Objective/Functional Measures:   FOTO 39    MMT right hip flexion 4-/5  MMT right hip ER 3+/5  MMT right hip IR 3+/5    TUG = 16 seconds     Pain Level (0-10 scale) post treatment: 4    ASSESSMENT/Changes in Function: Ms. Ankit Rivera reports 50% improvement since beginning therapy. Pt has been progressing well with her static and dynamic balance, but reports increased pain today which limited amount of exercises she was able to perform. She continues to weakness in her right hip. Some safety cues still needed with sit to stand transfers and when using her rollator.  We will continue with physical therapy to address her remaining functional deficits    Patient will continue to benefit from skilled PT services to modify and progress therapeutic interventions, address functional mobility deficits, address ROM deficits, address strength deficits, analyze and address soft tissue restrictions, analyze and cue movement patterns, analyze and modify body mechanics/ergonomics, assess and modify postural abnormalities, address imbalance/dizziness and instruct in home and community integration to attain remaining goals. [x]  See Plan of Care  [x]  See progress note/recertification  []  See Discharge Summary         Progress towards goals / Updated goals:  1. Pt will report compliance and independence to HEP to help the pt manage their pain and symptoms.                       Eval: established              MET     Long Term Goals: To be accomplished in 10 treatments:  1. Pt will report at least 50% improvement in pain/symptoms to improve dynamic balance and gait.                Eval: 0%              MET; 50%  2. Pt will increase MMT right hip flex to 4-/5, ER to 4-/5, IR to 3/5 to improve household mobility.             Eval: right hip flex 3+/5, ER 3+/5, IR 3/5              PROGRESSING; right hip flexion 4-/5, right hip ER 3+/5, right hip IR 3+/5  3. Pt will improve TUG time to 15 seconds or less with rollator to improve the pt's dynamic stability and fall risk.              Eval: 18 seconds with rollator.               PROGRESSING; 16 seconds with rollator  4. Pt will be able to descend 4 stairs with B handrails with alternating step pattern to improve safety and efficiency with mobility.               Eval: reports using 2 handrails and takes 1 step at a time with descending stairs at home, report she can do alternating step pattern with ascending stairs sometimes.               NOT MET; completing with nonreciprocal pattern     Functional Gains: ease with completing nonreciprocal pattern with stairs, showering, standing/walking tolerance, walking between rooms, washing dishes, low level ADLs  Functional Deficits: activity tolerance, B LE strength, reciprocal pattern with stairs, walking longer periods  % improvement: 50%  Pain   Average: 5/10       Best: 2/10     Worst: 8/10  Patient Goal: \"be able to stop my body from feeling like its feeling. \"    PLAN  []  Upgrade activities as tolerated     [x]  Continue plan of care  []  Update interventions per flow sheet       []  Discharge due to:_  []  Other:_      Farhan Francisco PTA, CSCS 4/8/2021  2:22 PM    Future Appointments   Date Time Provider Gaby Delarosa   4/8/2021  1:30 PM Kwan Forrest PTA [facility]   4[facility]12/2021  [facility]30 [facility]   4/16/2021  1:00 PM [facility]LEBOTOMIST [facility]   4/19/2021  1:00 PM [facility] FAST TRACK NURSE [facility]   5/25/2021  4:30 PM Leslye Robert RD [facility]   6/3/2021 10:00 AM Dung Choi MD [address] [facility]

## 2021-04-08 NOTE — PROGRESS NOTES
[facility] [address]  [address]   [facility], [address] [phone] fax    Continued Plan of Care/ Re-certification for Physical Therapy Services    Patient name: Laure Sweeney Start of Care: 3/11/2021   Referral source: Lima Mirza : 1945                Medical Diagnosis: Weakness of both upper extremities [R29.898]  Bilateral leg weakness [R29.898]  Payor: [place] MEDICARE / Plan: VA MEDICARE PART A & B / Product Type: Medicare /  Onset Date: 2019                Treatment Diagnosis: B LE/UE weakness/pain, impaired gait and balance   Prior Hospitalization: see medical history Provider#: [id number]   Medications: Verified on Patient summary List    Comorbidities: hx CVA, fractured right humerus 2020, c/s fusion 2019, arthritis, recent weight change. Prior Level of Function: retired. Recovering from cervical myelopathy/fusion last year. Lives with friend who helps assist with all care in an 1 story home. Frequent falls and general deconditioning was present since surgery. Hx fractured right humerus/shoulder in 2020. Visits from Start of Care: 9    Missed Visits: 0    The Plan of Care and following information is based on the patient's current status:  Goal: Pt will report compliance and independence to [facility] to help the pt manage their pain and symptoms.        Status at last note/certification: MET  Current Status: met    Goal: Pt will report at least 50% improvement in pain/symptoms to improve dynamic balance and gait.   Status at last note/certification: MET; 38%  Current Status: met    Goal: Pt will increase MMT right hip flex to 4-/5, ER to 4-/5, IR to 3/5 to improve household mobility.   Status at last note/certification: PROGRESSING; right hip flexion 4-/5, right hip ER 3+/5, right hip IR 3+/5  Current Status: not met    Goal: Pt will improve TUG time to 15 seconds or less with rollator to improve the pt's dynamic stability and fall risk. Status at last note/certification: PROGRESSING; 16 seconds with rollator  Current Status: not met    Goal: Pt will be able to descend 4 stairs with B handrails with alternating step pattern to improve safety and efficiency with mobility.   Status at last note/certification: NOT MET; completing with nonreciprocal pattern  Current Status: not met    Key functional changes:   Functional Gains: ease with completing nonreciprocal pattern with stairs, showering, standing/walking tolerance, walking between rooms, washing dishes, low level ADLs  Functional Deficits: activity tolerance, B LE strength, reciprocal pattern with stairs, walking longer periods  % improvement: 50%  Pain   Average: 5/10                  Best: 2/10                Worst: 8/10    Problems/ barriers to goal attainment: none    Problem List: pain affecting function, decrease ROM, decrease strength, edema affecting function, impaired gait/ balance, decrease ADL/ functional abilitiies, decrease activity tolerance, decrease flexibility/ joint mobility and decrease transfer abilities    Treatment Plan: Therapeutic exercise, Therapeutic activities, Neuromuscular re-education, Physical agent/modality, Gait/balance training, Manual therapy, Patient education, Self Care training, Functional mobility training, Home safety training and Stair training     Patient Goal (s) has been updated and includes: \"be able to stop my body from feeling like its feeling. \"      Goals for this certification period to be accomplished in 4 weeks:  1. Pt will increase MMT right hip ER/IR to 4-/5to improve household mobility. Re-certification: right hip ER 3+/5, right hip IR 3+/5  2. Pt will improve TUG time to 15 seconds or less with rollator to improve the pt's dynamic stability and fall risk. Re-certification: 16 seconds with rollator  3.  Pt will be able to descend 4 stairs with B handrails with alternating step pattern to improve safety and efficiency with mobility.   Re-certification: completing with nonreciprocal pattern    Frequency / Duration: Patient to be seen 2 times per week for 4 weeks:    Assessment / Recommendations:  Ms. Pretty Shepard reports 50% improvement since beginning therapy. Pt has been progressing well with her static and dynamic balance, but reports increased pain today which limited amount of exercises she was able to perform. She continues to weakness in her right hip. Some safety cues still needed with sit to stand transfers and when using her rollator. We will continue with physical therapy to address her remaining functional deficits. Certification Period: 4/9/2021-5/8/2021    Briana Etienne, PT 4/8/2021 5:09 PM    ________________________________________________________________________  I certify that the above Therapy Services are being furnished while the patient is under my care. I agree with the treatment plan and certify that this therapy is necessary. [] I have read the above and request that my patient continue as recommended.   [] I have read the above report and request that my patient continue therapy with the following changes/special instructions: _____________________________________________  [] I have read the above report and request that my patient be discharged from therapy    Physician's Signature:____________Date:_________TIME:________     Marisa Ambrosio PA-C  ** Signature, Date and Time must be completed for valid certification **    Please sign and return to [facility] [address]  [address], [address]  [phone] fax

## 2021-04-12 ENCOUNTER — HOSPITAL ENCOUNTER (OUTPATIENT)
Dept: PHYSICAL THERAPY | Age: 76
Discharge: HOME | End: 2021-04-12
Payer: MEDICARE

## 2021-04-12 PROCEDURE — 97112 NEUROMUSCULAR REEDUCATION: CPT

## 2021-04-12 PROCEDURE — 97110 THERAPEUTIC EXERCISES: CPT

## 2021-04-12 PROCEDURE — 97530 THERAPEUTIC ACTIVITIES: CPT

## 2021-04-12 NOTE — PROGRESS NOTES
PT DAILY TREATMENT NOTE     Patient Name: Hope Tuttle  Date:2021  : 1945  [x]  Patient  Verified  Payor: VA MEDICARE / Plan: VA MEDICARE PART A & B / Product Type: Medicare /    In time:130  Out time:212  Total Treatment Time (min): 42  Visit #: 1 of 8    Medicare/BCBS Only   Total Timed Codes (min):  42 1:1 Treatment Time:  42       Treatment Area: Bilateral leg weakness [R29.898]  Weakness of both upper extremities [R29.898]    SUBJECTIVE  Pain Level (0-10 scale): 7  Any medication changes, allergies to medications, adverse drug reactions, diagnosis change, or new procedure performed?: [x] No    [] Yes (see summary sheet for update)  Subjective functional status/changes:   [] No changes reported  \"I'm hurting more. \"    OBJECTIVE    Modality rationale: patient declined   Min Type Additional Details    [] Estim:  []Unatt       []IFC  []Premod                        []Other:  []w/ice   []w/heat  Position:  Location:    [] Estim: []Att    []TENS instruct  []NMES                    []Other:  []w/US   []w/ice   []w/heat  Position:  Location:    []  Traction: [] Cervical       []Lumbar                       [] Prone          []Supine                       []Intermittent   []Continuous Lbs:  [] before manual  [] after manual    []  Ultrasound: []Continuous   [] Pulsed                           []1MHz   []3MHz W/cm2:  Location:    []  Iontophoresis with dexamethasone         Location: [] Take home patch   [] In clinic    []  Ice     []  heat  []  Ice massage  []  Laser   []  Anodyne Position:  Location:    []  Laser with stim  []  Other:  Position:  Location:    []  Vasopneumatic Device Pressure:       [] lo [] med [] hi   Temperature: [] lo [] med [] hi   [] Skin assessment post-treatment:  []intact []redness- no adverse reaction    []redness - adverse reaction:     10 min Therapeutic Exercise:  [x] See flow sheet :   Rationale: increase ROM and increase strength to improve the patients ability to perform ADLs    20 min Therapeutic Activity:  [x]  See flow sheet : functional standing activities in the hallway, stair training   Rationale: increase ROM, increase strength, improve coordination, improve balance and increase proprioception  to improve the patients ability to improve mobility and ADL performance     12 min Neuromuscular Re-education:  [x]  See flow sheet : balance training, hip/glut re-ed activities   Rationale: increase ROM, increase strength, improve coordination, improve balance and increase proprioception  to improve the patients ability to improve mobility and decrease fall risk         With   [x] TE   [x] TA   [x] neuro   [] other: Patient Education: [x] Review HEP    [] Progressed/Changed HEP based on:   [x] positioning   [x] body mechanics   [] transfers   [] heat/ice application    [] other:      Other Objective/Functional Measures:      Pain Level (0-10 scale) post treatment: 7    ASSESSMENT/Changes in Function: Pt was more fatigued today and winded with exercises. She needed multiple rest breaks. She was a little more anxious with hallway activities due to fear of falling. Requires gait belt and CGA throughout treatment. She states that she was unable to come downstairs due to pain over the weekend. Pt to follow up with MD tomorrow. Patient will continue to benefit from skilled PT services to modify and progress therapeutic interventions, address functional mobility deficits, address ROM deficits, address strength deficits, analyze and address soft tissue restrictions, analyze and cue movement patterns, analyze and modify body mechanics/ergonomics, assess and modify postural abnormalities, address imbalance/dizziness and instruct in home and community integration to attain remaining goals. [x]  See Plan of Care  []  See progress note/recertification  []  See Discharge Summary         Progress towards goals / Updated goals:  1.  Pt will increase MMT right hip ER/IR to 4-/5to improve household mobility. Re-certification: right hip ER 3+/5, right hip IR 3+/5  2. Pt will improve TUG time to 15 seconds or less with rollator to improve the pt's dynamic stability and fall risk. Re-certification: 16 seconds with rollator  3.  Pt will be able to descend 4 stairs with B handrails with alternating step pattern to improve safety and efficiency with mobility.   Re-certification: completing with nonreciprocal pattern    PLAN  []  Upgrade activities as tolerated     [x]  Continue plan of care  []  Update interventions per flow sheet       []  Discharge due to:_  []  Other:_      Jaylon Tapia, PTA, CSCS 4/12/2021  2:17 PM    Future Appointments   Date Time Provider Gaby Delarosa   4/13/2021  2:30 PM Fransisco Valdes PA-C [facility]   4/16/2021  1:00 PM [facility] INFUSION PHLEBOTOMIST [facility]   4/19/2021  1:00 PM [facility] FAST TRACK NURSE [facility]   4/20/2021  1:30 PM Kye Parents, PT [facility]   4/22/2021  1:30 PM Christiana Sacks, PT [facility]   4/27/2021  1:30 PM Sari Soliz, CHRISTIAN [facility]   4/29/2021  1:30 PM Ginger Tony, PT [facility]   5/4/2021  1:30 PM Sari Soliz, CHRISTIAN [facility]   5/6/2021  1:30 PM Christian[facility] Sacks, PT [facility]   5/25/2021  4:30 PM Nacho Schreiber RD [facility]   6/3/2021 10:00 AM MD Tonya De Anda

## 2021-04-13 ENCOUNTER — OFFICE VISIT (OUTPATIENT)
Dept: ORTHOPEDIC SURGERY | Age: 76
End: 2021-04-13
Payer: MEDICARE

## 2021-04-13 VITALS
BODY MASS INDEX: 18.85 KG/M2 | HEIGHT: 60 IN | HEART RATE: 85 BPM | OXYGEN SATURATION: 95 % | RESPIRATION RATE: 16 BRPM | TEMPERATURE: 96.8 F | WEIGHT: 96 LBS

## 2021-04-13 DIAGNOSIS — R53.1 WEAKNESS: ICD-10-CM

## 2021-04-13 DIAGNOSIS — M25.562 LEFT KNEE PAIN, UNSPECIFIED CHRONICITY: ICD-10-CM

## 2021-04-13 DIAGNOSIS — M25.561 RIGHT KNEE PAIN, UNSPECIFIED CHRONICITY: Primary | ICD-10-CM

## 2021-04-13 PROCEDURE — 73562 X-RAY EXAM OF KNEE 3: CPT | Performed by: PHYSICIAN ASSISTANT

## 2021-04-13 PROCEDURE — 1090F PRES/ABSN URINE INCON ASSESS: CPT | Performed by: PHYSICIAN ASSISTANT

## 2021-04-13 PROCEDURE — 99213 OFFICE O/P EST LOW 20 MIN: CPT | Performed by: PHYSICIAN ASSISTANT

## 2021-04-13 PROCEDURE — G8432 DEP SCR NOT DOC, RNG: HCPCS | Performed by: PHYSICIAN ASSISTANT

## 2021-04-13 PROCEDURE — 3017F COLORECTAL CA SCREEN DOC REV: CPT | Performed by: PHYSICIAN ASSISTANT

## 2021-04-13 PROCEDURE — G8536 NO DOC ELDER MAL SCRN: HCPCS | Performed by: PHYSICIAN ASSISTANT

## 2021-04-13 PROCEDURE — G9899 SCRN MAM PERF RSLTS DOC: HCPCS | Performed by: PHYSICIAN ASSISTANT

## 2021-04-13 PROCEDURE — G8420 CALC BMI NORM PARAMETERS: HCPCS | Performed by: PHYSICIAN ASSISTANT

## 2021-04-13 PROCEDURE — 1101F PT FALLS ASSESS-DOCD LE1/YR: CPT | Performed by: PHYSICIAN ASSISTANT

## 2021-04-13 PROCEDURE — G8427 DOCREV CUR MEDS BY ELIG CLIN: HCPCS | Performed by: PHYSICIAN ASSISTANT

## 2021-04-13 NOTE — PROGRESS NOTES
Patient: Abhishek Nguyen                MRN: [id number]       SSN: [id number]  YOB: 1945        AGE: 70 y.o. SEX: female          PCP: None  04/13/21    Chief Complaint   Patient presents with    Knee Pain     bilateral     4/13/2021: Patient returns the office complaining of bilateral knee pain right greater than left. She has a known history with our office and has been seen for neck and low back pain in the past.  She is currently in physical therapy for strengthening and reconditioning of her lower extremity secondary to profound weakness. She uses a seated walker with brakes and a occasionally a wheelchair for ambulation assistance. She has picked up some weight about 20 pounds over the past month to 2. She had an episode of clicking with pain to the right knee that occurred about 10 days ago. Since that time she has had progressive pain to the knee that causes her to give to the pain and she worries that she is in a fall. Pain at rest dull aching characteristic associated with right knee carrying a pain rating of 3-4 on a 10 point scale. With activity to include short distance walking short periods of standing patient's pain increases to upwards of a 6-7 on a 10 point scale. HISTORY:  Abhishek Nguyen is a 70 y.o. female well-known to our practice presents today complaining of neck and low back pain. She is previously seen Dr. Lola Reyes for her low back and no surgical intervention was recommended. She has completed multiple visits of outpatient physical therapy with attention to her back. In addition she has recently undergone a occipital cervical fusion under the care of Dr. Joey Cutler in [facility]. The fusion was done in 2019. Since that time she has had progressive pain with numbness tingling in the hands and arms develop.   She has not gone back to Dr. Joey Cutler noting that she was not happy with the way his office staff treated her as a patient. Regarding her low back she denies any bowel or bladder incontinence no saddle paresthesia or anesthesia reported. She is in therapy currently for attention to bilateral lower extremity strengthening gait training and general postural concerns. She does ambulate with a Rollator type walker with brakes and seat. She denies any trauma to the neck or low back. She further notes from her spinal neck surgery she recovered very slowly and is not excited about considering a additional surgery but the symptoms of weakness and numbness in the hands and arms may in her words predicate that. Pain Assessment  4/13/2021   Location of Pain Knee   Pain Location Comment -   Location Modifiers Left;Right   Severity of Pain 6   Quality of Pain Throbbing;Popping; Other (Comment)   Quality of Pain Comment buckling   Duration of Pain Persistent   Frequency of Pain Constant   Date Pain First Started -   Date Pain First Started Comment -   Aggravating Factors Walking;Standing;Stairs   Aggravating Factors Comment -   Limiting Behavior Yes   Relieving Factors Exercises;NSAID   Relieving Factors Comment -   Result of Injury -   Work-Related Injury -   Type of Injury -           Lab Results   Component Value Date/Time    Hemoglobin A1c 5.8 (H) 03/18/2019 04:07 PM     Weight Metrics 4/13/2021 3/11/2021 2/17/2021 2/9/2021 2/4/2021 1/27/2021 12/2/2020   Weight 96 lb 89 lb 88 lb 89 lb 89 lb 88 lb 3.2 oz 91 lb   BMI 18.75 kg/m2 17.38 kg/m2 17.19 kg/m2 17.38 kg/m2 17.38 kg/m2 17.23 kg/m2 17.77 kg/m2            Problem List Items Addressed This Visit     Weakness      Other Visit Diagnoses     Right knee pain, unspecified chronicity    -  Primary    Relevant Orders    AMB POC X-RAY KNEE 3 VIEW (Completed)    AMB SUPPLY ORDER    Left knee pain, unspecified chronicity        Relevant Orders    AMB POC X-RAY KNEE 3 VIEW (Completed)    AMB SUPPLY ORDER          PAST MEDICAL HISTORY:   Past Medical History:   Diagnosis Date  Abnormal Pap smear     Dr. Dulce Maria Naik    Allergic rhinitis     Arthritis     Cerebrovascular small vessel disease 3/18/2019    CT 3/17/2019    Cervical spinal cord compression (HCC)     Chronic pain     Concentric left ventricular hypertrophy 3/18/2019    With left ventricular diastolic dysfunction by echocardiogram 3/18/2019    Hypercholesterolemia     Neck pain 5/17/2010    Stroke ([address] 10/02/2017    TIA- legs weak memory issues       PAST SURGICAL HISTORY:   Past Surgical History:   Procedure Laterality Date    COLONOSCOPY N/A 6/4/2018    COLONOSCOPY / polypectomy performed by Eliazar [facility] MD at [facility] ENDOSCOPY    HX GYN      Total Hyst    HX LAP CHOLECYSTECTOMY      HX OTHER SURGICAL  2017    Throat widened       ALLERGIES:   Allergies   Allergen Reactions    Baclofen Shortness of Breath     Denies. Pt tolerates  Other reaction(s): gi distress    Morphine Other (comments)     hallucinations  Other reaction(s): other/intolerance  hallucinations    Sulfa (Sulfonamide Antibiotics) Other (comments)     Drops blood pressure    Celecoxib Other (comments)     Tiredness   Other reaction(s): other/intolerance  Makes her tired        CURRENT MEDICATIONS:  A list of medications prior to the time of admission include:  Prior to Admission medications    Medication Sig Start Date End Date Taking? Authorizing Provider   lidocaine-prilocaine 5-2.5-2.5 % PaCr by Apply Externally route. Yes Provider, Historical   fluticasone propionate (FLOVENT HFA) 110 mcg/actuation inhaler Take  by inhalation. Yes Provider, Historical   ergocalciferol (Vitamin D2) 1,250 mcg (50,000 unit) capsule Take 50,000 Units by mouth. Yes Provider, Historical   VIT D3-FOLIC BBVB-O2-L6-K68 PO Take  by mouth. Yes Provider, Historical   ascorbic acid, vitamin C, (Vitamin C) 500 mg tablet Take  by mouth. Yes Provider, Historical   magnesium 200 mg tab Take  by mouth.    Yes Provider, Historical   nitrofurantoin, macrocrystal-monohydrate, (MACROBID) 100 mg capsule Take 1 Cap by mouth two (2) times a day. 2/4/21  Yes JULIA Suarez   conjugated estrogens (PREMARIN) 0.625 mg/gram vaginal cream Apply 0.5 g to affected area daily. Apply pea sized amount to urethra and whatever is left over to just inside of vagina 2/4/21  Yes JULIA Suarez   busPIRone (BUSPAR) 7.5 mg tablet take 1 tablet by mouth twice a day 9/23/20  Yes Petros Herrera NP   ibuprofen (MOTRIN) 400 mg tablet Take 1 Tab by mouth every six (6) hours as needed for Pain. 7/26/20  Yes Leelee Porter MD   cyanocobalamin (Vitamin B-12) 100 mcg tablet Take 100 mcg by mouth daily. Yes Provider, Historical   ondansetron (ZOFRAN ODT) 4 mg disintegrating tablet dissolve 1 tablet ON TONGUE every 6 hours if needed for nausea OR vomiting 5/7/20  Yes Petros Herrera NP   gabapentin (NEURONTIN) 100 mg capsule Take 2 Caps by mouth three (3) times daily. Max Daily Amount: 600 mg. Patient taking differently: Take 300 mg by mouth three (3) times daily. Takes 300mg TID 4/16/20  Yes Cierra Howell NP   denosumab (PROLIA) 60 mg/mL injection 60 mg by SubCUTAneous route every 6 months. 2/21/20  Yes Provider, Historical   acetaminophen (TYLENOL) 160 mg/5 mL elixir Take 1,000 mg by mouth daily. 12/12/19  Yes Provider, Historical   turmeric 400 mg cap Take 1 Cap by mouth daily. Yes Provider, Historical   aspirin 81 mg chewable tablet Take 1 Tab by mouth daily. 10/10/17  Yes Inessa Sanchez PA   atorvastatin (LIPITOR) 20 mg tablet take 1 tablet by mouth once daily 9/23/20   Petros Herrera NP   ALPRAZolam Jumana Dubonnet) 0.25 mg tablet Take 1 Tab by mouth two (2) times daily as needed for Anxiety.  Max Daily Amount: 0.5 mg. 5/26/20   Scar GALICIA NP   omeprazole (PRILOSEC) 20 mg capsule take 1 capsule by mouth once daily before breakfast 5/7/20   Petros Herrera NP   midodrine (PROAMITINE) 5 mg tablet Take 5 mg by mouth two (2) times a day. 1 tab by mouth with breakfast and lunch 2/6/20   Provider, Historical       FAMILY HISTORY:   Family History   Problem Relation Age of Onset    Cancer Mother         breast    Heart Disease Father        SOCIAL HISTORY:   Social History     Socioeconomic History    Marital status:      Spouse name: Not on file    Number of children: Not on file    Years of education: Not on file    Highest education level: Not on file   Tobacco Use    Smoking status: Never Smoker    Smokeless tobacco: Never Used   Substance and Sexual Activity    Alcohol use: No    Drug use: No    Sexual activity: Never       ROS:No CP, No SOB, No fever/chills nor night sweats. No headaches, vision abnormalities to include double and oral loss of vision. No hearing abnormalities. Musculoskeletal pain per HPI. Pain is exacerbated positionally. Pt denies h/o spinal surgery, injections, or PT/chiropractor. Self treated with less than adequate relief on oral antiinflammatories. . Pt denies change in bowel or bladder habits. Pt denies fever, weight loss, or skin changes. EXAM:  Patient alert and oriented x 3,   CN II-XII grossly intact  Sitting comfortably in the exam room, interacting with conversation with pleasant affect. Breathing appears regular effortless with no visible usage of accessory muscles  Distal cap refill intact at 2/2 Reed UE / LE. Neuro intact Reed LE to noxious stimuli    Ortho Specific exam:    Bilateral lower extremities reveal quad hamstring strength tested against resistance at 4-/5 on the right and 4+/5 on the left. Bilateral knees reveal varus deformities. She has pain over both medial joint lines. No instability on medial lateral stressing however medial stressing does cause greater discomfort in the right knee compared to left. No instabilitywith ACL/PCL stressing.     There is decreased range of motion in both knees in the plane of flexion to 105 degrees with crepitation patella tracked midline. She lacks 10 degrees to full extension in both knees. [facility] space bilateral knee 3 view reveals tricompartmental osteoarthritis greater in the medial joint spaces as well as patellofemoral.  Early change in the lateral joint space is noted. IMPRESSION:      ICD-10-CM ICD-9-CM    1. Right knee pain, unspecified chronicity  M25.561 719.46 AMB POC X-RAY KNEE 3 VIEW      AMB SUPPLY ORDER   2. Left knee pain, unspecified chronicity  M25.562 719.46 AMB POC X-RAY KNEE 3 VIEW      AMB SUPPLY ORDER   3. Weakness  R53.1 780.79         PLAN: Patient is getting continue with physical therapy outpatient with regards to range of motion and strengthening. She was given a rebound sport sleeve today to wear on the right knee. Essentially I do not believe that she has any instability associated with muscle tendon or ligament tearing or insufficiency. She does have pronounced osteoarthritis which could be contributing to her pain and then causing a protective mechanism as pain occurs to guard with the knee and risk falling. She is to continue over-the-counter analgesics for symptom management. We will see her back for physical therapy in a couple weeks. Today x-rays reviewed copies provided all of her questions answered to her satisfaction. .                         Patient provided a reminder for a \"due or due soon\" health maintenance. I have asked the patient to schedule an appointment with their primary care provider for follow-up on general health maintenance concerns. Today all the patient's questions were answered to their satisfaction. Copies of x-rays reviewed if obtained this visit, and provided to patient. Dictation disclaimer:  Please note that this dictation was completed with [employer], the [employer] voice recognition software.   Quite often unanticipated grammatical, syntax, homophones, and other interpretive errors are inadvertently transcribed by the computer software. Please disregard these errors. Please excuse any errors that have escaped final proofreading. Zion DUFFY, APC, MPAS, PA-C  [facility]

## 2021-04-16 ENCOUNTER — HOSPITAL ENCOUNTER (OUTPATIENT)
Dept: INFUSION THERAPY | Age: 76
Discharge: HOME | End: 2021-04-16
Payer: MEDICARE

## 2021-04-16 VITALS
OXYGEN SATURATION: 95 % | DIASTOLIC BLOOD PRESSURE: 77 MMHG | SYSTOLIC BLOOD PRESSURE: 145 MMHG | TEMPERATURE: 98.5 F | RESPIRATION RATE: 16 BRPM | HEART RATE: 83 BPM

## 2021-04-16 LAB
ALBUMIN SERPL-MCNC: 3.8 G/DL (ref 3.4–5)
ALBUMIN/GLOB SERPL: 1.1 {RATIO} (ref 0.8–1.7)
ALP SERPL-CCNC: 69 U/L (ref 45–117)
ALT SERPL-CCNC: 22 U/L (ref 13–56)
ANION GAP SERPL CALC-SCNC: 5 MMOL/L (ref 3–18)
AST SERPL-CCNC: 14 U/L (ref 10–38)
BILIRUB SERPL-MCNC: 0.5 MG/DL (ref 0.2–1)
BUN SERPL-MCNC: 20 MG/DL (ref 7–18)
BUN/CREAT SERPL: 37 (ref 12–20)
CALCIUM SERPL-MCNC: 9 MG/DL (ref 8.5–10.1)
CHLORIDE SERPL-SCNC: 108 MMOL/L (ref 100–111)
CO2 SERPL-SCNC: 28 MMOL/L (ref 21–32)
CREAT SERPL-MCNC: 0.54 MG/DL (ref 0.6–1.3)
GLOBULIN SER CALC-MCNC: 3.6 G/DL (ref 2–4)
GLUCOSE SERPL-MCNC: 106 MG/DL (ref 74–99)
MAGNESIUM SERPL-MCNC: 1.9 MG/DL (ref 1.6–2.6)
PHOSPHATE SERPL-MCNC: 3.8 MG/DL (ref 2.5–4.9)
POTASSIUM SERPL-SCNC: 4.4 MMOL/L (ref 3.5–5.5)
PROT SERPL-MCNC: 7.4 G/DL (ref 6.4–8.2)
SODIUM SERPL-SCNC: 141 MMOL/L (ref 136–145)

## 2021-04-16 PROCEDURE — 84100 ASSAY OF PHOSPHORUS: CPT

## 2021-04-16 PROCEDURE — 36415 COLL VENOUS BLD VENIPUNCTURE: CPT

## 2021-04-16 PROCEDURE — 80053 COMPREHEN METABOLIC PANEL: CPT

## 2021-04-16 PROCEDURE — 83735 ASSAY OF MAGNESIUM: CPT

## 2021-04-16 NOTE — PROGRESS NOTES
[facility] Progress Note    Date: 2021    Name: Laure Sweeney    MRN: [id number]         : 1945    Peripheral Lab Draw      Ms. Friedman to [facility], ambulatory at  accompanied by self. Pt was assessed and education was provided. Ms. Ivett Patterson vitals were reviewed and patient was observed for 5 minutes prior to treatment. Visit Vitals  BP (!) 145/77 (BP 1 Location: Left arm, BP Patient Position: Sitting)   Pulse 83   Temp 98.5 °F (36.9 °C)   Resp 16   SpO2 95%   No results found for this or any previous visit (from the past 12 hour(s)). Blood obtained peripherally from [facility] first attempt with butterfly needle and sent to lab for CMP, Magnesium, and Phosphrous per written orders. No bleeding or hematoma noted at site. Gauze and coban applied. Ms. Umair Baer tolerated the venipuncture, and had no complaints. Patient armband removed and shredded. Ms. Umair Baer was discharged from [place] [id number] in stable condition at 1315.      Scott Arroyo Phlebotomist PCT  2021  1:20 PM

## 2021-04-19 ENCOUNTER — HOSPITAL ENCOUNTER (OUTPATIENT)
Dept: INFUSION THERAPY | Age: 76
Discharge: HOME | End: 2021-04-19
Payer: MEDICARE

## 2021-04-19 VITALS
RESPIRATION RATE: 18 BRPM | SYSTOLIC BLOOD PRESSURE: 138 MMHG | HEART RATE: 86 BPM | TEMPERATURE: 97.6 F | DIASTOLIC BLOOD PRESSURE: 83 MMHG | OXYGEN SATURATION: 94 %

## 2021-04-19 DIAGNOSIS — M81.0 OSTEOPOROSIS, UNSPECIFIED OSTEOPOROSIS TYPE, UNSPECIFIED PATHOLOGICAL FRACTURE PRESENCE: Primary | ICD-10-CM

## 2021-04-19 PROCEDURE — 96372 THER/PROPH/DIAG INJ SC/IM: CPT

## 2021-04-19 PROCEDURE — 74011250636 HC RX REV CODE- 250/636: Performed by: NURSE PRACTITIONER

## 2021-04-19 RX ORDER — ONDANSETRON 2 MG/ML
8 INJECTION INTRAMUSCULAR; INTRAVENOUS AS NEEDED
OUTPATIENT
Start: 2021-10-17

## 2021-04-19 RX ORDER — ACETAMINOPHEN 325 MG/1
650 TABLET ORAL AS NEEDED
Start: 2021-10-17

## 2021-04-19 RX ORDER — DIPHENHYDRAMINE HYDROCHLORIDE 50 MG/ML
25 INJECTION, SOLUTION INTRAMUSCULAR; INTRAVENOUS AS NEEDED
Start: 2021-10-17

## 2021-04-19 RX ORDER — ALBUTEROL SULFATE 0.83 MG/ML
2.5 SOLUTION RESPIRATORY (INHALATION) AS NEEDED
Start: 2021-10-17

## 2021-04-19 RX ORDER — HYDROCORTISONE SODIUM SUCCINATE 100 MG/2ML
100 INJECTION, POWDER, FOR SOLUTION INTRAMUSCULAR; INTRAVENOUS AS NEEDED
OUTPATIENT
Start: 2021-10-17

## 2021-04-19 RX ORDER — EPINEPHRINE 1 MG/ML
0.3 INJECTION, SOLUTION, CONCENTRATE INTRAVENOUS AS NEEDED
OUTPATIENT
Start: 2021-10-17

## 2021-04-19 RX ORDER — DIPHENHYDRAMINE HYDROCHLORIDE 50 MG/ML
50 INJECTION, SOLUTION INTRAMUSCULAR; INTRAVENOUS AS NEEDED
Start: 2021-10-17

## 2021-04-19 RX ADMIN — DENOSUMAB 60 MG: 60 INJECTION SUBCUTANEOUS at 13:22

## 2021-04-19 NOTE — PROGRESS NOTES
[facility] Progress Note    Date: 2021    Name: Leotha Sever    MRN: [id number]         : 1945     Prolia Injection. Patient arrived to [facility] at 1310 ambulatory via walker. No concerns regarding injection today. States she tolerated previous prolia well. Ms. Mireya Rocha was assessed and education was provided. Ms. Tosha Lai vitals were reviewed and patient was observed for 5 minutes prior to treatment. Visit Vitals  /83 (BP 1 Location: Left upper arm, BP Patient Position: Sitting)   Pulse 86   Temp 97.6 °F (36.4 °C)   Resp 18   SpO2 94%   Breastfeeding No     Labs obtained on 2021 shows Calcium 9.0    Prolia 60 mg was administered subcutaneously to the back of patient's left arm. Band aid applied to site. Tolerated well. Reviewed discharge instructions with patient. She verbalized understanding     Patient armband removed and shredded. Ms. Mireya Rocha was discharged from [place] [address] in stable condition at 1330 She is to return on 10/15/2021 at [phone] for her next appointment.     Augie Taylor  2021  3:19 PM

## 2021-04-20 ENCOUNTER — HOSPITAL ENCOUNTER (OUTPATIENT)
Dept: PHYSICAL THERAPY | Age: 76
Discharge: HOME | End: 2021-04-20
Payer: MEDICARE

## 2021-04-20 PROCEDURE — 97530 THERAPEUTIC ACTIVITIES: CPT

## 2021-04-20 PROCEDURE — 97112 NEUROMUSCULAR REEDUCATION: CPT

## 2021-04-20 NOTE — PROGRESS NOTES
PT DAILY TREATMENT NOTE     Patient Name: Rhianna Cr  Date:2021  : 1945  [x]  Patient  Verified  Payor: [place] MEDICARE / Plan: [place] MEDICARE PART A & B / Product Type: Medicare /    In time:129  Out time:215  Total Treatment Time (min): 55  Visit #: 2 of 8    Medicare/BCBS Only   Total Timed Codes (min):  46 1:1 Treatment Time:  46       Treatment Area: Bilateral leg weakness [R29.898]  Weakness of both upper extremities [R29.898]    SUBJECTIVE  Pain Level (0-10 scale): 4  Any medication changes, allergies to medications, adverse drug reactions, diagnosis change, or new procedure performed?: [x] No    [] Yes (see summary sheet for update)  Subjective functional status/changes:   [] No changes reported  Pt presents with new knee brace, feels it is beneficial. Took a gabapentin today which is helping with pain. OBJECTIVE    23 min Therapeutic Activity:  [x]  See flow sheet : standing functional strength; pre gait activities   Rationale: increase strength and improve coordination  to improve the patients ability to negotiate home and community      23 min Neuromuscular Re-education:  [x]  See flow sheet : balance; gait in martin with CGA including lateral walking, dual task walking, walking with EC   Rationale: improve coordination, improve balance and increase proprioception  to improve the patients ability to reduce risk of falls          With   [] TE   [] TA   [] neuro   [] other: Patient Education: [x] Review HEP    [] Progressed/Changed HEP based on:   [] positioning   [] body mechanics   [] transfers   [] heat/ice application    [] other:      Other Objective/Functional Measures: progressed exercises per flow sheet as above     Pain Level (0-10 scale) post treatment: 4    ASSESSMENT/Changes in Function: Pt was challenged with static balance on compliant surfaces, required cuing to reduce the size of correction to prevent LOB.  Pt improved after cuing and was able to complete second attempt without UE reaching strategy. Cues for increase stride length and reciprocal UE swing improved gait but pt continues to have increased CONSTANTIN and use of high guard. May consider [facility] gait training [place] for home use only. Should progress to balance reaction training, curb and obstacle negotiation over the next few visits. Patient will continue to benefit from skilled PT services to modify and progress therapeutic interventions, address functional mobility deficits, address ROM deficits, address strength deficits, analyze and address soft tissue restrictions, analyze and cue movement patterns, analyze and modify body mechanics/ergonomics, assess and modify postural abnormalities, address imbalance/dizziness and instruct in home and community integration to attain remaining goals. []  See Plan of Care  []  See progress note/recertification  []  See Discharge Summary         Progress towards goals / Updated goals:  1. Pt will increase MMT right hip ER/IR to 4-/5to improve household mobility. Re-certification: right hip ER 3+/5, right hip IR 3+/5   Tolerating progression of exercises  2. Pt will improve TUG time to 15 seconds or less with rollator to improve the pt's dynamic stability and fall risk. Re-certification: 16 seconds with rollator   Progressing with gait training and challenges  3.  Pt will be able to descend 4 stairs with B handrails with alternating step pattern to improve safety and efficiency with mobility.    Re-certification: completing with nonreciprocal pattern    PLAN  [x]  Upgrade activities as tolerated     [x]  Continue plan of care  []  Update interventions per flow sheet       []  Discharge due to:_  []  Other:_      Alicia Kelley, PT 4/20/2021  1:43 PM    Future Appointments   Date Time Provider Gaby Delarosa   4/22/2021  1:30 PM Marlon Mann, PT [facility]   4/27/2021  1:30 PM [facility]   4/29/2021  1:30 PM Cornell Mendoza, PT [facility]   5/4/2021 1:30 PM Estil Post [facility]   5/6/2021  1:30 PM [facility]   5/25/2021  4:30 PM Ludmila Kramer RD [facility]   6/3/2021 10:00 AM MD Tonya Esteban   10/15/2021  1:15 PM [facility] INFUSION PHLEBOTOMIST [facility]   10/18/2021  1:00 PM [facility] FAST TRACK NURSE [facility]

## 2021-04-22 ENCOUNTER — HOSPITAL ENCOUNTER (OUTPATIENT)
Dept: PHYSICAL THERAPY | Age: 76
Discharge: HOME | End: 2021-04-22
Payer: MEDICARE

## 2021-04-22 PROCEDURE — 97530 THERAPEUTIC ACTIVITIES: CPT

## 2021-04-22 PROCEDURE — 97116 GAIT TRAINING THERAPY: CPT

## 2021-04-22 PROCEDURE — 97110 THERAPEUTIC EXERCISES: CPT

## 2021-04-22 NOTE — PROGRESS NOTES
PT DAILY TREATMENT NOTE     Patient Name: Halle Staff  Date:2021  : 1945  [x]  Patient  Verified  Payor: [place] MEDICARE / Plan: [place] MEDICARE PART A & B / Product Type: Medicare /    In time:130  Out time:218  Total Treatment Time (min): 48  Visit #: 3 of 8    Medicare/BCBS Only   Total Timed Codes (min):  48 1:1 Treatment Time:  48       Treatment Area: Bilateral leg weakness [R29.898]  Weakness of both upper extremities [R29.898]    SUBJECTIVE  Pain Level (0-10 scale): 3  Any medication changes, allergies to medications, adverse drug reactions, diagnosis change, or new procedure performed?: [x] No    [] Yes (see summary sheet for update)  Subjective functional status/changes:   [] No changes reported  Sore. \"    OBJECTIVE    23 min Therapeutic Activity:  [x]? See flow sheet : standing functional strength; pre gait activities   Rationale: increase strength and improve coordination  to improve the patients ability to negotiate home and community      17 min Neuromuscular Re-education:  [x]? See flow sheet : balance; gait in martin with CGA including lateral walking, dual task walking, walking with EC   Rationale: improve coordination, improve balance and increase proprioception  to improve the patients ability to reduce risk of falls    8 min Gait Training:  _gait training with SPC with CGA for safety. Step to pattern progressing to step through but less stable. Rationale: improve safety with gait. With   [] TE   [] TA   [] neuro   [] other: Patient Education: [x] Review HEP    [] Progressed/Changed HEP based on:   [] positioning   [] body mechanics   [] transfers   [] heat/ice application    [] other:      Other Objective/Functional Measures:      Pain Level (0-10 scale) post treatment: 3    ASSESSMENT/Changes in Function: Ms. Ochoa Donahue did well with progression of exercises. Seeing improved proprioception with eyes closed. Trial gait training with SPC.  Still unsafe without CGA but able to perform sequencing well. Patient will continue to benefit from skilled PT services to modify and progress therapeutic interventions, address functional mobility deficits, address ROM deficits, address strength deficits, analyze and address soft tissue restrictions, analyze and cue movement patterns, analyze and modify body mechanics/ergonomics, assess and modify postural abnormalities, address imbalance/dizziness and instruct in home and community integration to attain remaining goals. []  See Plan of Care  []  See progress note/recertification  []  See Discharge Summary         Progress towards goals / Updated goals:  1. Pt will increase MMT right hip ER/IR to 4-/5to improve household mobility. Re-certification: right hip ER 3+/5, right hip IR 3+/5              Tolerating progression of exercises  2. Pt will improve TUG time to 15 seconds or less with rollator to improve the pt's dynamic stability and fall risk. Re-certification: 16 seconds with rollator              Progressing with gait training and challenges  3.  Pt will be able to descend 4 stairs with B handrails with alternating step pattern to improve safety and efficiency with mobility.               Re-certification: completing with nonreciprocal pattern       PLAN  []  Upgrade activities as tolerated     [x]  Continue plan of care  []  Update interventions per flow sheet       []  Discharge due to:_  []  Other:_      Anna Burr, PT 4/22/2021  1:45 PM    Future Appointments   Date Time Provider Gaby Delarosa   4/27/2021  1:30 PM Bebeto Welsh [facility] [address]   4/29/2021  1:30 PM Kaylin Huber, PT [facility] [address]   5/4/2021  1:30 PM [facility] [address]   5/6/2021  1:30 PM [facility]ierrez[facility] [address]   5/25/2021  4:30 PM Nabeel Pantoja RD [facility] [address]   6/3/2021 10:00 AM Keyshawn Ellison MD [address] [facility]   10/15/2021  1:15 PM [facility] INFUSION PHLEBOTOMIST [facility] 10/18/2021  1:00 PM [facility] FAST TRACK NURSE [facility]

## 2021-04-27 ENCOUNTER — HOSPITAL ENCOUNTER (OUTPATIENT)
Dept: PHYSICAL THERAPY | Age: 76
Discharge: HOME | End: 2021-04-27
Payer: MEDICARE

## 2021-04-27 PROCEDURE — 97112 NEUROMUSCULAR REEDUCATION: CPT

## 2021-04-27 PROCEDURE — 97530 THERAPEUTIC ACTIVITIES: CPT

## 2021-04-27 PROCEDURE — 97110 THERAPEUTIC EXERCISES: CPT

## 2021-04-27 NOTE — PROGRESS NOTES
PT DAILY TREATMENT NOTE     Patient Name: Tasha Headings  Date:2021  : 1945  [x]  Patient  Verified  Payor: [place] MEDICARE / Plan: VA MEDICARE PART A & B / Product Type: Medicare /    In time:132  Out time:218  Total Treatment Time (min): 46  Visit #: 4 of 8    Medicare/BCBS Only   Total Timed Codes (min):  46 1:1 Treatment Time:  46       Treatment Area: Bilateral leg weakness [R29.898]  Weakness of both upper extremities [R29.898]    SUBJECTIVE  Pain Level (0-10 scale): 3  Any medication changes, allergies to medications, adverse drug reactions, diagnosis change, or new procedure performed?: [x] No    [] Yes (see summary sheet for update)  Subjective functional status/changes:   [] No changes reported  Patient reports some pain in her neck, but otherwise she's feeling fine. OBJECTIVE    16 min Therapeutic Exercise:  [] See flow sheet :   Rationale: increase ROM and increase strength to improve the patients ability to increase activity tolerance    18 min Neuromuscular Re-education:  []  See flow sheet : balance   Rationale: increase strength, improve coordination, improve balance and increase proprioception  to improve the patients ability to ambulate safely with improved stability    12 min Gait Trainin feet with SPC device on level surfaces with CGA level of assist   Rationale: to allow patient to ambulate with LRAD          With   [] TE   [] TA   [] neuro   [] other: Patient Education: [x] Review HEP    [] Progressed/Changed HEP based on:   [] positioning   [] body mechanics   [] transfers   [] heat/ice application    [] other:      Other Objective/Functional Measures:      Pain Level (0-10 scale) post treatment: 3    ASSESSMENT/Changes in Function: Patient demonstrates improving proprioception with EC and improving SLB with ball kicks. Gait with SPC improved with cuing for speed and fluidity. Sequences appropriately but crystal is slow and steps are not continuous.      Patient will continue to benefit from skilled PT services to modify and progress therapeutic interventions, address functional mobility deficits, address ROM deficits, address strength deficits, analyze and address soft tissue restrictions, analyze and cue movement patterns, analyze and modify body mechanics/ergonomics, assess and modify postural abnormalities, address imbalance/dizziness and instruct in home and community integration to attain remaining goals. []  See Plan of Care  []  See progress note/recertification  []  See Discharge Summary         Progress towards goals / Updated goals:  1. Pt will increase MMT right hip ER/IR to 4-/5to improve household mobility.             Re-certification: right hip ER 3+/5, right hip IR 3+/5              Tolerating progression of exercises  2. Pt will improve TUG time to 15 seconds or less with rollator to improve the pt's dynamic stability and fall risk.              Re-certification: 16 seconds with rollator              Progressing with gait training and challenges  3.  Pt will be able to descend 4 stairs with B handrails with alternating step pattern to improve safety and efficiency with mobility.               Re-certification: completing with nonreciprocal pattern    PLAN  [x]  Upgrade activities as tolerated     []  Continue plan of care  []  Update interventions per flow sheet       []  Discharge due to:_  []  Other:_      Adelso Lew PTA 4/27/2021  1:31 PM    Future Appointments   Date Time Provider Gaby Delarosa   4/29/2021  1:30 PM Ginger Tony, PT [facility]   5/4/2021  1:30 PM Sari Soliz PTA [facility]   5/6/2021  1:30 PM Christiana Sacks, PT [facility]   5/25/2021  4:30 PM Nacho Schreiber RD [facility]   6/3/2021 10:00 AM MD Tonya De Anda   10/15/2021  1:15 PM [facility] INFUSION PHLEBOTOMIST [facility]   10/18/2021  1:00 PM [facility] FAST TRACK NURSE [facility]

## 2021-04-29 ENCOUNTER — HOSPITAL ENCOUNTER (OUTPATIENT)
Dept: PHYSICAL THERAPY | Age: 76
Discharge: HOME | End: 2021-04-29
Payer: MEDICARE

## 2021-04-29 PROCEDURE — 97112 NEUROMUSCULAR REEDUCATION: CPT

## 2021-04-29 PROCEDURE — 97116 GAIT TRAINING THERAPY: CPT

## 2021-04-29 PROCEDURE — 97110 THERAPEUTIC EXERCISES: CPT

## 2021-04-29 NOTE — PROGRESS NOTES
PT DAILY TREATMENT NOTE     Patient Name: Gricelda Kirkpatrick  Date:2021  : 1945  [x]  Patient  Verified  Payor: Chad Turner / Plan: [place] MEDICARE PART A & B / Product Type: Medicare /    In time:1:30  Out time: 2:15  Total Treatment Time (min): 45  Visit #: 5 of 8    Medicare/BCBS Only   Total Timed Codes (min):  45 1:1 Treatment Time:  45       Treatment Area: Bilateral leg weakness [R29.898]  Weakness of both upper extremities [R29.898]    SUBJECTIVE  Pain Level (0-10 scale): *3/10  Any medication changes, allergies to medications, adverse drug reactions, diagnosis change, or new procedure performed?: [x] No    [] Yes (see summary sheet for update)  Subjective functional status/changes:   [] No changes reported  Patient stated that the pain she has is in her neck. Patient stated she has noticed an improvement since Starting PT.     OBJECTIVE    20 min Therapeutic Exercise:  [x] See flow sheet : Focus on improving available (B) LE ROM and strength    Rationale: increase ROM and increase strength to improve the patients ability to perform ADls safely and efficiently. 15 min Neuromuscular Re-education:  [x]  See flow sheet : balance acts; Alternating toe taps on 8 inch step with 1 HHA- 10 reps    Rationale: increase strength, improve coordination, improve balance and increase proprioception  to improve the patients ability to perform household management safely.      10 min Gait Training:  _160__ feet with _SPC__ device on level surfaces with _CGA__ level of assist   Rationale: Patient continues to require CGA for occasional moments of instability, but patient was able to self correct sequencing or cane placement without cues from therapist.           With   [] TE   [] TA   [] neuro   [] other: Patient Education: [x] Review HEP    [] Progressed/Changed HEP based on:   [] positioning   [] body mechanics   [] transfers   [] heat/ice application    [] other:      Other Objective/Functional Measures: At beginning of PT session: SPO2- 97 HR: 78  At end of the session SPO2- 98 HR: 79  Pain Level (0-10 scale) post treatment: 2/10     ASSESSMENT/Changes in Function: Frequent cues given by therapist to slow down with exercises as patient has a tendency to do exercises quickly and utilize momentum vs control. Patient stated that occasionally she gets winded. Patient stated MD is aware and lungs have been checked per patient. Patient reported decreased pain at end of the session. Patient will continue to benefit from skilled PT services to modify and progress therapeutic interventions, address functional mobility deficits, address ROM deficits, address strength deficits, analyze and address soft tissue restrictions, analyze and cue movement patterns, analyze and modify body mechanics/ergonomics, assess and modify postural abnormalities and address imbalance/dizziness to attain remaining goals. []  See Plan of Care  []  See progress note/recertification  []  See Discharge Summary         Progress towards goals / Updated goals:  1. Pt will increase MMT right hip ER/IR to 4-/5to improve household mobility.             Re-certification: right hip ER 3+/5, right hip IR 3+/5              Tolerating progression of exercises  2. Pt will improve TUG time to 15 seconds or less with rollator to improve the pt's dynamic stability and fall risk.              Re-certification: 16 seconds with rollator              Progressing with gait training and challenges  3.  Pt will be able to descend 4 stairs with B handrails with alternating step pattern to improve safety and efficiency with mobility.               Re-certification: completing with nonreciprocal pattern    PLAN  []  Upgrade activities as tolerated     [x]  Continue plan of care  []  Update interventions per flow sheet       []  Discharge due to:_  []  Other:_      Kalina Hernandes, PT 4/29/2021  1:33 PM    Future Appointments   Date Time Provider Gaby Delarosa 5/4/2021  1:30 PM Jean Pineda, PTA [facility]   5/6/2021  1:30 PM Bari Mishra, PT [facility]   5/25/2021  4:30 PM Serena Palma RD [facility]   6/3/2021 10:00 AM MD Tonya Harper   10/15/2021  1:15 PM [facility] INFUSION PHLEBOTOMIST [facility]   10/18/2021  1:00 PM [facility] FAST TRACK NURSE [facility]

## 2021-05-04 ENCOUNTER — HOSPITAL ENCOUNTER (OUTPATIENT)
Dept: PHYSICAL THERAPY | Age: 76
Discharge: HOME | End: 2021-05-04
Payer: MEDICARE

## 2021-05-04 PROCEDURE — 97110 THERAPEUTIC EXERCISES: CPT

## 2021-05-04 PROCEDURE — 97530 THERAPEUTIC ACTIVITIES: CPT

## 2021-05-04 PROCEDURE — 97112 NEUROMUSCULAR REEDUCATION: CPT

## 2021-05-04 PROCEDURE — 97116 GAIT TRAINING THERAPY: CPT

## 2021-05-04 NOTE — PROGRESS NOTES
PT DAILY TREATMENT NOTE     Patient Name: Otto Moura  Date:2021  : 1945  [x]  Patient  Verified  Payor: [place] MEDICARE / Plan: [place] MEDICARE PART A & B / Product Type: Medicare /    In time:130  Out time:215  Total Treatment Time (min): 45  Visit #: 6 of 8    Medicare/BCBS Only   Total Timed Codes (min):  45 1:1 Treatment Time:  45       Treatment Area: Bilateral leg weakness [R29.898]  Weakness of both upper extremities [R29.898]    SUBJECTIVE  Pain Level (0-10 scale): 3  Any medication changes, allergies to medications, adverse drug reactions, diagnosis change, or new procedure performed?: [x] No    [] Yes (see summary sheet for update)  Subjective functional status/changes:   [] No changes reported  Patient has her usual pain level in her neck. Everything else is going well. OBJECTIVE      20 min Therapeutic Exercise:  [] See flow sheet :   Rationale: increase ROM and increase strength to improve the patients ability to increase activity tolerance     10 min Neuromuscular Re-education:  []  See flow sheet : balance   Rationale: increase strength, improve coordination, improve balance and increase proprioception  to improve the patients ability to tolerate sustained postures    15 min Gait Trainin feet with SPC device on level surfaces with CGA level of assist   Rationale: to allow patient to safely ambulate with LRAD with cuing for sequencing and step through gait pattern          With   [] TE   [] TA   [] neuro   [] other: Patient Education: [x] Review HEP    [] Progressed/Changed HEP based on:   [] positioning   [] body mechanics   [] transfers   [] heat/ice application    [] other:      Other Objective/Functional Measures: improved crystal and sequencing with gait     Pain Level (0-10 scale) post treatment: 3    ASSESSMENT/Changes in Function: Ms. Marycruz Gerard demonstrated improved gait speed and step through sequencing with SPC today.  She had a few instances and LOB requiring Min A to correct. Instructed patient's partner on sequencing pattern to assist patient with gait practice at home. Patient will continue to benefit from skilled PT services to modify and progress therapeutic interventions, address functional mobility deficits, address ROM deficits, address strength deficits, analyze and address soft tissue restrictions, analyze and cue movement patterns, analyze and modify body mechanics/ergonomics, assess and modify postural abnormalities, address imbalance/dizziness and instruct in home and community integration to attain remaining goals. []  See Plan of Care  []  See progress note/recertification  []  See Discharge Summary         Progress towards goals / Updated goals:  1. Pt will increase MMT right hip ER/IR to 4-/5to improve household mobility.             Re-certification: right hip ER 3+/5, right hip IR 3+/5              Tolerating progression of exercises  2. Pt will improve TUG time to 15 seconds or less with rollator to improve the pt's dynamic stability and fall risk.              Re-certification: 16 seconds with rollator              Progressing with gait training and challenges  3.  Pt will be able to descend 4 stairs with B handrails with alternating step pattern to improve safety and efficiency with mobility.               Re-certification: completing with nonreciprocal pattern    PLAN  [x]  Upgrade activities as tolerated     []  Continue plan of care  []  Update interventions per flow sheet       []  Discharge due to:_  []  Other:_      Vonda Hernandez, PTA 5/4/2021  1:23 PM    Future Appointments   Date Time Provider Gaby Delarosa   5/4/2021  1:30 PM Brittany Lema [facility]   5/6/2021  1:30 PM Zaid Cunningham PT [facility]   5/25/2021  4:30 PM Maurisio Kan RD [facility]   6/3/2021 10:00 AM Slade Hassan MD [address] [facility]   10/15/2021  1:15 PM [facility] INFUSION PHLEBOTOMIST [facility]   10/18/2021  1:00 PM [facility] FAST TRACK NURSE [facility]

## 2021-05-06 ENCOUNTER — HOSPITAL ENCOUNTER (OUTPATIENT)
Dept: PHYSICAL THERAPY | Age: 76
Discharge: HOME | End: 2021-05-06
Payer: MEDICARE

## 2021-05-06 PROCEDURE — 97116 GAIT TRAINING THERAPY: CPT

## 2021-05-06 PROCEDURE — 97110 THERAPEUTIC EXERCISES: CPT

## 2021-05-06 PROCEDURE — 97112 NEUROMUSCULAR REEDUCATION: CPT

## 2021-05-06 NOTE — PROGRESS NOTES
[facility]  [address]  [place], [address] [phone] fax    Continued Plan of Care/ Re-certification for Physical Therapy Services    Patient name: Jamaica Friedman Start of Care: 3/11/2021   Referral source: Gonsalo Valdes PA-C FPL: 1/21/4298                Medical Diagnosis: Weakness of both upper extremities [R29.898]  Bilateral leg weakness [R29.898]  Payor: [place] MEDICARE / Plan: [place] MEDICARE PART A & B / Product Type: Medicare /  Onset Date: 11/20/2019                Treatment Diagnosis: B LE/UE weakness/pain, impaired gait and balance   Prior Hospitalization: see medical history Provider#: [id number]   Medications: Verified on Patient summary List    Comorbidities: hx CVA, fractured right humerus July 2020, c/s fusion 11/20/2019, arthritis, recent weight change.    Prior Level of Function: retired. Recovering from cervical myelopathy/fusion last year. Lives with friend who helps assist with all care in an 1 story home. Frequent falls and general deconditioning was present since surgery. Hx fractured right humerus/shoulder in July 2020. Visits from Start of Care: 16    Missed Visits: 0    The Plan of Care and following information is based on the patient's current status:  1. Pt will increase MMT right hip ER/IR to 4-/5to improve household mobility.             Re-certification: right hip ER 3+/5, right hip IR 3+/5               PROGRESSING :right hip ER 3+/5, right hip IR 4-/5  2. Pt will improve TUG time to 15 seconds or less with rollator to improve the pt's dynamic stability and fall risk.              Re-certification: 16 seconds with rollator               MET: 13 seconds:  3.  Pt will be able to descend 4 stairs with B handrails with alternating step pattern to improve safety and efficiency with mobility.               Re-certification: completing with nonreciprocal pattern   MET:    Key functional changes:   Functional Gains: walking better, starting training with cane, stair negotiation independently  Functional Deficits: fatigue quickly with walking, household chores  % improvement: 60%  Pain   Average: 6/10       Best: 2/10     Worst: 8/10 - in neck   Patient Goal: \"to keep working on my leg strength and endurance. \"        Problems/ barriers to goal attainment: weakness, impaired proprioception, impaired activity tolerance. Problem List: pain affecting function, decrease ROM, decrease strength, edema affecting function, impaired gait/ balance, decrease ADL/ functional abilitiies, decrease activity tolerance, decrease flexibility/ joint mobility and decrease transfer abilities    Treatment Plan: Therapeutic exercise, Therapeutic activities, Neuromuscular re-education, Physical agent/modality, Gait/balance training, Manual therapy, Aquatic therapy, Patient education, Self Care training, Functional mobility training, Home safety training and Stair training       Goals for this certification period to be accomplished in 4 weeks:  1. Pt will increase MMT right hip flexion/knee extension to 4+/5 bilaterally to improve ease with gait & stair negotiation. .              Re-certification: 3+/5  2. Pt will improve TUG time to 12 seconds or less with rollator to improve the pt's dynamic stability and fall risk.              Re-certification:13 seconds  3. Patient will ambulate 80' with SPC with CGA to improve ease with household mobility and decrease fall risk. Re-Certification: 055' with SPC with CGA      Frequency / Duration: Patient to be seen 2 times per week for 4 weeks:    Assessment / Recommendations:Ms. Jimena Howard has made good progress with PT and displays improved mobility, stair negotiation, and has begun to gait train with SPC. She remains very unsteady without AD, and fatigues quickly with standing/walking activities. Recommend continued PT to address remaining deficits.  .     Certification Period: 5/6/21 - 6/19/21    Ti Walker, PT 5/6/2021 1:35 PM    ________________________________________________________________________  I certify that the above Therapy Services are being furnished while the patient is under my care. I agree with the treatment plan and certify that this therapy is necessary. [] I have read the above and request that my patient continue as recommended.   [] I have read the above report and request that my patient continue therapy with the following changes/special instructions: _____________________________________________  [] I have read the above report and request that my patient be discharged from therapy    Physician's Signature:____________Date:_________TIME:________     Radha Whitney PA-C  ** Signature, Date and Time must be completed for valid certification **    Please sign and return to [facility] [place] [facility]  [address], [address] [phone] fax

## 2021-05-06 NOTE — PROGRESS NOTES
PT DAILY TREATMENT NOTE     Patient Name: Maddy Preston  Date:2021  : 1945  [x]  Patient  Verified  Payor: VA MEDICARE / Plan: VA MEDICARE PART A & B / Product Type: Medicare /    In time:130  Out time:215  Total Treatment Time (min): 45  Visit #: 7 of 8    Medicare/BCBS Only   Total Timed Codes (min):  45 1:1 Treatment Time:  45       Treatment Area: Bilateral leg weakness [R29.898]  Weakness of both upper extremities [R29.898]    SUBJECTIVE  Pain Level (0-10 scale): 4  Any medication changes, allergies to medications, adverse drug reactions, diagnosis change, or new procedure performed?: [x] No    [] Yes (see summary sheet for update)  Subjective functional status/changes:   [] No changes reported  'I have good days and bad days. \"    OBJECTIVE  20 min Therapeutic Exercise:  [x]? See flow sheet :   Rationale: increase ROM and increase strength to improve the patients ability to increase activity tolerance     10 min Neuromuscular Re-education:  [x]?   See flow sheet : balance   Rationale: increase strength, improve coordination, improve balance and increase proprioception  to improve the patients ability to tolerate sustained postures     15 min Gait Trainin feet with SPC device on level surfaces with CGA level of assist   Rationale: to allow patient to safely ambulate with LRAD with cuing for sequencing and step through gait pattern                                                       With   [] TE   [] TA   [] neuro   [] other: Patient Education: [x] Review HEP    [] Progressed/Changed HEP based on:   [] positioning   [] body mechanics   [] transfers   [] heat/ice application    [] other:      Other Objective/Functional Measures:      Pain Level (0-10 scale) post treatment: 3    ASSESSMENT/Changes in Function: see recert    Patient will continue to benefit from skilled PT services to modify and progress therapeutic interventions, address functional mobility deficits, address ROM deficits, address strength deficits, analyze and address soft tissue restrictions, analyze and cue movement patterns, analyze and modify body mechanics/ergonomics, assess and modify postural abnormalities, address imbalance/dizziness and instruct in home and community integration to attain remaining goals. []  See Plan of Care  []  See progress note/recertification  []  See Discharge Summary           Goals for this certification period to be accomplished in 4 weeks:  1. Pt will increase MMT right hip flexion/knee extension to 4+/5 bilaterally to improve ease with gait & stair negotiation. .              Re-certification: 3+/5  2. Pt will improve TUG time to 12 seconds or less with rollator to improve the pt's dynamic stability and fall risk.              Re-certification:13 seconds  3. Patient will ambulate 80' with [facility] with CGA to improve ease with household mobility and decrease fall risk.     Re-Certification: 477' with [facility] with CGA    PLAN  []  Upgrade activities as tolerated     [x]  Continue plan of care  []  Update interventions per flow sheet       []  Discharge due to:_  []  Other:_      Linda Reilly, PT 5/6/2021  2:23 PM    Future Appointments   Date Time Provider Gaby Delarosa   5/11/2021 11:15 AM Tracey Kimball, PTA [facility]   5/14/2021  1:30 PM [place] Isela Wong, PTA [facility]   5/19/2021  2:15 PM [place] Isela Wong, PTA [facility]   5/21/2021  1:30 PM Caro Schreiber, PT [facility]   5/25/2021  1:30 PM Gisela Lyon, PT [facility]   5/25/2021  4:30 PM Lou Parker RD [facility]   5/27/2021  1:30 PM Gisela Lyon, PT [facility]   6/1/2021  1:30 PM Katrin Werner [facility]   6/3/2021 10:00 AM MD Tonya Rao   6/3/2021  1:30 PM [facility]   10/15/2021  1:15 PM [facility]   10/18/2021  1:00 PM [facility] FAST TRACK NURSE [facility]

## 2021-05-11 ENCOUNTER — HOSPITAL ENCOUNTER (OUTPATIENT)
Dept: PHYSICAL THERAPY | Age: 76
Discharge: HOME | End: 2021-05-11
Payer: MEDICARE

## 2021-05-11 PROCEDURE — 97112 NEUROMUSCULAR REEDUCATION: CPT

## 2021-05-11 PROCEDURE — 97116 GAIT TRAINING THERAPY: CPT

## 2021-05-11 PROCEDURE — 97110 THERAPEUTIC EXERCISES: CPT

## 2021-05-11 NOTE — PROGRESS NOTES
PT DAILY TREATMENT NOTE     Patient Name: Samir Calderón  Date:2021  : 1945  [x]  Patient  Verified  Payor: VA MEDICARE / Plan: VA MEDICARE PART A & B / Product Type: Medicare /    In [id number]  Out time:1200  Total Treatment Time (min): 55  Visit #: 1 of 8    Medicare/BCBS Only   Total Timed Codes (min):  46 1:1 Treatment Time:  46       Treatment Area: Bilateral leg weakness [R29.898]  Weakness of both upper extremities [R29.898]    SUBJECTIVE  Pain Level (0-10 scale): 3  Any medication changes, allergies to medications, adverse drug reactions, diagnosis change, or new procedure performed?: [x] No    [] Yes (see summary sheet for update)  Subjective functional status/changes:   [] No changes reported  Patient reports she's feeling \"so-so\" today. OBJECTIVE      16 min Therapeutic Exercise:  [] See flow sheet :   Rationale: increase ROM and increase strength to improve the patients ability to increase activity tolerance     10 min Neuromuscular Re-education:  []  See flow sheet : balance   Rationale: improve coordination, improve balance and increase proprioception  to improve the patients ability to improve stability with standing and walking and decrease fall risk    20 min Gait Trainin feet with SPC device on level surfaces with CGA level of assist   Rationale: allow patient to safely ambulate with LRAD          With   [] TE   [] TA   [] neuro   [] other: Patient Education: [x] Review HEP    [] Progressed/Changed HEP based on:   [] positioning   [] body mechanics   [] transfers   [] heat/ice application    [] other:      Other Objective/Functional Measures: improved endurance with gait    Pain Level (0-10 scale) post treatment: 3    ASSESSMENT/Changes in Function: Patient demonstrated improved stability with balance activities requiring less assistance to correct LOB. She had improved endurance and sequencing with gait training and one LOB which she corrected without assistance.  She continues to have mostly step to gait pattern but her crystal is improving. Patient will continue to benefit from skilled PT services to modify and progress therapeutic interventions, address functional mobility deficits, address ROM deficits, address strength deficits, analyze and address soft tissue restrictions, analyze and cue movement patterns, analyze and modify body mechanics/ergonomics, assess and modify postural abnormalities, address imbalance/dizziness and instruct in home and community integration to attain remaining goals. []  See Plan of Care  []  See progress note/recertification  []  See Discharge Summary         Progress towards goals / Updated goals:  Goals for this certification period to be accomplished in 4 weeks:  1. Pt will increase MMT right hip flexion/knee extension to 4+/5 bilaterally to improve ease with gait & stair negotiation. .              Re-certification: 3+/5  2. Pt will improve TUG time to 12 seconds or less with rollator to improve the pt's dynamic stability and fall risk.              Re-certification:13 seconds  3. Patient will ambulate 80' with [facility] with CGA to improve ease with household mobility and decrease fall risk.                 Re-Certification: 475' with [facility] with CGA   Current: 12' with [facility] with CGA    PLAN  [x]  Upgrade activities as tolerated     []  Continue plan of care  []  Update interventions per flow sheet       []  Discharge due to:_  []  Other:_      Davide Deutsch, PTA 5/11/2021  9:45 AM    Future Appointments   Date Time Provider Gaby Delarosa   5/11/2021 11:15 AM Aliya Vazquez, PTA [facility]   5/14/2021  1:30 PM Kilo Norton PTA [facility]   5/19/2021  2:15 PM Kilo Norton PTA [facility]   5/21/2021  1:30 PM Mariely Medina, PT [facility]   5/25/2021  1:30 PM Jeimy Gutierrez, PT [facility]   5/25/2021  4:30 PM Nabeel Pantoja RD [facility]   5/27/2021  1:30 PM [facility] 6/1/2021  1:30 PM Judy Latif, PT [facility]   6/3/2021 10:00 AM Louis Denton MD [place] [id number]   6/3/2021  1:30 PM James Kimball, PT [facility]   10/15/2021  1:15 PM [facility]OT[facility]   10/18/2021  1:00 PM [facility] FAST TRACK NURSE [facility]

## 2021-05-14 ENCOUNTER — HOSPITAL ENCOUNTER (OUTPATIENT)
Dept: PHYSICAL THERAPY | Age: 76
Discharge: HOME | End: 2021-05-14
Payer: MEDICARE

## 2021-05-14 PROCEDURE — 97116 GAIT TRAINING THERAPY: CPT

## 2021-05-14 PROCEDURE — 97110 THERAPEUTIC EXERCISES: CPT

## 2021-05-14 PROCEDURE — 97112 NEUROMUSCULAR REEDUCATION: CPT

## 2021-05-14 NOTE — PROGRESS NOTES
PT DAILY TREATMENT NOTE     Patient Name: Wyatt Jenkins  Date:2021  : 1945  [x]  Patient  Verified  Payor: VA MEDICARE / Plan: VA MEDICARE PART A & B / Product Type: Medicare /    In time:130  Out time:215  Total Treatment Time (min): 45  Visit #: 2 of 8    Medicare/BCBS Only   Total Timed Codes (min):  45 1:1 Treatment Time:  45       Treatment Area: Bilateral leg weakness [R29.898]  Weakness of both upper extremities [R29.898]    SUBJECTIVE  Pain Level (0-10 scale): 3  Any medication changes, allergies to medications, adverse drug reactions, diagnosis change, or new procedure performed?: [x] No    [] Yes (see summary sheet for update)  Subjective functional status/changes:   [] No changes reported  \"I'm doing better. \"    OBJECTIVE    Modality rationale: patient declined   Min Type Additional Details    [] Estim:  []Unatt       []IFC  []Premod                        []Other:  []w/ice   []w/heat  Position:  Location:    [] Estim: []Att    []TENS instruct  []NMES                    []Other:  []w/US   []w/ice   []w/heat  Position:  Location:    []  Traction: [] Cervical       []Lumbar                       [] Prone          []Supine                       []Intermittent   []Continuous Lbs:  [] before manual  [] after manual    []  Ultrasound: []Continuous   [] Pulsed                           []1MHz   []3MHz W/cm2:  Location:    []  Iontophoresis with dexamethasone         Location: [] Take home patch   [] In clinic    []  Ice     []  heat  []  Ice massage  []  Laser   []  Anodyne Position:  Location:    []  Laser with stim  []  Other:  Position:  Location:    []  Vasopneumatic Device Pressure:       [] lo [] med [] hi   Temperature: [] lo [] med [] hi   [] Skin assessment post-treatment:  []intact []redness- no adverse reaction    []redness - adverse reaction:     15 min Therapeutic Exercise:  [x] See flow sheet :   Rationale: increase ROM and increase strength to improve the patients ability to perform ADLs    15 min Neuromuscular Re-education:  [x]  See flow sheet : balance training   Rationale: increase ROM, increase strength, improve coordination, improve balance and increase proprioception  to improve the patients ability to improve mobility and decrease fall risk    15 min Gait Trainin feet with SPC device on level surfaces with CGA level of assist   Rationale: to normalize gait and functional independence. With   [x] TE   [] TA   [x] neuro   [] other: Patient Education: [x] Review HEP    [] Progressed/Changed HEP based on:   [x] positioning   [x] body mechanics   [] transfers   [] heat/ice application    [] other:      Other Objective/Functional Measures:      Pain Level (0-10 scale) post treatment: 0    ASSESSMENT/Changes in Function: Pt was able to ambulate further today than in previous visits. She needs cuing at times to slow down to allow her [facility] to catch up with her as she drags that cane behind her at times. Did well with static balance on unstable surface with SBA to CGA. Patient will continue to benefit from skilled PT services to modify and progress therapeutic interventions, address functional mobility deficits, address ROM deficits, address strength deficits, analyze and address soft tissue restrictions, analyze and cue movement patterns, analyze and modify body mechanics/ergonomics, assess and modify postural abnormalities, address imbalance/dizziness and instruct in home and community integration to attain remaining goals. [x]  See Plan of Care  []  See progress note/recertification  []  See Discharge Summary         Progress towards goals / Updated goals:  Goals for this certification period to be accomplished in 4 weeks:  1. Pt will increase MMT right hip flexion/knee extension to 4+/5 bilaterally to improve ease with gait & stair negotiation. .              Re-certification: 3+/5   Making progress  2.  Pt will improve TUG time to 12 seconds or less with rollator to improve the pt's dynamic stability and fall risk.              Re-certification:13 seconds   Assess at later visit  3.  Patient will ambulate 80' with SPC with CGA to improve ease with household mobility and decrease fall risk.                Re-Certification: 021' with SPC with CGA              ' with SPC with CGA     PLAN  []  Upgrade activities as tolerated     [x]  Continue plan of care  []  Update interventions per flow sheet       []  Discharge due to:_  []  Other:_      Wilver Acosta, PTA, CSCS 5/14/2021  2:24 PM    Future Appointments   Date Time Provider Gaby Delarosa   5/19/2021  2:15 PM Stephanie Narvaez, PTA [facility]   5/21/2021  1:30 PM Brenton Mcburney, PT [facility]   5/25/2021  1:30 PM Gauri November, PT [facility]   5/25/2021  4:30 PM Marilyn Lincoln RD [facility]   5/27/2021  1:30 PM Gauri November, PT [facility]   6/1/2021  1:30 PM Silvano Ross [facility]   6/3/2021 10:00 AM Anand Carrasco MD [id number]   6/3/2021  1:30 PM Gauri November, PT [facility]   10/15/2021  1:15 PM [facility]LEBOTOM[facility]   10/18/2021  1:00 PM [facility] FAST TRACK NURSE [facility]

## 2021-05-19 ENCOUNTER — HOSPITAL ENCOUNTER (OUTPATIENT)
Dept: PHYSICAL THERAPY | Age: 76
Discharge: HOME | End: 2021-05-19
Payer: MEDICARE

## 2021-05-19 PROCEDURE — 97110 THERAPEUTIC EXERCISES: CPT

## 2021-05-19 PROCEDURE — 97112 NEUROMUSCULAR REEDUCATION: CPT

## 2021-05-19 PROCEDURE — 97116 GAIT TRAINING THERAPY: CPT

## 2021-05-19 NOTE — PROGRESS NOTES
PT DAILY TREATMENT NOTE     Patient Name: Marc Zelaya  Date:2021  : 1945  [x]  Patient  Verified  Payor: [place] MEDICARE / Plan: [place] MEDICARE PART A & B / Product Type: Medicare /    In time:215  Out time:255  Total Treatment Time (min): 40  Visit #: 3 of 8    Medicare/BCBS Only   Total Timed Codes (min):  40 1:1 Treatment Time:  40       Treatment Area: Bilateral leg weakness [R29.898]  Weakness of both upper extremities [R29.898]    SUBJECTIVE  Pain Level (0-10 scale): 6  Any medication changes, allergies to medications, adverse drug reactions, diagnosis change, or new procedure performed?: [x] No    [] Yes (see summary sheet for update)  Subjective functional status/changes:   [] No changes reported  \"I've been tired since Saturday. \"    OBJECTIVE    Modality rationale: patient declined   Min Type Additional Details    [] Estim:  []Unatt       []IFC  []Premod                        []Other:  []w/ice   []w/heat  Position:  Location:    [] Estim: []Att    []TENS instruct  []NMES                    []Other:  []w/US   []w/ice   []w/heat  Position:  Location:    []  Traction: [] Cervical       []Lumbar                       [] Prone          []Supine                       []Intermittent   []Continuous Lbs:  [] before manual  [] after manual    []  Ultrasound: []Continuous   [] Pulsed                           []1MHz   []3MHz W/cm2:  Location:    []  Iontophoresis with dexamethasone         Location: [] Take home patch   [] In clinic    []  Ice     []  heat  []  Ice massage  []  Laser   []  Anodyne Position:  Location:    []  Laser with stim  []  Other:  Position:  Location:    []  Vasopneumatic Device Pressure:       [] lo [] med [] hi   Temperature: [] lo [] med [] hi   [] Skin assessment post-treatment:  []intact []redness- no adverse reaction    []redness - adverse reaction:     15 min Therapeutic Exercise:  [x] See flow sheet :   Rationale: increase ROM and increase strength to improve the patients ability to perform ADLs    15 min Neuromuscular Re-education:  [x]  See flow sheet : balance training   Rationale: increase ROM, increase strength, improve coordination, improve balance and increase proprioception  to improve the patients ability to improve mobility and decrease fall risk    10 min Gait Trainin feet with SPC device on level surfaces with CGA level of assist   Rationale: to normalize gait and functional independence          With   [x] TE   [] TA   [x] neuro   [] other: Patient Education: [x] Review HEP    [] Progressed/Changed HEP based on:   [x] positioning   [x] body mechanics   [] transfers   [] heat/ice application    [] other:      Other Objective/Functional Measures:      Pain Level (0-10 scale) post treatment: 5    ASSESSMENT/Changes in Function: Pt was a little more challenged with sequencing using the [facility] today than in previous visits. Balance remains challenged, but more so on unstable surfaces. She was winded following step ups. Patient will continue to benefit from skilled PT services to modify and progress therapeutic interventions, address functional mobility deficits, address ROM deficits, address strength deficits, analyze and address soft tissue restrictions, analyze and cue movement patterns, analyze and modify body mechanics/ergonomics, assess and modify postural abnormalities, address imbalance/dizziness and instruct in home and community integration to attain remaining goals. [x]  See Plan of Care  []  See progress note/recertification  []  See Discharge Summary         Progress towards goals / Updated goals:  Goals for this certification period to be accomplished in 4 weeks:  1. Pt will increase MMT right hip flexion/knee extension to 4+/5 bilaterally to improve ease with gait & stair negotiation. .              Re-certification: 3+/5              Making progress  2.  Pt will improve TUG time to 12 seconds or less with rollator to improve the pt's dynamic stability and fall risk.              Re-certification:13 seconds              Assess at later visit  3.  Patient will ambulate 80' with SPC with CGA to improve ease with household mobility and decrease fall risk.                Re-Certification: 693' with [address] with CGA              MET 580' with SPC with CGA    PLAN  []  Upgrade activities as tolerated     [x]  Continue plan of care  []  Update interventions per flow sheet       []  Discharge due to:_  []  Other:_      Luigi Lucero, PTA, CSCS 5/19/2021  3:05 PM    Future Appointments   Date Time Provider Gaby Delarosa   5/19/2021  2:15 PM Mckinley Llanos PTA [facility]   5/21/2021  1:30 PM Joann Irving [address] [facility]   5/25/2021  1:30 PM Matheus Galindo, PT [facility]   5/25/2021  4:30 PM Ricki Levin RD [facility]   5/27/2021  1:30 PM Matheus Galindo, PT [facility]   6/1/2021  1:30 PM Toy Mystic [facility]   6/3/2021 10:00 AM Dada Jacobson MD [id number] [facility]   6/3/2021  1:30 PM [facility]   10/15/2021  1:15 PM [facility]   10/18/2021  1:00 PM [facility] FAST TRACK NURSE [facility]

## 2021-05-21 ENCOUNTER — HOSPITAL ENCOUNTER (OUTPATIENT)
Dept: PHYSICAL THERAPY | Age: 76
Discharge: HOME | End: 2021-05-21
Payer: MEDICARE

## 2021-05-21 PROCEDURE — 97112 NEUROMUSCULAR REEDUCATION: CPT

## 2021-05-21 PROCEDURE — 97530 THERAPEUTIC ACTIVITIES: CPT

## 2021-05-21 PROCEDURE — 97116 GAIT TRAINING THERAPY: CPT

## 2021-05-25 ENCOUNTER — APPOINTMENT (OUTPATIENT)
Dept: NUTRITION | Age: 76
End: 2021-05-25

## 2021-05-25 ENCOUNTER — HOSPITAL ENCOUNTER (OUTPATIENT)
Dept: PHYSICAL THERAPY | Age: 76
Discharge: HOME | End: 2021-05-25
Payer: MEDICARE

## 2021-05-25 PROCEDURE — 97116 GAIT TRAINING THERAPY: CPT

## 2021-05-25 PROCEDURE — 97110 THERAPEUTIC EXERCISES: CPT

## 2021-05-25 PROCEDURE — 97112 NEUROMUSCULAR REEDUCATION: CPT

## 2021-05-25 NOTE — PROGRESS NOTES
PT DAILY TREATMENT NOTE     Patient Name: Dania Baytown  Date:2021  : 1945  [x]  Patient  Verified  Payor: VA MEDICARE / Plan: VA MEDICARE PART A & B / Product Type: Medicare /    In time:130  Out time:215  Total Treatment Time (min): 45  Visit #: 5 of 8    Medicare/BCBS Only   Total Timed Codes (min):  45 1:1 Treatment Time:  45       Treatment Area: Bilateral leg weakness [R29.898]  Weakness of both upper extremities [R29.898]    SUBJECTIVE  Pain Level (0-10 scale): 3  Any medication changes, allergies to medications, adverse drug reactions, diagnosis change, or new procedure performed?: [x] No    [] Yes (see summary sheet for update)  Subjective functional status/changes:   [] No changes reported  Patient reports her doctor prescribed an inhaler but she's not sure she's using it right or if it'll help. OBJECTIVE    15 min Therapeutic Exercise:  [] See flow sheet :   Rationale: increase ROM and increase strength to improve the patients ability to increase activity tolerance     10 min Neuromuscular Re-education:  []  See flow sheet : balance, diaphragmatic breathing    Rationale: increase strength, improve coordination, improve balance and increase proprioception  to improve the patients ability to increase stability with ambulation and reduce risk for falls    20 min Gait Trainin feet without assistive device on level surfaces with CGA level of assist   Rationale: to allow patient to safely ambulate without AD          With   [] TE   [] TA   [] neuro   [] other: Patient Education: [x] Review HEP    [] Progressed/Changed HEP based on:   [] positioning   [] body mechanics   [] transfers   [] heat/ice application    [] other:      Other Objective/Functional Measures: added diaphragmatic breathing to HEP     Pain Level (0-10 scale) post treatment: 3    ASSESSMENT/Changes in Function: Patient demonstrated improved gait speed and endurance walking for 8:32 without seated rest break.  She reported she was more fatigued than usual today and had several instances of SOB. SPO2 was 99% and HR was normal. Reviewed diaphragmatic breathing with patient with her able to perform intermittently; printed visual for HEP for her to practice. Patient will continue to benefit from skilled PT services to modify and progress therapeutic interventions, address functional mobility deficits, address ROM deficits, address strength deficits, analyze and address soft tissue restrictions, analyze and cue movement patterns, analyze and modify body mechanics/ergonomics, assess and modify postural abnormalities, address imbalance/dizziness and instruct in home and community integration to attain remaining goals. []  See Plan of Care  []  See progress note/recertification  []  See Discharge Summary         Progress towards goals / Updated goals:  1. Pt will increase MMT right hip flexion/knee extension to 4+/5 bilaterally to improve ease with gait & stair negotiation. .              Re-certification: 3+/5              Making progress  2. Pt will improve TUG time to 12 seconds or less with rollator to improve the pt's dynamic stability and fall risk.              Re-certification:13 seconds              Assess at later visit  3.  Patient will ambulate 80' with SPC with CGA to improve ease with household mobility and decrease fall risk.                Re-Certification: 728' with [address] with CGA              MET 580' with SPC with CGA    PLAN  [x]  Upgrade activities as tolerated     []  Continue plan of care  []  Update interventions per flow sheet       []  Discharge due to:_  []  Other:_      Leslie Vazquez, PTA 5/25/2021  1:16 PM    Future Appointments   Date Time Provider Gaby Delarosa   5/25/2021  1:30 PM Parul Durant [facility]   5/27/2021  1:30 PM Monica Benedict, PT [facility]   6/1/2021  1:30 PM Bryce Leventhal, PT [facility]   6/3/2021 10:00 AM MD Tonya Zuñiga 6/3/2021  1:30 PM Frankie Jana, PT [facility]   10/15/2021  1:15 PM [facility]OTOM[facility]   10/18/2021  1:00 PM [facility] FAST TRACK NURSE [facility]

## 2021-05-27 ENCOUNTER — HOSPITAL ENCOUNTER (OUTPATIENT)
Dept: PHYSICAL THERAPY | Age: 76
Discharge: HOME | End: 2021-05-27
Payer: MEDICARE

## 2021-05-27 PROCEDURE — 97116 GAIT TRAINING THERAPY: CPT

## 2021-05-27 PROCEDURE — 97112 NEUROMUSCULAR REEDUCATION: CPT

## 2021-05-27 PROCEDURE — 97110 THERAPEUTIC EXERCISES: CPT

## 2021-05-27 NOTE — PROGRESS NOTES
PT DAILY TREATMENT NOTE     Patient Name: Abel Farr  Date:2021  : 1945  [x]  Patient  Verified  Payor: VA MEDICARE / Plan: VA MEDICARE PART A & B / Product Type: Medicare /    In time:130  Out time:215  Total Treatment Time (min): 45  Visit #: 6 of 8    Medicare/BCBS Only   Total Timed Codes (min):  45 1:1 Treatment Time:  45       Treatment Area: Bilateral leg weakness [R29.898]  Weakness of both upper extremities [R29.898]    SUBJECTIVE  Pain Level (0-10 scale): 6  Any medication changes, allergies to medications, adverse drug reactions, diagnosis change, or new procedure performed?: [x] No    [] Yes (see summary sheet for update)  Subjective functional status/changes:   [] No changes reported  \"I'm     OBJECTIVE    15 min Therapeutic Exercise:  [x]? See flow sheet :   Rationale: increase ROM and increase strength to improve the patients ability to increase activity tolerance     20 min Neuromuscular Re-education:  [x]? See flow sheet : balance, diaphragmatic breathing    Rationale: increase strength, improve coordination, improve balance and increase proprioception  to improve the patients ability to increase stability with ambulation and reduce risk for falls     10 min Gait Training:  without assistive device on level surfaces with CGA level of assist   Rationale: to allow patient to safely ambulate without AD            With   [] TE   [] TA   [] neuro   [] other: Patient Education: [x] Review HEP    [] Progressed/Changed HEP based on:   [] positioning   [] body mechanics   [] transfers   [] heat/ice application    [] other:      Other Objective/Functional Measures:      Pain Level (0-10 scale) post treatment: 3    ASSESSMENT/Changes in Function: Ms. Campos Persons continues to improve with gait tolerance and proprioception during balance training. Still needs CGA for safety.      Patient will continue to benefit from skilled PT services to modify and progress therapeutic interventions, address functional mobility deficits, address ROM deficits, address strength deficits, analyze and address soft tissue restrictions, analyze and cue movement patterns, analyze and modify body mechanics/ergonomics, assess and modify postural abnormalities, address imbalance/dizziness and instruct in home and community integration to attain remaining goals. []  See Plan of Care  []  See progress note/recertification  []  See Discharge Summary         Progress towards goals / Updated goals:  1. Pt will increase MMT right hip flexion/knee extension to 4+/5 bilaterally to improve ease with gait & stair negotiation. .              Re-certification: 3+/5              Making progress  2. Pt will improve TUG time to 12 seconds or less with rollator to improve the pt's dynamic stability and fall risk.              Re-certification:13 seconds              Assess at later visit  3.  Patient will ambulate 80' with [facility] with CGA to improve ease with household mobility and decrease fall risk.                Re-Certification: 674' with [facility] with CGA              MET 580' with [facility] with CGA    PLAN  []  Upgrade activities as tolerated     [x]  Continue plan of care  []  Update interventions per flow sheet       []  Discharge due to:_  []  Other:_      Justin Cosme, PT 5/27/2021  2:13 PM    Future Appointments   Date Time Provider Gaby Delarosa   6/1/2021  1:30 PM Jessica Moreno [facility]   6/3/2021 10:00 AM MD Tonya Peoples   6/3/2021  1:30 PM Blair Chacon, PT [facility]   10/15/2021  1:15 PM [facility]BOTOM[facility]   10/18/2021  1:00 PM [facility] FAST TRACK NURSE [facility]

## 2021-06-01 ENCOUNTER — APPOINTMENT (OUTPATIENT)
Dept: PHYSICAL THERAPY | Age: 76
End: 2021-06-01
Payer: MEDICARE

## 2021-06-03 ENCOUNTER — HOSPITAL ENCOUNTER (OUTPATIENT)
Dept: PHYSICAL THERAPY | Age: 76
Discharge: HOME | End: 2021-06-03
Payer: MEDICARE

## 2021-06-03 PROCEDURE — 97112 NEUROMUSCULAR REEDUCATION: CPT

## 2021-06-03 PROCEDURE — 97116 GAIT TRAINING THERAPY: CPT

## 2021-06-03 PROCEDURE — 97110 THERAPEUTIC EXERCISES: CPT

## 2021-06-03 PROCEDURE — 97530 THERAPEUTIC ACTIVITIES: CPT

## 2021-06-03 NOTE — PROGRESS NOTES
PT DAILY TREATMENT NOTE     Patient Name: Alina Cardoza  Date:6/3/2021  : 1945  [x]  Patient  Verified  Payor: VA MEDICARE / Plan: VA MEDICARE PART A & B / Product Type: Medicare /    In time:130  Out time:226  Total Treatment Time (min): 56  Visit #: 7 of 8    Medicare/BCBS Only   Total Timed Codes (min):  56 1:1 Treatment Time:  56       Treatment Area: Bilateral leg weakness [R29.898]  Weakness of both upper extremities [R29.898]    SUBJECTIVE  Pain Level (0-10 scale): 4  Any medication changes, allergies to medications, adverse drug reactions, diagnosis change, or new procedure performed?: [x] No    [] Yes (see summary sheet for update)  Subjective functional status/changes:   [] No changes reported  \"I'm having a pretty good day today. \"    OBJECTIVE    Modality rationale: patient declined   Min Type Additional Details    [] Estim:  []Unatt       []IFC  []Premod                        []Other:  []w/ice   []w/heat  Position:  Location:    [] Estim: []Att    []TENS instruct  []NMES                    []Other:  []w/US   []w/ice   []w/heat  Position:  Location:    []  Traction: [] Cervical       []Lumbar                       [] Prone          []Supine                       []Intermittent   []Continuous Lbs:  [] before manual  [] after manual    []  Ultrasound: []Continuous   [] Pulsed                           []1MHz   []3MHz W/cm2:  Location:    []  Iontophoresis with dexamethasone         Location: [] Take home patch   [] In clinic    []  Ice     []  heat  []  Ice massage  []  Laser   []  Anodyne Position:  Location:    []  Laser with stim  []  Other:  Position:  Location:    []  Vasopneumatic Device  Pre-treatment girth:  Post-treatment girth:  Measured at (location):  Pressure:       [] lo [] med [] hi   Temperature: [] lo [] med [] hi   [] Skin assessment post-treatment:  []intact []redness- no adverse reaction    []redness - adverse reaction:     10 min Therapeutic Exercise:  [x] See flow sheet :   Rationale: increase ROM and increase strength to improve the patients ability to perform ADLs    20 min Therapeutic Activity:  [x]  See flow sheet : FOTO, reassessment, TUG, sit to stands   Rationale: increase ROM, increase strength, improve coordination, improve balance and increase proprioception  to improve the patients ability to improve mobility and ADL performance    16 min Neuromuscular Re-education:  [x]  See flow sheet : balance training   Rationale: increase ROM, increase strength, improve coordination, improve balance and increase proprioception  to improve the patients ability to improve mobility and decrease fall risk     10 min Gait Trainin feet without assistive device on level surfaces with CGA level of assist in 7:11 time without seated rest break, lateral ambulation in martin 50 ft without UE assist using CGA    Rationale: to promote functional gait and independence          With   [x] TE   [] TA   [x] neuro   [] other: Patient Education: [x] Review HEP    [] Progressed/Changed HEP based on:   [x] positioning   [x] body mechanics   [] transfers   [] heat/ice application    [] other:      Other Objective/Functional Measures:   FOTO 38    MMT right hip flexion 4-/5  MMT right knee extension 4/5    TU seconds with rollator     Pain Level (0-10 scale) post treatment: 3    ASSESSMENT/Changes in Function: Ms. Taras Castillo reports 60% improvement since beginning therapy. Pt demonstrates increased right LE strength. Pt has been able to increase her ambulation distance in a shorter period of time, but still needs cuing to improve foot clearance and reduce her WBOS while ambulating without AD. We will continue with PT to address her remaining functional deficits.      Patient will continue to benefit from skilled PT services to modify and progress therapeutic interventions, address functional mobility deficits, address ROM deficits, address strength deficits, analyze and address soft tissue restrictions, analyze and cue movement patterns, analyze and modify body mechanics/ergonomics, assess and modify postural abnormalities, address imbalance/dizziness and instruct in home and community integration to attain remaining goals. [x]  See Plan of Care  [x]  See progress note/recertification  []  See Discharge Summary         Progress towards goals / Updated goals:  1. Pt will increase MMT right hip flexion/knee extension to 4+/5 bilaterally to improve ease with gait & stair negotiation. .              Re-certification: 3+/5              PROGRESSING; right hip flexion 4-/5, right knee extension 4/5  2. Pt will improve TUG time to 12 seconds or less with rollator to improve the pt's dynamic stability and fall risk.              Re-certification:13 seconds              MET; 11 seconds  3. Patient will ambulate 150' with SPC with CGA to improve ease with household mobility and decrease fall risk.                Re-Certification: 172' with SPC with CGA              MET; 900 ft without AD with CGA     Functional Gains: walking/standing tolerance, ascending stairs with reciprocal pattern, efficiency with HEP, B LE strength, caring for significant other  Functional Deficits: descending stairs with reciprocal pattern, walking duration, walking without AD in community and independently  % improvement: 60%  Pain   Average: 5/10       Best: 2/10     Worst: 8/10  Patient Goal: \"to walk without a cane or walker. \"    PLAN  []  Upgrade activities as tolerated     [x]  Continue plan of care  []  Update interventions per flow sheet       []  Discharge due to:_  []  Other:_      Carmelo Huber PTA, CSCS 6/3/2021  2:34 PM    Future Appointments   Date Time Provider Gaby Delarosa   6/10/2021 11:15 AM [facility]   10/15/2021  1:15 PM [facility]LEBOTOM[facility]   10/18/2021  1:00 PM [facility] FAST TRACK NURSE [facility]   12/2/2021  9:40 AM JULIA Holguin [address] [facility]

## 2021-06-04 NOTE — PROGRESS NOTES
[facility]  [address]  [facility], [address] [phone] fax    Continued Plan of Care/ Re-certification for Physical Therapy Services  Patient name: Jamaica Friedman Start of Care: 3/11/2021   Referral source: Brillhart, Bunnie Severs, PA-C LEB: [birth year]                Medical Diagnosis: Weakness of both upper extremities [R29.898]  Bilateral leg weakness [R29.898]  Payor: [place] MEDICARE / Plan: VA MEDICARE PART A & B / Product Type: Medicare /  Onset Date: 11/20/2019                Treatment Diagnosis: B LE/UE weakness/pain, impaired gait and balance   Prior Hospitalization: see medical history Provider#: [id number]   Medications: Verified on Patient summary List    Comorbidities: hx CVA, fractured right humerus July 2020, c/s fusion 11/20/2019, arthritis, recent weight change.    Prior Level of Function: retired. Recovering from cervical myelopathy/fusion last year. Lives with friend who helps assist with all care in an 1 story home. Frequent falls and general deconditioning was present since surgery. Hx fractured right humerus/shoulder in July 2020.       Visits from Start of Care: 23    Missed Visits: 0    The Plan of Care and following information is based on the patient's current status:  1. Pt will increase MMT right hip flexion/knee extension to 4+/5 bilaterally to improve ease with gait & stair negotiation. .              Re-certification: 3+/5              PROGRESSING; right hip flexion 4-/5, right knee extension 4/5  2. Pt will improve TUG time to 12 seconds or less with rollator to improve the pt's dynamic stability and fall risk.              Re-certification:13 seconds              MET; 11 seconds  3.  Patient will ambulate 150' with SPC with CGA to improve ease with household mobility and decrease fall risk.                Re-Certification: 124' with SPC with CGA              MET; 900 ft without AD with CGA     Functional Gains: walking/standing tolerance, ascending stairs with reciprocal pattern, efficiency with HEP, B LE strength, caring for significant other  Functional Deficits: descending stairs with reciprocal pattern, walking duration, walking without AD in community and independently  % improvement: 60%  Pain   Average: 5/10                  Best: 2/10                Worst: 8/10  Patient Goal: \"to walk without a cane or walker. \"    Problems/ barriers to goal attainment: pain, weakness, limited activity tolerance. Problem List: pain affecting function, decrease ROM, decrease strength, edema affecting function, impaired gait/ balance, decrease ADL/ functional abilitiies, decrease activity tolerance, decrease flexibility/ joint mobility and decrease transfer abilities    Treatment Plan: Therapeutic exercise, Therapeutic activities, Neuromuscular re-education, Physical agent/modality, Gait/balance training, Manual therapy, Aquatic therapy, Patient education, Self Care training, Functional mobility training, Home safety training and Stair training       Goals for this certification period to be accomplished in 4 weeks:  1. Pt will increase MMT right hip flexion/knee extension to 4+/5 bilaterally to improve ease with gait & stair negotiation. .              Re-certification: ; right hip flexion 4-/5, right knee extension 4/5  2. Patient will score < 20 on DGI to demonstrate improved dynamic gait and decrease fall risk with daily tasks. Recert: assess nv    Frequency / Duration: Patient to be seen 2 times per week for 4 weeks:    Assessment / Recommendations:  Ms. Prudencio Martin reports 60% improvement since beginning therapy. Pt demonstrates increased right LE strength. Pt has been able to increase her ambulation distance in a shorter period of time, but still needs cuing to improve foot clearance and reduce her WBOS while ambulating without AD. We will continue with PT to address her remaining functional deficits.      Certification Period: 6/4/21 - 7/2/21    Jarvis Quiroz Megan Page, PT 6/4/2021 12:53 PM    ________________________________________________________________________  I certify that the above Therapy Services are being furnished while the patient is under my care. I agree with the treatment plan and certify that this therapy is necessary. [] I have read the above and request that my patient continue as recommended.   [] I have read the above report and request that my patient continue therapy with the following changes/special instructions: _____________________________________________  [] I have read the above report and request that my patient be discharged from therapy    Physician's Signature:____________Date:_________TIME:________     David Turner PA-C  ** Signature, Date and Time must be completed for valid certification **    Please sign and return to [facility]  [address], [address] [phone] fax

## 2021-06-09 ENCOUNTER — HOSPITAL ENCOUNTER (OUTPATIENT)
Dept: PHYSICAL THERAPY | Age: 76
Discharge: HOME | End: 2021-06-09
Payer: MEDICARE

## 2021-06-09 PROCEDURE — 97112 NEUROMUSCULAR REEDUCATION: CPT

## 2021-06-09 PROCEDURE — 97110 THERAPEUTIC EXERCISES: CPT

## 2021-06-09 PROCEDURE — 97530 THERAPEUTIC ACTIVITIES: CPT

## 2021-06-09 NOTE — PROGRESS NOTES
PT DAILY TREATMENT NOTE     Patient Name: Charlene Núñez  Date:2021  : 1945  [x]  Patient  Verified  Payor: VA MEDICARE / Plan: VA MEDICARE PART A & B / Product Type: Medicare /    In time:1110  Out time:1150  Total Treatment Time (min): 40  Visit #: 1 of 8    Medicare/BCBS Only   Total Timed Codes (min):  40 1:1 Treatment Time:  40       Treatment Area: Bilateral leg weakness [R29.898]  Weakness of both upper extremities [R29.898]    SUBJECTIVE  Pain Level (0-10 scale): 3  Any medication changes, allergies to medications, adverse drug reactions, diagnosis change, or new procedure performed?: [x] No    [] Yes (see summary sheet for update)  Subjective functional status/changes:   [] No changes reported  \"I'm tired today. I was out all yesterday. \"    OBJECTIVE    Modality rationale: patient declined   Min Type Additional Details    [] Estim:  []Unatt       []IFC  []Premod                        []Other:  []w/ice   []w/heat  Position:  Location:    [] Estim: []Att    []TENS instruct  []NMES                    []Other:  []w/US   []w/ice   []w/heat  Position:  Location:    []  Traction: [] Cervical       []Lumbar                       [] Prone          []Supine                       []Intermittent   []Continuous Lbs:  [] before manual  [] after manual    []  Ultrasound: []Continuous   [] Pulsed                           []1MHz   []3MHz W/cm2:  Location:    []  Iontophoresis with dexamethasone         Location: [] Take home patch   [] In clinic    []  Ice     []  heat  []  Ice massage  []  Laser   []  Anodyne Position:  Location:    []  Laser with stim  []  Other:  Position:  Location:    []  Vasopneumatic Device  Pre-treatment girth:  Post-treatment girth:  Measured at (location):  Pressure:       [] lo [] med [] hi   Temperature: [] lo [] med [] hi   [] Skin assessment post-treatment:  []intact []redness- no adverse reaction    []redness - adverse reaction:     10 min Therapeutic Exercise:  [x] See flow sheet :   Rationale: increase ROM and increase strength to improve the patients ability to perform ADLs    20 min Therapeutic Activity:  [x]  See flow sheet : DGI assessment, sit to stands   Rationale: increase ROM, increase strength, improve coordination, improve balance and increase proprioception  to improve the patients ability to improve mobility and ADL performance     10 min Neuromuscular Re-education:  [x]  See flow sheet : balance training   Rationale: increase ROM, increase strength, improve coordination, improve balance and increase proprioception  to improve the patients ability to improve mobility and decrease fall risk. With   [x] TE   [x] TA   [x] neuro   [] other: Patient Education: [x] Review HEP    [] Progressed/Changed HEP based on:   [x] positioning   [x] body mechanics   [] transfers   [] heat/ice application    [] other:      Other Objective/Functional Measures:   DGI: 14/24     Pain Level (0-10 scale) post treatment: 3    ASSESSMENT/Changes in Function: Pt remains at a high fall risk according to her DGI score. She is challenged with foot clearance, stepping over objects and performing stairs. Initially challenged with eccentric control during sit to stands, but able to correct with cuing. Patient will continue to benefit from skilled PT services to modify and progress therapeutic interventions, address functional mobility deficits, address ROM deficits, address strength deficits, analyze and address soft tissue restrictions, analyze and cue movement patterns, analyze and modify body mechanics/ergonomics, assess and modify postural abnormalities, address imbalance/dizziness and instruct in home and community integration to attain remaining goals. [x]  See Plan of Care  []  See progress note/recertification  []  See Discharge Summary         Progress towards goals / Updated goals:  1.  Pt will increase MMT right hip flexion/knee extension to 4+/5 bilaterally to improve ease with gait & stair negotiation. .              Re-certification: ; right hip flexion 4-/5, right knee extension 4/5  2. Patient will score < 20 on DGI to demonstrate improved dynamic gait and decrease fall risk with daily tasks.                 Recert: assess nv   Initial score of 14/24    PLAN  []  Upgrade activities as tolerated     [x]  Continue plan of care  []  Update interventions per flow sheet       []  Discharge due to:_  []  Other:_      Odette Nicole, PTA, CSCS 6/9/2021  11:56 AM    Future Appointments   Date Time Provider Gaby Delarosa   6/9/2021 11:15 AM Nicola Mars PTA [facility]   6/10/2021 11:15 AM Ibis Astudillo PTA [facility]   6/16/2021 12:00 PM Ibis Astudillo PTA [facility]   6/18/2021 11:15 AM Joan Bennett, PT [facility]   6/22/2021 11:15 AM Ibis Astudillo PTA [facility]   6/24/2021 11:15 AM Ibis Astudillo PTA [facility]   6/28/2021 11:15 AM Nicola Mars PTA [facility]   7/1/2021 11:15 AM Nicola Mars, CHRISTIAN [facility]   10/15/2021  1:15 PM [facility]LEBOTOM[facility]   10/18/2021  1:00 PM [facility] FAST TRACK NURSE [facility]   12/2/2021  9:40 AM JULIA Rodriguez

## 2021-06-10 ENCOUNTER — HOSPITAL ENCOUNTER (OUTPATIENT)
Dept: PHYSICAL THERAPY | Age: 76
Discharge: HOME | End: 2021-06-10
Payer: MEDICARE

## 2021-06-10 PROCEDURE — 97112 NEUROMUSCULAR REEDUCATION: CPT

## 2021-06-10 PROCEDURE — 97110 THERAPEUTIC EXERCISES: CPT

## 2021-06-10 PROCEDURE — 97116 GAIT TRAINING THERAPY: CPT

## 2021-06-10 NOTE — PROGRESS NOTES
PT DAILY TREATMENT NOTE     Patient Name: Sophie Lindsay  Date:6/10/2021  : 1945  [x]  Patient  Verified  Payor: VA MEDICARE / Plan: VA MEDICARE PART A & B / Product Type: Medicare /    In time:1111  Out time:1158  Total Treatment Time (min): 47  Visit #: 2 of 8    Medicare/BCBS Only   Total Timed Codes (min):  47 1:1 Treatment Time:  47       Treatment Area: Bilateral leg weakness [R29.898]  Weakness of both upper extremities [R29.898]    SUBJECTIVE  Pain Level (0-10 scale): 6  Any medication changes, allergies to medications, adverse drug reactions, diagnosis change, or new procedure performed?: [x] No    [] Yes (see summary sheet for update)  Subjective functional status/changes:   [] No changes reported  Patient reports increased pain in her neck and shoulders and she feels more tired today. OBJECTIVE    19 min Therapeutic Exercise:  [] See flow sheet :   Rationale: increase ROM and increase strength to improve the patients ability to increase activity tolerance     15 min Neuromuscular Re-education:  []  See flow sheet : balance   Rationale: increase strength, improve coordination, improve balance and increase proprioception  to improve the patients ability to increase stability with home and community ambulation    13 min Gait Training: obstacle negotiation with cuing for step fluidity and increased step clearance   Rationale: to improve patient's ability to safely ambulate in home and community          With   [] TE   [] TA   [] neuro   [] other: Patient Education: [x] Review HEP    [] Progressed/Changed HEP based on:   [] positioning   [] body mechanics   [] transfers   [] heat/ice application    [] other:      Other Objective/Functional Measures: improved step clearance and continuity of steps with cuing    Pain Level (0-10 scale) post treatment: 6    ASSESSMENT/Changes in Function: Initiated gait with obstacle negotiation.  Patient initially stopping prior to stepping over obstacle but fluidity of steps improved with cuing. Improved stability on foam with EC today. Improved eccentric control with STS without cuing. Patient will continue to benefit from skilled PT services to modify and progress therapeutic interventions, address functional mobility deficits, address ROM deficits, address strength deficits, analyze and address soft tissue restrictions, analyze and cue movement patterns, analyze and modify body mechanics/ergonomics, assess and modify postural abnormalities, address imbalance/dizziness and instruct in home and community integration to attain remaining goals. []  See Plan of Care  []  See progress note/recertification  []  See Discharge Summary         Progress towards goals / Updated goals:  1. Pt will increase MMT right hip flexion/knee extension to 4+/5 bilaterally to improve ease with gait & stair negotiation. .              Re-certification: ; right hip flexion 4-/5, right knee extension 4/5  2.  Patient will score < 20 on DGI to demonstrate improved dynamic gait and decrease fall risk with daily tasks.                Recert: assess nv               Initial score of 14/24          PLAN  [x]  Upgrade activities as tolerated     []  Continue plan of care  []  Update interventions per flow sheet       []  Discharge due to:_  []  Other:_      Janace Ormond, PTA 6/10/2021  9:09 AM    Future Appointments   Date Time Provider Gaby Delarosa   6/10/2021 11:15 AM Aaron Nelson PTA [facility]   6/16/2021 12:00 PM Rabia Bonner [facility]   6/18/2021 11:15 AM Stefani Kirkpatrick PT [facility]   6/22/2021 11:15 AM Aaron Nelson PTA [facility]   6/24/2021 11:15 AM Aaron Nelson PTA [facility]   6/28/2021 11:15 AM Minus CHRISTIAN Morgan [facility]   7/1/2021 11:15 AM Minus CHRISTIAN Morgan [facility]   10/15/2021  1:15 PM [facility]LEBOTOMIST [facility]   10/18/2021  1:00 PM [facility] FAST TRACK NURSE [facility]   12/2/2021  9:40 AM JULIA Fischer Tonya

## 2021-06-16 ENCOUNTER — HOSPITAL ENCOUNTER (OUTPATIENT)
Dept: PHYSICAL THERAPY | Age: 76
Discharge: HOME | End: 2021-06-16
Payer: MEDICARE

## 2021-06-16 PROCEDURE — 97112 NEUROMUSCULAR REEDUCATION: CPT

## 2021-06-16 PROCEDURE — 97116 GAIT TRAINING THERAPY: CPT

## 2021-06-16 NOTE — PROGRESS NOTES
PT DAILY TREATMENT NOTE     Patient Name: Rhianna Cr  Date:2021  : 1945  [x]  Patient  Verified  Payor: VA MEDICARE / Plan: VA MEDICARE PART A & B / Product Type: Medicare /    In time:1200  Out time:1245  Total Treatment Time (min): 45  Visit #: 3 of 8    Medicare/BCBS Only   Total Timed Codes (min):  45 1:1 Treatment Time:  45       Treatment Area: Bilateral leg weakness [R29.898]  Weakness of both upper extremities [R29.898]    SUBJECTIVE  Pain Level (0-10 scale): 6  Any medication changes, allergies to medications, adverse drug reactions, diagnosis change, or new procedure performed?: [x] No    [] Yes (see summary sheet for update)  Subjective functional status/changes:   [] No changes reported  Patient reports she did a lot of exercises and walking this morning.     OBJECTIVE    5 min Therapeutic Exercise:  [] See flow sheet :   Rationale: increase ROM and increase strength to improve the patients ability to increase activity tolerance    17 min Neuromuscular Re-education:  []  See flow sheet : balance training   Rationale: increase strength, improve coordination, improve balance and increase proprioception  to improve the patients ability to increase stability with home and community ambulation    23 min Gait Training: lateral/retro ambulation, stop/go, stop/turn, obstacle negotiation without AD with CGA   Rationale: to improve patient's ability to safely ambulate in home and community without AD          With   [] TE   [] TA   [] neuro   [] other: Patient Education: [x] Review HEP    [x] Progressed/Changed HEP based on: perform lateral ambulation with assistance at home  [] positioning   [] body mechanics   [] transfers   [] heat/ice application    [] other:       Other Objective/Functional Measures: progressed dynamic gait training     Pain Level (0-10 scale) post treatment: 6    ASSESSMENT/Changes in Function: Progressed dynamic gait training providing cuing to slow crystal and increase step length. Patient demonstrated improved fluidity with minimal cuing with obstacle negotiation and improved stability with slowed crystal. She was challenged most with retro ambulation initially performing step-to gait; improved step through with cuing. Patient will continue to benefit from skilled PT services to modify and progress therapeutic interventions, address functional mobility deficits, address ROM deficits, address strength deficits, analyze and address soft tissue restrictions, analyze and cue movement patterns, analyze and modify body mechanics/ergonomics, assess and modify postural abnormalities, address imbalance/dizziness and instruct in home and community integration to attain remaining goals. []  See Plan of Care  []  See progress note/recertification  []  See Discharge Summary         Progress towards goals / Updated goals:  1. Pt will increase MMT right hip flexion/knee extension to 4+/5 bilaterally to improve ease with gait & stair negotiation. .              Re-certification: ; right hip flexion 4-/5, right knee extension 4/5  2.  Patient will score < 20 on DGI to demonstrate improved dynamic gait and decrease fall risk with daily tasks.                Recert: assess nv               Initial score of 14/24    PLAN  [x]  Upgrade activities as tolerated     []  Continue plan of care  []  Update interventions per flow sheet       []  Discharge due to:_  []  Other:_      Liliane Skelton, PTA 6/16/2021  12:03 PM    Future Appointments   Date Time Provider Gaby Delarosa   6/18/2021 11:15 AM Sharonda Hollingsworth, PT [facility]   6/22/2021 11:15 AM Kellen Bonner PTA [facility]   6/24/2021 11:15 AM Kellen Bonner PTA [facility]   6/28/2021 11:15 AM Eleonore Lis, PTA [facility]   7/1/2021 11:15 AM Eleonore Lis, PTA [facility]   10/15/2021  1:15 PM [facility] INF[facility]ION PHLEBOTOMIST [facility]   10/18/2021  1:00 PM [facility] FAST TRACK NURSE [facility]   12/2/2021  9:40 AM Ekta Horn, [address] [facility]

## 2021-06-18 ENCOUNTER — HOSPITAL ENCOUNTER (OUTPATIENT)
Dept: PHYSICAL THERAPY | Age: 76
Discharge: HOME | End: 2021-06-18
Payer: MEDICARE

## 2021-06-18 PROCEDURE — 97530 THERAPEUTIC ACTIVITIES: CPT

## 2021-06-18 PROCEDURE — 97116 GAIT TRAINING THERAPY: CPT

## 2021-06-18 PROCEDURE — 97112 NEUROMUSCULAR REEDUCATION: CPT

## 2021-06-18 NOTE — PROGRESS NOTES
PT DAILY TREATMENT NOTE     Patient Name: Alina Cardoza  Date:2021  : 1945  [x]  Patient  Verified  Payor: VA MEDICARE / Plan: VA MEDICARE PART A & B / Product Type: Medicare /    In time:1116  Out time:1201  Total Treatment Time (min): 45  Visit #: 4 of 8    Medicare/BCBS Only   Total Timed Codes (min):  45 1:1 Treatment Time:  45       Treatment Area: Bilateral leg weakness [R29.898]  Weakness of both upper extremities [R29.898]    SUBJECTIVE  Pain Level (0-10 scale): 6  Any medication changes, allergies to medications, adverse drug reactions, diagnosis change, or new procedure performed?: [x] No    [] Yes (see summary sheet for update)  Subjective functional status/changes:   [] No changes reported  Pt reports she has a tougher time getting going in the morning; \"it might be one of those crappy days. \" Pt reports max pain over the weekend was an 8/10 but could not identify any activity that elevated her pain.  Pt reports she forgot her knee brace today     OBJECTIVE    10 min Therapeutic Activity:  [x] See flow sheet : sit to stands   Rationale: increase strength and improve coordination to improve the patients ability to perform ADLs    25 min Gait Training:  [x]  See flow sheet : CGA for ambulation with head turns AP/ML, stop and go, estimated counting    Rationale: normalize gait and reduce risk of falls      10 min Neuromuscular Re-education:  [x]  See flow sheet : balance training, diaphragmatic breating    Rationale: increase strength, improve coordination, improve balance and increase proprioception  to improve the patients ability to recover LOB          With   [] TE   [] TA   [] neuro   [] other: Patient Education: [x] Review HEP    [] Progressed/Changed HEP based on:   [] positioning   [] body mechanics   [] transfers   [] heat/ice application    [] other:      Other Objective/Functional Measures: progressed exercises per flow sheet      Pain Level (0-10 scale) post treatment: 5    ASSESSMENT/Changes in Function: Pt tolerated progression of exercises with out increase in symptoms. Pt challenged with head turns, reduced to using visual turning only. Pt requiring CGA up to min A to correct LOB; pt also utilizing reaching strategy to assist in LOB recovery. Pt demonstrating large perturbations with static balance on foam; improved self recovery with cuing for reduced size of corrections. Gait pattern improved with cuing and estimated counting; pt able to improve toe rocker in terminal stance but had reduced balance and need for increased guarding and PT Assist.     Patient will continue to benefit from skilled PT services to modify and progress therapeutic interventions, address functional mobility deficits, address ROM deficits, address strength deficits, analyze and address soft tissue restrictions, analyze and cue movement patterns, analyze and modify body mechanics/ergonomics, assess and modify postural abnormalities, address imbalance/dizziness and instruct in home and community integration to attain remaining goals. []  See Plan of Care  []  See progress note/recertification  []  See Discharge Summary         Progress towards goals / Updated goals:  1. Pt will increase MMT right hip flexion/knee extension to 4+/5 bilaterally to improve ease with gait & stair negotiation. .              Re-certification: ; right hip flexion 4-/5, right knee extension 4/5   Tolerating progression of exercises  2.  Patient will score < 20 on DGI to demonstrate improved dynamic gait and decrease fall risk with daily tasks.                Recert: assess nv               Initial score of 14/24   Tolerating progression of balance and gait exercises    PLAN  [x]  Upgrade activities as tolerated     [x]  Continue plan of care  []  Update interventions per flow sheet       []  Discharge due to:_  []  Other:_      Alaina Vogel PT 6/18/2021  11:12 AM    Future Appointments   Date Time Provider Department [place]   6/18/2021 11:15 AM Lian Liu, PT [facility]   6/22/2021 11:15 AM Leonid Miller, PTA [facility]   6/24/2021 11:15 AM Leonid Miller, PTA [facility]   6/28/2021 11:15 AM Deana Cisse, PTA [facility]   7/1/2021 11:15 AM Deana Cisse, PTA [facility]   10/15/2021  1:15 PM [facility] INFUSION PHLEBOTOMIST [facility]   10/18/2021  1:00 PM [facility] FAST TRACK NURSE [facility]   12/2/2021  9:40 AM Collene Dakins, [place] [address] [facility]

## 2021-06-22 ENCOUNTER — HOSPITAL ENCOUNTER (OUTPATIENT)
Dept: PHYSICAL THERAPY | Age: 76
Discharge: HOME | End: 2021-06-22
Payer: MEDICARE

## 2021-06-22 PROCEDURE — 97116 GAIT TRAINING THERAPY: CPT

## 2021-06-22 PROCEDURE — 97530 THERAPEUTIC ACTIVITIES: CPT

## 2021-06-22 PROCEDURE — 97110 THERAPEUTIC EXERCISES: CPT

## 2021-06-22 PROCEDURE — 97112 NEUROMUSCULAR REEDUCATION: CPT

## 2021-06-22 NOTE — PROGRESS NOTES
PT DAILY TREATMENT NOTE     Patient Name: Nicole Yanes  Date:2021  : 1945  [x]  Patient  Verified  Payor: [place] MEDICARE / Plan: [place] MEDICARE PART A & B / Product Type: Medicare /    In time:1115  Out time:1200  Total Treatment Time (min): 45  Visit #: 5 of 8    Medicare/BCBS Only   Total Timed Codes (min):  45 1:1 Treatment Time:  45       Treatment Area: Bilateral leg weakness [R29.898]  Weakness of both upper extremities [R29.898]    SUBJECTIVE  Pain Level (0-10 scale): 4  Any medication changes, allergies to medications, adverse drug reactions, diagnosis change, or new procedure performed?: [x] No    [] Yes (see summary sheet for update)  Subjective functional status/changes:   [] No changes reported  \"Feeling a little crappy this morning. \"    OBJECTIVE    15 min Therapeutic Exercise:  [] See flow sheet :   Rationale: increase ROM and increase strength to improve the patients ability to increase activity tolerance     10 min Neuromuscular Re-education:  []  See flow sheet : balance   Rationale: increase strength, improve coordination, improve balance and increase proprioception  to improve the patients ability to decrease risk for falls    20 min Gait Trainin+ feet without assistive device on level surfaces with CGA level of assist; cuing for longer step length and push off, slower crystal   Rationale: to normalize gait and reduce fall risk          With   [] TE   [] TA   [] neuro   [] other: Patient Education: [x] Review HEP    [] Progressed/Changed HEP based on:   [] positioning   [] body mechanics   [] transfers   [] heat/ice application    [] other:      Other Objective/Functional Measures: added incline balance EO/EC     Pain Level (0-10 scale) post treatment: 4    ASSESSMENT/Changes in Function: Patient was more fatigued this morning requiring more frequent rest breaks.  She continues to demonstrate posterior lean with EC on foam requiring CGA->Min A with cuing for anterior WS to correct; stable with EO and EC on incline. She was fatigued with gait training but demonstrated improved step length, push off, and slowed crystal with cuing. Patient will continue to benefit from skilled PT services to modify and progress therapeutic interventions, address functional mobility deficits, address ROM deficits, address strength deficits, analyze and address soft tissue restrictions, analyze and cue movement patterns, analyze and modify body mechanics/ergonomics, assess and modify postural abnormalities, address imbalance/dizziness and instruct in home and community integration to attain remaining goals. []  See Plan of Care  []  See progress note/recertification  []  See Discharge Summary         Progress towards goals / Updated goals:  1. Pt will increase MMT right hip flexion/knee extension to 4+/5 bilaterally to improve ease with gait & stair negotiation. .              Re-certification: ; right hip flexion 4-/5, right knee extension 4/5              Tolerating progression of exercises  2.  Patient will score < 20 on DGI to demonstrate improved dynamic gait and decrease fall risk with daily tasks.                Recert: assess nv               Initial score of 14/24              Tolerating progression of balance and gait exercises    PLAN  [x]  Upgrade activities as tolerated     [x]  Continue plan of care  []  Update interventions per flow sheet       []  Discharge due to:_  []  Other:_      Lashawn So PTA 6/22/2021  9:05 AM    Future Appointments   Date Time Provider Gaby Delarosa   6/22/2021 11:15 AM Roberto Olmedo PTA [facility]   6/24/2021 11:15 AM [facility]   6/28/2021 11:15 AM [facility]   7/1/2021 11:15 AM [facility]   10/15/2021  1:15 PM [facility]   10/18/2021  1:00 PM [facility] FA[facility] TRACK NUR[facility]   12/2/2021  9:40 AM JULIA Rhodes

## 2021-06-24 ENCOUNTER — HOSPITAL ENCOUNTER (OUTPATIENT)
Dept: PHYSICAL THERAPY | Age: 76
Discharge: HOME | End: 2021-06-24
Payer: MEDICARE

## 2021-06-24 PROCEDURE — 97530 THERAPEUTIC ACTIVITIES: CPT

## 2021-06-24 PROCEDURE — 97116 GAIT TRAINING THERAPY: CPT

## 2021-06-24 NOTE — PROGRESS NOTES
PT DAILY TREATMENT NOTE     Patient Name: Renan Burns  Date:2021  : 1945  [x]  Patient  Verified  Payor: [place] MEDICARE / Plan: VA MEDICARE PART A & B / Product Type: Medicare /    In ILKP:[id number]  Out time:1200  Total Treatment Time (min): 43  Visit #: 6 of 8    Medicare/BCBS Only   Total Timed Codes (min):  43 1:1 Treatment Time:  43       Treatment Area: Bilateral leg weakness [R29.898]  Weakness of both upper extremities [R29.898]    SUBJECTIVE  Pain Level (0-10 scale): 3  Any medication changes, allergies to medications, adverse drug reactions, diagnosis change, or new procedure performed?: [x] No    [] Yes (see summary sheet for update)  Subjective functional status/changes:   [] No changes reported  Patient reports she's feeling better today. She's been practicing the heel-toe walking. OBJECTIVE    13 min Therapeutic Activity:  []  See flow sheet : STS, step up with WS and eccentric lower, balance   Rationale: increase ROM, increase strength, improve coordination, improve balance and increase proprioception  to improve the patients ability to perform transfers with ease     30 min Gait Training: ~1000 feet with CGA; cuing for even step length, heel strike and push off, arm swing   Rationale: normalize gait and reduce fall risk          With   [] TE   [] TA   [] neuro   [] other: Patient Education: [x] Review HEP    [] Progressed/Changed HEP based on:   [] positioning   [] body mechanics   [] transfers   [] heat/ice application    [] other:      Other Objective/Functional Measures: right knee flexion 4+/5, extension 4/5    Pain Level (0-10 scale) post treatment: 2    ASSESSMENT/Changes in Function: Patient demonstrated significant improvement in gait with cuing for step length and increased WB on right LE. She has some continued weakness in right knee flex/ext. Initiated step up with WS to right and eccentric lower with patient demonstrating improved control with repetition.      Patient will continue to benefit from skilled PT services to modify and progress therapeutic interventions, address functional mobility deficits, address ROM deficits, address strength deficits, analyze and address soft tissue restrictions, analyze and cue movement patterns, analyze and modify body mechanics/ergonomics, assess and modify postural abnormalities, address imbalance/dizziness and instruct in home and community integration to attain remaining goals. []  See Plan of Care  []  See progress note/recertification  []  See Discharge Summary         Progress towards goals / Updated goals:  1. Pt will increase MMT right hip flexion/knee extension to 4+/5 bilaterally to improve ease with gait & stair negotiation. .              Re-certification: ; right hip flexion 4-/5, right knee extension 4/5              Right knee ext = 4/5, flex = 4+/5 both with mild pain  2.  Patient will score < 20 on DGI to demonstrate improved dynamic gait and decrease fall risk with daily tasks.                Recert: assess nv               Initial score of 14/24              Tolerating progression of balance and gait exercises    PLAN  [x]  Upgrade activities as tolerated     [x]  Continue plan of care  []  Update interventions per flow sheet       []  Discharge due to:_  []  Other:_      Hugo Beard PTA 6/24/2021  11:03 AM    Future Appointments   Date Time Provider Gaby Delarosa   6/24/2021 11:15 AM Slava George PTA [facility]   6/28/2021 11:15 [facility]   7/1/2021 11:15 AM Leny Chau PTA [facility]   10/15/2021  1:15 PM [facility]   10/18/2021  1:00 PM [facility] TRACK NUR[facility]   12/2/2021  9:40 AM JULIA Mccollum

## 2021-06-28 ENCOUNTER — HOSPITAL ENCOUNTER (OUTPATIENT)
Dept: PHYSICAL THERAPY | Age: 76
Discharge: HOME | End: 2021-06-28
Payer: MEDICARE

## 2021-06-28 PROCEDURE — 97116 GAIT TRAINING THERAPY: CPT

## 2021-06-28 PROCEDURE — 97110 THERAPEUTIC EXERCISES: CPT

## 2021-06-28 PROCEDURE — 97112 NEUROMUSCULAR REEDUCATION: CPT

## 2021-06-28 PROCEDURE — 97530 THERAPEUTIC ACTIVITIES: CPT

## 2021-06-28 NOTE — PROGRESS NOTES
PT DAILY TREATMENT NOTE     Patient Name: Simón Clark  Date:2021  : 1945  [x]  Patient  Verified  Payor: VA MEDICARE / Plan: VA MEDICARE PART A & B / Product Type: Medicare /    In time:1115  Out time:1156  Total Treatment Time (min): 41  Visit #: 7 of 8    Medicare/BCBS Only   Total Timed Codes (min):  41 1:1 Treatment Time:  41       Treatment Area: Bilateral leg weakness [R29.898]  Weakness of both upper extremities [R29.898]    SUBJECTIVE  Pain Level (0-10 scale): 3  Any medication changes, allergies to medications, adverse drug reactions, diagnosis change, or new procedure performed?: [x] No    [] Yes (see summary sheet for update)  Subjective functional status/changes:   [] No changes reported  \"I was able to cook this weekend. \"    OBJECTIVE    Modality rationale: patient declined   Min Type Additional Details    [] Estim:  []Unatt       []IFC  []Premod                        []Other:  []w/ice   []w/heat  Position:  Location:    [] Estim: []Att    []TENS instruct  []NMES                    []Other:  []w/US   []w/ice   []w/heat  Position:  Location:    []  Traction: [] Cervical       []Lumbar                       [] Prone          []Supine                       []Intermittent   []Continuous Lbs:  [] before manual  [] after manual    []  Ultrasound: []Continuous   [] Pulsed                           []1MHz   []3MHz W/cm2:  Location:    []  Iontophoresis with dexamethasone         Location: [] Take home patch   [] In clinic    []  Ice     []  heat  []  Ice massage  []  Laser   []  Anodyne Position:  Location:    []  Laser with stim  []  Other:  Position:  Location:    []  Vasopneumatic Device    []  Right     []  Left  Pre-treatment girth:  Post-treatment girth:  Measured at (location):  Pressure:       [] lo [] med [] hi   Temperature: [] lo [] med [] hi   [] Skin assessment post-treatment:  []intact []redness- no adverse reaction    []redness - adverse reaction:     10 min Therapeutic Activity:  [x]  See flow sheet : sit to stands, step downs    Rationale: increase ROM, increase strength, improve coordination, improve balance and increase proprioception  to improve the patients ability to improve      11 min Neuromuscular Re-education:  [x]  See flow sheet : balance training    Rationale: increase ROM, increase strength, improve coordination, improve balance and increase proprioception  to improve the patients ability to improve mobility and decrease fall risk     20 min Gait Trainin ft in the hallway with CGA, cuing for even step length, heel strike and push off, arm swing, cuing to ambulate without such a WBOS   Rationale: to promote functional independence with gait and community ambulation          With   [] TE   [x] TA   [x] neuro   [] other: Patient Education: [x] Review HEP    [] Progressed/Changed HEP based on:   [x] positioning   [x] body mechanics   [] transfers   [] heat/ice application    [] other:      Other Objective/Functional Measures:      Pain Level (0-10 scale) post treatment: 2    ASSESSMENT/Changes in Function: Pt is making steady progress with her functional mobility and gait. She does continue to ambulate with a wider CONSTANTIN and quick steps with poor heel strike. Her balance continues to be challenged on unstable surfaces without visual input. Pt due for reassess at her NV. Patient will continue to benefit from skilled PT services to modify and progress therapeutic interventions, address functional mobility deficits, address ROM deficits, address strength deficits, analyze and address soft tissue restrictions, analyze and cue movement patterns, analyze and modify body mechanics/ergonomics, assess and modify postural abnormalities, address imbalance/dizziness and instruct in home and community integration to attain remaining goals.      [x]  See Plan of Care  []  See progress note/recertification  []  See Discharge Summary         Progress towards goals / Updated goals: 1. Pt will increase MMT right hip flexion/knee extension to 4+/5 bilaterally to improve ease with gait & stair negotiation. .              Re-certification: ; right hip flexion 4-/5, right knee extension 4/5              Right knee ext = 4/5, flex = 4+/5 both with mild pain  2.  Patient will score < 20 on DGI to demonstrate improved dynamic gait and decrease fall risk with daily tasks.                Recert: assess nv               Initial score of 14/24              Tolerating progression of balance and gait exercises    PLAN  []  Upgrade activities as tolerated     [x]  Continue plan of care  []  Update interventions per flow sheet       []  Discharge due to:_  []  Other:_      Franklyn Sommer PTA, CSCS 6/28/2021  11:58 AM    Future Appointments   Date Time Provider Gaby Delarosa   7/1/2021 11:15 AM [facility]   10/15/2021  1:15 PM [facility]   10/18/2021  1:00 PM [facility] TRACK NUR[facility]   12/2/2021  9:40 AM Eliot Miguel, [address]

## 2021-07-01 ENCOUNTER — HOSPITAL ENCOUNTER (OUTPATIENT)
Dept: PHYSICAL THERAPY | Age: 76
Discharge: HOME | End: 2021-07-01
Payer: MEDICARE

## 2021-07-01 PROCEDURE — 97112 NEUROMUSCULAR REEDUCATION: CPT

## 2021-07-01 PROCEDURE — 97530 THERAPEUTIC ACTIVITIES: CPT

## 2021-07-01 NOTE — PROGRESS NOTES
PT DAILY TREATMENT NOTE     Patient Name: Buzz Holm  Date:2021  : 1945  [x]  Patient  Verified  Payor: [place] MEDICARE / Plan: VA MEDICARE PART A & B / Product Type: Medicare /    In time:1115  Out time:1200  Total Treatment Time (min): 45  Visit #: 8 of 8    Medicare/BCBS Only   Total Timed Codes (min):  45 1:1 Treatment Time:  45       Treatment Area: Bilateral leg weakness [R29.898]  Weakness of both upper extremities [R29.898]    SUBJECTIVE  Pain Level (0-10 scale): 5  Any medication changes, allergies to medications, adverse drug reactions, diagnosis change, or new procedure performed?: [x] No    [] Yes (see summary sheet for update)  Subjective functional status/changes:   [] No changes reported  \"My neck is bothering me. \"    OBJECTIVE    Modality rationale: patient declined   Min Type Additional Details    [] Estim:  []Unatt       []IFC  []Premod                        []Other:  []w/ice   []w/heat  Position:  Location:    [] Estim: []Att    []TENS instruct  []NMES                    []Other:  []w/US   []w/ice   []w/heat  Position:  Location:    []  Traction: [] Cervical       []Lumbar                       [] Prone          []Supine                       []Intermittent   []Continuous Lbs:  [] before manual  [] after manual    []  Ultrasound: []Continuous   [] Pulsed                           []1MHz   []3MHz W/cm2:  Location:    []  Iontophoresis with dexamethasone         Location: [] Take home patch   [] In clinic    []  Ice     []  heat  []  Ice massage  []  Laser   []  Anodyne Position:  Location:    []  Laser with stim  []  Other:  Position:  Location:    []  Vasopneumatic Device    []  Right     []  Left  Pre-treatment girth:  Post-treatment girth:  Measured at (location):  Pressure:       [] lo [] med [] hi   Temperature: [] lo [] med [] hi   [] Skin assessment post-treatment:  []intact []redness- no adverse reaction    []redness - adverse reaction:     20 min Therapeutic Activity: [x]  See flow sheet : FOTO, reassessment, step ups, step downs   Rationale: increase ROM, increase strength, improve coordination, improve balance and increase proprioception  to improve the patients ability to improve mobility and ADL performance     25 min Neuromuscular Re-education:  [x]  See flow sheet : balance training, DGI assessment   Rationale: increase ROM, increase strength, improve coordination, improve balance and increase proprioception  to improve the patients ability to improve mobility and decreae fall risk          With   [x] TE   [x] TA   [x] neuro   [] other: Patient Education: [x] Review HEP    [] Progressed/Changed HEP based on:   [x] positioning   [x] body mechanics   [] transfers   [] heat/ice application    [] other:      Other Objective/Functional Measures:   FOTO 37    DGI 15/24    MMT right knee extension 5/5  MMT right hip flexion 4+/5     Pain Level (0-10 scale) post treatment: 3    ASSESSMENT/Changes in Function: Ms. Andreina Hudson reports 80% improvement since beginning therapy. Pt demonstrates improved balance per DGI score, but still at a high risk for falls. Her right LE strength has increased since her last recertification period. She continues to increase her walking duration/distance without an AD, but remains safer with her rollator. Discussed looking into joining a [employer]ne[employer] class once finished with PT to continue to address strength deficits. We will continue with PT to address her remaining functional deficits. Patient will continue to benefit from skilled PT services to modify and progress therapeutic interventions, address functional mobility deficits, address ROM deficits, address strength deficits, analyze and address soft tissue restrictions, analyze and cue movement patterns, analyze and modify body mechanics/ergonomics, assess and modify postural abnormalities, address imbalance/dizziness and instruct in home and community integration to attain remaining goals. [x]  See Plan of Care  [x]  See progress note/recertification  []  See Discharge Summary         Progress towards goals / Updated goals:  1. Pt will increase MMT right hip flexion/knee extension to 4+/5 bilaterally to improve ease with gait & stair negotiation. .              Re-certification: ; right hip flexion 4-/5, right knee extension 4/5              PROGRESSING; Right knee ext = 5/5, flex = 4/5   2. Patient will score < 20 on DGI to demonstrate improved dynamic gait and decrease fall risk with daily tasks.                Recert: assess nv               Initial score of 14/24              PROGRESSING; 15/24    Functional Gains: walking without AD, shopping, personal hygiene, walking longer distances with rollator  Functional Deficits: walking long durations/distance without AD, stairs, walking independently without supervision  % improvement: 80%  Pain   Average: 4/10       Best: 2/10     Worst: 8/10  Patient Goal: \"I want to walk. \"    PLAN  []  Upgrade activities as tolerated     [x]  Continue plan of care  []  Update interventions per flow sheet       []  Discharge due to:_  []  Other:_      Hansel Castillo PTA, CSCS 7/1/2021  12:08 PM    Future Appointments   Date Time Provider Gaby Delarosa   7/1/2021 11:15 AM Dallas Blankenship PTA [facility]   10/15/2021  1:15 PM [facility]OT[facility]   10/18/2021  1:00 PM [facility] TRACK NUR[facility]   12/2/2021  9:40 AM JULIA Perrin [address] [facility]

## 2021-07-07 ENCOUNTER — HOSPITAL ENCOUNTER (OUTPATIENT)
Dept: PHYSICAL THERAPY | Age: 76
Discharge: HOME | End: 2021-07-07
Payer: MEDICARE

## 2021-07-07 PROCEDURE — 97530 THERAPEUTIC ACTIVITIES: CPT

## 2021-07-07 PROCEDURE — 97116 GAIT TRAINING THERAPY: CPT

## 2021-07-07 PROCEDURE — 97112 NEUROMUSCULAR REEDUCATION: CPT

## 2021-07-07 NOTE — PROGRESS NOTES
PT DAILY TREATMENT NOTE     Patient Name: Enriqueta Murphy  Date:2021  : 1945  [x]  Patient  Verified  Payor: VA MEDICARE / Plan: VA MEDICARE PART A & B / Product Type: Medicare /    In time:509  Out time:555  Total Treatment Time (min): 46  Visit #: 1 of 8    Medicare/BCBS Only   Total Timed Codes (min):  46 1:1 Treatment Time:  46       Treatment Area: Bilateral leg weakness [R29.898]  Weakness of both upper extremities [R29.898]    SUBJECTIVE  Pain Level (0-10 scale): 2  Any medication changes, allergies to medications, adverse drug reactions, diagnosis change, or new procedure performed?: [x] No    [] Yes (see summary sheet for update)  Subjective functional status/changes:   [] No changes reported  \"Not much pain right now. \"    OBJECTIVE    Modality rationale: patient declined   Min Type Additional Details    [] Estim:  []Unatt       []IFC  []Premod                        []Other:  []w/ice   []w/heat  Position:  Location:    [] Estim: []Att    []TENS instruct  []NMES                    []Other:  []w/US   []w/ice   []w/heat  Position:  Location:    []  Traction: [] Cervical       []Lumbar                       [] Prone          []Supine                       []Intermittent   []Continuous Lbs:  [] before manual  [] after manual    []  Ultrasound: []Continuous   [] Pulsed                           []1MHz   []3MHz W/cm2:  Location:    []  Iontophoresis with dexamethasone         Location: [] Take home patch   [] In clinic    []  Ice     []  heat  []  Ice massage  []  Laser   []  Anodyne Position:  Location:    []  Laser with stim  []  Other:  Position:  Location:    []  Vasopneumatic Device    []  Right     []  Left  Pre-treatment girth:  Post-treatment girth:  Measured at (location):  Pressure:       [] lo [] med [] hi   Temperature: [] lo [] med [] hi   [] Skin assessment post-treatment:  []intact []redness- no adverse reaction    []redness - adverse reaction:     10 min Therapeutic Activity:  [x] See flow sheet : sit to stands, functional standing activities   Rationale: increase ROM and increase strength  to improve the patients ability to perform ADLs     26 min Neuromuscular Re-education:  [x]  See flow sheet : balance training   Rationale: increase ROM, increase strength, improve coordination, improve balance and increase proprioception  to improve the patients ability to improve mobility and decrease fall risk         10 min Gait Trainin ft in the hallway with CGA, cuing for even step length, heel strike and push off, arm swing, cuing to ambulate without such a WBOS   Rationale: To promote a normalized gait and functional independence with gait in the community    With   [] TE   [x] TA   [x] neuro   [] other: Patient Education: [x] Review HEP    [] Progressed/Changed HEP based on:   [x] positioning   [x] body mechanics   [] transfers   [] heat/ice application    [] other:      Other Objective/Functional Measures:      Pain Level (0-10 scale) post treatment: 2    ASSESSMENT/Changes in Function: Pt continues to ambulate in a WBOS when not using her rollator. She also increases her gait speed as she starts to fatigue. Challenged with balance on slant board today. She was winded with re-introduction of resistance on hip 4 way. Patient will continue to benefit from skilled PT services to modify and progress therapeutic interventions, address functional mobility deficits, address ROM deficits, address strength deficits, analyze and address soft tissue restrictions, analyze and cue movement patterns, analyze and modify body mechanics/ergonomics, assess and modify postural abnormalities, address imbalance/dizziness and instruct in home and community integration to attain remaining goals. [x]  See Plan of Care  []  See progress note/recertification  []  See Discharge Summary         Progress towards goals / Updated goals:  1.  Pt will increase MMT right hip flexion/knee extension to 4+/5 bilaterally to improve ease with gait & stair negotiation. .              Re-certification:  PROGRESSING; Right knee ext = 5/5, flex = 4/5   2.  Patient will score < 20 on DGI to demonstrate improved dynamic gait and decrease fall risk with daily tasks.                Recert: Kandis Schlatter; 13/70    PLAN  []  Upgrade activities as tolerated     [x]  Continue plan of care  []  Update interventions per flow sheet       []  Discharge due to:_  []  Other:_      Eliel Serrano, CHRISTIAN, CSCS 7/7/2021  6:00 PM    Future Appointments   Date Time Provider Gaby Delarosa   7/7/2021  5:15 PM Jabari Caruso, PTA [facility]   7/8/2021  1:30 PM Tara Qiana [facility]   7/13/2021  1:30 PM Diana Prado, PTA [facility]   7/15/2021 11:15 AM Diana Prado, PTA [facility]   7/20/2021 10:30 AM Breconcha Prado, PTA [facility]   7/22/2021 11:15 AM Delscarlettn Rounds, PT [facility]   7/27/2021 10:30 AM Diana Prado, PTA [facility]   7/29/2021 12:00 PM Cosmeynn Rounds, PT [facility]   10/15/2021  1:15 PM [facility]BOTOM[facility]   10/18/2021  1:00 PM [facility] FA[facility] TRACK NURSE [facility]   12/2/2021  9:40 AM JULIA Byrd [address] [facility]

## 2021-07-08 ENCOUNTER — HOSPITAL ENCOUNTER (OUTPATIENT)
Dept: PHYSICAL THERAPY | Age: 76
Discharge: HOME | End: 2021-07-08
Payer: MEDICARE

## 2021-07-08 PROCEDURE — 97112 NEUROMUSCULAR REEDUCATION: CPT

## 2021-07-08 PROCEDURE — 97116 GAIT TRAINING THERAPY: CPT

## 2021-07-08 NOTE — PROGRESS NOTES
[facility] - [facility] 87  [address]  [place], [address] [phone] fax    Continued Plan of Care/ Re-certification for Physical Therapy Services    Patient name: Jamaica Friedman Start of Care: 3/11/2021   Referral source: Roxane Valdes PA-C JSK: [id number]                Medical Diagnosis: Weakness of both upper extremities [R29.898]  Bilateral leg weakness [R29.898]  Payor: [place] MEDICARE / Plan: [place] MEDICARE PART A & B / Product Type: Medicare /  Onset Date: 11/20/2019                Treatment Diagnosis: B LE/UE weakness/pain, impaired gait and balance   Prior Hospitalization: see medical history Provider#: [id number]   Medications: Verified on Patient summary List    Comorbidities: hx CVA, fractured right humerus July 2020, c/s fusion 11/20/2019, arthritis, recent weight change.    Prior Level of Function: retired. Recovering from cervical myelopathy/fusion last year. Lives with friend who helps assist with all care in an 1 story home. Frequent falls and general deconditioning was present since surgery. Hx fractured right humerus/shoulder in July 2020. Visits from Start of Care: 31   Missed Visits: 0    The Plan of Care and following information is based on the patient's current status:  1. Pt will increase MMT right hip flexion/knee extension to 4+/5 bilaterally to improve ease with gait & stair negotiation. .              Re-certification: ; right hip flexion 4-/5, right knee extension 4/5              PROGRESSING; Right knee ext = 5/5, flex = 4/5   2.  Patient will score < 20 on DGI to demonstrate improved dynamic gait and decrease fall risk with daily tasks.                Recert: assess nv               Initial score of 14/24              PROGRESSING; 15/24     Key functional changes:   Functional Gains: walking without AD, shopping, personal hygiene, walking longer distances with rollator  Functional Deficits: walking long durations/distance without AD, stairs, walking independently without supervision  % improvement: 80%  Pain   Average: 4/10                  Best: 2/10                Worst: 8/10  Patient Goal: \"I want to walk. \"      Problems/ barriers to goal attainment: comorbidities    Problem List: pain affecting function, decrease ROM, decrease strength, impaired gait/ balance, decrease ADL/ functional abilitiies, decrease activity tolerance, decrease flexibility/ joint mobility and decrease transfer abilities    Treatment Plan: Therapeutic exercise, Therapeutic activities, Neuromuscular re-education, Physical agent/modality, Gait/balance training, Manual therapy, Aquatic therapy, Patient education, Self Care training, Functional mobility training, Home safety training and Stair training     Goals for this certification period to be accomplished in 4 weeks:  1. Pt will increase MMT right hip flexion/knee extension to 4+/5 bilaterally to improve ease with gait & stair negotiation. .              Re-certification:  PROGRESSING; Right knee ext = 5/5, flex = 4/5   2. Patient will score < 20 on DGI to demonstrate improved dynamic gait and decrease fall risk with daily tasks.                Recert: PROGRESSING; 59/48    Frequency / Duration: Patient to be seen 1-2 times per week for 8 treatments:    Assessment / Recommendations:Ms. Shelbi Ta reports 80% improvement since beginning therapy. Pt demonstrates improved balance per DGI score, but is still at a high risk for falls. Her right LE strength has increased since her last recertification period. She continues to increase her walking duration/distance without an AD, but remains safer with her rollator. Discussed looking into joining a Silver Sneakers class once finished with PT to continue to address strength deficits. We will continue with PT to address her remaining functional deficits.     Certification Period: 7/2/21-7/30/21    Cesar Ragsdale, PT 7/8/2021 7:38 AM    ________________________________________________________________________  I certify that the above Therapy Services are being furnished while the patient is under my care. I agree with the treatment plan and certify that this therapy is necessary. [] I have read the above and request that my patient continue as recommended.   [] I have read the above report and request that my patient continue therapy with the following changes/special instructions: _____________________________________________  [] I have read the above report and request that my patient be discharged from therapy    Physician's Signature:____________Date:_________TIME:________     Sagrario Sharma PA-C  ** Signature, Date and Time must be completed for valid certification **    Please sign and return to [facility] - [place] [address]  [address], [address] [phone] fax

## 2021-07-08 NOTE — PROGRESS NOTES
PT DAILY TREATMENT NOTE     Patient Name: Earlene Almaguer  Date:2021  : 1945  [x]  Patient  Verified  Payor: Melissa Vaca / Plan: [place] MEDICARE PART A & B / Product Type: Medicare /    In time:130  Out time:216  Total Treatment Time (min): 46  Visit #: 2 of 8    Medicare/BCBS Only   Total Timed Codes (min):  46 1:1 Treatment Time:  46       Treatment Area: Bilateral leg weakness [R29.898]  Weakness of both upper extremities [R29.898]    SUBJECTIVE  Pain Level (0-10 scale): 2  Any medication changes, allergies to medications, adverse drug reactions, diagnosis change, or new procedure performed?: [x] No    [] Yes (see summary sheet for update)  Subjective functional status/changes:   [] No changes reported  Pt is eager to work on her gait today. OBJECTIVE    10 min Neuromuscular Re-education:  [x]  See flow sheet : obstacle negotiation, forward and lateral walking on foam beam   Rationale: increase strength, improve coordination, improve balance and increase proprioception  to improve the patients ability to recover LOB    36 min Gait Traininft with CGA in 14:50. Significant cuing and instruction during turn arounds, 100ft within that 1200 with anterior to posterior resistance at ASIS with GTB for increased hip extension in terminal stance; x100ft with unilateral 5lbs kettle bell carry; x75ft with unilateral right LE march for forced left LE WB   Rationale: normalize gait to improve community and home negotiation          With   [] TE   [] TA   [] neuro   [] other: Patient Education: [x] Review HEP    [] Progressed/Changed HEP based on:   [] positioning   [] body mechanics   [] transfers   [] heat/ice application    [] other:      Other Objective/Functional Measures: see above     Pain Level (0-10 scale) post treatment: 2    ASSESSMENT/Changes in Function: Pt tolerated progression of exercises with out increase in symptoms.  During gait training focused on increased left LE WB to increase SLS stance phase and right step length. Pt frequently deviating from a straight path and reaching with UE for balance recovery. Reaching strategy used more due to apprehension vs ability to recover with ankle/hip/stepping strategy. Pt apprehensive with foam beam walking in parallel bars, relying on UE; removed parallel bars with pt improved ability to recover balance with stepping strategy. Pt remains highly motivated with infrequent seated rest breaks. Patient will continue to benefit from skilled PT services to modify and progress therapeutic interventions, address functional mobility deficits, address ROM deficits, address strength deficits, analyze and address soft tissue restrictions, analyze and cue movement patterns, analyze and modify body mechanics/ergonomics, assess and modify postural abnormalities, address imbalance/dizziness and instruct in home and community integration to attain remaining goals. []  See Plan of Care  []  See progress note/recertification  []  See Discharge Summary         Progress towards goals / Updated goals:  1. Pt will increase MMT right hip flexion/knee extension to 4+/5 bilaterally to improve ease with gait & stair negotiation. .              Re-certification:  PROGRESSING; Right knee ext = 5/5, flex = 4/5   2.  Patient will score < 20 on DGI to demonstrate improved dynamic gait and decrease fall risk with daily tasks.                Recert: PROGRESSING; 43/23    PLAN  [x]  Upgrade activities as tolerated     [x]  Continue plan of care  []  Update interventions per flow sheet       []  Discharge due to:_  []  Other:_      Maria R Mendoza, PT 7/8/2021  2:24 PM    Future Appointments   Date Time Provider Gaby Delarosa   7/13/2021  1:30 PM [facility]   7/15/2021 11:15 AM [facility]   7/20/2021 10:30 AM Naeem Gallo, [facility]   7/22/2021 11:15 AM [facility]   7/27/2021 10:30 AM Ariane Zhou, PTA [facility]   7/29/2021 12:00 PM [facility]   10/15/2021  1:15 PM [facility]LEBOTOM[facility]   10/18/2021  1:00 PM [facility] FAST TRACK NURSE [facility]   12/2/2021  9:40 AM JULIA Bobby

## 2021-07-13 ENCOUNTER — HOSPITAL ENCOUNTER (OUTPATIENT)
Dept: PHYSICAL THERAPY | Age: 76
Discharge: HOME | End: 2021-07-13
Payer: MEDICARE

## 2021-07-13 PROCEDURE — 97112 NEUROMUSCULAR REEDUCATION: CPT

## 2021-07-13 PROCEDURE — 97116 GAIT TRAINING THERAPY: CPT

## 2021-07-13 PROCEDURE — 97110 THERAPEUTIC EXERCISES: CPT

## 2021-07-13 NOTE — PROGRESS NOTES
PT DAILY TREATMENT NOTE     Patient Name: Korina Ferrer  Date:2021  : 1945  [x]  Patient  Verified  Payor: VA MEDICARE / Plan: VA MEDICARE PART A & B / Product Type: Medicare /    In time:130  Out time:214  Total Treatment Time (min): 44  Visit #: 3 of 8    Medicare/BCBS Only   Total Timed Codes (min):  44 1:1 Treatment Time:  44       Treatment Area: Bilateral leg weakness [R29.898]  Weakness of both upper extremities [R29.898]    SUBJECTIVE  Pain Level (0-10 scale): 6  Any medication changes, allergies to medications, adverse drug reactions, diagnosis change, or new procedure performed?: [x] No    [] Yes (see summary sheet for update)  Subjective functional status/changes:   [] No changes reported  Patient reports she's tired today; having a hard time getting started. OBJECTIVE    12 min Therapeutic Exercise:  [x] See flow sheet :   Rationale: increase ROM and increase strength to improve the patients ability to increase activity tolerance     22 min Neuromuscular Re-education:  [x]  See flow sheet : balance training, obstacle negotiation, foam beam lat. amb. Rationale: increase strength, improve coordination, improve balance and increase proprioception  to improve the patients ability to increase stability with ambulation    10 min Gait Training: CGA-  x200ft with unilateral 5lbs kettle bell carry; x100ft with unilateral right LE march for forced left LE WB; 100ft with cuing for even step length   Rationale: With   [] TE   [] TA   [] neuro   [] other: Patient Education: [x] Review HEP    [] Progressed/Changed HEP based on:   [] positioning   [] body mechanics   [] transfers   [] heat/ice application    [] other:      Other Objective/Functional Measures:      Pain Level (0-10 scale) post treatment: 6    ASSESSMENT/Changes in Function: Patient was more fatigued and had higher pain today vs last visit.  Despite fatigue, she demonstrated good LOB recovery during gait training and obstacle negotiation. She was more challenged with forward walking on foam beam today therefore held that activity. Patient will continue to benefit from skilled PT services to modify and progress therapeutic interventions, address functional mobility deficits, address ROM deficits, address strength deficits, analyze and address soft tissue restrictions, analyze and cue movement patterns, analyze and modify body mechanics/ergonomics, assess and modify postural abnormalities, address imbalance/dizziness and instruct in home and community integration to attain remaining goals. []  See Plan of Care  []  See progress note/recertification  []  See Discharge Summary         Progress towards goals / Updated goals:  1. Pt will increase MMT right hip flexion/knee extension to 4+/5 bilaterally to improve ease with gait & stair negotiation. .              Re-certification:  PROGRESSING; Right knee ext = 5/5, flex = 4/5   2.  Patient will score < 20 on DGI to demonstrate improved dynamic gait and decrease fall risk with daily tasks.                Recert: Electa Champagne; 70/01    PLAN  [x]  Upgrade activities as tolerated     [x]  Continue plan of care  []  Update interventions per flow sheet       []  Discharge due to:_  []  Other:_      Max Rodriguez, PTA 7/13/2021  1:27 PM    Future Appointments   Date Time Provider Gaby Delarosa   7/13/2021  1:30 PM [facility]   7/15/2021 11:15 AM Barb Mccrary PTA [facility]   7/20/2021 10:30 AM [facility]   7/22/2021 11:15 AM Cory Loredo, PT [facility]   7/27/2021 10:30 AM Barb Mccrary [facility]   7/29/2021 12:00 PM Ashley Serefren[facility]   10/15/2021  1:15 PM [facility] INF[facility] PHLEBOTOMIST [facility]   10/18/2021  1:00 PM [facility] FAST TRACK NURSE [facility]   12/2/2021  9:40 AM JULIA Middleton

## 2021-07-15 ENCOUNTER — APPOINTMENT (OUTPATIENT)
Dept: PHYSICAL THERAPY | Age: 76
End: 2021-07-15
Payer: MEDICARE

## 2021-07-20 ENCOUNTER — HOSPITAL ENCOUNTER (OUTPATIENT)
Dept: PHYSICAL THERAPY | Age: 76
Discharge: HOME | End: 2021-07-20
Payer: MEDICARE

## 2021-07-20 PROCEDURE — 97110 THERAPEUTIC EXERCISES: CPT

## 2021-07-20 PROCEDURE — 97112 NEUROMUSCULAR REEDUCATION: CPT

## 2021-07-20 PROCEDURE — 97116 GAIT TRAINING THERAPY: CPT

## 2021-07-20 NOTE — PROGRESS NOTES
PT DAILY TREATMENT NOTE     Patient Name: Franky Strauss  [id number]  : 1945  [x]  Patient  Verified  Payor: VA MEDICARE / Plan: VA MEDICARE PART A & B / Product Type: Medicare /    In time:1030  Out time:1115  Total Treatment Time (min): 45  Visit #: 4 of 8    Medicare/BCBS Only   Total Timed Codes (min):  45 1:1 Treatment Time:  45       Treatment Area: Bilateral leg weakness [R29.898]  Weakness of both upper extremities [R29.898]    SUBJECTIVE  Pain Level (0-10 scale): 5  Any medication changes, allergies to medications, adverse drug reactions, diagnosis change, or new procedure performed?: [x] No    [] Yes (see summary sheet for update)  Subjective functional status/changes:   [] No changes reported  Patient reports she's feeling a little better since her last session. OBJECTIVE    8 min Therapeutic Exercise:  [x] See flow sheet :   Rationale: increase ROM and increase strength to improve the patients ability to increase activity tolerance     25 min Neuromuscular Re-education:  [x]  See flow sheet : obstacle negotiation and foam beam walking fwd/lat   Rationale: increase strength, improve coordination, improve balance and increase proprioception  to improve the patients ability to Increase stability in stance and with ambulation     12 min Gait Trainin feet without device on level surfaces with CGA with cuing for even step length and fluidity   Rationale: With   [] TE   [] TA   [] neuro   [] other: Patient Education: [x] Review HEP    [] Progressed/Changed HEP based on:   [] positioning   [] body mechanics   [] transfers   [] heat/ice application    [] other:      Other Objective/Functional Measures: improved with fwd walk on foam beam    Pain Level (0-10 scale) post treatment: 3    ASSESSMENT/Changes in Function: Ms. Denise Muñiz was able to perform step up with eccentric lower on 6\" step with good control and without her knee brace.  Improved navigation of obstacles and foam beam walking. She continues to have LE numbness and B foot pain for which she plans to follow up with her doctor. She reported reduction in overall pain following session. Patient will continue to benefit from skilled PT services to modify and progress therapeutic interventions, address functional mobility deficits, address ROM deficits, address strength deficits, analyze and address soft tissue restrictions, analyze and cue movement patterns, analyze and modify body mechanics/ergonomics, assess and modify postural abnormalities, address imbalance/dizziness and instruct in home and community integration to attain remaining goals. []  See Plan of Care  []  See progress note/recertification  []  See Discharge Summary         Progress towards goals / Updated goals:  1. Pt will increase MMT right hip flexion/knee extension to 4+/5 bilaterally to improve ease with gait & stair negotiation. .              Re-certification:  PROGRESSING; Right knee ext = 5/5, flex = 4/5   2.  Patient will score < 20 on DGI to demonstrate improved dynamic gait and decrease fall risk with daily tasks.                Recert: Tung Lemus; 46/79    PLAN  [x]  Upgrade activities as tolerated     [x]  Continue plan of care  []  Update interventions per flow sheet       []  Discharge due to:_  []  Other:_      Meryle Piano, PTA 7/20/2021  9:05 AM    Future Appointments   Date Time Provider Gaby Delarosa   7/20/2021 10:30 AM Sukhjinder Hernandes PTA [facility]   7/22/2021 11:15 AM Myron Frederick, PT [facility]   7/27/2021 10:30 AM Sukhjinder Hernandes PTA [facility]   7/29/2021 12:00 PM Thaddeus Quinn PTA [facility]   10/15/2021  1:15 PM [facility] PHLEBOTOMIST [facility]   10/18/2021  1:00 PM [facility] FAST TRACK NURSE [facility]   12/2/2021  9:40 AM JULIA Benites [address] [facility]

## 2021-07-22 ENCOUNTER — HOSPITAL ENCOUNTER (OUTPATIENT)
Dept: PHYSICAL THERAPY | Age: 76
Discharge: HOME | End: 2021-07-22
Payer: MEDICARE

## 2021-07-22 PROCEDURE — 97110 THERAPEUTIC EXERCISES: CPT

## 2021-07-22 PROCEDURE — 97530 THERAPEUTIC ACTIVITIES: CPT

## 2021-07-22 PROCEDURE — 97112 NEUROMUSCULAR REEDUCATION: CPT

## 2021-07-22 NOTE — PROGRESS NOTES
PT DAILY TREATMENT NOTE     Patient Name: Leonel Holloway  IUJH:[id number]  : 1945  [x]  Patient  Verified  Payor: VA MEDICARE / Plan: VA MEDICARE PART A & B / Product Type: Medicare /    In time:1115  Out time:1155  Total Treatment Time (min): 40  Visit #: 5 of 8    Medicare/BCBS Only   Total Timed Codes (min):  40 1:1 Treatment Time:  40       Treatment Area: Bilateral leg weakness [R29.898]  Weakness of both upper extremities [R29.898]    SUBJECTIVE  Pain Level (0-10 scale): 7  Any medication changes, allergies to medications, adverse drug reactions, diagnosis change, or new procedure performed?: [x] No    [] Yes (see summary sheet for update)  Subjective functional status/changes:   [] No changes reported  Pt reports higher pain in her neck today; unable to identify aggravating factor. Reports LE fatigue with standing activity    OBJECTIVE    10 min Therapeutic Exercise:  [x] See flow sheet :   Rationale: increase ROM and increase strength to improve the patients ability to perform ADLs    15 min Therapeutic Activity:  [x]  See flow sheet : KB sit to stands, edu, stairs   Rationale: increase strength and improve coordination  to improve the patients ability to negotiate home and community      15 min Neuromuscular Re-education:  [x]  See flow sheet : 7min CGA/close SBA without AD+ 5min ambulation close SBA with SPC   Rationale: increase strength, improve coordination, improve balance and increase proprioception  to improve the patients ability to recover LOB          With   [] TE   [] TA   [] neuro   [] other: Patient Education: [x] Review HEP    [] Progressed/Changed HEP based on:   [] positioning   [] body mechanics   [] transfers   [] heat/ice application    [] other:      Other Objective/Functional Measures: adjusted program per flowsheet     Pain Level (0-10 scale) post treatment: 5    ASSESSMENT/Changes in Function: Increased mat table exercises today due to pt complaints of higher neck pain. Discussed and trialed use of standing rest breaks vs seated rest breaks to improve activity tolerance. Educated pt on neurological fatigue in addition to muscular fatigue as a component of her difficulty with standing activities. Patient will continue to benefit from skilled PT services to modify and progress therapeutic interventions, address functional mobility deficits, address ROM deficits, address strength deficits, analyze and address soft tissue restrictions, analyze and cue movement patterns, analyze and modify body mechanics/ergonomics, assess and modify postural abnormalities, address imbalance/dizziness and instruct in home and community integration to attain remaining goals. []  See Plan of Care  []  See progress note/recertification  []  See Discharge Summary         Progress towards goals / Updated goals:  1. Pt will increase MMT right hip flexion/knee extension to 4+/5 bilaterally to improve ease with gait & stair negotiation. .              Re-certification:  PROGRESSING; Right knee ext = 5/5, flex = 4/5    Tolerating progression of exercises  2.  Patient will score < 20 on DGI to demonstrate improved dynamic gait and decrease fall risk with daily tasks.                Recert: Manjinder Bliss; 44/84   To be reassessed at end of POC    PLAN  [x]  Upgrade activities as tolerated     [x]  Continue plan of care  []  Update interventions per flow sheet       []  Discharge due to:_  []  Other:_      Cesar Ragsdale, PT 7/22/2021  12:00 PM    Future Appointments   Date Time Provider Gaby Delarosa   7/27/2021 10:30 AM [facility]   7/29/2021 12:00 PM [facility]   10/15/2021  1:15 PM [facility]ION PHLEBOTOMIST [facility]   10/18/2021  1:00 PM [facility] FAST TRACK NURSE [facility]   12/2/2021  9:40 AM Vinod Jung, [address]

## 2021-07-27 ENCOUNTER — HOSPITAL ENCOUNTER (OUTPATIENT)
Dept: PHYSICAL THERAPY | Age: 76
Discharge: HOME | End: 2021-07-27
Payer: MEDICARE

## 2021-07-27 PROCEDURE — 97110 THERAPEUTIC EXERCISES: CPT

## 2021-07-27 PROCEDURE — 97116 GAIT TRAINING THERAPY: CPT

## 2021-07-27 PROCEDURE — 97530 THERAPEUTIC ACTIVITIES: CPT

## 2021-07-27 NOTE — PROGRESS NOTES
PT DAILY TREATMENT NOTE     Patient Name: Carmelo Vogel  PIVC:3/81/2021  : 1945  [x]  Patient  Verified  Payor: [place] MEDICARE / Plan: VA MEDICARE PART A & B / Product Type: Medicare /    In ISDL:4100  Out time:1115  Total Treatment Time (min): 44  Visit #: 6 of 8    Medicare/BCBS Only   Total Timed Codes (min):  44 1:1 Treatment Time:  44       Treatment Area: Bilateral leg weakness [R29.898]  Weakness of both upper extremities [R29.898]    SUBJECTIVE  Pain Level (0-10 scale): 5  Any medication changes, allergies to medications, adverse drug reactions, diagnosis change, or new procedure performed?: [x] No    [] Yes (see summary sheet for update)  Subjective functional status/changes:   [] No changes reported  Patient reports she's feeling okay; she's going to schedule a f/u with her Dr after this session. OBJECTIVE    10 min Therapeutic Exercise:  [x] See flow sheet :   Rationale: increase ROM and increase strength to improve the patients ability to increase activity tolerance    17 min Therapeutic Activity:  [x]  See flow sheet : STS with KB, stair negotiation   Rationale: increase ROM, increase strength and improve coordination  to improve the patients ability to negotiate stairs and perform transfers without UE assistance     17 min Gait Trainin' CGA->SBA without AD, 7' SBA without AD, 5' SBA with 5# KB (100 ft in each hand)   Rationale: to improve patient's independence with ambulation and ability to correct LOB           With   [] TE   [] TA   [] neuro   [] other: Patient Education: [x] Review HEP    [] Progressed/Changed HEP based on:   [] positioning   [] body mechanics   [] transfers   [] heat/ice application    [] other:      Other Objective/Functional Measures:       Pain Level (0-10 scale) post treatment: 5    ASSESSMENT/Changes in Function: Patient ambulated with SBA for total of 12 minutes with occasional self-corrected LOB.  She needed occasional cuing to slow crystal and increase step length. Increased reps with stair negotiation. She had moderate difficulty negotiating stairs without UE assist expressing some apprehension due to B foot numbness. Patient will continue to benefit from skilled PT services to modify and progress therapeutic interventions, address functional mobility deficits, address ROM deficits, address strength deficits, analyze and address soft tissue restrictions, analyze and cue movement patterns, analyze and modify body mechanics/ergonomics, assess and modify postural abnormalities, address imbalance/dizziness and instruct in home and community integration to attain remaining goals. []  See Plan of Care  []  See progress note/recertification  []  See Discharge Summary         Progress towards goals / Updated goals:  1. Pt will increase MMT right hip flexion/knee extension to 4+/5 bilaterally to improve ease with gait & stair negotiation. .              Re-certification:  PROGRESSING; Right knee ext = 5/5, flex = 4/5               Tolerating progression of exercises  2.  Patient will score < 20 on DGI to demonstrate improved dynamic gait and decrease fall risk with daily tasks.                Recert: PROGRESSING; 75/05              To be reassessed at end of POC    PLAN  [x]  Upgrade activities as tolerated     [x]  Continue plan of care  []  Update interventions per flow sheet       []  Discharge due to:_  []  Other:_      Bethany Perez, PTA 7/27/2021  8:44 AM    Future Appointments   Date Time Provider Gaby Delarosa   7/27/2021 10:30 AM Sachin Moreno PTA [facility]   7/29/2021 12:00 PM Junior Gomez PTA [facility]   10/15/2021  1:15 PM [facility] IN[facility] PHLEBOTOMIST [facility]   10/18/2021  1:00 PM [facility] FAST TRACK NURSE [facility]   12/2/2021  9:40 AM Angela Noyola, [address]

## 2021-07-28 ENCOUNTER — OFFICE VISIT (OUTPATIENT)
Dept: ORTHOPEDIC SURGERY | Age: 76
End: 2021-07-28
Payer: MEDICARE

## 2021-07-28 VITALS
TEMPERATURE: 96 F | HEART RATE: 78 BPM | WEIGHT: 101.6 LBS | OXYGEN SATURATION: 93 % | HEIGHT: 60 IN | BODY MASS INDEX: 19.94 KG/M2 | RESPIRATION RATE: 15 BRPM

## 2021-07-28 DIAGNOSIS — M48.02 CERVICAL SPINAL STENOSIS: Primary | ICD-10-CM

## 2021-07-28 DIAGNOSIS — M50.90 CERVICAL NECK PAIN WITH EVIDENCE OF DISC DISEASE: ICD-10-CM

## 2021-07-28 DIAGNOSIS — M47.892 OTHER OSTEOARTHRITIS OF SPINE, CERVICAL REGION: ICD-10-CM

## 2021-07-28 DIAGNOSIS — M50.30 DEGENERATIVE DISC DISEASE, CERVICAL: ICD-10-CM

## 2021-07-28 DIAGNOSIS — G95.89 CERVICAL CORD MYELOMALACIA (HCC): ICD-10-CM

## 2021-07-28 DIAGNOSIS — M79.18 MYOFASCIAL PAIN SYNDROME: ICD-10-CM

## 2021-07-28 DIAGNOSIS — M47.812 SPONDYLOSIS OF CERVICAL REGION WITHOUT MYELOPATHY OR RADICULOPATHY: ICD-10-CM

## 2021-07-28 PROCEDURE — G9899 SCRN MAM PERF RSLTS DOC: HCPCS | Performed by: PHYSICIAN ASSISTANT

## 2021-07-28 PROCEDURE — G8420 CALC BMI NORM PARAMETERS: HCPCS | Performed by: PHYSICIAN ASSISTANT

## 2021-07-28 PROCEDURE — 1101F PT FALLS ASSESS-DOCD LE1/YR: CPT | Performed by: PHYSICIAN ASSISTANT

## 2021-07-28 PROCEDURE — 99212 OFFICE O/P EST SF 10 MIN: CPT | Performed by: PHYSICIAN ASSISTANT

## 2021-07-28 PROCEDURE — 3017F COLORECTAL CA SCREEN DOC REV: CPT | Performed by: PHYSICIAN ASSISTANT

## 2021-07-28 PROCEDURE — 1090F PRES/ABSN URINE INCON ASSESS: CPT | Performed by: PHYSICIAN ASSISTANT

## 2021-07-28 PROCEDURE — G8432 DEP SCR NOT DOC, RNG: HCPCS | Performed by: PHYSICIAN ASSISTANT

## 2021-07-28 PROCEDURE — G8427 DOCREV CUR MEDS BY ELIG CLIN: HCPCS | Performed by: PHYSICIAN ASSISTANT

## 2021-07-28 PROCEDURE — G8536 NO DOC ELDER MAL SCRN: HCPCS | Performed by: PHYSICIAN ASSISTANT

## 2021-07-28 NOTE — PROGRESS NOTES
Ingrid Torres returns the office for follow-up and questions concerning her cervical spine fusion. She has been previously x-rayed and the fusion appears to be stable. The construct is cervico-occipital.  Patient is complaining of neck pain bilateral hand pain with weakness and since she has not followed with her spine surgeon for some time she requested today a recommendation for consult to review her case. Today I recommended Dr. Viky Hill from [facility].  A call was placed to [address] [place] [address] his  and patient will be reached out to by his office to schedule appointment within the week. Mrs John Hull was given a disc today containing the diagnostic imaging which has been performed by our office. Today all of her questions answered to her satisfaction.

## 2021-07-29 ENCOUNTER — HOSPITAL ENCOUNTER (OUTPATIENT)
Dept: PHYSICAL THERAPY | Age: 76
Discharge: HOME | End: 2021-07-29
Payer: MEDICARE

## 2021-07-29 PROCEDURE — 97112 NEUROMUSCULAR REEDUCATION: CPT

## 2021-07-29 PROCEDURE — 97110 THERAPEUTIC EXERCISES: CPT

## 2021-07-29 PROCEDURE — 97530 THERAPEUTIC ACTIVITIES: CPT

## 2021-07-29 NOTE — PROGRESS NOTES
[facility]  [address]  [place], [address] [phone] fax    Discharge Summary  Patient name: Jamaica Friedman Start of Care: 3/11/2021   Referral source: Hany Valdes PA-C GRE: 9/30/8532                Medical Diagnosis: Weakness of both upper extremities [R29.898]  Bilateral leg weakness [R29.898]  Payor: VA MEDICARE / Plan: VA MEDICARE PART A & B / Product Type: Medicare /  Onset Date: 11/20/2019                Treatment Diagnosis: B LE/UE weakness/pain, impaired gait and balance   Prior Hospitalization: see medical history Provider#: [id number]   Medications: Verified on Patient summary List    Comorbidities: hx CVA, fractured right humerus July 2020, c/s fusion 11/20/2019, arthritis, recent weight change.    Prior Level of Function: retired. Recovering from cervical myelopathy/fusion last year. Lives with friend who helps assist with all care in an 1 story home. Frequent falls and general deconditioning was present since surgery. Hx fractured right humerus/shoulder in July 2020.     Visits from Start of Care: 38    Missed Visits: 1    Reporting Period : 7/7/21 to 7/29/21    1. Pt will increase MMT right hip flexion/knee flexion to 4+/5 bilaterally to improve ease with gait & stair negotiation. .              Re-certification:  PROGRESSING; Right knee ext = 5/5, flex = 4/5               MET; MMT right hip flexion 5/5, right knee flexion 5/5  2. Patient will score > 20 on DGI to demonstrate improved dynamic gait and decrease fall risk with daily tasks.                Recert: PROGRESSING; 03/07              NOT MET; 16/24     Functional Gains: walking long periods with rollator, able to independently care for self, activity tolerance, strength, shopping in the community.    Functional Deficits: stairs, walking without AD for longer periods and independently, cleaning house  % improvement: 80%  Pain   Average: 5/10                  Best: 2/10 Worst: 8/10  Patient Goal: \"to walk and get rid of the numbness in my arms and legs\"    Assessment/Summary of care: Ms. Mona Cuevas reports 80% improvement since beginning therapy. Pt has progressed wonderfully since first coming to PT, but has recently hit a plateau with strength progression and balance. Advised pt on proper machines to use/avoid at the gym. Her walking/standing tolerance in the community has greatly improved, but still unstable without using her rollator. We are discharging to an updated HEP at this time.      RECOMMENDATIONS:  [x]Discontinue therapy: [x]Patient has reached or is progressing toward set goals      []Patient is non-compliant or has abdicated      []Due to lack of appreciable progress towards set [address], PT 7/29/2021 2:57 PM

## 2021-07-29 NOTE — PROGRESS NOTES
PT DAILY TREATMENT NOTE     Patient Name: Earlene Almaguer  SQRM:  : 1945  [x]  Patient  Verified  Payor: VA MEDICARE / Plan: VA MEDICARE PART A & B / Product Type: Medicare /    In IXIY:1150  Out time:1239  Total Treatment Time (min): 41  Visit #: 7 of 8    Medicare/BCBS Only   Total Timed Codes (min):  41 1:1 Treatment Time:  41       Treatment Area: Bilateral leg weakness [R29.898]  Weakness of both upper extremities [R29.898]    SUBJECTIVE  Pain Level (0-10 scale): 5  Any medication changes, allergies to medications, adverse drug reactions, diagnosis change, or new procedure performed?: [x] No    [] Yes (see summary sheet for update)  Subjective functional status/changes:   [] No changes reported  \"I'm going to start going to the gym. \"    OBJECTIVE    Modality rationale: patient declined   Min Type Additional Details    [] Estim:  []Unatt       []IFC  []Premod                        []Other:  []w/ice   []w/heat  Position:  Location:    [] Estim: []Att    []TENS instruct  []NMES                    []Other:  []w/US   []w/ice   []w/heat  Position:  Location:    []  Traction: [] Cervical       []Lumbar                       [] Prone          []Supine                       []Intermittent   []Continuous Lbs:  [] before manual  [] after manual    []  Ultrasound: []Continuous   [] Pulsed                           []1MHz   []3MHz W/cm2:  Location:    []  Iontophoresis with dexamethasone         Location: [] Take home patch   [] In clinic    []  Ice     []  heat  []  Ice massage  []  Laser   []  Anodyne Position:  Location:    []  Laser with stim  []  Other:  Position:  Location:    []  Vasopneumatic Device    []  Right     []  Left  Pre-treatment girth:  Post-treatment girth:  Measured at (location):  Pressure:       [] lo [] med [] hi   Temperature: [] lo [] med [] hi   [] Skin assessment post-treatment:  []intact []redness- no adverse reaction    []redness - adverse reaction:     10 min Therapeutic Exercise:  [x] See flow sheet :   Rationale: increase ROM and increase strength to improve the patients ability to perform ADLs    11 min Therapeutic Activity:  [x]  See flow sheet : reassessment, sit to stands   Rationale: increase ROM, increase strength, improve coordination, improve balance and increase proprioception  to improve the patients ability to improve mobility and ADL performance     20 min Neuromuscular Re-education:  [x]  See flow sheet : DGI balance testing   Rationale: increase ROM, increase strength, improve coordination, improve balance and increase proprioception  to improve the patients ability to improve mobility and decrease fall risk           With   [x] TE   [x] TA   [x] neuro   [] other: Patient Education: [x] Review HEP    [] Progressed/Changed HEP based on:   [x] positioning   [x] body mechanics   [] transfers   [] heat/ice application    [] other:      Other Objective/Functional Measures:   FOTO 37  DGI 16/24    MMT right hip flexion 5/5    MMT right knee flexion 5/5     Pain Level (0-10 scale) post treatment: 5    ASSESSMENT/Changes in Function: Ms. Octavia Nunez reports 80% improvement since beginning therapy. Pt has progressed wonderfully since first coming to PT, but has recently hit a plateau with strength progression and balance. Advised pt on proper machine to use and avoid at the gym. Her walking/standing tolerance in the community has greatly improved, but still unstable without using her rollator. We are discharging to an updated HEP at this time. []  See Plan of Care  []  See progress note/recertification  [x]  See Discharge Summary         Progress towards goals / Updated goals:  1. Pt will increase MMT right hip flexion/knee flexion to 4+/5 bilaterally to improve ease with gait & stair negotiation. .              Re-certification:  PROGRESSING; Right knee ext = 5/5, flex = 4/5               MET; MMT right hip flexion 5/5, right knee flexion 5/5  2.  Patient will score > 20 on DGI to demonstrate improved dynamic gait and decrease fall risk with daily tasks.                Recert: PROGRESSING; 76/48              NOT MET; 16/24    Functional Gains: walking long periods with rollator, able to independently care for self, activity tolerance, strength, shopping in the community.    Functional Deficits: stairs, walking without AD for longer periods and independently, cleaning house  % improvement: 80%  Pain   Average: 5/10       Best: 2/10     Worst: 8/10  Patient Goal: \"to walk and get rid of the numbness in my arms and legs\"    PLAN  []  Upgrade activities as tolerated     []  Continue plan of care  []  Update interventions per flow sheet       [x]  Discharge due to:_ PROGRAM COMPLETE  []  Other:_      Savannah Barros PTA, CSCS 7/29/2021  12:42 PM    Future Appointments   Date Time Provider Gaby Delarosa   7/29/2021 12:00 PM Judie Severance, PTA [facility]   10/15/2021  1:15 PM [facility] PHLEBOTOMIST [facility]   10/18/2021  1:00 PM [facility] FAST TRACK NURSE [facility]   12/2/2021  9:40 AM Krystle Fuentes, [address]

## 2021-07-29 NOTE — PROGRESS NOTES
Physical Therapy Discharge Instructions      [facility] - [place] [address]  [address] [facility], [address] [phone] fax      Patient:  Juan Francisco Ramesh  : 1945      Continue Home Exercise Program 1-2 times per day       Continue with    [] Ice  as needed      [x] Heat           Follow up with MD:     [x] Upon completion of therapy     [] As needed      Recommendations:     [x]   Return to activity with home program    []   Return to activity with the following modifications:       []Post Rehab Program    []Join Independent aquatic program     []Return to/join local gym      Karlos Coppola PTA, CSCS   2021 12:33 PM

## 2021-08-03 ENCOUNTER — TRANSCRIBE ORDER (OUTPATIENT)
Dept: SCHEDULING | Age: 76
End: 2021-08-03

## 2021-08-03 DIAGNOSIS — M54.12 RADICULOPATHY, CERVICAL REGION: ICD-10-CM

## 2021-08-03 DIAGNOSIS — M50.30 OTHER CERVICAL DISC DEGENERATION, UNSPECIFIED CERVICAL REGION: Primary | ICD-10-CM

## 2021-08-03 PROBLEM — R42 DIZZINESS: Status: RESOLVED | Noted: 2019-03-17 | Resolved: 2021-08-03

## 2021-08-06 ENCOUNTER — HOSPITAL ENCOUNTER (OUTPATIENT)
Dept: CT IMAGING | Age: 76
Discharge: HOME | End: 2021-08-06
Attending: ORTHOPAEDIC SURGERY
Payer: MEDICARE

## 2021-08-06 DIAGNOSIS — M50.30 OTHER CERVICAL DISC DEGENERATION, UNSPECIFIED CERVICAL REGION: ICD-10-CM

## 2021-08-06 DIAGNOSIS — M54.12 RADICULOPATHY, CERVICAL REGION: ICD-10-CM

## 2021-08-06 PROCEDURE — 72125 CT NECK SPINE W/O DYE: CPT

## 2021-08-16 RX ORDER — ACETAMINOPHEN 500 MG
500 TABLET ORAL 2 TIMES DAILY
COMMUNITY
Start: 2021-10-21 | End: 2021-10-21

## 2021-08-18 ENCOUNTER — ANESTHESIA EVENT (OUTPATIENT)
Dept: ENDOSCOPY | Age: 76
End: 2021-08-18
Payer: MEDICARE

## 2021-08-19 ENCOUNTER — HOSPITAL ENCOUNTER (OUTPATIENT)
Age: 76
Setting detail: OUTPATIENT SURGERY
Discharge: HOME | End: 2021-08-19
Attending: INTERNAL MEDICINE | Admitting: INTERNAL MEDICINE
Payer: MEDICARE

## 2021-08-19 ENCOUNTER — ANESTHESIA (OUTPATIENT)
Dept: ENDOSCOPY | Age: 76
End: 2021-08-19
Payer: MEDICARE

## 2021-08-19 VITALS
DIASTOLIC BLOOD PRESSURE: 72 MMHG | OXYGEN SATURATION: 98 % | RESPIRATION RATE: 20 BRPM | HEIGHT: 60 IN | HEART RATE: 69 BPM | BODY MASS INDEX: 19.36 KG/M2 | TEMPERATURE: 97.4 F | SYSTOLIC BLOOD PRESSURE: 137 MMHG | WEIGHT: 98.6 LBS

## 2021-08-19 PROCEDURE — 00813 ANES UPR LWR GI NDSC PX: CPT | Performed by: NURSE ANESTHETIST, CERTIFIED REGISTERED

## 2021-08-19 PROCEDURE — 77030013992 HC SNR POLYP ENDOSC BSC -B: Performed by: INTERNAL MEDICINE

## 2021-08-19 PROCEDURE — 99100 ANES PT EXTEME AGE<1 YR&>70: CPT | Performed by: ANESTHESIOLOGY

## 2021-08-19 PROCEDURE — 76040000007: Performed by: INTERNAL MEDICINE

## 2021-08-19 PROCEDURE — 2709999900 HC NON-CHARGEABLE SUPPLY: Performed by: INTERNAL MEDICINE

## 2021-08-19 PROCEDURE — 99100 ANES PT EXTEME AGE<1 YR&>70: CPT | Performed by: NURSE ANESTHETIST, CERTIFIED REGISTERED

## 2021-08-19 PROCEDURE — 00813 ANES UPR LWR GI NDSC PX: CPT | Performed by: ANESTHESIOLOGY

## 2021-08-19 PROCEDURE — 77030008565 HC TBNG SUC IRR ERBE -B: Performed by: INTERNAL MEDICINE

## 2021-08-19 PROCEDURE — 74011250636 HC RX REV CODE- 250/636: Performed by: NURSE ANESTHETIST, CERTIFIED REGISTERED

## 2021-08-19 PROCEDURE — 76060000032 HC ANESTHESIA 0.5 TO 1 HR: Performed by: INTERNAL MEDICINE

## 2021-08-19 PROCEDURE — 77030019988 HC FCPS ENDOSC DISP BSC -B: Performed by: INTERNAL MEDICINE

## 2021-08-19 PROCEDURE — 74011000250 HC RX REV CODE- 250: Performed by: NURSE ANESTHETIST, CERTIFIED REGISTERED

## 2021-08-19 RX ORDER — PROPOFOL 10 MG/ML
INJECTION, EMULSION INTRAVENOUS AS NEEDED
Status: DISCONTINUED | OUTPATIENT
Start: 2021-08-19 | End: 2021-08-19 | Stop reason: HOSPADM

## 2021-08-19 RX ORDER — LIDOCAINE HYDROCHLORIDE 10 MG/ML
0.1 INJECTION, SOLUTION EPIDURAL; INFILTRATION; INTRACAUDAL; PERINEURAL AS NEEDED
Status: DISCONTINUED | OUTPATIENT
Start: 2021-08-19 | End: 2021-08-19 | Stop reason: HOSPADM

## 2021-08-19 RX ORDER — SODIUM CHLORIDE, SODIUM LACTATE, POTASSIUM CHLORIDE, CALCIUM CHLORIDE 600; 310; 30; 20 MG/100ML; MG/100ML; MG/100ML; MG/100ML
25 INJECTION, SOLUTION INTRAVENOUS CONTINUOUS
Status: DISCONTINUED | OUTPATIENT
Start: 2021-08-19 | End: 2021-08-19 | Stop reason: HOSPADM

## 2021-08-19 RX ORDER — INSULIN LISPRO 100 [IU]/ML
INJECTION, SOLUTION INTRAVENOUS; SUBCUTANEOUS ONCE
Status: DISCONTINUED | OUTPATIENT
Start: 2021-08-19 | End: 2021-08-19 | Stop reason: HOSPADM

## 2021-08-19 RX ADMIN — PROPOFOL 10 MG: 10 INJECTION, EMULSION INTRAVENOUS at 14:21

## 2021-08-19 RX ADMIN — PROPOFOL 10 MG: 10 INJECTION, EMULSION INTRAVENOUS at 14:24

## 2021-08-19 RX ADMIN — PROPOFOL 10 MG: 10 INJECTION, EMULSION INTRAVENOUS at 14:28

## 2021-08-19 RX ADMIN — PROPOFOL 10 MG: 10 INJECTION, EMULSION INTRAVENOUS at 14:22

## 2021-08-19 RX ADMIN — PROPOFOL 10 MG: 10 INJECTION, EMULSION INTRAVENOUS at 14:27

## 2021-08-19 RX ADMIN — PROPOFOL 10 MG: 10 INJECTION, EMULSION INTRAVENOUS at 14:18

## 2021-08-19 RX ADMIN — PROPOFOL 10 MG: 10 INJECTION, EMULSION INTRAVENOUS at 14:25

## 2021-08-19 RX ADMIN — SODIUM CHLORIDE, SODIUM LACTATE, POTASSIUM CHLORIDE, AND CALCIUM CHLORIDE 25 ML/HR: 600; 310; 30; 20 INJECTION, SOLUTION INTRAVENOUS at 13:45

## 2021-08-19 RX ADMIN — FAMOTIDINE 20 MG: 10 INJECTION INTRAVENOUS at 13:52

## 2021-08-19 RX ADMIN — PROPOFOL 10 MG: 10 INJECTION, EMULSION INTRAVENOUS at 14:31

## 2021-08-19 RX ADMIN — PROPOFOL 10 MG: 10 INJECTION, EMULSION INTRAVENOUS at 14:30

## 2021-08-19 RX ADMIN — PROPOFOL 20 MG: 10 INJECTION, EMULSION INTRAVENOUS at 14:16

## 2021-08-19 RX ADMIN — PROPOFOL 80 MG: 10 INJECTION, EMULSION INTRAVENOUS at 14:14

## 2021-08-19 RX ADMIN — PROPOFOL 10 MG: 10 INJECTION, EMULSION INTRAVENOUS at 14:19

## 2021-08-19 NOTE — H&P
[facility]    [email or web address]      Impression:   1.dysphagia reflux   2. Hx polyps      Plan:     1. egd dil colo mac al risks benefits and at discussed       Chief Complaint: dysphagia reflux hx polyps      HPI:  Fernando Conley is a 78 y.o. female who is being seen on consult for reflux dyspahgai and hx of polyps.     PMH:   Past Medical History:   Diagnosis Date    Abnormal Pap smear     Dr. Shaila Darden Allergic rhinitis     Arthritis     Cerebrovascular small vessel disease 3/18/2019    CT 3/17/2019    Cervical spinal cord compression (HCC)     Chronic pain     Concentric left ventricular hypertrophy 3/18/2019    With left ventricular diastolic dysfunction by echocardiogram 3/18/2019    Hypercholesterolemia     Neck pain 5/17/2010    Stroke ([facility] [address] 10/02/2017    TIA- legs weak memory issues       PSH:   Past Surgical History:   Procedure Laterality Date    COLONOSCOPY N/A 6/4/2018    COLONOSCOPY / polypectomy [facility] by Pravin Owusu [facility]COPY    HX GYN      Total Hyst    HX LAP CHOLECYSTECTOMY      HX OTHER SURGICAL  2017    Throat widened    NEUROLOGICAL PROCEDURE UNLISTED  11/2019    Cervical fusion       Social HX:   Social History     Socioeconomic History    Marital status:      Spouse name: Not on file    Number of children: Not on file    Years of education: Not on file    Highest education level: Not on file   Occupational History    Not on file   Tobacco Use    Smoking status: Never Smoker    Smokeless tobacco: Never Used   Vaping Use    Vaping Use: Never used   Substance and Sexual Activity    Alcohol use: No    Drug use: No    Sexual activity: Never   Other Topics Concern    Not on file   Social History Narrative    Not on file     Social Determinants of Health     Financial Resource Strain:     Difficulty of Paying Living Expenses:    Food Insecurity:     Worried About Running Out of Food in the Last Year:     Ran Out of Food in the Last Year:    Transportation Needs:     Lack of Transportation (Medical):  Lack of Transportation (Non-Medical):    Physical Activity:     Days of Exercise per Week:     Minutes of Exercise per Session:    Stress:     Feeling of Stress :    Social Connections:     Frequency of Communication with Friends and Family:     Frequency of Social Gatherings with Friends and Family:     Attends Taoism Services:     Active Member of Clubs or Organizations:     Attends Club or Organization Meetings:     Marital Status:    Intimate Partner Violence:     Fear of Current or Ex-Partner:     Emotionally Abused:     Physically Abused:     Sexually Abused:        FHX:   Family History   Problem Relation Age of Onset    Cancer Mother         breast    Heart Disease Father        Allergy:   Allergies   Allergen Reactions    Baclofen Shortness of Breath     Denies. Pt tolerates  Other reaction(s): gi distress    Morphine Other (comments)     hallucinations  Other reaction(s): other/intolerance  hallucinations    Sulfa (Sulfonamide Antibiotics) Other (comments)     Drops blood pressure    Celecoxib Other (comments)     Tiredness   Other reaction(s): other/intolerance  Makes her tired       Home Medications:     Medications Prior to Admission   Medication Sig    acetaminophen (Tylenol Extra Strength) 500 mg tablet Take 500 mg by mouth two (2) times a day.  D-MANNOSE PO Take  by mouth daily as needed.  fluticasone propionate (FLOVENT HFA) 110 mcg/actuation inhaler Take  by inhalation.  conjugated estrogens (PREMARIN) 0.625 mg/gram vaginal cream Apply 0.5 g to affected area daily. Apply pea sized amount to urethra and whatever is left over to just inside of vagina    busPIRone (BUSPAR) 7.5 mg tablet take 1 tablet by mouth twice a day    ibuprofen (MOTRIN) 400 mg tablet Take 1 Tab by mouth every six (6) hours as needed for Pain.     ALPRAZolam (XANAX) 0.25 mg tablet Take 1 Tab by mouth two (2) times daily as needed for Anxiety. Max Daily Amount: 0.5 mg.    ondansetron (ZOFRAN ODT) 4 mg disintegrating tablet dissolve 1 tablet ON TONGUE every 6 hours if needed for nausea OR vomiting    denosumab (PROLIA) 60 mg/mL injection 60 mg by SubCUTAneous route every 6 months.  midodrine (PROAMITINE) 5 mg tablet Take 5 mg by mouth two (2) times a day. 1 tab by mouth with breakfast and lunch    turmeric 400 mg cap Take 1 Cap by mouth daily.  aspirin 81 mg chewable tablet Take 1 Tab by mouth daily.  lidocaine-prilocaine 5-2.5-2.5 % PaCr by Apply Externally route.  ergocalciferol (Vitamin D2) 1,250 mcg (50,000 unit) capsule Take 50,000 Units by mouth. (Patient not taking: Reported on 6/37/3256)    VIT D3-FOLIC IRGP-C5-N0-D42 PO Take  by mouth. (Patient not taking: Reported on 8/16/2021)    ascorbic acid, vitamin C, (Vitamin C) 500 mg tablet Take  by mouth. (Patient not taking: Reported on 8/16/2021)    magnesium 200 mg tab Take  by mouth. (Patient not taking: Reported on 8/16/2021)    nitrofurantoin, macrocrystal-monohydrate, (MACROBID) 100 mg capsule Take 1 Cap by mouth two (2) times a day. (Patient not taking: Reported on 8/16/2021)    atorvastatin (LIPITOR) 20 mg tablet take 1 tablet by mouth once daily (Patient not taking: Reported on 7/28/2021)    cyanocobalamin (Vitamin B-12) 100 mcg tablet Take 100 mcg by mouth daily. (Patient not taking: Reported on 8/16/2021)    omeprazole (PRILOSEC) 20 mg capsule take 1 capsule by mouth once daily before breakfast (Patient not taking: Reported on 7/28/2021)    gabapentin (NEURONTIN) 100 mg capsule Take 2 Caps by mouth three (3) times daily. Max Daily Amount: 600 mg. (Patient taking differently: Take 100 mg by mouth four (4) times daily.)    acetaminophen (TYLENOL) 160 mg/5 mL elixir Take 1,000 mg by mouth daily.  (Patient not taking: Reported on 8/16/2021)       Review of Systems:     Constitutional: No fevers, chills, weight loss, fatigue. Skin: No rashes, pruritis, jaundice, ulcerations, erythema. HENT: No headaches, nosebleeds, sinus pressure, rhinorrhea, sore throat. Eyes: No visual changes, blurred vision, eye pain, photophobia, jaundice. Cardiovascular: No chest pain, heart palpitations. Respiratory: No cough, SOB, wheezing, chest discomfort, orthopnea. Gastrointestinal: Dysphagia reflux    Genitourinary: No dysuria, bleeding, discharge, pyuria. Musculoskeletal: No weakness, arthralgias, wasting. Endo: No sweats. Heme: No bruising, easy bleeding. Allergies: As noted. Neurological: Cranial nerves intact. Alert and oriented. Gait not assessed. Psychiatric:  No anxiety, depression, hallucinations. Visit Vitals  Ht 5' (1.524 m)   Wt 45.4 kg (100 lb)   LMP  (LMP Unknown)   BMI 19.53 kg/m²       Physical Assessment:     constitutional: appearance: well developed, well nourished, normal habitus, no deformities, in no acute distress. skin: inspection: no rashes, ulcers, icterus or other lesions; no clubbing or telangiectasias. palpation: no induration or subcutaneos nodules. eyes: inspection: normal conjunctivae and lids; no jaundice pupils: symmetrical, normoreactive to light, normal accommodation and size. ENMT: mouth: normal oral mucosa,lips and gums; good dentition. oropharynx: normal tongue, hard and soft palate; posterior pharynx without erythema, exudate or lesions. neck: no masses organomegaly or tenderness. respiratory: effort: normal chest excursion; no intercostal retraction or accessory muscle use. cardiovascular: abdominal aorta: normal size and position; no bruits. palpation: PMI of normal size and position; normal rhythm; no thrill or murmurs. abdominal: abdomen: normal consistency; no tenderness or masses. hernias: no hernias appreciated. liver: normal size and consistency. spleen: not palpable. rectal: hemoccult/guaiac: not performed.    musculoskeletal: no deformities or muscle wasting   lymphatic: axilae: not palpable. groin: not palpable. neck: within normal limits. other: not palpable. neurologic: cranial nerves: II-XII normal.   psychiatric: judgement/insight: within normal limits. memory: within normal limits for recent and remote events. mood and affect: no evidence of depression, anxiety or agitation. orientation: oriented to time, space and person. Basic Metabolic Profile   No results for input(s): NA, K, CL, CO2, BUN, GLU, CA, MG, PHOS in the last 72 hours. No lab exists for component: CREAT      CBC w/Diff    No results for input(s): WBC, RBC, HGB, HCT, MCV, MCH, MCHC, RDW, PLT, HGBEXT, HCTEXT, PLTEXT in the last 72 hours. No lab exists for component: MPV No results for input(s): GRANS, LYMPH, EOS, PRO, MYELO, METAS, BLAST in the last 72 hours. No lab exists for component: MONO, BASO     Hepatic Function   No results for input(s): ALB, TP, TBILI, AP, AML, LPSE in the last 72 hours. No lab exists for component: DBILI, GPT, SGOT       Nani Desai MD, M.D. [facility], [address] [place] [address]  [email or web address]

## 2021-08-19 NOTE — ANESTHESIA PREPROCEDURE EVALUATION
Relevant Problems   NEUROLOGY   (+) CVA (cerebral vascular accident) ([facility] [address]   (+) TIA (transient ischemic attack)       Anesthetic History   No history of anesthetic complications            Review of Systems / Medical History  Patient summary reviewed and pertinent labs reviewed    Pulmonary  Within defined limits                 Neuro/Psych         TIA    Comments: Chronic pain syndrome Cardiovascular  Within defined limits                     GI/Hepatic/Renal  Within defined limits              Endo/Other        Arthritis     Other Findings              Physical Exam    Airway  Mallampati: I  TM Distance: 4 - 6 cm  Neck ROM: normal range of motion   Mouth opening: Normal     Cardiovascular  Regular rate and rhythm,  S1 and S2 normal,  no murmur, click, rub, or gallop             Dental  No notable dental hx       Pulmonary  Breath sounds clear to auscultation               Abdominal  GI exam deferred       Other Findings            Anesthetic Plan    ASA: 2  Anesthesia type: MAC          Induction: Intravenous  Anesthetic plan and risks discussed with: Patient

## 2021-08-19 NOTE — DISCHARGE INSTRUCTIONS
Upper GI Endoscopy: What to Expect at [address] [place] [address]  After you have an endoscopy, you will stay at the hospital or clinic for 1 to 2 hours. This will allow the medicine to wear off. You will be able to go home after your doctor or nurse checks to make sure you are not having any problems. You may have to stay overnight if you had treatment during the test. You may have a sore throat for a day or two after the test.  This care sheet gives you a general idea about what to expect after the test.  How can you care for yourself at home? Activity  · Rest as much as you need to after you go home. · You should be able to go back to your usual activities the day after the test.  Diet  · Follow your doctor's directions for eating after the test.  · Drink plenty of fluids (unless your doctor has told you not to). Medications  · If you have a sore throat the day after the test, use an over-the-counter spray to numb your throat. Follow-up care is a key part of your treatment and safety. Be sure to make and go to all appointments, and call your doctor if you are having problems. It's also a good idea to know your test results and keep a list of the medicines you take. When should you call for help? Call 911 anytime you think you may need emergency care. For example, call if:  · You passed out (lost consciousness). · You cough up blood. · You vomit blood or what looks like coffee grounds. · You pass maroon or very bloody stools. Call your doctor now or seek immediate medical care if:  · You have trouble swallowing. · You have belly pain. · Your stools are black and tarlike or have streaks of blood. · You are sick to your stomach or cannot keep fluids down. Watch closely for changes in your health, and be sure to contact your doctor if:  · Your throat still hurts after a day or two. · You do not get better as expected. Where can you learn more?    Go to DealExplorer.be  Enter J454 in the search box to learn more about \"Upper GI Endoscopy: What to Expect at Home. \"   © 8368-3040 [employer]. Care instructions adapted under license by [employer] (which disclaims liability or warranty for this information). This care instruction is for use with your licensed healthcare professional. If you have questions about a medical condition or this instruction, always ask your healthcare professional. [address] any warranty or liability for your use of this information. Content Version: 13.9.102378; Current as of: November 14, 2014    Patient Education   Patient Education        Esophageal Dilation: What to Expect at [address] had an esophageal dilation. This procedure can open up narrow areas of the esophagus. After the procedure, you will stay at the hospital or surgery center for 1 to 2 hours. This will allow the medicine to wear off. You will be able to go home after your doctor or nurse checks to make sure that you're not having any problems. This care sheet gives you a general idea about how long it will take for you to recover. But each person recovers at a different pace. Follow the steps below to get better as quickly as possible. How can you care for yourself at home? Activity    · Rest as much as you need to after you go home.     · You should be able to go back to your usual activities the day after the procedure. Diet    · Follow your doctor's directions for eating after the procedure.     · Drink plenty of fluids (unless your doctor has told you not to). Medicines    · Your doctor will tell you if and when you can restart your medicines. He or she will also give you instructions about taking any new medicines.     · If you take aspirin or some other blood thinner, ask your doctor if and when to start taking it again.  Make sure that you understand exactly what your doctor wants you to do.     · If you have a sore throat the day after the procedure, use an over-the-counter spray to numb your throat. Sucking on throat lozenges and gargling with warm salt water may also help relieve your symptoms. Follow-up care is a key part of your treatment and safety. Be sure to make and go to all appointments, and call your doctor if you are having problems. It's also a good idea to know your test results and keep a list of the medicines you take. When should you call for help? Call 911 anytime you think you may need emergency care. For example, call if:    · You passed out (lost consciousness).     · You have trouble breathing.     · Your stools are maroon or very bloody   Call your doctor now or seek immediate medical care if:    · You have new or worse belly pain.     · You have a fever.     · You have new or more blood in your stools.     · You are sick to your stomach and cannot drink fluids.     · You cannot pass stools or gas.     · You have pain that does not get better after you take pain medicine. Watch closely for changes in your health, and be sure to contact your doctor if:    · Your throat still hurts after a day or two.     · You do not get better as expected. Where can you learn more? Go to http://www.gray.com/  Enter J014 in the search box to learn more about \"Esophageal Dilation: What to Expect at Home. \"  Current as of: April 15, 2020               Content Version: 12.8  © 1552-9339 [employer]. Care instructions adapted under license by [employer] (which disclaims liability or warranty for this information). If you have questions about a medical condition or this instruction, always ask your healthcare professional. [address] any warranty or liability for your use of this information. Learning About Swallowing Problems  What are swallowing problems? Certain health problems that affect the nervous system can cause trouble swallowing.  These conditions include stroke, ALS (also known as Benito Murrain disease), Parkinson's disease, and multiple sclerosis. The muscles and nerves that help move food through the throat and esophagus may not work right. Growths, such as cancer, and other problems with your esophagus can also make it hard to swallow. The esophagus is the tube that leads from your throat to your stomach. How are swallowing problems diagnosed? A doctor or speech therapist will examine you to check for swallowing problems. You may get swallowing tests to check how well your throat muscles work. For these tests, you swallow a special liquid that helps the doctor see your throat and esophagus on an X-ray or video screen. Other tests use a thin, flexible tube called a scope to check for problems with your esophagus. The doctor puts the scope in your mouth and down your throat to look at your esophagus. What are the symptoms? Symptoms of swallowing problems may include:  · Trouble getting food or liquids to go down on the first try. · Gagging, choking, or coughing when you swallow. · Having food or liquids come back up through your throat, mouth, or nose after you swallow. · Feeling like foods or liquids are stuck in some part of your throat or chest.  · Pain when you swallow. How are swallowing problems treated? How swallowing problems are treated depends on the cause. The main goals of treatment will be to help you eat and swallow safely and get good nutrition. This is important for your health and quality of life. You may learn exercises to train your throat muscles to work together so you're able to swallow better. Learning certain ways to put food in your mouth or to position your head while eating may also help. Your doctor or a speech therapist may recommend changes to your diet to help make it easier to swallow. You may need to avoid certain foods or liquids.  You also may need to change the thickness of foods or liquids in your diet.  To eat and swallow safely, follow any instructions you get from your doctor or therapist. These ideas may help:  · Sit upright when eating, drinking, and taking pills. · Take small bites of food. Chew completely and swallow before taking another bite. · Take small sips of liquids. · If eating makes you tired, eat smaller but more frequent meals. · Tip your chin down when there is food in your mouth. Where can you learn more? Go to http://www.gray.com/  Enter D018 in the search box to learn more about \"Learning About Swallowing Problems. \"  Current as of: February 26, 2020               Content Version: 12.8  © 8254-1503 [employer]. Care instructions adapted under license by [employer] (which disclaims liability or warranty for this information). If you have questions about a medical condition or this instruction, always ask your healthcare professional. [place] [address] any warranty or liability for your use of this information. Patient Education        Colonoscopy: What to Expect at [address] [place] [address]  After a colonoscopy, you'll stay at the clinic for 1 to 2 hours until the medicines wear off. Then you can go home. But you'll need to arrange for a ride. Your doctor will tell you when you can eat and do your other usual activities. Your doctor will talk to you about when you'll need your next colonoscopy. Your doctor can help you decide how often you need to be checked. This will depend on the results of your test and your risk for colorectal cancer. After the test, you may be bloated or have gas pains. You may need to pass gas. If a biopsy was done or a polyp was removed, you may have streaks of blood in your stool (feces) for a few days. Problems such as heavy rectal bleeding may not occur until several weeks after the test. This isn't common. But it can happen after polyps are removed.   This care sheet gives you a general idea about how long it will take for you to recover. But each person recovers at a different pace. Follow the steps below to get better as quickly as possible. How can you care for yourself at home? Activity    · Rest when you feel tired.     · You can do your normal activities when it feels okay to do so. Diet    · Follow your doctor's directions for eating.     · Unless your doctor has told you not to, drink plenty of fluids. This helps to replace the fluids that were lost during the colon prep.     · Do not drink alcohol. Medicines    · Your doctor will tell you if and when you can restart your medicines. He or she will also give you instructions about taking any new medicines.     · If you take aspirin or some other blood thinner, ask your doctor if and when to start taking it again. Make sure that you understand exactly what your doctor wants you to do.     · If polyps were removed or a biopsy was done during the test, your doctor may tell you not to take aspirin or other anti-inflammatory medicines for a few days. These include ibuprofen (Advil, Motrin) and naproxen (Aleve). Other instructions    · For your safety, do not drive or operate machinery until the medicine wears off and you can think clearly. Your doctor may tell you not to drive or operate machinery until the day after your test.     · Do not sign legal documents or make major decisions until the medicine wears off and you can think clearly. The anesthesia can make it hard for you to fully understand what you are agreeing to. Follow-up care is a key part of your treatment and safety. Be sure to make and go to all appointments, and call your doctor if you are having problems. It's also a good idea to know your test results and keep a list of the medicines you take. When should you call for help? Call 911 anytime you think you may need emergency care.  For example, call if:    · You passed out (lost consciousness).     · You pass maroon or bloody stools.     · You have trouble breathing. Call your doctor now or seek immediate medical care if:    · You have pain that does not get better after you take pain medicine.     · You are sick to your stomach or cannot drink fluids.     · You have new or worse belly pain.     · You have blood in your stools.     · You have a fever.     · You cannot pass stools or gas. Watch closely for changes in your health, and be sure to contact your doctor if you have any problems. Where can you learn more? Go to http://www.gray.com/  Enter E264 in the search box to learn more about \"Colonoscopy: What to Expect at Home. \"  Current as of: December 17, 2020               Content Version: 12.8  © 2006-2021 [employer] Care instructions adapted under license by [employer] (which disclaims liability or warranty for this information). If you have questions about a medical condition or this instruction, always ask your healthcare professional. [place] [address] any warranty or liability for your use of this information. DISCHARGE SUMMARY from Nurse     POST-PROCEDURE INSTRUCTIONS:    Call your Physician if you:  ? Observe any excess bleeding. ? Develop a temperature over 100.5o F.  ? Experience abdominal, shoulder or chest pain. ? Notice any signs of decreased circulation or nerve impairment to an extremity such as a change in color, persistent numbness, tingling, coldness or increase in pain. ? Vomit blood or you have nausea and vomiting lasting longer than 4 hours. ? Are unable to take medications. ? Are unable to urinate within 8 hours after discharge following general anesthesia or intravenous sedation.     For the next 24 hours after receiving general anesthesia or intravenous sedation, or while taking prescription Narcotics, limit your activities:  ? Do NOT drive a motor vehicle, operate hazard machinery or power tools, or perform tasks that require coordination. The medication you received during your procedure may have some effect on your mental awareness. ? Do NOT make important personal or business decisions. The medication you received during your procedure may have some effect on your mental awareness. ? Do NOT drink alcoholic beverages. These drinks do not mix well with the medications that have been given to you. ? Upon discharge from the hospital, you must be accompanied by a responsible adult. ? Resume your diet as directed by your physician. ? Resume medications as your physician has prescribed. ? Please give a list of your current medications to your Primary Care Provider. ? Please update this list whenever your medications are discontinued, doses are changed, or new medications (including over-the-counter products) are added. ? Please carry medication information at all times in case of emergency situations. These are general instructions for a healthy lifestyle:    No smoking/ No tobacco products/ Avoid exposure to second hand smoke.  Surgeon General's Warning:  Quitting smoking now greatly reduces serious risk to your health. Obesity, smoking, and a sedentary lifestyle greatly increase your risk for illness.  A healthy diet, regular physical exercise & weight monitoring are important for maintaining a healthy lifestyle   You may be retaining fluid if you have a history of heart failure or if you experience any of the following symptoms:  Weight gain of 3 pounds or more overnight or 5 pounds in a week, increased swelling in our hands or feet or shortness of breath while lying flat in bed. Please call your doctor as soon as you notice any of these symptoms; do not wait until your next office visit. Colorectal Screening   Colorectal cancer almost always develops from precancerous polyps (abnormal growths) in the colon or rectum.   Screening tests can find precancerous polyps, so that they can be removed before they turn into cancer. Screening tests can also find colorectal cancer early, when treatment works best.  [employer] Speak with your physician about when you should begin screening and how often you should be tested  [employer]   Additional Information    Educational references and/or instructions provided during this visit included:    See attached. APPOINTMENTS:    Per MD Instruction. Discharge information has been reviewed with the patient. The patient verbalized understanding.

## 2021-08-19 NOTE — ANESTHESIA POSTPROCEDURE EVALUATION
Procedure(s):  UPPER ENDOSCOPY/ dilation  COLONOSCOPY.     MAC    Anesthesia Post Evaluation      Multimodal analgesia: multimodal analgesia used between 6 hours prior to anesthesia start to PACU discharge  Patient location during evaluation: PACU  Patient participation: complete - patient participated  Level of consciousness: awake  Pain score: 2  Pain management: adequate  Airway patency: patent  Anesthetic complications: no  Cardiovascular status: acceptable  Respiratory status: acceptable  Hydration status: acceptable  Post anesthesia nausea and vomiting:  none  Final Post Anesthesia Temperature Assessment:  Normothermia (36.0-37.5 degrees C)      INITIAL Post-op Vital signs:   Vitals Value Taken Time   /63 08/19/21 1444   Temp 36.8 °C (98.2 °F) 08/19/21 1444   Pulse 74 08/19/21 1444   Resp 20 08/19/21 1444   SpO2 100 % 08/19/21 1444

## 2021-09-28 ENCOUNTER — TRANSCRIBE ORDER (OUTPATIENT)
Dept: REGISTRATION | Age: 76
End: 2021-09-28

## 2021-09-28 ENCOUNTER — HOSPITAL ENCOUNTER (OUTPATIENT)
Dept: LAB | Age: 76
Discharge: HOME | End: 2021-09-28
Payer: MEDICARE

## 2021-09-28 DIAGNOSIS — G62.9 PERIPHERAL NERVE DISORDER: Primary | ICD-10-CM

## 2021-09-28 DIAGNOSIS — G62.9 PERIPHERAL NERVE DISORDER: ICD-10-CM

## 2021-09-28 LAB
FOLATE SERPL-MCNC: >20 NG/ML (ref 3.1–17.5)
TSH SERPL DL<=0.05 MIU/L-ACNC: 1.39 UIU/ML (ref 0.36–3.74)
VIT B12 SERPL-MCNC: 620 PG/ML (ref 211–911)

## 2021-09-28 PROCEDURE — 84155 ASSAY OF PROTEIN SERUM: CPT

## 2021-09-28 PROCEDURE — 82746 ASSAY OF FOLIC ACID SERUM: CPT

## 2021-09-28 PROCEDURE — 84443 ASSAY THYROID STIM HORMONE: CPT

## 2021-09-28 PROCEDURE — 82607 VITAMIN B-12: CPT

## 2021-09-28 PROCEDURE — 36415 COLL VENOUS BLD VENIPUNCTURE: CPT

## 2021-09-29 LAB
ALBUMIN SERPL ELPH-MCNC: 3.8 G/DL (ref 2.9–4.4)
ALBUMIN/GLOB SERPL: 1.2 {RATIO} (ref 0.7–1.7)
ALPHA1 GLOB SERPL ELPH-MCNC: 0.3 G/DL (ref 0–0.4)
ALPHA2 GLOB SERPL ELPH-MCNC: 0.9 G/DL (ref 0.4–1)
B-GLOBULIN SERPL ELPH-MCNC: 1 G/DL (ref 0.7–1.3)
GAMMA GLOB SERPL ELPH-MCNC: 0.9 G/DL (ref 0.4–1.8)
GLOBULIN SER CALC-MCNC: 3.1 G/DL (ref 2.2–3.9)
M PROTEIN SERPL ELPH-MCNC: NORMAL G/DL
PROT SERPL-MCNC: 6.9 G/DL (ref 6–8.5)

## 2021-10-15 ENCOUNTER — APPOINTMENT (OUTPATIENT)
Dept: INFUSION THERAPY | Age: 76
End: 2021-10-15

## 2021-10-18 ENCOUNTER — APPOINTMENT (OUTPATIENT)
Dept: INFUSION THERAPY | Age: 76
End: 2021-10-18

## 2021-10-19 PROBLEM — R52 PAIN: Status: ACTIVE | Noted: 2021-10-19

## 2021-10-19 PROBLEM — Z66 DNR (DO NOT RESUSCITATE): Status: ACTIVE | Noted: 2019-11-29

## 2021-10-19 PROBLEM — Z51.5 ENCOUNTER FOR PALLIATIVE CARE: Status: ACTIVE | Noted: 2021-10-19

## 2021-10-19 PROBLEM — K21.9 GERD (GASTROESOPHAGEAL REFLUX DISEASE): Status: ACTIVE | Noted: 2019-11-22

## 2021-10-19 PROBLEM — J45.909 ASTHMA: Status: ACTIVE | Noted: 2019-11-22

## 2021-10-19 PROBLEM — Z86.73 HISTORY OF STROKE: Status: ACTIVE | Noted: 2019-11-22

## 2021-10-19 PROBLEM — K59.00 CONSTIPATION: Status: ACTIVE | Noted: 2021-10-19

## 2021-10-19 PROBLEM — G82.50 QUADRIPLEGIA (HCC): Status: ACTIVE | Noted: 2019-11-29

## 2021-10-19 RX ORDER — KETOROLAC TROMETHAMINE 5 MG/ML
SOLUTION OPHTHALMIC
COMMUNITY
Start: 2021-07-12 | End: 2021-10-21

## 2021-10-19 RX ORDER — LIDOCAINE 50 MG/G
PATCH TOPICAL
COMMUNITY
Start: 2021-08-08

## 2021-10-19 RX ORDER — ONDANSETRON 4 MG/1
TABLET, FILM COATED ORAL
COMMUNITY
Start: 2021-08-14 | End: 2021-10-21

## 2021-10-21 ENCOUNTER — OFFICE VISIT (OUTPATIENT)
Dept: PULMONOLOGY | Age: 76
End: 2021-10-21
Payer: MEDICARE

## 2021-10-21 VITALS
BODY MASS INDEX: 19.71 KG/M2 | RESPIRATION RATE: 18 BRPM | HEIGHT: 60 IN | DIASTOLIC BLOOD PRESSURE: 66 MMHG | WEIGHT: 100.4 LBS | TEMPERATURE: 98.3 F | SYSTOLIC BLOOD PRESSURE: 126 MMHG | HEART RATE: 80 BPM | OXYGEN SATURATION: 94 %

## 2021-10-21 DIAGNOSIS — G70.9 NEUROMUSCULAR WEAKNESS (HCC): ICD-10-CM

## 2021-10-21 DIAGNOSIS — I20.8 STABLE ANGINA PECTORIS (HCC): ICD-10-CM

## 2021-10-21 DIAGNOSIS — R06.09 DYSPNEA ON EXERTION: Primary | ICD-10-CM

## 2021-10-21 DIAGNOSIS — R53.81 PHYSICAL DECONDITIONING: ICD-10-CM

## 2021-10-21 PROCEDURE — G9899 SCRN MAM PERF RSLTS DOC: HCPCS | Performed by: INTERNAL MEDICINE

## 2021-10-21 PROCEDURE — G8427 DOCREV CUR MEDS BY ELIG CLIN: HCPCS | Performed by: INTERNAL MEDICINE

## 2021-10-21 PROCEDURE — 1101F PT FALLS ASSESS-DOCD LE1/YR: CPT | Performed by: INTERNAL MEDICINE

## 2021-10-21 PROCEDURE — G8420 CALC BMI NORM PARAMETERS: HCPCS | Performed by: INTERNAL MEDICINE

## 2021-10-21 PROCEDURE — 3017F COLORECTAL CA SCREEN DOC REV: CPT | Performed by: INTERNAL MEDICINE

## 2021-10-21 PROCEDURE — G8536 NO DOC ELDER MAL SCRN: HCPCS | Performed by: INTERNAL MEDICINE

## 2021-10-21 PROCEDURE — 1090F PRES/ABSN URINE INCON ASSESS: CPT | Performed by: INTERNAL MEDICINE

## 2021-10-21 PROCEDURE — G8510 SCR DEP NEG, NO PLAN REQD: HCPCS | Performed by: INTERNAL MEDICINE

## 2021-10-21 PROCEDURE — 99214 OFFICE O/P EST MOD 30 MIN: CPT | Performed by: INTERNAL MEDICINE

## 2021-10-21 RX ORDER — FACIAL-BODY WIPES
625 EACH TOPICAL DAILY
COMMUNITY

## 2021-10-21 RX ORDER — MULTIVIT-MIN/FOLIC/VIT K/LYCOP 400-300MCG
1 TABLET ORAL DAILY
COMMUNITY
End: 2022-05-27 | Stop reason: ALTCHOICE

## 2021-10-21 NOTE — PROGRESS NOTES
[address] [place] [address] [facility]  Pulmonary, Critical Care, and Sleep Medicine    Pulmonary Office  F/U  Name: Nicole Serrano 78 y.o. female  MRN: [id number]  : 1945  Service Date: 10/21/21    Referring Provider: MD Kimberly Mckeon [facility] [address],  [address]  Chief Complaint:   Chief Complaint   Patient presents with    Asthma     follow up from 10/1/2019 (last seen Dr. Kenny High)    Shortness of Breath         History of Present Illness:  (pt accompanied by  who provides some of history)  Nicole Serrano is a 78 y.o. female, who presents to Pulmonary clinic for followup of SOB. Patient was last seen in our clinic on 10/1/2019, by Dr. Camila Dimas  Pt reports that when she does she does the exercise bike about 20min per day, daily. Pt reports ongoing chest pain/pressure -- reports that this occurs 5mins into the workout. This also occurs walking up and down steps. She continues to workout through the pain with no change in sx  Chest pain described as \"crushing\" on the center of her chest - preventing her breathing. Sx only improved with rest of a few minutes  Pt reports sx since her neck surgery. Dyspnea with moderate to strenuous exertio  Pt had neck surgery in 2019, pt unable to walk inittially - required a harness. Pt able to walk for about 15-16 months.   Lifelong nonsmoker      Past Medical History:   Diagnosis Date    Abnormal Pap smear     Dr. Nessa Alonzo Allergic rhinitis     Arthritis     Asthma 2019    Cerebrovascular small vessel disease 3/18/2019    CT 3/17/2019    Cervical spinal cord compression (HCC)     Chronic pain     Concentric left ventricular hypertrophy 3/18/2019    With left ventricular diastolic dysfunction by echocardiogram 3/18/2019    GERD (gastroesophageal reflux disease) 2019    Hypercholesterolemia     Neck pain 2010    Stroke ([facility] [address] 10/02/2017    TIA- legs weak memory issues     Past Surgical History:   Procedure Laterality Date    COLONOSCOPY N/A 6/4/2018    COLONOSCOPY / polypectomy performed by Venancio Farr MD at [facility] ENDOSCOPY    COLONOSCOPY N/A 8/19/2021    COLONOSCOPY performed by Ganga Etienne MD at [facility] ENDOSCOPY    HX GYN      Total Hyst    HX LAP CHOLECYSTECTOMY      HX OTHER SURGICAL  2017    Throat widened    NEUROLOGICAL PROCEDURE UNLISTED  11/2019    Cervical fusion     Family History   Problem Relation Age of Onset    Cancer Mother         breast    Heart Disease Father      Social History     Socioeconomic History    Marital status:      Spouse name: Not on file    Number of children: Not on file    Years of education: Not on file    Highest education level: Not on file   Occupational History    Not on file   Tobacco Use    Smoking status: Never Smoker    Smokeless tobacco: Never Used   Vaping Use    Vaping Use: Never used   Substance and Sexual Activity    Alcohol use: No    Drug use: No    Sexual activity: Never   Other Topics Concern    Not on file   Social History Narrative    Not on file     Social Determinants of Health     Financial Resource Strain:     Difficulty of Paying Living Expenses:    Food Insecurity:     Worried About Running Out of Food in the Last Year:     Ran Out of Food in the Last Year:    Transportation Needs:     Lack of Transportation (Medical):      Lack of Transportation (Non-Medical):    Physical Activity:     Days of Exercise per Week:     Minutes of Exercise per Session:    Stress:     Feeling of Stress :    Social Connections:     Frequency of Communication with Friends and Family:     Frequency of Social Gatherings with Friends and Family:     Attends Confucianist Services:     Active Member of Clubs or Organizations:     Attends Club or Organization Meetings:     Marital Status:    Intimate Partner Violence:     Fear of Current or Ex-Partner:     Emotionally Abused:     Physically Abused:     Sexually Abused: Allergies   Allergen Reactions    Baclofen Shortness of Breath     Denies. Pt tolerates  Other reaction(s): gi distress    Morphine Other (comments)     hallucinations  Other reaction(s): other/intolerance  hallucinations    Sulfa (Sulfonamide Antibiotics) Other (comments)     Drops blood pressure    Celecoxib Other (comments)     Tiredness   Other reaction(s): other/intolerance  Makes her tired     Prior to Admission medications    Medication Sig Start Date End Date Taking? Authorizing Provider   atorvastatin (LIPITOR) 20 mg tablet take 1 tablet by mouth once daily  Patient taking differently: Take 20 mg by mouth daily. 9/23/20  Yes Alissa Frey, TERESITA   ALPRAZolam Yazmin Gather) 0.25 mg tablet Take 1 Tab by mouth two (2) times daily as needed for Anxiety. Max Daily Amount: 0.5 mg. 5/26/20  Yes Cierra Howell NP   acetaminophen (TYLENOL) 160 mg/5 mL elixir Take 1,000 mg by mouth daily. 12/12/19  Yes Provider, Historical   aspirin 81 mg chewable tablet Take 1 Tab by mouth daily. 10/10/17  Yes Kiaan Sanchez PA   ketorolac (ACULAR) 0.5 % ophthalmic solution instill 1 drop into right eye twice a day BEGIN USING 3 DAYS PRIOR TO SURGERY 7/12/21   Provider, Historical   lidocaine (LIDODERM) 5 % apply 1 patch TO THE AFFECTED AREA DAILY. LEAVE ON FOR 12 HOURS AND THEN OFF FOR 12 HOURS. 8/8/21   Provider, Historical   ondansetron hcl (ZOFRAN) 4 mg tablet take 1 tablet by mouth every 6 hours if needed for NAUSEA 5 8/14/21   Provider, Historical   acetaminophen (Tylenol Extra Strength) 500 mg tablet Take 500 mg by mouth two (2) times a day. Patient not taking: Reported on 10/21/2021 10/21/21 10/21/21  Provider, Historical   D-MANNOSE PO Take  by mouth daily as needed. Provider, Historical   lidocaine-prilocaine 5-2.5-2.5 % PaCr by Apply Externally route.     Provider, Historical   fluticasone propionate (FLOVENT HFA) 110 mcg/actuation inhaler Take 1 Puff by inhalation every twelve (12) hours as needed. Provider, Historical   ergocalciferol (Vitamin D2) 1,250 mcg (50,000 unit) capsule Take 50,000 Units by mouth. Patient not taking: Reported on 8/16/2021    Provider, Historical   VIT D3-FOLIC GDPG-L1-L6-U82 PO Take  by mouth. Patient not taking: Reported on 8/16/2021    Provider, Historical   ascorbic acid, vitamin C, (Vitamin C) 500 mg tablet Take  by mouth. Patient not taking: Reported on 8/16/2021    Provider, Historical   magnesium 200 mg tab Take  by mouth. Patient not taking: Reported on 8/16/2021    Provider, Historical   nitrofurantoin, macrocrystal-monohydrate, (MACROBID) 100 mg capsule Take 1 Cap by mouth two (2) times a day. Patient not taking: Reported on 8/16/2021 2/4/21   JULIA Arreola   conjugated estrogens (PREMARIN) 0.625 mg/gram vaginal cream Apply 0.5 g to affected area daily. Apply pea sized amount to urethra and whatever is left over to just inside of vagina 2/4/21   JULIA Arreola   busPIRone (BUSPAR) 7.5 mg tablet take 1 tablet by mouth twice a day 9/23/20   Agus Lucio NP   ibuprofen (MOTRIN) 400 mg tablet Take 1 Tab by mouth every six (6) hours as needed for Pain. 7/26/20   Sharron Burton MD   cyanocobalamin (Vitamin B-12) 100 mcg tablet Take 100 mcg by mouth daily. Patient not taking: Reported on 8/16/2021    Provider, Historical   ondansetron (ZOFRAN ODT) 4 mg disintegrating tablet dissolve 1 tablet ON TONGUE every 6 hours if needed for nausea OR vomiting 5/7/20   Agus Lucio NP   omeprazole (PRILOSEC) 20 mg capsule take 1 capsule by mouth once daily before breakfast  Patient not taking: Reported on 7/28/2021 5/7/20   Agus Lucio NP   gabapentin (NEURONTIN) 100 mg capsule Take 2 Caps by mouth three (3) times daily. Max Daily Amount: 600 mg. Patient taking differently: Take 100 mg by mouth four (4) times daily.  4/16/20   Brianna Downey NP   denosumab (PROLIA) 60 mg/mL injection 60 mg by SubCUTAneous route every 6 months. 2/21/20   Provider, Historical   midodrine (PROAMITINE) 5 mg tablet Take 5 mg by mouth two (2) times a day. 1 tab by mouth with breakfast and lunch 2/6/20   Provider, Historical   turmeric 400 mg cap Take 1 Cap by mouth daily. Provider, Historical       Immunizations:  I have reviewed the patient's immunizations  Immunization History   Administered Date(s) Administered    Covid-19, MODERNA, Mrna, Lnp-s, Pf, 100mcg/0.5mL 02/16/2021, 03/16/2021    Influenza Vaccine 12/23/2019, 09/08/2020    Influenza Vaccine (Tri) Adjuvanted (>65 Yrs FLUAD TRI 68964) 09/04/2018    Influenza, Quadrivalent, Adjuvanted (>65 Yrs FLUAD QUAD 18392) 09/04/2020, 10/06/2021    Zoster Recombinant 07/03/2018, 10/19/2018       Review of Systems:  A complete review of systems was performed as stated in the HPI, all others are negative. Objective:    Physical Exam:  /66 (BP 1 Location: Left upper arm, BP Patient Position: Sitting, BP Cuff Size: Adult)   Pulse 80   Temp 98.3 °F (36.8 °C) (Temporal)   Resp 18   Ht 5' (1.524 m)   Wt 45.5 kg (100 lb 6.4 oz)   LMP  (LMP Unknown)   SpO2 94%   BMI 19.61 kg/m²   Vitals were personally reviewed  Gen: no acute distress, pleasant and cooperative, sitting up in chair, frail, ambulates with walker  HEENT: normocephalic/atraumatic, no ocular drainage, EOMI, no scleral icterus, nasal bridge midline, unable to assess nasal and oral cavities due to patient wearing mask in the setting of COVID-19 pandemic  Neck: supple, trachea midline, no JVD  CVS: regular rate rhythm, S1/S2, no murmurs/rubs/gallops  Lungs: good air entry B/L, CTABL, no wheezes/rales/rhonchi  Psych: normal memory, thought content, and processing    Labs:   I have reviewed the patient's available labs  Lab Results   Component Value Date/Time    WBC 12.6 02/28/2020 01:30 PM    Hemoglobin, POC 14.3 09/30/2019 03:39 PM    HGB 13.2 02/28/2020 01:30 PM    Hematocrit, POC 42 2019 03:39 PM    HCT 41.0 2020 01:30 PM    PLATELET 708 51/10/9232 01:30 PM    MCV 95.3 2020 01:30 PM     Lab Results   Component Value Date/Time    Sodium 141 2021 01:13 PM    Potassium 4.4 2021 01:13 PM    Chloride 108 2021 01:13 PM    CO2 28 2021 01:13 PM    Anion gap 5 2021 01:13 PM    Glucose 106 (H) 2021 01:13 PM    BUN 20 (H) 2021 01:13 PM    Creatinine 0.54 (L) 2021 01:13 PM    BUN/Creatinine ratio 37 (H) 2021 01:13 PM    GFR est AA >60 2021 01:13 PM    GFR est non-AA >60 2021 01:13 PM    Calcium 9.0 2021 01:13 PM    Bilirubin, total 0.5 2021 01:13 PM    Alk. phosphatase 69 2021 01:13 PM    Protein, total 6.9 2021 09:15 AM    Albumin 3.8 2021 01:13 PM    Globulin 3.6 2021 01:13 PM    A-G Ratio 1.1 2021 01:13 PM    ALT (SGPT) 22 2021 01:13 PM    AST (SGOT) 14 2021 01:13 PM       Imaging:  I have personally reviewed patient's imaging reviewed in clinic as follows: CT chest without contrast from 2019 shows some biapical subpleural fibrotic changes/scarring, lung fields otherwise are completely clear without infiltrate, ILD, effusion, consolidation, mass.   This was compared to lung windows on CT cervical spine without contrast from 2021, shows stable bilateral apical scarring without any parenchymal involvement    PFTs:  I have reviewed the patient's PFTs as follows: 2019 spirometry is normal without BD response    TTE:  I have reviewed the patient's TTE results  Results for orders placed or performed during the hospital encounter of 18   2D ECHO COMPLETE ADULT (TTE) W OR WO CONTR     Status: None    Narrative    [facility]   [address]  [facility], [phone]    Transthoracic Echocardiogram    Patient: Fay Gallegos  MRN: [id number]  [address]  [facility] #: [de-identified]  : 1945  Age: 81 years  Gender: Female  Height: 61 in  Weight: 104.7 lb  BSA: 1.42 m-sq  BP: 110 / 60 mmHg  Study date: 17-Jan-2018  Status: Routine  Location: [facility] [address] #: [id number]    Allergies: NO KNOWN ALLERGIES    Referring_Ordering Physician:  [facility] Hospitalist  Interpreting Group:  [address] [place] [address]  Interpreting Physician:  Dalila Cr MD  Technologist:  Sue Saab. Rose Cano    SUMMARY:  Left ventricle: Systolic function was normal. Ejection fraction was estimated   in the range of 60 % to 65 %. There were  no regional wall motion abnormalities. INDICATIONS: Dyspnea/shortness of breath. HISTORY: Prior history: Chronic pain syndrome Remote transient ischemic   attack. PROCEDURE: This was a routine study. The study included complete 2D imaging,   M-mode, complete spectral Doppler, and  color Doppler. The heart rate was 56 bpm, at the start of the study. Systolic   blood pressure was 110 mmHg, at the start  of the study. Diastolic blood pressure was 60 mmHg, at the start of the   study. Images were obtained from the  parasternal, apical, subcostal, and suprasternal notch acoustic windows. Image quality was adequate. LEFT VENTRICLE: Size was normal. Systolic function was normal. Ejection   fraction was estimated in the range of 60 % to  65 %. There were no regional wall motion abnormalities. Wall thickness was   normal. DOPPLER: Left ventricular diastolic  function parameters were normal.    RIGHT VENTRICLE: The size was normal. Systolic function was normal. DOPPLER:   Systolic pressure was within the normal  range. Estimated peak pressure was 24 mmHg. LEFT ATRIUM: Size was normal. LA volume index was 20 ml/m-sq. RIGHT ATRIUM: Size was normal.    MITRAL VALVE: Normal valve structure. There was normal leaflet separation. DOPPLER: There was no evidence for stenosis. There was trivial regurgitation. AORTIC VALVE: The valve was trileaflet. Leaflets exhibited normal thickness   and normal cuspal separation.  DOPPLER:  There was no stenosis. There was no regurgitation. TRICUSPID VALVE: Normal valve structure. There was normal leaflet separation. DOPPLER: There was no evidence for  tricuspid stenosis. There was no significant regurgitation. PULMONIC VALVE: Leaflets exhibited normal thickness, no calcification, and   normal cuspal separation. DOPPLER: There was  trivial regurgitation. AORTA: The root exhibited normal size. PULMONARY ARTERY: The size was normal. DOPPLER: Systolic pressure was within   the normal range. SYSTEMIC VEINS: IVC: The inferior vena cava was normal in size. PERICARDIUM: No significant pericardial effusion identified. MEASUREMENT TABLES    2D measurements  Right atrium   (Reference normals)  Area sys   10 cm-sq   (8.3-19. 5)    SYSTEM MEASUREMENT TABLES    2D  Ao Diam: 2.96 cm  IVSd: 0.69 cm  LVIDd: 3.97 cm  LVIDs: 2.15 cm  LVPWd: 0.83 cm  SV(Teich): 53.57 ml  EDV(Teich): 68.77 ml  ESV(Cube): 9.87 ml  ESV(Teich): 15.2 ml  LAAs A2C: 12.54 cm2  LAAs A4C: 10.68 cm2  LAESV A-L A2C: 28.84 ml  LAESV A-L A4C: 24.56 ml  LAESV Index (A-L): 20.31 ml/m2  LAESV MOD A2C: 26.78 ml  LAESV MOD A4C: 24.12 ml  LAESV(A-L): 28.84 ml  LALs A2C: 4.63 cm  LALs A4C: 3.94 cm  LVOT Diam: 1.76 cm  RA Area: 9.53 cm2  RV Minor: 2.58 cm    CW  TR Vmax: 2.3 m/s  TR maxP.25 mmHG    PW  LVOT VTI: 20.12 cm  LVOT Vmax: 0.88 m/s  LVOT Vmean: 0.61 m/s  MV A Zi: 0.9 m/s  MV Dec Thomas: 6.05 m/s2  MV DecT: 156.14 ms  MV E Zi: 0.94 m/s  MV E/A Ratio: 1.05  E': 0.08 m/s  E/E': 12.47  LVOT maxPG: 3.12 mmHG  LVOT meanP.63 mmHG  LVSI Dopp: 34.29 ml/m2  LVSV Dopp: 48.69 ml  Lateral E': 0.12 m/s  Lateral E/E': 8.16    Prepared and E-signed by    Kaity Vincent MD  Signed 2018 17:33:17       19    ECHO ADULT COMPLETE 2019 3/18/2019    Interpretation Summary  · Saline contrast was given to evaluate for intracardiac shunt. No shunt seen. · Estimated left ventricular ejection fraction is 56 - 60%.  Visually measured ejection fraction. Left ventricular cavity size is decreased. Left ventricular mild concentric hypertrophy. No regional wall motion abnormality noted. Age-appropriate left ventricular diastolic function. · There is no evidence of pulmonary hypertension. Signed by: Neftaly Kovacs MD on 3/18/2019 11:57 AM        Assessment and Plan:  78 y.o. female with:    Impression:  1. Dyspnea on exertion/shortness of breath: Etiology is secondary to deconditioning given normal PFTs and CT imaging of lung parenchyma. Deconditioning due to neuropathy and weakness since cervical spine surgery resulted in paraplegia  2. Deconditioning  3. Neuromuscular weakness  4. Chest pain: Suspect angina, occurs with exertion. Other etiology may be costochondritis given patient had significant spine surgery, however pain not reproducible with palpation, only with exertion    Plan:  -Given chest pain with exertion, will refer patient to cardiology for potential work-up  -With regards to dyspnea, advised patient to continue with graded exercise. Patient uses exercise bike approximately 20 minutes a day, advised patient to increase as tolerated. Patient declines pulmonary rehab  -Immunizations reviewed, Covid and influenza vaccinations up-to-date  -Counseled patient regarding lifestyle precautions in COVID-19 pandemic including wearing mask in public and confined spaces, social/physical distancing, frequent hand hygiene, etc.  Advised pt to receive COVID-19 booster when possible    Follow-up and Dispositions    · Return if symptoms worsen or fail to improve. No further F/U required    Orders Placed This Encounter    REFERRAL TO CARDIOLOGY     This patient has a high complexity chronic care condition   This Visit needed moderate complexity medically necessary decision making and management plans.     Time spent in preparing for the visit-review of history, tests done prior to arrival, additional time reviewing clinical data, imaging, outside records and test results as well as time spent in ordering tests, treatments and referring patient for further care was a total of 18 minutes. Additional time-counseling with patient and family members regarding care plan 14 minutes.   Total aggregate time spent in patient encounter was 32 minutes      José Luis Ritter MD/MPH     Pulmonary, [address] [place] [address],[address] [facility]

## 2021-10-21 NOTE — LETTER
10/22/2021    Patient: Dakota Begum   YOB: 1945   Date of Visit: 10/21/2021     Pat Izaguirre MD  [address] [address]  Via Fax: [phone]    Dear Pat Izaguirre MD,      Thank you for referring Ms. Mariam Herndon to [address] [place] [address] for evaluation. My notes for this consultation are attached. If you have questions, please do not hesitate to call me. I look forward to following your patient along with you.       Sincerely,    Naun Dennis MD

## 2021-10-21 NOTE — PROGRESS NOTES
Chastity Galvez presents today for   Chief Complaint   Patient presents with    Asthma     follow up from 10/1/2019 (last seen Dr. Zacarias Case)    Shortness of Breath       Is someone accompanying this pt? Yes. Spouse    Is the patient using any DME equipment during OV? Yes. walker   -DME Company N/A    Depression Screening:  3 most recent PHQ Screens 10/21/2021   PHQ Not Done -   Little interest or pleasure in doing things Not at all   Feeling down, depressed, irritable, or hopeless Not at all   Total Score PHQ 2 0       Learning Assessment:  Learning Assessment 2/26/2019   PRIMARY LEARNER Patient   HIGHEST LEVEL OF EDUCATION - PRIMARY LEARNER  -   CO-LEARNER CAREGIVER -   PRIMARY LANGUAGE ENGLISH   LEARNER PREFERENCE PRIMARY DEMONSTRATION   ANSWERED BY Patient   RELATIONSHIP SELF       Abuse Screening:  Abuse Screening Questionnaire 10/21/2021   Do you ever feel afraid of your partner? N   Are you in a relationship with someone who physically or mentally threatens you? N   Is it safe for you to go home? Y       Fall Risk  Fall Risk Assessment, last 12 mths 10/21/2021   Able to walk? Yes   Fall in past 12 months? 0   Do you feel unsteady? -   Are you worried about falling 1   Is the gait abnormal? 1   Number of falls in past 12 months 0   Fall with injury? -         Coordination of Care:  1. Have you been to the ER, urgent care clinic since your last visit? Hospitalized since your last visit? Yes; Where: [facility], When: 8/19/2021-upper endoscopy    2. Have you seen or consulted any other health care providers outside of the [address] [place] [address] since your last visit? Include any pap smears or colon screening. Yes.  Dr. Ange Sue, PCP

## 2021-10-26 ENCOUNTER — OFFICE VISIT (OUTPATIENT)
Dept: CARDIOLOGY CLINIC | Age: 76
End: 2021-10-26
Payer: MEDICARE

## 2021-10-26 VITALS
DIASTOLIC BLOOD PRESSURE: 79 MMHG | HEART RATE: 80 BPM | BODY MASS INDEX: 19.44 KG/M2 | HEIGHT: 60 IN | SYSTOLIC BLOOD PRESSURE: 144 MMHG | WEIGHT: 99 LBS

## 2021-10-26 DIAGNOSIS — R07.9 CHEST PAIN, UNSPECIFIED TYPE: Primary | ICD-10-CM

## 2021-10-26 DIAGNOSIS — E78.5 DYSLIPIDEMIA: ICD-10-CM

## 2021-10-26 DIAGNOSIS — I95.1 ORTHOSTATIC HYPOTENSION: ICD-10-CM

## 2021-10-26 PROCEDURE — G8536 NO DOC ELDER MAL SCRN: HCPCS | Performed by: INTERNAL MEDICINE

## 2021-10-26 PROCEDURE — 1090F PRES/ABSN URINE INCON ASSESS: CPT | Performed by: INTERNAL MEDICINE

## 2021-10-26 PROCEDURE — G8428 CUR MEDS NOT DOCUMENT: HCPCS | Performed by: INTERNAL MEDICINE

## 2021-10-26 PROCEDURE — 1101F PT FALLS ASSESS-DOCD LE1/YR: CPT | Performed by: INTERNAL MEDICINE

## 2021-10-26 PROCEDURE — G9899 SCRN MAM PERF RSLTS DOC: HCPCS | Performed by: INTERNAL MEDICINE

## 2021-10-26 PROCEDURE — 3017F COLORECTAL CA SCREEN DOC REV: CPT | Performed by: INTERNAL MEDICINE

## 2021-10-26 PROCEDURE — 99204 OFFICE O/P NEW MOD 45 MIN: CPT | Performed by: INTERNAL MEDICINE

## 2021-10-26 PROCEDURE — G8420 CALC BMI NORM PARAMETERS: HCPCS | Performed by: INTERNAL MEDICINE

## 2021-10-26 PROCEDURE — G8432 DEP SCR NOT DOC, RNG: HCPCS | Performed by: INTERNAL MEDICINE

## 2021-10-29 NOTE — PROGRESS NOTES
HISTORY OF PRESENT ILLNESS  Luz Maria Louie is a 78 y.o. female. New Patient  The history is provided by the patient. This is a chronic problem. The problem occurs every several days. The problem has not changed since onset. Associated symptoms include chest pain. Pertinent negatives include no abdominal pain, no headaches and no shortness of breath. Chest Pain (Angina)   The history is provided by the patient. This is a chronic problem. The problem occurs daily. The pain is present in the left side. The pain is at a severity of 3/10. The pain is mild. The quality of the pain is described as dull. The pain does not radiate. Pertinent negatives include no abdominal pain, no claudication, no cough, no diaphoresis, no dizziness, no fever, no headaches, no hemoptysis, no nausea, no orthopnea, no palpitations, no PND, no shortness of breath, no sputum production, no vomiting and no weakness. Review of Systems   Constitutional: Negative for chills, diaphoresis, fever and weight loss. HENT: Negative for ear pain and hearing loss. Eyes: Negative for blurred vision. Respiratory: Negative for cough, hemoptysis, sputum production, shortness of breath, wheezing and stridor. Cardiovascular: Positive for chest pain. Negative for palpitations, orthopnea, claudication, leg swelling and PND. Gastrointestinal: Negative for abdominal pain, heartburn, nausea and vomiting. Musculoskeletal: Negative for myalgias and neck pain. Skin: Negative for rash. Neurological: Negative for dizziness, tingling, tremors, focal weakness, loss of consciousness, weakness and headaches. Psychiatric/Behavioral: Negative for depression and suicidal ideas.      Family History   Problem Relation Age of Onset    Cancer Mother         breast    Heart Disease Father        Past Medical History:   Diagnosis Date    Abnormal Pap smear     Dr. Tone Hernandez Allergic rhinitis     Arthritis     Asthma 11/22/2019    Cerebrovascular small vessel disease 3/18/2019    CT 3/17/2019    Cervical spinal cord compression (HCC)     Chronic pain     Concentric left ventricular hypertrophy 3/18/2019    With left ventricular diastolic dysfunction by echocardiogram 3/18/2019    GERD (gastroesophageal reflux disease) 11/22/2019    Hypercholesterolemia     Neck pain 5/17/2010    Stroke ([address] 10/02/2017    TIA- legs weak memory issues       Past Surgical History:   Procedure Laterality Date    COLONOSCOPY N/A 6/4/2018    COLONOSCOPY / polypectomy performed by Faviola Bravo MD at [facility] COLONOSCOPY N/A 8/19/2021    COLONOSCOPY performed by Shasta Desai MD at [facility] [address] HX GYN      Total Hyst    HX LAP CHOLECYSTECTOMY      HX OTHER SURGICAL  2017    Throat widened    NEUROLOGICAL PROCEDURE UNLISTED  11/2019    Cervical fusion       Social History     Tobacco Use    Smoking status: Never Smoker    Smokeless tobacco: Never Used   Substance Use Topics    Alcohol use: No       Allergies   Allergen Reactions    Baclofen Shortness of Breath     Denies. Pt tolerates  Other reaction(s): gi distress    Morphine Other (comments)     hallucinations  Other reaction(s): other/intolerance  hallucinations    Sulfa (Sulfonamide Antibiotics) Other (comments)     Drops blood pressure    Celecoxib Other (comments)     Tiredness   Other reaction(s): other/intolerance  Makes her tired       Outpatient Medications Marked as Taking for the 10/26/21 encounter (Office Visit) with Karla Will MD   Medication Sig Dispense Refill    multivit-min-folic-vit K-lycop (One-A-Day Men's Multivitamin) 400- mcg tab Take 1 Tablet by mouth daily.  polyethylene glycol 3350 (CLEARLAX PO) Take 1 Dose by mouth daily.  lidocaine (LIDODERM) 5 % apply 1 patch TO THE AFFECTED AREA DAILY. LEAVE ON FOR 12 HOURS AND THEN OFF FOR 12 HOURS.       fluticasone propionate (FLOVENT HFA) 110 mcg/actuation inhaler Take 1 Puff by inhalation every twelve (12) hours as needed.  atorvastatin (LIPITOR) 20 mg tablet take 1 tablet by mouth once daily (Patient taking differently: Take 20 mg by mouth daily.) 90 Tab 0    busPIRone (BUSPAR) 7.5 mg tablet take 1 tablet by mouth twice a day (Patient taking differently: Take 7.5 mg by mouth two (2) times a day.) 180 Tab 0    ibuprofen (MOTRIN) 400 mg tablet Take 1 Tab by mouth every six (6) hours as needed for Pain. (Patient taking differently: Take 800 mg by mouth daily.) 8 Tab 0    ALPRAZolam (XANAX) 0.25 mg tablet Take 1 Tab by mouth two (2) times daily as needed for Anxiety. Max Daily Amount: 0.5 mg. 60 Tab 0    ondansetron (ZOFRAN ODT) 4 mg disintegrating tablet dissolve 1 tablet ON TONGUE every 6 hours if needed for nausea OR vomiting (Patient taking differently: Take 4 mg by mouth every six (6) hours as needed.) 90 Tab 0    gabapentin (NEURONTIN) 100 mg capsule Take 2 Caps by mouth three (3) times daily. Max Daily Amount: 600 mg. (Patient taking differently: Take 400 mg by mouth daily. ) 180 Cap 1    acetaminophen (TYLENOL) 160 mg/5 mL elixir Take 1,000 mg by mouth daily.  midodrine (PROAMITINE) 5 mg tablet Take 5 mg by mouth as needed. 1 tab by mouth with breakfast and lunch      turmeric 400 mg cap Take 1 Cap by mouth daily.  aspirin 81 mg chewable tablet Take 1 Tab by mouth daily. 30 Tab 3        Visit Vitals  BP (!) 144/79 (BP 1 Location: Left upper arm, BP Patient Position: Sitting, BP Cuff Size: Adult)   Pulse 80   Ht 5' (1.524 m)   Wt 44.9 kg (99 lb)   LMP  (LMP Unknown)   BMI 19.33 kg/m²       Physical Exam  Constitutional:       Appearance: She is well-developed. HENT:      Head: Normocephalic and atraumatic. Eyes:      General: No scleral icterus. Conjunctiva/sclera: Conjunctivae normal.   Neck:      Vascular: No JVD. Cardiovascular:      Rate and Rhythm: Normal rate and regular rhythm. Heart sounds: Normal heart sounds. No murmur heard. No friction rub. No gallop.     Pulmonary: Effort: Pulmonary effort is normal. No respiratory distress. Breath sounds: Normal breath sounds. No stridor. No wheezing or rales. Chest:      Chest wall: No tenderness. Abdominal:      General: There is no distension. Tenderness: There is no abdominal tenderness. Musculoskeletal:         General: No tenderness. Normal range of motion. Cervical back: Normal range of motion and neck supple. Lymphadenopathy:      Cervical: No cervical adenopathy. Skin:     Findings: No erythema or rash. Neurological:      Mental Status: She is alert and oriented to person, place, and time. Cranial Nerves: No cranial nerve deficit. Psychiatric:         Behavior: Behavior normal.     ekg sinus rhythm with no acute st-t changes    ASSESSMENT and PLAN    ICD-10-CM ICD-9-CM    1. Chest pain, unspecified type  R07.9 786.50 NUCLEAR CARDIAC STRESS TEST   2. Dyslipidemia  E78.5 272.4    3. Orthostatic hypotension  I95.1 458.0      No orders of the defined types were placed in this encounter. Follow-up and Dispositions    · Return in about 3 weeks (around 11/16/2021). current treatment plan is effective, no change in therapy. Patient is a 68-year-old female seen for chronic chest pain. Symptoms lasting for several last several months. Anterior chest wall-describes as squeezing/tightness with no significant radiation. No diaphoresis. We will proceed with nuclear stress test to evaluate ischemia as patient is unable to walk on treadmill. Follow-up in 3 weeks.

## 2021-11-11 ENCOUNTER — HOSPITAL ENCOUNTER (OUTPATIENT)
Dept: PHYSICAL THERAPY | Age: 76
Discharge: HOME | End: 2021-11-11
Payer: MEDICARE

## 2021-11-11 PROCEDURE — 97162 PT EVAL MOD COMPLEX 30 MIN: CPT

## 2021-11-11 PROCEDURE — 97530 THERAPEUTIC ACTIVITIES: CPT

## 2021-11-11 NOTE — PROGRESS NOTES
[facility] - [address]   [facility], [address] [phone] fax    Plan of Care/ Statement of Necessity for Physical Therapy Services           Patient name: Germán Diaz Start of Care: 2021   Referral source: Leno Sanchez MD : 1945    Medical Diagnosis: Other abnormalities of gait and mobility [R26.89]  Payor: [place] MEDICARE / Plan: [place] MEDICARE PART A & B / Product Type: Medicare /  Onset Date: with exacerbation 10/18/21    Treatment Diagnosis:  Abnormalities of gait   Prior Hospitalization: see medical history Provider#: [id number]   Medications: Verified on Patient summary List    Comorbidities: 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 The Plan of Care and following information is based on the information from the initial evaluation. Assessment/ key information:   Ms. Olman Chavis is a 68yo female who is known to clinic and returns with exacerbation of continued balance deficits. 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 A review of the EMR shows no significant findings. Pt presents today with decreased LE strength as compared to DC status in 2021. Pt remains a falls risk as evidenced by increased 5x sit to stand time and miniBEST test score. Pt deficits impair her independence at home and ability to negotiate the community as she could in the summer.  Pt care is medically necessary and she will benefit from skilled PT in order to address listed deficits, decrease pain, and maximize functional potential.     Evaluation Complexity History HIGH Complexity :3+ comorbidities / personal factors will impact the outcome/ POC ; Examination MEDIUM Complexity : 3 Standardized tests and measures addressing body structure, function, activity limitation and / or participation in recreation  ;Presentation MEDIUM Complexity : Evolving with changing characteristics  ; Clinical Decision Making MEDIUM Complexity : FOTO score of 26-74  Overall Complexity Rating: MEDIUM  Problem List: pain affecting function, decrease ROM, decrease strength, impaired gait/ balance, decrease activity tolerance, decrease flexibility/ joint mobility and decrease transfer abilities   Treatment Plan may include any combination of the following: Therapeutic exercise, Therapeutic activities, Neuromuscular re-education, Physical agent/modality, Gait/balance training, Manual therapy, Aquatic therapy, Patient education, Self Care training, Functional mobility training, Home safety training and Stair training  Patient / Family readiness to learn indicated by: asking questions, trying to perform skills and interest  Persons(s) to be included in education: patient (P)  Barriers to Learning/Limitations: None  Patient Goal (s): walk a little bit better; maybe with out a RW  Patient Self Reported Health Status: fair  Rehabilitation Potential: good  Short Term Goals: To be accomplished in 2 weeks:  1. Pt will compliance HEP in order to supplement PT treatment   Eval = established  2. Pt will demonstrate 5x sit to  13seconds or less in order to reduce risk of falls   Eval = 18seconds    Long Term Goals: To be accomplished in 10 treatments:  1. Pt will demonstrate bilateral knee strength to 5/5 in order to improve ability to ascend/descend curbs in the community. Eval =  Bilateral extension = 4+/5, flexion = 4/5  2. Pt will demonstrate bilateral hip flexion and abduction  knee strength minimum 5-/5 and 4/5, respectively,  in order to improve ambulation distances for ADLs and community engagement. Eval: flexion bilaterally = 4/5, abduction 3/5  3.    Pt will score at least predicted value on FOTO in order to improve overall function, decrease pain, and facilitate return to PLOF.   Eval = complete at first follow up due to time constraints  4. Pt will demonstrate 3/5 PF strength (ability to perform 1 single HR) in order to improve her ability to ambulate with LRAD   Eval = 2/5, unable to perform SL HR  5. Pt will score at least 15/28 on miniBEST test in order to reduce risk of falls   Eval = 6/28    Frequency / Duration: Patient to be seen 2 times per week for 10 treatments. Certification [id number]:96/87/78-37/08/54    Patient/ Caregiver education and instruction: Diagnosis, prognosis, self care, activity modification and exercises   [x]  Plan of care has been reviewed with CHRISTIAN Urban, PT 11/11/2021 3:19 PM    ________________________________________________________________________    I certify that the above Therapy Services are being furnished while the patient is under my care. I agree with the treatment plan and certify that this therapy is necessary.     Physician's Signature:____________Date:_________TIME:________     Surinder Mcmullen MD  ** Signature, Date and Time must be completed for valid certification **    Please sign and return to [facility] - [address]   [facility], [address] [phone] fax

## 2021-11-11 NOTE — PROGRESS NOTES
PT DAILY TREATMENT NOTE     Patient Name: Sofia Sawyer  Date:2021  : 1945  [x]  Patient  Verified  Payor: VA MEDICARE / Plan: [place] MEDICARE PART A & B / Product Type: Medicare /    In time:347  Out time:436  Total Treatment Time (min): 49  Visit #: 1 of 10    Medicare/BCBS Only   Total Timed Codes (min):  10 1:1 Treatment Time:  10       Treatment Area: Pain in left hand [M79.642]  Pain in right hand [M79.641]  Pain in left foot [M79.672]  Pain in right foot [M79.671]    SUBJECTIVE  Pain Level (0-10 scale): 7  Any medication changes, allergies to medications, adverse drug reactions, diagnosis change, or new procedure performed?: [x] No    [] Yes (see summary sheet for update)  Subjective functional status/changes:   [] No changes reported  See evaluation    OBJECTIVE    39 min [x]Eval                  []Re-Eval     10 min Therapeutic Activity:  [x]  See flow sheet : explanation, demonstration, performance and distribution of HEP   Rationale: increase ROM and increase strength  to improve the patients ability to perform ADLs          With   [] TE   [] TA   [] neuro   [] other: Patient Education: [x] Review HEP    [] Progressed/Changed HEP based on:   [] positioning   [] body mechanics   [] transfers   [] heat/ice application    [] other:      Other Objective/Functional Measures: see evaluation     Pain Level (0-10 scale) post treatment: 4    ASSESSMENT/Changes in Function: see POC    Patient will continue to benefit from skilled PT services to modify and progress therapeutic interventions, address functional mobility deficits, address ROM deficits, address strength deficits, analyze and address soft tissue restrictions, analyze and cue movement patterns, analyze and modify body mechanics/ergonomics, assess and modify postural abnormalities, address imbalance/dizziness and instruct in home and community integration to attain remaining goals.      [x]  See Plan of Care  []  See progress note/recertification  []  See Discharge Summary         Progress towards goals / Updated goals:  Short Term Goals: To be accomplished in 2 weeks:  1. Pt will compliance HEP in order to supplement PT treatment   Eval = established  2. Pt will demonstrate 5x sit to  13seconds or less in order to reduce risk of falls   Eval = 18seconds    Long Term Goals: To be accomplished in 10 treatments:  1. Pt will demonstrate bilateral knee strength to 5/5 in order to improve ability to ascend/descend curbs in the community. Eval =  Bilateral extension = 4+/5, flexion = 4/5  2. Pt will demonstrate bilateral hip flexion and abduction  knee strength minimum 5-/5 and 4/5, respectively,  in order to improve ambulation distances for ADLs and community engagement. Eval: flexion bilaterally = 4/5, abduction 3/5  3. Pt will score at least predicted value on FOTO in order to improve overall function, decrease pain, and facilitate return to PLOF. Eval = complete at first follow up due to time constraints  4. Pt will demonstrate 3/5 PF strength (ability to perform 1 single HR) in order to improve her ability to ambulate with LRAD   Eval = 2/5, unable to perform SL HR  5.    Pt will score at least 15/28 on miniBEST test in order to reduce risk of falls   Eval = 6/28    PLAN  [x]  Upgrade activities as tolerated     [x]  Continue plan of care  []  Update interventions per flow sheet       []  Discharge due to:_  []  Other:_      Jasbir Shah, PT 11/11/2021  3:20 PM    Future Appointments   Date Time Provider Gaby Delarosa   11/11/2021  3:45 PM Cas Langley, PT [facility]   11/16/2021  2:30 PM Janae August MD [facility] AMB   12/2/2021  9:40 AM Carlos Manzanaresr, [address] [facility]

## 2021-11-16 ENCOUNTER — OFFICE VISIT (OUTPATIENT)
Dept: CARDIOLOGY CLINIC | Age: 76
End: 2021-11-16
Payer: MEDICARE

## 2021-11-16 VITALS
SYSTOLIC BLOOD PRESSURE: 129 MMHG | BODY MASS INDEX: 18.88 KG/M2 | WEIGHT: 100 LBS | OXYGEN SATURATION: 94 % | DIASTOLIC BLOOD PRESSURE: 80 MMHG | HEART RATE: 87 BPM | HEIGHT: 61 IN

## 2021-11-16 DIAGNOSIS — E78.5 DYSLIPIDEMIA: ICD-10-CM

## 2021-11-16 DIAGNOSIS — I95.1 ORTHOSTATIC HYPOTENSION: ICD-10-CM

## 2021-11-16 DIAGNOSIS — R07.9 CHEST PAIN, UNSPECIFIED TYPE: Primary | ICD-10-CM

## 2021-11-16 PROCEDURE — G9899 SCRN MAM PERF RSLTS DOC: HCPCS | Performed by: INTERNAL MEDICINE

## 2021-11-16 PROCEDURE — 3017F COLORECTAL CA SCREEN DOC REV: CPT | Performed by: INTERNAL MEDICINE

## 2021-11-16 PROCEDURE — 99213 OFFICE O/P EST LOW 20 MIN: CPT | Performed by: INTERNAL MEDICINE

## 2021-11-16 PROCEDURE — 1101F PT FALLS ASSESS-DOCD LE1/YR: CPT | Performed by: INTERNAL MEDICINE

## 2021-11-16 PROCEDURE — 1090F PRES/ABSN URINE INCON ASSESS: CPT | Performed by: INTERNAL MEDICINE

## 2021-11-16 PROCEDURE — G8432 DEP SCR NOT DOC, RNG: HCPCS | Performed by: INTERNAL MEDICINE

## 2021-11-16 PROCEDURE — G8420 CALC BMI NORM PARAMETERS: HCPCS | Performed by: INTERNAL MEDICINE

## 2021-11-16 PROCEDURE — G8427 DOCREV CUR MEDS BY ELIG CLIN: HCPCS | Performed by: INTERNAL MEDICINE

## 2021-11-16 PROCEDURE — G8536 NO DOC ELDER MAL SCRN: HCPCS | Performed by: INTERNAL MEDICINE

## 2021-11-16 NOTE — PROGRESS NOTES
1. Have you been to the ER, urgent care clinic since your last visit? Hospitalized since your last visit? No    2. Have you seen or consulted any other health care providers outside of the [address] [place] [address] since your last visit? Include any pap smears or colon screening.  No

## 2021-11-17 ENCOUNTER — HOSPITAL ENCOUNTER (OUTPATIENT)
Dept: PHYSICAL THERAPY | Age: 76
Discharge: HOME | End: 2021-11-17
Payer: MEDICARE

## 2021-11-17 PROCEDURE — 97112 NEUROMUSCULAR REEDUCATION: CPT

## 2021-11-17 PROCEDURE — 97110 THERAPEUTIC EXERCISES: CPT

## 2021-11-17 NOTE — PROGRESS NOTES
PT DAILY TREATMENT NOTE     Patient Name: Nixon Bone  Date:2021  : 1945  [x]  Patient  Verified  Payor: [employer] / Plan: [place] MEDICARE PART A & B / Product Type: Medicare /    In time:2:18P  Out time: 2:58P  Total Treatment Time (min): 40  Visit #: 2 of 10    Medicare/BCBS Only   Total Timed Codes (min):  40 1:1 Treatment Time:  40       Treatment Area: Other abnormalities of gait and mobility [R26.89]    SUBJECTIVE  Pain Level (0-10 scale): 7  Any medication changes, allergies to medications, adverse drug reactions, diagnosis change, or new procedure performed?: [x] No    [] Yes (see summary sheet for update)  Subjective functional status/changes:   [] No changes reported  Pt reports full compliance with prescribed HEP with no issues to report    OBJECTIVE    25 min Therapeutic Exercise:  [x] See flow sheet :    Rationale: increase ROM and increase strength to improve the patients ability to perform ADLs with greater ease     15 min Neuromuscular Re-education:  [x]  See flow sheet :   Rationale: increase strength, improve coordination, improve balance and increase proprioception  to improve the patients ability to perform ambulation/transfer tasks with decreased fall risk          With   [] TE   [] TA   [] neuro   [] other: Patient Education: [x] Review HEP    [] Progressed/Changed HEP based on:   [] positioning   [] body mechanics   [] transfers   [] heat/ice application    [] other:      Other Objective/Functional Measures:  initiated ex program per flow sheet  HR: 86 bpm  O2: 97%  FOTO: 33     Pain Level (0-10 scale) post treatment: 6    ASSESSMENT/Changes in Function: Initiated act program per POC. Session limited d/t FOTO assessment and required rest breaks d/t overall fatigue/difficulty breathing. Pt reports \"I''m always like this\" following recommendation to take longer rest break. States MD prescribed her an inhaler a few months ago, however she doesn't use it.   Advised pt to bring inhaler to sessions for precautionary measures - pt verbalizes understanding of this. Significant verbal cuing required for safety (locking brakes) w/ min to therapeutic carryover noted. Significantly challenged w/ static/dynamic balance activities demonstrating significant ant weight shift compensations. Will progress activity program as able and tolerated. Patient will continue to benefit from skilled PT services to modify and progress therapeutic interventions, address functional mobility deficits, address ROM deficits, address strength deficits, analyze and address soft tissue restrictions, analyze and cue movement patterns, analyze and modify body mechanics/ergonomics, assess and modify postural abnormalities, address imbalance/dizziness and instruct in home and community integration to attain remaining goals. [x]  See Plan of Care  []  See progress note/recertification  []  See Discharge Summary         Progress towards goals / Updated goals:  Short Term Goals: To be accomplished in 2 weeks:  1. Pt will compliance HEP in order to supplement PT treatment   Eval = established    Current = report full compliance  2. Pt will demonstrate 5x sit to  13seconds or less in order to reduce risk of falls   Eval = 18seconds    Long Term Goals: To be accomplished in 10 treatments:  1. Pt will demonstrate bilateral knee strength to 5/5 in order to improve ability to ascend/descend curbs in the community. Eval =  Bilateral extension = 4+/5, flexion = 4/5  2. Pt will demonstrate bilateral hip flexion and abduction  knee strength minimum 5-/5 and 4/5, respectively,  in order to improve ambulation distances for ADLs and community engagement. Eval: flexion bilaterally = 4/5, abduction 3/5  3. Pt will score at least predicted value on FOTO in order to improve overall function, decrease pain, and facilitate return to PLOF.    Eval = complete at first follow up due to time constraints   Current = 33 pts  4. Pt will demonstrate 3/5 PF strength (ability to perform 1 single HR) in order to improve her ability to ambulate with LRAD   Eval = 2/5, unable to perform SL HR  5.    Pt will score at least 15/28 on miniBEST test in order to reduce risk of falls   Eval = 6/28    PLAN  [x]  Upgrade activities as tolerated     [x]  Continue plan of care  []  Update interventions per flow sheet       []  Discharge due to:_  []  Other:_      Guevara Nelson DPT 11/17/2021  3:20 PM    Future Appointments   Date Time Provider Gaby Delarosa   11/17/2021  2:15 PM Janice Womack DPT [facility]   11/19/2021  2:15 PM Morgan Gallegos PTA [facility]   11/22/2021  3:00 PM Vidya Luke, PT [facility]   11/24/2021  4:30 PM Vidya Luke, PT [facility]   11/29/2021  1:30 PM BRITTANY GeigerT [facility]   12/1/2021  1:30 PM Arsh Howell, PT [facility]   12/2/2021  9:40 AM JULIA Jones   5/17/2022  1:45 PM Josh Christian MD [facility]

## 2021-11-19 ENCOUNTER — HOSPITAL ENCOUNTER (OUTPATIENT)
Dept: PHYSICAL THERAPY | Age: 76
Discharge: HOME | End: 2021-11-19
Payer: MEDICARE

## 2021-11-19 PROCEDURE — 97112 NEUROMUSCULAR REEDUCATION: CPT

## 2021-11-19 PROCEDURE — 97530 THERAPEUTIC ACTIVITIES: CPT

## 2021-11-19 PROCEDURE — 97110 THERAPEUTIC EXERCISES: CPT

## 2021-11-19 NOTE — PROGRESS NOTES
PT DAILY TREATMENT NOTE     Patient Name: Saira Barnes  Date:2021  : 1945  [x]  Patient  Verified  Payor: Michelle Cordero / Plan: [place] MED[place] & B / Product Type: Medicare /    In time:212  Out time:252  Total Treatment Time (min): 40  Visit #: 3 of 10    Medicare/BCBS Only   Total Timed Codes (min):  40 1:1 Treatment Time:  40       Treatment Area: Other abnormalities of gait and mobility [R26.89]    SUBJECTIVE  Pain Level (0-10 scale): 5  Any medication changes, allergies to medications, adverse drug reactions, diagnosis change, or new procedure performed?: [x] No    [] Yes (see summary sheet for update)  Subjective functional status/changes:   [] No changes reported  \"The pain is always there. \"    OBJECTIVE    Modality rationale: patient declined    Min Type Additional Details    [] Estim:  []Unatt       []IFC  []Premod                        []Other:  []w/ice   []w/heat  Position:  Location:    [] Estim: []Att    []TENS instruct  []NMES                    []Other:  []w/US   []w/ice   []w/heat  Position:  Location:    []  Traction: [] Cervical       []Lumbar                       [] Prone          []Supine                       []Intermittent   []Continuous Lbs:  [] before manual  [] after manual    []  Ultrasound: []Continuous   [] Pulsed                           []1MHz   []3MHz W/cm2:  Location:    []  Iontophoresis with dexamethasone         Location: [] Take home patch   [] In clinic    []  Ice     []  heat  []  Ice massage  []  Laser   []  Anodyne Position:  Location:    []  Laser with stim  []  Other:  Position:  Location:    []  Vasopneumatic Device    []  Right     []  Left  Pre-treatment girth:  Post-treatment girth:  Measured at (location):  Pressure:       [] lo [] med [] hi   Temperature: [] lo [] med [] hi   [] Skin assessment post-treatment:  []intact []redness- no adverse reaction    []redness - adverse reaction:     15 min Therapeutic Exercise:  [x] See flow sheet : Rationale: increase ROM and increase strength to improve the patients ability to perform ADLs    25 min Neuromuscular Re-education:  [x]  See flow sheet : balance training   Rationale: increase ROM, increase strength, improve coordination, improve balance and increase proprioception  to improve the patients ability to improve mobility and decrease fall risk         With   [x] TE   [x] TA   [x] neuro   [] other: Patient Education: [x] Review HEP    [] Progressed/Changed HEP based on:   [x] positioning   [x] body mechanics   [] transfers   [] heat/ice application    [] other:      Other Objective/Functional Measures:      Pain Level (0-10 scale) post treatment: 5    ASSESSMENT/Changes in Function: Pt was challenged with posterior stepping and required min A to maintain upright posture without UE support. Still has difficulty with balance on stable and unstable surfaces. Patient will continue to benefit from skilled PT services to modify and progress therapeutic interventions, address functional mobility deficits, address ROM deficits, address strength deficits, analyze and address soft tissue restrictions, analyze and cue movement patterns, analyze and modify body mechanics/ergonomics, assess and modify postural abnormalities, address imbalance/dizziness and instruct in home and community integration to attain remaining goals. [x]  See Plan of Care  []  See progress note/recertification  []  See Discharge Summary         Progress towards goals / Updated goals:  Short Term Goals: To be accomplished in 2 weeks:  1. Pt will compliance HEP in order to supplement PT treatment              Eval = established               Current = report full compliance  2. Pt will demonstrate 5x sit to  13seconds or less in order to reduce risk of falls              Eval = 18seconds   Assess at later visit     Long Term Goals: To be accomplished in 10 treatments:  1.    Pt will demonstrate bilateral knee strength to 5/5 in order to improve ability to ascend/descend curbs in the community. Eval =  Bilateral extension = 4+/5, flexion = 4/5   No change to note  2. Pt will demonstrate bilateral hip flexion and abduction  knee strength minimum 5-/5 and 4/5, respectively,  in order to improve ambulation distances for ADLs and community engagement. Eval: flexion bilaterally = 4/5, abduction 3/5   No change to note  3. Pt will score at least predicted value on FOTO in order to improve overall function, decrease pain, and facilitate return to OF. Eval = complete at first follow up due to time constraints              33 pts  4. Pt will demonstrate 3/5 PF strength (ability to perform 1 single HR) in order to improve her ability to ambulate with LRAD              Eval = 2/5, unable to perform SL HR   No change to note  5.    Pt will score at least 15/28 on miniBEST test in order to reduce risk of falls              Eval = 6/28   Assess at 30 day aleena    PLAN  []  Upgrade activities as tolerated     [x]  Continue plan of care  []  Update interventions per flow sheet       []  Discharge due to:_  []  Other:_      Jatinder Weber, PTA, CSCS 11/19/2021  3:03 PM    Future Appointments   Date Time Provider Gaby Delarosa   11/19/2021  2:15 PM Marcus Sahni, PT [facility]   11/22/2021  3:00 PM Marcus Sahni PT [facility]   11/24/2021  4:30 PM [facility] Sahni [facility]   11/29/2021  1:30 PM BRITTANY AtkinsonT [facility]   12/1/2021  1:30 PM Mala Mason, PT [facility]   12/2/2021  9:40 AM JULIA Patterson   5/17/2022  1:45 PM Micah Peck MD [facility]

## 2021-11-22 ENCOUNTER — HOSPITAL ENCOUNTER (OUTPATIENT)
Dept: PHYSICAL THERAPY | Age: 76
Discharge: HOME | End: 2021-11-22
Payer: MEDICARE

## 2021-11-22 PROCEDURE — 97110 THERAPEUTIC EXERCISES: CPT

## 2021-11-22 PROCEDURE — 97112 NEUROMUSCULAR REEDUCATION: CPT

## 2021-11-22 NOTE — PROGRESS NOTES
PT DAILY TREATMENT NOTE     Patient Name: Evan Ordonez  Date:2021  : 1945  [x]  Patient  Verified  Payor: [place] MEDICARE / Plan: [place] MEDICARE PART A & B / Product Type: Medicare /    In time:258  Out time:346  Total Treatment Time (min): 48  Visit #: 4 of 10    Medicare/BCBS Only   Total Timed Codes (min):  48 1:1 Treatment Time:  48       Treatment Area: Other abnormalities of gait and mobility [R26.89]    SUBJECTIVE  Pain Level (0-10 scale): 5  Any medication changes, allergies to medications, adverse drug reactions, diagnosis change, or new procedure performed?: [x] No    [] Yes (see summary sheet for update)  Subjective functional status/changes:   [] No changes reported  Patient reports she did 30 minutes of exercises yesterday and did all of her exercises today; she will do the bike when she gets home. OBJECTIVE    33 min Therapeutic Exercise:  [x] See flow sheet :   Rationale: increase ROM and increase strength to improve the patients ability to increase activity tolerance    15 min Neuromuscular Re-education:  [x]  See flow sheet : balance training   Rationale: increase strength, improve coordination, improve balance and increase proprioception  to improve the patients ability to tolerate sustained postures          With   [] TE   [] TA   [] neuro   [] other: Patient Education: [x] Review HEP    [] Progressed/Changed HEP based on:   [] positioning   [] body mechanics   [] transfers   [] heat/ice application    [] other:      Other Objective/Functional Measures: progressed per flow sheet     Pain Level (0-10 scale) post treatment: 5    ASSESSMENT/Changes in Function: Patient did well with added resistance with standing hip strengthening. She demonstrates improved stability with posterior stepping. Required tactile cuing for positioning with s/l abd.     Patient will continue to benefit from skilled PT services to modify and progress therapeutic interventions, address functional mobility deficits, address ROM deficits, address strength deficits, analyze and address soft tissue restrictions, analyze and cue movement patterns, analyze and modify body mechanics/ergonomics, assess and modify postural abnormalities, address imbalance/dizziness and instruct in home and community integration to attain remaining goals. []  See Plan of Care  []  See progress note/recertification  []  See Discharge Summary         Progress towards goals / Updated goals:  Short Term Goals: To be accomplished in 2 weeks: 1.   Pt will compliance HEP in order to supplement PT treatment              Eval = established               Current = report full compliance  2.   Pt will demonstrate 5x sit to  13seconds or less in order to reduce risk of falls              Eval = 18seconds              Assess at later visit     Long Term Goals: To be accomplished in 10 treatments:  1.   Pt will demonstrate bilateral knee strength to 5/5 in order to improve ability to ascend/descend curbs in the community.             Eval =  Bilateral extension = 4+/5, flexion = 4/5              No change to note  2.   Pt will demonstrate bilateral hip flexion and abduction  knee strength minimum 5-/5 and 4/5, respectively,  in order to improve ambulation distances for ADLs and community engagement.               Eval: flexion bilaterally = 4/5, abduction 3/5              No change to note  3.   Pt will score at least predicted value on FOTO in order to improve overall function, decrease pain, and facilitate return to PLOF.               Eval = complete at first follow up due to time constraints              33 pts  4.   Pt will demonstrate 3/5 PF strength (ability to perform 1 single HR) in order to improve her ability to ambulate with LRAD              Eval = 2/5, unable to perform SL HR              No change to note  5.   Pt will score at least 15/28 on miniBEST test in order to reduce risk of falls              Eval = 6/28 Assess at 30 day aleena    PLAN  [x]  Upgrade activities as tolerated     [x]  Continue plan of care  []  Update interventions per flow sheet       []  Discharge due to:_  []  Other:_      Jt Big StoneCHRISTIAN salas 11/22/2021  2:44 PM    Future Appointments   Date Time Provider Gaby Delarosa   11/22/2021  3:00 PM [facility]   11[facility]24/2021  4:30 PM [facility]   11/29/2021  1:30 PM Arcenio [facility]eronimo [facility]   12/1/2021  1:30 PM [facility]   12/2/2021  9:40 AM JULIA Butterfield [facility]   5/17/2022  1:45 PM Farideh Ashby MD [facility]

## 2021-11-24 ENCOUNTER — HOSPITAL ENCOUNTER (OUTPATIENT)
Dept: PHYSICAL THERAPY | Age: 76
Discharge: HOME | End: 2021-11-24
Payer: MEDICARE

## 2021-11-24 PROCEDURE — 97110 THERAPEUTIC EXERCISES: CPT

## 2021-11-24 PROCEDURE — 97112 NEUROMUSCULAR REEDUCATION: CPT

## 2021-11-24 NOTE — PROGRESS NOTES
PT DAILY TREATMENT NOTE     Patient Name: Flower Fox  Date:2021  : 1945  [x]  Patient  Verified  Payor: Cris Farris / Plan: [place] MEDICARE PART A & B / Product Type: Medicare /    In time: 9:00A Out time: 9:41A  Total Treatment Time (min): 41  Visit #: 5 of 10    Medicare/BCBS Only   Total Timed Codes (min):  41 1:1 Treatment Time:  41       Treatment Area: Other abnormalities of gait and mobility [R26.89]    SUBJECTIVE  Pain Level (0-10 scale): 6  Any medication changes, allergies to medications, adverse drug reactions, diagnosis change, or new procedure performed?: [x] No    [] Yes (see summary sheet for update)  Subjective functional status/changes:   [] No changes reported  Patient reports doing well with no new concerns/recent falls     OBJECTIVE    16 min Therapeutic Exercise:  [x] See flow sheet :   Rationale: increase ROM and increase strength to improve the patients ability to increase activity tolerance    25 min Neuromuscular Re-education:  [x]  See flow sheet : balance training   Rationale: increase strength, improve coordination, improve balance and increase proprioception  to improve the patients ability to tolerate sustained postures          With   [x] TE   [] TA   [x] neuro   [] other: Patient Education: [x] Review HEP    [] Progressed/Changed HEP based on:   [] positioning   [] body mechanics   [] transfers   [] heat/ice application    [] other:      Other Objective/Functional Measures: progressed per flow sheet     Pain Level (0-10 scale) post treatment: 5    ASSESSMENT/Changes in Function:  Patient continues to require increased rest breaks d/t overall decreased endurance, however demonstrates improved balance performance in comparison to initiating PT services. Demonstrates intermittent ant weight shift compensations, however emphasized stepping strategy performance w/ pt demonstrating significant therapeutic carryover over.  Will progress activity program as able and tolerated. Patient will continue to benefit from skilled PT services to modify and progress therapeutic interventions, address functional mobility deficits, address ROM deficits, address strength deficits, analyze and address soft tissue restrictions, analyze and cue movement patterns, analyze and modify body mechanics/ergonomics, assess and modify postural abnormalities, address imbalance/dizziness and instruct in home and community integration to attain remaining goals. [x]  See Plan of Care  []  See progress note/recertification  []  See Discharge Summary         Progress towards goals / Updated goals:  Short Term Goals: To be accomplished in 2 weeks: 1.   Pt will compliance HEP in order to supplement PT treatment              Eval = established               Current = report full compliance  2.   Pt will demonstrate 5x sit to  13seconds or less in order to reduce risk of falls              Eval = 18seconds              Assess at later visit     Long Term Goals: To be accomplished in 10 treatments:  1.   Pt will demonstrate bilateral knee strength to 5/5 in order to improve ability to ascend/descend curbs in the community.             Eval =  Bilateral extension = 4+/5, flexion = 4/5              No change to note  2.   Pt will demonstrate bilateral hip flexion and abduction  knee strength minimum 5-/5 and 4/5, respectively,  in order to improve ambulation distances for ADLs and community engagement.               Eval: flexion bilaterally = 4/5, abduction 3/5              No change to note  3.   Pt will score at least predicted value on FOTO in order to improve overall function, decrease pain, and facilitate return to PLOF.               Eval = complete at first follow up due to time constraints              33 pts  4.   Pt will demonstrate 3/5 PF strength (ability to perform 1 single HR) in order to improve her ability to ambulate with LRAD              Eval = 2/5, unable to perform SL HR No change to note  5.   Pt will score at least 15/28 on miniBEST test in order to reduce risk of falls              Eval = 6/28              Assess at 30 day aleena    PLAN  [x]  Upgrade activities as tolerated     [x]  Continue plan of care  []  Update interventions per flow sheet       []  Discharge due to:_  []  Other:_      Clare John, LUCAS 11/24/2021  2:44 PM    Future Appointments   Date Time Provider Gaby Delarosa   11/29/2021  1:30 PM Beau Vale DPT [facility]   12/1/2021  1:30 PM Mahogany Hope, PT [facility]   12/2/2021  9:40 AM JULIA Clark [address] [facility]   5/17/2022  1:45 PM Karla Will MD [facility]

## 2021-11-24 NOTE — PROGRESS NOTES
HISTORY OF PRESENT ILLNESS  Zack Colon is a 78 y.o. female. Chest Pain (Angina)   The history is provided by the patient. This is a chronic problem. The problem occurs daily. The pain is present in the left side. The pain is at a severity of 3/10. The pain is mild. The quality of the pain is described as dull. The pain does not radiate. Pertinent negatives include no claudication, no cough, no diaphoresis, no dizziness, no fever, no hemoptysis, no nausea, no orthopnea, no palpitations, no PND, no sputum production, no vomiting and no weakness. Review of Systems   Constitutional: Negative for chills, diaphoresis, fever and weight loss. HENT: Negative for ear pain and hearing loss. Eyes: Negative for blurred vision. Respiratory: Negative for cough, hemoptysis, sputum production, wheezing and stridor. Cardiovascular: Negative for palpitations, orthopnea, claudication, leg swelling and PND. Gastrointestinal: Negative for heartburn, nausea and vomiting. Musculoskeletal: Negative for myalgias and neck pain. Skin: Negative for rash. Neurological: Negative for dizziness, tingling, tremors, focal weakness, loss of consciousness and weakness. Psychiatric/Behavioral: Negative for depression and suicidal ideas.      Family History   Problem Relation Age of Onset    Cancer Mother         breast    Heart Disease Father        Past Medical History:   Diagnosis Date    Abnormal Pap smear     Dr. Tone Hernandez Allergic rhinitis     Arthritis     Asthma 11/22/2019    Cerebrovascular small vessel disease 3/18/2019    CT 3/17/2019    Cervical spinal cord compression (HCC)     Chronic pain     Concentric left ventricular hypertrophy 3/18/2019    With left ventricular diastolic dysfunction by echocardiogram 3/18/2019    GERD (gastroesophageal reflux disease) 11/22/2019    Hypercholesterolemia     Neck pain 5/17/2010    Stroke ([facility] [address] 10/02/2017    TIA- legs weak memory issues       Past Surgical History:   Procedure Laterality Date    COLONOSCOPY N/A 6/4/2018    COLONOSCOPY / polypectomy performed by Andrade Dsouza MD at [facility] COLONOSCOPY N/A 8/19/2021    COLONOSCOPY performed by Anisha Capone MD at [address] HX GYN      Total Hyst    HX LAP CHOLECYSTECTOMY      HX OTHER SURGICAL  2017    Throat widened    NEUROLOGICAL PROCEDURE UNLISTED  11/2019    Cervical fusion       Social History     Tobacco Use    Smoking status: Never Smoker    Smokeless tobacco: Never Used   Substance Use Topics    Alcohol use: No       Allergies   Allergen Reactions    Baclofen Shortness of Breath     Denies. Pt tolerates  Other reaction(s): gi distress    Morphine Other (comments)     hallucinations  Other reaction(s): other/intolerance  hallucinations    Sulfa (Sulfonamide Antibiotics) Other (comments)     Drops blood pressure    Celecoxib Other (comments)     Tiredness   Other reaction(s): other/intolerance  Makes her tired       Outpatient Medications Marked as Taking for the 11/16/21 encounter (Office Visit) with Janae August MD   Medication Sig Dispense Refill    polyethylene glycol 3350 (CLEARLAX PO) Take 1 Dose by mouth daily.  lidocaine (LIDODERM) 5 % apply 1 patch TO THE AFFECTED AREA DAILY. LEAVE ON FOR 12 HOURS AND THEN OFF FOR 12 HOURS.  [DISCONTINUED] D-MANNOSE PO Take 500 mg by mouth daily as needed.  fluticasone propionate (FLOVENT HFA) 110 mcg/actuation inhaler Take 1 Puff by inhalation every twelve (12) hours as needed.  conjugated estrogens (PREMARIN) 0.625 mg/gram vaginal cream Apply 0.5 g to affected area daily.  Apply pea sized amount to urethra and whatever is left over to just inside of vagina 30 g 5    atorvastatin (LIPITOR) 20 mg tablet take 1 tablet by mouth once daily (Patient taking differently: Take 20 mg by mouth daily.) 90 Tab 0    busPIRone (BUSPAR) 7.5 mg tablet take 1 tablet by mouth twice a day (Patient taking differently: Take 7.5 mg by mouth two (2) times a day.) 180 Tab 0    gabapentin (NEURONTIN) 100 mg capsule Take 2 Caps by mouth three (3) times daily. Max Daily Amount: 600 mg. (Patient taking differently: Take 400 mg by mouth daily. ) 180 Cap 1    acetaminophen (TYLENOL) 160 mg/5 mL elixir Take 1,000 mg by mouth daily.  turmeric 400 mg cap Take 1 Cap by mouth daily.  aspirin 81 mg chewable tablet Take 1 Tab by mouth daily. 30 Tab 3        Visit Vitals  /80 (BP 1 Location: Left upper arm, BP Patient Position: Sitting, BP Cuff Size: Adult)   Pulse 87   Ht 5' 1\" (1.549 m)   Wt 45.4 kg (100 lb)   LMP  (LMP Unknown)   SpO2 94%   BMI 18.89 kg/m²       Physical Exam  Constitutional:       Appearance: She is well-developed. HENT:      Head: Normocephalic and atraumatic. Eyes:      General: No scleral icterus. Conjunctiva/sclera: Conjunctivae normal.   Neck:      Vascular: No JVD. Cardiovascular:      Rate and Rhythm: Normal rate and regular rhythm. Heart sounds: Normal heart sounds. No murmur heard. No friction rub. No gallop. Pulmonary:      Effort: Pulmonary effort is normal. No respiratory distress. Breath sounds: Normal breath sounds. No stridor. No wheezing or rales. Chest:      Chest wall: No tenderness. Abdominal:      General: There is no distension. Tenderness: There is no abdominal tenderness. Musculoskeletal:         General: No tenderness. Normal range of motion. Cervical back: Normal range of motion and neck supple. Lymphadenopathy:      Cervical: No cervical adenopathy. Skin:     Findings: No erythema or rash. Neurological:      Mental Status: She is alert and oriented to person, place, and time. Cranial Nerves: No cranial nerve deficit. Psychiatric:         Behavior: Behavior normal.     ekg sinus rhythm with no acute st-t changes  11/02/21    NUCLEAR CARDIAC STRESS TEST 11/02/2021 11/4/2021    Interpretation Summary  · Baseline ECG: Normal EKG.   · SPECT: Left ventricular function post-stress was normal. Calculated ejection fraction is 58% (normal EF value is equal to or greater than 50%). Left ventricular wall motion was normal at stress, no regional wall motion abnormality noted. There is no evidence of transient ischemic dilation (TID). The TID ratio is 0.87. · SPECT: Left ventricular perfusion is normal.  · SPECT: Left ventricular perfusion is normal. Myocardial perfusion imaging supports a low risk stress test.    Signed by: Dontrell Velasco MD on 11/2/2021  3:40 PM      ASSESSMENT and PLAN    ICD-10-CM ICD-9-CM    1. Chest pain, unspecified type  R07.9 786.50    2. Dyslipidemia  E78.5 272.4    3. Orthostatic hypotension  I95.1 458.0      No orders of the defined types were placed in this encounter. Follow-up and Dispositions    · Return in about 6 months (around 5/16/2022). current treatment plan is effective, no change in therapy. Patient is a 82-year-old female seen for chronic chest pain. Symptoms lasting for several last several months. Anterior chest wall-describes as squeezing/tightness with no significant radiation. No diaphoresis. No ischemia on stress test.  Recommend intense risk factor modification. F/u in 6 months.

## 2021-11-29 ENCOUNTER — HOSPITAL ENCOUNTER (OUTPATIENT)
Dept: PHYSICAL THERAPY | Age: 76
Discharge: HOME | End: 2021-11-29
Payer: MEDICARE

## 2021-11-29 PROCEDURE — 97112 NEUROMUSCULAR REEDUCATION: CPT

## 2021-11-29 PROCEDURE — 97110 THERAPEUTIC EXERCISES: CPT
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
## 2021-12-01 ENCOUNTER — HOSPITAL ENCOUNTER (OUTPATIENT)
Dept: PHYSICAL THERAPY | Age: 76
Discharge: HOME | End: 2021-12-01
Payer: MEDICARE

## 2021-12-01 PROCEDURE — 97112 NEUROMUSCULAR REEDUCATION: CPT

## 2021-12-01 PROCEDURE — 97530 THERAPEUTIC ACTIVITIES: CPT

## 2021-12-01 NOTE — PROGRESS NOTES
PT DAILY TREATMENT NOTE     Patient Name: Andreina Stephens  Date:2021  : 1945  [x]  Patient  Verified  Payor: [place] MEDICARE / Plan: [place] MEDICARE PART A & B / Product Type: Medicare /    In time:130  Out time:213  Total Treatment Time (min): 43  Visit #: 7 of 10    Medicare/BCBS Only   Total Timed Codes (min):  43 1:1 Treatment Time:  43       Treatment Area: Other abnormalities of gait and mobility [R26.89]    SUBJECTIVE  Pain Level (0-10 scale): 3  Any medication changes, allergies to medications, adverse drug reactions, diagnosis change, or new procedure performed?: [x] No    [] Yes (see summary sheet for update)  Subjective functional status/changes:   [] No changes reported  Pt reports she continues to do her HEP     OBJECTIVE    25 min Therapeutic Activity:  [x]  See flow sheet : standing functional LE strengh   Rationale: increase strength and improve coordination  to improve the patients ability to negotiate home and community      18 min Neuromuscular Re-education:  [x]  See flow sheet : balance, ankle and hp strategy training    Rationale: increase strength, improve coordination, improve balance and increase proprioception  to improve the patients ability to recover LOB           With   [] TE   [] TA   [] neuro   [] other: Patient Education: [x] Review HEP    [] Progressed/Changed HEP based on:   [] positioning   [] body mechanics   [] transfers   [] heat/ice application    [] other:      Other Objective/Functional Measures: progressed exercises per flow sheet      Pain Level (0-10 scale) post treatment: 3    ASSESSMENT/Changes in Function: Pt tolerated progression of exercises with out increase in symptoms. Pt very challenged with eccentric lowering on 2 up 1 down HR, requiring increased time to complete. Pt also challenged with ankle/hip strategy training at wall; fear of falling also playing a factor into performance. Pt has met her short term goal for STS.       Patient will continue to benefit from skilled PT services to modify and progress therapeutic interventions, address functional mobility deficits, address ROM deficits, address strength deficits, analyze and address soft tissue restrictions, analyze and cue movement patterns, analyze and modify body mechanics/ergonomics, assess and modify postural abnormalities, address imbalance/dizziness and instruct in home and community integration to attain remaining goals. []  See Plan of Care  []  See progress note/recertification  []  See Discharge Summary         Progress towards goals / Updated goals:  Short Term Goals: To be accomplished in 2 weeks: 1.   Pt will compliance HEP in order to supplement PT treatment              Eval = established               Current = report full compliance  2.   Pt will demonstrate 5x sit to  13seconds or less in order to reduce risk of falls              Eval = 18seconds              MET = 12seconds     Long Term Goals: To be accomplished in 10 treatments:  1.   Pt will demonstrate bilateral knee strength to 5/5 in order to improve ability to ascend/descend curbs in the community.             Eval =  Bilateral extension = 4+/5, flexion = 4/5              No change to note  2.   Pt will demonstrate bilateral hip flexion and abduction  knee strength minimum 5-/5 and 4/5, respectively,  in order to improve ambulation distances for ADLs and community engagement.               Eval: flexion bilaterally = 4/5, abduction 3/5              No change to note  3.   Pt will score at least predicted value on FOTO in order to improve overall function, decrease pain, and facilitate return to PLOF.               Eval = complete at first follow up due to time constraints              33 pts  4.   Pt will demonstrate 3/5 PF strength (ability to perform 1 single HR) in order to improve her ability to ambulate with LRAD              Eval = 2/5, unable to perform SL HR              No change to note  5.   Pt will score at least 15/28 on miniBEST test in order to reduce risk of falls              Eval = 6/28              Assess at 30 day aleena     PLAN  [x]  Upgrade activities as tolerated     [x]  Continue plan of care  []  Update interventions per flow sheet       []  Discharge due to:_  []  Other:_      Suzette Due, PT 12/1/2021  1:40 PM    Future Appointments   Date Time Provider Gaby Isaura   12/2/2021  9:40 AM [place] [facility]   12/6/2021  1:30 PM Thai Dawkins, DPT [facility]   12/8/2021  1:30 PM Thai Dawkins, DPT [facility]   12/13/2021  1:30 PM Thai Dawkins, DPT [facility]   12/15/2021  1:30 PM Kristy Barcenas, PT [facility]   12/20/2021  1:30 PM Kristy Barcenas, PT [facility]   12/22/2021  1:30 PM Thai Dawkins, DPT [facility]   12/27/2021  1:30 PM Thai Dawkins, DPT [facility]   12/29/2021  1:30 PM Kristy Barcenas, PT [facility]   5/17/2022  1:45 PM Simpson Lundborg, MD [facility]

## 2021-12-06 ENCOUNTER — HOSPITAL ENCOUNTER (OUTPATIENT)
Dept: PHYSICAL THERAPY | Age: 76
Discharge: HOME | End: 2021-12-06
Payer: MEDICARE

## 2021-12-06 ENCOUNTER — TRANSCRIBE ORDER (OUTPATIENT)
Dept: SCHEDULING | Age: 76
End: 2021-12-06

## 2021-12-06 DIAGNOSIS — R13.10 DYSPHAGIA: Primary | ICD-10-CM

## 2021-12-06 DIAGNOSIS — R63.4 WEIGHT LOSS: ICD-10-CM

## 2021-12-06 DIAGNOSIS — R68.81 EARLY SATIETY: ICD-10-CM

## 2021-12-06 DIAGNOSIS — R15.0 INCOMPLETE DEFECATION: ICD-10-CM

## 2021-12-06 PROCEDURE — 97110 THERAPEUTIC EXERCISES: CPT

## 2021-12-06 PROCEDURE — 97112 NEUROMUSCULAR REEDUCATION: CPT

## 2021-12-06 PROCEDURE — 97530 THERAPEUTIC ACTIVITIES: CPT

## 2021-12-06 NOTE — PROGRESS NOTES
PT DAILY TREATMENT NOTE     Patient Name: Evan Ordonez  Date:2021  : 1945  [x]  Patient  Verified  Payor: [place] MEDICARE / Plan: [place] MEDICARE PART A & B / Product Type: Medicare /    In time:1:32P  Out time: 2:14P  Total Treatment Time (min): 42  Visit #: 8 of 10    Medicare/BCBS Only   Total Timed Codes (min):  42 1:1 Treatment Time:  42       Treatment Area: Other abnormalities of gait and mobility [R26.89]    SUBJECTIVE  Pain Level (0-10 scale): 4  Any medication changes, allergies to medications, adverse drug reactions, diagnosis change, or new procedure performed?: [x] No    [] Yes (see summary sheet for update)  Subjective functional status/changes:   [] No changes reported  Pt reports she continues to perform HEP daily, however notices little to no improvement in functional status since beginning PT.      OBJECTIVE    12 min Therapeutic Exercise:  [x]?  See flow sheet :   Rationale: increase ROM and increase strength to improve the patients ability to increase activity tolerance    15 min Therapeutic Activity:  [x]  See flow sheet : standing functional LE strengh   Rationale: increase strength and improve coordination  to improve the patients ability to negotiate home and community      15 min Neuromuscular Re-education:  [x]  See flow sheet : balance, ankle and hp strategy training    Rationale: increase strength, improve coordination, improve balance and increase proprioception  to improve the patients ability to recover LOB           With   [x] TE   [x] TA   [x] neuro   [] other: Patient Education: [x] Review HEP    [] Progressed/Changed HEP based on:   [] positioning   [] body mechanics   [] transfers   [] heat/ice application    [] other:      Other Objective/Functional Measures: progressed exercises per flow sheet      Pain Level (0-10 scale) post treatment: 3    ASSESSMENT/Changes in Function:   Continued progression of current ex program - patient requires increased rest breaks d/t overall decreased endurance, however continues to demonstrate increased confidence/balance with multidirectional amb. Will progress activity program as able and tolerated. Formal reassessment to be completed at next session . Patient will continue to benefit from skilled PT services to modify and progress therapeutic interventions, address functional mobility deficits, address ROM deficits, address strength deficits, analyze and address soft tissue restrictions, analyze and cue movement patterns, analyze and modify body mechanics/ergonomics, assess and modify postural abnormalities, address imbalance/dizziness and instruct in home and community integration to attain remaining goals. [x]  See Plan of Care  []  See progress note/recertification  []  See Discharge Summary         Progress towards goals / Updated goals:  Short Term Goals: To be accomplished in 2 weeks: 1.   Pt will compliance HEP in order to supplement PT treatment              Eval = established               Current = report full compliance  2.   Pt will demonstrate 5x sit to  13seconds or less in order to reduce risk of falls              Eval = 18seconds              MET = 12seconds     Long Term Goals: To be accomplished in 10 treatments:  1.   Pt will demonstrate bilateral knee strength to 5/5 in order to improve ability to ascend/descend curbs in the community.             Eval =  Bilateral extension = 4+/5, flexion = 4/5              No change to note  2.   Pt will demonstrate bilateral hip flexion and abduction  knee strength minimum 5-/5 and 4/5, respectively,  in order to improve ambulation distances for ADLs and community engagement.               Eval: flexion bilaterally = 4/5, abduction 3/5              No change to note  3.   Pt will score at least predicted value on FOTO in order to improve overall function, decrease pain, and facilitate return to PLOF.               Eval = complete at first follow up due to time constraints              33 pts  4.   Pt will demonstrate 3/5 PF strength (ability to perform 1 single HR) in order to improve her ability to ambulate with LRAD              Eval = 2/5, unable to perform SL HR              No change to note  5.   Pt will score at least 15/28 on miniBEST test in order to reduce risk of falls              Eval = 6/28              Assess at 30 day aleena     PLAN  [x]  Upgrade activities as tolerated     [x]  Continue plan of care  []  Update interventions per flow sheet       []  Discharge due to:_  [x]  Other: Recert to be completed at next f/u session scheduled for 12/08/21    Arden Bhakta DPT 12/6/2021  1:40 PM    Future Appointments   Date Time Provider Gaby Delarosa   12/6/2021  1:30 PM Rosiland Carbo, DPT [facility]   12/8/2021  1:30 PM Rosiland Carbo, DPT [facility]   12/13/2021  1:30 PM Rosiland Carbo, DPT [facility]   12/15/2021  1:30 PM Charlean Patella, PT [facility]   12/20/2021  1:30 PM Charlean Patella, PT [facility]   12/22/2021  1:30 PM Rosiland Carbo, DPT [facility]   12/27/2021  1:30 PM Rosiland Carbo, DPT [facility]   12/29/2021  1:30 PM Charlean Patella, PT [facility]   5/17/2022  1:45 PM Kendra Mathis MD [facility]   6/2/2022 11:00 AM Vergil Sensing, [address]

## 2021-12-08 ENCOUNTER — HOSPITAL ENCOUNTER (OUTPATIENT)
Dept: PHYSICAL THERAPY | Age: 76
Discharge: HOME | End: 2021-12-08
Payer: MEDICARE

## 2021-12-08 PROCEDURE — 97530 THERAPEUTIC ACTIVITIES: CPT

## 2021-12-08 PROCEDURE — 97110 THERAPEUTIC EXERCISES: CPT

## 2021-12-08 PROCEDURE — 97112 NEUROMUSCULAR REEDUCATION: CPT

## 2021-12-08 NOTE — PROGRESS NOTES
PT DAILY TREATMENT NOTE     Patient Name: Vanessa Mayfield  Date:2021  : 1945  [x]  Patient  Verified  Payor: Meir Wells / Plan: [place] MEDICARE PART A & B / Product Type: Medicare /    In time:1:30P  Out time: 2:12P  Total Treatment Time (min): 42  Visit #: 9 of 10    Medicare/BCBS Only   Total Timed Codes (min):  42 1:1 Treatment Time:  42       Treatment Area: Other abnormalities of gait and mobility [R26.89]    SUBJECTIVE  Pain Level (0-10 scale): 2  Any medication changes, allergies to medications, adverse drug reactions, diagnosis change, or new procedure performed?: [x] No    [] Yes (see summary sheet for update)  Subjective functional status/changes:   [] No changes reported  Pt reports a 45% improvement in current condition since beginning skilled PT services; stating improvements B LE strength. States  she would like to continue therapy to continue working toward her ultimate goal of ambulating with greater independence. OBJECTIVE    8 min Therapeutic Exercise:  [x]?  See flow sheet :   Rationale: increase ROM and increase strength to improve the patients ability to increase activity tolerance    20 min Therapeutic Activity:  [x]  See flow sheet : FOTO; Goal Reassessment, Pt Ed    Rationale: increase strength and improve coordination  to improve the patients ability to negotiate home and community      14 min Neuromuscular Re-education:  [x]  See flow sheet : balance, ankle and hp strategy training    Rationale: increase strength, improve coordination, improve balance and increase proprioception  to improve the patients ability to recover LOB           With   [x] TE   [x] TA   [x] neuro   [] other: Patient Education: [x] Review HEP    [] Progressed/Changed HEP based on:   [] positioning   [] body mechanics   [] transfers   [] heat/ice application    [] other:      Other Objective/Functional Measures: See Goals Below       Pain Level (0-10 scale) post treatment: 1    ASSESSMENT/Changes in Function:   Pt reports a 45% improvement in current condition since beginning skilled PT services; stating improvements B LE strength. States  she would like to continue therapy to continue working toward her ultimate goal of ambulating with greater independence. Objectively, pt demonstrates progress toward all LTGs through improving B LE strength and functional status (evident by improving FOTO and miniBEST assessment scores). Continues to lack optimal strength and balance required to perform ambulation and functional activities with greatest safety and independence indicating the need to continue PT services. Patient will continue to benefit from skilled PT services to modify and progress therapeutic interventions, address functional mobility deficits, address ROM deficits, address strength deficits, analyze and address soft tissue restrictions, analyze and cue movement patterns, analyze and modify body mechanics/ergonomics, assess and modify postural abnormalities, address imbalance/dizziness and instruct in home and community integration to attain remaining goals. []  See Plan of Care  [x]  See progress note/recertification  []  See Discharge Summary         Progress towards goals / Updated goals:  Short Term Goals: To be accomplished in 2 weeks:   1. Pt will compliance HEP in order to supplement PT treatment               Eval = established                Current = report full compliance     2. Pt will demonstrate 5x sit to  13seconds or less in order to reduce risk of falls               Eval = 18seconds               Current: MET = 11 seconds     Long Term Goals: To be accomplished in 10 treatments:   1. Pt will demonstrate bilateral knee strength to 5/5 in order to improve ability to ascend/descend curbs in the community. Eval =  Bilateral extension = 4+/5, flexion = 4/5               Current: Progressing: B flexion: 4+/5, left ext: 4+/5, right ext: 4/5     2.    Pt will demonstrate bilateral hip flexion and abduction  knee strength minimum 5-/5 and 4/5, respectively,  in order to improve ambulation distances for ADLs and community engagement. Eval: flexion bilaterally = 4/5, abduction 3/5               Current: Progressing: left hip flex: 4+/5, right hip flex: 4/5, B Hip Abd: 3+/5     3. Pt will score at least predicted value on FOTO in order to improve overall function, decrease pain, and facilitate return to OF. Eval = complete at first follow up due to time constraints; 33 pts               Current: Progressing: Progressin pts      4. Pt will demonstrate 3/5 PF strength (ability to perform 1 single HR) in order to improve her ability to ambulate with LRAD               Eval = 2/5, unable to perform SL HR               Current: Progressin+/5     5.    Pt will score at least 15/28 on miniBEST test in order to reduce risk of falls               Eval =                Current: Progressin/28      PLAN  [x]  Upgrade activities as tolerated     [x]  Continue plan of care  []  Update interventions per flow sheet       []  Discharge due to:_  []  Other:     Tia Lewis DPT 2021  1:40 PM    Future Appointments   Date Time Provider Gaby Delarosa   2021  1:30 PM Patsy Govea DPT [facility]   2021  3:00 PM [facility] CT RM 2 [facility]   2021  1:30 PM Patsy Govea DPT [facility]   12/15/2021  1:30 PM Eric Villagomez PT [facility]   2021  1:30 PM Eric Villagomez PT [facility]   2021  1:30 PM Patsy Govea DPT [facility]   2021  1:30 PM Patsy Govea DPT [facility]   2021  1:30 PM Eric Villagomez, PT [facility]   2022  1:45 PM Amairani Li MD [facility]   2022 11:00 AM Ken Dubois, [address] [facility]

## 2021-12-08 NOTE — PROGRESS NOTES
[facility]  [address] [place], [address] [phone] fax    Continued Plan of Care/ Re-certification for Physical Therapy Services    Patient name: Anastasia Peña Start of Care: 2021   Referral source: Krystyna Quezada MD : 1945   Medical/Treatment Diagnosis: Other abnormalities of gait and mobility [R26.89]  Payor: [place] MEDICARE / Plan: [place] MEDICARE PART A & B / Product Type: Medicare /  Onset Date: with exacerbation 10/18/21       Prior Hospitalization: see medical history Provider#: [id number]   Medications: Verified on Patient Summary List    Comorbidities: 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 Visits from Start of Care: 9    Missed Visits: 0    The Plan of Care and following information is based on the patient's current status:  Short Term Goals: To be accomplished in 2 weeks:   1. Pt will compliance HEP in order to supplement PT treatment               Eval = established                Current = report full compliance      2. Pt will demonstrate 5x sit to  13seconds or less in order to reduce risk of falls               Eval = 18seconds               Current: MET = 11 seconds      Long Term Goals: To be accomplished in 10 treatments:   1. Pt will demonstrate bilateral knee strength to 5/5 in order to improve ability to ascend/descend curbs in the community. Eval =  Bilateral extension = 4+/5, flexion = 4/5               Current: Progressing: B flexion: 4+/5, left ext: 4+/5, right ext: 4/5      2. Pt will demonstrate bilateral hip flexion and abduction  knee strength minimum 5-/5 and 4/5, respectively,  in order to improve ambulation distances for ADLs and community engagement.                 Eval: flexion bilaterally = 4/5, abduction 3/5               Current: Progressing: left hip flex: 4+/5, right hip flex: 4/5, B Hip Abd: 3+/5      3. Pt will score at least predicted value on FOTO in order to improve overall function, decrease pain, and facilitate return to PLOF. Eval = complete at first follow up due to time constraints; 33 pts               Current: Progressing: Progressin pts       4. Pt will demonstrate 3/5 PF strength (ability to perform 1 single HR) in order to improve her ability to ambulate with LRAD               Eval = 2/5, unable to perform SL HR               Current: Progressin+/5      5. Pt will score at least 15/ on miniBEST test in order to reduce risk of falls               Eval =                Current: Progressin/28       Problems/ barriers to goal attainment: co morbidities     Problem List: pain affecting function, decrease ROM, decrease strength, impaired gait/ balance, decrease ADL/ functional abilitiies, decrease activity tolerance, decrease flexibility/ joint mobility and decrease transfer abilities    Treatment Plan: Therapeutic exercise, Therapeutic activities, Neuromuscular re-education, Physical agent/modality, Gait/balance training, Manual therapy, Patient education, Self Care training, Functional mobility training, Home safety training and Stair training     Patient Goal (s) has been updated and includes: \"to walk without walker\" \"to drive again\"    Goals for this certification period to be accomplished in 5 weeks:  1. Pt will demonstrate 5x sit to  < 10 seconds or less in order to reduce risk of falls               Recert: Progressin seconds       2.   Pt will demonstrate bilateral knee strength to 5/5 in order to improve ability to ascend/descend curbs in the community. Recert: Progressing: B flexion: 4+/5, left ext: 4+/5, right ext: 4/5      3.    Pt will demonstrate bilateral hip flexion and abduction  knee strength minimum 5-/5 and 4/5, respectively,  in order to improve ambulation distances for ADLs and community engagement. Recert: Progressing: left hip flex: 4+/5, right hip flex: 4/5, B Hip Abd: 3+/5      4. Pt will score at least predicted value on FOTO in order to improve overall function, decrease pain, and facilitate return to PLOF. Recert: Progressing: Progressin pts       5. Pt will demonstrate 3/5 PF strength (ability to perform 1 single HR) in order to improve her ability to ambulate with LRAD              Recert: Progressin+/5      6. Pt will score at least 15/28 on miniBEST test in order to reduce risk of falls              Recert: Progressin/84     Frequency / Duration: Patient to be seen 2 times per week for 5 weeks:    Assessment / Recommendations:  Pt reports a 45% improvement in current condition since beginning skilled PT services; stating improvements B LE strength. States  she would like to continue therapy to continue working toward her ultimate goal of ambulating with greater independence. Objectively, pt demonstrates progress toward all LTGs through improving B LE strength and functional status (evident by improving FOTO and miniBEST assessment scores). Continues to lack optimal strength and balance required to perform ambulation and functional activities with greatest safety and independence indicating the need to continue PT services.    Nellie Landau will continue to benefit from skilled PT services to modify and progress therapeutic interventions, address functional mobility deficits, address ROM deficits, address strength deficits, analyze and address soft tissue restrictions, analyze and cue movement patterns, analyze and modify body mechanics/ergonomics, assess and modify postural abnormalities, address imbalance/dizziness and instruct in home and community integration to attain remaining goals.     Certification Period: 21 - 22    Sean Baez DPT 2021 12:07 PM    ________________________________________________________________________  I certify that the above Therapy Services are being furnished while the patient is under my care. I agree with the treatment plan and certify that this therapy is necessary. [] I have read the above and request that my patient continue as recommended.   [] I have read the above report and request that my patient continue therapy with the following changes/special instructions: _____________________________________________  [] I have read the above report and request that my patient be discharged from therapy    Physician's Signature:____________Date:_________TIME:________     Natalie Carrera MD  ** Signature, Date and Time must be completed for valid certification **    Please sign and return to [facility]  [address], [address]  [phone] fax

## 2021-12-09 ENCOUNTER — HOSPITAL ENCOUNTER (OUTPATIENT)
Dept: CT IMAGING | Age: 76
Discharge: HOME | End: 2021-12-09
Attending: PHYSICIAN ASSISTANT
Payer: MEDICARE

## 2021-12-09 DIAGNOSIS — R15.0 INCOMPLETE DEFECATION: ICD-10-CM

## 2021-12-09 DIAGNOSIS — R68.81 EARLY SATIETY: ICD-10-CM

## 2021-12-09 DIAGNOSIS — R63.4 WEIGHT LOSS: ICD-10-CM

## 2021-12-09 DIAGNOSIS — R13.10 DYSPHAGIA: ICD-10-CM

## 2021-12-09 LAB — CREAT UR-MCNC: 0.7 MG/DL (ref 0.6–1.3)

## 2021-12-09 PROCEDURE — 82565 ASSAY OF CREATININE: CPT

## 2021-12-09 PROCEDURE — 74177 CT ABD & PELVIS W/CONTRAST: CPT

## 2021-12-09 PROCEDURE — 74011000636 HC RX REV CODE- 636: Performed by: PHYSICIAN ASSISTANT

## 2021-12-09 RX ADMIN — IOPAMIDOL 75 ML: 612 INJECTION, SOLUTION INTRAVENOUS at 15:54

## 2021-12-13 ENCOUNTER — HOSPITAL ENCOUNTER (OUTPATIENT)
Dept: PHYSICAL THERAPY | Age: 76
Discharge: HOME | End: 2021-12-13
Payer: MEDICARE

## 2021-12-13 PROCEDURE — 97110 THERAPEUTIC EXERCISES: CPT

## 2021-12-13 PROCEDURE — 97112 NEUROMUSCULAR REEDUCATION: CPT

## 2021-12-13 PROCEDURE — 97530 THERAPEUTIC ACTIVITIES: CPT

## 2021-12-13 NOTE — PROGRESS NOTES
PT DAILY TREATMENT NOTE     Patient Name: Jerad Townsend  Date:2021  : 1945  [x]  Patient  Verified  Payor: VA MEDICARE / Plan: VA MEDICARE PART A & B / Product Type: Medicare /    In time:1:28P  Out time: 2:14P  Total Treatment Time (min): 46  Visit #: 1 of 10    Medicare/BCBS Only   Total Timed Codes (min):  46 1:1 Treatment Time:  46       Treatment Area: Other abnormalities of gait and mobility [R26.89]    SUBJECTIVE  Pain Level (0-10 scale): 1  Any medication changes, allergies to medications, adverse drug reactions, diagnosis change, or new procedure performed?: [x] No    [] Yes (see summary sheet for update)  Subjective functional status/changes:   [] No changes reported  Pt reports doing well with no new concerns       OBJECTIVE    10 min Therapeutic Exercise:  [x]?  See flow sheet :   Rationale: increase ROM and increase strength to improve the patients ability to increase activity tolerance    16 min Therapeutic Activity:  [x]  See flow sheet : standing functional LE strengh; sit to stands   Rationale: increase strength and improve coordination  to improve the patients ability to negotiate home and community      20 min Neuromuscular Re-education:  [x]  See flow sheet : balance, ankle and hp strategy training    Rationale: increase strength, improve coordination, improve balance and increase proprioception  to improve the patients ability to recover LOB           With   [x] TE   [x] TA   [x] neuro   [] other: Patient Education: [x] Review HEP    [] Progressed/Changed HEP based on:   [] positioning   [] body mechanics   [] transfers   [] heat/ice application    [] other:      Other Objective/Functional Measures: progressed exercises per flow sheet      Pain Level (0-10 scale) post treatment: 3    ASSESSMENT/Changes in Function:   Continued progression of current ex program - patient requires increased rest breaks d/t overall decreased endurance, however continues to demonstrate increased confidence/balance with multidirectional amb. Will progress activity program as able and tolerated. Patient will continue to benefit from skilled PT services to modify and progress therapeutic interventions, address functional mobility deficits, address ROM deficits, address strength deficits, analyze and address soft tissue restrictions, analyze and cue movement patterns, analyze and modify body mechanics/ergonomics, assess and modify postural abnormalities, address imbalance/dizziness and instruct in home and community integration to attain remaining goals. [x]  See Plan of Care  []  See progress note/recertification  []  See Discharge Summary         Progress towards goals / Updated goals: 1.   Pt will demonstrate 5x sit to  < 10 seconds or less in order to reduce risk of falls               Recert: Progressin seconds       2.   Pt will demonstrate bilateral knee strength to 5/5 in order to improve ability to ascend/descend curbs in the community.               Recert: Progressing: B flexion: 4+/5, left ext: 4+/5, right ext: 4/5      3.   Pt will demonstrate bilateral hip flexion and abduction  knee strength minimum 5-/5 and 4/5, respectively,  in order to improve ambulation distances for ADLs and community engagement.               Recert: Progressing: left hip flex: 4+/5, right hip flex: 4/5, B Hip Abd: 3+/5      4.   Pt will score at least predicted value on FOTO in order to improve overall function, decrease pain, and facilitate return to PLOF.               Recert: Progressing: Progressin pts       5.   Pt will demonstrate 3/5 PF strength (ability to perform 1 single HR) in order to improve her ability to ambulate with LRAD              Recert: Progressin+/5    Remains: 2+/5     6.   Pt will score at least 15/28 on miniBEST test in order to reduce risk of falls              Recert: Progressin/61      PLAN  [x]  Upgrade activities as tolerated     [x]  Continue plan of care  []  Update interventions per flow sheet       []  Discharge due to:_  []  Other:    Shantanu Patel DPT 12/13/2021  1:40 PM    Future Appointments   Date Time Provider Gaby Delarosa   12/15/2021  1:30 PM Evon Levin, DINO [facility]   12/20/2021  1:30 PM Evon Levin, DINO [facility]   12/22/2021  1:30 PM Kathy Kilpatrick DPT [facility]   12/27/2021  1:30 PM Kathy Kilpatrick DPT [facility]   12/29/2021  1:30 PM Evon Levin, DINO [facility]   5/17/2022  1:45 PM Monica Anderson MD [facility] AMB   6/2/2022 11:00 AM JULIA Avalos [address] [facility]

## 2021-12-15 ENCOUNTER — HOSPITAL ENCOUNTER (OUTPATIENT)
Dept: PHYSICAL THERAPY | Age: 76
Discharge: HOME | End: 2021-12-15
Payer: MEDICARE

## 2021-12-15 PROCEDURE — 97112 NEUROMUSCULAR REEDUCATION: CPT

## 2021-12-15 PROCEDURE — 97110 THERAPEUTIC EXERCISES: CPT

## 2021-12-15 PROCEDURE — 97530 THERAPEUTIC ACTIVITIES: CPT

## 2021-12-15 NOTE — PROGRESS NOTES
PT DAILY TREATMENT NOTE     Patient Name: Camila Calvo  Date:12/15/2021  : 1945  [x]  Patient  Verified  Payor: Samaria Vaca / Plan: VA MEDICARE PART A & B / Product Type: Medicare /    In time:130  Out time:212  Total Treatment Time (min): 42  Visit #: 2 of 10    Medicare/BCBS Only   Total Timed Codes (min):  42 1:1 Treatment Time:  42       Treatment Area: Other abnormalities of gait and mobility [R26.89]    SUBJECTIVE  Pain Level (0-10 scale): 2  Any medication changes, allergies to medications, adverse drug reactions, diagnosis change, or new procedure performed?: [x] No    [] Yes (see summary sheet for update)  Subjective functional status/changes:   [] No changes reported  \"Not too much pain. \"    OBJECTIVE    Modality rationale: patient declined   Min Type Additional Details    [] Estim:  []Unatt       []IFC  []Premod                        []Other:  []w/ice   []w/heat  Position:  Location:    [] Estim: []Att    []TENS instruct  []NMES                    []Other:  []w/US   []w/ice   []w/heat  Position:  Location:    []  Traction: [] Cervical       []Lumbar                       [] Prone          []Supine                       []Intermittent   []Continuous Lbs:  [] before manual  [] after manual    []  Ultrasound: []Continuous   [] Pulsed                           []1MHz   []3MHz W/cm2:  Location:    []  Iontophoresis with dexamethasone         Location: [] Take home patch   [] In clinic    []  Ice     []  heat  []  Ice massage  []  Laser   []  Anodyne Position:  Location:    []  Laser with stim  []  Other:  Position:  Location:    []  Vasopneumatic Device    []  Right     []  Left  Pre-treatment girth:  Post-treatment girth:  Measured at (location):  Pressure:       [] lo [] med [] hi   Temperature: [] lo [] med [] hi   [] Skin assessment post-treatment:  []intact []redness- no adverse reaction    []redness - adverse reaction:     10 min Therapeutic Exercise:  [x] See flow sheet :   Rationale: increase ROM and increase strength to improve the patients ability to perform ADLs    12 min Therapeutic Activity:  [x]  See flow sheet : standing functional LE strengh; sit to stands   Rationale: increase ROM, increase strength, improve coordination, improve balance and increase proprioception  to improve the patients ability to improve mobility and ADL performance      20 min Neuromuscular Re-education:  [x]  See flow sheet : balance, ankle and hp strategy training    Rationale: increase ROM, increase strength, improve coordination, improve balance and increase proprioception  to improve the patients ability to improve mobility and decrease fall risk          With   [x] TE   [x] TA   [x] neuro   [] other: Patient Education: [x] Review HEP    [] Progressed/Changed HEP based on:   [x] positioning   [x] body mechanics   [] transfers   [] heat/ice application    [] other:      Other Objective/Functional Measures:      Pain Level (0-10 scale) post treatment: 2    ASSESSMENT/Changes in Function: Pt is making progress with static balance. Still challenged with more dynamic activities. Step ups to foam without UE support was challenging for balance. Three seated rest breaks needed during treatment. Patient will continue to benefit from skilled PT services to modify and progress therapeutic interventions, address functional mobility deficits, address ROM deficits, address strength deficits, analyze and address soft tissue restrictions, analyze and cue movement patterns, analyze and modify body mechanics/ergonomics, assess and modify postural abnormalities, address imbalance/dizziness and instruct in home and community integration to attain remaining goals. [x]  See Plan of Care  []  See progress note/recertification  []  See Discharge Summary         Progress towards goals / Updated goals:   1.   Pt will demonstrate 5x sit to  < 10 seconds or less in order to reduce risk of falls               Recert: Progressin seconds       2.   Pt will demonstrate bilateral knee strength to 5/5 in order to improve ability to ascend/descend curbs in the community.                Recert: Progressing: B flexion: 4+/5, left ext: 4+/5, right ext: 4/5      3.   Pt will demonstrate bilateral hip flexion and abduction  knee strength minimum 5-/5 and 4/5, respectively,  in order to improve ambulation distances for ADLs and community engagement.               Recert: Progressing: left hip flex: 4+/5, right hip flex: 4/5, B Hip Abd: 3+/5      4.   Pt will score at least predicted value on FOTO in order to improve overall function, decrease pain, and facilitate return to PLOF.               Recert: Progressing: Progressin pts       5.   Pt will demonstrate 3/5 PF strength (ability to perform 1 single HR) in order to improve her ability to ambulate with LRAD              Recert: Progressin+/5               Remains: 2+/5     6.   Pt will score at least 15/28 on miniBEST test in order to reduce risk of falls              Recert: Progressin/14     PLAN  []  Upgrade activities as tolerated     [x]  Continue plan of care  []  Update interventions per flow sheet       []  Discharge due to:_  []  Other:_      Heron Mendez PTA, CSCS 12/15/2021  2:19 PM    Future Appointments   Date Time Provider Gaby Delarosa   12/15/2021  [facility]:30 [facility]   2021  1:30 PM [facility]   2021  1:30 PM [facility]   2021  1:30 PM Rob Freeman DPT [facility]   2021  1:30 PM [facility]   2022  1:45 PM Irene Pantoja MD [facility] AMB   2022 11:00 AM [facility],  [address]

## 2021-12-20 ENCOUNTER — HOSPITAL ENCOUNTER (OUTPATIENT)
Dept: PHYSICAL THERAPY | Age: 76
Discharge: HOME | End: 2021-12-20
Payer: MEDICARE

## 2021-12-20 PROCEDURE — 97530 THERAPEUTIC ACTIVITIES: CPT

## 2021-12-20 PROCEDURE — 97110 THERAPEUTIC EXERCISES: CPT

## 2021-12-20 PROCEDURE — 97112 NEUROMUSCULAR REEDUCATION: CPT

## 2021-12-20 NOTE — PROGRESS NOTES
PT DAILY TREATMENT NOTE     Patient Name: Evan Ordonez  Date:2021  : 1945  [x]  Patient  Verified  Payor: VA MEDICARE / Plan: VA MEDICARE PART A & B / Product Type: Medicare /    In time:130  Out time:212  Total Treatment Time (min): 42  Visit #: 3 of 10    Medicare/BCBS Only   Total Timed Codes (min):  42 1:1 Treatment Time:  42       Treatment Area: Other abnormalities of gait and mobility [R26.89]    SUBJECTIVE  Pain Level (0-10 scale): 3  Any medication changes, allergies to medications, adverse drug reactions, diagnosis change, or new procedure performed?: [x] No    [] Yes (see summary sheet for update)  Subjective functional status/changes:   [] No changes reported  \"I have a little bit more pain today. \"    OBJECTIVE    Modality rationale: patient declined   Min Type Additional Details    [] Estim:  []Unatt       []IFC  []Premod                        []Other:  []w/ice   []w/heat  Position:  Location:    [] Estim: []Att    []TENS instruct  []NMES                    []Other:  []w/US   []w/ice   []w/heat  Position:  Location:    []  Traction: [] Cervical       []Lumbar                       [] Prone          []Supine                       []Intermittent   []Continuous Lbs:  [] before manual  [] after manual    []  Ultrasound: []Continuous   [] Pulsed                           []1MHz   []3MHz W/cm2:  Location:    []  Iontophoresis with dexamethasone         Location: [] Take home patch   [] In clinic    []  Ice     []  heat  []  Ice massage  []  Laser   []  Anodyne Position:  Location:    []  Laser with stim  []  Other:  Position:  Location:    []  Vasopneumatic Device    []  Right     []  Left  Pre-treatment girth:  Post-treatment girth:  Measured at (location):  Pressure:       [] lo [] med [] hi   Temperature: [] lo [] med [] hi   [] Skin assessment post-treatment:  []intact []redness- no adverse reaction    []redness - adverse reaction:     10 min Therapeutic Exercise:  [x] See flow sheet :   Rationale: increase ROM and increase strength to improve the patients ability to perform ADLs    12 min Therapeutic Activity:  [x]  See flow sheet : standing functional LE strengh; sit to stands   Rationale: increase ROM, increase strength, improve coordination, improve balance and increase proprioception  to improve the patients ability to improve mobility and ADL performance      20 min Neuromuscular Re-education:  [x]  See flow sheet : balance, ankle and hp strategy training    Rationale: increase ROM, increase strength, improve coordination, improve balance and increase proprioception  to improve the patients ability to improve mobility and decrease fall risk        With   [x] TE   [x] TA   [x] neuro   [] other: Patient Education: [x] Review HEP    [] Progressed/Changed HEP based on:   [x] positioning   [x] body mechanics   [] transfers   [] heat/ice application    [] other:      Other Objective/Functional Measures:      Pain Level (0-10 scale) post treatment: 2    ASSESSMENT/Changes in Function: Pt continues to be challenged with more dynamic activities without her AD, but making progress. Able to ambulate 200 ft without an AD, but multiple cues needed to slow down gait and focus on proper heel strike and toe off. Patient will continue to benefit from skilled PT services to modify and progress therapeutic interventions, address functional mobility deficits, address ROM deficits, address strength deficits, analyze and address soft tissue restrictions, analyze and cue movement patterns, analyze and modify body mechanics/ergonomics, assess and modify postural abnormalities, address imbalance/dizziness and instruct in home and community integration to attain remaining goals. [x]  See Plan of Care  []  See progress note/recertification  []  See Discharge Summary         Progress towards goals / Updated goals:   1.   Pt will demonstrate 5x sit to  < 10 seconds or less in order to reduce risk of falls               Recert: Progressin seconds       2.   Pt will demonstrate bilateral knee strength to 5/5 in order to improve ability to ascend/descend curbs in the community.                Recert: Progressing: B flexion: 4+/5, left ext: 4+/5, right ext: 4/5      3.   Pt will demonstrate bilateral hip flexion and abduction  knee strength minimum 5-/5 and 4/5, respectively,  in order to improve ambulation distances for ADLs and community engagement.               Recert: Progressing: left hip flex: 4+/5, right hip flex: 4/5, B Hip Abd: 3+/5      4.   Pt will score at least predicted value on FOTO in order to improve overall function, decrease pain, and facilitate return to PLOF.               Recert: Progressing: Progressin pts       5.   Pt will demonstrate 3/5 PF strength (ability to perform 1 single HR) in order to improve her ability to ambulate with LRAD              Recert: Progressin+/5               Remains: 2+/5     6.   Pt will score at least 15/28 on miniBEST test in order to reduce risk of falls              Recert: Progressin/27     PLAN  []  Upgrade activities as tolerated     [x]  Continue plan of care  []  Update interventions per flow sheet       []  Discharge due to:_  []  Other:_      Calvin Saab PTA, CSCS 2021  2:28 PM    Future Appointments   Date Time Provider Gaby Delarosa   2021  1:30 PM [facility]   2021  1:30 PM [facility]   2021  1:30 PM Andrea Swartz PT [facility]   2022  1:45 PM Farideh Ashby MD [facility] AMB   2022 11:00 AM Rosemary Henriquez, [address]

## 2021-12-22 ENCOUNTER — HOSPITAL ENCOUNTER (OUTPATIENT)
Dept: PHYSICAL THERAPY | Age: 76
Discharge: HOME | End: 2021-12-22
Payer: MEDICARE

## 2021-12-22 PROCEDURE — 97110 THERAPEUTIC EXERCISES: CPT

## 2021-12-22 PROCEDURE — 97112 NEUROMUSCULAR REEDUCATION: CPT

## 2021-12-22 PROCEDURE — 97530 THERAPEUTIC ACTIVITIES: CPT

## 2021-12-22 NOTE — PROGRESS NOTES
PT DAILY TREATMENT NOTE     Patient Name: Linn Naranjo  Date:2021  : 1945  [x]  Patient  Verified  Payor: Maddy Mott / Plan: [place] MED[place]ARE PART A & B / Product Type: Medicare /    In time:1:30P Out time: 2:10P  Total Treatment Time (min): 40  Visit #: 4 of 10    Medicare/BCBS Only   Total Timed Codes (min):  40 1:1 Treatment Time:  40       Treatment Area:  Other abnormalities of gait and mobility [R26.89]    SUBJECTIVE  Pain Level (0-10 scale): 3  Any medication changes, allergies to medications, adverse drug reactions, diagnosis change, or new procedure performed?: [x] No    [] Yes (see summary sheet for update)  Subjective functional status/changes:   [] No changes reported  Pt reports regular continued compliance with home program, noticing she is getting stronger and able to ambulate short distances around home w/o AD    OBJECTIVE    Modality rationale: patient declined   Min Type Additional Details    [] Estim:  []Unatt       []IFC  []Premod                        []Other:  []w/ice   []w/heat  Position:  Location:    [] Estim: []Att    []TENS instruct  []NMES                    []Other:  []w/US   []w/ice   []w/heat  Position:  Location:    []  Traction: [] Cervical       []Lumbar                       [] Prone          []Supine                       []Intermittent   []Continuous Lbs:  [] before manual  [] after manual    []  Ultrasound: []Continuous   [] Pulsed                           []1MHz   []3MHz W/cm2:  Location:    []  Iontophoresis with dexamethasone         Location: [] Take home patch   [] In clinic    []  Ice     []  heat  []  Ice massage  []  Laser   []  Anodyne Position:  Location:    []  Laser with stim  []  Other:  Position:  Location:    []  Vasopneumatic Device    []  Right     []  Left  Pre-treatment girth:  Post-treatment girth:  Measured at (location):  Pressure:       [] lo [] med [] hi   Temperature: [] lo [] med [] hi   [] Skin assessment post-treatment:  []intact []redness- no adverse reaction    []redness - adverse reaction:     10 min Therapeutic Exercise:  [x] See flow sheet :   Rationale: increase ROM and increase strength to improve the patients ability to perform ADLs    10 min Therapeutic Activity:  [x]  See flow sheet : standing functional LE strengh; sit to stands   Rationale: increase ROM, increase strength, improve coordination, improve balance and increase proprioception  to improve the patients ability to improve mobility and ADL performance      20 min Neuromuscular Re-education:  [x]  See flow sheet : balance, ankle and hp strategy training    Rationale: increase ROM, increase strength, improve coordination, improve balance and increase proprioception  to improve the patients ability to improve mobility and decrease fall risk        With   [x] TE   [x] TA   [x] neuro   [] other: Patient Education: [x] Review HEP    [] Progressed/Changed HEP based on:   [x] positioning   [x] body mechanics   [] transfers   [] heat/ice application    [] other:      Other Objective/Functional Measures:   300' w/o AD, CGA - frequent verbal cuing for improved heel strike/recriprocal arm swing     Pain Level (0-10 scale) post treatment: 2    ASSESSMENT/Changes in Function: Pt continues to be challenged by dynamic and SL balance activities, however demonstrates steady overall progress since IE. Able to increase amb w/o AD during today's session evident by objective measures noted above. Will continue to progress program as able and tolerated.      Patient will continue to benefit from skilled PT services to modify and progress therapeutic interventions, address functional mobility deficits, address ROM deficits, address strength deficits, analyze and address soft tissue restrictions, analyze and cue movement patterns, analyze and modify body mechanics/ergonomics, assess and modify postural abnormalities, address imbalance/dizziness and instruct in home and community integration to attain remaining goals. [x]  See Plan of Care  []  See progress note/recertification  []  See Discharge Summary         Progress towards goals / Updated goals: 1.   Pt will demonstrate 5x sit to  < 10 seconds or less in order to reduce risk of falls               Recert: Progressin seconds       2.   Pt will demonstrate bilateral knee strength to 5/5 in order to improve ability to ascend/descend curbs in the community.                Recert: Progressing: B flexion: 4+/5, left ext: 4+/5, right ext: 4/5      3.   Pt will demonstrate bilateral hip flexion and abduction  knee strength minimum 5-/5 and 4/5, respectively,  in order to improve ambulation distances for ADLs and community engagement.               Recert: Progressing: left hip flex: 4+/5, right hip flex: 4/5, B Hip Abd: 3+/5      4.   Pt will score at least predicted value on FOTO in order to improve overall function, decrease pain, and facilitate return to PLOF.               Recert: Progressing: Progressin pts       5.   Pt will demonstrate 3/5 PF strength (ability to perform 1 single HR) in order to improve her ability to ambulate with LRAD              Recert: Progressin+/3               Remains: 2+/5     6.   Pt will score at least 15/28 on miniBEST test in order to reduce risk of falls              Recert: Progressin/46     PLAN  [x]  Upgrade activities as tolerated     [x]  Continue plan of care  [x]  Update interventions per flow sheet       []  Discharge due to:_  []  Other:_      Paulina Hammond DPT, CSCS 2021  2:28 PM    Future Appointments   Date Time Provider Gaby Arcei   2021  1:30 PM Tomasz Funes DPT [facility]   [facility]21  [facility]   2022  1:45 PM Janae August MD [facility] AM[facility]   2022 11:00 AM Carlos Bowman, [address]

## 2021-12-24 ENCOUNTER — TRANSCRIBE ORDER (OUTPATIENT)
Dept: SCHEDULING | Age: 76
End: 2021-12-24

## 2021-12-24 DIAGNOSIS — K86.89 SUBCUTANEOUS NODULAR FAT NECROSIS IN PANCREATITIS: Primary | ICD-10-CM

## 2021-12-27 ENCOUNTER — HOSPITAL ENCOUNTER (OUTPATIENT)
Dept: PHYSICAL THERAPY | Age: 76
Discharge: HOME | End: 2021-12-27
Payer: MEDICARE

## 2021-12-27 PROCEDURE — 97112 NEUROMUSCULAR REEDUCATION: CPT

## 2021-12-27 PROCEDURE — 97110 THERAPEUTIC EXERCISES: CPT

## 2021-12-27 PROCEDURE — 97530 THERAPEUTIC ACTIVITIES: CPT

## 2021-12-27 NOTE — PROGRESS NOTES
PT DAILY TREATMENT NOTE     Patient Name: Yulissa Yuan  Date:2021  : 1945  [x]  Patient  Verified  Payor: Luis Eduardo Shelley / Plan: [place] MEDICARE PART A & B / Product Type: Medicare /    In time:1:30P Out time: 2:10P  Total Treatment Time (min): 40  Visit #: 5 of 10    Medicare/BCBS Only   Total Timed Codes (min):  40 1:1 Treatment Time:  40       Treatment Area: Other abnormalities of gait and mobility [R26.89]    SUBJECTIVE  Pain Level (0-10 scale): 3  Any medication changes, allergies to medications, adverse drug reactions, diagnosis change, or new procedure performed?: [x] No    [] Yes (see summary sheet for update)  Subjective functional status/changes:   [] No changes reported  Pt reports regular continued compliance with home program, w/ no new concerns at this time.      OBJECTIVE    Modality rationale: patient declined   Min Type Additional Details    [] Estim:  []Unatt       []IFC  []Premod                        []Other:  []w/ice   []w/heat  Position:  Location:    [] Estim: []Att    []TENS instruct  []NMES                    []Other:  []w/US   []w/ice   []w/heat  Position:  Location:    []  Traction: [] Cervical       []Lumbar                       [] Prone          []Supine                       []Intermittent   []Continuous Lbs:  [] before manual  [] after manual    []  Ultrasound: []Continuous   [] Pulsed                           []1MHz   []3MHz W/cm2:  Location:    []  Iontophoresis with dexamethasone         Location: [] Take home patch   [] In clinic    []  Ice     []  heat  []  Ice massage  []  Laser   []  Anodyne Position:  Location:    []  Laser with stim  []  Other:  Position:  Location:    []  Vasopneumatic Device    []  Right     []  Left  Pre-treatment girth:  Post-treatment girth:  Measured at (location):  Pressure:       [] lo [] med [] hi   Temperature: [] lo [] med [] hi   [] Skin assessment post-treatment:  []intact []redness- no adverse reaction    []redness - adverse reaction:     10 min Therapeutic Exercise:  [x] See flow sheet :   Rationale: increase ROM and increase strength to improve the patients ability to perform ADLs    10 min Therapeutic Activity:  [x]  See flow sheet : standing functional LE strengh; sit to stands   Rationale: increase ROM, increase strength, improve coordination, improve balance and increase proprioception  to improve the patients ability to improve mobility and ADL performance      20 min Neuromuscular Re-education:  [x]  See flow sheet : balance, ankle and hp strategy training    Rationale: increase ROM, increase strength, improve coordination, improve balance and increase proprioception  to improve the patients ability to improve mobility and decrease fall risk        With   [x] TE   [x] TA   [x] neuro   [] other: Patient Education: [x] Review HEP    [] Progressed/Changed HEP based on:   [x] positioning   [x] body mechanics   [] transfers   [] heat/ice application    [] other:      Other Objective/Functional Measures:   600' w/o AD, CGA - occational verbal cuing for improved heel strike/recriprocal arm swing  x5 sit <>stand (low chair): 12 sec    Pain Level (0-10 scale) post treatment: 2    ASSESSMENT/Changes in Function: Improvements in B LE strength/power, endurance and fall risk evident by objective measures noted above. Continues to require occasional cuing for appropriate stepping strategy during static/dynmaic balance ex, otherwise pt appears to making steady progress toward LTGs . Will continue to progress program as able and tolerated.      Patient will continue to benefit from skilled PT services to modify and progress therapeutic interventions, address functional mobility deficits, address ROM deficits, address strength deficits, analyze and address soft tissue restrictions, analyze and cue movement patterns, analyze and modify body mechanics/ergonomics, assess and modify postural abnormalities, address imbalance/dizziness and instruct in home and community integration to attain remaining goals. [x]  See Plan of Care  []  See progress note/recertification  []  See Discharge Summary         Progress towards goals / Updated goals: 1.   Pt will demonstrate 5x sit to  < 10 seconds or less in order to reduce risk of falls               Recert: Progressin seconds    Current: Progressing: low chair - 12 seconds       2.   Pt will demonstrate bilateral knee strength to 5/5 in order to improve ability to ascend/descend curbs in the community.                Recert: Progressing: B flexion: 4+/5, left ext: 4+/5, right ext: 4/5      3.   Pt will demonstrate bilateral hip flexion and abduction  knee strength minimum 5-/5 and 4/5, respectively,  in order to improve ambulation distances for ADLs and community engagement.               Recert: Progressing: left hip flex: 4+/5, right hip flex: 4/5, B Hip Abd: 3+/5      4.   Pt will score at least predicted value on FOTO in order to improve overall function, decrease pain, and facilitate return to PLOF.               Recert: Progressing: Progressin pts       5.   Pt will demonstrate 3/5 PF strength (ability to perform 1 single HR) in order to improve her ability to ambulate with LRAD              Recert: Progressin+/6               Remains: 2+/5     6.   Pt will score at least 15/28 on miniBEST test in order to reduce risk of falls              Recert: Progressin/87     PLAN  [x]  Upgrade activities as tolerated     [x]  Continue plan of care  [x]  Update interventions per flow sheet       []  Discharge due to:_  []  Other:_      Clarissa Doyle DPT, [facility] 2021  2:28 PM    Future Appointments   Date Time Provider Gaby Delarosa   2021  1:30 PM Ewa Pack PT [facility]   2022  1:00 PM [facility] MRI RM 1 [facility]   2022  1:45 PM Bekah Vance MD [facility] AMB   2022 11:00 AM [facility] Headings, [address] [facility]

## 2021-12-29 ENCOUNTER — HOSPITAL ENCOUNTER (OUTPATIENT)
Dept: PHYSICAL THERAPY | Age: 76
Discharge: HOME | End: 2021-12-29
Payer: MEDICARE

## 2021-12-29 PROCEDURE — 97530 THERAPEUTIC ACTIVITIES: CPT

## 2021-12-29 PROCEDURE — 97112 NEUROMUSCULAR REEDUCATION: CPT

## 2021-12-29 NOTE — PROGRESS NOTES
PT DAILY TREATMENT NOTE     Patient Name: Fina Deleon  Date:2021  : 1945  [x]  Patient  Verified  Payor: Stephanie Nones / Plan: [place] MEDICARE PART A & B / Product Type: Medicare /    In time:130  Out time:210  Total Treatment Time (min): 40  Visit #: 6 of 10    Medicare/BCBS Only   Total Timed Codes (min):  40 1:1 Treatment Time:  40       Treatment Area: Other abnormalities of gait and mobility [R26.89]    SUBJECTIVE  Pain Level (0-10 scale): 3  Any medication changes, allergies to medications, adverse drug reactions, diagnosis change, or new procedure performed?: [x] No    [] Yes (see summary sheet for update)  Subjective functional status/changes:   [] No changes reported  \"I had a rough day yesterday, but feel better today. \"    OBJECTIVE    Modality rationale: patient declined   Min Type Additional Details    [] Estim:  []Unatt       []IFC  []Premod                        []Other:  []w/ice   []w/heat  Position:  Location:    [] Estim: []Att    []TENS instruct  []NMES                    []Other:  []w/US   []w/ice   []w/heat  Position:  Location:    []  Traction: [] Cervical       []Lumbar                       [] Prone          []Supine                       []Intermittent   []Continuous Lbs:  [] before manual  [] after manual    []  Ultrasound: []Continuous   [] Pulsed                           []1MHz   []3MHz W/cm2:  Location:    []  Iontophoresis with dexamethasone         Location: [] Take home patch   [] In clinic    []  Ice     []  heat  []  Ice massage  []  Laser   []  Anodyne Position:  Location:    []  Laser with stim  []  Other:  Position:  Location:    []  Vasopneumatic Device    []  Right     []  Left  Pre-treatment girth:  Post-treatment girth:  Measured at (location):  Pressure:       [] lo [] med [] hi   Temperature: [] lo [] med [] hi   [] Skin assessment post-treatment:  []intact []redness- no adverse reaction    []redness - adverse reaction:     15 min Therapeutic Activity:  [x] See flow sheet : standing functional LE strengh; sit to stands   Rationale: increase ROM, increase strength, improve coordination, improve balance and increase proprioception  to improve the patients ability to improve mobility and ADL performance     25 min Neuromuscular Re-education:  [x]  See flow sheet : balance, ankle and hip strategy training    Rationale: increase ROM, increase strength, improve coordination, improve balance and increase proprioception  to improve the patients ability to improve mobility, stance stability, gait, and decrease fall risk        With   [x] TE   [x] TA   [x] neuro   [] other: Patient Education: [x] Review HEP    [] Progressed/Changed HEP based on:   [x] positioning   [x] body mechanics   [] transfers   [] heat/ice application    [] other:      Other Objective/Functional Measures:      Pain Level (0-10 scale) post treatment: 2    ASSESSMENT/Changes in Function: Pt is making progress with her balance and activity tolerance. She was able to progress dynamic activities. Still challenged with ankle and hip strategies. Obstacle course was a challenge when walking on long airex. Patient will continue to benefit from skilled PT services to modify and progress therapeutic interventions, address functional mobility deficits, address ROM deficits, address strength deficits, analyze and address soft tissue restrictions, analyze and cue movement patterns, analyze and modify body mechanics/ergonomics, assess and modify postural abnormalities, address imbalance/dizziness and instruct in home and community integration to attain remaining goals. [x]  See Plan of Care  []  See progress note/recertification  []  See Discharge Summary         Progress towards goals / Updated goals:   1.   Pt will demonstrate 5x sit to  < 10 seconds or less in order to reduce risk of falls               Recert: Progressin seconds               Current: Progressing: low chair - 12 seconds     2.   Pt will demonstrate bilateral knee strength to 5/5 in order to improve ability to ascend/descend curbs in the community.                Recert: Progressing: B flexion: 4+/5, left ext: 4+/5, right ext: 4/5     Making progress  3.   Pt will demonstrate bilateral hip flexion and abduction  knee strength minimum 5-/5 and 4/5, respectively,  in order to improve ambulation distances for ADLs and community engagement.               Recert: Progressing: left hip flex: 4+/5, right hip flex: 4/5, B Hip Abd: 3+/5     Making progress  4.   Pt will score at least predicted value on FOTO in order to improve overall function, decrease pain, and facilitate return to PLOF.               Recert: Progressing: Progressin pts      Assess at 30 day aleena  5.   Pt will demonstrate 3/5 PF strength (ability to perform 1 single HR) in order to improve her ability to ambulate with LRAD              Recert: Progressin+/5               Remains: 2+/5     6.   Pt will score at least 15/28 on miniBEST test in order to reduce risk of falls              Recert: Progressin/59    Assess at 30 day aleena    PLAN  []  Upgrade activities as tolerated     [x]  Continue plan of care  []  Update interventions per flow sheet       []  Discharge due to:_  []  Other:_      Bladimir Maldonado PTA, CSCS 2021  2:13 PM    Future Appointments   Date Time Provider Gaby Delarosa   1/3/2022 12:00 PM Alexandre Olea [facility]   2022 12:00 PM Alexandre Olea [facility]   2022  1:00 PM [facility] MRI RM 1 [facility]   2022 12:00 [facility]   2022  1:45 PM Radha Hsu MD [facility] AM[facility]   2022 11:00 AM Adriano Panchal,  [address]

## 2022-01-03 ENCOUNTER — HOSPITAL ENCOUNTER (OUTPATIENT)
Dept: PHYSICAL THERAPY | Age: 77
Discharge: HOME | End: 2022-01-03
Payer: MEDICARE

## 2022-01-03 PROCEDURE — 97110 THERAPEUTIC EXERCISES: CPT

## 2022-01-03 PROCEDURE — 97530 THERAPEUTIC ACTIVITIES: CPT

## 2022-01-03 PROCEDURE — 97112 NEUROMUSCULAR REEDUCATION: CPT

## 2022-01-03 NOTE — PROGRESS NOTES
PT DAILY TREATMENT NOTE     Patient Name: Brandy Villasenor  IMCV:  : 1945  [x]  Patient  Verified  Payor: [place] MEDICARE / Plan: [place] MEDICARE PART A & B / Product Type: Medicare /    In time:1200  Out time:1243  Total Treatment Time (min): 43  Visit #: 7 of 10    Medicare/BCBS Only   Total Timed Codes (min):  43 1:1 Treatment Time:  43       Treatment Area: Other abnormalities of gait and mobility [R26.89]    SUBJECTIVE  Pain Level (0-10 scale): 2  Any medication changes, allergies to medications, adverse drug reactions, diagnosis change, or new procedure performed?: [x] No    [] Yes (see summary sheet for update)  Subjective functional status/changes:   [] No changes reported  Patient reports she's feeling pretty good today. OBJECTIVE    5 min Therapeutic Exercise:  [x] See flow sheet :   Rationale: increase ROM and increase strength to improve the patients ability to increase activity tolerance    13 min Therapeutic Activity:  [x]  See flow sheet : obstacle/stair negotiation, STS   Rationale: increase ROM, increase strength and improve coordination  to improve the patients ability to improve community ambulation/negotiation     25 min Neuromuscular Re-education:  [x]  See flow sheet : balance training, NC and dual task challenges with ambulation   Rationale: increase strength, improve coordination, improve balance and increase proprioception  to improve the patients ability to reduce risk for falls          With   [] TE   [] TA   [] neuro   [] other: Patient Education: [x] Review HEP    [] Progressed/Changed HEP based on:   [] positioning   [] body mechanics   [] transfers   [] heat/ice application    [] other:      Other Objective/Functional Measures:      Pain Level (0-10 scale) post treatment: 1    ASSESSMENT/Changes in Function: Patient able to increase ambulation distance without AD with CGA to 800ft today.  She was challenged with obstacle course negotiation requiring occasional Min A to correct LOB and HHA. Demonstrates good control with STS from low chair and no LOB with NC/dual task challenges. Patient will continue to benefit from skilled PT services to modify and progress therapeutic interventions, address functional mobility deficits, address ROM deficits, address strength deficits, analyze and address soft tissue restrictions, analyze and cue movement patterns, analyze and modify body mechanics/ergonomics, assess and modify postural abnormalities, address imbalance/dizziness and instruct in home and community integration to attain remaining goals. []  See Plan of Care  []  See progress note/recertification  []  See Discharge Summary         Progress towards goals / Updated goals: 1.   Pt will demonstrate 5x sit to  < 10 seconds or less in order to reduce risk of falls               Recert: Progressin seconds               Current: Progressing: low chair - 12 seconds       2.   Pt will demonstrate bilateral knee strength to 5/5 in order to improve ability to ascend/descend curbs in the community.                Recert: Progressing: B flexion: 4+/5, left ext: 4+/5, right ext: 4/5               Making progress  3.   Pt will demonstrate bilateral hip flexion and abduction  knee strength minimum 5-/5 and 4/5, respectively,  in order to improve ambulation distances for ADLs and community engagement.               Recert: Progressing: left hip flex: 4+/5, right hip flex: 4/5, B Hip Abd: 3+/5               Making progress  4.   Pt will score at least predicted value on FOTO in order to improve overall function, decrease pain, and facilitate return to PLOF.               Recert: Progressing: Progressin pts                Assess at 30 day aleena  5.   Pt will demonstrate 3/5 PF strength (ability to perform 1 single HR) in order to improve her ability to ambulate with LRAD              Recert: Progressin+/7               Remains: 2+/5     6.   Pt will score at least 15/28 on miniBEST test in order to reduce risk of falls              Recert: Progressin/00               Assess at 30 day aleena    PLAN  []  Upgrade activities as tolerated     []  Continue plan of care  []  Update interventions per flow sheet       []  Discharge due to:_  []  Other:_      Kimberly Holguin, PTA 1/3/2022  11:50 AM    Future Appointments   Date Time Provider Gaby Delarosa   1[facility]2022 12:00 [facility]   2022 12:00 PM [facility]   2022  1:00 PM [facility] MRI RM 1 [facility]   2022 12:00 PM [facility]   2022  1:45 PM Luis Munoz MD [facility] AMB   2022 11:00 AM Isauro Crum, [address]

## 2022-01-05 ENCOUNTER — HOSPITAL ENCOUNTER (OUTPATIENT)
Dept: PHYSICAL THERAPY | Age: 77
Discharge: HOME | End: 2022-01-05
Payer: MEDICARE

## 2022-01-05 PROCEDURE — 97116 GAIT TRAINING THERAPY: CPT

## 2022-01-05 PROCEDURE — 97110 THERAPEUTIC EXERCISES: CPT

## 2022-01-05 PROCEDURE — 97530 THERAPEUTIC ACTIVITIES: CPT

## 2022-01-05 PROCEDURE — 97112 NEUROMUSCULAR REEDUCATION: CPT

## 2022-01-05 NOTE — PROGRESS NOTES
PT DAILY TREATMENT NOTE     Patient Name: Ryan Oconnor  Date:2022  : 1945  [x]  Patient  Verified  Payor: [place] MEDICARE / Plan: [place] MEDICARE PART A & B / Product Type: Medicare /    In time:1200  Out time:1245  Total Treatment Time (min): 45  Visit #: 8 of 10    Medicare/BCBS Only   Total Timed Codes (min):  45 1:1 Treatment Time:  45       Treatment Area: Other abnormalities of gait and mobility [R26.89]    SUBJECTIVE  Pain Level (0-10 scale): 4  Any medication changes, allergies to medications, adverse drug reactions, diagnosis change, or new procedure performed?: [x] No    [] Yes (see summary sheet for update)  Subjective functional status/changes:   [] No changes reported  Patient reports she didn't sleep well last night and her legs are a little tired today.     OBJECTIVE    10 min Therapeutic Exercise:  [x] See flow sheet :   Rationale: increase ROM and increase strength to improve the patients ability to increase activity tolerance     15 min Neuromuscular Re-education:  [x]  See flow sheet : balance challenges   Rationale: increase strength, improve coordination, improve balance and increase proprioception  to improve the patients ability to Increase stability in stance and with ambulation    20 min Gait Trainin feet without device on level surfaces with SBA level of assist; 200 feet without AD carrying water with SBA   Rationale: to allow patient to ambulate with LRAD or no AD independently          With   [] TE   [] TA   [] neuro   [] other: Patient Education: [x] Review HEP    [] Progressed/Changed HEP based on:   [] positioning   [] body mechanics   [] transfers   [] heat/ice application    [] other:      Other Objective/Functional Measures: slant balance 30\" CGA, 30\" SBA; water carry SBA, ambulating short distances in clinic without AD with SBA    Pain Level (0-10 scale) post treatment: 2    ASSESSMENT/Changes in Function: Patient is progressing well with balance challenges, self-correcting, and ambulating without AD. Able to complete all gait training with SBA vs CGA today. Some balance challenges performed with SBA vs CGA as well. Patient will continue to benefit from skilled PT services to modify and progress therapeutic interventions, address functional mobility deficits, address ROM deficits, address strength deficits, analyze and address soft tissue restrictions, analyze and cue movement patterns, analyze and modify body mechanics/ergonomics, assess and modify postural abnormalities, address imbalance/dizziness and instruct in home and community integration to attain remaining goals. []  See Plan of Care  []  See progress note/recertification  []  See Discharge Summary         Progress towards goals / Updated goals: 1.   Pt will demonstrate 5x sit to  < 10 seconds or less in order to reduce risk of falls               Recert: Progressin seconds               Current: Progressing: low chair - 12 seconds       2.   Pt will demonstrate bilateral knee strength to 5/5 in order to improve ability to ascend/descend curbs in the community.                Recert: Progressing: B flexion: 4+/5, left ext: 4+/5, right ext: 4/5               Making progress  3.   Pt will demonstrate bilateral hip flexion and abduction  knee strength minimum 5-/5 and 4/5, respectively,  in order to improve ambulation distances for ADLs and community engagement.               Recert: Progressing: left hip flex: 4+/5, right hip flex: 4/5, B Hip Abd: 3+/5               Making progress  4.   Pt will score at least predicted value on FOTO in order to improve overall function, decrease pain, and facilitate return to PLOF.               Recert: Progressing: Progressin pts                Assess at 30 day aleena  5.   Pt will demonstrate 3/5 PF strength (ability to perform 1 single HR) in order to improve her ability to ambulate with LRAD              Recert: Progressin+/4               Remains: 2+/5     6.   Pt will score at least 15/28 on miniBEST test in order to reduce risk of falls              Recert: Progressin/70               Assess at 30 day aleena    PLAN  []  Upgrade activities as tolerated     []  Continue plan of care  []  Update interventions per flow sheet       []  Discharge due to:_  []  Other:_      Bobby Rajiv, PTA 2022  12:06 PM    Future Appointments   Date Time Provider Gaby Delarosa   2022 12:00 PM Bharath Tapia, PT [facility]   2022  4:00 PM [facility] MRI RM 1 [facility]   2022  1:45 PM Chantell Hernandez MD [facility]   2022 11:00 AM Tone Hudson, [address]

## 2022-01-12 ENCOUNTER — HOSPITAL ENCOUNTER (OUTPATIENT)
Dept: PHYSICAL THERAPY | Age: 77
Discharge: HOME | End: 2022-01-12
Payer: MEDICARE

## 2022-01-12 PROCEDURE — 97112 NEUROMUSCULAR REEDUCATION: CPT

## 2022-01-12 PROCEDURE — 97530 THERAPEUTIC ACTIVITIES: CPT

## 2022-01-12 NOTE — PROGRESS NOTES
[facility] - [facility]  [address] [place], [address] [phone] fax    Continued Plan of Care/ Re-certification for Physical Therapy Services    Patient name: Jennifer Bryant Start of Care: 2021   Referral source: Kya Jones MD : 1945                Medical Diagnosis: Other abnormalities of gait and mobility [R26.89]  Payor: [place] MEDICARE / Plan: [place] MEDICARE PART A & B / Product Type: Medicare /  Onset Date: with exacerbation 10/18/21                Treatment Diagnosis:  Abnormalities of gait   Prior Hospitalization: see medical history Provider#: [id number]   Medications: Verified on Patient summary List    Comorbidities: 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 Visits from Start of Care: 18    Missed Visits: 0    The Plan of Care and following information is based on the patient's current status:  1.   Pt will demonstrate 5x sit to  < 10 seconds or less in order to reduce risk of falls               Recert: Progressin seconds               PROGRESSING; 10 seconds   2.   Pt will demonstrate bilateral knee strength to 5/5 in order to improve ability to ascend/descend curbs in the community.                Recert: Progressing: B flexion: 4+/5, left ext: 4+/5, right ext: 4/5               PROGRESSING; B flexion 5/5, left ext: 5/5, right ext 4+/5  3.   Pt will demonstrate bilateral hip flexion and abduction  knee strength minimum 5-/5 and 4/5, respectively,  in order to improve ambulation distances for ADLs and community engagement.               Recert: Progressing: left hip flex: 4+/5, right hip flex: 4/5, B Hip Abd: 3+/5               PROGRESSING; left hip flexion 4+/5, right hip flexion 4/5, B hip abduction 4/5  4.   Pt will score at least predicted value on FOTO in order to improve overall function, decrease pain, and facilitate return to PLOF.               Recert: Progressing: Progressin pts                NOT MET; 31  5.   Pt will demonstrate 3/5 PF strength (ability to perform 1 single HR) in order to improve her ability to ambulate with LRAD              Recert: Progressin+/3               NOT MET; remains 2+/5  6.   Pt will score at least 15/28 on miniBEST test in order to reduce risk of falls              Recert: Progressin/64               MET;       Key functional changes:   Functional Gains: standing tolerance, washing dishes, walking with rollator  Functional Deficits: driving, walking without AD for longer distances, stairs independently  % improvement: 54%  Pain   Average: 4/10                  Best: 1/10                Worst: 6/10  Patient Goal: \"to walk and drive\"     Problems/ barriers to goal attainment: comorbidities, pain    Problem List: pain affecting function, decrease ROM, decrease strength, impaired gait/ balance, decrease ADL/ functional abilitiies, decrease activity tolerance, decrease flexibility/ joint mobility and decrease transfer abilities    Treatment Plan: Therapeutic exercise, Therapeutic activities, Neuromuscular re-education, Physical agent/modality, Gait/balance training, Manual therapy, Aquatic therapy, Patient education, Self Care training, Functional mobility training, Home safety training and Stair training     Goals for this certification period to be accomplished in 4 weeks: 1.   Pt will demonstrate 5x sit to  < 10 seconds or less in order to reduce risk of falls               Recert: Progressing:  PROGRESSING; 10 seconds   2.   Pt will demonstrate right knee extension strength to 5/5 in order to improve ability to ascend/descend curbs in the community.                Recert: updated goal: right ext 4+/5  3.   Pt will demonstrate bilateral hip flexion  minimum 5-/5 in order to improve ambulation distances for ADLs and community engagement.               Recert: updated goal; left hip flexion 4+/5, right hip flexion 4/5  4.   Pt will score at least predicted value on FOTO in order to improve overall function, decrease pain, and facilitate return to PLOF.               Recert: NOT MET; 31  5.   Pt will demonstrate 3/5 PF strength (ability to perform 1 single HR) in order to improve her ability to ambulate with LRAD              Recert: NOT MET; remains 2+/5    Frequency / Duration: Patient to be seen 2 times per week for 10 treatments:    Assessment / Recommendations: Ms. Bryn Cam reports 54% improvement since beginning therapy. Pt is making progress with her static balance, but is still challenged with more dynamic activities. Her miniBEST score improved compared to previous assessments. Still has altered gait when not using her rollator and needs some cuing for safety during transfers. We will continue with PT to address her remaining functional deficits. Certification Period: 1/13/22-2/10/22    Garrick Alvarez, PT 1/12/2022 1:38 PM    ________________________________________________________________________  I certify that the above Therapy Services are being furnished while the patient is under my care. I agree with the treatment plan and certify that this therapy is necessary. [] I have read the above and request that my patient continue as recommended.   [] I have read the above report and request that my patient continue therapy with the following changes/special instructions: _____________________________________________  [] I have read the above report and request that my patient be discharged from therapy    Physician's Signature:____________Date:_________TIME:________     Catalina Mireles MD  ** Signature, Date and Time must be completed for valid certification **    Please sign and return to [facility] - [facility]  [address], [address] [phone] fax

## 2022-01-12 NOTE — PROGRESS NOTES
PT DAILY TREATMENT NOTE     Patient Name: Bessy Galeas  Date:2022  : 1945  [x]  Patient  Verified  Payor: [place] MEDICARE / Plan: [place] MEDICARE PART A & B / Product Type: Medicare /    In BTDA:[id number]  Out time:1240  Total Treatment Time (min): 41  Visit #: 1 of 10    Medicare/BCBS Only   Total Timed Codes (min):  41 1:1 Treatment Time:  41       Treatment Area: Other abnormalities of gait and mobility [R26.89]    SUBJECTIVE  Pain Level (0-10 scale): 2  Any medication changes, allergies to medications, adverse drug reactions, diagnosis change, or new procedure performed?: [x] No    [] Yes (see summary sheet for update)  Subjective functional status/changes:   [] No changes reported  \"The exercises help with pain. \"    OBJECTIVE    Modality rationale: patient declined   Min Type Additional Details    [] Estim:  []Unatt       []IFC  []Premod                        []Other:  []w/ice   []w/heat  Position:  Location:    [] Estim: []Att    []TENS instruct  []NMES                    []Other:  []w/US   []w/ice   []w/heat  Position:  Location:    []  Traction: [] Cervical       []Lumbar                       [] Prone          []Supine                       []Intermittent   []Continuous Lbs:  [] before manual  [] after manual    []  Ultrasound: []Continuous   [] Pulsed                           []1MHz   []3MHz W/cm2:  Location:    []  Iontophoresis with dexamethasone         Location: [] Take home patch   [] In clinic    []  Ice     []  heat  []  Ice massage  []  Laser   []  Anodyne Position:  Location:    []  Laser with stim  []  Other:  Position:  Location:    []  Vasopneumatic Device    []  Right     []  Left  Pre-treatment girth:  Post-treatment girth:  Measured at (location):  Pressure:       [] lo [] med [] hi   Temperature: [] lo [] med [] hi   [] Skin assessment post-treatment:  []intact []redness- no adverse reaction    []redness - adverse reaction:     16 min Therapeutic Activity:  [x]  See flow sheet : FOTO, reassessment, functional standing activities, sit to stands   Rationale: increase ROM and increase strength  to improve the patients ability to perform ADLs     25 min Neuromuscular Re-education:  [x]  See flow sheet : miniBEST test, balance training   Rationale: increase ROM, increase strength, improve coordination, improve balance and increase proprioception  to improve the patients ability to improve mobility and decrease fall risk         With   [] TE   [x] TA   [x] neuro   [] other: Patient Education: [x] Review HEP    [] Progressed/Changed HEP based on:   [x] positioning   [x] body mechanics   [] transfers   [] heat/ice application    [] other:      Other Objective/Functional Measures:   FOTO 31    miniBEST 17/28    5x sit to stand = 10 seconds    MMT right hip flexion 4/5  MMT left hip flexion 4+/5    MMT right hip abduction 4/5  MMT left hip abduction 4/5    MMT right knee flexion 5/5  MMT left knee flexion 5/5    MMT right knee extension 4+/5  MMT left knee extension 5/5    MMT PF 2+/5     Pain Level (0-10 scale) post treatment: 1    ASSESSMENT/Changes in Function: Ms. Elva Cranker reports 54% improvement since beginning therapy. Pt is making progress with her static balance, but is still challenged with more dynamic activities. Her miniBEST score improved compared to previous assessments. Still has altered gait when not using her rollator and needs some cuing for safety during transfers. We will continue with PT to address her remaining functional deficits.     Patient will continue to benefit from skilled PT services to modify and progress therapeutic interventions, address functional mobility deficits, address ROM deficits, address strength deficits, analyze and address soft tissue restrictions, analyze and cue movement patterns, analyze and modify body mechanics/ergonomics, assess and modify postural abnormalities, address imbalance/dizziness and instruct in home and community integration to attain remaining goals. [x]  See Plan of Care  [x]  See progress note/recertification  []  See Discharge Summary         Progress towards goals / Updated goals: 1.   Pt will demonstrate 5x sit to  < 10 seconds or less in order to reduce risk of falls               Recert: Progressin seconds               PROGRESSING; 10 seconds      2.   Pt will demonstrate bilateral knee strength to 5/5 in order to improve ability to ascend/descend curbs in the community.                Recert: Progressing: B flexion: 4+/5, left ext: 4+/5, right ext: 4/5               PROGRESSING; B flexion 5/5, left ext: 5/5, right ext 4+/5  3.   Pt will demonstrate bilateral hip flexion and abduction  knee strength minimum 5-/5 and 4/5, respectively,  in order to improve ambulation distances for ADLs and community engagement.               Recert: Progressing: left hip flex: 4+/5, right hip flex: 4/5, B Hip Abd: 3+/5               PROGRESSING; left hip flexion 4+/5, right hip flexion 4/5, B hip abduction 4/5  4.   Pt will score at least predicted value on FOTO in order to improve overall function, decrease pain, and facilitate return to PLOF.               Recert: Progressing: Progressin pts                NOT MET; 31  5.   Pt will demonstrate 3/5 PF strength (ability to perform 1 single HR) in order to improve her ability to ambulate with LRAD              Recert: Progressin+/9               NOT MET; remains 2+/5  6.   Pt will score at least 15/28 on miniBEST test in order to reduce risk of falls              Recert: Progressin/79               MET; 17/28    Functional Gains: standing tolerance, washing dishes, walking with rollator  Functional Deficits: driving, walking without AD for longer distances, stairs independently  % improvement: 54%  Pain   Average: 4/10       Best: 1/10     Worst: 6/10  Patient Goal: \"to walk and drive\"    PLAN  []  Upgrade activities as tolerated     [x]  Continue plan of care  []  Update interventions per flow sheet       []  Discharge due to:_  []  Other:_      Lisa Mason PTA, CSCS 1/12/2022  1:02 PM    Future Appointments   Date Time Provider Gaby Delarosa   1/14/2022  4:00 PM [facility] MRI RM 1 [facility]   5/17/2022  1:45 PM Ryan Brock MD [facility]   6/2/2022 11:00 AM JULIA Stephens

## 2022-01-17 ENCOUNTER — HOSPITAL ENCOUNTER (OUTPATIENT)
Dept: PHYSICAL THERAPY | Age: 77
Discharge: HOME | End: 2022-01-17
Payer: MEDICARE

## 2022-01-17 PROCEDURE — 97110 THERAPEUTIC EXERCISES: CPT

## 2022-01-17 PROCEDURE — 97112 NEUROMUSCULAR REEDUCATION: CPT

## 2022-01-17 NOTE — PROGRESS NOTES
PT DAILY TREATMENT NOTE     Patient Name: Hadley Royalty  Date:2022  : 1945  [x]  Patient  Verified  Payor: Arelis Harper / Plan: [place] MEDICARE PART A & B / Product Type: Medicare /    In time: 12:01   Out time: 12:45  Total Treatment Time (min): 44  Visit #: 2 of 10    Medicare/BCBS Only   Total Timed Codes (min): 44 1:1 Treatment Time:  44       Treatment Area: Other abnormalities of gait and mobility [R26.89]    SUBJECTIVE  Pain Level (0-10 scale): 3  Any medication changes, allergies to medications, adverse drug reactions, diagnosis change, or new procedure performed?: [x] No    [] Yes (see summary sheet for update)  Subjective functional status/changes:   [] No changes reported  Pt reports no changes since the last session. OBJECTIVE    10 min Therapeutic Exercise:  [x]  See flow sheet :   Rationale: increase ROM and increase strength  to improve the patients ability to perform ADLs     35 min Neuromuscular Re-education:  [x]  See flow sheet : dynamic gait, balancing exercises   Rationale: increase strength, improve coordination, improve balance and increase proprioception  to improve the patients ability to improve the pt's fall risk         With   [] TE   [x] TA   [x] neuro   [] other: Patient Education: [x] Review HEP    [] Progressed/Changed HEP based on:   [x] positioning   [x] body mechanics   [] transfers   [] heat/ice application    [] other:      Other Objective/Functional Measures: Added exercises per flow sheet to improve strength and balance. Pain Level (0-10 scale) post treatment: 3    ASSESSMENT/Changes in Function: Pt challenged with B SLS and needs 2 fingertouch/HHA for balance and stability. Needed several sitting rest breaks today secondary to increased neck pain and was limited with participation with therapy activities today because of this neck pain. Educated pt to follow up with her doctor regarding her neck pain if it continues to interfere with her mobility. Continue POC as tolerated to improve balance and fall risk. Patient will continue to benefit from skilled PT services to modify and progress therapeutic interventions, address functional mobility deficits, address ROM deficits, address strength deficits, analyze and address soft tissue restrictions, analyze and cue movement patterns, analyze and modify body mechanics/ergonomics, assess and modify postural abnormalities, address imbalance/dizziness and instruct in home and community integration to attain remaining goals. []  See Plan of Care  []  See progress note/recertification  []  See Discharge Summary         Progress towards goals / Updated goals:  Goals for this certification period to be accomplished in 4 weeks: 1.   Pt will demonstrate 5x sit to  < 10 seconds or less in order to reduce risk of falls               Recert: Progressing:  PROGRESSING; 10 seconds   2.   Pt will demonstrate right knee extension strength to 5/5 in order to improve ability to ascend/descend curbs in the community.                Recert: updated goal: right ext 4+/5  3.   Pt will demonstrate bilateral hip flexion  minimum 5-/5 in order to improve ambulation distances for ADLs and community engagement.               Recert: updated goal; left hip flexion 4+/5, right hip flexion 4/5  4.   Pt will score at least predicted value on FOTO in order to improve overall function, decrease pain, and facilitate return to PLOF.               Recert: NOT MET; 31  5.   Pt will demonstrate 3/5 PF strength (ability to perform 1 single HR) in order to improve her ability to ambulate with LRAD              Recert: NOT MET; remains 2+/5    PLAN  [x]  Upgrade activities as tolerated     [x]  Continue plan of care  [x]  Update interventions per flow sheet       []  Discharge due to:_  []  Other:_      Argenis Castano, PT 1/17/2022  12:05 PM    Future Appointments   Date Time Provider Gaby Delarosa   1/20/2022 12:00 PM Betzy Dow Pattie Gay, PT [facility]   1/21/2022  7:00 PM [facility] MRI RM 1 [facility]   1/24/2022 12:00 PM Michael Vences, PT [facility]   1/26/2022 12:00 PM Robinette Blizzard, PT [facility]   1/31/2022 12:00 PM Michael Vences, PT [facility]   2/2/2022 12:00 PM Michael Vences, PT [facility]   2/7/2022 12:00 PM Michael Vences, PT [facility]   2/9/2022 12:00 PM Robinette Blizzard, PT [facility]   5/17/2022  1:45 PM Verenice Herrera MD [facility]   6/2/2022 11:00 AM JULIA Merritt

## 2022-01-20 ENCOUNTER — HOSPITAL ENCOUNTER (OUTPATIENT)
Dept: PHYSICAL THERAPY | Age: 77
Discharge: HOME | End: 2022-01-20
Payer: MEDICARE

## 2022-01-20 PROCEDURE — 97112 NEUROMUSCULAR REEDUCATION: CPT

## 2022-01-20 PROCEDURE — 97530 THERAPEUTIC ACTIVITIES: CPT

## 2022-01-20 NOTE — PROGRESS NOTES
PT DAILY TREATMENT NOTE     Patient Name: Chanelle Mares  ENJX:[id number]  : 1945  [x]  Patient  Verified  Payor: Sandie Wilson / Plan: [place] MED[place] & B / Product Type: Medicare /    In time: 11:51    Out time: 12:44  Total Treatment Time (min): 53  Visit #: 3 of 10    Medicare/BCBS Only   Total Timed Codes (min): 53 1:1 Treatment Time:  53       Treatment Area: Other abnormalities of gait and mobility [R26.89]    SUBJECTIVE  Pain Level (0-10 scale): 3  Any medication changes, allergies to medications, adverse drug reactions, diagnosis change, or new procedure performed?: [x] No    [] Yes (see summary sheet for update)  Subjective functional status/changes:   [] No changes reported  Pt states she has a UTI and started antibiotics and she is feeling better. OBJECTIVE    15 min Therapeutic Activity:  [x]  See flow sheet : exercises, ambulation for endurance in hallway, step over foam   Rationale: increase strength, improve coordination, improve balance and increase proprioception  to improve the patients ability to tolerate functional activities     38 min Neuromuscular Re-education:  [x]  See flow sheet : foam and balancing exercises   Rationale: increase strength, improve coordination, improve balance and increase proprioception  to improve the patients ability to improve the pt's fall risk         With   [] TE   [x] TA   [x] neuro   [] other: Patient Education: [x] Review HEP    [] Progressed/Changed HEP based on:   [x] positioning   [x] body mechanics   [] transfers   [] heat/ice application    [] other:      Other Objective/Functional Measures: Added exercises per flow sheet to improve strength, balance and endurance. Pain Level (0-10 scale) post treatment: 2    ASSESSMENT/Changes in Function: Reported improvement in pain post session today. Improved tolerance to therapy interventions today than in previous sessions.  Pt demonstrates limited endurance with standing activities secondary to needing sitting rest breaks. She had 1 LOB with ambulation in the hallway on the last lap but was able to recover balance without help from therapist. Continue POC as tolerated to improve balance/strength and endurance with daily activities. Patient will continue to benefit from skilled PT services to modify and progress therapeutic interventions, address functional mobility deficits, address ROM deficits, address strength deficits, analyze and address soft tissue restrictions, analyze and cue movement patterns, analyze and modify body mechanics/ergonomics, assess and modify postural abnormalities, address imbalance/dizziness and instruct in home and community integration to attain remaining goals. []  See Plan of Care  []  See progress note/recertification  []  See Discharge Summary         Progress towards goals / Updated goals:  Goals for this certification period to be accomplished in 4 weeks: 1.   Pt will demonstrate 5x sit to  < 10 seconds or less in order to reduce risk of falls               Recert: Progressing:  PROGRESSING; 10 seconds   2.   Pt will demonstrate right knee extension strength to 5/5 in order to improve ability to ascend/descend curbs in the community.                Recert: updated goal: right ext 4+/5   Tolerating progression of exercises  3.   Pt will demonstrate bilateral hip flexion  minimum 5-/5 in order to improve ambulation distances for ADLs and community engagement.               Recert: updated goal; left hip flexion 4+/5, right hip flexion 4/5   Tolerating progression of exercises  4.   Pt will score at least predicted value on FOTO in order to improve overall function, decrease pain, and facilitate return to PLOF.               Recert: NOT MET; 31  5.   Pt will demonstrate 3/5 PF strength (ability to perform 1 single HR) in order to improve her ability to ambulate with LRAD              Recert: NOT MET; remains 2+/5   Tolerating progression of exercises    PLAN  [x]  Upgrade activities as tolerated     [x]  Continue plan of care  [x]  Update interventions per flow sheet       []  Discharge due to:_  []  Other:_      Raymundo Seo, PT 1/20/2022  11:57 AM    Future Appointments   Date Time Provider Gaby Delarosa   1/20/2022 12:00 PM Rella Aw, PT [facility]   1/21/2022  7:00 PM [facility] MRI RM 1 [facility]   1/24/2022 12:00 PM Darrold Rader, PT [facility]   1/26/2022 12:00 PM Rella Aw, PT [facility]   1/31/2022 12:00 PM Darrold Rader, PT [facility]   2/2/2022 12:00 PM Darrold Rader, PT [facility]   2/7/2022 12:00 PM Darrold Rader, PT [facility]   2/9/2022 12:00 PM Rella Aw, PT [facility]   5/17/2022  1:45 PM Rony Reyes MD [facility]   6/2/2022 11:00 AM JULIA Mistry [address]

## 2022-01-21 ENCOUNTER — HOSPITAL ENCOUNTER (OUTPATIENT)
Dept: MRI IMAGING | Age: 77
Discharge: HOME | End: 2022-01-21
Attending: INTERNAL MEDICINE
Payer: MEDICARE

## 2022-01-21 DIAGNOSIS — K86.89 SUBCUTANEOUS NODULAR FAT NECROSIS IN PANCREATITIS: ICD-10-CM

## 2022-01-21 LAB — CREAT UR-MCNC: 0.8 MG/DL (ref 0.6–1.3)

## 2022-01-21 PROCEDURE — A9577 INJ MULTIHANCE: HCPCS | Performed by: INTERNAL MEDICINE

## 2022-01-21 PROCEDURE — 74011250636 HC RX REV CODE- 250/636: Performed by: INTERNAL MEDICINE

## 2022-01-21 PROCEDURE — 82565 ASSAY OF CREATININE: CPT

## 2022-01-21 PROCEDURE — 74183 MRI ABD W/O CNTR FLWD CNTR: CPT

## 2022-01-21 RX ADMIN — GADOBENATE DIMEGLUMINE 8 ML: 529 INJECTION, SOLUTION INTRAVENOUS at 09:51

## 2022-01-24 ENCOUNTER — HOSPITAL ENCOUNTER (OUTPATIENT)
Dept: PHYSICAL THERAPY | Age: 77
Discharge: HOME | End: 2022-01-24
Payer: MEDICARE

## 2022-01-24 PROCEDURE — 97530 THERAPEUTIC ACTIVITIES: CPT

## 2022-01-24 PROCEDURE — 97112 NEUROMUSCULAR REEDUCATION: CPT

## 2022-01-24 NOTE — PROGRESS NOTES
PT DAILY TREATMENT NOTE     Patient Name: Naina Vegas  FTMJ:  : 1945  [x]  Patient  Verified  Payor: [place] MEDICARE / Plan: [place] MEDICARE PART A & B / Product Type: Medicare /    In time:1155  Out time:1240  Total Treatment Time (min): 45  Visit #: 4 of 10    Medicare/BCBS Only   Total Timed Codes (min):  45 1:1 Treatment Time:  45       Treatment Area: Other abnormalities of gait and mobility [R26.89]    SUBJECTIVE  Pain Level (0-10 scale): 4  Any medication changes, allergies to medications, adverse drug reactions, diagnosis change, or new procedure performed?: [x] No    [] Yes (see summary sheet for update)  Subjective functional status/changes:   [] No changes reported  \"I have my normal pain. I'm able to walk around in the house without my walker some. \"    OBJECTIVE    Modality rationale: patient declined   Min Type Additional Details    [] Estim:  []Unatt       []IFC  []Premod                        []Other:  []w/ice   []w/heat  Position:  Location:    [] Estim: []Att    []TENS instruct  []NMES                    []Other:  []w/US   []w/ice   []w/heat  Position:  Location:    []  Traction: [] Cervical       []Lumbar                       [] Prone          []Supine                       []Intermittent   []Continuous Lbs:  [] before manual  [] after manual    []  Ultrasound: []Continuous   [] Pulsed                           []1MHz   []3MHz W/cm2:  Location:    []  Iontophoresis with dexamethasone         Location: [] Take home patch   [] In clinic    []  Ice     []  heat  []  Ice massage  []  Laser   []  Anodyne Position:  Location:    []  Laser with stim  []  Other:  Position:  Location:    []  Vasopneumatic Device    []  Right     []  Left  Pre-treatment girth:  Post-treatment girth:  Measured at (location):  Pressure:       [] lo [] med [] hi   Temperature: [] lo [] med [] hi   [] Skin assessment post-treatment:  []intact []redness- no adverse reaction    []redness - adverse reaction: 30 min Therapeutic Activity:  _  See flow sheet : exercises, ambulation for endurance in hallway, step over foam   Rationale: increase ROM, increase strength, improve coordination, improve balance and increase proprioception  to improve the patients ability to improve mobility and ADL performance     15 min Neuromuscular Re-education:  [x]  See flow sheet : balance training   Rationale: increase ROM, increase strength, improve coordination, improve balance and increase proprioception  to improve the patients ability to improve mobility and decrease fall risk        With   [] TE   [x] TA   [x] neuro   [] other: Patient Education: [x] Review HEP    [] Progressed/Changed HEP based on:   [x] positioning   [x] body mechanics   [] transfers   [] heat/ice application    [] other:      Other Objective/Functional Measures:      Pain Level (0-10 scale) post treatment: 2    ASSESSMENT/Changes in Function: Pt continues to list from side to side when ambulating in the hallway for distance without an AD. Continues to do well with static balance, but challenged with more dynamic activities such as stepping over objects and recovering balance. Patient will continue to benefit from skilled PT services to modify and progress therapeutic interventions, address functional mobility deficits, address ROM deficits, address strength deficits, analyze and address soft tissue restrictions, analyze and cue movement patterns, analyze and modify body mechanics/ergonomics, assess and modify postural abnormalities, address imbalance/dizziness and instruct in home and community integration to attain remaining goals. [x]  See Plan of Care  []  See progress note/recertification  []  See Discharge Summary         Progress towards goals / Updated goals:   1.   Pt will demonstrate 5x sit to  < 10 seconds or less in order to reduce risk of falls               Recert: Progressing:  PROGRESSING; 10 seconds    Assess at [place]  2.   Pt will demonstrate right knee extension strength to 5/5 in order to improve ability to ascend/descend curbs in the community.                Recert: updated goal: right ext 4+/5              Tolerating progression of exercises  3.   Pt will demonstrate bilateral hip flexion  minimum 5-/5 in order to improve ambulation distances for ADLs and community engagement.               Recert: updated goal; left hip flexion 4+/5, right hip flexion 4/5              Tolerating progression of exercises  4.   Pt will score at least predicted value on FOTO in order to improve overall function, decrease pain, and facilitate return to OF.               Recert: NOT MET; 31   Assess at 30 day aleena  5.   Pt will demonstrate 3/5 PF strength (ability to perform 1 single HR) in order to improve her ability to ambulate with LRAD              Recert: NOT MET; remains 2+/5              Tolerating progression of exercises    PLAN  []  Upgrade activities as tolerated     [x]  Continue plan of care  []  Update interventions per flow sheet       []  Discharge due to:_  []  Other:_      Cesar Kehr, PTA, CSCS 1/24/2022  12:42 PM    Future Appointments   Date Time Provider Gaby Delarosa   1/26/2022 12:00 PM Joann Irving [address] [facility]   1/31/2022 12:00 PM Ania Franco [facility]   2/2/2022 12:00 PM Scott Restrepo, PT [facility]   [facility]/7/2022 12:00 [facility]   2/9/2022 12:00 PM Saeid Mclaughlin PT [facility]   5/17/2022  1:45 PM Jaime Starks MD [facility]   6/2/2022 11:00 AM Marco A Ramirez,  [address]

## 2022-01-26 ENCOUNTER — HOSPITAL ENCOUNTER (OUTPATIENT)
Dept: PHYSICAL THERAPY | Age: 77
Discharge: HOME | End: 2022-01-26
Payer: MEDICARE

## 2022-01-26 PROCEDURE — 97112 NEUROMUSCULAR REEDUCATION: CPT

## 2022-01-26 PROCEDURE — 97116 GAIT TRAINING THERAPY: CPT

## 2022-01-26 NOTE — PROGRESS NOTES
PT DAILY TREATMENT NOTE     Patient Name: Ashley Truong  KTYI:  : 1945  [x]  Patient  Verified  Payor: [place] MEDICARE / Plan: [place] MEDICARE PART A & B / Product Type: Medicare /    In HNNK:9050  Out time:1241  Total Treatment Time (min): 42  Visit #: 5 of 8    Medicare/BCBS Only   Total Timed Codes (min):  42 1:1 Treatment Time:  42       Treatment Area: Other abnormalities of gait and mobility [R26.89]    SUBJECTIVE  Pain Level (0-10 scale): 2  Any medication changes, allergies to medications, adverse drug reactions, diagnosis change, or new procedure performed?: [x] No    [] Yes (see summary sheet for update)  Subjective functional status/changes:   [] No changes reported  Pt reports she put on 2 pain patches today and is feeling ok     OBJECTIVE    15 min Neuromuscular Re-education:  [x]  See flow sheet : perturbations, sit to stands with valgus force   Rationale: increase strength, improve coordination, improve balance and increase proprioception  to improve the patients ability to reduce risk of falls    27 min Gait Training: SBA for time without AD, cuing for distant focus and increased UE swing [id number];     CGA: metronome retro walking x100ft at 60 and 50 bpm, cues for equal step length; lateral walk x50ft ea, grapevine x30ft each    Rationale: to normalize gait for improved community negotiation          With   [] TE    [] TA   [] neuro   [] other: Patient Education: [x] Review HEP    [] Progressed/Changed HEP based on:   [] positioning   [] body mechanics   [] transfers   [] heat/ice application    [] other:      Other Objective/Functional Measures: 1100ft with SBA in 10:28     Pain Level (0-10 scale) post treatment: 1    ASSESSMENT/Changes in Function: Pt did very well with self recover during perturbations; performed with CGA but did not require PT assist to recover.  Use of metronome for retro walking improved WB through right LE, but pt continued to have difficulty with equal step length. Using a slower crystal pt was able to improve left step length but was challenged with balance. Consider use of metronome for grapevine next visit. Patient will continue to benefit from skilled PT services to modify and progress therapeutic interventions, address functional mobility deficits, address ROM deficits, address strength deficits, analyze and address soft tissue restrictions, analyze and cue movement patterns, analyze and modify body mechanics/ergonomics, assess and modify postural abnormalities, address imbalance/dizziness and instruct in home and community integration to attain remaining goals. []  See Plan of Care  []  See progress note/recertification  []  See Discharge Summary         Progress towards goals / Updated goals: 1.   Pt will demonstrate 5x sit to  < 10 seconds or less in order to reduce risk of falls               Recert: Progressing:  PROGRESSING; 10 seconds               sit to stands performed at lower height with valgus force for increased glut activation  2.   Pt will demonstrate right knee extension strength to 5/5 in order to improve ability to ascend/descend curbs in the community.                Recert: updated goal: right ext 4+/5              Tolerating progression of exercises  3.   Pt will demonstrate bilateral hip flexion  minimum 5-/5 in order to improve ambulation distances for ADLs and community engagement.               Recert: updated goal; left hip flexion 4+/5, right hip flexion 4/5              Tolerating progression of exercises  4.   Pt will score at least predicted value on FOTO in order to improve overall function, decrease pain, and facilitate return to PLOF.               Recert: NOT MET; 31              Assess at 30 day aleena  5.   Pt will demonstrate 3/5 PF strength (ability to perform 1 single HR) in order to improve her ability to ambulate with LRAD              Recert: NOT MET; remains 2+/5              Tolerating progression of exercises       PLAN  [x]  Upgrade activities as tolerated     [x]  Continue plan of care  []  Update interventions per flow sheet       []  Discharge due to:_  []  Other:_      Cori Pham, PT 1/26/2022  12:28 PM    Future Appointments   Date Time Provider Gaby Delarosa   1/31/2022 12:00 PM Enedelia Purpura, PT [facility]   2/2/2022 12:00 PM Enedelia Purpura, PT [facility]   2/7/2022 12:00 PM Enedelia Purpura, PT [facility]   2/9/2022 12:00 PM [place] [facility]   5/17/2022  1:45 PM Sruthi Hernandez MD [facility]   6/2/2022 11:00 AM JULIA Mandujano

## 2022-01-31 ENCOUNTER — HOSPITAL ENCOUNTER (OUTPATIENT)
Dept: PHYSICAL THERAPY | Age: 77
Discharge: HOME | End: 2022-01-31
Payer: MEDICARE

## 2022-01-31 PROCEDURE — 97116 GAIT TRAINING THERAPY: CPT

## 2022-01-31 PROCEDURE — 97112 NEUROMUSCULAR REEDUCATION: CPT

## 2022-01-31 NOTE — PROGRESS NOTES
PT DAILY TREATMENT NOTE     Patient Name: Trena Gray  EXUW:  : 1945  [x]  Patient  Verified  Payor: [place] MEDICARE / Plan: [place] MEDICARE PART A & B / Product Type: Medicare /    In time:1155  Out time:1240  Total Treatment Time (min): 45  Visit #: 6 of 10    Medicare/BCBS Only   Total Timed Codes (min):  45 1:1 Treatment Time:  45       Treatment Area: Other abnormalities of gait and mobility [R26.89]    SUBJECTIVE  Pain Level (0-10 scale): 2  Any medication changes, allergies to medications, adverse drug reactions, diagnosis change, or new procedure performed?: [x] No    [] Yes (see summary sheet for update)  Subjective functional status/changes:   [] No changes reported  \"I have a little pain. \"    OBJECTIVE    Modality rationale: patient declined   Min Type Additional Details    [] Estim:  []Unatt       []IFC  []Premod                        []Other:  []w/ice   []w/heat  Position:  Location:    [] Estim: []Att    []TENS instruct  []NMES                    []Other:  []w/US   []w/ice   []w/heat  Position:  Location:    []  Traction: [] Cervical       []Lumbar                       [] Prone          []Supine                       []Intermittent   []Continuous Lbs:  [] before manual  [] after manual    []  Ultrasound: []Continuous   [] Pulsed                           []1MHz   []3MHz W/cm2:  Location:    []  Iontophoresis with dexamethasone         Location: [] Take home patch   [] In clinic    []  Ice     []  heat  []  Ice massage  []  Laser   []  Anodyne Position:  Location:    []  Laser with stim  []  Other:  Position:  Location:    []  Vasopneumatic Device    []  Right     []  Left  Pre-treatment girth:  Post-treatment girth:  Measured at (location):  Pressure:       [] lo [] med [] hi   Temperature: [] lo [] med [] hi   [] Skin assessment post-treatment:  []intact []redness- no adverse reaction    []redness - adverse reaction:     15 min Neuromuscular Re-education:  [x]  See flow sheet : balance training, hip/glut re-ed activities    Rationale: increase ROM, increase strength, improve coordination, improve balance and increase proprioception  to improve the patients ability to improve mobility and decrease fall risk     25 min Gait Training: SBA for time without AD, cuing for distant focus and increased UE swing [id number];      CGA: metronome retro walking x100ft at 60 and 50 bpm, cues for equal step length; lateral walk x50ft ea, grapevine x30ft each    Rationale: to promote functional independence within the community           With   [] TE   [] TA   [x] neuro   [] other: Patient Education: [x] Review HEP    [] Progressed/Changed HEP based on:   [x] positioning   [x] body mechanics   [] transfers   [] heat/ice application    [] other:      Other Objective/Functional Measures:      Pain Level (0-10 scale) post treatment: 1    ASSESSMENT/Changes in Function: Pt was more efficient with her ambulation today, but still has difficulty with fully clearing foot from the floor throughout without her rollator. Demonstrates good squatting mechanics after initial cuing to prevent forward weight shifting. Patient will continue to benefit from skilled PT services to modify and progress therapeutic interventions, address functional mobility deficits, address ROM deficits, address strength deficits, analyze and address soft tissue restrictions, analyze and cue movement patterns, analyze and modify body mechanics/ergonomics, assess and modify postural abnormalities, address imbalance/dizziness and instruct in home and community integration to attain remaining goals. [x]  See Plan of Care  []  See progress note/recertification  []  See Discharge Summary         Progress towards goals / Updated goals:   1.   Pt will demonstrate 5x sit to  < 10 seconds or less in order to reduce risk of falls               Recert: Progressing:  PROGRESSING; 10 seconds               sit to stands performed at lower height with valgus force for increased glut activation  2.   Pt will demonstrate right knee extension strength to 5/5 in order to improve ability to ascend/descend curbs in the community.                Recert: updated goal: right ext 4+/5              Tolerating progression of exercises  3.   Pt will demonstrate bilateral hip flexion  minimum 5-/5 in order to improve ambulation distances for ADLs and community engagement.               Recert: updated goal; left hip flexion 4+/5, right hip flexion 4/5              Tolerating progression of exercises  4.   Pt will score at least predicted value on FOTO in order to improve overall function, decrease pain, and facilitate return to PLOF.               Recert: NOT MET; 31              Assess at 30 day aleena  5.   Pt will demonstrate 3/5 PF strength (ability to perform 1 single HR) in order to improve her ability to ambulate with LRAD              Recert: NOT MET; remains 2+/5              Tolerating progression of exercises    PLAN  []  Upgrade activities as tolerated     [x]  Continue plan of care  []  Update interventions per flow sheet       []  Discharge due to:_  []  Other:_      Nida Mittal, PTA, CSCS 1/31/2022  12:43 PM    Future Appointments   Date Time Provider Gaby Isaura   1/31/2022 12:00 PM Trenda Hum, PT [facility]   2/2/2022 12:00 PM Trenda Hum, PT [facility]   2/7/2022 12:00 PM Trenda Hum, PT [facility]   2/9/2022 12:00 PM Michael Chand, PT [facility]   5/17/2022  1:45 PM Stefani Elizondo MD [facility]   6/2/2022 11:00 AM Willie Phillips, [address]

## 2022-02-02 ENCOUNTER — HOSPITAL ENCOUNTER (OUTPATIENT)
Dept: PHYSICAL THERAPY | Age: 77
Discharge: HOME | End: 2022-02-02
Payer: MEDICARE

## 2022-02-02 PROCEDURE — 97116 GAIT TRAINING THERAPY: CPT

## 2022-02-02 PROCEDURE — 97112 NEUROMUSCULAR REEDUCATION: CPT

## 2022-02-02 NOTE — PROGRESS NOTES
PT DAILY TREATMENT NOTE     Patient Name: Soraya Mendoza  OEWA:  : 1945  [x]  Patient  Verified  Payor: [place] MEDICARE / Plan: [place] MEDICARE PART A & B / Product Type: Medicare /    In FHJP:[id number]  Out time:1241  Total Treatment Time (min): 44  Visit #: 7 of 10    Medicare/BCBS Only   Total Timed Codes (min):  44 1:1 Treatment Time:  44       Treatment Area: Other abnormalities of gait and mobility [R26.89]    SUBJECTIVE  Pain Level (0-10 scale): 2  Any medication changes, allergies to medications, adverse drug reactions, diagnosis change, or new procedure performed?: [x] No    [] Yes (see summary sheet for update)  Subjective functional status/changes:   [] No changes reported  \"Very little pain. \"    OBJECTIVE    Modality rationale: patient declined   Min Type Additional Details    [] Estim:  []Unatt       []IFC  []Premod                        []Other:  []w/ice   []w/heat  Position:  Location:    [] Estim: []Att    []TENS instruct  []NMES                    []Other:  []w/US   []w/ice   []w/heat  Position:  Location:    []  Traction: [] Cervical       []Lumbar                       [] Prone          []Supine                       []Intermittent   []Continuous Lbs:  [] before manual  [] after manual    []  Ultrasound: []Continuous   [] Pulsed                           []1MHz   []3MHz W/cm2:  Location:    []  Iontophoresis with dexamethasone         Location: [] Take home patch   [] In clinic    []  Ice     []  heat  []  Ice massage  []  Laser   []  Anodyne Position:  Location:    []  Laser with stim  []  Other:  Position:  Location:    []  Vasopneumatic Device    []  Right     []  Left  Pre-treatment girth:  Post-treatment girth:  Measured at (location):  Pressure:       [] lo [] med [] hi   Temperature: [] lo [] med [] hi   [] Skin assessment post-treatment:  []intact []redness- no adverse reaction    []redness - adverse reaction:       15 min Neuromuscular Re-education:  [x]  See flow sheet : balance training, hip/glut re-ed activities    Rationale: increase ROM, increase strength, improve coordination, improve balance and increase proprioception  to improve the patients ability to improve mobility and decrease fall risk     29 min Gait Training:  SBA for time without AD, cuing for distant focus and increased UE swing [id number];      CGA: metronome retro walking x100ft at 60 and 50 bpm, cues for equal step length; lateral walk x50ft ea, grapevine x30ft each    Rationale: to promote functional independence with gait within the community. With   [] TE   [] TA   [x] neuro   [] other: Patient Education: [x] Review HEP    [] Progressed/Changed HEP based on:   [x] positioning   [x] body mechanics   [] transfers   [] heat/ice application    [] other:      Other Objective/Functional Measures:      Pain Level (0-10 scale) post treatment: 1    ASSESSMENT/Changes in Function: Pt continues to make tremendous progress with her functional mobility, static balance, and strength. She is increasing her activity tolerance with each visit. Patient will continue to benefit from skilled PT services to modify and progress therapeutic interventions, address functional mobility deficits, address ROM deficits, address strength deficits, analyze and address soft tissue restrictions, analyze and cue movement patterns, analyze and modify body mechanics/ergonomics, assess and modify postural abnormalities, address imbalance/dizziness and instruct in home and community integration to attain remaining goals. [x]  See Plan of Care  []  See progress note/recertification  []  See Discharge Summary         Progress towards goals / Updated goals:   1.   Pt will demonstrate 5x sit to  < 10 seconds or less in order to reduce risk of falls               Recert: Progressing:  PROGRESSING; 10 seconds               sit to stands performed at lower height with valgus force for increased glut activation  2.   Pt will demonstrate right knee extension strength to 5/5 in order to improve ability to ascend/descend curbs in the community.                Recert: updated goal: right ext 4+/5              Tolerating progression of exercises  3.   Pt will demonstrate bilateral hip flexion  minimum 5-/5 in order to improve ambulation distances for ADLs and community engagement.               Recert: updated goal; left hip flexion 4+/5, right hip flexion 4/5              Tolerating progression of exercises  4.   Pt will score at least predicted value on FOTO in order to improve overall function, decrease pain, and facilitate return to PLOF.               Recert: NOT MET; 31              Assess at 30 day aleena  5.   Pt will demonstrate 3/5 PF strength (ability to perform 1 single HR) in order to improve her ability to ambulate with LRAD              Recert: NOT MET; remains 2+/5              Tolerating progression of exercises    PLAN  []  Upgrade activities as tolerated     [x]  Continue plan of care  []  Update interventions per flow sheet       []  Discharge due to:_  []  Other:_      Lisa Mason PTA, CSCS 2/2/2022  12:45 PM    Future Appointments   Date Time Provider Gaby Delarosa   2/2/2022 12:00 PM Jody Alfred, PT [facility]   2/7/2022 12:00 PM Jody Alfred, PT [facility]   2/9/2022 12:00 PM Padmini Saldana, PT [facility]   5/17/2022  1:45 PM Ryan Brock MD [facility]   6/2/2022 11:00 AM Sushil Stephens

## 2022-02-07 ENCOUNTER — HOSPITAL ENCOUNTER (OUTPATIENT)
Dept: PHYSICAL THERAPY | Age: 77
Discharge: HOME | End: 2022-02-07
Payer: MEDICARE

## 2022-02-07 PROCEDURE — 97116 GAIT TRAINING THERAPY: CPT

## 2022-02-07 PROCEDURE — 97112 NEUROMUSCULAR REEDUCATION: CPT

## 2022-02-07 NOTE — PROGRESS NOTES
PT DAILY TREATMENT NOTE     Patient Name: Karissa Robbins  DMEH:[id number]  : 1945  [x]  Patient  Verified  Payor: [place] MEDICARE / Plan: [place] MEDICARE PART A & B / Product Type: Medicare /    In IEXY:[id number]  Out time:1245  Total Treatment Time (min): 53  Visit #: 8 of 10    Medicare/BCBS Only   Total Timed Codes (min):  53 1:1 Treatment Time:  53       Treatment Area: Other abnormalities of gait and mobility [R26.89]    SUBJECTIVE  Pain Level (0-10 scale): 3  Any medication changes, allergies to medications, adverse drug reactions, diagnosis change, or new procedure performed?: [x] No    [] Yes (see summary sheet for update)  Subjective functional status/changes:   [] No changes reported  \"I'm doing ok. \"    OBJECTIVE    Modality rationale: patient declined   Min Type Additional Details    [] Estim:  []Unatt       []IFC  []Premod                        []Other:  []w/ice   []w/heat  Position:  Location:    [] Estim: []Att    []TENS instruct  []NMES                    []Other:  []w/US   []w/ice   []w/heat  Position:  Location:    []  Traction: [] Cervical       []Lumbar                       [] Prone          []Supine                       []Intermittent   []Continuous Lbs:  [] before manual  [] after manual    []  Ultrasound: []Continuous   [] Pulsed                           []1MHz   []3MHz W/cm2:  Location:    []  Iontophoresis with dexamethasone         Location: [] Take home patch   [] In clinic    []  Ice     []  heat  []  Ice massage  []  Laser   []  Anodyne Position:  Location:    []  Laser with stim  []  Other:  Position:  Location:    []  Vasopneumatic Device    []  Right     []  Left  Pre-treatment girth:  Post-treatment girth:  Measured at (location):  Pressure:       [] lo [] med [] hi   Temperature: [] lo [] med [] hi   [] Skin assessment post-treatment:  []intact []redness- no adverse reaction    []redness - adverse reaction:     30 min Neuromuscular Re-education:  [x]  See flow sheet : balance training and hip/glut re-ed activities, obstacle course    Rationale: increase ROM, increase strength, improve coordination, improve balance and increase proprioception  to improve the patients ability to improve mobility and decrease fall risk     23 min Gait Training:  SBA for time without AD, cuing for distant focus and increased UE swing [id number];      CGA: metronome retro walking x100ft at 60 and 50 bpm, cues for equal step length; lateral walk x50ft ea, grapevine x30ft each    Rationale:  to promote functional independence with gait within the community. With   [] TE   [] TA   [x] neuro   [] other: Patient Education: [x] Review HEP    [] Progressed/Changed HEP based on:   [x] positioning   [x] body mechanics   [] transfers   [] heat/ice application    [] other:      Other Objective/Functional Measures:      Pain Level (0-10 scale) post treatment: 1    ASSESSMENT/Changes in Function: Pt was challenged with introduction of obstacle course. Continues to require safety cues and CGA to min A with LOB during step overs and walking on airex plank. Continues to have decreased foot clearance when ambulating without an AD. Pt due for reassess at her NV. Patient will continue to benefit from skilled PT services to modify and progress therapeutic interventions, address functional mobility deficits, address ROM deficits, address strength deficits, analyze and address soft tissue restrictions, analyze and cue movement patterns, analyze and modify body mechanics/ergonomics, assess and modify postural abnormalities, address imbalance/dizziness and instruct in home and community integration to attain remaining goals. [x]  See Plan of Care  []  See progress note/recertification  []  See Discharge Summary         Progress towards goals / Updated goals:   1.   Pt will demonstrate 5x sit to  < 10 seconds or less in order to reduce risk of falls               Recert: Progressing:  PROGRESSING; 10 seconds               sit to stands performed at lower height with valgus force for increased glut activation  2.   Pt will demonstrate right knee extension strength to 5/5 in order to improve ability to ascend/descend curbs in the community.                Recert: updated goal: right ext 4+/5              Tolerating progression of exercises  3.   Pt will demonstrate bilateral hip flexion  minimum 5-/5 in order to improve ambulation distances for ADLs and community engagement.               Recert: updated goal; left hip flexion 4+/5, right hip flexion 4/5              Tolerating progression of exercises  4.   Pt will score at least predicted value on FOTO in order to improve overall function, decrease pain, and facilitate return to PLOF.               Recert: NOT MET; 31              Assess at 30 day aleena  5.   Pt will demonstrate 3/5 PF strength (ability to perform 1 single HR) in order to improve her ability to ambulate with LRAD              Recert: NOT MET; remains 2+/5              Tolerating progression of exercises    PLAN  []  Upgrade activities as tolerated     [x]  Continue plan of care  []  Update interventions per flow sheet       []  Discharge due to:_  []  Other:_      Aishwarya Weir, PTA, CSCS 2/7/2022  1:04 PM    Future Appointments   Date Time Provider Gaby Delarosa   2/7/2022 12:00 PM [facility]   2/9/2022 12:00 PM [facility]   5/17/2022  1:45 PM Susan Nuñez MD [facility]   6/2/2022 11:00 AM Kenia Hui, [facility]

## 2022-02-09 ENCOUNTER — HOSPITAL ENCOUNTER (OUTPATIENT)
Dept: PHYSICAL THERAPY | Age: 77
Discharge: HOME | End: 2022-02-09
Payer: MEDICARE

## 2022-02-09 PROCEDURE — 97110 THERAPEUTIC EXERCISES: CPT

## 2022-02-09 PROCEDURE — 97530 THERAPEUTIC ACTIVITIES: CPT

## 2022-02-09 NOTE — PROGRESS NOTES
Physical Therapy Discharge Instructions      [facility] [address] [facility] [address] [address] [phone] fax      Patient: Odilia Ríos  : 1945      Continue Home Exercise Program 4-7 times per week.              Follow up with MD:   [x] As needed        Recommendations:   [x]   Return to activity with home program        Ye Pacheco, PT 2022 12:40 PM

## 2022-02-09 NOTE — PROGRESS NOTES
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   Visits from Start of Care: 27    Missed Visits: 0    Reporting Period : 2022 to 2022    Date:2022  : 1945  [x]  Patient  Verified  Payor: VA MEDICARE / Plan: VA MEDICARE PART A & B / Product Type: Medicare /    In time:12:03  Out time:12:44  Total Treatment Time (min): 41  Visit #: 9 of 10    Medicare/BCBS Only   Total Timed Codes (min):  41 1:1 Treatment Time:  41       SUBJECTIVE  Pain Level (0-10 scale): 2  Any medication changes, allergies to medications, adverse drug reactions, diagnosis change, or new procedure performed?: [x] No    [] Yes (see summary sheet for update)  Subjective functional status/changes:   [] No changes reported  Pt states she feels ready to d/c at this time to HEP. OBJECTIVE    13 min Therapeutic Exercise:  [] See flow sheet :HEP instruction and demonstration   Rationale: increase ROM and increase strength to improve the patients ability to tolerate ADLs    28 min Therapeutic Activity:  []  See flow sheet : goal assessment, FOTO with pt   Rationale: increase ROM, increase strength and improve coordination  to improve the patients ability to tolerate functional tasks.            With   [x] TE   [x] TA   [] neuro   [] other: Patient Education: [x] Review HEP    [] Progressed/Changed HEP based on:   [] positioning   [] body mechanics   [] transfers   [] heat/ice application    [] other:      Other Objective/Functional Measures: See goals below. Pain Level (0-10 scale) post treatment: 2    Summary of Care:  Goal: Pt will demonstrate 5x sit to  < 10 seconds or less in order to reduce risk of falls   Status at last note/certification: MET, 9 seconds  Status at discharge: met    Goal: Pt will demonstrate right knee extension strength to 5/5 in order to improve ability to ascend/descend curbs in the community.   Status at last note/certification: not met, 4/5 pain in the anterior of the right LE   Status at discharge: not met    Goal: Pt will demonstrate bilateral hip flexion  minimum 5-/5 in order to improve ambulation distances for ADLs and community engagement.    Status at last note/certification: not met, right 4-/5, left 4+/5  Status at discharge: not met    Goal: Pt will score at least predicted value on FOTO in order to improve overall function, decrease pain, and facilitate return to PLOF.   Status at last note/certification: Not met, 30 points  Status at discharge: not met    Goal: Pt will demonstrate 3/5 PF strength (ability to perform 1 single HR) in order to improve her ability to ambulate with LRAD   Status at last note/certification: MET, 4+/5 on right, 5/5 on the left, able to perform a single HR on each LE  Status at discharge: met    ASSESSMENT/Changes in Function:   Pt was seen for 27 therapy sessions. Pt demonstrated/reported improvements in overall strength, balance, and mobility since starting therapy. She continues to have fatigue and limitations with proximal B LE strength but improved B ankle PF strength was noted today. Pt given updated HEP to perform. Pt is d/c'ed from therapy at this time to HEP secondary to plateau in symptoms and ability to independently perform HEP to manage current deficits.      Thank you for this referral! PLAN  [x]Discontinue therapy: []Patient has reached or is progressing toward set goals      []Patient is non-compliant or has abdicated      [x]Due to lack of appreciable progress towards set goals; able to perform HEP at home to improve pain and mobility.      Ye Pacheco, PT 2/9/2022  2:17 PM

## 2022-02-24 ENCOUNTER — OFFICE VISIT (OUTPATIENT)
Dept: ORTHOPEDIC SURGERY | Age: 77
End: 2022-02-24
Payer: MEDICARE

## 2022-02-24 VITALS
RESPIRATION RATE: 18 BRPM | HEIGHT: 61 IN | OXYGEN SATURATION: 96 % | HEART RATE: 68 BPM | BODY MASS INDEX: 18.65 KG/M2 | WEIGHT: 98.8 LBS | TEMPERATURE: 97 F

## 2022-02-24 DIAGNOSIS — M25.50 ARTHRALGIA, UNSPECIFIED JOINT: Primary | ICD-10-CM

## 2022-02-24 DIAGNOSIS — M25.50 ARTHRALGIA OF MULTIPLE JOINTS: ICD-10-CM

## 2022-02-24 PROCEDURE — 1101F PT FALLS ASSESS-DOCD LE1/YR: CPT | Performed by: PHYSICIAN ASSISTANT

## 2022-02-24 PROCEDURE — G8420 CALC BMI NORM PARAMETERS: HCPCS | Performed by: PHYSICIAN ASSISTANT

## 2022-02-24 PROCEDURE — G8536 NO DOC ELDER MAL SCRN: HCPCS | Performed by: PHYSICIAN ASSISTANT

## 2022-02-24 PROCEDURE — G8428 CUR MEDS NOT DOCUMENT: HCPCS | Performed by: PHYSICIAN ASSISTANT

## 2022-02-24 PROCEDURE — 3017F COLORECTAL CA SCREEN DOC REV: CPT | Performed by: PHYSICIAN ASSISTANT

## 2022-02-24 PROCEDURE — 99213 OFFICE O/P EST LOW 20 MIN: CPT | Performed by: PHYSICIAN ASSISTANT

## 2022-02-24 PROCEDURE — G8432 DEP SCR NOT DOC, RNG: HCPCS | Performed by: PHYSICIAN ASSISTANT

## 2022-02-24 PROCEDURE — 1090F PRES/ABSN URINE INCON ASSESS: CPT | Performed by: PHYSICIAN ASSISTANT

## 2022-02-24 RX ORDER — DICLOFENAC SODIUM 75 MG/1
75 TABLET, DELAYED RELEASE ORAL 2 TIMES DAILY
Qty: 60 TABLET | Refills: 1 | Status: SHIPPED | OUTPATIENT
Start: 2022-02-24 | End: 2022-05-27 | Stop reason: ALTCHOICE

## 2022-02-24 NOTE — PROGRESS NOTES
Patient: Odilia Ríos                MRN: [id number]       SSN: [id number]  YOB: 1945        AGE: 78 y.o. SEX: female          PCP: Brittnee Banerjee MD  02/24/22    Chief Complaint   Patient presents with    Neck Pain     neck pain       HISTORY:  Odilia Ríos is a 78 y.o. female returns the office with a complaint of multiple joint arthralgia type pains. She is a cervical spine fusion patient and has been previously referred to Dr. Aleksandr Interiano in [facility] with Charleen Campbell.  Dr. Aleksandr Interiano indicated to the patient that her current fusion is stable but she does have arthritic findings just below the end of C5 fusion. 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 Today she is complaining of pain in the hands wrists elbows and knees. She just completed twenty-seven sessions of outpatient physical therapy with a focus on gait training balance and endurance. She was discharged with home exercise program comprehensive and has been performing those self-directed exercises on a regular basis. She has tried to work on her weight recently but still carries a 98 pound maximum with BMI of 18.67. Within the past 2 years she was using solely a power mobility device for ambulation and today she has been able to advance to a seated for post rolling walker. Overall she is pleased with her outcome over 2 years but still concerned that she has an underlying condition beyond the discussed osteoarthritis. No known history of rheumatoid arthritis by report or Sjogren's disease. She has an allergy to Celebrex but does use 800 mg of ibuprofen generally once daily for symptom management with moderate success. She also takes roughly about 2300 mg of Tylenol during the course of the day for additional symptom management. She was just seen by gastroenterology for a urge type incontinence.   She has been placed on a special medication to help purge any retained fecal material that lies in her descending colon. A previous scope by Dr. Umu Huffman revealed normal anatomy.       Pain Assessment  2/24/2022   Location of Pain Neck   Pain Location Comment -   Location Modifiers -   Severity of Pain 4   Quality of Pain Aching   Quality of Pain Comment -   Duration of Pain Persistent   Frequency of Pain Constant   Date Pain First Started -   Date Pain First Started Comment -   Aggravating Factors -   Aggravating Factors Comment -   Limiting Behavior -   Relieving Factors Nothing   Relieving Factors Comment -   Result of Injury No   Work-Related Injury -   Type of Injury -           Lab Results   Component Value Date/Time    Hemoglobin A1c 5.8 (H) 03/18/2019 04:07 PM     Weight Metrics 2/24/2022 1/14/2022 12/2/2021 11/16/2021 11/2/2021 10/26/2021 10/21/2021   Weight 98 lb 12.8 oz 100 lb 100 lb 100 lb 99 lb 99 lb 100 lb 6.4 oz   BMI 18.67 kg/m2 19.53 kg/m2 19.53 kg/m2 18.89 kg/m2 19.33 kg/m2 19.33 kg/m2 19.61 kg/m2            Problem List Items Addressed This Visit     None      Visit Diagnoses     Arthralgia, unspecified joint    -  Primary    Relevant Orders    ANTINUCLEAR ANTIBODIES, IFA    RHEUMATOID FACTOR, QT    LUPUS ANTICOAGULANT PANEL    SJOGREN'S ABS, SSA AND SSB    SED RATE (ESR)    C REACTIVE PROTEIN, QT    Arthralgia of multiple joints              PAST MEDICAL HISTORY:   Past Medical History:   Diagnosis Date    Abnormal Pap smear     Dr. Juan David Lewis    Allergic rhinitis     Arthritis     Asthma 11/22/2019    Cerebrovascular small vessel disease 3/18/2019    CT 3/17/2019    Cervical spinal cord compression (HCC)     Chronic pain     Concentric left ventricular hypertrophy 3/18/2019    With left ventricular diastolic dysfunction by echocardiogram 3/18/2019    GERD (gastroesophageal reflux disease) 11/22/2019    Hypercholesterolemia     Neck pain 5/17/2010    Stroke ([facility] [address] 10/02/2017    TIA- legs weak memory issues       PAST SURGICAL HISTORY:   Past Surgical History:   Procedure Laterality Date    COLONOSCOPY N/A 6/4/2018    COLONOSCOPY / polypectomy performed by Gt Moody MD at [facility] COLONOSCOPY N/A 8/19/2021    COLONOSCOPY performed by Jett Adair MD at [facility] [address] HX GYN      Total Hyst    HX LAP CHOLECYSTECTOMY      HX OTHER SURGICAL  2017    Throat widened    NEUROLOGICAL PROCEDURE UNLISTED  11/2019    Cervical fusion    [facility] PLAST[facility] SURG[facility], NECK  2019       ALLERGIES:   Allergies   Allergen Reactions    Baclofen Shortness of Breath     Denies. Pt tolerates  Other reaction(s): gi distress    Morphine Other (comments)     hallucinations  Other reaction(s): other/intolerance  hallucinations  Other reaction(s): delusions    Sulfa (Sulfonamide Antibiotics) Other (comments)     Drops blood pressure    Celecoxib Other (comments)     Tiredness   Other reaction(s): other/intolerance  Makes her tired        CURRENT MEDICATIONS:  A list of medications prior to the time of admission include:  Prior to Admission medications    Medication Sig Start Date End Date Taking? Authorizing Provider   diclofenac EC (VOLTAREN) 75 mg EC tablet Take 1 Tablet by mouth two (2) times a day. 2/24/22  Yes Malvin Valdes PA-C   nitrofurantoin, macrocrystal-monohydrate, (MACROBID) 100 mg capsule Take 1 Capsule by mouth two (2) times a day. 1/17/22  Yes JULIA Espinal   ondansetron hcl (ZOFRAN) 4 mg tablet take 1 tablet by mouth every 6 hours if needed for nausea 5 11/16/21  Yes Provider, Historical   calcium polycarbophiL (Fiber Laxative, ca polycarbo,) 625 mg tablet Take 625 mg by mouth daily. Yes Provider, Historical   lidocaine (LIDODERM) 5 % apply 1 patch TO THE AFFECTED AREA DAILY. LEAVE ON FOR 12 HOURS AND THEN OFF FOR 12 HOURS. 8/8/21  Yes Provider, Historical   fluticasone propionate (FLOVENT HFA) 110 mcg/actuation inhaler Take 1 Puff by inhalation every twelve (12) hours as needed.    Yes Provider, Historical   atorvastatin (LIPITOR) 20 mg tablet take 1 tablet by mouth once daily  Patient taking differently: Take 20 mg by mouth daily. 9/23/20  Yes Trang Gallegos NP   busPIRone (BUSPAR) 7.5 mg tablet take 1 tablet by mouth twice a day  Patient taking differently: Take 7.5 mg by mouth two (2) times a day. 9/23/20  Yes Trang Gallegos NP   ibuprofen (MOTRIN) 400 mg tablet Take 1 Tab by mouth every six (6) hours as needed for Pain. Patient taking differently: Take 800 mg by mouth daily. 7/26/20  Yes Akira Gray MD   omeprazole (PRILOSEC) 20 mg capsule take 1 capsule by mouth once daily before breakfast 5/7/20  Yes Trang Gallegos NP   gabapentin (NEURONTIN) 100 mg capsule Take 2 Caps by mouth three (3) times daily. Max Daily Amount: 600 mg. Patient taking differently: Take 400 mg by mouth daily. 4/16/20  Yes Yolanda Howell NP   acetaminophen (TYLENOL) 160 mg/5 mL elixir Take 1,000 mg by mouth daily. 12/12/19  Yes Provider, Historical   midodrine (PROAMITINE) 5 mg tablet Take 5 mg by mouth as needed. 1 tab by mouth with breakfast and lunch 2/6/20  Yes Provider, Historical   turmeric 400 mg cap Take 1 Cap by mouth daily. Yes Provider, Historical   aspirin 81 mg chewable tablet Take 1 Tab by mouth daily. 10/10/17  Yes JULIA Mejias   multivit-min-folic-vit K-lycop (One-A-Day Men's Multivitamin) 400- mcg tab Take 1 Tablet by mouth daily. Patient not taking: Reported on 12/2/2021    Provider, Historical   polyethylene glycol 3350 (CLEARLAX PO) Take 1 Dose by mouth daily. Patient not taking: Reported on 12/2/2021    Provider, Historical   nitrofurantoin, macrocrystal-monohydrate, (MACROBID) 100 mg capsule Take 1 Cap by mouth two (2) times a day. Patient not taking: Reported on 8/16/2021 2/4/21   JULIA Moyer   conjugated estrogens (PREMARIN) 0.625 mg/gram vaginal cream Apply 0.5 g to affected area daily.  Apply pea sized amount to urethra and whatever is left over to just inside of vagina  Patient not taking: Reported on 12/2/2021 2/4/21   JULIA Mandujano       FAMILY HISTORY:   Family History   Problem Relation Age of Onset    Cancer Mother         breast    Heart Disease Father        SOCIAL HISTORY:   Social History     Socioeconomic History    Marital status:    Tobacco Use    Smoking status: Never Smoker    Smokeless tobacco: Never Used   Vaping Use    Vaping Use: Never used   Substance and Sexual Activity    Alcohol use: No    Drug use: No    Sexual activity: Never       ROS:No CP, No SOB, No fever/chills nor night sweats. No headaches, vision abnormalities to include double and or loss of vision. No dizziness. No hearing abnormalities. No Chest Pain nor Shortness of breath. Pt denies h/o spinal surgery, injections, or PT/chiropractor. Patient has attempted self treatment with less than adequate relief on oral and topical analgesic / anti inflammatory medications . Pt denies change in bowel or bladder habits. No saddle paresthesia / anesthesia. Pt denies fever, unplanned weight loss / weight gains, and no skin changes. Musculoskeletal pain per HPI. Pain is exacerbated positionally. PHYSICAL EXAM:    Visit Vitals  Pulse 68   Temp 97 °F (36.1 °C) (Temporal)   Resp 18   Ht 5' 1\" (1.549 m)   Wt 98 lb 12.8 oz (44.8 kg)   LMP  (LMP Unknown)   SpO2 96%   BMI 18.67 kg/m²       Constitutional: Appears well-developed and well-nourished. No distress. Sitting comfortably in the exam room, interacting with conversation with pleasant affect. Gait appears steady but guarded and patient exhibits no evidence of ataxia nor Trendelenburg gait. Patient is able to ambulate with caution using a four post rolling walker with seat and brakes. No focal neurological deficit noted. No facial droop, slurred speech, or evidence of altered mentation noted on exam.   Skin: Skin over the head, neck, bilateral limbs, and trunk is warm and dry. No rash or erythema noted.    Cranial Nerves II-XII grossly intact  HENT: NC/AT. Normal symmetry, bulk and tone of facial and neck musculature. Trachea midline. No discernible thyromegaly or masses. No involuntary movements. Lymphatic: No preauricular, submandibuar, anterior or posterior cervical lymphadenopathy. Psychiatric: The patient is awake, alert, and oriented to person, place and time. Behavior is normal. Thought content normal.   Cardiovascular: No clubbing, cyanosis. No edema bilateral lower extremities. Pulmonary: No tripoding nor accessory muscle recruitment. Breathing normally, no distress, no audible wheezing. Distal cap refill intact at 2/2 Reed UE / LE. Neuro intact Reed UE/LE to noxious stimuli        Ortho Specific exam:    Generalized guarding of shoulders, elbows, wrists and fingers with stiffness and pain reproduced in all planes of motion. She has slight ulnar deviation of the right hand index long ring and little finger. Left hand appears normal with no ulnar deviation of the fingers. There appears Heberden's and Fausto's nodes on the fingers of both hands. Otherwise skin intact with no warmth, erythema, edema, or ecchymosis. No effusions noted to the above-mentioned areas. Her cervical spine range of motion is guarded secondary to her base of the occiput through C5 fusion. IMPRESSION:      ICD-10-CM ICD-9-CM    1. Arthralgia, unspecified joint  M25.50 719.40 ANTINUCLEAR ANTIBODIES, IFA      RHEUMATOID FACTOR, QT      LUPUS ANTICOAGULANT PANEL      SJOGREN'S ABS, SSA AND SSB      SED RATE (ESR)      C REACTIVE PROTEIN, QT   2. Arthralgia of multiple joints  M25.50 719.49         PLAN: Today we discussed alternatives to care to include but not limited to a serum assay for rheumatoid arthritis ALONSO Sjogren's to rule out any other inflammatory cause for her multiple joint arthralgias.   Patient will discontinue her Motrin and instead try one Voltaren enteric-coated tablet daily with food.  She may continue her Tylenol. She will continue her self-guided exercise program as previous. Follow-up once the serum assay labs are available for review and comment. In light of the patient's osteoarthritic findings I am making a recommendation for aerobic exercise to include but not limited to stationary bicycle, elliptical, therapeutic walking with good shoes and or swimming. Patient should avoid any running or jumping. If using the treadmill then recommendation for no elevation and no running or jogging. Care plan outlined and precautions discussed. Results were reviewed with the patient. All medications were reviewed with the patient. All of pt's questions and concerns were addressed. Alarm symptoms and return precautions associated with chief complaint and evaluation were reviewed with the patient in detail. The patient demonstrated adequate understanding. The patient expresses willing compliance with the treatment plan. No Narcotic indicated today. Patient given pain medication for short term acute pain relief. Goal is to treat patient according to above plan and to ultimately have patient off all pain medications once appropriate. If chronic pain management is required beyond what is expected for current orthopedic problem, will refer patient to pain management.  was reviewed and will be reviewed with every medication refill request.         Patient provided a reminder for a \"due or due soon\" health maintenance. I have asked the patient to schedule an appointment with their primary care provider for follow-up on general health maintenance concerns. Today all the patient's questions were answered to their satisfaction. Copies of x-rays reviewed if obtained this visit, and provided to patient. Dictation disclaimer:  Please note that this dictation was completed with [employer], the [employer] voice recognition software.   Quite often unanticipated grammatical, syntax, homophones, and other interpretive errors are inadvertently transcribed by the computer software. Please disregard these errors. Please excuse any errors that have escaped final proofreading. Bart DUFFY, APC, MPAS, PA-C  [facility]

## 2022-03-01 ENCOUNTER — HOSPITAL ENCOUNTER (OUTPATIENT)
Dept: LAB | Age: 77
Discharge: HOME | End: 2022-03-01
Payer: MEDICARE

## 2022-03-01 DIAGNOSIS — M25.50 ARTHRALGIA, UNSPECIFIED JOINT: ICD-10-CM

## 2022-03-01 LAB
CRP SERPL-MCNC: <0.3 MG/DL (ref 0–0.3)
ERYTHROCYTE [SEDIMENTATION RATE] IN BLOOD: 3 MM/HR (ref 0–30)
RHEUMATOID FACT SERPL-ACNC: <10 IU/ML

## 2022-03-01 PROCEDURE — 86235 NUCLEAR ANTIGEN ANTIBODY: CPT

## 2022-03-01 PROCEDURE — 36415 COLL VENOUS BLD VENIPUNCTURE: CPT

## 2022-03-01 PROCEDURE — 86431 RHEUMATOID FACTOR QUANT: CPT

## 2022-03-01 PROCEDURE — 86038 ANTINUCLEAR ANTIBODIES: CPT

## 2022-03-01 PROCEDURE — 85652 RBC SED RATE AUTOMATED: CPT

## 2022-03-01 PROCEDURE — 85597 PHOSPHOLIPID PLTLT NEUTRALIZ: CPT

## 2022-03-01 PROCEDURE — 86140 C-REACTIVE PROTEIN: CPT

## 2022-03-02 ENCOUNTER — HOSPITAL ENCOUNTER (OUTPATIENT)
Dept: PHYSICAL THERAPY | Age: 77
Discharge: HOME | End: 2022-03-02
Payer: MEDICARE

## 2022-03-02 LAB
ENA SS-A AB SER-ACNC: <0.2 AI (ref 0–0.9)
ENA SS-B AB SER-ACNC: <0.2 AI (ref 0–0.9)

## 2022-03-02 PROCEDURE — 97112 NEUROMUSCULAR REEDUCATION: CPT

## 2022-03-02 PROCEDURE — 97162 PT EVAL MOD COMPLEX 30 MIN: CPT

## 2022-03-02 PROCEDURE — 97530 THERAPEUTIC ACTIVITIES: CPT

## 2022-03-02 NOTE — PROGRESS NOTES
[facility]  [address] [facility]  [phone] fax    Plan of Care/ Statement of Necessity for Physical Therapy Services    Patient name: Pauline Lambert Start of Care: 3/2/2022   Referral source: JULIA Augustin : 1945    Medical Diagnosis: PFD (pelvic floor dysfunction) [M62.89]  Payor: [place] MEDICARE / Plan: [place] MEDICARE PART A & B / Product Type: Medicare /  Onset Date: worsened during the last 3 months2    Treatment Diagnosis: PFD, Urinary incont. , bowel urgency   Prior Hospitalization: see medical history Provider#: [id number]   Medications: Verified on Patient summary List    Comorbidities: osteoporosis, arthritis, stroke, neck fusion surgery   Prior Level of Function: less urgency with bowel, less leakage, mod ind with ADLs, using rollator. The Plan of Care and following information is based on the information from the initial evaluation. Assessment/ bennett information: Ms. Pauline Lambert is a 77 y/o, F who present with c/o PFD, UI, and abnormal bowel urgency. 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 Pt states that she's \"giving up on my bladder. \" Pt used 2-4 diapers and pads every days due to mod-max leakage. Pt reports WNL with bladder voiding interval. Pt has recurrent UTI. She usually experiences urgency but unable to void bowel. Fecal matter sometimes \"bursts\" out with voiding bladder. Bowel frequency is 3x/day but notes lingering urgency, incomplete voiding. Pt reports increasing vegetables and fruits intake. She uses laxative nearly everyday. Pain locates along abdominal region & lower but no change of pain after voiding bowel/bladder. Internal assessment reveals pt with atrophy of PFM, instability of ant wall, poor coordination with PFM contraction. Patient may benefit from physical therapy to address PFM, bladder and bowel function to improve her QOL.     Evaluation Complexity History HIGH Complexity :3+ comorbidities / personal factors will impact the outcome/ POC ; Examination MEDIUM Complexity : 3 Standardized tests and measures addressing body structure, function, activity limitation and / or participation in recreation  ;Presentation MEDIUM Complexity : Evolving with changing characteristics  ; Clinical Decision Making MEDIUM Complexity : FOTO score of 26-74  Overall Complexity Rating: MEDIUM    Problem List: Pelvic pain/dysfunction, Decreased pelvic floor mm awareness, Decreased pelvic floor mm strength, Use of accessory muscles, Improper voiding habits, Hypertonus of pelvic floor and Urinary urgency    Treatment Plan may include any combination of the following:   Therapeutic exercise, Urge suppression techniques, Neuromuscular re-education, Manual therapy, Physical agent/modality and Patient education  Patient / Family readiness to learn indicated by: asking questions, trying to perform skills and interest    Persons(s) to be included in education: patient (P)    Barriers to Learning/Limitations: None    Patient Goal (s): going to bathroom and not having urgency    Patient Self Reported Health Status: fair    Rehabilitation Potential: good    Short Term Goals: To be accomplished in 4 weeks:  1. Patient will demonstrate accurate performance of home exercise program/pelvic floor contractions as adjunct to physical therapy clinic visits to promote healthy lifestyle and improved quality of life. Eval status: will review next visit    2. Patient will report at least 25% improvement with complete empty of bowel & bladder to improve her QOL. Eval status: difficulty with voiding and incomplete voiding sensation. 3. Patient will have decreased pad usage to maximum 2 pads a day for increased patient comfort and increased quality of life. Eval status: 2-4 diapers & thick pads    Long Term Goals: To be accomplished in 8  weeks:  1.  Patient will demonstrate independence in HEP for maintenance of pelvic floor program and improved quality of life. Eval status: will review next visits    2. Patient will have decreased leakage episodes to </=5 day per week for increased quality of life. aEval status: mod-max leakage every day    3. Patient will report at least 60% improvement with complete empty of bowel & bladder to improve her QOL. Eval status: difficulty with voiding and incomplete voiding sensation. 4. Patient will have increased pelvic floor muscle motor performance by 1/2 to 1 grade for improved urinary continence and quality of life. Eval status: very poor coordination    5. Patient will have FOTO Urinary Problem & Bowel Constipation score change of 13 points or more indicating improvement in function for icreased quality of life. Eval status: 41 & 47    Frequency / Duration: Patient to be seen 2 times per week for 4 weeks. Patient/ Caregiver education and instruction: Diagnosis, prognosis, Proper Voiding Habits, Diet, Pain Management, Exercises and Bladder Retraining    Certification Period: 3-2-2022 to 4-1-2022    Thienphuc Edgemont Jock 3/2/2022 8:00 AM    ________________________________________________________________________    I certify that the above Therapy Services are being furnished while the patient is under my care. I agree with the treatment plan and certify that this therapy is necessary.     Physician's Signature:____________Date:_________TIME:________     Carin Butler Sender, PA  ** Signature, Date and Time must be completed for valid certification **    Please sign and return to [facility]   [address] [place] [address]  [phone] fax

## 2022-03-02 NOTE — PROGRESS NOTES
PF Daily Treatment Note  Patient Name: Baldemar Benton  Date:3/2/2022  []  Patient  Verified  Insurance:Payor: Charis Russo / Plan: [place] / Product Type: Medicare /   In time: 9:49  Out time: 10:30  Total Treatment Time (min): 41  Total Timed Codes (min): 23  1:1 Treatment Time (MC only): 41   Visit #: 1 of 8    Treatment Area: [x] Pelvic Floor     [] Other:    SUBJECTIVE  Pain Level (0-10 scale): 3/10  Any medication changes, allergies to medications, adverse drug reactions, diagnosis change, or new procedure performed?: [x] No    [] Yes (see summary sheet for update)    Ms. Miguel Almanza a 75 y/o, F who present with c/o PFD, UI, and abnormal bowel urgency. 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 Pt states that she's \"giving up on my bladder. \" Pt used 2-4 diapers and pads every days due to mod-max leakage. Pt reports WNL with bladder voiding interval. Pt has recurrent UTI. She usually experiences urgency but unable to void bowel. Fecal matter sometimes \"bursts\" out with voiding bladder. Bowel frequency is 3x/day but notes lingering urgency, incomplete voiding. Pt reports increasing vegetables and fruits intake. She uses laxative nearly everyday. Pain locates along abdominal region & lower but no change of pain after voiding bowel/bladder. Pelvic Floor Dysfunction Evaluation    Musculoskeletal Screen:    Skin Integrity:  [] Healthy [x] Red  [] Labia Atrophy [] Fragile    Sensation: [x] Intact [] Diminished:    Muscle Bulk: [] Symmetrical  [] Well-developed [x] Atrophied:  [x]L   [x]R   []B    Prolapse: noted instability of ane wall [] Cystocele:   [] Rectocele:    PERF Score (Performance/Endurance/Repetitions/Flicks)   P: very poor strength E: R: F: Total:    Patient has failed previous pelvic floor muscle training?   [] Yes    [] No    EMG Evaluation:  [] N/A [] Deferred secondary to:    Channel A: Electrode type:  [] Internal    [] Surface    [] Vaginal    [] Rectal  Channel B: Electrode location:    Baseline Resting Tone (1 minute)  Channel A (microvolts): Quality:  [] Normal [] Irradic [] Elevated  Channel B (microvolts): Slow Twitch: (10 second hold, 20 second rest)  Channel A (microvolts): Quality:[] Quick/slow rise [] Low net rise  Net rise (microvolts):   [] Slow Relaxation [] Incoordination       [] Unable to contract [] Fatigues at (sec):       [] Elevated baseline between contractions    Channel B (microvolts): Use of Accessory Muscles: [] Minimal  Net rise (microvolts):   [] Moderate  [] Excessive       [] No use of accessory muscles    Fast Twitch (3 second hold, 10 second rest)  Channel A (microvolts): Quality:[] Quick/slow rise [] Low net rise  Net rise (microvolts):   [] Slow Relaxation [] Incoordination       [] Unable to contract [] Fatigues at (sec):       [] Elevated baseline between contractions    Channel B (microvolts): Use of Accessory Muscles: [] Minimal  Net rise (microvolts):   [] Moderate  [] Excessive       [] No use of accessory muscles    Optional Tests:  Lower abdominal strength: /5    Comments/Additional Tests:      OBJECTIVE    18 min eval    15 min Therapeutic Activity:  []  See flow sheet :Pt edu within scope of practice on prognosis, POC, PFM anatomy/physiology, health bladder & bowel function   Rationale: Increase pelvic floor muscle strength, Improve quality of pelvic floor contractions, Decrease resting tone of the pelvic floor, Increase tissue extensibility of the pelvic floor muscles, Increase core strength, Inhibit abnormal muscle activity and Improve lumbosacral and coccygeal mobility in order to Increase urinary continence, Decrease urinary urgency, Decrease bowel urgency, Improve frequency and ease of bowel movements and Improve ability to perform ADLs. 8 Neuromuscular Re-education: quality PFM contraction to improve PFM strength and coordination for improve leakage and ease with voiding.             min Patient Education: [x] Review HEP    [] Progressed/Changed HEP based on:   [] positioning   [] body mechanics   [] transfers   [] heat/ice application        Other Objective/Functional Measures:   []baseline resting tone:   []slow twitch mms   []fast twitch mms    Pain Level (0-10 scale) post treatment: 3/10    ASSESSMENT/Changes in Function: see POC please    []  Decrease # of leaks   [] No change []  Improving [] Resolved     []  Decrease hypertonus [] No change []  Improving [] Resolved     []  Increase void interval [] No change []  Improving [] Resolved     []  Increase PF strength [] No change []  Improving [] Resolved     []  Increase PF endurance [] No change []  Improving [] Resolved     []  Increase endurance [] No change []  Improving [] Resolved     []  Decrease # of pads [] No change []  Improving [] Resolved     []  Decrease pain [] No change []  Improving [] Resolved       Patient will continue to benefit from skilled PT services to modify and progress therapeutic interventions, address functional mobility deficits, address ROM deficits, address strength deficits, analyze and address soft tissue restrictions, analyze and cue movement patterns, analyze and modify body mechanics/ergonomics, assess and modify postural abnormalities, address imbalance/dizziness and instruct in home and community integration to attain remaining goals.      [x]  See Plan of Care         PLAN  [x]  Upgrade activities as tolerated     [x]  Continue plan of care  []  Update interventions per flow sheet       []  Discharge due to:_  []  Other:_      Leno Baez 3/2/2022  7:59 AM

## 2022-03-05 LAB — ANA TITR SER IF: NEGATIVE {TITER}

## 2022-03-07 ENCOUNTER — HOSPITAL ENCOUNTER (OUTPATIENT)
Dept: PHYSICAL THERAPY | Age: 77
Discharge: HOME | End: 2022-03-07
Payer: MEDICARE

## 2022-03-07 PROCEDURE — 97530 THERAPEUTIC ACTIVITIES: CPT

## 2022-03-07 PROCEDURE — 97112 NEUROMUSCULAR REEDUCATION: CPT

## 2022-03-07 NOTE — PROGRESS NOTES
PF DAILY TREATMENT NOTE 3-    Patient Name: Rogers Virgen  Date:3/7/2022  : 1945  [x]  Patient  Verified  Payor: [place] MEDICARE / Plan: [place] MEDICARE PART A & B / Product Type: Medicare /    In time: 11:15  Out time:11:59  Total Treatment Time (min): 44  Visit #: 1 of     Medicare/BCBS Only   Total Timed Codes (min):  44 1:1 Treatment Time:  44     Treatment Area: [x] Pelvic Floor     [] Other:    SUBJECTIVE  Pain Level (0-10 scale): 3/10  Any medication changes, allergies to medications, adverse drug reactions, diagnosis change, or new procedure performed?: [x] No    [] Yes (see summary sheet for update)  Subjective functional status/changes:   [] No changes reported  Pt reports min soreness and pressure with rectal region. She has very hard time with breathing exercises but trying her best. She's working on increasing vegetables & fruits intake. Nocturia is 2x/night most days. She feels like the abdominal massage has been somewhat helpful. OBJECTIVE    20 min Therapeutic Activity:  []  See flow sheet :    []  Increase Tissue extensibility        []  Assess fiber intake    [x]  Assess voiding habits  [x]  Assess bowel habits  [x]  Other: voiding schedule (1.25 hour), healthy bowel & bladder function, finding balance, pain management with modalities  Rationale: Increase pelvic floor muscle strength, Improve quality of pelvic floor contractions, Decrease resting tone of the pelvic floor, Increase tissue extensibility of the pelvic floor muscles, Increase core strength, Inhibit abnormal muscle activity and Improve lumbosacral and coccygeal mobility in order to Increase urinary continence, Decrease urinary urgency, Increase ability to delay urination, Decrease frequency of urination, Decrease nocturia, Improve frequency and ease of bowel movements and Improve ability to perform ADLs.         24 min Neuromuscular Re-education:  []  See flow sheet :   []  Pelvic floor strengthening                 []  Pelvic floor downtraining  []  Quality pelvic floor contractions       []  Relaxation techniques  [x]  Urge suppression exercises  [x]  Other: voiding mechanics  Rationale:  Increase pelvic floor muscle strength, Improve quality of pelvic floor contractions, Decrease resting tone of the pelvic floor, Increase tissue extensibility of the pelvic floor muscles, Increase core strength, Inhibit abnormal muscle activity and Improve lumbosacral and coccygeal mobility in order to Increase urinary continence, Decrease urinary urgency, Increase ability to delay urination, Decrease frequency of urination, Decrease nocturia, Improve frequency and ease of bowel movements and Improve ability to perform ADLs. With   [] TE   [] TA   [] neuro  [] manual   [] other: Patient Education: [x] Review HEP    [] Progressed/Changed HEP based on:   [] positioning   [] body mechanics   [] transfers   [] heat/ice application    [] other:      Other Objective/Functional Measures:   []baseline resting tone:   []slow twitch mms   []fast twitch mms    Pain Level (0-10 scale) post treatment: 3/10    ASSESSMENT/Changes in Function: Pt demonstrates good understanding with urge management, voiding schedule, voiding mechanics and optimal function of bladder/bowel. Pt demonstrates poor mechanics with breathing; noted excessive chest movement, mod guarding, decreased diaphragm and ribs expansion. Will cont with Biofeedback & manual next visit.      []  Decrease # of leaks   [] No change []  Improving [] Resolved     []  Decrease hypertonus [] No change []  Improving [] Resolved     []  Increase void interval [] No change []  Improving [] Resolved     []  Increase PF strength [] No change []  Improving [] Resolved     []  Increase PF endurance [] No change []  Improving [] Resolved     []  Increase endurance [] No change []  Improving [] Resolved     []  Decrease # of pads [] No change []  Improving [] Resolved     []  Decrease pain [] No change [] Improving [] Resolved     []  Increased coordination [] No change []  Improving [] Resolved     []  Increased Bowel Frequency [] No change []  Improving [] Resolved       Patient will continue to benefit from skilled PT services to modify and progress therapeutic interventions, address functional mobility deficits, address ROM deficits, address strength deficits, analyze and address soft tissue restrictions, analyze and cue movement patterns, analyze and modify body mechanics/ergonomics, assess and modify postural abnormalities, address imbalance/dizziness and instruct in home and community integration to attain remaining goals. [x]  See Plan of Care  []  See progress note/recertification  []  See Discharge Summary         Progress towards goals / Updated goals:  Short Term Goals: To be accomplished in 4 weeks:  1. Patient will demonstrate accurate performance of home exercise program/pelvic floor contractions as adjunct to physical therapy clinic visits to promote healthy lifestyle and improved quality of life. Eval status: will review next visit     2. Patient will report at least 25% improvement with complete empty of bowel & bladder to improve her QOL. Eval status: difficulty with voiding and incomplete voiding sensation.      3. Patient will have decreased pad usage to maximum 2 pads a day for increased patient comfort and increased quality of life. Eval status: 2-4 diapers & thick pads     Long Term Goals: To be accomplished in 8  weeks:  1. Patient will demonstrate independence in HEP for maintenance of pelvic floor program and improved quality of life. Eval status: will review next visits     2. Patient will have decreased leakage episodes to </=5 day per week for increased quality of life. aEval status: mod-max leakage every day     3. Patient will report at least 60% improvement with complete empty of bowel & bladder to improve her QOL.   Eval status: difficulty with voiding and incomplete voiding sensation.      4. Patient will have increased pelvic floor muscle motor performance by 1/2 to 1 grade for improved urinary continence and quality of life. Eval status: very poor coordination     5. Patient will have FOTO Urinary Problem & Bowel Constipation score change of 13 points or more indicating improvement in function for icreased quality of life.   Eval status: 41 & 47    PLAN  [x]  Upgrade activities as tolerated     [x]  Continue plan of care  []  Update interventions per flow sheet       []  Discharge due to:_  []  Other:_      Shawn Rubin 3/7/2022  8:48 AM    Future Appointments   Date Time Provider Gaby Delaroas   3/7/2022 11:15 AM Shola Quest [id number] [facility]   3/11/2022 12:00 PM Adriel TOPETE MMCPTPB [facility]   3/14/2022 12:00 PM Shola Quest MMCPTPB [facility]   3/18/2022 11:15 AM Adriel TOPETE [facility]CPTPB [facility]   3/21/2022 10:30 AM Shola Quest MMCPTPB [facility]   3/25/2022 11:15 AM Shola Quest [id number] [facility]   3/28/2022 10:30 AM Shola Quest [id number] [facility]   4/1/2022 11:15 AM Shola Quest MMCPTPB [facility]   5/17/2022  1:45 PM Susan Nuñez MD [facility]   6/2/2022 11:00 AM JULIA Hector

## 2022-03-11 ENCOUNTER — HOSPITAL ENCOUNTER (OUTPATIENT)
Dept: PHYSICAL THERAPY | Age: 77
Discharge: HOME | End: 2022-03-11
Payer: MEDICARE

## 2022-03-11 PROCEDURE — 90913 BFB TRAINING EA ADDL 15 MIN: CPT

## 2022-03-11 PROCEDURE — 97140 MANUAL THERAPY 1/> REGIONS: CPT

## 2022-03-11 PROCEDURE — 90912 BFB TRAINING 1ST 15 MIN: CPT

## 2022-03-11 NOTE — PROGRESS NOTES
PF DAILY TREATMENT NOTE 3-    Patient Name: Brandy Villasenor  Date:3/11/2022  : 1945  [x]  Patient  Verified  Payor: Alberto Whittington / Plan: [place] MEDICARE PART A & B / Product Type: Medicare /    In time: 12:02  Out time: 12:44  Total Treatment Time (min): 42  Visit #: 3 of     Medicare/BCBS Only   Total Timed Codes (min):  42 1:1 Treatment Time:  42     Treatment Area: [x] Pelvic Floor     [] Other:    SUBJECTIVE  Pain Level (0-10 scale): 0/10  Any medication changes, allergies to medications, adverse drug reactions, diagnosis change, or new procedure performed?: [x] No    [] Yes (see summary sheet for update)  Subjective functional status/changes:   [] No changes reported  Pt eats very little everyday. She reports actually duing better since seeing a nutritionist     OBJECTIVE    Modality rationale: increase tissue extensibility and increase muscle contraction/control to improve the patients ability to improve ease with voiding   Min Type Additional Details    [] Estim:  []Unatt       []IFC  []Premod                        []Other:  []w/ice   []w/heat  Position:  Location:   34 [] Estim: []Att    []TENS instruct  []NMES                    [x]Other:  biofeedback []w/US   []w/ice   []w/heat  Position: supine, wedge for B LEs  Location: external sensor for PFM    []  Ultrasound: []Continuous   [] Pulsed                           []1MHz   []3MHz Position:  Location:    []  Ice     []  heat  []  Ice massage  []  Laser   []  Anodyne Position:  Location:   [x] Skin assessment post-treatment:  [x]intact []redness- no adverse reaction    []redness - adverse reaction:       8 min Manual Therapy:  Intra-rectal MFR   The manual therapy interventions were performed at a separate and distinct time from the therapeutic activities interventions.     Rationale: Decrease resting tone of the pelvic floor, Increase tissue extensibility of the pelvic floor muscles, Inhibit abnormal muscle activity and Improve lumbosacral and coccygeal mobility in order to Improve frequency and ease of bowel movements, Improve ability to undergo a gynecological exam and Improve ability to perform ADLs. With   [] TE   [] TA   [] neuro  [] manual   [] other: Patient Education: [x] Review HEP    [] Progressed/Changed HEP based on:   [] positioning   [] body mechanics   [] transfers   [] heat/ice application    [] other:      Other Objective/Functional Measures:   []baseline resting tone: 4.7   []slow twitch mms 10 sec hold, 10 sec rest, 10 rep: work: 13.8 µV, rest: 7.4 µV   Difficulty with relaxation   Compensated significantly with glute & core, improved core compensation but max difficulty with avoiding glute compensation   No trigger points noted with intra-rectal    []fast twitch mms: challenged with quick flick    Pain Level (1-57 scale) post treatment: 0/10    ASSESSMENT/Changes in Function: Pt present with elevated tone on Biofeedback, deemed to due to guarding as cont to note poor tone with internal palpation. Pt demonstrates min improvement with PFM coordination after Biofeedback & education. She's mod challenged with relaxation of PFM. Will cont to progress therex and voiding mechanics as tolerated.      []  Decrease # of leaks   [] No change []  Improving [] Resolved     []  Decrease hypertonus [] No change []  Improving [] Resolved     []  Increase void interval [] No change []  Improving [] Resolved     []  Increase PF strength [] No change []  Improving [] Resolved     []  Increase PF endurance [] No change []  Improving [] Resolved     []  Increase endurance [] No change []  Improving [] Resolved     []  Decrease # of pads [] No change []  Improving [] Resolved     []  Decrease pain [] No change []  Improving [] Resolved     []  Increased coordination [] No change []  Improving [] Resolved     []  Increased Bowel Frequency [] No change []  Improving [] Resolved       Patient will continue to benefit from skilled PT services to modify and progress therapeutic interventions, address functional mobility deficits, address ROM deficits, address strength deficits, analyze and address soft tissue restrictions, analyze and cue movement patterns, analyze and modify body mechanics/ergonomics, assess and modify postural abnormalities, address imbalance/dizziness and instruct in home and community integration to attain remaining goals. [x]  See Plan of Care  []  See progress note/recertification  []  See Discharge Summary         Progress towards goals / Updated goals:  Short Term Goals: To be accomplished in 4 weeks:  1. Patient will demonstrate accurate performance of home exercise program/pelvic floor contractions as adjunct to physical therapy clinic visits to promote healthy lifestyle and improved quality of life. Eval status: will review next visit     2. Patient will report at least 25% improvement with complete empty of bowel & bladder to improve her QOL. Eval status: difficulty with voiding and incomplete voiding sensation.      3. Patient will have decreased pad usage to maximum 2 pads a day for increased patient comfort and increased quality of life. Eval status: 2-4 diapers & thick pads     Long Term Goals: To be accomplished in 8  weeks:  1. Patient will demonstrate independence in HEP for maintenance of pelvic floor program and improved quality of life. Eval status: will review next visits     2. Patient will have decreased leakage episodes to </=5 day per week for increased quality of life. aEval status: mod-max leakage every day     3. Patient will report at least 60% improvement with complete empty of bowel & bladder to improve her QOL. Eval status: difficulty with voiding and incomplete voiding sensation.      4. Patient will have increased pelvic floor muscle motor performance by 1/2 to 1 grade for improved urinary continence and quality of life. Eval status: very poor coordination     5.  Patient will have FOTO Urinary Problem & Bowel Constipation score change of 13 points or more indicating improvement in function for icreased quality of life.   Eval status: 41 & 47    PLAN  [x]  Upgrade activities as tolerated     [x]  Continue plan of care  []  Update interventions per flow sheet       []  Discharge due to:_  []  Other:_      Cliff Christianson 3/11/2022  8:06 AM    Future Appointments   Date Time Provider Gaby Delarosa   3/11/2022 12:00 PM Delorse Nipple [id number] [facility]   3/14/2022 12:00 PM Delorse Nipple MMCPTPB [facility]   3/18/2022 11:15 AM Delorse Nipple MMCPTPB [facility]   3/21/2022 10:30 AM Delorse Nipple MMCPTPB [facility]   3/25/2022 11:15 AM Delorse Nipple [id number] [facility]   3/28/2022 10:30 AM Delorse Nipple [id number] [facility]   4/1/2022 11:15 AM Delorse Nipple MMCPTPB [facility]   5/17/2022  1:45 PM Stefani Elizondo MD [facility] AMB   6/2/2022 11:00 AM JULIA Morales [id number]

## 2022-03-14 ENCOUNTER — HOSPITAL ENCOUNTER (OUTPATIENT)
Dept: PHYSICAL THERAPY | Age: 77
Discharge: HOME | End: 2022-03-14
Payer: MEDICARE

## 2022-03-14 PROCEDURE — 97530 THERAPEUTIC ACTIVITIES: CPT

## 2022-03-14 PROCEDURE — 97112 NEUROMUSCULAR REEDUCATION: CPT

## 2022-03-14 PROCEDURE — 97110 THERAPEUTIC EXERCISES: CPT

## 2022-03-14 NOTE — PROGRESS NOTES
PF DAILY TREATMENT NOTE 3-    Patient Name: Bruno Teresa  Date:3/14/2022  : 1945  [x]  Patient  Verified  Payor: Zeenat Rubio / Plan: [place] / Product Type: Medicare /    In time: 11:56  Out time:12:44  Total Treatment Time (min): 48  Visit #: 4 of     Medicare/BCBS Only   Total Timed Codes (min):  48 1:1 Treatment Time:  48     Treatment Area: [x] Pelvic Floor     [] Other:    SUBJECTIVE  Pain Level (0-10 scale):  4/10  Any medication changes, allergies to medications, adverse drug reactions, diagnosis change, or new procedure performed?: [x] No    [] Yes (see summary sheet for update)  Subjective functional status/changes:   [] No changes reported  Pt had 8 diarrhea after taking 1 tablet of fiber intake. She still experiences the pressure/bowel urgency after voiding that many time. OBJECTIVE      13 min Therapeutic Exercise:  [] See flow sheet :   [x]  Pelvic floor strengthening                 []  Pelvic floor downtraining  []  Quality pelvic floor contractions       []  Relaxation techniques  []  Urge suppression exercises  [x]  Other: core & hips strengthening therex  Rationale: Increase pelvic floor muscle strength, Improve quality of pelvic floor contractions, Decrease resting tone of the pelvic floor, Increase tissue extensibility of the pelvic floor muscles, Increase core strength, Inhibit abnormal muscle activity and Improve lumbosacral and coccygeal mobility in order to Increase urinary continence, Decrease urinary urgency, Increase ability to delay urination, Decrease frequency of urination, Decrease nocturia, Improve frequency and ease of bowel movements and Improve ability to perform ADLs.       10 min Therapeutic Activity:  []  See flow sheet :    []  Increase Tissue extensibility        [x]  Assess fiber intake    [x]  Assess voiding habits  [x]  Assess bowel habits  []  Other:   Rationale: Increase pelvic floor muscle strength, Improve quality of pelvic floor contractions, Decrease resting tone of the pelvic floor, Increase tissue extensibility of the pelvic floor muscles, Increase core strength, Inhibit abnormal muscle activity and Improve lumbosacral and coccygeal mobility in order to Increase urinary continence, Decrease urinary urgency, Increase ability to delay urination, Decrease frequency of urination, Decrease nocturia, Improve frequency and ease of bowel movements and Improve ability to perform ADLs. 25 min Neuromuscular Re-education:  []  See flow sheet :   []  Pelvic floor strengthening                 []  Pelvic floor downtraining  []  Quality pelvic floor contractions       [x]  Relaxation techniques  [x]  Urge suppression exercises  []  Other:  Rationale: Increase pelvic floor muscle strength, Improve quality of pelvic floor contractions, Decrease resting tone of the pelvic floor, Increase tissue extensibility of the pelvic floor muscles, Increase core strength, Inhibit abnormal muscle activity and Improve lumbosacral and coccygeal mobility in order to Increase urinary continence, Decrease urinary urgency, Increase ability to delay urination, Decrease frequency of urination, Decrease nocturia, Improve frequency and ease of bowel movements and Improve ability to perform ADLs. With   [] TE   [] TA   [] neuro  [] manual   [] other: Patient Education: [x] Review HEP    [] Progressed/Changed HEP based on:   [] positioning   [] body mechanics   [] transfers   [] heat/ice application    [] other:      Other Objective/Functional Measures:   []baseline resting tone:   []slow twitch mms   []fast twitch mms   tendency to engage glute/perform bridge with any therex. Cont to compensate with core & glute during PMF   LOB multiple times with dyandisc, lat trunk lean to left with upper trunk    Pain Level (0-10 scale) post treatment: 4/10    ASSESSMENT/Changes in Function: Pt was mod-max challenged with strengthening therex due to weakness mostly.  Will cont with some more relaxation/streching to improve tone of PFM. []  Decrease # of leaks   [] No change []  Improving [] Resolved     []  Decrease hypertonus [] No change []  Improving [] Resolved     []  Increase void interval [] No change []  Improving [] Resolved     []  Increase PF strength [] No change []  Improving [] Resolved     []  Increase PF endurance [] No change []  Improving [] Resolved     []  Increase endurance [] No change []  Improving [] Resolved     []  Decrease # of pads [] No change []  Improving [] Resolved     []  Decrease pain [] No change []  Improving [] Resolved     []  Increased coordination [] No change []  Improving [] Resolved     []  Increased Bowel Frequency [] No change []  Improving [] Resolved       Patient will continue to benefit from skilled PT services to modify and progress therapeutic interventions, address functional mobility deficits, address ROM deficits, address strength deficits, analyze and address soft tissue restrictions, analyze and cue movement patterns, analyze and modify body mechanics/ergonomics, assess and modify postural abnormalities and instruct in home and community integration to attain remaining goals. [x]  See Plan of Care  []  See progress note/recertification  []  See Discharge Summary         Progress towards goals / Updated goals:  Short Term Goals: To be accomplished in 4 weeks:  1. Patient will demonstrate accurate performance of home exercise program/pelvic floor contractions as adjunct to physical therapy clinic visits to promote healthy lifestyle and improved quality of life. Eval status: will review next visit     2. Patient will report at least 25% improvement with complete empty of bowel & bladder to improve her QOL. Eval status: difficulty with voiding and incomplete voiding sensation.      3. Patient will have decreased pad usage to maximum 2 pads a day for increased patient comfort and increased quality of life.   Eval status: 2-4 diapers & thick pads     Long Term Goals: To be accomplished in 8  weeks:  1. Patient will demonstrate independence in HEP for maintenance of pelvic floor program and improved quality of life. Eval status: will review next visits     2. Patient will have decreased leakage episodes to </=5 day per week for increased quality of life. aEval status: mod-max leakage every day     3. Patient will report at least 60% improvement with complete empty of bowel & bladder to improve her QOL. Eval status: difficulty with voiding and incomplete voiding sensation.      4. Patient will have increased pelvic floor muscle motor performance by 1/2 to 1 grade for improved urinary continence and quality of life. Eval status: very poor coordination     5. Patient will have FOTO Urinary Problem & Bowel Constipation score change of 13 points or more indicating improvement in function for icreased quality of life.   Eval status: 41 & 47       PLAN  [x]  Upgrade activities as tolerated     [x]  Continue plan of care  []  Update interventions per flow sheet       []  Discharge due to:_  []  Other:_      Cherie Harris 3/14/2022  8:06 AM    Future Appointments   Date Time Provider Gaby Delarosa   3/14/2022 12:00 PM Gopi Pump [id number] [facility]   3/18/2022 11:15 AM Gopi Pump [id number] [facility]   3/21/2022 10:30 AM Gopi Pump [id number] [facility]   3/25/2022 11:15 AM Gopi Pump [id number] [facility]   3/28/2022 10:30 AM Gopi Pump JUGIALV [facility]   4/1/2022 11:15 AM Gopi Pump [id number] [facility]   5/17/2022  1:45 PM Lissett Vásquez MD [facility] AMB   6/2/2022 11:00 AM JULIA Sterling [address] [facility]

## 2022-03-16 ENCOUNTER — OFFICE VISIT (OUTPATIENT)
Dept: ORTHOPEDIC SURGERY | Age: 77
End: 2022-03-16
Payer: MEDICARE

## 2022-03-16 VITALS — WEIGHT: 97.6 LBS | BODY MASS INDEX: 18.44 KG/M2 | TEMPERATURE: 97.2 F | OXYGEN SATURATION: 95 % | HEART RATE: 85 BPM

## 2022-03-16 DIAGNOSIS — M25.50 ARTHRALGIA OF MULTIPLE JOINTS: ICD-10-CM

## 2022-03-16 DIAGNOSIS — M25.50 ARTHRALGIA, UNSPECIFIED JOINT: ICD-10-CM

## 2022-03-16 DIAGNOSIS — M48.02 CERVICAL SPINAL STENOSIS: Primary | ICD-10-CM

## 2022-03-16 PROCEDURE — 1101F PT FALLS ASSESS-DOCD LE1/YR: CPT | Performed by: PHYSICIAN ASSISTANT

## 2022-03-16 PROCEDURE — 99212 OFFICE O/P EST SF 10 MIN: CPT | Performed by: PHYSICIAN ASSISTANT

## 2022-03-16 PROCEDURE — G8427 DOCREV CUR MEDS BY ELIG CLIN: HCPCS | Performed by: PHYSICIAN ASSISTANT

## 2022-03-16 PROCEDURE — 1090F PRES/ABSN URINE INCON ASSESS: CPT | Performed by: PHYSICIAN ASSISTANT

## 2022-03-16 PROCEDURE — G8419 CALC BMI OUT NRM PARAM NOF/U: HCPCS | Performed by: PHYSICIAN ASSISTANT

## 2022-03-16 PROCEDURE — G8536 NO DOC ELDER MAL SCRN: HCPCS | Performed by: PHYSICIAN ASSISTANT

## 2022-03-16 PROCEDURE — 3017F COLORECTAL CA SCREEN DOC REV: CPT | Performed by: PHYSICIAN ASSISTANT

## 2022-03-16 PROCEDURE — G8432 DEP SCR NOT DOC, RNG: HCPCS | Performed by: PHYSICIAN ASSISTANT

## 2022-03-16 NOTE — PROGRESS NOTES
Roxy Novak returns the office for follow-up regarding blood work for rule out Sjogren's, lupus, RA. Sed rate C-reactive protein normal.  Labs currently still pending lupus. Negative for RA. Patient is on Neurontin currently. She takes about 400 mg a day. She does have polyarthralgias generally as a symptom which interrupted helps slightly. She does not like to take the medication. We discussed options for care to include but not limited to a pain management referral formal.  When the lupus labs come back I will give her a call and let her know the outcome. Fortunately her RA is negative. Labs reviewed today copies provided all of her questions answered to her satisfaction.

## 2022-03-18 ENCOUNTER — HOSPITAL ENCOUNTER (OUTPATIENT)
Dept: PHYSICAL THERAPY | Age: 77
Discharge: HOME | End: 2022-03-18
Payer: MEDICARE

## 2022-03-18 PROBLEM — M48.02 CERVICAL SPINAL STENOSIS: Status: ACTIVE | Noted: 2017-11-28

## 2022-03-18 PROBLEM — E78.5 HYPERLIPIDEMIA: Status: ACTIVE | Noted: 2018-07-02

## 2022-03-18 PROBLEM — R53.1 WEAKNESS: Status: ACTIVE | Noted: 2019-03-17

## 2022-03-18 PROBLEM — R42 VERTIGO: Status: ACTIVE | Noted: 2019-03-17

## 2022-03-18 PROBLEM — G89.4 CHRONIC PAIN SYNDROME: Status: ACTIVE | Noted: 2017-11-28

## 2022-03-18 PROBLEM — K21.9 GERD (GASTROESOPHAGEAL REFLUX DISEASE): Status: ACTIVE | Noted: 2019-11-22

## 2022-03-18 PROBLEM — M50.30 DEGENERATIVE DISC DISEASE, CERVICAL: Status: ACTIVE | Noted: 2017-11-28

## 2022-03-18 PROBLEM — G95.89 CERVICAL CORD MYELOMALACIA (HCC): Status: ACTIVE | Noted: 2020-06-11

## 2022-03-18 PROBLEM — M47.812 CERVICAL FACET SYNDROME: Status: ACTIVE | Noted: 2017-11-28

## 2022-03-18 PROBLEM — I51.7 CONCENTRIC LEFT VENTRICULAR HYPERTROPHY: Status: ACTIVE | Noted: 2019-03-18

## 2022-03-18 PROCEDURE — 97112 NEUROMUSCULAR REEDUCATION: CPT

## 2022-03-18 PROCEDURE — 97110 THERAPEUTIC EXERCISES: CPT

## 2022-03-18 NOTE — PROGRESS NOTES
PF DAILY TREATMENT NOTE 3-16    Patient Name: Soraya Mendoza  Date:3/18/2022  : 1945  [x]  Patient  Verified  Payor: Hamilton Beebe / Plan: [place] / Product Type: Medicare /    In time: 11:16  Out time: 12:01  Total Treatment Time (min): 45  Visit #: 5 of     Medicare/BCBS Only   Total Timed Codes (min):  45 1:1 Treatment Time:  45     Treatment Area: [x] Pelvic Floor     [] Other:    SUBJECTIVE  Pain Level (0-10 scale): 310  Any medication changes, allergies to medications, adverse drug reactions, diagnosis change, or new procedure performed?: [x] No    [] Yes (see summary sheet for update)  Subjective functional status/changes:   [] No changes reported  Pt reports doing her best with the exercises, no change with anything at this time. OBJECTIVE      25 min Therapeutic Exercise:  [] See flow sheet :   []  Pelvic floor strengthening                 []  Pelvic floor downtraining  [x]  Quality pelvic floor contractions       [x]  Relaxation techniques  []  Urge suppression exercises  []  Other:  Rationale: Increase pelvic floor muscle strength, Improve quality of pelvic floor contractions, Decrease resting tone of the pelvic floor, Increase tissue extensibility of the pelvic floor muscles, Increase core strength, Inhibit abnormal muscle activity and Improve lumbosacral and coccygeal mobility in order to Increase urinary continence, Decrease bowel urgency, Improve frequency and ease of bowel movements and Improve ability to perform ADLs.       20 min Neuromuscular Re-education:  []  See flow sheet :   []  Pelvic floor strengthening                 []  Pelvic floor downtraining  [x]  Quality pelvic floor contractions       [x]  Relaxation techniques  []  Urge suppression exercises  []  Other:  Rationale:  Increase pelvic floor muscle strength, Improve quality of pelvic floor contractions, Decrease resting tone of the pelvic floor, Increase tissue extensibility of the pelvic floor muscles, Increase core strength, Inhibit abnormal muscle activity and Improve lumbosacral and coccygeal mobility in order to Increase urinary continence, Decrease bowel urgency, Improve frequency and ease of bowel movements and Improve ability to perform ADLs. With   [] TE   [] TA   [] neuro  [] manual   [] other: Patient Education: [x] Review HEP    [] Progressed/Changed HEP based on:   [] positioning   [] body mechanics   [] transfers   [] heat/ice application    [] other:      Other Objective/Functional Measures:   []baseline resting tone:   []slow twitch mms   []fast twitch mms    Pain Level (0-10 scale) post treatment: 4/10    ASSESSMENT/Changes in Function: Pt demonstrates difficulty with isolating PFM and relaxation techniques due to guarding with new activities. She stays motivated and reports improving compliance with HEP. Will cont to progress therex and manual as tolerated.      []  Decrease # of leaks   [] No change []  Improving [] Resolved     []  Decrease hypertonus [] No change []  Improving [] Resolved     []  Increase void interval [] No change []  Improving [] Resolved     []  Increase PF strength [] No change []  Improving [] Resolved     []  Increase PF endurance [] No change []  Improving [] Resolved     []  Increase endurance [] No change []  Improving [] Resolved     []  Decrease # of pads [] No change []  Improving [] Resolved     []  Decrease pain [] No change []  Improving [] Resolved     []  Increased coordination [] No change []  Improving [] Resolved     []  Increased Bowel Frequency [] No change []  Improving [] Resolved       Patient will continue to benefit from skilled PT services to modify and progress therapeutic interventions, address functional mobility deficits, address ROM deficits, address strength deficits, analyze and address soft tissue restrictions, analyze and cue movement patterns, analyze and modify body mechanics/ergonomics, assess and modify postural abnormalities, address imbalance/dizziness and instruct in home and community integration to attain remaining goals. [x]  See Plan of Care  []  See progress note/recertification  []  See Discharge Summary         Progress towards goals / Updated goals:  Short Term Goals: To be accomplished in 4 weeks:  1. Patient will demonstrate accurate performance of home exercise program/pelvic floor contractions as adjunct to physical therapy clinic visits to promote healthy lifestyle and improved quality of life. Eval status: will review next visit     2. Patient will report at least 25% improvement with complete empty of bowel & bladder to improve her QOL. Eval status: difficulty with voiding and incomplete voiding sensation.      3. Patient will have decreased pad usage to maximum 2 pads a day for increased patient comfort and increased quality of life. Eval status: 2-4 diapers & thick pads     Long Term Goals: To be accomplished in 8  weeks:  1. Patient will demonstrate independence in HEP for maintenance of pelvic floor program and improved quality of life. Eval status: will review next visits     2. Patient will have decreased leakage episodes to </=5 day per week for increased quality of life. aEval status: mod-max leakage every day     3. Patient will report at least 60% improvement with complete empty of bowel & bladder to improve her QOL. Eval status: difficulty with voiding and incomplete voiding sensation.      4. Patient will have increased pelvic floor muscle motor performance by 1/2 to 1 grade for improved urinary continence and quality of life. Eval status: very poor coordination     5. Patient will have FOTO Urinary Problem & Bowel Constipation score change of 13 points or more indicating improvement in function for icreased quality of life.   Eval status: 41 & 47    PLAN  [x]  Upgrade activities as tolerated     [x]  Continue plan of care  []  Update interventions per flow sheet       []  Discharge due to:_  [] Other:Britany Alcocer 3/18/2022  8:09 AM    Future Appointments   Date Time Provider Gaby Delarosa   3/18/2022 11:15 AM Loraine Kayser [id number] [facility]   3/21/2022 10:30 AM Loraine Kayser [id number] [facility]   3/25/2022 11:15 AM Loraine Kayser [id number] [facility]   3/28/2022 10:30 AM Loraine Kayser [id number] [facility]   4/1/2022 11:15 AM Loraine Kayser MMCPTPB [facility]   5/17/2022  1:45 PM Luis Munoz MD [facility] AMB   6/2/2022 11:00 AM JULIA Cao

## 2022-03-19 PROBLEM — M47.812 SPONDYLOSIS OF CERVICAL REGION WITHOUT MYELOPATHY OR RADICULOPATHY: Status: ACTIVE | Noted: 2017-11-28

## 2022-03-19 PROBLEM — F41.9 ANXIETY: Status: ACTIVE | Noted: 2018-07-02

## 2022-03-19 PROBLEM — G82.50 QUADRIPLEGIA (HCC): Status: ACTIVE | Noted: 2019-11-29

## 2022-03-19 PROBLEM — M81.0 OSTEOPOROSIS: Status: ACTIVE | Noted: 2019-03-08

## 2022-03-19 PROBLEM — J45.909 ASTHMA: Status: ACTIVE | Noted: 2019-11-22

## 2022-03-19 PROBLEM — I63.9 CVA (CEREBRAL VASCULAR ACCIDENT) (HCC): Status: ACTIVE | Noted: 2017-10-02

## 2022-03-19 PROBLEM — Z66 DNR (DO NOT RESUSCITATE): Status: ACTIVE | Noted: 2019-11-29

## 2022-03-19 PROBLEM — G45.9 TIA (TRANSIENT ISCHEMIC ATTACK): Status: ACTIVE | Noted: 2017-10-02

## 2022-03-19 PROBLEM — Z51.5 ENCOUNTER FOR PALLIATIVE CARE: Status: ACTIVE | Noted: 2021-10-19

## 2022-03-19 PROBLEM — K59.00 CONSTIPATION: Status: ACTIVE | Noted: 2021-10-19

## 2022-03-19 PROBLEM — M79.18 MYOFASCIAL PAIN SYNDROME: Status: ACTIVE | Noted: 2018-03-29

## 2022-03-19 PROBLEM — I67.9 CEREBROVASCULAR SMALL VESSEL DISEASE: Status: ACTIVE | Noted: 2019-03-18

## 2022-03-19 PROBLEM — M54.12 CERVICAL RADICULITIS: Status: ACTIVE | Noted: 2018-01-25

## 2022-03-20 PROBLEM — M47.812 OSTEOARTHRITIS OF CERVICAL SPINE: Status: ACTIVE | Noted: 2018-07-03

## 2022-03-20 PROBLEM — Z86.73 HISTORY OF STROKE: Status: ACTIVE | Noted: 2019-11-22

## 2022-03-20 PROBLEM — R52 PAIN: Status: ACTIVE | Noted: 2021-10-19

## 2022-03-21 ENCOUNTER — HOSPITAL ENCOUNTER (OUTPATIENT)
Dept: PHYSICAL THERAPY | Age: 77
Discharge: HOME | End: 2022-03-21
Payer: MEDICARE

## 2022-03-21 PROCEDURE — 97112 NEUROMUSCULAR REEDUCATION: CPT

## 2022-03-21 PROCEDURE — 97140 MANUAL THERAPY 1/> REGIONS: CPT

## 2022-03-21 PROCEDURE — 97110 THERAPEUTIC EXERCISES: CPT

## 2022-03-21 NOTE — PROGRESS NOTES
PF DAILY TREATMENT NOTE 3-    Patient Name: Greig Hatchet  Date:3/21/2022  : 1945  [x]  Patient  Verified  Payor: Tanvi Whipple / Plan: [place] PART [place] [employer] [place] / Product Type: Medicare /    In time: 10:32  Out time: 11:13  Total Treatment Time (min): 41  Visit #: 6 of -    Medicare/BCBS Only   Total Timed Codes (min):  41 1:1 Treatment Time:  41     Treatment Area: [x] Pelvic Floor     [] Other:    SUBJECTIVE  Pain Level (0-10 scale): 310  Any medication changes, allergies to medications, adverse drug reactions, diagnosis change, or new procedure performed?: [x] No    [] Yes (see summary sheet for update)  Subjective functional status/changes:   [] No changes reported  Pt has more diarrhea during the last 2 days. Pt's trying her best with PFM exercises. OBJECTIVE    23 min Therapeutic Exercise:  [] See flow sheet :   [x]  Pelvic floor strengthening                 []  Pelvic floor downtraining  []  Quality pelvic floor contractions       [x]  Relaxation techniques  []  Urge suppression exercises  [x]  Other: core & hips strengthening  Rationale: Increase pelvic floor muscle strength, Improve quality of pelvic floor contractions, Decrease resting tone of the pelvic floor, Increase tissue extensibility of the pelvic floor muscles, Increase core strength, Inhibit abnormal muscle activity and Improve lumbosacral and coccygeal mobility in order to Increase urinary continence and Improve ability to perform ADLs.       10 min Neuromuscular Re-education:  []  See flow sheet :   [x]  Pelvic floor strengthening                 []  Pelvic floor downtraining  []  Quality pelvic floor contractions       [x]  Relaxation techniques  []  Urge suppression exercises  [x]  Other: core stren  Rationale: Increase pelvic floor muscle strength, Improve quality of pelvic floor contractions, Decrease resting tone of the pelvic floor, Increase tissue extensibility of the pelvic floor muscles, Increase core strength, Inhibit abnormal muscle activity and Improve lumbosacral and coccygeal mobility in order to Increase urinary continence and Improve ability to perform ADLs. 8 min Manual Therapy:  External PFM/rectal region MFR in supine & sitting   The manual therapy interventions were performed at a separate and distinct time from the therapeutic activities interventions. Rationale: Decrease resting tone of the pelvic floor, Increase tissue extensibility of the pelvic floor muscles, Inhibit abnormal muscle activity and Improve lumbosacral and coccygeal mobility in order to Improve frequency and ease of bowel movements and Improve ability to perform ADLs. With   [] TE   [] TA   [] neuro  [] manual   [] other: Patient Education: [x] Review HEP    [] Progressed/Changed HEP based on:   [] positioning   [] body mechanics   [] transfers   [] heat/ice application    [] other:      Other Objective/Functional Measures:   []baseline resting tone:   []slow twitch mms   []fast twitch mms   Received Anorectal manometry test result/impression    Pain Level (0-10 scale) post treatment: 1/10    ASSESSMENT/Changes in Function:  Pt demonstrates good pain/pressure relief with manual. She cont to experience diarrhea, probably due to Miralax. Educated pt to decrease dosage or stop using Miralax and follow up with MD for better management. Will cont to progress strengthening gradually and initiate internal manual as tolerated.       []  Decrease # of leaks   [] No change []  Improving [] Resolved     []  Decrease hypertonus [] No change []  Improving [] Resolved     []  Increase void interval [] No change []  Improving [] Resolved     []  Increase PF strength [] No change []  Improving [] Resolved     []  Increase PF endurance [] No change []  Improving [] Resolved     []  Increase endurance [] No change []  Improving [] Resolved     []  Decrease # of pads [] No change []  Improving [] Resolved     []  Decrease pain [] No change []  Improving [] Resolved     []  Increased coordination [] No change []  Improving [] Resolved     []  Increased Bowel Frequency [] No change []  Improving [] Resolved       Patient will continue to benefit from skilled PT services to modify and progress therapeutic interventions, address functional mobility deficits, address ROM deficits, address strength deficits, analyze and address soft tissue restrictions, analyze and cue movement patterns, analyze and modify body mechanics/ergonomics, assess and modify postural abnormalities and instruct in home and community integration to attain remaining goals. [x]  See Plan of Care  []  See progress note/recertification  []  See Discharge Summary         Progress towards goals / Updated goals:  Short Term Goals: To be accomplished in 4 weeks:  1. Patient will demonstrate accurate performance of home exercise program/pelvic floor contractions as adjunct to physical therapy clinic visits to promote healthy lifestyle and improved quality of life. Eval status: will review next visit  Current: good compliance 3-21-22     2. Patient will report at least 25% improvement with complete empty of bowel & bladder to improve her QOL. Eval status: difficulty with voiding and incomplete voiding sensation.   Current: improving gradually with ease for emptying bowel, no change with pressure/urgency sensation though 3-21-22     3. Patient will have decreased pad usage to maximum 2 pads a day for increased patient comfort and increased quality of life. Eval status: 2-4 diapers & thick pads     Long Term Goals: To be accomplished in 8  weeks:  1. Patient will demonstrate independence in HEP for maintenance of pelvic floor program and improved quality of life. Eval status: will review next visits     2. Patient will have decreased leakage episodes to </=5 day per week for increased quality of life. aEval status: mod-max leakage every day     3.  Patient will report at least 60% improvement with complete empty of bowel & bladder to improve her QOL. Eval status: difficulty with voiding and incomplete voiding sensation.      4. Patient will have increased pelvic floor muscle motor performance by 1/2 to 1 grade for improved urinary continence and quality of life. Eval status: very poor coordination     5. Patient will have FOTO Urinary Problem & Bowel Constipation score change of 13 points or more indicating improvement in function for icreased quality of life.   Eval status: 41 & 47    PLAN  [x]  Upgrade activities as tolerated     [x]  Continue plan of care  []  Update interventions per flow sheet       []  Discharge due to:_  []  Other:_      Irvin Wallace 3/21/2022  8:07 AM    Future Appointments   Date Time Provider Gaby Delarosa   3/21/2022 10:30 AM [place] Ugo UDYVOFI [facility]   3/25/2022 11:15 AM [place] Ugo [id number] [facility]   3/28/2022 10:30 AM [place]go [id number] [facility]   4/1/2022 11:15 AM [place]go [id number] [facility]   5/17/2022  1:45 PM Meggan Veras MD [facility] BS AMB   6/2/2022 11:00 AM JULIA Cr

## 2022-03-24 LAB
APTT HEX PL PPP: 0 SEC
APTT IMM NP PPP: NORMAL SEC
APTT PPP 1:1 SALINE: NORMAL SEC
APTT PPP: 24.9 SEC
B2 GLYCOPROT1 IGA SER-ACNC: <10 SAU
B2 GLYCOPROT1 IGG SER-ACNC: <10 SGU
B2 GLYCOPROT1 IGM SER-ACNC: <10 SMU
CARDIOLIPIN IGG SER IA-ACNC: <10 GPL
CARDIOLIPIN IGM SER IA-ACNC: <10 MPL
CONFIRM DRVVT: NORMAL SEC
INR PPP: 1 RATIO
LAC INTERPRETATION, 502038: NORMAL
PLATELET NEUTRALIZATION 500049: 0 SEC
PROTHROMBIN TIME 500372: 10.5 SEC
SCREEN DRVVT/NORMAL: NORMAL RATIO
SCREEN DRVVT: 30.8 SEC
THROMBIN TIME: 17.4 SEC

## 2022-03-25 ENCOUNTER — HOSPITAL ENCOUNTER (OUTPATIENT)
Dept: PHYSICAL THERAPY | Age: 77
Discharge: HOME | End: 2022-03-25
Payer: MEDICARE

## 2022-03-25 PROCEDURE — 97530 THERAPEUTIC ACTIVITIES: CPT

## 2022-03-25 PROCEDURE — 97112 NEUROMUSCULAR REEDUCATION: CPT

## 2022-03-25 PROCEDURE — 97110 THERAPEUTIC EXERCISES: CPT

## 2022-03-25 NOTE — PROGRESS NOTES
PF DAILY TREATMENT NOTE 3-16    Patient Name: Trena Gray  Date:3/25/2022  : 1945  [x]  Patient  Verified  Payor: [place] MED[place]ARE / Plan: [place] MEDICARE PART A & B / Product Type: Medicare /    In time:11:17  Out time: 11:58  Total Treatment Time (min): 41  Visit #: 7 of 8-12    Medicare/BCBS Only   Total Timed Codes (min):  41 1:1 Treatment Time:  41     Treatment Area: [x] Pelvic Floor     [] Other:    SUBJECTIVE  Pain Level (0-10 scale): 4/10  Any medication changes, allergies to medications, adverse drug reactions, diagnosis change, or new procedure performed?: [x] No    [] Yes (see summary sheet for update)  Subjective functional status/changes:   [] No changes reported  Pt reports cont to have diarrhea with Miralax. She has 5-7 episodes during     OBJECTIVE      21 min Therapeutic Exercise:  [] See flow sheet :   []  Pelvic floor strengthening                 []  Pelvic floor downtraining  []  Quality pelvic floor contractions       [x]  Relaxation techniques  []  Urge suppression exercises  []  Other:  Rationale: : Increase pelvic floor muscle strength, Improve quality of pelvic floor contractions, Decrease resting tone of the pelvic floor, Increase tissue extensibility of the pelvic floor muscles, Increase core strength, Inhibit abnormal muscle activity and Improve lumbosacral and coccygeal mobility in order to Increase urinary continence, Decrease bowel urgency, Improve frequency and ease of bowel movements and Improve ability to perform ADLs.        10 min Therapeutic Activity:  []  See flow sheet :    []  Increase Tissue extensibility        []  Assess fiber intake    [x]  Assess voiding habits  [x]  Assess bowel habits  [x]  Other: optimal bowel function   Rationale: Increase pelvic floor muscle strength, Improve quality of pelvic floor contractions, Decrease resting tone of the pelvic floor, Increase tissue extensibility of the pelvic floor muscles, Increase core strength, Inhibit abnormal muscle activity and Improve lumbosacral and coccygeal mobility in order to Increase urinary continence, Decrease bowel urgency, Improve frequency and ease of bowel movements and Improve ability to perform ADLs. 10 min Manual Therapy:  Intra-rectal MFR   The manual therapy interventions were performed at a separate and distinct time from the therapeutic activities interventions. Rationale: Decrease resting tone of the pelvic floor, Increase tissue extensibility of the pelvic floor muscles, Inhibit abnormal muscle activity and Improve lumbosacral and coccygeal mobility in order to Increase urinary continence, Decrease bowel urgency, Improve frequency and ease of bowel movements and Improve ability to perform ADLs. With   [] TE   [] TA   [] neuro  [] manual   [] other: Patient Education: [x] Review HEP    [] Progressed/Changed HEP based on:   [] positioning   [] body mechanics   [] transfers   [] heat/ice application    [] other:      Other Objective/Functional Measures:   []baseline resting tone:   []slow twitch mms   []fast twitch mms    Pain Level (0-10 scale) post treatment: 0/10    ASSESSMENT/Changes in Function: Pt cont to struggle with maintaining bowel consistency, having 5-7 diarrhea episodes after using Miralax. She's challenged with isolating PFM and has soreness with inner thighs from overflow techniques. Advised pt to discuss with MD and drink warm soup/water/milk instead of Miralax for gentle stimulation of BM. Pt tolerated internal manual well, no significant trigger points, only mod tightness. Will cont with manual and progress therex as tolerated.      []  Decrease # of leaks   [] No change []  Improving [] Resolved     []  Decrease hypertonus [] No change []  Improving [] Resolved     []  Increase void interval [] No change []  Improving [] Resolved     []  Increase PF strength [] No change []  Improving [] Resolved     []  Increase PF endurance [] No change []  Improving [] Resolved     [] Increase endurance [] No change []  Improving [] Resolved     []  Decrease # of pads [] No change []  Improving [] Resolved     []  Decrease pain [] No change []  Improving [] Resolved     []  Increased coordination [] No change []  Improving [] Resolved     []  Increased Bowel Frequency [] No change []  Improving [] Resolved       Patient will continue to benefit from skilled PT services to modify and progress therapeutic interventions, address functional mobility deficits, address ROM deficits, address strength deficits, analyze and address soft tissue restrictions, analyze and cue movement patterns, analyze and modify body mechanics/ergonomics, assess and modify postural abnormalities and instruct in home and community integration to attain remaining goals. [x]  See Plan of Care  []  See progress note/recertification  []  See Discharge Summary         Progress towards goals / Updated goals:  Short Term Goals: To be accomplished in 4 weeks:  1. Patient will demonstrate accurate performance of home exercise program/pelvic floor contractions as adjunct to physical therapy clinic visits to promote healthy lifestyle and improved quality of life. Eval status: will review next visit  Current: good compliance 3-21-22     2. Patient will report at least 25% improvement with complete empty of bowel & bladder to improve her QOL. Eval status: difficulty with voiding and incomplete voiding sensation.   Current: improving gradually with ease for emptying bowel, no change with pressure/urgency sensation though 3-21-22     3. Patient will have decreased pad usage to maximum 2 pads a day for increased patient comfort and increased quality of life. Eval status: 2-4 diapers & thick pads     Long Term Goals: To be accomplished in 8  weeks:  1. Patient will demonstrate independence in HEP for maintenance of pelvic floor program and improved quality of life.   Eval status: will review next visits     2. Patient will have decreased leakage episodes to </=5 day per week for increased quality of life. aEval status: mod-max leakage every day     3. Patient will report at least 60% improvement with complete empty of bowel & bladder to improve her QOL. Eval status: difficulty with voiding and incomplete voiding sensation.      4. Patient will have increased pelvic floor muscle motor performance by 1/2 to 1 grade for improved urinary continence and quality of life. Eval status: very poor coordination     5. Patient will have FOTO Urinary Problem & Bowel Constipation score change of 13 points or more indicating improvement in function for icreased quality of life.   Eval status: 41 & 47    PLAN  [x]  Upgrade activities as tolerated     [x]  Continue plan of care  []  Update interventions per flow sheet       []  Discharge due to:_  []  Other:_      Macario Rocha 3/25/2022  8:06 AM    Future Appointments   Date Time Provider Gaby Delarosa   3/25/2022 11:15 AM Karly Acosta [id number] [facility]   3/28/2022 10:30 AM Karly Acosta [id number] [facility]   4/1/2022 11:15 AM Karly Acosta [id number] [facility]   5/17/2022  1:45 PM Marycruz Stephens MD [facility] AM[facility]   6/2/2022 11:00 AM Eleanore Mort[place], [place] [address]

## 2022-03-28 ENCOUNTER — HOSPITAL ENCOUNTER (OUTPATIENT)
Dept: PHYSICAL THERAPY | Age: 77
Discharge: HOME | End: 2022-03-28
Payer: MEDICARE

## 2022-03-28 PROCEDURE — 97112 NEUROMUSCULAR REEDUCATION: CPT

## 2022-03-28 PROCEDURE — 97110 THERAPEUTIC EXERCISES: CPT

## 2022-03-28 NOTE — PROGRESS NOTES
PF DAILY TREATMENT NOTE 3-    Patient Name: Rohith Richey  Date:3/28/2022  : 1945  [x]  Patient  Verified  Payor: [place] MEDICARE / Plan: [place] MEDICARE PART A & B / Product Type: Medicare /    In time: 10:33  Out time: 11:14  Total Treatment Time (min): 41  Visit #: 8 of -    Medicare/BCBS Only   Total Timed Codes (min):  41 1:1 Treatment Time:  41     Treatment Area: [x] Pelvic Floor     [] Other:    SUBJECTIVE  Pain Level (0-10 scale): 2/10  Any medication changes, allergies to medications, adverse drug reactions, diagnosis change, or new procedure performed?: [x] No    [] Yes (see summary sheet for update)  Subjective functional status/changes:   [] No changes reported  Pt reports having a small bowel movement this morning, no diarrhea. She's trying her best with PFM exercises. OBJECTIVE      25 min Therapeutic Exercise:  [x] See flow sheet :   [x]  Pelvic floor strengthening                 []  Pelvic floor downtraining  []  Quality pelvic floor contractions       []  Relaxation techniques  []  Urge suppression exercises  [x]  Other: core & hips strengthening   Rationale: Increase pelvic floor muscle strength, Improve quality of pelvic floor contractions, Decrease resting tone of the pelvic floor, Increase tissue extensibility of the pelvic floor muscles, Increase core strength, Inhibit abnormal muscle activity and Improve lumbosacral and coccygeal mobility in order to Increase urinary continence, Decrease bowel urgency, Improve frequency and ease of bowel movements and Improve ability to perform ADLs.       16 min Neuromuscular Re-education:  [x]  See flow sheet :   []  Pelvic floor strengthening                 []  Pelvic floor downtraining  [x]  Quality pelvic floor contractions       []  Relaxation techniques  []  Urge suppression exercises  [x]  Other: core & hips strengthening   Rationale: Increase pelvic floor muscle strength, Improve quality of pelvic floor contractions, Decrease resting tone of the pelvic floor, Increase tissue extensibility of the pelvic floor muscles, Increase core strength, Inhibit abnormal muscle activity and Improve lumbosacral and coccygeal mobility in order to Increase urinary continence, Decrease bowel urgency, Improve frequency and ease of bowel movements and Improve ability to perform ADLs. With   [] TE   [] TA   [] neuro  [] manual   [] other: Patient Education: [x] Review HEP    [] Progressed/Changed HEP based on:   [] positioning   [] body mechanics   [] transfers   [] heat/ice application    [] other:      Other Objective/Functional Measures:   []baseline resting tone:   []slow twitch mms   []fast twitch mms    Pain Level (0-10 scale) post treatment: 2/10    ASSESSMENT/Changes in Function: Pt was mod challenged with therex due to weakness and endurance. Demonstrates improve isolation of PFM through external observation. Pt reports no diarrhea during the last several days. Will cont with manual and progress therex as tolerated.      []  Decrease # of leaks   [] No change []  Improving [] Resolved     []  Decrease hypertonus [] No change []  Improving [] Resolved     []  Increase void interval [] No change []  Improving [] Resolved     []  Increase PF strength [] No change []  Improving [] Resolved     []  Increase PF endurance [] No change []  Improving [] Resolved     []  Increase endurance [] No change []  Improving [] Resolved     []  Decrease # of pads [] No change []  Improving [] Resolved     []  Decrease pain [] No change []  Improving [] Resolved     []  Increased coordination [] No change []  Improving [] Resolved     []  Increased Bowel Frequency [] No change []  Improving [] Resolved       Patient will continue to benefit from skilled PT services to modify and progress therapeutic interventions, address functional mobility deficits, address ROM deficits, address strength deficits, analyze and address soft tissue restrictions, analyze and cue movement patterns, analyze and modify body mechanics/ergonomics, assess and modify postural abnormalities and instruct in home and community integration to attain remaining goals. [x]  See Plan of Care  []  See progress note/recertification  []  See Discharge Summary         Progress towards goals / Updated goals:  Short Term Goals: To be accomplished in 4 weeks:  1. Patient will demonstrate accurate performance of home exercise program/pelvic floor contractions as adjunct to physical therapy clinic visits to promote healthy lifestyle and improved quality of life. Eval status: will review next visit  Current: good compliance 3-21-22     2. Patient will report at least 25% improvement with complete empty of bowel & bladder to improve her QOL. Eval status: difficulty with voiding and incomplete voiding sensation.   Current: improving gradually with ease for emptying bowel, no change with pressure/urgency sensation though 3-21-22     3. Patient will have decreased pad usage to maximum 2 pads a day for increased patient comfort and increased quality of life. Eval status: 2-4 diapers & thick pads     Long Term Goals: To be accomplished in 8  weeks:  1. Patient will demonstrate independence in HEP for maintenance of pelvic floor program and improved quality of life. Eval status: will review next visits     2. Patient will have decreased leakage episodes to </=5 day per week for increased quality of life. aEval status: mod-max leakage every day     3. Patient will report at least 60% improvement with complete empty of bowel & bladder to improve her QOL. Eval status: difficulty with voiding and incomplete voiding sensation.      4. Patient will have increased pelvic floor muscle motor performance by 1/2 to 1 grade for improved urinary continence and quality of life. Eval status: very poor coordination     5.  Patient will have FOTO Urinary Problem & Bowel Constipation score change of 13 points or more indicating improvement in function for icreased quality of life.   Eval status: 41 & 47    PLAN  [x]  Upgrade activities as tolerated     [x]  Continue plan of care  []  Update interventions per flow sheet       []  Discharge due to:_  []  Other:_      Ca Nando 3/28/2022  8:10 AM    Future Appointments   Date Time Provider Gaby Delarosa   3/28/2022 10:30 AM Wayne Grant [id number] [facility]   4/1/2022 11:15 AM Wayne Grant [id number] [facility]   5/17/2022  1:45 PM Steven Dacosta MD [facility] AM[facility]   6/2/2022 11:00 AM JULIA Salter

## 2022-04-01 ENCOUNTER — HOSPITAL ENCOUNTER (OUTPATIENT)
Dept: PHYSICAL THERAPY | Age: 77
Discharge: HOME | End: 2022-04-01
Payer: MEDICARE

## 2022-04-01 PROCEDURE — 97530 THERAPEUTIC ACTIVITIES: CPT

## 2022-04-01 PROCEDURE — 97140 MANUAL THERAPY 1/> REGIONS: CPT

## 2022-04-01 NOTE — PROGRESS NOTES
PF DAILY TREATMENT NOTE 3-16    Patient Name: Gardenia Wynn  Date:2022  : 1945  [x]  Patient  Verified  Payor: [place] MEDICARE / Plan: [place] MEDICARE PART A & B / Product Type: Medicare /    In time: 10:35  Out time: 2:14  Total Treatment Time (min): 39  Visit #: 9 of -12    Medicare/BCBS Only   Total Timed Codes (min):  39 1:1 Treatment Time:  39     Treatment Area: [x] Pelvic Floor     [] Other:    SUBJECTIVE  Pain Level (0-10 scale): 0/10  Any medication changes, allergies to medications, adverse drug reactions, diagnosis change, or new procedure performed?: [x] No    [] Yes (see summary sheet for update)  Subjective functional status/changes:   [] No changes reported  Pt reports doing a little better. She doesn't have diarrhea anymore, initiating BM is a little easier; she still needs to push a little. OBJECTIVE      20 min Therapeutic Activity:  []  See flow sheet :    []  Increase Tissue extensibility        [x]  Assess fiber intake    [x]  Assess voiding habits  [x]  Assess bowel habits  [x]  Other: FOTO re-asessment    Rationale: Increase pelvic floor muscle strength, Improve quality of pelvic floor contractions, Decrease resting tone of the pelvic floor, Increase tissue extensibility of the pelvic floor muscles, Increase core strength, Inhibit abnormal muscle activity and Improve lumbosacral and coccygeal mobility in order to Increase urinary continence, Decrease bowel urgency, Improve frequency and ease of bowel movements and Improve ability to perform ADLs.       9 min Neuromuscular Re-education:  []  See flow sheet :   []  Pelvic floor strengthening                 []  Pelvic floor downtraining  [x]  Quality pelvic floor contractions       []  Relaxation techniques  []  Urge suppression exercises  [x]  Other: voiding mechanics for bowel  Rationale: Increase pelvic floor muscle strength, Improve quality of pelvic floor contractions, Decrease resting tone of the pelvic floor, Increase tissue extensibility of the pelvic floor muscles, Increase core strength, Inhibit abnormal muscle activity and Improve lumbosacral and coccygeal mobility in order to Increase urinary continence, Decrease bowel urgency, Improve frequency and ease of bowel movements and Improve ability to perform ADLs. 10 min Manual Therapy:  Intra-rectal MFR   The manual therapy interventions were performed at a separate and distinct time from the therapeutic activities interventions. Rationale: Increase pelvic floor muscle strength, Improve quality of pelvic floor contractions, Decrease resting tone of the pelvic floor, Increase tissue extensibility of the pelvic floor muscles, Inhibit abnormal muscle activity and Improve lumbosacral and coccygeal mobility in order to Decrease bowel urgency and Improve ability to perform ADLs.             With   [] TE   [] TA   [] neuro  [] manual   [] other: Patient Education: [x] Review HEP    [] Progressed/Changed HEP based on:   [] positioning   [] body mechanics   [] transfers   [] heat/ice application    [] other:      Other Objective/Functional Measures:   []baseline resting tone:   []slow twitch mms   []fast twitch mms    Pain Level (0-10 scale) post treatment: 0/10    ASSESSMENT/Changes in Function: see Re-cert please        []  Decrease # of leaks   [] No change []  Improving [] Resolved     []  Decrease hypertonus [] No change []  Improving [] Resolved     []  Increase void interval [] No change []  Improving [] Resolved     []  Increase PF strength [] No change []  Improving [] Resolved     []  Increase PF endurance [] No change []  Improving [] Resolved     []  Increase endurance [] No change []  Improving [] Resolved     []  Decrease # of pads [] No change []  Improving [] Resolved     []  Decrease pain [] No change []  Improving [] Resolved     []  Increased coordination [] No change []  Improving [] Resolved     []  Increased Bowel Frequency [] No change []  Improving [] Resolved Patient will continue to benefit from skilled PT services to modify and progress therapeutic interventions, address functional mobility deficits, address ROM deficits, address strength deficits, analyze and address soft tissue restrictions, analyze and cue movement patterns, analyze and modify body mechanics/ergonomics, assess and modify postural abnormalities, address imbalance/dizziness and instruct in home and community integration to attain remaining goals. []  See Plan of Care  [x]  See progress note/recertification  []  See Discharge Summary         Progress towards goals / Updated goals:  Short Term Goals: To be accomplished in 4 weeks:  1. Patient will demonstrate accurate performance of home exercise program/pelvic floor contractions as adjunct to physical therapy clinic visits to promote healthy lifestyle and improved quality of life. Eval status: will review next visit  Current: good compliance 3-21-22     2. Patient will report at least 25% improvement with complete empty of bowel & bladder to improve her QOL. Eval status: difficulty with voiding and incomplete voiding sensation.   Current: improving gradually with ease for emptying bowel, no change with pressure/urgency sensation though 3-21-22; met 40% 4-1-22     3. Patient will have decreased pad usage to maximum 2 pads a day for increased patient comfort and increased quality of life. Eval status: 2-4 diapers & thick pads  Current: no significant change 4-1-22     Long Term Goals: To be accomplished in 8  weeks:  1. Patient will demonstrate independence in HEP for maintenance of pelvic floor program and improved quality of life. Eval status: will review next visits  Current: good compliance 4-1-22     2. Patient will have decreased leakage episodes to </=5 day per week for increased quality of life. Eval status: mod-max leakage every day  Current: 4x/day, everyday 4-1-22     3.  Patient will report at least 60% improvement with complete empty of bowel & bladder to improve her QOL. Eval status: difficulty with voiding and incomplete voiding sensation. Current: progressing well 40% 4-1-22      4. Patient will have increased pelvic floor muscle motor performance by 1/2 to 1 grade for improved urinary continence and quality of life. Eval status: very poor coordination  Current: min improvement 4-1-22     5. Patient will have FOTO Urinary Problem & Bowel Constipation score change of 13 points or more indicating improvement in function for icreased quality of life.   Eval status: 41 & 47  Current: started but pt hasn't finished survey due to technical problem, will update at next visit 4-1-22    PLAN  [x]  Upgrade activities as tolerated     [x]  Continue plan of care  []  Update interventions per flow sheet       []  Discharge due to:_  []  Other:_      Sophia Segovia 4/1/2022  8:12 AM    Future Appointments   Date Time Provider Gaby Delarosa   4/1/2022 11:15 AM Deya PRYORUZCLYDE [facility]   5/17/2022  1:45 PM Laura Dumont MD [facility] AMB   6/2/2022 11:00 AM Nevin Ramirez, [address]

## 2022-04-01 NOTE — PROGRESS NOTES
[facility] [place] [employer]  YA  [address] [facility]  [phone] fax    Continued Plan of Care/ Re-certification for Physical Therapy Services      Patient name: Craig Huynh Start of Care: 3/2/2022   Referral source: JULIA Larson : 1945               Medical Diagnosis: PFD (pelvic floor dysfunction) [M62.89]  Payor: [place] MED[place]ARE / Plan: [place] MEDICARE PART A & B / Product Type: Medicare /  Onset Date: worsened during the last 3 months2               Treatment Diagnosis: PFD, Urinary incont. , bowel urgency   Prior Hospitalization: see medical history Provider#: [id number]   Medications: Verified on Patient summary List    Comorbidities: osteoporosis, arthritis, stroke, neck fusion surgery   Prior Level of Function: less urgency with bowel, less leakage, mod ind with ADLs, using rollator. Visits from Start of Care: 9    Missed Visits: 0    The Plan of Care and following information is based on the patient's current status:  Progress towards goals / Updated goals:  Short Term Goals: To be accomplished in 4 weeks:  1. Patient will demonstrate accurate performance of home exercise program/pelvic floor contractions as adjunct to physical therapy clinic visits to promote healthy lifestyle and improved quality of life. Eval status: will review next visit  Current: good compliance 3-21-22     2. Patient will report at least 25% improvement with complete empty of bowel & bladder to improve her QOL. Eval status: difficulty with voiding and incomplete voiding sensation.   Current: improving gradually with ease for emptying bowel, no change with pressure/urgency sensation though 3-21-22; met 40% overall 22     3. Patient will have decreased pad usage to maximum 2 pads a day for increased patient comfort and increased quality of life.   Eval status: 2-4 diapers & thick pads  Current: no significant change 22     Long Term Goals: To be accomplished in 8  weeks:  1. Patient will demonstrate independence in HEP for maintenance of pelvic floor program and improved quality of life. Eval status: will review next visits  Current: good compliance 4-1-22     2. Patient will have decreased leakage episodes to </=5 day per week for increased quality of life. Eval status: mod-max leakage every day  Current: 4x/day, everyday 4-1-22     3. Patient will report at least 60% improvement with complete empty of bowel & bladder to improve her QOL. Eval status: difficulty with voiding and incomplete voiding sensation. Current: progressing well 40% overall 4-1-22      4. Patient will have increased pelvic floor muscle motor performance by 1/2 to 1 grade for improved urinary continence and quality of life. Eval status: very poor coordination  Current: min improvement 4-1-22     5. Patient will have FOTO Urinary Problem & Bowel Constipation score change of 13 points or more indicating improvement in function for icreased quality of life. Eval status: 41 & 47  Current: started but pt hasn't finished survey due to technical problem, will update at next visit 4-1-22    Key functional changes: improving ease with BM, improving coordination of PFM      Problems/ barriers to goal attainment: comorbidity, PFM weakness and tone     Problem List: Pelvic pain/dysfunction, Decreased pelvic floor mm awareness, Decreased pelvic floor mm strength, Use of accessory muscles, Improper voiding habits, Hypertonus of pelvic floor, Urinary urgency and Other Bowel urgency    Treatment Plan: Therapeutic exercise, Urge suppression techniques, Neuromuscular re-education, Manual therapy, Physical agent/modality and Patient education     Patient Goal (s) has been updated and includes: get stronger and improve urgency problem     Goals for this certification period to be accomplished in 4 weeks:  1.  Patient will demonstrate independence in HEP for maintenance of pelvic floor program and improved quality of life. Status at Progress Note: good compliance 4-1-22     2. Patient will have decreased leakage episodes to </=5 day per week for increased quality of life. Status at Progress Note: 4x/day, everyday 4-1-22     3. Patient will report at least 60% improvement with complete empty of bowel & bladder to improve her QOL. Status at Progress Note: progressing well 40% 4-1-22      4. Patient will have increased pelvic floor muscle motor performance by 1/2 to 1 grade for improved urinary continence and quality of life. Status at Progress Note: min improvement 4-1-22     5. Patient will have FOTO Urinary Problem & Bowel Constipation score change of 13 points or more indicating improvement in function for icreased quality of life. Eval status: initial assessment: 41 & 47; started but pt hasn't finished survey due to technical problem, will update at next visit 4-1-22    Frequency / Duration: Patient to be seen 1-2 times per week for 4 weeks:    Assessment / Recommendations: Pt's making gradual progress with PT, reports 40% improvement  overall. Diarrhea resolved after stopping Miralax; pt's able to maintain WNL frequency with bowel by maintaining optimal fiber and water intake. Pt reports improving ease with initiating BM. Also notes some improvement with bladder control if she really focuses on PFM contraction. Pt's still challenged with isolation of PFM during exercises. She cont to experience constant bowel urgency even after voiding/having diarrhea 5-7x/week, which indicates that her urgency symptoms arrving from PFM trigger points and/or instability of pelvic organ. Pt would cont to benefit from skilled PF PT to address remained limitations to improve her QOL.     Certification Period: 4-2-2022 to 5-1-2022    Mary Esquivel 4/1/2022 11:13 AM    ________________________________________________________________________  I certify that the above Therapy Services are being furnished while the patient is under my care. I agree with the treatment plan and certify that this therapy is necessary. [] I have read the above and request that my patient continue as recommended.   [] I have read the above report and request that my patient continue therapy with the following changes/special instructions: _____________________________________________  [] I have read the above report and request that my patient be discharged from therapy      Physician's Signature:____________Date:_________TIME:________     Mona Butler PA  ** Signature, Date and Time must be completed for valid certification **    Please sign and return to [facility] [employer]   [address] [facility]  [phone] fax

## 2022-04-13 ENCOUNTER — APPOINTMENT (OUTPATIENT)
Dept: GENERAL RADIOLOGY | Age: 77
End: 2022-04-13
Attending: STUDENT IN AN ORGANIZED HEALTH CARE EDUCATION/TRAINING PROGRAM
Payer: MEDICARE

## 2022-04-13 ENCOUNTER — APPOINTMENT (OUTPATIENT)
Dept: PHYSICAL THERAPY | Age: 77
End: 2022-04-13
Payer: MEDICARE

## 2022-04-13 ENCOUNTER — HOSPITAL ENCOUNTER (EMERGENCY)
Age: 77
Discharge: HOME | End: 2022-04-13
Attending: STUDENT IN AN ORGANIZED HEALTH CARE EDUCATION/TRAINING PROGRAM
Payer: MEDICARE

## 2022-04-13 VITALS
DIASTOLIC BLOOD PRESSURE: 66 MMHG | OXYGEN SATURATION: 95 % | SYSTOLIC BLOOD PRESSURE: 107 MMHG | HEART RATE: 76 BPM | TEMPERATURE: 98.2 F | RESPIRATION RATE: 17 BRPM

## 2022-04-13 DIAGNOSIS — J10.1 INFLUENZA A: Primary | ICD-10-CM

## 2022-04-13 LAB
ALBUMIN SERPL-MCNC: 4 G/DL (ref 3.4–5)
ALBUMIN/GLOB SERPL: 1.3 {RATIO} (ref 0.8–1.7)
ALP SERPL-CCNC: 48 U/L (ref 45–117)
ALT SERPL-CCNC: 25 U/L (ref 13–56)
ANION GAP SERPL CALC-SCNC: 6 MMOL/L (ref 3–18)
AST SERPL-CCNC: 27 U/L (ref 10–38)
ATRIAL RATE: 71 BPM
ATRIAL RATE: 76 BPM
B PERT DNA SPEC QL NAA+PROBE: NOT DETECTED
BASOPHILS # BLD: 0 K/UL (ref 0–0.1)
BASOPHILS NFR BLD: 1 % (ref 0–2)
BILIRUB SERPL-MCNC: 0.4 MG/DL (ref 0.2–1)
BNP SERPL-MCNC: 372 PG/ML (ref 0–900)
BORDETELLA PARAPERTUSSIS PCR, BORPAR: NOT DETECTED
BUN SERPL-MCNC: 14 MG/DL (ref 7–18)
BUN/CREAT SERPL: 21 (ref 12–20)
C PNEUM DNA SPEC QL NAA+PROBE: NOT DETECTED
CALCIUM SERPL-MCNC: 9.4 MG/DL (ref 8.5–10.1)
CALCULATED P AXIS, ECG09: 72 DEGREES
CALCULATED P AXIS, ECG09: 88 DEGREES
CALCULATED R AXIS, ECG10: 29 DEGREES
CALCULATED R AXIS, ECG10: 58 DEGREES
CALCULATED T AXIS, ECG11: 73 DEGREES
CALCULATED T AXIS, ECG11: 78 DEGREES
CHLORIDE SERPL-SCNC: 105 MMOL/L (ref 100–111)
CO2 SERPL-SCNC: 25 MMOL/L (ref 21–32)
CREAT SERPL-MCNC: 0.66 MG/DL (ref 0.6–1.3)
DEPRECATED S PYO AG THROAT QL EIA: NEGATIVE
DIAGNOSIS, 93000: NORMAL
DIAGNOSIS, 93000: NORMAL
DIFFERENTIAL METHOD BLD: ABNORMAL
EOSINOPHIL # BLD: 0.1 K/UL (ref 0–0.4)
EOSINOPHIL NFR BLD: 1 % (ref 0–5)
ERYTHROCYTE [DISTWIDTH] IN BLOOD BY AUTOMATED COUNT: 12.1 % (ref 11.6–14.5)
FLUAV H1 2009 PAND RNA SPEC QL NAA+PROBE: NOT DETECTED
FLUAV H1 RNA SPEC QL NAA+PROBE: NOT DETECTED
FLUAV H3 RNA SPEC QL NAA+PROBE: DETECTED
FLUAV SUBTYP SPEC NAA+PROBE: NOT DETECTED
FLUBV RNA SPEC QL NAA+PROBE: NOT DETECTED
GLOBULIN SER CALC-MCNC: 3 G/DL (ref 2–4)
GLUCOSE SERPL-MCNC: 91 MG/DL (ref 74–99)
HADV DNA SPEC QL NAA+PROBE: NOT DETECTED
HCOV 229E RNA SPEC QL NAA+PROBE: NOT DETECTED
HCOV HKU1 RNA SPEC QL NAA+PROBE: NOT DETECTED
HCOV NL63 RNA SPEC QL NAA+PROBE: NOT DETECTED
HCOV OC43 RNA SPEC QL NAA+PROBE: NOT DETECTED
HCT VFR BLD AUTO: 39 % (ref 35–45)
HGB BLD-MCNC: 13 G/DL (ref 12–16)
HMPV RNA SPEC QL NAA+PROBE: NOT DETECTED
HPIV1 RNA SPEC QL NAA+PROBE: NOT DETECTED
HPIV2 RNA SPEC QL NAA+PROBE: NOT DETECTED
HPIV3 RNA SPEC QL NAA+PROBE: NOT DETECTED
HPIV4 RNA SPEC QL NAA+PROBE: NOT DETECTED
IMM GRANULOCYTES # BLD AUTO: 0 K/UL (ref 0–0.04)
IMM GRANULOCYTES NFR BLD AUTO: 0 % (ref 0–0.5)
LIPASE SERPL-CCNC: 121 U/L (ref 73–393)
LYMPHOCYTES # BLD: 0.9 K/UL (ref 0.9–3.6)
LYMPHOCYTES NFR BLD: 15 % (ref 21–52)
M PNEUMO DNA SPEC QL NAA+PROBE: NOT DETECTED
MAGNESIUM SERPL-MCNC: 1.8 MG/DL (ref 1.6–2.6)
MCH RBC QN AUTO: 32.9 PG (ref 24–34)
MCHC RBC AUTO-ENTMCNC: 33.3 G/DL (ref 31–37)
MCV RBC AUTO: 98.7 FL (ref 78–100)
MONOCYTES # BLD: 1.3 K/UL (ref 0.05–1.2)
MONOCYTES NFR BLD: 22 % (ref 3–10)
NEUTS SEG # BLD: 3.6 K/UL (ref 1.8–8)
NEUTS SEG NFR BLD: 61 % (ref 40–73)
NRBC # BLD: 0 K/UL (ref 0–0.01)
NRBC BLD-RTO: 0 PER 100 WBC
P-R INTERVAL, ECG05: 142 MS
P-R INTERVAL, ECG05: 148 MS
PLATELET # BLD AUTO: 189 K/UL (ref 135–420)
PMV BLD AUTO: 9.2 FL (ref 9.2–11.8)
POTASSIUM SERPL-SCNC: 4.3 MMOL/L (ref 3.5–5.5)
PROT SERPL-MCNC: 7 G/DL (ref 6.4–8.2)
Q-T INTERVAL, ECG07: 392 MS
Q-T INTERVAL, ECG07: 410 MS
QRS DURATION, ECG06: 74 MS
QRS DURATION, ECG06: 74 MS
QTC CALCULATION (BEZET), ECG08: 425 MS
QTC CALCULATION (BEZET), ECG08: 461 MS
RBC # BLD AUTO: 3.95 M/UL (ref 4.2–5.3)
RSV RNA SPEC QL NAA+PROBE: NOT DETECTED
RV+EV RNA SPEC QL NAA+PROBE: NOT DETECTED
SARS-COV-2 PCR, COVPCR: NOT DETECTED
SODIUM SERPL-SCNC: 136 MMOL/L (ref 136–145)
TROPONIN-HIGH SENSITIVITY: 5 NG/L (ref 0–54)
TROPONIN-HIGH SENSITIVITY: 6 NG/L (ref 0–54)
VENTRICULAR RATE, ECG03: 71 BPM
VENTRICULAR RATE, ECG03: 76 BPM
WBC # BLD AUTO: 5.9 K/UL (ref 4.6–13.2)

## 2022-04-13 PROCEDURE — 84484 ASSAY OF TROPONIN QUANT: CPT

## 2022-04-13 PROCEDURE — 96374 THER/PROPH/DIAG INJ IV PUSH: CPT

## 2022-04-13 PROCEDURE — 93005 ELECTROCARDIOGRAM TRACING: CPT

## 2022-04-13 PROCEDURE — 87070 CULTURE OTHR SPECIMN AEROBIC: CPT

## 2022-04-13 PROCEDURE — 71045 X-RAY EXAM CHEST 1 VIEW: CPT

## 2022-04-13 PROCEDURE — 74011250637 HC RX REV CODE- 250/637: Performed by: STUDENT IN AN ORGANIZED HEALTH CARE EDUCATION/TRAINING PROGRAM

## 2022-04-13 PROCEDURE — 83880 ASSAY OF NATRIURETIC PEPTIDE: CPT

## 2022-04-13 PROCEDURE — 83735 ASSAY OF MAGNESIUM: CPT

## 2022-04-13 PROCEDURE — 96360 HYDRATION IV INFUSION INIT: CPT

## 2022-04-13 PROCEDURE — 99285 EMERGENCY DEPT VISIT HI MDM: CPT

## 2022-04-13 PROCEDURE — 96361 HYDRATE IV INFUSION ADD-ON: CPT

## 2022-04-13 PROCEDURE — 87880 STREP A ASSAY W/OPTIC: CPT

## 2022-04-13 PROCEDURE — 74011250636 HC RX REV CODE- 250/636: Performed by: STUDENT IN AN ORGANIZED HEALTH CARE EDUCATION/TRAINING PROGRAM

## 2022-04-13 PROCEDURE — 0202U NFCT DS 22 TRGT SARS-COV-2: CPT

## 2022-04-13 PROCEDURE — 80053 COMPREHEN METABOLIC PANEL: CPT

## 2022-04-13 PROCEDURE — 85025 COMPLETE CBC W/AUTO DIFF WBC: CPT

## 2022-04-13 PROCEDURE — 83690 ASSAY OF LIPASE: CPT

## 2022-04-13 RX ORDER — ONDANSETRON 2 MG/ML
4 INJECTION INTRAMUSCULAR; INTRAVENOUS ONCE
Status: COMPLETED | OUTPATIENT
Start: 2022-04-13 | End: 2022-04-13

## 2022-04-13 RX ORDER — GABAPENTIN 100 MG/1
100 CAPSULE ORAL ONCE
Status: COMPLETED | OUTPATIENT
Start: 2022-04-13 | End: 2022-04-13

## 2022-04-13 RX ADMIN — ONDANSETRON 4 MG: 2 INJECTION INTRAMUSCULAR; INTRAVENOUS at 08:00

## 2022-04-13 RX ADMIN — GABAPENTIN 100 MG: 100 CAPSULE ORAL at 10:43

## 2022-04-13 RX ADMIN — SODIUM CHLORIDE 1000 ML: 900 INJECTION, SOLUTION INTRAVENOUS at 08:01

## 2022-04-13 NOTE — ED PROVIDER NOTES
EMERGENCY DEPARTMENT HISTORY AND PHYSICAL EXAM    8:14 AM      Date: 4/13/2022  Patient Name: Sowmya Rowan    History of Presenting Illness     Chief Complaint   Patient presents with    Fatigue    Sore Throat         History Provided By: Patient  Location/Duration/Severity/Modifying factors   Patient presents to the emergency room with 3-day history of sore throat, nausea, decreased fluid intake, weakness, general malaise. Patient has been having symptoms since Saturday to Sunday. Patient has had subjective fevers though no recorded fevers at this time. Otherwise patient states that she has been trying to keep up with fluid intake though per family, this is limited. Patient denies any specific pains, shortness of breath, chest pain. Patient was noted to have syncopal episode in the waiting room. Patient was noted to have loss of bowel and bladder control, though did not note any shaking, did not lose pulses, did not stop breathing. She recovered within a couple of minutes though she felt unwell for several minutes after. PCP: Marialuisa Núñez MD    Current Outpatient Medications   Medication Sig Dispense Refill    vit B Cmplx 3-FA-Vit C-Biotin (NEPHRO SANJEEV RX) 1- mg-mg-mcg tablet Take 1 Tablet by mouth daily.  diclofenac EC (VOLTAREN) 75 mg EC tablet Take 1 Tablet by mouth two (2) times a day. 60 Tablet 1    nitrofurantoin, macrocrystal-monohydrate, (MACROBID) 100 mg capsule Take 1 Capsule by mouth two (2) times a day. 14 Capsule 0    ondansetron hcl (ZOFRAN) 4 mg tablet take 1 tablet by mouth every 6 hours if needed for nausea 5      multivit-min-folic-vit K-lycop (One-A-Day Men's Multivitamin) 400- mcg tab Take 1 Tablet by mouth daily. (Patient not taking: Reported on 12/2/2021)      polyethylene glycol 3350 (CLEARLAX PO) Take 1 Dose by mouth daily.  (Patient not taking: Reported on 12/2/2021)      calcium polycarbophiL (Fiber Laxative, ca polycarbo,) 625 mg tablet Take 625 mg by mouth daily.  lidocaine (LIDODERM) 5 % apply 1 patch TO THE AFFECTED AREA DAILY. LEAVE ON FOR 12 HOURS AND THEN OFF FOR 12 HOURS.  fluticasone propionate (FLOVENT HFA) 110 mcg/actuation inhaler Take 1 Puff by inhalation every twelve (12) hours as needed.  nitrofurantoin, macrocrystal-monohydrate, (MACROBID) 100 mg capsule Take 1 Cap by mouth two (2) times a day. (Patient not taking: Reported on 8/16/2021) 14 Cap 0    conjugated estrogens (PREMARIN) 0.625 mg/gram vaginal cream Apply 0.5 g to affected area daily. Apply pea sized amount to urethra and whatever is left over to just inside of vagina (Patient not taking: Reported on 12/2/2021) 30 g 5    atorvastatin (LIPITOR) 20 mg tablet take 1 tablet by mouth once daily (Patient not taking: Reported on 3/16/2022) 90 Tab 0    busPIRone (BUSPAR) 7.5 mg tablet take 1 tablet by mouth twice a day (Patient taking differently: Take 7.5 mg by mouth two (2) times a day.) 180 Tab 0    ibuprofen (MOTRIN) 400 mg tablet Take 1 Tab by mouth every six (6) hours as needed for Pain. (Patient taking differently: Take 800 mg by mouth daily.) 8 Tab 0    omeprazole (PRILOSEC) 20 mg capsule take 1 capsule by mouth once daily before breakfast (Patient not taking: Reported on 3/16/2022) 90 Cap 1    gabapentin (NEURONTIN) 100 mg capsule Take 2 Caps by mouth three (3) times daily. Max Daily Amount: 600 mg. (Patient taking differently: Take 400 mg by mouth daily. ) 180 Cap 1    acetaminophen (TYLENOL) 160 mg/5 mL elixir Take 1,000 mg by mouth daily.  midodrine (PROAMITINE) 5 mg tablet Take 5 mg by mouth as needed. 1 tab by mouth with breakfast and lunch      turmeric 400 mg cap Take 1 Cap by mouth daily.  aspirin 81 mg chewable tablet Take 1 Tab by mouth daily.  30 Tab 3       Past History     Past Medical History:  Past Medical History:   Diagnosis Date    Abnormal Pap smear     Dr. Aaron Sandoval Allergic rhinitis     Arthritis     Asthma 11/22/2019  Cerebrovascular small vessel disease 3/18/2019    CT 3/17/2019    Cervical spinal cord compression (HCC)     Chronic pain     Concentric left ventricular hypertrophy 3/18/2019    With left ventricular diastolic dysfunction by echocardiogram 3/18/2019    GERD (gastroesophageal reflux disease) 11/22/2019    Hypercholesterolemia     Neck pain 5/17/2010    Stroke ([facility] [address] 10/02/2017    TIA- legs weak memory issues       Past Surgical History:  Past Surgical History:   Procedure Laterality Date    COLONOSCOPY N/A 6/4/2018    COLONOSCOPY / polypectomy performed by Amilcar Stone MD at [facility] COLONOSCOPY N/A 8/19/2021    COLONOSCOPY performed by Montserrat Carmona MD at [facility] ENDOSCOPY    HX GYN      Total Hyst    HX LAP CHOLECYSTECTOMY      HX OTHER SURGICAL  2017    Throat widened    NEUROLOGICAL PROCEDURE UNLISTED  11/2019    Cervical fusion    RI PLASTIC SURGERY, NECK  2019       Family History:  Family History   Problem Relation Age of Onset    Cancer Mother         breast    Heart Disease Father        Social History:  Social History     Tobacco Use    Smoking status: Never Smoker    Smokeless tobacco: Never Used   Vaping Use    Vaping Use: Never used   Substance Use Topics    Alcohol use: No    Drug use: No       Allergies: Allergies   Allergen Reactions    Baclofen Shortness of Breath     Denies. Pt tolerates  Other reaction(s): gi distress    Morphine Other (comments)     hallucinations  Other reaction(s): other/intolerance  hallucinations  Other reaction(s): delusions    Sulfa (Sulfonamide Antibiotics) Other (comments)     Drops blood pressure    Celecoxib Other (comments)     Tiredness   Other reaction(s): other/intolerance  Makes her tired         Review of Systems     Review of Systems   Constitutional: Positive for activity change, appetite change, chills and fatigue. Negative for diaphoresis and fever. HENT: Positive for sore throat.  Negative for congestion, rhinorrhea and trouble swallowing. Respiratory: Negative for cough, shortness of breath and wheezing. Cardiovascular: Negative for chest pain, palpitations and leg swelling. Gastrointestinal: Positive for diarrhea, nausea and vomiting. Negative for abdominal distention and constipation. Neurological: Positive for syncope and weakness. Negative for dizziness, tremors, seizures, numbness and headaches. Physical Exam     Visit Vitals  /66 Comment: manual. monitor reading very high. Pulse 75   Temp 98.2 °F (36.8 °C)   Resp 15   LMP  (LMP Unknown)   SpO2 94%         Physical Exam  Constitutional:       General: She is not in acute distress. Appearance: She is well-developed. She is ill-appearing. She is not diaphoretic. HENT:      Head: Normocephalic and atraumatic. Nose: No congestion or rhinorrhea. Mouth/Throat:      Mouth: Mucous membranes are moist.      Pharynx: Oropharynx is clear. Eyes:      Conjunctiva/sclera: Conjunctivae normal.      Pupils: Pupils are equal, round, and reactive to light. Cardiovascular:      Rate and Rhythm: Normal rate and regular rhythm. Heart sounds: Normal heart sounds. Pulmonary:      Effort: Pulmonary effort is normal.      Breath sounds: Normal breath sounds. Abdominal:      General: Bowel sounds are normal.      Palpations: Abdomen is soft. Musculoskeletal:      Cervical back: Normal range of motion. Skin:     General: Skin is warm and dry. Capillary Refill: Capillary refill takes less than 2 seconds. Neurological:      General: No focal deficit present. Mental Status: She is alert and oriented to person, place, and time.    Psychiatric:         Mood and Affect: Mood normal.         Behavior: Behavior normal.           Diagnostic Study Results     Labs -  Recent Results (from the past 12 hour(s))   CBC WITH AUTOMATED DIFF    Collection Time: 04/13/22  7:50 AM   Result Value Ref Range    WBC 5.9 4.6 - 13.2 K/uL    RBC 3.95 (L) 4.20 - 5.30 M/uL    HGB 13.0 12.0 - 16.0 g/dL    HCT 39.0 35.0 - 45.0 %    MCV 98.7 78.0 - 100.0 FL    MCH 32.9 24.0 - 34.0 PG    MCHC 33.3 31.0 - 37.0 g/dL    RDW 12.1 11.6 - 14.5 %    PLATELET 284 772 - 617 K/uL    MPV 9.2 9.2 - 11.8 FL    NRBC 0.0 0  WBC    ABSOLUTE NRBC 0.00 0.00 - 0.01 K/uL    NEUTROPHILS 61 40 - 73 %    LYMPHOCYTES 15 (L) 21 - 52 %    MONOCYTES 22 (H) 3 - 10 %    EOSINOPHILS 1 0 - 5 %    BASOPHILS 1 0 - 2 %    IMMATURE GRANULOCYTES 0 0.0 - 0.5 %    ABS. NEUTROPHILS 3.6 1.8 - 8.0 K/UL    ABS. LYMPHOCYTES 0.9 0.9 - 3.6 K/UL    ABS. MONOCYTES 1.3 (H) 0.05 - 1.2 K/UL    ABS. EOSINOPHILS 0.1 0.0 - 0.4 K/UL    ABS. BASOPHILS 0.0 0.0 - 0.1 K/UL    ABS. IMM. GRANS. 0.0 0.00 - 0.04 K/UL    DF AUTOMATED     METABOLIC PANEL, COMPREHENSIVE    Collection Time: 04/13/22  7:50 AM   Result Value Ref Range    Sodium 136 136 - 145 mmol/L    Potassium 4.3 3.5 - 5.5 mmol/L    Chloride 105 100 - 111 mmol/L    CO2 25 21 - 32 mmol/L    Anion gap 6 3.0 - 18 mmol/L    Glucose 91 74 - 99 mg/dL    BUN 14 7.0 - 18 MG/DL    Creatinine 0.66 0.6 - 1.3 MG/DL    BUN/Creatinine ratio 21 (H) 12 - 20      GFR est AA >60 >60 ml/min/1.73m2    GFR est non-AA >60 >60 ml/min/1.73m2    Calcium 9.4 8.5 - 10.1 MG/DL    Bilirubin, total 0.4 0.2 - 1.0 MG/DL    ALT (SGPT) 25 13 - 56 U/L    AST (SGOT) 27 10 - 38 U/L    Alk.  phosphatase 48 45 - 117 U/L    Protein, total 7.0 6.4 - 8.2 g/dL    Albumin 4.0 3.4 - 5.0 g/dL    Globulin 3.0 2.0 - 4.0 g/dL    A-G Ratio 1.3 0.8 - 1.7     TROPONIN-HIGH SENSITIVITY    Collection Time: 04/13/22  7:50 AM   Result Value Ref Range    Troponin-High Sensitivity 5 0 - 54 ng/L   RESPIRATORY VIRUS PANEL W/COVID-19, PCR    Collection Time: 04/13/22  7:55 AM    Specimen: Nasopharyngeal   Result Value Ref Range    Adenovirus Not detected NOTD      Coronavirus 229E Not detected NOTD      Coronavirus HKU1 Not detected NOTD      Coronavirus CVNL63 Not detected NOTD      Coronavirus OC43 Not detected NOTD SARS-CoV-2, PCR Not detected NOTD      Metapneumovirus Not detected NOTD      Rhinovirus and Enterovirus Not detected NOTD      Influenza A Not detected NOTD      Influenza A, subtype H1 Not detected NOTD      Influenza A, subtype H3 Detected (A) NOTD      INFLUENZA A H1N1 PCR Not detected NOTD      Influenza B Not detected NOTD      Parainfluenza 1 Not detected NOTD      Parainfluenza 2 Not detected NOTD      Parainfluenza 3 Not detected NOTD      Parainfluenza virus 4 Not detected NOTD      RSV by PCR Not detected NOTD      B. parapertussis, PCR Not detected NOTD      Bordetella pertussis - PCR Not detected NOTD      Chlamydophila pneumoniae DNA, QL, PCR Not detected NOTD      Mycoplasma pneumoniae DNA, QL, PCR Not detected NOTD     EKG, 12 LEAD, INITIAL    Collection Time: 04/13/22  7:57 AM   Result Value Ref Range    Ventricular Rate 71 BPM    Atrial Rate 71 BPM    P-R Interval 142 ms    QRS Duration 74 ms    Q-T Interval 392 ms    QTC Calculation (Bezet) 425 ms    Calculated P Axis 88 degrees    Calculated R Axis 58 degrees    Calculated T Axis 78 degrees    Diagnosis       Normal sinus rhythm  Left atrial enlargement  Borderline ECG  When compared with ECG of 02-NOV-2021 09:31,  No significant change was found  Confirmed by Sadie Taylor [id number]) on 4/13/2022 10:24:57 AM     STREP AG SCREEN, GROUP A    Collection Time: 04/13/22  8:24 AM    Specimen: Throat   Result Value Ref Range    Group A Strep Ag ID Negative     NT-PRO BNP    Collection Time: 04/13/22  8:24 AM   Result Value Ref Range    NT pro- 0 - 900 PG/ML   LIPASE    Collection Time: 04/13/22  8:24 AM   Result Value Ref Range    Lipase 121 73 - 393 U/L   MAGNESIUM    Collection Time: 04/13/22  8:24 AM   Result Value Ref Range    Magnesium 1.8 1.6 - 2.6 mg/dL   TROPONIN-HIGH SENSITIVITY    Collection Time: 04/13/22 10:36 AM   Result Value Ref Range    Troponin-High Sensitivity 6 0 - 54 ng/L       Radiologic Studies -   XR CHEST PORT   Final Result No radiographic evidence of acute cardiopulmonary process. Medical Decision Making   I am the first provider for this patient. I reviewed the vital signs, available nursing notes, past medical history, past surgical history, family history and social history. Vital Signs-Reviewed the patient's vital signs. EKG: Normal sinus rhythm   Left atrial enlargement   Borderline ECG   When compared with ECG of 02-NOV-2021 09:31,   No significant change was found   Confirmed by Mel Horner ([id number]) on 4/13/2022 10:24:57 AM    Records Reviewed: Nursing Notes, Old Medical Records and Previous electrocardiograms (Time of Review: 8:14 AM)    ED Course: Progress Notes, Reevaluation, and Consults:    ED Course as of 04/13/22 1152   Wed Apr 13, 2022   0815 Patient here for sore throats, vomiting, general weakness. Started 3 days ago. Patient had syncopal appearing episode in the waiting room and was brought back. Plan started, fluids started, labs drawn, chest x-ray and flu/Covid swab. Placed on monitors. Normotensive with good saturations normal heart rate and at this point regular respirations. [JL]   1018 Patient overall feeling better after Zofran and fluids. Given syncopal episode will get delta troponin, plus minus repeat EKG. Patient is positive for influenza. This would explain her symptoms at this time. If patient continues to stay well and delta troponin normal, can consider discharge home. [JL]      ED Course User Index  [JL] Dinah Romero MD       Provider Notes (Medical Decision Making):   MDM  Number of Diagnoses or Management Options  Influenza A  Diagnosis management comments: Patient presents with symptoms consistent with potential URI though given syncope, concern for cardiac versus pulmonary issues. Will get labs to rule this out. EKG does not seem concerning for cardiac cause. Labs nonconcerning for cardiac issue, will get delta troponin however given syncope.   Labs significant however for positive influenza a. Delta troponin negative. Patient feels much better after some medication and fluids. Agreeable to discharge with follow-up with primary care. Procedures    Critical Care Time: -      Diagnosis     Clinical Impression:   1. Influenza A        Disposition: Discharge home    Follow-up Information     Follow up With Specialties Details Why Contact Info    Bell Fortune MD General Practice Schedule an appointment as soon as possible for a visit in 3 days for ED follow up Gabby Richardson  [phone]             Patient's Medications   Start Taking    No medications on file   Continue Taking    ACETAMINOPHEN (TYLENOL) 160 MG/5 ML ELIXIR    Take 1,000 mg by mouth daily. ASPIRIN 81 MG CHEWABLE TABLET    Take 1 Tab by mouth daily. ATORVASTATIN (LIPITOR) 20 MG TABLET    take 1 tablet by mouth once daily    BUSPIRONE (BUSPAR) 7.5 MG TABLET    take 1 tablet by mouth twice a day    CALCIUM POLYCARBOPHIL (FIBER LAXATIVE, CA POLYCARBO,) 625 MG TABLET    Take 625 mg by mouth daily. CONJUGATED ESTROGENS (PREMARIN) 0.625 MG/GRAM VAGINAL CREAM    Apply 0.5 g to affected area daily. Apply pea sized amount to urethra and whatever is left over to just inside of vagina    DICLOFENAC EC (VOLTAREN) 75 MG EC TABLET    Take 1 Tablet by mouth two (2) times a day. FLUTICASONE PROPIONATE (FLOVENT HFA) 110 MCG/ACTUATION INHALER    Take 1 Puff by inhalation every twelve (12) hours as needed. GABAPENTIN (NEURONTIN) 100 MG CAPSULE    Take 2 Caps by mouth three (3) times daily. Max Daily Amount: 600 mg. IBUPROFEN (MOTRIN) 400 MG TABLET    Take 1 Tab by mouth every six (6) hours as needed for Pain. LIDOCAINE (LIDODERM) 5 %    apply 1 patch TO THE AFFECTED AREA DAILY. LEAVE ON FOR 12 HOURS AND THEN OFF FOR 12 HOURS. MIDODRINE (PROAMITINE) 5 MG TABLET    Take 5 mg by mouth as needed.  1 tab by mouth with breakfast and lunch MULTIVIT-MIN-FOLIC-VIT K-LYCOP (ONE-A-DAY MEN'S MULTIVITAMIN) 400- MCG TAB    Take 1 Tablet by mouth daily. NITROFURANTOIN, MACROCRYSTAL-MONOHYDRATE, (MACROBID) 100 MG CAPSULE    Take 1 Cap by mouth two (2) times a day. NITROFURANTOIN, MACROCRYSTAL-MONOHYDRATE, (MACROBID) 100 MG CAPSULE    Take 1 Capsule by mouth two (2) times a day. OMEPRAZOLE (PRILOSEC) 20 MG CAPSULE    take 1 capsule by mouth once daily before breakfast    ONDANSETRON HCL (ZOFRAN) 4 MG TABLET    take 1 tablet by mouth every 6 hours if needed for nausea 5    POLYETHYLENE GLYCOL 3350 (CLEARLAX PO)    Take 1 Dose by mouth daily. TURMERIC 400 MG CAP    Take 1 Cap by mouth daily. VIT B CMPLX 3-FA-VIT C-BIOTIN (NEPHRO SANJEEV RX) 1- MG-MG-MCG TABLET    Take 1 Tablet by mouth daily. These Medications have changed    No medications on file   Stop Taking    No medications on file     Disclaimer: Sections of this note are dictated using utilizing voice recognition software. Minor typographical errors may be present. If questions arise, please do not hesitate to contact me or call our department.

## 2022-04-15 LAB
BACTERIA SPEC CULT: NORMAL
SERVICE CMNT-IMP: NORMAL

## 2022-04-18 ENCOUNTER — APPOINTMENT (OUTPATIENT)
Dept: PHYSICAL THERAPY | Age: 77
End: 2022-04-18
Payer: MEDICARE

## 2022-04-20 ENCOUNTER — HOSPITAL ENCOUNTER (OUTPATIENT)
Dept: PHYSICAL THERAPY | Age: 77
Discharge: HOME | End: 2022-04-20
Payer: MEDICARE

## 2022-04-20 PROCEDURE — 97110 THERAPEUTIC EXERCISES: CPT

## 2022-04-20 PROCEDURE — 97530 THERAPEUTIC ACTIVITIES: CPT

## 2022-04-20 NOTE — PROGRESS NOTES
PF DAILY TREATMENT NOTE 3-16    Patient Name: Keli Silverman  Date:2022  : 1945  [x]  Patient  Verified  Payor: [place] MEDICARE / Plan: [place] MEDICARE PART A & B / Product Type: Medicare /    In time: 9:45  Out time: 10:33  Total Treatment Time (min): 48  Visit #: 1 of     Medicare/BCBS Only   Total Timed Codes (min):  48 1:1 Treatment Time:  48     Treatment Area: [x] Pelvic Floor     [] Other:    SUBJECTIVE  Pain Level (0-10 scale): \"10/10\"  Any medication changes, allergies to medications, adverse drug reactions, diagnosis change, or new procedure performed?: [x] No    [] Yes (see summary sheet for update)  Subjective functional status/changes:   [] No changes reported  Pt's been having severe pain with her neck; she went to ER and passed out at the hospital. MD ordered some PT. She's not able to perform HEP as instructed as she also got a flu during the last 2 weeks. Pt reports daily leakage with her bladder and at least 2 episodes of fecal incontinence. Fecal incontinence occurred suddenly, no triggering food/activities recalled. No change with pressure/bowel urgency sensation      OBJECTIVE      33 min Therapeutic Exercise:  [] See flow sheet :   [x]  Pelvic floor strengthening                 []  Pelvic floor downtraining  []  Quality pelvic floor contractions       []  Relaxation techniques  []  Urge suppression exercises  [x]  Other: NMES use  Rationale: Increase pelvic floor muscle strength, Improve quality of pelvic floor contractions, Decrease resting tone of the pelvic floor, Increase tissue extensibility of the pelvic floor muscles, Increase core strength, Inhibit abnormal muscle activity and Improve lumbosacral and coccygeal mobility in order to Increase urinary continence, Increase fecal continence, Decrease bowel urgency, Improve ability to undergo a gynecological exam and Improve ability to perform ADLs.       15 min Therapeutic Activity:  []  See flow sheet :    []  Increase Tissue extensibility        []  Assess fiber intake    []  Assess voiding habits  []  Assess bowel habits  []  Other:   Rationale: Increase pelvic floor muscle strength, Improve quality of pelvic floor contractions, Decrease resting tone of the pelvic floor, Increase tissue extensibility of the pelvic floor muscles, Increase core strength, Inhibit abnormal muscle activity and Improve lumbosacral and coccygeal mobility in order to Increase urinary continence, Increase fecal continence, Decrease bowel urgency, Improve ability to undergo a gynecological exam and Improve ability to perform ADLs. With   [] TE   [] TA   [] neuro  [] manual   [] other: Patient Education: [x] Review HEP    [] Progressed/Changed HEP based on:   [] positioning   [] body mechanics   [] transfers   [] heat/ice application    [] other:      Other Objective/Functional Measures:   []baseline resting tone:   []slow twitch mms   []fast twitch mms    Pain Level (0-10 scale) post treatment: \"10/10\" with neck    ASSESSMENT/Changes in Function: Pt had 2 week gap with PT due to other illness. PT reports daily leakage with bladder, 2 episodes of fecal leakage and fair compliance with HEP. Pt deemed mod fatigued but demonstrates good effort with all therex today. Introduced NMES to further improve PFM tone & strength. Will cont to progress strengthening as tolerated.     []  Decrease # of leaks   [] No change []  Improving [] Resolved     []  Decrease hypertonus [] No change []  Improving [] Resolved     []  Increase void interval [] No change []  Improving [] Resolved     []  Increase PF strength [] No change []  Improving [] Resolved     []  Increase PF endurance [] No change []  Improving [] Resolved     []  Increase endurance [] No change []  Improving [] Resolved     []  Decrease # of pads [] No change []  Improving [] Resolved     []  Decrease pain [] No change []  Improving [] Resolved     []  Increased coordination [] No change []  Improving [] Resolved     []  Increased Bowel Frequency [] No change []  Improving [] Resolved       Patient will continue to benefit from skilled PT services to modify and progress therapeutic interventions, address functional mobility deficits, address ROM deficits, address strength deficits, analyze and address soft tissue restrictions, analyze and cue movement patterns, analyze and modify body mechanics/ergonomics, assess and modify postural abnormalities, address imbalance/dizziness and instruct in home and community integration to attain remaining goals. []  See Plan of Care  [x]  See progress note/recertification  []  See Discharge Summary         Progress towards goals / Updated goals:  Goals for this certification period to be accomplished in 4 weeks:  1. Patient will demonstrate independence in HEP for maintenance of pelvic floor program and improved quality of life. Status at Progress Note: good compliance 4-1-22     2. Patient will have decreased leakage episodes to </=5 day per week for increased quality of life. Status at Progress Note: 4x/day, everyday 4-1-22  Current: no change 4-20-22     3. Patient will report at least 60% improvement with complete empty of bowel & bladder to improve her QOL. Status at Progress Note: progressing well 40% 4-1-22   Current: skipping 3 days today, constant \"urgency sensation\" 4-20-22    4. Patient will have increased pelvic floor muscle motor performance by 1/2 to 1 grade for improved urinary continence and quality of life. Status at Progress Note: min improvement 4-1-22     5. Patient will have FOTO Urinary Problem & Bowel Constipation score change of 13 points or more indicating improvement in function for icreased quality of life.   Eval status: initial assessment: 41 & 47; started but pt hasn't finished survey due to technical problem, will update at next visit 4-1-22     PLAN  [x]  Upgrade activities as tolerated     [x]  Continue plan of care  []  Update interventions per flow sheet       []  Discharge due to:_  []  Other:_      Herminia Fahad 4/20/2022  8:11 AM    Future Appointments   Date Time Provider Gaby Delarosa   4/20/2022  9:45 AM Vicenta Maza [id number] [facility]   4/28/2022 11:15 AM Vicenta Maza VELVHXO [facility]   5/17/2022  1:45 PM Jacy Garner MD [facility]  AMB   6/2/2022 11:00 AM JULIA Calderon

## 2022-04-28 ENCOUNTER — HOSPITAL ENCOUNTER (OUTPATIENT)
Dept: PHYSICAL THERAPY | Age: 77
Discharge: HOME | End: 2022-04-28
Payer: MEDICARE

## 2022-04-28 PROCEDURE — 97530 THERAPEUTIC ACTIVITIES: CPT

## 2022-04-28 NOTE — PROGRESS NOTES
PF DAILY TREATMENT NOTE 3-    Patient Name: Cristian Patel  Date:2022  : 1945  [x]  Patient  Verified  Payor: [place] MEDICARE / Plan: [place] MEDICARE PART A & B / Product Type: Medicare /    In time: 11:20  Out time:12:04  Total Treatment Time (min):  44  Visit #: 2 of     Medicare/BCBS Only   Total Timed Codes (min):  44 1:1 Treatment Time:  44     Treatment Area: [x] Pelvic Floor     [] Other:    SUBJECTIVE  Pain Level (0-10 scale): 3/10 pressure with PF/anus  Any medication changes, allergies to medications, adverse drug reactions, diagnosis change, or new procedure performed?: [x] No    [] Yes (see summary sheet for update)  Subjective functional status/changes:   [] No changes reported  She had 1 leakage of fecal matter after taking Miralax. She's doing a little better overall so she can do more exercises but not as much as before. OBJECTIVE      44 min Therapeutic Activity:  []  See flow sheet :    []  Increase Tissue extensibility        [x]  Assess fiber intake    [x]  Assess voiding habits  [x]  Assess bowel habits  [x]  Other: FOTO re-assessment, NMES use   Rationale: Increase pelvic floor muscle strength, Improve quality of pelvic floor contractions, Decrease resting tone of the pelvic floor, Increase tissue extensibility of the pelvic floor muscles, Increase core strength, Inhibit abnormal muscle activity and Improve lumbosacral and coccygeal mobility in order to Increase urinary continence, Increase fecal continence, Decrease bowel urgency and Improve ability to perform ADLs.             With   [] TE   [] TA   [] neuro  [] manual   [] other: Patient Education: [x] Review HEP    [] Progressed/Changed HEP based on:   [] positioning   [] body mechanics   [] transfers   [] heat/ice application    [] other:      Other Objective/Functional Measures:   []baseline resting tone:   []slow twitch mms   []fast twitch mms    Pain Level (0-10 scale) post treatment: 2/10    ASSESSMENT/Changes in Function: see Re-cert please    []  Decrease # of leaks   [] No change []  Improving [] Resolved     []  Decrease hypertonus [] No change []  Improving [] Resolved     []  Increase void interval [] No change []  Improving [] Resolved     []  Increase PF strength [] No change []  Improving [] Resolved     []  Increase PF endurance [] No change []  Improving [] Resolved     []  Increase endurance [] No change []  Improving [] Resolved     []  Decrease # of pads [] No change []  Improving [] Resolved     []  Decrease pain [] No change []  Improving [] Resolved     []  Increased coordination [] No change []  Improving [] Resolved     []  Increased Bowel Frequency [] No change []  Improving [] Resolved       Patient will continue to benefit from skilled PT services to modify and progress therapeutic interventions, address functional mobility deficits, address ROM deficits, address strength deficits, analyze and address soft tissue restrictions, analyze and cue movement patterns, analyze and modify body mechanics/ergonomics, assess and modify postural abnormalities, address imbalance/dizziness and instruct in home and community integration to attain remaining goals. []  See Plan of Care  [x]  See progress note/recertification  []  See Discharge Summary         Progress towards goals / Updated goals:  Goals for this certification period to be accomplished in 4 weeks:  1. Patient will demonstrate independence in HEP for maintenance of pelvic floor program and improved quality of life. Status at Progress Note: good compliance 4-1-22  Current : fair compliance 4-28-22     2. Patient will have decreased leakage episodes to </=5 day per week for increased quality of life. Status at Progress Note: 4x/day, everyday 4-1-22  Current: no change 4-20-22; progressing, 1 episodes during the last 1 week 4-28-22     3. Patient will report at least 60% improvement with complete empty of bowel & bladder to improve her QOL.   Status at Progress Note: progressing well 40% 4-1-22   Current: skipping 3 days today, constant \"urgency sensation\" 4-20-22; progressing well, 50% overall 4-28-22     4. Patient will have increased pelvic floor muscle motor performance by 1/2 to 1 grade for improved urinary continence and quality of life. Status at Progress Note: min improvement 4-1-22  Current : poor progression 4-28-22    5. Patient will have FOTO Urinary Problem & Bowel Constipation score change of 13 points or more indicating improvement in function for icreased quality of life.   Eval status: initial assessment: 41 & 47; started but pt hasn't finished survey due to technical problem, will update at next visit 4-1-22   Current : no significant change 4-28-22      PLAN  [x]  Upgrade activities as tolerated     [x]  Continue plan of care  []  Update interventions per flow sheet       []  Discharge due to:_  []  Other:_      D[place] Michelle 4/28/2022  7:52 AM    Future Appointments   Date Time Provider Gaby Delarosa   4/28/2022 11:15 AM Haskel Sprang PNQUUCE [facility]   5/17/2022  1:45 PM Silvestre Jenkins MD [facility] BS AMB   6/2/2022 11:00 AM Libby Mejia, [address]

## 2022-04-28 NOTE — PROGRESS NOTES
[facility] - [place] Dominguez  [address] [facility]  [phone] fax    Continued Plan of Care/ Re-certification for Physical Therapy Services    Patient name: Jamaica Friedman Start of Care: 3/2/2022   Referral source: Andreina Butler, [address] : 1945               Medical Diagnosis: PFD (pelvic floor dysfunction) [M62.89]  Payor: [place] MEDICARE / Plan: [place] MEDICARE PART A & B / Product Type: Medicare /  Onset Date: worsened during the last 3 months2               Treatment Diagnosis: PFD, Urinary incont. , bowel urgency   Prior Hospitalization: see medical history Provider#: [id number]   Medications: Verified on Patient summary List    Comorbidities: osteoporosis, arthritis, stroke, neck fusion surgery   Prior Level of Function: less urgency with bowel, less leakage, mod ind with ADLs, using rollator.        Visits from Start of Care: 12    Missed Visits: 0    The Plan of Care and following information is based on the patient's current status:  Goals for this certification period to be accomplished in 4 weeks:  1. Patient will demonstrate independence in [facility] for maintenance of pelvic floor program and improved quality of life. Status at Progress Note: good compliance 22  Current : fair compliance 22     2. Patient will have decreased leakage episodes to </=5 day per week for increased quality of life. Status at Progress Note: 4x/day, everyday 22  Current: no change 22; progressing, 1 episodes during the last 1 week 22     3. Patient will report at least 60% improvement with complete empty of bowel & bladder to improve her QOL. Status at Progress Note: progressing well 40% 22   Current: skipping 3 days today, constant \"urgency sensation\" 22; progressing well, 50% overall 22     4. Patient will have increased pelvic floor muscle motor performance by 1/2 to 1 grade for improved urinary continence and quality of life.   Status at Progress Note: min improvement 4-1-22  Current : poor progression 4-28-22     5. Patient will have FOTO Urinary Problem & Bowel Constipation score change of 13 points or more indicating improvement in function for icreased quality of life. Eval status: initial assessment: 41 & 47; started but pt hasn't finished survey due to technical problem, will update at next visit 4-1-22   Current : no significant change 4-28-22      Key functional changes: improving coordination of PFM      Problems/ barriers to goal attainment: poor strength of PFM, gap with PT     Problem List: Pelvic pain/dysfunction, Decreased pelvic floor mm awareness, Decreased pelvic floor mm strength, Use of accessory muscles, Improper voiding habits, Hypertonus of pelvic floor and Urinary urgency    Treatment Plan: Therapeutic exercise, Urge suppression techniques, Neuromuscular re-education, Manual therapy, Physical agent/modality and Patient education     Patient Goal (s) has been updated and includes: improve pain/pressure, voiding bowel     Goals for this certification period to be accomplished in 4 weeks:  1. Patient will demonstrate independence in HEP for maintenance of pelvic floor program and improved quality of life. Status at Progress Note: fair compliance 4-28-22     2. Patient will have decreased leakage episodes to </=5 day per week for increased quality of life. Status at Progress Note: initial assessment: 4x/day; 1 episodes during the last 1 week 4-28-22     3. Patient will report at least 75% improvement with complete empty of bowel & bladder to improve her QOL. Status at Progress Note: progressing well, 50% overall 4-28-22     4. Patient will have increased pelvic floor muscle motor performance by 1/2 to 1 grade for improved urinary continence and quality of life. Status at Progress Note: poor progression due to gap with PT 4-28-22     5.  Patient will have FOTO Urinary Problem & Bowel Constipation score change of 13 points or more indicating improvement in function for icreased quality of life. Eval status: initial assessment: 41 & 47; no significant change 4-28-22    Frequency / Duration: Patient to be seen 1-2 times per week for 4 weeks:    Assessment / Recommendations:Pt demonstrates limited progression during the last 30 days due to gap with PT (because of other illness). She notes 1-4 episodes of fecal leakage and cont to experience constant pressure/urgency at anus. Pt present with poor tone & strength of PFM. She was fair compliant with HEP due to decreased endurance overall. Pt demonstrates good understanding with HEP progression, NMES use and self management. She would cont to benefit from skilled PT to improve PFM, bowel and bladder function to improve her QOL. Certification Period: 4- to 5-    [place] Michelle 4/28/2022 7:53 AM    ________________________________________________________________________  I certify that the above Therapy Services are being furnished while the patient is under my care. I agree with the treatment plan and certify that this therapy is necessary. [] I have read the above and request that my patient continue as recommended.   [] I have read the above report and request that my patient continue therapy with the following changes/special instructions: _____________________________________________  [] I have read the above report and request that my patient be discharged from therapy      Physician's Signature:____________Date:_________TIME:________     Vishnu Butler PA  ** Signature, Date and Time must be completed for valid certification **    Please sign and return to [facility] - [address]  [phone] fax

## 2022-05-11 ENCOUNTER — HOSPITAL ENCOUNTER (OUTPATIENT)
Dept: LAB | Age: 77
Discharge: HOME | End: 2022-05-11
Payer: MEDICARE

## 2022-05-11 ENCOUNTER — TRANSCRIBE ORDER (OUTPATIENT)
Dept: REGISTRATION | Age: 77
End: 2022-05-11

## 2022-05-11 DIAGNOSIS — E55.9 AVITAMINOSIS D: ICD-10-CM

## 2022-05-11 DIAGNOSIS — E46 UNSPECIFIED PROTEIN-CALORIE MALNUTRITION (HCC): ICD-10-CM

## 2022-05-11 DIAGNOSIS — M81.0 SENILE OSTEOPOROSIS: ICD-10-CM

## 2022-05-11 DIAGNOSIS — E46 UNSPECIFIED PROTEIN-CALORIE MALNUTRITION (HCC): Primary | ICD-10-CM

## 2022-05-11 LAB
25(OH)D3 SERPL-MCNC: 31.2 NG/ML (ref 30–100)
ALBUMIN SERPL-MCNC: 3.8 G/DL (ref 3.4–5)
ALBUMIN/GLOB SERPL: 1.2 {RATIO} (ref 0.8–1.7)
ALP SERPL-CCNC: 57 U/L (ref 45–117)
ALT SERPL-CCNC: 23 U/L (ref 13–56)
ANION GAP SERPL CALC-SCNC: 4 MMOL/L (ref 3–18)
AST SERPL-CCNC: 17 U/L (ref 10–38)
BILIRUB SERPL-MCNC: 0.5 MG/DL (ref 0.2–1)
BUN SERPL-MCNC: 17 MG/DL (ref 7–18)
BUN/CREAT SERPL: 23 (ref 12–20)
CALCIUM SERPL-MCNC: 9.4 MG/DL (ref 8.5–10.1)
CHLORIDE SERPL-SCNC: 107 MMOL/L (ref 100–111)
CO2 SERPL-SCNC: 31 MMOL/L (ref 21–32)
CREAT SERPL-MCNC: 0.73 MG/DL (ref 0.6–1.3)
GLOBULIN SER CALC-MCNC: 3.3 G/DL (ref 2–4)
GLUCOSE SERPL-MCNC: 92 MG/DL (ref 74–99)
POTASSIUM SERPL-SCNC: 4.5 MMOL/L (ref 3.5–5.5)
PROT SERPL-MCNC: 7.1 G/DL (ref 6.4–8.2)
SODIUM SERPL-SCNC: 142 MMOL/L (ref 136–145)

## 2022-05-11 PROCEDURE — 80053 COMPREHEN METABOLIC PANEL: CPT

## 2022-05-11 PROCEDURE — 36415 COLL VENOUS BLD VENIPUNCTURE: CPT

## 2022-05-11 PROCEDURE — 82306 VITAMIN D 25 HYDROXY: CPT

## 2022-05-13 ENCOUNTER — HOSPITAL ENCOUNTER (OUTPATIENT)
Dept: PHYSICAL THERAPY | Age: 77
Discharge: HOME | End: 2022-05-13
Payer: MEDICARE

## 2022-05-13 PROCEDURE — 97530 THERAPEUTIC ACTIVITIES: CPT

## 2022-05-13 PROCEDURE — 97110 THERAPEUTIC EXERCISES: CPT

## 2022-05-13 NOTE — PROGRESS NOTES
PF DAILY TREATMENT NOTE 3-16    Patient Name: Simón Clark  Date:2022  : 1945  [x]  Patient  Verified  Payor: Santo Kirkpatrick / Plan: [place] MEDICARE PART A & B / Product Type: Medicare /    In time: 10:34  Out time: 11:14  Total Treatment Time (min): 40  Visit #: 1 of 8    Medicare/BCBS Only   Total Timed Codes (min):  40 1:1 Treatment Time:  40     Treatment Area: [x] Pelvic Floor     [] Other:    SUBJECTIVE  Pain Level (0-10 scale): 0/10  Any medication changes, allergies to medications, adverse drug reactions, diagnosis change, or new procedure performed?: [x] No    [] Yes (see summary sheet for update)  Subjective functional status/changes:   [] No changes reported  Pt reports using Miralax and NMES unit. She feels like it's a little easier to void bowel. Only 1 leakage with urgency. She has some soreness after NMES. OBJECTIVE    25 min Therapeutic Exercise:  [] See flow sheet :   [x]  Pelvic floor strengthening                 []  Pelvic floor downtraining  []  Quality pelvic floor contractions       []  Relaxation techniques  []  Urge suppression exercises  [x]  Other: core & hips strengthening  Rationale: Increase pelvic floor muscle strength, Improve quality of pelvic floor contractions, Decrease resting tone of the pelvic floor, Increase tissue extensibility of the pelvic floor muscles, Increase core strength, Inhibit abnormal muscle activity and Improve lumbosacral and coccygeal mobility in order to Increase urinary continence, Decrease urinary urgency, Increase ability to delay urination, Decrease frequency of urination, Decrease nocturia, Increase fecal continence, Decrease bowel urgency, Improve frequency and ease of bowel movements and Improve ability to perform ADLs.        15 min Therapeutic Activity:  []  See flow sheet :    []  Increase Tissue extensibility        []  Assess fiber intake    [x]  Assess voiding habits  [x]  Assess bowel habits  []  Other:   Rationale: Increase pelvic floor muscle strength, Improve quality of pelvic floor contractions, Decrease resting tone of the pelvic floor, Increase tissue extensibility of the pelvic floor muscles, Increase core strength, Inhibit abnormal muscle activity and Improve lumbosacral and coccygeal mobility in order to Increase urinary continence, Decrease urinary urgency, Increase ability to delay urination, Decrease frequency of urination, Decrease nocturia, Increase fecal continence, Decrease bowel urgency, Improve frequency and ease of bowel movements and Improve ability to perform ADLs. With   [] TE   [] TA   [] neuro  [] manual   [] other: Patient Education: [x] Review HEP    [] Progressed/Changed HEP based on:   [] positioning   [] body mechanics   [] transfers   [] heat/ice application    [] other:      Other Objective/Functional Measures:   []baseline resting tone:   []slow twitch mms   []fast twitch mms    Pain Level (0-10 scale) post treatment: 0/10    ASSESSMENT/Changes in Function: Pt demonstrates good compliance with NMES, min soreness, no pain. She notes min improvement with pressure/urgency/pain at rectal region and improving ease with voiding. Pt was min challenged with progressive strengthening therex today. Will cont to progress HEP as tolerated.      []  Decrease # of leaks   [] No change []  Improving [] Resolved     []  Decrease hypertonus [] No change []  Improving [] Resolved     []  Increase void interval [] No change []  Improving [] Resolved     []  Increase PF strength [] No change []  Improving [] Resolved     []  Increase PF endurance [] No change []  Improving [] Resolved     []  Increase endurance [] No change []  Improving [] Resolved     []  Decrease # of pads [] No change []  Improving [] Resolved     []  Decrease pain [] No change []  Improving [] Resolved     []  Increased coordination [] No change []  Improving [] Resolved     []  Increased Bowel Frequency [] No change []  Improving [] Resolved Patient will continue to benefit from skilled PT services to modify and progress therapeutic interventions, address functional mobility deficits, address ROM deficits, address strength deficits, analyze and address soft tissue restrictions, analyze and cue movement patterns, analyze and modify body mechanics/ergonomics, assess and modify postural abnormalities, address imbalance/dizziness and instruct in home and community integration to attain remaining goals. []  See Plan of Care  [x]  See progress note/recertification  []  See Discharge Summary         Progress towards goals / Updated goals:  Goals for this certification period to be accomplished in 4 weeks:  1. Patient will demonstrate independence in HEP for maintenance of pelvic floor program and improved quality of life. Status at Progress Note: fair compliance 4-28-22  Current: fair compliance 5-13-22     2. Patient will have decreased leakage episodes to </=5 day per week for increased quality of life. Status at Progress Note: initial assessment: 4x/day; 1 episodes during the last 1 week 4-28-22  Current: 1x during the last 2 weeks 5-13-22     3. Patient will report at least 75% improvement with complete empty of bowel & bladder to improve her QOL. Status at Progress Note: progressing well, 50% overall 4-28-22   Current: improving min 5-18-22     4. Patient will have increased pelvic floor muscle motor performance by 1/2 to 1 grade for improved urinary continence and quality of life. Status at Progress Note: poor progression due to gap with CA 6-93-97     5. Patient will have FOTO Urinary Problem & Bowel Constipation score change of 13 points or more indicating improvement in function for icreased quality of life.   Eval status: initial assessment: 41 & 47; no significant change 4-28-22    PLAN  []  Upgrade activities as tolerated     [x]  Continue plan of care  []  Update interventions per flow sheet       []  Discharge due to:_  []  Other:_ Palmer Raien Bernheim 5/13/2022  8:08 AM    Future Appointments   Date Time Provider Gaby Isaura   5/13/2022 10:30 AM Zeeshan Miner GGEIEKH [facility]   5/18/2022 12:45 PM Zeeshan Miner NSMSVRT [facility]   5/25/2022 11:15 AM Zeeshan Miner XQNCENT [facility]   5/27/2022  1:30 PM Sonam Pinon NP [facility] BS AMB   6/1/2022 11:15 AM Zeeshan Miner MZRAUTO [facility]   6/2/2022 11:00 AM JULIA Graves

## 2022-05-18 ENCOUNTER — HOSPITAL ENCOUNTER (OUTPATIENT)
Dept: PHYSICAL THERAPY | Age: 77
Discharge: HOME | End: 2022-05-18
Payer: MEDICARE

## 2022-05-18 PROCEDURE — 97140 MANUAL THERAPY 1/> REGIONS: CPT

## 2022-05-18 PROCEDURE — 97110 THERAPEUTIC EXERCISES: CPT

## 2022-05-18 PROCEDURE — 97530 THERAPEUTIC ACTIVITIES: CPT

## 2022-05-25 ENCOUNTER — HOSPITAL ENCOUNTER (OUTPATIENT)
Dept: PHYSICAL THERAPY | Age: 77
Discharge: HOME | End: 2022-05-25
Payer: MEDICARE

## 2022-05-25 PROCEDURE — 97140 MANUAL THERAPY 1/> REGIONS: CPT

## 2022-05-25 PROCEDURE — 97530 THERAPEUTIC ACTIVITIES: CPT

## 2022-05-25 NOTE — PROGRESS NOTES
PF DAILY TREATMENT NOTE 3-16    Patient Name: Gardenia Wynn  Date:2022  : 1945  [x]  Patient  Verified  Payor: Severiano Quinton / Plan: [place] MEDICARE PART A & B / Product Type: Medicare /    In time: 11:18  Out time:11:58  Total Treatment Time (min): 40  Visit #: 3 of 8    Medicare/BCBS Only   Total Timed Codes (min):  40 1:1 Treatment Time:  40     Treatment Area: [x] Pelvic Floor     [] Other:    SUBJECTIVE  Pain Level (0-10 scale): 2/10 pressure/urgency  Any medication changes, allergies to medications, adverse drug reactions, diagnosis change, or new procedure performed?: [x] No    [] Yes (see summary sheet for update)  Subjective functional status/changes:   [] No changes reported  Pt has to see oral surgeon as she has some pain and teeth problem. Pain/pressure relieved for 1 day after internal manual. She's trying to eat more, she's been loosing weight during the last 1-2 months. No significant change with urinary leakage     OBJECTIVE        17 min Therapeutic Activity:  []  See flow sheet :    []  Increase Tissue extensibility        [x]  Assess fiber intake    [x]  Assess voiding habits  [x]  Assess bowel habits  []  Other:   Rationale: Increase pelvic floor muscle strength, Improve quality of pelvic floor contractions, Decrease resting tone of the pelvic floor, Increase tissue extensibility of the pelvic floor muscles, Increase core strength, Inhibit abnormal muscle activity and Improve lumbosacral and coccygeal mobility in order to Increase urinary continence, Decrease urinary urgency, Increase ability to delay urination, Decrease frequency of urination, Decrease bowel urgency, Improve frequency and ease of bowel movements and Improve ability to perform ADLs. 23 min Manual Therapy:  Andree-urethral muscles trumping.  Levator ani MFR intra-vaginally & rectally, coccygeus MFR & Severino's massage   The manual therapy interventions were performed at a separate and distinct time from the therapeutic activities interventions. Rationale: Increase pelvic floor muscle strength, Improve quality of pelvic floor contractions, Decrease resting tone of the pelvic floor, Increase tissue extensibility of the pelvic floor muscles, Inhibit abnormal muscle activity and Improve lumbosacral and coccygeal mobility in order to Increase urinary continence, Decrease urinary urgency, Increase ability to delay urination, Decrease frequency of urination, Decrease bowel urgency, Improve frequency and ease of bowel movements and Improve ability to perform ADLs.       With   [] TE   [] TA   [] neuro  [] manual   [] other: Patient Education: [x] Review HEP    [] Progressed/Changed HEP based on:   [] positioning   [] body mechanics   [] transfers   [] heat/ice application    [] other:      Other Objective/Functional Measures:   []baseline resting tone:   []slow twitch mms   []fast twitch mms    Pain Level (0-10 scale) post treatment: 0/10    ASSESSMENT/Changes in Function: see re-cert please      []  Decrease # of leaks   [] No change []  Improving [] Resolved     []  Decrease hypertonus [] No change []  Improving [] Resolved     []  Increase void interval [] No change []  Improving [] Resolved     []  Increase PF strength [] No change []  Improving [] Resolved     []  Increase PF endurance [] No change []  Improving [] Resolved     []  Increase endurance [] No change []  Improving [] Resolved     []  Decrease # of pads [] No change []  Improving [] Resolved     []  Decrease pain [] No change []  Improving [] Resolved     []  Increased coordination [] No change []  Improving [] Resolved     []  Increased Bowel Frequency [] No change []  Improving [] Resolved       Patient will continue to benefit from skilled PT services to modify and progress therapeutic interventions, address functional mobility deficits, address ROM deficits, address strength deficits, analyze and address soft tissue restrictions, analyze and cue movement patterns, analyze and modify body mechanics/ergonomics, assess and modify postural abnormalities, address imbalance/dizziness and instruct in home and community integration to attain remaining goals. []  See Plan of Care  [x]  See progress note/recertification  []  See Discharge Summary         Progress towards goals / Updated goals:  Goals for this certification period to be accomplished in 4 weeks:  1. Patient will demonstrate independence in HEP for maintenance of pelvic floor program and improved quality of life. Status at Progress Note: fair compliance 4-28-22  Current: fair compliance 5-13-22; good compliance 5-25-22     2. Patient will have decreased leakage episodes to </=5 day per week for increased quality of life. Status at Progress Note: initial assessment: 4x/day; 1 episodes during the last 1 week 4-28-22  Current: 1x during the last 2 weeks 5-13-22; no significant change 5-25-22     3. Patient will report at least 75% improvement with complete empty of bowel & bladder to improve her QOL. Status at Progress Note: progressing well, 50% overall 4-28-22   Current: improving min 5-18-22; improved ease with voiding, cont to fluctuate with incomplete voiding sensation 5-25-22     4. Patient will have increased pelvic floor muscle motor performance by 1/2 to 1 grade for improved urinary continence and quality of life. Status at Progress Note: poor progression due to gap with  0-26-65  Current: 1+/5 5-18-22; progressing gradually 5-25-22     5. Patient will have FOTO Urinary Problem & Bowel Constipation score change of 13 points or more indicating improvement in function for icreased quality of life.   Eval status: initial assessment: 41 & 47; no significant change 4-28-22  Current: min improvement with Bowel Constipation 5-25-22    PLAN  [x]  Upgrade activities as tolerated     [x]  Continue plan of care  []  Update interventions per flow sheet       []  Discharge due to:_  []  Other:_      Miracle Etienne Nahomy 5/25/2022  8:50 AM    Future Appointments   Date Time Provider Gaby Isaura   5/25/2022 11:15 AM [place] [facility] LNLXLEI [facility]   5/27/2022  1:30 PM Surjit Rhodes NP CAP BS AMB   6/1/2022 11:15 AM [place] [facility] MMCPTPB [facility]   6/2/2022 11:00 AM JULIA Alves [facility] DEEJAY SCHED   6/8/2022  9:45 AM [place] [facility] MMCPTPB [facility]   6/10/2022 10:30 AM Sharif TOPETE MMCPTPB [facility]   6/14/2022 10:30 AM [place] [facility] MMCPTPB [facility]   6/16/2022 10:30 AM [place] MMCPTPB [facility]   6/20/2022  9:45 AM [place] MMCPTPB [facility]   6/22/2022 12:45 PM [place] [facility] LZRVIQZ [facility]   6/27/2022 12:45 PM [place] [facility] VQDMRXP [facility]   6/29/2022 12:45 PM Sharif TOPETE MMCPTPB [facility]

## 2022-05-25 NOTE — PROGRESS NOTES
[facility] - Richard Singleton  [address] [facility]  [phone] fax    Continued Plan of Care/ Re-certification for Physical Therapy Services    Patient name: Jamaica Friedman Start of Care: 3/2/2022   Referral source: JeysonsinaVishnu Diana Schroeder : 1945               Medical Diagnosis: PFD (pelvic floor dysfunction) [M62.89]  Payor: [place] MEDICARE / Plan: [place] MEDICARE PART A & B / Product Type: Medicare /  Onset Date: worsened during the last 3 months2               Treatment Diagnosis: PFD, Urinary incont. , bowel urgency   Prior Hospitalization: see medical history Provider#: [id number]   Medications: Verified on Patient summary List    Comorbidities: osteoporosis, arthritis, stroke, neck fusion surgery   Prior Level of Function: less urgency with bowel, less leakage, mod ind with ADLs, using rollator.      Visits from Start of Care: 15    Missed Visits: 0    The Plan of Care and following information is based on the patient's current status:  1. Patient will demonstrate independence in HEP for maintenance of pelvic floor program and improved quality of life. Status at Progress Note: fair compliance 22  Current: fair compliance 22; good compliance 22     2. Patient will have decreased leakage episodes to </=5 day per week for increased quality of life. Status at Progress Note: initial assessment: 4x/day; 1 episodes during the last 1 week 22  Current: 1x during the last 2 weeks 22; no significant change 22     3. Patient will report at least 75% improvement with complete empty of bowel & bladder to improve her QOL. Status at Progress Note: progressing well, 50% overall 22   Current: improving min 22; improved ease with voiding, cont to fluctuate with incomplete voiding sensation 22     4.  Patient will have increased pelvic floor muscle motor performance by 1/2 to 1 grade for improved urinary continence and quality of life.  Status at Progress Note: poor progression due to gap with LUCHO 7-58-25  Current: 1+/5 5-18-22; progressing gradually 5-25-22     5. Patient will have FOTO Urinary Problem & Bowel Constipation score change of 13 points or more indicating improvement in function for icreased quality of life. Eval status: initial assessment: 41 & 47; no significant change 4-28-22  Current: min improvement with Bowel Constipation 5-25-22       Key functional changes: min improvement of strength, improving pressure/rectal urgency post manual      Problems/ barriers to goal attainment: comorbidity, poor strength of PFM, worst with rectal sphincter, trigger points with levator ani & coccygeus muscles     Problem List: Pelvic pain/dysfunction, Decreased pelvic floor mm awareness, Decreased pelvic floor mm strength, Use of accessory muscles, Improper voiding habits, Hypertonus of pelvic floor and Urinary urgency    Treatment Plan: Therapeutic exercise, Urge suppression techniques, Neuromuscular re-education, Manual therapy, Physical agent/modality and Patient education     Patient Goal (s) has been updated and includes: improve pressure/urgency, get stronger       Goals for this certification period to be accomplished in 4 weeks:  1. Patient will demonstrate independence in HEP for maintenance of pelvic floor program and improved quality of life. Status at Progress Note: good compliance 5-25-22     2. Patient will have decreased leakage episodes to </=5 day per week for increased quality of life. Status at Progress Note: initial assessment: 4x/day; no significant change 5-25-22     3. Patient will report at least 75% improvement with complete empty of bowel & bladder to improve her QOL. Status at Progress Note: progressing well, 50% overall, cont to fluctuate with incomplete voiding sensation 5-25-22     4.  Patient will have increased pelvic floor muscle motor performance by 1/2 to 1 grade for improved urinary continence and quality of life. Status at Progress Note: 1+/5 5-18-22; progressing gradually 5-25-22     5. Patient will have FOTO Urinary Problem & Bowel Constipation score change of 13 points or more indicating improvement in function for icreased quality of life. Eval status: initial assessment: 41 & 47; min improvement with Bowel Constipation 5-25-22       Frequency / Duration: Patient to be seen 1-2 times per week for 4 weeks:    Assessment / Recommendations:Pt cont to demonstrate limited progress. She reports decreased endurance, having neck pain, teeth problem thus not being able to maintain compliance with HEP as instructed. No significant change with urinary incontinence. She demonstrates good compliance with NMES use. Noted improving tolerance with internal manual and pt reports mod improvement of pressure/urgency at rectal region post manual. Pt reports improving compliance with HEP during the last 1 week only 2 episodes of incontinence with fecal matter. She would cont to benefit from skilled PT to address PFM, bowel and bladder function to improve her QOL. Certification Period: 5- to 6-    Becky Sammy 5/25/2022 8:51 AM    ________________________________________________________________________  I certify that the above Therapy Services are being furnished while the patient is under my care. I agree with the treatment plan and certify that this therapy is necessary. [] I have read the above and request that my patient continue as recommended.   [] I have read the above report and request that my patient continue therapy with the following changes/special instructions: _____________________________________________  [] I have read the above report and request that my patient be discharged from therapy      Physician's Signature:____________Date:_________TIME:________     Octavia Butler PA  ** Signature, Date and Time must be completed for valid certification **    Please sign and return to [facility] - Patricia Langley   [address] [facility]  [phone] fax

## 2022-05-27 ENCOUNTER — OFFICE VISIT (OUTPATIENT)
Dept: CARDIOLOGY CLINIC | Age: 77
End: 2022-05-27
Payer: MEDICARE

## 2022-05-27 VITALS
WEIGHT: 96 LBS | HEART RATE: 82 BPM | BODY MASS INDEX: 18.12 KG/M2 | DIASTOLIC BLOOD PRESSURE: 67 MMHG | SYSTOLIC BLOOD PRESSURE: 118 MMHG | HEIGHT: 61 IN | OXYGEN SATURATION: 93 %

## 2022-05-27 DIAGNOSIS — G45.9 TIA (TRANSIENT ISCHEMIC ATTACK): ICD-10-CM

## 2022-05-27 DIAGNOSIS — E78.5 DYSLIPIDEMIA: ICD-10-CM

## 2022-05-27 DIAGNOSIS — I95.1 ORTHOSTATIC HYPOTENSION: ICD-10-CM

## 2022-05-27 DIAGNOSIS — R07.9 CHEST PAIN, UNSPECIFIED TYPE: Primary | ICD-10-CM

## 2022-05-27 PROCEDURE — 1123F ACP DISCUSS/DSCN MKR DOCD: CPT | Performed by: NURSE PRACTITIONER

## 2022-05-27 PROCEDURE — G8419 CALC BMI OUT NRM PARAM NOF/U: HCPCS | Performed by: NURSE PRACTITIONER

## 2022-05-27 PROCEDURE — 3017F COLORECTAL CA SCREEN DOC REV: CPT | Performed by: NURSE PRACTITIONER

## 2022-05-27 PROCEDURE — G8536 NO DOC ELDER MAL SCRN: HCPCS | Performed by: NURSE PRACTITIONER

## 2022-05-27 PROCEDURE — 1090F PRES/ABSN URINE INCON ASSESS: CPT | Performed by: NURSE PRACTITIONER

## 2022-05-27 PROCEDURE — 99214 OFFICE O/P EST MOD 30 MIN: CPT | Performed by: NURSE PRACTITIONER

## 2022-05-27 PROCEDURE — 1101F PT FALLS ASSESS-DOCD LE1/YR: CPT | Performed by: NURSE PRACTITIONER

## 2022-05-27 PROCEDURE — G8427 DOCREV CUR MEDS BY ELIG CLIN: HCPCS | Performed by: NURSE PRACTITIONER

## 2022-05-27 PROCEDURE — G8432 DEP SCR NOT DOC, RNG: HCPCS | Performed by: NURSE PRACTITIONER

## 2022-05-27 RX ORDER — RANITIDINE 150 MG/1
150 TABLET, FILM COATED ORAL AS NEEDED
COMMUNITY

## 2022-05-27 RX ORDER — TRAMADOL HYDROCHLORIDE 50 MG/1
50 TABLET ORAL AS NEEDED
COMMUNITY

## 2022-05-27 RX ORDER — DICLOFENAC SODIUM 10 MG/G
GEL TOPICAL 4 TIMES DAILY
COMMUNITY

## 2022-05-27 NOTE — PROGRESS NOTES
1. Have you been to the ER, urgent care clinic since your last visit? Hospitalized since your last visit? Yes When: 4/2022 Where: [facility] Reason for visit:Influenza a    2. Have you seen or consulted any other health care providers outside of the [address] [place] [address] since your last visit? Include any pap smears or colon screening.       No

## 2022-05-27 NOTE — PROGRESS NOTES
HISTORY OF PRESENT ILLNESS  Pavan Gross is a 78 y.o. female. 5/2022  Patient presents with atypical chest pain. She c/o \"chest compressions\" at night. She denies chest pain, shortness of breath, palpitations or edema. She c/o mild neck pain / tingling since surgery. She is no longer taking     Chest Pain (Angina)   The history is provided by the patient. This is a chronic problem. The problem occurs daily. The pain is present in the left side. The pain is at a severity of 3/10. The pain is mild. The quality of the pain is described as dull. The pain does not radiate. Pertinent negatives include no claudication, no cough, no diaphoresis, no dizziness, no fever, no hemoptysis, no nausea, no orthopnea, no palpitations, no PND, no sputum production, no vomiting and no weakness. Review of Systems   Constitutional: Negative for chills, diaphoresis, fever and weight loss. HENT: Negative for ear pain and hearing loss. Eyes: Negative for blurred vision. Respiratory: Negative for cough, hemoptysis, sputum production, wheezing and stridor. Cardiovascular: Positive for chest pain. Negative for palpitations, orthopnea, claudication, leg swelling and PND. Gastrointestinal: Negative for heartburn, nausea and vomiting. Musculoskeletal: Negative for myalgias and neck pain. Skin: Negative for rash. Neurological: Negative for dizziness, tingling, tremors, focal weakness, loss of consciousness and weakness. Psychiatric/Behavioral: Negative for depression and suicidal ideas.      Family History   Problem Relation Age of Onset    Cancer Mother         breast    Heart Disease Father        Past Medical History:   Diagnosis Date    Abnormal Pap smear     Dr. Marshall Standing Allergic rhinitis     Arthritis     Asthma 11/22/2019    Cerebrovascular small vessel disease 3/18/2019    CT 3/17/2019    Cervical spinal cord compression (HCC)     Chronic pain     Concentric left ventricular hypertrophy 3/18/2019 With left ventricular diastolic dysfunction by echocardiogram 3/18/2019    GERD (gastroesophageal reflux disease) 11/22/2019    Hypercholesterolemia     Neck pain 5/17/2010    Stroke ([address] 10/02/2017    TIA- legs weak memory issues       Past Surgical History:   Procedure Laterality Date    COLONOSCOPY N/A 6/4/2018    COLONOSCOPY / polypectomy performed by Travon Pena MD at [facility] COLONOSCOPY N/A 8/19/2021    COLONOSCOPY performed by Travon Waters MD at [facility] [address] HX GYN      Total Hyst    HX LAP CHOLECYSTECTOMY      HX OTHER SURGICAL  2017    Throat widened    NEUROLOGICAL PROCEDURE UNLISTED  11/2019    Cervical fusion    CA PLASTIC SURGERY, NECK  2019       Social History     Tobacco Use    Smoking status: Never Smoker    Smokeless tobacco: Never Used   Substance Use Topics    Alcohol use: No       Allergies   Allergen Reactions    Baclofen Shortness of Breath     Denies. Pt tolerates  Other reaction(s): gi distress    Morphine Other (comments)     hallucinations  Other reaction(s): other/intolerance  hallucinations  Other reaction(s): delusions    Sulfa (Sulfonamide Antibiotics) Other (comments)     Drops blood pressure    Celecoxib Other (comments)     Tiredness   Other reaction(s): other/intolerance  Makes her tired            Visit Vitals  /67   Pulse 82   Ht 5' 1\" (1.549 m)   Wt 43.5 kg (96 lb)   LMP  (LMP Unknown)   SpO2 93%   BMI 18.14 kg/m²       Physical Exam  Vitals and nursing note reviewed. Constitutional:       Appearance: She is well-developed. HENT:      Head: Normocephalic and atraumatic. Eyes:      General: No scleral icterus. Conjunctiva/sclera: Conjunctivae normal.   Neck:      Vascular: No JVD. Cardiovascular:      Rate and Rhythm: Normal rate and regular rhythm. Heart sounds: Normal heart sounds. No murmur heard. No friction rub. No gallop. Pulmonary:      Effort: Pulmonary effort is normal. No respiratory distress. Breath sounds: Normal breath sounds. No stridor. No wheezing or rales. Chest:      Chest wall: No tenderness. Abdominal:      General: There is no distension. Tenderness: There is no abdominal tenderness. Musculoskeletal:         General: No tenderness. Normal range of motion. Cervical back: Normal range of motion and neck supple. Right lower leg: No edema. Left lower leg: No edema. Lymphadenopathy:      Cervical: No cervical adenopathy. Skin:     Findings: No erythema or rash. Neurological:      Mental Status: She is alert and oriented to person, place, and time. Cranial Nerves: No cranial nerve deficit. Psychiatric:         Behavior: Behavior normal.     ekg sinus rhythm with no acute st-t changes    3/2019  Echo  Interpretation Summary    · Saline contrast was given to evaluate for intracardiac shunt. No shunt seen. · Estimated left ventricular ejection fraction is 56 - 60%. Visually measured ejection fraction. Left ventricular cavity size is decreased. Left ventricular mild concentric hypertrophy. No regional wall motion abnormality noted. Age-appropriate left ventricular diastolic function. · There is no evidence of pulmonary hypertension. Comparison Study Information      Prior Study    There is a prior study available for comparison that was performed on 1/17/2018. As compared to the previous study, there are no significant changes. 11/02/21    NUCLEAR CARDIAC STRESS TEST 11/02/2021 11/4/2021    Interpretation Summary  · Baseline ECG: Normal EKG. · SPECT: Left ventricular function post-stress was normal. Calculated ejection fraction is 58% (normal EF value is equal to or greater than 50%). Left ventricular wall motion was normal at stress, no regional wall motion abnormality noted. There is no evidence of transient ischemic dilation (TID). The TID ratio is 0.87.   · SPECT: Left ventricular perfusion is normal.  · SPECT: Left ventricular perfusion is normal. Myocardial perfusion imaging supports a low risk stress test.    Signed by: Santos Harrell MD on 11/2/2021  3:40 PM      ASSESSMENT and PLAN    ICD-10-CM ICD-9-CM    1. Chest pain, unspecified type  R07.9 786.50    2. Dyslipidemia  E78.5 272.4    3. TIA (transient ischemic attack)  G45.9 435.9    4. Orthostatic hypotension  I95.1 458.0      No orders of the defined types were placed in this encounter. Follow-up and Dispositions    · Return in about 6 months (around 11/27/2022) for Follow up with Dr. Jarett Yousif. current treatment plan is effective, no change in therapy. Patient is a 68-year-old female seen for chronic chest pain. Symptoms lasting for several last several months. Anterior chest wall-describes as squeezing/tightness with no significant radiation. No diaphoresis. No ischemia on stress test.  Recommend intense risk factor modification. F/u in 6 months.    5/2022  Patient seen for atypical chronic chest pain which she describes as \"chest compressions\". She denies associated symptoms. Recent nuclear stress test reviewed and discussed with patient which was negative for ischemia recent EKG sinus rhythm unchanged from prior. Recommend risk factor modification. She is no longer taking atorvastatin and unsure why. She prefers to follow-up with PCP. She has upcoming carotid ultrasound scheduled. Sugar is controlled continue current medications.

## 2022-06-01 ENCOUNTER — HOSPITAL ENCOUNTER (OUTPATIENT)
Dept: PHYSICAL THERAPY | Age: 77
Discharge: HOME | End: 2022-06-01
Payer: MEDICARE

## 2022-06-01 PROCEDURE — 97140 MANUAL THERAPY 1/> REGIONS: CPT

## 2022-06-01 PROCEDURE — 97110 THERAPEUTIC EXERCISES: CPT

## 2022-06-01 NOTE — PROGRESS NOTES
PF DAILY TREATMENT NOTE 3-16    Patient Name: Summer Deleon  Date:2022  : 1945  [x]  Patient  Verified  Payor: [place] MED[place]ARE / Plan: [place] MEDICARE PART A & B / Product Type: Medicare /    In time: 11:17  Out time: 11:58  Total Treatment Time (min): 41  Visit #: 1 of 8    Medicare/BCBS Only   Total Timed Codes (min):  41 1:1 Treatment Time:  41     Treatment Area: [x] Pelvic Floor     [] Other:    SUBJECTIVE  Pain Level (0-10 scale): 2/10  Any medication changes, allergies to medications, adverse drug reactions, diagnosis change, or new procedure performed?: [x] No    [] Yes (see summary sheet for update)  Subjective functional status/changes:   [] No changes reported  Pt reports feeling easier with voiding her bowel. She's doing better with HEP. OBJECTIVE      26 min Therapeutic Exercise:  [] See flow sheet :   [x]  Pelvic floor strengthening                 []  Pelvic floor downtraining  []  Quality pelvic floor contractions       []  Relaxation techniques  []  Urge suppression exercises  []  Other:  Rationale: Increase pelvic floor muscle strength, Improve quality of pelvic floor contractions, Decrease resting tone of the pelvic floor, Increase tissue extensibility of the pelvic floor muscles, Increase core strength, Inhibit abnormal muscle activity and Improve lumbosacral and coccygeal mobility in order to Improve frequency and ease of bowel movements and Improve ability to perform ADLs. 15 min Manual Therapy:  Intra-rectal MFR/Severino's massage   The manual therapy interventions were performed at a separate and distinct time from the therapeutic activities interventions. Rationale: Decrease resting tone of the pelvic floor, Increase tissue extensibility of the pelvic floor muscles, Inhibit abnormal muscle activity and Improve lumbosacral and coccygeal mobility in order to Decrease bowel urgency, Improve frequency and ease of bowel movements and Improve ability to perform ADLs. With   [] TE   [] TA   [] neuro  [] manual   [] other: Patient Education: [x] Review HEP    [] Progressed/Changed HEP based on:   [] positioning   [] body mechanics   [] transfers   [] heat/ice application    [] other:      Other Objective/Functional Measures:   []baseline resting tone:   []slow twitch mms   []fast twitch mms   Mod-max fatigued with all therex    Pain Level (0-10 scale) post treatment: 1/10    ASSESSMENT/Changes in Function: Pt's making gradual progress with improving ease for voiding nad improving tolerance for internal manual. She cont be limited with poor strength of PFM. Will cont to progress therex and manual as tolerated. []  Decrease # of leaks   [] No change []  Improving [] Resolved     []  Decrease hypertonus [] No change []  Improving [] Resolved     []  Increase void interval [] No change []  Improving [] Resolved     []  Increase PF strength [] No change []  Improving [] Resolved     []  Increase PF endurance [] No change []  Improving [] Resolved     []  Increase endurance [] No change []  Improving [] Resolved     []  Decrease # of pads [] No change []  Improving [] Resolved     []  Decrease pain [] No change []  Improving [] Resolved     []  Increased coordination [] No change []  Improving [] Resolved     []  Increased Bowel Frequency [] No change []  Improving [] Resolved       Patient will continue to benefit from skilled PT services to modify and progress therapeutic interventions, address functional mobility deficits, address ROM deficits, address strength deficits, analyze and address soft tissue restrictions, analyze and cue movement patterns, analyze and modify body mechanics/ergonomics, assess and modify postural abnormalities, address imbalance/dizziness and instruct in home and community integration to attain remaining goals.      []  See Plan of Care  [x]  See progress note/recertification  []  See Discharge Summary         Progress towards goals / Updated goals:  Goals for this certification period to be accomplished in 4 weeks:  1. Patient will demonstrate independence in [facility] for maintenance of pelvic floor program and improved quality of life. Status at Progress Note: good compliance 5-25-22     2. Patient will have decreased leakage episodes to </=5 day per week for increased quality of life. Status at Progress Note: initial assessment: 4x/day; no significant change 5-25-22     3. Patient will report at least 75% improvement with complete empty of bowel & bladder to improve her QOL. Status at Progress Note: progressing well, 50% overall, cont to fluctuate with incomplete voiding sensation 5-25-22  Current: improving ease with voiding more during this week 6-1-22     4. Patient will have increased pelvic floor muscle motor performance by 1/2 to 1 grade for improved urinary continence and quality of life. Status at Progress Note: 1+/5 5-18-22; progressing gradually 5-25-22     5. Patient will have FOTO Urinary Problem & Bowel Constipation score change of 13 points or more indicating improvement in function for icreased quality of life.   Eval status: initial assessment: 41 & 47; min improvement with Bowel Constipation 5-25-22       PLAN  [x]  Upgrade activities as tolerated     [x]  Continue plan of care  []  Update interventions per flow sheet       []  Discharge due to:_  []  Other:_      [place] Michelle 6/1/2022  8:20 AM    Future Appointments   Date Time Provider Gaby Delarosa   6/1/2022 11:15 AM Noe Rodriguez [id number] [facility]   6/2/2022 11:00 AM JULIA Reynoso Madelin Saint SCHED   6/8/2022  9:45 AM Noe Rodriguez DOCHVDW [facility]   6/10/2022 10:30 AM Morro TOPETE MMCPTPB [facility]   6/14/2022 10:30 AM Palmer Beltrán MMCPTPB [facility]   6/16/2022 10:30 AM Noe Rodriguez MMCPTPB [facility]   6/20/2022  9:45 AM Noe Rodriguez [id number] [facility]   6/22/2022 12:45 PM Noe Rodriguez [id number] [facility]   6/27/2022 12:45 PM Noe Rodriguez MMCPTPB [facility]   6/29/2022 12:45 PM Noe Rodriguez MMCPTPB [facility]   12/9/2022  9:45 AM Nael Jones MD [facility] BS AMB

## 2022-06-08 ENCOUNTER — HOSPITAL ENCOUNTER (OUTPATIENT)
Dept: PHYSICAL THERAPY | Age: 77
Discharge: HOME | End: 2022-06-08
Payer: MEDICARE

## 2022-06-08 NOTE — PROGRESS NOTES
PF DAILY TREATMENT NOTE 3-16    Patient Name: Sage Ashby  Date:2022  : 1945  [x]  Patient  Verified  Payor: Arpita Cox / Plan: [place] MEDICARE PART A & B / Product Type: Medicare /    In time: 9:48  Out time: 10:25  Total Treatment Time (min): 37  Visit #: 2 of 8    Medicare/BCBS Only   Total Timed Codes (min):  37 1:1 Treatment Time:  37     Treatment Area: [x] Pelvic Floor     [] Other:    SUBJECTIVE  Pain Level (0-10 scale):  3/10  Any medication changes, allergies to medications, adverse drug reactions, diagnosis change, or new procedure performed?: [x] No    [] Yes (see summary sheet for update)  Subjective functional status/changes:   [] No changes reported  Pt's having UTI since Sat 22. She's on antibiotic now but still feeling very bad. OBJECTIVE      27 min Therapeutic Exercise:  [x] See flow sheet :   [x]  Pelvic floor strengthening                 []  Pelvic floor downtraining  []  Quality pelvic floor contractions       [x]  Relaxation techniques  []  Urge suppression exercises  []  Other:  Rationale: Increase pelvic floor muscle strength, Improve quality of pelvic floor contractions, Decrease resting tone of the pelvic floor, Increase tissue extensibility of the pelvic floor muscles, Increase core strength, Inhibit abnormal muscle activity and Improve lumbosacral and coccygeal mobility in order to Increase urinary continence, Decrease bowel urgency, Improve frequency and ease of bowel movements and Improve ability to perform ADLs.        10 min Therapeutic Activity:  []  See flow sheet :    []  Increase Tissue extensibility        [x]  Assess fiber intake    [x]  Assess voiding habits  [x]  Assess bowel habits  []  Other:   Rationale:  Increase pelvic floor muscle strength, Improve quality of pelvic floor contractions, Decrease resting tone of the pelvic floor, Increase tissue extensibility of the pelvic floor muscles, Increase core strength, Inhibit abnormal muscle activity and Improve lumbosacral and coccygeal mobility in order to Increase urinary continence, Decrease bowel urgency, Improve frequency and ease of bowel movements and Improve ability to perform ADLs. With   [] TE   [] TA   [] neuro  [] manual   [] other: Patient Education: [x] Review HEP    [] Progressed/Changed HEP based on:   [] positioning   [] body mechanics   [] transfers   [] heat/ice application    [] other:      Other Objective/Functional Measures:   []baseline resting tone:   []slow twitch mms   []fast twitch mms    Pain Level (0-10 scale) post treatment: 3/10    ASSESSMENT/Changes in Function: Hold MT as pt's having UTI; focussed on gentle strengthening and relaxation. Pt deemed mod fatigued but demonstrated good effort. Will progress therex as tolerated. []  Decrease # of leaks   [] No change []  Improving [] Resolved     []  Decrease hypertonus [] No change []  Improving [] Resolved     []  Increase void interval [] No change []  Improving [] Resolved     []  Increase PF strength [] No change []  Improving [] Resolved     []  Increase PF endurance [] No change []  Improving [] Resolved     []  Increase endurance [] No change []  Improving [] Resolved     []  Decrease # of pads [] No change []  Improving [] Resolved     []  Decrease pain [] No change []  Improving [] Resolved     []  Increased coordination [] No change []  Improving [] Resolved     []  Increased Bowel Frequency [] No change []  Improving [] Resolved       Patient will continue to benefit from skilled PT services to modify and progress therapeutic interventions, address functional mobility deficits, address ROM deficits, address strength deficits, analyze and address soft tissue restrictions, analyze and cue movement patterns, analyze and modify body mechanics/ergonomics, assess and modify postural abnormalities, address imbalance/dizziness and instruct in home and community integration to attain remaining goals.      []  See Plan of Care  [x]  See progress note/recertification  []  See Discharge Summary         Progress towards goals / Updated goals:  Goals for this certification period to be accomplished in 4 weeks:  1. Patient will demonstrate independence in [facility] for maintenance of pelvic floor program and improved quality of life. Status at Progress Note: good compliance 5-25-22     2. Patient will have decreased leakage episodes to </=5 day per week for increased quality of life. Status at Progress Note: initial assessment: 4x/day; no significant change 5-25-22     3. Patient will report at least 75% improvement with complete empty of bowel & bladder to improve her QOL. Status at Progress Note: progressing well, 50% overall, cont to fluctuate with incomplete voiding sensation 5-25-22  Current: improving ease with voiding more during this week 6-1-22     4. Patient will have increased pelvic floor muscle motor performance by 1/2 to 1 grade for improved urinary continence and quality of life. Status at Progress Note: 1+/5 5-18-22; progressing gradually 5-25-22     5. Patient will have FOTO Urinary Problem & Bowel Constipation score change of 13 points or more indicating improvement in function for icreased quality of life.   Eval status: initial assessment: 41 & 47; min improvement with Bowel Constipation 5-25-22    PLAN  [x]  Upgrade activities as tolerated     [x]  Continue plan of care  []  Update interventions per flow sheet       []  Discharge due to:_  []  Other:_      Dalton Bush 6/8/2022  8:02 AM    Future Appointments   Date Time Provider Gaby Delarosa   6/8/2022  9:45 AM Nicky Clements [id number] [facility]   6/10/2022 10:30 AM Palmer Au MMCPTPB [facility]   6/14/2022 10:30 AM Dawna TOPETE MMCPTPB [facility]   6/16/2022 10:30 AM Nicky Clements MMCPTPB [facility]   6/20/2022  9:45 AM Nicky Clements [id number] [facility]   6/22/2022 12:45 PM Nicky Clements [id number] [facility]   6/27/2022 12:45 PM Nicky Clements [id number] [facility]   6/29/2022 12:45 PM Nicky Clements [id number] [facility]   12/2/2022 11:20 AM JULIA [facility]   12/9/2022  9:45 AM Lisa Hawley MD CAP BS AMB

## 2022-06-10 ENCOUNTER — HOSPITAL ENCOUNTER (OUTPATIENT)
Dept: PHYSICAL THERAPY | Age: 77
Discharge: HOME | End: 2022-06-10
Payer: MEDICARE

## 2022-06-10 PROCEDURE — 97110 THERAPEUTIC EXERCISES: CPT

## 2022-06-10 PROCEDURE — 97530 THERAPEUTIC ACTIVITIES: CPT

## 2022-06-10 NOTE — PROGRESS NOTES
PF DAILY TREATMENT NOTE 3-16    Patient Name: Enriqueta Murphy  Date:6/10/2022  : 1945  [x]  Patient  Verified  Payor: [place] MEDICARE / Plan: [place] MEDICARE PART A & B / Product Type: Medicare /    In time: 10:32  Out time: 11:10  Total Treatment Time (min): 38  Visit #: 3 of 8    Medicare/BCBS Only   Total Timed Codes (min):  38 1:1 Treatment Time:  38     Treatment Area: [x] Pelvic Floor     [] Other:    SUBJECTIVE  Pain Level (0-10 scale): 310  Any medication changes, allergies to medications, adverse drug reactions, diagnosis change, or new procedure performed?: [x] No    [] Yes (see summary sheet for update)  Subjective functional status/changes:   [] No changes reported  Pt feels sick with the antibiotic that she's taking for UTI. Her urine is clearing up but her stomach seems to be upset. OBJECTIVE      15 min Therapeutic Exercise:  [] See flow sheet :   []  Pelvic floor strengthening                 []  Pelvic floor downtraining  []  Quality pelvic floor contractions       []  Relaxation techniques  []  Urge suppression exercises  []  Other:  Rationale: Increase pelvic floor muscle strength, Improve quality of pelvic floor contractions, Decrease resting tone of the pelvic floor, Increase tissue extensibility of the pelvic floor muscles, Increase core strength, Inhibit abnormal muscle activity and Improve lumbosacral and coccygeal mobility in order to Increase urinary continence, Decrease bowel urgency, Improve frequency and ease of bowel movements and Improve ability to perform ADLs.        23 min Therapeutic Activity:  []  See flow sheet :    []  Increase Tissue extensibility        []  Assess fiber intake    [x]  Assess voiding habits  [x]  Assess bowel habits  [x]  Other: self management, pain/pressure management   Rationale:  Increase pelvic floor muscle strength, Improve quality of pelvic floor contractions, Decrease resting tone of the pelvic floor, Increase tissue extensibility of the pelvic floor muscles, Increase core strength, Inhibit abnormal muscle activity and Improve lumbosacral and coccygeal mobility in order to Increase urinary continence, Decrease bowel urgency, Improve frequency and ease of bowel movements and Improve ability to perform ADLs. With   [] TE   [] TA   [] neuro  [] manual   [] other: Patient Education: [x] Review HEP    [] Progressed/Changed HEP based on:   [] positioning   [] body mechanics   [] transfers   [] heat/ice application    [] other:      Other Objective/Functional Measures:   []baseline resting tone:   []slow twitch mms   []fast twitch mms    Pain Level (0-10 scale) post treatment: 2/10    ASSESSMENT/Changes in Function: Pt demonstrates good relaxation with therex & good understanding with all education. Will resume internal manual when UIT is cleared by MD/OBGYN. Progress strengthening therex as tolerated.      []  Decrease # of leaks   [] No change []  Improving [] Resolved     []  Decrease hypertonus [] No change []  Improving [] Resolved     []  Increase void interval [] No change []  Improving [] Resolved     []  Increase PF strength [] No change []  Improving [] Resolved     []  Increase PF endurance [] No change []  Improving [] Resolved     []  Increase endurance [] No change []  Improving [] Resolved     []  Decrease # of pads [] No change []  Improving [] Resolved     []  Decrease pain [] No change []  Improving [] Resolved     []  Increased coordination [] No change []  Improving [] Resolved     []  Increased Bowel Frequency [] No change []  Improving [] Resolved       Patient will continue to benefit from skilled PT services to modify and progress therapeutic interventions, address functional mobility deficits, address ROM deficits, address strength deficits, analyze and address soft tissue restrictions, analyze and cue movement patterns, analyze and modify body mechanics/ergonomics, assess and modify postural abnormalities, address imbalance/dizziness and instruct in home and community integration to attain remaining goals. []  See Plan of Care  [x]  See progress note/recertification  []  See Discharge Summary         Progress towards goals / Updated goals:  Goals for this certification period to be accomplished in 4 weeks:  1. Patient will demonstrate independence in [facility] for maintenance of pelvic floor program and improved quality of life. Status at Progress Note: good compliance 5-25-22  Current: difficulty due to UTI 6-10-22     2. Patient will have decreased leakage episodes to </=5 day per week for increased quality of life. Status at Progress Note: initial assessment: 4x/day; no significant change 5-25-22     3. Patient will report at least 75% improvement with complete empty of bowel & bladder to improve her QOL. Status at Progress Note: progressing well, 50% overall, cont to fluctuate with incomplete voiding sensation 5-25-22  Current: improving ease with voiding more during this week 6-1-22     4. Patient will have increased pelvic floor muscle motor performance by 1/2 to 1 grade for improved urinary continence and quality of life. Status at Progress Note: 1+/5 5-18-22; progressing gradually 5-25-22     5. Patient will have FOTO Urinary Problem & Bowel Constipation score change of 13 points or more indicating improvement in function for icreased quality of life.   Eval status: initial assessment: 41 & 47; min improvement with Bowel Constipation 5-25-22    PLAN  [x]  Upgrade activities as tolerated     [x]  Continue plan of care  []  Update interventions per flow sheet       []  Discharge due to:_  []  Other:_      Jocelyne Corey 6/10/2022  8:04 AM    Future Appointments   Date Time Provider Gaby Delarosa   6/10/2022 10:30 AM Bogdan Maher [id number] [facility]   6/14/2022 10:30 AM Bogdan Maher MMCPTPB [facility]   6/16/2022 10:30 AM Bogdan Maher MMCPTPB [facility]   6/20/2022  9:45 AM Bogdan Maher [id number] [facility]   6/22/2022 12:45 PM Bogdan JOHN [facility]   6/27/2022 12:45 PM Paris Collet [id number] [facility]   6/29/2022 12:45 PM Paris Collet NGUADJY [facility]   12/2/2022 11:20 AM JULIA [facility]   12/9/2022  9:45 AM Ana Paula Leslie MD CAP BS AMB

## 2022-06-14 ENCOUNTER — HOSPITAL ENCOUNTER (OUTPATIENT)
Dept: PHYSICAL THERAPY | Age: 77
Discharge: HOME | End: 2022-06-14
Payer: MEDICARE

## 2022-06-14 PROCEDURE — 97110 THERAPEUTIC EXERCISES: CPT

## 2022-06-14 PROCEDURE — 97140 MANUAL THERAPY 1/> REGIONS: CPT

## 2022-06-14 NOTE — PROGRESS NOTES
PF DAILY TREATMENT NOTE 3-16    Patient Name: Shea Sheridan  Date:2022  : 1945  [x]  Patient  Verified  Payor: [place] [employer] / Plan: [place] MED[place]ARE PART A & B / Product Type: Medicare /    In time: 10:32  Out time: 11:15  Total Treatment Time (min): 43  Visit #: 4 of 8    Medicare/BCBS Only   Total Timed Codes (min):  43 1:1 Treatment Time:  43     Treatment Area: [x] Pelvic Floor     [] Other:    SUBJECTIVE  Pain Level (0-10 scale): 4/10  Any medication changes, allergies to medications, adverse drug reactions, diagnosis change, or new procedure performed?: [x] No    [] Yes (see summary sheet for update)  Subjective functional status/changes:   [] No changes reported  Pt reports no more symptom with UTI. But she couldn't schedule appointment with MD for urine test to make sure that she's cleared with UTI. She will need to wait for a long time. Pt will see gastrologist next week. OBJECTIVE      35 min Therapeutic Exercise:  [] See flow sheet :   [x]  Pelvic floor strengthening                 []  Pelvic floor downtraining  []  Quality pelvic floor contractions       [x]  Relaxation techniques  []  Urge suppression exercises  [x]  Other: core & hips strengthening  Rationale: Increase pelvic floor muscle strength, Improve quality of pelvic floor contractions, Decrease resting tone of the pelvic floor, Increase tissue extensibility of the pelvic floor muscles, Increase core strength, Inhibit abnormal muscle activity and Improve lumbosacral and coccygeal mobility in order to Decrease bowel urgency, Improve frequency and ease of bowel movements and Improve ability to perform ADLs. 8 min Manual Therapy:  Severino's massage/intra-rectal MFR   The manual therapy interventions were performed at a separate and distinct time from the therapeutic activities interventions.     Rationale: Decrease resting tone of the pelvic floor, Increase tissue extensibility of the pelvic floor muscles, Inhibit abnormal muscle activity and Improve lumbosacral and coccygeal mobility in order to Decrease bowel urgency, Improve frequency and ease of bowel movements and Improve ability to perform ADLs. With   [] TE   [] TA   [] neuro  [] manual   [] other: Patient Education: [x] Review HEP    [] Progressed/Changed HEP based on:   [] positioning   [] body mechanics   [] transfers   [] heat/ice application    [] other:      Other Objective/Functional Measures:   []baseline resting tone:   []slow twitch mms   []fast twitch mms    Pain Level (0-10 scale) post treatment: 0/10    ASSESSMENT/Changes in Function: Pt's cleared with all UTI symptoms, tolerates internal manual well; reports significant relief of urge/pressure with PF post manual. She was mod challenged with progressive strengthening therex due to endurance and strength. Will cont to progress therex and manual as tolerated.      []  Decrease # of leaks   [] No change []  Improving [] Resolved     []  Decrease hypertonus [] No change []  Improving [] Resolved     []  Increase void interval [] No change []  Improving [] Resolved     []  Increase PF strength [] No change []  Improving [] Resolved     []  Increase PF endurance [] No change []  Improving [] Resolved     []  Increase endurance [] No change []  Improving [] Resolved     []  Decrease # of pads [] No change []  Improving [] Resolved     []  Decrease pain [] No change []  Improving [] Resolved     []  Increased coordination [] No change []  Improving [] Resolved     []  Increased Bowel Frequency [] No change []  Improving [] Resolved       Patient will continue to benefit from skilled PT services to modify and progress therapeutic interventions, address functional mobility deficits, address ROM deficits, address strength deficits, analyze and address soft tissue restrictions, analyze and cue movement patterns, analyze and modify body mechanics/ergonomics, assess and modify postural abnormalities, address imbalance/dizziness and instruct in home and community integration to attain remaining goals. []  See Plan of Care  [x]  See progress note/recertification  []  See Discharge Summary         Progress towards goals / Updated goals:  Goals for this certification period to be accomplished in 4 weeks:  1. Patient will demonstrate independence in [facility] for maintenance of pelvic floor program and improved quality of life. Status at Progress Note: good compliance 5-25-22  Current: difficulty due to UTI 6-10-22     2. Patient will have decreased leakage episodes to </=5 day per week for increased quality of life. Status at Progress Note: initial assessment: 4x/day; no significant change 5-25-22     3. Patient will report at least 75% improvement with complete empty of bowel & bladder to improve her QOL. Status at Progress Note: progressing well, 50% overall, cont to fluctuate with incomplete voiding sensation 5-25-22  Current: improving ease with voiding more during this week 6-1-22     4. Patient will have increased pelvic floor muscle motor performance by 1/2 to 1 grade for improved urinary continence and quality of life. Status at Progress Note: 1+/5 5-18-22; progressing gradually 5-25-22     5. Patient will have FOTO Urinary Problem & Bowel Constipation score change of 13 points or more indicating improvement in function for icreased quality of life.   Eval status: initial assessment: 41 & 47; min improvement with Bowel Constipation 5-25-22    PLAN  [x]  Upgrade activities as tolerated     [x]  Continue plan of care  []  Update interventions per flow sheet       []  Discharge due to:_  []  Other:_      Leanna Purpura 6/14/2022  9:41 AM    Future Appointments   Date Time Provider Gaby Delarosa   6/14/2022 10:30 AM Mickie Lopez [id number] [facility]   6/16/2022 10:30 AM Mickie Lopez [id number] [facility]   6/20/2022  9:45 AM Mickie Lopez ENVUEKW [facility]   6/22/2022 12:45 PM Mickie Lopez [id number] [facility]   6/27/2022 12:45 PM Jr Cuellar Kip Say [id number] [facility]   6/29/2022 12:45 PM Bogdan Maher [id number] [facility]   12/2/2022 11:20 AM JULIA Mantilla [facility] [place]   12/9/2022  9:45 AM Ana Chang MD [facility] BS AMB

## 2022-06-16 ENCOUNTER — HOSPITAL ENCOUNTER (OUTPATIENT)
Dept: PHYSICAL THERAPY | Age: 77
Discharge: HOME | End: 2022-06-16
Payer: MEDICARE

## 2022-06-16 PROCEDURE — 97110 THERAPEUTIC EXERCISES: CPT

## 2022-06-16 PROCEDURE — 97140 MANUAL THERAPY 1/> REGIONS: CPT

## 2022-06-16 NOTE — PROGRESS NOTES
PF DAILY TREATMENT NOTE 3    Patient Name: Everette Belcher  Date:2022  : 1945  [x]  Patient  Verified  Payor: [place] MEDICARE / Plan: [place] MEDICARE PART A & B / Product Type: Medicare /    In time: 10:35  Out time: 11:16  Total Treatment Time (min): 41  Visit #: 5 of 8    Medicare/BCBS Only   Total Timed Codes (min):  41 1:1 Treatment Time:  41     Treatment Area: [x] Pelvic Floor     [] Other:    SUBJECTIVE  Pain Level (0-10 scale): 2/10  Any medication changes, allergies to medications, adverse drug reactions, diagnosis change, or new procedure performed?: [x] No    [] Yes (see summary sheet for update)  Subjective functional status/changes:   [] No changes reported  Pt reports having a good day yesterday. She was more aware of her bowel urgency. OBJECTIVE      31 min Therapeutic Exercise:  [] See flow sheet :   [x]  Pelvic floor strengthening                 []  Pelvic floor downtraining  []  Quality pelvic floor contractions       []  Relaxation techniques  []  Urge suppression exercises  [x]  Other: hips & core strengthening  Rationale: Increase pelvic floor muscle strength, Improve quality of pelvic floor contractions, Decrease resting tone of the pelvic floor, Increase tissue extensibility of the pelvic floor muscles, Increase core strength, Inhibit abnormal muscle activity and Improve lumbosacral and coccygeal mobility in order to Decrease bowel urgency, Improve frequency and ease of bowel movements and Improve ability to perform ADLs. 10 min Manual Therapy:  Intra-rectal MFR, Severino's massage    The manual therapy interventions were performed at a separate and distinct time from the therapeutic activities interventions.     Rationale: Decrease resting tone of the pelvic floor, Increase tissue extensibility of the pelvic floor muscles and Improve lumbosacral and coccygeal mobility in order to Decrease bowel urgency, Improve frequency and ease of bowel movements and Improve ability to perform ADLs. With   [] TE   [] TA   [] neuro  [] manual   [] other: Patient Education: [x] Review HEP    [] Progressed/Changed HEP based on:   [] positioning   [] body mechanics   [] transfers   [] heat/ice application    [] other:      Other Objective/Functional Measures:   []baseline resting tone:   []slow twitch mms   []fast twitch mms    Pain Level (0-10 scale) post treatment: 0/10    ASSESSMENT/Changes in Function:  Pt demonstrates improved tone of PFM. Strength still deemed 1/5 with significant compensation of glute & min of core. Will cont to progress therex and manual as tolerated. []  Decrease # of leaks   [] No change []  Improving [] Resolved     []  Decrease hypertonus [] No change []  Improving [] Resolved     []  Increase void interval [] No change []  Improving [] Resolved     []  Increase PF strength [] No change []  Improving [] Resolved     []  Increase PF endurance [] No change []  Improving [] Resolved     []  Increase endurance [] No change []  Improving [] Resolved     []  Decrease # of pads [] No change []  Improving [] Resolved     []  Decrease pain [] No change []  Improving [] Resolved     []  Increased coordination [] No change []  Improving [] Resolved     []  Increased Bowel Frequency [] No change []  Improving [] Resolved       Patient will continue to benefit from skilled PT services to modify and progress therapeutic interventions, address functional mobility deficits, address ROM deficits, address strength deficits, analyze and address soft tissue restrictions, analyze and cue movement patterns, analyze and modify body mechanics/ergonomics, assess and modify postural abnormalities, address imbalance/dizziness and instruct in home and community integration to attain remaining goals.      []  See Plan of Care  [x]  See progress note/recertification  []  See Discharge Summary         Progress towards goals / Updated goals:  Goals for this certification period to be accomplished in 4 weeks:  1. Patient will demonstrate independence in HEP for maintenance of pelvic floor program and improved quality of life. Status at Progress Note: good compliance 5-25-22  Current: difficulty due to UTI 6-10-22     2. Patient will have decreased leakage episodes to </=5 day per week for increased quality of life. Status at Progress Note: initial assessment: 4x/day; no significant change 5-25-22     3. Patient will report at least 75% improvement with complete empty of bowel & bladder to improve her QOL. Status at Progress Note: progressing well, 50% overall, cont to fluctuate with incomplete voiding sensation 5-25-22  Current: improving ease with voiding more during this week 6-1-22     4. Patient will have increased pelvic floor muscle motor performance by 1/2 to 1 grade for improved urinary continence and quality of life. Status at Progress Note: 1+/5 5-18-22; progressing gradually 5-25-22     5. Patient will have FOTO Urinary Problem & Bowel Constipation score change of 13 points or more indicating improvement in function for icreased quality of life.   Eval status: initial assessment: 41 & 47; min improvement with Bowel Constipation 5-25-22    PLAN  [x]  Upgrade activities as tolerated     [x]  Continue plan of care  []  Update interventions per flow sheet       []  Discharge due to:_  []  Other:_      Nayely Tomlinson 6/16/2022  8:07 AM    Future Appointments   Date Time Provider Gaby Delarosa   6/16/2022 10:30 AM Zoey Heart [id number] [facility]   6/20/2022  9:45 AM Zoey Heart [id number] [facility]   6/22/2022 12:45 PM Zoey Heart HNHYTTM [facility]   6/27/2022 12:45 PM Zoey Heart [id number] [facility]   6/29/2022 12:45 PM Zoey Heart [id number] [facility]   12/2/2022 11:20 AM JULIA Beckham [facility] DEEJAY SCHED   12/9/2022  9:45 AM Lisa Hawley MD [facility] BS AMB

## 2022-06-20 ENCOUNTER — HOSPITAL ENCOUNTER (OUTPATIENT)
Dept: PHYSICAL THERAPY | Age: 77
Discharge: HOME | End: 2022-06-20
Payer: MEDICARE

## 2022-06-20 PROCEDURE — 97110 THERAPEUTIC EXERCISES: CPT

## 2022-06-20 PROCEDURE — 97530 THERAPEUTIC ACTIVITIES: CPT

## 2022-06-20 PROCEDURE — 97140 MANUAL THERAPY 1/> REGIONS: CPT

## 2022-06-20 NOTE — PROGRESS NOTES
PF DAILY TREATMENT NOTE 3-16    Patient Name: Everette Belcher  Date:2022  : 1945  [x]  Patient  Verified  [facility] / Plan: [place] MEDICARE PART A & B / Product Type: Medicare /    In time: 9:49  Out time: 10:28  Total Treatment Time (min): 39  Visit #: 6 of 8    Medicare/BCBS Only   Total Timed Codes (min):  39 1:1 Treatment Time:  39     Treatment Area: [x] Pelvic Floor     [] Other:    SUBJECTIVE  Pain Level (0-10 scale):  3/10  Any medication changes, allergies to medications, adverse drug reactions, diagnosis change, or new procedure performed?: [x] No    [] Yes (see summary sheet for update)  Subjective functional status/changes:   [] No changes reported  Pt reports gradual improvement of pressure overall. BM is WNL with frequency. She cont to experience urinary leakage with position change and urgency. Pt reports seeing referring MD on Thursday this week. OBJECTIVE      15 min Therapeutic Exercise:  [x] See flow sheet :   []  Pelvic floor strengthening                 []  Pelvic floor downtraining  []  Quality pelvic floor contractions       [x]  Relaxation techniques  []  Urge suppression exercises  []  Other:  Rationale: Increase pelvic floor muscle strength, Improve quality of pelvic floor contractions, Decrease resting tone of the pelvic floor, Increase tissue extensibility of the pelvic floor muscles, Increase core strength, Inhibit abnormal muscle activity and Improve lumbosacral and coccygeal mobility in order to Increase urinary continence, Decrease urinary urgency, Increase ability to delay urination, Decrease frequency of urination, Decrease nocturia, Decrease bowel urgency, Improve frequency and ease of bowel movements and Improve ability to perform ADLs.          8 min Therapeutic Activity:  []  See flow sheet :    []  Increase Tissue extensibility        [x]  Assess fiber intake    [x]  Assess voiding habits  [x]  Assess bowel habits  []  Other:   Rationale:  Increase pelvic floor muscle strength, Improve quality of pelvic floor contractions, Decrease resting tone of the pelvic floor, Increase tissue extensibility of the pelvic floor muscles, Increase core strength, Inhibit abnormal muscle activity and Improve lumbosacral and coccygeal mobility in order to Increase urinary continence, Decrease urinary urgency, Increase ability to delay urination, Decrease frequency of urination, Decrease nocturia, Decrease bowel urgency, Improve frequency and ease of bowel movements and Improve ability to perform ADLs. 8 min Manual Therapy:  Tripod release, abdominal massage & colon stimulation   The manual therapy interventions were performed at a separate and distinct time from the therapeutic activities interventions. Rationale: Decrease resting tone of the pelvic floor, Increase tissue extensibility of the pelvic floor muscles, Inhibit abnormal muscle activity and Improve lumbosacral and coccygeal mobility in order to Decrease bowel urgency, Improve frequency and ease of bowel movements and Improve ability to perform ADLs. With   [] TE   [] TA   [] neuro  [] manual   [] other: Patient Education: [x] Review HEP    [] Progressed/Changed HEP based on:   [] positioning   [] body mechanics   [] transfers   [] heat/ice application    [] other:      Other Objective/Functional Measures:   []baseline resting tone:   []slow twitch mms   []fast twitch mms    Pain Level (0-10 scale) post treatment: 2/10    ASSESSMENT/Changes in Function: Focussed on relaxation and colon stimulation today. Pt reports min improvement of pressure/urgency, not as much relief compared to internal manual. She cont to fluctuate with urinary incont & bowel pressure/urgency symptoms. Educated pt to discuss with MD on upcoming visit. Will cont PT until the end of this month. Progress therex and resume internal manual next visit.       []  Decrease # of leaks   [] No change []  Improving [] Resolved     []  Decrease hypertonus [] No change []  Improving [] Resolved     []  Increase void interval [] No change []  Improving [] Resolved     []  Increase PF strength [] No change []  Improving [] Resolved     []  Increase PF endurance [] No change []  Improving [] Resolved     []  Increase endurance [] No change []  Improving [] Resolved     []  Decrease # of pads [] No change []  Improving [] Resolved     []  Decrease pain [] No change []  Improving [] Resolved     []  Increased coordination [] No change []  Improving [] Resolved     []  Increased Bowel Frequency [] No change []  Improving [] Resolved       Patient will continue to benefit from skilled PT services to modify and progress therapeutic interventions, address functional mobility deficits, address ROM deficits, address strength deficits, analyze and address soft tissue restrictions, analyze and cue movement patterns, analyze and modify body mechanics/ergonomics, assess and modify postural abnormalities, address imbalance/dizziness and instruct in home and community integration to attain remaining goals. []  See Plan of Care  [x]  See progress note/recertification  []  See Discharge Summary         Progress towards goals / Updated goals:  Goals for this certification period to be accomplished in 4 weeks:  1. Patient will demonstrate independence in [facility]P for maintenance of pelvic floor program and improved quality of life. Status at Progress Note: good compliance 5-25-22  Current: difficulty due to UTI 6-10-22     2. Patient will have decreased leakage episodes to </=5 day per week for increased quality of life. Status at Progress Note: initial assessment: 4x/day; no significant change 5-25-22     3. Patient will report at least 75% improvement with complete empty of bowel & bladder to improve her QOL.   Status at Progress Note: progressing well, 50% overall, cont to fluctuate with incomplete voiding sensation 5-25-22  Current: improving ease with voiding more during this week 6-1-22     4. Patient will have increased pelvic floor muscle motor performance by 1/2 to 1 grade for improved urinary continence and quality of life. Status at Progress Note: 1+/5 5-18-22; progressing gradually 5-25-22     5. Patient will have FOTO Urinary Problem & Bowel Constipation score change of 13 points or more indicating improvement in function for icreased quality of life.   Eval status: initial assessment: 41 & 47; min improvement with Bowel Constipation 5-25-22       PLAN   [x]  Upgrade activities as tolerated     [x]  Continue plan of care  []  Update interventions per flow sheet       []  Discharge due to:_  []  Other:_      Randolph Ban 6/20/2022  8:11 AM    Future Appointments   Date Time Provider Gaby Delarosa   6/20/2022  9:45 AM Alejandra Myers NPUSRUN [facility]   6/22/2022 12:45 PM Alejandra Myers LVUTIVI [facility]   6/27/2022 12:45 PM Alejandra Myers [id number] [facility]   6/29/2022 12:45 PM Alejandra Myers TBSCTOV [facility]   12/2/2022 11:20 AM JULIA Browne [facility] DEEJAY SCHED   12/9/2022  9:45 AM Roxanne Castillo MD [facility] BS AMB         \

## 2022-06-22 ENCOUNTER — HOSPITAL ENCOUNTER (OUTPATIENT)
Dept: PHYSICAL THERAPY | Age: 77
Discharge: HOME | End: 2022-06-22
Payer: MEDICARE

## 2022-06-22 PROCEDURE — 97530 THERAPEUTIC ACTIVITIES: CPT

## 2022-06-22 PROCEDURE — 97140 MANUAL THERAPY 1/> REGIONS: CPT

## 2022-06-22 NOTE — PROGRESS NOTES
PF DAILY TREATMENT NOTE 3-16    Patient Name: Aileen Los  Date:2022  : 1945  [x]  Patient  Verified  Payor: Karrie Morton / Plan: [place] MED[place] & B / Product Type: Medicare /    In time: 12:49  Out time: 1:29  Total Treatment Time (min): 40  Visit #: 7 of 8    Medicare/BCBS Only   Total Timed Codes (min):  40 1:1 Treatment Time:  40     Treatment Area: [x] Pelvic Floor     [] Other:    SUBJECTIVE  Pain Level (0-10 scale): 310  Any medication changes, allergies to medications, adverse drug reactions, diagnosis change, or new procedure performed?: [x] No    [] Yes (see summary sheet for update)  Subjective functional status/changes:   [] No changes reported  Pt skipped BM 3 days already. She has the urge/pressure a gain. OBJECTIVE    30 min Therapeutic Activity:  []  See flow sheet :    []  Increase Tissue extensibility        [x]  Assess fiber intake    [x]  Assess voiding habits  [x]  Assess bowel habits  []  Other:   Rationale: Increase pelvic floor muscle strength, Improve quality of pelvic floor contractions, Decrease resting tone of the pelvic floor, Increase tissue extensibility of the pelvic floor muscles, Increase core strength, Inhibit abnormal muscle activity and Improve lumbosacral and coccygeal mobility in order to Increase urinary continence, Decrease urinary urgency, Increase ability to delay urination, Decrease frequency of urination, Decrease nocturia, Decrease bowel urgency, Improve frequency and ease of bowel movements and Improve ability to perform ADLs. 10 min Manual Therapy:  PFM MFR & corrina-urethral stimulation/trumping   The manual therapy interventions were performed at a separate and distinct time from the therapeutic activities interventions.     Rationale: Increase pelvic floor muscle strength, Improve quality of pelvic floor contractions, Decrease resting tone of the pelvic floor, Increase tissue extensibility of the pelvic floor muscles, Increase core strength, Inhibit abnormal muscle activity and Improve lumbosacral and coccygeal mobility in order to Increase urinary continence, Decrease urinary urgency, Increase ability to delay urination, Decrease frequency of urination, Decrease nocturia, Decrease bowel urgency, Improve frequency and ease of bowel movements and Improve ability to perform ADLs. With   [] TE   [] TA   [] neuro  [] manual   [] other: Patient Education: [x] Review HEP    [] Progressed/Changed HEP based on:   [] positioning   [] body mechanics   [] transfers   [] heat/ice application    [] other:      Other Objective/Functional Measures:   []baseline resting tone:   []slow twitch mms   []fast twitch mms    Pain Level (0-10 scale) post treatment: 2/10    ASSESSMENT/Changes in Function: see Re-cert please      []  Decrease # of leaks   [] No change []  Improving [] Resolved     []  Decrease hypertonus [] No change []  Improving [] Resolved     []  Increase void interval [] No change []  Improving [] Resolved     []  Increase PF strength [] No change []  Improving [] Resolved     []  Increase PF endurance [] No change []  Improving [] Resolved     []  Increase endurance [] No change []  Improving [] Resolved     []  Decrease # of pads [] No change []  Improving [] Resolved     []  Decrease pain [] No change []  Improving [] Resolved     []  Increased coordination [] No change []  Improving [] Resolved     []  Increased Bowel Frequency [] No change []  Improving [] Resolved       Patient will continue to benefit from skilled PT services to modify and progress therapeutic interventions, address functional mobility deficits, address ROM deficits, address strength deficits, analyze and address soft tissue restrictions, analyze and cue movement patterns, analyze and modify body mechanics/ergonomics, assess and modify postural abnormalities, address imbalance/dizziness and instruct in home and community integration to attain remaining goals. []  See Plan of Care  [x]  See progress note/recertification  []  See Discharge Summary         Progress towards goals / Updated goals:  Goals for this certification period to be accomplished in 4 weeks:  1. Patient will demonstrate independence in HEP for maintenance of pelvic floor program and improved quality of life. Status at Progress Note: good compliance 5-25-22  Current: difficulty due to UTI 6-10-22; good compliance 6-22-22     2. Patient will have decreased leakage episodes to </=5 day per week for increased quality of life. Status at Progress Note: initial assessment: 4x/day; no significant change 5-25-22  Current: no significant change 6-22-22     3. Patient will report at least 75% improvement with complete empty of bowel & bladder to improve her QOL. Status at Progress Note: progressing well, 50% overall, cont to fluctuate with incomplete voiding sensation 5-25-22  Current: improving ease with voiding more during this week 6-1-22; no significant change 6-22-22     4. Patient will have increased pelvic floor muscle motor performance by 1/2 to 1 grade for improved urinary continence and quality of life. Status at Progress Note: 1+/5 5-18-22; progressing gradually 5-25-22  Current: no change 6-22-22     5. Patient will have FOTO Urinary Problem & Bowel Constipation score change of 13 points or more indicating improvement in function for icreased quality of life.   Eval status: initial assessment: 41 & 47; min improvement with Bowel Constipation 5-25-22  Current: improved 3 points with Urinary Problem, no change with Bowel Constipation 6-22-22      PLAN  [x]  Upgrade activities as tolerated     [x]  Continue plan of care  []  Update interventions per flow sheet       []  Discharge due to:_  []  Other:_      [place] Due 6/22/2022  8:28 AM    Future Appointments   Date Time Provider Gaby Delarosa   6/22/2022 12:45 PM Redgie Pass [id number] [facility]   6/27/2022 12:45 PM Redgie Pass [id number] [facility] 6/29/2022 12:45 PM [facility] [id number] [facility]   12/2/2022 11:20 AM JULIA Naranjo [facility]   12/9/2022  9:45 AM Dragan Johnson MD CAP BS AMB 10

## 2022-06-22 NOTE — PROGRESS NOTES
[facility] [place] [employer]  [place]  [address] [facility]  [phone] fax    Continued Plan of Care/ Re-certification for Physical Therapy Services    Patient name: Chery Friedman Start of Care: 3/2/2022   Referral source: Luke Octaiva Tamayo [place] : 1945               Medical Diagnosis: PFD (pelvic floor dysfunction) [M62.89]  Payor: [place] MEDICARE / Plan: [place] MEDICARE PART A & B / Product Type: Medicare /  Onset Date: worsened during the last 3 months2               Treatment Diagnosis: PFD, Urinary incont. , bowel urgency   Prior Hospitalization: see medical history Provider#: [id number]   Medications: Verified on Patient summary List    Comorbidities: osteoporosis, arthritis, stroke, neck fusion surgery   Prior Level of Function: less urgency with bowel, less leakage, mod ind with ADLs, using rollator.        Visits from Start of Care: 12    Missed Visits: 0    The Plan of Care and following information is based on the patient's current status:  Goals for this certification period to be accomplished in 4 weeks:  1. Patient will demonstrate independence in HEP for maintenance of pelvic floor program and improved quality of life. Status at Progress Note: good compliance 22  Current: difficulty due to UTI 6-10-22; good compliance 22     2. Patient will have decreased leakage episodes to </=5 day per week for increased quality of life. Status at Progress Note: initial assessment: 4x/day; no significant change 22  Current: no significant change 22     3. Patient will report at least 75% improvement with complete empty of bowel & bladder to improve her QOL. Status at Progress Note: progressing well, 50% overall, cont to fluctuate with incomplete voiding sensation 22  Current: improving ease with voiding more during this week 22; no significant change 22     4.  Patient will have increased pelvic floor muscle motor performance by 1/2 to 1 grade for improved urinary continence and quality of life. Status at Progress Note: 1+/5 5-18-22; progressing gradually 5-25-22  Current: no change 6-22-22     5. Patient will have FOTO Urinary Problem & Bowel Constipation score change of 13 points or more indicating improvement in function for icreased quality of life. Eval status: initial assessment: 41 & 47; min improvement with Bowel Constipation 5-25-22  Current: improved 3 points with Urinary Problem, no change with Bowel Constipation 6-22-22       Key functional changes: significant improvement of PF pressure/pain post manual       Problems/ barriers to goal attainment: comorbidity, poor strength of PFM     Problem List: Pelvic pain/dysfunction, Decreased pelvic floor mm awareness, Decreased pelvic floor mm strength, Use of accessory muscles, Improper voiding habits, Hypertonus of pelvic floor and Urinary urgency    Treatment Plan: Therapeutic exercise, Urge suppression techniques, Neuromuscular re-education, Manual therapy, Physical agent/modality and Patient education     Patient Goal (s) has been updated and includes: getting stronger     Goals for this certification period to be accomplished in 4 weeks:  1. Patient will demonstrate independence in HEP for maintenance of pelvic floor program and improved quality of life. Status at Progress Note: good compliance 6-22-22     2. Patient will have decreased leakage episodes to </=5 day per week for increased quality of life. Status at Progress Note: initial assessment: 4x/day; no significant change 6-22-22     3. Patient will report at least 75% improvement with complete empty of bowel & bladder to improve her QOL. Status at Progress Note: progressing well, 50% overall, no significant change 6-22-22     4. Patient will have increased pelvic floor muscle motor performance by 1/2 to 1 grade for improved urinary continence and quality of life.   Status at Progress Note: 1+/5 5-18-22; no change 6-22-22     5. Patient will have FOTO Urinary Problem & Bowel Constipation score change of 13 points or more indicating improvement in function for icreased quality of life. Eval status: initial assessment: improved 3 points with Urinary Problem, no change with Bowel Constipation 6-22-22       Frequency / Duration: Patient to be seen 1-2 times per week for 4 weeks:    Assessment / Recommendations:Pt seemed to plateau during the last 1 month with PT. She's been having other medical problems after getting the flu. Bowel frequency is WNL but pt cont to require Miralax. Pain/pressure/bowel urge improves significantly after manual but poor long term relief. Pt present with poor strength of PFM. No change with urinary leakage. Will cont another month to complete NMES protocol but educated pt to discuss with MD for further instruction. Pt would cont to benefit from skilled PT Increase pelvic floor muscle strength, Improve quality of pelvic floor contractions, Decrease resting tone of the pelvic floor, Increase tissue extensibility of the pelvic floor muscles, Increase core strength, Inhibit abnormal muscle activity and Improve lumbosacral and coccygeal mobility in order to Increase urinary continence, Decrease urinary urgency, Increase ability to delay urination, Decrease frequency of urination, Decrease nocturia, Decrease bowel urgency, Improve frequency and ease of bowel movements and Improve ability to perform ADLs. Certification Period: 6-23-22 to 7-22-22    Comfort Stringer 6/22/2022 8:31 AM    ________________________________________________________________________  I certify that the above Therapy Services are being furnished while the patient is under my care. I agree with the treatment plan and certify that this therapy is necessary. [] I have read the above and request that my patient continue as recommended.   [] I have read the above report and request that my patient continue therapy with the following changes/special instructions: _____________________________________________  [] I have read the above report and request that my patient be discharged from therapy      Physician's Signature:____________Date:_________TIME:________     Con Butler PA  ** Signature, Date and Time must be completed for valid certification **    Please sign and return to [facility]   [address] [place] [address]  [phone] fax

## 2022-06-23 ENCOUNTER — TRANSCRIBE ORDER (OUTPATIENT)
Dept: SCHEDULING | Age: 77
End: 2022-06-23

## 2022-06-23 DIAGNOSIS — R42 DIZZINESS: Primary | ICD-10-CM

## 2022-06-24 ENCOUNTER — TRANSCRIBE ORDER (OUTPATIENT)
Dept: SCHEDULING | Age: 77
End: 2022-06-24

## 2022-06-24 DIAGNOSIS — R15.0 INCOMPLETE DEFECATION: ICD-10-CM

## 2022-06-24 DIAGNOSIS — K86.89 PANCREATIC DUCT DILATED: Primary | ICD-10-CM

## 2022-06-27 ENCOUNTER — HOSPITAL ENCOUNTER (OUTPATIENT)
Dept: PHYSICAL THERAPY | Age: 77
Discharge: HOME | End: 2022-06-27
Payer: MEDICARE

## 2022-06-27 PROCEDURE — 97140 MANUAL THERAPY 1/> REGIONS: CPT

## 2022-06-27 PROCEDURE — 97530 THERAPEUTIC ACTIVITIES: CPT

## 2022-06-27 PROCEDURE — 97110 THERAPEUTIC EXERCISES: CPT

## 2022-06-27 NOTE — PROGRESS NOTES
PF DAILY TREATMENT NOTE 3-16    Patient Name: Zack Colon  Date:2022  : 1945  [x]  Patient  Verified  Payor: [place] MEDICARE / Plan: [place] MEDICARE PART A & B / Product Type: Medicare /    In time: 12:54  Out time: 1:33  Total Treatment Time (min): 39  Visit #: 1 of 8    Medicare/BCBS Only   Total Timed Codes (min):  39 1:1 Treatment Time:  39     Treatment Area: [x] Pelvic Floor     [] Other:    SUBJECTIVE  Pain Level (0-10 scale): 3/10  Any medication changes, allergies to medications, adverse drug reactions, diagnosis change, or new procedure performed?: [x] No    [] Yes (see summary sheet for update)  Subjective functional status/changes:   [] No changes reported  Pt reports her MD appt was cancelled but someone called her later. She was supposed to take Miralax everyday, 1 full cap a day & 1/2 a cap for 2 days. She only has MRI scheduled for her stomach. Pt's been having diarrhea due to the Miralax. OBJECTIVE      10 min Therapeutic Exercise:  [] See flow sheet :   [x]  Pelvic floor strengthening                 []  Pelvic floor downtraining  []  Quality pelvic floor contractions       [x]  Relaxation techniques  []  Urge suppression exercises  []  Other:  Rationale: Increase pelvic floor muscle strength, Improve quality of pelvic floor contractions, Decrease resting tone of the pelvic floor, Increase tissue extensibility of the pelvic floor muscles, Increase core strength, Inhibit abnormal muscle activity and Improve lumbosacral and coccygeal mobility in order to Increase urinary continence, Decrease urinary urgency, Increase ability to delay urination, Decrease frequency of urination, Decrease nocturia, Improve ability to undergo a gynecological exam and Improve ability to perform ADLs.    10 min Therapeutic Activity:  []? See flow sheet :    []? Increase Tissue extensibility        [x]? Assess fiber intake    [x]? Assess voiding habits                  [x]? Assess bowel habits  []? Other:          Rationale: Increase pelvic floor muscle strength, Improve quality of pelvic floor contractions, Decrease resting tone of the pelvic floor, Increase tissue extensibility of the pelvic floor muscles, Increase core strength, Inhibit abnormal muscle activity and Improve lumbosacral and coccygeal mobility in order to Increase urinary continence, Decrease urinary urgency, Increase ability to delay urination, Decrease frequency of urination, Decrease nocturia, Decrease bowel urgency, Improve frequency and ease of bowel movements and Improve ability to perform ADLs. 19 min Manual Therapy: abdominal MFR, Severino's massage   The manual therapy interventions were performed at a separate and distinct time from the therapeutic activities interventions. Rationale: Decrease resting tone of the pelvic floor, Increase tissue extensibility of the pelvic floor muscles, Inhibit abnormal muscle activity and Improve lumbosacral and coccygeal mobility in order to Increase urinary continence, Decrease urinary urgency, Increase ability to delay urination, Decrease frequency of urination, Decrease nocturia, Decrease bowel urgency, Improve frequency and ease of bowel movements, Improve ability to undergo a gynecological exam and Improve ability to perform ADLs. With   [] TE   [] TA   [] neuro  [] manual   [] other: Patient Education: [x] Review HEP    [] Progressed/Changed HEP based on:   [] positioning   [] body mechanics   [] transfers   [] heat/ice application    [] other:      Other Objective/Functional Measures:   []baseline resting tone:   []slow twitch mms   []fast twitch mms    Pain Level (0-10 scale) post treatment: 0/10    ASSESSMENT/Changes in Function: Pt cont to report good relief of pressure/bowel urgency after manual. She's also limited with strength of PFM. Will progress therex next visit.      []  Decrease # of leaks   [] No change []  Improving [] Resolved     []  Decrease hypertonus [] No change []  Improving [] Resolved     []  Increase void interval [] No change []  Improving [] Resolved     []  Increase PF strength [] No change []  Improving [] Resolved     []  Increase PF endurance [] No change []  Improving [] Resolved     []  Increase endurance [] No change []  Improving [] Resolved     []  Decrease # of pads [] No change []  Improving [] Resolved     []  Decrease pain [] No change []  Improving [] Resolved     []  Increased coordination [] No change []  Improving [] Resolved     []  Increased Bowel Frequency [] No change []  Improving [] Resolved       Patient will continue to benefit from skilled PT services to modify and progress therapeutic interventions, address functional mobility deficits, address ROM deficits, address strength deficits, analyze and address soft tissue restrictions, analyze and cue movement patterns, analyze and modify body mechanics/ergonomics, assess and modify postural abnormalities, address imbalance/dizziness and instruct in home and community integration to attain remaining goals. []  See Plan of Care  [x]  See progress note/recertification  []  See Discharge Summary         Progress towards goals / Updated goals:  Goals for this certification period to be accomplished in 4 weeks:  1. Patient will demonstrate independence in HEP for maintenance of pelvic floor program and improved quality of life. Status at Progress Note: good compliance 6-22-22     2. Patient will have decreased leakage episodes to </=5 day per week for increased quality of life. Status at Progress Note: initial assessment: 4x/day; no significant change 6-22-22     3. Patient will report at least 75% improvement with complete empty of bowel & bladder to improve her QOL. Status at Progress Note: progressing well, 50% overall, no significant change 6-22-22     4.  Patient will have increased pelvic floor muscle motor performance by 1/2 to 1 grade for improved urinary continence and quality of life.  Status at Progress Note: 1+/5 5-18-22; no change 6-22-22     5. Patient will have FOTO Urinary Problem & Bowel Constipation score change of 13 points or more indicating improvement in function for icreased quality of life.   Eval status: initial assessment: improved 3 points with Urinary Problem, no change with Bowel Constipation 6-22-22    PLAN  [x]  Upgrade activities as tolerated     [x]  Continue plan of care  []  Update interventions per flow sheet       []  Discharge due to:_  []  Other:_      Radharonnie Claytonfederico 6/27/2022  8:49 AM    Future Appointments   Date Time Provider Gaby Delarosa   6/27/2022 12:45 PM Asa Priestly DCOIGPP [facility]   6/29/2022 12:45 PM Asa Priestly [id number] [facility]   7/25/2022  1:00 PM [facility] MRI RM 1 MMCRMRI [facility]   7/25/2022  2:00 PM [facility] VAS LAB RM 1 MMCVASC [facility]   12/2/2022 11:20 AM JULIA Lezama [facility] DEEJAY Count includes the [facility]   12/9/2022  9:45 AM Mahalia Kanner, MD CAP BS AMB

## 2022-06-29 ENCOUNTER — HOSPITAL ENCOUNTER (OUTPATIENT)
Dept: PHYSICAL THERAPY | Age: 77
Discharge: HOME | End: 2022-06-29
Payer: MEDICARE

## 2022-06-29 PROCEDURE — 97110 THERAPEUTIC EXERCISES: CPT

## 2022-06-29 PROCEDURE — 97112 NEUROMUSCULAR REEDUCATION: CPT

## 2022-06-29 NOTE — PROGRESS NOTES
PF DAILY TREATMENT NOTE 3-16    Patient Name: Andreina Stephens  Date:2022  : 1945  [x]  Patient  Verified  Payor: Rhonda Jacobson / Plan: [place] & B / Product Type: Medicare /    In time: 12:45  Out time: 1:28  Total Treatment Time (min):  43  Visit #: 2 of 8    Medicare/BCBS Only   Total Timed Codes (min):  43 1:1 Treatment Time:  43     Treatment Area: [x] Pelvic Floor     [] Other:    SUBJECTIVE  Pain Level (0-10 scale): 2/10  Any medication changes, allergies to medications, adverse drug reactions, diagnosis change, or new procedure performed?: [x] No    [] Yes (see summary sheet for update)  Subjective functional status/changes:   [] No changes reported  Pt is scheduled for MRI on . Pt reports having 3 BMs this morning, no diarrhea. OBJECTIVE      28 min Therapeutic Exercise:  [x] See flow sheet :   [x]  Pelvic floor strengthening                 []  Pelvic floor downtraining  []  Quality pelvic floor contractions       []  Relaxation techniques  []  Urge suppression exercises  [x]  Other: core & hips strengthening therex  Rationale: Increase pelvic floor muscle strength, Improve quality of pelvic floor contractions, Decrease resting tone of the pelvic floor, Increase tissue extensibility of the pelvic floor muscles, Increase core strength, Inhibit abnormal muscle activity and Improve lumbosacral and coccygeal mobility in order to Increase urinary continence, Decrease urinary urgency, Increase ability to delay urination, Decrease frequency of urination, Decrease nocturia, Improve frequency and ease of bowel movements, Improve ability to undergo a gynecological exam and Improve ability to perform ADLs.       15 min Neuromuscular Re-education:  []  See flow sheet :   [x]  Pelvic floor strengthening                 []  Pelvic floor downtraining  [x]  Quality pelvic floor contractions       []  Relaxation techniques  []  Urge suppression exercises  [x]  Other: core & hips strengthening therex  Rationale:  Increase pelvic floor muscle strength, Improve quality of pelvic floor contractions, Decrease resting tone of the pelvic floor, Increase tissue extensibility of the pelvic floor muscles, Increase core strength, Inhibit abnormal muscle activity and Improve lumbosacral and coccygeal mobility in order to Increase urinary continence, Decrease urinary urgency, Increase ability to delay urination, Decrease frequency of urination, Decrease nocturia, Improve frequency and ease of bowel movements, Improve ability to undergo a gynecological exam and Improve ability to perform ADLs. With   [] TE   [] TA   [] neuro  [] manual   [] other: Patient Education: [x] Review HEP    [] Progressed/Changed HEP based on:   [] positioning   [] body mechanics   [] transfers   [] heat/ice application    [] other:      Other Objective/Functional Measures:   []baseline resting tone:   []slow twitch mms   []fast twitch mms   Mod challenged with dynadisc, min LOB multiple times, excessive PPT to balance       Pain Level (0-10 scale) post treatment: 0/10    ASSESSMENT/Changes in Function: Pt was mod challenged with core strengthening therex using dynadisc. Mod fatigue but no pain with progressing therex for PFM & hips. She notes min improving BM with MD's suggested dosage with Miralax & fiber supplement. Will cont to progress therex and manual as tolerated.      []  Decrease # of leaks   [] No change []  Improving [] Resolved     []  Decrease hypertonus [] No change []  Improving [] Resolved     []  Increase void interval [] No change []  Improving [] Resolved     []  Increase PF strength [] No change []  Improving [] Resolved     []  Increase PF endurance [] No change []  Improving [] Resolved     []  Increase endurance [] No change []  Improving [] Resolved     []  Decrease # of pads [] No change []  Improving [] Resolved     []  Decrease pain [] No change []  Improving [] Resolved     []  Increased coordination [] No change []  Improving [] Resolved     []  Increased Bowel Frequency [] No change []  Improving [] Resolved       Patient will continue to benefit from skilled PT services to modify and progress therapeutic interventions, address functional mobility deficits, address ROM deficits, address strength deficits, analyze and address soft tissue restrictions, analyze and cue movement patterns, analyze and modify body mechanics/ergonomics, assess and modify postural abnormalities, address imbalance/dizziness and instruct in home and community integration to attain remaining goals. []  See Plan of Care  [x]  See progress note/recertification  []  See Discharge Summary         Progress towards goals / Updated goals:  Goals for this certification period to be accomplished in 4 weeks:  1. Patient will demonstrate independence in HEP for maintenance of pelvic floor program and improved quality of life. Status at Progress Note: good compliance 6-22-22  Current: good compliance 6-29-22    2. Patient will have decreased leakage episodes to </=5 day per week for increased quality of life. Status at Progress Note: initial assessment: 4x/day; no significant change 6-22-22     3. Patient will report at least 75% improvement with complete empty of bowel & bladder to improve her QOL. Status at Progress Note: progressing well, 50% overall, no significant change 6-22-22     4. Patient will have increased pelvic floor muscle motor performance by 1/2 to 1 grade for improved urinary continence and quality of life. Status at Progress Note: 1+/5 5-18-22; no change 6-22-22      5. Patient will have FOTO Urinary Problem & Bowel Constipation score change of 13 points or more indicating improvement in function for icreased quality of life.   Eval status: initial assessment: improved 3 points with Urinary Problem, no change with Bowel Constipation 6-22-22    PLAN  [x]  Upgrade activities as tolerated     [x]  Continue plan of care  [] Update interventions per flow sheet       []  Discharge due to:_  []  Other:_      Riccardo Johnsondavid 6/29/2022  8:07 AM    Future Appointments   Date Time Provider Gaby Delarosa   6/29/2022 12:45 PM Trinity Calloway [id number]   7/25/2022  1:00 PM [facility] MRI RM 1 MMCRMRI [facility]   7/25/2022  2:00 PM [facility] VAS LAB RM 1 [facility]   12/2/2022 11:20 AM [facility]   12/9/2022  9:45 AM Randy Hernandez MD [facility] AMB

## 2022-07-11 ENCOUNTER — OFFICE VISIT (OUTPATIENT)
Dept: ORTHOPEDIC SURGERY | Age: 77
End: 2022-07-11
Payer: MEDICARE

## 2022-07-11 VITALS
TEMPERATURE: 97 F | OXYGEN SATURATION: 94 % | HEIGHT: 60 IN | HEART RATE: 99 BPM | WEIGHT: 96.4 LBS | BODY MASS INDEX: 18.93 KG/M2

## 2022-07-11 DIAGNOSIS — M48.02 CERVICAL SPINAL STENOSIS: Primary | ICD-10-CM

## 2022-07-11 PROCEDURE — G8427 DOCREV CUR MEDS BY ELIG CLIN: HCPCS | Performed by: PHYSICIAN ASSISTANT

## 2022-07-11 PROCEDURE — 3017F COLORECTAL CA SCREEN DOC REV: CPT | Performed by: PHYSICIAN ASSISTANT

## 2022-07-11 PROCEDURE — G8536 NO DOC ELDER MAL SCRN: HCPCS | Performed by: PHYSICIAN ASSISTANT

## 2022-07-11 PROCEDURE — 1090F PRES/ABSN URINE INCON ASSESS: CPT | Performed by: PHYSICIAN ASSISTANT

## 2022-07-11 PROCEDURE — 1123F ACP DISCUSS/DSCN MKR DOCD: CPT | Performed by: PHYSICIAN ASSISTANT

## 2022-07-11 PROCEDURE — 1101F PT FALLS ASSESS-DOCD LE1/YR: CPT | Performed by: PHYSICIAN ASSISTANT

## 2022-07-11 PROCEDURE — 99213 OFFICE O/P EST LOW 20 MIN: CPT | Performed by: PHYSICIAN ASSISTANT

## 2022-07-11 PROCEDURE — G8432 DEP SCR NOT DOC, RNG: HCPCS | Performed by: PHYSICIAN ASSISTANT

## 2022-07-11 PROCEDURE — 72040 X-RAY EXAM NECK SPINE 2-3 VW: CPT | Performed by: PHYSICIAN ASSISTANT

## 2022-07-11 PROCEDURE — G8420 CALC BMI NORM PARAMETERS: HCPCS | Performed by: PHYSICIAN ASSISTANT

## 2022-07-11 NOTE — PROGRESS NOTES
Patient: Kennedi Blackwell                MRN: [id number]       SSN: [id number]  YOB: 1945        AGE: 78 y.o. SEX: female          PCP: Palak Hernandez MD  07/11/22 7/11/2022: Patient returns the office with a concern for a small area of swelling on the back of her neck. No trauma reported. This is in the area of prior cervical fusion. She has seen someone with neurology recently and has polyneuropathy. She is currently on 400 mg of gabapentin daily. She is still neuropathic by report despite the gabapentin. She has an extensive history as below with a stable occipitocervical fusion. Chief Complaint   Patient presents with    Neck Pain       HISTORY:  Kennedi Blackwell is a 78 y.o. female returns the office with a complaint of multiple joint arthralgia type pains. She is a cervical spine fusion patient and has been previously referred to Dr. Viral Nogueira in [facility] with Anirudh Lui.  Dr. Viral Nogueira indicated to the patient that her current fusion is stable but she does have arthritic findings just below the end of C5 fusion. 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 Today she is complaining of pain in the hands wrists elbows and knees. She just completed twenty-seven sessions of outpatient physical therapy with a focus on gait training balance and endurance. She was discharged with home exercise program comprehensive and has been performing those self-directed exercises on a regular basis. She has tried to work on her weight recently but still carries a 98 pound maximum with BMI of 18.83. Within the past 2 years she was using solely a power mobility device for ambulation and today she has been able to advance to a seated for post rolling walker. Overall she is pleased with her outcome over 2 years but still concerned that she has an underlying condition beyond the discussed osteoarthritis.     No known history of rheumatoid arthritis by report or Sjogren's disease. She has an allergy to Celebrex but does use 800 mg of ibuprofen generally once daily for symptom management with moderate success. She also takes roughly about 2300 mg of Tylenol during the course of the day for additional symptom management. She was just seen by gastroenterology for a urge type incontinence. She has been placed on a special medication to help purge any retained fecal material that lies in her descending colon. A previous scope by Dr. Anthony Walker revealed normal anatomy.       Pain Assessment  7/11/2022   Location of Pain Neck   Pain Location Comment -   Location Modifiers -   Severity of Pain 8   Quality of Pain Throbbing   Quality of Pain Comment -   Duration of Pain Persistent   Frequency of Pain Constant   Date Pain First Started -   Date Pain First Started Comment -   Aggravating Factors -   Aggravating Factors Comment -   Limiting Behavior Yes   Relieving Factors NSAID;Nothing   Relieving Factors Comment -   Result of Injury No   Work-Related Injury -   Type of Injury -           Lab Results   Component Value Date/Time    Hemoglobin A1c 5.8 (H) 03/18/2019 04:07 PM     Weight Metrics 7/11/2022 6/2/2022 5/27/2022 3/16/2022 2/24/2022 1/14/2022 12/2/2021   Weight 96 lb 6.4 oz 96 lb 96 lb 97 lb 9.6 oz 98 lb 12.8 oz 100 lb 100 lb   BMI 18.83 kg/m2 18.14 kg/m2 18.14 kg/m2 18.44 kg/m2 18.67 kg/m2 19.53 kg/m2 19.53 kg/m2            Problem List Items Addressed This Visit        Orthopedic Problems    Cervical spinal stenosis - Primary (Chronic)    Relevant Orders    AMB POC XRAY, SPINE, CERVICAL; 2 OR 3 (Completed)          PAST MEDICAL HISTORY:   Past Medical History:   Diagnosis Date    Abnormal Pap smear     Dr. Leblanc Postal    Allergic rhinitis     Arthritis     Asthma 11/22/2019    Cerebrovascular small vessel disease 3/18/2019    CT 3/17/2019    Cervical spinal cord compression (HCC)     Chronic pain     Concentric left ventricular hypertrophy 3/18/2019    With left ventricular diastolic dysfunction by echocardiogram 3/18/2019    GERD (gastroesophageal reflux disease) 11/22/2019    Hypercholesterolemia     Neck pain 5/17/2010    Stroke ([facility] [address] 10/02/2017    TIA- legs weak memory issues       PAST SURGICAL HISTORY:   Past Surgical History:   Procedure Laterality Date    COLONOSCOPY N/A 6/4/2018    COLONOSCOPY / polypectomy performed by Amy Ramirez MD at [facility] COLONOSCOPY N/A 8/19/2021    COLONOSCOPY performed by Dulce Gerber MD at [facility] HX GYN      Total Hyst    HX LAP CHOLECYSTECTOMY      HX OTHER SURGICAL  2017    Throat widened    NEUROLOGICAL PROCEDURE UNLISTED  11/2019    Cervical fusion    MI PLASTIC SURGERY, NECK  2019       ALLERGIES:   Allergies   Allergen Reactions    Baclofen Shortness of Breath     Denies. Pt tolerates  Other reaction(s): gi distress    Morphine Other (comments)     hallucinations  Other reaction(s): other/intolerance  hallucinations  Other reaction(s): delusions    Sulfa (Sulfonamide Antibiotics) Other (comments)     Drops blood pressure    Celecoxib Other (comments)     Tiredness   Other reaction(s): other/intolerance  Makes her tired        CURRENT MEDICATIONS:  A list of medications prior to the time of admission include:  Prior to Admission medications    Medication Sig Start Date End Date Taking? Authorizing Provider   diclofenac (VOLTAREN) 1 % gel Apply  to affected area four (4) times daily. Provider, Historical   traMADoL (ULTRAM) 50 mg tablet Take 50 mg by mouth as needed for Pain. Provider, Historical   raNITIdine (Zantac) 150 mg tablet Take 150 mg by mouth as needed for Indigestion. Provider, Historical   vit B Cmplx 3-FA-Vit C-Biotin (NEPHRO SANJEEV RX) 1- mg-mg-mcg tablet Take 1 Tablet by mouth daily.     Provider, Historical   ondansetron hcl (ZOFRAN) 4 mg tablet take 1 tablet by mouth every 6 hours if needed for nausea 5 11/16/21   Provider, Historical   calcium polycarbophiL (Fiber Laxative, ca polycarbo,) 625 mg tablet Take 625 mg by mouth daily. Provider, Historical   lidocaine (LIDODERM) 5 % apply 1 patch TO THE AFFECTED AREA DAILY. LEAVE ON FOR 12 HOURS AND THEN OFF FOR 12 HOURS. 8/8/21   Provider, Historical   fluticasone propionate (FLOVENT HFA) 110 mcg/actuation inhaler Take 1 Puff by inhalation every twelve (12) hours as needed. Provider, Historical   atorvastatin (LIPITOR) 20 mg tablet take 1 tablet by mouth once daily  Patient not taking: Reported on 3/16/2022 9/23/20   Marj Garcia NP   busPIRone (BUSPAR) 7.5 mg tablet take 1 tablet by mouth twice a day  Patient taking differently: Take 7.5 mg by mouth two (2) times a day. 9/23/20   Marj Garcia NP   ibuprofen (MOTRIN) 400 mg tablet Take 1 Tab by mouth every six (6) hours as needed for Pain. Patient taking differently: Take 800 mg by mouth daily. 7/26/20   Tee Cruz MD   omeprazole (PRILOSEC) 20 mg capsule take 1 capsule by mouth once daily before breakfast 5/7/20   Majr Garcia NP   gabapentin (NEURONTIN) 100 mg capsule Take 2 Caps by mouth three (3) times daily. Max Daily Amount: 600 mg. Patient taking differently: Take 400 mg by mouth daily. 4/16/20   Amy Loving NP   acetaminophen (TYLENOL) 160 mg/5 mL elixir Take 1,000 mg by mouth daily. 12/12/19   Provider, Historical   midodrine (PROAMITINE) 5 mg tablet Take 5 mg by mouth as needed. 1 tab by mouth with breakfast and lunch 2/6/20   Provider, Historical   turmeric 400 mg cap Take 1 Cap by mouth daily. Provider, Historical   aspirin 81 mg chewable tablet Take 1 Tab by mouth daily.  10/10/17   JULIA Oro       FAMILY HISTORY:   Family History   Problem Relation Age of Onset    Cancer Mother         breast    Heart Disease Father        SOCIAL HISTORY:   Social History     Socioeconomic History    Marital status:    Tobacco Use    Smoking status: Never Smoker  Smokeless tobacco: Never Used   Vaping Use    Vaping Use: Never used   Substance and Sexual Activity    Alcohol use: No    Drug use: No    Sexual activity: Never       ROS:No CP, No SOB, No fever/chills nor night sweats. No headaches, vision abnormalities to include double and or loss of vision. No dizziness. No hearing abnormalities. No Chest Pain nor Shortness of breath. Pt denies h/o spinal surgery, injections, or PT/chiropractor. Patient has attempted self treatment with less than adequate relief on oral and topical analgesic / anti inflammatory medications . Pt denies change in bowel or bladder habits. No saddle paresthesia / anesthesia. Pt denies fever, unplanned weight loss / weight gains, and no skin changes. Musculoskeletal pain per HPI. Pain is exacerbated positionally. PHYSICAL EXAM:    Visit Vitals  Pulse 99   Temp 97 °F (36.1 °C) (Temporal)   Ht 5' (1.524 m)   Wt 96 lb 6.4 oz (43.7 kg)   LMP  (LMP Unknown)   SpO2 94%   BMI 18.83 kg/m²       Constitutional: Appears well-developed and well-nourished. No distress. Sitting comfortably in the exam room, interacting with conversation with pleasant affect. Gait appears steady but guarded and patient exhibits no evidence of ataxia nor Trendelenburg gait. Patient is able to ambulate with caution using a four post rolling walker with seat and brakes. No focal neurological deficit noted. No facial droop, slurred speech, or evidence of altered mentation noted on exam.   Skin: Skin over the head, neck, bilateral limbs, and trunk is warm and dry. No rash or erythema noted. Cranial Nerves II-XII grossly intact  HENT: NC/AT. Normal symmetry, bulk and tone of facial and neck musculature. Trachea midline. No discernible thyromegaly or masses. No involuntary movements. Lymphatic: No preauricular, submandibuar, anterior or posterior cervical lymphadenopathy. Psychiatric: The patient is awake, alert, and oriented to person, place and time.  Behavior is normal. Thought content normal.   Cardiovascular: No clubbing, cyanosis. No edema bilateral lower extremities. Pulmonary: No tripoding nor accessory muscle recruitment. Breathing normally, no distress, no audible wheezing. Distal cap refill intact at 2/2 Reed UE / LE. Neuro intact Reed UE/LE to noxious stimuli        Ortho Specific exam:    Generalized guarding of shoulders, elbows, wrists and fingers with stiffness and pain reproduced in all planes of motion. She has slight ulnar deviation of the right hand index long ring and little finger. Left hand appears normal with no ulnar deviation of the fingers. There appears Heberden's and Fausto's nodes on the fingers of both hands. Otherwise skin intact with no warmth, erythema, edema, or ecchymosis. No effusions noted to the above-mentioned areas. Her cervical spine range of motion is guarded secondary to her base of the occiput through C5 fusion. There is cervical hardware noted at C3-4 and 5 posteriorly. Cervical hardware also noted at the base of the occiput    No evidence of hardware failure of the entire cervical occipital complex. At the base of the neck just proximal from the surgical incision used to place the hardware evident on x-ray there is a small area which is painful to touch but no redness or evidence of infection associated. There is palpable underlying retained orthopedic hardware. X-ray: [facility] 7/11/2022 space 2 view of the cervical spine extensive cervical fusion spanning from the base of the occiput through C5. Hardware is present. No hardware failure noted. There is severe degenerative disc disease noted throughout the entire cervical spine. Cervical spondylosis is noted. IMPRESSION:      ICD-10-CM ICD-9-CM    1. Cervical spinal stenosis  M48.02 723.0 AMB POC XRAY, SPINE, CERVICAL; 2 OR 3   2.   Retained painful orthopedic hardware    PLAN: Patient is going to try topical diclofenac gel alternating with lidocaine for her retained painful orthopedic hardware of the cervical spine. She fails to improve consideration for advanced imaging may be necessary. Does not appear that she has any hardware failure however she has very thin skin and very little musculature or subcutaneous tissue overlying the cervical hardware placement site. This is ideally contributing to her discomfort. .       Care plan outlined and precautions discussed. Results were reviewed with the patient. All medications were reviewed with the patient. All of pt's questions and concerns were addressed. Alarm symptoms and return precautions associated with chief complaint and evaluation were reviewed with the patient in detail. The patient demonstrated adequate understanding. The patient expresses willing compliance with the treatment plan. No Narcotic indicated today. Patient given pain medication for short term acute pain relief. Goal is to treat patient according to above plan and to ultimately have patient off all pain medications once appropriate. If chronic pain management is required beyond what is expected for current orthopedic problem, will refer patient to pain management.  was reviewed and will be reviewed with every medication refill request.         Patient provided a reminder for a \"due or due soon\" health maintenance. I have asked the patient to schedule an appointment with their primary care provider for follow-up on general health maintenance concerns. Today all the patient's questions were answered to their satisfaction. Copies of x-rays reviewed if obtained this visit, and provided to patient. Dictation disclaimer:  Please note that this dictation was completed with [employer], the [employer] voice recognition software.   Quite often unanticipated grammatical, syntax, homophones, and other interpretive errors are inadvertently transcribed by the computer software. Please disregard these errors. Please excuse any errors that have escaped final proofreading. Toney DUFFY, APC, MPAS, PA-C  [facility]

## 2022-07-13 ENCOUNTER — HOSPITAL ENCOUNTER (OUTPATIENT)
Dept: PHYSICAL THERAPY | Age: 77
Discharge: HOME | End: 2022-07-13
Payer: MEDICARE

## 2022-07-13 PROCEDURE — 97140 MANUAL THERAPY 1/> REGIONS: CPT

## 2022-07-13 PROCEDURE — 97530 THERAPEUTIC ACTIVITIES: CPT

## 2022-07-13 NOTE — PROGRESS NOTES
PF DAILY TREATMENT NOTE 3-16    Patient Name: Loyd Waters  Date:2022  : 1945  [x]  Patient  Verified  Payor: Maite Racer / Plan: [place] MED[place] & B / Product Type: Medicare /    In time: 11:16  Out time: 11:59  Total Treatment Time (min): 43  Visit #: 3 of 8    Medicare/BCBS Only   Total Timed Codes (min):  43 1:1 Treatment Time:  43     Treatment Area: [x] Pelvic Floor     [] Other:    SUBJECTIVE  Pain Level (0-10 scale): 4/10  Any medication changes, allergies to medications, adverse drug reactions, diagnosis change, or new procedure performed?: [x] No    [] Yes (see summary sheet for update)  Subjective functional status/changes:   [] No changes reported  Pt reports BM frequency is every 2-3 days. She strains min this morning. Pt feels like she's a little grouchy today. She just has so many health problem to deal with. OBJECTIVE     15 min Therapeutic Activity:  []  See flow sheet :    []  Increase Tissue extensibility        [x]  Assess fiber intake    [x]  Assess voiding habits  [x]  Assess bowel habits  [x]  Other: finding balance, voiding mechanics    Rationale: Increase pelvic floor muscle strength, Improve quality of pelvic floor contractions, Decrease resting tone of the pelvic floor, Increase tissue extensibility of the pelvic floor muscles, Increase core strength, Inhibit abnormal muscle activity and Improve lumbosacral and coccygeal mobility in order to Increase urinary continence, Decrease urinary urgency, Increase ability to delay urination, Decrease frequency of urination, Decrease nocturia, Decrease bowel urgency, Improve frequency and ease of bowel movements, Improve ability to undergo a gynecological exam and Improve ability to perform ADLs. 28 min Manual Therapy:  Intra-rectal MFR, Severino's massage. Abdominal MFR & massage. The manual therapy interventions were performed at a separate and distinct time from the therapeutic activities interventions.     Rationale: Decrease resting tone of the pelvic floor, Increase tissue extensibility of the pelvic floor muscles, Inhibit abnormal muscle activity and Improve lumbosacral and coccygeal mobility in order to Increase urinary continence, Decrease urinary urgency, Increase ability to delay urination, Decrease frequency of urination, Decrease nocturia, Decrease bowel urgency, Improve frequency and ease of bowel movements, Improve ability to undergo a gynecological exam and Improve ability to perform ADLs. With   [] TE   [] TA   [] neuro  [] manual   [] other: Patient Education: [x] Review HEP    [] Progressed/Changed HEP based on:   [] positioning   [] body mechanics   [] transfers   [] heat/ice application    [] other:      Other Objective/Functional Measures:   []baseline resting tone:   []slow twitch mms   []fast twitch mms    Pain Level (0-10 scale) post treatment: 2/10    ASSESSMENT/Changes in Function: Pt cont to fluctuate with bowel frequency and rectal urgencyShe demonstrates elevated stress/anxiety due to other medically problems. Pt is very pleased with manual and reports that HEP cont to assist with BM. Will cont to progress therex & manual as tolerated.       []  Decrease # of leaks   [] No change []  Improving [] Resolved     []  Decrease hypertonus [] No change []  Improving [] Resolved     []  Increase void interval [] No change []  Improving [] Resolved     []  Increase PF strength [] No change []  Improving [] Resolved     []  Increase PF endurance [] No change []  Improving [] Resolved     []  Increase endurance [] No change []  Improving [] Resolved     []  Decrease # of pads [] No change []  Improving [] Resolved     []  Decrease pain [] No change []  Improving [] Resolved     []  Increased coordination [] No change []  Improving [] Resolved     []  Increased Bowel Frequency [] No change []  Improving [] Resolved       Patient will continue to benefit from skilled PT services to modify and progress therapeutic interventions, address functional mobility deficits, address ROM deficits, address strength deficits, analyze and address soft tissue restrictions, analyze and cue movement patterns, analyze and modify body mechanics/ergonomics, assess and modify postural abnormalities, address imbalance/dizziness and instruct in home and community integration to attain remaining goals. []  See Plan of Care  [x]  See progress note/recertification  []  See Discharge Summary         Progress towards goals / Updated goals:  Goals for this certification period to be accomplished in 4 weeks:  1. Patient will demonstrate independence in HEP for maintenance of pelvic floor program and improved quality of life. Status at Progress Note: good compliance 6-22-22  Current: good compliance 6-29-22     2. Patient will have decreased leakage episodes to </=5 day per week for increased quality of life. Status at Progress Note: initial assessment: 4x/day; no significant change 6-22-22     3. Patient will report at least 75% improvement with complete empty of bowel & bladder to improve her QOL. Status at Progress Note: progressing well, 50% overall, no significant change 6-22-22  Current: cont to fluctuate 7-13-22     4. Patient will have increased pelvic floor muscle motor performance by 1/2 to 1 grade for improved urinary continence and quality of life. Status at Progress Note: 1+/5 5-18-22; no change 6-22-22      5. Patient will have FOTO Urinary Problem & Bowel Constipation score change of 13 points or more indicating improvement in function for icreased quality of life.   Eval status: initial assessment: improved 3 points with Urinary Problem, no change with Bowel Constipation 6-22-22    PLAN  [x]  Upgrade activities as tolerated     [x]  Continue plan of care  []  Update interventions per flow sheet       []  Discharge due to:_  []  Other:_      Barbara Going 7/13/2022  8:07 AM    Future Appointments   Date Time Provider Gaby Isaura   7/13/2022 11:15 AM Cosimo Newcomsina [id number] [facility]   7/15/2022 11:15 AM Palmer Jean-Baptiste [id number] [facility]   7/19/2022 12:00 PM Cosimo Newcomer MMCPTPB [facility]   7/21/2022 11:15 AM Cosimo Newcomer MMCPTPB [facility]   7/25/2022  1:00 PM [facility] MRI RM 1 MMCRMRI [facility]   7/26/2022  1:30 PM Richie Valdes PA-C [facility] BS AMB   7/27/2022 11:15 AM Cosimo Shannon [id number] [facility]   7/29/2022 11:15 AM Palmer Jean-Baptiste [id number] [facility]   8/2/2022 12:00 PM Cosimo Newcomer MMCPTPB [facility]   8/4/2022 11:15 AM Cosimo Shannon WPTCAWH [facility]   12/2/2022 11:20 AM JULIA [facility]   12/9/2022  9:45 AM Vincenzo Guillermo MD [facility] BS AMB

## 2022-07-15 ENCOUNTER — HOSPITAL ENCOUNTER (OUTPATIENT)
Dept: PHYSICAL THERAPY | Age: 77
Discharge: HOME | End: 2022-07-15
Payer: MEDICARE

## 2022-07-15 PROCEDURE — 97110 THERAPEUTIC EXERCISES: CPT

## 2022-07-15 PROCEDURE — 97140 MANUAL THERAPY 1/> REGIONS: CPT

## 2022-07-15 NOTE — PROGRESS NOTES
PF DAILY TREATMENT NOTE 3-16    Patient Name: Alice Perales  Date:7/15/2022  : 1945  [x]  Patient  Verified  Payor: [place] MEDICARE / Plan: [place] MEDICARE PART A & B / Product Type: Medicare /    In time: 11:19  Out time: 12:00  Total Treatment Time (min): 41  Visit #: 4 of 8    Medicare/BCBS Only   Total Timed Codes (min):  41 1:1 Treatment Time:  41     Treatment Area: [x] Pelvic Floor     [] Other:    SUBJECTIVE  Pain Level (0-10 scale): 2/10  Any medication changes, allergies to medications, adverse drug reactions, diagnosis change, or new procedure performed?: [x] No    [] Yes (see summary sheet for update)  Subjective functional status/changes:   [] No changes reported  Pt doing better today. She feels very good after last session. She had a small BM this morning. OBJECTIVE      31 min Therapeutic Exercise:  [] See flow sheet :   [x]  Pelvic floor strengthening                 []  Pelvic floor downtraining  []  Quality pelvic floor contractions       [x]  Relaxation techniques  []  Urge suppression exercises  []  Other:  Rationale: Increase pelvic floor muscle strength, Improve quality of pelvic floor contractions, Decrease resting tone of the pelvic floor, Increase tissue extensibility of the pelvic floor muscles, Increase core strength, Inhibit abnormal muscle activity and Improve lumbosacral and coccygeal mobility in order to Increase urinary continence, Decrease urinary urgency, Increase ability to delay urination, Decrease frequency of urination, Decrease nocturia, Decrease bowel urgency, Improve frequency and ease of bowel movements, Improve ability to undergo a gynecological exam and Improve ability to perform ADLs. 10 min Manual Therapy:  Abdominal MFR   The manual therapy interventions were performed at a separate and distinct time from the therapeutic activities interventions.     Rationale: Decrease resting tone of the pelvic floor, Increase tissue extensibility of the pelvic floor muscles, Inhibit abnormal muscle activity and Improve lumbosacral and coccygeal mobility in order to Increase urinary continence, Decrease urinary urgency, Increase ability to delay urination, Decrease frequency of urination, Decrease nocturia, Decrease bowel urgency, Improve frequency and ease of bowel movements, Improve ability to undergo a gynecological exam and Improve ability to perform ADLs. With   [] TE   [] TA   [] neuro  [] manual   [] other: Patient Education: [x] Review HEP    [] Progressed/Changed HEP based on:   [] positioning   [] body mechanics   [] transfers   [] heat/ice application    [] other:      Other Objective/Functional Measures:   []baseline resting tone:   []slow twitch mms   []fast twitch mms    Pain Level (0-10 scale) post treatment: 0/10    ASSESSMENT/Changes in Function: pt was mod challenged with PFM exercises in sitting & progressive standing therex for glute. She's very pleased with abdominal MFR. Will cont to progress therex & manual as tolerated.      []  Decrease # of leaks   [] No change []  Improving [] Resolved     []  Decrease hypertonus [] No change []  Improving [] Resolved     []  Increase void interval [] No change []  Improving [] Resolved     []  Increase PF strength [] No change []  Improving [] Resolved     []  Increase PF endurance [] No change []  Improving [] Resolved     []  Increase endurance [] No change []  Improving [] Resolved     []  Decrease # of pads [] No change []  Improving [] Resolved     []  Decrease pain [] No change []  Improving [] Resolved     []  Increased coordination [] No change []  Improving [] Resolved     []  Increased Bowel Frequency [] No change []  Improving [] Resolved       Patient will continue to benefit from skilled PT services to modify and progress therapeutic interventions, address functional mobility deficits, address ROM deficits, address strength deficits, analyze and address soft tissue restrictions, analyze and cue movement patterns, analyze and modify body mechanics/ergonomics, assess and modify postural abnormalities, address imbalance/dizziness and instruct in home and community integration to attain remaining goals. []  See Plan of Care  [x]  See progress note/recertification  []  See Discharge Summary         Progress towards goals / Updated goals:  Goals for this certification period to be accomplished in 4 weeks:  1. Patient will demonstrate independence in [facility] for maintenance of pelvic floor program and improved quality of life. Status at Progress Note: good compliance 6-22-22  Current: good compliance 6-29-22     2. Patient will have decreased leakage episodes to </=5 day per week for increased quality of life. Status at Progress Note: initial assessment: 4x/day; no significant change 6-22-22     3. Patient will report at least 75% improvement with complete empty of bowel & bladder to improve her QOL. Status at Progress Note: progressing well, 50% overall, no significant change 6-22-22  Current: cont to fluctuate 7-13-22     4. Patient will have increased pelvic floor muscle motor performance by 1/2 to 1 grade for improved urinary continence and quality of life. Status at Progress Note: 1+/5 5-18-22; no change 6-22-22      5. Patient will have FOTO Urinary Problem & Bowel Constipation score change of 13 points or more indicating improvement in function for icreased quality of life.   Eval status: initial assessment: improved 3 points with Urinary Problem, no change with Bowel Constipation 6-22-22       PLAN  [x]  Upgrade activities as tolerated     [x]  Continue plan of care  []  Update interventions per flow sheet       []  Discharge due to:_  []  Other:_      Riccardo Ly 7/15/2022  8:59 AM    Future Appointments   Date Time Provider Gaby Delarosa   7/15/2022 11:15 AM Moody Lofton [id number] [facility]   7/19/2022 12:00 PM Moody Lofton MMCPTPB [facility]   7/21/2022 11:15 AM Vickie TOPETE MMCPTPB [facility]   7/25/2022 1:00 PM [facility] MRI RM 1 MMCRMRI [facility]   7/26/2022  1:30 PM Bradley Valdes PA-C [facility] BS AMB   7/27/2022 11:15 AM Jody Lampleela MMCPTPB [facility]   7/29/2022 11:15 AM Jody Vazquez [id number] [facility]   8/2/2022 12:00 PM Jody Vazquez MMCPTPB [facility]   8/4/2022 11:15 AM Jody Vazquez [id number] [facility]   12/2/2022 11:20 AM [facility]   12/9/2022  9:45 AM Austin Love MD CAP BS AMB

## 2022-07-19 ENCOUNTER — HOSPITAL ENCOUNTER (OUTPATIENT)
Dept: PHYSICAL THERAPY | Age: 77
Discharge: HOME | End: 2022-07-19
Payer: MEDICARE

## 2022-07-19 PROCEDURE — 97110 THERAPEUTIC EXERCISES: CPT

## 2022-07-19 PROCEDURE — 97140 MANUAL THERAPY 1/> REGIONS: CPT

## 2022-07-19 NOTE — PROGRESS NOTES
PF DAILY TREATMENT NOTE 3-16    Patient Name: Elmira Kaiser  DDTQ:[id number]  : 1945  [x]  Patient  Verified  Payor: Destiny Tang / Plan: [place] MEDICARE PART A & B / Product Type: Medicare /    In time: 12:02  Out time: 12:42  Total Treatment Time (min): 40  Visit #: 5 of 8    Medicare/BCBS Only   Total Timed Codes (min):  40 1:1 Treatment Time:  40     Treatment Area: [x] Pelvic Floor     [] Other:    SUBJECTIVE  Pain Level (0-10 scale): 2/10  Any medication changes, allergies to medications, adverse drug reactions, diagnosis change, or new procedure performed?: [x] No    [] Yes (see summary sheet for update)  Subjective functional status/changes:   [] No changes reported  Pt skipped BM 3 days until today. OBJECTIVE    15 min Therapeutic Exercise:  [x] See flow sheet :   []  Pelvic floor strengthening                 []  Pelvic floor downtraining  []  Quality pelvic floor contractions       [x]  Relaxation techniques  []  Urge suppression exercises  []  Other:  Rationale: Decrease resting tone of the pelvic floor, Increase tissue extensibility of the pelvic floor muscles, Inhibit abnormal muscle activity and Improve lumbosacral and coccygeal mobility in order to Decrease bowel urgency, Improve frequency and ease of bowel movements and Improve ability to perform ADLs. 25 min Manual Therapy:  Intra-rectal MFR, Severino's massage, abdominal MFR   The manual therapy interventions were performed at a separate and distinct time from the therapeutic activities interventions. Rationale: Decrease resting tone of the pelvic floor, Increase tissue extensibility of the pelvic floor muscles, Inhibit abnormal muscle activity and Improve lumbosacral and coccygeal mobility in order to Decrease bowel urgency, Improve frequency and ease of bowel movements and Improve ability to perform ADLs.           With   [] TE   [] TA   [] neuro  [] manual   [] other: Patient Education: [x] Review HEP    [] Progressed/Changed HEP based on:   [] positioning   [] body mechanics   [] transfers   [] heat/ice application    [] other:      Other Objective/Functional Measures:   []baseline resting tone:   []slow twitch mms   []fast twitch mms    Pain Level (0-10 scale) post treatment: 0/10    ASSESSMENT/Changes in Function: Pt responded well to manual. BM frequency is ~1/2-3 days. Pt cont to present with poor strength of PFM. Will progress manual and therex as tolerated. []  Decrease # of leaks   [] No change []  Improving [] Resolved     []  Decrease hypertonus [] No change []  Improving [] Resolved     []  Increase void interval [] No change []  Improving [] Resolved     []  Increase PF strength [] No change []  Improving [] Resolved     []  Increase PF endurance [] No change []  Improving [] Resolved     []  Increase endurance [] No change []  Improving [] Resolved     []  Decrease # of pads [] No change []  Improving [] Resolved     []  Decrease pain [] No change []  Improving [] Resolved     []  Increased coordination [] No change []  Improving [] Resolved     []  Increased Bowel Frequency [] No change []  Improving [] Resolved       Patient will continue to benefit from skilled PT services to modify and progress therapeutic interventions, address functional mobility deficits, address ROM deficits, address strength deficits, analyze and address soft tissue restrictions, analyze and cue movement patterns, analyze and modify body mechanics/ergonomics, assess and modify postural abnormalities, address imbalance/dizziness and instruct in home and community integration to attain remaining goals. []  See Plan of Care  [x]  See progress note/recertification  []  See Discharge Summary         Progress towards goals / Updated goals:  Goals for this certification period to be accomplished in 4 weeks:  1. Patient will demonstrate independence in HEP for maintenance of pelvic floor program and improved quality of life.   Status at Progress Note: good compliance 6-22-22  Current: good compliance 6-29-22     2. Patient will have decreased leakage episodes to </=5 day per week for increased quality of life. Status at Progress Note: initial assessment: 4x/day; no significant change 6-22-22     3. Patient will report at least 75% improvement with complete empty of bowel & bladder to improve her QOL. Status at Progress Note: progressing well, 50% overall, no significant change 6-22-22  Current: cont to fluctuate 7-13-22     4. Patient will have increased pelvic floor muscle motor performance by 1/2 to 1 grade for improved urinary continence and quality of life. Status at Progress Note: 1+/5 5-18-22; no change 6-22-22      5. Patient will have FOTO Urinary Problem & Bowel Constipation score change of 13 points or more indicating improvement in function for icreased quality of life.   Eval status: initial assessment: improved 3 points with Urinary Problem, no change with Bowel Constipation 6-22-22      PLAN  [x]  Upgrade activities as tolerated     [x]  Continue plan of care  []  Update interventions per flow sheet       []  Discharge due to:_  []  Other:_        Pricilla Jessica 7/19/2022  10:20 AM    Future Appointments   Date Time Provider Gaby Delarosa   7/19/2022 12:00 PM Radha Paige [id number] [facility]   7/21/2022 11:15 AM Bora TOPETE MMCPTPB [facility]   7/25/2022  1:00 PM [facility] MRI RM 1 MMCRMRI [facility]   7/26/2022  1:30 PM Hero Valdes PA-C [facility] BS AMB   7/27/2022 11:15 AM Radha Paige [id number] [facility]   7/29/2022 11:15 AM Palmer Lau QOMLDHY [facility]   8/2/2022 12:00 PM Radha Paige [id number] [facility]   8/4/2022 11:15 AM Radha SNIDER [facility]   12/2/2022 11:20 AM [place]ULIA [place] [facility]   12/9/2022  9:45 AM Brando Cleveland MD [facility] BS AMB

## 2022-07-21 ENCOUNTER — HOSPITAL ENCOUNTER (OUTPATIENT)
Dept: PHYSICAL THERAPY | Age: 77
Discharge: HOME | End: 2022-07-21
Payer: MEDICARE

## 2022-07-21 PROCEDURE — 97110 THERAPEUTIC EXERCISES: CPT

## 2022-07-21 PROCEDURE — 97530 THERAPEUTIC ACTIVITIES: CPT

## 2022-07-21 PROCEDURE — 97140 MANUAL THERAPY 1/> REGIONS: CPT

## 2022-07-21 NOTE — PROGRESS NOTES
PF DAILY TREATMENT NOTE 3-16    Patient Name: Rogerio Cm  Date:2022  : 1945  [x]  Patient  Verified  Payor: [place] MED[place]ARE / Plan: [place] MEDICARE PART A & B / Product Type: Medicare /    In time: 11:18  Out time:12:10  Total Treatment Time (min): 52  Visit #: 6 of 8    Medicare/BCBS Only   Total Timed Codes (min):  52 1:1 Treatment Time:  52     Treatment Area: [x] Pelvic Floor     [] Other:    SUBJECTIVE  Pain Level (0-10 scale): 2/10  Any medication changes, allergies to medications, adverse drug reactions, diagnosis change, or new procedure performed?: [x] No    [] Yes (see summary sheet for update)  Subjective functional status/changes:   [] No changes reported  Pt ate something that triggered her fecal leakage and she had diarrhea later. Her appt is postponed again. OBJECTIVE    27 min Therapeutic Exercise:  [x] See flow sheet :   [x]  Pelvic floor strengthening                 []  Pelvic floor downtraining  []  Quality pelvic floor contractions       []  Relaxation techniques  []  Urge suppression exercises  []  Other:  Rationale: Increase pelvic floor muscle strength, Improve quality of pelvic floor contractions, Decrease resting tone of the pelvic floor, Increase tissue extensibility of the pelvic floor muscles, Increase core strength, Inhibit abnormal muscle activity, and Improve lumbosacral and coccygeal mobility in order to Increase urinary continence, Decrease urinary urgency, Increase ability to delay urination, Decrease frequency of urination, Decrease nocturia, Increase fecal continence, Decrease bowel urgency, Improve frequency and ease of bowel movements, Improve ability to undergo a gynecological exam, and Improve ability to perform ADLs.        15 min Therapeutic Activity:  []  See flow sheet :    []  Increase Tissue extensibility        [x]  Assess fiber intake    [x]  Assess voiding habits  [x]  Assess bowel habits  [x]  Other: FOTO reassessment   Rationale: Increase pelvic floor muscle strength, Improve quality of pelvic floor contractions, Decrease resting tone of the pelvic floor, Increase tissue extensibility of the pelvic floor muscles, Increase core strength, Inhibit abnormal muscle activity, and Improve lumbosacral and coccygeal mobility in order to Increase urinary continence, Decrease urinary urgency, Increase ability to delay urination, Decrease frequency of urination, Decrease nocturia, Increase fecal continence, Decrease bowel urgency, Improve frequency and ease of bowel movements, Improve ability to undergo a gynecological exam, and Improve ability to perform ADLs. 10 min Manual Therapy:  abdominal MFR, diaphragm release   The manual therapy interventions were performed at a separate and distinct time from the therapeutic activities interventions. Rationale: Decrease resting tone of the pelvic floor, Increase tissue extensibility of the pelvic floor muscles, Increase core strength, Inhibit abnormal muscle activity, and Improve lumbosacral and coccygeal mobility in order to Increase urinary continence, Decrease urinary urgency, Increase ability to delay urination, Decrease frequency of urination, Decrease nocturia, Increase fecal continence, Decrease bowel urgency, Improve frequency and ease of bowel movements, Improve ability to undergo a gynecological exam, and Improve ability to perform ADLs.              With   [] TE   [] TA   [] neuro  [] manual   [] other: Patient Education: [x] Review HEP    [] Progressed/Changed HEP based on:   [] positioning   [] body mechanics   [] transfers   [] heat/ice application    [] other:      Other Objective/Functional Measures:   []baseline resting tone:   []slow twitch mms   []fast twitch mms    Pain Level (0-10 scale) post treatment: 0/10    ASSESSMENT/Changes in Function: see Re-cert please    []  Decrease # of leaks   [] No change []  Improving [] Resolved     []  Decrease hypertonus [] No change []  Improving [] Resolved     [] Increase void interval [] No change []  Improving [] Resolved     []  Increase PF strength [] No change []  Improving [] Resolved     []  Increase PF endurance [] No change []  Improving [] Resolved     []  Increase endurance [] No change []  Improving [] Resolved     []  Decrease # of pads [] No change []  Improving [] Resolved     []  Decrease pain [] No change []  Improving [] Resolved     []  Increased coordination [] No change []  Improving [] Resolved     []  Increased Bowel Frequency [] No change []  Improving [] Resolved       Patient will continue to benefit from skilled PT services to modify and progress therapeutic interventions, address functional mobility deficits, address ROM deficits, address strength deficits, analyze and address soft tissue restrictions, analyze and cue movement patterns, analyze and modify body mechanics/ergonomics, assess and modify postural abnormalities, address imbalance/dizziness, and instruct in home and community integration to attain remaining goals. []  See Plan of Care  [x]  See progress note/recertification  []  See Discharge Summary         Progress towards goals / Updated goals:  Goals for this certification period to be accomplished in 4 weeks:  1. Patient will demonstrate independence in HEP for maintenance of pelvic floor program and improved quality of life. Status at Progress Note: good compliance 6-22-22  Current: good compliance 6-29-22; good understanding 7-21-22     2. Patient will have decreased leakage episodes to </=5 day per week for increased quality of life. Status at Progress Note: initial assessment: 4x/day; no significant change 6-22-22  Current: progressing gradually, fecal leakage occurs once a week: nearly no urinary leakage during the day, 3x/night, every night 7-21-22     3. Patient will report at least 75% improvement with complete empty of bowel & bladder to improve her QOL.   Status at Progress Note: progressing well, 50% overall, no significant change 6-22-22  Current: cont to fluctuate 7-13-22; 50% 7-21-22     4. Patient will have increased pelvic floor muscle motor performance by 1/2 to 1 grade for improved urinary continence and quality of life. Status at Progress Note: 1+/5 5-18-22; no change 6-22-22  Current: progressing slowly 7-21-22     5. Patient will have FOTO Urinary Problem & Bowel Constipation score change of 13 points or more indicating improvement in function for icreased quality of life.   Eval status: initial assessment: improved 3 points with Urinary Problem, no change with Bowel Constipation 6-22-22  Current: regressed min compared to last assessment 7-21-22                              PLAN  [x]  Upgrade activities as tolerated     [x]  Continue plan of care  []  Update interventions per flow sheet       []  Discharge due to:_  []  Other:_        Brian White 7/21/2022  8:06 AM    Future Appointments   Date Time Provider Gaby Delarosa   7/21/2022 11:15 AM Garrett Pino [id number] [facility]   7/25/2022  1:00 PM [facility] MRI RM 1 MMCRMRI [facility]   7/26/2022  1:30 PM Bharath Valdes PA-C [facility] BS AMB   7/27/2022 11:15 AM Garrett Pino [id number] [facility]   7/29/2022 11:15 AM Palmer Mead [id number] [facility]   8/2/2022 12:00 PM Garrett Rosarioigan [id number] [facility]   8/4/2022 11:15 AM Garrett Pino [id number] [facility]   12/2/2022 11:20 AM JULIA Isaac [facility] DEEJAY SCHED   12/9/2022  9:45 AM Anthony Cade MD [facility] BS AMB

## 2022-07-21 NOTE — PROGRESS NOTES
[facility] [employer] [facility]  [address] [place] [address]  [phone] fax    Continued Plan of Care/ Re-certification for Physical Therapy Services    Patient name: Jesse Amor Start of Care: 3/2/2022   Referral source: JULIA Wang : 1945               Medical Diagnosis: PFD (pelvic floor dysfunction) [M62.89]  Payor: [place] MEDICARE / Plan: [place] MEDICARE PART A & B / Product Type: Medicare /  Onset Date: worsened during the last 3 months2               Treatment Diagnosis: PFD, Urinary incont. , bowel urgency   Prior Hospitalization: see medical history Provider#: [id number]   Medications: Verified on Patient summary List    Comorbidities: osteoporosis, arthritis, stroke, neck fusion surgery   Prior Level of Function: less urgency with bowel, less leakage, mod ind with ADLs, using rollator. Visits from Start of Care: 28    Missed Visits: 0    The Plan of Care and following information is based on the patient's current status:  1. Patient will demonstrate independence in HEP for maintenance of pelvic floor program and improved quality of life. Status at Progress Note: good compliance 22  Current: good compliance 22; good understanding 22     2. Patient will have decreased leakage episodes to </=5 day per week for increased quality of life. Status at Progress Note: initial assessment: 4x/day; no significant change 22  Current: progressing gradually, fecal leakage occurs once a week: nearly no urinary leakage during the day, 3x/night, every night 22     3. Patient will report at least 75% improvement with complete empty of bowel & bladder to improve her QOL. Status at Progress Note: progressing well, 50% overall, no significant change 22  Current: cont to fluctuate 22; 50% 22     4.  Patient will have increased pelvic floor muscle motor performance by 1/2 to 1 grade for improved urinary continence and quality of life.  Status at Progress Note: 1+/5 5-18-22; no change 6-22-22  Current: progressing slowly 7-21-22     5. Patient will have FOTO Urinary Problem & Bowel Constipation score change of 13 points or more indicating improvement in function for icreased quality of life. Eval status: initial assessment: improved 3 points with Urinary Problem, no change with Bowel Constipation 6-22-22  Current: regressed min compared to last assessment 7-21-22                              Key functional changes: improving urinary & fecal leakage, improving ease with emptying bowel & bladder      Problems/ barriers to goal attainment: poor strength of PFM, comorbidities     Problem List: Pelvic pain/dysfunction, Decreased pelvic floor mm awareness, Decreased pelvic floor mm strength, Use of accessory muscles, Improper voiding habits, Hypertonus of pelvic floor, and Urinary urgency    Treatment Plan: Therapeutic exercise, Urge suppression techniques, Neuromuscular re-education, Manual therapy, Physical agent/modality, and Patient education     Patient Goal (s) has been updated and includes: improve leakage, get stronger     Goals for this certification period to be accomplished in 4 weeks:  1. Patient will demonstrate independence in HEP for maintenance of pelvic floor program and improved quality of life. Status at Progress Note: good understanding 7-21-22     2. Patient will have decreased leakage episodes to </=5 day per week for increased quality of life. Status at Progress Note: progressing gradually, fecal leakage occurs once a week: nearly no urinary leakage during the day, 3x/night, every night 7-21-22     3. Patient will report at least 75% improvement with complete empty of bowel & bladder to improve her QOL. Status at Progress Note: cont to fluctuate 7-13-22; 50% 7-21-22     4. Patient will have increased pelvic floor muscle motor performance by 1/2 to 1 grade for improved urinary continence and quality of life.   Status at Progress Note: 1+/5 5-18-22; progressing slowly 7-21-22     5. Patient will have FOTO Urinary Problem & Bowel Constipation score change of 13 points or more indicating improvement in function for icreased quality of life. Eval status: initial assessment: regressed min compared to last assessment 7-21-22                              Frequency / Duration: Patient to be seen 1-2 times per week for 4 weeks:    Assessment / Recommendations:Pt's making gradual progress with PF PT. She reports significant improvement of leakage amount, and min  improvement with frequency. Urinary leakage occurs mostly at night, nearly no leakage at daytime. Fecal leakage occurs 1x/week. Rectal pressure/urgency improves significantly post manual but poor long term relief. PFM's tone improves gradually. PFM's strength cont to be limited. Pt's done with NMES protocol. Will cont with PF PT for 3-4 more visits to promote independence with self management and to wait until pt's able to follow up with MD for further instruction (pt's appointment with MD has been moved)     Certification Period: 7- to 8-    Palmer Holman 7/21/2022 8:07 AM    ________________________________________________________________________  I certify that the above Therapy Services are being furnished while the patient is under my care. I agree with the treatment plan and certify that this therapy is necessary. [] I have read the above and request that my patient continue as recommended.   [] I have read the above report and request that my patient continue therapy with the following changes/special instructions: _____________________________________________  [] I have read the above report and request that my patient be discharged from therapy      Physician's Signature:____________Date:_________TIME:________     Fabi Butler PA  ** Signature, Date and Time must be completed for valid certification **    Please sign and return to [facility]   [address] [facility]  [phone] fax

## 2022-07-25 ENCOUNTER — HOSPITAL ENCOUNTER (OUTPATIENT)
Dept: VASCULAR SURGERY | Age: 77
End: 2022-07-25
Attending: PHYSICIAN ASSISTANT
Payer: MEDICARE

## 2022-07-25 ENCOUNTER — HOSPITAL ENCOUNTER (OUTPATIENT)
Dept: MRI IMAGING | Age: 77
Discharge: HOME | End: 2022-07-25
Attending: NURSE PRACTITIONER
Payer: MEDICARE

## 2022-07-25 DIAGNOSIS — R15.0 INCOMPLETE DEFECATION: ICD-10-CM

## 2022-07-25 DIAGNOSIS — K86.89 PANCREATIC DUCT DILATED: ICD-10-CM

## 2022-07-25 LAB — CREAT UR-MCNC: 0.7 MG/DL (ref 0.6–1.3)

## 2022-07-25 PROCEDURE — A9577 INJ MULTIHANCE: HCPCS | Performed by: NURSE PRACTITIONER

## 2022-07-25 PROCEDURE — 74183 MRI ABD W/O CNTR FLWD CNTR: CPT

## 2022-07-25 PROCEDURE — 82565 ASSAY OF CREATININE: CPT

## 2022-07-25 PROCEDURE — 74011250636 HC RX REV CODE- 250/636: Performed by: NURSE PRACTITIONER

## 2022-07-25 RX ADMIN — GADOBENATE DIMEGLUMINE 8 ML: 529 INJECTION, SOLUTION INTRAVENOUS at 14:26

## 2022-07-26 ENCOUNTER — OFFICE VISIT (OUTPATIENT)
Dept: ORTHOPEDIC SURGERY | Age: 77
End: 2022-07-26
Payer: MEDICARE

## 2022-07-26 VITALS
TEMPERATURE: 97.5 F | OXYGEN SATURATION: 97 % | WEIGHT: 96 LBS | BODY MASS INDEX: 18.85 KG/M2 | HEART RATE: 75 BPM | HEIGHT: 60 IN

## 2022-07-26 DIAGNOSIS — M79.18 MYOFASCIAL PAIN SYNDROME: ICD-10-CM

## 2022-07-26 DIAGNOSIS — R52 PAIN: ICD-10-CM

## 2022-07-26 DIAGNOSIS — M25.50 ARTHRALGIA OF MULTIPLE JOINTS: ICD-10-CM

## 2022-07-26 DIAGNOSIS — M48.02 CERVICAL SPINAL STENOSIS: Primary | ICD-10-CM

## 2022-07-26 DIAGNOSIS — G95.89 CERVICAL CORD MYELOMALACIA (HCC): ICD-10-CM

## 2022-07-26 PROCEDURE — 3017F COLORECTAL CA SCREEN DOC REV: CPT | Performed by: PHYSICIAN ASSISTANT

## 2022-07-26 PROCEDURE — G8536 NO DOC ELDER MAL SCRN: HCPCS | Performed by: PHYSICIAN ASSISTANT

## 2022-07-26 PROCEDURE — 1101F PT FALLS ASSESS-DOCD LE1/YR: CPT | Performed by: PHYSICIAN ASSISTANT

## 2022-07-26 PROCEDURE — 1090F PRES/ABSN URINE INCON ASSESS: CPT | Performed by: PHYSICIAN ASSISTANT

## 2022-07-26 PROCEDURE — 1123F ACP DISCUSS/DSCN MKR DOCD: CPT | Performed by: PHYSICIAN ASSISTANT

## 2022-07-26 PROCEDURE — G8432 DEP SCR NOT DOC, RNG: HCPCS | Performed by: PHYSICIAN ASSISTANT

## 2022-07-26 PROCEDURE — G8420 CALC BMI NORM PARAMETERS: HCPCS | Performed by: PHYSICIAN ASSISTANT

## 2022-07-26 PROCEDURE — G8427 DOCREV CUR MEDS BY ELIG CLIN: HCPCS | Performed by: PHYSICIAN ASSISTANT

## 2022-07-26 PROCEDURE — 99213 OFFICE O/P EST LOW 20 MIN: CPT | Performed by: PHYSICIAN ASSISTANT

## 2022-07-26 NOTE — PROGRESS NOTES
Patient: Bessy Marx                MRN: [id number]       SSN: [id number]  YOB: 1945        AGE: 78 y.o. SEX: female          PCP: Diana Aguero  07/26/22    Chief Complaint   Patient presents with    Neck Pain       HISTORY:  Bessy Marx is a 78 y.o. female returns to the office with complaint of persistent cervical discomfort. She is status post cervico-occipital fusion. She has been seen by our practice and [facility] under care of Dr. Amanda Lopez. No surgical intervention was recommended by Dr. Amanda Lopez. Patient has persisted with bilateral cervical discomfort and has a history of polyneuropathy. She reports no appreciable radicular pain in the upper or lower extremities other than that its associated with a polyneuropathy. She has pain when she turns her head but is severely limited secondary to the effusion cervico-occipital.  We have tried gabapentin for symptom management with minimum success. Patient is a bit frustrated with her course of care to this point and questions whether she needs to follow back with her neurosurgeon Dr. Benoit Sampson. She had some difficulties with Dr. Tejinder Silva office for follow-up appointments and generally did not leave the practice with any ill feelings but was concerned that the office staff may have delayed her follow-ups indirectly.           Pain Assessment  7/26/2022   Location of Pain Neck   Pain Location Comment -   Location Modifiers -   Severity of Pain 7   Quality of Pain Sharp   Quality of Pain Comment -   Duration of Pain Persistent   Frequency of Pain Constant   Date Pain First Started -   Date Pain First Started Comment -   Aggravating Factors -   Aggravating Factors Comment -   Limiting Behavior Some   Relieving Factors NSAID   Relieving Factors Comment -   Result of Injury -   Work-Related Injury -   Type of Injury -           Lab Results   Component Value Date/Time    Hemoglobin A1c 5.8 (H) 03/18/2019 04:07 PM     Weight Metrics 7/26/2022 7/11/2022 6/2/2022 5/27/2022 3/16/2022 2/24/2022 1/14/2022   Weight 96 lb 96 lb 6.4 oz 96 lb 96 lb 97 lb 9.6 oz 98 lb 12.8 oz 100 lb   BMI 18.75 kg/m2 18.83 kg/m2 18.14 kg/m2 18.14 kg/m2 18.44 kg/m2 18.67 kg/m2 19.53 kg/m2            Problem List Items Addressed This Visit          Orthopedic Problems    Cervical spinal stenosis - Primary (Chronic)    Myofascial pain syndrome       Other    Cervical cord myelomalacia (HCC)    Pain     Other Visit Diagnoses       Arthralgia of multiple joints                PAST MEDICAL HISTORY:   Past Medical History:   Diagnosis Date    Abnormal Pap smear     Dr. Claudia Butler    Allergic rhinitis     Arthritis     Asthma 11/22/2019    Cerebrovascular small vessel disease 3/18/2019    CT 3/17/2019    Cervical spinal cord compression (HCC)     Chronic pain     Concentric left ventricular hypertrophy 3/18/2019    With left ventricular diastolic dysfunction by echocardiogram 3/18/2019    GERD (gastroesophageal reflux disease) 11/22/2019    Hypercholesterolemia     Neck pain 5/17/2010    Stroke ([address] 10/02/2017    TIA- legs weak memory issues       PAST SURGICAL HISTORY:   Past Surgical History:   Procedure Laterality Date    COLONOSCOPY N/A 6/4/2018    COLONOSCOPY / polypectomy performed by Zev Samuel MD at [facility] ENDOSCOPY    COLONOSCOPY N/A 8/19/2021    COLONOSCOPY performed by Adolfo Cruz MD at [facility] ENDOSCOPY    HX GYN      Total Hyst    HX LAP CHOLECYSTECTOMY      HX OTHER SURGICAL  2017    Throat widened    NEUROLOGICAL PROCEDURE UNLISTED  11/2019    Cervical fusion    OR PLASTIC SURGERY, NECK  2019       ALLERGIES:   Allergies   Allergen Reactions    Baclofen Shortness of Breath     Denies.  Pt tolerates  Other reaction(s): gi distress    Morphine Other (comments)     hallucinations  Other reaction(s): other/intolerance  hallucinations  Other reaction(s): delusions    Sulfa (Sulfonamide Antibiotics) Other (comments)     Drops blood pressure    Celecoxib Other (comments)     Tiredness   Other reaction(s): other/intolerance  Makes her tired        CURRENT MEDICATIONS:  A list of medications prior to the time of admission include:  Prior to Admission medications    Medication Sig Start Date End Date Taking? Authorizing Provider   diclofenac (VOLTAREN) 1 % gel Apply  to affected area four (4) times daily. Provider, Historical   traMADoL (ULTRAM) 50 mg tablet Take 50 mg by mouth as needed for Pain. Provider, Historical   raNITIdine (Zantac) 150 mg tablet Take 150 mg by mouth as needed for Indigestion. Provider, Historical   vit B Cmplx 3-FA-Vit C-Biotin (NEPHRO SANJEEV RX) 1- mg-mg-mcg tablet Take 1 Tablet by mouth daily. Provider, Historical   ondansetron hcl (ZOFRAN) 4 mg tablet take 1 tablet by mouth every 6 hours if needed for nausea 5 11/16/21   Provider, Historical   calcium polycarbophiL (Fiber Laxative, ca polycarbo,) 625 mg tablet Take 625 mg by mouth daily. Provider, Historical   lidocaine (LIDODERM) 5 % apply 1 patch TO THE AFFECTED AREA DAILY. LEAVE ON FOR 12 HOURS AND THEN OFF FOR 12 HOURS. 8/8/21   Provider, Historical   fluticasone propionate (FLOVENT HFA) 110 mcg/actuation inhaler Take 1 Puff by inhalation every twelve (12) hours as needed. Provider, Historical   atorvastatin (LIPITOR) 20 mg tablet take 1 tablet by mouth once daily  Patient not taking: Reported on 3/16/2022 9/23/20   Natalie Caldwell NP   busPIRone (BUSPAR) 7.5 mg tablet take 1 tablet by mouth twice a day  Patient taking differently: Take 7.5 mg by mouth two (2) times a day. 9/23/20   Natalie Caldwell NP   ibuprofen (MOTRIN) 400 mg tablet Take 1 Tab by mouth every six (6) hours as needed for Pain. Patient taking differently: Take 800 mg by mouth daily.  7/26/20   Jp Correa MD   omeprazole (PRILOSEC) 20 mg capsule take 1 capsule by mouth once daily before breakfast 5/7/20   Natalie Caldwell NP   gabapentin (NEURONTIN) 100 mg capsule Take 2 Caps by mouth three (3) times daily. Max Daily Amount: 600 mg. Patient taking differently: Take 400 mg by mouth daily. 4/16/20   Alina Tucker NP   acetaminophen (TYLENOL) 160 mg/5 mL elixir Take 1,000 mg by mouth daily. 12/12/19   Provider, Historical   midodrine (PROAMITINE) 5 mg tablet Take 5 mg by mouth as needed. 1 tab by mouth with breakfast and lunch 2/6/20   Provider, Historical   turmeric 400 mg cap Take 1 Cap by mouth daily. Provider, Historical   aspirin 81 mg chewable tablet Take 1 Tab by mouth daily. 10/10/17   Evin Sanchez PA       FAMILY HISTORY:   Family History   Problem Relation Age of Onset    Cancer Mother         breast    Heart Disease Father        SOCIAL HISTORY:   Social History     Socioeconomic History    Marital status:    Tobacco Use    Smoking status: Never    Smokeless tobacco: Never   Vaping Use    Vaping Use: Never used   Substance and Sexual Activity    Alcohol use: No    Drug use: No    Sexual activity: Never       ROS:No CP, No SOB, No fever/chills nor night sweats. No headaches, vision abnormalities to include double and or loss of vision. No dizziness. No hearing abnormalities. No Chest Pain nor Shortness of breath. Pt denies h/o spinal surgery, injections, or PT/chiropractor. Patient has attempted self treatment with less than adequate relief on oral and topical analgesic / anti inflammatory medications . Pt denies change in bowel or bladder habits. No saddle paresthesia / anesthesia. Pt denies fever, unplanned weight loss / weight gains, and no skin changes. Musculoskeletal pain per HPI. Pain is exacerbated positionally. PHYSICAL EXAM:    Visit Vitals  Pulse 75   Temp 97.5 °F (36.4 °C) (Temporal)   Ht 5' (1.524 m)   Wt 96 lb (43.5 kg)   LMP  (LMP Unknown)   SpO2 97%   BMI 18.75 kg/m²       Constitutional: Appears well-developed and well-nourished. No distress.  Sitting comfortably in the exam room, interacting with conversation with pleasant affect. Gait appears steady and patient exhibits no evidence of ataxia. Patient is able to ambulate with caution. No focal neurological deficit noted. No facial droop, slurred speech, or evidence of altered mentation noted on exam.   Skin: Skin over the head, neck, bilateral limbs, and trunk is warm and dry. No rash or erythema noted. Cranial Nerves II-XII grossly intact  HENT: NC/AT. Normal symmetry, bulk and tone of facial and neck musculature. Trachea midline. No discernible thyromegaly or masses. No involuntary movements. Lymphatic: No preauricular, submandibuar, anterior or posterior cervical lymphadenopathy. Psychiatric: The patient is awake, alert, and oriented to person, place and time. Behavior is normal. Thought content normal.   Cardiovascular: No clubbing, cyanosis. No edema bilateral lower extremities. Pulmonary: No tripoding nor accessory muscle recruitment. Breathing normally, no distress, no audible wheezing. Distal cap refill intact at 2/2 Reed UE / LE. Neuro intact Reed UE/LE to noxious stimuli            IMPRESSION:      ICD-10-CM ICD-9-CM    1. Cervical spinal stenosis  M48.02 723.0       2. Arthralgia of multiple joints  M25.50 719.49       3. Myofascial pain syndrome  M79.18 729.1       4. Cervical cord myelomalacia (HCC)  G95.89 336.8       5. Pain  R52 780.96       6. Retained orthopedic hardware  7. Cervicalgia  8. Decreased range of motion of the neck secondary to above  9. Polyneuropathy    PLAN: Today we discussed alternatives care to include but not limited to a referral back to Dr. Gerhard Dance concerning her cervical occipital fusion. Patient understood that we had reached the end of our scope of practice and had no further recommendations but see Dr. Rosalio Alvarado further recommendations. Today all the patient's questions were answered to her satisfaction. Care plan outlined and precautions discussed.   Results were reviewed with the patient. All medications were reviewed with the patient. All of pt's questions and concerns were addressed. Alarm symptoms and return precautions associated with chief complaint and evaluation were reviewed with the patient in detail. The patient demonstrated adequate understanding. The patient expresses willing compliance with the treatment plan. Special note: Medication management discussed in detail all patient's questions answered to their satisfaction. No Narcotic indicated today. Patient given pain medication for short term acute pain relief. Goal is to treat patient according to above plan and to ultimately have patient off all pain medications once appropriate. If chronic pain management is required beyond what is expected for current orthopedic problem, will refer patient to pain management.  was reviewed and will be reviewed with every medication refill request.         Patient provided a reminder for a \"due or due soon\" health maintenance. I have asked the patient to schedule an appointment with their primary care provider for follow-up on general health maintenance concerns. Today all the patient's questions were answered to their satisfaction. Copies of x-rays reviewed if obtained this visit, and provided to patient. Dictation disclaimer:  Please note that this dictation was completed with [employer], the [employer] voice recognition software. Quite often unanticipated grammatical, syntax, homophones, and other interpretive errors are inadvertently transcribed by the computer software. Please disregard these errors. Please excuse any errors that have escaped final proofreading. Elsa DUFFY, APC, MPAS, PA-C  [facility]

## 2022-07-27 ENCOUNTER — HOSPITAL ENCOUNTER (OUTPATIENT)
Dept: PHYSICAL THERAPY | Age: 77
Discharge: HOME | End: 2022-07-27
Payer: MEDICARE

## 2022-07-27 PROCEDURE — 97530 THERAPEUTIC ACTIVITIES: CPT

## 2022-07-27 PROCEDURE — 97110 THERAPEUTIC EXERCISES: CPT

## 2022-07-27 PROCEDURE — 97140 MANUAL THERAPY 1/> REGIONS: CPT

## 2022-07-27 NOTE — PROGRESS NOTES
PF DAILY TREATMENT NOTE 3-16    Patient Name: Nixon Bone  Date:2022  : 1945  [x]  Patient  Verified  [place] / Plan: [place] PART A & B / Product Type: Medicare /    In time: 11:22  Out time: 12:08  Total Treatment Time (min): 46  Visit #: 1 of 8    Medicare/BCBS Only   Total Timed Codes (min):  46 1:1 Treatment Time:  46       Treatment Area: [x] Pelvic Floor     [] Other:    SUBJECTIVE  Pain Level (0-10 scale): 4-8/10  Any medication changes, allergies to medications, adverse drug reactions, diagnosis change, or new procedure performed?: [x] No    [] Yes (see summary sheet for update)  Subjective functional status/changes:   [] No changes reported  Pt reports having diarrhea during the last several days. She reports that MRI (done on ) shows significant amount of fecal matter. Her current neck doctor told her to go back to her last surgeon as he won't be able to do anything else for her. She's been stressed out because of her current medical problems. OBJECTIVE    28 min Therapeutic Exercise:  [x] See flow sheet :   []  Pelvic floor strengthening                 []  Pelvic floor downtraining  []  Quality pelvic floor contractions       [x]  Relaxation techniques  []  Urge suppression exercises  []  Other:  Rationale: Increase pelvic floor muscle strength, Improve quality of pelvic floor contractions, Decrease resting tone of the pelvic floor, Increase tissue extensibility of the pelvic floor muscles, Increase core strength, Inhibit abnormal muscle activity, and Improve lumbosacral and coccygeal mobility in order to Increase urinary continence, Decrease bowel urgency, Improve frequency and ease of bowel movements, Improve ability to undergo a gynecological exam, and Improve ability to perform ADLs.       8 min Therapeutic Activity:  []  See flow sheet :    []  Increase Tissue extensibility        [x]  Assess fiber intake    [x]  Assess voiding habits  [x]  Assess bowel habits  []  Other:   Rationale: Increase pelvic floor muscle strength, Improve quality of pelvic floor contractions, Decrease resting tone of the pelvic floor, Increase tissue extensibility of the pelvic floor muscles, Increase core strength, Inhibit abnormal muscle activity, and Improve lumbosacral and coccygeal mobility in order to Increase urinary continence, Decrease bowel urgency, Improve frequency and ease of bowel movements, Improve ability to undergo a gynecological exam, and Improve ability to perform ADLs. 10 min Manual Therapy:  abdominal MFR, diaphragm release, abdominal massage    The manual therapy interventions were performed at a separate and distinct time from the therapeutic activities interventions. Rationale: Decrease resting tone of the pelvic floor, Increase tissue extensibility of the pelvic floor muscles, Inhibit abnormal muscle activity, and Improve lumbosacral and coccygeal mobility in order to Increase urinary continence, Decrease bowel urgency, Improve frequency and ease of bowel movements, Improve ability to undergo a gynecological exam, and Improve ability to perform ADLs. With   [] TE   [] TA   [] neuro  [] manual   [] other: Patient Education: [x] Review HEP    [] Progressed/Changed HEP based on:   [] positioning   [] body mechanics   [] transfers   [] heat/ice application    [] other:      Other Objective/Functional Measures:   []baseline resting tone:   []slow twitch mms   []fast twitch mms    Pain Level (0-10 scale) post treatment: 1/10    ASSESSMENT/Changes in Function: Pt reports elevated stress due to her pelvic floor & other medical problem. She's very pleased with relaxation & stretching tech. Will cont to review self management during the last 3 visit.      []  Decrease # of leaks   [] No change []  Improving [] Resolved     []  Decrease hypertonus [] No change []  Improving [] Resolved     []  Increase void interval [] No change []  Improving [] Resolved []  Increase PF strength [] No change []  Improving [] Resolved     []  Increase PF endurance [] No change []  Improving [] Resolved     []  Increase endurance [] No change []  Improving [] Resolved     []  Decrease # of pads [] No change []  Improving [] Resolved     []  Decrease pain [] No change []  Improving [] Resolved     []  Increased coordination [] No change []  Improving [] Resolved     []  Increased Bowel Frequency [] No change []  Improving [] Resolved       Patient will continue to benefit from skilled PT services to modify and progress therapeutic interventions, address functional mobility deficits, address ROM deficits, address strength deficits, analyze and address soft tissue restrictions, analyze and cue movement patterns, analyze and modify body mechanics/ergonomics, assess and modify postural abnormalities, address imbalance/dizziness, and instruct in home and community integration to attain remaining goals. []  See Plan of Care  [x]  See progress note/recertification  []  See Discharge Summary         Progress towards goals / Updated goals:  Goals for this certification period to be accomplished in 4 weeks:  1. Patient will demonstrate independence in [facility] for maintenance of pelvic floor program and improved quality of life. Status at Progress Note: good understanding 7-21-22     2. Patient will have decreased leakage episodes to </=5 day per week for increased quality of life. Status at Progress Note: progressing gradually, fecal leakage occurs once a week: nearly no urinary leakage during the day, 3x/night, every night 7-21-22     3. Patient will report at least 75% improvement with complete empty of bowel & bladder to improve her QOL. Status at Progress Note: cont to fluctuate 7-13-22; 50% 7-21-22     4. Patient will have increased pelvic floor muscle motor performance by 1/2 to 1 grade for improved urinary continence and quality of life.   Status at Progress Note: 1+/5 5-18-22; progressing slowly 7-21-22     5. Patient will have FOTO Urinary Problem & Bowel Constipation score change of 13 points or more indicating improvement in function for icreased quality of life.   Eval status: initial assessment: regressed min compared to last assessment 7-21-22                              PLAN  [x]  Upgrade activities as tolerated     [x]  Continue plan of care  []  Update interventions per flow sheet       []  Discharge due to:_  []  Other:_      Agnes Arnold 7/27/2022  8:05 AM    Future Appointments   Date Time Provider Gaby Delarosa   7/27/2022 11:15 AM January Ortega [id number] [facility]   7/29/2022 11:15 AM Palmer Jean-Baptiste [id number] [facility]   8/2/2022 12:00 PM January Ortega [id number] [facility]   8/4/2022 11:15 AM January Ortega [id number] [facility]   12/2/2022 11:20 AM JULIA [facility]   12/9/2022  9:45 AM Vincenzo Guillermo MD [facility] AMB

## 2022-07-29 ENCOUNTER — HOSPITAL ENCOUNTER (OUTPATIENT)
Dept: PHYSICAL THERAPY | Age: 77
Discharge: HOME | End: 2022-07-29
Payer: MEDICARE

## 2022-07-29 PROCEDURE — 97110 THERAPEUTIC EXERCISES: CPT

## 2022-07-29 PROCEDURE — 97140 MANUAL THERAPY 1/> REGIONS: CPT

## 2022-07-29 NOTE — PROGRESS NOTES
PF DAILY TREATMENT NOTE 3-16    Patient Name: Sharmila Steele  Date:2022  : 1945  [x]  Patient  Verified  Payor: Maddy Mott / Plan: [place] & B / Product Type: Medicare /    In time: 11:18  Out time: 12:08  Total Treatment Time (min): 50  Visit #: 2 of 8    Medicare/BCBS Only   Total Timed Codes (min):  50 1:1 Treatment Time:  50     Treatment Area: [x] Pelvic Floor     [] Other:    SUBJECTIVE  Pain Level (0-10 scale): 3/10  Any medication changes, allergies to medications, adverse drug reactions, diagnosis change, or new procedure performed?: [x] No    [] Yes (see summary sheet for update)  Subjective functional status/changes:   [] No changes reported  Pt reports doing ok today. OBJECTIVE    35 min Therapeutic Exercise:  [] See flow sheet :   []  Pelvic floor strengthening                 []  Pelvic floor downtraining  []  Quality pelvic floor contractions       []  Relaxation techniques  []  Urge suppression exercises  []  Other:  Rationale: Increase pelvic floor muscle strength, Improve quality of pelvic floor contractions, Decrease resting tone of the pelvic floor, Increase tissue extensibility of the pelvic floor muscles, Increase core strength, Inhibit abnormal muscle activity, and Improve lumbosacral and coccygeal mobility in order to Increase urinary continence, Decrease bowel urgency, Improve frequency and ease of bowel movements, Improve ability to undergo a gynecological exam, and Improve ability to perform ADLs. 15 min Manual Therapy:  abdominal MFR, diaphragm release   The manual therapy interventions were performed at a separate and distinct time from the therapeutic activities interventions.     Rationale: Decrease resting tone of the pelvic floor, Increase tissue extensibility of the pelvic floor muscles, Inhibit abnormal muscle activity, and Improve lumbosacral and coccygeal mobility in order to Increase urinary continence, Decrease bowel urgency, Improve frequency and ease of bowel movements, Improve ability to undergo a gynecological exam, and Improve ability to perform ADLs. With   [] TE   [] TA   [] neuro  [] manual   [] other: Patient Education: [x] Review HEP    [] Progressed/Changed HEP based on:   [] positioning   [] body mechanics   [] transfers   [] heat/ice application    [] other:      Other Objective/Functional Measures:   []baseline resting tone:   []slow twitch mms   []fast twitch mms      Pain Level (0-10 scale) post treatment: 1/10    ASSESSMENT/Changes in Function: Pt was mod fatigued with progressive strengthening therex for PFM (more reps in standing position). She fluctuates with BM frequency & incomplete voiding sensation. Will cont to progress therex & manual as tolerated. []  Decrease # of leaks   [] No change []  Improving [] Resolved     []  Decrease hypertonus [] No change []  Improving [] Resolved     []  Increase void interval [] No change []  Improving [] Resolved     []  Increase PF strength [] No change []  Improving [] Resolved     []  Increase PF endurance [] No change []  Improving [] Resolved     []  Increase endurance [] No change []  Improving [] Resolved     []  Decrease # of pads [] No change []  Improving [] Resolved     []  Decrease pain [] No change []  Improving [] Resolved     []  Increased coordination [] No change []  Improving [] Resolved     []  Increased Bowel Frequency [] No change []  Improving [] Resolved       Patient will continue to benefit from skilled PT services to modify and progress therapeutic interventions, address functional mobility deficits, address ROM deficits, address strength deficits, analyze and address soft tissue restrictions, analyze and cue movement patterns, analyze and modify body mechanics/ergonomics, assess and modify postural abnormalities, address imbalance/dizziness, and instruct in home and community integration to attain remaining goals.      []  See Plan of Care  [x]  See progress note/recertification  []  See Discharge Summary         Progress towards goals / Updated goals:  Goals for this certification period to be accomplished in 4 weeks:  1. Patient will demonstrate independence in [facility] for maintenance of pelvic floor program and improved quality of life. Status at Progress Note: good understanding 7-21-22     2. Patient will have decreased leakage episodes to </=5 day per week for increased quality of life. Status at Progress Note: progressing gradually, fecal leakage occurs once a week: nearly no urinary leakage during the day, 3x/night, every night 7-21-22     3. Patient will report at least 75% improvement with complete empty of bowel & bladder to improve her QOL. Status at Progress Note: cont to fluctuate 7-13-22; 50% 7-21-22     4. Patient will have increased pelvic floor muscle motor performance by 1/2 to 1 grade for improved urinary continence and quality of life. Status at Progress Note: 1+/5 5-18-22; progressing slowly 7-21-22     5. Patient will have FOTO Urinary Problem & Bowel Constipation score change of 13 points or more indicating improvement in function for icreased quality of life.   Eval status: initial assessment: regressed min compared to last assessment 7-21-22      PLAN  [x]  Upgrade activities as tolerated     [x]  Continue plan of care  []  Update interventions per flow sheet       []  Discharge due to:_  []  Other:_      Felisha Valero 7/29/2022  8:06 AM    Future Appointments   Date Time Provider Gaby Delarosa   7/29/2022 11:15 AM Neva Tang GIXOOUJ [facility]   8/2/2022 12:00 PM Neva Tang [id number] [facility]   8/4/2022 11:15 AM Neva Tang [id number] [facility]   12/2/2022 11:20 AM JULIA Patterson [facility]   12/9/2022  9:45 AM Toshia Borrego MD [facility] BS AMB

## 2022-08-02 ENCOUNTER — HOSPITAL ENCOUNTER (OUTPATIENT)
Dept: PHYSICAL THERAPY | Age: 77
Discharge: HOME | End: 2022-08-02
Payer: MEDICARE

## 2022-08-02 PROCEDURE — 97110 THERAPEUTIC EXERCISES: CPT

## 2022-08-02 PROCEDURE — 97140 MANUAL THERAPY 1/> REGIONS: CPT

## 2022-08-02 NOTE — PROGRESS NOTES
PF DAILY TREATMENT NOTE 3-    Patient Name: Cynthia Rivera  RMZQ:  : 1945  [x]  Patient  Verified  Payor: [place] MEDICARE / Plan: [place] MEDICARE PART A & B / Product Type: Medicare /    In time:12:00  Out time: 12:42  Total Treatment Time (min): 42  Visit #: 3 of 8    Medicare/BCBS Only   Total Timed Codes (min):  42 1:1 Treatment Time:  42     Treatment Area: [x] Pelvic Floor     [] Other:    SUBJECTIVE  Pain Level (0-10 scale): 3/10  Any medication changes, allergies to medications, adverse drug reactions, diagnosis change, or new procedure performed?: [x] No    [] Yes (see summary sheet for update)  Subjective functional status/changes:   [] No changes reported  Pt reports seeing MD on the . She's supposed to take Metamucil before seeing him. OBJECTIVE    15 min Therapeutic Exercise:  [x] See flow sheet :   [x]  Pelvic floor strengthening                 []  Pelvic floor downtraining  []  Quality pelvic floor contractions       []  Relaxation techniques  []  Urge suppression exercises  []  Other:  Rationale: Increase pelvic floor muscle strength, Improve quality of pelvic floor contractions, Decrease resting tone of the pelvic floor, Increase tissue extensibility of the pelvic floor muscles, Increase core strength, Inhibit abnormal muscle activity, and Improve lumbosacral and coccygeal mobility in order to Increase urinary continence, Improve frequency and ease of bowel movements, Improve ability to undergo a gynecological exam, and Improve ability to perform ADLs. 27 min Manual Therapy:  intra-rectal MFR, Severino's massage, abdominal MFR & colon stimulation   The manual therapy interventions were performed at a separate and distinct time from the therapeutic activities interventions.     Rationale: Decrease resting tone of the pelvic floor, Increase tissue extensibility of the pelvic floor muscles, Inhibit abnormal muscle activity, and Improve lumbosacral and coccygeal mobility in order to Increase urinary continence, Improve frequency and ease of bowel movements, Improve ability to undergo a gynecological exam, and Improve ability to perform ADLs. With   [] TE   [] TA   [] neuro  [] manual   [] other: Patient Education: [x] Review HEP    [] Progressed/Changed HEP based on:   [] positioning   [] body mechanics   [] transfers   [] heat/ice application    [] other:      Other Objective/Functional Measures:   []baseline resting tone:   []slow twitch mms   []fast twitch mms    Pain Level (0-10 scale) post treatment: 1/10    ASSESSMENT/Changes in Function: pt cont to demonstrate good pain relief with manual but no significant long term carry over. She forgot recipe for bowel movement. Will review self management for last visit. []  Decrease # of leaks   [] No change []  Improving [] Resolved     []  Decrease hypertonus [] No change []  Improving [] Resolved     []  Increase void interval [] No change []  Improving [] Resolved     []  Increase PF strength [] No change []  Improving [] Resolved     []  Increase PF endurance [] No change []  Improving [] Resolved     []  Increase endurance [] No change []  Improving [] Resolved     []  Decrease # of pads [] No change []  Improving [] Resolved     []  Decrease pain [] No change []  Improving [] Resolved     []  Increased coordination [] No change []  Improving [] Resolved     []  Increased Bowel Frequency [] No change []  Improving [] Resolved       Patient will continue to benefit from skilled PT services to modify and progress therapeutic interventions, address functional mobility deficits, address ROM deficits, address strength deficits, analyze and address soft tissue restrictions, analyze and cue movement patterns, analyze and modify body mechanics/ergonomics, assess and modify postural abnormalities, address imbalance/dizziness, and instruct in home and community integration to attain remaining goals.      []  See Plan of Care  [x]  See progress note/recertification  []  See Discharge Summary         Progress towards goals / Updated goals:  Goals for this certification period to be accomplished in 4 weeks:  1. Patient will demonstrate independence in [facility] for maintenance of pelvic floor program and improved quality of life. Status at Progress Note: good understanding 7-21-22     2. Patient will have decreased leakage episodes to </=5 day per week for increased quality of life. Status at Progress Note: progressing gradually, fecal leakage occurs once a week: nearly no urinary leakage during the day, 3x/night, every night 7-21-22  Current: no significant change 8-2-22     3. Patient will report at least 75% improvement with complete empty of bowel & bladder to improve her QOL. Status at Progress Note: cont to fluctuate 7-13-22; 50% 7-21-22  Current: cont to fluctuate 8-2-22     4. Patient will have increased pelvic floor muscle motor performance by 1/2 to 1 grade for improved urinary continence and quality of life. Status at Progress Note: 1+/5 5-18-22; progressing slowly 7-21-22     5. Patient will have FOTO Urinary Problem & Bowel Constipation score change of 13 points or more indicating improvement in function for icreased quality of life.   Eval status: initial assessment: regressed min compared to last assessment 7-21-22      PLAN  [x]  Upgrade activities as tolerated     [x]  Continue plan of care  []  Update interventions per flow sheet       []  Discharge due to:_  []  Other:_      Forcinthya Martel 8/2/2022  9:37 AM    Future Appointments   Date Time Provider Gaby Delarosa   8/2/2022 12:00 PM Rafa Sheehan [id number] [facility]   8/4/2022 11:15 AM Asa Priestly [id number] [facility]   12/2/2022 11:20 AM JULIA Lezama [facility] DEEJAY SCHED   12/9/2022  9:45 AM Mahalia Kanner, MD [facility] BS AMB

## 2022-08-04 ENCOUNTER — HOSPITAL ENCOUNTER (OUTPATIENT)
Dept: PHYSICAL THERAPY | Age: 77
Discharge: HOME | End: 2022-08-04
Payer: MEDICARE

## 2022-08-04 PROCEDURE — 97140 MANUAL THERAPY 1/> REGIONS: CPT

## 2022-08-04 PROCEDURE — 97530 THERAPEUTIC ACTIVITIES: CPT

## 2022-08-04 PROCEDURE — 97110 THERAPEUTIC EXERCISES: CPT

## 2022-08-04 NOTE — PROGRESS NOTES
[facility] - Patricia Rodríguez  [address]  [phone] fax    Physical Therapy Discharge Summary    Patient name: Sue Wick Start of Care: 3/2/2022   Referral source: JULIA Geronimo : 1945               Medical Diagnosis: PFD (pelvic floor dysfunction) [M62.89]  Payor: [place] MEDICARE / Plan: VA MEDICARE PART A & B / Product Type: Medicare /  Onset Date: worsened during the last 3 months2               Treatment Diagnosis: PFD, Urinary incont. , bowel urgency   Prior Hospitalization: see medical history Provider#: [id number]   Medications: Verified on Patient summary List    Comorbidities: osteoporosis, arthritis, stroke, neck fusion surgery   Prior Level of Function: less urgency with bowel, less leakage, mod ind with ADLs, using rollator. Visits from Start of Care: 32    Missed Visits:     Reporting Period: 2022 to 2022    Summary of Care:  Goals for this certification period to be accomplished in 4 weeks:  1. Patient will demonstrate independence in HEP for maintenance of pelvic floor program and improved quality of life. Status at Progress Note: good understanding 22  Current: good compliance 22  Status at discharge: met     2. Patient will have decreased leakage episodes to </=5 day per week for increased quality of life. Status at Progress Note: progressing gradually, fecal leakage occurs once a week: nearly no urinary leakage during the day, 3x/night, every night 22  Current: no significant change 22; cont to fluctuate 22  Status at discharge: not met     3. Patient will report at least 75% improvement with complete empty of bowel & bladder to improve her QOL. Status at Progress Note: cont to fluctuate 22; 50% 22  Current: cont to fluctuate 8-; no significant change 22  Status at discharge: not met     4.  Patient will have increased pelvic floor muscle motor performance by 1/2 to 1 grade for improved urinary continence and quality of life. Status at Progress Note: 1+/5 5-18-22; progressing slowly   Current; no significant change 8-4-22  Status at discharge: not met     5. Patient will have FOTO Urinary Problem & Bowel Constipation score change of 13 points or more indicating improvement in function for icreased quality of life. Eval status: initial assessment: regressed min compared to last assessment 7-21-22  Current: no significant change compared to initial assessment 8-4-22  Status at discharge: not met      ASSESSMENT/RECOMMENDATIONS: Pt cont to demonstrate limited progression during the last several weeks. Pain/pressure improves significant after manual but poor long term carry over. Pt cont to struggle with constipation management, partially due to comorbidity. She has diarrhea with Miralax and constipation with Metamucil. Pt cont to forget to try bowel stimulation recipe & report difficulty with abdominal massage due to neck & arms problem. Pt demonstrates good understanding with HEP and self management after review. Will refer back to MD for further instructions.        [x]Discontinue therapy: []Patient has reached or is progressing toward set goals      []Patient is non-compliant or has abdicated      [x]Due to lack of appreciable progress towards set goals    Palmer Holman 8/4/2022 8:21 AM

## 2022-08-04 NOTE — PROGRESS NOTES
PF DAILY TREATMENT NOTE 3-16    Patient Name: Rogerio Cm  Date:2022  : 1945  [x]  Patient  Verified  Payor: VA MEDICARE / Plan: VA MEDICARE PART A & B / Product Type: Medicare /    In time: 11:18 Out time: 12:03  Total Treatment Time (min): 45  Visit #: 4 of 8    Medicare/BCBS Only   Total Timed Codes (min):  45 1:1 Treatment Time:  45     Treatment Area: [x] Pelvic Floor     [] Other:    SUBJECTIVE  Pain Level (0-10 scale): 4/10  Any medication changes, allergies to medications, adverse drug reactions, diagnosis change, or new procedure performed?: [x] No    [] Yes (see summary sheet for update)  Subjective functional status/changes:   [] No changes reported  Pt reports doing ok today. She feels good with all the exercises and will try her best to cont them. She hopes that her appointment won't be cancelled so she can discuss other options with her referring MD. She hasn't tried the recipe for BM. OBJECTIVE    20 min Therapeutic Exercise:  [] See flow sheet :   [x]  Pelvic floor strengthening                 []  Pelvic floor downtraining  []  Quality pelvic floor contractions       [x]  Relaxation techniques  []  Urge suppression exercises  []  Other:  Rationale: Increase pelvic floor muscle strength, Improve quality of pelvic floor contractions, Decrease resting tone of the pelvic floor, Increase tissue extensibility of the pelvic floor muscles, Increase core strength, Inhibit abnormal muscle activity, and Improve lumbosacral and coccygeal mobility in order to Increase urinary continence, Decrease urinary urgency, Increase ability to delay urination, Decrease frequency of urination, Decrease nocturia, Increase fecal continence, Decrease bowel urgency, Improve frequency and ease of bowel movements, Improve ability to undergo a gynecological exam, and Improve ability to perform ADLs.        15 min Therapeutic Activity:  []  See flow sheet :    []  Increase Tissue extensibility        [x]  Assess fiber intake    [x]  Assess voiding habits  [x]  Assess bowel habits  [x]  Other: HEP & self management   Rationale: Increase pelvic floor muscle strength, Improve quality of pelvic floor contractions, Decrease resting tone of the pelvic floor, Increase tissue extensibility of the pelvic floor muscles, Increase core strength, Inhibit abnormal muscle activity, and Improve lumbosacral and coccygeal mobility in order to Increase urinary continence, Decrease urinary urgency, Increase ability to delay urination, Decrease frequency of urination, Decrease nocturia, Increase fecal continence, Decrease bowel urgency, Improve frequency and ease of bowel movements, Improve ability to undergo a gynecological exam, and Improve ability to perform ADLs. 10 min Manual Therapy:  abdominal MFR, diaphragm release   The manual therapy interventions were performed at a separate and distinct time from the therapeutic activities interventions. Rationale: Decrease resting tone of the pelvic floor, Increase tissue extensibility of the pelvic floor muscles, Inhibit abnormal muscle activity, and Improve lumbosacral and coccygeal mobility in order to Decrease bowel urgency, Improve frequency and ease of bowel movements, Improve ability to undergo a gynecological exam, and Improve ability to perform ADLs.       With   [] TE   [] TA   [] neuro  [] manual   [] other: Patient Education: [x] Review HEP    [] Progressed/Changed HEP based on:   [] positioning   [] body mechanics   [] transfers   [] heat/ice application    [] other:      Other Objective/Functional Measures:   []baseline resting tone:   []slow twitch mms   []fast twitch mms    Pain Level (0-10 scale) post treatment: 1/10    ASSESSMENT/Changes in Function: see D/c summary please    []  Decrease # of leaks   [] No change []  Improving [] Resolved     []  Decrease hypertonus [] No change []  Improving [] Resolved     []  Increase void interval [] No change []  Improving [] Resolved     []  Increase PF strength [] No change []  Improving [] Resolved     []  Increase PF endurance [] No change []  Improving [] Resolved     []  Increase endurance [] No change []  Improving [] Resolved     []  Decrease # of pads [] No change []  Improving [] Resolved     []  Decrease pain [] No change []  Improving [] Resolved     []  Increased coordination [] No change []  Improving [] Resolved     []  Increased Bowel Frequency [] No change []  Improving [] Resolved       []  See Plan of Care  []  See progress note/recertification  [x]  See Discharge Summary         Progress towards goals / Updated goals:  Goals for this certification period to be accomplished in 4 weeks:  1. Patient will demonstrate independence in HEP for maintenance of pelvic floor program and improved quality of life. Status at Progress Note: good understanding 7-21-22  Current: good compliance 8-4-22     2. Patient will have decreased leakage episodes to </=5 day per week for increased quality of life. Status at Progress Note: progressing gradually, fecal leakage occurs once a week: nearly no urinary leakage during the day, 3x/night, every night 7-21-22  Current: no significant change 8-2-22; cont to fluctuate 8-4-22     3. Patient will report at least 75% improvement with complete empty of bowel & bladder to improve her QOL. Status at Progress Note: cont to fluctuate 7-13-22; 50% 7-21-22  Current: cont to fluctuate 8-2-22; no significant change 8-4-22     4. Patient will have increased pelvic floor muscle motor performance by 1/2 to 1 grade for improved urinary continence and quality of life. Status at Progress Note: 1+/5 5-18-22; progressing slowly   Current; no significant change 8-4-22     5. Patient will have FOTO Urinary Problem & Bowel Constipation score change of 13 points or more indicating improvement in function for icreased quality of life.   Eval status: initial assessment: regressed min compared to last assessment 7-21-22  Current: no significant change compared to initial assessment 8-4-22       PLAN  []  Upgrade activities as tolerated     []  Continue plan of care  []  Update interventions per flow sheet       [x]  Discharge due to:_ lack of significant improvement   []  Other:_      Lam Lan 8/4/2022  8:19 AM    Future Appointments   Date Time Provider Gaby Delarosa   8/4/2022 11:15 AM Óscar Estimable [id number] [facility]   12/2/2022 11:20 AM JULIA Colmenares [facility]   12/9/2022  9:45 AM Kristal Roper MD [facility] BS AMB

## 2022-08-17 ENCOUNTER — TRANSCRIBE ORDER (OUTPATIENT)
Dept: SCHEDULING | Age: 77
End: 2022-08-17

## 2022-08-17 DIAGNOSIS — K59.00 CONSTIPATION: Primary | ICD-10-CM

## 2022-08-17 DIAGNOSIS — G82.20 PARAPLEGIA (HCC): ICD-10-CM

## 2022-08-17 DIAGNOSIS — R13.10 DYSPHAGIA: ICD-10-CM

## 2022-08-17 DIAGNOSIS — R10.13 ABDOMINAL PAIN, EPIGASTRIC: ICD-10-CM

## 2022-08-17 DIAGNOSIS — R15.0 INCOMPLETE DEFECATION: ICD-10-CM

## 2022-08-17 DIAGNOSIS — R63.4 WEIGHT LOSS: ICD-10-CM

## 2022-08-17 DIAGNOSIS — K86.89 PANCREATIC DUCT DILATED: ICD-10-CM

## 2022-08-17 DIAGNOSIS — Z86.010 PERSONAL HISTORY OF COLONIC POLYPS: ICD-10-CM

## 2022-09-12 ENCOUNTER — TRANSCRIBE ORDER (OUTPATIENT)
Dept: SCHEDULING | Age: 77
End: 2022-09-12

## 2022-09-12 DIAGNOSIS — M47.12 CERVICAL SPONDYLOSIS WITH MYELOPATHY: Primary | ICD-10-CM

## 2022-10-04 ENCOUNTER — HOSPITAL ENCOUNTER (OUTPATIENT)
Dept: MRI IMAGING | Age: 77
Discharge: HOME | End: 2022-10-04
Attending: NEUROLOGICAL SURGERY
Payer: MEDICARE

## 2022-10-04 DIAGNOSIS — M47.12 CERVICAL SPONDYLOSIS WITH MYELOPATHY: ICD-10-CM

## 2022-10-04 PROCEDURE — 72141 MRI NECK SPINE W/O DYE: CPT

## 2022-11-23 NOTE — DISCHARGE INSTRUCTIONS
FAMILY MEDICINE OFFICE VISIT  DR. MARLEN GUERRERO MD      Patient: Lanny Lea Date of Service: 2022   : 1965 MRN: [id number]     Note to patient: The  Cent[employer] Cures Act requires that medical notes like this be available to patients in the interest of transparency. However, be advised this is a medical document. It is intended as peer to peer communication. It is written in medical language and may contain abbreviations or verbiage that are unfamiliar. It may appear blunt or direct. Medical documents are intended to carry relevant information, facts as evident, and the clinical opinion of the practitioner.    SUBJECTIVE:     Chief Complaint   Patient presents with   • Follow-up     Medications check.Lt knee pain.         HISTORY OF PRESENT ILLNESS:    Lanny Lea is a 57 year old female who presents today for DM check and med check      DIABETES FOLLOW UP    Monitors blood sugar at home: Yes  FBS usually <125  PP glucose -not monitored regularly      Taking medicine regularly: Yes   Side effects: No  Pertinent changes in medications after last visit-not recently, but after previous results Metformin was changed to ER and BID, pt still had 850 mg dose not 500 mg  Eliminated energy drinks  Opthalmology visit within the last year: no  Urine microalbumins within last year: Yes  Depressed mood: improved  Anhedonia : better  Chest pain: No  Lightheadedness : No  Palpitations: No  Foot pain: No  Foot ulcers: No  Feet self assessment: Yes   Numbness or tingling in hands or feet: No  Exercising regularly: No  Watching diet: Yes    HYPERTENSION FOLLOW UP  Compliant with medications: Yes  Medication side effects: No  Pertinent changes in HTN treatment at previous visit: none  Chest Pains: No  Shortness of breath: No  Headaches: No  Lightheadedness: No  Vision changes: No  Cough: No  Monitors blood pressure at home: Yes   Average systolic blood pressure: 115-125 range  Average diastolic blood pressure: 70-80  Patient Education        Cervical Disc Disease: Care Instructions  Your Care Instructions    Cervical disc disease results from damage, disease, or wear and tear to the discs between the bones (vertebra) in your neck. The discs act as shock absorbers for the spine and keep the spine flexible. When a disc is damaged, it can bulge out and press against the nerve roots or spinal cord. This is sometimes called a herniated or \"slipped disc. \" This pressure can cause pain and numbness or tingling in your arms and hands. It can also cause weakness in your legs. An accident can damage a disc and cause it to break open (rupture). Aging and hard physical work can also cause damage to cervical discs. The first treatments for cervical disc disease include physical therapy, special neck exercises, heat, and pain medicine. If these fail, your doctor may inject steroids and pain medicine into your neck. Surgery is usually done only if other treatments have not worked. Follow-up care is a key part of your treatment and safety. Be sure to make and go to all appointments, and call your doctor if you are having problems. It's also a good idea to know your test results and keep a list of the medicines you take. How can you care for yourself at home? · Take pain medicines exactly as directed. ? If the doctor gave you a prescription medicine for pain, take it as prescribed. ? If you are not taking a prescription pain medicine, ask your doctor if you can take an over-the-counter medicine. · Don't spend too long in one position. Take short breaks to move around and change positions. · Wear a seat belt and shoulder harness when you are in a car. · Sleep with a pillow under your head and neck that keeps your neck straight. · Follow your doctor's instructions for gentle neck-stretching exercises. · Do not smoke. Smoking can slow healing of your discs.  If you need help quitting, talk to your doctor about stop-smoking programs and medicines. These can increase your chances of quitting for good. · Avoid strenuous work or exercise until your doctor says it is okay. When should you call for help? Call 911 anytime you think you may need emergency care. For example, call if:    · You are unable to move an arm or a leg at all.   [facility] your doctor now or seek immediate medical care if:    · You have new or worse symptoms in your arms, legs, belly, or buttocks. Symptoms may include:  ? Numbness or tingling. ? Weakness. ? Pain.     · You lose bladder or bowel control.    Watch closely for changes in your health, and be sure to contact your doctor if:    · You do not get better as expected. Where can you learn more? Go to http://karin-michaela.info/. Enter N118 in the search box to learn more about \"Cervical Disc Disease: Care Instructions. \"  Current as of: September 20, 2018  Content Version: 11.9  © 8066-6719 [employer]. Care instructions adapted under license by [employer] (which disclaims liability or warranty for this information). If you have questions about a medical condition or this instruction, always ask your healthcare professional. [place] [address] any warranty or liability for your use of this information. DISCHARGE SUMMARY from Nurse    PATIENT INSTRUCTIONS:    After general anesthesia or intravenous sedation, for 24 hours or while taking prescription Narcotics:  · Limit your activities  · Do not drive and operate hazardous machinery  · Do not make important personal or business decisions  · Do  not drink alcoholic beverages  · If you have not urinated within 8 hours after discharge, please contact your surgeon on call.     Report the following to your surgeon:  · Excessive pain, swelling, redness or odor of or around the surgical area  · Temperature over 100.5  · Nausea and vomiting lasting longer than 4 hours or if unable to take medications  · Any signs of range            Patient Active Problem List   Diagnosis   • Essential hypertension   • Mixed dyslipidemia   • Controlled type 2 diabetes mellitus without complication, without long-term current use of insulin (CMS/[facility])   • Herpes zoster without complication   • Numbness of right foot   • Chronic pain of left knee   • Anxiety   • Urinary incontinence         Past Medical History:   Diagnosis Date   • Cough 2022         Current Outpatient Medications   Medication Sig   • escitalopram (LEXAPRO) 10 MG tablet Take 1 tablet by mouth daily.   • meloxicam (MOBIC) 15 MG tablet Take 1 tablet by mouth daily.   • metFORMIN (GLUCOPHAGE-XR) 500 MG 24 hr tablet Take 1 tablet by mouth in the morning and 1 tablet in the evening. Take after meals.   • atorvastatin (LIPITOR) 10 MG tablet Take 1 tablet by mouth daily.   • amLODIPine (NORVASC) 10 MG tablet Take 1 tablet by mouth daily.   • losartan (COZAAR) 50 MG tablet Take 1 tablet by mouth daily.   • triamcinolone (ARISTOCORT) 0.1 % cream Apply 1 application topically 2 times daily.   • mupirocin (BACTROBAN) 2 % ointment Apply topically 3 times daily.   • Coenzyme Q10 (COQ10 PO)    • Zinc Sulfate (ZINC 15 PO)      No current facility-administered medications for this visit.         ALLERGIES:   Allergen Reactions   • Amoclan PRURITUS         Past Surgical History:   Procedure Laterality Date   • ------------other-------------      urogyne surgery for incontinence   •  delivery only           Family History   Problem Relation Age of Onset   • Patient is unaware of any medical problems Mother    • Dementia/Alzheimers Father         pacemaker   • Patient is unaware of any medical problems Brother         not sure         Social History     Tobacco Use   • Smoking status: Never Smoker   • Smokeless tobacco: Never Used   Vaping Use   • Vaping Use: never used   Substance Use Topics   • Alcohol use: Not Currently   • Drug use: Never         Review of Systems   Constitutional:  Negative for unexpected weight change.   HENT: Negative for congestion.    Respiratory: Negative for cough and shortness of breath.    Cardiovascular: Negative for chest pain.   Genitourinary:        Urine incontinence problems, wears pads, had urogyne procedure in the past   Musculoskeletal: Positive for arthralgias.        Had R knee pain-improved  Now L knee pain, pain with walking, would like Rx refills for Meloxicam   Psychiatric/Behavioral: Positive for dysphoric mood. The patient is nervous/anxious.         Pt never received Rx for Lexapro as prescribed many months ago, but she is feeling sligthly better now, has less stress with her mom and took some time off work which helped  PHQ 9 score 11, SOLOMON 7 score 6          OBJECTIVE:       Visit Vitals  /63 (BP Location: LUE - Left upper extremity, Patient Position: Sitting, Cuff Size: Large Adult)   Pulse 78   Temp 97.9 °F (36.6 °C) (Temporal)   Resp 16   Ht 5' 4\" (1.626 m)   Wt 119.3 kg (263 lb 0.1 oz)   BMI 45.15 kg/m²         Physical Exam  Constitutional:       General: She is not in acute distress.     Appearance: Normal appearance.   HENT:      Head: Normocephalic and atraumatic.      Nose: Nose normal. No congestion.      Mouth/Throat:      Pharynx: Oropharynx is clear. No oropharyngeal exudate or posterior oropharyngeal erythema.      Neck: Neck supple.   Eyes:      Extraocular Movements: Extraocular movements intact.      Conjunctiva/sclera: Conjunctivae normal.   Cardiovascular:      Rate and Rhythm: Normal rate and regular rhythm.      Pulses: Normal pulses.      Heart sounds: Normal heart sounds.   Pulmonary:      Effort: Pulmonary effort is normal.      Breath sounds: Normal breath sounds.   Musculoskeletal:         General: No deformity.      Right lower leg: No edema.      Left lower leg: No edema.      Comments: Limited ROM in L knee due to pain   Skin:     Findings: No rash.   Neurological:      General: No focal deficit present.      Mental  decreased circulation or nerve impairment to extremity: change in color, persistent  numbness, tingling, coldness or increase pain  · Any questions    What to do at Home:  Recommended activity: wear cervical collar at all times other that when taking a shower, use support while walking to avoid falls. If you experience any of the following symptoms chest pain, shortness of breath, increased weakness/numbness, changes in mental status, difficulty with speech please call 911. *  Please give a list of your current medications to your Primary Care Provider. *  Please update this list whenever your medications are discontinued, doses are      changed, or new medications (including over-the-counter products) are added. *  Please carry medication information at all times in case of emergency situations. These are general instructions for a healthy lifestyle:    No smoking/ No tobacco products/ Avoid exposure to second hand smoke  Surgeon General's Warning:  Quitting smoking now greatly reduces serious risk to your health. Obesity, smoking, and sedentary lifestyle greatly increases your risk for illness    A healthy diet, regular physical exercise & weight monitoring are important for maintaining a healthy lifestyle    You may be retaining fluid if you have a history of heart failure or if you experience any of the following symptoms:  Weight gain of 3 pounds or more overnight or 5 pounds in a week, increased swelling in our hands or feet or shortness of breath while lying flat in bed. Please call your doctor as soon as you notice any of these symptoms; do not wait until your next office visit. Recognize signs and symptoms of STROKE:    F-face looks uneven    A-arms unable to move or move unevenly    S-speech slurred or non-existent    T-time-call 911 as soon as signs and symptoms begin-DO NOT go       Back to bed or wait to see if you get better-TIME IS BRAIN.     Warning Signs of HEART ATTACK Call 911 if you have these symptoms:   Chest discomfort. Most heart attacks involve discomfort in the center of the chest that lasts more than a few minutes, or that goes away and comes back. It can feel like uncomfortable pressure, squeezing, fullness, or pain.  Discomfort in other areas of the upper body. Symptoms can include pain or discomfort in one or both arms, the back, neck, jaw, or stomach.  Shortness of breath with or without chest discomfort.  Other signs may include breaking out in a cold sweat, nausea, or lightheadedness. Don't wait more than five minutes to call 911 - MINUTES MATTER! Fast action can save your life. Calling 911 is almost always the fastest way to get lifesaving treatment. Emergency Medical Services staff can begin treatment when they arrive -- up to an hour sooner than if someone gets to the hospital by car. The discharge information has been reviewed with the patient. The patient verbalized understanding. Discharge medications reviewed with the patient and appropriate educational materials and side effects teaching were provided.   ___________________________________________________________________________________________________________________________________       Patient armband removed and shredded Status: She is oriented to person, place, and time.   Psychiatric:         Mood and Affect: Mood normal.         Behavior: Behavior normal.                 DIAGNOSTIC STUDIES:     LAB RESULTS:    Lab Results Reviewed and   Office Visit on 11/23/2022   Component Date Value Ref Range Status   • Sodium 11/23/2022 142  135 - 145 mmol/L Final   • Potassium 11/23/2022 4.0  3.4 - 5.1 mmol/L Final   • Chloride 11/23/2022 108  97 - 110 mmol/L Final   • Carbon Dioxide 11/23/2022 25  21 - 32 mmol/L Final   • Anion Gap 11/23/2022 13  7 - 19 mmol/L Final   • Glucose 11/23/2022 148 (A) 70 - 99 mg/dL Final   • BUN 11/23/2022 22 (A) 6 - 20 mg/dL Final   • Creatinine 11/23/2022 0.60  0.51 - 0.95 mg/dL Final   • Glomerular Filtration Rate 11/23/2022 >90  >=60 Final   • BUN/ Creatinine Ratio 11/23/2022 37 (A) 7 - 25 Final   • Calcium 11/23/2022 9.1  8.4 - 10.2 mg/dL Final   • Bilirubin, Total 11/23/2022 1.3 (A) 0.2 - 1.0 mg/dL Final   • GOT/AST 11/23/2022 22  <=37 Units/L Final   • GPT/ALT 11/23/2022 40  <64 Units/L Final   • Alkaline Phosphatase 11/23/2022 59  45 - 117 Units/L Final   • Albumin 11/23/2022 3.8  3.6 - 5.1 g/dL Final   • Protein, Total 11/23/2022 7.0  6.4 - 8.2 g/dL Final   • Globulin 11/23/2022 3.2  2.0 - 4.0 g/dL Final   • A/G Ratio 11/23/2022 1.2  1.0 - 2.4 Final   • Hemoglobin A1C 11/23/2022 6.2 (A) 4.5 - 5.6 % Final   • WBC 11/23/2022 7.0  4.2 - 11.0 K/mcL Final   • RBC 11/23/2022 4.59  4.00 - 5.20 mil/mcL Final   • HGB 11/23/2022 13.5  12.0 - 15.5 g/dL Final   • HCT 11/23/2022 41.5  36.0 - 46.5 % Final   • MCV 11/23/2022 90.4  78.0 - 100.0 fl Final   • MCH 11/23/2022 29.4  26.0 - 34.0 pg Final   • MCHC 11/23/2022 32.5  32.0 - 36.5 g/dL Final   • RDW-CV 11/23/2022 12.2  11.0 - 15.0 % Final   • RDW-SD 11/23/2022 40.2  39.0 - 50.0 fL Final   • PLT 11/23/2022 282  140 - 450 K/mcL Final   • NRBC 11/23/2022 0  <=0 /100 WBC Final   • Neutrophil, Percent 11/23/2022 45  % Final   • Lymphocytes, Percent 11/23/2022 42  %  Final   • Mono, Percent 11/23/2022 8  % Final   • Eosinophils, Percent 11/23/2022 4  % Final   • Basophils, Percent 11/23/2022 1  % Final   • Immature Granulocytes 11/23/2022 0  % Final   • Absolute Neutrophils 11/23/2022 3.1  1.8 - 7.7 K/mcL Final   • Absolute Lymphocytes 11/23/2022 3.0  1.0 - 4.0 K/mcL Final   • Absolute Monocytes 11/23/2022 0.6  0.3 - 0.9 K/mcL Final   • Absolute Eosinophils  11/23/2022 0.3  0.0 - 0.5 K/mcL Final   • Absolute Basophils 11/23/2022 0.1  0.0 - 0.3 K/mcL Final   • Absolute Immmature Granulocytes 11/23/2022 0.0  0.0 - 0.2 K/mcL Final   Office Visit on 08/18/2022   Component Date Value Ref Range Status   • HM MAMMOGRAPHY BILATERAL 12/03/2020 Normal   Final   • Sodium 08/18/2022 138  135 - 145 mmol/L Final   • Potassium 08/18/2022 4.0  3.4 - 5.1 mmol/L Final   • Chloride 08/18/2022 106  97 - 110 mmol/L Final   • Carbon Dioxide 08/18/2022 23  21 - 32 mmol/L Final   • Anion Gap 08/18/2022 13  7 - 19 mmol/L Final   • Glucose 08/18/2022 115 (A) 70 - 99 mg/dL Final   • BUN 08/18/2022 19  6 - 20 mg/dL Final   • Creatinine 08/18/2022 0.54  0.51 - 0.95 mg/dL Final   • Glomerular Filtration Rate 08/18/2022 >90  >=60 Final   • BUN/ Creatinine Ratio 08/18/2022 35 (A) 7 - 25 Final   • Calcium 08/18/2022 9.2  8.4 - 10.2 mg/dL Final   • Bilirubin, Total 08/18/2022 1.3 (A) 0.2 - 1.0 mg/dL Final   • GOT/AST 08/18/2022 17  <=37 Units/L Final   • GPT/ALT 08/18/2022 28  <64 Units/L Final   • Alkaline Phosphatase 08/18/2022 63  45 - 117 Units/L Final   • Albumin 08/18/2022 3.8  3.6 - 5.1 g/dL Final   • Protein, Total 08/18/2022 7.1  6.4 - 8.2 g/dL Final   • Globulin 08/18/2022 3.3  2.0 - 4.0 g/dL Final   • A/G Ratio 08/18/2022 1.2  1.0 - 2.4 Final   • Cholesterol 08/18/2022 138  <=199 mg/dL Final   • Triglycerides 08/18/2022 139  <=149 mg/dL Final   • HDL 08/18/2022 43 (A) >=50 mg/dL Final   • LDL 08/18/2022 67  <=129 mg/dL Final   • Non-HDL Cholesterol 08/18/2022 95  mg/dL Final   • Cholesterol/ HDL Ratio  08/18/2022 3.2  <=4.4 Final   • TSH 08/18/2022 1.547  0.350 - 5.000 mcUnits/mL Final   • Hemoglobin A1C 08/18/2022 6.4 (A) 4.5 - 5.6 % Final   • COLOR, URINALYSIS 08/18/2022 Yellow   Final   • APPEARANCE, URINALYSIS 08/18/2022 Clear   Final   • GLUCOSE, URINALYSIS 08/18/2022 Negative  Negative mg/dL Final   • BILIRUBIN, URINALYSIS 08/18/2022 Negative  Negative Final   • KETONES, URINALYSIS 08/18/2022 Negative  Negative mg/dL Final   • SPECIFIC GRAVITY, URINALYSIS 08/18/2022 1.024  1.005 - 1.030 Final   • OCCULT BLOOD, URINALYSIS 08/18/2022 Negative  Negative Final   • PH, URINALYSIS 08/18/2022 5.0  5.0 - 7.0 Final   • PROTEIN, URINALYSIS 08/18/2022 Negative  Negative mg/dL Final   • UROBILINOGEN, URINALYSIS 08/18/2022 0.2  0.2, 1.0 mg/dL Final   • NITRITE, URINALYSIS 08/18/2022 Negative  Negative Final   • LEUKOCYTE ESTERASE, URINALYSIS 08/18/2022 Large (A) Negative Final   • SQUAMOUS EPITHELIAL, URINALYSIS 08/18/2022 1 to 5  None Seen, 1 to 5 /hpf Final   • ERYTHROCYTES, URINALYSIS 08/18/2022 None Seen  None Seen, 1 to 2 /hpf Final   • LEUKOCYTES, URINALYSIS 08/18/2022 11 to 25 (A) None Seen, 1 to 5 /hpf Final   • BACTERIA, URINALYSIS 08/18/2022 Few (A) None Seen /hpf Final   • HYALINE CASTS, URINALYSIS 08/18/2022 None Seen  None Seen, 1 to 5 /lpf Final   • MUCUS 08/18/2022 Present   Final   • Microalbumin, Urine 08/18/2022 2.35  mg/dL Final   • Creatinine, Urine 08/18/2022 148.00  mg/dL Final   • Microalbumin/ Creatinine Ratio 08/18/2022 15.9  <30.0 mg/g Final   • WBC 08/18/2022 7.6  4.2 - 11.0 K/mcL Final   • RBC 08/18/2022 4.67  4.00 - 5.20 mil/mcL Final   • HGB 08/18/2022 13.5  12.0 - 15.5 g/dL Final   • HCT 08/18/2022 42.0  36.0 - 46.5 % Final   • MCV 08/18/2022 89.9  78.0 - 100.0 fl Final   • MCH 08/18/2022 28.9  26.0 - 34.0 pg Final   • MCHC 08/18/2022 32.1  32.0 - 36.5 g/dL Final   • RDW-CV 08/18/2022 12.4  11.0 - 15.0 % Final   • RDW-SD 08/18/2022 40.2  39.0 - 50.0 fL Final   • PLT 08/18/2022 271  140 -  450 K/mcL Final   • NRBC 08/18/2022 0  <=0 /100 WBC Final   • Neutrophil, Percent 08/18/2022 42  % Final   • Lymphocytes, Percent 08/18/2022 45  % Final   • Mono, Percent 08/18/2022 9  % Final   • Eosinophils, Percent 08/18/2022 3  % Final   • Basophils, Percent 08/18/2022 1  % Final   • Immature Granulocytes 08/18/2022 0  % Final   • Absolute Neutrophils 08/18/2022 3.2  1.8 - 7.7 K/mcL Final   • Absolute Lymphocytes 08/18/2022 3.4  1.0 - 4.0 K/mcL Final   • Absolute Monocytes 08/18/2022 0.7  0.3 - 0.9 K/mcL Final   • Absolute Eosinophils  08/18/2022 0.3  0.0 - 0.5 K/mcL Final   • Absolute Basophils 08/18/2022 0.1  0.0 - 0.3 K/mcL Final   • Absolute Immmature Granulocytes 08/18/2022 0.0  0.0 - 0.2 K/mcL Final   • Cologuard 08/24/2022 Negative  Negative Final   Office Visit on 07/06/2022   Component Date Value Ref Range Status   • SARS-CoV-2 by PCR 07/06/2022 Detected (A) Not Detected / Detected / Inhibitor Present Final   • Procedural Notes 07/06/2022    Final                    Value:This result contains rich text formatting which cannot be displayed here.   Abstract on 05/24/2022   Component Date Value Ref Range Status   • HM PAP TEST 09/25/2020 Normal   Final          ASSESSMENT AND PLAN:     Problem List Items Addressed This Visit        Active Problems    Anxiety    Chronic pain of left knee    Controlled type 2 diabetes mellitus without complication, without long-term current use of insulin (CMS/[facility]) - Primary    Relevant Orders    CBC WITH DIFFERENTIAL (Completed)    COMPREHENSIVE METABOLIC PANEL (Completed)    GLYCOHEMOGLOBIN (Completed)    SERVICE TO PODIATRY    Essential hypertension    Urinary incontinence    Relevant Orders    SERVICE TO URODYNAMICS OR URO GYN      Other Visit Diagnoses     Need for vaccination        Relevant Orders    INFLUENZA QUADRIVALENT SPLIT PRES FREE 0.5 ML VACC, IM (FLULAVAL,FLUARIX,FLUZONE) (Completed)          Preventive Health Maintenance:   Preventive diet and exercise  related life-style changes discussed & recommended.    Weight goals reviewed & discussed.  Age and gender appropriate preventive tests and vaccines recommended and discussed.  Flue vaccine given  Reprinted eye specialist referral              No follow-ups on file.        • Instructions provided as documented in the Visit Summary.  • Medical compliance with plan discussed and risk of noncompliance reviewed.  • Patient/caregiver education completed on disease process, etiology and prognosis.  • Return to clinic as clinically indicated was discussed with patient/caregiver who verbalized understanding of and agreement with the plan.  • Proper usage and all side effects of medications reviewed with patient/caregiver who expressed understanding.       Electronically signed by:  Rhonda Reynolds MD  11/23/2022

## 2022-12-13 ENCOUNTER — TRANSCRIBE ORDER (OUTPATIENT)
Dept: SCHEDULING | Age: 77
End: 2022-12-13

## 2022-12-13 DIAGNOSIS — R10.13 ABDOMINAL PAIN, EPIGASTRIC: Primary | ICD-10-CM

## 2022-12-29 ENCOUNTER — HOSPITAL ENCOUNTER (OUTPATIENT)
Dept: VASCULAR SURGERY | Age: 77
Discharge: HOME | End: 2022-12-29
Attending: INTERNAL MEDICINE
Payer: MEDICARE

## 2022-12-29 DIAGNOSIS — R10.13 ABDOMINAL PAIN, EPIGASTRIC: ICD-10-CM

## 2022-12-29 LAB
ABDOMINAL DIST AORTA VEL: 95.7 CM/S
ABDOMINAL LT COM ILIAC AP: 0.86 CM
ABDOMINAL LT COM ILIAC TRANS: 0.91 CM
ABDOMINAL MID AORTA VEL: 102.2 CM/S
ABDOMINAL PROX AORTA VEL: 76.3 CM/S
ABDOMINAL RT COM ILIAC AP: 0.91 CM
ABDOMINAL RT COM ILIAC TRANS: 0.98 CM
CELIAC EDV: 25.6 CM/S
CELIAC PSV: 176.9 CM/S
COMMON HEPATIC EDV: 17.4 CM/S
COMMON HEPATIC PSV: 125.6 CM/S
DIST AORTIC AP: 1.3 CM
DIST AORTIC TRANS: 1.29 CM
DIST SMA EDV: 27.7 CM/S
DIST SMA PSV: 130.8 CM/S
IMA EDV: 10.1 CM/S
IMA PSV: 163.7 CM/S
LEFT COM ILIAC PROX VEL: 126.4 CM/S
LEFT RENAL ORIGIN DIAS: 24.8 CM/S
LEFT RENAL ORIGIN RAR: 1.45
LEFT RENAL ORIGIN RI: 0.78
LEFT RENAL ORIGIN SYS: 110.6 CM/S
MID AORTIC AP: 1.48 CM
MID AORTIC TRANS: 1.23 CM
MID SMA EDV: 23.3 CM/S
MID SMA PSV: 141.7 CM/S
ORIGIN SMA EDV: 33.8 CM/S
ORIGIN SMA PSV: 179.8 CM/S
PROX AORTIC AP: 1.4 CM
PROX AORTIC TRANS: 1.37 CM
PROX SMA EDV: 23.3 CM/S
PROX SMA PSV: 150.5 CM/S
RIGHT COM ILIAC PROX VEL: 117.6 CM/S
RIGHT RENAL ORIGIN DIAS: 36.4 CM/S
RIGHT RENAL ORIGIN RAR: 1.51
RIGHT RENAL ORIGIN RI: 0.68
RIGHT RENAL ORIGIN SYS: 115 CM/S
SPLENIC EDV: 23 CM/S
SPLENIC PSV: 107 CM/S

## 2022-12-29 PROCEDURE — 93975 VASCULAR STUDY: CPT

## 2022-12-30 ENCOUNTER — HOSPITAL ENCOUNTER (OUTPATIENT)
Dept: GENERAL RADIOLOGY | Age: 77
End: 2022-12-30
Attending: INTERNAL MEDICINE
Payer: MEDICARE

## 2022-12-30 DIAGNOSIS — R10.13 ABDOMINAL PAIN, EPIGASTRIC: ICD-10-CM

## 2022-12-30 PROCEDURE — 74220 X-RAY XM ESOPHAGUS 1CNTRST: CPT

## 2022-12-30 PROCEDURE — 74011000250 HC RX REV CODE- 250: Performed by: INTERNAL MEDICINE

## 2022-12-30 RX ADMIN — BARIUM SULFATE 30 G: 960 POWDER, FOR SUSPENSION ORAL at 07:20

## 2022-12-30 RX ADMIN — BARIUM SULFATE 700 MG: 700 TABLET ORAL at 07:20

## 2022-12-30 RX ADMIN — ANTACID/ANTIFLATULENT 4 G: 380; 550; 10; 10 GRANULE, EFFERVESCENT ORAL at 07:20

## 2022-12-30 RX ADMIN — BARIUM SULFATE 135 ML: 980 POWDER, FOR SUSPENSION ORAL at 07:20

## 2023-02-14 DIAGNOSIS — M48.02 CERVICAL SPINAL STENOSIS: Primary | ICD-10-CM

## 2023-02-28 ENCOUNTER — OFFICE VISIT (OUTPATIENT)
Age: 78
End: 2023-02-28
Payer: MEDICARE

## 2023-02-28 VITALS
HEART RATE: 74 BPM | SYSTOLIC BLOOD PRESSURE: 135 MMHG | HEIGHT: 60 IN | BODY MASS INDEX: 17.83 KG/M2 | OXYGEN SATURATION: 97 % | DIASTOLIC BLOOD PRESSURE: 74 MMHG | WEIGHT: 90.8 LBS

## 2023-02-28 DIAGNOSIS — G45.9 TRANSIENT CEREBRAL ISCHEMIA, UNSPECIFIED TYPE: ICD-10-CM

## 2023-02-28 DIAGNOSIS — Z01.810 PREOP CARDIOVASCULAR EXAM: ICD-10-CM

## 2023-02-28 DIAGNOSIS — I95.1 ORTHOSTATIC HYPOTENSION: Primary | ICD-10-CM

## 2023-02-28 DIAGNOSIS — E78.5 HYPERLIPIDEMIA, UNSPECIFIED HYPERLIPIDEMIA TYPE: ICD-10-CM

## 2023-02-28 DIAGNOSIS — R07.9 CHEST PAIN, UNSPECIFIED TYPE: ICD-10-CM

## 2023-02-28 PROCEDURE — G8427 DOCREV CUR MEDS BY ELIG CLIN: HCPCS | Performed by: INTERNAL MEDICINE

## 2023-02-28 PROCEDURE — G8399 PT W/DXA RESULTS DOCUMENT: HCPCS | Performed by: INTERNAL MEDICINE

## 2023-02-28 PROCEDURE — G8484 FLU IMMUNIZE NO ADMIN: HCPCS | Performed by: INTERNAL MEDICINE

## 2023-02-28 PROCEDURE — 1090F PRES/ABSN URINE INCON ASSESS: CPT | Performed by: INTERNAL MEDICINE

## 2023-02-28 PROCEDURE — 3017F COLORECTAL CA SCREEN DOC REV: CPT | Performed by: INTERNAL MEDICINE

## 2023-02-28 PROCEDURE — 99214 OFFICE O/P EST MOD 30 MIN: CPT | Performed by: INTERNAL MEDICINE

## 2023-02-28 PROCEDURE — G8419 CALC BMI OUT NRM PARAM NOF/U: HCPCS | Performed by: INTERNAL MEDICINE

## 2023-02-28 PROCEDURE — 1036F TOBACCO NON-USER: CPT | Performed by: INTERNAL MEDICINE

## 2023-02-28 PROCEDURE — 1123F ACP DISCUSS/DSCN MKR DOCD: CPT | Performed by: INTERNAL MEDICINE

## 2023-02-28 RX ORDER — ASPIRIN 81 MG/1
81 TABLET, CHEWABLE ORAL DAILY
Qty: 100 TABLET | Refills: 1 | Status: SHIPPED | OUTPATIENT
Start: 2023-02-28

## 2023-02-28 RX ORDER — POLYETHYLENE GLYCOL 3350 17 G/17G
17 POWDER, FOR SOLUTION ORAL DAILY
COMMUNITY

## 2023-02-28 RX ORDER — PANTOPRAZOLE SODIUM 40 MG/1
40 TABLET, DELAYED RELEASE ORAL DAILY
COMMUNITY

## 2023-02-28 RX ORDER — ATORVASTATIN CALCIUM 20 MG/1
TABLET, FILM COATED ORAL
Qty: 90 TABLET | Refills: 3 | Status: SHIPPED | OUTPATIENT
Start: 2023-02-28

## 2023-02-28 RX ORDER — EAR PLUGS
EACH OTIC (EAR) DAILY
COMMUNITY

## 2023-02-28 NOTE — PROGRESS NOTES
1. Have you been to the ER, urgent care clinic since your last visit? Hospitalized since your last visit? Yes Where: [facility]    2. Have you seen or consulted any other health care providers outside of the [address] [place] [address] since your last visit? Include any pap smears or colon screening. No     3. Do you need any refills today?    no

## 2023-02-28 NOTE — PROGRESS NOTES
HISTORY OF PRESENT ILLNESS  Shagufta Sarabia is a 78 y.o. female. 5/2022  Patient presents with atypical chest pain. She c/o \"chest compressions\" at night. She denies chest pain, shortness of breath, palpitations or edema. She c/o mild neck pain / tingling since surgery. She is no longer taking     Chest Pain (Angina)   The history is provided by the patient. This is a chronic problem. The problem occurs daily. The pain is present in the left side. The pain is at a severity of 3/10. The pain is mild. The quality of the pain is described as dull. The pain does not radiate. Pertinent negatives include no claudication, no cough, no diaphoresis, no dizziness, no fever, no hemoptysis, no nausea, no orthopnea, no palpitations, no PND, no sputum production, no vomiting and no weakness. Review of Systems   Constitutional: Negative for chills, diaphoresis, fever and weight loss. HENT: Negative for ear pain and hearing loss. Eyes: Negative for blurred vision. Respiratory: Negative for cough, hemoptysis, sputum production, wheezing and stridor. Cardiovascular: Positive for chest pain. Negative for palpitations, orthopnea, claudication, leg swelling and PND. Gastrointestinal: Negative for heartburn, nausea and vomiting. Musculoskeletal: Negative for myalgias and neck pain. Skin: Negative for rash. Neurological: Negative for dizziness, tingling, tremors, focal weakness, loss of consciousness and weakness. Psychiatric/Behavioral: Negative for depression and suicidal ideas.        Family History   Problem Relation Age of Onset    Heart Disease Father     Cancer Mother         breast       Past Medical History:   Diagnosis Date    Abnormal Pap smear     Dr. Thalia Diaz    Allergic rhinitis     Arthritis     Asthma 11/22/2019    Cerebrovascular small vessel disease 3/18/2019    CT 3/17/2019    Cervical spinal cord compression [facility])     Chronic pain     Concentric left ventricular hypertrophy 3/18/2019    With left ventricular diastolic dysfunction by echocardiogram 3/18/2019    GERD (gastroesophageal reflux disease) 11/22/2019    Hypercholesterolemia     Neck pain 5/17/2010    Stroke ([address] 10/02/2017    TIA- legs weak memory issues       Past Surgical History:   Procedure Laterality Date    CHOLECYSTECTOMY, LAPAROSCOPIC      COLONOSCOPY N/A 6/4/2018    COLONOSCOPY / polypectomy performed by Johnny Mak MD at [address] N/A 8/19/2021    COLONOSCOPY performed by Arabella Storey MD at [address] [facility] [address]  11/2019    Cervical fusion    OTHER SURGICAL HISTORY  2017    Throat widened    PLASTIC SURGERY, NECK  2019       Social History     Tobacco Use    Smoking status: Never    Smokeless tobacco: Never   Substance Use Topics    Alcohol use: No       Allergies   Allergen Reactions    Baclofen Shortness Of Breath     Denies. Pt tolerates  Other reaction(s): gi distress    Morphine Other (See Comments)     hallucinations  Other reaction(s): other/intolerance  hallucinations  Other reaction(s): delusions    Sulfa Antibiotics Other (See Comments)     Drops blood pressure    Celecoxib Other (See Comments)     Tiredness   Other reaction(s): other/intolerance  Makes her tired       Prior to Admission medications    Medication Sig Start Date End Date Taking?  Authorizing Provider   Cyanocobalamin (VITAMIN B-12) 1000 MCG/15ML LIQD Take by mouth daily   Yes Historical Provider, MD   polyethylene glycol (GLYCOLAX) 17 GM/SCOOP powder Take 17 g by mouth daily   Yes Historical Provider, MD   pantoprazole (PROTONIX) 40 MG tablet Take 40 mg by mouth daily   Yes Historical Provider, MD   acetaminophen (TYLENOL) 160 MG/5ML solution Take 1,000 mg by mouth daily 12/12/19  Yes Ar Automatic Reconciliation   busPIRone (BUSPAR) 7.5 MG tablet take 1 tablet by mouth twice a day 9/23/20  Yes Ar Automatic Reconciliation   Calcium Polycarbophil (FIBER) 625 MG TABS Take 625 mg by mouth daily   Yes Ar Automatic Reconciliation   D-Mannose 500 MG CAPS Take by mouth daily   Yes Ar Automatic Reconciliation   estradiol (ESTRACE) 0.1 MG/GM vaginal cream X2 a week 12/2/22  Yes Ar Automatic Reconciliation   gabapentin (NEURONTIN) 100 MG capsule Take 100 mg by mouth. 6 times a day 4/16/20  Yes Ar Automatic Reconciliation   lidocaine (LIDODERM) 5 % apply 1 patch TO THE AFFECTED AREA DAILY. LEAVE ON FOR 12 HOURS AND THEN OFF FOR 12 HOURS. 8/8/21  Yes Ar Automatic Reconciliation   midodrine (PROAMATINE) 5 MG tablet Take 5 mg by mouth as needed 2/6/20  Yes Ar Automatic Reconciliation   ondansetron (ZOFRAN) 4 MG tablet take 1 tablet by mouth every 6 hours if needed for nausea 5 11/16/21  Yes Ar Automatic Reconciliation   RANITIDINE HCL PO Take 360 mg by mouth as needed   Yes Ar Automatic Reconciliation   traMADol (ULTRAM) 50 MG tablet Take 50 mg by mouth as needed. Yes Ar Automatic Reconciliation         /74   Pulse 74   Ht 5' (1.524 m)   Wt 90 lb 12.8 oz (41.2 kg)   SpO2 97%   BMI 17.73 kg/m²       Physical Exam  Vitals and nursing note reviewed. Constitutional:       Appearance: She is well-developed. HENT:      Head: Normocephalic and atraumatic. Eyes:      General: No scleral icterus. Conjunctiva/sclera: Conjunctivae normal.   Neck:      Vascular: No JVD. Cardiovascular:      Rate and Rhythm: Normal rate and regular rhythm. Heart sounds: Normal heart sounds. No murmur heard. No friction rub. No gallop. Pulmonary:      Effort: Pulmonary effort is normal. No respiratory distress. Breath sounds: Normal breath sounds. No stridor. No wheezing or rales. Chest:      Chest wall: No tenderness. Abdominal:      General: There is no distension. Tenderness: There is no abdominal tenderness. Musculoskeletal:         General: No tenderness. Normal range of motion. Cervical back: Normal range of motion and neck supple. Right lower leg: No edema. Left lower leg: No edema. Lymphadenopathy:      Cervical: No cervical adenopathy. Skin:     Findings: No erythema or rash. Neurological:      Mental Status: She is alert and oriented to person, place, and time. Cranial Nerves: No cranial nerve deficit. Psychiatric:         Behavior: Behavior normal.     ekg sinus rhythm with no acute st-t changes    3/2019  Echo  Interpretation Summary    Saline contrast was given to evaluate for intracardiac shunt. No shunt seen. Estimated left ventricular ejection fraction is 56 - 60%. Visually measured ejection fraction. Left ventricular cavity size is decreased. Left ventricular mild concentric hypertrophy. No regional wall motion abnormality noted. Age-appropriate left ventricular diastolic function. There is no evidence of pulmonary hypertension. Comparison Study Information      Prior Study    There is a prior study available for comparison that was performed on 1/17/2018. As compared to the previous study, there are no significant changes. 11/02/21    NUCLEAR CARDIAC STRESS TEST 11/02/2021 11/4/2021    Interpretation Summary  · Baseline ECG: Normal EKG. · SPECT: Left ventricular function post-stress was normal. Calculated ejection fraction is 58% (normal EF value is equal to or greater than 50%). Left ventricular wall motion was normal at stress, no regional wall motion abnormality noted. There is no evidence of transient ischemic dilation (TID). The TID ratio is 0.87. · SPECT: Left ventricular perfusion is normal.  · SPECT: Left ventricular perfusion is normal. Myocardial perfusion imaging supports a low risk stress test.    Signed by: Lakeshia Nichols MD on 11/2/2021  3:40 PM          No flowsheet data found. Assessment        Diagnosis Orders   1. Orthostatic hypotension      Stable symptom continue treatment monitor      2. Hyperlipidemia, unspecified hyperlipidemia type      Continue current treatment monitor      3.  Transient cerebral ischemia, unspecified type      Continue treatment monitor      4. Chest pain, unspecified type      Atypical stable monitor      5. Preop cardiovascular exam      Cardiac status is stable. Okay to proceed with neck surgery as planned. Medium cardiac risk. Medications Discontinued During This Encounter   Medication Reason    aspirin 81 MG chewable tablet LIST CLEANUP    atorvastatin (LIPITOR) 20 MG tablet LIST CLEANUP    calcium carbonate 1500 (600 Ca) MG TABS tablet LIST CLEANUP    chlorhexidine (PERIDEX) 0.12 % solution LIST CLEANUP    diazePAM (VALIUM) 5 MG tablet LIST CLEANUP    diclofenac sodium (VOLTAREN) 1 % GEL LIST CLEANUP    doxycycline monohydrate (MONODOX) 100 MG capsule LIST CLEANUP    fluticasone (FLOVENT HFA) 110 MCG/ACT inhaler LIST CLEANUP    ibuprofen (ADVIL;MOTRIN) 400 MG tablet LIST CLEANUP    ketorolac (ACULAR) 0.5 % ophthalmic solution LIST CLEANUP    meloxicam (MOBIC) 15 MG tablet LIST CLEANUP    metoclopramide (REGLAN) 10 MG tablet LIST CLEANUP    omeprazole (PRILOSEC) 20 MG delayed release capsule LIST CLEANUP    Plecanatide (TRULANCE) 3 MG TABS LIST CLEANUP         Follow-up and Dispositions    Return in about 1 year (around 2/28/2024). current treatment plan is effective, no change in therapy. Patient is a 61-year-old female seen for chronic chest pain. Symptoms lasting for several last several months. Anterior chest wall-describes as squeezing/tightness with no significant radiation. No diaphoresis. No ischemia on stress test.  Recommend intense risk factor modification. F/u in 6 months.    5/2022  Patient seen for atypical chronic chest pain which she describes as \"chest compressions\". She denies associated symptoms. Recent nuclear stress test reviewed and discussed with patient which was negative for ischemia recent EKG sinus rhythm unchanged from prior. Recommend risk factor modification. She is no longer taking atorvastatin and unsure why.   She prefers to follow-up with PCP. She has upcoming carotid ultrasound scheduled. Sugar is controlled continue current medications. 2/2023  Cardiac status stable. Chest pain stable. Dizziness is stable and uses midodrine as needed. Has issues with bloating and stomach followed by GI specialist.  Continue current cardiac management  Patient is advised to restart aspirin and statin in view of her TIA. Okay to proceed with neck surgery as planned.   Medium cardiac risk

## 2023-06-05 NOTE — PROGRESS NOTES
Check xray of right lower leg.   Continue heat, stretching and massage.   If xray is normal, consider spine imaging/PT/ortho      Ms Blaine Morales presents at office for ear irrigation of bilateral ears. Ear irrigated and large amount of light brown wax flushed from bilateral ears. She denies any discomfort and and stated her ears felt better and left office ambulatory.

## 2023-07-31 ENCOUNTER — OFFICE VISIT (OUTPATIENT)
Age: 78
End: 2023-07-31
Payer: MEDICARE

## 2023-07-31 VITALS
RESPIRATION RATE: 22 BRPM | OXYGEN SATURATION: 95 % | HEIGHT: 60 IN | DIASTOLIC BLOOD PRESSURE: 88 MMHG | HEART RATE: 54 BPM | SYSTOLIC BLOOD PRESSURE: 122 MMHG | WEIGHT: 83.2 LBS | TEMPERATURE: 97.6 F | BODY MASS INDEX: 16.33 KG/M2

## 2023-07-31 DIAGNOSIS — G95.9 CERVICAL MYELOPATHY (HCC): ICD-10-CM

## 2023-07-31 DIAGNOSIS — G62.9 NEUROPATHY: Primary | ICD-10-CM

## 2023-07-31 PROCEDURE — 1090F PRES/ABSN URINE INCON ASSESS: CPT | Performed by: STUDENT IN AN ORGANIZED HEALTH CARE EDUCATION/TRAINING PROGRAM

## 2023-07-31 PROCEDURE — 1123F ACP DISCUSS/DSCN MKR DOCD: CPT | Performed by: STUDENT IN AN ORGANIZED HEALTH CARE EDUCATION/TRAINING PROGRAM

## 2023-07-31 PROCEDURE — 1036F TOBACCO NON-USER: CPT | Performed by: STUDENT IN AN ORGANIZED HEALTH CARE EDUCATION/TRAINING PROGRAM

## 2023-07-31 PROCEDURE — G8427 DOCREV CUR MEDS BY ELIG CLIN: HCPCS | Performed by: STUDENT IN AN ORGANIZED HEALTH CARE EDUCATION/TRAINING PROGRAM

## 2023-07-31 PROCEDURE — 99204 OFFICE O/P NEW MOD 45 MIN: CPT | Performed by: STUDENT IN AN ORGANIZED HEALTH CARE EDUCATION/TRAINING PROGRAM

## 2023-07-31 PROCEDURE — G8419 CALC BMI OUT NRM PARAM NOF/U: HCPCS | Performed by: STUDENT IN AN ORGANIZED HEALTH CARE EDUCATION/TRAINING PROGRAM

## 2023-07-31 PROCEDURE — G8399 PT W/DXA RESULTS DOCUMENT: HCPCS | Performed by: STUDENT IN AN ORGANIZED HEALTH CARE EDUCATION/TRAINING PROGRAM

## 2023-07-31 ASSESSMENT — ENCOUNTER SYMPTOMS
COUGH: 0
NAUSEA: 0
BACK PAIN: 1
SHORTNESS OF BREATH: 1
VOMITING: 0

## 2023-07-31 NOTE — PROGRESS NOTES
Lester Wilson is a 70 y.o. female . presents for New Patient    A 70years old female patient with medical history of arthritis, asthma, cervical spondylosis with spinal canal stenosis and spinal cord compression status post fusion surgery in 2019, hyperlipidemia, and previous stroke referred here for evaluation of possible neuropathy. She states that she has tingling and burning sensation from her neck down for about 4 years. Symptoms are more at nighttime but might continue with activity. Has numbness, pain which is more over her legs. She also has weakness over her arms and legs. But she mentioned her  is good and does not drop from her hands. No problem lifting her upper extremities up. When she walks, feels unsteady. No falls. She uses a rollator; but she is able to walk without any support in the house. She mentioned that immediately after the cervical fusion surgery, she was weak all over [upper and lower extremities]. She was on a wheelchair for a while. Had been in rehab. Strength is gradually getting better. She has neck pain which is constant. Also complains of low back pain. She is followed at the neuropathy center. She states she is getting B12 injections in her calf; no previous history of B12 deficiency. No history of diabetes. No past history of alcohol abuse. No previous history of chemotherapy. Does not have any incontinence. Has constipation. Has lost significant amount of weight. For pain, she is taking gabapentin 100 mg 6 times a day [but the order is to take 200 mg 3 times a day]. MRI of the cervical spine from October 2022 has shown:Stable postoperative and alignment of the cervical spine with unchanged  anterolisthesis C3-4 and C4-5; posterior decompression C1-C5. Occipital to C5 fusion; Stable encephalomalacia dorsal cord C4-5 level; patent spinal canal throughout fusion levels. Review of Systems   Constitutional:  Positive for unexpected weight change.

## 2023-08-02 ENCOUNTER — HOSPITAL ENCOUNTER (OUTPATIENT)
Facility: HOSPITAL | Age: 78
Discharge: HOME | End: 2023-08-05
Payer: MEDICARE

## 2023-08-02 LAB
EST. AVERAGE GLUCOSE BLD GHB EST-MCNC: 114 MG/DL
HBA1C MFR BLD: 5.6 % (ref 4.2–5.6)

## 2023-08-02 PROCEDURE — 36415 COLL VENOUS BLD VENIPUNCTURE: CPT

## 2023-08-02 PROCEDURE — 84155 ASSAY OF PROTEIN SERUM: CPT

## 2023-08-02 PROCEDURE — 83036 HEMOGLOBIN GLYCOSYLATED A1C: CPT

## 2023-08-02 PROCEDURE — 84165 PROTEIN E-PHORESIS SERUM: CPT

## 2023-08-07 ENCOUNTER — HOSPITAL ENCOUNTER (EMERGENCY)
Facility: HOSPITAL | Age: 78
Discharge: HOME | End: 2023-08-07
Attending: EMERGENCY MEDICINE
Payer: MEDICARE

## 2023-08-07 ENCOUNTER — APPOINTMENT (OUTPATIENT)
Facility: HOSPITAL | Age: 78
End: 2023-08-07
Payer: MEDICARE

## 2023-08-07 VITALS
RESPIRATION RATE: 20 BRPM | TEMPERATURE: 97.9 F | DIASTOLIC BLOOD PRESSURE: 69 MMHG | SYSTOLIC BLOOD PRESSURE: 105 MMHG | HEART RATE: 108 BPM | WEIGHT: 83 LBS | HEIGHT: 60 IN | BODY MASS INDEX: 16.3 KG/M2 | OXYGEN SATURATION: 96 %

## 2023-08-07 DIAGNOSIS — S22.079A CLOSED FRACTURE OF TENTH THORACIC VERTEBRA, UNSPECIFIED FRACTURE MORPHOLOGY, INITIAL ENCOUNTER (HCC): ICD-10-CM

## 2023-08-07 DIAGNOSIS — R19.7 DIARRHEA, UNSPECIFIED TYPE: Primary | ICD-10-CM

## 2023-08-07 LAB
ALBUMIN SERPL ELPH-MCNC: 4.1 G/DL (ref 2.9–4.4)
ALBUMIN SERPL-MCNC: 4.1 G/DL (ref 3.4–5)
ALBUMIN/GLOB SERPL: 1.2 (ref 0.8–1.7)
ALBUMIN/GLOB SERPL: 1.4 (ref 0.7–1.7)
ALP SERPL-CCNC: 67 U/L (ref 45–117)
ALPHA1 GLOB SERPL ELPH-MCNC: 0.3 G/DL (ref 0–0.4)
ALPHA2 GLOB SERPL ELPH-MCNC: 0.7 G/DL (ref 0.4–1)
ALT SERPL-CCNC: 20 U/L (ref 13–56)
ANION GAP SERPL CALC-SCNC: 4 MMOL/L (ref 3–18)
APPEARANCE UR: CLEAR
AST SERPL-CCNC: 22 U/L (ref 10–38)
B-GLOBULIN SERPL ELPH-MCNC: 1 G/DL (ref 0.7–1.3)
BASOPHILS # BLD: 0.1 K/UL (ref 0–0.1)
BASOPHILS NFR BLD: 1 % (ref 0–2)
BILIRUB SERPL-MCNC: 0.5 MG/DL (ref 0.2–1)
BILIRUB UR QL: NEGATIVE
BUN SERPL-MCNC: 12 MG/DL (ref 7–18)
BUN/CREAT SERPL: 17 (ref 12–20)
CALCIUM SERPL-MCNC: 9.7 MG/DL (ref 8.5–10.1)
CHLORIDE SERPL-SCNC: 106 MMOL/L (ref 100–111)
CO2 SERPL-SCNC: 29 MMOL/L (ref 21–32)
COLOR UR: YELLOW
CREAT SERPL-MCNC: 0.71 MG/DL (ref 0.6–1.3)
DIFFERENTIAL METHOD BLD: ABNORMAL
EOSINOPHIL # BLD: 0 K/UL (ref 0–0.4)
EOSINOPHIL NFR BLD: 0 % (ref 0–5)
ERYTHROCYTE [DISTWIDTH] IN BLOOD BY AUTOMATED COUNT: 11.9 % (ref 11.6–14.5)
GAMMA GLOB SERPL ELPH-MCNC: 1 G/DL (ref 0.4–1.8)
GLOBULIN SER CALC-MCNC: 3 G/DL (ref 2.2–3.9)
GLOBULIN SER CALC-MCNC: 3.5 G/DL (ref 2–4)
GLUCOSE SERPL-MCNC: 114 MG/DL (ref 74–99)
GLUCOSE UR STRIP.AUTO-MCNC: NEGATIVE MG/DL
HCT VFR BLD AUTO: 45.4 % (ref 35–45)
HGB BLD-MCNC: 15.3 G/DL (ref 12–16)
HGB UR QL STRIP: NEGATIVE
IMM GRANULOCYTES # BLD AUTO: 0 K/UL (ref 0–0.04)
IMM GRANULOCYTES NFR BLD AUTO: 0 % (ref 0–0.5)
KETONES UR QL STRIP.AUTO: 15 MG/DL
LEUKOCYTE ESTERASE UR QL STRIP.AUTO: NEGATIVE
LYMPHOCYTES # BLD: 1.6 K/UL (ref 0.9–3.6)
LYMPHOCYTES NFR BLD: 19 % (ref 21–52)
M PROTEIN SERPL ELPH-MCNC: NORMAL G/DL
MCH RBC QN AUTO: 32.6 PG (ref 24–34)
MCHC RBC AUTO-ENTMCNC: 33.7 G/DL (ref 31–37)
MCV RBC AUTO: 96.8 FL (ref 78–100)
MONOCYTES # BLD: 0.6 K/UL (ref 0.05–1.2)
MONOCYTES NFR BLD: 7 % (ref 3–10)
NEUTS SEG # BLD: 5.9 K/UL (ref 1.8–8)
NEUTS SEG NFR BLD: 73 % (ref 40–73)
NITRITE UR QL STRIP.AUTO: NEGATIVE
NRBC # BLD: 0 K/UL (ref 0–0.01)
NRBC BLD-RTO: 0 PER 100 WBC
PH UR STRIP: 6.5 (ref 5–8)
PLATELET # BLD AUTO: 241 K/UL (ref 135–420)
PMV BLD AUTO: 9.3 FL (ref 9.2–11.8)
POTASSIUM SERPL-SCNC: 4.6 MMOL/L (ref 3.5–5.5)
PROT SERPL-MCNC: 7.1 G/DL (ref 6–8.5)
PROT SERPL-MCNC: 7.6 G/DL (ref 6.4–8.2)
PROT UR STRIP-MCNC: NEGATIVE MG/DL
RBC # BLD AUTO: 4.69 M/UL (ref 4.2–5.3)
SODIUM SERPL-SCNC: 139 MMOL/L (ref 136–145)
SP GR UR REFRACTOMETRY: >1.03 (ref 1–1.03)
UROBILINOGEN UR QL STRIP.AUTO: 1 EU/DL (ref 0.2–1)
WBC # BLD AUTO: 8.1 K/UL (ref 4.6–13.2)

## 2023-08-07 PROCEDURE — 93005 ELECTROCARDIOGRAM TRACING: CPT | Performed by: PHYSICIAN ASSISTANT

## 2023-08-07 PROCEDURE — 80053 COMPREHEN METABOLIC PANEL: CPT

## 2023-08-07 PROCEDURE — 81003 URINALYSIS AUTO W/O SCOPE: CPT

## 2023-08-07 PROCEDURE — 85025 COMPLETE CBC W/AUTO DIFF WBC: CPT

## 2023-08-07 PROCEDURE — 6360000004 HC RX CONTRAST MEDICATION: Performed by: PHYSICIAN ASSISTANT

## 2023-08-07 PROCEDURE — 74177 CT ABD & PELVIS W/CONTRAST: CPT

## 2023-08-07 PROCEDURE — 71046 X-RAY EXAM CHEST 2 VIEWS: CPT

## 2023-08-07 PROCEDURE — 71275 CT ANGIOGRAPHY CHEST: CPT

## 2023-08-07 PROCEDURE — 99285 EMERGENCY DEPT VISIT HI MDM: CPT

## 2023-08-07 RX ORDER — ONDANSETRON 4 MG/1
4 TABLET, FILM COATED ORAL 3 TIMES DAILY PRN
Qty: 15 TABLET | Refills: 0 | Status: SHIPPED | OUTPATIENT
Start: 2023-08-07

## 2023-08-07 RX ORDER — 0.9 % SODIUM CHLORIDE 0.9 %
1000 INTRAVENOUS SOLUTION INTRAVENOUS ONCE
Status: DISCONTINUED | OUTPATIENT
Start: 2023-08-07 | End: 2023-08-07 | Stop reason: HOSPADM

## 2023-08-07 RX ORDER — ONDANSETRON 2 MG/ML
4 INJECTION INTRAMUSCULAR; INTRAVENOUS
Status: DISCONTINUED | OUTPATIENT
Start: 2023-08-07 | End: 2023-08-07 | Stop reason: HOSPADM

## 2023-08-07 RX ADMIN — IOPAMIDOL 80 ML: 755 INJECTION, SOLUTION INTRAVENOUS at 13:11

## 2023-08-07 ASSESSMENT — ENCOUNTER SYMPTOMS
EYE DISCHARGE: 0
CONSTIPATION: 1
ABDOMINAL PAIN: 1
VOMITING: 0
SORE THROAT: 0
ABDOMINAL DISTENTION: 0
NAUSEA: 0
WHEEZING: 0
SHORTNESS OF BREATH: 1
DIARRHEA: 1

## 2023-08-07 NOTE — ED PROVIDER NOTES
WBC    nRBC 0.00 0.00 - 0.01 K/uL    Neutrophils % 73 40 - 73 %    Lymphocytes % 19 (L) 21 - 52 %    Monocytes % 7 3 - 10 %    Eosinophils % 0 0 - 5 %    Basophils % 1 0 - 2 %    Immature Granulocytes 0 0.0 - 0.5 %    Neutrophils Absolute 5.9 1.8 - 8.0 K/UL    Lymphocytes Absolute 1.6 0.9 - 3.6 K/UL    Monocytes Absolute 0.6 0.05 - 1.2 K/UL    Eosinophils Absolute 0.0 0.0 - 0.4 K/UL    Basophils Absolute 0.1 0.0 - 0.1 K/UL    Absolute Immature Granulocyte 0.0 0.00 - 0.04 K/UL    Differential Type AUTOMATED     Comprehensive Metabolic Panel    Collection Time: 08/07/23 12:00 PM   Result Value Ref Range    Sodium 139 136 - 145 mmol/L    Potassium 4.6 3.5 - 5.5 mmol/L    Chloride 106 100 - 111 mmol/L    CO2 29 21 - 32 mmol/L    Anion Gap 4 3.0 - 18 mmol/L    Glucose 114 (H) 74 - 99 mg/dL    BUN 12 7.0 - 18 MG/DL    Creatinine 0.71 0.6 - 1.3 MG/DL    Bun/Cre Ratio 17 12 - 20      Est, Glom Filt Rate >60 >60 ml/min/1.73m2    Calcium 9.7 8.5 - 10.1 MG/DL    Total Bilirubin 0.5 0.2 - 1.0 MG/DL    ALT 20 13 - 56 U/L    AST 22 10 - 38 U/L    Alk Phosphatase 67 45 - 117 U/L    Total Protein 7.6 6.4 - 8.2 g/dL    Albumin 4.1 3.4 - 5.0 g/dL    Globulin 3.5 2.0 - 4.0 g/dL    Albumin/Globulin Ratio 1.2 0.8 - 1.7     Urinalysis    Collection Time: 08/07/23  3:10 PM   Result Value Ref Range    Color, UA YELLOW      Appearance CLEAR      Specific Gravity, UA >1.030 (H) 1.005 - 1.030    pH, Urine 6.5 5.0 - 8.0      Protein, UA Negative NEG mg/dL    Glucose, UA Negative NEG mg/dL    Ketones, Urine 15 (A) NEG mg/dL    Bilirubin Urine Negative NEG      Blood, Urine Negative NEG      Urobilinogen, Urine 1.0 0.2 - 1.0 EU/dL    Nitrite, Urine Negative NEG      Leukocyte Esterase, Urine Negative NEG         Radiologic Studies -   CT ABDOMEN PELVIS W IV CONTRAST Additional Contrast? None   Final Result      Fluid in the rectum such as might be seen with diarrheal illness.       Tiny liver lesion almost certainly cyst given its conspicuity

## 2023-08-07 NOTE — ED TRIAGE NOTES
Pt to triage c/o abdominal cramping, diarrhea and generalized weakness x one month that has progressively worsened over the past week. Denies any n/v or blood in her stool. States she has not been able to eat and has last roughly 10lbs in the past month.

## 2023-08-08 LAB
EKG ATRIAL RATE: 89 BPM
EKG DIAGNOSIS: NORMAL
EKG P AXIS: 85 DEGREES
EKG P-R INTERVAL: 136 MS
EKG Q-T INTERVAL: 384 MS
EKG QRS DURATION: 84 MS
EKG QTC CALCULATION (BAZETT): 467 MS
EKG R AXIS: 2 DEGREES
EKG T AXIS: 76 DEGREES
EKG VENTRICULAR RATE: 89 BPM

## 2023-08-08 PROCEDURE — 93010 ELECTROCARDIOGRAM REPORT: CPT | Performed by: INTERNAL MEDICINE

## 2023-09-21 RX ORDER — RANITIDINE 150 MG/1
360 CAPSULE ORAL 2 TIMES DAILY
COMMUNITY

## 2023-09-21 NOTE — PROGRESS NOTES
Instructions for your procedure at [facility] [address] Date: 9/21/2023      Patient's Name: Dinorah Hammonds      Procedure Date: 9/28        Please enter the main entrance of the hospital and check-in at the  located in the lobby. Do NOT eat or drink anything, including candy, gum, or ice chips after midnight prior to your procedure, unless it is part of your prep. Brush your teeth before coming to the hospital.You may swish with water, but do not swallow. No smoking/Vaping/E-Cigarettes 24 hours prior to the day of procedure. No alcohol 24 hours prior to the day of procedure. No recreational drugs for one week prior to the day of procedure. Bring Photo ID, Insurance information, and Co-pay if required on day of procedure. Bring in pertinent legal documents, such as, Medical Power of ALTHEA TORRES, DNR, Advance Directive, etc.  Leave all other valuables, including money/purse, at home. Remove jewelry, including ALL body piercings, nail polish, acrylic nails, and makeup (including mascara); no lotions, powders, deodorant, and/or perfume/cologne/after shave on the skin. Glasses and dentures may be worn to the hospital.  They must be removed prior to procedure. Please bring case/container for glasses or dentures. 11. Contacts should not be worn on day of procedure. 12. Call the office ([phone]) if you have symptoms of a cold or illness within 24-48 hours prior to your procedure. 13. AN ADULT (relative or friend 18 years or older) MUST DRIVE YOU HOME AFTER YOUR PROCEDURE. 14. Please make arrangements for a responsible adult (18 years or older) to be with you for 24 hours after your procedure. 13. ONE VISITOR will be allowed in the waiting area during your procedure. Special Instructions:      Bring list of CURRENT medications.   Follow instructions from the office regarding Bowel Prep, Vitamins, Iron, Blood Thinners, Insulin, Seizure, and Blood

## 2023-09-27 ENCOUNTER — ANESTHESIA EVENT (OUTPATIENT)
Facility: HOSPITAL | Age: 78
End: 2023-09-27
Payer: MEDICARE

## 2023-09-27 RX ORDER — SODIUM CHLORIDE, SODIUM LACTATE, POTASSIUM CHLORIDE, CALCIUM CHLORIDE 600; 310; 30; 20 MG/100ML; MG/100ML; MG/100ML; MG/100ML
INJECTION, SOLUTION INTRAVENOUS CONTINUOUS
Status: CANCELLED | OUTPATIENT
Start: 2023-09-27

## 2023-09-27 RX ORDER — LIDOCAINE HYDROCHLORIDE 10 MG/ML
1 INJECTION, SOLUTION EPIDURAL; INFILTRATION; INTRACAUDAL; PERINEURAL
Status: CANCELLED | OUTPATIENT
Start: 2023-09-27 | End: 2023-09-28

## 2023-09-28 ENCOUNTER — ANESTHESIA (OUTPATIENT)
Facility: HOSPITAL | Age: 78
End: 2023-09-28
Payer: MEDICARE

## 2023-09-28 ENCOUNTER — HOSPITAL ENCOUNTER (OUTPATIENT)
Facility: HOSPITAL | Age: 78
Setting detail: OUTPATIENT SURGERY
Discharge: HOME | End: 2023-09-28
Attending: INTERNAL MEDICINE | Admitting: INTERNAL MEDICINE
Payer: MEDICARE

## 2023-09-28 VITALS
TEMPERATURE: 97.8 F | BODY MASS INDEX: 16.3 KG/M2 | HEART RATE: 75 BPM | OXYGEN SATURATION: 99 % | RESPIRATION RATE: 19 BRPM | HEIGHT: 60 IN | DIASTOLIC BLOOD PRESSURE: 69 MMHG | WEIGHT: 83 LBS | SYSTOLIC BLOOD PRESSURE: 114 MMHG

## 2023-09-28 PROCEDURE — 7100000000 HC PACU RECOVERY - FIRST 15 MIN: Performed by: INTERNAL MEDICINE

## 2023-09-28 PROCEDURE — 2500000003 HC RX 250 WO HCPCS: Performed by: NURSE ANESTHETIST, CERTIFIED REGISTERED

## 2023-09-28 PROCEDURE — 6360000002 HC RX W HCPCS: Performed by: NURSE ANESTHETIST, CERTIFIED REGISTERED

## 2023-09-28 PROCEDURE — 3700000001 HC ADD 15 MINUTES (ANESTHESIA): Performed by: INTERNAL MEDICINE

## 2023-09-28 PROCEDURE — 2709999900 HC NON-CHARGEABLE SUPPLY: Performed by: INTERNAL MEDICINE

## 2023-09-28 PROCEDURE — 7100000010 HC PHASE II RECOVERY - FIRST 15 MIN: Performed by: INTERNAL MEDICINE

## 2023-09-28 PROCEDURE — 2580000003 HC RX 258: Performed by: NURSE ANESTHETIST, CERTIFIED REGISTERED

## 2023-09-28 PROCEDURE — 3700000000 HC ANESTHESIA ATTENDED CARE: Performed by: INTERNAL MEDICINE

## 2023-09-28 PROCEDURE — 88305 TISSUE EXAM BY PATHOLOGIST: CPT

## 2023-09-28 PROCEDURE — 3600007513: Performed by: INTERNAL MEDICINE

## 2023-09-28 PROCEDURE — 3600007503: Performed by: INTERNAL MEDICINE

## 2023-09-28 RX ORDER — ONDANSETRON 4 MG/1
4 TABLET, FILM COATED ORAL 3 TIMES DAILY PRN
Qty: 30 TABLET | Refills: 0 | Status: SHIPPED | OUTPATIENT
Start: 2023-09-28

## 2023-09-28 RX ORDER — LIDOCAINE HYDROCHLORIDE 20 MG/ML
INJECTION, SOLUTION EPIDURAL; INFILTRATION; INTRACAUDAL; PERINEURAL PRN
Status: DISCONTINUED | OUTPATIENT
Start: 2023-09-28 | End: 2023-09-28 | Stop reason: SDUPTHER

## 2023-09-28 RX ORDER — PROPOFOL 10 MG/ML
INJECTION, EMULSION INTRAVENOUS PRN
Status: DISCONTINUED | OUTPATIENT
Start: 2023-09-28 | End: 2023-09-28 | Stop reason: SDUPTHER

## 2023-09-28 RX ORDER — SODIUM CHLORIDE, SODIUM LACTATE, POTASSIUM CHLORIDE, CALCIUM CHLORIDE 600; 310; 30; 20 MG/100ML; MG/100ML; MG/100ML; MG/100ML
INJECTION, SOLUTION INTRAVENOUS CONTINUOUS PRN
Status: DISCONTINUED | OUTPATIENT
Start: 2023-09-28 | End: 2023-09-28 | Stop reason: SDUPTHER

## 2023-09-28 RX ADMIN — LIDOCAINE HYDROCHLORIDE 40 MG: 20 INJECTION, SOLUTION EPIDURAL; INFILTRATION; INTRACAUDAL; PERINEURAL at 12:41

## 2023-09-28 RX ADMIN — SODIUM CHLORIDE, SODIUM LACTATE, POTASSIUM CHLORIDE, AND CALCIUM CHLORIDE: 600; 310; 30; 20 INJECTION, SOLUTION INTRAVENOUS at 12:22

## 2023-09-28 RX ADMIN — PROPOFOL 100 MG: 10 INJECTION, EMULSION INTRAVENOUS at 13:05

## 2023-09-28 RX ADMIN — PROPOFOL 30 MG: 10 INJECTION, EMULSION INTRAVENOUS at 13:12

## 2023-09-28 RX ADMIN — PROPOFOL 50 MG: 10 INJECTION, EMULSION INTRAVENOUS at 12:47

## 2023-09-28 RX ADMIN — PROPOFOL 50 MG: 10 INJECTION, EMULSION INTRAVENOUS at 12:54

## 2023-09-28 ASSESSMENT — PAIN SCALES - GENERAL: PAINLEVEL_OUTOF10: 0

## 2023-09-28 NOTE — ANESTHESIA POSTPROCEDURE EVALUATION
Department of Anesthesiology  Postprocedure Note    Patient:  Dana Hunter  MRN: [id number]  YOB: 1945  Date of evaluation: 9/28/2023      Procedure Summary     Date: 09/28/23 Room / Location: [facility] ENDO 02 / [facility] END[facility]COPY    Anesthesia Start: 1239 Anesthesia Stop: 8916    Procedures:       EGD ESOPHAGOGASTRODUODENOSCOPY with Bxs (Upper GI Region)      COLONOSCOPY with Bxs (Abdomen) Diagnosis:       Generalized abdominal pain      Diarrhea, unspecified type      Paraplegia (HCC)      Pancreatic duct dilated      Body mass index less than 16.5      Weight loss      (Generalized abdominal pain [R10.84])      (Diarrhea, unspecified type [R19.7])      (Paraplegia (HCC) [G82.20])      (Pancreatic duct dilated [K86.89])      (Body mass index less than 16.5 [Z68.1])      (Weight loss [R63.4])    Surgeons: Shan Trihn MD Responsible Provider: Diana Harvey MD    Anesthesia Type: General, TIVA ASA Status: 3          Anesthesia Type: General, TIVA    Francisca Phase I: Francisca Score: 10    Francisca Phase II: Francisca Score: 10      Anesthesia Post Evaluation    Patient location during evaluation: PACU  Patient participation: complete - patient participated  Level of consciousness: sleepy but conscious  Pain score: 0  Airway patency: patent  Nausea & Vomiting: no nausea and no vomiting  Complications: no  Cardiovascular status: blood pressure returned to baseline  Respiratory status: acceptable  Hydration status: euvolemic  Pain management: adequate

## 2023-09-28 NOTE — ANESTHESIA PRE PROCEDURE
Department of Anesthesiology  Preprocedure Note       Name:  Iggy Leon   Age:  70 y.o.  :  1945                                          MRN:  [id number]         Date:  2023      Surgeon: Tasneem Sutton):  Dana Espinosa MD    Procedure: Procedure(s):  EGD ESOPHAGOGASTRODUODENOSCOPY DILATION  COLONOSCOPY    Medications prior to admission:   Prior to Admission medications    Medication Sig Start Date End Date Taking? Authorizing Provider   Cyanocobalamin 1000 MCG/ML KIT Twice a Week    Historical Provider, MD   raNITIdine (ZANTAC) 150 MG capsule Take 360 mg by mouth 2 times daily Zantac 360 maximum strength    Historical Provider, MD   ondansetron (ZOFRAN) 4 MG tablet Take 1 tablet by mouth 3 times daily as needed for Nausea or Vomiting  Patient taking differently: Take 1 tablet by mouth 3 times daily as needed for Nausea or Vomiting Dissolvable tablet 23   YARITZA Villalobos   Cyanocobalamin (VITAMIN B-12) 1000 MCG/15ML LIQD Take by mouth Takes 4 times weekly    Historical Provider, MD   polyethylene glycol (GLYCOLAX) 17 GM/SCOOP powder Take 17 g by mouth daily    Historical Provider, MD   pantoprazole (PROTONIX) 40 MG tablet Take 1 tablet by mouth daily    Historical Provider, MD   aspirin 81 MG chewable tablet Take 1 tablet by mouth daily 23   Luigi Nathan MD   acetaminophen (TYLENOL) 500 MG CAPS capsule Take 2 capsules by mouth daily 19   Ar Automatic Reconciliation   busPIRone (BUSPAR) 7.5 MG tablet take 1 tablet by mouth twice a day 20   Ar Automatic Reconciliation   D-Mannose 500 MG CAPS Take by mouth daily    Ar Automatic Reconciliation   estradiol (ESTRACE) 0.1 MG/GM vaginal cream X2 a week 22   Ar Automatic Reconciliation   gabapentin (NEURONTIN) 100 MG capsule Take 1 capsule by mouth. 6 times a day 20   Ar Automatic Reconciliation   traMADol (ULTRAM) 50 MG tablet Take 1 tablet by mouth as needed.     Ar Automatic Reconciliation       Current medications:    No

## 2023-09-28 NOTE — H&P
[email or web address]  [phone]    GASTROENTEROLOGY CONSULT      Impression:   1. Diarrhea  2. Abd pain   3. Hx polyps      Plan:     1. Egd dil colo mac all risks benefits and alt reviewed pt agrees to proceed       Chief Complaint: diarrhea and abd pain       HPI:  Dinorah Hammonds is a 70 y.o. female who I am being asked to see in consultation for an opinion regarding diarrhea abd pain hx polyps .     PMH:   Past Medical History:   Diagnosis Date    Abnormal Pap smear     Dr. Vinicius Maurice    Allergic rhinitis     Arthritis     Cerebrovascular small vessel disease 3/18/2019    CT 3/17/2019    Cervical spinal cord compression (HCC)     Chronic pain     Concentric left ventricular hypertrophy 3/18/2019    With left ventricular diastolic dysfunction by echocardiogram 3/18/2019    GERD (gastroesophageal reflux disease) 11/22/2019    Hypercholesterolemia     Neck pain 5/17/2010    Neuropathy     Stroke ([address]) 10/02/2017    TIA- legs weak memory issues       PSH:   Past Surgical History:   Procedure Laterality Date    CHOLECYSTECTOMY, LAPAROSCOPIC      COLONOSCOPY N/A 6/4/2018    COLONOSCOPY / polypectomy performed by Florence Gustafson MD at [facility]    COLONOSCOPY N/A 8/19/2021    COLONOSCOPY performed by Nanci Billy MD at [address] [place] [address]  11/2019    Cervical fusion    OTHER SURGICAL HISTORY  2017    Throat widened    PLASTIC SURGERY, NECK  2019       Social HX:   Social History     Socioeconomic History    Marital status: Life Partner     Spouse name: Not on file    Number of children: Not on file    Years of education: Not on file    Highest education level: Not on file   Occupational History    Not on file   Tobacco Use    Smoking status: Never     Passive exposure: Never    Smokeless tobacco: Never   Substance and Sexual Activity    Alcohol use: No    Drug use: No    Sexual activity: Not on file   Other Topics Concern    Not on file   Social History Narrative Not on file     Social Determinants of Health     Financial Resource Strain: Not on file   Food Insecurity: Not on file   Transportation Needs: Not on file   Physical Activity: Not on file   Stress: Not on file   Social Connections: Not on file   Intimate Partner Violence: Not on file   Housing Stability: Not on file       FHX:   Family History   Problem Relation Age of Onset    Heart Disease Father     Cancer Mother         breast       Allergy:   Allergies   Allergen Reactions    Baclofen Shortness Of Breath and Nausea And Vomiting     Denies. Pt tolerates  Other reaction(s): gi distress    Morphine Other (See Comments) and Hallucinations     hallucinations  Other reaction(s): other/intolerance  hallucinations  Other reaction(s): delusions  hallucinations      Sulfa Antibiotics Other (See Comments)     Drops blood pressure  Makes extremely tired    Celecoxib Other (See Comments)     Tiredness   Other reaction(s): other/intolerance  Makes her tired  Makes her tired         Patient Active Problem List   Diagnosis    Vertigo    Hyperlipidemia    Concentric left ventricular hypertrophy    Cervical spinal stenosis    Cervical facet syndrome    Cervical cord myelomalacia (HCC)    GERD (gastroesophageal reflux disease)    Weakness    Degenerative disc disease, cervical    Chronic pain syndrome    Anxiety    CVA (cerebral vascular accident) ([address])    Myofascial pain syndrome    TIA (transient ischemic attack)    Cerebrovascular small vessel disease    Cervical neck pain with evidence of disc disease    Quadriplegia ([address])    DNR (do not resuscitate)    Spondylosis of cervical region without myelopathy or radiculopathy    Asthma    Constipation    Encounter for palliative care    Cervical radiculitis    Osteoporosis    History of stroke    Pain    Osteoarthritis of cervical spine       Home Medications:     [unfilled]    Review of Systems:     Constitutional: No fevers, chills, weight loss, fatigue.    Skin: No rashes,

## 2023-09-28 NOTE — DISCHARGE INSTRUCTIONS
have been given to you. Upon discharge from the hospital, you must be accompanied by a responsible adult. Resume your diet as directed by your physician. Resume medications as your physician has prescribed. Please give a list of your current medications to your Primary Care Provider. Please update this list whenever your medications are discontinued, doses are changed, or new medications (including over-the-counter products) are added. Please carry medication information at all times in case of emergency situations. These are general instructions for a healthy lifestyle:    No smoking/ No tobacco products/ Avoid exposure to second hand smoke. Surgeon General's Warning:  Quitting smoking now greatly reduces serious risk to your health. Obesity, smoking, and a sedentary lifestyle greatly increase your risk for illness. A healthy diet, regular physical exercise & weight monitoring are important for maintaining a healthy lifestyle  You may be retaining fluid if you have a history of heart failure or if you experience any of the following symptoms:  Weight gain of 3 pounds or more overnight or 5 pounds in a week, increased swelling in our hands or feet or shortness of breath while lying flat in bed. Please call your doctor as soon as you notice any of these symptoms; do not wait until your next office visit. Recognize signs and symptoms of STROKE:  F  -  Face looks uneven  A  -  Arms unable to move or move unevenly  S  -  Speech slurred or non-existent  T  -  Time to call 911 - as soon as signs and symptoms begin - DO NOT go back to bed or wait to see If you get better - TIME IS BRAIN. Colorectal Screening  Colorectal cancer almost always develops from precancerous polyps (abnormal growths) in the colon or rectum. Screening tests can find precancerous polyps, so that they can be removed before they turn into cancer.  Screening tests can also find colorectal cancer early, when treatment

## 2023-11-02 NOTE — PROGRESS NOTES
PT DAILY TREATMENT NOTE     Patient Name: Donald Ewing  Date:3/18/2021  : 1945  [x]  Patient  Verified  Payor: [place] MEDICARE / Plan: [place] MEDICARE PART A & B / Product Type: Medicare /    In time:130  Out time:215  Total Treatment Time (min): 45  Visit #: 3 of 10    Medicare/BCBS Only   Total Timed Codes (min):  45 1:1 Treatment Time:  45       Treatment Area: Bilateral leg weakness [R29.898]  Weakness of both upper extremities [R29.898]    SUBJECTIVE  Pain Level (0-10 scale): 4  Any medication changes, allergies to medications, adverse drug reactions, diagnosis change, or new procedure performed?: [x] No    [] Yes (see summary sheet for update)  Subjective functional status/changes:   [] No changes reported  \"I'm a little more fatigued today from getting my second COVID vaccine yesterday. \"    OBJECTIVE    Modality rationale: patient declined   Min Type Additional Details    [] Estim:  []Unatt       []IFC  []Premod                        []Other:  []w/ice   []w/heat  Position:  Location:    [] Estim: []Att    []TENS instruct  []NMES                    []Other:  []w/US   []w/ice   []w/heat  Position:  Location:    []  Traction: [] Cervical       []Lumbar                       [] Prone          []Supine                       []Intermittent   []Continuous Lbs:  [] before manual  [] after manual    []  Ultrasound: []Continuous   [] Pulsed                           []1MHz   []3MHz W/cm2:  Location:    []  Iontophoresis with dexamethasone         Location: [] Take home patch   [] In clinic    []  Ice     []  heat  []  Ice massage  []  Laser   []  Anodyne Position:  Location:    []  Laser with stim  []  Other:  Position:  Location:    []  Vasopneumatic Device Pressure:       [] lo [] med [] hi   Temperature: [] lo [] med [] hi   [] Skin assessment post-treatment:  []intact []redness- no adverse reaction    []redness - adverse reaction:     10 min Therapeutic Exercise:  [x] See flow sheet :   Rationale: increase What Type Of Note Output Would You Prefer (Optional)?: Standard Output Have Your Spot(S) Been Treated In The Past?: has not been treated ROM and increase strength to improve the patients ability to perform ADLs    20 min Therapeutic Activity:  [x]  See flow sheet : step ups, sit to stands   Rationale: increase ROM, increase strength, improve coordination, improve balance and increase proprioception  to improve the patients ability to improve mobility and ADL performance     15 min Neuromuscular Re-education:  [x]  See flow sheet : balance training   Rationale: increase ROM, increase strength, improve coordination, improve balance and increase proprioception  to improve the patients ability to improve mobility and decrease fall risk        With   [x] TE   [x] TA   [x] neuro   [] other: Patient Education: [x] Review HEP    [] Progressed/Changed HEP based on:   [x] positioning   [x] body mechanics   [] transfers   [] heat/ice application    [] other:      Other Objective/Functional Measures:      Pain Level (0-10 scale) post treatment: 4    ASSESSMENT/Changes in Function: Pt was more fatigued today secondary from side effects from getting her second COVID-19 vaccine yesterday. Balance remains challenged especially with eyes closed and when UE assist is removed. [place] She was able to complete sit to stands without UE assist without LOB. Patient will continue to benefit from skilled PT services to modify and progress therapeutic interventions, address functional mobility deficits, address ROM deficits, address strength deficits, analyze and address soft tissue restrictions, analyze and cue movement patterns, analyze and modify body mechanics/ergonomics, assess and modify postural abnormalities, address imbalance/dizziness and instruct in home and community integration to attain remaining goals. [x]  See Plan of Care  []  See progress note/recertification  []  See Discharge Summary         Progress towards goals / Updated goals:  1. Pt will report compliance and independence to HEP to help the pt manage their pain and symptoms.          Eval: established              Reports compliance     Long Term Goals: To be accomplished in 10 treatments:  1. Pt will report at least 50% improvement in pain/symptoms to improve dynamic balance and gait.               Eval: 0%   Too soon to see progress  2. Pt will increase MMT right hip flex to 4-/5, ER to 4-/5, IR to 3/5 to improve household mobility. Eval: right hip flex 3+/5, ER 3+/5, IR 3/5   Too soon to see progress  3. Pt will improve TUG time to 15 seconds or less with rollator to improve the pt's dynamic stability and fall risk. Eval: 18 seconds with rollator.    Assess at later visit  4.  Pt will be able to descend 4 stairs with B handrails with alternating step pattern to improve safety and efficiency with mobility.               Eval: reports using 2 handrails and takes 1 step at a time with descending stairs at home, report she can do alternating step pattern with ascending stairs sometimes.     Challenged with stairs    PLAN  []  Upgrade activities as tolerated     [x]  Continue plan of care  []  Update interventions per flow sheet       []  Discharge due to:_  []  Other:_      Araceli Galeazzi, CHRISTIAN, CSCS 3/18/2021  3:22 PM    Future Appointments   Date Time Provider Gaby Delarosa   3/22/2021  2:15 PM Anand Carnes PTA [facility]   3/25/2021  1:30 PM Anand Carnes PTA [facility]   3/29/2021  1:30 PM Denzel Solitario PT [facility]   3/30/2021  4:30 PM Janell Persaud RD [facility]   4/1/2021  1:30 PM Anand Carnes PTA [facility]   4/5/2021  1:30 PM Ahmet Trivedi PT [facility]   4/8/2021  1:30 PM Anand Carnes PTA [facility]   4/12/2021  [facility]30 [facility]   4/16/2021  1:00 PM [facility] INFUSION PHLEBOTOMIST [facility]   4/19/2021  1:00 PM [facility] FAST TRACK NURSE [facility]   6/3/2021 10:00 AM [facility] of [facility] Lesions

## 2023-12-12 RX ORDER — SODIUM CHLORIDE 9 MG/ML
INJECTION, SOLUTION INTRAVENOUS CONTINUOUS
Status: CANCELLED | OUTPATIENT
Start: 2023-12-14

## 2023-12-12 RX ORDER — SODIUM CHLORIDE 9 MG/ML
5-250 INJECTION, SOLUTION INTRAVENOUS PRN
Status: CANCELLED | OUTPATIENT
Start: 2023-12-14

## 2023-12-12 RX ORDER — ALBUTEROL SULFATE 90 UG/1
4 AEROSOL, METERED RESPIRATORY (INHALATION) PRN
Status: CANCELLED | OUTPATIENT
Start: 2023-12-14

## 2023-12-12 RX ORDER — ACETAMINOPHEN 325 MG/1
650 TABLET ORAL
Status: CANCELLED | OUTPATIENT
Start: 2023-12-14

## 2023-12-12 RX ORDER — DIPHENHYDRAMINE HYDROCHLORIDE 50 MG/ML
50 INJECTION INTRAMUSCULAR; INTRAVENOUS
Status: CANCELLED | OUTPATIENT
Start: 2023-12-14

## 2023-12-12 RX ORDER — EPINEPHRINE 1 MG/ML
0.3 INJECTION, SOLUTION, CONCENTRATE INTRAVENOUS PRN
Status: CANCELLED | OUTPATIENT
Start: 2023-12-14

## 2023-12-12 RX ORDER — HEPARIN 100 UNIT/ML
500 SYRINGE INTRAVENOUS PRN
Status: CANCELLED | OUTPATIENT
Start: 2023-12-14

## 2023-12-12 RX ORDER — ONDANSETRON 2 MG/ML
8 INJECTION INTRAMUSCULAR; INTRAVENOUS
Status: CANCELLED | OUTPATIENT
Start: 2023-12-14

## 2023-12-14 ENCOUNTER — HOSPITAL ENCOUNTER (OUTPATIENT)
Facility: HOSPITAL | Age: 78
Setting detail: INFUSION SERIES
End: 2023-12-14
Payer: MEDICARE

## 2023-12-14 VITALS
TEMPERATURE: 97.6 F | SYSTOLIC BLOOD PRESSURE: 155 MMHG | BODY MASS INDEX: 17.15 KG/M2 | DIASTOLIC BLOOD PRESSURE: 65 MMHG | WEIGHT: 87.8 LBS | RESPIRATION RATE: 16 BRPM | OXYGEN SATURATION: 97 % | HEART RATE: 68 BPM

## 2023-12-14 DIAGNOSIS — M81.0 OSTEOPOROSIS, UNSPECIFIED OSTEOPOROSIS TYPE, UNSPECIFIED PATHOLOGICAL FRACTURE PRESENCE: Primary | ICD-10-CM

## 2023-12-14 PROCEDURE — 2580000003 HC RX 258: Performed by: INTERNAL MEDICINE

## 2023-12-14 PROCEDURE — 6360000002 HC RX W HCPCS: Performed by: INTERNAL MEDICINE

## 2023-12-14 PROCEDURE — 96365 THER/PROPH/DIAG IV INF INIT: CPT

## 2023-12-14 PROCEDURE — 6370000000 HC RX 637 (ALT 250 FOR IP): Performed by: INTERNAL MEDICINE

## 2023-12-14 RX ORDER — DIPHENHYDRAMINE HYDROCHLORIDE 50 MG/ML
50 INJECTION INTRAMUSCULAR; INTRAVENOUS
OUTPATIENT
Start: 2023-12-14

## 2023-12-14 RX ORDER — SODIUM CHLORIDE 0.9 % (FLUSH) 0.9 %
5-40 SYRINGE (ML) INJECTION PRN
Status: CANCELLED | OUTPATIENT
Start: 2023-12-14

## 2023-12-14 RX ORDER — ACETAMINOPHEN 325 MG/1
650 TABLET ORAL ONCE
Status: COMPLETED | OUTPATIENT
Start: 2023-12-14 | End: 2023-12-14

## 2023-12-14 RX ORDER — SODIUM CHLORIDE 9 MG/ML
5-250 INJECTION, SOLUTION INTRAVENOUS PRN
Status: ACTIVE | OUTPATIENT
Start: 2023-12-14 | End: 2023-12-15

## 2023-12-14 RX ORDER — ACETAMINOPHEN 325 MG/1
650 TABLET ORAL
OUTPATIENT
Start: 2023-12-14

## 2023-12-14 RX ORDER — SODIUM CHLORIDE 9 MG/ML
5-250 INJECTION, SOLUTION INTRAVENOUS PRN
Status: CANCELLED | OUTPATIENT
Start: 2023-12-14

## 2023-12-14 RX ORDER — SODIUM CHLORIDE 9 MG/ML
INJECTION, SOLUTION INTRAVENOUS CONTINUOUS
OUTPATIENT
Start: 2023-12-14

## 2023-12-14 RX ORDER — ZOLEDRONIC ACID 5 MG/100ML
5 INJECTION, SOLUTION INTRAVENOUS ONCE
Status: COMPLETED | OUTPATIENT
Start: 2023-12-14 | End: 2023-12-14

## 2023-12-14 RX ORDER — SODIUM CHLORIDE 0.9 % (FLUSH) 0.9 %
5-40 SYRINGE (ML) INJECTION PRN
Status: ACTIVE | OUTPATIENT
Start: 2023-12-14 | End: 2023-12-15

## 2023-12-14 RX ORDER — EPINEPHRINE 1 MG/ML
0.3 INJECTION, SOLUTION, CONCENTRATE INTRAVENOUS PRN
OUTPATIENT
Start: 2023-12-14

## 2023-12-14 RX ORDER — ALBUTEROL SULFATE 90 UG/1
4 AEROSOL, METERED RESPIRATORY (INHALATION) PRN
OUTPATIENT
Start: 2023-12-14

## 2023-12-14 RX ORDER — ONDANSETRON 2 MG/ML
8 INJECTION INTRAMUSCULAR; INTRAVENOUS
OUTPATIENT
Start: 2023-12-14

## 2023-12-14 RX ORDER — SODIUM CHLORIDE 9 MG/ML
5-250 INJECTION, SOLUTION INTRAVENOUS PRN
OUTPATIENT
Start: 2023-12-14

## 2023-12-14 RX ORDER — HEPARIN 100 UNIT/ML
500 SYRINGE INTRAVENOUS PRN
OUTPATIENT
Start: 2023-12-14

## 2023-12-14 RX ORDER — ZOLEDRONIC ACID 5 MG/100ML
5 INJECTION, SOLUTION INTRAVENOUS ONCE
Status: CANCELLED | OUTPATIENT
Start: 2023-12-14 | End: 2023-12-14

## 2023-12-14 RX ORDER — ZOLEDRONIC ACID 5 MG/100ML
5 INJECTION, SOLUTION INTRAVENOUS ONCE
COMMUNITY

## 2023-12-14 RX ADMIN — ZOLEDRONIC ACID 5 MG: 0.05 INJECTION, SOLUTION INTRAVENOUS at 13:55

## 2023-12-14 RX ADMIN — SODIUM CHLORIDE 30 ML/HR: 9 INJECTION, SOLUTION INTRAVENOUS at 13:45

## 2023-12-14 RX ADMIN — ACETAMINOPHEN 650 MG: 325 TABLET ORAL at 14:30

## 2023-12-14 RX ADMIN — SODIUM CHLORIDE, PRESERVATIVE FREE 10 ML: 5 INJECTION INTRAVENOUS at 13:40

## 2023-12-14 ASSESSMENT — PAIN - FUNCTIONAL ASSESSMENT: PAIN_FUNCTIONAL_ASSESSMENT: 0-10

## 2024-03-20 ENCOUNTER — OFFICE VISIT (OUTPATIENT)
Age: 79
End: 2024-03-20
Payer: MEDICARE

## 2024-03-20 VITALS — HEIGHT: 60 IN | WEIGHT: 86 LBS | TEMPERATURE: 98.2 F | BODY MASS INDEX: 16.88 KG/M2

## 2024-03-20 DIAGNOSIS — M25.662 DECREASED RANGE OF MOTION (ROM) OF LEFT KNEE: ICD-10-CM

## 2024-03-20 DIAGNOSIS — R63.6 LOW BODY WEIGHT DUE TO INADEQUATE CALORIC INTAKE: ICD-10-CM

## 2024-03-20 DIAGNOSIS — M17.12 ARTHRITIS OF KNEE, LEFT: ICD-10-CM

## 2024-03-20 DIAGNOSIS — M25.562 LEFT KNEE PAIN, UNSPECIFIED CHRONICITY: Primary | ICD-10-CM

## 2024-03-20 PROCEDURE — G8484 FLU IMMUNIZE NO ADMIN: HCPCS | Performed by: PHYSICIAN ASSISTANT

## 2024-03-20 PROCEDURE — G8419 CALC BMI OUT NRM PARAM NOF/U: HCPCS | Performed by: PHYSICIAN ASSISTANT

## 2024-03-20 PROCEDURE — 1123F ACP DISCUSS/DSCN MKR DOCD: CPT | Performed by: PHYSICIAN ASSISTANT

## 2024-03-20 PROCEDURE — 99213 OFFICE O/P EST LOW 20 MIN: CPT | Performed by: PHYSICIAN ASSISTANT

## 2024-03-20 PROCEDURE — 1036F TOBACCO NON-USER: CPT | Performed by: PHYSICIAN ASSISTANT

## 2024-03-20 PROCEDURE — G8428 CUR MEDS NOT DOCUMENT: HCPCS | Performed by: PHYSICIAN ASSISTANT

## 2024-03-20 PROCEDURE — G8399 PT W/DXA RESULTS DOCUMENT: HCPCS | Performed by: PHYSICIAN ASSISTANT

## 2024-03-20 PROCEDURE — 73562 X-RAY EXAM OF KNEE 3: CPT | Performed by: PHYSICIAN ASSISTANT

## 2024-03-20 PROCEDURE — 1090F PRES/ABSN URINE INCON ASSESS: CPT | Performed by: PHYSICIAN ASSISTANT

## 2024-03-20 NOTE — PROGRESS NOTES
Patient: Sue Craig                MRN: [id number]       SSN: [id number]  YOB: 1945        AGE: 78 y.o.        SEX: female      OFFICE NOTE DICTATED    PCP: Suhas Huang MD  03/20/24    Chief Complaint   Patient presents with    Knee Pain     Left         HISTORY:    Sue Craig is a 78 y.o. female returns to the office with complaint of left knee pain.  Pain associates with rest and activity.  Patient is a limited home ambulator.  She does use a mobility device for transport.  She has a caregiver who lives with her.  She is well-known to our office and has been treated successfully in the past for lumbar and cervical concerns.    No trauma reported to the left knee.  Pain has not responded to Tylenol with lidocaine topical cream gel.            No results found for: \"HBA1C\", \"[id number]\"      3/20/2024     1:31 PM 12/14/2023     1:05 PM 9/21/2023     8:58 AM 8/7/2023    10:12 AM 7/31/2023     1:23 PM 2/28/2023     8:20 AM 1/23/2023    10:07 AM   Weight Metrics   Weight 86 lb 87 lb 12.8 oz 83 lb 83 lb 83 lb 3.2 oz 90 lb 12.8 oz 90 lb   BMI (Calculated) 16.8 kg/m2 0 kg/m2 16.2 kg/m2 16.2 kg/m2 16.3 kg/m2 17.8 kg/m2 17.6 kg/m2          Problem List Items Addressed This Visit    None  Visit Diagnoses       Left knee pain, unspecified chronicity    -  Primary    Relevant Orders    [07775] Knee 3V (Completed)            PAST MEDICAL HISTORY:       Diagnosis Date    Abnormal Pap smear     Dr. Montana    Allergic rhinitis     Arthritis     Cerebrovascular small vessel disease 3/18/2019    CT 3/17/2019    Cervical spinal cord compression (HCC)     Chronic pain     Concentric left ventricular hypertrophy 3/18/2019    With left ventricular diastolic dysfunction by echocardiogram 3/18/2019    GERD (gastroesophageal reflux disease) 11/22/2019    Hypercholesterolemia     Neck pain 5/17/2010    Neuropathy     Osteoporosis     Stroke (HCC) 10/02/2017    TIA- legs weak memory issues        PAST SURGICAL

## 2024-09-01 ENCOUNTER — HOSPITAL ENCOUNTER (EMERGENCY)
Facility: HOSPITAL | Age: 79
Discharge: HOME | End: 2024-09-01
Attending: EMERGENCY MEDICINE
Payer: MEDICARE

## 2024-09-01 ENCOUNTER — APPOINTMENT (OUTPATIENT)
Facility: HOSPITAL | Age: 79
End: 2024-09-01
Payer: MEDICARE

## 2024-09-01 VITALS
SYSTOLIC BLOOD PRESSURE: 138 MMHG | HEIGHT: 60 IN | RESPIRATION RATE: 20 BRPM | DIASTOLIC BLOOD PRESSURE: 76 MMHG | BODY MASS INDEX: 17.08 KG/M2 | OXYGEN SATURATION: 94 % | HEART RATE: 78 BPM | TEMPERATURE: 97.9 F | WEIGHT: 87 LBS

## 2024-09-01 DIAGNOSIS — E86.0 DEHYDRATION: Primary | ICD-10-CM

## 2024-09-01 DIAGNOSIS — K59.00 CONSTIPATION, UNSPECIFIED CONSTIPATION TYPE: ICD-10-CM

## 2024-09-01 LAB
ALBUMIN SERPL-MCNC: 3.6 G/DL (ref 3.4–5)
ALBUMIN/GLOB SERPL: 1 (ref 0.8–1.7)
ALP SERPL-CCNC: 59 U/L (ref 45–117)
ALT SERPL-CCNC: 19 U/L (ref 13–56)
ANION GAP SERPL CALC-SCNC: 6 MMOL/L (ref 3–18)
APPEARANCE UR: CLEAR
AST SERPL-CCNC: 18 U/L (ref 10–38)
BASOPHILS # BLD: 0 K/UL (ref 0–0.1)
BASOPHILS NFR BLD: 0 % (ref 0–2)
BILIRUB SERPL-MCNC: 0.4 MG/DL (ref 0.2–1)
BILIRUB UR QL: NEGATIVE
BUN SERPL-MCNC: 17 MG/DL (ref 7–18)
BUN/CREAT SERPL: 21 (ref 12–20)
CALCIUM SERPL-MCNC: 9.3 MG/DL (ref 8.5–10.1)
CHLORIDE SERPL-SCNC: 107 MMOL/L (ref 100–111)
CO2 SERPL-SCNC: 28 MMOL/L (ref 21–32)
COLOR UR: YELLOW
CREAT SERPL-MCNC: 0.81 MG/DL (ref 0.6–1.3)
DIFFERENTIAL METHOD BLD: ABNORMAL
EOSINOPHIL # BLD: 0 K/UL (ref 0–0.4)
EOSINOPHIL NFR BLD: 0 % (ref 0–5)
ERYTHROCYTE [DISTWIDTH] IN BLOOD BY AUTOMATED COUNT: 12.5 % (ref 11.6–14.5)
GLOBULIN SER CALC-MCNC: 3.7 G/DL (ref 2–4)
GLUCOSE SERPL-MCNC: 165 MG/DL (ref 74–99)
GLUCOSE UR STRIP.AUTO-MCNC: NEGATIVE MG/DL
HCT VFR BLD AUTO: 45.7 % (ref 35–45)
HGB BLD-MCNC: 15.4 G/DL (ref 12–16)
HGB UR QL STRIP: NEGATIVE
IMM GRANULOCYTES # BLD AUTO: 0 K/UL (ref 0–0.04)
IMM GRANULOCYTES NFR BLD AUTO: 0 % (ref 0–0.5)
KETONES UR QL STRIP.AUTO: NEGATIVE MG/DL
LEUKOCYTE ESTERASE UR QL STRIP.AUTO: NEGATIVE
LIPASE SERPL-CCNC: 50 U/L (ref 13–75)
LYMPHOCYTES # BLD: 1.2 K/UL (ref 0.9–3.6)
LYMPHOCYTES NFR BLD: 12 % (ref 21–52)
MCH RBC QN AUTO: 33.4 PG (ref 24–34)
MCHC RBC AUTO-ENTMCNC: 33.7 G/DL (ref 31–37)
MCV RBC AUTO: 99.1 FL (ref 78–100)
MONOCYTES # BLD: 0.3 K/UL (ref 0.05–1.2)
MONOCYTES NFR BLD: 3 % (ref 3–10)
NEUTS SEG # BLD: 8.3 K/UL (ref 1.8–8)
NEUTS SEG NFR BLD: 85 % (ref 40–73)
NITRITE UR QL STRIP.AUTO: NEGATIVE
NRBC # BLD: 0 K/UL (ref 0–0.01)
NRBC BLD-RTO: 0 PER 100 WBC
PH UR STRIP: 8.5 (ref 5–8)
PLATELET # BLD AUTO: 236 K/UL (ref 135–420)
PMV BLD AUTO: 9 FL (ref 9.2–11.8)
POTASSIUM SERPL-SCNC: 3.8 MMOL/L (ref 3.5–5.5)
PROT SERPL-MCNC: 7.3 G/DL (ref 6.4–8.2)
PROT UR STRIP-MCNC: NEGATIVE MG/DL
RBC # BLD AUTO: 4.61 M/UL (ref 4.2–5.3)
SODIUM SERPL-SCNC: 141 MMOL/L (ref 136–145)
SP GR UR REFRACTOMETRY: 1.01 (ref 1–1.03)
UROBILINOGEN UR QL STRIP.AUTO: 0.2 EU/DL (ref 0.2–1)
WBC # BLD AUTO: 9.8 K/UL (ref 4.6–13.2)

## 2024-09-01 PROCEDURE — 81003 URINALYSIS AUTO W/O SCOPE: CPT

## 2024-09-01 PROCEDURE — 80053 COMPREHEN METABOLIC PANEL: CPT

## 2024-09-01 PROCEDURE — 93005 ELECTROCARDIOGRAM TRACING: CPT | Performed by: EMERGENCY MEDICINE

## 2024-09-01 PROCEDURE — 74022 RADEX COMPL AQT ABD SERIES: CPT

## 2024-09-01 PROCEDURE — 99285 EMERGENCY DEPT VISIT HI MDM: CPT

## 2024-09-01 PROCEDURE — 96361 HYDRATE IV INFUSION ADD-ON: CPT

## 2024-09-01 PROCEDURE — 96360 HYDRATION IV INFUSION INIT: CPT

## 2024-09-01 PROCEDURE — 2580000003 HC RX 258: Performed by: EMERGENCY MEDICINE

## 2024-09-01 PROCEDURE — 83690 ASSAY OF LIPASE: CPT

## 2024-09-01 PROCEDURE — 85025 COMPLETE CBC W/AUTO DIFF WBC: CPT

## 2024-09-01 RX ORDER — SODIUM CHLORIDE 9 MG/ML
INJECTION, SOLUTION INTRAVENOUS CONTINUOUS
Status: DISCONTINUED | OUTPATIENT
Start: 2024-09-01 | End: 2024-09-01 | Stop reason: HOSPADM

## 2024-09-01 RX ORDER — 0.9 % SODIUM CHLORIDE 0.9 %
1000 INTRAVENOUS SOLUTION INTRAVENOUS ONCE
Status: COMPLETED | OUTPATIENT
Start: 2024-09-01 | End: 2024-09-01

## 2024-09-01 RX ADMIN — SODIUM CHLORIDE: 9 INJECTION, SOLUTION INTRAVENOUS at 17:36

## 2024-09-01 RX ADMIN — SODIUM CHLORIDE 1000 ML: 9 INJECTION, SOLUTION INTRAVENOUS at 17:38

## 2024-09-01 ASSESSMENT — PAIN - FUNCTIONAL ASSESSMENT
PAIN_FUNCTIONAL_ASSESSMENT: NONE - DENIES PAIN
PAIN_FUNCTIONAL_ASSESSMENT: NONE - DENIES PAIN
PAIN_FUNCTIONAL_ASSESSMENT: 0-10

## 2024-09-01 ASSESSMENT — LIFESTYLE VARIABLES
HOW MANY STANDARD DRINKS CONTAINING ALCOHOL DO YOU HAVE ON A TYPICAL DAY: PATIENT DOES NOT DRINK
HOW OFTEN DO YOU HAVE A DRINK CONTAINING ALCOHOL: NEVER

## 2024-09-01 ASSESSMENT — PAIN SCALES - GENERAL: PAINLEVEL_OUTOF10: 9

## 2024-09-01 NOTE — DISCHARGE INSTRUCTIONS
Keep yourself well-hydrated, and work with your doctors regarding good plan for your constipation.  Your x-ray showed that you have good amount of stool however none of it appears to be hard or obstructing you at this point.  You can continue to use MiraLAX to help keep your stools loose and discuss other ways to improve your constipation with your gastroenterologist and primary provider.  Please return if you are at all worsened or concerned.

## 2024-09-01 NOTE — ED PROVIDER NOTES
EMERGENCY DEPARTMENT HISTORY AND PHYSICAL EXAM    6:51 PM      Date: 9/1/2024  Patient Name: Sue Craig    History of Presenting Illness     Chief Complaint   Patient presents with    Abdominal Pain    Shortness of Breath    Emesis    Nausea    Diarrhea       History From: Patient    Sue Craig is a 79 y.o. 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  The patient said 2 weeks ago she was having diarrhea but now it is more of a loose stool and has been having urinary tract infections and recent was treated with an antibiotic which she just completed in the last 2 weeks.  The patient today was feeling fatigued, family was concerned that have is she was having increased fatigue and 1 to make sure she was evaluated.  There been no fevers or chills and the patient's been having pain throughout her body which is suggestive of her chronic pain and is not being controlled within the Neurontin as it was in the past.  Patient has the pain management doctor and primary doctor following to see them but not getting much improvement which is frustrating her and frustrating her .  Patient is not a smoker, drinker, no drug user.  The patient has not had any recent travel.           Nursing Notes were all reviewed and agreed with or any disagreements were addressed in the HPI.    PCP: Suhas Huang MD    Current Facility-Administered Medications   Medication Dose Route Frequency Provider Last Rate Last Admin    0.9 % sodium chloride infusion   IntraVENous Continuous Blas Bell  mL/hr at 09/01/24 1736 New Bag at 09/01/24 1736     Current Outpatient Medications   Medication Sig Dispense Refill    diclofenac sodium (VOLTAREN) 1 % GEL

## 2024-09-01 NOTE — ED TRIAGE NOTES
Pt states that she has been having abdominal pain, N/V/D intermittently and SOB for 6 months. The last 2 days her Sx have been worse and her  made her come to the ED. She is seeing a GI doctor. Pt reports 15 Lb weight loss.

## 2024-09-01 NOTE — ED NOTES
Patient up for discharge. Verbals and written discharge instructions given, and patient verbalized understanding of. There were no personal belongings, valuables or home medications left at patient's bedside observed.

## 2024-09-01 NOTE — ED NOTES
Chaperoned Provider for rectal exam, noting stool in vault soft and brown. Patient tolerated well.

## 2024-09-01 NOTE — ED NOTES
Patient hourly rounding in progress. Patient awaiting test results. No new concerns/complaints at this time. Call bell remains within reach. Bed remains in lowest position with casters locked. Will continue to monitor. Lights dimmed and warm blanket provided for comfort. Encouraged to voice and concerns and all concerns addressed.

## 2024-09-03 LAB
EKG ATRIAL RATE: 85 BPM
EKG DIAGNOSIS: NORMAL
EKG P AXIS: 81 DEGREES
EKG P-R INTERVAL: 132 MS
EKG Q-T INTERVAL: 360 MS
EKG QRS DURATION: 76 MS
EKG QTC CALCULATION (BAZETT): 428 MS
EKG R AXIS: 57 DEGREES
EKG T AXIS: 84 DEGREES
EKG VENTRICULAR RATE: 85 BPM

## 2024-09-03 PROCEDURE — 93010 ELECTROCARDIOGRAM REPORT: CPT | Performed by: INTERNAL MEDICINE

## 2024-09-29 NOTE — PROGRESS NOTES
HISTORY OF PRESENT ILLNESS  Sue Craig is a 79 y.o. female.    PMH history of CVA, HLD,   ----  CARDIAC STUDIES  ----  Vascular duplex abdominal viscerla arteries 12/2022  There appears to be no evidence of mesenteric artery stenosis  Patent IVC and SMV  Normal Size Aorta (No AAA)  ----  Nuclear stress test 11/2/2021  · Baseline ECG: Normal EKG.  · SPECT: Left ventricular function post-stress was normal. Calculated ejection fraction is 58% (normal EF value is equal to or greater than 50%). Left ventricular wall motion was normal at stress, no regional wall motion abnormality noted. There is no evidence of transient ischemic dilation (TID).The TID ratio is 0.87.  · SPECT: Left ventricular perfusion is normal.  · SPECT: Left ventricular perfusion is normal. Myocardial perfusion imaging supports a low risk stress test.  ----  2d echo 3/18/2019    · Saline contrast was given to evaluate for intracardiac shunt. No shunt seen.  · Estimated left ventricular ejection fraction is 56 - 60%. Visually measured ejection fraction. Left ventricular cavity size is decreased. Left ventricular mild concentric hypertrophy. No regional wall motion abnormality noted. Age-appropriate left ventricular diastolic function.  · There is no evidence of pulmonary hypertension  ----    Have you had Fatigue?  No      2.   Have you had have you had Chest Pain? No      3.   Have you had Dyspnea (SOB) ? No   4.   Have you had Orthopnea? No      5.   Have you had PND? No   6.   Have you had leg swelling? No  7.    Have you had any weight gain? No      8. Have you had any palpitations? No    9. Have you had any syncope? No   10. Do you have any wounds on legs?no       Reviewed with the patient and is as such.        Family History   Problem Relation Age of Onset    Heart Disease Father     Cancer Mother         breast         Past Medical History:   Diagnosis Date    Abnormal Pap smear     Dr. Montana    Allergic rhinitis     Arthritis

## 2024-10-09 ENCOUNTER — OFFICE VISIT (OUTPATIENT)
Age: 79
End: 2024-10-09

## 2024-10-09 VITALS
WEIGHT: 81.6 LBS | HEART RATE: 98 BPM | BODY MASS INDEX: 16.02 KG/M2 | DIASTOLIC BLOOD PRESSURE: 59 MMHG | HEIGHT: 60 IN | OXYGEN SATURATION: 94 % | SYSTOLIC BLOOD PRESSURE: 93 MMHG

## 2024-10-09 DIAGNOSIS — Z86.73 HISTORY OF CVA (CEREBROVASCULAR ACCIDENT): Primary | ICD-10-CM

## 2024-10-09 RX ORDER — LIDOCAINE 50 MG/G
1 PATCH TOPICAL DAILY
COMMUNITY

## 2024-10-09 RX ORDER — BISMUTH SUBSALICYLATE 262 MG/1
524 TABLET, CHEWABLE ORAL AS NEEDED
COMMUNITY

## 2024-10-09 RX ORDER — SIMETHICONE 80 MG
80 TABLET,CHEWABLE ORAL EVERY 6 HOURS PRN
COMMUNITY

## 2024-10-09 RX ORDER — ESOMEPRAZOLE MAGNESIUM 40 MG/1
40 GRANULE, DELAYED RELEASE ORAL DAILY
COMMUNITY

## 2024-10-09 RX ORDER — ATORVASTATIN CALCIUM 20 MG/1
20 TABLET, FILM COATED ORAL DAILY
Qty: 30 TABLET | Refills: 3 | Status: SHIPPED | OUTPATIENT
Start: 2024-10-09

## 2024-10-09 RX ORDER — LOPERAMIDE HCL 2 MG
2 CAPSULE ORAL AS NEEDED
COMMUNITY

## 2024-10-09 RX ORDER — ATORVASTATIN CALCIUM 20 MG/1
20 TABLET, FILM COATED ORAL DAILY
Qty: 30 TABLET | Refills: 3 | Status: SHIPPED | OUTPATIENT
Start: 2024-10-09 | End: 2024-10-09

## 2024-10-09 RX ORDER — MIDODRINE HYDROCHLORIDE 2.5 MG/1
2.5 TABLET ORAL AS NEEDED
COMMUNITY

## 2024-10-09 ASSESSMENT — ENCOUNTER SYMPTOMS
COLOR CHANGE: 0
WHEEZING: 0
CHEST TIGHTNESS: 0
BLOOD IN STOOL: 0
COUGH: 0
SHORTNESS OF BREATH: 0
NAUSEA: 0
ABDOMINAL PAIN: 0
APNEA: 0
DIARRHEA: 0
CONSTIPATION: 0

## 2024-10-09 NOTE — PROGRESS NOTES
Have you had Fatigue?  No     2.   Have you had have you had Chest Pain? No     3.   Have you had Dyspnea (SOB) ? No   4.   Have you had Orthopnea? No     5.   Have you had PND? No   6.   Have you had leg swelling? No  7.    Have you had any weight gain? No     8. Have you had any palpitations? No    9. Have you had any syncope? No   10. Do you have any wounds on legs?no

## 2024-10-09 NOTE — PATIENT INSTRUCTIONS
Learning About the Mediterranean Diet  What is the Mediterranean diet?     The Mediterranean diet is a style of eating rather than a diet plan. It features foods eaten in [place], [place], [place], and other countries along the [place] Sea. It emphasizes eating foods like fish, fruits, vegetables, beans, high-fiber breads and whole grains, nuts, and olive oil. This style of eating includes limited red meat, cheese, and sweets.  Why choose the Mediterranean diet?  A Mediterranean-style diet may improve heart health. It contains more fat than other heart-healthy diets. But the fats are mainly from nuts, unsaturated oils (such as fish oils and olive oil), and certain nut or seed oils (such as canola, soybean, or flaxseed oil). These fats may help protect the heart and blood vessels.  How can you get started on the Mediterranean diet?  Here are some things you can do to switch to a more Mediterranean way of eating.  What to eat  Eat a variety of fruits and vegetables each day, such as grapes, blueberries, tomatoes, broccoli, peppers, figs, olives, spinach, eggplant, beans, lentils, and chickpeas.  Eat a variety of whole-grain foods each day, such as oats, brown rice, and whole wheat bread, pasta, and couscous.  Eat fish at least 2 times a week. Try tuna, salmon, mackerel, lake trout, herring, or sardines.  Eat moderate amounts of low-fat dairy products, such as milk, cheese, or yogurt.  Eat moderate amounts of poultry and eggs.  Choose healthy (unsaturated) fats, such as nuts, olive oil, and certain nut or seed oils like canola, soybean, and flaxseed.  Limit unhealthy (saturated) fats, such as butter, palm oil, and coconut oil. And limit fats found in animal products, such as meat and dairy products made with whole milk. Try to eat red meat only a few times a month in very small amounts.  Limit sweets and desserts to only a few times a week. This includes sugar-sweetened drinks like soda.  The

## 2025-03-16 ENCOUNTER — APPOINTMENT (OUTPATIENT)
Facility: HOSPITAL | Age: 80
End: 2025-03-16
Payer: MEDICARE

## 2025-03-16 ENCOUNTER — HOSPITAL ENCOUNTER (EMERGENCY)
Facility: HOSPITAL | Age: 80
Discharge: LEFT BEFORE DISCHARGE | End: 2025-03-16
Payer: MEDICARE

## 2025-03-16 VITALS
HEIGHT: 64 IN | RESPIRATION RATE: 22 BRPM | TEMPERATURE: 99.1 F | BODY MASS INDEX: 13.32 KG/M2 | OXYGEN SATURATION: 90 % | DIASTOLIC BLOOD PRESSURE: 60 MMHG | HEART RATE: 92 BPM | SYSTOLIC BLOOD PRESSURE: 131 MMHG | WEIGHT: 78 LBS

## 2025-03-16 DIAGNOSIS — N30.00 ACUTE CYSTITIS WITHOUT HEMATURIA: ICD-10-CM

## 2025-03-16 DIAGNOSIS — Z53.29 LEFT AGAINST MEDICAL ADVICE: ICD-10-CM

## 2025-03-16 DIAGNOSIS — J18.9 COMMUNITY ACQUIRED PNEUMONIA, UNSPECIFIED LATERALITY: Primary | ICD-10-CM

## 2025-03-16 LAB
ALBUMIN SERPL-MCNC: 3.5 G/DL (ref 3.4–5)
ALBUMIN/GLOB SERPL: 1 (ref 0.8–1.7)
ALP SERPL-CCNC: 82 U/L (ref 45–117)
ALT SERPL-CCNC: 14 U/L (ref 13–56)
ANION GAP SERPL CALC-SCNC: 4 MMOL/L (ref 3–18)
APPEARANCE UR: CLEAR
AST SERPL-CCNC: 13 U/L (ref 10–38)
B PERT DNA SPEC QL NAA+PROBE: NOT DETECTED
BACTERIA URNS QL MICRO: NEGATIVE /HPF
BASOPHILS # BLD: 0.16 K/UL (ref 0–0.1)
BASOPHILS NFR BLD: 1 % (ref 0–2)
BILIRUB SERPL-MCNC: 0.7 MG/DL (ref 0.2–1)
BILIRUB UR QL: NEGATIVE
BORDETELLA PARAPERTUSSIS BY PCR: NOT DETECTED
BUN SERPL-MCNC: 15 MG/DL (ref 7–18)
BUN/CREAT SERPL: 21 (ref 12–20)
C PNEUM DNA SPEC QL NAA+PROBE: NOT DETECTED
CALCIUM SERPL-MCNC: 8.9 MG/DL (ref 8.5–10.1)
CAOX CRY URNS QL MICRO: ABNORMAL
CHLORIDE SERPL-SCNC: 101 MMOL/L (ref 100–111)
CO2 SERPL-SCNC: 31 MMOL/L (ref 21–32)
COLOR UR: YELLOW
CREAT SERPL-MCNC: 0.73 MG/DL (ref 0.6–1.3)
DIFFERENTIAL METHOD BLD: ABNORMAL
EKG ATRIAL RATE: 111 BPM
EKG DIAGNOSIS: NORMAL
EKG P AXIS: 79 DEGREES
EKG P-R INTERVAL: 142 MS
EKG Q-T INTERVAL: 306 MS
EKG QRS DURATION: 72 MS
EKG QTC CALCULATION (BAZETT): 416 MS
EKG R AXIS: 15 DEGREES
EKG T AXIS: 74 DEGREES
EKG VENTRICULAR RATE: 111 BPM
EOSINOPHIL # BLD: 0 K/UL (ref 0–0.4)
EOSINOPHIL NFR BLD: 0 % (ref 0–5)
EPITH CASTS URNS QL MICRO: ABNORMAL /LPF (ref 0–5)
ERYTHROCYTE [DISTWIDTH] IN BLOOD BY AUTOMATED COUNT: 12.5 % (ref 11.6–14.5)
FLUAV SUBTYP SPEC NAA+PROBE: NOT DETECTED
FLUBV RNA SPEC QL NAA+PROBE: NOT DETECTED
GLOBULIN SER CALC-MCNC: 3.5 G/DL (ref 2–4)
GLUCOSE SERPL-MCNC: 160 MG/DL (ref 74–99)
GLUCOSE UR STRIP.AUTO-MCNC: NEGATIVE MG/DL
HADV DNA SPEC QL NAA+PROBE: NOT DETECTED
HCOV 229E RNA SPEC QL NAA+PROBE: NOT DETECTED
HCOV HKU1 RNA SPEC QL NAA+PROBE: NOT DETECTED
HCOV NL63 RNA SPEC QL NAA+PROBE: NOT DETECTED
HCOV OC43 RNA SPEC QL NAA+PROBE: NOT DETECTED
HCT VFR BLD AUTO: 42.9 % (ref 35–45)
HGB BLD-MCNC: 13.7 G/DL (ref 12–16)
HGB UR QL STRIP: NEGATIVE
HMPV RNA SPEC QL NAA+PROBE: NOT DETECTED
HPIV1 RNA SPEC QL NAA+PROBE: NOT DETECTED
HPIV2 RNA SPEC QL NAA+PROBE: NOT DETECTED
HPIV3 RNA SPEC QL NAA+PROBE: NOT DETECTED
HPIV4 RNA SPEC QL NAA+PROBE: NOT DETECTED
IMM GRANULOCYTES # BLD AUTO: 0 K/UL (ref 0–0.04)
IMM GRANULOCYTES NFR BLD AUTO: 0 % (ref 0–0.5)
KETONES UR QL STRIP.AUTO: NEGATIVE MG/DL
LACTATE BLD-SCNC: 1.37 MMOL/L (ref 0.4–2)
LEUKOCYTE ESTERASE UR QL STRIP.AUTO: ABNORMAL
LYMPHOCYTES # BLD: 2.12 K/UL (ref 0.9–3.6)
LYMPHOCYTES NFR BLD: 13 % (ref 21–52)
M PNEUMO DNA SPEC QL NAA+PROBE: NOT DETECTED
MAGNESIUM SERPL-MCNC: 2.1 MG/DL (ref 1.6–2.6)
MCH RBC QN AUTO: 32 PG (ref 24–34)
MCHC RBC AUTO-ENTMCNC: 31.9 G/DL (ref 31–37)
MCV RBC AUTO: 100.2 FL (ref 78–100)
MONOCYTES # BLD: 1.14 K/UL (ref 0.05–1.2)
MONOCYTES NFR BLD: 7 % (ref 3–10)
NEUTS SEG # BLD: 12.88 K/UL (ref 1.8–8)
NEUTS SEG NFR BLD: 79 % (ref 40–73)
NITRITE UR QL STRIP.AUTO: NEGATIVE
NRBC # BLD: 0 K/UL (ref 0–0.01)
NRBC BLD-RTO: 0 PER 100 WBC
PH UR STRIP: 6.5 (ref 5–8)
PLATELET # BLD AUTO: 208 K/UL (ref 135–420)
PLATELET COMMENT: ABNORMAL
PMV BLD AUTO: 9.6 FL (ref 9.2–11.8)
POTASSIUM SERPL-SCNC: 4.1 MMOL/L (ref 3.5–5.5)
PROT SERPL-MCNC: 7 G/DL (ref 6.4–8.2)
PROT UR STRIP-MCNC: ABNORMAL MG/DL
RBC # BLD AUTO: 4.28 M/UL (ref 4.2–5.3)
RBC #/AREA URNS HPF: NEGATIVE /HPF (ref 0–5)
RBC MORPH BLD: ABNORMAL
RSV RNA SPEC QL NAA+PROBE: NOT DETECTED
RV+EV RNA SPEC QL NAA+PROBE: NOT DETECTED
SARS-COV-2 RNA RESP QL NAA+PROBE: NOT DETECTED
SODIUM SERPL-SCNC: 136 MMOL/L (ref 136–145)
SP GR UR REFRACTOMETRY: >1.03 (ref 1–1.04)
TROPONIN I SERPL HS-MCNC: 7 NG/L (ref 0–54)
UROBILINOGEN UR QL STRIP.AUTO: 1 EU/DL (ref 0.2–1)
WBC # BLD AUTO: 16.3 K/UL (ref 4.6–13.2)
WBC URNS QL MICRO: ABNORMAL /HPF (ref 0–5)

## 2025-03-16 PROCEDURE — 6370000000 HC RX 637 (ALT 250 FOR IP): Performed by: PHYSICIAN ASSISTANT

## 2025-03-16 PROCEDURE — 81001 URINALYSIS AUTO W/SCOPE: CPT

## 2025-03-16 PROCEDURE — 83735 ASSAY OF MAGNESIUM: CPT

## 2025-03-16 PROCEDURE — 0202U NFCT DS 22 TRGT SARS-COV-2: CPT

## 2025-03-16 PROCEDURE — 85025 COMPLETE CBC W/AUTO DIFF WBC: CPT

## 2025-03-16 PROCEDURE — 71045 X-RAY EXAM CHEST 1 VIEW: CPT

## 2025-03-16 PROCEDURE — 93005 ELECTROCARDIOGRAM TRACING: CPT | Performed by: STUDENT IN AN ORGANIZED HEALTH CARE EDUCATION/TRAINING PROGRAM

## 2025-03-16 PROCEDURE — 6360000002 HC RX W HCPCS: Performed by: PHYSICIAN ASSISTANT

## 2025-03-16 PROCEDURE — 96375 TX/PRO/DX INJ NEW DRUG ADDON: CPT

## 2025-03-16 PROCEDURE — 93010 ELECTROCARDIOGRAM REPORT: CPT | Performed by: INTERNAL MEDICINE

## 2025-03-16 PROCEDURE — 71275 CT ANGIOGRAPHY CHEST: CPT

## 2025-03-16 PROCEDURE — 96361 HYDRATE IV INFUSION ADD-ON: CPT

## 2025-03-16 PROCEDURE — 2500000003 HC RX 250 WO HCPCS: Performed by: PHYSICIAN ASSISTANT

## 2025-03-16 PROCEDURE — 99285 EMERGENCY DEPT VISIT HI MDM: CPT

## 2025-03-16 PROCEDURE — 6360000004 HC RX CONTRAST MEDICATION: Performed by: PHYSICIAN ASSISTANT

## 2025-03-16 PROCEDURE — 84484 ASSAY OF TROPONIN QUANT: CPT

## 2025-03-16 PROCEDURE — 2580000003 HC RX 258: Performed by: PHYSICIAN ASSISTANT

## 2025-03-16 PROCEDURE — 96365 THER/PROPH/DIAG IV INF INIT: CPT

## 2025-03-16 PROCEDURE — 87040 BLOOD CULTURE FOR BACTERIA: CPT

## 2025-03-16 PROCEDURE — 80053 COMPREHEN METABOLIC PANEL: CPT

## 2025-03-16 PROCEDURE — 83605 ASSAY OF LACTIC ACID: CPT

## 2025-03-16 RX ORDER — GABAPENTIN 300 MG/1
300 CAPSULE ORAL
Status: COMPLETED | OUTPATIENT
Start: 2025-03-16 | End: 2025-03-16

## 2025-03-16 RX ORDER — DOCUSATE SODIUM 100 MG/1
100 CAPSULE, LIQUID FILLED ORAL
Status: COMPLETED | OUTPATIENT
Start: 2025-03-16 | End: 2025-03-16

## 2025-03-16 RX ORDER — 0.9 % SODIUM CHLORIDE 0.9 %
500 INTRAVENOUS SOLUTION INTRAVENOUS ONCE
Status: COMPLETED | OUTPATIENT
Start: 2025-03-16 | End: 2025-03-16

## 2025-03-16 RX ORDER — IOPAMIDOL 755 MG/ML
85 INJECTION, SOLUTION INTRAVASCULAR
Status: COMPLETED | OUTPATIENT
Start: 2025-03-16 | End: 2025-03-16

## 2025-03-16 RX ORDER — TRAMADOL HYDROCHLORIDE 50 MG/1
50 TABLET ORAL
Status: COMPLETED | OUTPATIENT
Start: 2025-03-16 | End: 2025-03-16

## 2025-03-16 RX ORDER — LEVOFLOXACIN 250 MG/1
250 TABLET, FILM COATED ORAL DAILY
Qty: 10 TABLET | Refills: 0 | Status: SHIPPED | OUTPATIENT
Start: 2025-03-16 | End: 2025-03-26

## 2025-03-16 RX ADMIN — TRAMADOL HYDROCHLORIDE 50 MG: 50 TABLET, COATED ORAL at 20:47

## 2025-03-16 RX ADMIN — SODIUM CHLORIDE 500 ML: 9 INJECTION, SOLUTION INTRAVENOUS at 19:09

## 2025-03-16 RX ADMIN — GABAPENTIN 300 MG: 300 CAPSULE ORAL at 20:47

## 2025-03-16 RX ADMIN — IOPAMIDOL 70 ML: 755 INJECTION, SOLUTION INTRAVENOUS at 16:07

## 2025-03-16 RX ADMIN — DOCUSATE SODIUM 100 MG: 100 CAPSULE, LIQUID FILLED ORAL at 20:47

## 2025-03-16 RX ADMIN — WATER 1000 MG: 1 INJECTION INTRAMUSCULAR; INTRAVENOUS; SUBCUTANEOUS at 16:15

## 2025-03-16 RX ADMIN — VANCOMYCIN HYDROCHLORIDE 1000 MG: 1 INJECTION, POWDER, LYOPHILIZED, FOR SOLUTION INTRAVENOUS at 16:29

## 2025-03-16 ASSESSMENT — PAIN DESCRIPTION - ORIENTATION: ORIENTATION: MID

## 2025-03-16 ASSESSMENT — PAIN DESCRIPTION - LOCATION: LOCATION: CHEST

## 2025-03-16 ASSESSMENT — PAIN DESCRIPTION - DESCRIPTORS: DESCRIPTORS: SHARP

## 2025-03-16 ASSESSMENT — PAIN DESCRIPTION - PAIN TYPE: TYPE: ACUTE PAIN

## 2025-03-16 ASSESSMENT — PAIN - FUNCTIONAL ASSESSMENT: PAIN_FUNCTIONAL_ASSESSMENT: 0-10

## 2025-03-16 ASSESSMENT — PAIN SCALES - GENERAL: PAINLEVEL_OUTOF10: 5

## 2025-03-16 NOTE — PROGRESS NOTES
[facility]   Pharmacy Pharmacokinetic Monitoring Service - Vancomycin     Sue Craig is a 79 y.o. female starting on vancomycin therapy for Sepsis of Unknown Etiology. Pharmacy consulted for monitoring and adjustment.    Target Concentration: Dosing based on anticipated concentration <15 mg/L due to renal impairment/insufficiency    Additional Antimicrobials: Ceftriaxone    Pertinent Laboratory Values:   Temp: 99.1 °F (37.3 °C), Weight - Scale: 35.4 kg (78 lb)  Recent Labs     03/16/25  1415   CREATININE 0.73   BUN 15   WBC 16.3*     Estimated Creatinine Clearance: 36 mL/min (based on SCr of 0.73 mg/dL).    Pertinent Cultures:  Culture Date Source Results        MRSA Nasal Swab: N/A. Non-respiratory infection    Plan:  Concentration-guided dosing due to renal impairment/insufficiency  Start vancomycin 1000 mg x 1  Will dose according to vancomycin concentration levels at this time due to renal insufficiency  Renal labs as indicated   Vancomycin concentration ordered for AM labs tomorrow  Pharmacy will continue to monitor patient and adjust therapy as indicated    Thank you for the consult,  Adelfo Vazquez [facility]  3/16/2025

## 2025-03-16 NOTE — ED TRIAGE NOTES
Pt presented to ED with fatigue since receiving the RSV shot last 03/10. Pt also reports chest pain since Tuesday.     Hx of GERD.

## 2025-03-16 NOTE — ED PROVIDER NOTES
EMERGENCY DEPARTMENT HISTORY AND PHYSICAL EXAM    Date: 3/16/2025  Patient Name: Sue Craig    History of Presenting Illness     Chief Complaint   Patient presents with    Fatigue    Chest Pain         History Provided By:Patient     Chief Complaint: cough, chest pain, fatigue  Duration: 1 week  Timing: acute  Location: chest  Quality: pressure  Severity: moderate  Modifying Factors:none   Associated Symptoms: chills and nausea       Additional History (Context): Sue Craig is a 79 y.o. female accompanied by her partner with PMH of stroke, left ventricular diastolic dysfunction, recurrent UTIs, chronic neck pain, osteoporosis, neuropathy, asthma, and GERD who presents with cough, chest pain, and fatigue x 1 week. Her symptoms 6 days ago after she got the RSV vaccine. She states she feels a constant pressure over her entire chest. She has a productive cough with yellow sputum. She has been also very fatigued the past few days. She is normally independent but now needs assistance with her ADLs. She feels very weak and has not been able to eat or drink much.       PCP: Suhas Huang MD    Current Facility-Administered Medications   Medication Dose Route Frequency Provider Last Rate Last Admin    cefTRIAXone (ROCEPHIN) 1,000 mg in sterile water 10 mL IV syringe  1,000 mg IntraVENous Q24H Kacie William PA-C   1,000 mg at 03/16/25 1615    vancomycin (VANCOCIN) intermittent dosing (placeholder)   Other RX Placeholder Kacie William PA-C        magnesium hydroxide (MILK OF MAGNESIA) 400 MG/5ML suspension 30 mL  30 mL Oral NOW Kacie William PA-C         Current Outpatient Medications   Medication Sig Dispense Refill    levoFLOXacin (LEVAQUIN) 250 MG tablet Take 1 tablet by mouth daily for 10 days 10 tablet 0    trospium (SANCTURA) 20 MG tablet Take 1 tablet by mouth 2 times daily 60 tablet 3    estradiol (ESTRACE VAGINAL) 0.1 MG/GM vaginal cream Place pea-sized amount on outer vagina around urethra 3

## 2025-03-17 NOTE — ED NOTES
Pt's family arrived and she is now requesting to leave. Pt signed AMA form. PIV removed and pt given a pulse Ox to monitor SPO2. Antibiotic sent electronically to pharmacy.

## 2025-03-22 LAB
BACTERIA SPEC CULT: NORMAL
BACTERIA SPEC CULT: NORMAL
SERVICE CMNT-IMP: NORMAL
SERVICE CMNT-IMP: NORMAL

## (undated) DEVICE — DRAPE,REIN 53X77,STERILE: Brand: MEDLINE

## (undated) DEVICE — ELECTRODE ES AD DISPER HYDRGEL THN FOAM ADH SCALLOPED EDGE

## (undated) DEVICE — NDL BIOP VIZISHOT ASPR 40MM --

## (undated) DEVICE — CONTAINER PREFIL FRMLN 40ML --

## (undated) DEVICE — SET EXTN SM 0.5ML L13IN BOR W/O INJ SITE

## (undated) DEVICE — CATHETER SUCT TR FL TIP 14FR W/ O CTRL

## (undated) DEVICE — (D)GLOVE EXAM LG NITRL NS -- DISC BY MFR NO SUB

## (undated) DEVICE — STERILE POLYISOPRENE POWDER-FREE SURGICAL GLOVES: Brand: PROTEXIS

## (undated) DEVICE — (D)SYR 10ML SLIP TIP 1/5ML GRD -- DISC BY MFR USE ITEM 338000

## (undated) DEVICE — FCPS RAD JAW 4LC 240CM W/NDL -- BX/20 RADIAL JAW 4

## (undated) DEVICE — BITE BLOCK ENDOSCP UNIV AD 6 TO 9.4 MM

## (undated) DEVICE — SOLUTION IV 250ML 0.9% SOD CHL CLR INJ FLX BG CONT PRT CLSR

## (undated) DEVICE — BASIN EMESIS 500CC ROSE 250/CS 60/PLT: Brand: MEDEGEN MEDICAL PRODUCTS, LLC

## (undated) DEVICE — SYR 10ML CTRL LR LCK NSAF LF --

## (undated) DEVICE — AVANOS* SHORT BEVEL NEEDLE: Brand: AVANOS

## (undated) DEVICE — MEDI-VAC SUCTION HIGH CAPACITY: Brand: CARDINAL HEALTH

## (undated) DEVICE — GOWN PLASTIC FILM THMBHKS UNIV BLUE: Brand: CARDINAL HEALTH

## (undated) DEVICE — SYRINGE MED 25GA 3ML L5/8IN SUBQ PLAS W/ DETACH NDL SFTY

## (undated) DEVICE — MIRAGE SWIFT II PILLOW LGE: Brand: MIRAGE SWIFT II

## (undated) DEVICE — FLUFF AND POLYMER UNDERPAD,EXTRA HEAVY: Brand: WINGS

## (undated) DEVICE — CUFF BLD PRESSURE MONITORING LNG AD 23-33 CM 1 TUBE MY CUF

## (undated) DEVICE — MEDI-VAC NON-CONDUCTIVE SUCTION TUBING: Brand: CARDINAL HEALTH

## (undated) DEVICE — ENDOSCOPY PUMP TUBING/ CAP SET: Brand: ERBE

## (undated) DEVICE — BLADE TNGE REG 6IN WOOD STRL -- CONVERT TO ITEM 153408

## (undated) DEVICE — BASIN EMSIS 16OZ GRAPHITE PLAS KID SHP MOLD GRAD FOR ORAL

## (undated) DEVICE — SYR 20ML LL STRL LF --

## (undated) DEVICE — SYRINGE MED 10ML LUERLOCK TIP W/O SFTY DISP

## (undated) DEVICE — NDL BX EXCELON 21GX130CM --

## (undated) DEVICE — SNARE POLYP M W27MMXL240CM OVL STIFF DISP CAPTIVATOR

## (undated) DEVICE — AIRLIFE™ NASAL OXYGEN CANNULA CURVED, FLARED TIP WITH 14 FOOT (4.3 M) CRUSH-RESISTANT TUBING, OVER-THE-EAR STYLE: Brand: AIRLIFE™

## (undated) DEVICE — FCPS BIOP PULM RAD JAW 100CML -- BX/10 M00515180

## (undated) DEVICE — FLEX ADVANTAGE 3000CC: Brand: FLEX ADVANTAGE

## (undated) DEVICE — Device: Brand: MEDEX

## (undated) DEVICE — (D)BNDG ADHESIVE FABRIC 3/4X3 -- DISC BY MFR USE ITEM 357960

## (undated) DEVICE — Device

## (undated) DEVICE — TRAY SUPP STD NO DRUG W EXTENSION SET

## (undated) DEVICE — GOWN ISOL IMPERV UNIV, DISP, OPEN BACK, BLUE --

## (undated) DEVICE — SYR 10ML LUER LOK 1/5ML GRAD --

## (undated) DEVICE — UNDERPAD INCONT W23XL36IN STD BLU POLYPR BK FLUF SFT

## (undated) DEVICE — BE 105-8 BRONCHOSCOPE SWIVEL - 15MM ID/22MM OD (PATIENT PORT) X15MM OD (EQUIPMENT PORT). REUSABLE.  FITS COMPONENTS OF ADULT VENTILATOR CIRCUITS.  MOLDED OF POLYETHERIMIDE. INCLUDES TWO SILICONE RUBBER CAPS; ONE CAP ALLOWS FOR THE USE OF A SUCTIONING CATHETER WHILE THE OTHER CAP ALLOWS FOR THE USE OF A FIBER-OPTIC BRONCHOSCOPE WITHOUT SIGNIFICANT LOSS OF PEEP.: Brand: BE 105-8 BRONCHOSCOPE SWIVEL

## (undated) DEVICE — SYR 50ML SLIP TIP NSAF LF STRL --

## (undated) DEVICE — SET ADMIN L104IN 20 GTT GRAV RLER CLMP SMRT SITE NDL FREE

## (undated) DEVICE — CATH IV SAFE STR 22GX1IN BLU -- PROTECTIV PLUS

## (undated) DEVICE — SOLUTION IRRIG 1000ML H2O STRL BLT

## (undated) DEVICE — LINER SUCT CANSTR 3000CC PLAS SFT PRE ASSEMB W/OUT TBNG W/

## (undated) DEVICE — DRAPE TWL SURG 16X26IN BLU ORB04] ALLCARE INC]

## (undated) DEVICE — SYRINGE 20ML LL S/C 50

## (undated) DEVICE — AIRLIFE™ ADULT OXYGEN MASK VINYL, UNDER-THE-CHIN STYLE, MEDIUM CONCENTRATION MASK WITH 7 FEET (2.1 M) CRUSH-RESISTANT OXYGEN TUBING: Brand: AIRLIFE™

## (undated) DEVICE — 1860S HEALTH CARE RESPIRATOR N95 120EA/C: Brand: 3M™

## (undated) DEVICE — GAUZE,SPONGE,4"X4",16PLY,STRL,LF,10/TRAY: Brand: MEDLINE

## (undated) DEVICE — CANNULA ORIG TL CLR W FOAM CUSHIONS AND 14FT SUPL TB 3 CHN

## (undated) DEVICE — TRAP SPEC COLL POLYP POLYSTYR --

## (undated) DEVICE — MASK SURG REG ORNG LEV 3 SFTY SEAL 4 LAYR SFT INNR LINING

## (undated) DEVICE — Device: Brand: BALLOON

## (undated) DEVICE — TRAP SUC MUCOUS 70ML -- MEDICHOICE MEDLINE

## (undated) DEVICE — APPLICATOR FBR TIP L6IN COT TIP WOOD SHFT SWAB 2000 PER CA

## (undated) DEVICE — SYRINGE MED 50ML LUERSLIP TIP

## (undated) DEVICE — AIRLIFE™ NASAL OXYGEN CANNULA CURVED, NONFLARED TIP WITH 14 FOOT (4.3 M) CRUSH-RESISTANT TUBING, OVER-THE-EAR STYLE: Brand: AIRLIFE™

## (undated) DEVICE — SINGLE USE BIOPSY VALVE MAJ-210: Brand: SINGLE USE BIOPSY VALVE (STERILE)

## (undated) DEVICE — GAUZE SPONGES,16 PLY: Brand: CURITY

## (undated) DEVICE — CANNULA NSL AD TBNG L14FT STD PVC O2 CRV CONN NONFLARED NSL

## (undated) DEVICE — 1860 HEALTH CARE N95 MASK, 20EACH/BOX  6 BX/C: Brand: 3M™

## (undated) DEVICE — FLEX ADVANTAGE 1500CC: Brand: FLEX ADVANTAGE

## (undated) DEVICE — ADAPTER TBNG DIA15MM SWVL FBROPT BRONCHSCP TERM 2 AXIS PEEP

## (undated) DEVICE — CURVED SHARP RF CANNULA, RADIOPAQUE MARKER: Brand: RADIOPAQUE RADIOFREQUENCY CANNULA

## (undated) DEVICE — FORCEPS BX L240CM JAW DIA2.8MM L CAP W/ NDL MIC MESH TOOTH

## (undated) DEVICE — MAILER SLDE MICSCP 2 PLC --

## (undated) DEVICE — SINGLE USE SUCTION VALVE MAJ-209: Brand: SINGLE USE SUCTION VALVE (STERILE)

## (undated) DEVICE — YANKAUER,SMOOTH HANDLE,HIGH CAPACITY: Brand: MEDLINE INDUSTRIES, INC.

## (undated) DEVICE — FORCEPS BX L240CM JAW DIA2.4MM ORNG L CAP W/ NDL DISP RAD